# Patient Record
Sex: FEMALE | Race: BLACK OR AFRICAN AMERICAN | NOT HISPANIC OR LATINO | Employment: OTHER | URBAN - METROPOLITAN AREA
[De-identification: names, ages, dates, MRNs, and addresses within clinical notes are randomized per-mention and may not be internally consistent; named-entity substitution may affect disease eponyms.]

---

## 2017-01-06 RX ORDER — GLIMEPIRIDE 4 MG/1
TABLET ORAL
Qty: 30 TABLET | Refills: 1 | Status: SHIPPED | OUTPATIENT
Start: 2017-01-06 | End: 2017-03-11 | Stop reason: SDUPTHER

## 2017-01-11 ENCOUNTER — OFFICE VISIT (OUTPATIENT)
Dept: SLEEP MEDICINE | Facility: HOSPITAL | Age: 57
End: 2017-01-11

## 2017-01-11 VITALS
BODY MASS INDEX: 37.19 KG/M2 | OXYGEN SATURATION: 96 % | HEART RATE: 66 BPM | WEIGHT: 197 LBS | SYSTOLIC BLOOD PRESSURE: 158 MMHG | HEIGHT: 61 IN | DIASTOLIC BLOOD PRESSURE: 66 MMHG

## 2017-01-11 DIAGNOSIS — F51.04 PSYCHOPHYSIOLOGICAL INSOMNIA: ICD-10-CM

## 2017-01-11 DIAGNOSIS — G47.33 OBSTRUCTIVE SLEEP APNEA SYNDROME: Primary | ICD-10-CM

## 2017-01-11 PROBLEM — E61.1 IRON DEFICIENCY: Status: ACTIVE | Noted: 2017-01-11

## 2017-01-11 PROBLEM — J02.0 STREP PHARYNGITIS: Status: ACTIVE | Noted: 2017-01-11

## 2017-01-11 PROCEDURE — 99213 OFFICE O/P EST LOW 20 MIN: CPT | Performed by: INTERNAL MEDICINE

## 2017-01-11 RX ORDER — HYDROCODONE BITARTRATE AND ACETAMINOPHEN 5; 325 MG/1; MG/1
TABLET ORAL
COMMUNITY
Start: 2016-12-19 | End: 2018-04-23

## 2017-01-11 NOTE — PATIENT INSTRUCTIONS
Insomnia  Insomnia is a sleep disorder that makes it difficult to fall asleep or to stay asleep. Insomnia can cause tiredness (fatigue), low energy, difficulty concentrating, mood swings, and poor performance at work or school.   There are three different ways to classify insomnia:   · Difficulty falling asleep.  · Difficulty staying asleep.  · Waking up too early in the morning.  Any type of insomnia can be long-term (chronic) or short-term (acute). Both are common. Short-term insomnia usually lasts for three months or less. Chronic insomnia occurs at least three times a week for longer than three months.  CAUSES   Insomnia may be caused by another condition, situation, or substance, such as:  · Anxiety.  · Certain medicines.  · Gastroesophageal reflux disease (GERD) or other gastrointestinal conditions.  · Asthma or other breathing conditions.  · Restless legs syndrome, sleep apnea, or other sleep disorders.  · Chronic pain.  · Menopause. This may include hot flashes.  · Stroke.  · Abuse of alcohol, tobacco, or illegal drugs.  · Depression.  · Caffeine.    · Neurological disorders, such as Alzheimer disease.  · An overactive thyroid (hyperthyroidism).  The cause of insomnia may not be known.  RISK FACTORS  Risk factors for insomnia include:  · Gender. Women are more commonly affected than men.  · Age. Insomnia is more common as you get older.  · Stress. This may involve your professional or personal life.  · Income. Insomnia is more common in people with lower income.  · Lack of exercise.    · Irregular work schedule or night shifts.  · Traveling between different time zones.  SIGNS AND SYMPTOMS  If you have insomnia, trouble falling asleep or trouble staying asleep is the main symptom. This may lead to other symptoms, such as:  · Feeling fatigued.  · Feeling nervous about going to sleep.  · Not feeling rested in the morning.  · Having trouble concentrating.  · Feeling irritable, anxious, or depressed.  TREATMENT    Treatment for insomnia depends on the cause. If your insomnia is caused by an underlying condition, treatment will focus on addressing the condition. Treatment may also include:   · Medicines to help you sleep.  · Counseling or therapy.  · Lifestyle adjustments.  HOME CARE INSTRUCTIONS   · Take medicines only as directed by your health care provider.  · Keep regular sleeping and waking hours. Avoid naps.  · Keep a sleep diary to help you and your health care provider figure out what could be causing your insomnia. Include:      When you sleep.    When you wake up during the night.    How well you sleep.      How rested you feel the next day.    Any side effects of medicines you are taking.    What you eat and drink.    · Make your bedroom a comfortable place where it is easy to fall asleep:    Put up shades or special blackout curtains to block light from outside.    Use a white noise machine to block noise.    Keep the temperature cool.    · Exercise regularly as directed by your health care provider. Avoid exercising right before bedtime.  · Use relaxation techniques to manage stress. Ask your health care provider to suggest some techniques that may work well for you. These may include:    Breathing exercises.    Routines to release muscle tension.    Visualizing peaceful scenes.  · Cut back on alcohol, caffeinated beverages, and cigarettes, especially close to bedtime. These can disrupt your sleep.  · Do not overeat or eat spicy foods right before bedtime. This can lead to digestive discomfort that can make it hard for you to sleep.  · Limit screen use before bedtime. This includes:    Watching TV.    Using your smartphone, tablet, and computer.  · Stick to a routine. This can help you fall asleep faster. Try to do a quiet activity, brush your teeth, and go to bed at the same time each night.  · Get out of bed if you are still awake after 15 minutes of trying to sleep. Keep the lights down, but try reading or  doing a quiet activity. When you feel sleepy, go back to bed.  · Make sure that you drive carefully. Avoid driving if you feel very sleepy.  · Keep all follow-up appointments as directed by your health care provider. This is important.  SEEK MEDICAL CARE IF:   · You are tired throughout the day or have trouble in your daily routine due to sleepiness.  · You continue to have sleep problems or your sleep problems get worse.  SEEK IMMEDIATE MEDICAL CARE IF:   · You have serious thoughts about hurting yourself or someone else.     This information is not intended to replace advice given to you by your health care provider. Make sure you discuss any questions you have with your health care provider.     Document Released: 12/15/2001 Document Revised: 09/07/2016 Document Reviewed: 09/18/2015  [employer] Interactive Patient Education ©2016 [employer]  Sleep Apnea   Sleep apnea is a sleep disorder characterized by abnormal pauses in breathing while you sleep. When your breathing pauses, the level of oxygen in your blood decreases. This causes you to move out of deep sleep and into light sleep. As a result, your quality of sleep is poor, and the system that carries your blood throughout your body (cardiovascular system) experiences stress. If sleep apnea remains untreated, the following conditions can develop:  · High blood pressure (hypertension).  · Coronary artery disease.  · Inability to achieve or maintain an erection (impotence).  · Impairment of your thought process (cognitive dysfunction).  There are three types of sleep apnea:  1. Obstructive sleep apnea--Pauses in breathing during sleep because of a blocked airway.  2. Central sleep apnea--Pauses in breathing during sleep because the area of the brain that controls your breathing does not send the correct signals to the muscles that control breathing.  3. Mixed sleep apnea--A combination of both obstructive and central sleep apnea.  RISK FACTORS  The following  "risk factors can increase your risk of developing sleep apnea:  · Being overweight.  · Smoking.  · Having narrow passages in your nose and throat.  · Being of older age.  · Being male.  · Alcohol use.  · Sedative and tranquilizer use.  · Ethnicity. Among individuals younger than 35 years,  Americans are at increased risk of sleep apnea.  SYMPTOMS   · Difficulty staying asleep.  · Daytime sleepiness and fatigue.  · Loss of energy.  · Irritability.  · Loud, heavy snoring.  · Morning headaches.  · Trouble concentrating.  · Forgetfulness.  · Decreased interest in sex.  · Unexplained sleepiness.  DIAGNOSIS   In order to diagnose sleep apnea, your caregiver will perform a physical examination. A sleep study done in the comfort of your own home may be appropriate if you are otherwise healthy. Your caregiver may also recommend that you spend the night in a sleep lab. In the sleep lab, several monitors record information about your heart, lungs, and brain while you sleep. Your leg and arm movements and blood oxygen level are also recorded.  TREATMENT  The following actions may help to resolve mild sleep apnea:  · Sleeping on your side.    · Using a decongestant if you have nasal congestion.    · Avoiding the use of depressants, including alcohol, sedatives, and narcotics.    · Losing weight and modifying your diet if you are overweight.  There also are devices and treatments to help open your airway:  · Oral appliances. These are custom-made mouthpieces that shift your lower jaw forward and slightly open your bite. This opens your airway.  · Devices that create positive airway pressure. This positive pressure \"splints\" your airway open to help you breathe better during sleep. The following devices create positive airway pressure:    Continuous positive airway pressure (CPAP) device. The CPAP device creates a continuous level of air pressure with an air pump. The air is delivered to your airway through a mask while you " sleep. This continuous pressure keeps your airway open.    Nasal expiratory positive airway pressure (EPAP) device. The EPAP device creates positive air pressure as you exhale. The device consists of single-use valves, which are inserted into each nostril and held in place by adhesive. The valves create very little resistance when you inhale but create much more resistance when you exhale. That increased resistance creates the positive airway pressure. This positive pressure while you exhale keeps your airway open, making it easier to breath when you inhale again.    Bilevel positive airway pressure (BPAP) device. The BPAP device is used mainly in patients with central sleep apnea. This device is similar to the CPAP device because it also uses an air pump to deliver continuous air pressure through a mask. However, with the BPAP machine, the pressure is set at two different levels. The pressure when you exhale is lower than the pressure when you inhale.  · Surgery. Typically, surgery is only done if you cannot comply with less invasive treatments or if the less invasive treatments do not improve your condition. Surgery involves removing excess tissue in your airway to create a wider passage way.     This information is not intended to replace advice given to you by your health care provider. Make sure you discuss any questions you have with your health care provider.     Document Released: 12/08/2003 Document Revised: 01/08/2016 Document Reviewed: 04/25/2013  [employer] Interactive Patient Education ©2016 [employer]

## 2017-01-11 NOTE — PROGRESS NOTES
"Eamon Lopez is a 56 y.o. female is here today for follow-up.She is seen follow-up for obstructive sleep apnea and difficulty getting to sleep.  Her primary care physician is Dr. Little.    History of Present Illness  Patient was last seen here August 24, 2016.  She has obstructive sleep apnea obesity diabetes and dyspnea she's been having some problems of being unable to fall asleep for the past 2 weeks she is been going to bed at 10 or 10:30 at night taking Ambien still some problems falling asleep.  She's sleeping during the day however up to 5 hours says she would like to get up at 8:30 or 9 but is sleeping off until 11 AM.  She was being  and apparently had 4 for foster children removed and one day.  She is been using Ambien as needed.  The following portions of the patient's history were reviewed and updated as appropriate: allergies, current medications and problem list.    Review of Systems   Constitutional: Negative.    HENT: Negative.    Eyes: Positive for visual disturbance.   Respiratory: Positive for shortness of breath.    Cardiovascular: Positive for palpitations.   Gastrointestinal: Negative.    Endocrine: Negative.    Genitourinary: Negative.    Musculoskeletal: Positive for arthralgias and joint swelling.   Skin: Negative.    Allergic/Immunologic: Negative.    Neurological: Negative.    Hematological: Negative.    Psychiatric/Behavioral: Positive for dysphoric mood.       Objective    Visit Vitals   • /66   • Pulse 66   • Ht 61\" (154.9 cm)   • Wt 197 lb (89.4 kg)   • SpO2 96%   • BMI 37.22 kg/m2       Physical Exam   Constitutional: She is oriented to person, place, and time. She appears well-developed and well-nourished.   She is obese.   HENT:   Head: Normocephalic and atraumatic.   She has Mallampati class IV anatomy   Eyes: EOM are normal. Pupils are equal, round, and reactive to light.   Neck: Normal range of motion. Neck supple.   Cardiovascular: Normal " rate, regular rhythm and normal heart sounds.    Pulmonary/Chest: Effort normal and breath sounds normal.   Abdominal: Soft. Bowel sounds are normal.   Musculoskeletal: Normal range of motion. She exhibits no edema.   Neurological: She is alert and oriented to person, place, and time.   Skin: Skin is warm and dry.   Psychiatric: She has a normal mood and affect. Her behavior is normal.     Download from machine for the past 6 months shows usage 99.4% the days.  She 8 hours 17 minutes per day.  Her AHI is 2.6.  Her 90% pressure is 12.3.    Assessment/Plan   Di was seen today for follow-up.    Diagnoses and all orders for this visit:    Obstructive sleep apnea syndrome  -     Generic Auto PAP Order    Psychophysiological insomnia        The patient does have significant obstructive sleep apnea but appears to be good control on her current pressure settings.  We'll write for new supplies.  She is continued encouraged to lose weight and to avoid alcohol and sedatives close to bedtime she is to practice lateral position sleep    She seems have some significant psych physiologic insomnia.  Given her information on measures to improve her sleep hygiene  also given her information on the [facility] cognitive behavioral therapy course for insomnia.  She is to return then in 4 months and we'll reassess situation.  She is to contact us earlier symptoms worsen.    Mendoza Mercer MD [facility]  Sleep Medicine  Pulmonary and Critical Care Medicine

## 2017-01-11 NOTE — MR AVS SNAPSHOT
Di WADDELL Hockaday 1/11/2017 3:45 PM   Office Visit    Dept Phone:  [phone]   Encounter #:  [id number]    Provider:  Mendoza Mercer MD   Department:  [facility] SLEEP MEDICINE                Your Full Care Plan              Your Updated Medication List          This list is accurate as of: 1/11/17  4:44 PM.  Always use your most recent med list.                albuterol 108 (90 BASE) MCG/ACT inhaler   Commonly known as:  PROVENTIL HFA;VENTOLIN HFA       Alcohol Prep 70 % pads       ASSURE COMFORT LANCETS 30G misc       budesonide-formoterol 160-4.5 MCG/ACT inhaler   Commonly known as:  SYMBICORT   Inhale 2 puffs 2 (two) times a day.       carvedilol 12.5 MG tablet   Commonly known as:  COREG       Dextromethorphan-Guaifenesin  MG capsule   Commonly known as:  CORICIDIN HBP CONGESTION/COUGH   Take 2 tablets by mouth Every 4 (Four) Hours As Needed (cough).       escitalopram 20 MG tablet   Commonly known as:  LEXAPRO   Take 1 tablet by mouth Daily.       FISH OIL PO       folic acid 1 MG tablet   Commonly known as:  FOLVITE   Take 1 tablet by mouth daily.       furosemide 40 MG tablet   Commonly known as:  LASIX       gabapentin 600 MG tablet   Commonly known as:  NEURONTIN       glimepiride 4 MG tablet   Commonly known as:  AMARYL   TAKE ONE TABLET BY MOUTH ONCE DAILY       * HYDROcodone-acetaminophen  MG per tablet   Commonly known as:  NORCO       * HYDROcodone-acetaminophen 5-325 MG per tablet   Commonly known as:  NORCO       Insulin Glargine 100 UNIT/ML injection pen   Commonly known as:  LANTUS SOLOSTAR   Inject 40 Units under the skin daily.       nitrofurantoin 100 MG capsule   Commonly known as:  MACRODANTIN   Take 1 capsule by mouth 2 (Two) Times a Day.       ONE TOUCH ULTRA 2 W/DEVICE kit       ONE TOUCH ULTRA TEST test strip   Generic drug:  glucose blood       ONGLYZA 5 MG tablet   Generic drug:  SAXagliptin   TAKE ONE TABLET BY MOUTH ONCE DAILY        OT ULTRA/FASTTK CNTRL SOLN solution       RELION PEN NEEDLES 32G X 4 MM misc   Generic drug:  Insulin Pen Needle       SIMPLE DIAGNOSTICS LANCING DEV misc       simvastatin 40 MG tablet   Commonly known as:  ZOCOR       * warfarin 1 MG tablet   Commonly known as:  COUMADIN       * warfarin 4 MG tablet   Commonly known as:  COUMADIN       * Notice:  This list has 4 medication(s) that are the same as other medications prescribed for you. Read the directions carefully, and ask your doctor or other care provider to review them with you.            We Performed the Following     Generic Auto PAP Order       You Were Diagnosed With        Codes Comments    Obstructive sleep apnea syndrome    -  Primary ICD-10-CM: G47.33  ICD-9-CM: 327.23     Psychophysiological insomnia     ICD-10-CM: F51.04  ICD-9-CM: 307.42       Instructions    Insomnia  Insomnia is a sleep disorder that makes it difficult to fall asleep or to stay asleep. Insomnia can cause tiredness (fatigue), low energy, difficulty concentrating, mood swings, and poor performance at work or school.   There are three different ways to classify insomnia:   · Difficulty falling asleep.  · Difficulty staying asleep.  · Waking up too early in the morning.  Any type of insomnia can be long-term (chronic) or short-term (acute). Both are common. Short-term insomnia usually lasts for three months or less. Chronic insomnia occurs at least three times a week for longer than three months.  CAUSES   Insomnia may be caused by another condition, situation, or substance, such as:  · Anxiety.  · Certain medicines.  · Gastroesophageal reflux disease (GERD) or other gastrointestinal conditions.  · Asthma or other breathing conditions.  · Restless legs syndrome, sleep apnea, or other sleep disorders.  · Chronic pain.  · Menopause. This may include hot flashes.  · Stroke.  · Abuse of alcohol, tobacco, or illegal drugs.  · Depression.  · Caffeine.    · Neurological disorders, such  as Alzheimer disease.  · An overactive thyroid (hyperthyroidism).  The cause of insomnia may not be known.  RISK FACTORS  Risk factors for insomnia include:  · Gender. Women are more commonly affected than men.  · Age. Insomnia is more common as you get older.  · Stress. This may involve your professional or personal life.  · Income. Insomnia is more common in people with lower income.  · Lack of exercise.    · Irregular work schedule or night shifts.  · Traveling between different time zones.  SIGNS AND SYMPTOMS  If you have insomnia, trouble falling asleep or trouble staying asleep is the main symptom. This may lead to other symptoms, such as:  · Feeling fatigued.  · Feeling nervous about going to sleep.  · Not feeling rested in the morning.  · Having trouble concentrating.  · Feeling irritable, anxious, or depressed.  TREATMENT   Treatment for insomnia depends on the cause. If your insomnia is caused by an underlying condition, treatment will focus on addressing the condition. Treatment may also include:   · Medicines to help you sleep.  · Counseling or therapy.  · Lifestyle adjustments.  HOME CARE INSTRUCTIONS   · Take medicines only as directed by your health care provider.  · Keep regular sleeping and waking hours. Avoid naps.  · Keep a sleep diary to help you and your health care provider figure out what could be causing your insomnia. Include:      When you sleep.    When you wake up during the night.    How well you sleep.      How rested you feel the next day.    Any side effects of medicines you are taking.    What you eat and drink.    · Make your bedroom a comfortable place where it is easy to fall asleep:    Put up shades or special blackout curtains to block light from outside.    Use a white noise machine to block noise.    Keep the temperature cool.    · Exercise regularly as directed by your health care provider. Avoid exercising right before bedtime.  · Use relaxation techniques to manage stress.  Ask your health care provider to suggest some techniques that may work well for you. These may include:    Breathing exercises.    Routines to release muscle tension.    Visualizing peaceful scenes.  · Cut back on alcohol, caffeinated beverages, and cigarettes, especially close to bedtime. These can disrupt your sleep.  · Do not overeat or eat spicy foods right before bedtime. This can lead to digestive discomfort that can make it hard for you to sleep.  · Limit screen use before bedtime. This includes:    Watching TV.    Using your smartphone, tablet, and computer.  · Stick to a routine. This can help you fall asleep faster. Try to do a quiet activity, brush your teeth, and go to bed at the same time each night.  · Get out of bed if you are still awake after 15 minutes of trying to sleep. Keep the lights down, but try reading or doing a quiet activity. When you feel sleepy, go back to bed.  · Make sure that you drive carefully. Avoid driving if you feel very sleepy.  · Keep all follow-up appointments as directed by your health care provider. This is important.  SEEK MEDICAL CARE IF:   · You are tired throughout the day or have trouble in your daily routine due to sleepiness.  · You continue to have sleep problems or your sleep problems get worse.  SEEK IMMEDIATE MEDICAL CARE IF:   · You have serious thoughts about hurting yourself or someone else.     This information is not intended to replace advice given to you by your health care provider. Make sure you discuss any questions you have with your health care provider.     Document Released: 12/15/2001 Document Revised: 09/07/2016 Document Reviewed: 09/18/2015  [employer] Interactive Patient Education ©2016 [employer]  Sleep Apnea   Sleep apnea is a sleep disorder characterized by abnormal pauses in breathing while you sleep. When your breathing pauses, the level of oxygen in your blood decreases. This causes you to move out of deep sleep and into light sleep. As  a result, your quality of sleep is poor, and the system that carries your blood throughout your body (cardiovascular system) experiences stress. If sleep apnea remains untreated, the following conditions can develop:  · High blood pressure (hypertension).  · Coronary artery disease.  · Inability to achieve or maintain an erection (impotence).  · Impairment of your thought process (cognitive dysfunction).  There are three types of sleep apnea:  1. Obstructive sleep apnea--Pauses in breathing during sleep because of a blocked airway.  2. Central sleep apnea--Pauses in breathing during sleep because the area of the brain that controls your breathing does not send the correct signals to the muscles that control breathing.  3. Mixed sleep apnea--A combination of both obstructive and central sleep apnea.  RISK FACTORS  The following risk factors can increase your risk of developing sleep apnea:  · Being overweight.  · Smoking.  · Having narrow passages in your nose and throat.  · Being of older age.  · Being male.  · Alcohol use.  · Sedative and tranquilizer use.  · Ethnicity. Among individuals younger than 35 years,  Americans are at increased risk of sleep apnea.  SYMPTOMS   · Difficulty staying asleep.  · Daytime sleepiness and fatigue.  · Loss of energy.  · Irritability.  · Loud, heavy snoring.  · Morning headaches.  · Trouble concentrating.  · Forgetfulness.  · Decreased interest in sex.  · Unexplained sleepiness.  DIAGNOSIS   In order to diagnose sleep apnea, your caregiver will perform a physical examination. A sleep study done in the comfort of your own home may be appropriate if you are otherwise healthy. Your caregiver may also recommend that you spend the night in a sleep lab. In the sleep lab, several monitors record information about your heart, lungs, and brain while you sleep. Your leg and arm movements and blood oxygen level are also recorded.  TREATMENT  The following actions may help to resolve  "mild sleep apnea:  · Sleeping on your side.    · Using a decongestant if you have nasal congestion.    · Avoiding the use of depressants, including alcohol, sedatives, and narcotics.    · Losing weight and modifying your diet if you are overweight.  There also are devices and treatments to help open your airway:  · Oral appliances. These are custom-made mouthpieces that shift your lower jaw forward and slightly open your bite. This opens your airway.  · Devices that create positive airway pressure. This positive pressure \"splints\" your airway open to help you breathe better during sleep. The following devices create positive airway pressure:    Continuous positive airway pressure (CPAP) device. The CPAP device creates a continuous level of air pressure with an air pump. The air is delivered to your airway through a mask while you sleep. This continuous pressure keeps your airway open.    Nasal expiratory positive airway pressure (EPAP) device. The EPAP device creates positive air pressure as you exhale. The device consists of single-use valves, which are inserted into each nostril and held in place by adhesive. The valves create very little resistance when you inhale but create much more resistance when you exhale. That increased resistance creates the positive airway pressure. This positive pressure while you exhale keeps your airway open, making it easier to breath when you inhale again.    Bilevel positive airway pressure (BPAP) device. The BPAP device is used mainly in patients with central sleep apnea. This device is similar to the CPAP device because it also uses an air pump to deliver continuous air pressure through a mask. However, with the BPAP machine, the pressure is set at two different levels. The pressure when you exhale is lower than the pressure when you inhale.  · Surgery. Typically, surgery is only done if you cannot comply with less invasive treatments or if the less invasive treatments do not " "improve your condition. Surgery involves removing excess tissue in your airway to create a wider passage way.     This information is not intended to replace advice given to you by your health care provider. Make sure you discuss any questions you have with your health care provider.     Document Released: 12/08/2003 Document Revised: 01/08/2016 Document Reviewed: 04/25/2013  [employer] Interactive Patient Education ©2016 [employer]       Patient Instructions History      Upcoming Appointments     Visit Type Date Time Department    FOLLOW UP 1/11/2017  3:45 PM [facility] [place]    FOLLOW UP 1 UNIT 2/3/2017 11:30 AM [facility] [place]    FOLLOW UP 5/12/2017  2:45 PM [facility] [place]      MyChart Signup     Our records indicate that you have declined [facility] [employer] signup. If you would like to sign up for [employer], please email [facility] Nest [email or web address] or call [phone] to obtain an activation code.             Other Info from Your Visit           Your Appointments     Feb 03, 2017 11:30 AM EST   FOLLOW UP with Quinn Palmer MD   [facility] HEMATOLOGY  AND ONCOLOGY ([place])    [address] [address]  [place] [address]   [phone]            May 12, 2017  2:45 PM EDT   Follow Up with Mendoza Mercer MD   [facility] SLEEP MEDICINE (--)    [address] [address]  [place] [id number] [phone]           Please bring completed new patient packets that were mailed to you prior to follow up as well as bringing a copy of insurnace card and photo ID to visit. Please bring downloadable chips/cards out of machines if you are on PAP therapy.              Allergies     Codeine      Latex      Penicillins      Sulfa Antibiotics        Reason for Visit     Follow-up           Vital Signs     Blood Pressure Pulse Height Weight Oxygen Saturation Body Mass Index    158/66 66 61\" (154.9 cm) 197 lb (89.4 kg) 96% 37.22 kg/m2    Smoking Status "                   Former Smoker           Problems and Diagnoses Noted     Difficulty falling or staying asleep    Sleep apnea

## 2017-01-11 NOTE — LETTER
January 13, 2017     Keri Little DO  [address] [place] [id number]    Patient: Di Lopez   YOB: 1960   Date of Visit: 1/11/2017       Dear Dr. Sidney DO:    Thank you for referring Di Lopez to me for evaluation. Below are the relevant portions of my assessment and plan of care.      Di was seen today for follow-up.    Diagnoses and all orders for this visit:    Obstructive sleep apnea syndrome  -     Generic Auto PAP Order    Psychophysiological insomnia        The patient does have significant obstructive sleep apnea but appears to be good control on her current pressure settings.  We'll write for new supplies.  She is continued encouraged to lose weight and to avoid alcohol and sedatives close to bedtime she is to practice lateral position sleep    She seems have some significant psych physiologic insomnia.  Given her information on measures to improve her sleep hygiene  also given her information on the [facility] cognitive behavioral therapy course for insomnia.  She is to return then in 4 months and we'll reassess situation.  She is to contact us earlier symptoms worsen.    Mendoza Mercer MD [facility]  Sleep Medicine  Pulmonary and Critical Care Medicine                      If you have questions, please do not hesitate to call me. I look forward to following Di along with you.         Sincerely,        Mendoza Mercer MD        CC: No Recipients

## 2017-02-02 RX ORDER — PEN NEEDLE, DIABETIC 32GX 5/32"
NEEDLE, DISPOSABLE MISCELLANEOUS
Qty: 100 EACH | Refills: 0 | Status: SHIPPED | OUTPATIENT
Start: 2017-02-02 | End: 2017-06-21 | Stop reason: SDUPTHER

## 2017-02-07 ENCOUNTER — OFFICE VISIT (OUTPATIENT)
Dept: ONCOLOGY | Facility: CLINIC | Age: 57
End: 2017-02-07

## 2017-02-07 ENCOUNTER — LAB (OUTPATIENT)
Dept: LAB | Facility: HOSPITAL | Age: 57
End: 2017-02-07

## 2017-02-07 VITALS
HEART RATE: 69 BPM | BODY MASS INDEX: 37.19 KG/M2 | RESPIRATION RATE: 16 BRPM | DIASTOLIC BLOOD PRESSURE: 70 MMHG | SYSTOLIC BLOOD PRESSURE: 153 MMHG | WEIGHT: 197 LBS | TEMPERATURE: 96.9 F | HEIGHT: 61 IN

## 2017-02-07 DIAGNOSIS — D64.9 NORMOCYTIC ANEMIA: Primary | ICD-10-CM

## 2017-02-07 DIAGNOSIS — D64.9 NORMOCYTIC ANEMIA: ICD-10-CM

## 2017-02-07 LAB
ERYTHROCYTE [DISTWIDTH] IN BLOOD BY AUTOMATED COUNT: 13.9 % (ref 11.3–14.5)
FERRITIN SERPL-MCNC: 89 NG/ML (ref 10–291)
FOLATE SERPL-MCNC: 16.86 NG/ML (ref 3.2–20)
HCT VFR BLD AUTO: 36.9 % (ref 34.5–44)
HGB BLD-MCNC: 12.4 G/DL (ref 11.5–15.5)
IRON 24H UR-MRATE: 56 MCG/DL (ref 50–175)
IRON SATN MFR SERPL: 14 % (ref 15–50)
LYMPHOCYTES # BLD AUTO: 1.5 10*3/MM3 (ref 0.6–4.8)
LYMPHOCYTES NFR BLD AUTO: 19.2 % (ref 24–44)
MCH RBC QN AUTO: 26.8 PG (ref 27–31)
MCHC RBC AUTO-ENTMCNC: 33.5 G/DL (ref 32–36)
MCV RBC AUTO: 80.2 FL (ref 80–99)
MONOCYTES # BLD AUTO: 0.3 10*3/MM3 (ref 0–1)
MONOCYTES NFR BLD AUTO: 3.6 % (ref 0–12)
NEUTROPHILS # BLD AUTO: 6.2 10*3/MM3 (ref 1.5–8.3)
NEUTROPHILS NFR BLD AUTO: 77.2 % (ref 41–71)
PLATELET # BLD AUTO: 340 10*3/MM3 (ref 150–450)
PMV BLD AUTO: 8 FL (ref 6–12)
RBC # BLD AUTO: 4.6 10*6/MM3 (ref 3.89–5.14)
TIBC SERPL-MCNC: 387 MCG/DL (ref 250–450)
WBC NRBC COR # BLD: 8 10*3/MM3 (ref 3.5–10.8)

## 2017-02-07 PROCEDURE — 82746 ASSAY OF FOLIC ACID SERUM: CPT | Performed by: INTERNAL MEDICINE

## 2017-02-07 PROCEDURE — 85025 COMPLETE CBC W/AUTO DIFF WBC: CPT

## 2017-02-07 PROCEDURE — 82607 VITAMIN B-12: CPT | Performed by: INTERNAL MEDICINE

## 2017-02-07 PROCEDURE — 36415 COLL VENOUS BLD VENIPUNCTURE: CPT

## 2017-02-07 PROCEDURE — 83540 ASSAY OF IRON: CPT | Performed by: INTERNAL MEDICINE

## 2017-02-07 PROCEDURE — 99213 OFFICE O/P EST LOW 20 MIN: CPT | Performed by: NURSE PRACTITIONER

## 2017-02-07 PROCEDURE — 82728 ASSAY OF FERRITIN: CPT | Performed by: INTERNAL MEDICINE

## 2017-02-07 PROCEDURE — 83550 IRON BINDING TEST: CPT | Performed by: INTERNAL MEDICINE

## 2017-02-07 NOTE — PROGRESS NOTES
"DATE OF VISIT: 2/7/2017    REASON FOR VISIT: Followup for normocytic anemia.      HISTORY OF PRESENT ILLNESS: The patient is a very pleasant 53-year-old   American female with past medical history significant for anemia. The patient  had been diagnosed with iron deficiency and vitamin B12 deficiency in the past.  The patient was treated with IV iron replacement and vitamin B12 replacement.  The patient was doing fine until 07/2013. The patient was admitted to the  hospital with fatigue and weakness and found to have hemoglobin of 5. The  patient had a good response to transfusion of 4 units of blood during her  hospitalization. She had a normal iron panel, borderline vitamin B12 level.  She was started on vitamin B12 replacement prior to discharge. The patient had  EGD, colonoscopy and barium swallow evaluation done in 2013 that was negative  for bleeds. The patient is here today in scheduled followup visit.     SUBJECTIVE: The patient has been doing fairly well. She complains today of difficulty sleeping. She can't fall asleep until 3:00 am. She has tried Ambien and melatonin with no relief. She notes she falls asleep during the day and feels \"run down.\" She has been seen by sleep medicine, but has had no improvement in her symptoms. She also notes persistent shortness of air. This has not been progressive. She denies cough, fever, chills, or wheezing. Otherwise, she denies evidence of bleeding. She continues her Coumadin for mechanical valve.  PAST MEDICAL HISTORY/SOCIAL HISTORY/FAMILY HISTORY: Unchanged from my prior documentation done on 09/16/2013.     Review of Systems   Constitutional: Positive for fatigue. Negative for activity change, appetite change, chills, fever and unexpected weight change.   HENT: Negative for hearing loss, mouth sores, nosebleeds, sore throat and trouble swallowing.    Eyes: Negative for visual disturbance.   Respiratory: Positive for shortness of breath. Negative for cough, " chest tightness and wheezing.    Cardiovascular: Negative for chest pain, palpitations and leg swelling.   Gastrointestinal: Negative for abdominal distention, abdominal pain, blood in stool, constipation, diarrhea, nausea, rectal pain and vomiting.   Endocrine: Negative for cold intolerance and heat intolerance.   Genitourinary: Negative for difficulty urinating, dysuria, frequency and urgency.   Musculoskeletal: Negative for arthralgias, back pain, gait problem, joint swelling and myalgias.   Skin: Negative for rash.   Neurological: Negative for dizziness, tremors, syncope, weakness, light-headedness, numbness and headaches.        Sleep disturbance   Hematological: Negative for adenopathy. Does not bruise/bleed easily.   Psychiatric/Behavioral: Negative for confusion, sleep disturbance and suicidal ideas. The patient is not nervous/anxious.          Current Outpatient Prescriptions:   •  albuterol (PROVENTIL HFA;VENTOLIN HFA) 108 (90 BASE) MCG/ACT inhaler, Inhale 2 puffs Every 4 (Four) Hours As Needed for wheezing., Disp: , Rfl:   •  Alcohol Swabs (ALCOHOL PREP) 70 % pads, , Disp: , Rfl:   •  ASSURE COMFORT LANCETS 30G misc, , Disp: , Rfl:   •  Blood Glucose Calibration (OT ULTRA/FASTTK CNTRL SOLN) solution, , Disp: , Rfl:   •  Blood Glucose Monitoring Suppl (ONE TOUCH ULTRA 2) W/DEVICE kit, , Disp: , Rfl:   •  budesonide-formoterol (SYMBICORT) 160-4.5 MCG/ACT inhaler, Inhale 2 puffs 2 (two) times a day., Disp: 1 inhaler, Rfl: 12  •  carvedilol (COREG) 12.5 MG tablet, , Disp: , Rfl:   •  Dextromethorphan-Guaifenesin (CORICIDIN HBP CONGESTION/COUGH)  MG capsule, Take 2 tablets by mouth Every 4 (Four) Hours As Needed (cough)., Disp: 168 each, Rfl: 0  •  escitalopram (LEXAPRO) 20 MG tablet, Take 1 tablet by mouth Daily., Disp: 30 tablet, Rfl: 2  •  folic acid (FOLVITE) 1 MG tablet, Take 1 tablet by mouth daily., Disp: 30 tablet, Rfl: 5  •  furosemide (LASIX) 40 MG tablet, Take 1 tablet by mouth daily., Disp: ,  "Rfl:   •  gabapentin (NEURONTIN) 600 MG tablet, , Disp: , Rfl:   •  glimepiride (AMARYL) 4 MG tablet, TAKE ONE TABLET BY MOUTH ONCE DAILY, Disp: 30 tablet, Rfl: 1  •  HYDROcodone-acetaminophen (NORCO)  MG per tablet, , Disp: , Rfl:   •  HYDROcodone-acetaminophen (NORCO) 5-325 MG per tablet, , Disp: , Rfl:   •  Insulin Glargine (LANTUS SOLOSTAR) 100 UNIT/ML injection pen, Inject 40 Units under the skin daily., Disp: 1 pen, Rfl: 3  •  Lancet Devices (SIMPLE DIAGNOSTICS LANCING DEV) misc, , Disp: , Rfl:   •  nitrofurantoin (MACRODANTIN) 100 MG capsule, Take 1 capsule by mouth 2 (Two) Times a Day., Disp: 20 capsule, Rfl: 0  •  Omega-3 Fatty Acids (FISH OIL PO), Take  by mouth., Disp: , Rfl:   •  ONE TOUCH ULTRA TEST test strip, , Disp: , Rfl:   •  ONGLYZA 5 MG tablet, TAKE ONE TABLET BY MOUTH ONCE DAILY, Disp: 30 tablet, Rfl: 2  •  RELION PEN NEEDLES 32G X 4 MM misc, USE AS DIRECTED, Disp: 100 each, Rfl: 0  •  simvastatin (ZOCOR) 40 MG tablet, Take 1 tablet by mouth every night., Disp: , Rfl:   •  warfarin (COUMADIN) 1 MG tablet, , Disp: , Rfl:   •  warfarin (COUMADIN) 4 MG tablet, , Disp: , Rfl:     PHYSICAL EXAMINATION:   Visit Vitals   • Ht 61\" (154.9 cm)      ECOG Performance Status: 1 - Symptomatic but completely ambulatory  General Appearance:  alert, cooperative, no apparent distress and appears stated age   Neurologic/Psychiatric: A&O x 3, gait steady, appropriate affect, strength 5/5 in all muscle groups   HEENT:  Normocephalic, without obvious abnormality, mucous membranes moist   Neck: Supple, symmetrical, trachea midline, no adenopathy;  No thyromegaly, masses, or tenderness   Lungs:   Clear to auscultation bilaterally; respirations regular, even, and unlabored bilaterally   Heart:  Regular rate and rhythm, no murmurs appreciated   Abdomen:   Soft, non-tender, non-distended and no organomegaly   Lymph nodes: No cervical, supraclavicular, inguinal or axillary adenopathy noted   Extremities: Normal, " atraumatic; no clubbing, cyanosis, or edema    Skin: No rashes, ulcers, or suspicious lesions noted     No visits with results within 2 Week(s) from this visit.  Latest known visit with results is:    Abstract on 01/11/2017   Component Date Value Ref Range Status   • [facility] Colonoscopy 01/01/2013 negative for bleeds   Final        No results found.    ASSESSMENT: The patient is a very pleasant 56-year-old  female  with normocytic anemia.    PLAN:  1. I did go over the CBC results in detail with the patient. I reassured her  that her hemoglobin is normal. Her iron studies and vitamin levels are pending at this time. We will follow up on these results and notify her of any significant findings.   2. The patient will come back to see me in 6 months with repeat CBC, ferritin, iron panel, vitamin B12, and folate.   3. The patient will continue taking Coumadin for her mechanical valve. She  gets her INR checked with Dr. Patel.   4. Regarding her sleep disturbance, I recommended good sleep hygiene including reduced caffeine in the evenings, limited screen time 30 minutes before bed, and avoiding naps throughout the day. She will follow up with her sleep medicine doctor regarding ongoing treatment.     Suzette Olsen, APRN  2/7/2017

## 2017-02-16 ENCOUNTER — OFFICE VISIT (OUTPATIENT)
Dept: RETAIL CLINIC | Facility: CLINIC | Age: 57
End: 2017-02-16

## 2017-02-16 VITALS — TEMPERATURE: 97.5 F | HEART RATE: 74 BPM | OXYGEN SATURATION: 99 %

## 2017-02-16 DIAGNOSIS — J40 BRONCHITIS: Primary | ICD-10-CM

## 2017-02-16 PROCEDURE — 99213 OFFICE O/P EST LOW 20 MIN: CPT | Performed by: NURSE PRACTITIONER

## 2017-02-16 RX ORDER — DEXTROMETHORPHAN HYDROBROMIDE AND PROMETHAZINE HYDROCHLORIDE 15; 6.25 MG/5ML; MG/5ML
5 SYRUP ORAL 4 TIMES DAILY PRN
Qty: 120 ML | Refills: 0 | Status: SHIPPED | OUTPATIENT
Start: 2017-02-16 | End: 2017-02-22

## 2017-02-16 RX ORDER — PREDNISONE 5 MG/1
TABLET ORAL
Qty: 1 EACH | Refills: 0 | Status: SHIPPED | OUTPATIENT
Start: 2017-02-16 | End: 2017-03-08

## 2017-02-16 RX ORDER — AZITHROMYCIN 250 MG/1
TABLET, FILM COATED ORAL
Qty: 6 TABLET | Refills: 0 | Status: SHIPPED | OUTPATIENT
Start: 2017-02-16 | End: 2017-03-08

## 2017-02-16 NOTE — PATIENT INSTRUCTIONS
Acute Bronchitis  Bronchitis is inflammation of the airways that extend from the windpipe into the lungs (bronchi). The inflammation often causes mucus to develop. This leads to a cough, which is the most common symptom of bronchitis.   In acute bronchitis, the condition usually develops suddenly and goes away over time, usually in a couple weeks. Smoking, allergies, and asthma can make bronchitis worse. Repeated episodes of bronchitis may cause further lung problems.   CAUSES  Acute bronchitis is most often caused by the same virus that causes a cold. The virus can spread from person to person (contagious) through coughing, sneezing, and touching contaminated objects.  SIGNS AND SYMPTOMS   · Cough.    · Fever.    · Coughing up mucus.    · Body aches.    · Chest congestion.    · Chills.    · Shortness of breath.    · Sore throat.    DIAGNOSIS   Acute bronchitis is usually diagnosed through a physical exam. Your health care provider will also ask you questions about your medical history. Tests, such as chest X-rays, are sometimes done to rule out other conditions.   TREATMENT   Acute bronchitis usually goes away in a couple weeks. Oftentimes, no medical treatment is necessary. Medicines are sometimes given for relief of fever or cough. Antibiotic medicines are usually not needed but may be prescribed in certain situations. In some cases, an inhaler may be recommended to help reduce shortness of breath and control the cough. A cool mist vaporizer may also be used to help thin bronchial secretions and make it easier to clear the chest.   HOME CARE INSTRUCTIONS  · Get plenty of rest.    · Drink enough fluids to keep your urine clear or pale yellow (unless you have a medical condition that requires fluid restriction). Increasing fluids may help thin your respiratory secretions (sputum) and reduce chest congestion, and it will prevent dehydration.    · Take medicines only as directed by your health care provider.  · If  you were prescribed an antibiotic medicine, finish it all even if you start to feel better.  · Avoid smoking and secondhand smoke. Exposure to cigarette smoke or irritating chemicals will make bronchitis worse. If you are a smoker, consider using nicotine gum or skin patches to help control withdrawal symptoms. Quitting smoking will help your lungs heal faster.    · Reduce the chances of another bout of acute bronchitis by washing your hands frequently, avoiding people with cold symptoms, and trying not to touch your hands to your mouth, nose, or eyes.    · Keep all follow-up visits as directed by your health care provider.    SEEK MEDICAL CARE IF:  Your symptoms do not improve after 1 week of treatment.   SEEK IMMEDIATE MEDICAL CARE IF:  · You develop an increased fever or chills.    · You have chest pain.    · You have severe shortness of breath.  · You have bloody sputum.    · You develop dehydration.  · You faint or repeatedly feel like you are going to pass out.  · You develop repeated vomiting.  · You develop a severe headache.  MAKE SURE YOU:   · Understand these instructions.  · Will watch your condition.  · Will get help right away if you are not doing well or get worse.     This information is not intended to replace advice given to you by your health care provider. Make sure you discuss any questions you have with your health care provider.     Document Released: 01/25/2006 Document Revised: 01/08/2016 Document Reviewed: 06/10/2014  [employer] Interactive Patient Education ©2016 [employer]

## 2017-02-21 RX ORDER — ESCITALOPRAM OXALATE 20 MG/1
TABLET ORAL
Qty: 30 TABLET | Refills: 0 | Status: SHIPPED | OUTPATIENT
Start: 2017-02-21 | End: 2017-03-31 | Stop reason: SDUPTHER

## 2017-02-21 NOTE — PROGRESS NOTES
Subjective   Di Lopez is a 56 y.o. female presenting to the clinic with c/o persistent cough, headache, chest congestion for the past few weeks, feeling worse today. OTC mucinex with minimal relief. Denies any known exposure. See ROS.      History of Present Illness     The following portions of the patient's history were reviewed and updated as appropriate: allergies, current medications, past family history, past medical history, past social history, past surgical history and problem list.    Review of Systems   Constitutional: Positive for activity change, appetite change (mild) and fatigue. Negative for chills and diaphoresis. Fever: unknown.   HENT: Negative for congestion, dental problem, ear discharge, ear pain, mouth sores, nosebleeds, postnasal drip, rhinorrhea, sinus pressure, sneezing, sore throat and trouble swallowing.    Eyes: Negative for discharge and redness.   Respiratory: Positive for cough (persistent/dry), chest tightness (chest congestion), shortness of breath and wheezing.    Cardiovascular: Positive for chest pain (chest soreness with coughing).   Gastrointestinal: Positive for nausea (mild). Negative for abdominal pain, diarrhea and vomiting.   Musculoskeletal: Positive for back pain (with coughing). Negative for myalgias.   Skin: Negative for rash.   Neurological: Positive for headaches (after coughing spells). Negative for dizziness.     Visit Vitals   • Pulse 74   • Temp 97.5 °F (36.4 °C)   • SpO2 99%   • Breastfeeding No         Objective   Physical Exam   Constitutional: She appears well-nourished. She has a sickly appearance.   HENT:   Right Ear: Tympanic membrane, external ear and ear canal normal.   Left Ear: Tympanic membrane, external ear and ear canal normal.   Nose: Nose normal.   Mouth/Throat: Uvula is midline and mucous membranes are normal. Posterior oropharyngeal erythema present. No oropharyngeal exudate or posterior oropharyngeal edema.   Eyes: Conjunctivae are  normal.   Cardiovascular: Normal rate, regular rhythm and normal heart sounds.    Pulmonary/Chest: Effort normal. She has decreased breath sounds in the right lower field and the left lower field. She has wheezes (intermittent/expiration). She has rhonchi (throughout that does not clear with cough). She has no rales.   Lymphadenopathy:     She has no cervical adenopathy.   Neurological: She is alert.   Skin: Skin is warm and dry.   Psychiatric: She has a normal mood and affect.       Assessment/Plan   Di was seen today for cough.    Diagnoses and all orders for this visit:    Bronchitis  -     promethazine-dextromethorphan (PROMETHAZINE-DM) 6.25-15 MG/5ML syrup; Take 5 mL by mouth 4 (Four) Times a Day As Needed for cough for up to 6 days.  -     PredniSONE 5 MG (21) tablet therapy pack dosepak; Take as directed on package instructions.  -     azithromycin (ZITHROMAX Z-SHYANN) 250 MG tablet; Take 2 tablets the first day, then 1 tablet daily for 4 days.  -     Stay hydrated  -     OTC tylenol per bottle instructions for pain/fever  -     Patient instructions given  -     For persistent or worsening symptoms f/u with PCP

## 2017-03-08 ENCOUNTER — OFFICE VISIT (OUTPATIENT)
Dept: FAMILY MEDICINE CLINIC | Facility: CLINIC | Age: 57
End: 2017-03-08

## 2017-03-08 VITALS
SYSTOLIC BLOOD PRESSURE: 132 MMHG | DIASTOLIC BLOOD PRESSURE: 80 MMHG | WEIGHT: 199 LBS | HEART RATE: 72 BPM | TEMPERATURE: 98.2 F | HEIGHT: 61 IN | OXYGEN SATURATION: 96 % | BODY MASS INDEX: 37.57 KG/M2

## 2017-03-08 DIAGNOSIS — E11.9 TYPE 2 DIABETES MELLITUS WITHOUT COMPLICATION, WITH LONG-TERM CURRENT USE OF INSULIN (HCC): ICD-10-CM

## 2017-03-08 DIAGNOSIS — Z00.00 HEALTH CARE MAINTENANCE: Primary | ICD-10-CM

## 2017-03-08 DIAGNOSIS — Z79.01 LONG TERM CURRENT USE OF ANTICOAGULANT: ICD-10-CM

## 2017-03-08 DIAGNOSIS — Z79.4 TYPE 2 DIABETES MELLITUS WITHOUT COMPLICATION, WITH LONG-TERM CURRENT USE OF INSULIN (HCC): ICD-10-CM

## 2017-03-08 LAB
ALBUMIN SERPL-MCNC: 4.6 G/DL (ref 3.2–4.8)
ALBUMIN/GLOB SERPL: 1.4 G/DL (ref 1.5–2.5)
ALP SERPL-CCNC: 201 U/L (ref 25–100)
ALT SERPL W P-5'-P-CCNC: 57 U/L (ref 7–40)
ANION GAP SERPL CALCULATED.3IONS-SCNC: 9 MMOL/L (ref 3–11)
ARTICHOKE IGE QN: 117 MG/DL (ref 0–130)
AST SERPL-CCNC: 34 U/L (ref 0–33)
BASOPHILS # BLD AUTO: 0.02 10*3/MM3 (ref 0–0.2)
BASOPHILS NFR BLD AUTO: 0.2 % (ref 0–1)
BILIRUB SERPL-MCNC: 0.3 MG/DL (ref 0.3–1.2)
BUN BLD-MCNC: 11 MG/DL (ref 9–23)
BUN/CREAT SERPL: 15.7 (ref 7–25)
CALCIUM SPEC-SCNC: 10.7 MG/DL (ref 8.7–10.4)
CHLORIDE SERPL-SCNC: 100 MMOL/L (ref 99–109)
CHOLEST SERPL-MCNC: 189 MG/DL (ref 0–200)
CO2 SERPL-SCNC: 28 MMOL/L (ref 20–31)
CREAT BLD-MCNC: 0.7 MG/DL (ref 0.6–1.3)
DEPRECATED RDW RBC AUTO: 43.6 FL (ref 37–54)
EOSINOPHIL # BLD AUTO: 0.25 10*3/MM3 (ref 0.1–0.3)
EOSINOPHIL NFR BLD AUTO: 2.5 % (ref 0–3)
ERYTHROCYTE [DISTWIDTH] IN BLOOD BY AUTOMATED COUNT: 14.3 % (ref 11.3–14.5)
GFR SERPL CREATININE-BSD FRML MDRD: 105 ML/MIN/1.73
GLOBULIN UR ELPH-MCNC: 3.2 GM/DL
GLUCOSE BLD-MCNC: 223 MG/DL (ref 70–100)
HBA1C MFR BLD: 8.9 %
HCT VFR BLD AUTO: 40.4 % (ref 34.5–44)
HDLC SERPL-MCNC: 44 MG/DL (ref 40–60)
HGB BLD-MCNC: 13.1 G/DL (ref 11.5–15.5)
IMM GRANULOCYTES # BLD: 0.03 10*3/MM3 (ref 0–0.03)
IMM GRANULOCYTES NFR BLD: 0.3 % (ref 0–0.6)
INR PPP: 1.9 (ref 2–3)
LYMPHOCYTES # BLD AUTO: 2.63 10*3/MM3 (ref 0.6–4.8)
LYMPHOCYTES NFR BLD AUTO: 26.1 % (ref 24–44)
MCH RBC QN AUTO: 27.1 PG (ref 27–31)
MCHC RBC AUTO-ENTMCNC: 32.4 G/DL (ref 32–36)
MCV RBC AUTO: 83.6 FL (ref 80–99)
MONOCYTES # BLD AUTO: 0.69 10*3/MM3 (ref 0–1)
MONOCYTES NFR BLD AUTO: 6.8 % (ref 0–12)
NEUTROPHILS # BLD AUTO: 6.46 10*3/MM3 (ref 1.5–8.3)
NEUTROPHILS NFR BLD AUTO: 64.1 % (ref 41–71)
PLATELET # BLD AUTO: 385 10*3/MM3 (ref 150–450)
PMV BLD AUTO: 11 FL (ref 6–12)
POC CREATININE URINE: 200
POC MICROALBUMIN URINE: 80
POTASSIUM BLD-SCNC: 4.4 MMOL/L (ref 3.5–5.5)
PROT SERPL-MCNC: 7.8 G/DL (ref 5.7–8.2)
RBC # BLD AUTO: 4.83 10*6/MM3 (ref 3.89–5.14)
SODIUM BLD-SCNC: 137 MMOL/L (ref 132–146)
TRIGL SERPL-MCNC: 226 MG/DL (ref 0–150)
TSH SERPL DL<=0.05 MIU/L-ACNC: 0.67 MIU/ML (ref 0.35–5.35)
WBC NRBC COR # BLD: 10.08 10*3/MM3 (ref 3.5–10.8)

## 2017-03-08 PROCEDURE — 96160 PT-FOCUSED HLTH RISK ASSMT: CPT | Performed by: FAMILY MEDICINE

## 2017-03-08 PROCEDURE — 83036 HEMOGLOBIN GLYCOSYLATED A1C: CPT | Performed by: FAMILY MEDICINE

## 2017-03-08 PROCEDURE — 36415 COLL VENOUS BLD VENIPUNCTURE: CPT | Performed by: FAMILY MEDICINE

## 2017-03-08 PROCEDURE — 82044 UR ALBUMIN SEMIQUANTITATIVE: CPT | Performed by: FAMILY MEDICINE

## 2017-03-08 PROCEDURE — 85025 COMPLETE CBC W/AUTO DIFF WBC: CPT | Performed by: FAMILY MEDICINE

## 2017-03-08 PROCEDURE — 99396 PREV VISIT EST AGE 40-64: CPT | Performed by: FAMILY MEDICINE

## 2017-03-08 PROCEDURE — 85610 PROTHROMBIN TIME: CPT | Performed by: FAMILY MEDICINE

## 2017-03-08 PROCEDURE — G0439 PPPS, SUBSEQ VISIT: HCPCS | Performed by: FAMILY MEDICINE

## 2017-03-08 PROCEDURE — 80053 COMPREHEN METABOLIC PANEL: CPT | Performed by: FAMILY MEDICINE

## 2017-03-08 PROCEDURE — 80061 LIPID PANEL: CPT | Performed by: FAMILY MEDICINE

## 2017-03-08 PROCEDURE — 84443 ASSAY THYROID STIM HORMONE: CPT | Performed by: FAMILY MEDICINE

## 2017-03-08 NOTE — PROGRESS NOTES
QUICK REFERENCE INFORMATION:  The ABCs of the Annual Wellness Visit    Subsequent Medicare Wellness Visit    HEALTH RISK ASSESSMENT    1960    Recent Hospitalizations:  No recent hospitalization(s)..        Current Medical Providers:  Patient Care Team:  Keri Little DO as PCP - General  Keri Little DO as PCP - Family Medicine  Cardiology, Dr. Patel  Pain management, Dr. Ivy      Smoking Status:  History   Smoking Status   • Former Smoker   • Packs/day: 0.50   • Types: Cigarettes   • Quit date: 1/1/2012   Smokeless Tobacco   • Never Used       Alcohol Consumption:  History   Alcohol Use No       Depression Screen:   PHQ-9 Depression Screening 3/8/2017   Little interest or pleasure in doing things 0   Feeling down, depressed, or hopeless 0   Trouble falling or staying asleep, or sleeping too much 1   Feeling tired or having little energy 0   Poor appetite or overeating 0   Feeling bad about yourself - or that you are a failure or have let yourself or your family down 0   Trouble concentrating on things, such as reading the newspaper or watching television 0   Moving or speaking so slowly that other people could have noticed. Or the opposite - being so fidgety or restless that you have been moving around a lot more than usual 0   Thoughts that you would be better off dead, or of hurting yourself in some way 0   PHQ-9 Total Score 1   If you checked off any problems, how difficult have these problems made it for you to do your work, take care of things at home, or get along with other people? Not difficult at all       Health Habits and Functional and Cognitive Screening:  Functional & Cognitive Status 3/8/2017   Do you have difficulty preparing food and eating? No   Do you have difficulty bathing yourself? No   Do you have difficulty getting dressed? No   Do you have difficulty using the toilet? No   Do you have difficulty moving around from place to place? No   In the past year have you fallen or  experienced a near fall? No   Do you need help using the phone?  No   Are you deaf or do you have serious difficulty hearing?  No   Do you need help with transportation? No   Do you need help shopping? No   Do you need help preparing meals?  No   Do you need help with housework?  No   Do you need help with laundry? No   Do you need help taking your medications? No   Do you need help managing money? No   Do you have difficulty concentrating, remembering or making decisions? No              Does the patient have evidence of cognitive impairment? No    Asprin use counseling:no      Recent Lab Results:  CMP:  Lab Results   Component Value Date    BUN 10 11/18/2016    CREATININE 0.80 11/18/2016    EGFRIFAFRI 91 11/18/2016    BCR 12.5 11/18/2016     11/18/2016    K 3.8 11/18/2016    CO2 30.0 11/18/2016    CALCIUM 9.2 11/18/2016    ALBUMIN 4.10 11/18/2016    LABIL2 1.4 11/18/2016    BILITOT 0.3 11/18/2016    ALKPHOS 155 (H) 11/18/2016    AST 25 11/18/2016    ALT 25 11/18/2016     Lipid Panel:  Lab Results   Component Value Date    CHLPL 167 02/11/2016    TRIG 334 (H) 11/18/2016    HDL 38 (L) 11/18/2016     LDL:     HbA1c:  Lab Results   Component Value Date    HGBA1C 8.9 03/08/2017     Urine Microalbumin:  Lab Results   Component Value Date    POCMALB 80 03/08/2017     Visual Acuity:  Per ophth    Hearing:   bilat finger rub test normal    Age-appropriate Screening Schedule:  Refer to the list below for future screening recommendations based on patient's age, sex and/or medical conditions. Orders for these recommended tests are listed in the plan section. The patient has been provided with a written plan.    Health Maintenance   Topic Date Due   • DIABETIC FOOT EXAM  05/12/2016   • DIABETIC EYE EXAM  03/31/2017   • MAMMOGRAM  03/08/2018   • HEMOGLOBIN A1C  03/08/2018   • URINE MICROALBUMIN  03/08/2018   • TDAP/TD VACCINES (2 - Td) 09/01/2020   • COLONOSCOPY  01/01/2023   • PNEUMOCOCCAL VACCINE (19-64 MEDIUM RISK)   Addressed   • INFLUENZA VACCINE  Completed   • PAP SMEAR  Excluded        Subjective   History of Present Illness    Di Lopez is a 56 y.o. female who presents for an Subsequent Wellness Visit.    The following portions of the patient's history were reviewed and updated as appropriate: allergies, current medications, past family history, past medical history, past social history, past surgical history and problem list.    Outpatient Medications Prior to Visit   Medication Sig Dispense Refill   • albuterol (PROVENTIL HFA;VENTOLIN HFA) 108 (90 BASE) MCG/ACT inhaler Inhale 2 puffs Every 4 (Four) Hours As Needed for wheezing.     • Alcohol Swabs (ALCOHOL PREP) 70 % pads      • ASSURE COMFORT LANCETS 30G misc      • Blood Glucose Calibration (OT ULTRA/FASTTK CNTRL SOLN) solution      • Blood Glucose Monitoring Suppl (ONE TOUCH ULTRA 2) W/DEVICE kit      • carvedilol (COREG) 12.5 MG tablet      • escitalopram (LEXAPRO) 20 MG tablet TAKE ONE TABLET BY MOUTH ONCE DAILY 30 tablet 0   • furosemide (LASIX) 40 MG tablet Take 1 tablet by mouth daily.     • gabapentin (NEURONTIN) 600 MG tablet      • glimepiride (AMARYL) 4 MG tablet TAKE ONE TABLET BY MOUTH ONCE DAILY 30 tablet 1   • HYDROcodone-acetaminophen (NORCO) 5-325 MG per tablet      • Lancet Devices (SIMPLE DIAGNOSTICS LANCING DEV) misc      • Omega-3 Fatty Acids (FISH OIL PO) Take  by mouth.     • ONE TOUCH ULTRA TEST test strip      • ONGLYZA 5 MG tablet TAKE ONE TABLET BY MOUTH ONCE DAILY 30 tablet 2   • RELION PEN NEEDLES 32G X 4 MM misc USE AS DIRECTED 100 each 0   • simvastatin (ZOCOR) 40 MG tablet Take 1 tablet by mouth every night.     • warfarin (COUMADIN) 1 MG tablet      • warfarin (COUMADIN) 4 MG tablet      • Insulin Glargine (LANTUS SOLOSTAR) 100 UNIT/ML injection pen Inject 40 Units under the skin daily. 1 pen 3   • azithromycin (ZITHROMAX Z-SHYANN) 250 MG tablet Take 2 tablets the first day, then 1 tablet daily for 4 days. 6 tablet 0   •  budesonide-formoterol (SYMBICORT) 160-4.5 MCG/ACT inhaler Inhale 2 puffs 2 (two) times a day. 1 inhaler 12   • PredniSONE 5 MG (21) tablet therapy pack dosepak Take as directed on package instructions. 1 each 0     No facility-administered medications prior to visit.        Patient Active Problem List   Diagnosis   • Arteriovenous malformation of gastrointestinal tract   • Depression   • Type 2 diabetes mellitus   • Dyslipidemia   • Emphysema/COPD   • Hypertension   • Insomnia   • Morbid obesity   • Low back pain   • Obstructive sleep apnea syndrome   • Long term current use of anticoagulant   • Normocytic anemia   • Hand paresthesia   • Sliding hiatal hernia   • Dyspnea   • Vitamin B12 deficiency   • Rash and nonspecific skin eruption   • Cough   • Iron deficiency   • Strep pharyngitis       Advanced Care Planning:  has NO advanced directive - not interested in additional information    Identification of Risk Factors:  Risk factors include: weight , unhealthy diet, cardiovascular risk, chronic pain, depression and polypharmacy.    Review of Systems   Constitutional: Negative for appetite change, chills, diaphoresis, fatigue, fever and unexpected weight change.   HENT: Negative for congestion, ear pain, mouth sores, postnasal drip, rhinorrhea, sinus pressure, sneezing, sore throat and trouble swallowing.    Eyes: Negative for pain, redness and visual disturbance.   Respiratory: Negative for apnea, cough, chest tightness, shortness of breath and wheezing.    Cardiovascular: Negative for chest pain, palpitations and leg swelling.   Gastrointestinal: Negative for abdominal distention, blood in stool, constipation, diarrhea and nausea.   Endocrine: Negative for cold intolerance, polydipsia, polyphagia and polyuria.   Genitourinary: Negative for difficulty urinating, dysuria, enuresis, flank pain and urgency.   Musculoskeletal: Negative for arthralgias, back pain, joint swelling, myalgias and neck pain.   Skin: Negative  "for color change, rash and wound.   Neurological: Negative for dizziness, syncope, weakness, light-headedness and numbness.   Hematological: Negative for adenopathy.   Psychiatric/Behavioral: Negative for agitation, behavioral problems and confusion. The patient is not nervous/anxious.        Compared to one year ago, the patient feels her physical health is the same.  Compared to one year ago, the patient feels her mental health is the same.    Objective     Physical Exam    Vitals:    03/08/17 1138   BP: 132/80   Pulse: 72   Temp: 98.2 °F (36.8 °C)   SpO2: 96%   Weight: 199 lb (90.3 kg)   Height: 61\" (154.9 cm)       Body mass index is 37.6 kg/(m^2).  Discussed the patient's BMI with her. The BMI is above average; BMI management plan is completed.    Assessment/Plan   Patient Self-Management and Personalized Health Advice  The patient has been provided with information about: diet, exercise, weight management, prevention of cardiac or vascular disease, the relationship between weight and GERD, fall prevention, designing advance directives, supplements and mental health concerns and preventive services including:   · Advanced directives: has NO advanced directive - not interested in additional information, Bone densitometry screening, Exercise counseling provided, Fall Risk assessment done, Fall Risk plan of care done, Screening mammography, referral placed.    Visit Diagnoses:    ICD-10-CM ICD-9-CM   1. Type 2 diabetes mellitus without complication, with long-term current use of insulin E11.9 250.00    Z79.4 V58.67   2. Long term current use of anticoagulant Z79.01 V58.61   3. Health care maintenance Z00.00 V70.0       Orders Placed This Encounter   Procedures   • Comprehensive Metabolic Panel   • Lipid Panel   • TSH   • CBC Auto Differential   • POCT glycated hemoglobin, total   • POCT INR   • POC Microalbumin       Outpatient Encounter Prescriptions as of 3/8/2017   Medication Sig Dispense Refill   • albuterol " (PROVENTIL HFA;VENTOLIN HFA) 108 (90 BASE) MCG/ACT inhaler Inhale 2 puffs Every 4 (Four) Hours As Needed for wheezing.     • Alcohol Swabs (ALCOHOL PREP) 70 % pads      • ASSURE COMFORT LANCETS 30G misc      • Blood Glucose Calibration (OT ULTRA/FASTTK CNTRL SOLN) solution      • Blood Glucose Monitoring Suppl (ONE TOUCH ULTRA 2) W/DEVICE kit      • carvedilol (COREG) 12.5 MG tablet      • escitalopram (LEXAPRO) 20 MG tablet TAKE ONE TABLET BY MOUTH ONCE DAILY 30 tablet 0   • furosemide (LASIX) 40 MG tablet Take 1 tablet by mouth daily.     • gabapentin (NEURONTIN) 600 MG tablet      • glimepiride (AMARYL) 4 MG tablet TAKE ONE TABLET BY MOUTH ONCE DAILY 30 tablet 1   • HYDROcodone-acetaminophen (NORCO) 5-325 MG per tablet      • Insulin Glargine (LANTUS SOLOSTAR) 100 UNIT/ML injection pen Inject 50 Units under the skin Daily. 1 pen 3   • Lancet Devices (SIMPLE DIAGNOSTICS LANCING DEV) misc      • Omega-3 Fatty Acids (FISH OIL PO) Take  by mouth.     • ONE TOUCH ULTRA TEST test strip      • ONGLYZA 5 MG tablet TAKE ONE TABLET BY MOUTH ONCE DAILY 30 tablet 2   • RELION PEN NEEDLES 32G X 4 MM misc USE AS DIRECTED 100 each 0   • simvastatin (ZOCOR) 40 MG tablet Take 1 tablet by mouth every night.     • warfarin (COUMADIN) 1 MG tablet      • warfarin (COUMADIN) 4 MG tablet      • [DISCONTINUED] Insulin Glargine (LANTUS SOLOSTAR) 100 UNIT/ML injection pen Inject 40 Units under the skin daily. 1 pen 3   • [DISCONTINUED] azithromycin (ZITHROMAX Z-SHYANN) 250 MG tablet Take 2 tablets the first day, then 1 tablet daily for 4 days. 6 tablet 0   • [DISCONTINUED] budesonide-formoterol (SYMBICORT) 160-4.5 MCG/ACT inhaler Inhale 2 puffs 2 (two) times a day. 1 inhaler 12   • [DISCONTINUED] PredniSONE 5 MG (21) tablet therapy pack dosepak Take as directed on package instructions. 1 each 0     No facility-administered encounter medications on file as of 3/8/2017.        Reviewed use of high risk medication in the elderly: yes  Reviewed  for potential of harmful drug interactions in the elderly: yes    Follow Up:  Return in about 3 months (around 6/8/2017).     An After Visit Summary and PPPS with all of these plans were given to the patient.

## 2017-03-10 ENCOUNTER — TRANSCRIBE ORDERS (OUTPATIENT)
Dept: FAMILY MEDICINE CLINIC | Facility: CLINIC | Age: 57
End: 2017-03-10

## 2017-03-10 DIAGNOSIS — Z12.31 VISIT FOR SCREENING MAMMOGRAM: Primary | ICD-10-CM

## 2017-03-13 RX ORDER — GLIMEPIRIDE 4 MG/1
TABLET ORAL
Qty: 30 TABLET | Refills: 0 | Status: SHIPPED | OUTPATIENT
Start: 2017-03-13 | End: 2017-04-07 | Stop reason: SDUPTHER

## 2017-03-13 RX ORDER — SIMVASTATIN 40 MG
TABLET ORAL
Qty: 90 TABLET | Refills: 0 | Status: SHIPPED | OUTPATIENT
Start: 2017-03-13 | End: 2017-08-25 | Stop reason: SDUPTHER

## 2017-03-30 ENCOUNTER — HOSPITAL ENCOUNTER (OUTPATIENT)
Dept: MAMMOGRAPHY | Facility: HOSPITAL | Age: 57
Discharge: HOME | End: 2017-03-30
Attending: FAMILY MEDICINE | Admitting: FAMILY MEDICINE

## 2017-03-30 DIAGNOSIS — Z12.31 VISIT FOR SCREENING MAMMOGRAM: ICD-10-CM

## 2017-03-30 PROCEDURE — 77063 BREAST TOMOSYNTHESIS BI: CPT

## 2017-03-30 PROCEDURE — G0202 SCR MAMMO BI INCL CAD: HCPCS | Performed by: RADIOLOGY

## 2017-03-30 PROCEDURE — 77063 BREAST TOMOSYNTHESIS BI: CPT | Performed by: RADIOLOGY

## 2017-03-30 PROCEDURE — G0202 SCR MAMMO BI INCL CAD: HCPCS

## 2017-03-31 RX ORDER — ESCITALOPRAM OXALATE 20 MG/1
TABLET ORAL
Qty: 90 TABLET | Refills: 0 | Status: SHIPPED | OUTPATIENT
Start: 2017-03-31 | End: 2017-08-25 | Stop reason: SDUPTHER

## 2017-04-07 RX ORDER — GLIMEPIRIDE 4 MG/1
TABLET ORAL
Qty: 30 TABLET | Refills: 0 | Status: SHIPPED | OUTPATIENT
Start: 2017-04-07 | End: 2017-08-05 | Stop reason: SDUPTHER

## 2017-04-10 RX ORDER — INSULIN GLARGINE 100 [IU]/ML
INJECTION, SOLUTION SUBCUTANEOUS
Qty: 1 PEN | Refills: 0 | Status: SHIPPED | OUTPATIENT
Start: 2017-04-10 | End: 2018-01-22 | Stop reason: SDUPTHER

## 2017-04-14 ENCOUNTER — OFFICE VISIT (OUTPATIENT)
Dept: RETAIL CLINIC | Facility: CLINIC | Age: 57
End: 2017-04-14

## 2017-04-14 VITALS — HEART RATE: 69 BPM | OXYGEN SATURATION: 99 % | HEIGHT: 61 IN | TEMPERATURE: 97.8 F

## 2017-04-14 DIAGNOSIS — J01.00 ACUTE NON-RECURRENT MAXILLARY SINUSITIS: Primary | ICD-10-CM

## 2017-04-14 PROCEDURE — 99213 OFFICE O/P EST LOW 20 MIN: CPT | Performed by: NURSE PRACTITIONER

## 2017-04-14 RX ORDER — AZITHROMYCIN 250 MG/1
TABLET, FILM COATED ORAL
Qty: 6 TABLET | Refills: 0 | Status: SHIPPED | OUTPATIENT
Start: 2017-04-14 | End: 2017-04-26

## 2017-04-14 NOTE — PROGRESS NOTES
"Subjective   Di Lopez is a 56 y.o. female.     HPI Comments: She reports sinus pressure past few days and greenish sinus drainage. Denies fever, chest congestion.     Cough   This is a new problem. The current episode started in the past 7 days. The problem has been gradually worsening. The cough is non-productive. Associated symptoms include headaches, nasal congestion, postnasal drip and rhinorrhea. Pertinent negatives include no chest pain, chills, ear congestion, ear pain, fever, hemoptysis, myalgias, rash, sore throat, shortness of breath, sweats, weight loss or wheezing.        The following portions of the patient's history were reviewed and updated as appropriate: allergies, current medications, past family history, past medical history, past social history, past surgical history and problem list.    Review of Systems   Constitutional: Negative for chills, fever and weight loss.   HENT: Positive for postnasal drip and rhinorrhea. Negative for ear pain and sore throat.    Respiratory: Positive for cough. Negative for hemoptysis, shortness of breath and wheezing.    Cardiovascular: Negative for chest pain.   Musculoskeletal: Negative for myalgias.   Skin: Negative for rash.   Neurological: Positive for headaches.   All other systems reviewed and are negative.    Pulse 69  Temp 97.8 °F (36.6 °C) (Oral)   Ht 61\" (154.9 cm)  SpO2 99%   Objective   Physical Exam   Constitutional: She appears well-developed.   HENT:   Head: Normocephalic.   Right Ear: Tympanic membrane is erythematous.   Left Ear: Tympanic membrane is erythematous.   Nose: Mucosal edema and rhinorrhea present. Right sinus exhibits maxillary sinus tenderness. Left sinus exhibits maxillary sinus tenderness.   Mouth/Throat: Posterior oropharyngeal edema and posterior oropharyngeal erythema present.   Eyes: Conjunctivae are normal. Pupils are equal, round, and reactive to light.   Neck: Normal range of motion.   Cardiovascular: Normal rate, " regular rhythm and normal heart sounds.    Pulmonary/Chest: Effort normal and breath sounds normal.   Vitals reviewed.       Assessment/Plan   Di was seen today for cough and nasal congestion.    Diagnoses and all orders for this visit:    Acute non-recurrent maxillary sinusitis    Other orders  -     azithromycin (ZITHROMAX Z-SHYANN) 250 MG tablet; Take 2 tablets the first day, then 1 tablet daily for 4 days.      Please review visit summary for additional instructions.            Kareen Chance, APRN

## 2017-04-20 DIAGNOSIS — M54.40 CHRONIC LOW BACK PAIN WITH SCIATICA, SCIATICA LATERALITY UNSPECIFIED, UNSPECIFIED BACK PAIN LATERALITY: Primary | ICD-10-CM

## 2017-04-20 DIAGNOSIS — G89.29 CHRONIC LOW BACK PAIN WITH SCIATICA, SCIATICA LATERALITY UNSPECIFIED, UNSPECIFIED BACK PAIN LATERALITY: Primary | ICD-10-CM

## 2017-04-26 ENCOUNTER — OFFICE VISIT (OUTPATIENT)
Dept: FAMILY MEDICINE CLINIC | Facility: CLINIC | Age: 57
End: 2017-04-26

## 2017-04-26 VITALS
TEMPERATURE: 98.4 F | BODY MASS INDEX: 38.36 KG/M2 | HEART RATE: 84 BPM | RESPIRATION RATE: 16 BRPM | SYSTOLIC BLOOD PRESSURE: 124 MMHG | WEIGHT: 203 LBS | DIASTOLIC BLOOD PRESSURE: 70 MMHG

## 2017-04-26 DIAGNOSIS — J06.9 ACUTE URI: ICD-10-CM

## 2017-04-26 DIAGNOSIS — Z79.01 CHRONIC ANTICOAGULATION: ICD-10-CM

## 2017-04-26 DIAGNOSIS — E11.9 DIABETES MELLITUS WITHOUT COMPLICATION (HCC): ICD-10-CM

## 2017-04-26 DIAGNOSIS — R04.0 EPISTAXIS: Primary | ICD-10-CM

## 2017-04-26 LAB
ALBUMIN SERPL-MCNC: 4.6 G/DL (ref 3.2–4.8)
ALBUMIN/GLOB SERPL: 1.6 G/DL (ref 1.5–2.5)
ALP SERPL-CCNC: 230 U/L (ref 25–100)
ALT SERPL W P-5'-P-CCNC: 34 U/L (ref 7–40)
ANION GAP SERPL CALCULATED.3IONS-SCNC: 3 MMOL/L (ref 3–11)
AST SERPL-CCNC: 27 U/L (ref 0–33)
BASOPHILS # BLD AUTO: 0.02 10*3/MM3 (ref 0–0.2)
BASOPHILS NFR BLD AUTO: 0.2 % (ref 0–1)
BILIRUB SERPL-MCNC: 0.3 MG/DL (ref 0.3–1.2)
BUN BLD-MCNC: 12 MG/DL (ref 9–23)
BUN/CREAT SERPL: 15 (ref 7–25)
CALCIUM SPEC-SCNC: 9.8 MG/DL (ref 8.7–10.4)
CHLORIDE SERPL-SCNC: 103 MMOL/L (ref 99–109)
CO2 SERPL-SCNC: 31 MMOL/L (ref 20–31)
CREAT BLD-MCNC: 0.8 MG/DL (ref 0.6–1.3)
DEPRECATED RDW RBC AUTO: 44.5 FL (ref 37–54)
EOSINOPHIL # BLD AUTO: 0.13 10*3/MM3 (ref 0.1–0.3)
EOSINOPHIL NFR BLD AUTO: 1.3 % (ref 0–3)
ERYTHROCYTE [DISTWIDTH] IN BLOOD BY AUTOMATED COUNT: 14.3 % (ref 11.3–14.5)
GFR SERPL CREATININE-BSD FRML MDRD: 90 ML/MIN/1.73
GLOBULIN UR ELPH-MCNC: 2.8 GM/DL
GLUCOSE BLD-MCNC: 183 MG/DL (ref 70–100)
HBA1C MFR BLD: 8.6 % (ref 4.8–5.6)
HCT VFR BLD AUTO: 37.1 % (ref 34.5–44)
HGB BLD-MCNC: 11.3 G/DL (ref 11.5–15.5)
IMM GRANULOCYTES # BLD: 0.02 10*3/MM3 (ref 0–0.03)
IMM GRANULOCYTES NFR BLD: 0.2 % (ref 0–0.6)
INR PPP: 1.8 (ref 0.9–1.1)
LYMPHOCYTES # BLD AUTO: 2.14 10*3/MM3 (ref 0.6–4.8)
LYMPHOCYTES NFR BLD AUTO: 22.1 % (ref 24–44)
MCH RBC QN AUTO: 26 PG (ref 27–31)
MCHC RBC AUTO-ENTMCNC: 30.5 G/DL (ref 32–36)
MCV RBC AUTO: 85.5 FL (ref 80–99)
MONOCYTES # BLD AUTO: 0.75 10*3/MM3 (ref 0–1)
MONOCYTES NFR BLD AUTO: 7.8 % (ref 0–12)
NEUTROPHILS # BLD AUTO: 6.61 10*3/MM3 (ref 1.5–8.3)
NEUTROPHILS NFR BLD AUTO: 68.4 % (ref 41–71)
PLATELET # BLD AUTO: 374 10*3/MM3 (ref 150–450)
PMV BLD AUTO: 10.5 FL (ref 6–12)
POTASSIUM BLD-SCNC: 4.3 MMOL/L (ref 3.5–5.5)
PROT SERPL-MCNC: 7.4 G/DL (ref 5.7–8.2)
PROTHROMBIN TIME: 21.8 SECONDS
RBC # BLD AUTO: 4.34 10*6/MM3 (ref 3.89–5.14)
SODIUM BLD-SCNC: 137 MMOL/L (ref 132–146)
WBC NRBC COR # BLD: 9.67 10*3/MM3 (ref 3.5–10.8)

## 2017-04-26 PROCEDURE — 83036 HEMOGLOBIN GLYCOSYLATED A1C: CPT | Performed by: FAMILY MEDICINE

## 2017-04-26 PROCEDURE — 85610 PROTHROMBIN TIME: CPT | Performed by: FAMILY MEDICINE

## 2017-04-26 PROCEDURE — 36415 COLL VENOUS BLD VENIPUNCTURE: CPT | Performed by: FAMILY MEDICINE

## 2017-04-26 PROCEDURE — 99214 OFFICE O/P EST MOD 30 MIN: CPT | Performed by: FAMILY MEDICINE

## 2017-04-26 PROCEDURE — 85025 COMPLETE CBC W/AUTO DIFF WBC: CPT | Performed by: FAMILY MEDICINE

## 2017-04-26 PROCEDURE — 80053 COMPREHEN METABOLIC PANEL: CPT | Performed by: FAMILY MEDICINE

## 2017-04-26 RX ORDER — AZITHROMYCIN 250 MG/1
TABLET, FILM COATED ORAL
Qty: 6 TABLET | Refills: 0 | Status: SHIPPED | OUTPATIENT
Start: 2017-04-26 | End: 2017-06-28

## 2017-04-26 NOTE — PROGRESS NOTES
Subjective   Di Lopez is a 56 y.o. female.     Nose Bleed    The bleeding has been from the left nare. This is a new problem. The current episode started in the past 7 days. The problem occurs daily. The problem has been unchanged. The bleeding is associated with anticoagulants. The treatment provided mild relief.   Diabetes   She presents for her follow-up diabetic visit. She has type 2 diabetes mellitus. Her disease course has been stable. There are no hypoglycemic associated symptoms. Pertinent negatives for hypoglycemia include no confusion, dizziness or nervousness/anxiousness. Associated symptoms include polyuria. Pertinent negatives for diabetes include no blurred vision, no chest pain, no fatigue, no polydipsia, no polyphagia and no weakness. There are no hypoglycemic complications. Symptoms are stable. There are no diabetic complications. Risk factors for coronary artery disease include diabetes mellitus, dyslipidemia, family history, obesity, hypertension, sedentary lifestyle and post-menopausal. Current diabetic treatment includes insulin injections and oral agent (dual therapy). She is compliant with treatment all of the time. Her weight is stable. She is following a generally healthy diet. She has not had a previous visit with a dietitian. She rarely participates in exercise. There is no change in her home blood glucose trend. An ACE inhibitor/angiotensin II receptor blocker is not being taken. She does not see a podiatrist.Eye exam is not current.   Has had headache, frontal with sinus pressure for 1 week.  Was placed on [facility]pack 1 week [facility].  Pt also is having fatigue and has a history of anemia.  The following portions of the patient's history were reviewed and updated as appropriate: allergies, current medications, past family history, past medical history, past social history, past surgical history and problem list.    Review of Systems   Constitutional: Negative for appetite  change, chills, diaphoresis, fatigue, fever and unexpected weight change.   HENT: Positive for nosebleeds. Negative for congestion, ear pain, mouth sores, postnasal drip, rhinorrhea, sinus pressure, sneezing, sore throat and trouble swallowing.    Eyes: Negative for blurred vision, pain, redness and visual disturbance.   Respiratory: Negative for apnea, cough, chest tightness, shortness of breath and wheezing.    Cardiovascular: Negative for chest pain, palpitations and leg swelling.   Gastrointestinal: Negative for abdominal distention, blood in stool, constipation, diarrhea and nausea.   Endocrine: Positive for polyuria. Negative for cold intolerance, polydipsia and polyphagia.   Genitourinary: Negative for difficulty urinating, dysuria, enuresis, flank pain and urgency.   Musculoskeletal: Negative for arthralgias, back pain, joint swelling, myalgias and neck pain.   Skin: Negative for color change, rash and wound.   Neurological: Negative for dizziness, syncope, weakness, light-headedness and numbness.   Hematological: Negative for adenopathy.   Psychiatric/Behavioral: Negative for agitation, behavioral problems and confusion. The patient is not nervous/anxious.        Objective   Physical Exam   Constitutional: She is oriented to person, place, and time. She appears well-developed and well-nourished. No distress.   HENT:   Right Ear: External ear normal.   Left Ear: External ear normal.   Nose: Nose normal.   Mouth/Throat: Oropharynx is clear and moist.   Eyes: Conjunctivae and EOM are normal. Pupils are equal, round, and reactive to light.   Neck: Normal range of motion. Neck supple. No thyromegaly present.   Cardiovascular: Normal rate, regular rhythm and normal heart sounds.    No murmur heard.  Pulmonary/Chest: Effort normal and breath sounds normal. No respiratory distress. She has no wheezes.   Abdominal: Soft. Bowel sounds are normal. She exhibits no distension and no mass. There is no tenderness. There  is no rebound and no guarding. No hernia.   Musculoskeletal: Normal range of motion. She exhibits no edema or tenderness.   Lymphadenopathy:     She has no cervical adenopathy.   Neurological: She is alert and oriented to person, place, and time. She has normal reflexes.   Skin: Skin is warm and dry. No rash noted. She is not diaphoretic. No erythema. No pallor.   Psychiatric: She has a normal mood and affect. Her behavior is normal. Judgment and thought content normal.   Nursing note and vitals reviewed.      Assessment/Plan   Di was seen today for nose bleed and headache.    Diagnoses and all orders for this visit:    Epistaxis  -     CBC & Differential  -     CBC Auto Differential    Acute URI    Diabetes mellitus without complication  -     Comprehensive Metabolic Panel  -     Hemoglobin A1c    Chronic anticoagulation  -     Cancel: POC INR  -     POC Protime / INR    Other orders  -     azithromycin (ZITHROMAX Z-SHYANN) 250 MG tablet; Take 2 tablets the first day, then 1 tablet daily for 4 days.    Patient has been instructed to go to ER if increased nasal bleeding or coughing up blood.    I personally spent over half of a total 25 minutes face to face with the patient in counseling and discussion and/or coordination of care as described above.

## 2017-05-12 ENCOUNTER — OFFICE VISIT (OUTPATIENT)
Dept: SLEEP MEDICINE | Facility: HOSPITAL | Age: 57
End: 2017-05-12

## 2017-05-12 VITALS
OXYGEN SATURATION: 96 % | WEIGHT: 202.8 LBS | HEART RATE: 67 BPM | DIASTOLIC BLOOD PRESSURE: 60 MMHG | SYSTOLIC BLOOD PRESSURE: 154 MMHG | HEIGHT: 61 IN | BODY MASS INDEX: 38.29 KG/M2

## 2017-05-12 DIAGNOSIS — F51.04 PSYCHOPHYSIOLOGICAL INSOMNIA: ICD-10-CM

## 2017-05-12 DIAGNOSIS — G47.33 OSA (OBSTRUCTIVE SLEEP APNEA): Primary | ICD-10-CM

## 2017-05-12 PROCEDURE — 99214 OFFICE O/P EST MOD 30 MIN: CPT | Performed by: INTERNAL MEDICINE

## 2017-06-21 RX ORDER — PEN NEEDLE, DIABETIC 32GX 5/32"
NEEDLE, DISPOSABLE MISCELLANEOUS
Qty: 100 EACH | Refills: 0 | Status: SHIPPED | OUTPATIENT
Start: 2017-06-21 | End: 2017-06-21 | Stop reason: SDUPTHER

## 2017-06-28 ENCOUNTER — OFFICE VISIT (OUTPATIENT)
Dept: FAMILY MEDICINE CLINIC | Facility: CLINIC | Age: 57
End: 2017-06-28

## 2017-06-28 VITALS
OXYGEN SATURATION: 96 % | BODY MASS INDEX: 38.33 KG/M2 | HEIGHT: 61 IN | WEIGHT: 203 LBS | DIASTOLIC BLOOD PRESSURE: 80 MMHG | HEART RATE: 96 BPM | SYSTOLIC BLOOD PRESSURE: 158 MMHG

## 2017-06-28 DIAGNOSIS — G89.29 HEEL PAIN, CHRONIC, LEFT: ICD-10-CM

## 2017-06-28 DIAGNOSIS — R04.0 EPISTAXIS: ICD-10-CM

## 2017-06-28 DIAGNOSIS — J45.21 MILD INTERMITTENT ASTHMA WITH ACUTE EXACERBATION: ICD-10-CM

## 2017-06-28 DIAGNOSIS — R35.0 URINARY FREQUENCY: ICD-10-CM

## 2017-06-28 DIAGNOSIS — M79.672 HEEL PAIN, CHRONIC, LEFT: ICD-10-CM

## 2017-06-28 DIAGNOSIS — E11.9 TYPE 2 DIABETES MELLITUS WITHOUT COMPLICATION, WITHOUT LONG-TERM CURRENT USE OF INSULIN (HCC): Primary | ICD-10-CM

## 2017-06-28 LAB
BILIRUB BLD-MCNC: ABNORMAL MG/DL
CLARITY, POC: CLEAR
COLOR UR: ABNORMAL
GLUCOSE UR STRIP-MCNC: NEGATIVE MG/DL
HBA1C MFR BLD: 7.5 %
KETONES UR QL: NEGATIVE
LEUKOCYTE EST, POC: NEGATIVE
NITRITE UR-MCNC: NEGATIVE MG/ML
PH UR: 5 [PH] (ref 5–8)
PROT UR STRIP-MCNC: ABNORMAL MG/DL
RBC # UR STRIP: ABNORMAL /UL
SP GR UR: 1.03 (ref 1–1.03)
UROBILINOGEN UR QL: NORMAL

## 2017-06-28 PROCEDURE — 83036 HEMOGLOBIN GLYCOSYLATED A1C: CPT | Performed by: FAMILY MEDICINE

## 2017-06-28 PROCEDURE — 81002 URINALYSIS NONAUTO W/O SCOPE: CPT | Performed by: FAMILY MEDICINE

## 2017-06-28 PROCEDURE — 99214 OFFICE O/P EST MOD 30 MIN: CPT | Performed by: FAMILY MEDICINE

## 2017-06-28 NOTE — PROGRESS NOTES
Subjective   Di Lopez is a 56 y.o. female.     Diabetes   She presents for her follow-up diabetic visit. She has type 2 diabetes mellitus. Her disease course has been stable. There are no hypoglycemic associated symptoms. Pertinent negatives for hypoglycemia include no confusion, dizziness or nervousness/anxiousness. Associated symptoms include polyuria. Pertinent negatives for diabetes include no blurred vision, no chest pain, no fatigue, no polydipsia, no polyphagia and no weakness. There are no hypoglycemic complications. Symptoms are stable. There are no diabetic complications. Risk factors for coronary artery disease include diabetes mellitus, dyslipidemia, family history, obesity, hypertension, sedentary lifestyle and post-menopausal. Current diabetic treatment includes insulin injections and oral agent (dual therapy) (Lantus 55 units qd). She is compliant with treatment all of the time. Her weight is stable. She is following a generally healthy diet. She has not had a previous visit with a dietitian. She rarely participates in exercise. There is no change in her home blood glucose trend. An ACE inhibitor/angiotensin II receptor blocker is not being taken. She does not see a podiatrist.Eye exam is not current.   Hypertension   This is a chronic (follows with Cardiology) problem. The current episode started more than 1 year ago. The problem is unchanged. The problem is uncontrolled. Associated symptoms include malaise/fatigue and shortness of breath. Pertinent negatives include no blurred vision, chest pain, neck pain, palpitations, peripheral edema or PND. Risk factors for coronary artery disease include diabetes mellitus, dyslipidemia, family history, obesity, post-menopausal state, sedentary lifestyle and stress.   Urinary Frequency    This is a recurrent problem. The current episode started 1 to 4 weeks ago. The problem occurs intermittently. The problem has been unchanged. The pain is at a severity  of 0/10. The patient is experiencing no pain. There has been no fever. There is no history of pyelonephritis. Associated symptoms include frequency and urgency. Pertinent negatives include no chills, flank pain, hematuria or nausea. She has tried nothing for the symptoms. The treatment provided no relief.   Still having nose bleeds, has not seen ENT yet.   C/O left heel pain.  Increased dyspnea and asthma. Using Albuterol tid. Has seen Pulmonary and needs f/u.  The following portions of the patient's history were reviewed and updated as appropriate: allergies, current medications, past family history, past medical history, past social history, past surgical history and problem list.    Review of Systems   Constitutional: Positive for malaise/fatigue. Negative for appetite change, chills, diaphoresis, fatigue, fever and unexpected weight change.   HENT: Negative for congestion, ear pain, mouth sores, postnasal drip, rhinorrhea, sinus pressure, sneezing, sore throat and trouble swallowing.    Eyes: Negative for blurred vision, pain, redness and visual disturbance.   Respiratory: Positive for shortness of breath. Negative for apnea, cough, chest tightness and wheezing.    Cardiovascular: Negative for chest pain, palpitations, leg swelling and PND.   Gastrointestinal: Negative for abdominal distention, blood in stool, constipation, diarrhea and nausea.   Endocrine: Positive for polyuria. Negative for cold intolerance, polydipsia and polyphagia.   Genitourinary: Positive for frequency and urgency. Negative for difficulty urinating, dysuria, enuresis, flank pain and hematuria.   Musculoskeletal: Negative for arthralgias, back pain, joint swelling, myalgias and neck pain.   Skin: Negative for color change, rash and wound.   Neurological: Negative for dizziness, syncope, weakness, light-headedness and numbness.   Hematological: Negative for adenopathy.   Psychiatric/Behavioral: Negative for agitation, behavioral problems  and confusion. The patient is not nervous/anxious.        Objective   Physical Exam   Constitutional: She is oriented to person, place, and time. She appears well-developed and well-nourished. No distress.   HENT:   Right Ear: External ear normal.   Left Ear: External ear normal.   Nose: Nose normal.   Mouth/Throat: Oropharynx is clear and moist.   Eyes: Conjunctivae and EOM are normal. Pupils are equal, round, and reactive to light.   Neck: Normal range of motion. Neck supple. No thyromegaly present.   Cardiovascular: Normal rate, regular rhythm and normal heart sounds.    No murmur heard.  Pulmonary/Chest: Effort normal and breath sounds normal. No respiratory distress. She has no wheezes.   Abdominal: Soft. Bowel sounds are normal. She exhibits no distension and no mass. There is no tenderness. There is no rebound and no guarding. No hernia.   Musculoskeletal: Normal range of motion. She exhibits no edema or tenderness.   Lymphadenopathy:     She has no cervical adenopathy.   Neurological: She is alert and oriented to person, place, and time. She has normal reflexes.   Skin: Skin is warm and dry. No rash noted. She is not diaphoretic. No erythema. No pallor.   Psychiatric: She has a normal mood and affect. Her behavior is normal. Judgment and thought content normal.   Nursing note and vitals reviewed.      Assessment/Plan   Di was seen today for follow-up.    Diagnoses and all orders for this visit:    Type 2 diabetes mellitus without complication, without long-term current use of insulin  -     POC Glycosylated Hemoglobin (Hb A1C)    Epistaxis  -     Ambulatory Referral to ENT (Otolaryngology)    Heel pain, chronic, left    Mild intermittent asthma with acute exacerbation  -     Ambulatory Referral to Pulmonology    Urinary frequency  -     POC Urinalysis Dipstick    Patient unable to take NSAIDs. We will do PT for heel pain.  Continue current meds. BP came down in office (initially up due to stress)  Patient  will continue follow-up with cardiology and her specialists.  I personally spent over half of a total 25 minutes face to face with the patient in counseling and discussion and/or coordination of care as described above.

## 2017-07-11 ENCOUNTER — TELEPHONE (OUTPATIENT)
Dept: FAMILY MEDICINE CLINIC | Facility: CLINIC | Age: 57
End: 2017-07-11

## 2017-07-11 RX ORDER — FLUTICASONE PROPIONATE 50 MCG
2 SPRAY, SUSPENSION (ML) NASAL DAILY
Qty: 1 EACH | Refills: 1 | Status: SHIPPED | OUTPATIENT
Start: 2017-07-11 | End: 2017-08-10

## 2017-07-11 NOTE — TELEPHONE ENCOUNTER
----- Message from Sofia Madrid sent at 7/11/2017  9:59 AM EDT -----  Regarding: NEEDS REFILL  Contact: [phone]  PATIENT NEEDS FLONASE CALLED IN TO [facility]

## 2017-07-17 RX ORDER — SAXAGLIPTIN 5 MG/1
TABLET, FILM COATED ORAL
Qty: 30 TABLET | Refills: 0 | Status: SHIPPED | OUTPATIENT
Start: 2017-07-17 | End: 2017-08-05 | Stop reason: SDUPTHER

## 2017-08-07 RX ORDER — GLIMEPIRIDE 4 MG/1
TABLET ORAL
Qty: 30 TABLET | Refills: 0 | Status: SHIPPED | OUTPATIENT
Start: 2017-08-07 | End: 2017-08-25 | Stop reason: SDUPTHER

## 2017-08-07 RX ORDER — SAXAGLIPTIN 5 MG/1
TABLET, FILM COATED ORAL
Qty: 30 TABLET | Refills: 0 | Status: SHIPPED | OUTPATIENT
Start: 2017-08-07 | End: 2017-08-25 | Stop reason: SDUPTHER

## 2017-08-08 ENCOUNTER — LAB (OUTPATIENT)
Dept: LAB | Facility: HOSPITAL | Age: 57
End: 2017-08-08

## 2017-08-08 ENCOUNTER — OFFICE VISIT (OUTPATIENT)
Dept: ONCOLOGY | Facility: CLINIC | Age: 57
End: 2017-08-08

## 2017-08-08 VITALS
RESPIRATION RATE: 16 BRPM | HEART RATE: 64 BPM | DIASTOLIC BLOOD PRESSURE: 77 MMHG | BODY MASS INDEX: 38.51 KG/M2 | HEIGHT: 61 IN | WEIGHT: 204 LBS | TEMPERATURE: 97.2 F | SYSTOLIC BLOOD PRESSURE: 150 MMHG

## 2017-08-08 DIAGNOSIS — D64.9 NORMOCYTIC ANEMIA: ICD-10-CM

## 2017-08-08 DIAGNOSIS — E61.1 IRON DEFICIENCY: Primary | ICD-10-CM

## 2017-08-08 DIAGNOSIS — E53.8 VITAMIN B12 DEFICIENCY: ICD-10-CM

## 2017-08-08 LAB
ERYTHROCYTE [DISTWIDTH] IN BLOOD BY AUTOMATED COUNT: 16.2 % (ref 11.3–14.5)
FERRITIN SERPL-MCNC: 62 NG/ML (ref 10–291)
FOLATE SERPL-MCNC: 21.16 NG/ML (ref 3.2–20)
HCT VFR BLD AUTO: 36.2 % (ref 34.5–44)
HGB BLD-MCNC: 11.3 G/DL (ref 11.5–15.5)
IRON 24H UR-MRATE: 22 MCG/DL (ref 50–175)
IRON SATN MFR SERPL: 5 % (ref 15–50)
LYMPHOCYTES # BLD AUTO: 1.9 10*3/MM3 (ref 0.6–4.8)
LYMPHOCYTES NFR BLD AUTO: 20 % (ref 24–44)
MCH RBC QN AUTO: 24 PG (ref 27–31)
MCHC RBC AUTO-ENTMCNC: 31.2 G/DL (ref 32–36)
MCV RBC AUTO: 76.9 FL (ref 80–99)
MONOCYTES # BLD AUTO: 0.3 10*3/MM3 (ref 0–1)
MONOCYTES NFR BLD AUTO: 3.7 % (ref 0–12)
NEUTROPHILS # BLD AUTO: 7.1 10*3/MM3 (ref 1.5–8.3)
NEUTROPHILS NFR BLD AUTO: 76.3 % (ref 41–71)
PLATELET # BLD AUTO: 375 10*3/MM3 (ref 150–450)
PMV BLD AUTO: 8.1 FL (ref 6–12)
RBC # BLD AUTO: 4.71 10*6/MM3 (ref 3.89–5.14)
TIBC SERPL-MCNC: 422 MCG/DL (ref 250–450)
VIT B12 BLD-MCNC: 403 PG/ML (ref 211–911)
WBC NRBC COR # BLD: 9.3 10*3/MM3 (ref 3.5–10.8)

## 2017-08-08 PROCEDURE — 99214 OFFICE O/P EST MOD 30 MIN: CPT | Performed by: INTERNAL MEDICINE

## 2017-08-08 PROCEDURE — 83550 IRON BINDING TEST: CPT | Performed by: NURSE PRACTITIONER

## 2017-08-08 PROCEDURE — 82607 VITAMIN B-12: CPT | Performed by: NURSE PRACTITIONER

## 2017-08-08 PROCEDURE — 83540 ASSAY OF IRON: CPT | Performed by: NURSE PRACTITIONER

## 2017-08-08 PROCEDURE — 82728 ASSAY OF FERRITIN: CPT | Performed by: NURSE PRACTITIONER

## 2017-08-08 PROCEDURE — 85025 COMPLETE CBC W/AUTO DIFF WBC: CPT

## 2017-08-08 PROCEDURE — 82746 ASSAY OF FOLIC ACID SERUM: CPT | Performed by: NURSE PRACTITIONER

## 2017-08-08 PROCEDURE — 36415 COLL VENOUS BLD VENIPUNCTURE: CPT

## 2017-08-08 RX ORDER — FOLIC ACID 1 MG/1
1 TABLET ORAL DAILY
Qty: 30 TABLET | Refills: 5 | Status: SHIPPED | OUTPATIENT
Start: 2017-08-08 | End: 2017-08-14 | Stop reason: SDUPTHER

## 2017-08-08 NOTE — PROGRESS NOTES
"DATE OF VISIT: 8/8/2017    REASON FOR VISIT: Followup for normocytic anemia.      HISTORY OF PRESENT ILLNESS: The patient is a very pleasant 53-year-old   American female with past medical history significant for anemia. The patient  had been diagnosed with iron deficiency and vitamin B12 deficiency in the past.  The patient was treated with IV iron replacement and vitamin B12 replacement.  The patient was doing fine until 07/2013. The patient was admitted to the  hospital with fatigue and weakness and found to have hemoglobin of 5. The  patient had a good response to transfusion of 4 units of blood during her  hospitalization. She had a normal iron panel, borderline vitamin B12 level.  She was started on vitamin B12 replacement prior to discharge. The patient had  EGD, colonoscopy and barium swallow evaluation done in 2013 that was negative  for bleeds. The patient is here today in scheduled followup visit.     SUBJECTIVE: The patient has been doing fairly well. She complains today of difficulty sleeping. She can't fall asleep until 3:00 am. She has tried Ambien and melatonin with no relief. She notes she falls asleep during the day and feels \"run down.\" She has been seen by sleep medicine, but has had no improvement in her symptoms. She also notes persistent shortness of air. This has not been progressive. She denies cough, fever, chills, or wheezing. Otherwise, she denies evidence of bleeding. She continues her Coumadin for mechanical valve.    PAST MEDICAL HISTORY/SOCIAL HISTORY/FAMILY HISTORY: Unchanged from my prior documentation done on 09/16/2013.     Review of Systems   Constitutional: Positive for fatigue. Negative for activity change, appetite change, chills, fever and unexpected weight change.   HENT: Negative for hearing loss, mouth sores, nosebleeds, sore throat and trouble swallowing.    Eyes: Negative for visual disturbance.   Respiratory: Positive for shortness of breath. Negative for cough, " chest tightness and wheezing.    Cardiovascular: Negative for chest pain, palpitations and leg swelling.   Gastrointestinal: Negative for abdominal distention, abdominal pain, blood in stool, constipation, diarrhea, nausea, rectal pain and vomiting.   Endocrine: Negative for cold intolerance and heat intolerance.   Genitourinary: Negative for difficulty urinating, dysuria, frequency and urgency.   Musculoskeletal: Negative for arthralgias, back pain, gait problem, joint swelling and myalgias.   Skin: Negative for rash.   Neurological: Negative for dizziness, tremors, syncope, weakness, light-headedness, numbness and headaches.        Sleep disturbance   Hematological: Negative for adenopathy. Does not bruise/bleed easily.   Psychiatric/Behavioral: Negative for confusion, sleep disturbance and suicidal ideas. The patient is not nervous/anxious.          Current Outpatient Prescriptions:   •  albuterol (PROVENTIL HFA;VENTOLIN HFA) 108 (90 BASE) MCG/ACT inhaler, Inhale 2 puffs Every 4 (Four) Hours As Needed for wheezing., Disp: , Rfl:   •  Alcohol Swabs (ALCOHOL PREP) 70 % pads, , Disp: , Rfl:   •  ASSURE COMFORT LANCETS 30G misc, , Disp: , Rfl:   •  Blood Glucose Calibration (OT ULTRA/FASTTK CNTRL SOLN) solution, , Disp: , Rfl:   •  Blood Glucose Monitoring Suppl (ONE TOUCH ULTRA 2) W/DEVICE kit, , Disp: , Rfl:   •  carvedilol (COREG) 12.5 MG tablet, , Disp: , Rfl:   •  escitalopram (LEXAPRO) 20 MG tablet, TAKE ONE TABLET BY MOUTH ONCE DAILY, Disp: 90 tablet, Rfl: 0  •  fluticasone (FLONASE) 50 MCG/ACT nasal spray, 2 sprays into each nostril Daily for 30 days., Disp: 1 each, Rfl: 1  •  folic acid (FOLVITE) 1 MG tablet, Take 1 tablet by mouth Daily., Disp: 30 tablet, Rfl: 5  •  furosemide (LASIX) 40 MG tablet, Take 1 tablet by mouth daily., Disp: , Rfl:   •  gabapentin (NEURONTIN) 600 MG tablet, , Disp: , Rfl:   •  glimepiride (AMARYL) 4 MG tablet, TAKE ONE TABLET BY MOUTH ONCE DAILY, Disp: 30 tablet, Rfl: 0  •   "HYDROcodone-acetaminophen (NORCO) 5-325 MG per tablet, , Disp: , Rfl:   •  Insulin Pen Needle (RELION PEN NEEDLES) 32G X 4 MM misc, TEST BID, Disp: 100 each, Rfl: 0  •  Lancet Devices (SIMPLE DIAGNOSTICS LANCING DEV) misc, , Disp: , Rfl:   •  LANTUS SOLOSTAR 100 UNIT/ML injection pen, INJECT 50 UNITS SUBCUTANEOUSLY DAILY, Disp: 1 pen, Rfl: 0  •  Omega-3 Fatty Acids (FISH OIL PO), Take  by mouth., Disp: , Rfl:   •  ONE TOUCH ULTRA TEST test strip, , Disp: , Rfl:   •  ONGLYZA 5 MG tablet, TAKE ONE TABLET BY MOUTH ONCE DAILY, Disp: 30 tablet, Rfl: 0  •  simvastatin (ZOCOR) 40 MG tablet, TAKE ONE TABLET BY MOUTH AT BEDTIME, Disp: 90 tablet, Rfl: 0  •  warfarin (COUMADIN) 1 MG tablet, , Disp: , Rfl:   •  warfarin (COUMADIN) 4 MG tablet, , Disp: , Rfl:     PHYSICAL EXAMINATION:   /77  Pulse 64  Temp 97.2 °F (36.2 °C) (Temporal Artery )   Resp 16  Ht 61\" (154.9 cm)  Wt 204 lb (92.5 kg)  BMI 38.55 kg/m2   ECOG Performance Status: 1 - Symptomatic but completely ambulatory  General Appearance:  alert, cooperative, no apparent distress and appears stated age   Neurologic/Psychiatric: A&O x 3, gait steady, appropriate affect, strength 5/5 in all muscle groups   HEENT:  Normocephalic, without obvious abnormality, mucous membranes moist   Neck: Supple, symmetrical, trachea midline, no adenopathy;  No thyromegaly, masses, or tenderness   Lungs:   Clear to auscultation bilaterally; respirations regular, even, and unlabored bilaterally   Heart:  Regular rate and rhythm, no murmurs appreciated   Abdomen:   Soft, non-tender, non-distended and no organomegaly   Lymph nodes: No cervical, supraclavicular, inguinal or axillary adenopathy noted   Extremities: Normal, atraumatic; no clubbing, cyanosis, or edema    Skin: No rashes, ulcers, or suspicious lesions noted     Lab on 08/08/2017   Component Date Value Ref Range Status   • Ferritin 08/08/2017 62.00  10.00 - 291.00 ng/mL Final   • Vitamin B-12 08/08/2017 403  211 - 911 " pg/mL Final   • Iron 08/08/2017 22* 50 - 175 mcg/dL Final   • TIBC 08/08/2017 422  250 - 450 mcg/dL Final   • Iron Saturation 08/08/2017 5* 15 - 50 % Final   • Folate 08/08/2017 21.16* 3.20 - 20.00 ng/mL Final   • WBC 08/08/2017 9.30  3.50 - 10.80 10*3/mm3 Final    Verified by repeat analysis.    • RBC 08/08/2017 4.71  3.89 - 5.14 10*6/mm3 Final   • Hemoglobin 08/08/2017 11.3* 11.5 - 15.5 g/dL Final   • Hematocrit 08/08/2017 36.2  34.5 - 44.0 % Final   • RDW 08/08/2017 16.2* 11.3 - 14.5 % Final   • MCV 08/08/2017 76.9* 80.0 - 99.0 fL Final   • MCH 08/08/2017 24.0* 27.0 - 31.0 pg Final   • MCHC 08/08/2017 31.2* 32.0 - 36.0 g/dL Final   • MPV 08/08/2017 8.1  6.0 - 12.0 fL Final   • Platelets 08/08/2017 375  150 - 450 10*3/mm3 Final   • Neutrophil % 08/08/2017 76.3* 41.0 - 71.0 % Final   • Lymphocyte % 08/08/2017 20.0* 24.0 - 44.0 % Final   • Monocyte % 08/08/2017 3.7  0.0 - 12.0 % Final   • Neutrophils, Absolute 08/08/2017 7.10  1.50 - 8.30 10*3/mm3 Final   • Lymphocytes, Absolute 08/08/2017 1.90  0.60 - 4.80 10*3/mm3 Final   • Monocytes, Absolute 08/08/2017 0.30  0.00 - 1.00 10*3/mm3 Final        No results found.    ASSESSMENT: The patient is a very pleasant 56-year-old  female  with normocytic anemia.    PLAN:  1. I did go over the CBC results in detail with the patient. I reassured her  that her hemoglobin is stable. Her iron studies and vitamin levels are pending at this time. We will follow up on these results and notify her of any significant findings.   2. The patient will come back to see me in 4 months with repeat CBC, ferritin, iron panel, vitamin B12, and folate.   3. The patient will continue taking Coumadin for her mechanical valve. She  gets her INR checked with Dr. Patel.   4. She will follow up with her sleep medicine doctor regarding ongoing treatment.   5. I will restart folate 1 mg daily.  6. Microcytosis: I am concern about her new microcytosis, I will arrange for IV iron if her  ferritin is less than 50 or Fe sat less than 10%.    Scribed for Quinn Palmer MD by Suzette Olsen, APRN. 8/8/2017  5:35 PM  Quinn Palmer MD  8/8/2017   I, Quinn Palmer MD, personally performed the services described in this documentation as scribed by the above named individual in my presence, and it is both accurate and complete.  8/8/2017  5:35 PM

## 2017-08-09 ENCOUNTER — TELEPHONE (OUTPATIENT)
Dept: ONCOLOGY | Facility: CLINIC | Age: 57
End: 2017-08-09

## 2017-08-09 PROBLEM — K90.9 IRON MALABSORPTION: Status: ACTIVE | Noted: 2017-08-09

## 2017-08-09 RX ORDER — SODIUM CHLORIDE 9 MG/ML
250 INJECTION, SOLUTION INTRAVENOUS ONCE
Status: CANCELLED | OUTPATIENT
Start: 2017-08-23

## 2017-08-09 RX ORDER — FAMOTIDINE 10 MG/ML
20 INJECTION, SOLUTION INTRAVENOUS ONCE
Status: CANCELLED | OUTPATIENT
Start: 2017-08-16

## 2017-08-09 RX ORDER — FAMOTIDINE 10 MG/ML
20 INJECTION, SOLUTION INTRAVENOUS ONCE
Status: CANCELLED | OUTPATIENT
Start: 2017-08-23

## 2017-08-09 RX ORDER — DIPHENHYDRAMINE HCL 25 MG
25 CAPSULE ORAL ONCE
Status: CANCELLED | OUTPATIENT
Start: 2017-08-23

## 2017-08-09 RX ORDER — DIPHENHYDRAMINE HCL 25 MG
25 CAPSULE ORAL ONCE
Status: CANCELLED | OUTPATIENT
Start: 2017-08-16

## 2017-08-09 RX ORDER — SODIUM CHLORIDE 9 MG/ML
250 INJECTION, SOLUTION INTRAVENOUS ONCE
Status: CANCELLED | OUTPATIENT
Start: 2017-08-16

## 2017-08-09 NOTE — TELEPHONE ENCOUNTER
Pt informed of need for IV Iron via VM. Message sent to scheduling to call patient with appt details

## 2017-08-14 RX ORDER — FOLIC ACID 1 MG/1
TABLET ORAL
Qty: 30 TABLET | Refills: 5 | Status: SHIPPED | OUTPATIENT
Start: 2017-08-14 | End: 2019-06-05

## 2017-08-14 RX ORDER — FOLIC ACID 1 MG/1
1 TABLET ORAL DAILY
Qty: 30 TABLET | Refills: 5 | Status: SHIPPED | OUTPATIENT
Start: 2017-08-14 | End: 2017-08-14 | Stop reason: SDUPTHER

## 2017-08-16 ENCOUNTER — INFUSION (OUTPATIENT)
Dept: ONCOLOGY | Facility: HOSPITAL | Age: 57
End: 2017-08-16

## 2017-08-16 VITALS
HEART RATE: 70 BPM | TEMPERATURE: 96.6 F | DIASTOLIC BLOOD PRESSURE: 77 MMHG | SYSTOLIC BLOOD PRESSURE: 142 MMHG | RESPIRATION RATE: 20 BRPM

## 2017-08-16 DIAGNOSIS — E53.8 VITAMIN B12 DEFICIENCY: ICD-10-CM

## 2017-08-16 DIAGNOSIS — D64.9 NORMOCYTIC ANEMIA: ICD-10-CM

## 2017-08-16 DIAGNOSIS — E61.1 IRON DEFICIENCY: ICD-10-CM

## 2017-08-16 DIAGNOSIS — K90.9 IRON MALABSORPTION: Primary | ICD-10-CM

## 2017-08-16 LAB
BASOPHILS # BLD AUTO: 0.03 10*3/MM3 (ref 0–0.2)
BASOPHILS NFR BLD AUTO: 0.4 % (ref 0–1)
DEPRECATED RDW RBC AUTO: 43.3 FL (ref 37–54)
EOSINOPHIL # BLD AUTO: 0.14 10*3/MM3 (ref 0–0.3)
EOSINOPHIL NFR BLD AUTO: 1.7 % (ref 0–3)
ERYTHROCYTE [DISTWIDTH] IN BLOOD BY AUTOMATED COUNT: 15.3 % (ref 11.3–14.5)
FERRITIN SERPL-MCNC: 61 NG/ML (ref 10–291)
HCT VFR BLD AUTO: 36.4 % (ref 34.5–44)
HGB BLD-MCNC: 11.1 G/DL (ref 11.5–15.5)
IMM GRANULOCYTES # BLD: 0.02 10*3/MM3 (ref 0–0.03)
IMM GRANULOCYTES NFR BLD: 0.2 % (ref 0–0.6)
IRON 24H UR-MRATE: 24 MCG/DL (ref 50–175)
IRON SATN MFR SERPL: 6 % (ref 15–50)
LYMPHOCYTES # BLD AUTO: 1.73 10*3/MM3 (ref 0.6–4.8)
LYMPHOCYTES NFR BLD AUTO: 20.4 % (ref 24–44)
MCH RBC QN AUTO: 23.9 PG (ref 27–31)
MCHC RBC AUTO-ENTMCNC: 30.5 G/DL (ref 32–36)
MCV RBC AUTO: 78.3 FL (ref 80–99)
MONOCYTES # BLD AUTO: 0.58 10*3/MM3 (ref 0–1)
MONOCYTES NFR BLD AUTO: 6.9 % (ref 0–12)
NEUTROPHILS # BLD AUTO: 5.96 10*3/MM3 (ref 1.5–8.3)
NEUTROPHILS NFR BLD AUTO: 70.4 % (ref 41–71)
PLATELET # BLD AUTO: 370 10*3/MM3 (ref 150–450)
PMV BLD AUTO: 10.4 FL (ref 6–12)
RBC # BLD AUTO: 4.65 10*6/MM3 (ref 3.89–5.14)
TIBC SERPL-MCNC: 388 MCG/DL (ref 250–450)
WBC NRBC COR # BLD: 8.46 10*3/MM3 (ref 3.5–10.8)

## 2017-08-16 PROCEDURE — 83550 IRON BINDING TEST: CPT

## 2017-08-16 PROCEDURE — 63710000001 DIPHENHYDRAMINE PER 50 MG: Performed by: INTERNAL MEDICINE

## 2017-08-16 PROCEDURE — 96374 THER/PROPH/DIAG INJ IV PUSH: CPT

## 2017-08-16 PROCEDURE — 85025 COMPLETE CBC W/AUTO DIFF WBC: CPT

## 2017-08-16 PROCEDURE — 96375 TX/PRO/DX INJ NEW DRUG ADDON: CPT

## 2017-08-16 PROCEDURE — 83540 ASSAY OF IRON: CPT

## 2017-08-16 PROCEDURE — 82728 ASSAY OF FERRITIN: CPT

## 2017-08-16 PROCEDURE — 25010000002 FERUMOXYTOL 510 MG/17ML SOLUTION 510 MG VIAL: Performed by: INTERNAL MEDICINE

## 2017-08-16 RX ORDER — FAMOTIDINE 10 MG/ML
20 INJECTION, SOLUTION INTRAVENOUS ONCE
Status: COMPLETED | OUTPATIENT
Start: 2017-08-16 | End: 2017-08-16

## 2017-08-16 RX ORDER — DIPHENHYDRAMINE HCL 25 MG
25 CAPSULE ORAL ONCE
Status: COMPLETED | OUTPATIENT
Start: 2017-08-16 | End: 2017-08-16

## 2017-08-16 RX ADMIN — FAMOTIDINE 20 MG: 10 INJECTION, SOLUTION INTRAVENOUS at 11:52

## 2017-08-16 RX ADMIN — DIPHENHYDRAMINE HYDROCHLORIDE 25 MG: 25 CAPSULE ORAL at 11:51

## 2017-08-16 RX ADMIN — FERUMOXYTOL 510 MG: 510 INJECTION INTRAVENOUS at 11:55

## 2017-08-23 ENCOUNTER — INFUSION (OUTPATIENT)
Dept: ONCOLOGY | Facility: HOSPITAL | Age: 57
End: 2017-08-23

## 2017-08-23 VITALS
HEART RATE: 72 BPM | DIASTOLIC BLOOD PRESSURE: 61 MMHG | TEMPERATURE: 96.7 F | RESPIRATION RATE: 20 BRPM | SYSTOLIC BLOOD PRESSURE: 141 MMHG | OXYGEN SATURATION: 98 %

## 2017-08-23 DIAGNOSIS — E61.1 IRON DEFICIENCY: ICD-10-CM

## 2017-08-23 DIAGNOSIS — K90.9 IRON MALABSORPTION: Primary | ICD-10-CM

## 2017-08-23 PROCEDURE — 96375 TX/PRO/DX INJ NEW DRUG ADDON: CPT

## 2017-08-23 PROCEDURE — 96374 THER/PROPH/DIAG INJ IV PUSH: CPT

## 2017-08-23 PROCEDURE — 63710000001 DIPHENHYDRAMINE PER 50 MG: Performed by: INTERNAL MEDICINE

## 2017-08-23 PROCEDURE — 25010000002 FERUMOXYTOL 510 MG/17ML SOLUTION 510 MG VIAL: Performed by: INTERNAL MEDICINE

## 2017-08-23 RX ORDER — FAMOTIDINE 10 MG/ML
20 INJECTION, SOLUTION INTRAVENOUS ONCE
Status: COMPLETED | OUTPATIENT
Start: 2017-08-23 | End: 2017-08-23

## 2017-08-23 RX ORDER — DIPHENHYDRAMINE HCL 25 MG
25 CAPSULE ORAL ONCE
Status: COMPLETED | OUTPATIENT
Start: 2017-08-23 | End: 2017-08-23

## 2017-08-23 RX ADMIN — FAMOTIDINE 20 MG: 10 INJECTION, SOLUTION INTRAVENOUS at 11:57

## 2017-08-23 RX ADMIN — FERUMOXYTOL 510 MG: 510 INJECTION INTRAVENOUS at 12:03

## 2017-08-23 RX ADMIN — DIPHENHYDRAMINE HYDROCHLORIDE 25 MG: 25 CAPSULE ORAL at 11:57

## 2017-08-25 ENCOUNTER — HOSPITAL ENCOUNTER (OUTPATIENT)
Dept: GENERAL RADIOLOGY | Facility: HOSPITAL | Age: 57
Discharge: HOME | End: 2017-08-25
Attending: FAMILY MEDICINE | Admitting: FAMILY MEDICINE

## 2017-08-25 ENCOUNTER — OFFICE VISIT (OUTPATIENT)
Dept: FAMILY MEDICINE CLINIC | Facility: CLINIC | Age: 57
End: 2017-08-25

## 2017-08-25 VITALS
HEART RATE: 72 BPM | TEMPERATURE: 98 F | BODY MASS INDEX: 39.45 KG/M2 | DIASTOLIC BLOOD PRESSURE: 80 MMHG | SYSTOLIC BLOOD PRESSURE: 140 MMHG | RESPIRATION RATE: 16 BRPM | WEIGHT: 202 LBS | OXYGEN SATURATION: 95 %

## 2017-08-25 DIAGNOSIS — J40 BRONCHITIS: ICD-10-CM

## 2017-08-25 DIAGNOSIS — G89.29 OTHER CHRONIC PAIN: ICD-10-CM

## 2017-08-25 DIAGNOSIS — J40 BRONCHITIS: Primary | ICD-10-CM

## 2017-08-25 PROCEDURE — 71020 HC CHEST PA AND LATERAL: CPT

## 2017-08-25 PROCEDURE — 99214 OFFICE O/P EST MOD 30 MIN: CPT | Performed by: FAMILY MEDICINE

## 2017-08-25 RX ORDER — GLIMEPIRIDE 4 MG/1
4 TABLET ORAL
Qty: 90 TABLET | Refills: 0 | Status: SHIPPED | OUTPATIENT
Start: 2017-08-25 | End: 2017-10-12 | Stop reason: SDUPTHER

## 2017-08-25 RX ORDER — SIMVASTATIN 40 MG
40 TABLET ORAL NIGHTLY
Qty: 90 TABLET | Refills: 0 | Status: SHIPPED | OUTPATIENT
Start: 2017-08-25 | End: 2018-01-17

## 2017-08-25 RX ORDER — ESCITALOPRAM OXALATE 20 MG/1
20 TABLET ORAL DAILY
Qty: 90 TABLET | Refills: 0 | Status: SHIPPED | OUTPATIENT
Start: 2017-08-25 | End: 2017-10-12 | Stop reason: SDUPTHER

## 2017-08-25 RX ORDER — BENZONATATE 100 MG/1
100 CAPSULE ORAL 3 TIMES DAILY PRN
Qty: 30 CAPSULE | Refills: 0 | Status: SHIPPED | OUTPATIENT
Start: 2017-08-25 | End: 2017-09-28

## 2017-08-25 NOTE — PROGRESS NOTES
Subjective   Di Lopez is a 56 y.o. female.   Had recently had iron infusion. Was noticed to have bronchitis and shortness of breath 2 days ago and seen at [facility]. Given a nebulizer and placed on prednisone and doxycycline with some relief. Has increased urination and increased blood sugar on steroids. Pt increased insulin to lantus 60 units. Over past 2 days.  Bronchitis   This is a new problem. The current episode started in the past 7 days. The problem occurs constantly. The problem has been unchanged. Associated symptoms include chills, coughing and fatigue. Pertinent negatives include no arthralgias, chest pain, congestion, diaphoresis, fever, joint swelling, myalgias, nausea, neck pain, numbness, rash, sore throat or weakness. Nothing aggravates the symptoms. Treatments tried: prednisone and doxycycline, ventolin. The treatment provided mild relief.     Needs rf of onglyza, lexapro, glimeperide.   Has been in pain clinic and needs referral.  Pt needs papers completed for foster care.  The following portions of the patient's history were reviewed and updated as appropriate: allergies, current medications, past family history, past medical history, past social history, past surgical history and problem list.    Review of Systems   Constitutional: Positive for chills and fatigue. Negative for appetite change, diaphoresis, fever and unexpected weight change.   HENT: Negative for congestion, ear pain, mouth sores, postnasal drip, rhinorrhea, sinus pressure, sneezing, sore throat and trouble swallowing.    Eyes: Negative for pain, redness and visual disturbance.   Respiratory: Positive for cough. Negative for apnea, chest tightness, shortness of breath and wheezing.    Cardiovascular: Negative for chest pain, palpitations and leg swelling.   Gastrointestinal: Negative for abdominal distention, blood in stool, constipation, diarrhea and nausea.   Endocrine: Negative for cold intolerance, polydipsia, polyphagia  and polyuria.   Genitourinary: Negative for difficulty urinating, dysuria, enuresis, flank pain and urgency.   Musculoskeletal: Negative for arthralgias, back pain, joint swelling, myalgias and neck pain.   Skin: Negative for color change, rash and wound.   Neurological: Negative for dizziness, syncope, weakness, light-headedness and numbness.   Hematological: Negative for adenopathy.   Psychiatric/Behavioral: Negative for agitation, behavioral problems and confusion. The patient is not nervous/anxious.        Objective   Physical Exam   Constitutional: She is oriented to person, place, and time. She appears well-developed and well-nourished. No distress.   HENT:   Right Ear: External ear normal.   Left Ear: External ear normal.   Nose: Nose normal.   Mouth/Throat: Oropharynx is clear and moist.   Eyes: Conjunctivae and EOM are normal. Pupils are equal, round, and reactive to light.   Neck: Normal range of motion. Neck supple. No thyromegaly present.   Cardiovascular: Normal rate, regular rhythm and normal heart sounds.    No murmur heard.  Pulmonary/Chest: Effort normal and breath sounds normal. No respiratory distress. She has no wheezes.   Abdominal: Soft. Bowel sounds are normal. She exhibits no distension and no mass. There is no tenderness. There is no rebound and no guarding. No hernia.   Musculoskeletal: Normal range of motion. She exhibits no edema or tenderness.   Lymphadenopathy:     She has no cervical adenopathy.   Neurological: She is alert and oriented to person, place, and time. She has normal reflexes.   Skin: Skin is warm and dry. No rash noted. She is not diaphoretic. No erythema. No pallor.   Psychiatric: She has a normal mood and affect. Her behavior is normal. Judgment and thought content normal.   Nursing note and vitals reviewed.      Assessment/Plan   Di was seen today for bronchitis.    Diagnoses and all orders for this visit:    Bronchitis  -     XR Chest 2 View; Future    Other chronic  pain    Other orders  -     escitalopram (LEXAPRO) 20 MG tablet; Take 1 tablet by mouth Daily.  -     simvastatin (ZOCOR) 40 MG tablet; Take 1 tablet by mouth Every Night.  -     SAXagliptin (ONGLYZA) 5 MG tablet; Take 1 tablet by mouth Daily.  -     glimepiride (AMARYL) 4 MG tablet; Take 1 tablet by mouth Every Morning Before Breakfast.  -     benzonatate (TESSALON PERLES) 100 MG capsule; Take 1 capsule by mouth 3 (Three) Times a Day As Needed for Cough.        I personally spent over half of a total 25 minutes face to face with the patient in counseling and discussion and/or coordination of care as described above.

## 2017-08-30 ENCOUNTER — OFFICE VISIT (OUTPATIENT)
Dept: PULMONOLOGY | Facility: CLINIC | Age: 57
End: 2017-08-30

## 2017-08-30 VITALS
OXYGEN SATURATION: 92 % | SYSTOLIC BLOOD PRESSURE: 140 MMHG | HEIGHT: 60 IN | BODY MASS INDEX: 39.66 KG/M2 | WEIGHT: 202 LBS | RESPIRATION RATE: 16 BRPM | DIASTOLIC BLOOD PRESSURE: 78 MMHG | TEMPERATURE: 97.6 F | HEART RATE: 72 BPM

## 2017-08-30 DIAGNOSIS — R05.9 COUGH: ICD-10-CM

## 2017-08-30 DIAGNOSIS — J43.9 PULMONARY EMPHYSEMA, UNSPECIFIED EMPHYSEMA TYPE (HCC): Primary | ICD-10-CM

## 2017-08-30 DIAGNOSIS — Z79.01 LONG TERM CURRENT USE OF ANTICOAGULANT: ICD-10-CM

## 2017-08-30 DIAGNOSIS — G47.33 OBSTRUCTIVE SLEEP APNEA SYNDROME: ICD-10-CM

## 2017-08-30 DIAGNOSIS — R06.02 SHORTNESS OF BREATH: ICD-10-CM

## 2017-08-30 DIAGNOSIS — E66.01 MORBID OBESITY, UNSPECIFIED OBESITY TYPE (HCC): ICD-10-CM

## 2017-08-30 PROCEDURE — 94726 PLETHYSMOGRAPHY LUNG VOLUMES: CPT | Performed by: NURSE PRACTITIONER

## 2017-08-30 PROCEDURE — 94200 LUNG FUNCTION TEST (MBC/MVV): CPT | Performed by: NURSE PRACTITIONER

## 2017-08-30 PROCEDURE — 94375 RESPIRATORY FLOW VOLUME LOOP: CPT | Performed by: NURSE PRACTITIONER

## 2017-08-30 PROCEDURE — 94620 PR PULMONARY STRESS TESTING,SIMPLE: CPT | Performed by: NURSE PRACTITIONER

## 2017-08-30 PROCEDURE — 99215 OFFICE O/P EST HI 40 MIN: CPT | Performed by: NURSE PRACTITIONER

## 2017-08-30 PROCEDURE — 94729 DIFFUSING CAPACITY: CPT | Performed by: NURSE PRACTITIONER

## 2017-08-30 NOTE — PROGRESS NOTES
[facility] Pulmonary Follow up    CHIEF COMPLAINT    Dyspnea    HISTORY OF PRESENT ILLNESS    Di Lopez is a 56 y.o.female here today for worsening shortness of air.  She has been evaluated in our office by Dr. Alex Traore in 2016 as well as Dr. Thomas in the past for dyspnea.  She has had a nondiagnostic CPX as well as a CT scan.  She does have multiple comorbidities for her dyspnea, she was lost to follow-up after her last visit.  She comes in today for continued dyspnea.  She continues to smoke an e-cigarette and has done so for almost 10 years.  She has chronic bronchitis issues and is just recovering from a recent illness and is finishing up a antibiotic.  She does have a chronic cough with chronic sputum production.  She does complain of postnasal drainage and seasonal allergies.  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  She had some resolution of her symptoms when she stopped smoking and after cancer in 2008.  It has had a worsening of her symptoms around a year ago.  She had a mitral valve replacement in 2008 by Dr. Barreto and is on chronic Coumadin therapy.  She's had no worsening or complaints of chest pain, palpitations, or lower extremity edema.  She's also been her cardiologist Dr. Patel regularly.    She also has obstructive sleep apnea and uses a CPAP at night.  She follows up with Dr. Mercer in the sleep clinic.    She also follows up with Dr. Vargas for iron deficiency anemia with iron injections.    Patient Active Problem List   Diagnosis   • Arteriovenous malformation of gastrointestinal tract   • Depression   • Type 2 diabetes mellitus   • Dyslipidemia   • Emphysema/COPD   • Hypertension   • Insomnia   • Morbid obesity   • Low back pain   • Obstructive sleep apnea syndrome   • Long term current use of anticoagulant   • Normocytic anemia   • Hand paresthesia   • Sliding hiatal hernia   •  Dyspnea   • Vitamin B12 deficiency   • Rash and nonspecific skin eruption   • Cough   • Iron deficiency   • Strep pharyngitis   • Iron malabsorption       Allergies   Allergen Reactions   • Codeine    • Latex    • Penicillins    • Sulfa Antibiotics        Current Outpatient Prescriptions:   •  albuterol (PROVENTIL HFA;VENTOLIN HFA) 108 (90 BASE) MCG/ACT inhaler, Inhale 2 puffs Every 4 (Four) Hours As Needed for wheezing., Disp: , Rfl:   •  Alcohol Swabs (ALCOHOL PREP) 70 % pads, , Disp: , Rfl:   •  ASSURE COMFORT LANCETS 30G misc, , Disp: , Rfl:   •  benzonatate (TESSALON PERLES) 100 MG capsule, Take 1 capsule by mouth 3 (Three) Times a Day As Needed for Cough., Disp: 30 capsule, Rfl: 0  •  Blood Glucose Calibration (OT ULTRA/FASTTK CNTRL SOLN) solution, , Disp: , Rfl:   •  Blood Glucose Monitoring Suppl (ONE TOUCH ULTRA 2) W/DEVICE kit, , Disp: , Rfl:   •  carvedilol (COREG) 12.5 MG tablet, , Disp: , Rfl:   •  doxycycline (MONODOX) 100 MG capsule, Take 1 capsule by mouth 2 (Two) Times a Day for 10 days., Disp: 20 capsule, Rfl: 0  •  escitalopram (LEXAPRO) 20 MG tablet, Take 1 tablet by mouth Daily., Disp: 90 tablet, Rfl: 0  •  folic acid (FOLVITE) 1 MG tablet, TAKE ONE TABLET BY MOUTH DAILY, Disp: 30 tablet, Rfl: 5  •  furosemide (LASIX) 40 MG tablet, Take 1 tablet by mouth daily., Disp: , Rfl:   •  gabapentin (NEURONTIN) 600 MG tablet, , Disp: , Rfl:   •  glimepiride (AMARYL) 4 MG tablet, Take 1 tablet by mouth Every Morning Before Breakfast., Disp: 90 tablet, Rfl: 0  •  HYDROcodone-acetaminophen (NORCO) 5-325 MG per tablet, , Disp: , Rfl:   •  Insulin Pen Needle (RELION PEN NEEDLES) 32G X 4 MM misc, TEST BID, Disp: 100 each, Rfl: 0  •  Lancet Devices (SIMPLE DIAGNOSTICS LANCING DEV) misc, , Disp: , Rfl:   •  LANTUS SOLOSTAR 100 UNIT/ML injection pen, INJECT 50 UNITS SUBCUTANEOUSLY DAILY, Disp: 1 pen, Rfl: 0  •  Omega-3 Fatty Acids (FISH OIL PO), Take  by mouth., Disp: , Rfl:   •  ONE TOUCH ULTRA TEST test strip,  ", Disp: , Rfl:   •  PredniSONE (DELTASONE) 10 MG (21) tablet pack, Take  by mouth Take As Directed., Disp: 21 tablet, Rfl: 0  •  SAXagliptin (ONGLYZA) 5 MG tablet, Take 1 tablet by mouth Daily., Disp: 90 tablet, Rfl: 0  •  simvastatin (ZOCOR) 40 MG tablet, Take 1 tablet by mouth Every Night., Disp: 90 tablet, Rfl: 0  •  warfarin (COUMADIN) 1 MG tablet, , Disp: , Rfl:   •  warfarin (COUMADIN) 4 MG tablet, , Disp: , Rfl:   MEDICATION LIST AND ALLERGIES REVIEWED.    Social History   Substance Use Topics   • Smoking status: Current Every Day Smoker     Packs/day: 0.50     Types: Electronic Cigarette, Cigarettes     Last attempt to quit: 1/1/2012   • Smokeless tobacco: Never Used   • Alcohol use No       FAMILY AND SOCIAL HISTORY REVIEWED.    Review of Systems   Constitutional: Positive for fatigue. Negative for chills, fever and unexpected weight change.   HENT: Positive for congestion and postnasal drip. Negative for nosebleeds, rhinorrhea, sinus pressure and trouble swallowing.    Respiratory: Positive for cough, shortness of breath and wheezing. Negative for chest tightness.    Cardiovascular: Negative for chest pain, palpitations and leg swelling.   Gastrointestinal: Negative for abdominal pain, constipation, diarrhea, nausea and vomiting.   Genitourinary: Negative for dysuria, frequency, hematuria and urgency.   Musculoskeletal: Negative for myalgias.   Neurological: Positive for weakness. Negative for dizziness, numbness and headaches.   All other systems reviewed and are negative.  .    /78  Pulse 72  Temp 97.6 °F (36.4 °C)  Resp 16  Ht 60\" (152.4 cm)  Wt 202 lb (91.6 kg)  SpO2 92% Comment: RA  BMI 39.45 kg/m2    Physical Exam   Constitutional: She is oriented to person, place, and time. She appears well-developed and well-nourished.   obese   HENT:   Head: Normocephalic and atraumatic.   Eyes: EOM are normal. Pupils are equal, round, and reactive to light.   Neck: Normal range of motion. Neck " supple.   Cardiovascular: Normal rate and regular rhythm.    No murmur heard.  Pulmonary/Chest: Effort normal. No respiratory distress. She has wheezes. She has no rales.   Abdominal: Soft. Bowel sounds are normal. She exhibits no distension.   Musculoskeletal: Normal range of motion. She exhibits no edema.   Neurological: She is alert and oriented to person, place, and time.   Skin: Skin is warm and dry. No erythema.   Psychiatric: She has a normal mood and affect. Her behavior is normal.   Vitals reviewed.        RESULTS    PFTS in the office today, read by me:  Moderate obstructive airway disease with moderate restriction.    6MT:  Desaturated to 85% at 2 minutes, 300 feet with very minimal heart rate response, 69.    CXR   FINDINGS: Two-view chest reveals the heart to be enlarged. The patient  is status post median sternotomy and valvular replacement. Prominence of  the pulmonary vascularity. No focal parenchymal opacification present.  Mild increased perihilar markings suggesting a viral bronchiolitis with  bronchial cuffing and thickening bilaterally. Degenerative changes seen  within the spine.  No pleural effusion or pneumothorax.          IMPRESSION:  The heart is borderline enlarged with slight prominence of  the perihilar regions bilaterally and bronchial cuffing suggesting a  mild viral bronchiolitis.        CT 5/2016  1. There is centrilobular emphysema and mild obstructive lung disease  with minimal bronchiectasis in the upper and lower lung zones and  minimal perimeter fibrosis involving the peripheral secondary pulmonary  lobular septations.  2. Otherwise, there is no evidence of groundglass disease,  consolidation, free fluid or dominant mass.  3. Considerable calcified post granulomatous adenopathy is identified in  the central mediastinum. There is borderline four-chamber heart size.        PROBLEM LIST    Problem List Items Addressed This Visit        Respiratory    Emphysema/COPD - Primary     Relevant Orders    Pulmonary Function Test (Completed)    Walking Oximetry    Obstructive sleep apnea syndrome    Overview     Description: A.  Moderate; CPAP therapy..         Relevant Orders    Pulmonary Function Test (Completed)    Walking Oximetry    Dyspnea    Relevant Orders    Pulmonary Function Test (Completed)    Walking Oximetry    Cough    Relevant Orders    Pulmonary Function Test (Completed)    Walking Oximetry       Digestive    Morbid obesity       Other    Long term current use of anticoagulant    Overview     Valvular heart replacement                 DISCUSSION    A total of 30 minutes Of our 45 minute visit was spent with face-to-face time in the office today discussing COPD management.  We spent time counseling on tobacco cessation, maintenance medication, the use of oxygen with chronic respiratory failure, patient and family education regarding disease management.  As well as the importance of compliance with medication and regular follow-up.  She does continue to use an e-cigarette for the last 10 years, we discussed there is no long-term use data and that her recurrent chronic bronchitis is likely related to her continued e-cigarette use.  I put her on Zyrtec and Flonase for her postnasal drainage and allergic rhinitis.  This is also contributing to her chronic bronchitis.  We went over her PFTs as well as management of her chronic airway disease.  I gave her samples of Breo and Incruse In the office today, I demonstrated how to use them and gave her written instructions.  Ok to continue on Albuterol as needed every 4-6 hours for shortness of breath and wheezing.    She did require oxygen and had exertional hypoxemia on her 6 and walk test.  We'll arrange home oxygen 2 L nasal cannula with activity.    Continue on her CPAP for obstructive sleep apnea.  We went over how chronic sleep apnea will attribute to her chronic dyspnea as well.  Follow-up with Dr. Mercer at the sleep clinic for CPAP  titration study to see if she needs oxygen with her CPAP as well.    I would also like her to follow-up with her cardiologist, Dr. Patel, for repeat echocardiogram to evaluate for pulmonary hypertension.  I'm unsure why she did not have a very significant chronotropic response to her hypoxemia, she is on Coreg 12.5 mg.  I would like for him to follow up for further recommendations and evaluation.    Follow-up in 6-8 weeks to see how she's doing on the above treatment.    Abbey Reed, APRN  08/30/201711:37 AM  Electronically signed     Please note that portions of this note were completed with a voice recognition program. Efforts were made to edit the dictations, but occasionally words are mistranscribed.      CC: Keri Little, DO

## 2017-09-11 ENCOUNTER — TRANSCRIBE ORDERS (OUTPATIENT)
Dept: PULMONOLOGY | Facility: CLINIC | Age: 57
End: 2017-09-11

## 2017-09-20 ENCOUNTER — TELEPHONE (OUTPATIENT)
Dept: ONCOLOGY | Facility: CLINIC | Age: 57
End: 2017-09-20

## 2017-09-20 NOTE — TELEPHONE ENCOUNTER
----- Message from Monika Ramos sent at 9/19/2017  1:51 PM EDT -----  Regarding: [facility]-INPATIENT  Contact: [phone]  Patient is in inpatient at  due to going to the ER at [facility] in [place] said she needed a [facility] due to having something going on with her liver she had a MRI she has a mass on her liver that is full of blood could this be where all her blood has been going? She told the doctors to get in touch with Dr. Palmer.

## 2017-09-25 ENCOUNTER — TRANSITIONAL CARE MANAGEMENT TELEPHONE ENCOUNTER (OUTPATIENT)
Dept: FAMILY MEDICINE CLINIC | Facility: CLINIC | Age: 57
End: 2017-09-25

## 2017-09-28 ENCOUNTER — OFFICE VISIT (OUTPATIENT)
Dept: FAMILY MEDICINE CLINIC | Facility: CLINIC | Age: 57
End: 2017-09-28

## 2017-09-28 VITALS
HEART RATE: 62 BPM | HEIGHT: 60 IN | SYSTOLIC BLOOD PRESSURE: 124 MMHG | BODY MASS INDEX: 39.07 KG/M2 | TEMPERATURE: 98.7 F | WEIGHT: 199 LBS | DIASTOLIC BLOOD PRESSURE: 76 MMHG | OXYGEN SATURATION: 97 %

## 2017-09-28 DIAGNOSIS — Z09 HOSPITAL DISCHARGE FOLLOW-UP: Primary | ICD-10-CM

## 2017-09-28 DIAGNOSIS — R51.9 HEADACHE DISORDER: ICD-10-CM

## 2017-09-28 DIAGNOSIS — E11.9 DIABETES MELLITUS WITHOUT COMPLICATION (HCC): ICD-10-CM

## 2017-09-28 DIAGNOSIS — R16.0 LIVER MASS: ICD-10-CM

## 2017-09-28 LAB
ALBUMIN SERPL-MCNC: 3.6 G/DL (ref 3.2–4.8)
ALBUMIN/GLOB SERPL: 1.4 G/DL (ref 1.5–2.5)
ALP SERPL-CCNC: 296 U/L (ref 25–100)
ALT SERPL W P-5'-P-CCNC: 28 U/L (ref 7–40)
AMYLASE SERPL-CCNC: 46 U/L (ref 30–118)
ANION GAP SERPL CALCULATED.3IONS-SCNC: 6 MMOL/L (ref 3–11)
AST SERPL-CCNC: 21 U/L (ref 0–33)
BASOPHILS # BLD AUTO: 0.02 10*3/MM3 (ref 0–0.2)
BASOPHILS NFR BLD AUTO: 0.2 % (ref 0–1)
BILIRUB SERPL-MCNC: 0.2 MG/DL (ref 0.3–1.2)
BUN BLD-MCNC: 6 MG/DL (ref 9–23)
BUN/CREAT SERPL: 7.5 (ref 7–25)
CALCIUM SPEC-SCNC: 8.8 MG/DL (ref 8.7–10.4)
CHLORIDE SERPL-SCNC: 103 MMOL/L (ref 99–109)
CO2 SERPL-SCNC: 29 MMOL/L (ref 20–31)
CREAT BLD-MCNC: 0.8 MG/DL (ref 0.6–1.3)
DEPRECATED RDW RBC AUTO: 55.3 FL (ref 37–54)
EOSINOPHIL # BLD AUTO: 0.09 10*3/MM3 (ref 0–0.3)
EOSINOPHIL NFR BLD AUTO: 0.9 % (ref 0–3)
ERYTHROCYTE [DISTWIDTH] IN BLOOD BY AUTOMATED COUNT: 18.4 % (ref 11.3–14.5)
GFR SERPL CREATININE-BSD FRML MDRD: 90 ML/MIN/1.73
GLOBULIN UR ELPH-MCNC: 2.6 GM/DL
GLUCOSE BLD-MCNC: 136 MG/DL (ref 70–100)
HBA1C MFR BLD: 7.4 % (ref 4.8–5.6)
HCT VFR BLD AUTO: 30.4 % (ref 34.5–44)
HGB BLD-MCNC: 9.4 G/DL (ref 11.5–15.5)
IMM GRANULOCYTES # BLD: 0.05 10*3/MM3 (ref 0–0.03)
IMM GRANULOCYTES NFR BLD: 0.5 % (ref 0–0.6)
LIPASE SERPL-CCNC: 32 U/L (ref 6–51)
LYMPHOCYTES # BLD AUTO: 1.46 10*3/MM3 (ref 0.6–4.8)
LYMPHOCYTES NFR BLD AUTO: 14.3 % (ref 24–44)
MCH RBC QN AUTO: 25.5 PG (ref 27–31)
MCHC RBC AUTO-ENTMCNC: 30.9 G/DL (ref 32–36)
MCV RBC AUTO: 82.4 FL (ref 80–99)
MONOCYTES # BLD AUTO: 1.13 10*3/MM3 (ref 0–1)
MONOCYTES NFR BLD AUTO: 11.1 % (ref 0–12)
NEUTROPHILS # BLD AUTO: 7.44 10*3/MM3 (ref 1.5–8.3)
NEUTROPHILS NFR BLD AUTO: 73 % (ref 41–71)
PLATELET # BLD AUTO: 625 10*3/MM3 (ref 150–450)
PMV BLD AUTO: 9.1 FL (ref 6–12)
POTASSIUM BLD-SCNC: 4.4 MMOL/L (ref 3.5–5.5)
PROT SERPL-MCNC: 6.2 G/DL (ref 5.7–8.2)
RBC # BLD AUTO: 3.69 10*6/MM3 (ref 3.89–5.14)
SODIUM BLD-SCNC: 138 MMOL/L (ref 132–146)
WBC NRBC COR # BLD: 10.19 10*3/MM3 (ref 3.5–10.8)

## 2017-09-28 PROCEDURE — 83036 HEMOGLOBIN GLYCOSYLATED A1C: CPT | Performed by: FAMILY MEDICINE

## 2017-09-28 PROCEDURE — 82150 ASSAY OF AMYLASE: CPT | Performed by: FAMILY MEDICINE

## 2017-09-28 PROCEDURE — 85025 COMPLETE CBC W/AUTO DIFF WBC: CPT | Performed by: FAMILY MEDICINE

## 2017-09-28 PROCEDURE — 80053 COMPREHEN METABOLIC PANEL: CPT | Performed by: FAMILY MEDICINE

## 2017-09-28 PROCEDURE — 83690 ASSAY OF LIPASE: CPT | Performed by: FAMILY MEDICINE

## 2017-09-28 PROCEDURE — 99214 OFFICE O/P EST MOD 30 MIN: CPT | Performed by: FAMILY MEDICINE

## 2017-09-28 PROCEDURE — 36415 COLL VENOUS BLD VENIPUNCTURE: CPT | Performed by: FAMILY MEDICINE

## 2017-09-28 RX ORDER — PANTOPRAZOLE SODIUM 40 MG/1
40 TABLET, DELAYED RELEASE ORAL DAILY
COMMUNITY
End: 2018-04-23

## 2017-09-28 RX ORDER — TRAMADOL HYDROCHLORIDE 50 MG/1
50 TABLET ORAL EVERY 8 HOURS PRN
Qty: 45 TABLET | Refills: 0 | Status: SHIPPED | OUTPATIENT
Start: 2017-09-28 | End: 2017-11-17

## 2017-10-03 ENCOUNTER — HOSPITAL ENCOUNTER (OUTPATIENT)
Dept: MRI IMAGING | Facility: HOSPITAL | Age: 57
Discharge: HOME | End: 2017-10-03
Attending: FAMILY MEDICINE | Admitting: FAMILY MEDICINE

## 2017-10-03 DIAGNOSIS — R51.9 HEADACHE DISORDER: ICD-10-CM

## 2017-10-03 PROCEDURE — 70551 MRI BRAIN STEM W/O DYE: CPT

## 2017-10-09 ENCOUNTER — OFFICE VISIT (OUTPATIENT)
Dept: FAMILY MEDICINE CLINIC | Facility: CLINIC | Age: 57
End: 2017-10-09

## 2017-10-09 VITALS
RESPIRATION RATE: 16 BRPM | HEART RATE: 68 BPM | WEIGHT: 194 LBS | HEIGHT: 60 IN | DIASTOLIC BLOOD PRESSURE: 78 MMHG | OXYGEN SATURATION: 98 % | BODY MASS INDEX: 38.09 KG/M2 | SYSTOLIC BLOOD PRESSURE: 122 MMHG

## 2017-10-09 DIAGNOSIS — D50.9 IRON DEFICIENCY ANEMIA, UNSPECIFIED IRON DEFICIENCY ANEMIA TYPE: ICD-10-CM

## 2017-10-09 DIAGNOSIS — K76.9 LIVER LESION: Primary | ICD-10-CM

## 2017-10-09 LAB
HCT VFR BLDA CALC: 34.4 %
HGB BLDA-MCNC: 10.8 G/DL
MCH, POC: 26
MCHC, POC: 31.4
MCV, POC: 82.7
PLATELET # BLD AUTO: 509 10*3/MM3
PMV BLD: 7.2 FL
RBC, POC: 4.16
RDW, POC: 17.8
WBC # BLD: 7.8 10*3/UL

## 2017-10-09 PROCEDURE — 99214 OFFICE O/P EST MOD 30 MIN: CPT | Performed by: FAMILY MEDICINE

## 2017-10-09 PROCEDURE — 85027 COMPLETE CBC AUTOMATED: CPT | Performed by: FAMILY MEDICINE

## 2017-10-09 NOTE — PROGRESS NOTES
Subjective   Di Lopez is a 56 y.o. female.     History of Present Illness   Pt is feeling some better. Still has HA. Had stable MRI head.   Has also had a repeat MRI abdomen to monitor liver lesion. Will be following with GI at .  Has been anemic. Needs to repeat CBC. Follows hematology.  The following portions of the patient's history were reviewed and updated as appropriate: allergies, current medications, past family history, past medical history, past social history, past surgical history and problem list.    Review of Systems   Constitutional: Negative.    HENT: Negative.    Eyes: Negative.    Respiratory: Negative.    Cardiovascular: Negative.    Gastrointestinal: Negative.    Endocrine: Negative.    Genitourinary: Negative.    Musculoskeletal: Negative.    Skin: Negative.    Allergic/Immunologic: Negative.    Neurological: Negative.    Hematological: Negative.    Psychiatric/Behavioral: Negative.    All other systems reviewed and are negative.      Objective   Physical Exam   Constitutional: She is oriented to person, place, and time. She appears well-developed and well-nourished. No distress.   HENT:   Right Ear: External ear normal.   Left Ear: External ear normal.   Nose: Nose normal.   Mouth/Throat: Oropharynx is clear and moist.   Eyes: Conjunctivae and EOM are normal. Pupils are equal, round, and reactive to light.   Neck: Normal range of motion. Neck supple. No thyromegaly present.   Cardiovascular: Normal rate, regular rhythm and normal heart sounds.    No murmur heard.  Pulmonary/Chest: Effort normal and breath sounds normal. No respiratory distress. She has no wheezes.   Abdominal: Soft. Bowel sounds are normal. She exhibits no distension and no mass. There is no tenderness. There is no rebound and no guarding. No hernia.   Musculoskeletal: Normal range of motion. She exhibits no edema or tenderness.   Lymphadenopathy:     She has no cervical adenopathy.   Neurological: She is alert and  oriented to person, place, and time. She has normal reflexes.   Skin: Skin is warm and dry. No rash noted. She is not diaphoretic. No erythema. No pallor.   Psychiatric: She has a normal mood and affect. Her behavior is normal. Judgment and thought content normal.   Nursing note and vitals reviewed.  Hemoglobin 10.8 today    Assessment/Plan   Di was seen today for follow-up.    Diagnoses and all orders for this visit:    Liver lesion    Iron deficiency anemia, unspecified iron deficiency anemia type  -     POC CBC    Patient will follow-up with specialist regarding MRI of abdomen and if she needs a biopsy or not.    I personally spent over half of a total 25 minutes face to face with the patient in counseling and discussion and/or coordination of care as described above.

## 2017-10-12 RX ORDER — ESCITALOPRAM OXALATE 20 MG/1
20 TABLET ORAL DAILY
Qty: 90 TABLET | Refills: 0 | Status: SHIPPED | OUTPATIENT
Start: 2017-10-12 | End: 2018-04-16 | Stop reason: SDUPTHER

## 2017-10-12 RX ORDER — GLIMEPIRIDE 4 MG/1
4 TABLET ORAL
Qty: 90 TABLET | Refills: 0 | Status: SHIPPED | OUTPATIENT
Start: 2017-10-12 | End: 2018-04-23

## 2017-11-08 ENCOUNTER — TELEPHONE (OUTPATIENT)
Dept: FAMILY MEDICINE CLINIC | Facility: CLINIC | Age: 57
End: 2017-11-08

## 2017-11-08 NOTE — TELEPHONE ENCOUNTER
----- Message from Naima Rice sent at 11/8/2017 12:41 PM EST -----  Regarding: med refills  Contact: [phone]  Need a refill on ASSURE COMFORT LANCETS [facility]  [address] [place]--[phone]

## 2017-11-09 RX ORDER — PEN NEEDLE, DIABETIC 32GX 5/32"
NEEDLE, DISPOSABLE MISCELLANEOUS
Qty: 100 EACH | Refills: 3 | Status: SHIPPED | OUTPATIENT
Start: 2017-11-09 | End: 2018-10-30 | Stop reason: SDUPTHER

## 2017-11-17 ENCOUNTER — OFFICE VISIT (OUTPATIENT)
Dept: SLEEP MEDICINE | Facility: HOSPITAL | Age: 57
End: 2017-11-17

## 2017-11-17 VITALS
HEIGHT: 60 IN | OXYGEN SATURATION: 92 % | BODY MASS INDEX: 39.66 KG/M2 | HEART RATE: 72 BPM | DIASTOLIC BLOOD PRESSURE: 62 MMHG | SYSTOLIC BLOOD PRESSURE: 134 MMHG | WEIGHT: 202 LBS

## 2017-11-17 DIAGNOSIS — G47.33 OBSTRUCTIVE SLEEP APNEA, ADULT: Primary | ICD-10-CM

## 2017-11-17 DIAGNOSIS — G47.34 NOCTURNAL HYPOXEMIA: ICD-10-CM

## 2017-11-17 DIAGNOSIS — J43.9 PULMONARY EMPHYSEMA, UNSPECIFIED EMPHYSEMA TYPE (HCC): ICD-10-CM

## 2017-11-17 PROCEDURE — 99214 OFFICE O/P EST MOD 30 MIN: CPT | Performed by: INTERNAL MEDICINE

## 2017-11-17 NOTE — PATIENT INSTRUCTIONS
Chronic Obstructive Pulmonary Disease  Chronic obstructive pulmonary disease (COPD) is a common lung condition in which airflow from the lungs is limited. COPD is a general term that can be used to describe many different lung problems that limit airflow, including both chronic bronchitis and emphysema. If you have COPD, your lung function will probably never return to normal, but there are measures you can take to improve lung function and make yourself feel better.  CAUSES   · Smoking (common).  · Exposure to secondhand smoke.  · Genetic problems.  · Chronic inflammatory lung diseases or recurrent infections.  SYMPTOMS  · Shortness of breath, especially with physical activity.  · Deep, persistent (chronic) cough with a large amount of thick mucus.  · Wheezing.  · Rapid breaths (tachypnea).  · Gray or bluish discoloration (cyanosis) of the skin, especially in your fingers, toes, or lips.  · Fatigue.  · Weight loss.  · Frequent infections or episodes when breathing symptoms become much worse (exacerbations).  · Chest tightness.  DIAGNOSIS  Your health care provider will take a medical history and perform a physical examination to diagnose COPD. Additional tests for COPD may include:  · Lung (pulmonary) function tests.  · Chest X-ray.  · CT scan.  · Blood tests.  TREATMENT   Treatment for COPD may include:  · Inhaler and nebulizer medicines. These help manage the symptoms of COPD and make your breathing more comfortable.  · Supplemental oxygen. Supplemental oxygen is only helpful if you have a low oxygen level in your blood.  · Exercise and physical activity. These are beneficial for nearly all people with COPD.  · Lung surgery or transplant.  · Nutrition therapy to gain weight, if you are underweight.  · Pulmonary rehabilitation. This may involve working with a team of health care providers and specialists, such as respiratory, occupational, and physical therapists.  HOME CARE INSTRUCTIONS  · Take all medicines  (inhaled or pills) as directed by your health care provider.  · Avoid over-the-counter medicines or cough syrups that dry up your airway (such as antihistamines) and slow down the elimination of secretions unless instructed otherwise by your health care provider.  · If you are a smoker, the most important thing that you can do is stop smoking. Continuing to smoke will cause further lung damage and breathing trouble. Ask your health care provider for help with quitting smoking. He or she can direct you to community resources or hospitals that provide support.  · Avoid exposure to irritants such as smoke, chemicals, and fumes that aggravate your breathing.  · Use oxygen therapy and pulmonary rehabilitation if directed by your health care provider. If you require home oxygen therapy, ask your health care provider whether you should purchase a pulse oximeter to measure your oxygen level at home.  · Avoid contact with individuals who have a contagious illness.  · Avoid extreme temperature and humidity changes.  · Eat healthy foods. Eating smaller, more frequent meals and resting before meals may help you maintain your strength.  · Stay active, but balance activity with periods of rest. Exercise and physical activity will help you maintain your ability to do things you want to do.  · Preventing infection and hospitalization is very important when you have COPD. Make sure to receive all the vaccines your health care provider recommends, especially the pneumococcal and influenza vaccines. Ask your health care provider whether you need a pneumonia vaccine.  · Learn and use relaxation techniques to manage stress.  · Learn and use controlled breathing techniques as directed by your health care provider. Controlled breathing techniques include:    Pursed lip breathing. Start by breathing in (inhaling) through your nose for 1 second. Then, purse your lips as if you were going to whistle and breathe out (exhale) through the  pursed lips for 2 seconds.    Diaphragmatic breathing. Start by putting one hand on your abdomen just above your waist. Inhale slowly through your nose. The hand on your abdomen should move out. Then purse your lips and exhale slowly. You should be able to feel the hand on your abdomen moving in as you exhale.  · Learn and use controlled coughing to clear mucus from your lungs. Controlled coughing is a series of short, progressive coughs. The steps of controlled coughing are:  . Lean your head slightly forward.  . Breathe in deeply using diaphragmatic breathing.  . Try to hold your breath for 3 seconds.  . Keep your mouth slightly open while coughing twice.  . Spit any mucus out into a tissue.  . Rest and repeat the steps once or twice as needed.  SEEK MEDICAL CARE IF:  · You are coughing up more mucus than usual.  · There is a change in the color or thickness of your mucus.  · Your breathing is more labored than usual.  · Your breathing is faster than usual.  SEEK IMMEDIATE MEDICAL CARE IF:  · You have shortness of breath while you are resting.  · You have shortness of breath that prevents you from:    Being able to talk.    Performing your usual physical activities.  · You have chest pain lasting longer than 5 minutes.  · Your skin color is more cyanotic than usual.  · You measure low oxygen saturations for longer than 5 minutes with a pulse oximeter.  MAKE SURE YOU:  · Understand these instructions.  · Will watch your condition.  · Will get help right away if you are not doing well or get worse.     This information is not intended to replace advice given to you by your health care provider. Make sure you discuss any questions you have with your health care provider.     Document Released: 09/27/2006 Document Revised: 01/08/2016 Document Reviewed: 08/14/2014  [employer] Interactive Patient Education ©2017 [employer]  Sleep Apnea  Sleep apnea is a condition in which breathing pauses or becomes shallow during  sleep. Episodes of sleep apnea usually last 10 seconds or longer, and they may occur as many as 20 times an hour. Sleep apnea disrupts your sleep and keeps your body from getting the rest that it needs. This condition can increase your risk of certain health problems, including:  · Heart attack.  · Stroke.  · Obesity.  · Diabetes.  · Heart failure.  · Irregular heartbeat.  There are three kinds of sleep apnea:  · Obstructive sleep apnea. This kind is caused by a blocked or collapsed airway.  · Central sleep apnea. This kind happens when the part of the brain that controls breathing does not send the correct signals to the muscles that control breathing.  · Mixed sleep apnea. This is a combination of obstructive and central sleep apnea.  CAUSES  The most common cause of this condition is a collapsed or blocked airway. An airway can collapse or become blocked if:  · Your throat muscles are abnormally relaxed.  · Your tongue and tonsils are larger than normal.  · You are overweight.  · Your airway is smaller than normal.  RISK FACTORS  This condition is more likely to develop in people who:  · Are overweight.  · Smoke.  · Have a smaller than normal airway.  · Are elderly.  · Are male.  · Drink alcohol.  · Take sedatives or tranquilizers.  · Have a family history of sleep apnea.  SYMPTOMS  Symptoms of this condition include:  · Trouble staying asleep.  · Daytime sleepiness and tiredness.  · Irritability.  · Loud snoring.  · Morning headaches.  · Trouble concentrating.  · Forgetfulness.  · Decreased interest in sex.  · Unexplained sleepiness.  · Mood swings.  · Personality changes.  · Feelings of depression.  · Waking up often during the night to urinate.  · Dry mouth.  · Sore throat.  DIAGNOSIS  This condition may be diagnosed with:  · A medical history.  · A physical exam.  · A series of tests that are done while you are sleeping (sleep study). These tests are usually done in a sleep lab, but they may also be done at  home.  TREATMENT  Treatment for this condition aims to restore normal breathing and to ease symptoms during sleep. It may involve managing health issues that can affect breathing, such as high blood pressure or obesity. Treatment may include:  · Sleeping on your side.  · Using a decongestant if you have nasal congestion.  · Avoiding the use of depressants, including alcohol, sedatives, and narcotics.  · Losing weight if you are overweight.  · Making changes to your diet.  · Quitting smoking.  · Using a device to open your airway while you sleep, such as:    An oral appliance. This is a custom-made mouthpiece that shifts your lower jaw forward.    A continuous positive airway pressure (CPAP) device. This device delivers oxygen to your airway through a mask.    A nasal expiratory positive airway pressure (EPAP) device. This device has valves that you put into each nostril.    A bi-level positive airway pressure (BPAP) device. This device delivers oxygen to your airway through a mask.  · Surgery if other treatments do not work. During surgery, excess tissue is removed to create a wider airway.  It is important to get treatment for sleep apnea. Without treatment, this condition can lead to:  · High blood pressure.  · Coronary artery disease.  · (Men) An inability to achieve or maintain an erection (impotence).  · Reduced thinking abilities.  HOME CARE INSTRUCTIONS  · Make any lifestyle changes that your health care provider recommends.  · Eat a healthy, well-balanced diet.  · Take over-the-counter and prescription medicines only as told by your health care provider.  · Avoid using depressants, including alcohol, sedatives, and narcotics.  · Take steps to lose weight if you are overweight.  · If you were given a device to open your airway while you sleep, use it only as told by your health care provider.  · Do not use any tobacco products, such as cigarettes, chewing tobacco, and e-cigarettes. If you need help quitting,  ask your health care provider.  · Keep all follow-up visits as told by your health care provider. This is important.  SEEK MEDICAL CARE IF:  · The device that you received to open your airway during sleep is uncomfortable or does not seem to be working.  · Your symptoms do not improve.  · Your symptoms get worse.  SEEK IMMEDIATE MEDICAL CARE IF:  · You develop chest pain.  · You develop shortness of breath.  · You develop discomfort in your back, arms, or stomach.  · You have trouble speaking.  · You have weakness on one side of your body.  · You have drooping in your face.  These symptoms may represent a serious problem that is an emergency. Do not wait to see if the symptoms will go away. Get medical help right away. Call your local emergency services (911 in the [place] Do not drive yourself to the hospital.     This information is not intended to replace advice given to you by your health care provider. Make sure you discuss any questions you have with your health care provider.     Document Released: 12/08/2003 Document Revised: 04/10/2017 Document Reviewed: 09/26/2016  [employer] Interactive Patient Education ©2017 [employer]

## 2017-11-17 NOTE — PROGRESS NOTES
Subjective   Di Lopez is a 56 y.o. female is here today for follow-up.  She is followed here with obstructive sleep apnea.  Her primary care physician is Dr. Little.    History of Present Illness  Patient was last seen in this clinic May 12 with obstructive sleep apnea.  She has a history of having index of 19.8 on her study in 2013.  She has history of psychophysiologic insomnia and diabetes and obesity.  She is been evaluated by cardiology and found to have moderate pulmonary hypertension with RVSP of 57 on her most recent echo.  She says she's been using her CPAP with oxygen therapy.  She's been doing fairly well with the machine without any significant problems.  She apparently is been found to have a liver mass and this to go to  to have this further evaluated.  She denies any history of weight loss.  She is also been found to have a subcutaneous mass on her left shoulder.  She's been having occasional nosebleeds and must go see ENT after her last visit but did not do this.  She is having occasional symptoms of arthritis.  She has seen Ms. Reed at the pulmonary office for her COPD.  Past Medical History:   Diagnosis Date   • Arteriovenous malformation of gastrointestinal tract 5/12/2016   • Arthritis    • Black hairy tongue    • CHF (congestive heart failure)    • Depression 5/12/2016   • Diabetes mellitus    • Dyslipidemia 5/12/2016   • Emphysema/COPD 5/12/2016   • Fatigue    • Headache    • Herniated cervical disc    • History of echocardiogram 07/16/2013    Left ventricular systolic function at the lower limits of normal; EF 50-55%; mild MR; mechanical prosthetic mitral valve present; mod TR   • Hypertension    • Insomnia 5/12/2016   • Iron deficiency     A.  The patient was treated with IV iron replacement. B.  Secondary to GI bleed, status post EGD with cauterization of blood vessel to the duodenum, 7/16/2014.   • Left shoulder strain    • Liver mass    • Long term current use of anticoagulant  5/12/2016   • Morbid obesity 5/12/2016   • Obstructive sleep apnea syndrome 5/12/2016    Description: A.  Moderate; CPAP therapy..   • Sliding hiatal hernia 7/28/2016    Description: A.  Reported on EGD, 12/30/2011.   • Type 2 diabetes mellitus 5/12/2016   • Vitamin B12 deficiency     A.  The patient was treated with vitamin B12 replacement.       Past Surgical History:   Procedure Laterality Date   • BILATERAL OOPHORECTOMY  2009    Status post bilateral oophorectomy secondary to ovarian cysts    • BREAST BIOPSY Right     STEREOTACTIC   • BREAST EXCISIONAL BIOPSY Bilateral    • COLONOSCOPY  12/28/2012   • CYSTECTOMY      removal of ganglion cyst from left wrist   • ENDOSCOPY  12/30/2011    DIAGNOSTIC ESOPHAGOGASTRODUODENOSCOPY   • HERNIA REPAIR     • HYSTERECTOMY  1995    A.  Status post hysterectomy for early stage endometrial cancer done in 1995.   • MITRAL VALVE REPLACEMENT  03/03/2008    Dr. Hima Barreto.    • OOPHORECTOMY             Current Outpatient Prescriptions:   •  Alcohol Swabs (ALCOHOL PREP) 70 % pads, , Disp: , Rfl:   •  ASSURE COMFORT LANCETS 30G misc, Use 2 times a day E11.9, Disp: 100 each, Rfl: 5  •  Blood Glucose Calibration (OT ULTRA/FASTTK CNTRL SOLN) solution, , Disp: , Rfl:   •  Blood Glucose Monitoring Suppl (ONE TOUCH ULTRA 2) W/DEVICE kit, , Disp: , Rfl:   •  carvedilol (COREG) 12.5 MG tablet, , Disp: , Rfl:   •  escitalopram (LEXAPRO) 20 MG tablet, Take 1 tablet by mouth Daily., Disp: 90 tablet, Rfl: 0  •  Fluticasone Furoate-Vilanterol (BREO ELLIPTA) 100-25 MCG/INH aerosol powder , Inhale., Disp: , Rfl:   •  folic acid (FOLVITE) 1 MG tablet, TAKE ONE TABLET BY MOUTH DAILY, Disp: 30 tablet, Rfl: 5  •  furosemide (LASIX) 40 MG tablet, Take 1 tablet by mouth daily., Disp: , Rfl:   •  gabapentin (NEURONTIN) 600 MG tablet, , Disp: , Rfl:   •  glimepiride (AMARYL) 4 MG tablet, Take 1 tablet by mouth Every Morning Before Breakfast., Disp: 90 tablet, Rfl: 0  •  HYDROcodone-acetaminophen  (NORCO) 5-325 MG per tablet, , Disp: , Rfl:   •  Insulin Pen Needle (RELION PEN NEEDLES) 32G X 4 MM misc, TEST BID, Disp: 100 each, Rfl: 0  •  Lancet Devices (SIMPLE DIAGNOSTICS LANCING DEV) misc, , Disp: , Rfl:   •  LANTUS SOLOSTAR 100 UNIT/ML injection pen, INJECT 50 UNITS SUBCUTANEOUSLY DAILY, Disp: 1 pen, Rfl: 0  •  Omega-3 Fatty Acids (FISH OIL PO), Take  by mouth., Disp: , Rfl:   •  ONE TOUCH ULTRA TEST test strip, , Disp: , Rfl:   •  pantoprazole (PROTONIX) 40 MG EC tablet, Take 40 mg by mouth Daily., Disp: , Rfl:   •  RELION PEN NEEDLES 32G X 4 MM misc, USE AS DIRECTED, Disp: 100 each, Rfl: 3  •  SAXagliptin (ONGLYZA) 5 MG tablet, Take 1 tablet by mouth Daily., Disp: 90 tablet, Rfl: 0  •  simvastatin (ZOCOR) 40 MG tablet, Take 1 tablet by mouth Every Night., Disp: 90 tablet, Rfl: 0  •  warfarin (COUMADIN) 1 MG tablet, , Disp: , Rfl:   •  warfarin (COUMADIN) 4 MG tablet, , Disp: , Rfl:   •  albuterol (PROVENTIL HFA;VENTOLIN HFA) 108 (90 BASE) MCG/ACT inhaler, Inhale 2 puffs Every 4 (Four) Hours As Needed for wheezing., Disp: , Rfl:     Allergies   Allergen Reactions   • Codeine    • Latex    • Penicillins    • Sulfa Antibiotics        The following portions of the patient's history were reviewed and updated as appropriate: allergies, current medications and problem list.    Review of Systems   Constitutional: Positive for chills and diaphoresis.   HENT: Positive for congestion, nosebleeds and postnasal drip.    Eyes: Negative.    Respiratory: Positive for cough, shortness of breath and wheezing.    Cardiovascular: Positive for palpitations.   Gastrointestinal: Positive for abdominal distention.   Endocrine: Negative.    Genitourinary: Positive for frequency.   Musculoskeletal: Positive for arthralgias and myalgias.   Skin: Negative.    Allergic/Immunologic: Negative.    Neurological: Negative.    Hematological: Negative.    Psychiatric/Behavioral: Positive for dysphoric mood.     [place] scores  "4/24  Objective     /62  Pulse 72  Ht 60\" (152.4 cm)  Wt 202 lb (91.6 kg)  SpO2 92%  BMI 39.45 kg/m2    Physical Exam   Constitutional: She is oriented to person, place, and time. She appears well-developed and well-nourished.   She is obese.   HENT:   Head: Normocephalic and atraumatic.   She has Mallampati class III anatomy.   Eyes: EOM are normal. Pupils are equal, round, and reactive to light.   Neck: Normal range of motion. Neck supple.   Cardiovascular: Normal rate and regular rhythm.    She seems have a valve click.   Pulmonary/Chest: Effort normal and breath sounds normal.   Abdominal: Soft. Bowel sounds are normal.   Musculoskeletal: Normal range of motion. She exhibits edema and deformity.   Seems to have a lipoma on her left shoulder    She has trace pedal edema   Neurological: She is alert and oriented to person, place, and time.   Skin: Skin is warm and dry.   Psychiatric: She has a normal mood and affect. Her behavior is normal.    download from machine for the past 6 months shows usage 95.6% of the days.  She's using it 9 hours 23 minutes per day.  Her 90% pressure is 11.3.  Her AHI is 1.9 she is an AutoSet of 8-18.      Assessment/Plan   Di was seen today for follow-up.    Diagnoses and all orders for this visit:    Obstructive sleep apnea, adult  -     CPAP Therapy  -     Overnight Sleep Oximetry Study; Future    Pulmonary emphysema, unspecified emphysema type    Nocturnal hypoxemia  -     Overnight Sleep Oximetry Study; Future    Patient seems be doing fairly well on her CPAP with good control of her respiratory events.  She is concerned whether her oxygen should be used at night.  She needs to be having that bled into her machine.  We will check an overnight oximetry on the CPAP and her supplemental oxygen and make sure it is staying where it should be.  We will renew her supplies.  We will plan to see her back in 6 months.  She is apparently to be seen sooner [facility] to evaluate her " liver mass.             Mendoza Mercer MD [facility]  Sleep Medicine  Pulmonary and Critical Care Medicine      11/17/17  10:33 AM

## 2017-11-21 DIAGNOSIS — K76.9 LIVER LESION: ICD-10-CM

## 2017-11-21 DIAGNOSIS — E61.1 IRON DEFICIENCY: Primary | ICD-10-CM

## 2017-11-27 ENCOUNTER — TELEPHONE (OUTPATIENT)
Dept: FAMILY MEDICINE CLINIC | Facility: CLINIC | Age: 57
End: 2017-11-27

## 2017-11-27 NOTE — TELEPHONE ENCOUNTER
----- Message from Keri Little DO sent at 11/21/2017  2:25 PM EST -----  Contact: [phone]  In order for her to see a GI doctor at [facility]. I can also see her next week  ----- Message -----     From: Marina Juan MA     Sent: 11/21/2017   2:04 PM       To: Keri Little DO    Pt would like to know if you want her to wait to come in and see you next week because she has not seen GI .   ----- Message -----     From: Keri Little DO     Sent: 11/21/2017   1:20 PM       To: Marina Juan MA    I placed the order but I want her to see the [facility] GI dr as well since  is who found the liver abnormality  ----- Message -----     From: Marina Juan MA     Sent: 11/21/2017  10:36 AM       To: Keri Little DO        ----- Message -----     From: Jodee Redding     Sent: 11/21/2017  10:15 AM       To: Marina Juan MA    THE HOSPITAL AT  REFERRED PATIENT TO A GI  AT  AND PATIENT WOULD LIKE TO HAVE A ORDER FROM PCP TO SEE A GI DOCTOR AT [facility].  PATIENT ALSO HAS A F/U SCHEDULED NEXT WEEK AND IS UNSURE IF SHE NEEDS TO COME AFTER SHE IS SEEN AT [facility]

## 2017-12-01 ENCOUNTER — TELEPHONE (OUTPATIENT)
Dept: GASTROENTEROLOGY | Facility: CLINIC | Age: 57
End: 2017-12-01

## 2017-12-01 NOTE — TELEPHONE ENCOUNTER
Returned patient's call. Patient stated she is still having shortness of breath, nose bleeds, swelling of the abd including pain but no fever. Patient is wondering what she can do for the pain until she comes in to see us on 12/22/2017. I advised patient to come to [facility] ER to get evaluated. I don't think she need to wait until the 12/22/17 with these symptoms but still keep the office appointment. Patient voiced understanding.

## 2017-12-04 RX ORDER — GLIMEPIRIDE 4 MG/1
TABLET ORAL
Qty: 90 TABLET | Refills: 0 | Status: SHIPPED | OUTPATIENT
Start: 2017-12-04 | End: 2018-03-18 | Stop reason: SDUPTHER

## 2017-12-04 RX ORDER — SAXAGLIPTIN 5 MG/1
TABLET, FILM COATED ORAL
Qty: 90 TABLET | Refills: 0 | Status: SHIPPED | OUTPATIENT
Start: 2017-12-04 | End: 2018-03-18 | Stop reason: SDUPTHER

## 2017-12-04 RX ORDER — ESCITALOPRAM OXALATE 20 MG/1
TABLET ORAL
Qty: 90 TABLET | Refills: 0 | Status: SHIPPED | OUTPATIENT
Start: 2017-12-04 | End: 2018-04-16 | Stop reason: SDUPTHER

## 2017-12-12 ENCOUNTER — TELEPHONE (OUTPATIENT)
Dept: ONCOLOGY | Facility: CLINIC | Age: 57
End: 2017-12-12

## 2017-12-12 ENCOUNTER — LAB (OUTPATIENT)
Dept: LAB | Facility: HOSPITAL | Age: 57
End: 2017-12-12

## 2017-12-12 ENCOUNTER — OFFICE VISIT (OUTPATIENT)
Dept: ONCOLOGY | Facility: CLINIC | Age: 57
End: 2017-12-12

## 2017-12-12 VITALS
WEIGHT: 204 LBS | HEART RATE: 72 BPM | BODY MASS INDEX: 40.05 KG/M2 | DIASTOLIC BLOOD PRESSURE: 65 MMHG | HEIGHT: 60 IN | RESPIRATION RATE: 16 BRPM | OXYGEN SATURATION: 97 % | TEMPERATURE: 98.3 F | SYSTOLIC BLOOD PRESSURE: 163 MMHG

## 2017-12-12 DIAGNOSIS — E61.1 IRON DEFICIENCY: ICD-10-CM

## 2017-12-12 DIAGNOSIS — E61.1 IRON DEFICIENCY: Primary | ICD-10-CM

## 2017-12-12 DIAGNOSIS — D64.9 NORMOCYTIC ANEMIA: ICD-10-CM

## 2017-12-12 DIAGNOSIS — K90.9 IRON MALABSORPTION: ICD-10-CM

## 2017-12-12 LAB
ERYTHROCYTE [DISTWIDTH] IN BLOOD BY AUTOMATED COUNT: 17 % (ref 11.3–14.5)
FERRITIN SERPL-MCNC: 188 NG/ML (ref 10–291)
FOLATE SERPL-MCNC: >24 NG/ML (ref 3.2–20)
HCT VFR BLD AUTO: 39.6 % (ref 34.5–44)
HGB BLD-MCNC: 12.3 G/DL (ref 11.5–15.5)
IRON 24H UR-MRATE: 50 MCG/DL (ref 50–175)
IRON SATN MFR SERPL: 14 % (ref 15–50)
LYMPHOCYTES # BLD AUTO: 1.7 10*3/MM3 (ref 0.6–4.8)
LYMPHOCYTES NFR BLD AUTO: 14.9 % (ref 24–44)
MCH RBC QN AUTO: 25.4 PG (ref 27–31)
MCHC RBC AUTO-ENTMCNC: 31 G/DL (ref 32–36)
MCV RBC AUTO: 81.9 FL (ref 80–99)
MONOCYTES # BLD AUTO: 0.3 10*3/MM3 (ref 0–1)
MONOCYTES NFR BLD AUTO: 2.9 % (ref 0–12)
NEUTROPHILS # BLD AUTO: 9.2 10*3/MM3 (ref 1.5–8.3)
NEUTROPHILS NFR BLD AUTO: 82.2 % (ref 41–71)
PLATELET # BLD AUTO: 394 10*3/MM3 (ref 150–450)
PMV BLD AUTO: 8.1 FL (ref 6–12)
RBC # BLD AUTO: 4.84 10*6/MM3 (ref 3.89–5.14)
TIBC SERPL-MCNC: 364 MCG/DL (ref 250–450)
VIT B12 BLD-MCNC: 324 PG/ML (ref 211–911)
WBC NRBC COR # BLD: 11.2 10*3/MM3 (ref 3.5–10.8)

## 2017-12-12 PROCEDURE — 99214 OFFICE O/P EST MOD 30 MIN: CPT | Performed by: INTERNAL MEDICINE

## 2017-12-12 PROCEDURE — 82728 ASSAY OF FERRITIN: CPT

## 2017-12-12 PROCEDURE — 82746 ASSAY OF FOLIC ACID SERUM: CPT

## 2017-12-12 PROCEDURE — 82607 VITAMIN B-12: CPT

## 2017-12-12 PROCEDURE — 83550 IRON BINDING TEST: CPT

## 2017-12-12 PROCEDURE — 36415 COLL VENOUS BLD VENIPUNCTURE: CPT

## 2017-12-12 PROCEDURE — 85025 COMPLETE CBC W/AUTO DIFF WBC: CPT

## 2017-12-12 PROCEDURE — 83540 ASSAY OF IRON: CPT

## 2017-12-12 NOTE — PROGRESS NOTES
"DATE OF VISIT: 12/12/2017    REASON FOR VISIT: Followup for normocytic anemia.      HISTORY OF PRESENT ILLNESS: The patient is a very pleasant 53-year-old   American female with past medical history significant for anemia. The patient  had been diagnosed with iron deficiency and vitamin B12 deficiency in the past.  The patient was treated with IV iron replacement and vitamin B12 replacement.  The patient was doing fine until 07/2013. The patient was admitted to the  hospital with fatigue and weakness and found to have hemoglobin of 5. The  patient had a good response to transfusion of 4 units of blood during her  hospitalization. She had a normal iron panel, borderline vitamin B12 level.  She was started on vitamin B12 replacement prior to discharge. The patient had  EGD, colonoscopy and barium swallow evaluation done in 2013 that was negative  for bleeds. The patient is here today in scheduled followup visit.     SUBJECTIVE: The patient has been doing fairly well. She complains today of difficulty sleeping. She can't fall asleep until 3:00 am. She has tried Ambien and melatonin with no relief. She notes she falls asleep during the day and feels \"run down.\" She has been seen by sleep medicine, but has had no improvement in her symptoms. She also notes persistent shortness of air. This has not been progressive. She denies cough, fever, chills, or wheezing. Otherwise, she denies evidence of bleeding. She continues her Coumadin for mechanical valve.    PAST MEDICAL HISTORY/SOCIAL HISTORY/FAMILY HISTORY: Unchanged from my prior documentation done on 09/16/2013.     Review of Systems   Constitutional: Positive for fatigue. Negative for activity change, appetite change, chills, fever and unexpected weight change.   HENT: Negative for hearing loss, mouth sores, nosebleeds, sore throat and trouble swallowing.    Eyes: Negative for visual disturbance.   Respiratory: Positive for shortness of breath. Negative for " cough, chest tightness and wheezing.    Cardiovascular: Negative for chest pain, palpitations and leg swelling.   Gastrointestinal: Negative for abdominal distention, abdominal pain, blood in stool, constipation, diarrhea, nausea, rectal pain and vomiting.   Endocrine: Negative for cold intolerance and heat intolerance.   Genitourinary: Negative for difficulty urinating, dysuria, frequency and urgency.   Musculoskeletal: Negative for arthralgias, back pain, gait problem, joint swelling and myalgias.   Skin: Negative for rash.   Neurological: Negative for dizziness, tremors, syncope, weakness, light-headedness, numbness and headaches.        Sleep disturbance   Hematological: Negative for adenopathy. Does not bruise/bleed easily.   Psychiatric/Behavioral: Negative for confusion, sleep disturbance and suicidal ideas. The patient is not nervous/anxious.          Current Outpatient Prescriptions:   •  albuterol (PROVENTIL HFA;VENTOLIN HFA) 108 (90 BASE) MCG/ACT inhaler, Inhale 2 puffs Every 4 (Four) Hours As Needed for wheezing., Disp: , Rfl:   •  Alcohol Swabs (ALCOHOL PREP) 70 % pads, , Disp: , Rfl:   •  ASSURE COMFORT LANCETS 30G misc, Use 2 times a day E11.9, Disp: 100 each, Rfl: 5  •  Blood Glucose Calibration (OT ULTRA/FASTTK CNTRL SOLN) solution, , Disp: , Rfl:   •  Blood Glucose Monitoring Suppl (ONE TOUCH ULTRA 2) W/DEVICE kit, , Disp: , Rfl:   •  carvedilol (COREG) 12.5 MG tablet, , Disp: , Rfl:   •  escitalopram (LEXAPRO) 20 MG tablet, Take 1 tablet by mouth Daily., Disp: 90 tablet, Rfl: 0  •  escitalopram (LEXAPRO) 20 MG tablet, TAKE ONE TABLET BY MOUTH ONCE DAILY, Disp: 90 tablet, Rfl: 0  •  Fluticasone Furoate-Vilanterol (BREO ELLIPTA) 100-25 MCG/INH aerosol powder , Inhale., Disp: , Rfl:   •  folic acid (FOLVITE) 1 MG tablet, TAKE ONE TABLET BY MOUTH DAILY, Disp: 30 tablet, Rfl: 5  •  furosemide (LASIX) 40 MG tablet, Take 1 tablet by mouth daily., Disp: , Rfl:   •  gabapentin (NEURONTIN) 600 MG tablet, ,  "Disp: , Rfl:   •  glimepiride (AMARYL) 4 MG tablet, Take 1 tablet by mouth Every Morning Before Breakfast., Disp: 90 tablet, Rfl: 0  •  glimepiride (AMARYL) 4 MG tablet, TAKE ONE TABLET BY MOUTH IN THE MORNING BEFORE BREAKFAST, Disp: 90 tablet, Rfl: 0  •  HYDROcodone-acetaminophen (NORCO) 5-325 MG per tablet, , Disp: , Rfl:   •  Insulin Pen Needle (RELION PEN NEEDLES) 32G X 4 MM misc, TEST BID, Disp: 100 each, Rfl: 0  •  Lancet Devices (SIMPLE DIAGNOSTICS LANCING DEV) misc, , Disp: , Rfl:   •  LANTUS SOLOSTAR 100 UNIT/ML injection pen, INJECT 50 UNITS SUBCUTANEOUSLY DAILY, Disp: 1 pen, Rfl: 0  •  Omega-3 Fatty Acids (FISH OIL PO), Take  by mouth., Disp: , Rfl:   •  ONE TOUCH ULTRA TEST test strip, , Disp: , Rfl:   •  ONGLYZA 5 MG tablet, TAKE ONE TABLET BY MOUTH ONCE DAILY, Disp: 90 tablet, Rfl: 0  •  pantoprazole (PROTONIX) 40 MG EC tablet, Take 40 mg by mouth Daily., Disp: , Rfl:   •  RELION PEN NEEDLES 32G X 4 MM misc, USE AS DIRECTED, Disp: 100 each, Rfl: 3  •  simvastatin (ZOCOR) 40 MG tablet, Take 1 tablet by mouth Every Night., Disp: 90 tablet, Rfl: 0  •  warfarin (COUMADIN) 1 MG tablet, , Disp: , Rfl:   •  warfarin (COUMADIN) 4 MG tablet, , Disp: , Rfl:     PHYSICAL EXAMINATION:   /65  Pulse 72  Temp 98.3 °F (36.8 °C) (Temporal Artery )   Resp 16  Ht 152.4 cm (60\")  Wt 92.5 kg (204 lb)  SpO2 97%  BMI 39.84 kg/m2   ECOG Performance Status: 1 - Symptomatic but completely ambulatory  General Appearance:  alert, cooperative, no apparent distress and appears stated age   Neurologic/Psychiatric: A&O x 3, gait steady, appropriate affect, strength 5/5 in all muscle groups   HEENT:  Normocephalic, without obvious abnormality, mucous membranes moist   Neck: Supple, symmetrical, trachea midline, no adenopathy;  No thyromegaly, masses, or tenderness   Lungs:   Clear to auscultation bilaterally; respirations regular, even, and unlabored bilaterally   Heart:  Regular rate and rhythm, no murmurs appreciated "   Abdomen:   Soft, non-tender, non-distended and no organomegaly   Lymph nodes: No cervical, supraclavicular, inguinal or axillary adenopathy noted   Extremities: Normal, atraumatic; no clubbing, cyanosis, or edema    Skin: No rashes, ulcers, or suspicious lesions noted     Lab on 12/12/2017   Component Date Value Ref Range Status   • WBC 12/12/2017 11.20* 3.50 - 10.80 10*3/mm3 Final   • RBC 12/12/2017 4.84  3.89 - 5.14 10*6/mm3 Final   • Hemoglobin 12/12/2017 12.3  11.5 - 15.5 g/dL Final   • Hematocrit 12/12/2017 39.6  34.5 - 44.0 % Final   • RDW 12/12/2017 17.0* 11.3 - 14.5 % Final   • MCV 12/12/2017 81.9  80.0 - 99.0 fL Final   • MCH 12/12/2017 25.4* 27.0 - 31.0 pg Final   • MCHC 12/12/2017 31.0* 32.0 - 36.0 g/dL Final   • MPV 12/12/2017 8.1  6.0 - 12.0 fL Final   • Platelets 12/12/2017 394  150 - 450 10*3/mm3 Final   • Neutrophil % 12/12/2017 82.2* 41.0 - 71.0 % Final   • Lymphocyte % 12/12/2017 14.9* 24.0 - 44.0 % Final   • Monocyte % 12/12/2017 2.9  0.0 - 12.0 % Final   • Neutrophils, Absolute 12/12/2017 9.20* 1.50 - 8.30 10*3/mm3 Final   • Lymphocytes, Absolute 12/12/2017 1.70  0.60 - 4.80 10*3/mm3 Final   • Monocytes, Absolute 12/12/2017 0.30  0.00 - 1.00 10*3/mm3 Final        No results found.    ASSESSMENT: The patient is a very pleasant 56-year-old  female  with normocytic anemia.    PLAN:  1. I did go over the CBC results in detail with the patient. I reassured her  that her hemoglobin is stable. Her iron studies and vitamin levels are pending at this time. We will follow up on these results and notify her of any significant findings.   2. The patient will come back to see me in 6 months with repeat CBC, ferritin, iron panel, vitamin B12, and folate.   3. The patient will continue taking Coumadin for her mechanical valve. She  gets her INR checked with Dr. Patel.   4. She will follow up with her sleep medicine doctor regarding ongoing treatment.   5. I will restart folate 1 mg  daily.  6. Microcytosis: I am concern about her new microcytosis, I will arrange for IV iron if her ferritin is less than 50 or Fe sat less than 10%.    Scribed for Quinn Palmer MD by FRANCESCA Villanueva. 12/12/2017  1:00 PM  Quinn Palmer MD  12/12/2017   I, Quinn Palmer MD, personally performed the services described in this documentation as scribed by the above named individual in my presence, and it is both accurate and complete.  12/12/2017  1:00 PM

## 2018-01-17 ENCOUNTER — OFFICE VISIT (OUTPATIENT)
Dept: RETAIL CLINIC | Facility: CLINIC | Age: 58
End: 2018-01-17

## 2018-01-17 VITALS
OXYGEN SATURATION: 97 % | DIASTOLIC BLOOD PRESSURE: 86 MMHG | SYSTOLIC BLOOD PRESSURE: 144 MMHG | HEIGHT: 55 IN | BODY MASS INDEX: 46.05 KG/M2 | HEART RATE: 74 BPM | TEMPERATURE: 98.2 F | RESPIRATION RATE: 16 BRPM | WEIGHT: 199 LBS

## 2018-01-17 DIAGNOSIS — R68.89 FLU-LIKE SYMPTOMS: ICD-10-CM

## 2018-01-17 DIAGNOSIS — J40 BRONCHITIS: ICD-10-CM

## 2018-01-17 DIAGNOSIS — J01.11 ACUTE RECURRENT FRONTAL SINUSITIS: Primary | ICD-10-CM

## 2018-01-17 LAB
EXPIRATION DATE: NORMAL
FLUAV AG NPH QL: NEGATIVE
FLUBV AG NPH QL: NEGATIVE
INTERNAL CONTROL: NORMAL
Lab: NORMAL

## 2018-01-17 PROCEDURE — 99213 OFFICE O/P EST LOW 20 MIN: CPT | Performed by: NURSE PRACTITIONER

## 2018-01-17 PROCEDURE — 87804 INFLUENZA ASSAY W/OPTIC: CPT | Performed by: NURSE PRACTITIONER

## 2018-01-17 RX ORDER — DEXTROMETHORPHAN HYDROBROMIDE AND PROMETHAZINE HYDROCHLORIDE 15; 6.25 MG/5ML; MG/5ML
5 SYRUP ORAL 4 TIMES DAILY PRN
Qty: 240 ML | Refills: 0 | Status: SHIPPED | OUTPATIENT
Start: 2018-01-17 | End: 2018-01-27

## 2018-01-17 RX ORDER — GUAIFENESIN, PSEUDOEPHEDRINE HYDROCHLORIDE 600; 60 MG/1; MG/1
1 TABLET, EXTENDED RELEASE ORAL EVERY 12 HOURS
Qty: 20 TABLET | Refills: 0 | Status: SHIPPED | OUTPATIENT
Start: 2018-01-17 | End: 2018-01-27

## 2018-01-17 RX ORDER — DOXYCYCLINE HYCLATE 100 MG/1
100 CAPSULE ORAL 2 TIMES DAILY
Qty: 20 CAPSULE | Refills: 0 | Status: SHIPPED | OUTPATIENT
Start: 2018-01-17 | End: 2018-01-22

## 2018-01-17 NOTE — PATIENT INSTRUCTIONS
Sinusitis, Adult  Sinusitis is soreness and inflammation of your sinuses. Sinuses are hollow spaces in the bones around your face. Your sinuses are located:  · Around your eyes.  · In the middle of your forehead.  · Behind your nose.  · In your cheekbones.  Your sinuses and nasal passages are lined with a stringy fluid (mucus). Mucus normally drains out of your sinuses. When your nasal tissues become inflamed or swollen, the mucus can become trapped or blocked so air cannot flow through your sinuses. This allows bacteria, viruses, and funguses to grow, which leads to infection.  Sinusitis can develop quickly and last for 7?10 days (acute) or for more than 12 weeks (chronic). Sinusitis often develops after a cold.  What are the causes?  This condition is caused by anything that creates swelling in the sinuses or stops mucus from draining, including:  · Allergies.  · Asthma.  · Bacterial or viral infection.  · Abnormally shaped bones between the nasal passages.  · Nasal growths that contain mucus (nasal polyps).  · Narrow sinus openings.  · Pollutants, such as chemicals or irritants in the air.  · A foreign object stuck in the nose.  · A fungal infection. This is rare.  What increases the risk?  The following factors may make you more likely to develop this condition:  · Having allergies or asthma.  · Having had a recent cold or respiratory tract infection.  · Having structural deformities or blockages in your nose or sinuses.  · Having a weak immune system.  · Doing a lot of swimming or diving.  · Overusing nasal sprays.  · Smoking.  What are the signs or symptoms?  The main symptoms of this condition are pain and a feeling of pressure around the affected sinuses. Other symptoms include:  · Upper toothache.  · Earache.  · Headache.  · Bad breath.  · Decreased sense of smell and taste.  · A cough that may get worse at night.  · Fatigue.  · Fever.  · Thick drainage from your nose. The drainage is often green and it may  contain pus (purulent).  · Stuffy nose or congestion.  · Postnasal drip. This is when extra mucus collects in the throat or back of the nose.  · Swelling and warmth over the affected sinuses.  · Sore throat.  · Sensitivity to light.  How is this diagnosed?  This condition is diagnosed based on symptoms, a medical history, and a physical exam. To find out if your condition is acute or chronic, your health care provider may:  · Look in your nose for signs of nasal polyps.  · Tap over the affected sinus to check for signs of infection.  · View the inside of your sinuses using an imaging device that has a light attached (endoscope).  If your health care provider suspects that you have chronic sinusitis, you may also:  · Be tested for allergies.  · Have a sample of mucus taken from your nose (nasal culture) and checked for bacteria.  · Have a mucus sample examined to see if your sinusitis is related to an allergy.  If your sinusitis does not respond to treatment and it lasts longer than 8 weeks, you may have an MRI or CT scan to check your sinuses. These scans also help to determine how severe your infection is.  In rare cases, a bone biopsy may be done to rule out more serious types of fungal sinus disease.  How is this treated?  Treatment for sinusitis depends on the cause and whether your condition is chronic or acute. If a virus is causing your sinusitis, your symptoms will go away on their own within 10 days. You may be given medicines to relieve your symptoms, including:  · Topical nasal decongestants. They shrink swollen nasal passages and let mucus drain from your sinuses.  · Antihistamines. These drugs block inflammation that is triggered by allergies. This can help to ease swelling in your nose and sinuses.  · Topical nasal corticosteroids. These are nasal sprays that ease inflammation and swelling in your nose and sinuses.  · Nasal saline washes. These rinses can help to get rid of thick mucus in your  nose.  If your condition is caused by bacteria, you will be given an antibiotic medicine. If your condition is caused by a fungus, you will be given an antifungal medicine.  Surgery may be needed to correct underlying conditions, such as narrow nasal passages. Surgery may also be needed to remove polyps.  Follow these instructions at home:  Medicines  · Take, use, or apply over-the-counter and prescription medicines only as told by your health care provider. These may include nasal sprays.  · If you were prescribed an antibiotic medicine, take it as told by your health care provider. Do not stop taking the antibiotic even if you start to feel better.  Hydrate and Humidify  · Drink enough water to keep your urine clear or pale yellow. Staying hydrated will help to thin your mucus.  · Use a cool mist humidifier to keep the humidity level in your home above 50%.  · Inhale steam for 10-15 minutes, 3-4 times a day or as told by your health care provider. You can do this in the bathroom while a hot shower is running.  · Limit your exposure to cool or dry air.  Rest  · Rest as much as possible.  · Sleep with your head raised (elevated).  · Make sure to get enough sleep each night.  General instructions  · Apply a warm, moist washcloth to your face 3-4 times a day or as told by your health care provider. This will help with discomfort.  · Wash your hands often with soap and water to reduce your exposure to viruses and other germs. If soap and water are not available, use hand .  · Do not smoke. Avoid being around people who are smoking (secondhand smoke).  · Keep all follow-up visits as told by your health care provider. This is important.  Contact a health care provider if:  · You have a fever.  · Your symptoms get worse.  · Your symptoms do not improve within 10 days.  Get help right away if:  · You have a severe headache.  · You have persistent vomiting.  · You have pain or swelling around your face or  eyes.  · You have vision problems.  · You develop confusion.  · Your neck is stiff.  · You have trouble breathing.  This information is not intended to replace advice given to you by your health care provider. Make sure you discuss any questions you have with your health care provider.  Document Released: 12/18/2006 Document Revised: 08/13/2017 Document Reviewed: 10/12/2016  [employer] Interactive Patient Education © 2017 [employer]    Acute Bronchitis, Adult  Acute bronchitis is when air tubes (bronchi) in the lungs suddenly get swollen. The condition can make it hard to breathe. It can also cause these symptoms:  · A cough.  · Coughing up clear, yellow, or green mucus.  · Wheezing.  · Chest congestion.  · Shortness of breath.  · A fever.  · Body aches.  · Chills.  · A sore throat.  Follow these instructions at home:  Medicines  · Take over-the-counter and prescription medicines only as told by your doctor.  · If you were prescribed an antibiotic medicine, take it as told by your doctor. Do not stop taking the antibiotic even if you start to feel better.  General instructions  · Rest.  · Drink enough fluids to keep your pee (urine) clear or pale yellow.  · Avoid smoking and secondhand smoke. If you smoke and you need help quitting, ask your doctor. Quitting will help your lungs heal faster.  · Use an inhaler, cool mist vaporizer, or humidifier as told by your doctor.  · Keep all follow-up visits as told by your doctor. This is important.  How is this prevented?  To lower your risk of getting this condition again:  · Wash your hands often with soap and water. If you cannot use soap and water, use hand .  · Avoid contact with people who have cold symptoms.  · Try not to touch your hands to your mouth, nose, or eyes.  · Make sure to get the flu shot every year.  Contact a doctor if:  · Your symptoms do not get better in 2 weeks.  Get help right away if:  · You cough up blood.  · You have chest pain.  · You  have very bad shortness of breath.  · You become dehydrated.  · You faint (pass out) or keep feeling like you are going to pass out.  · You keep throwing up (vomiting).  · You have a very bad headache.  · Your fever or chills gets worse.  This information is not intended to replace advice given to you by your health care provider. Make sure you discuss any questions you have with your health care provider.  Document Released: 06/05/2009 Document Revised: 07/26/2017 Document Reviewed: 06/07/2017  [employer] Interactive Patient Education © 2017 [employer]

## 2018-01-17 NOTE — PROGRESS NOTES
Subjective   Di Lopez is a 57 y.o. female.     Flu Symptoms   This is a new problem. The current episode started 1 to 4 weeks ago (11 days). The problem occurs constantly. The problem has been gradually worsening. Associated symptoms include arthralgias, chills, congestion (yellow), coughing, diaphoresis, fatigue, myalgias and weakness. Pertinent negatives include no abdominal pain, anorexia, change in bowel habit, chest pain, fever, joint swelling, nausea, neck pain, numbness, rash, sore throat, swollen glands, urinary symptoms, vertigo, visual change or vomiting. Nothing aggravates the symptoms. She has tried sleep for the symptoms. The treatment provided mild relief.        Current Outpatient Prescriptions on File Prior to Visit   Medication Sig Dispense Refill   • albuterol (PROVENTIL HFA;VENTOLIN HFA) 108 (90 BASE) MCG/ACT inhaler Inhale 2 puffs Every 4 (Four) Hours As Needed for wheezing.     • Alcohol Swabs (ALCOHOL PREP) 70 % pads      • ASSURE COMFORT LANCETS 30G misc Use 2 times a day E11.9 100 each 5   • Blood Glucose Calibration (OT ULTRA/FASTTK CNTRL SOLN) solution      • Blood Glucose Monitoring Suppl (ONE TOUCH ULTRA 2) W/DEVICE kit      • carvedilol (COREG) 12.5 MG tablet      • escitalopram (LEXAPRO) 20 MG tablet Take 1 tablet by mouth Daily. 90 tablet 0   • escitalopram (LEXAPRO) 20 MG tablet TAKE ONE TABLET BY MOUTH ONCE DAILY 90 tablet 0   • Fluticasone Furoate-Vilanterol (BREO ELLIPTA) 100-25 MCG/INH aerosol powder  Inhale.     • folic acid (FOLVITE) 1 MG tablet TAKE ONE TABLET BY MOUTH DAILY 30 tablet 5   • furosemide (LASIX) 40 MG tablet Take 1 tablet by mouth daily.     • gabapentin (NEURONTIN) 600 MG tablet      • glimepiride (AMARYL) 4 MG tablet Take 1 tablet by mouth Every Morning Before Breakfast. 90 tablet 0   • glimepiride (AMARYL) 4 MG tablet TAKE ONE TABLET BY MOUTH IN THE MORNING BEFORE BREAKFAST 90 tablet 0   • HYDROcodone-acetaminophen (NORCO) 5-325 MG per tablet      •  Insulin Pen Needle (RELION PEN NEEDLES) 32G X 4 MM misc TEST  each 0   • Lancet Devices (SIMPLE DIAGNOSTICS LANCING DEV) misc      • LANTUS SOLOSTAR 100 UNIT/ML injection pen INJECT 50 UNITS SUBCUTANEOUSLY DAILY 1 pen 0   • Omega-3 Fatty Acids (FISH OIL PO) Take  by mouth.     • ONE TOUCH ULTRA TEST test strip      • ONGLYZA 5 MG tablet TAKE ONE TABLET BY MOUTH ONCE DAILY 90 tablet 0   • pantoprazole (PROTONIX) 40 MG EC tablet Take 40 mg by mouth Daily.     • RELION PEN NEEDLES 32G X 4 MM misc USE AS DIRECTED 100 each 3   • warfarin (COUMADIN) 1 MG tablet      • warfarin (COUMADIN) 4 MG tablet      • [DISCONTINUED] simvastatin (ZOCOR) 40 MG tablet Take 1 tablet by mouth Every Night. 90 tablet 0     No current facility-administered medications on file prior to visit.        Allergies   Allergen Reactions   • Codeine    • Latex    • Penicillins    • Sulfa Antibiotics        Past Medical History:   Diagnosis Date   • Arteriovenous malformation of gastrointestinal tract 5/12/2016   • Arthritis    • Black hairy tongue    • CHF (congestive heart failure)    • Depression 5/12/2016   • Diabetes mellitus    • Dyslipidemia 5/12/2016   • Emphysema/COPD 5/12/2016   • Fatigue    • Headache    • Herniated cervical disc    • History of echocardiogram 07/16/2013    Left ventricular systolic function at the lower limits of normal; EF 50-55%; mild MR; mechanical prosthetic mitral valve present; mod TR   • Hypertension    • Insomnia 5/12/2016   • Iron deficiency     A.  The patient was treated with IV iron replacement. B.  Secondary to GI bleed, status post EGD with cauterization of blood vessel to the duodenum, 7/16/2014.   • Left shoulder strain    • Liver mass    • Long term current use of anticoagulant 5/12/2016   • Morbid obesity 5/12/2016   • Obstructive sleep apnea syndrome 5/12/2016    Description: A.  Moderate; CPAP therapy..   • Sliding hiatal hernia 7/28/2016    Description: A.  Reported on EGD, 12/30/2011.   • Type  2 diabetes mellitus 5/12/2016   • Vitamin B12 deficiency     A.  The patient was treated with vitamin B12 replacement.       Past Surgical History:   Procedure Laterality Date   • BILATERAL OOPHORECTOMY  2009    Status post bilateral oophorectomy secondary to ovarian cysts    • BREAST BIOPSY Right     STEREOTACTIC   • BREAST EXCISIONAL BIOPSY Bilateral    • COLONOSCOPY  12/28/2012   • CYSTECTOMY      removal of ganglion cyst from left wrist   • ENDOSCOPY  12/30/2011    DIAGNOSTIC ESOPHAGOGASTRODUODENOSCOPY   • HERNIA REPAIR     • HYSTERECTOMY  1995    A.  Status post hysterectomy for early stage endometrial cancer done in 1995.   • MITRAL VALVE REPLACEMENT  03/03/2008    Dr. Hima Barreto.    • OOPHORECTOMY         Family History   Problem Relation Age of Onset   • Sudden death Mother      POSSIBLE SERIOUS MASSIVE STROKE   • Multiple myeloma Father    • Ovarian cancer Paternal Grandmother    • Breast cancer Neg Hx        Social History     Social History   • Marital status: Single     Spouse name: N/A   • Number of children: N/A   • Years of education: N/A     Occupational History   • Not on file.     Social History Main Topics   • Smoking status: Former Smoker     Packs/day: 0.50     Years: 33.00     Types: Electronic Cigarette, Cigarettes     Quit date: 1/1/2012   • Smokeless tobacco: Never Used      Comment: Quit in 2010   • Alcohol use No   • Drug use: No   • Sexual activity: Not on file     Other Topics Concern   • Not on file     Social History Narrative       Review of Systems   Constitutional: Positive for activity change, chills, diaphoresis and fatigue. Negative for appetite change and fever.   HENT: Positive for congestion (yellow), rhinorrhea, sinus pressure and voice change. Negative for sinus pain, sneezing and sore throat.    Eyes: Negative for pain, discharge and itching.   Respiratory: Positive for cough.    Cardiovascular: Negative for chest pain.   Gastrointestinal: Negative for abdominal pain,  "anorexia, change in bowel habit, nausea and vomiting.   Musculoskeletal: Positive for arthralgias and myalgias. Negative for joint swelling and neck pain.   Skin: Negative for rash.   Allergic/Immunologic: Negative for environmental allergies.   Neurological: Positive for weakness. Negative for vertigo and numbness.       /86 (BP Location: Left arm, Patient Position: Sitting, Cuff Size: Adult)  Pulse 74  Temp 98.2 °F (36.8 °C) (Oral)   Resp 16  Ht 60 cm (23.62\")  Wt 90.3 kg (199 lb)  LMP 06/17/1998  SpO2 97%  .74 kg/m2    Objective   Physical Exam   Constitutional: She is oriented to person, place, and time. She appears well-developed and well-nourished. She is cooperative. She appears ill.   HENT:   Head: Normocephalic and atraumatic.   Right Ear: External ear and ear canal normal. A middle ear effusion is present.   Left Ear: Tympanic membrane, external ear and ear canal normal.   Nose: Mucosal edema and rhinorrhea present. Right sinus exhibits frontal sinus tenderness. Right sinus exhibits no maxillary sinus tenderness. Left sinus exhibits frontal sinus tenderness. Left sinus exhibits no maxillary sinus tenderness.   Mouth/Throat: Uvula is midline and mucous membranes are normal. Oropharyngeal exudate (clear) and posterior oropharyngeal erythema present. No posterior oropharyngeal edema.   Eyes: Conjunctivae and lids are normal.   Cardiovascular: Normal heart sounds.    Pulmonary/Chest: Effort normal and breath sounds normal.   Lymphadenopathy:     She has cervical adenopathy.   Neurological: She is alert and oriented to person, place, and time.   Skin: Skin is warm and dry. No rash noted.   Psychiatric: She has a normal mood and affect. Her speech is normal and behavior is normal.         Assessment/Plan   Di was seen today for flu symptoms.    Diagnoses and all orders for this visit:    Acute recurrent frontal sinusitis  -     pseudoephedrine-guaifenesin (MUCINEX D)  MG per 12 hr " tablet; Take 1 tablet by mouth Every 12 (Twelve) Hours for 10 days.  -     promethazine-dextromethorphan (PROMETHAZINE-DM) 6.25-15 MG/5ML syrup; Take 5 mL by mouth 4 (Four) Times a Day As Needed for Cough for up to 10 days.  -     doxycycline (VIBRAMYCIN) 100 MG capsule; Take 1 capsule by mouth 2 (Two) Times a Day for 5 days.    Bronchitis  -     pseudoephedrine-guaifenesin (MUCINEX D)  MG per 12 hr tablet; Take 1 tablet by mouth Every 12 (Twelve) Hours for 10 days.  -     promethazine-dextromethorphan (PROMETHAZINE-DM) 6.25-15 MG/5ML syrup; Take 5 mL by mouth 4 (Four) Times a Day As Needed for Cough for up to 10 days.  -     doxycycline (VIBRAMYCIN) 100 MG capsule; Take 1 capsule by mouth 2 (Two) Times a Day for 5 days.        Results for orders placed or performed in visit on 12/12/17   Iron Profile   Result Value Ref Range    Iron 50 50 - 175 mcg/dL    TIBC 364 250 - 450 mcg/dL    Iron Saturation 14 (L) 15 - 50 %   Ferritin   Result Value Ref Range    Ferritin 188.00 10.00 - 291.00 ng/mL   Folate   Result Value Ref Range    Folate >24.00 (H) 3.20 - 20.00 ng/mL   Vitamin B12   Result Value Ref Range    Vitamin B-12 324 211 - 911 pg/mL   CBC Auto Differential   Result Value Ref Range    WBC 11.20 (H) 3.50 - 10.80 10*3/mm3    RBC 4.84 3.89 - 5.14 10*6/mm3    Hemoglobin 12.3 11.5 - 15.5 g/dL    Hematocrit 39.6 34.5 - 44.0 %    RDW 17.0 (H) 11.3 - 14.5 %    MCV 81.9 80.0 - 99.0 fL    MCH 25.4 (L) 27.0 - 31.0 pg    MCHC 31.0 (L) 32.0 - 36.0 g/dL    MPV 8.1 6.0 - 12.0 fL    Platelets 394 150 - 450 10*3/mm3    Neutrophil % 82.2 (H) 41.0 - 71.0 %    Lymphocyte % 14.9 (L) 24.0 - 44.0 %    Monocyte % 2.9 0.0 - 12.0 %    Neutrophils, Absolute 9.20 (H) 1.50 - 8.30 10*3/mm3    Lymphocytes, Absolute 1.70 0.60 - 4.80 10*3/mm3    Monocytes, Absolute 0.30 0.00 - 1.00 10*3/mm3     - Drink 8-10 eight ounce glasses of fluid a day. Nonalcoholic and noncafeinated beverages    Follow up with PCP or go to the nearest emergency room  if symptoms worsen or fail to improve.

## 2018-01-17 NOTE — PROGRESS NOTES
Subjective   Di Lopez is a 57 y.o. female.     History of Present Illness     Current Outpatient Prescriptions on File Prior to Visit   Medication Sig Dispense Refill   • albuterol (PROVENTIL HFA;VENTOLIN HFA) 108 (90 BASE) MCG/ACT inhaler Inhale 2 puffs Every 4 (Four) Hours As Needed for wheezing.     • Alcohol Swabs (ALCOHOL PREP) 70 % pads      • ASSURE COMFORT LANCETS 30G misc Use 2 times a day E11.9 100 each 5   • Blood Glucose Calibration (OT ULTRA/FASTTK CNTRL SOLN) solution      • Blood Glucose Monitoring Suppl (ONE TOUCH ULTRA 2) W/DEVICE kit      • carvedilol (COREG) 12.5 MG tablet      • escitalopram (LEXAPRO) 20 MG tablet Take 1 tablet by mouth Daily. 90 tablet 0   • escitalopram (LEXAPRO) 20 MG tablet TAKE ONE TABLET BY MOUTH ONCE DAILY 90 tablet 0   • Fluticasone Furoate-Vilanterol (BREO ELLIPTA) 100-25 MCG/INH aerosol powder  Inhale.     • folic acid (FOLVITE) 1 MG tablet TAKE ONE TABLET BY MOUTH DAILY 30 tablet 5   • furosemide (LASIX) 40 MG tablet Take 1 tablet by mouth daily.     • gabapentin (NEURONTIN) 600 MG tablet      • glimepiride (AMARYL) 4 MG tablet Take 1 tablet by mouth Every Morning Before Breakfast. 90 tablet 0   • glimepiride (AMARYL) 4 MG tablet TAKE ONE TABLET BY MOUTH IN THE MORNING BEFORE BREAKFAST 90 tablet 0   • HYDROcodone-acetaminophen (NORCO) 5-325 MG per tablet      • Insulin Pen Needle (RELION PEN NEEDLES) 32G X 4 MM misc TEST  each 0   • Lancet Devices (SIMPLE DIAGNOSTICS LANCING DEV) misc      • LANTUS SOLOSTAR 100 UNIT/ML injection pen INJECT 50 UNITS SUBCUTANEOUSLY DAILY 1 pen 0   • Omega-3 Fatty Acids (FISH OIL PO) Take  by mouth.     • ONE TOUCH ULTRA TEST test strip      • ONGLYZA 5 MG tablet TAKE ONE TABLET BY MOUTH ONCE DAILY 90 tablet 0   • pantoprazole (PROTONIX) 40 MG EC tablet Take 40 mg by mouth Daily.     • RELION PEN NEEDLES 32G X 4 MM misc USE AS DIRECTED 100 each 3   • warfarin (COUMADIN) 1 MG tablet      • warfarin (COUMADIN) 4 MG tablet       • [DISCONTINUED] simvastatin (ZOCOR) 40 MG tablet Take 1 tablet by mouth Every Night. 90 tablet 0     No current facility-administered medications on file prior to visit.        Allergies   Allergen Reactions   • Codeine    • Latex    • Penicillins    • Sulfa Antibiotics        Past Medical History:   Diagnosis Date   • Arteriovenous malformation of gastrointestinal tract 5/12/2016   • Arthritis    • Black hairy tongue    • CHF (congestive heart failure)    • Depression 5/12/2016   • Diabetes mellitus    • Dyslipidemia 5/12/2016   • Emphysema/COPD 5/12/2016   • Fatigue    • Headache    • Herniated cervical disc    • History of echocardiogram 07/16/2013    Left ventricular systolic function at the lower limits of normal; EF 50-55%; mild MR; mechanical prosthetic mitral valve present; mod TR   • Hypertension    • Insomnia 5/12/2016   • Iron deficiency     A.  The patient was treated with IV iron replacement. B.  Secondary to GI bleed, status post EGD with cauterization of blood vessel to the duodenum, 7/16/2014.   • Left shoulder strain    • Liver mass    • Long term current use of anticoagulant 5/12/2016   • Morbid obesity 5/12/2016   • Obstructive sleep apnea syndrome 5/12/2016    Description: A.  Moderate; CPAP therapy..   • Sliding hiatal hernia 7/28/2016    Description: A.  Reported on EGD, 12/30/2011.   • Type 2 diabetes mellitus 5/12/2016   • Vitamin B12 deficiency     A.  The patient was treated with vitamin B12 replacement.       Past Surgical History:   Procedure Laterality Date   • BILATERAL OOPHORECTOMY  2009    Status post bilateral oophorectomy secondary to ovarian cysts    • BREAST BIOPSY Right     STEREOTACTIC   • BREAST EXCISIONAL BIOPSY Bilateral    • COLONOSCOPY  12/28/2012   • CYSTECTOMY      removal of ganglion cyst from left wrist   • ENDOSCOPY  12/30/2011    DIAGNOSTIC ESOPHAGOGASTRODUODENOSCOPY   • HERNIA REPAIR     • HYSTERECTOMY  1995    A.  Status post hysterectomy for early stage  "endometrial cancer done in 1995.   • MITRAL VALVE REPLACEMENT  03/03/2008    Dr. Hima Barreto.    • OOPHORECTOMY         Family History   Problem Relation Age of Onset   • Sudden death Mother      POSSIBLE SERIOUS MASSIVE STROKE   • Multiple myeloma Father    • Ovarian cancer Paternal Grandmother    • Breast cancer Neg Hx        Social History     Social History   • Marital status: Single     Spouse name: N/A   • Number of children: N/A   • Years of education: N/A     Occupational History   • Not on file.     Social History Main Topics   • Smoking status: Former Smoker     Packs/day: 0.50     Types: Electronic Cigarette, Cigarettes     Quit date: 1/1/2012   • Smokeless tobacco: Never Used   • Alcohol use No   • Drug use: No   • Sexual activity: Not on file     Other Topics Concern   • Not on file     Social History Narrative       Review of Systems    /86 (BP Location: Left arm, Patient Position: Sitting, Cuff Size: Adult)  Pulse 74  Temp 98.2 °F (36.8 °C) (Oral)   Resp 16  Ht 60 cm (23.62\")  Wt 90.3 kg (199 lb)  LMP 06/17/1998  SpO2 97%  .74 kg/m2    Objective   Physical Exam      Assessment/Plan   There are no diagnoses linked to this encounter.    Results for orders placed or performed in visit on 12/12/17   Iron Profile   Result Value Ref Range    Iron 50 50 - 175 mcg/dL    TIBC 364 250 - 450 mcg/dL    Iron Saturation 14 (L) 15 - 50 %   Ferritin   Result Value Ref Range    Ferritin 188.00 10.00 - 291.00 ng/mL   Folate   Result Value Ref Range    Folate >24.00 (H) 3.20 - 20.00 ng/mL   Vitamin B12   Result Value Ref Range    Vitamin B-12 324 211 - 911 pg/mL   CBC Auto Differential   Result Value Ref Range    WBC 11.20 (H) 3.50 - 10.80 10*3/mm3    RBC 4.84 3.89 - 5.14 10*6/mm3    Hemoglobin 12.3 11.5 - 15.5 g/dL    Hematocrit 39.6 34.5 - 44.0 %    RDW 17.0 (H) 11.3 - 14.5 %    MCV 81.9 80.0 - 99.0 fL    MCH 25.4 (L) 27.0 - 31.0 pg    MCHC 31.0 (L) 32.0 - 36.0 g/dL    MPV 8.1 6.0 - 12.0 fL    " Platelets 394 150 - 450 10*3/mm3    Neutrophil % 82.2 (H) 41.0 - 71.0 %    Lymphocyte % 14.9 (L) 24.0 - 44.0 %    Monocyte % 2.9 0.0 - 12.0 %    Neutrophils, Absolute 9.20 (H) 1.50 - 8.30 10*3/mm3    Lymphocytes, Absolute 1.70 0.60 - 4.80 10*3/mm3    Monocytes, Absolute 0.30 0.00 - 1.00 10*3/mm3       Follow up with PCP or go to the nearest emergency room if symptoms worsen or fail to improve.

## 2018-01-23 RX ORDER — INSULIN GLARGINE 100 [IU]/ML
INJECTION, SOLUTION SUBCUTANEOUS
Qty: 1 PEN | Refills: 0 | Status: SHIPPED | OUTPATIENT
Start: 2018-01-23 | End: 2018-03-18 | Stop reason: SDUPTHER

## 2018-03-18 RX ORDER — GLIMEPIRIDE 4 MG/1
TABLET ORAL
Qty: 90 TABLET | Refills: 0 | Status: SHIPPED | OUTPATIENT
Start: 2018-03-18 | End: 2020-01-14

## 2018-03-18 RX ORDER — INSULIN GLARGINE 100 [IU]/ML
INJECTION, SOLUTION SUBCUTANEOUS
Qty: 15 ML | Refills: 5 | Status: SHIPPED | OUTPATIENT
Start: 2018-03-18 | End: 2018-04-16 | Stop reason: SDUPTHER

## 2018-03-18 RX ORDER — SAXAGLIPTIN 5 MG/1
TABLET, FILM COATED ORAL
Qty: 90 TABLET | Refills: 0 | Status: SHIPPED | OUTPATIENT
Start: 2018-03-18 | End: 2018-06-25 | Stop reason: SDUPTHER

## 2018-04-16 ENCOUNTER — OFFICE VISIT (OUTPATIENT)
Dept: FAMILY MEDICINE CLINIC | Facility: CLINIC | Age: 58
End: 2018-04-16

## 2018-04-16 VITALS
HEIGHT: 60 IN | SYSTOLIC BLOOD PRESSURE: 146 MMHG | RESPIRATION RATE: 18 BRPM | HEART RATE: 78 BPM | BODY MASS INDEX: 39.07 KG/M2 | OXYGEN SATURATION: 96 % | WEIGHT: 199 LBS | DIASTOLIC BLOOD PRESSURE: 84 MMHG

## 2018-04-16 DIAGNOSIS — I10 HYPERTENSION, UNSPECIFIED TYPE: ICD-10-CM

## 2018-04-16 DIAGNOSIS — M54.40 BILATERAL LOW BACK PAIN WITH SCIATICA, SCIATICA LATERALITY UNSPECIFIED, UNSPECIFIED CHRONICITY: ICD-10-CM

## 2018-04-16 DIAGNOSIS — Z79.4 CONTROLLED TYPE 2 DIABETES MELLITUS WITH INSULIN THERAPY (HCC): Primary | ICD-10-CM

## 2018-04-16 DIAGNOSIS — E11.9 CONTROLLED TYPE 2 DIABETES MELLITUS WITH INSULIN THERAPY (HCC): Primary | ICD-10-CM

## 2018-04-16 DIAGNOSIS — R04.0 BLEEDING FROM THE NOSE: ICD-10-CM

## 2018-04-16 LAB — HBA1C MFR BLD: 6.6 %

## 2018-04-16 PROCEDURE — 99215 OFFICE O/P EST HI 40 MIN: CPT | Performed by: FAMILY MEDICINE

## 2018-04-16 PROCEDURE — 83036 HEMOGLOBIN GLYCOSYLATED A1C: CPT | Performed by: FAMILY MEDICINE

## 2018-04-16 RX ORDER — ATORVASTATIN CALCIUM 80 MG/1
TABLET, FILM COATED ORAL
COMMUNITY
Start: 2018-03-18 | End: 2018-05-10

## 2018-04-16 RX ORDER — ESCITALOPRAM OXALATE 20 MG/1
20 TABLET ORAL DAILY
Qty: 90 TABLET | Refills: 0 | Status: SHIPPED | OUTPATIENT
Start: 2018-04-16 | End: 2018-04-23 | Stop reason: SDUPTHER

## 2018-04-16 RX ORDER — HYDROCODONE BITARTRATE AND ACETAMINOPHEN 7.5; 325 MG/1; MG/1
TABLET ORAL
COMMUNITY
Start: 2018-02-01 | End: 2018-04-16 | Stop reason: SDUPTHER

## 2018-04-16 RX ORDER — ESCITALOPRAM OXALATE 20 MG/1
TABLET ORAL
Qty: 90 TABLET | Refills: 0 | Status: SHIPPED | OUTPATIENT
Start: 2018-04-16 | End: 2018-05-10

## 2018-04-16 RX ORDER — HYDROCODONE BITARTRATE AND ACETAMINOPHEN 7.5; 325 MG/1; MG/1
1 TABLET ORAL 2 TIMES DAILY PRN
Qty: 30 TABLET | Refills: 0 | Status: SHIPPED | OUTPATIENT
Start: 2018-04-16 | End: 2018-07-19 | Stop reason: SDUPTHER

## 2018-04-16 RX ORDER — SILDENAFIL CITRATE 20 MG/1
TABLET ORAL
COMMUNITY
Start: 2018-04-04 | End: 2018-05-10

## 2018-04-16 NOTE — PROGRESS NOTES
Subjective   Di Lopez is a 57 y.o. female.     Diabetes   She presents for her follow-up diabetic visit. She has type 2 diabetes mellitus. Her disease course has been stable. There are no hypoglycemic associated symptoms. Pertinent negatives for hypoglycemia include no confusion, dizziness, headaches or nervousness/anxiousness. Pertinent negatives for diabetes include no blurred vision, no chest pain, no fatigue, no polydipsia, no polyphagia, no polyuria, no weakness and no weight loss. There are no hypoglycemic complications. Symptoms are stable. There are no diabetic complications. Risk factors for coronary artery disease include diabetes mellitus, dyslipidemia, family history, obesity, hypertension, sedentary lifestyle and post-menopausal. Current diabetic treatment includes insulin injections and oral agent (dual therapy) (Lantus 55 units qd). She is compliant with treatment all of the time. Her weight is stable. She is following a generally healthy diet. She has not had a previous visit with a dietitian. She rarely participates in exercise. There is no change in her home blood glucose trend. An ACE inhibitor/angiotensin II receptor blocker is not being taken. She does not see a podiatrist.Eye exam is not current.   Nose Bleed    The bleeding has been from the left nare. This is a chronic problem. The current episode started more than 1 month ago. The problem has been unchanged. The bleeding is associated with anticoagulants. She has tried nothing for the symptoms. The treatment provided no relief. liver tumor   Hypertension   This is a chronic (follows with Cardiology) problem. The current episode started more than 1 year ago. The problem is unchanged. The problem is uncontrolled. Associated symptoms include malaise/fatigue and shortness of breath. Pertinent negatives include no blurred vision, chest pain, headaches, neck pain, palpitations, peripheral edema or PND. Risk factors for coronary artery  disease include diabetes mellitus, dyslipidemia, family history, obesity, post-menopausal state, sedentary lifestyle and stress. Past treatments include beta blockers and diuretics. Current antihypertension treatment includes beta blockers and diuretics. The current treatment provides moderate improvement. There are no compliance problems.    Has been following with surgery at  for liver tumor.  Continues following with Cardiology, now at . Has shortness of breath. Does not have CHF. On Viagra for Pulmonary Hypertension.  Needs referred to pain treatment ceter at [place]. Has chronic low back pain.  Still has mass on left shoulder and has had an US at  which possibly showed a fatty tumor.    The following portions of the patient's history were reviewed and updated as appropriate: allergies, current medications, past family history, past medical history, past social history, past surgical history and problem list.    Review of Systems   Constitutional: Positive for malaise/fatigue. Negative for activity change, fatigue, fever, unexpected weight gain and unexpected weight loss.   HENT: Positive for nosebleeds. Negative for congestion, sneezing and sore throat.    Eyes: Negative.  Negative for blurred vision, double vision and visual disturbance.   Respiratory: Positive for shortness of breath. Negative for cough, chest tightness and wheezing.    Cardiovascular: Negative.  Negative for chest pain, palpitations, leg swelling and PND.   Gastrointestinal: Negative.  Negative for abdominal distention, abdominal pain, blood in stool, constipation, diarrhea and nausea.   Endocrine: Negative.  Negative for cold intolerance, heat intolerance, polydipsia, polyphagia and polyuria.   Genitourinary: Negative.  Negative for urinary incontinence, dysuria, frequency and urgency.   Musculoskeletal: Negative.  Negative for arthralgias, myalgias and neck pain.   Skin: Negative.  Negative for rash.   Allergic/Immunologic:  Negative.    Neurological: Negative.  Negative for dizziness, syncope, weakness, numbness, headaches, memory problem and confusion.   Hematological: Negative.  Negative for adenopathy.   Psychiatric/Behavioral: Negative.  Negative for suicidal ideas and depressed mood. The patient is not nervous/anxious.    All other systems reviewed and are negative.      Objective   Physical Exam   Constitutional: She is oriented to person, place, and time. She appears well-developed and well-nourished.   HENT:   Head: Normocephalic.   Right Ear: External ear normal.   Left Ear: External ear normal.   Nose: Nose normal.   Mouth/Throat: Oropharynx is clear and moist. No oropharyngeal exudate.   Eyes: Conjunctivae and EOM are normal. Pupils are equal, round, and reactive to light.   Neck: Normal range of motion. Neck supple. No thyromegaly present.   Cardiovascular: Normal rate, regular rhythm, normal heart sounds and intact distal pulses.    No murmur heard.  Pulmonary/Chest: Effort normal and breath sounds normal. No respiratory distress. She exhibits no tenderness.   Abdominal: Soft. Bowel sounds are normal. She exhibits no distension and no mass. There is no tenderness. There is no rebound and no guarding.   Musculoskeletal: Normal range of motion.   Lymphadenopathy:     She has no cervical adenopathy.   Neurological: She is alert and oriented to person, place, and time. She has normal reflexes. She displays normal reflexes. She exhibits normal muscle tone. Coordination normal.   Skin: Skin is warm and dry. No rash noted. She is not diaphoretic. No erythema.   Psychiatric: She has a normal mood and affect. Her behavior is normal. Judgment and thought content normal.   Nursing note and vitals reviewed.        Assessment/Plan   Di was seen today for diabetes and nose bleed.    Diagnoses and all orders for this visit:    Controlled type 2 diabetes mellitus with insulin therapy  -     POC Glycosylated Hemoglobin (Hb  A1C)    Hypertension, unspecified type    Bilateral low back pain with sciatica, sciatica laterality unspecified, unspecified chronicity  -     Ambulatory Referral to Pain Management    Bleeding from the nose  -     Ambulatory Referral to ENT (Otolaryngology)    Other orders  -     escitalopram (LEXAPRO) 20 MG tablet; Take 1 tablet by mouth Daily.  -     Insulin Glargine (LANTUS SOLOSTAR) 100 UNIT/ML injection pen; Inject 50 Units under the skin Daily.  -     HYDROcodone-acetaminophen (NORCO) 7.5-325 MG per tablet; Take 1 tablet by mouth 2 (Two) Times a Day As Needed for Moderate Pain .    Patient presents with all of her records from [facility] where she has been had having extensive testing and follow-up for liver tumors. I have reviewed and discussed these records with the patient today.  Patient will continue her current medications and follow-up with her specialists.  Discussed the nature of the disease including, risks, complications, implications, management, safe and proper use of medications. Encouraged therapeutic lifestyle changes including low calorie diet with plenty of fruits and vegetables, daily exercise, medication compliance, and keeping scheduled follow up appointments with me and any other providers. Encouraged patient to have appointment for complete physical, fasting labs, appropriate screenings, and immunizations on an annual basis.  I personally spent over half of a total 40minutes face to face with the patient in counseling and discussion and/or coordination of care as described above.

## 2018-04-23 ENCOUNTER — OFFICE VISIT (OUTPATIENT)
Dept: RETAIL CLINIC | Facility: CLINIC | Age: 58
End: 2018-04-23

## 2018-04-23 ENCOUNTER — TELEPHONE (OUTPATIENT)
Dept: FAMILY MEDICINE CLINIC | Facility: CLINIC | Age: 58
End: 2018-04-23

## 2018-04-23 VITALS
BODY MASS INDEX: 38.87 KG/M2 | TEMPERATURE: 98.4 F | DIASTOLIC BLOOD PRESSURE: 102 MMHG | HEART RATE: 82 BPM | SYSTOLIC BLOOD PRESSURE: 160 MMHG | HEIGHT: 60 IN | OXYGEN SATURATION: 96 % | RESPIRATION RATE: 18 BRPM | WEIGHT: 198 LBS

## 2018-04-23 DIAGNOSIS — R06.02 SHORTNESS OF BREATH: ICD-10-CM

## 2018-04-23 DIAGNOSIS — R61 DIAPHORESIS: Primary | ICD-10-CM

## 2018-04-23 DIAGNOSIS — E11.8 TYPE 2 DIABETES MELLITUS WITH COMPLICATION, UNSPECIFIED LONG TERM INSULIN USE STATUS: ICD-10-CM

## 2018-04-23 DIAGNOSIS — I10 HYPERTENSION, UNSPECIFIED TYPE: ICD-10-CM

## 2018-04-23 LAB
GLUCOSE BLDC GLUCOMTR-MCNC: 59 MG/DL (ref 70–130)
GLUCOSE BLDC GLUCOMTR-MCNC: 65 MG/DL (ref 70–130)

## 2018-04-23 PROCEDURE — 99214 OFFICE O/P EST MOD 30 MIN: CPT | Performed by: NURSE PRACTITIONER

## 2018-04-23 PROCEDURE — 82962 GLUCOSE BLOOD TEST: CPT | Performed by: NURSE PRACTITIONER

## 2018-04-23 PROCEDURE — 94640 AIRWAY INHALATION TREATMENT: CPT | Performed by: NURSE PRACTITIONER

## 2018-04-23 RX ORDER — IPRATROPIUM BROMIDE AND ALBUTEROL SULFATE 2.5; .5 MG/3ML; MG/3ML
3 SOLUTION RESPIRATORY (INHALATION) ONCE
Status: COMPLETED | OUTPATIENT
Start: 2018-04-23 | End: 2018-04-23

## 2018-04-23 RX ORDER — WARFARIN SODIUM 5 MG/1
5 TABLET ORAL
COMMUNITY
End: 2019-09-17

## 2018-04-23 RX ADMIN — IPRATROPIUM BROMIDE AND ALBUTEROL SULFATE 3 ML: 2.5; .5 SOLUTION RESPIRATORY (INHALATION) at 17:00

## 2018-04-23 NOTE — PROGRESS NOTES
Subjective   Di Lopez is a 57 y.o. female    CHIEF COMPLAINT  Shortness of Breath      In ED at [facility] on 4/21/18, received breathing treatment for SOA.  Called PCP today, was instructed to seek care here and will see PCP tomorrow (per patient).  H/O liver tumor, recently started on Viagra per cardiologist.  After cardiology clears patient, she is to have surgery to remove liver tumor.          Shortness of Breath   This is a new problem. The current episode started in the past 7 days. The problem occurs intermittently. The problem has been waxing and waning. Associated symptoms include headaches, orthopnea and sputum production. Pertinent negatives include no abdominal pain, chest pain, claudication, coryza, ear pain, fever, hemoptysis, leg pain, leg swelling, neck pain (2 pillows since 2008), rash, rhinorrhea, sore throat, swollen glands, syncope, vomiting or wheezing. The symptoms are aggravated by emotional upset. She has tried body position changes and rest for the symptoms. The treatment provided mild relief.       The following portions of the patient's history were reviewed and updated as appropriate: allergies, current mediations, past family history, past medical history, past social history, past surgical history, and problem list.    Review of Systems   Constitutional: Positive for fatigue. Negative for activity change, appetite change, chills and fever.   HENT: Positive for nosebleeds. Negative for congestion, ear pain, postnasal drip, rhinorrhea, sinus pressure, sneezing, sore throat and tinnitus.    Eyes: Negative for pain and itching.   Respiratory: Positive for cough, sputum production and shortness of breath. Negative for hemoptysis and wheezing.    Cardiovascular: Positive for orthopnea. Negative for chest pain, claudication, leg swelling and syncope.   Gastrointestinal: Negative for abdominal pain, diarrhea, nausea and vomiting.   Musculoskeletal: Negative for neck pain (2  "pillows since 2008).   Skin: Negative for rash.   Neurological: Positive for headaches. Negative for dizziness and light-headedness.         Objective   Allergies   Allergen Reactions   • Codeine Itching and Nausea And Vomiting   • Latex Other (See Comments)     Pulls skin off   • Penicillins Hives   • Sulfa Antibiotics Nausea And Vomiting         BP (!) 160/102   Pulse 82   Temp 98.4 °F (36.9 °C) (Oral)   Resp 18   Ht 152.4 cm (60\")   Wt 89.8 kg (198 lb)   LMP 06/17/1998   SpO2 96%   BMI 38.67 kg/m²     Physical Exam   Constitutional: She is oriented to person, place, and time. She appears well-developed and well-nourished. She appears distressed.   HENT:   Head: Normocephalic.   Right Ear: External ear normal.   Left Ear: External ear normal.   Nose: Nose normal.   Eyes: Conjunctivae are normal.   Cardiovascular: Normal rate, regular rhythm and normal heart sounds.    Pulmonary/Chest: Effort normal. No tachypnea. No respiratory distress. She has decreased breath sounds in the right lower field and the left lower field.   Abdominal: Bowel sounds are normal. She exhibits distension. She exhibits no shifting dullness, no fluid wave and no mass. There is no tenderness.   Lymphadenopathy:     She has no cervical adenopathy.   Neurological: She is alert and oriented to person, place, and time.   Psychiatric: Her mood appears anxious.       Assessment/Plan   Di was seen today for shortness of breath.    Diagnoses and all orders for this visit:    Diaphoresis  -     POC Glucose: 59/verified with second test: 65; given a sucker, juice, peanut butter crackers  -     POC Glucose: 148 after PO intake    Shortness of breath  -     ipratropium-albuterol (DUO-NEB) nebulizer solution 3 mL; Take 3 mL by nebulization 1 (One) Time. Breath sounds improved although bilateral bases still diminished, no SOA reported by patient    Type 2 diabetes mellitus with complication, unspecified long term insulin use status        " - Advised to monitor blood glucose closely this evening, drink plenty of water, take meds as prescribed; eat a protein rich meal for dinner    Hypertension, unspecified type        - Advised to monitor BP, call PCP and advise of elevated BP readings        - Patient states she has not taken her 1700 medications, will take as soon as she gets home   Strongly encouraged to call PCP first thing in am, report symptoms and treatment for further evaluation and treatment as warranted.  If symptoms worsen this evening, encouraged to seek higher level of care (ED/UTC).       An After Visit Summary was printed, reviewed, and given to the patient. Understanding verbalized and agrees with treatment plan.          April 23, 2018  5:43 PM

## 2018-04-23 NOTE — PATIENT INSTRUCTIONS
Blood Glucose Monitoring, Adult  Monitoring your blood sugar (glucose) helps you manage your diabetes. It also helps you and your health care provider determine how well your diabetes management plan is working. Blood glucose monitoring involves checking your blood glucose as often as directed, and keeping a record (log) of your results over time.  Why should I monitor my blood glucose?  Checking your blood glucose regularly can:  · Help you understand how food, exercise, illnesses, and medicines affect your blood glucose.  · Let you know what your blood glucose is at any time. You can quickly tell if you are having low blood glucose (hypoglycemia) or high blood glucose (hyperglycemia).  · Help you and your health care provider adjust your medicines as needed.  When should I check my blood glucose?  Follow instructions from your health care provider about how often to check your blood glucose. This may depend on:  · The type of diabetes you have.  · How well-controlled your diabetes is.  · Medicines you are taking.  If you have type 1 diabetes:   · Check your blood glucose at least 2 times a day.  · Also check your blood glucose:  ¨ Before every insulin injection.  ¨ Before and after exercise.  ¨ Between meals.  ¨ 2 hours after a meal.  ¨ Occasionally between 2:00 a.m. and 3:00 a.m., as directed.  ¨ Before potentially dangerous tasks, like driving or using heavy machinery.  ¨ At bedtime.  · You may need to check your blood glucose more often, up to 6-10 times a day:  ¨ If you use an insulin pump.  ¨ If you need multiple daily injections (MDI).  ¨ If your diabetes is not well-controlled.  ¨ If you are ill.  ¨ If you have a history of severe hypoglycemia.  ¨ If you have a history of not knowing when your blood glucose is getting low (hypoglycemia unawareness).  If you have type 2 diabetes:   · If you take insulin or other diabetes medicines, check your blood glucose at least 2 times a day.  · If you are on intensive  insulin therapy, check your blood glucose at least 4 times a day. Occasionally, you may also need to check between 2:00 a.m. and 3:00 a.m., as directed.  · Also check your blood glucose:  ¨ Before and after exercise.  ¨ Before potentially dangerous tasks, like driving or using heavy machinery.  · You may need to check your blood glucose more often if:  ¨ Your medicine is being adjusted.  ¨ Your diabetes is not well-controlled.  ¨ You are ill.  What is a blood glucose log?  · A blood glucose log is a record of your blood glucose readings. It helps you and your health care provider:  ¨ Look for patterns in your blood glucose over time.  ¨ Adjust your diabetes management plan as needed.  · Every time you check your blood glucose, write down your result and notes about things that may be affecting your blood glucose, such as your diet and exercise for the day.  · Most glucose meters store a record of glucose readings in the meter. Some meters allow you to download your records to a computer.  How do I check my blood glucose?  Follow these steps to get accurate readings of your blood glucose:  Supplies needed     · Blood glucose meter.  · Test strips for your meter. Each meter has its own strips. You must use the strips that come with your meter.  · A needle to prick your finger (lancet). Do not use lancets more than once.  · A device that holds the lancet (lancing device).  · A journal or log book to write down your results.  Procedure   · Wash your hands with soap and water.  · Prick the side of your finger (not the tip) with the lancet. Use a different finger each time.  · Gently rub the finger until a small drop of blood appears.  · Follow instructions that come with your meter for inserting the test strip, applying blood to the strip, and using your blood glucose meter.  · Write down your result and any notes.  Alternative testing sites   · Some meters allow you to use areas of your body other than your finger  (alternative sites) to test your blood.  · If you think you may have hypoglycemia, or if you have hypoglycemia unawareness, do not use alternative sites. Use your finger instead.  · Alternative sites may not be as accurate as the fingers, because blood flow is slower in these areas. This means that the result you get may be delayed, and it may be different from the result that you would get from your finger.  · The most common alternative sites are:  ¨ Forearm.  ¨ Thigh.  ¨ Palm of the hand.  Additional tips   · Always keep your supplies with you.  · If you have questions or need help, all blood glucose meters have a 24-hour “hotline” number that you can call. You may also contact your health care provider.  · After you use a few boxes of test strips, adjust (calibrate) your blood glucose meter by following instructions that came with your meter.  This information is not intended to replace advice given to you by your health care provider. Make sure you discuss any questions you have with your health care provider.  Document Released: 12/20/2004 Document Revised: 07/07/2017 Document Reviewed: 05/29/2017  [employer] Interactive Patient Education © 2017 [employer]  Hypertension  Hypertension is another name for high blood pressure. High blood pressure forces your heart to work harder to pump blood. This can cause problems over time.  There are two numbers in a blood pressure reading. There is a top number (systolic) over a bottom number (diastolic). It is best to have a blood pressure below 120/80. Healthy choices can help lower your blood pressure. You may need medicine to help lower your blood pressure if:  · Your blood pressure cannot be lowered with healthy choices.  · Your blood pressure is higher than 130/80.  Follow these instructions at home:  Eating and drinking   · If directed, follow the DASH eating plan. This diet includes:  ¨ Filling half of your plate at each meal with fruits and vegetables.  ¨ Filling  one quarter of your plate at each meal with whole grains. Whole grains include whole wheat pasta, brown rice, and whole grain bread.  ¨ Eating or drinking low-fat dairy products, such as skim milk or low-fat yogurt.  ¨ Filling one quarter of your plate at each meal with low-fat (lean) proteins. Low-fat proteins include fish, skinless chicken, eggs, beans, and tofu.  ¨ Avoiding fatty meat, cured and processed meat, or chicken with skin.  ¨ Avoiding premade or processed food.  · Eat less than 1,500 mg of salt (sodium) a day.  · Limit alcohol use to no more than 1 drink a day for nonpregnant women and 2 drinks a day for men. One drink equals 12 oz of beer, 5 oz of wine, or 1½ oz of hard liquor.  Lifestyle   · Work with your doctor to stay at a healthy weight or to lose weight. Ask your doctor what the best weight is for you.  · Get at least 30 minutes of exercise that causes your heart to beat faster (aerobic exercise) most days of the week. This may include walking, swimming, or biking.  · Get at least 30 minutes of exercise that strengthens your muscles (resistance exercise) at least 3 days a week. This may include lifting weights or pilates.  · Do not use any products that contain nicotine or tobacco. This includes cigarettes and e-cigarettes. If you need help quitting, ask your doctor.  · Check your blood pressure at home as told by your doctor.  · Keep all follow-up visits as told by your doctor. This is important.  Medicines   · Take over-the-counter and prescription medicines only as told by your doctor. Follow directions carefully.  · Do not skip doses of blood pressure medicine. The medicine does not work as well if you skip doses. Skipping doses also puts you at risk for problems.  · Ask your doctor about side effects or reactions to medicines that you should watch for.  Contact a doctor if:  · You think you are having a reaction to the medicine you are taking.  · You have headaches that keep coming back  (recurring).  · You feel dizzy.  · You have swelling in your ankles.  · You have trouble with your vision.  Get help right away if:  · You get a very bad headache.  · You start to feel confused.  · You feel weak or numb.  · You feel faint.  · You get very bad pain in your:  ¨ Chest.  ¨ Belly (abdomen).  · You throw up (vomit) more than once.  · You have trouble breathing.  Summary  · Hypertension is another name for high blood pressure.  · Making healthy choices can help lower blood pressure. If your blood pressure cannot be controlled with healthy choices, you may need to take medicine.  This information is not intended to replace advice given to you by your health care provider. Make sure you discuss any questions you have with your health care provider.  Document Released: 06/05/2009 Document Revised: 11/15/2017 Document Reviewed: 11/15/2017  [employer] Interactive Patient Education © 2017 [employer]  Shortness of Breath, Adult  Shortness of breath means you have trouble breathing. Your lungs are organs for breathing.  Follow these instructions at home:  Pay attention to any changes in your symptoms. Take these actions to help with your condition:  · Do not smoke. Smoking can cause shortness of breath. If you need help to quit smoking, ask your doctor.  · Avoid things that can make it harder to breathe, such as:  ¨ Mold.  ¨ Dust.  ¨ Air pollution.  ¨ Chemical smells.  ¨ Things that can cause allergy symptoms (allergens), if you have allergies.  · Keep your living space clean and free of mold and dust.  · Rest as needed. Slowly return to your usual activities.  · Take over-the-counter and prescription medicines, including oxygen and inhaled medicines, only as told by your doctor.  · Keep all follow-up visits as told by your doctor. This is important.  Contact a doctor if:  · Your condition does not get better as soon as expected.  · You have a hard time doing your normal activities, even after you rest.  · You  have new symptoms.  Get help right away if:  · You have trouble breathing when you are resting.  · You feel light-headed or you faint.  · You have a cough that is not helped by medicines.  · You cough up blood.  · You have pain with breathing.  · You have pain in your chest, arms, shoulders, or belly (abdomen).  · You have a fever.  · You cannot walk up stairs.  · You cannot exercise the way you normally do.  This information is not intended to replace advice given to you by your health care provider. Make sure you discuss any questions you have with your health care provider.  Document Released: 06/05/2009 Document Revised: 01/04/2018 Document Reviewed: 01/04/2018  [employer] Interactive Patient Education © 2017 [employer]

## 2018-04-23 NOTE — TELEPHONE ENCOUNTER
Called and spoke with pt , she stated that she was having a hard time breathing and thought she needed another breathing treatment . I advised pt to return to the ER if she was having trouble breathing and advised pt to call me back .... Pt verbalized understanding .                   ----- Message from Celena Boyd sent at 4/23/2018  9:15 AM EDT -----  Regarding: PLEASE RETURN CALL  Contact: [phone]  WENT TO ER.BREATHING DIFFICULTIES. BP /78,GOT BREATHING TREATMENTS, BUT WANTS ANOTHER BREATHING TREATMENT.WOULD RATHER TALK TO A NURSE TO SEE IF SHE SHOULD SCHEDULE NOW. PATIENT WAS CONFUSED WHEN I OFFERED  ER F/U APPT. CAN YOU PLEASE CALL HER AND SPEAK WITH HER, AND ADVISE APPT.[phone].THANKS,  CELENA

## 2018-05-10 ENCOUNTER — OFFICE VISIT (OUTPATIENT)
Dept: FAMILY MEDICINE CLINIC | Facility: CLINIC | Age: 58
End: 2018-05-10

## 2018-05-10 VITALS
BODY MASS INDEX: 39.46 KG/M2 | DIASTOLIC BLOOD PRESSURE: 88 MMHG | OXYGEN SATURATION: 98 % | SYSTOLIC BLOOD PRESSURE: 152 MMHG | HEART RATE: 84 BPM | RESPIRATION RATE: 16 BRPM | WEIGHT: 201 LBS | HEIGHT: 60 IN

## 2018-05-10 DIAGNOSIS — F32.0 MILD SINGLE CURRENT EPISODE OF MAJOR DEPRESSIVE DISORDER (HCC): Primary | ICD-10-CM

## 2018-05-10 DIAGNOSIS — E53.8 B12 DEFICIENCY: ICD-10-CM

## 2018-05-10 PROCEDURE — 99214 OFFICE O/P EST MOD 30 MIN: CPT | Performed by: FAMILY MEDICINE

## 2018-05-10 PROCEDURE — 96372 THER/PROPH/DIAG INJ SC/IM: CPT | Performed by: FAMILY MEDICINE

## 2018-05-10 RX ORDER — FLUOXETINE HYDROCHLORIDE 20 MG/1
20 CAPSULE ORAL DAILY
Qty: 30 CAPSULE | Refills: 0 | Status: SHIPPED | OUTPATIENT
Start: 2018-05-10 | End: 2018-07-19

## 2018-05-10 RX ORDER — CYANOCOBALAMIN 1000 UG/ML
1000 INJECTION, SOLUTION INTRAMUSCULAR; SUBCUTANEOUS
Status: DISCONTINUED | OUTPATIENT
Start: 2018-05-10 | End: 2019-06-05

## 2018-05-10 RX ADMIN — CYANOCOBALAMIN 1000 MCG: 1000 INJECTION, SOLUTION INTRAMUSCULAR; SUBCUTANEOUS at 13:42

## 2018-05-10 NOTE — PROGRESS NOTES
Subjective   Di Lopez is a 57 y.o. female.     Depression   Visit Type: follow-up (on Lexapro and does not think it is working. Increased crying and increased depression. )  Patient presents with the following symptoms: anhedonia, depressed mood, irritability, malaise, psychomotor retardation and shortness of breath.  Patient is not experiencing: excessive worry, nervousness/anxiety, palpitations, suicidal ideas, suicidal planning, thoughts of death, weight gain and weight loss.  Frequency of symptoms: occasionally   Severity: moderate   Sleep per night: 6 hours  Sleep quality: fair  Nighttime awakenings: occasional  Compliance with medications:  %        Pt also still following at  for removal of potential malignant liver mass.  Continues also following with Cardiology.    The following portions of the patient's history were reviewed and updated as appropriate: allergies, current medications, past family history, past medical history, past social history, past surgical history and problem list.    Review of Systems   Constitutional: Positive for irritability. Negative for activity change, fatigue, fever, unexpected weight gain and unexpected weight loss.   HENT: Negative.  Negative for congestion, sneezing and sore throat.    Eyes: Negative.  Negative for blurred vision, double vision and visual disturbance.   Respiratory: Positive for shortness of breath. Negative for cough, chest tightness and wheezing.    Cardiovascular: Negative.  Negative for chest pain, palpitations and leg swelling.   Gastrointestinal: Negative.  Negative for abdominal distention, abdominal pain, blood in stool, constipation, diarrhea and nausea.   Endocrine: Negative.  Negative for cold intolerance and heat intolerance.   Genitourinary: Negative.  Negative for urinary incontinence, dysuria, frequency and urgency.   Musculoskeletal: Negative.  Negative for arthralgias and myalgias.   Skin: Negative.  Negative for rash.    Allergic/Immunologic: Negative.    Neurological: Negative.  Negative for dizziness, syncope, numbness and memory problem.   Hematological: Negative.  Negative for adenopathy.   Psychiatric/Behavioral: Negative.  Positive for depressed mood. Negative for suicidal ideas. The patient is not nervous/anxious.    All other systems reviewed and are negative.      Objective   Physical Exam   Constitutional: She is oriented to person, place, and time. She appears well-developed and well-nourished.   HENT:   Head: Normocephalic.   Right Ear: External ear normal.   Left Ear: External ear normal.   Nose: Nose normal.   Mouth/Throat: Oropharynx is clear and moist. No oropharyngeal exudate.   Eyes: Conjunctivae and EOM are normal. Pupils are equal, round, and reactive to light.   Neck: Normal range of motion. Neck supple. No thyromegaly present.   Cardiovascular: Normal rate, regular rhythm, normal heart sounds and intact distal pulses.    No murmur heard.  Pulmonary/Chest: Effort normal and breath sounds normal. No respiratory distress. She exhibits no tenderness.   Abdominal: Soft. Bowel sounds are normal. She exhibits no distension and no mass. There is no tenderness. There is no rebound and no guarding.   Musculoskeletal: Normal range of motion.   Lymphadenopathy:     She has no cervical adenopathy.   Neurological: She is alert and oriented to person, place, and time. She has normal reflexes. She displays normal reflexes. She exhibits normal muscle tone. Coordination normal.   Skin: Skin is warm and dry. No rash noted. She is not diaphoretic. No erythema.   Psychiatric: She has a normal mood and affect. Her behavior is normal. Judgment and thought content normal.   Nursing note and vitals reviewed.        Assessment/Plan   Di was seen today for depression.    Diagnoses and all orders for this visit:    Mild single current episode of major depressive disorder    B12 deficiency  -     cyanocobalamin injection 1,000 mcg;  Inject 1 mL into the shoulder, thigh, or buttocks Every 28 (Twenty-Eight) Days.    Other orders  -     FLUoxetine (PROZAC) 20 MG capsule; Take 1 capsule by mouth Daily.    Will change patient from Lexapro to Prozac. Patient will follow-up in one month.  Patient will continue follow-up with her specialists.  I personally spent over half of a total 25 minutes face to face with the patient in counseling and discussion and/or coordination of care as described above.

## 2018-05-11 ENCOUNTER — TELEPHONE (OUTPATIENT)
Dept: FAMILY MEDICINE CLINIC | Facility: CLINIC | Age: 58
End: 2018-05-11

## 2018-05-11 RX ORDER — TRAZODONE HYDROCHLORIDE 50 MG/1
50 TABLET ORAL NIGHTLY
Qty: 30 TABLET | Refills: 1 | Status: SHIPPED | OUTPATIENT
Start: 2018-05-11 | End: 2018-07-19

## 2018-05-11 NOTE — TELEPHONE ENCOUNTER
----- Message from Keri Little DO sent at 5/11/2018  2:49 PM EDT -----  I want her to continue her previous medicine Lexapro. 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 She wants to know if you can send her something else ?

## 2018-05-31 RX ORDER — GLIMEPIRIDE 4 MG/1
TABLET ORAL
Qty: 90 TABLET | Refills: 0 | Status: SHIPPED | OUTPATIENT
Start: 2018-05-31 | End: 2018-09-19 | Stop reason: SDUPTHER

## 2018-06-05 ENCOUNTER — TRANSCRIBE ORDERS (OUTPATIENT)
Dept: FAMILY MEDICINE CLINIC | Facility: CLINIC | Age: 58
End: 2018-06-05

## 2018-06-05 DIAGNOSIS — Z12.31 VISIT FOR SCREENING MAMMOGRAM: Primary | ICD-10-CM

## 2018-06-15 ENCOUNTER — LAB (OUTPATIENT)
Dept: LAB | Facility: HOSPITAL | Age: 58
End: 2018-06-15

## 2018-06-15 DIAGNOSIS — K90.9 IRON MALABSORPTION: ICD-10-CM

## 2018-06-15 DIAGNOSIS — E61.1 IRON DEFICIENCY: ICD-10-CM

## 2018-06-15 DIAGNOSIS — D64.9 NORMOCYTIC ANEMIA: ICD-10-CM

## 2018-06-15 LAB
ERYTHROCYTE [DISTWIDTH] IN BLOOD BY AUTOMATED COUNT: 18.3 % (ref 11.3–14.5)
FERRITIN SERPL-MCNC: 89 NG/ML (ref 10–291)
FOLATE SERPL-MCNC: >24 NG/ML (ref 3.2–20)
HCT VFR BLD AUTO: 37.4 % (ref 34.5–44)
HGB BLD-MCNC: 11.1 G/DL (ref 11.5–15.5)
IRON 24H UR-MRATE: 42 MCG/DL (ref 50–175)
IRON SATN MFR SERPL: 10 % (ref 15–50)
LYMPHOCYTES # BLD AUTO: 1.5 10*3/MM3 (ref 0.6–4.8)
LYMPHOCYTES NFR BLD AUTO: 18.2 % (ref 24–44)
MCH RBC QN AUTO: 24.6 PG (ref 27–31)
MCHC RBC AUTO-ENTMCNC: 29.8 G/DL (ref 32–36)
MCV RBC AUTO: 82.7 FL (ref 80–99)
MONOCYTES # BLD AUTO: 0.4 10*3/MM3 (ref 0–1)
MONOCYTES NFR BLD AUTO: 5.1 % (ref 0–12)
NEUTROPHILS # BLD AUTO: 6.5 10*3/MM3 (ref 1.5–8.3)
NEUTROPHILS NFR BLD AUTO: 76.7 % (ref 41–71)
PLATELET # BLD AUTO: 341 10*3/MM3 (ref 150–450)
PMV BLD AUTO: 7.7 FL (ref 6–12)
RBC # BLD AUTO: 4.52 10*6/MM3 (ref 3.89–5.14)
TIBC SERPL-MCNC: 412 MCG/DL (ref 250–450)
VIT B12 BLD-MCNC: 428 PG/ML (ref 211–911)
WBC NRBC COR # BLD: 8.5 10*3/MM3 (ref 3.5–10.8)

## 2018-06-15 PROCEDURE — 83550 IRON BINDING TEST: CPT

## 2018-06-15 PROCEDURE — 85025 COMPLETE CBC W/AUTO DIFF WBC: CPT

## 2018-06-15 PROCEDURE — 83540 ASSAY OF IRON: CPT

## 2018-06-15 PROCEDURE — 82607 VITAMIN B-12: CPT

## 2018-06-15 PROCEDURE — 82746 ASSAY OF FOLIC ACID SERUM: CPT

## 2018-06-15 PROCEDURE — 82728 ASSAY OF FERRITIN: CPT

## 2018-06-15 PROCEDURE — 36415 COLL VENOUS BLD VENIPUNCTURE: CPT

## 2018-06-26 RX ORDER — SAXAGLIPTIN 5 MG/1
TABLET, FILM COATED ORAL
Qty: 90 TABLET | Refills: 0 | Status: SHIPPED | OUTPATIENT
Start: 2018-06-26 | End: 2018-09-30 | Stop reason: SDUPTHER

## 2018-07-06 ENCOUNTER — HOSPITAL ENCOUNTER (OUTPATIENT)
Dept: MAMMOGRAPHY | Facility: HOSPITAL | Age: 58
Discharge: HOME | End: 2018-07-06
Attending: FAMILY MEDICINE | Admitting: FAMILY MEDICINE

## 2018-07-06 DIAGNOSIS — Z12.31 VISIT FOR SCREENING MAMMOGRAM: ICD-10-CM

## 2018-07-06 PROCEDURE — 77063 BREAST TOMOSYNTHESIS BI: CPT | Performed by: RADIOLOGY

## 2018-07-06 PROCEDURE — 77067 SCR MAMMO BI INCL CAD: CPT

## 2018-07-06 PROCEDURE — 77067 SCR MAMMO BI INCL CAD: CPT | Performed by: RADIOLOGY

## 2018-07-06 PROCEDURE — 77063 BREAST TOMOSYNTHESIS BI: CPT

## 2018-07-19 ENCOUNTER — OFFICE VISIT (OUTPATIENT)
Dept: FAMILY MEDICINE CLINIC | Facility: CLINIC | Age: 58
End: 2018-07-19

## 2018-07-19 VITALS
WEIGHT: 202.4 LBS | HEIGHT: 60 IN | HEART RATE: 74 BPM | OXYGEN SATURATION: 98 % | DIASTOLIC BLOOD PRESSURE: 84 MMHG | SYSTOLIC BLOOD PRESSURE: 136 MMHG | BODY MASS INDEX: 39.74 KG/M2 | RESPIRATION RATE: 16 BRPM

## 2018-07-19 DIAGNOSIS — Z00.00 HEALTH CARE MAINTENANCE: Primary | ICD-10-CM

## 2018-07-19 DIAGNOSIS — Z79.01 CURRENT USE OF LONG TERM ANTICOAGULATION: ICD-10-CM

## 2018-07-19 DIAGNOSIS — M54.40 CHRONIC LOW BACK PAIN WITH SCIATICA, SCIATICA LATERALITY UNSPECIFIED, UNSPECIFIED BACK PAIN LATERALITY: ICD-10-CM

## 2018-07-19 DIAGNOSIS — Z51.81 ENCOUNTER FOR THERAPEUTIC DRUG LEVEL MONITORING: ICD-10-CM

## 2018-07-19 DIAGNOSIS — G89.29 CHRONIC LOW BACK PAIN WITH SCIATICA, SCIATICA LATERALITY UNSPECIFIED, UNSPECIFIED BACK PAIN LATERALITY: ICD-10-CM

## 2018-07-19 DIAGNOSIS — R22.32 SKIN LUMP OF ARM, LEFT: ICD-10-CM

## 2018-07-19 DIAGNOSIS — Z79.4 TYPE 2 DIABETES MELLITUS WITHOUT COMPLICATION, WITH LONG-TERM CURRENT USE OF INSULIN (HCC): ICD-10-CM

## 2018-07-19 DIAGNOSIS — E11.9 TYPE 2 DIABETES MELLITUS WITHOUT COMPLICATION, WITH LONG-TERM CURRENT USE OF INSULIN (HCC): ICD-10-CM

## 2018-07-19 LAB
ALBUMIN SERPL-MCNC: 4.39 G/DL (ref 3.2–4.8)
ALBUMIN/GLOB SERPL: 1.4 G/DL (ref 1.5–2.5)
ALP SERPL-CCNC: 246 U/L (ref 25–100)
ALT SERPL W P-5'-P-CCNC: 42 U/L (ref 7–40)
ANION GAP SERPL CALCULATED.3IONS-SCNC: 8 MMOL/L (ref 3–11)
ARTICHOKE IGE QN: 95 MG/DL (ref 0–130)
AST SERPL-CCNC: 38 U/L (ref 0–33)
BILIRUB SERPL-MCNC: 0.3 MG/DL (ref 0.3–1.2)
BUN BLD-MCNC: 14 MG/DL (ref 9–23)
BUN/CREAT SERPL: 15.6 (ref 7–25)
CALCIUM SPEC-SCNC: 9.4 MG/DL (ref 8.7–10.4)
CHLORIDE SERPL-SCNC: 102 MMOL/L (ref 99–109)
CHOLEST SERPL-MCNC: 147 MG/DL (ref 0–200)
CO2 SERPL-SCNC: 29 MMOL/L (ref 20–31)
CREAT BLD-MCNC: 0.9 MG/DL (ref 0.6–1.3)
GFR SERPL CREATININE-BSD FRML MDRD: 78 ML/MIN/1.73
GLOBULIN UR ELPH-MCNC: 3.1 GM/DL
GLUCOSE BLD-MCNC: 203 MG/DL (ref 70–100)
HBA1C MFR BLD: 7.5 %
HDLC SERPL-MCNC: 35 MG/DL (ref 40–60)
INR PPP: 3.3 (ref 0.9–1.1)
POC CREATININE URINE: 200
POC MICROALBUMIN URINE: 80
POTASSIUM BLD-SCNC: 4 MMOL/L (ref 3.5–5.5)
PROT SERPL-MCNC: 7.5 G/DL (ref 5.7–8.2)
SODIUM BLD-SCNC: 139 MMOL/L (ref 132–146)
TRIGL SERPL-MCNC: 202 MG/DL (ref 0–150)

## 2018-07-19 PROCEDURE — 80061 LIPID PANEL: CPT | Performed by: FAMILY MEDICINE

## 2018-07-19 PROCEDURE — 83036 HEMOGLOBIN GLYCOSYLATED A1C: CPT | Performed by: FAMILY MEDICINE

## 2018-07-19 PROCEDURE — G0447 BEHAVIOR COUNSEL OBESITY 15M: HCPCS | Performed by: FAMILY MEDICINE

## 2018-07-19 PROCEDURE — 82044 UR ALBUMIN SEMIQUANTITATIVE: CPT | Performed by: FAMILY MEDICINE

## 2018-07-19 PROCEDURE — 36415 COLL VENOUS BLD VENIPUNCTURE: CPT | Performed by: FAMILY MEDICINE

## 2018-07-19 PROCEDURE — 80053 COMPREHEN METABOLIC PANEL: CPT | Performed by: FAMILY MEDICINE

## 2018-07-19 PROCEDURE — 85610 PROTHROMBIN TIME: CPT | Performed by: FAMILY MEDICINE

## 2018-07-19 PROCEDURE — 99396 PREV VISIT EST AGE 40-64: CPT | Performed by: FAMILY MEDICINE

## 2018-07-19 PROCEDURE — G0439 PPPS, SUBSEQ VISIT: HCPCS | Performed by: FAMILY MEDICINE

## 2018-07-19 PROCEDURE — G0444 DEPRESSION SCREEN ANNUAL: HCPCS | Performed by: FAMILY MEDICINE

## 2018-07-19 RX ORDER — GABAPENTIN 600 MG/1
600 TABLET ORAL DAILY
Qty: 30 TABLET | Refills: 1 | Status: SHIPPED | OUTPATIENT
Start: 2018-07-19 | End: 2018-08-22 | Stop reason: SDUPTHER

## 2018-07-19 RX ORDER — ATORVASTATIN CALCIUM 80 MG/1
80 TABLET, FILM COATED ORAL DAILY
COMMUNITY
End: 2018-11-05

## 2018-07-19 RX ORDER — HYDROCODONE BITARTRATE AND ACETAMINOPHEN 7.5; 325 MG/1; MG/1
1 TABLET ORAL DAILY PRN
Qty: 20 TABLET | Refills: 0 | Status: SHIPPED | OUTPATIENT
Start: 2018-07-19 | End: 2019-06-05 | Stop reason: DRUGHIGH

## 2018-07-19 NOTE — PROGRESS NOTES
"QUICK REFERENCE INFORMATION:  The ABCs of the Annual Wellness Visit    Initial Medicare Wellness Visit    HEALTH RISK ASSESSMENT    1960    Recent Hospitalizations:  {Hospitalization history:2552171275::\"No hospitalization(s) within the last year.\"}.        Current Medical Providers:  Patient Care Team:  Mendoza Mercer MD as PCP - General (Pulmonary Disease)  Keri Little DO as PCP - Family Medicine  Quinn Palmer MD as Consulting Physician (Hematology and Oncology)        Smoking Status:  History   Smoking Status   • Former Smoker   • Packs/day: 0.50   • Years: 33.00   • Types: Electronic Cigarette, Cigarettes   • Quit date: 1/1/2012   Smokeless Tobacco   • Never Used     Comment: Quit in 2010       Alcohol Consumption:  History   Alcohol Use No       Depression Screen:   PHQ-2/PHQ-9 Depression Screening 3/8/2017   Little interest or pleasure in doing things 0   Feeling down, depressed, or hopeless 0   Trouble falling or staying asleep, or sleeping too much 1   Feeling tired or having little energy 0   Poor appetite or overeating 0   Feeling bad about yourself - or that you are a failure or have let yourself or your family down 0   Trouble concentrating on things, such as reading the newspaper or watching television 0   Moving or speaking so slowly that other people could have noticed. Or the opposite - being so fidgety or restless that you have been moving around a lot more than usual 0   Thoughts that you would be better off dead, or of hurting yourself in some way 0   Total Score 1   If you checked off any problems, how difficult have these problems made it for you to do your work, take care of things at home, or get along with other people? Not difficult at all       Health Habits and Functional and Cognitive Screening:  Functional & Cognitive Status 3/8/2017   Do you have difficulty preparing food and eating? No   Do you have difficulty bathing yourself, getting dressed or grooming yourself? No   Do " "you have difficulty using the toilet? No   Do you have difficulty moving around from place to place? No   In the past year have you fallen or experienced a near fall? No   Do you need help using the phone?  No   Are you deaf or do you have serious difficulty hearing?  No   Do you need help with transportation? No   Do you need help shopping? No   Do you need help preparing meals?  No   Do you need help with housework?  No   Do you need help with laundry? No   Do you need help taking your medications? No   Do you need help managing money? No   Do you have difficulty concentrating, remembering or making decisions? No           Does the patient have evidence of cognitive impairment? {Yes/No w/ pre-defaulted No:57476::\"No\"}    Asiprin use counseling: {Aspirin :06233}      Recent Lab Results:    Visual Acuity:  No exam data present    Age-appropriate Screening Schedule:  Refer to the list below for future screening recommendations based on patient's age, sex and/or medical conditions. Orders for these recommended tests are listed in the plan section. The patient has been provided with a written plan.    Health Maintenance   Topic Date Due   • ZOSTER VACCINE (1 of 2) 12/31/2011   • URINE MICROALBUMIN  03/08/2018   • DIABETIC EYE EXAM  05/26/2018   • INFLUENZA VACCINE  08/01/2018   • HEMOGLOBIN A1C  10/16/2018   • DIABETIC FOOT EXAM  04/16/2019   • MAMMOGRAM  07/06/2020   • TDAP/TD VACCINES (2 - Td) 09/01/2020   • COLONOSCOPY  01/01/2023   • PNEUMOCOCCAL VACCINE (19-64 MEDIUM RISK)  Addressed   • PAP SMEAR  Excluded        Subjective   History of Present Illness    Di Lopez is a 57 y.o. female who presents for an Annual Wellness Visit.    The following portions of the patient's history were reviewed and updated as appropriate: {history reviewed:20406::\"allergies\",\"current medications\",\"past family history\",\"past medical history\",\"past social history\",\"past surgical history\",\"problem list\"}.    Outpatient " Medications Prior to Visit   Medication Sig Dispense Refill   • albuterol (PROVENTIL HFA;VENTOLIN HFA) 108 (90 BASE) MCG/ACT inhaler Inhale 2 puffs Every 4 (Four) Hours As Needed for wheezing.     • Alcohol Swabs (ALCOHOL PREP) 70 % pads      • ASSURE COMFORT LANCETS 30G misc Use 2 times a day E11.9 100 each 5   • Blood Glucose Calibration (OT ULTRA/FASTTK CNTRL SOLN) solution      • Blood Glucose Monitoring Suppl (ONE TOUCH ULTRA 2) W/DEVICE kit      • carvedilol (COREG) 12.5 MG tablet      • Fluticasone Furoate-Vilanterol (BREO ELLIPTA) 100-25 MCG/INH aerosol powder  Inhale.     • folic acid (FOLVITE) 1 MG tablet TAKE ONE TABLET BY MOUTH DAILY 30 tablet 5   • furosemide (LASIX) 40 MG tablet Take 1 tablet by mouth daily.     • gabapentin (NEURONTIN) 600 MG tablet      • glimepiride (AMARYL) 4 MG tablet TAKE ONE TABLET BY MOUTH IN THE MORNING BEFORE BREAKFAST 90 tablet 0   • glimepiride (AMARYL) 4 MG tablet TAKE ONE TABLET BY MOUTH IN THE MORNING BEFORE BREAKFAST 90 tablet 0   • HYDROcodone-acetaminophen (NORCO) 7.5-325 MG per tablet Take 1 tablet by mouth 2 (Two) Times a Day As Needed for Moderate Pain . 30 tablet 0   • Insulin Glargine (LANTUS SOLOSTAR) 100 UNIT/ML injection pen Inject 50 Units under the skin Daily. 15 mL 5   • Insulin Pen Needle (RELION PEN NEEDLES) 32G X 4 MM misc TEST  each 0   • Lancet Devices (SIMPLE DIAGNOSTICS LANCING DEV) misc      • Omega-3 Fatty Acids (FISH OIL PO) Take  by mouth.     • ONE TOUCH ULTRA TEST test strip      • ONGLYZA 5 MG tablet TAKE 1 TABLET BY MOUTH ONCE DAILY 90 tablet 0   • RELION PEN NEEDLES 32G X 4 MM misc USE AS DIRECTED 100 each 3   • warfarin (COUMADIN) 5 MG tablet Take 5 mg by mouth Daily.     • FLUoxetine (PROZAC) 20 MG capsule Take 1 capsule by mouth Daily. 30 capsule 0   • traZODone (DESYREL) 50 MG tablet Take 1 tablet by mouth Every Night. 30 tablet 1     Facility-Administered Medications Prior to Visit   Medication Dose Route Frequency Provider Last  "Rate Last Dose   • cyanocobalamin injection 1,000 mcg  1,000 mcg Intramuscular Q28 Days Keri Little,    1,000 mcg at 05/10/18 1342       Patient Active Problem List   Diagnosis   • Arteriovenous malformation of gastrointestinal tract   • Depression   • Type 2 diabetes mellitus (CMS/HCC)   • Dyslipidemia   • Emphysema/COPD (CMS/[facility])   • Hypertension   • Insomnia   • Morbid obesity (CMS/[facility])   • Low back pain   • Obstructive sleep apnea syndrome   • Long term current use of anticoagulant   • Normocytic anemia   • Hand paresthesia   • Sliding hiatal hernia   • Dyspnea   • Vitamin B12 deficiency   • Rash and nonspecific skin eruption   • Cough   • Iron deficiency   • Strep pharyngitis   • Iron malabsorption       Advance Care Planning:  {Advance Directive Status:02491}    Identification of Risk Factors:  Risk factors include: {MC; WELLNESS RISK FACTORS:09984}.    Review of Systems    Compared to one year ago, the patient feels her physical health is {better worse same:60184}.  Compared to one year ago, the patient feels her mental health is {better worse same:25503}.    Objective     Physical Exam    Vitals:    07/19/18 1144   BP: 136/84   Pulse: 74   Resp: 16   SpO2: 98%   Weight: 91.8 kg (202 lb 6.4 oz)   Height: 152.4 cm (60\")   PainSc: 0-No pain       Patient's Body mass index is 39.53 kg/m². BMI is {BMI range:68418}.      Assessment/Plan   Patient Self-Management and Personalized Health Advice  The patient has been provided with information about: {; PERSONALIZED HEALTH ADVICE:99772} and preventive services including:   · {plan:41302}.    Visit Diagnoses:  No diagnosis found.    No orders of the defined types were placed in this encounter.      Outpatient Encounter Prescriptions as of 7/19/2018   Medication Sig Dispense Refill   • albuterol (PROVENTIL HFA;VENTOLIN HFA) 108 (90 BASE) MCG/ACT inhaler Inhale 2 puffs Every 4 (Four) Hours As Needed for wheezing.     • Alcohol Swabs (ALCOHOL PREP) 70 % pads      • " ASSURE COMFORT LANCETS 30G misc Use 2 times a day E11.9 100 each 5   • atorvastatin (LIPITOR) 80 MG tablet Take 80 mg by mouth Daily.     • Blood Glucose Calibration (OT ULTRA/FASTTK CNTRL SOLN) solution      • Blood Glucose Monitoring Suppl (ONE TOUCH ULTRA 2) W/DEVICE kit      • carvedilol (COREG) 12.5 MG tablet      • Fluticasone Furoate-Vilanterol (BREO ELLIPTA) 100-25 MCG/INH aerosol powder  Inhale.     • folic acid (FOLVITE) 1 MG tablet TAKE ONE TABLET BY MOUTH DAILY 30 tablet 5   • furosemide (LASIX) 40 MG tablet Take 1 tablet by mouth daily.     • gabapentin (NEURONTIN) 600 MG tablet      • glimepiride (AMARYL) 4 MG tablet TAKE ONE TABLET BY MOUTH IN THE MORNING BEFORE BREAKFAST 90 tablet 0   • glimepiride (AMARYL) 4 MG tablet TAKE ONE TABLET BY MOUTH IN THE MORNING BEFORE BREAKFAST 90 tablet 0   • HYDROcodone-acetaminophen (NORCO) 7.5-325 MG per tablet Take 1 tablet by mouth 2 (Two) Times a Day As Needed for Moderate Pain . 30 tablet 0   • Insulin Glargine (LANTUS SOLOSTAR) 100 UNIT/ML injection pen Inject 50 Units under the skin Daily. 15 mL 5   • Insulin Pen Needle (RELION PEN NEEDLES) 32G X 4 MM misc TEST  each 0   • Lancet Devices (SIMPLE DIAGNOSTICS LANCING DEV) misc      • Omega-3 Fatty Acids (FISH OIL PO) Take  by mouth.     • ONE TOUCH ULTRA TEST test strip      • ONGLYZA 5 MG tablet TAKE 1 TABLET BY MOUTH ONCE DAILY 90 tablet 0   • RELION PEN NEEDLES 32G X 4 MM misc USE AS DIRECTED 100 each 3   • warfarin (COUMADIN) 5 MG tablet Take 5 mg by mouth Daily.     • [DISCONTINUED] FLUoxetine (PROZAC) 20 MG capsule Take 1 capsule by mouth Daily. 30 capsule 0   • [DISCONTINUED] traZODone (DESYREL) 50 MG tablet Take 1 tablet by mouth Every Night. 30 tablet 1     Facility-Administered Encounter Medications as of 7/19/2018   Medication Dose Route Frequency Provider Last Rate Last Dose   • cyanocobalamin injection 1,000 mcg  1,000 mcg Intramuscular Q28 Days Keri Little DO   1,000 mcg at 05/10/18 9071        Reviewed use of high risk medication in the elderly: {Response; yes/no/na:63}  Reviewed for potential of harmful drug interactions in the elderly: {Response; yes/no/na:63}    Follow Up:  No Follow-up on file.     An After Visit Summary and PPPS with all of these plans were given to the patient.

## 2018-07-19 NOTE — PROGRESS NOTES
QUICK REFERENCE INFORMATION:  The ABCs of the Annual Wellness Visit    Subsequent Medicare Wellness Visit    HEALTH RISK ASSESSMENT    1960    Recent Hospitalizations:  Recently treated at the following:  Other: . 10/17 for liver disease        Current Medical Providers:  Patient Care Team:  Mendoza Mercer MD as PCP - General (Pulmonary Disease)  Keri Little DO as PCP - Family Medicine  Quinn Palmer MD as Consulting Physician (Hematology and Oncology)  Cardiology-[facility]- GI, transplant, Cardiology      Smoking Status:  History   Smoking Status   • Former Smoker   • Packs/day: 0.50   • Years: 33.00   • Types: Electronic Cigarette, Cigarettes   • Quit date: 1/1/2012   Smokeless Tobacco   • Never Used     Comment: Quit in 2010       Alcohol Consumption:  History   Alcohol Use No       Depression Screen:   PHQ-2/PHQ-9 Depression Screening 7/19/2018   Little interest or pleasure in doing things 1   Feeling down, depressed, or hopeless 1   Trouble falling or staying asleep, or sleeping too much 1   Feeling tired or having little energy 1   Poor appetite or overeating 0   Feeling bad about yourself - or that you are a failure or have let yourself or your family down 0   Trouble concentrating on things, such as reading the newspaper or watching television 0   Moving or speaking so slowly that other people could have noticed. Or the opposite - being so fidgety or restless that you have been moving around a lot more than usual 0   Thoughts that you would be better off dead, or of hurting yourself in some way 0   Total Score 4   If you checked off any problems, how difficult have these problems made it for you to do your work, take care of things at home, or get along with other people? -       Health Habits and Functional and Cognitive Screening:  Functional & Cognitive Status 7/19/2018   Do you have difficulty preparing food and eating? No   Do you have difficulty bathing yourself, getting dressed or  grooming yourself? No   Do you have difficulty using the toilet? No   Do you have difficulty moving around from place to place? No   Do you have trouble with steps or getting out of a bed or a chair? No   In the past year have you fallen or experienced a near fall? No   Current Diet Well Balanced Diet   Dental Exam Not up to date   Eye Exam Up to date   Exercise (times per week) 0 times per week   Current Exercise Activities Include None   Do you need help using the phone?  No   Are you deaf or do you have serious difficulty hearing?  No   Do you need help with transportation? No   Do you need help shopping? No   Do you need help preparing meals?  No   Do you need help with housework?  No   Do you need help with laundry? No   Do you need help taking your medications? No   Do you need help managing money? No   Do you ever drive or ride in a car without wearing a seat belt? No   Have you felt unusual stress, anger or loneliness in the last month? No   Who do you live with? Alone   If you need help, do you have trouble finding someone available to you? No   Have you been bothered in the last four weeks by sexual problems? No   Do you have difficulty concentrating, remembering or making decisions? No           Does the patient have evidence of cognitive impairment? No    Aspirin use counseling: Contraindicated from taking ASA      Recent Lab Results:  CMP:  Lab Results   Component Value Date    BUN 6 (L) 09/28/2017    CREATININE 0.80 09/28/2017    EGFRIFAFRI 90 09/28/2017    BCR 7.5 09/28/2017     09/28/2017    K 4.4 09/28/2017    CO2 29.0 09/28/2017    CALCIUM 8.8 09/28/2017    ALBUMIN 3.60 09/28/2017    BILITOT 0.2 (L) 09/28/2017    ALKPHOS 296 (H) 09/28/2017    AST 21 09/28/2017    ALT 28 09/28/2017     Lipid Panel:  Lab Results   Component Value Date    CHOL 189 03/08/2017    TRIG 226 (H) 03/08/2017    HDL 44 03/08/2017     HbA1c:  Lab Results   Component Value Date    HGBA1C 7.5 07/19/2018       Visual  Acuity:   Visual Acuity Screening    Right eye Left eye Both eyes   Without correction:      With correction: 20/20 20/20 20/20   Comments: Per ophth    Hearing Screening Comments: Normal hearing    Age-appropriate Screening Schedule:  Refer to the list below for future screening recommendations based on patient's age, sex and/or medical conditions. Orders for these recommended tests are listed in the plan section. The patient has been provided with a written plan.    Health Maintenance   Topic Date Due   • INFLUENZA VACCINE  08/01/2018   • HEMOGLOBIN A1C  10/16/2018   • DIABETIC EYE EXAM  11/01/2018   • DIABETIC FOOT EXAM  04/16/2019   • URINE MICROALBUMIN  07/19/2019   • MAMMOGRAM  07/06/2020   • TDAP/TD VACCINES (2 - Td) 09/01/2020   • COLONOSCOPY  01/01/2023   • PNEUMOCOCCAL VACCINE (19-64 MEDIUM RISK)  Addressed   • PAP SMEAR  Excluded   • ZOSTER VACCINE  Excluded        Subjective   History of Present Illness    Di Lopez is a 57 y.o. female who presents for an Subsequent Wellness Visit.  1. F/u DM; Continues on Glimeperide and Lantus.  2. Had a lump left shoulder unchanging and now hardened; Continues to follow with  for liver mass with possible upcoming excision/chemo/rad.  3. Needs referred to new pain dr. Needs rf of [place] to use prn for chronic back pain. Unable to take med daily due to liver disease.  The following portions of the patient's history were reviewed and updated as appropriate: allergies, current medications, past family history, past medical history, past social history, past surgical history and problem list.    Outpatient Medications Prior to Visit   Medication Sig Dispense Refill   • albuterol (PROVENTIL HFA;VENTOLIN HFA) 108 (90 BASE) MCG/ACT inhaler Inhale 2 puffs Every 4 (Four) Hours As Needed for wheezing.     • Alcohol Swabs (ALCOHOL PREP) 70 % pads      • ASSURE COMFORT LANCETS 30G misc Use 2 times a day E11.9 100 each 5   • Blood Glucose Calibration (OT ULTRA/FASTTK CNTRL  SOLN) solution      • Blood Glucose Monitoring Suppl (ONE TOUCH ULTRA 2) W/DEVICE kit      • carvedilol (COREG) 12.5 MG tablet      • Fluticasone Furoate-Vilanterol (BREO ELLIPTA) 100-25 MCG/INH aerosol powder  Inhale.     • folic acid (FOLVITE) 1 MG tablet TAKE ONE TABLET BY MOUTH DAILY 30 tablet 5   • furosemide (LASIX) 40 MG tablet Take 1 tablet by mouth daily.     • glimepiride (AMARYL) 4 MG tablet TAKE ONE TABLET BY MOUTH IN THE MORNING BEFORE BREAKFAST 90 tablet 0   • glimepiride (AMARYL) 4 MG tablet TAKE ONE TABLET BY MOUTH IN THE MORNING BEFORE BREAKFAST 90 tablet 0   • Insulin Glargine (LANTUS SOLOSTAR) 100 UNIT/ML injection pen Inject 50 Units under the skin Daily. 15 mL 5   • Insulin Pen Needle (RELION PEN NEEDLES) 32G X 4 MM misc TEST  each 0   • Lancet Devices (SIMPLE DIAGNOSTICS LANCING DEV) misc      • Omega-3 Fatty Acids (FISH OIL PO) Take  by mouth.     • ONE TOUCH ULTRA TEST test strip      • ONGLYZA 5 MG tablet TAKE 1 TABLET BY MOUTH ONCE DAILY 90 tablet 0   • RELION PEN NEEDLES 32G X 4 MM misc USE AS DIRECTED 100 each 3   • warfarin (COUMADIN) 5 MG tablet Take 5 mg by mouth Daily.     • gabapentin (NEURONTIN) 600 MG tablet      • HYDROcodone-acetaminophen (NORCO) 7.5-325 MG per tablet Take 1 tablet by mouth 2 (Two) Times a Day As Needed for Moderate Pain . 30 tablet 0   • FLUoxetine (PROZAC) 20 MG capsule Take 1 capsule by mouth Daily. 30 capsule 0   • traZODone (DESYREL) 50 MG tablet Take 1 tablet by mouth Every Night. 30 tablet 1     Facility-Administered Medications Prior to Visit   Medication Dose Route Frequency Provider Last Rate Last Dose   • cyanocobalamin injection 1,000 mcg  1,000 mcg Intramuscular Q28 Days Keri Little DO   1,000 mcg at 05/10/18 1342       Patient Active Problem List   Diagnosis   • Arteriovenous malformation of gastrointestinal tract   • Depression   • Type 2 diabetes mellitus (CMS/HCC)   • Dyslipidemia   • Emphysema/COPD (CMS/HCC)   • Hypertension   •  Insomnia   • Morbid obesity (CMS/HCC)   • Low back pain   • Obstructive sleep apnea syndrome   • Long term current use of anticoagulant   • Normocytic anemia   • Hand paresthesia   • Sliding hiatal hernia   • Dyspnea   • Vitamin B12 deficiency   • Rash and nonspecific skin eruption   • Cough   • Iron deficiency   • Strep pharyngitis   • Iron malabsorption       Advance Care Planning:  has NO advance directive - not interested in additional information    Identification of Risk Factors:  Risk factors include: weight , unhealthy diet, cardiovascular risk, inactivity, increased fall risk and polypharmacy.    Review of Systems   Constitutional: Negative.  Negative for activity change, fatigue, fever and unexpected weight change.   HENT: Negative.  Negative for congestion, sneezing and sore throat.    Eyes: Negative.  Negative for visual disturbance.   Respiratory: Negative.  Negative for cough, chest tightness, shortness of breath and wheezing.    Cardiovascular: Negative.  Negative for chest pain, palpitations and leg swelling.   Gastrointestinal: Negative.  Negative for abdominal distention, abdominal pain, blood in stool, constipation, diarrhea and nausea.   Endocrine: Negative.  Negative for cold intolerance and heat intolerance.   Genitourinary: Negative.  Negative for dysuria, frequency and urgency.   Musculoskeletal: Negative.  Negative for arthralgias and myalgias.   Skin: Negative.  Negative for rash.   Allergic/Immunologic: Negative.    Neurological: Negative.  Negative for dizziness, syncope and numbness.   Hematological: Negative.  Negative for adenopathy.   Psychiatric/Behavioral: Negative.  Negative for suicidal ideas. The patient is not nervous/anxious.        Compared to one year ago, the patient feels her physical health is worse.  Compared to one year ago, the patient feels her mental health is the same.    Objective     Physical Exam   Constitutional: She is oriented to person, place, and time. She  "appears well-developed and well-nourished. No distress.   HENT:   Right Ear: External ear normal.   Left Ear: External ear normal.   Nose: Nose normal.   Mouth/Throat: Oropharynx is clear and moist.   Eyes: Pupils are equal, round, and reactive to light. Conjunctivae and EOM are normal.   Neck: Normal range of motion. Neck supple. No thyromegaly present.   Cardiovascular: Normal rate, regular rhythm and normal heart sounds.    No murmur heard.  + mechanical heart valve click   Pulmonary/Chest: Effort normal and breath sounds normal. No respiratory distress. She has no wheezes.   Abdominal: Soft. Bowel sounds are normal. She exhibits no distension and no mass. There is no tenderness. There is no rebound and no guarding. No hernia.   Musculoskeletal: Normal range of motion. She exhibits no edema or tenderness.   Large soft area at deltoid of left shoulder. This does become quite hardened  whenshe extends shoulder   Lymphadenopathy:     She has no cervical adenopathy.   Neurological: She is alert and oriented to person, place, and time. She has normal reflexes.   Skin: Skin is warm and dry. No rash noted. She is not diaphoretic. No erythema. No pallor.   Psychiatric: She has a normal mood and affect. Her behavior is normal. Judgment and thought content normal.   Nursing note and vitals reviewed.      Vitals:    07/19/18 1144   BP: 136/84   Pulse: 74   Resp: 16   SpO2: 98%   Weight: 91.8 kg (202 lb 6.4 oz)   Height: 152.4 cm (60\")   PainSc: 0-No pain   PainLoc: Back       Patient's Body mass index is 39.53 kg/m². BMI is above normal parameters. Recommendations include: nutrition counseling.      Assessment/Plan   Patient Self-Management and Personalized Health Advice  The patient has been provided with information about: diet, exercise, weight management, prevention of cardiac or vascular disease and fall prevention and preventive services including:   · Counseling for cardiovascular disease risk reduction, Diabetes " screening, see lab orders, Exercise counseling provided, Fall Risk assessment done, Nutrition counseling provided.    Visit Diagnoses:    ICD-10-CM ICD-9-CM   1. Health care maintenance Z00.00 V70.0   2. Type 2 diabetes mellitus without complication, with long-term current use of insulin (CMS/[facility]) E11.9 250.00    Z79.4 V58.67   3. Current use of long term anticoagulation Z79.01 V58.61   4. Chronic low back pain with sciatica, sciatica laterality unspecified, unspecified back pain laterality M54.40 724.2    G89.29 724.3     338.29   5. Skin lump of arm, left R22.32 782.2   6. Encounter for therapeutic drug level monitoring  Z51.81 V58.83       Orders Placed This Encounter   Procedures   • MRI Shoulder Left Without Contrast     Standing Status:   Future     Standing Expiration Date:   7/19/2019     Order Specific Question:   Patient Pregnant     Answer:   No   • Comprehensive Metabolic Panel   • Lipid Panel   • Urine Drug Screen - Urine, Clean Catch   • Ambulatory Referral to Pain Management     Referral Priority:   Routine     Referral Type:   Pain Management     Referral Reason:   Specialty Services Required     Requested Specialty:   Pain Medicine     Number of Visits Requested:   1   • POC INR   • POC Glycosylated Hemoglobin (Hb A1C)   • POC Microalbumin       Outpatient Encounter Prescriptions as of 7/19/2018   Medication Sig Dispense Refill   • albuterol (PROVENTIL HFA;VENTOLIN HFA) 108 (90 BASE) MCG/ACT inhaler Inhale 2 puffs Every 4 (Four) Hours As Needed for wheezing.     • Alcohol Swabs (ALCOHOL PREP) 70 % pads      • ASSURE COMFORT LANCETS 30G misc Use 2 times a day E11.9 100 each 5   • atorvastatin (LIPITOR) 80 MG tablet Take 80 mg by mouth Daily.     • Blood Glucose Calibration (OT ULTRA/FASTTK CNTR SOLN) solution      • Blood Glucose Monitoring Suppl (ONE TOUCH ULTRA 2) W/DEVICE kit      • carvedilol (COREG) 12.5 MG tablet      • Fluticasone Furoate-Vilanterol (BREO ELLIPTA) 100-25 MCG/INH aerosol  powder  Inhale.     • folic acid (FOLVITE) 1 MG tablet TAKE ONE TABLET BY MOUTH DAILY 30 tablet 5   • furosemide (LASIX) 40 MG tablet Take 1 tablet by mouth daily.     • gabapentin (NEURONTIN) 600 MG tablet Take 1 tablet by mouth Daily. 30 tablet 1   • glimepiride (AMARYL) 4 MG tablet TAKE ONE TABLET BY MOUTH IN THE MORNING BEFORE BREAKFAST 90 tablet 0   • glimepiride (AMARYL) 4 MG tablet TAKE ONE TABLET BY MOUTH IN THE MORNING BEFORE BREAKFAST 90 tablet 0   • HYDROcodone-acetaminophen (NORCO) 7.5-325 MG per tablet Take 1 tablet by mouth Daily As Needed for Moderate Pain . 20 tablet 0   • Insulin Glargine (LANTUS SOLOSTAR) 100 UNIT/ML injection pen Inject 50 Units under the skin Daily. 15 mL 5   • Insulin Pen Needle (RELION PEN NEEDLES) 32G X 4 MM misc TEST  each 0   • Lancet Devices (SIMPLE DIAGNOSTICS LANCING DEV) misc      • Omega-3 Fatty Acids (FISH OIL PO) Take  by mouth.     • ONE TOUCH ULTRA TEST test strip      • ONGLYZA 5 MG tablet TAKE 1 TABLET BY MOUTH ONCE DAILY 90 tablet 0   • RELION PEN NEEDLES 32G X 4 MM misc USE AS DIRECTED 100 each 3   • warfarin (COUMADIN) 5 MG tablet Take 5 mg by mouth Daily.     • [DISCONTINUED] gabapentin (NEURONTIN) 600 MG tablet      • [DISCONTINUED] HYDROcodone-acetaminophen (NORCO) 7.5-325 MG per tablet Take 1 tablet by mouth 2 (Two) Times a Day As Needed for Moderate Pain . 30 tablet 0   • [DISCONTINUED] FLUoxetine (PROZAC) 20 MG capsule Take 1 capsule by mouth Daily. 30 capsule 0   • [DISCONTINUED] traZODone (DESYREL) 50 MG tablet Take 1 tablet by mouth Every Night. 30 tablet 1     Facility-Administered Encounter Medications as of 7/19/2018   Medication Dose Route Frequency Provider Last Rate Last Dose   • cyanocobalamin injection 1,000 mcg  1,000 mcg Intramuscular Q28 Days Keri Little DO   1,000 mcg at 05/10/18 1342       Reviewed use of high risk medication in the elderly: yes  Reviewed for potential of harmful drug interactions in the elderly: yes  Annual  depression screen complete. 15 minutes.  Intensive behavioral therapy for obesity, discussed with patient. 15 minutes. Discussed nutrition and healthy eating choices. Discussed and given handouts from physicians committee for responsible medicine.  Follow Up:  No Follow-up on file.     An After Visit Summary and PPPS with all of these plans were given to the patient.

## 2018-07-26 DIAGNOSIS — M25.812 MASS OF JOINT OF LEFT SHOULDER: Primary | ICD-10-CM

## 2018-08-20 DIAGNOSIS — G47.34 NOCTURNAL HYPOXEMIA: ICD-10-CM

## 2018-08-20 DIAGNOSIS — G47.33 OBSTRUCTIVE SLEEP APNEA, ADULT: ICD-10-CM

## 2018-08-22 ENCOUNTER — OFFICE VISIT (OUTPATIENT)
Dept: SLEEP MEDICINE | Facility: HOSPITAL | Age: 58
End: 2018-08-22

## 2018-08-22 VITALS
HEART RATE: 69 BPM | BODY MASS INDEX: 40.8 KG/M2 | OXYGEN SATURATION: 95 % | SYSTOLIC BLOOD PRESSURE: 125 MMHG | WEIGHT: 207.8 LBS | DIASTOLIC BLOOD PRESSURE: 51 MMHG | HEIGHT: 60 IN

## 2018-08-22 DIAGNOSIS — G47.33 OBSTRUCTIVE SLEEP APNEA SYNDROME: ICD-10-CM

## 2018-08-22 DIAGNOSIS — R06.02 SHORTNESS OF BREATH: ICD-10-CM

## 2018-08-22 DIAGNOSIS — E66.01 MORBID OBESITY (HCC): ICD-10-CM

## 2018-08-22 DIAGNOSIS — G47.61 PERIODIC LIMB MOVEMENT DISORDER: ICD-10-CM

## 2018-08-22 DIAGNOSIS — G47.33 OSA (OBSTRUCTIVE SLEEP APNEA): Primary | ICD-10-CM

## 2018-08-22 PROCEDURE — 99214 OFFICE O/P EST MOD 30 MIN: CPT | Performed by: INTERNAL MEDICINE

## 2018-08-22 RX ORDER — GABAPENTIN 600 MG/1
600 TABLET ORAL DAILY
Qty: 30 TABLET | Refills: 2 | Status: SHIPPED | OUTPATIENT
Start: 2018-08-22 | End: 2018-11-05

## 2018-08-22 NOTE — PATIENT INSTRUCTIONS
Shortness of Breath, Adult  Shortness of breath is when a person has trouble breathing enough air, or when a person feels like she or he is having trouble breathing in enough air. Shortness of breath could be a sign of medical problem.  Follow these instructions at home:  Pay attention to any changes in your symptoms. Take these actions to help with your condition:  · Do not smoke. Smoking is a common cause of shortness of breath. If you smoke and you need help quitting, ask your health care provider.  · Avoid things that can irritate your airways, such as:  ? Mold.  ? Dust.  ? Air pollution.  ? Chemical fumes.  ? Things that can cause allergy symptoms (allergens), if you have allergies.  · Keep your living space clean and free of mold and dust.  · Rest as needed. Slowly return to your usual activities.  · Take over-the-counter and prescription medicines, including oxygen and inhaled medicines, only as told by your health care provider.  · Keep all follow-up visits as told by your health care provider. This is important.    Contact a health care provider if:  · Your condition does not improve as soon as expected.  · You have a hard time doing your normal activities, even after you rest.  · You have new symptoms.  Get help right away if:  · Your shortness of breath gets worse.  · You have shortness of breath when you are resting.  · You feel light-headed or you faint.  · You have a cough that is not controlled with medicines.  · You cough up blood.  · You have pain with breathing.  · You have pain in your chest, arms, shoulders, or abdomen.  · You have a fever.  · You cannot walk up stairs or exercise the way that you normally do.  This information is not intended to replace advice given to you by your health care provider. Make sure you discuss any questions you have with your health care provider.  Document Released: 09/12/2002 Document Revised: 07/08/2017 Document Reviewed: 05/25/2017  [employer] Patient  Education © 2018 [employer]  Sleep Apnea  Sleep apnea is a condition in which breathing pauses or becomes shallow during sleep. Episodes of sleep apnea usually last 10 seconds or longer, and they may occur as many as 20 times an hour. Sleep apnea disrupts your sleep and keeps your body from getting the rest that it needs. This condition can increase your risk of certain health problems, including:  · Heart attack.  · Stroke.  · Obesity.  · Diabetes.  · Heart failure.  · Irregular heartbeat.    There are three kinds of sleep apnea:  · Obstructive sleep apnea. This kind is caused by a blocked or collapsed airway.  · Central sleep apnea. This kind happens when the part of the brain that controls breathing does not send the correct signals to the muscles that control breathing.  · Mixed sleep apnea. This is a combination of obstructive and central sleep apnea.    What are the causes?  The most common cause of this condition is a collapsed or blocked airway. An airway can collapse or become blocked if:  · Your throat muscles are abnormally relaxed.  · Your tongue and tonsils are larger than normal.  · You are overweight.  · Your airway is smaller than normal.    What increases the risk?  This condition is more likely to develop in people who:  · Are overweight.  · Smoke.  · Have a smaller than normal airway.  · Are elderly.  · Are male.  · Drink alcohol.  · Take sedatives or tranquilizers.  · Have a family history of sleep apnea.    What are the signs or symptoms?  Symptoms of this condition include:  · Trouble staying asleep.  · Daytime sleepiness and tiredness.  · Irritability.  · Loud snoring.  · Morning headaches.  · Trouble concentrating.  · Forgetfulness.  · Decreased interest in sex.  · Unexplained sleepiness.  · Mood swings.  · Personality changes.  · Feelings of depression.  · Waking up often during the night to urinate.  · Dry mouth.  · Sore throat.    How is this diagnosed?  This condition may be diagnosed  with:  · A medical history.  · A physical exam.  · A series of tests that are done while you are sleeping (sleep study). These tests are usually done in a sleep lab, but they may also be done at home.    How is this treated?  Treatment for this condition aims to restore normal breathing and to ease symptoms during sleep. It may involve managing health issues that can affect breathing, such as high blood pressure or obesity. Treatment may include:  · Sleeping on your side.  · Using a decongestant if you have nasal congestion.  · Avoiding the use of depressants, including alcohol, sedatives, and narcotics.  · Losing weight if you are overweight.  · Making changes to your diet.  · Quitting smoking.  · Using a device to open your airway while you sleep, such as:  ? An oral appliance. This is a custom-made mouthpiece that shifts your lower jaw forward.  ? A continuous positive airway pressure (CPAP) device. This device delivers oxygen to your airway through a mask.  ? A nasal expiratory positive airway pressure (EPAP) device. This device has valves that you put into each nostril.  ? A bi-level positive airway pressure (BPAP) device. This device delivers oxygen to your airway through a mask.  · Surgery if other treatments do not work. During surgery, excess tissue is removed to create a wider airway.    It is important to get treatment for sleep apnea. Without treatment, this condition can lead to:  · High blood pressure.  · Coronary artery disease.  · (Men) An inability to achieve or maintain an erection (impotence).  · Reduced thinking abilities.    Follow these instructions at home:  · Make any lifestyle changes that your health care provider recommends.  · Eat a healthy, well-balanced diet.  · Take over-the-counter and prescription medicines only as told by your health care provider.  · Avoid using depressants, including alcohol, sedatives, and narcotics.  · Take steps to lose weight if you are overweight.  · If you  were given a device to open your airway while you sleep, use it only as told by your health care provider.  · Do not use any tobacco products, such as cigarettes, chewing tobacco, and e-cigarettes. If you need help quitting, ask your health care provider.  · Keep all follow-up visits as told by your health care provider. This is important.  Contact a health care provider if:  · The device that you received to open your airway during sleep is uncomfortable or does not seem to be working.  · Your symptoms do not improve.  · Your symptoms get worse.  Get help right away if:  · You develop chest pain.  · You develop shortness of breath.  · You develop discomfort in your back, arms, or stomach.  · You have trouble speaking.  · You have weakness on one side of your body.  · You have drooping in your face.  These symptoms may represent a serious problem that is an emergency. Do not wait to see if the symptoms will go away. Get medical help right away. Call your local emergency services (911 in the [place] Do not drive yourself to the hospital.  This information is not intended to replace advice given to you by your health care provider. Make sure you discuss any questions you have with your health care provider.  Document Released: 12/08/2003 Document Revised: 08/13/2017 Document Reviewed: 09/26/2016  [employer] Interactive Patient Education © 2018 [employer]

## 2018-08-22 NOTE — PROGRESS NOTES
Subjective   Di Lopez is a 57 y.o. female is here today for follow-up.  She is seen follow-up of sleep apnea.  Her primary care physician is Dr. Little.    History of Present Illness  She was last seen here in November 17, 2017.  She had moderate obstructive sleep apnea with an AHI of 19.8.  She has psychophysiologic insomnia and also has diabetes, pulmonary hypertension, she has hepatic mass in dyspnea.  She has a history of obesity.  She says she's not sleeping very well and night but will fall asleep during the day.  She kicks her legs frequently night.  She also complains jerking of her legs syndrome keep her awake.  She has occasional pain in her feet.  She is been on gabapentin also had been receiving iron supplements for iron deficiency anemia.  She complains of dyspnea and activity and dyspnea when bending over.  She is been using her CPAP regularly.  She denies being noted she has snoring with the CPAP.  She is apparently have resection of the liver mass 30th this month.  She complains of occasional coughing at night she is not using her inhaler.  She is been on Breo in  the past but is not using currently.  She had a cauterization for nosebleeds previously  Past Medical History:   Diagnosis Date   • Arteriovenous malformation of gastrointestinal tract 5/12/2016   • Arthritis    • Black hairy tongue    • CHF (congestive heart failure) (CMS/HCC)    • Chronic back pain    • Depression 5/12/2016   • Diabetes mellitus (CMS/HCC)    • Dyslipidemia 5/12/2016   • Emphysema/COPD (CMS/HCC) 5/12/2016   • Fatigue    • Headache    • Herniated cervical disc    • History of echocardiogram 07/16/2013    Left ventricular systolic function at the lower limits of normal; EF 50-55%; mild MR; mechanical prosthetic mitral valve present; mod TR   • Hypertension    • Insomnia 5/12/2016   • Iron deficiency     A.  The patient was treated with IV iron replacement. B.  Secondary to GI bleed, status post EGD with cauterization  of blood vessel to the duodenum, 7/16/2014.   • Left shoulder strain    • Liver mass    • Long term current use of anticoagulant 5/12/2016   • Morbid obesity (CMS/HCC) 5/12/2016   • Obstructive sleep apnea syndrome 5/12/2016    Description: A.  Moderate; CPAP therapy..   • Pulmonary hypertension    • Sliding hiatal hernia 7/28/2016    Description: A.  Reported on EGD, 12/30/2011.   • Type 2 diabetes mellitus (CMS/HCC) 5/12/2016   • Vitamin B12 deficiency     A.  The patient was treated with vitamin B12 replacement.       Past Surgical History:   Procedure Laterality Date   • BILATERAL OOPHORECTOMY  2009    Status post bilateral oophorectomy secondary to ovarian cysts    • BREAST BIOPSY Right     STEREOTACTIC   • BREAST EXCISIONAL BIOPSY Bilateral    • COLONOSCOPY  12/28/2012   • CYSTECTOMY      removal of ganglion cyst from left wrist   • ENDOSCOPY  12/30/2011    DIAGNOSTIC ESOPHAGOGASTRODUODENOSCOPY   • HERNIA REPAIR     • HYSTERECTOMY  1995    A.  Status post hysterectomy for early stage endometrial cancer done in 1995.   • MITRAL VALVE REPLACEMENT  03/03/2008    Dr. Hima Barreto.    • OOPHORECTOMY             Current Outpatient Prescriptions:   •  albuterol (PROVENTIL HFA;VENTOLIN HFA) 108 (90 BASE) MCG/ACT inhaler, Inhale 2 puffs Every 4 (Four) Hours As Needed for wheezing., Disp: , Rfl:   •  Alcohol Swabs (ALCOHOL PREP) 70 % pads, , Disp: , Rfl:   •  ASSURE COMFORT LANCETS 30G misc, Use 2 times a day E11.9, Disp: 100 each, Rfl: 5  •  atorvastatin (LIPITOR) 80 MG tablet, Take 80 mg by mouth Daily., Disp: , Rfl:   •  Blood Glucose Calibration (OT ULTRA/FASTTK CNTRL SOLN) solution, , Disp: , Rfl:   •  Blood Glucose Monitoring Suppl (ONE TOUCH ULTRA 2) W/DEVICE kit, , Disp: , Rfl:   •  carvedilol (COREG) 12.5 MG tablet, , Disp: , Rfl:   •  Fluticasone Furoate-Vilanterol (BREO ELLIPTA) 100-25 MCG/INH aerosol powder , Inhale., Disp: , Rfl:   •  folic acid (FOLVITE) 1 MG tablet, TAKE ONE TABLET BY MOUTH DAILY, Disp:  30 tablet, Rfl: 5  •  furosemide (LASIX) 40 MG tablet, Take 1 tablet by mouth daily., Disp: , Rfl:   •  gabapentin (NEURONTIN) 600 MG tablet, Take 1 tablet by mouth Daily., Disp: 30 tablet, Rfl: 1  •  glimepiride (AMARYL) 4 MG tablet, TAKE ONE TABLET BY MOUTH IN THE MORNING BEFORE BREAKFAST, Disp: 90 tablet, Rfl: 0  •  glimepiride (AMARYL) 4 MG tablet, TAKE ONE TABLET BY MOUTH IN THE MORNING BEFORE BREAKFAST, Disp: 90 tablet, Rfl: 0  •  HYDROcodone-acetaminophen (NORCO) 7.5-325 MG per tablet, Take 1 tablet by mouth Daily As Needed for Moderate Pain ., Disp: 20 tablet, Rfl: 0  •  Insulin Glargine (LANTUS SOLOSTAR) 100 UNIT/ML injection pen, Inject 50 Units under the skin Daily., Disp: 15 mL, Rfl: 5  •  Insulin Pen Needle (RELION PEN NEEDLES) 32G X 4 MM misc, TEST BID, Disp: 100 each, Rfl: 0  •  Lancet Devices (SIMPLE DIAGNOSTICS LANCING DEV) misc, , Disp: , Rfl:   •  Omega-3 Fatty Acids (FISH OIL PO), Take  by mouth., Disp: , Rfl:   •  ONE TOUCH ULTRA TEST test strip, , Disp: , Rfl:   •  ONGLYZA 5 MG tablet, TAKE 1 TABLET BY MOUTH ONCE DAILY, Disp: 90 tablet, Rfl: 0  •  RELION PEN NEEDLES 32G X 4 MM misc, USE AS DIRECTED, Disp: 100 each, Rfl: 3  •  warfarin (COUMADIN) 5 MG tablet, Take 5 mg by mouth Daily., Disp: , Rfl:     Current Facility-Administered Medications:   •  cyanocobalamin injection 1,000 mcg, 1,000 mcg, Intramuscular, Q28 Days, Keri Little DO, 1,000 mcg at 05/10/18 1342    Allergies   Allergen Reactions   • Codeine Itching and Nausea And Vomiting   • Latex Other (See Comments)     Pulls skin off   • Penicillins Hives   • Sulfa Antibiotics Nausea And Vomiting       The following portions of the patient's history were reviewed and updated as appropriate: allergies, current medications and problem list.    Review of Systems   Constitutional: Positive for diaphoresis.   HENT: Positive for congestion, nosebleeds and sinus pressure.    Eyes: Positive for visual disturbance.   Respiratory: Positive for  "cough, shortness of breath and wheezing.    Cardiovascular: Positive for palpitations.   Gastrointestinal: Positive for abdominal pain and nausea.   Endocrine: Positive for polydipsia and polyuria.   Genitourinary: Positive for frequency.   Musculoskeletal: Positive for arthralgias and joint swelling.   Skin: Negative.    Allergic/Immunologic: Positive for environmental allergies.   Neurological: Positive for light-headedness and headaches.   Hematological: Negative.    Psychiatric/Behavioral: Positive for dysphoric mood. The patient is nervous/anxious.    [place] scores 11/24    Objective     /51   Pulse 69   Ht 152.4 cm (60\")   Wt 94.3 kg (207 lb 12.8 oz)   LMP 06/17/1998   SpO2 95%   BMI 40.58 kg/m²     Physical Exam   Constitutional: She is oriented to person, place, and time. She appears well-developed and well-nourished.   She is morbidly obese.   HENT:   Head: Normocephalic and atraumatic.   She has Geovanny Triica class for anatomy.   Eyes: Pupils are equal, round, and reactive to light. EOM are normal.   Neck: Normal range of motion. Neck supple.   Cardiovascular: Normal rate and regular rhythm.    She has valve clicks present   Pulmonary/Chest: Effort normal and breath sounds normal.   Abdominal: Soft. Bowel sounds are normal.   Musculoskeletal: Normal range of motion. She exhibits no edema.   Neurological: She is alert and oriented to person, place, and time.   Skin: Skin is warm and dry.   Psychiatric: She has a normal mood and affect. Her behavior is normal.    download from her machine shows for the past 6 months she's used it 99.4% the days.  Using 8 hours 5 minutes per day.  Her AHI is normal at 2.8.      Assessment/Plan   Di was seen today for follow-up.    Diagnoses and all orders for this visit:    CAL (obstructive sleep apnea)  -     CPAP Therapy    Obstructive sleep apnea syndrome    Morbid obesity (CMS/HCC)    Shortness of breath  -     Fluticasone Furoate-Vilanterol (BREO ELLIPTA) " 100-25 MCG/INH aerosol powder ; Inhale 1 puff Daily.  -     Cancel: Full Pulmonary Function Test With Bronchodilator; Future  -     Ambulatory Referral to Pulmonology    Periodic limb movement disorder  -     gabapentin (NEURONTIN) 600 MG tablet; Take 1 tablet by mouth Daily.    Patient has obstructive sleep apnea . she has good compliance and good control of her respiratory events on her current pressure setting.  We will continue on these pressures.  We will renew her supplies.  She also complains of kicking and jerking her legs at night and has been on gabapentin.  She states she needs a refill on that.  We will her that.  Her [facility] report is reviewed and is #[id number].  She complains of occasional wheezing.  We'll place her back on Breo and give her 2 refills on that.  We will also refer back to Ms. Reed in pulmonary clinic.  We will see her again in sleep clinic in 6 months.  She is to contact us earlier symptoms worsen.             Mendoza Mercer MD [facility]  Sleep Medicine  Pulmonary and Critical Care Medicine      08/22/18  12:01 PM

## 2018-09-19 ENCOUNTER — OFFICE VISIT (OUTPATIENT)
Dept: PULMONOLOGY | Facility: CLINIC | Age: 58
End: 2018-09-19

## 2018-09-19 VITALS
DIASTOLIC BLOOD PRESSURE: 82 MMHG | HEIGHT: 60 IN | BODY MASS INDEX: 40.64 KG/M2 | OXYGEN SATURATION: 96 % | TEMPERATURE: 97.9 F | HEART RATE: 76 BPM | SYSTOLIC BLOOD PRESSURE: 130 MMHG | WEIGHT: 207 LBS

## 2018-09-19 DIAGNOSIS — G47.33 OBSTRUCTIVE SLEEP APNEA SYNDROME: ICD-10-CM

## 2018-09-19 DIAGNOSIS — J43.9 PULMONARY EMPHYSEMA, UNSPECIFIED EMPHYSEMA TYPE (HCC): ICD-10-CM

## 2018-09-19 DIAGNOSIS — R05.9 COUGH: Primary | ICD-10-CM

## 2018-09-19 DIAGNOSIS — R06.02 SHORTNESS OF BREATH: ICD-10-CM

## 2018-09-19 DIAGNOSIS — E66.01 MORBID OBESITY (HCC): ICD-10-CM

## 2018-09-19 PROCEDURE — 94375 RESPIRATORY FLOW VOLUME LOOP: CPT | Performed by: NURSE PRACTITIONER

## 2018-09-19 PROCEDURE — 99214 OFFICE O/P EST MOD 30 MIN: CPT | Performed by: NURSE PRACTITIONER

## 2018-09-19 NOTE — PROGRESS NOTES
[facility] Follow up    CHIEF COMPLAINT    Dyspnea    HISTORY OF PRESENT ILLNESS    Di Lopez is a 57 y.o.female here today for worsening shortness of air.  She does complain of worsening shortness of air as well as generalized fatigue and weakness.  She's been having some hemoptysis and nosebleeds recently due to a vessel in her nose has been cauterized by ENT in the past.  Last time she had been was in July.  She's had several months of this chronic nosebleed.  She is on Coumadin as well.  She continues to follow-up with ENT.  After one of the episodes where she has a significant nosebleed she feels very weakened and fatigued.    She also follows up with cardiology for chronic heart failure, pulmonary hypertension.  They had her on sildenafil which she believes she still takes.  We also discharge her her on Entresto.  She follows up with Dr. Patel.  I do not have her last echocardiogram available.  She does complain of lightheadedness and frequent headaches.    She does wear BiPAP at night for obstructive sleep apnea.  She does have the bleed 2 L into her BiPAP due to chronic hypoxemia.  She also has exertional hypoxemia but has never received her portable concentrator from [facility].  She has documented exertional hypoxemia down to 85% requiring 2 L to keep her above 90.  She does not sleep well and only gets this a few hours of sleep at night.    She also has a liver mass that she's going to have removed the first of October.        Patient Active Problem List   Diagnosis   • Arteriovenous malformation of gastrointestinal tract   • Depression   • Type 2 diabetes mellitus (CMS/HCC)   • Dyslipidemia   • Emphysema/COPD (CMS/HCC)   • Hypertension   • Insomnia   • Morbid obesity (CMS/HCC)   • Low back pain   • Obstructive sleep apnea syndrome   • Long term current use of anticoagulant   • Normocytic anemia   • Hand paresthesia   • Sliding hiatal hernia   • Dyspnea   • Vitamin B12 deficiency   • Rash and  nonspecific skin eruption   • Cough   • Iron deficiency   • Strep pharyngitis   • Iron malabsorption       Allergies   Allergen Reactions   • Codeine Itching and Nausea And Vomiting   • Latex Other (See Comments)     Pulls skin off   • Penicillins Hives   • Sulfa Antibiotics Nausea And Vomiting       Current Outpatient Prescriptions:   •  albuterol (PROVENTIL HFA;VENTOLIN HFA) 108 (90 BASE) MCG/ACT inhaler, Inhale 2 puffs Every 4 (Four) Hours As Needed for wheezing., Disp: , Rfl:   •  Alcohol Swabs (ALCOHOL PREP) 70 % pads, , Disp: , Rfl:   •  ASSURE COMFORT LANCETS 30G misc, Use 2 times a day E11.9, Disp: 100 each, Rfl: 5  •  atorvastatin (LIPITOR) 80 MG tablet, Take 80 mg by mouth Daily., Disp: , Rfl:   •  Blood Glucose Calibration (OT ULTRA/FASTTK CNTRL SOLN) solution, , Disp: , Rfl:   •  Blood Glucose Monitoring Suppl (ONE TOUCH ULTRA 2) W/DEVICE kit, , Disp: , Rfl:   •  carvedilol (COREG) 12.5 MG tablet, , Disp: , Rfl:   •  Fluticasone Furoate-Vilanterol (BREO ELLIPTA) 100-25 MCG/INH aerosol powder , Inhale 1 puff Daily., Disp: 1 each, Rfl: 6  •  folic acid (FOLVITE) 1 MG tablet, TAKE ONE TABLET BY MOUTH DAILY, Disp: 30 tablet, Rfl: 5  •  furosemide (LASIX) 40 MG tablet, Take 1 tablet by mouth daily., Disp: , Rfl:   •  gabapentin (NEURONTIN) 600 MG tablet, Take 1 tablet by mouth Daily., Disp: 30 tablet, Rfl: 2  •  glimepiride (AMARYL) 4 MG tablet, TAKE ONE TABLET BY MOUTH IN THE MORNING BEFORE BREAKFAST, Disp: 90 tablet, Rfl: 0  •  glimepiride (AMARYL) 4 MG tablet, TAKE ONE TABLET BY MOUTH IN THE MORNING BEFORE BREAKFAST, Disp: 90 tablet, Rfl: 0  •  HYDROcodone-acetaminophen (NORCO) 7.5-325 MG per tablet, Take 1 tablet by mouth Daily As Needed for Moderate Pain ., Disp: 20 tablet, Rfl: 0  •  Insulin Glargine (LANTUS SOLOSTAR) 100 UNIT/ML injection pen, Inject 50 Units under the skin Daily., Disp: 15 mL, Rfl: 5  •  Insulin Pen Needle (RELION PEN NEEDLES) 32G X 4 MM misc, TEST BID, Disp: 100 each, Rfl: 0  •   Lancet Devices (SIMPLE DIAGNOSTICS LANCING DEV) misc, , Disp: , Rfl:   •  Omega-3 Fatty Acids (FISH OIL PO), Take  by mouth., Disp: , Rfl:   •  ONE TOUCH ULTRA TEST test strip, , Disp: , Rfl:   •  ONGLYZA 5 MG tablet, TAKE 1 TABLET BY MOUTH ONCE DAILY, Disp: 90 tablet, Rfl: 0  •  RELION PEN NEEDLES 32G X 4 MM misc, USE AS DIRECTED, Disp: 100 each, Rfl: 3  •  warfarin (COUMADIN) 5 MG tablet, Take 5 mg by mouth Daily., Disp: , Rfl:     Current Facility-Administered Medications:   •  cyanocobalamin injection 1,000 mcg, 1,000 mcg, Intramuscular, Q28 Days, Keri Little DO, 1,000 mcg at 05/10/18 1342  MEDICATION LIST AND ALLERGIES REVIEWED.    Social History   Substance Use Topics   • Smoking status: Former Smoker     Packs/day: 0.50     Years: 33.00     Types: Electronic Cigarette, Cigarettes     Quit date: 2010   • Smokeless tobacco: Never Used      Comment: Quit in 2010   • Alcohol use No       FAMILY AND SOCIAL HISTORY REVIEWED.    Review of Systems   Constitutional: Positive for fatigue. Negative for chills, fever and unexpected weight change.   HENT: Positive for nosebleeds. Negative for congestion, postnasal drip, rhinorrhea, sinus pressure and trouble swallowing.    Respiratory: Positive for cough and shortness of breath. Negative for chest tightness and wheezing.    Cardiovascular: Positive for palpitations and leg swelling. Negative for chest pain.   Gastrointestinal: Negative for abdominal pain, constipation, diarrhea, nausea and vomiting.   Genitourinary: Negative for dysuria, frequency, hematuria and urgency.   Musculoskeletal: Negative for myalgias.   Neurological: Positive for dizziness, light-headedness and headaches. Negative for weakness and numbness.   Psychiatric/Behavioral: Positive for sleep disturbance.   All other systems reviewed and are negative.  .    LMP 06/17/1998     Immunization History   Administered Date(s) Administered   • Flu Mist 10/22/2014   • PPD Test 06/20/2012   • Tdap 09/01/2010        Physical Exam   Constitutional: She is oriented to person, place, and time. She appears well-developed and well-nourished.   Obese   HENT:   Head: Normocephalic and atraumatic.   Eyes: Pupils are equal, round, and reactive to light. EOM are normal.   Neck: Normal range of motion. Neck supple.   Cardiovascular: Normal rate and regular rhythm.    No murmur heard.  Pulmonary/Chest: Effort normal and breath sounds normal. No respiratory distress. She has no wheezes. She has no rales.   Decreased but no wheezing or rales   Abdominal: Soft. Bowel sounds are normal. She exhibits no distension.   Musculoskeletal: Normal range of motion. She exhibits edema.   Neurological: She is alert and oriented to person, place, and time.   Skin: Skin is warm and dry. No erythema.   Psychiatric: She has a normal mood and affect. Her behavior is normal.   Vitals reviewed.        RESULTS    PFTS in the office today, read by me:  Moderate restrictive disease with a normal ratio of forced vital capacity of 1.59, 69%    PROBLEM LIST    Problem List Items Addressed This Visit        Respiratory    Emphysema/COPD (CMS/HCC)    Obstructive sleep apnea syndrome    Overview     Description: A.  Moderate; CPAP therapy..         Dyspnea    Cough - Primary    Relevant Orders    Spirometry Without Bronchodilator (Completed)       Digestive    Morbid obesity (CMS/HCC)            DISCUSSION    We went over the need for oxygen with her multiple comorbidities.  With her heart failure, pulmonary hypertension, and severe objective sleep apnea with obesity having relation syndrome she does need oxygen.  She has documented nocturnal as well as exertional hypoxemia.  She is never able to get her portable concentrator through [facility], we'll plan to reestablish with another DME and sleeping get her her equipment.  We went over the importance of using her oxygen with activity.    Continue follow-up with Dr. Patel her cardiologist for pulmonary hypertension  and heart failure management.    Continue on her BiPAP at night.    Follow-up in 3 months.    I spent 25 minutes with the patient. I spent > 50% percent of this time counseling and discussing diagnostic testing, evaluation, current status, treatment options and management.    Abbey Reed, APRN  09/19/201811:39 AM  Electronically signed     Please note that portions of this note were completed with a voice recognition program. Efforts were made to edit the dictations, but occasionally words are mistranscribed.      CC: Mendoza Mercer MD

## 2018-10-01 RX ORDER — SAXAGLIPTIN 5 MG/1
TABLET, FILM COATED ORAL
Qty: 90 TABLET | Refills: 0 | Status: SHIPPED | OUTPATIENT
Start: 2018-10-01 | End: 2019-01-14 | Stop reason: SDUPTHER

## 2018-10-01 RX ORDER — GLIMEPIRIDE 4 MG/1
TABLET ORAL
Qty: 90 TABLET | Refills: 0 | Status: SHIPPED | OUTPATIENT
Start: 2018-10-01 | End: 2019-01-14 | Stop reason: SDUPTHER

## 2018-10-31 RX ORDER — PEN NEEDLE, DIABETIC 32GX 5/32"
NEEDLE, DISPOSABLE MISCELLANEOUS
Qty: 100 EACH | Refills: 0 | Status: SHIPPED | OUTPATIENT
Start: 2018-10-31 | End: 2018-12-19 | Stop reason: SDUPTHER

## 2018-11-05 ENCOUNTER — OFFICE VISIT (OUTPATIENT)
Dept: FAMILY MEDICINE CLINIC | Facility: CLINIC | Age: 58
End: 2018-11-05

## 2018-11-05 VITALS
SYSTOLIC BLOOD PRESSURE: 134 MMHG | DIASTOLIC BLOOD PRESSURE: 82 MMHG | HEART RATE: 70 BPM | BODY MASS INDEX: 39.66 KG/M2 | HEIGHT: 60 IN | RESPIRATION RATE: 16 BRPM | WEIGHT: 202 LBS | OXYGEN SATURATION: 97 %

## 2018-11-05 DIAGNOSIS — E55.9 VITAMIN D DEFICIENCY: ICD-10-CM

## 2018-11-05 DIAGNOSIS — R26.89 DECREASED MOBILITY: ICD-10-CM

## 2018-11-05 DIAGNOSIS — R53.83 OTHER FATIGUE: ICD-10-CM

## 2018-11-05 DIAGNOSIS — E11.9 TYPE 2 DIABETES MELLITUS WITHOUT COMPLICATION, WITHOUT LONG-TERM CURRENT USE OF INSULIN (HCC): Primary | ICD-10-CM

## 2018-11-05 DIAGNOSIS — R39.9 UTI SYMPTOMS: ICD-10-CM

## 2018-11-05 DIAGNOSIS — D50.8 OTHER IRON DEFICIENCY ANEMIA: ICD-10-CM

## 2018-11-05 LAB
25(OH)D3 SERPL-MCNC: 7.7 NG/ML
ALBUMIN SERPL-MCNC: 4.17 G/DL (ref 3.2–4.8)
ALBUMIN/GLOB SERPL: 1.3 G/DL (ref 1.5–2.5)
ALP SERPL-CCNC: 153 U/L (ref 25–100)
ALT SERPL W P-5'-P-CCNC: 16 U/L (ref 7–40)
ANION GAP SERPL CALCULATED.3IONS-SCNC: 9 MMOL/L (ref 3–11)
AST SERPL-CCNC: 18 U/L (ref 0–33)
BASOPHILS # BLD AUTO: 0.02 10*3/MM3 (ref 0–0.2)
BASOPHILS NFR BLD AUTO: 0.3 % (ref 0–1)
BILIRUB BLD-MCNC: NEGATIVE MG/DL
BILIRUB SERPL-MCNC: 0.4 MG/DL (ref 0.3–1.2)
BUN BLD-MCNC: 11 MG/DL (ref 9–23)
BUN/CREAT SERPL: 14.9 (ref 7–25)
CALCIUM SPEC-SCNC: 9.2 MG/DL (ref 8.7–10.4)
CHLORIDE SERPL-SCNC: 102 MMOL/L (ref 99–109)
CLARITY, POC: CLEAR
CO2 SERPL-SCNC: 27 MMOL/L (ref 20–31)
COLOR UR: YELLOW
CREAT BLD-MCNC: 0.74 MG/DL (ref 0.6–1.3)
DEPRECATED RDW RBC AUTO: 46.5 FL (ref 37–54)
EOSINOPHIL # BLD AUTO: 0.16 10*3/MM3 (ref 0–0.3)
EOSINOPHIL NFR BLD AUTO: 2.4 % (ref 0–3)
ERYTHROCYTE [DISTWIDTH] IN BLOOD BY AUTOMATED COUNT: 16.1 % (ref 11.3–14.5)
GFR SERPL CREATININE-BSD FRML MDRD: 98 ML/MIN/1.73
GLOBULIN UR ELPH-MCNC: 3.1 GM/DL
GLUCOSE BLD-MCNC: 108 MG/DL (ref 70–100)
GLUCOSE UR STRIP-MCNC: NEGATIVE MG/DL
HBA1C MFR BLD: 7.6 %
HCT VFR BLD AUTO: 36.7 % (ref 34.5–44)
HGB BLD-MCNC: 11.3 G/DL (ref 11.5–15.5)
IMM GRANULOCYTES # BLD: 0.03 10*3/MM3 (ref 0–0.03)
IMM GRANULOCYTES NFR BLD: 0.5 % (ref 0–0.6)
IRON 24H UR-MRATE: 38 MCG/DL (ref 50–175)
KETONES UR QL: NEGATIVE
LEUKOCYTE EST, POC: NEGATIVE
LYMPHOCYTES # BLD AUTO: 1.64 10*3/MM3 (ref 0.6–4.8)
LYMPHOCYTES NFR BLD AUTO: 24.9 % (ref 24–44)
MCH RBC QN AUTO: 24.4 PG (ref 27–31)
MCHC RBC AUTO-ENTMCNC: 30.8 G/DL (ref 32–36)
MCV RBC AUTO: 79.1 FL (ref 80–99)
MONOCYTES # BLD AUTO: 0.61 10*3/MM3 (ref 0–1)
MONOCYTES NFR BLD AUTO: 9.3 % (ref 0–12)
NEUTROPHILS # BLD AUTO: 4.15 10*3/MM3 (ref 1.5–8.3)
NEUTROPHILS NFR BLD AUTO: 63.1 % (ref 41–71)
NITRITE UR-MCNC: NEGATIVE MG/ML
PH UR: 6 [PH] (ref 5–8)
PLATELET # BLD AUTO: 369 10*3/MM3 (ref 150–450)
PMV BLD AUTO: 10.7 FL (ref 6–12)
POTASSIUM BLD-SCNC: 4.2 MMOL/L (ref 3.5–5.5)
PROT SERPL-MCNC: 7.3 G/DL (ref 5.7–8.2)
PROT UR STRIP-MCNC: ABNORMAL MG/DL
RBC # BLD AUTO: 4.64 10*6/MM3 (ref 3.89–5.14)
RBC # UR STRIP: NEGATIVE /UL
SODIUM BLD-SCNC: 138 MMOL/L (ref 132–146)
SP GR UR: 1.02 (ref 1–1.03)
UROBILINOGEN UR QL: NORMAL
VIT B12 BLD-MCNC: 348 PG/ML (ref 211–911)
WBC NRBC COR # BLD: 6.58 10*3/MM3 (ref 3.5–10.8)

## 2018-11-05 PROCEDURE — 85025 COMPLETE CBC W/AUTO DIFF WBC: CPT | Performed by: FAMILY MEDICINE

## 2018-11-05 PROCEDURE — 36415 COLL VENOUS BLD VENIPUNCTURE: CPT | Performed by: FAMILY MEDICINE

## 2018-11-05 PROCEDURE — 87186 SC STD MICRODIL/AGAR DIL: CPT | Performed by: FAMILY MEDICINE

## 2018-11-05 PROCEDURE — 80053 COMPREHEN METABOLIC PANEL: CPT | Performed by: FAMILY MEDICINE

## 2018-11-05 PROCEDURE — 87077 CULTURE AEROBIC IDENTIFY: CPT | Performed by: FAMILY MEDICINE

## 2018-11-05 PROCEDURE — 83540 ASSAY OF IRON: CPT | Performed by: FAMILY MEDICINE

## 2018-11-05 PROCEDURE — 87086 URINE CULTURE/COLONY COUNT: CPT | Performed by: FAMILY MEDICINE

## 2018-11-05 PROCEDURE — 82306 VITAMIN D 25 HYDROXY: CPT | Performed by: FAMILY MEDICINE

## 2018-11-05 PROCEDURE — 81002 URINALYSIS NONAUTO W/O SCOPE: CPT | Performed by: FAMILY MEDICINE

## 2018-11-05 PROCEDURE — 83036 HEMOGLOBIN GLYCOSYLATED A1C: CPT | Performed by: FAMILY MEDICINE

## 2018-11-05 PROCEDURE — 82607 VITAMIN B-12: CPT | Performed by: FAMILY MEDICINE

## 2018-11-05 PROCEDURE — 99214 OFFICE O/P EST MOD 30 MIN: CPT | Performed by: FAMILY MEDICINE

## 2018-11-05 NOTE — PROGRESS NOTES
"Subjective   Di Lopez is a 57 y.o. female.   Has recently had surgery at  on the liver and had \"blood vessels to tumor clipped\"/embolization.  Pt c/o low energy.  Has had decreased mobility and is wanting an electric chair.  C/o increased odor in urination  Diabetes   She presents for her follow-up (taking lantus, onglyza, glimeperide) diabetic visit. She has type 2 diabetes mellitus. Her disease course has been stable. There are no hypoglycemic associated symptoms. Pertinent negatives for hypoglycemia include no dizziness, headaches or nervousness/anxiousness. Associated symptoms include fatigue. Pertinent negatives for diabetes include no blurred vision, no chest pain, no polydipsia, no polyphagia, no polyuria, no weakness and no weight loss. There are no hypoglycemic complications. Symptoms are stable. There are no diabetic complications. Risk factors for coronary artery disease include diabetes mellitus, dyslipidemia, family history, obesity, hypertension, sedentary lifestyle and post-menopausal. Current diabetic treatment includes insulin injections and oral agent (dual therapy) (Lantus 50 units qd). She is compliant with treatment all of the time. Her weight is stable. She is following a generally healthy diet. She has not had a previous visit with a dietitian. She rarely participates in exercise. There is no change in her home blood glucose trend. An ACE inhibitor/angiotensin II receptor blocker is not being taken. She does not see a podiatrist.Eye exam is not current.   Fatigue   This is a chronic problem. The current episode started more than 1 year ago. The problem occurs constantly. The problem has been unchanged. Associated symptoms include fatigue. Pertinent negatives include no abdominal pain, chest pain, coughing, fever, headaches, nausea, neck pain, rash or weakness. Exacerbated by: iron def. She has tried nothing for the symptoms. The treatment provided no relief.   Hypertension   This is " a chronic (follows with Cardiology) problem. The current episode started more than 1 year ago. The problem is unchanged. The problem is uncontrolled. Associated symptoms include malaise/fatigue and shortness of breath. Pertinent negatives include no blurred vision, chest pain, headaches, neck pain, palpitations, peripheral edema or PND. Risk factors for coronary artery disease include diabetes mellitus, dyslipidemia, family history, obesity, post-menopausal state, sedentary lifestyle and stress. Past treatments include beta blockers and diuretics. Current antihypertension treatment includes beta blockers and diuretics. The current treatment provides moderate improvement. There are no compliance problems.         The following portions of the patient's history were reviewed and updated as appropriate: allergies, current medications, past family history, past medical history, past social history, past surgical history and problem list.    Review of Systems   Constitutional: Positive for fatigue and malaise/fatigue. Negative for activity change, fever, unexpected weight gain and unexpected weight loss.   HENT: Negative.    Eyes: Negative.  Negative for blurred vision, double vision and visual disturbance.   Respiratory: Positive for shortness of breath. Negative for cough, chest tightness and wheezing.    Cardiovascular: Negative.  Negative for chest pain, palpitations and PND.   Gastrointestinal: Negative.  Negative for abdominal distention, abdominal pain, blood in stool, constipation, diarrhea and nausea.   Endocrine: Negative.  Negative for cold intolerance, heat intolerance, polydipsia, polyphagia and polyuria.   Genitourinary: Negative.  Negative for urinary incontinence, dysuria, frequency and urgency.   Musculoskeletal: Negative.  Negative for neck pain.   Skin: Negative.  Negative for rash.   Allergic/Immunologic: Negative.    Neurological: Negative.  Negative for dizziness and weakness.   Hematological:  Negative.  Negative for adenopathy.   Psychiatric/Behavioral: Negative.  Negative for suicidal ideas and depressed mood. The patient is not nervous/anxious.    All other systems reviewed and are negative.      Objective   Physical Exam   Constitutional: She is oriented to person, place, and time. She appears well-developed and well-nourished.   HENT:   Head: Normocephalic.   Right Ear: External ear normal.   Left Ear: External ear normal.   Nose: Nose normal.   Mouth/Throat: Oropharynx is clear and moist. No oropharyngeal exudate.   Eyes: Conjunctivae and EOM are normal. Pupils are equal, round, and reactive to light.   Neck: Normal range of motion. Neck supple. No thyromegaly present.   Cardiovascular: Normal rate, regular rhythm and intact distal pulses.   Murmur heard.  Pulmonary/Chest: Effort normal and breath sounds normal. No respiratory distress. She exhibits no tenderness.   Abdominal: Soft. Bowel sounds are normal. She exhibits no distension and no mass. There is no tenderness. There is no rebound and no guarding.   Musculoskeletal: Normal range of motion.   Decreased strength, mobility in arms and legs   Lymphadenopathy:     She has no cervical adenopathy.   Neurological: She is alert and oriented to person, place, and time. She has normal reflexes. She displays normal reflexes. She exhibits normal muscle tone. Coordination normal.   Skin: Skin is warm and dry. No rash noted. She is not diaphoretic. No erythema.   Psychiatric: She has a normal mood and affect. Her behavior is normal. Judgment and thought content normal.   Nursing note and vitals reviewed.        Assessment/Plan   Di was seen today for diabetes and fatigue.    Diagnoses and all orders for this visit:    Type 2 diabetes mellitus without complication, without long-term current use of insulin (CMS/[facility])  -     POC Glycosylated Hemoglobin (Hb A1C)  -     Ambulatory Referral to Ophthalmology    UTI symptoms  -     POC Urinalysis Dipstick  -      Urine Culture - Urine, Urine, Clean Catch    Other fatigue  -     CBC & Differential  -     Comprehensive Metabolic Panel  -     Iron  -     Vitamin D 25 Hydroxy  -     Vitamin B12  -     CBC Auto Differential    Other iron deficiency anemia  -     CBC & Differential  -     Comprehensive Metabolic Panel  -     Iron  -     Vitamin D 25 Hydroxy  -     Vitamin B12  -     CBC Auto Differential    Vitamin D deficiency      Patient will follow-up with cardiology and hematology. She will also continue to follow-up with  liver team.

## 2018-11-07 LAB — BACTERIA SPEC AEROBE CULT: ABNORMAL

## 2018-11-07 RX ORDER — NITROFURANTOIN 25; 75 MG/1; MG/1
100 CAPSULE ORAL EVERY 12 HOURS SCHEDULED
Qty: 14 CAPSULE | Refills: 0 | Status: SHIPPED | OUTPATIENT
Start: 2018-11-07 | End: 2019-06-05

## 2018-11-20 ENCOUNTER — APPOINTMENT (OUTPATIENT)
Dept: LAB | Facility: HOSPITAL | Age: 58
End: 2018-11-20

## 2018-11-20 ENCOUNTER — TRANSCRIBE ORDERS (OUTPATIENT)
Dept: LAB | Facility: HOSPITAL | Age: 58
End: 2018-11-20

## 2018-11-20 DIAGNOSIS — Z79.01 ENCOUNTER FOR MONITORING COUMADIN THERAPY: ICD-10-CM

## 2018-11-20 DIAGNOSIS — Z95.2 HEART VALVE REPLACED: Primary | ICD-10-CM

## 2018-11-20 DIAGNOSIS — Z51.81 ENCOUNTER FOR MONITORING COUMADIN THERAPY: ICD-10-CM

## 2018-11-20 LAB
INR PPP: 4.2 (ref 0.91–1.09)
PROTHROMBIN TIME: 44.1 SECONDS (ref 9.6–11.5)

## 2018-11-20 PROCEDURE — 85610 PROTHROMBIN TIME: CPT | Performed by: INTERNAL MEDICINE

## 2018-11-20 PROCEDURE — 36415 COLL VENOUS BLD VENIPUNCTURE: CPT | Performed by: INTERNAL MEDICINE

## 2018-11-26 ENCOUNTER — TELEPHONE (OUTPATIENT)
Dept: FAMILY MEDICINE CLINIC | Facility: CLINIC | Age: 58
End: 2018-11-26

## 2018-11-26 RX ORDER — CIPROFLOXACIN 250 MG/1
250 TABLET, FILM COATED ORAL DAILY
Qty: 7 TABLET | Refills: 0 | Status: SHIPPED | OUTPATIENT
Start: 2018-11-26 | End: 2019-06-05

## 2018-11-26 NOTE — TELEPHONE ENCOUNTER
----- Message from Keri Little DO sent at 11/26/2018 12:14 PM EST -----  Regarding: FW: BLADDER INFECTION  Contact: [phone]  Change to Cipro 250 qd for 7 days no rf. Repeat urine culture in 10 days  ----- Message -----  From: Justa Burk  Sent: 11/26/2018  12:01 PM  To: Keri Little DO  Subject: BLADDER INFECTION                                PT STATES HER BLADDER INFECTION IS NOT GETTING ANY BETTER.   THANK YOU.

## 2018-12-05 ENCOUNTER — TELEPHONE (OUTPATIENT)
Dept: FAMILY MEDICINE CLINIC | Facility: CLINIC | Age: 58
End: 2018-12-05

## 2018-12-05 NOTE — TELEPHONE ENCOUNTER
----- Message from Keri Little DO sent at 12/4/2018  9:34 AM EST -----  Regarding: FW: HOME HEALTH CARE REQUEST  Contact: [phone]  Yes, if they haven't taken care of it  ----- Message -----  From: Marina Machado MA  Sent: 12/4/2018   9:19 AM  To: Keri Little DO  Subject: FW: HOME HEALTH CARE REQUEST                     Do you want me to tell her to just call back after sh ehas the surgery >   ----- Message -----  From: Keri Little DO  Sent: 12/4/2018   9:09 AM  To: Marina Machado MA  Subject: FW: HOME HEALTH CARE REQUEST                     I can sign orders though this usually gets set up from surgery.  ----- Message -----  From: Marina Machado MA  Sent: 12/3/2018   3:55 PM  To: Keri Little DO  Subject: FW: HOME HEALTH CARE REQUEST                         ----- Message -----  From: Marcela Boyd, Lillian Rep  Sent: 12/3/2018   3:18 PM  To: Marina Machado MA  Subject: HOME HEALTH CARE REQUEST                         PT CALLED TO SEE IF SHE CAN GET GET HOME HEALTH CARE AFTER HER LIVER SURGERY IN January? SHE LIVES AT HOME ALONE AND HAS HAD PREVIOUS DIFFICULTY WITH OTHER PROCEDURES. SHE WAS TOLD THAT IT HAS TO COME FROM HER PCP.

## 2018-12-19 RX ORDER — PEN NEEDLE, DIABETIC 32GX 5/32"
NEEDLE, DISPOSABLE MISCELLANEOUS
Qty: 100 EACH | Refills: 0 | Status: SHIPPED | OUTPATIENT
Start: 2018-12-19 | End: 2019-03-29 | Stop reason: SDUPTHER

## 2019-01-14 RX ORDER — GLIMEPIRIDE 4 MG/1
TABLET ORAL
Qty: 90 TABLET | Refills: 0 | Status: SHIPPED | OUTPATIENT
Start: 2019-01-14 | End: 2019-04-15 | Stop reason: SDUPTHER

## 2019-01-14 RX ORDER — SAXAGLIPTIN 5 MG/1
TABLET, FILM COATED ORAL
Qty: 90 TABLET | Refills: 0 | Status: SHIPPED | OUTPATIENT
Start: 2019-01-14 | End: 2020-01-14

## 2019-01-30 ENCOUNTER — OFFICE VISIT (OUTPATIENT)
Dept: FAMILY MEDICINE CLINIC | Facility: CLINIC | Age: 59
End: 2019-01-30

## 2019-01-30 VITALS
HEIGHT: 60 IN | OXYGEN SATURATION: 96 % | SYSTOLIC BLOOD PRESSURE: 132 MMHG | BODY MASS INDEX: 40.84 KG/M2 | DIASTOLIC BLOOD PRESSURE: 78 MMHG | HEART RATE: 76 BPM | RESPIRATION RATE: 16 BRPM | WEIGHT: 208 LBS

## 2019-01-30 DIAGNOSIS — E11.9 TYPE 2 DIABETES MELLITUS WITHOUT COMPLICATION, WITHOUT LONG-TERM CURRENT USE OF INSULIN (HCC): ICD-10-CM

## 2019-01-30 DIAGNOSIS — I10 ESSENTIAL HYPERTENSION: ICD-10-CM

## 2019-01-30 DIAGNOSIS — R19.7 DIARRHEA, UNSPECIFIED TYPE: ICD-10-CM

## 2019-01-30 DIAGNOSIS — N39.0 URINARY TRACT INFECTION WITHOUT HEMATURIA, SITE UNSPECIFIED: ICD-10-CM

## 2019-01-30 DIAGNOSIS — R30.0 DYSURIA: Primary | ICD-10-CM

## 2019-01-30 DIAGNOSIS — R05.9 COUGH: ICD-10-CM

## 2019-01-30 LAB
BILIRUB BLD-MCNC: ABNORMAL MG/DL
CLARITY, POC: CLEAR
COLOR UR: YELLOW
GLUCOSE UR STRIP-MCNC: NEGATIVE MG/DL
HBA1C MFR BLD: 7.5 %
KETONES UR QL: NEGATIVE
LEUKOCYTE EST, POC: NEGATIVE
NITRITE UR-MCNC: NEGATIVE MG/ML
PH UR: 5 [PH] (ref 5–8)
PROT UR STRIP-MCNC: ABNORMAL MG/DL
RBC # UR STRIP: ABNORMAL /UL
SP GR UR: 1.03 (ref 1–1.03)
UROBILINOGEN UR QL: NORMAL

## 2019-01-30 PROCEDURE — 87077 CULTURE AEROBIC IDENTIFY: CPT | Performed by: FAMILY MEDICINE

## 2019-01-30 PROCEDURE — 83036 HEMOGLOBIN GLYCOSYLATED A1C: CPT | Performed by: FAMILY MEDICINE

## 2019-01-30 PROCEDURE — 87086 URINE CULTURE/COLONY COUNT: CPT | Performed by: FAMILY MEDICINE

## 2019-01-30 PROCEDURE — 99214 OFFICE O/P EST MOD 30 MIN: CPT | Performed by: FAMILY MEDICINE

## 2019-01-30 PROCEDURE — 81003 URINALYSIS AUTO W/O SCOPE: CPT | Performed by: FAMILY MEDICINE

## 2019-01-30 PROCEDURE — 87186 SC STD MICRODIL/AGAR DIL: CPT | Performed by: FAMILY MEDICINE

## 2019-01-30 RX ORDER — METRONIDAZOLE 500 MG/1
500 TABLET ORAL 2 TIMES DAILY
Qty: 20 TABLET | Refills: 0 | Status: SHIPPED | OUTPATIENT
Start: 2019-01-30 | End: 2019-06-05

## 2019-01-30 RX ORDER — FLUCONAZOLE 150 MG/1
150 TABLET ORAL ONCE
Qty: 1 TABLET | Refills: 1 | Status: SHIPPED | OUTPATIENT
Start: 2019-01-30 | End: 2019-01-30

## 2019-01-30 RX ORDER — CIPROFLOXACIN 250 MG/1
250 TABLET, FILM COATED ORAL 2 TIMES DAILY
Qty: 10 TABLET | Refills: 0 | Status: SHIPPED | OUTPATIENT
Start: 2019-01-30 | End: 2019-06-05

## 2019-01-30 NOTE — PROGRESS NOTES
Subjective   Di Lopez is a 58 y.o. female.     Diarrhea    This is a new problem. The current episode started 1 to 4 weeks ago. The problem occurs 2 to 4 times per day. The problem has been unchanged. The patient states that diarrhea does not awaken her from sleep. Pertinent negatives include no abdominal pain, arthralgias, coughing, fever, myalgias or weight loss. Nothing aggravates the symptoms. There are no known risk factors. She has tried nothing for the symptoms. The treatment provided no relief.   Cough   This is a new problem. The current episode started 1 to 4 weeks ago. The problem has been unchanged. The problem occurs every few minutes. The cough is productive of sputum. Associated symptoms include shortness of breath. Pertinent negatives include no chest pain, ear pain, fever, myalgias, nasal congestion, postnasal drip, rash, rhinorrhea, sore throat, weight loss or wheezing. Treatments tried: mucinex and Amox. The treatment provided mild relief. Her past medical history is significant for asthma.   Difficulty Urinating   This is a recurrent problem. The current episode started more than 1 month ago. The problem occurs intermittently. The problem has been unchanged. Pertinent negatives include no abdominal pain, arthralgias, chest pain, congestion, coughing, fatigue, fever, myalgias, nausea, numbness, rash or sore throat. Nothing aggravates the symptoms. Treatments tried: Was treated with Cipro 2 months ago for UTI. The treatment provided no relief.   Diabetes   She presents for her follow-up (taking lantus, onglyza, glimeperide) diabetic visit. She has type 2 diabetes mellitus. Her disease course has been stable. There are no hypoglycemic associated symptoms. Pertinent negatives for hypoglycemia include no dizziness or nervousness/anxiousness. Pertinent negatives for diabetes include no blurred vision, no chest pain, no fatigue, no polydipsia, no polyphagia, no polyuria and no weight loss. There  are no hypoglycemic complications. Symptoms are stable. There are no diabetic complications. Risk factors for coronary artery disease include diabetes mellitus, dyslipidemia, family history, obesity, hypertension, sedentary lifestyle and post-menopausal. Current diabetic treatment includes insulin injections and oral agent (dual therapy) (Lantus 50 units qd). She is compliant with treatment all of the time. Her weight is stable. She is following a generally healthy diet. She has not had a previous visit with a dietitian. She rarely participates in exercise. There is no change in her home blood glucose trend. An ACE inhibitor/angiotensin II receptor blocker is not being taken. She does not see a podiatrist.Eye exam is not current.   Hypertension   This is a chronic (follows with Cardiology) problem. The current episode started more than 1 year ago. The problem is unchanged. The problem is uncontrolled. Associated symptoms include malaise/fatigue and shortness of breath. Pertinent negatives include no blurred vision, chest pain, palpitations, peripheral edema or PND. Risk factors for coronary artery disease include diabetes mellitus, dyslipidemia, family history, obesity, post-menopausal state, sedentary lifestyle and stress. Past treatments include beta blockers and diuretics. Current antihypertension treatment includes beta blockers and diuretics. The current treatment provides moderate improvement. There are no compliance problems.    Continues following with GI/surgery at  for liver lesion. Will have possible upcoming liver surgery/biopsy.  Continues following with pain clinic and will have an upcoming injection in back.  Continues following with Cardiology and stable.       The following portions of the patient's history were reviewed and updated as appropriate: allergies, current medications, past family history, past medical history, past social history, past surgical history and problem list.    Review of  Systems   Constitutional: Positive for malaise/fatigue. Negative for activity change, fatigue, fever, unexpected weight gain and unexpected weight loss.   HENT: Negative.  Negative for congestion, ear pain, postnasal drip, rhinorrhea, sneezing and sore throat.    Eyes: Negative.  Negative for blurred vision, double vision and visual disturbance.   Respiratory: Positive for shortness of breath. Negative for cough, chest tightness and wheezing.    Cardiovascular: Negative.  Negative for chest pain, palpitations, leg swelling and PND.   Gastrointestinal: Positive for diarrhea. Negative for abdominal distention, abdominal pain, blood in stool, constipation and nausea.   Endocrine: Negative.  Negative for cold intolerance, heat intolerance, polydipsia, polyphagia and polyuria.   Genitourinary: Positive for difficulty urinating. Negative for urinary incontinence, dysuria, frequency and urgency.   Musculoskeletal: Negative.  Negative for arthralgias and myalgias.   Skin: Negative.  Negative for rash.   Allergic/Immunologic: Negative.    Neurological: Negative.  Negative for dizziness, syncope, numbness and memory problem.   Hematological: Negative.  Negative for adenopathy.   Psychiatric/Behavioral: Negative.  Negative for suicidal ideas and depressed mood. The patient is not nervous/anxious.    All other systems reviewed and are negative.      Objective   Physical Exam   Constitutional: She is oriented to person, place, and time. She appears well-developed and well-nourished.   HENT:   Head: Normocephalic.   Right Ear: External ear normal.   Left Ear: External ear normal.   Nose: Nose normal.   Mouth/Throat: Oropharynx is clear and moist. No oropharyngeal exudate.   Eyes: Conjunctivae and EOM are normal. Pupils are equal, round, and reactive to light.   Neck: Normal range of motion. Neck supple. No thyromegaly present.   Cardiovascular: Normal rate, regular rhythm, normal heart sounds and intact distal pulses.   No murmur  heard.  Pulmonary/Chest: Effort normal and breath sounds normal. No respiratory distress. She exhibits no tenderness.   Abdominal: Soft. Bowel sounds are normal. She exhibits no distension and no mass. There is no tenderness. There is no rebound and no guarding.   Musculoskeletal: Normal range of motion.   Lymphadenopathy:     She has no cervical adenopathy.   Neurological: She is alert and oriented to person, place, and time. She has normal reflexes. She displays normal reflexes. She exhibits normal muscle tone. Coordination normal.   Skin: Skin is warm and dry. No rash noted. She is not diaphoretic. No erythema.   Psychiatric: She has a normal mood and affect. Her behavior is normal. Judgment and thought content normal.   Nursing note and vitals reviewed.        Assessment/Plan   Di was seen today for diarrhea, cough and difficulty urinating.    Diagnoses and all orders for this visit:    Dysuria  -     POC Urinalysis Dipstick, Automated    Urinary tract infection without hematuria, site unspecified  -     Urine Culture - Urine, Urine, Clean Catch    Cough    Diarrhea, unspecified type  -     Clostridium Difficile Toxin - Stool, Per Rectum    Type 2 diabetes mellitus without complication, without long-term current use of insulin (CMS/[facility])  -     POC Glycosylated Hemoglobin (Hb A1C)    Essential hypertension    Other orders  -     ciprofloxacin (CIPRO) 250 MG tablet; Take 1 tablet by mouth 2 (Two) Times a Day.  -     fluconazole (DIFLUCAN) 150 MG tablet; Take 1 tablet by mouth 1 (One) Time for 1 dose.  -     metroNIDAZOLE (FLAGYL) 500 MG tablet; Take 1 tablet by mouth 2 (Two) Times a Day.

## 2019-02-01 LAB
BACTERIA SPEC AEROBE CULT: ABNORMAL
C DIFF TOX GENS STL QL NAA+PROBE: NOT DETECTED

## 2019-02-01 PROCEDURE — 87493 C DIFF AMPLIFIED PROBE: CPT | Performed by: FAMILY MEDICINE

## 2019-02-25 RX ORDER — ESCITALOPRAM OXALATE 20 MG/1
TABLET ORAL
Qty: 90 TABLET | Refills: 0 | Status: SHIPPED | OUTPATIENT
Start: 2019-02-25 | End: 2019-05-26 | Stop reason: SDUPTHER

## 2019-02-26 ENCOUNTER — TELEPHONE (OUTPATIENT)
Dept: FAMILY MEDICINE CLINIC | Facility: CLINIC | Age: 59
End: 2019-02-26

## 2019-02-26 NOTE — TELEPHONE ENCOUNTER
----- Message from Keri Little DO sent at 2/25/2019  2:18 PM EST -----  Regarding: FW: LAB RESULTS  Contact: [phone]  What is this for? For foster care? If so then we should be able to do a drug test here.  ----- Message -----  From: Nina Jolley MA  Sent: 2/25/2019   1:00 PM  To: Keri Little DO  Subject: FW: LAB RESULTS                                      ----- Message -----  From: Lula Ross RegSched Rep  Sent: 2/25/2019  12:58 PM  To: Nina Jolley MA  Subject: LAB RESULTS                                      PLEASE CALL PT IF YOU CAN ORDER A LAB TEST TO CONFIRM THAT SHE DOES NOT DO DRUGS. PLEASE CALL AND ADVISE    Advised pt that she nneds an appt.  Jaylene, MARKUS

## 2019-02-26 NOTE — TELEPHONE ENCOUNTER
----- Message from Keri Little DO sent at 2/25/2019  2:18 PM EST -----  Regarding: FW: LAB RESULTS  Contact: [phone]  What is this for? For foster care? If so then we should be able to do a drug test here.  ----- Message -----  From: Nina Jolley MA  Sent: 2/25/2019   1:00 PM  To: Keri Little DO  Subject: FW: LAB RESULTS                                      ----- Message -----  From: Lula Ross RegSched Rep  Sent: 2/25/2019  12:58 PM  To: Nina Jolley MA  Subject: LAB RESULTS                                      PLEASE CALL PT IF YOU CAN ORDER A LAB TEST TO CONFIRM THAT SHE DOES NOT DO DRUGS. PLEASE CALL AND ADVISE

## 2019-03-04 RX ORDER — SAXAGLIPTIN 5 MG/1
TABLET, FILM COATED ORAL
Qty: 90 TABLET | Refills: 0 | Status: SHIPPED | OUTPATIENT
Start: 2019-03-04 | End: 2019-05-06 | Stop reason: SDUPTHER

## 2019-03-29 RX ORDER — PEN NEEDLE, DIABETIC 32GX 5/32"
NEEDLE, DISPOSABLE MISCELLANEOUS
Qty: 60 EACH | Refills: 2 | Status: SHIPPED | OUTPATIENT
Start: 2019-03-29 | End: 2019-09-09 | Stop reason: SDUPTHER

## 2019-04-17 RX ORDER — GLIMEPIRIDE 4 MG/1
TABLET ORAL
Qty: 90 TABLET | Refills: 0 | Status: SHIPPED | OUTPATIENT
Start: 2019-04-17 | End: 2019-06-05 | Stop reason: SDUPTHER

## 2019-05-17 RX ORDER — SAXAGLIPTIN 5 MG/1
TABLET, FILM COATED ORAL
Qty: 90 TABLET | Refills: 0 | Status: SHIPPED | OUTPATIENT
Start: 2019-05-17 | End: 2019-09-17

## 2019-05-17 NOTE — TELEPHONE ENCOUNTER
This is written by her primary care provider.  Mendoza Mercer MD [facility]  Sleep Medicine  Pulmonary and Critical Care Medicine

## 2019-05-24 ENCOUNTER — TRANSCRIBE ORDERS (OUTPATIENT)
Dept: ADMINISTRATIVE | Facility: HOSPITAL | Age: 59
End: 2019-05-24

## 2019-05-24 DIAGNOSIS — Z12.31 VISIT FOR SCREENING MAMMOGRAM: Primary | ICD-10-CM

## 2019-05-26 RX ORDER — ESCITALOPRAM OXALATE 20 MG/1
TABLET ORAL
Qty: 90 TABLET | Refills: 0 | Status: SHIPPED | OUTPATIENT
Start: 2019-05-26 | End: 2019-08-29 | Stop reason: SDUPTHER

## 2019-06-05 ENCOUNTER — OFFICE VISIT (OUTPATIENT)
Dept: FAMILY MEDICINE CLINIC | Facility: CLINIC | Age: 59
End: 2019-06-05

## 2019-06-05 VITALS
RESPIRATION RATE: 24 BRPM | SYSTOLIC BLOOD PRESSURE: 124 MMHG | OXYGEN SATURATION: 92 % | BODY MASS INDEX: 41.13 KG/M2 | HEART RATE: 90 BPM | DIASTOLIC BLOOD PRESSURE: 68 MMHG | HEIGHT: 60 IN | WEIGHT: 209.5 LBS | TEMPERATURE: 97.1 F

## 2019-06-05 DIAGNOSIS — J01.80 OTHER ACUTE SINUSITIS, RECURRENCE NOT SPECIFIED: Primary | ICD-10-CM

## 2019-06-05 DIAGNOSIS — J40 BRONCHITIS: ICD-10-CM

## 2019-06-05 PROCEDURE — 99213 OFFICE O/P EST LOW 20 MIN: CPT | Performed by: NURSE PRACTITIONER

## 2019-06-05 RX ORDER — DEXTROMETHORPHAN HYDROBROMIDE AND PROMETHAZINE HYDROCHLORIDE 15; 6.25 MG/5ML; MG/5ML
5 SYRUP ORAL 4 TIMES DAILY PRN
Qty: 240 ML | Refills: 0 | Status: SHIPPED | OUTPATIENT
Start: 2019-06-05 | End: 2020-02-28

## 2019-06-05 RX ORDER — DOXYCYCLINE HYCLATE 100 MG/1
100 CAPSULE ORAL 2 TIMES DAILY
Qty: 20 CAPSULE | Refills: 0 | Status: SHIPPED | OUTPATIENT
Start: 2019-06-05 | End: 2019-07-31

## 2019-06-05 RX ORDER — ALBUTEROL SULFATE 90 UG/1
2 AEROSOL, METERED RESPIRATORY (INHALATION) EVERY 4 HOURS PRN
Qty: 1 INHALER | Refills: 2 | Status: SHIPPED | OUTPATIENT
Start: 2019-06-05 | End: 2020-02-28

## 2019-06-05 RX ORDER — HYDROCODONE BITARTRATE AND ACETAMINOPHEN 5; 325 MG/1; MG/1
1 TABLET ORAL 2 TIMES DAILY
Refills: 0 | COMMUNITY
Start: 2019-05-06 | End: 2020-02-28 | Stop reason: DRUGHIGH

## 2019-06-05 NOTE — PROGRESS NOTES
Subjective   Di Lopez is a 58 y.o. female.     History of Present Illness 4 day history of nasal congestion, nasal discharge, sinus pressure, cough productive of yellow sputum. Has some sob and wheezing. Treated with OTC sinus and allergy meds. Low grade temp. Not using inhaler.  Denies chest pain. Not sleeping well at night.  On coumadin for mitral valve replacement.    Outpatient Encounter Medications as of 6/5/2019   Medication Sig Dispense Refill   • albuterol (PROVENTIL HFA;VENTOLIN HFA) 108 (90 BASE) MCG/ACT inhaler Inhale 2 puffs Every 4 (Four) Hours As Needed for wheezing.     • Alcohol Swabs (ALCOHOL PREP) 70 % pads      • ASSURE COMFORT LANCETS 30G misc Use 2 times a day E11.9 100 each 5   • Blood Glucose Calibration (OT ULTRA/FASTTK CNTRL SOLN) solution      • Blood Glucose Monitoring Suppl (ONE TOUCH ULTRA 2) W/DEVICE kit      • carvedilol (COREG) 12.5 MG tablet 12.5 mg 2 (Two) Times a Day With Meals.     • escitalopram (LEXAPRO) 20 MG tablet TAKE 1 TABLET BY MOUTH ONCE DAILY 90 tablet 0   • Fluticasone Furoate-Vilanterol (BREO ELLIPTA) 100-25 MCG/INH aerosol powder  Inhale 1 puff Daily. 1 each 6   • furosemide (LASIX) 40 MG tablet Take 1 tablet by mouth daily.     • glimepiride (AMARYL) 4 MG tablet TAKE ONE TABLET BY MOUTH IN THE MORNING BEFORE BREAKFAST 90 tablet 0   • HYDROcodone-acetaminophen (NORCO) 5-325 MG per tablet Take 1 tablet by mouth 2 (Two) Times a Day.  0   • Insulin Glargine (LANTUS SOLOSTAR) 100 UNIT/ML injection pen Inject 50 Units under the skin into the appropriate area as directed Daily. 15 mL 3   • Insulin Pen Needle (RELION PEN NEEDLES) 32G X 4 MM misc TEST  each 0   • Lancet Devices (SIMPLE DIAGNOSTICS LANCING DEV) misc      • Omega-3 Fatty Acids (FISH OIL PO) Take  by mouth.     • ONE TOUCH ULTRA TEST test strip      • ONGLYZA 5 MG tablet TAKE 1 TABLET BY MOUTH ONCE DAILY 90 tablet 0   • ONGLYZA 5 MG tablet TAKE 1 TABLET BY MOUTH ONCE DAILY 90 tablet 0   • RELION  PEN NEEDLES 32G X 4 MM misc USE AS DIRECTED 60 each 2   • sacubitril-valsartan (ENTRESTO) 24-26 MG tablet Take 1 tablet by mouth 2 (Two) Times a Day.     • warfarin (COUMADIN) 5 MG tablet Take 5 mg by mouth Daily.     • [DISCONTINUED] ciprofloxacin (CIPRO) 250 MG tablet Take 1 tablet by mouth Daily. 7 tablet 0   • [DISCONTINUED] ciprofloxacin (CIPRO) 250 MG tablet Take 1 tablet by mouth 2 (Two) Times a Day. 10 tablet 0   • [DISCONTINUED] folic acid (FOLVITE) 1 MG tablet TAKE ONE TABLET BY MOUTH DAILY 30 tablet 5   • [DISCONTINUED] glimepiride (AMARYL) 4 MG tablet TAKE 1 TABLET BY MOUTH ONCE DAILY IN THE MORNING BEFORE BREAKFAST 90 tablet 0   • [DISCONTINUED] HYDROcodone-acetaminophen (NORCO) 7.5-325 MG per tablet Take 1 tablet by mouth Daily As Needed for Moderate Pain . 20 tablet 0   • [DISCONTINUED] metroNIDAZOLE (FLAGYL) 500 MG tablet Take 1 tablet by mouth 2 (Two) Times a Day. 20 tablet 0   • [DISCONTINUED] nitrofurantoin, macrocrystal-monohydrate, (MACROBID) 100 MG capsule Take 1 capsule by mouth Every 12 (Twelve) Hours. 14 capsule 0     Facility-Administered Encounter Medications as of 6/5/2019   Medication Dose Route Frequency Provider Last Rate Last Dose   • [DISCONTINUED] cyanocobalamin injection 1,000 mcg  1,000 mcg Intramuscular Q28 Days Keri Little DO   1,000 mcg at 05/10/18 1342       The following portions of the patient's history were reviewed and updated as appropriate: allergies, current medications, past family history, past medical history, past social history, past surgical history and problem list.    Review of Systems   Constitutional: Negative for appetite change, fever, unexpected weight gain and unexpected weight loss.   HENT: Positive for congestion, postnasal drip, rhinorrhea, sinus pressure and sore throat. Negative for nosebleeds and trouble swallowing.    Eyes: Negative for visual disturbance.   Respiratory: Positive for cough, shortness of breath and wheezing.    Cardiovascular:  "Negative for chest pain, palpitations and leg swelling.   Gastrointestinal: Negative for abdominal pain, blood in stool, constipation, diarrhea, nausea and vomiting.   Endocrine: Negative for polydipsia, polyphagia and polyuria.   Genitourinary: Negative for dysuria, frequency and hematuria.   Musculoskeletal: Negative for arthralgias, joint swelling and myalgias.   Skin: Negative for rash.   Neurological: Negative for dizziness, seizures, syncope and numbness.   Hematological: Negative for adenopathy. Does not bruise/bleed easily.   Psychiatric/Behavioral: Negative for behavioral problems, sleep disturbance and depressed mood. The patient is not nervous/anxious.        Objective     Visit Vitals  /68 (BP Location: Right arm, Patient Position: Sitting)   Pulse 90   Temp 97.1 °F (36.2 °C) (Temporal)   Resp 24   Ht 152.4 cm (60\")   Wt 95 kg (209 lb 8 oz)   LMP 06/17/1998   SpO2 92%   BMI 40.92 kg/m²       Physical Exam   Constitutional: She is oriented to person, place, and time. She appears well-developed and well-nourished. No distress.   HENT:   Head: Normocephalic and atraumatic.   Right Ear: Tympanic membrane and external ear normal.   Left Ear: Tympanic membrane and external ear normal.   Nose: Nose normal.   Mouth/Throat: Oropharynx is clear and moist. No oropharyngeal exudate.   Eyes: Conjunctivae are normal. Pupils are equal, round, and reactive to light. Right eye exhibits no discharge. Left eye exhibits no discharge. No scleral icterus.   Neck: Neck supple. No tracheal deviation present. No thyromegaly present.   Cardiovascular: Normal rate, regular rhythm and normal heart sounds. Exam reveals no gallop and no friction rub.   No murmur heard.  Pulmonary/Chest: Effort normal. No respiratory distress. She has no wheezes.   Decreased bs bilat but still with acceptable air movement.   Abdominal: Soft. Bowel sounds are normal. She exhibits no distension and no mass. There is no tenderness. "   Musculoskeletal: She exhibits no edema or deformity.   Lymphadenopathy:     She has no cervical adenopathy.   Neurological: She is alert and oriented to person, place, and time. Coordination normal.   Skin: Skin is warm and dry. Capillary refill takes less than 2 seconds. No rash noted. No erythema.   Psychiatric: She has a normal mood and affect. Her speech is normal and behavior is normal. Judgment and thought content normal.   Nursing note and vitals reviewed.        Assessment/Plan   Di was seen today for uri.    Diagnoses and all orders for this visit:    Other acute sinusitis, recurrence not specified  -     doxycycline (VIBRAMYCIN) 100 MG capsule; Take 1 capsule by mouth 2 (Two) Times a Day.  -     promethazine-dextromethorphan (PROMETHAZINE-DM) 6.25-15 MG/5ML syrup; Take 5 mL by mouth 4 (Four) Times a Day As Needed for Cough.    Bronchitis  -     doxycycline (VIBRAMYCIN) 100 MG capsule; Take 1 capsule by mouth 2 (Two) Times a Day.  -     promethazine-dextromethorphan (PROMETHAZINE-DM) 6.25-15 MG/5ML syrup; Take 5 mL by mouth 4 (Four) Times a Day As Needed for Cough.  -     albuterol sulfate  (90 Base) MCG/ACT inhaler; Inhale 2 puffs Every 4 (Four) Hours As Needed for Wheezing.      Increase fluids. Use inhaler 2 puffs qid. Instructions on proper use of inhaler provided. Follow up symptoms persist or worsen.  Discussed the nature of the disease including, risks, complications, implications, management, safe and proper use of medications. Encouraged therapeutic lifestyle changes including low calorie diet with plenty of fruits and vegetables, daily exercise, medication compliance, and keeping scheduled follow up appointments with me and any other providers. Encouraged patient to have appointment for complete physical, fasting labs, appropriate screenings, and immunizations on an annual basis.  Follow up symptoms persist or worsen or go to ER.  Check PT/INR next week.

## 2019-07-12 RX ORDER — GLIMEPIRIDE 4 MG/1
TABLET ORAL
Qty: 90 TABLET | Refills: 0 | OUTPATIENT
Start: 2019-07-12

## 2019-07-22 ENCOUNTER — TELEPHONE (OUTPATIENT)
Dept: FAMILY MEDICINE CLINIC | Facility: CLINIC | Age: 59
End: 2019-07-22

## 2019-07-22 NOTE — TELEPHONE ENCOUNTER
LVM for pt and advised form has been filled out and faxed for diabetic shoes.  ----- Message from Keri Little DO sent at 7/22/2019  3:26 PM EDT -----  Regarding: FW: PAPERWORK  Contact: [phone]  complete  ----- Message -----  From: Temitope Treadwell MA  Sent: 7/22/2019   1:50 PM  To: Keri Little DO  Subject: FW: PAPERWORK                                        ----- Message -----  From: Marina Mancia  Sent: 7/22/2019   1:06 PM  To: Temitope Treadwell MA  Subject: PAPERWORK                                        Patient called and was wanting to know if her paperwork for Diabetic shoes is completed yet.  Please call and advise @ [phone].  Thank you.

## 2019-07-31 ENCOUNTER — OFFICE VISIT (OUTPATIENT)
Dept: FAMILY MEDICINE CLINIC | Facility: CLINIC | Age: 59
End: 2019-07-31

## 2019-07-31 VITALS
HEIGHT: 60 IN | SYSTOLIC BLOOD PRESSURE: 110 MMHG | DIASTOLIC BLOOD PRESSURE: 74 MMHG | HEART RATE: 81 BPM | RESPIRATION RATE: 18 BRPM | OXYGEN SATURATION: 97 % | WEIGHT: 205 LBS | BODY MASS INDEX: 40.25 KG/M2 | TEMPERATURE: 96.9 F

## 2019-07-31 DIAGNOSIS — E11.9 TYPE 2 DIABETES MELLITUS WITHOUT COMPLICATION, WITH LONG-TERM CURRENT USE OF INSULIN (HCC): Primary | ICD-10-CM

## 2019-07-31 DIAGNOSIS — Z79.4 TYPE 2 DIABETES MELLITUS WITHOUT COMPLICATION, WITH LONG-TERM CURRENT USE OF INSULIN (HCC): Primary | ICD-10-CM

## 2019-07-31 DIAGNOSIS — N63.10 LUMP OF RIGHT BREAST: ICD-10-CM

## 2019-07-31 DIAGNOSIS — N64.9 DISORDER OF BREAST: ICD-10-CM

## 2019-07-31 DIAGNOSIS — I10 ESSENTIAL HYPERTENSION: ICD-10-CM

## 2019-07-31 LAB — HBA1C MFR BLD: 9.2 %

## 2019-07-31 PROCEDURE — 80061 LIPID PANEL: CPT | Performed by: FAMILY MEDICINE

## 2019-07-31 PROCEDURE — 85025 COMPLETE CBC W/AUTO DIFF WBC: CPT | Performed by: FAMILY MEDICINE

## 2019-07-31 PROCEDURE — 83036 HEMOGLOBIN GLYCOSYLATED A1C: CPT | Performed by: FAMILY MEDICINE

## 2019-07-31 PROCEDURE — 99214 OFFICE O/P EST MOD 30 MIN: CPT | Performed by: FAMILY MEDICINE

## 2019-07-31 PROCEDURE — 84443 ASSAY THYROID STIM HORMONE: CPT | Performed by: FAMILY MEDICINE

## 2019-07-31 PROCEDURE — 80053 COMPREHEN METABOLIC PANEL: CPT | Performed by: FAMILY MEDICINE

## 2019-07-31 RX ORDER — GLIMEPIRIDE 4 MG/1
4 TABLET ORAL
Qty: 90 TABLET | Refills: 1 | Status: SHIPPED | OUTPATIENT
Start: 2019-07-31 | End: 2019-09-17

## 2019-07-31 NOTE — PROGRESS NOTES
Subjective   Di Lopez is a 58 y.o. female.   Has a lump in right breast and has a bruise.  Diabetes   She presents for her follow-up (taking lantus, onglyza. Has not been taking Glmeperide.) diabetic visit. She has type 2 diabetes mellitus. Her disease course has been stable. There are no hypoglycemic associated symptoms. Pertinent negatives for hypoglycemia include no dizziness or nervousness/anxiousness. There are no diabetic associated symptoms. Pertinent negatives for diabetes include no blurred vision, no chest pain, no fatigue, no polydipsia, no polyphagia, no polyuria and no weight loss. There are no hypoglycemic complications. Symptoms are stable. There are no diabetic complications. Risk factors for coronary artery disease include diabetes mellitus, dyslipidemia, family history, obesity, hypertension, sedentary lifestyle and post-menopausal. Current diabetic treatment includes insulin injections and oral agent (dual therapy) (Lantus 50 units qd). She is compliant with treatment all of the time. Her weight is stable. She is following a generally healthy diet. She has not had a previous visit with a dietitian. She rarely participates in exercise. There is no change in her home blood glucose trend. An ACE inhibitor/angiotensin II receptor blocker is not being taken. She does not see a podiatrist.Eye exam is not current.   Hypertension   This is a chronic (follows with Cardiology) problem. The current episode started more than 1 year ago. The problem is unchanged. The problem is uncontrolled. Pertinent negatives include no blurred vision, chest pain, palpitations, peripheral edema, PND or shortness of breath. Risk factors for coronary artery disease include diabetes mellitus, dyslipidemia, family history, obesity, post-menopausal state, sedentary lifestyle and stress. Past treatments include beta blockers and diuretics. Current antihypertension treatment includes beta blockers and diuretics. The current  treatment provides moderate improvement. There are no compliance problems.         The following portions of the patient's history were reviewed and updated as appropriate: allergies, current medications, past family history, past medical history, past social history, past surgical history and problem list.  Vitals:    07/31/19 1428   BP: 110/74   Pulse: 81   Resp: 18   Temp: 96.9 °F (36.1 °C)   SpO2: 97%     Body mass index is 40.04 kg/m².  Review of Systems   Constitutional: Negative.  Negative for activity change, fatigue, fever, unexpected weight gain and unexpected weight loss.   HENT: Negative.  Negative for congestion, sneezing and sore throat.    Eyes: Negative.  Negative for blurred vision, double vision and visual disturbance.   Respiratory: Negative.  Negative for cough, chest tightness, shortness of breath and wheezing.    Cardiovascular: Negative.  Negative for chest pain, palpitations, leg swelling and PND.   Gastrointestinal: Negative.  Negative for abdominal distention, abdominal pain, blood in stool, constipation, diarrhea and nausea.   Endocrine: Negative.  Negative for cold intolerance, heat intolerance, polydipsia, polyphagia and polyuria.   Genitourinary: Negative.  Positive for breast lump. Negative for urinary incontinence, dysuria, frequency and urgency.   Musculoskeletal: Negative.  Negative for arthralgias and myalgias.   Skin: Negative.  Negative for rash.   Allergic/Immunologic: Negative.    Neurological: Negative.  Negative for dizziness, syncope, numbness and memory problem.   Hematological: Negative.  Negative for adenopathy.   Psychiatric/Behavioral: Negative.  Negative for suicidal ideas and depressed mood. The patient is not nervous/anxious.    All other systems reviewed and are negative.      Objective   Physical Exam   Constitutional: She is oriented to person, place, and time. She appears well-developed and well-nourished. No distress.   HENT:   Right Ear: External ear normal.    Left Ear: External ear normal.   Nose: Nose normal.   Mouth/Throat: Oropharynx is clear and moist.   Eyes: Conjunctivae and EOM are normal. Pupils are equal, round, and reactive to light.   Neck: Normal range of motion. Neck supple. No thyromegaly present.   Cardiovascular: Normal rate, regular rhythm and normal heart sounds.   No murmur heard.  Pulmonary/Chest: Effort normal and breath sounds normal. No respiratory distress. She has no wheezes.   Hematoma/lump right breast 7:00 3 in from nipple   Abdominal: Soft. Bowel sounds are normal. She exhibits no distension and no mass. There is no tenderness. There is no rebound and no guarding. No hernia.   Musculoskeletal: Normal range of motion. She exhibits no edema or tenderness.   Lymphadenopathy:     She has no cervical adenopathy.   Neurological: She is alert and oriented to person, place, and time. She has normal reflexes.   Skin: Skin is warm and dry. No rash noted. She is not diaphoretic. No erythema. No pallor.   Psychiatric: She has a normal mood and affect. Her behavior is normal. Judgment and thought content normal.   Nursing note and vitals reviewed.          Assessment/Plan   Di was seen today for diabetes.    Diagnoses and all orders for this visit:    Type 2 diabetes mellitus without complication, with long-term current use of insulin (CMS/[facility])  -     POC Glycosylated Hemoglobin (Hb A1C)  -     CBC & Differential  -     Comprehensive Metabolic Panel  -     Lipid Panel  -     TSH    Essential hypertension    Lump of right breast  -     Mammo Diagnostic Digital Tomosynthesis Bilateral With CAD; Future    Disorder of breast   -     Mammo Diagnostic Digital Tomosynthesis Bilateral With CAD; Future    Other orders  -     glimepiride (AMARYL) 4 MG tablet; Take 1 tablet by mouth Every Morning Before Breakfast.

## 2019-08-01 LAB
ALBUMIN SERPL-MCNC: 4.5 G/DL (ref 3.5–5.2)
ALBUMIN/GLOB SERPL: 1.4 G/DL
ALP SERPL-CCNC: 122 U/L (ref 39–117)
ALT SERPL W P-5'-P-CCNC: 32 U/L (ref 1–33)
ANION GAP SERPL CALCULATED.3IONS-SCNC: 14 MMOL/L (ref 5–15)
AST SERPL-CCNC: 23 U/L (ref 1–32)
BASOPHILS # BLD AUTO: 0.05 10*3/MM3 (ref 0–0.2)
BASOPHILS NFR BLD AUTO: 0.5 % (ref 0–1.5)
BILIRUB SERPL-MCNC: 0.3 MG/DL (ref 0.2–1.2)
BUN BLD-MCNC: 12 MG/DL (ref 6–20)
BUN/CREAT SERPL: 16 (ref 7–25)
CALCIUM SPEC-SCNC: 9.8 MG/DL (ref 8.6–10.5)
CHLORIDE SERPL-SCNC: 94 MMOL/L (ref 98–107)
CHOLEST SERPL-MCNC: 259 MG/DL (ref 0–200)
CO2 SERPL-SCNC: 27 MMOL/L (ref 22–29)
CREAT BLD-MCNC: 0.75 MG/DL (ref 0.57–1)
DEPRECATED RDW RBC AUTO: 46.6 FL (ref 37–54)
EOSINOPHIL # BLD AUTO: 0.18 10*3/MM3 (ref 0–0.4)
EOSINOPHIL NFR BLD AUTO: 1.9 % (ref 0.3–6.2)
ERYTHROCYTE [DISTWIDTH] IN BLOOD BY AUTOMATED COUNT: 14 % (ref 12.3–15.4)
GFR SERPL CREATININE-BSD FRML MDRD: 97 ML/MIN/1.73
GLOBULIN UR ELPH-MCNC: 3.2 GM/DL
GLUCOSE BLD-MCNC: 284 MG/DL (ref 65–99)
HCT VFR BLD AUTO: 45.5 % (ref 34–46.6)
HDLC SERPL-MCNC: 41 MG/DL (ref 40–60)
HGB BLD-MCNC: 13.8 G/DL (ref 12–15.9)
IMM GRANULOCYTES # BLD AUTO: 0.03 10*3/MM3 (ref 0–0.05)
IMM GRANULOCYTES NFR BLD AUTO: 0.3 % (ref 0–0.5)
LDLC SERPL CALC-MCNC: 147 MG/DL (ref 0–100)
LDLC/HDLC SERPL: 3.58 {RATIO}
LYMPHOCYTES # BLD AUTO: 1.84 10*3/MM3 (ref 0.7–3.1)
LYMPHOCYTES NFR BLD AUTO: 19.1 % (ref 19.6–45.3)
MCH RBC QN AUTO: 27.6 PG (ref 26.6–33)
MCHC RBC AUTO-ENTMCNC: 30.3 G/DL (ref 31.5–35.7)
MCV RBC AUTO: 91 FL (ref 79–97)
MONOCYTES # BLD AUTO: 0.55 10*3/MM3 (ref 0.1–0.9)
MONOCYTES NFR BLD AUTO: 5.7 % (ref 5–12)
NEUTROPHILS # BLD AUTO: 6.97 10*3/MM3 (ref 1.7–7)
NEUTROPHILS NFR BLD AUTO: 72.5 % (ref 42.7–76)
NRBC BLD AUTO-RTO: 0 /100 WBC (ref 0–0.2)
PLATELET # BLD AUTO: 174 10*3/MM3 (ref 140–450)
PMV BLD AUTO: 12 FL (ref 6–12)
POTASSIUM BLD-SCNC: 4.3 MMOL/L (ref 3.5–5.2)
PROT SERPL-MCNC: 7.7 G/DL (ref 6–8.5)
RBC # BLD AUTO: 5 10*6/MM3 (ref 3.77–5.28)
SODIUM BLD-SCNC: 135 MMOL/L (ref 136–145)
TRIGL SERPL-MCNC: 356 MG/DL (ref 0–150)
TSH SERPL DL<=0.05 MIU/L-ACNC: 0.72 MIU/ML (ref 0.27–4.2)
VLDLC SERPL-MCNC: 71.2 MG/DL (ref 5–40)
WBC NRBC COR # BLD: 9.62 10*3/MM3 (ref 3.4–10.8)

## 2019-08-26 ENCOUNTER — HOSPITAL ENCOUNTER (OUTPATIENT)
Dept: ULTRASOUND IMAGING | Facility: HOSPITAL | Age: 59
Discharge: HOME | End: 2019-08-26

## 2019-08-26 ENCOUNTER — HOSPITAL ENCOUNTER (OUTPATIENT)
Dept: MAMMOGRAPHY | Facility: HOSPITAL | Age: 59
Discharge: HOME | End: 2019-08-26
Admitting: FAMILY MEDICINE

## 2019-08-26 ENCOUNTER — APPOINTMENT (OUTPATIENT)
Dept: MAMMOGRAPHY | Facility: HOSPITAL | Age: 59
End: 2019-08-26

## 2019-08-26 ENCOUNTER — TRANSCRIBE ORDERS (OUTPATIENT)
Dept: MAMMOGRAPHY | Facility: HOSPITAL | Age: 59
End: 2019-08-26

## 2019-08-26 DIAGNOSIS — N64.9 DISORDER OF BREAST: ICD-10-CM

## 2019-08-26 DIAGNOSIS — N63.10 LUMP OF RIGHT BREAST: ICD-10-CM

## 2019-08-26 DIAGNOSIS — R92.8 ABNORMAL MAMMOGRAM: Primary | ICD-10-CM

## 2019-08-26 PROCEDURE — G0279 TOMOSYNTHESIS, MAMMO: HCPCS

## 2019-08-26 PROCEDURE — 76642 ULTRASOUND BREAST LIMITED: CPT | Performed by: RADIOLOGY

## 2019-08-26 PROCEDURE — 76642 ULTRASOUND BREAST LIMITED: CPT

## 2019-08-26 PROCEDURE — 77066 DX MAMMO INCL CAD BI: CPT

## 2019-08-26 PROCEDURE — 77066 DX MAMMO INCL CAD BI: CPT | Performed by: RADIOLOGY

## 2019-08-26 PROCEDURE — G0279 TOMOSYNTHESIS, MAMMO: HCPCS | Performed by: RADIOLOGY

## 2019-08-30 RX ORDER — ESCITALOPRAM OXALATE 20 MG/1
TABLET ORAL
Qty: 90 TABLET | Refills: 0 | Status: SHIPPED | OUTPATIENT
Start: 2019-08-30 | End: 2019-11-22 | Stop reason: SDUPTHER

## 2019-09-12 ENCOUNTER — OFFICE VISIT (OUTPATIENT)
Dept: RETAIL CLINIC | Facility: CLINIC | Age: 59
End: 2019-09-12

## 2019-09-12 VITALS
BODY MASS INDEX: 41.58 KG/M2 | OXYGEN SATURATION: 96 % | TEMPERATURE: 97.8 F | HEIGHT: 60 IN | RESPIRATION RATE: 16 BRPM | DIASTOLIC BLOOD PRESSURE: 84 MMHG | WEIGHT: 211.8 LBS | HEART RATE: 101 BPM | SYSTOLIC BLOOD PRESSURE: 126 MMHG

## 2019-09-12 DIAGNOSIS — J01.00 ACUTE MAXILLARY SINUSITIS, RECURRENCE NOT SPECIFIED: Primary | ICD-10-CM

## 2019-09-12 DIAGNOSIS — H65.02 ACUTE SEROUS OTITIS MEDIA OF LEFT EAR, RECURRENCE NOT SPECIFIED: ICD-10-CM

## 2019-09-12 PROCEDURE — 99213 OFFICE O/P EST LOW 20 MIN: CPT | Performed by: NURSE PRACTITIONER

## 2019-09-12 RX ORDER — AZITHROMYCIN 250 MG/1
TABLET, FILM COATED ORAL
Qty: 6 TABLET | Refills: 0 | Status: SHIPPED | OUTPATIENT
Start: 2019-09-12 | End: 2019-11-27

## 2019-09-12 RX ORDER — FLUTICASONE PROPIONATE 50 MCG
2 SPRAY, SUSPENSION (ML) NASAL DAILY
Qty: 1 BOTTLE | Refills: 0 | Status: SHIPPED | OUTPATIENT
Start: 2019-09-12 | End: 2019-11-27

## 2019-09-12 NOTE — PROGRESS NOTES
TY  Di Lopez is a 58 y.o. female.   Chief Complaint   Patient presents with   • Nasal Congestion   • Sinus Problem      History of Present Illness   Patient presents with severe nasal congestion with yellow thick nasal exudate which has been progressively worsening for over a week. She is taking OTC allergy medication without relief. She had originally thought it was a cold/allergy issues but it is now feeling like a sinus infection. She has bilateral sinus pressure and lethargy and malaise.   The following portions of the patient's history were reviewed and updated as appropriate: allergies, current medications, past family history, past medical history, past social history, past surgical history and problem list.    Current Outpatient Medications:   •  albuterol sulfate  (90 Base) MCG/ACT inhaler, Inhale 2 puffs Every 4 (Four) Hours As Needed for Wheezing., Disp: 1 inhaler, Rfl: 2  •  Alcohol Swabs (ALCOHOL PREP) 70 % pads, , Disp: , Rfl:   •  ASSURE COMFORT LANCETS 30G misc, Use 2 times a day E11.9, Disp: 100 each, Rfl: 5  •  azithromycin (ZITHROMAX) 250 MG tablet, Take 2 tablets the first day, then 1 tablet daily for 4 days., Disp: 6 tablet, Rfl: 0  •  Blood Glucose Calibration (OT ULTRA/FASTTK CNTRL SOLN) solution, , Disp: , Rfl:   •  Blood Glucose Monitoring Suppl (ONE TOUCH ULTRA 2) W/DEVICE kit, , Disp: , Rfl:   •  carvedilol (COREG) 12.5 MG tablet, 12.5 mg 2 (Two) Times a Day With Meals., Disp: , Rfl:   •  Chlorcyclizine-Pseudoephed (STAHIST AD) 25-60 MG tablet, Take 1 tablet by mouth 2 (Two) Times a Day., Disp: 42 tablet, Rfl: 0  •  escitalopram (LEXAPRO) 20 MG tablet, TAKE 1 TABLET BY MOUTH ONCE DAILY, Disp: 90 tablet, Rfl: 0  •  fluticasone (FLONASE) 50 MCG/ACT nasal spray, 2 sprays into the nostril(s) as directed by provider Daily., Disp: 1 bottle, Rfl: 0  •  Fluticasone Furoate-Vilanterol (BREO ELLIPTA) 100-25 MCG/INH aerosol powder , Inhale 1 puff Daily., Disp: 1 each,  Rfl: 6  •  furosemide (LASIX) 40 MG tablet, Take 1 tablet by mouth daily., Disp: , Rfl:   •  glimepiride (AMARYL) 4 MG tablet, TAKE ONE TABLET BY MOUTH IN THE MORNING BEFORE BREAKFAST, Disp: 90 tablet, Rfl: 0  •  glimepiride (AMARYL) 4 MG tablet, Take 1 tablet by mouth Every Morning Before Breakfast., Disp: 90 tablet, Rfl: 1  •  HYDROcodone-acetaminophen (NORCO) 5-325 MG per tablet, Take 1 tablet by mouth 2 (Two) Times a Day., Disp: , Rfl: 0  •  Insulin Glargine (LANTUS SOLOSTAR) 100 UNIT/ML injection pen, Inject 50 Units under the skin into the appropriate area as directed Daily., Disp: 15 mL, Rfl: 3  •  Insulin Pen Needle (RELION PEN NEEDLES) 32G X 4 MM misc, TEST BID, Disp: 100 each, Rfl: 0  •  Insulin Pen Needle (RELION PEN NEEDLES) 32G X 4 MM misc, Insulin pen needle refill, Disp: 60 each, Rfl: 2  •  Lancet Devices (SIMPLE DIAGNOSTICS LANCING DEV) misc, , Disp: , Rfl:   •  Omega-3 Fatty Acids (FISH OIL PO), Take  by mouth., Disp: , Rfl:   •  ONE TOUCH ULTRA TEST test strip, , Disp: , Rfl:   •  ONGLYZA 5 MG tablet, TAKE 1 TABLET BY MOUTH ONCE DAILY, Disp: 90 tablet, Rfl: 0  •  ONGLYZA 5 MG tablet, TAKE 1 TABLET BY MOUTH ONCE DAILY, Disp: 90 tablet, Rfl: 0  •  promethazine-dextromethorphan (PROMETHAZINE-DM) 6.25-15 MG/5ML syrup, Take 5 mL by mouth 4 (Four) Times a Day As Needed for Cough., Disp: 240 mL, Rfl: 0  •  sacubitril-valsartan (ENTRESTO) 24-26 MG tablet, Take 1 tablet by mouth 2 (Two) Times a Day., Disp: , Rfl:   •  warfarin (COUMADIN) 5 MG tablet, Take 5 mg by mouth Daily., Disp: , Rfl:     Allergies   Allergen Reactions   • Codeine Itching and Nausea And Vomiting   • Latex Other (See Comments)     Pulls skin off   • Penicillins Hives   • Sulfa Antibiotics Nausea And Vomiting       Review of Systems   Constitutional: Positive for activity change, appetite change, fatigue and fever (tactile).   HENT: Positive for congestion (nasal and sinus), rhinorrhea, sinus pressure, sinus pain and sneezing. Negative  "for ear discharge, ear pain, facial swelling, hearing loss, sore throat, trouble swallowing and voice change.    Eyes: Positive for itching.   Respiratory: Negative.    Cardiovascular: Negative.    Gastrointestinal: Negative.    Musculoskeletal: Negative.    Allergic/Immunologic: Positive for environmental allergies.   Neurological: Negative.    Hematological: Negative.    Psychiatric/Behavioral: Positive for agitation.       Objective     Visit Vitals  /84 (BP Location: Left arm, Patient Position: Sitting, Cuff Size: Adult)   Pulse 101   Temp 97.8 °F (36.6 °C)   Resp 16   Ht 152.4 cm (60\")   Wt 96.1 kg (211 lb 12.8 oz)   LMP 06/17/1998   SpO2 96%   BMI 41.36 kg/m²         Physical Exam   Constitutional: She is oriented to person, place, and time. Vital signs are normal. She appears well-developed and well-nourished. She is cooperative.  Non-toxic appearance. She does not have a sickly appearance. She appears ill. No distress.   HENT:   Head: Normocephalic and atraumatic.   Right Ear: Hearing, tympanic membrane, external ear and ear canal normal.   Left Ear: Hearing, external ear and ear canal normal. Tympanic membrane is erythematous. Tympanic membrane is not injected. A middle ear effusion is present.   Nose: Mucosal edema and rhinorrhea present. Right sinus exhibits maxillary sinus tenderness and frontal sinus tenderness. Left sinus exhibits maxillary sinus tenderness and frontal sinus tenderness.   Mouth/Throat: Oropharynx is clear and moist and mucous membranes are normal. No oropharyngeal exudate, posterior oropharyngeal edema, posterior oropharyngeal erythema or tonsillar abscesses. Tonsils are 0 on the right. Tonsils are 0 on the left. No tonsillar exudate.   Eyes: Conjunctivae and EOM are normal. Pupils are equal, round, and reactive to light.   Neck: Normal range of motion. Neck supple.   Cardiovascular: Normal rate, regular rhythm and normal heart sounds.   No murmur heard.  Pulmonary/Chest: Effort " normal and breath sounds normal. No respiratory distress. She has no wheezes.   Lymphadenopathy:     She has no cervical adenopathy.   Neurological: She is alert and oriented to person, place, and time.   Skin: Skin is warm and dry. No rash noted. No erythema.   Psychiatric: She has a normal mood and affect. Her behavior is normal.   Nursing note and vitals reviewed.      Lab Results (last 24 hours)     ** No results found for the last 24 hours. **          Assessment/Plan   Di was seen today for nasal congestion and sinus problem.    Diagnoses and all orders for this visit:    Acute maxillary sinusitis, recurrence not specified  -     azithromycin (ZITHROMAX) 250 MG tablet; Take 2 tablets the first day, then 1 tablet daily for 4 days.  -     Chlorcyclizine-Pseudoephed (STAHIST AD) 25-60 MG tablet; Take 1 tablet by mouth 2 (Two) Times a Day.  -     fluticasone (FLONASE) 50 MCG/ACT nasal spray; 2 sprays into the nostril(s) as directed by provider Daily.    Acute serous otitis media of left ear, recurrence not specified  -     azithromycin (ZITHROMAX) 250 MG tablet; Take 2 tablets the first day, then 1 tablet daily for 4 days.    patient to monitor PT/INR during the time she is using the Azithromycin. She has a machine at home.  Take the Stahist for the least amount of time possible   Start Afrin Nasal spray as directed and use for 3 days only to open nasal turbinates.   Increase fluids and rest.   Follow up with PCP worsening s/s.     FRANCESCA Boss

## 2019-09-12 NOTE — PATIENT INSTRUCTIONS
Sinusitis, Adult  Sinusitis is soreness and swelling (inflammation) of your sinuses. Sinuses are hollow spaces in the bones around your face. They are located:  · Around your eyes.  · In the middle of your forehead.  · Behind your nose.  · In your cheekbones.  Your sinuses and nasal passages are lined with a stringy fluid (mucus). Mucus normally drains out of your sinuses. Swelling can trap mucus in your sinuses. This lets germs like bacteria or viruses grow, and that leads to infection. Most of the time, sinusitis is caused by a virus.  If bacteria is causing your infection, your doctor may have you wait and see if you get better without antibiotic medicine. You may be prescribed antibiotics if you have:  · A very bad infection.  · A weak disease-fighting (immune) system.  Follow these instructions at home:  Medicines  · Take, use, or apply over-the-counter and prescription medicines only as told by your doctor. These may include nasal sprays.  · If you were prescribed an antibiotic, take it as told by your doctor. Do not stop taking the antibiotic even if you start to feel better.  Hydrate and Humidify  · Drink enough water to keep your pee (urine) pale yellow.  · Use a cool mist humidifier to keep the humidity level in your home above 50%.  · Breathe in steam for 10-15 minutes, 3-4 times a day or as told by your doctor. You can do this in the bathroom while a hot shower is running.  · Try not to spend time in cool or dry air.  Rest  · Rest as much as possible.  · Sleep with your head raised (elevated).  · Make sure to get enough sleep each night.  General instructions  · Put a warm, moist washcloth on your face 3-4 times a day, or as often as told by your doctor. This will help with discomfort.  · Wash your hands often with soap and water. If there is no soap and water, use hand .  · Do not smoke. Avoid being around people who are smoking (secondhand smoke).  · Keep all follow-up visits as told by your  doctor. This is important.  Contact a doctor if:  · You have a fever.  · Your symptoms get worse.  · Your symptoms do not get better within 10 days.  Get help right away if:  · You have a very bad headache.  · You cannot stop throwing up (vomiting).  · You have pain or swelling around your face or eyes.  · You have trouble seeing.  · You feel confused.  · Your neck is stiff.  · You have trouble breathing.  This information is not intended to replace advice given to you by your health care provider. Make sure you discuss any questions you have with your health care provider.  Document Released: 06/05/2009 Document Revised: 06/29/2018 Document Reviewed: 10/12/2016  [employer] Patient Education © 2019 [employer]

## 2019-09-16 ENCOUNTER — TRANSCRIBE ORDERS (OUTPATIENT)
Dept: ADMINISTRATIVE | Facility: HOSPITAL | Age: 59
End: 2019-09-16

## 2019-09-16 DIAGNOSIS — I35.1 AORTIC VALVE INSUFFICIENCY, ACQUIRED: Primary | ICD-10-CM

## 2019-09-17 ENCOUNTER — OFFICE VISIT (OUTPATIENT)
Dept: SLEEP MEDICINE | Facility: HOSPITAL | Age: 59
End: 2019-09-17

## 2019-09-17 VITALS
HEART RATE: 67 BPM | DIASTOLIC BLOOD PRESSURE: 49 MMHG | WEIGHT: 210.8 LBS | SYSTOLIC BLOOD PRESSURE: 109 MMHG | HEIGHT: 60 IN | OXYGEN SATURATION: 96 % | BODY MASS INDEX: 41.39 KG/M2

## 2019-09-17 DIAGNOSIS — G25.81 RESTLESS LEGS SYNDROME (RLS): ICD-10-CM

## 2019-09-17 DIAGNOSIS — F51.04 PSYCHOPHYSIOLOGICAL INSOMNIA: ICD-10-CM

## 2019-09-17 DIAGNOSIS — G47.34 NOCTURNAL HYPOXEMIA: ICD-10-CM

## 2019-09-17 DIAGNOSIS — G47.33 OSA (OBSTRUCTIVE SLEEP APNEA): Primary | ICD-10-CM

## 2019-09-17 PROCEDURE — 99213 OFFICE O/P EST LOW 20 MIN: CPT | Performed by: NURSE PRACTITIONER

## 2019-09-17 RX ORDER — ROPINIROLE 0.5 MG/1
0.5 TABLET, FILM COATED ORAL NIGHTLY
Qty: 30 TABLET | Refills: 5 | Status: SHIPPED | OUTPATIENT
Start: 2019-09-17 | End: 2020-08-21

## 2019-09-17 RX ORDER — WARFARIN SODIUM 4 MG/1
4 TABLET ORAL
Refills: 2 | COMMUNITY
Start: 2019-08-14

## 2019-09-17 RX ORDER — TRAZODONE HYDROCHLORIDE 100 MG/1
100 TABLET ORAL NIGHTLY
Qty: 30 TABLET | Refills: 3 | Status: SHIPPED | OUTPATIENT
Start: 2019-09-17 | End: 2020-08-21

## 2019-09-17 NOTE — PROGRESS NOTES
Chief Complaint:   Chief Complaint   Patient presents with   • Follow-up       HPI:    Di Lopez is a 58 y.o. female here for follow-up of sleep apnea.  Patient was last seen 8/22/2018.  Patient was taking Neurontin for PLMD but did stop this due to memory loss.  Patient complains that her her leg kicks are still bad and do keep her awake at night and waking up frequently.  She does not think she is tried any other medications in the past for this.  Patient has been on Ambien for insomnia that was taken off this medication by her PCP.  Patient has used melatonin with only minimal relief.  Patient states that will sometimes occur 45 minutes up to an hour to go to sleep and then still awakens frequently.  Patient would like to try different medication to help with both leg kicks and insomnia.  Patient does have CPAP therapy and is doing well.  Patient is sleeping 5 hours nightly and only intermittently rested upon awakening.  Patient has an [place] score of 5/24.  Patient is compliant with CPAP.  Patient needs need a new machine order today as well as an order for oxygen to bleed into this machine.        Current medications are:   Current Outpatient Medications:   •  albuterol sulfate  (90 Base) MCG/ACT inhaler, Inhale 2 puffs Every 4 (Four) Hours As Needed for Wheezing., Disp: 1 inhaler, Rfl: 2  •  Alcohol Swabs (ALCOHOL PREP) 70 % pads, , Disp: , Rfl:   •  ASSURE COMFORT LANCETS 30G misc, Use 2 times a day E11.9, Disp: 100 each, Rfl: 5  •  azithromycin (ZITHROMAX) 250 MG tablet, Take 2 tablets the first day, then 1 tablet daily for 4 days., Disp: 6 tablet, Rfl: 0  •  Blood Glucose Calibration (OT ULTRA/FASTTK CNTRL SOLN) solution, , Disp: , Rfl:   •  Blood Glucose Monitoring Suppl (ONE TOUCH ULTRA 2) W/DEVICE kit, , Disp: , Rfl:   •  carvedilol (COREG) 12.5 MG tablet, 12.5 mg 2 (Two) Times a Day With Meals., Disp: , Rfl:   •  Chlorcyclizine-Pseudoephed (STAHIST AD) 25-60 MG tablet, Take 1 tablet  by mouth 2 (Two) Times a Day., Disp: 42 tablet, Rfl: 0  •  escitalopram (LEXAPRO) 20 MG tablet, TAKE 1 TABLET BY MOUTH ONCE DAILY, Disp: 90 tablet, Rfl: 0  •  fluticasone (FLONASE) 50 MCG/ACT nasal spray, 2 sprays into the nostril(s) as directed by provider Daily., Disp: 1 bottle, Rfl: 0  •  Fluticasone Furoate-Vilanterol (BREO ELLIPTA) 100-25 MCG/INH aerosol powder , Inhale 1 puff Daily., Disp: 1 each, Rfl: 6  •  furosemide (LASIX) 40 MG tablet, Take 1 tablet by mouth daily., Disp: , Rfl:   •  glimepiride (AMARYL) 4 MG tablet, TAKE ONE TABLET BY MOUTH IN THE MORNING BEFORE BREAKFAST, Disp: 90 tablet, Rfl: 0  •  HYDROcodone-acetaminophen (NORCO) 5-325 MG per tablet, Take 1 tablet by mouth 2 (Two) Times a Day., Disp: , Rfl: 0  •  Insulin Glargine (LANTUS SOLOSTAR) 100 UNIT/ML injection pen, Inject 50 Units under the skin into the appropriate area as directed Daily., Disp: 15 mL, Rfl: 3  •  Insulin Pen Needle (RELION PEN NEEDLES) 32G X 4 MM misc, TEST BID, Disp: 100 each, Rfl: 0  •  Lancet Devices (SIMPLE DIAGNOSTICS LANCING DEV) misc, , Disp: , Rfl:   •  Omega-3 Fatty Acids (FISH OIL PO), Take  by mouth., Disp: , Rfl:   •  ONE TOUCH ULTRA TEST test strip, , Disp: , Rfl:   •  ONGLYZA 5 MG tablet, TAKE 1 TABLET BY MOUTH ONCE DAILY, Disp: 90 tablet, Rfl: 0  •  promethazine-dextromethorphan (PROMETHAZINE-DM) 6.25-15 MG/5ML syrup, Take 5 mL by mouth 4 (Four) Times a Day As Needed for Cough., Disp: 240 mL, Rfl: 0  •  sacubitril-valsartan (ENTRESTO) 24-26 MG tablet, Take 1 tablet by mouth 2 (Two) Times a Day., Disp: , Rfl:   •  warfarin (COUMADIN) 4 MG tablet, Take 4.5 mg by mouth Daily., Disp: , Rfl: 2  •  rOPINIRole (REQUIP) 0.5 MG tablet, Take 1 tablet by mouth Every Night. Take 1 hour before bedtime., Disp: 30 tablet, Rfl: 5  •  traZODone (DESYREL) 100 MG tablet, Take 1 tablet by mouth Every Night., Disp: 30 tablet, Rfl: 3.      The patient's relevant past medical, surgical, family and social history were reviewed and  updated in Epic as appropriate.       Review of Systems   Constitutional: Positive for diaphoresis.   Eyes: Positive for visual disturbance.   Respiratory: Positive for apnea, cough, shortness of breath and wheezing.    Cardiovascular: Positive for palpitations.   Gastrointestinal: Positive for abdominal pain and nausea.   Endocrine: Positive for polydipsia and polyuria.   Genitourinary: Positive for frequency.   Musculoskeletal: Positive for arthralgias and joint swelling.   Allergic/Immunologic: Positive for environmental allergies.   Neurological: Positive for light-headedness and headaches.   Psychiatric/Behavioral: Positive for dysphoric mood and sleep disturbance. The patient is nervous/anxious.    All other systems reviewed and are negative.        Objective:    Physical Exam   Constitutional: She is oriented to person, place, and time. She appears well-developed and well-nourished.   HENT:   Head: Normocephalic and atraumatic.   Mouth/Throat: Oropharynx is clear and moist.   Mallampati 4 anatomy   Eyes: Conjunctivae are normal.   Neck: Neck supple. No thyromegaly present.   Cardiovascular: Normal rate and regular rhythm.   Pulmonary/Chest: Effort normal and breath sounds normal.   Lymphadenopathy:     She has no cervical adenopathy.   Neurological: She is alert and oriented to person, place, and time.   Skin: Skin is warm and dry.   Psychiatric: She has a normal mood and affect. Her behavior is normal. Judgment and thought content normal.   Nursing note and vitals reviewed.        ASSESSMENT/PLAN    Di was seen today for follow-up.    Diagnoses and all orders for this visit:    CAL (obstructive sleep apnea)  -     Cancel: CPAP Therapy  -     PAP Therapy    Restless legs syndrome (RLS)  -     rOPINIRole (REQUIP) 0.5 MG tablet; Take 1 tablet by mouth Every Night. Take 1 hour before bedtime.    Psychophysiological insomnia  -     traZODone (DESYREL) 100 MG tablet; Take 1 tablet by mouth Every  Night.    Nocturnal hypoxemia  -     Oxygen Therapy            1. Counseled patient regarding multimodal approach with healthy nutrition, healthy sleep, regular physical activity, social activities, counseling, and medications. Encouraged to practice lateral sleep position. Avoid alcohol and sedatives close to bedtime.  2. There has been placed for supplies for CPAP as well as a new machine.  Patient will need to follow-up in 31 to 90 days for this.  Requip 0.5 mg 1 p.o. 1 hour before bedtime #30  Trazodone 100 mg 1 p.o. nightly #30 with 2 refills given I have reviewed the results of my evaluation  And impression and discussed my recommendations in detail with the patient.    O2 at 2 L to bleed into CPAP as above I will see patient back in 31 to 90 days.  Signed by  FRANCESCA Smith    September 17, 2019      CC: Keri Little DO          No ref. provider found

## 2019-09-17 NOTE — PATIENT INSTRUCTIONS
Restless Legs Syndrome  Restless legs syndrome is a condition that causes uncomfortable feelings or sensations in the legs, especially while sitting or lying down. The sensations usually cause an overwhelming urge to move the legs. The arms can also sometimes be affected.  The condition can range from mild to severe. The symptoms often interfere with a person's ability to sleep.  What are the causes?  The cause of this condition is not known.  What increases the risk?  The following factors may make you more likely to develop this condition:  · Being older than 50.  · Pregnancy.  · Being a woman. In general, the condition is more common in women than in men.  · A family history of the condition.  · Having iron deficiency.  · Overuse of caffeine, nicotine, or alcohol.  · Certain medical conditions, such as kidney disease, Parkinson's disease, or nerve damage.  · Certain medicines, such as those for high blood pressure, nausea, colds, allergies, depression, and some heart conditions.  What are the signs or symptoms?  The main symptom of this condition is uncomfortable sensations in the legs, such as:  · Pulling.  · Tingling.  · Prickling.  · Throbbing.  · Crawling.  · Burning.  Usually, the sensations:  · Affect both sides of the body.  · Are worse when you sit or lie down.  · Are worse at night. These may wake you up or make it difficult to fall asleep.  · Make you have a strong urge to move your legs.  · Are temporarily relieved by moving your legs.  The arms can also be affected, but this is rare. People who have this condition often have tiredness during the day because of their lack of sleep at night.  How is this diagnosed?  This condition may be diagnosed based on:  · Your symptoms.  · Blood tests.  In some cases, you may be monitored in a sleep lab by a specialist (a sleep study). This can detect any disruptions in your sleep.  How is this treated?  This condition is treated by managing the symptoms. This may  include:  · Lifestyle changes, such as exercising, using relaxation techniques, and avoiding caffeine, alcohol, or tobacco.  · Medicines. Anti-seizure medicines may be tried first.  Follow these instructions at home:    General instructions  · Take over-the-counter and prescription medicines only as told by your health care provider.  · Use methods to help relieve the uncomfortable sensations, such as:  ? Massaging your legs.  ? Walking or stretching.  ? Taking a cold or hot bath.  · Keep all follow-up visits as told by your health care provider. This is important.  Lifestyle  · Practice good sleep habits. For example, go to bed and get up at the same time every day. Most adults should get 7-9 hours of sleep each night.  · Exercise regularly. Try to get at least 30 minutes of exercise most days of the week.  · Practice ways of relaxing, such as yoga or meditation.  · Avoid caffeine and alcohol.  · Do not use any products that contain nicotine or tobacco, such as cigarettes and e-cigarettes. If you need help quitting, ask your health care provider.  Contact a health care provider if:  · Your symptoms get worse or they do not improve with treatment.  Summary  · Restless legs syndrome is a condition that causes uncomfortable feelings or sensations in the legs, especially while sitting or lying down.  · The symptoms often interfere with a person's ability to sleep.  · This condition is treated by managing the symptoms. You may need to make lifestyle changes or take medicines.  This information is not intended to replace advice given to you by your health care provider. Make sure you discuss any questions you have with your health care provider.  Document Released: 12/08/2003 Document Revised: 01/07/2019 Document Reviewed: 01/07/2019  [employer] Patient Education © 2019 [employer]  Sleep Apnea  Sleep apnea is a condition that affects breathing. People with sleep apnea have moments during sleep when their  breathing pauses briefly or gets shallow. Sleep apnea can cause these symptoms:  · Trouble staying asleep.  · Sleepiness or tiredness during the day.  · Irritability.  · Loud snoring.  · Morning headaches.  · Trouble concentrating.  · Forgetting things.  · Less interest in sex.  · Being sleepy for no reason.  · Mood swings.  · Personality changes.  · Depression.  · Waking up a lot during the night to pee (urinate).  · Dry mouth.  · Sore throat.  Follow these instructions at home:    · Make any changes in your routine that your doctor recommends.  · Eat a healthy, well-balanced diet.  · Take over-the-counter and prescription medicines only as told by your doctor.  · Avoid using alcohol, calming medicines (sedatives), and narcotic medicines.  · Take steps to lose weight if you are overweight.  · If you were given a machine (device) to use while you sleep, use it only as told by your doctor.  · Do not use any tobacco products, such as cigarettes, chewing tobacco, and e-cigarettes. If you need help quitting, ask your doctor.  · Keep all follow-up visits as told by your doctor. This is important.  Contact a doctor if:  · The machine that you were given to use during sleep is uncomfortable or does not seem to be working.  · Your symptoms do not get better.  · Your symptoms get worse.  Get help right away if:  · Your chest hurts.  · You have trouble breathing in enough air (shortness of breath).  · You have an uncomfortable feeling in your back, arms, or stomach.  · You have trouble talking.  · One side of your body feels weak.  · A part of your face is hanging down (drooping).  These symptoms may be an emergency. Do not wait to see if the symptoms will go away. Get medical help right away. Call your local emergency services (911 in the [place] Do not drive yourself to the hospital.  This information is not intended to replace advice given to you by your health care provider. Make sure you discuss any questions you have  with your health care provider.  Document Released: 09/26/2009 Document Revised: 07/16/2018 Document Reviewed: 09/26/2016  [employer] Interactive Patient Education © 2019 [employer]

## 2019-09-26 ENCOUNTER — APPOINTMENT (OUTPATIENT)
Dept: CARDIOLOGY | Facility: HOSPITAL | Age: 59
End: 2019-09-26

## 2019-10-08 ENCOUNTER — APPOINTMENT (OUTPATIENT)
Dept: CARDIOLOGY | Facility: HOSPITAL | Age: 59
End: 2019-10-08

## 2019-10-10 ENCOUNTER — HOSPITAL ENCOUNTER (OUTPATIENT)
Dept: CARDIOLOGY | Facility: HOSPITAL | Age: 59
Discharge: HOME | End: 2019-10-10
Admitting: INTERNAL MEDICINE

## 2019-10-10 VITALS
HEIGHT: 60 IN | RESPIRATION RATE: 20 BRPM | WEIGHT: 210.76 LBS | DIASTOLIC BLOOD PRESSURE: 60 MMHG | HEART RATE: 70 BPM | SYSTOLIC BLOOD PRESSURE: 128 MMHG | OXYGEN SATURATION: 95 % | BODY MASS INDEX: 41.38 KG/M2

## 2019-10-10 DIAGNOSIS — I35.1 AORTIC VALVE INSUFFICIENCY, ACQUIRED: ICD-10-CM

## 2019-10-10 PROCEDURE — 93312 ECHO TRANSESOPHAGEAL: CPT

## 2019-10-10 PROCEDURE — 93321 DOPPLER ECHO F-UP/LMTD STD: CPT

## 2019-10-10 PROCEDURE — 93325 DOPPLER ECHO COLOR FLOW MAPG: CPT

## 2019-10-10 PROCEDURE — 25010000002 FENTANYL CITRATE (PF) 100 MCG/2ML SOLUTION: Performed by: INTERNAL MEDICINE

## 2019-10-10 PROCEDURE — 25010000002 MIDAZOLAM PER 1 MG: Performed by: INTERNAL MEDICINE

## 2019-10-10 RX ORDER — MIDAZOLAM HYDROCHLORIDE 1 MG/ML
INJECTION INTRAMUSCULAR; INTRAVENOUS
Status: COMPLETED | OUTPATIENT
Start: 2019-10-10 | End: 2019-10-10

## 2019-10-10 RX ORDER — FENTANYL CITRATE 50 UG/ML
INJECTION, SOLUTION INTRAMUSCULAR; INTRAVENOUS
Status: COMPLETED | OUTPATIENT
Start: 2019-10-10 | End: 2019-10-10

## 2019-10-10 RX ADMIN — MIDAZOLAM HYDROCHLORIDE 2 MG: 1 INJECTION, SOLUTION INTRAMUSCULAR; INTRAVENOUS at 15:09

## 2019-10-10 RX ADMIN — FENTANYL CITRATE 50 MCG: 50 INJECTION, SOLUTION INTRAMUSCULAR; INTRAVENOUS at 15:09

## 2019-10-11 ENCOUNTER — APPOINTMENT (OUTPATIENT)
Dept: CARDIOLOGY | Facility: HOSPITAL | Age: 59
End: 2019-10-11

## 2019-10-21 RX ORDER — SAXAGLIPTIN 5 MG/1
TABLET, FILM COATED ORAL
Qty: 90 TABLET | Refills: 0 | Status: SHIPPED | OUTPATIENT
Start: 2019-10-21 | End: 2019-11-27

## 2019-11-25 RX ORDER — ESCITALOPRAM OXALATE 20 MG/1
TABLET ORAL
Qty: 90 TABLET | Refills: 0 | Status: SHIPPED | OUTPATIENT
Start: 2019-11-25 | End: 2020-02-28

## 2019-11-27 ENCOUNTER — TELEPHONE (OUTPATIENT)
Dept: FAMILY MEDICINE CLINIC | Facility: CLINIC | Age: 59
End: 2019-11-27

## 2019-11-27 ENCOUNTER — TELEPHONE (OUTPATIENT)
Dept: SLEEP MEDICINE | Facility: HOSPITAL | Age: 59
End: 2019-11-27

## 2019-11-27 ENCOUNTER — OFFICE VISIT (OUTPATIENT)
Dept: FAMILY MEDICINE CLINIC | Facility: CLINIC | Age: 59
End: 2019-11-27

## 2019-11-27 VITALS
WEIGHT: 211.8 LBS | DIASTOLIC BLOOD PRESSURE: 82 MMHG | BODY MASS INDEX: 41.58 KG/M2 | TEMPERATURE: 97.4 F | RESPIRATION RATE: 16 BRPM | HEART RATE: 71 BPM | SYSTOLIC BLOOD PRESSURE: 128 MMHG | OXYGEN SATURATION: 90 % | HEIGHT: 60 IN

## 2019-11-27 DIAGNOSIS — K52.9 AGE (ACUTE GASTROENTERITIS): ICD-10-CM

## 2019-11-27 DIAGNOSIS — T50.Z95A ADVERSE EFFECT OF VACCINE, INITIAL ENCOUNTER: ICD-10-CM

## 2019-11-27 DIAGNOSIS — R35.0 URINARY FREQUENCY: ICD-10-CM

## 2019-11-27 DIAGNOSIS — L03.114 CELLULITIS OF LEFT UPPER EXTREMITY: ICD-10-CM

## 2019-11-27 DIAGNOSIS — Z79.4 TYPE 2 DIABETES MELLITUS WITHOUT COMPLICATION, WITH LONG-TERM CURRENT USE OF INSULIN (HCC): Primary | ICD-10-CM

## 2019-11-27 DIAGNOSIS — E11.9 TYPE 2 DIABETES MELLITUS WITHOUT COMPLICATION, WITH LONG-TERM CURRENT USE OF INSULIN (HCC): Primary | ICD-10-CM

## 2019-11-27 DIAGNOSIS — K29.60 OTHER GASTRITIS WITHOUT HEMORRHAGE, UNSPECIFIED CHRONICITY: ICD-10-CM

## 2019-11-27 LAB
BILIRUB BLD-MCNC: NEGATIVE MG/DL
CLARITY, POC: CLEAR
COLOR UR: YELLOW
EXPIRATION DATE: NORMAL
GLUCOSE UR STRIP-MCNC: NEGATIVE MG/DL
HBA1C MFR BLD: 8.3 %
KETONES UR QL: NEGATIVE
LEUKOCYTE EST, POC: NEGATIVE
Lab: NORMAL
NITRITE UR-MCNC: NEGATIVE MG/ML
PH UR: 5.5 [PH] (ref 5–8)
PROT UR STRIP-MCNC: NEGATIVE MG/DL
RBC # UR STRIP: NEGATIVE /UL
SP GR UR: 1.03 (ref 1–1.03)
UROBILINOGEN UR QL: NORMAL

## 2019-11-27 PROCEDURE — 99214 OFFICE O/P EST MOD 30 MIN: CPT | Performed by: NURSE PRACTITIONER

## 2019-11-27 PROCEDURE — 81003 URINALYSIS AUTO W/O SCOPE: CPT | Performed by: NURSE PRACTITIONER

## 2019-11-27 PROCEDURE — 83036 HEMOGLOBIN GLYCOSYLATED A1C: CPT | Performed by: NURSE PRACTITIONER

## 2019-11-27 RX ORDER — ZOSTER VACCINE RECOMBINANT, ADJUVANTED 50 MCG/0.5
KIT INTRAMUSCULAR
Refills: 0 | COMMUNITY
Start: 2019-11-25 | End: 2020-02-28

## 2019-11-27 RX ORDER — DOXYCYCLINE HYCLATE 100 MG/1
100 TABLET, DELAYED RELEASE ORAL 2 TIMES DAILY
Qty: 14 TABLET | Refills: 0 | Status: SHIPPED | OUTPATIENT
Start: 2019-11-27 | End: 2019-11-27

## 2019-11-27 RX ORDER — SPIRONOLACTONE 25 MG/1
25 TABLET ORAL DAILY
Refills: 0 | COMMUNITY
Start: 2019-11-25

## 2019-11-27 RX ORDER — INFLUENZA A VIRUS A/SINGAPORE/GP1908/2015 IVR-180A (H1N1) ANTIGEN (PROPIOLACTONE INACTIVATED), INFLUENZA A VIRUS A/SINGAPORE/INFIMH-16-0019/2016 IVR-186 (H3N2) ANTIGEN (PROPIOLACTONE INACTIVATED), INFLUENZA B VIRUS B/MARYLAND/15/2016 ANTIGEN (PROPIOLACTONE INACTIVATED), AND INFLUENZA B VIRUS B/PHUKET/3073/2013 BVR-1B ANTIGEN (PROPIOLACTONE INACTIVATED) 15; 15; 15; 15 UG/.5ML; UG/.5ML; UG/.5ML; UG/.5ML
INJECTION, SUSPENSION INTRAMUSCULAR
Refills: 0 | COMMUNITY
Start: 2019-11-25 | End: 2020-02-28

## 2019-11-27 RX ORDER — DOXYCYCLINE 100 MG/1
100 CAPSULE ORAL 2 TIMES DAILY
Qty: 20 CAPSULE | Refills: 0 | Status: SHIPPED | OUTPATIENT
Start: 2019-11-27 | End: 2020-02-28

## 2019-11-27 RX ORDER — OMEPRAZOLE 20 MG/1
20 CAPSULE, DELAYED RELEASE ORAL DAILY
Qty: 30 CAPSULE | Refills: 5 | Status: SHIPPED | OUTPATIENT
Start: 2019-11-27 | End: 2020-08-21

## 2019-11-27 RX ORDER — WARFARIN SODIUM 1 MG/1
1 TABLET ORAL DAILY PRN
Refills: 5 | COMMUNITY
Start: 2019-11-22 | End: 2020-08-21

## 2019-11-27 NOTE — PROGRESS NOTES
Subjective   Di Lopez is a 58 y.o. female.     History of Present Illness Ms Lopez is a patient of Dr Little who presents with multiple concerns today.  1. Complains of urinary frequency. No dysuria. Urine with strong odor. Has had frequent UTI's this year.  2. Has type 2 DM. Last A1c > 9.0.  She has been drinking apple cider vinegar for her diabetes and lantus 50u at night. Poor appetite. No exercise. Denies poly's  3. Had recent flu and pneumovax at pharmacy 2 days ago. Left upper arm is red and swollen and hot to touch. It is a little better today but still red and hot. No fever. Has not had reaction in the past.  4. Complains of epigastric fullness and pain with burning after eating. No change in bowel. Has a liver mass and is managed by . It is not malignant. Having treatments to destroy the mass. Denies melana and hematochezia.  5. Recently had 24 hours of vomiting and diarrhea. Has had some weakness since then.    The following portions of the patient's history were reviewed and updated as appropriate: allergies, current medications, past family history, past medical history, past social history, past surgical history and problem list.    Review of Systems   Constitutional: Negative for appetite change, fever, unexpected weight gain and unexpected weight loss.   HENT: Negative for congestion, nosebleeds, sore throat and trouble swallowing.    Eyes: Negative for visual disturbance.   Respiratory: Negative for cough, shortness of breath and wheezing.    Cardiovascular: Negative for chest pain, palpitations and leg swelling.   Gastrointestinal: Positive for abdominal pain and GERD. Negative for blood in stool, constipation, diarrhea, nausea and vomiting.   Endocrine: Negative for polydipsia, polyphagia and polyuria.   Genitourinary: Positive for frequency. Negative for dysuria and hematuria.   Musculoskeletal: Negative for arthralgias, joint swelling and myalgias.   Skin: Positive for color change.  Negative for rash.   Neurological: Negative for dizziness, seizures, syncope and numbness.   Hematological: Negative for adenopathy. Does not bruise/bleed easily.   Psychiatric/Behavioral: Negative for behavioral problems, sleep disturbance and depressed mood. The patient is not nervous/anxious.        Objective   Physical Exam   Constitutional: She is oriented to person, place, and time. She appears well-developed and well-nourished. No distress.   HENT:   Head: Normocephalic and atraumatic.   Right Ear: Tympanic membrane and external ear normal.   Left Ear: Tympanic membrane and external ear normal.   Nose: Nose normal.   Mouth/Throat: Oropharynx is clear and moist. No oropharyngeal exudate.   Eyes: Conjunctivae are normal. Pupils are equal, round, and reactive to light. Right eye exhibits no discharge. Left eye exhibits no discharge. No scleral icterus.   Neck: Neck supple. No tracheal deviation present. No thyromegaly present.   Cardiovascular: Normal rate, regular rhythm and normal heart sounds. Exam reveals no gallop and no friction rub.   No murmur heard.  Pulmonary/Chest: Effort normal and breath sounds normal. No respiratory distress. She has no wheezes.   Abdominal: Soft. Bowel sounds are normal. She exhibits mass. She exhibits no distension. There is tenderness.   Tender in epigastric area but area of firmness in RUQ approx 3cm that is non-tender   Musculoskeletal: She exhibits no edema or deformity.   Lymphadenopathy:     She has no cervical adenopathy.   Neurological: She is alert and oriented to person, place, and time. Coordination normal.   Skin: Skin is warm and dry. Capillary refill takes less than 2 seconds. No rash noted. There is erythema.   Left upper arm with 12 cm area of induration. No pus or streaks. Warm to touch   Psychiatric: She has a normal mood and affect. Her speech is normal and behavior is normal. Judgment and thought content normal.   Nursing note and vitals  reviewed.        Assessment/Plan   Di was seen today for follow-up and abdominal pain.    Diagnoses and all orders for this visit:    Type 2 diabetes mellitus without complication, with long-term current use of insulin (CMS/[facility])  -     POC Glycosylated Hemoglobin (Hb A1C)  -     POCT urinalysis dipstick, automated    Cellulitis of left upper extremity  -     doxycycline (DORYX) 100 MG enteric coated tablet; Take 1 tablet by mouth 2 (Two) Times a Day.    Adverse effect of vaccine, initial encounter    AGE (acute gastroenteritis)    Urinary frequency    Other gastritis without hemorrhage, unspecified chronicity  -     omeprazole (priLOSEC) 20 MG capsule; Take 1 capsule by mouth Daily.    UA is negative  A1c is better at 8.3. Continue current treatment.   Discussed the importance of low carb diet, annual dilated eye exam, nightly foot checks, annual dentist visit, daily exercise. Monitor blood sugar at least twice a week. Maintain BMI under 25. Have regular follow up appointments for labs and screenings.  Discussed need to take PPI alone.  Discussed need for PT/INR next week due to meds  Watch for bleeding. GERD precautions.  Follow up with PCP in one week.

## 2019-11-27 NOTE — TELEPHONE ENCOUNTER
[facility] called saying that the doxycycline (DORYX) 100 MG enteric coated tablet extended release is not covered by insurance, but the Doxycycline regular release is covered. Does the provider want to change the rx?    [facility] in [place]

## 2020-01-14 RX ORDER — SAXAGLIPTIN 5 MG/1
TABLET, FILM COATED ORAL
Qty: 30 TABLET | Refills: 0 | Status: SHIPPED | OUTPATIENT
Start: 2020-01-14 | End: 2020-02-21

## 2020-01-14 RX ORDER — GLIMEPIRIDE 4 MG/1
TABLET ORAL
Qty: 30 TABLET | Refills: 0 | Status: SHIPPED | OUTPATIENT
Start: 2020-01-14 | End: 2020-02-21

## 2020-01-24 ENCOUNTER — OFFICE VISIT (OUTPATIENT)
Dept: RETAIL CLINIC | Facility: CLINIC | Age: 60
End: 2020-01-24

## 2020-01-24 VITALS
HEART RATE: 70 BPM | OXYGEN SATURATION: 97 % | TEMPERATURE: 98.1 F | BODY MASS INDEX: 41.23 KG/M2 | WEIGHT: 210 LBS | DIASTOLIC BLOOD PRESSURE: 80 MMHG | HEIGHT: 60 IN | SYSTOLIC BLOOD PRESSURE: 110 MMHG

## 2020-01-24 DIAGNOSIS — J02.0 STREP THROAT: Primary | ICD-10-CM

## 2020-01-24 LAB
EXPIRATION DATE: ABNORMAL
INTERNAL CONTROL: ABNORMAL
Lab: ABNORMAL
S PYO AG THROAT QL: POSITIVE

## 2020-01-24 PROCEDURE — 87880 STREP A ASSAY W/OPTIC: CPT | Performed by: NURSE PRACTITIONER

## 2020-01-24 PROCEDURE — 99213 OFFICE O/P EST LOW 20 MIN: CPT | Performed by: NURSE PRACTITIONER

## 2020-01-24 RX ORDER — AMOXICILLIN 875 MG/1
875 TABLET, COATED ORAL 2 TIMES DAILY
Qty: 20 TABLET | Refills: 0 | Status: SHIPPED | OUTPATIENT
Start: 2020-01-24 | End: 2020-02-28

## 2020-01-25 NOTE — PROGRESS NOTES
TY  Di Lopez is a 59 y.o. female.   Chief Complaint   Patient presents with   • Sore Throat      History of Present Illness   Presents with sore throat, lethargy and malaise, headache and fever. Symptoms have been present for several days and she found some old Amoxicillin tablets which she started to use 2 days ago. She states she saw exudate infection on the back of her throat and that's why she started the medication. She is gargling warm salt water and taking IBU for pain.   The following portions of the patient's history were reviewed and updated as appropriate: allergies, current medications, past family history, past medical history, past social history, past surgical history and problem list.    Current Outpatient Medications:   •  AFLURIA QUADRIVALENT 0.5 ML suspension prefilled syringe injection, ADM 0.5ML IM UTD, Disp: , Rfl: 0  •  albuterol sulfate  (90 Base) MCG/ACT inhaler, Inhale 2 puffs Every 4 (Four) Hours As Needed for Wheezing., Disp: 1 inhaler, Rfl: 2  •  carvedilol (COREG) 12.5 MG tablet, 12.5 mg 2 (Two) Times a Day With Meals., Disp: , Rfl:   •  escitalopram (LEXAPRO) 20 MG tablet, TAKE 1 TABLET BY MOUTH ONCE DAILY, Disp: 90 tablet, Rfl: 0  •  Fluticasone Furoate-Vilanterol (BREO ELLIPTA) 100-25 MCG/INH aerosol powder , Inhale 1 puff Daily., Disp: 1 each, Rfl: 6  •  furosemide (LASIX) 40 MG tablet, Take 1 tablet by mouth daily., Disp: , Rfl:   •  glimepiride (AMARYL) 4 MG tablet, TAKE 1 TABLET BY MOUTH ONCE DAILY IN THE MORNING BEFORE BREAKFAST, Disp: 30 tablet, Rfl: 0  •  HYDROcodone-acetaminophen (NORCO) 5-325 MG per tablet, Take 1 tablet by mouth 2 (Two) Times a Day., Disp: , Rfl: 0  •  Insulin Pen Needle (RELION PEN NEEDLES) 32G X 4 MM misc, Use BID for E11.9, Disp: 100 each, Rfl: 1  •  Lancet Devices (SIMPLE DIAGNOSTICS LANCING DEV) misc, , Disp: , Rfl:   •  ONGLYZA 5 MG tablet, TAKE 1 TABLET BY MOUTH ONCE DAILY, Disp: 30 tablet, Rfl: 0  •  rOPINIRole (REQUIP)  0.5 MG tablet, Take 1 tablet by mouth Every Night. Take 1 hour before bedtime., Disp: 30 tablet, Rfl: 5  •  sacubitril-valsartan (ENTRESTO) 24-26 MG tablet, Take 1 tablet by mouth 2 (Two) Times a Day., Disp: , Rfl:   •  SHINGRIX 50 MCG/0.5ML reconstituted suspension, ADM 0.5ML IM UTD, Disp: , Rfl: 0  •  spironolactone (ALDACTONE) 25 MG tablet, Take 25 mg by mouth Daily., Disp: , Rfl: 0  •  traZODone (DESYREL) 100 MG tablet, Take 1 tablet by mouth Every Night., Disp: 30 tablet, Rfl: 3  •  Alcohol Swabs (ALCOHOL PREP) 70 % pads, , Disp: , Rfl:   •  amoxicillin (AMOXIL) 875 MG tablet, Take 1 tablet by mouth 2 (Two) Times a Day., Disp: 20 tablet, Rfl: 0  •  ASSURE COMFORT LANCETS 30G misc, Use 2 times a day E11.9, Disp: 100 each, Rfl: 5  •  Blood Glucose Calibration (OT ULTRA/FASTTK CNTRL SOLN) solution, , Disp: , Rfl:   •  Blood Glucose Monitoring Suppl (ONE TOUCH ULTRA 2) W/DEVICE kit, , Disp: , Rfl:   •  doxycycline (MONODOX) 100 MG capsule, Take 1 capsule by mouth 2 (Two) Times a Day., Disp: 20 capsule, Rfl: 0  •  Insulin Glargine (LANTUS SOLOSTAR) 100 UNIT/ML injection pen, Inject 50 Units under the skin into the appropriate area as directed Daily., Disp: 15 mL, Rfl: 1  •  Omega-3 Fatty Acids (FISH OIL PO), Take  by mouth., Disp: , Rfl:   •  omeprazole (priLOSEC) 20 MG capsule, Take 1 capsule by mouth Daily., Disp: 30 capsule, Rfl: 5  •  ONE TOUCH ULTRA TEST test strip, , Disp: , Rfl:   •  promethazine-dextromethorphan (PROMETHAZINE-DM) 6.25-15 MG/5ML syrup, Take 5 mL by mouth 4 (Four) Times a Day As Needed for Cough., Disp: 240 mL, Rfl: 0  •  warfarin (COUMADIN) 1 MG tablet, Take 1 mg by mouth Daily As Needed., Disp: , Rfl: 5  •  warfarin (COUMADIN) 4 MG tablet, Take 4.5 mg by mouth Daily., Disp: , Rfl: 2    Allergies   Allergen Reactions   • Codeine Itching and Nausea And Vomiting   • Latex Other (See Comments)     Pulls skin off   • Penicillins Hives   • Sulfa Antibiotics Nausea And Vomiting       Review of  "Systems   Constitutional: Positive for activity change, appetite change, fatigue and fever.   HENT: Positive for postnasal drip, rhinorrhea, sore throat and trouble swallowing. Negative for ear pain, sinus pressure, sinus pain and voice change.    Eyes: Negative.    Respiratory: Negative.    Cardiovascular: Negative.    Gastrointestinal: Negative.    Musculoskeletal: Negative.    Skin: Negative.    Allergic/Immunologic: Negative.    Neurological: Positive for headaches.   Hematological: Negative.    Psychiatric/Behavioral: Negative.        Objective     Visit Vitals  /80   Pulse 70   Temp 98.1 °F (36.7 °C)   Ht 152.4 cm (60\")   Wt 95.3 kg (210 lb)   LMP 06/17/1998   SpO2 97%   BMI 41.01 kg/m²         Physical Exam   Constitutional: She is oriented to person, place, and time. Vital signs are normal. She appears well-developed and well-nourished. She is cooperative.  Non-toxic appearance. She does not have a sickly appearance. She does not appear ill. No distress.   HENT:   Head: Normocephalic and atraumatic.   Right Ear: Hearing, tympanic membrane, external ear and ear canal normal.   Left Ear: Hearing, tympanic membrane, external ear and ear canal normal.   Nose: Mucosal edema and rhinorrhea present. Right sinus exhibits no maxillary sinus tenderness and no frontal sinus tenderness. Left sinus exhibits no maxillary sinus tenderness and no frontal sinus tenderness.   Mouth/Throat: Mucous membranes are normal. Posterior oropharyngeal edema and posterior oropharyngeal erythema present. No oropharyngeal exudate or tonsillar abscesses. Tonsils are 2+ on the right. Tonsils are 1+ on the left. No tonsillar exudate.   Neck: Normal range of motion. Neck supple.   Cardiovascular: Normal rate, regular rhythm and normal heart sounds.   No murmur heard.  Pulmonary/Chest: Effort normal and breath sounds normal.   Lymphadenopathy:     She has no cervical adenopathy.   Neurological: She is alert and oriented to person, place, " and time.   Skin: Skin is warm and dry. No rash noted.   Psychiatric: She has a normal mood and affect.   Nursing note and vitals reviewed.      Lab Results (last 24 hours)     Procedure Component Value Units Date/Time    POC Rapid Strep A [[id number]]  (Abnormal) Collected:  01/24/20 1946    Specimen:  Swab Updated:  01/24/20 1946     Rapid Strep A Screen Positive     Internal Control Passed     Lot Number [id number]     Expiration Date 3/31/2021          Assessment/Plan   Di was seen today for sore throat.    Diagnoses and all orders for this visit:    Strep throat  -     amoxicillin (AMOXIL) 875 MG tablet; Take 1 tablet by mouth 2 (Two) Times a Day.  -     POC Rapid Strep A    warm salt water gargles  Rest and fluids  continue  every 6 hours prn pain.  Follow up prn worsening s/s.    FRANCESCA Boss

## 2020-02-08 RX ORDER — INSULIN GLARGINE 100 [IU]/ML
INJECTION, SOLUTION SUBCUTANEOUS
Qty: 45 ML | Refills: 0 | Status: SHIPPED | OUTPATIENT
Start: 2020-02-08 | End: 2020-05-19

## 2020-02-21 RX ORDER — SAXAGLIPTIN 5 MG/1
TABLET, FILM COATED ORAL
Qty: 30 TABLET | Refills: 0 | Status: SHIPPED | OUTPATIENT
Start: 2020-02-21 | End: 2020-03-27

## 2020-02-21 RX ORDER — GLIMEPIRIDE 4 MG/1
TABLET ORAL
Qty: 30 TABLET | Refills: 0 | Status: SHIPPED | OUTPATIENT
Start: 2020-02-21 | End: 2020-03-27

## 2020-02-27 ENCOUNTER — HOSPITAL ENCOUNTER (OUTPATIENT)
Dept: ULTRASOUND IMAGING | Facility: HOSPITAL | Age: 60
Discharge: HOME | End: 2020-02-27

## 2020-02-27 ENCOUNTER — HOSPITAL ENCOUNTER (OUTPATIENT)
Dept: MAMMOGRAPHY | Facility: HOSPITAL | Age: 60
Discharge: HOME | End: 2020-02-27
Admitting: FAMILY MEDICINE

## 2020-02-27 DIAGNOSIS — R92.8 ABNORMAL MAMMOGRAM: ICD-10-CM

## 2020-02-27 PROCEDURE — 76642 ULTRASOUND BREAST LIMITED: CPT

## 2020-02-27 PROCEDURE — 76642 ULTRASOUND BREAST LIMITED: CPT | Performed by: RADIOLOGY

## 2020-02-27 PROCEDURE — 77065 DX MAMMO INCL CAD UNI: CPT | Performed by: RADIOLOGY

## 2020-02-27 PROCEDURE — 77065 DX MAMMO INCL CAD UNI: CPT

## 2020-02-28 ENCOUNTER — OFFICE VISIT (OUTPATIENT)
Dept: RETAIL CLINIC | Facility: CLINIC | Age: 60
End: 2020-02-28

## 2020-02-28 VITALS
OXYGEN SATURATION: 97 % | HEIGHT: 55 IN | TEMPERATURE: 98.2 F | BODY MASS INDEX: 13.01 KG/M2 | HEART RATE: 97 BPM | SYSTOLIC BLOOD PRESSURE: 130 MMHG | DIASTOLIC BLOOD PRESSURE: 68 MMHG | WEIGHT: 56.2 LBS | RESPIRATION RATE: 16 BRPM

## 2020-02-28 DIAGNOSIS — J06.9 ACUTE URI: ICD-10-CM

## 2020-02-28 DIAGNOSIS — R68.89 FLU-LIKE SYMPTOMS: ICD-10-CM

## 2020-02-28 PROCEDURE — 99213 OFFICE O/P EST LOW 20 MIN: CPT | Performed by: NURSE PRACTITIONER

## 2020-02-28 PROCEDURE — 87804 INFLUENZA ASSAY W/OPTIC: CPT | Performed by: NURSE PRACTITIONER

## 2020-02-28 RX ORDER — FERROUS SULFATE TAB EC 324 MG (65 MG FE EQUIVALENT) 324 (65 FE) MG
324 TABLET DELAYED RESPONSE ORAL
COMMUNITY
End: 2020-10-23

## 2020-02-28 RX ORDER — HYDROCODONE BITARTRATE AND ACETAMINOPHEN 7.5; 325 MG/1; MG/1
1 TABLET ORAL DAILY PRN
COMMUNITY
Start: 2020-02-21

## 2020-02-28 RX ORDER — LANOLIN ALCOHOL/MO/W.PET/CERES
800 CREAM (GRAM) TOPICAL DAILY
COMMUNITY
End: 2020-08-21

## 2020-02-28 RX ORDER — GUAIFENESIN 600 MG/1
1200 TABLET, EXTENDED RELEASE ORAL 2 TIMES DAILY
Qty: 40 TABLET | Refills: 0 | Status: SHIPPED | OUTPATIENT
Start: 2020-02-28 | End: 2020-03-09

## 2020-02-28 RX ORDER — PEN NEEDLE, DIABETIC 32GX 5/32"
NEEDLE, DISPOSABLE MISCELLANEOUS
COMMUNITY
Start: 2020-01-07 | End: 2020-03-18 | Stop reason: SDUPTHER

## 2020-02-28 RX ORDER — MELATONIN
2000 DAILY
COMMUNITY
End: 2021-10-06

## 2020-02-28 RX ORDER — AZITHROMYCIN 250 MG/1
TABLET, FILM COATED ORAL
Qty: 6 TABLET | Refills: 0 | Status: SHIPPED | OUTPATIENT
Start: 2020-02-28 | End: 2020-05-22

## 2020-02-28 RX ORDER — ESCITALOPRAM OXALATE 20 MG/1
TABLET ORAL
Qty: 90 TABLET | Refills: 0 | Status: SHIPPED | OUTPATIENT
Start: 2020-02-28 | End: 2020-06-01

## 2020-02-28 RX ORDER — ALBUTEROL SULFATE 90 UG/1
2 AEROSOL, METERED RESPIRATORY (INHALATION) EVERY 6 HOURS PRN
Qty: 1 INHALER | Refills: 0 | Status: SHIPPED | OUTPATIENT
Start: 2020-02-28 | End: 2020-03-14

## 2020-02-29 NOTE — PROGRESS NOTES
"Flu Symptoms      Subjective   Di Lopez is a 59 y.o. female.     Flu Symptoms   This is a new problem. Episode onset: 2 days ago. The problem occurs constantly. The problem has been gradually worsening. Associated symptoms include congestion, coughing, fatigue, headaches, myalgias and a sore throat. Pertinent negatives include no abdominal pain, arthralgias, chest pain, chills, fever, nausea, numbness, rash, swollen glands, vomiting or weakness. Nothing aggravates the symptoms. She has tried nothing for the symptoms. The treatment provided no relief.      Patient reports onset of symptoms \"about 2 days ago\", with worsening productive cough, nasal congestion, sneezing, mild sore throat, and headaches. She denies fever, nausea, vomiting, or diarrhea, however she does report exposure to the flu this past week.    Patient Active Problem List   Diagnosis   • Arteriovenous malformation of gastrointestinal tract   • Depression   • Type 2 diabetes mellitus (CMS/HCC)   • Dyslipidemia   • Emphysema/COPD (CMS/HCC)   • Hypertension   • Insomnia   • Morbid obesity (CMS/HCC)   • Low back pain   • Obstructive sleep apnea syndrome   • Long term current use of anticoagulant   • Normocytic anemia   • Hand paresthesia   • Sliding hiatal hernia   • Dyspnea   • Vitamin B12 deficiency   • Rash and nonspecific skin eruption   • Cough   • Iron deficiency   • Strep pharyngitis   • Iron malabsorption       Allergies   Allergen Reactions   • Codeine Itching and Nausea And Vomiting   • Latex Other (See Comments)     Pulls skin off   • Penicillins Hives   • Sulfa Antibiotics Nausea And Vomiting        Current Outpatient Medications on File Prior to Visit   Medication Sig Dispense Refill   • carvedilol (COREG) 12.5 MG tablet 12.5 mg 2 (Two) Times a Day With Meals.     • cholecalciferol (VITAMIN D3) 25 MCG (1000 UT) tablet Take 2,000 Units by mouth Daily.     • escitalopram (LEXAPRO) 20 MG tablet Take 1 tablet by mouth once daily 90 " tablet 0   • ferrous sulfate 324 (65 Fe) MG tablet delayed-release EC tablet Take 324 mg by mouth Daily With Breakfast.     • Fluticasone Furoate-Vilanterol (BREO ELLIPTA) 100-25 MCG/INH aerosol powder  Inhale 1 puff Daily. 1 each 6   • folic acid (FOLVITE) 400 MCG tablet Take 800 mcg by mouth Daily.     • furosemide (LASIX) 40 MG tablet Take 1 tablet by mouth daily.     • glimepiride (AMARYL) 4 MG tablet TAKE 1 TABLET BY MOUTH ONCE DAILY IN THE MORNING BEFORE BREAKFAST 30 tablet 0   • HYDROcodone-acetaminophen (NORCO) 7.5-325 MG per tablet TAKE 1  TABLET BY MOUTH 2-3 TIMES DAILY AS NEEDED     • LANTUS SOLOSTAR 100 UNIT/ML injection pen INJECT 50 UNITS SUBCUTANEOUSLY ONCE DAILY 45 mL 0   • Omega-3 Fatty Acids (FISH OIL PO) Take  by mouth.     • ONGLYZA 5 MG tablet TAKE 1 TABLET BY MOUTH ONCE DAILY 30 tablet 0   • sacubitril-valsartan (ENTRESTO) 24-26 MG tablet Take 1 tablet by mouth 2 (Two) Times a Day.     • spironolactone (ALDACTONE) 25 MG tablet Take 25 mg by mouth Daily.  0   • warfarin (COUMADIN) 1 MG tablet Take 1 mg by mouth Daily As Needed.  5   • warfarin (COUMADIN) 4 MG tablet Take 4.5 mg by mouth Daily.  2   • Alcohol Swabs (ALCOHOL PREP) 70 % pads      • ASSURE COMFORT LANCETS 30G misc Use 2 times a day E11.9 100 each 5   • Blood Glucose Calibration (OT ULTRA/FASTTK CNT SOLN) solution      • Blood Glucose Monitoring Suppl (ONE TOUCH ULTRA 2) W/DEVICE kit      • Insulin Pen Needle (RELION PEN NEEDLES) 32G X 4 MM misc Use BID for E11.9 100 each 1   • Lancet Devices (SIMPLE DIAGNOSTICS LANCING DEV) misc      • omeprazole (priLOSEC) 20 MG capsule Take 1 capsule by mouth Daily. 30 capsule 5   • ONE TOUCH ULTRA TEST test strip      • RELION PEN NEEDLES 32G X 4 MM misc USE TWICE DAILY     • rOPINIRole (REQUIP) 0.5 MG tablet Take 1 tablet by mouth Every Night. Take 1 hour before bedtime. 30 tablet 5   • traZODone (DESYREL) 100 MG tablet Take 1 tablet by mouth Every Night. 30 tablet 3     No current  facility-administered medications on file prior to visit.        Past Medical History:   Diagnosis Date   • Arteriovenous malformation of gastrointestinal tract 5/12/2016   • Arthritis    • Black hairy tongue    • CHF (congestive heart failure) (CMS/[facility])    • Chronic back pain    • Depression 5/12/2016   • Diabetes mellitus (CMS/HCC)    • Dyslipidemia 5/12/2016   • Emphysema/COPD (CMS/HCC) 5/12/2016   • Fatigue    • Headache    • Herniated cervical disc    • History of echocardiogram 07/16/2013    Left ventricular systolic function at the lower limits of normal; EF 50-55%; mild MR; mechanical prosthetic mitral valve present; mod TR   • Hypertension    • Insomnia 5/12/2016   • Iron deficiency     A.  The patient was treated with IV iron replacement. B.  Secondary to GI bleed, status post EGD with cauterization of blood vessel to the duodenum, 7/16/2014.   • Left shoulder strain    • Liver mass    • Long term current use of anticoagulant 5/12/2016   • Morbid obesity (CMS/[facility]) 5/12/2016   • Obstructive sleep apnea syndrome 5/12/2016    Description: A.  Moderate; CPAP therapy..   • Pulmonary hypertension (CMS/[facility])    • Sliding hiatal hernia 7/28/2016    Description: A.  Reported on EGD, 12/30/2011.   • Type 2 diabetes mellitus (CMS/[facility]) 5/12/2016   • Vitamin B12 deficiency     A.  The patient was treated with vitamin B12 replacement.       Past Surgical History:   Procedure Laterality Date   • BACK SURGERY     • BILATERAL OOPHORECTOMY  2009    Status post bilateral oophorectomy secondary to ovarian cysts    • BREAST BIOPSY Right     STEREOTACTIC   • BREAST EXCISIONAL BIOPSY Bilateral    • COLONOSCOPY  12/28/2012   • CYSTECTOMY      removal of ganglion cyst from left wrist   • ENDOSCOPY  12/30/2011    DIAGNOSTIC ESOPHAGOGASTRODUODENOSCOPY   • HERNIA REPAIR     • HYSTERECTOMY  1995    A.  Status post hysterectomy for early stage endometrial cancer done in 1995.   • MITRAL VALVE REPLACEMENT  03/03/2008    Dr. Rabago  [place].    • OOPHORECTOMY         Family History   Problem Relation Age of Onset   • Sudden death Mother         POSSIBLE SERIOUS MASSIVE STROKE   • Multiple myeloma Father    • Ovarian cancer Paternal Grandmother    • Breast cancer Neg Hx        Social History     Socioeconomic History   • Marital status: Single     Spouse name: Not on file   • Number of children: Not on file   • Years of education: Not on file   • Highest education level: Not on file   Tobacco Use   • Smoking status: Former Smoker     Packs/day: 0.50     Years: 33.00     Pack years: 16.50     Types: Electronic Cigarette, Cigarettes     Last attempt to quit: 2010     Years since quitting: 10.1   • Smokeless tobacco: Never Used   • Tobacco comment: Quit in    Substance and Sexual Activity   • Alcohol use: No   • Drug use: No       Travel:  No recent travel within the last 21 days outside the [place] Denies recent travel to one of the following [place]  Countries:  [place], [place], [place], or [place].  Denies contact with anyone who has traveled to one of the following [place]  Countries: [place], [place], [place], or [place] within the last 21 days and is known or suspected to have Ebola.  Denies having had any contact with the human remains, blood or any bodily fluids of someone who is known or suspected to have Ebola within the last 21 days.     OB History        1    Para   1    Term   1            AB        Living           SAB        TAB        Ectopic        Molar        Multiple        Live Births                    Review of Systems   Constitutional: Positive for fatigue. Negative for activity change, appetite change, chills and fever.   HENT: Positive for congestion, postnasal drip, rhinorrhea, sneezing and sore throat. Negative for ear discharge, ear pain, sinus pressure, sinus pain, tinnitus, trouble swallowing and voice change.    Eyes: Negative for pain.   Respiratory: Positive for cough, chest tightness  "(\"feels like I can't get a deep breath\") and shortness of breath (with exertion). Negative for wheezing.    Cardiovascular: Negative for chest pain.   Gastrointestinal: Negative for abdominal pain, diarrhea, nausea and vomiting.   Musculoskeletal: Positive for myalgias. Negative for arthralgias.   Skin: Negative for rash.   Neurological: Positive for headaches. Negative for dizziness, facial asymmetry, weakness, light-headedness and numbness.   All other systems reviewed and are negative.      /68 (BP Location: Left arm, Patient Position: Sitting, Cuff Size: Adult)   Pulse 97   Temp 98.2 °F (36.8 °C) (Oral)   Resp 16   Ht 123.2 cm (48.5\")   Wt 25.5 kg (56 lb 3.2 oz)   LMP 06/17/1998   SpO2 97%   Breastfeeding No   BMI 16.80 kg/m²     Objective   Physical Exam   Constitutional: She is oriented to person, place, and time. She appears well-developed and well-nourished. No distress.   HENT:   Head: Normocephalic and atraumatic.   Right Ear: Hearing, external ear and ear canal normal. Tympanic membrane is erythematous.   Left Ear: Hearing, external ear and ear canal normal. Tympanic membrane is erythematous.   Nose: Mucosal edema and rhinorrhea present. Right sinus exhibits no maxillary sinus tenderness and no frontal sinus tenderness. Left sinus exhibits no maxillary sinus tenderness and no frontal sinus tenderness.   Mouth/Throat: Uvula is midline and mucous membranes are normal. Posterior oropharyngeal erythema present. No oropharyngeal exudate, posterior oropharyngeal edema or tonsillar abscesses.   Eyes: Pupils are equal, round, and reactive to light. Conjunctivae and EOM are normal. Right eye exhibits no discharge. Left eye exhibits no discharge. No scleral icterus.   Neck: Normal range of motion. Neck supple.   Cardiovascular: Normal rate, regular rhythm and normal heart sounds.   Pulmonary/Chest: Effort normal. No stridor. No tachypnea. No respiratory distress. She has decreased breath sounds " (bilaterally throughout). She has no wheezes. She has no rhonchi. She has no rales.   Deep congested cough noted on exam, productive of thick green sputum   Musculoskeletal: Normal range of motion.   Lymphadenopathy:     She has no cervical adenopathy.   Neurological: She is alert and oriented to person, place, and time.   Skin: Skin is warm and dry. No rash noted. She is not diaphoretic.   Psychiatric: She has a normal mood and affect. Her behavior is normal.   Vitals reviewed.      Assessment/Plan   Di was seen today for flu symptoms.    Diagnoses and all orders for this visit:    Acute URI  -     azithromycin (ZITHROMAX Z-SHYANN) 250 MG tablet; Take 2 tablets the first day, then 1 tablet daily for 4 days.  -     albuterol sulfate  (90 Base) MCG/ACT inhaler; Inhale 2 puffs Every 6 (Six) Hours As Needed for Shortness of Air for up to 15 days.  -     guaiFENesin (MUCINEX) 600 MG 12 hr tablet; Take 2 tablets by mouth 2 (Two) Times a Day for 10 days.    Flu-like symptoms  -     POC Influenza A / B      Negative for flu; instructed to rest, get plenty of clear, decaffeinated fluids; Tylenol for pain; monitor blood sugar closely, take abx to completion. Discussed need for patient to monitor her INR closely while taking abx; patient states she has her own monitor at home and checks her INR via fingerstick regularly; follow up with PCP for no improvement in 2-3 days; go to ER for new or worsening symptoms. Verbalized understanding.    Results for orders placed or performed in visit on 02/28/20   POC Influenza A / B   Result Value Ref Range    Rapid Influenza A Ag Negative Negative    Rapid Influenza B Ag Negative Negative    Internal Control Passed Passed    Lot Number [id number]     Expiration Date 05/06/2022        No follow-ups on file.

## 2020-02-29 NOTE — PATIENT INSTRUCTIONS
"Upper Respiratory Infection, Adult  An upper respiratory infection (URI) affects the nose, throat, and upper air passages. URIs are caused by germs (viruses). The most common type of URI is often called \"the common cold.\"  Medicines cannot cure URIs, but you can do things at home to relieve your symptoms. URIs usually get better within 7-10 days.  Follow these instructions at home:  Activity  · Rest as needed.  · If you have a fever, stay home from work or school until your fever is gone, or until your doctor says you may return to work or school.  ? You should stay home until you cannot spread the infection anymore (you are not contagious).  ? Your doctor may have you wear a face mask so you have less risk of spreading the infection.  Relieving symptoms  · Gargle with a salt-water mixture 3-4 times a day or as needed. To make a salt-water mixture, completely dissolve ½-1 tsp of salt in 1 cup of warm water.  · Use a cool-mist humidifier to add moisture to the air. This can help you breathe more easily.  Eating and drinking    · Drink enough fluid to keep your pee (urine) pale yellow.  · Eat soups and other clear broths.  General instructions    · Take over-the-counter and prescription medicines only as told by your doctor. These include cold medicines, fever reducers, and cough suppressants.  · Do not use any products that contain nicotine or tobacco. These include cigarettes and e-cigarettes. If you need help quitting, ask your doctor.  · Avoid being where people are smoking (avoid secondhand smoke).  · Make sure you get regular shots and get the flu shot every year.  · Keep all follow-up visits as told by your doctor. This is important.  How to avoid spreading infection to others    · Wash your hands often with soap and water. If you do not have soap and water, use hand .  · Avoid touching your mouth, face, eyes, or nose.  · Cough or sneeze into a tissue or your sleeve or elbow. Do not cough or sneeze " "into your hand or into the air.  Contact a doctor if:  · You are getting worse, not better.  · You have any of these:  ? A fever.  ? Chills.  ? Brown or red mucus in your nose.  ? Yellow or brown fluid (discharge)coming from your nose.  ? Pain in your face, especially when you bend forward.  ? Swollen neck glands.  ? Pain with swallowing.  ? White areas in the back of your throat.  Get help right away if:  · You have shortness of breath that gets worse.  · You have very bad or constant:  ? Headache.  ? Ear pain.  ? Pain in your forehead, behind your eyes, and over your cheekbones (sinus pain).  ? Chest pain.  · You have long-lasting (chronic) lung disease along with any of these:  ? Wheezing.  ? Long-lasting cough.  ? Coughing up blood.  ? A change in your usual mucus.  · You have a stiff neck.  · You have changes in your:  ? Vision.  ? Hearing.  ? Thinking.  ? Mood.  Summary  · An upper respiratory infection (URI) is caused by a germ called a virus. The most common type of URI is often called \"the common cold.\"  · URIs usually get better within 7-10 days.  · Take over-the-counter and prescription medicines only as told by your doctor.  This information is not intended to replace advice given to you by your health care provider. Make sure you discuss any questions you have with your health care provider.  Document Released: 06/05/2009 Document Revised: 12/26/2019 Document Reviewed: 08/10/2018  [employer] Patient Education © 2020 [employer]    "

## 2020-03-09 RX ORDER — CHLORAL HYDRATE 500 MG
1000 CAPSULE ORAL 4 TIMES DAILY
Qty: 120 CAPSULE | Refills: 2 | Status: SHIPPED | OUTPATIENT
Start: 2020-03-09 | End: 2020-04-20 | Stop reason: SDUPTHER

## 2020-03-18 RX ORDER — PEN NEEDLE, DIABETIC 32GX 5/32"
NEEDLE, DISPOSABLE MISCELLANEOUS
Qty: 100 EACH | Refills: 0 | Status: SHIPPED | OUTPATIENT
Start: 2020-03-18 | End: 2020-07-08

## 2020-03-27 RX ORDER — GLIMEPIRIDE 4 MG/1
TABLET ORAL
Qty: 30 TABLET | Refills: 0 | Status: SHIPPED | OUTPATIENT
Start: 2020-03-27 | End: 2020-05-04

## 2020-03-27 RX ORDER — SAXAGLIPTIN 5 MG/1
TABLET, FILM COATED ORAL
Qty: 30 TABLET | Refills: 0 | Status: SHIPPED | OUTPATIENT
Start: 2020-03-27 | End: 2020-04-21

## 2020-04-20 RX ORDER — CHLORAL HYDRATE 500 MG
1000 CAPSULE ORAL 4 TIMES DAILY
Qty: 120 CAPSULE | Refills: 2 | Status: SHIPPED | OUTPATIENT
Start: 2020-04-20

## 2020-04-21 RX ORDER — SAXAGLIPTIN 5 MG/1
TABLET, FILM COATED ORAL
Qty: 30 TABLET | Refills: 0 | Status: SHIPPED | OUTPATIENT
Start: 2020-04-21 | End: 2020-06-01

## 2020-05-04 RX ORDER — GLIMEPIRIDE 4 MG/1
TABLET ORAL
Qty: 30 TABLET | Refills: 0 | Status: SHIPPED | OUTPATIENT
Start: 2020-05-04 | End: 2020-06-01

## 2020-05-05 ENCOUNTER — TELEPHONE (OUTPATIENT)
Dept: FAMILY MEDICINE CLINIC | Facility: CLINIC | Age: 60
End: 2020-05-05

## 2020-05-05 NOTE — TELEPHONE ENCOUNTER
Patient called requesting an appt to come into the office to have a diabetic checkup with labs. She stated that her blood sugar has been in the 280 range.   She stated she had been tested for COVID19 last week and was negative, denied symptoms.   Per [facility] procedure I am not able to schedule in office visit if patient has been tested for COVID19.    Please call patient and advise [phone]

## 2020-05-18 ENCOUNTER — TELEPHONE (OUTPATIENT)
Dept: FAMILY MEDICINE CLINIC | Facility: CLINIC | Age: 60
End: 2020-05-18

## 2020-05-18 NOTE — TELEPHONE ENCOUNTER
PT CALLED AND CANCELED TODAYS APPT AND WANTED TO KNOW IF SHE COULD GET LABS ONLY FOR THE YEAR AND NOT COME SEE THE PHYSICIAN; PLEASE ADVISE    NEHA: [phone]

## 2020-05-19 RX ORDER — INSULIN GLARGINE 100 [IU]/ML
INJECTION, SOLUTION SUBCUTANEOUS
Qty: 45 ML | Refills: 0 | Status: SHIPPED | OUTPATIENT
Start: 2020-05-19 | End: 2020-08-03

## 2020-05-22 ENCOUNTER — OFFICE VISIT (OUTPATIENT)
Dept: FAMILY MEDICINE CLINIC | Facility: CLINIC | Age: 60
End: 2020-05-22

## 2020-05-22 VITALS
SYSTOLIC BLOOD PRESSURE: 106 MMHG | WEIGHT: 212.6 LBS | OXYGEN SATURATION: 94 % | HEIGHT: 55 IN | DIASTOLIC BLOOD PRESSURE: 62 MMHG | RESPIRATION RATE: 16 BRPM | TEMPERATURE: 97.1 F | HEART RATE: 85 BPM | BODY MASS INDEX: 49.2 KG/M2

## 2020-05-22 DIAGNOSIS — F41.1 GENERALIZED ANXIETY DISORDER: ICD-10-CM

## 2020-05-22 DIAGNOSIS — E11.9 DIABETES MELLITUS WITHOUT COMPLICATION (HCC): ICD-10-CM

## 2020-05-22 DIAGNOSIS — K21.9 GASTROESOPHAGEAL REFLUX DISEASE WITHOUT ESOPHAGITIS: ICD-10-CM

## 2020-05-22 DIAGNOSIS — R53.83 OTHER FATIGUE: ICD-10-CM

## 2020-05-22 DIAGNOSIS — Z79.01 LONG TERM CURRENT USE OF ANTICOAGULANT: Primary | ICD-10-CM

## 2020-05-22 DIAGNOSIS — E61.1 IRON DEFICIENCY: ICD-10-CM

## 2020-05-22 DIAGNOSIS — R05.9 COUGH: ICD-10-CM

## 2020-05-22 DIAGNOSIS — R51.9 LEFT TEMPORAL HEADACHE: ICD-10-CM

## 2020-05-22 DIAGNOSIS — R06.02 SHORTNESS OF BREATH: ICD-10-CM

## 2020-05-22 LAB
BASOPHILS # BLD AUTO: 0.04 10*3/MM3 (ref 0–0.2)
BASOPHILS NFR BLD AUTO: 0.5 % (ref 0–1.5)
BILIRUB BLD-MCNC: NEGATIVE MG/DL
CLARITY, POC: CLEAR
COLOR UR: YELLOW
DEPRECATED RDW RBC AUTO: 40.4 FL (ref 37–54)
EOSINOPHIL # BLD AUTO: 0.15 10*3/MM3 (ref 0–0.4)
EOSINOPHIL NFR BLD AUTO: 1.9 % (ref 0.3–6.2)
ERYTHROCYTE [DISTWIDTH] IN BLOOD BY AUTOMATED COUNT: 12.9 % (ref 12.3–15.4)
ERYTHROCYTE [SEDIMENTATION RATE] IN BLOOD: 30 MM/HR (ref 0–30)
GLUCOSE UR STRIP-MCNC: ABNORMAL MG/DL
HBA1C MFR BLD: 9.9 %
HCT VFR BLD AUTO: 38.3 % (ref 34–46.6)
HGB BLD-MCNC: 12.7 G/DL (ref 12–15.9)
IMM GRANULOCYTES # BLD AUTO: 0.03 10*3/MM3 (ref 0–0.05)
IMM GRANULOCYTES NFR BLD AUTO: 0.4 % (ref 0–0.5)
INR PPP: 4.3 (ref 0.9–1.1)
KETONES UR QL: NEGATIVE
LEUKOCYTE EST, POC: ABNORMAL
LYMPHOCYTES # BLD AUTO: 1.82 10*3/MM3 (ref 0.7–3.1)
LYMPHOCYTES NFR BLD AUTO: 23.1 % (ref 19.6–45.3)
MCH RBC QN AUTO: 28.7 PG (ref 26.6–33)
MCHC RBC AUTO-ENTMCNC: 33.2 G/DL (ref 31.5–35.7)
MCV RBC AUTO: 86.5 FL (ref 79–97)
MONOCYTES # BLD AUTO: 0.52 10*3/MM3 (ref 0.1–0.9)
MONOCYTES NFR BLD AUTO: 6.6 % (ref 5–12)
NEUTROPHILS # BLD AUTO: 5.31 10*3/MM3 (ref 1.7–7)
NEUTROPHILS NFR BLD AUTO: 67.5 % (ref 42.7–76)
NITRITE UR-MCNC: NEGATIVE MG/ML
NRBC BLD AUTO-RTO: 0 /100 WBC (ref 0–0.2)
PH UR: 6 [PH] (ref 5–8)
PLATELET # BLD AUTO: 94 10*3/MM3 (ref 140–450)
PMV BLD AUTO: 12.1 FL (ref 6–12)
PROT UR STRIP-MCNC: NEGATIVE MG/DL
RBC # BLD AUTO: 4.43 10*6/MM3 (ref 3.77–5.28)
RBC # UR STRIP: NEGATIVE /UL
SP GR UR: 1.01 (ref 1–1.03)
UROBILINOGEN UR QL: NORMAL
WBC NRBC COR # BLD: 7.87 10*3/MM3 (ref 3.4–10.8)

## 2020-05-22 PROCEDURE — 87086 URINE CULTURE/COLONY COUNT: CPT | Performed by: FAMILY MEDICINE

## 2020-05-22 PROCEDURE — 87077 CULTURE AEROBIC IDENTIFY: CPT | Performed by: FAMILY MEDICINE

## 2020-05-22 PROCEDURE — 84443 ASSAY THYROID STIM HORMONE: CPT | Performed by: FAMILY MEDICINE

## 2020-05-22 PROCEDURE — 85610 PROTHROMBIN TIME: CPT | Performed by: FAMILY MEDICINE

## 2020-05-22 PROCEDURE — 83036 HEMOGLOBIN GLYCOSYLATED A1C: CPT | Performed by: FAMILY MEDICINE

## 2020-05-22 PROCEDURE — 85025 COMPLETE CBC W/AUTO DIFF WBC: CPT | Performed by: FAMILY MEDICINE

## 2020-05-22 PROCEDURE — 99214 OFFICE O/P EST MOD 30 MIN: CPT | Performed by: FAMILY MEDICINE

## 2020-05-22 PROCEDURE — 83540 ASSAY OF IRON: CPT | Performed by: FAMILY MEDICINE

## 2020-05-22 PROCEDURE — 85652 RBC SED RATE AUTOMATED: CPT | Performed by: FAMILY MEDICINE

## 2020-05-22 PROCEDURE — 80061 LIPID PANEL: CPT | Performed by: FAMILY MEDICINE

## 2020-05-22 PROCEDURE — 82164 ANGIOTENSIN I ENZYME TEST: CPT | Performed by: FAMILY MEDICINE

## 2020-05-22 PROCEDURE — 81002 URINALYSIS NONAUTO W/O SCOPE: CPT | Performed by: FAMILY MEDICINE

## 2020-05-22 PROCEDURE — 36415 COLL VENOUS BLD VENIPUNCTURE: CPT | Performed by: FAMILY MEDICINE

## 2020-05-22 PROCEDURE — 87186 SC STD MICRODIL/AGAR DIL: CPT | Performed by: FAMILY MEDICINE

## 2020-05-22 PROCEDURE — 80053 COMPREHEN METABOLIC PANEL: CPT | Performed by: FAMILY MEDICINE

## 2020-05-22 RX ORDER — ALBUTEROL SULFATE 90 UG/1
2 AEROSOL, METERED RESPIRATORY (INHALATION) EVERY 4 HOURS PRN
Qty: 1 INHALER | Refills: 3 | Status: SHIPPED | OUTPATIENT
Start: 2020-05-22 | End: 2021-10-06

## 2020-05-22 RX ORDER — OMEPRAZOLE 20 MG/1
20 CAPSULE, DELAYED RELEASE ORAL DAILY
Qty: 30 CAPSULE | Refills: 1 | Status: SHIPPED | OUTPATIENT
Start: 2020-05-22 | End: 2020-08-21

## 2020-05-22 RX ORDER — BUPROPION HYDROCHLORIDE 150 MG/1
150 TABLET ORAL EVERY MORNING
Qty: 30 TABLET | Refills: 1 | Status: SHIPPED | OUTPATIENT
Start: 2020-05-22 | End: 2020-07-15

## 2020-05-22 NOTE — PROGRESS NOTES
Subjective   Di Lopez is a 59 y.o. female.   Pt needs PT/INR check. Is on Coumadin for artificial heart valve.    Headache    This is a recurrent problem. The current episode started more than 1 year ago. The problem occurs intermittently. The pain is located in the left unilateral region. The pain does not radiate. Associated symptoms include coughing and insomnia. Pertinent negatives include no abdominal pain, blurred vision, dizziness, fever, nausea, numbness, sore throat or weight loss. Her past medical history is significant for obesity. (Artificial heart valve)   Cough   This is a chronic problem. Episode onset: 6 months. The problem has been unchanged. The problem occurs every few minutes. The cough is productive of sputum. Associated symptoms include headaches and shortness of breath. Pertinent negatives include no chest pain, fever, myalgias, postnasal drip, rash, sore throat, weight loss or wheezing. Nothing aggravates the symptoms. Risk factors for lung disease include animal exposure. She has tried nothing for the symptoms. The treatment provided no relief. artificial heart valve   Diabetes   She presents for her follow-up (On Lantus 50, onglyza, and glimeperide) diabetic visit. She has type 2 diabetes mellitus. Her disease course has been stable. Hypoglycemia symptoms include headaches. Pertinent negatives for hypoglycemia include no dizziness or nervousness/anxiousness. There are no diabetic associated symptoms. Pertinent negatives for diabetes include no blurred vision, no chest pain, no fatigue, no polydipsia, no polyphagia, no polyuria and no weight loss. There are no hypoglycemic complications. Symptoms are stable. There are no diabetic complications. Risk factors for coronary artery disease include diabetes mellitus, dyslipidemia, family history, obesity, hypertension, sedentary lifestyle and post-menopausal. Current diabetic treatment includes insulin injections and oral agent (dual  therapy) (Lantus 50 units qd). She is compliant with treatment all of the time. Her weight is stable. She is following a generally healthy diet. She has not had a previous visit with a dietitian. She rarely participates in exercise. There is no change in her home blood glucose trend. An ACE inhibitor/angiotensin II receptor blocker is not being taken. She does not see a podiatrist.Eye exam is not current.   Hypertension   This is a chronic (follows with Cardiology) problem. The current episode started more than 1 year ago. The problem is unchanged. The problem is uncontrolled. Associated symptoms include headaches and shortness of breath. Pertinent negatives include no blurred vision, chest pain, palpitations, peripheral edema or PND. Risk factors for coronary artery disease include diabetes mellitus, dyslipidemia, family history, obesity, post-menopausal state, sedentary lifestyle and stress. Past treatments include beta blockers and diuretics. Current antihypertension treatment includes beta blockers and diuretics. The current treatment provides moderate improvement. There are no compliance problems.    Depression   Visit Type: follow-up (takes Lexapro and does not think it is helping)  Patient presents with the following symptoms: depressed mood, fatigue, insomnia, irritability, malaise and shortness of breath.  Patient is not experiencing: anhedonia, excessive worry, nervousness/anxiety, palpitations, panic, suicidal ideas, suicidal planning, thoughts of death, weight gain and weight loss.  Frequency of symptoms: rarely   Severity: mild   Sleep quality: fair  Nighttime awakenings: occasional  Compliance with medications:  %        Hyperlipidemia   This is a chronic problem. The current episode started more than 1 year ago. The problem is uncontrolled. Recent lipid tests were reviewed and are high. Exacerbating diseases include diabetes, liver disease and obesity. Associated symptoms include shortness of  breath. Pertinent negatives include no chest pain or myalgias. Treatments tried: Unable to take statin due to liver disease. There are no compliance problems.  Risk factors for coronary artery disease include diabetes mellitus, dyslipidemia, obesity, hypertension and post-menopausal.   Has had trouble swallowing and esophageal problems.  Has been following with Cardiology regarding PT/INR.    The following portions of the patient's history were reviewed and updated as appropriate: allergies, current medications, past family history, past medical history, past social history, past surgical history and problem list.  Vitals:    05/22/20 1334   BP: 106/62   Pulse: 85   Resp: 16   Temp: 97.1 °F (36.2 °C)   SpO2: 94%     Body mass index is 63.55 kg/m².  Review of Systems   Constitutional: Positive for irritability. Negative for activity change, fatigue, fever, unexpected weight gain and unexpected weight loss.   HENT: Negative for congestion, postnasal drip, sneezing and sore throat.    Eyes: Negative.  Negative for blurred vision, double vision and visual disturbance.   Respiratory: Positive for cough and shortness of breath. Negative for chest tightness and wheezing.    Cardiovascular: Negative.  Negative for chest pain, palpitations, leg swelling and PND.   Gastrointestinal: Negative.  Negative for abdominal distention, abdominal pain, blood in stool, constipation, diarrhea and nausea.   Endocrine: Negative.  Negative for cold intolerance, heat intolerance, polydipsia, polyphagia and polyuria.   Genitourinary: Negative.  Negative for dysuria, frequency, urgency and urinary incontinence.   Musculoskeletal: Negative.  Negative for arthralgias and myalgias.   Skin: Negative.  Negative for rash.   Allergic/Immunologic: Negative.    Neurological: Negative for dizziness, syncope, numbness and memory problem.   Hematological: Negative.  Negative for adenopathy.   Psychiatric/Behavioral: Positive for depressed mood. Negative  for suicidal ideas. The patient has insomnia. The patient is not nervous/anxious.    All other systems reviewed and are negative.      Objective   Physical Exam   Constitutional: She is oriented to person, place, and time. She appears well-developed and well-nourished. No distress.   HENT:   Right Ear: External ear normal.   Left Ear: External ear normal.   Nose: Nose normal.   Mouth/Throat: Oropharynx is clear and moist.   Eyes: Pupils are equal, round, and reactive to light. Conjunctivae and EOM are normal.   Neck: Normal range of motion. Neck supple. No thyromegaly present.   Cardiovascular: Normal rate, regular rhythm and normal heart sounds.   No murmur heard.  Pulmonary/Chest: Effort normal and breath sounds normal. No respiratory distress. She has no wheezes.   Abdominal: Soft. Bowel sounds are normal. She exhibits no distension and no mass. There is no tenderness. There is no rebound and no guarding. No hernia.   Musculoskeletal: Normal range of motion. She exhibits no edema or tenderness.   Lymphadenopathy:     She has no cervical adenopathy.   Neurological: She is alert and oriented to person, place, and time. She has normal reflexes.   Skin: Skin is warm and dry. No rash noted. She is not diaphoretic. No erythema. No pallor.   Psychiatric: She has a normal mood and affect. Her behavior is normal. Judgment and thought content normal.   Nursing note and vitals reviewed.      Today INR is 4.3. Pt notified Cardiology for dosing of Coumadin.    Assessment/Plan   Di was seen today for headache and cough.    Diagnoses and all orders for this visit:    Long term current use of anticoagulant  -     POC INR    Diabetes mellitus without complication (CMS/[facility])  -     POC Glycosylated Hemoglobin (Hb A1C)  -     Lipid Panel  -     POC Urinalysis Dipstick  -     Urine Culture - Urine, Urine, Clean Catch    Left temporal headache  -     Ambulatory Referral to Neurology  -     Sedimentation Rate    Cough  -     XR  Chest 2 View; Future  -     Angiotensin Converting Enzyme    Shortness of breath  -     CBC & Differential  -     Comprehensive Metabolic Panel  -     TSH  -     Fluticasone Furoate-Vilanterol (Breo Ellipta) 100-25 MCG/INH inhaler; Inhale 1 puff Daily.  -     CBC Auto Differential    Generalized anxiety disorder   -     TSH    Iron deficiency  -     Iron    Other fatigue   -     Urine Culture - Urine, Urine, Clean Catch    Gastroesophageal reflux disease without esophagitis    Other orders  -     albuterol sulfate  (90 Base) MCG/ACT inhaler; Inhale 2 puffs Every 4 (Four) Hours As Needed for Wheezing.  -     buPROPion XL (Wellbutrin XL) 150 MG 24 hr tablet; Take 1 tablet by mouth Every Morning.  -     omeprazole (PrilOSEC) 20 MG capsule; Take 1 capsule by mouth Daily.      Increase lantus to 55 units. Continue current meds.  F/U with Cardiology. She has Coumadin monitoring and dosing through them and has called them today regarding today's result.  F/U 6 weeks.

## 2020-05-23 LAB
ALBUMIN SERPL-MCNC: 4.3 G/DL (ref 3.5–5.2)
ALBUMIN/GLOB SERPL: 1.3 G/DL
ALP SERPL-CCNC: 131 U/L (ref 39–117)
ALT SERPL W P-5'-P-CCNC: 23 U/L (ref 1–33)
ANION GAP SERPL CALCULATED.3IONS-SCNC: 13 MMOL/L (ref 5–15)
AST SERPL-CCNC: 22 U/L (ref 1–32)
BILIRUB SERPL-MCNC: 0.2 MG/DL (ref 0.2–1.2)
BUN BLD-MCNC: 9 MG/DL (ref 6–20)
BUN/CREAT SERPL: 11.7 (ref 7–25)
CALCIUM SPEC-SCNC: 9.7 MG/DL (ref 8.6–10.5)
CHLORIDE SERPL-SCNC: 101 MMOL/L (ref 98–107)
CHOLEST SERPL-MCNC: 250 MG/DL (ref 0–200)
CO2 SERPL-SCNC: 26 MMOL/L (ref 22–29)
CREAT BLD-MCNC: 0.77 MG/DL (ref 0.57–1)
GFR SERPL CREATININE-BSD FRML MDRD: 93 ML/MIN/1.73
GLOBULIN UR ELPH-MCNC: 3.2 GM/DL
GLUCOSE BLD-MCNC: 297 MG/DL (ref 65–99)
HDLC SERPL-MCNC: 34 MG/DL (ref 40–60)
IRON 24H UR-MRATE: 110 MCG/DL (ref 37–145)
LDLC SERPL CALC-MCNC: 143 MG/DL (ref 0–100)
LDLC/HDLC SERPL: 4.21 {RATIO}
POTASSIUM BLD-SCNC: 4.5 MMOL/L (ref 3.5–5.2)
PROT SERPL-MCNC: 7.5 G/DL (ref 6–8.5)
SODIUM BLD-SCNC: 140 MMOL/L (ref 136–145)
TRIGL SERPL-MCNC: 365 MG/DL (ref 0–150)
TSH SERPL DL<=0.05 MIU/L-ACNC: 0.73 UIU/ML (ref 0.27–4.2)
VLDLC SERPL-MCNC: 73 MG/DL (ref 5–40)

## 2020-05-24 LAB
BACTERIA SPEC AEROBE CULT: ABNORMAL
BACTERIA SPEC AEROBE CULT: ABNORMAL

## 2020-05-26 DIAGNOSIS — R06.00 DYSPNEA, UNSPECIFIED TYPE: Primary | ICD-10-CM

## 2020-05-26 LAB — ACE SERPL-CCNC: 98 U/L (ref 14–82)

## 2020-05-26 RX ORDER — NITROFURANTOIN 25; 75 MG/1; MG/1
100 CAPSULE ORAL 2 TIMES DAILY
Qty: 20 CAPSULE | Refills: 0 | Status: SHIPPED | OUTPATIENT
Start: 2020-05-26 | End: 2020-08-21

## 2020-06-01 RX ORDER — SAXAGLIPTIN 5 MG/1
TABLET, FILM COATED ORAL
Qty: 30 TABLET | Refills: 2 | Status: SHIPPED | OUTPATIENT
Start: 2020-06-01 | End: 2020-08-24

## 2020-06-01 RX ORDER — GLIMEPIRIDE 4 MG/1
TABLET ORAL
Qty: 30 TABLET | Refills: 2 | Status: SHIPPED | OUTPATIENT
Start: 2020-06-01 | End: 2020-08-24

## 2020-06-01 RX ORDER — ESCITALOPRAM OXALATE 20 MG/1
TABLET ORAL
Qty: 90 TABLET | Refills: 0 | Status: SHIPPED | OUTPATIENT
Start: 2020-06-01 | End: 2020-08-24

## 2020-06-05 ENCOUNTER — HOSPITAL ENCOUNTER (OUTPATIENT)
Dept: GENERAL RADIOLOGY | Facility: HOSPITAL | Age: 60
Discharge: HOME | End: 2020-06-05
Admitting: FAMILY MEDICINE

## 2020-06-05 DIAGNOSIS — R05.9 COUGH: ICD-10-CM

## 2020-06-05 PROCEDURE — 71046 X-RAY EXAM CHEST 2 VIEWS: CPT

## 2020-06-15 DIAGNOSIS — Z01.812 PRE-OPERATIVE LABORATORY EXAMINATION: Primary | ICD-10-CM

## 2020-06-16 ENCOUNTER — LAB (OUTPATIENT)
Dept: PULMONOLOGY | Facility: CLINIC | Age: 60
End: 2020-06-16

## 2020-06-16 DIAGNOSIS — J43.9 PULMONARY EMPHYSEMA, UNSPECIFIED EMPHYSEMA TYPE (HCC): Primary | ICD-10-CM

## 2020-06-16 DIAGNOSIS — R05.9 COUGH: ICD-10-CM

## 2020-06-16 PROCEDURE — U0004 COV-19 TEST NON-CDC HGH THRU: HCPCS | Performed by: NURSE PRACTITIONER

## 2020-06-16 PROCEDURE — U0002 COVID-19 LAB TEST NON-CDC: HCPCS | Performed by: NURSE PRACTITIONER

## 2020-06-16 PROCEDURE — 99000 SPECIMEN HANDLING OFFICE-LAB: CPT | Performed by: NURSE PRACTITIONER

## 2020-06-17 LAB
REF LAB TEST METHOD: NORMAL
SARS-COV-2 RNA RESP QL NAA+PROBE: NOT DETECTED

## 2020-06-18 ENCOUNTER — OFFICE VISIT (OUTPATIENT)
Dept: PULMONOLOGY | Facility: CLINIC | Age: 60
End: 2020-06-18

## 2020-06-18 VITALS
HEART RATE: 69 BPM | BODY MASS INDEX: 63.18 KG/M2 | WEIGHT: 273 LBS | SYSTOLIC BLOOD PRESSURE: 128 MMHG | DIASTOLIC BLOOD PRESSURE: 68 MMHG | TEMPERATURE: 97.3 F | HEIGHT: 55 IN | OXYGEN SATURATION: 98 %

## 2020-06-18 DIAGNOSIS — J47.9 BRONCHIECTASIS WITHOUT COMPLICATION (HCC): ICD-10-CM

## 2020-06-18 DIAGNOSIS — J43.9 PULMONARY EMPHYSEMA, UNSPECIFIED EMPHYSEMA TYPE (HCC): Primary | ICD-10-CM

## 2020-06-18 DIAGNOSIS — Z78.9 ELECTRONIC CIGARETTE USE: ICD-10-CM

## 2020-06-18 DIAGNOSIS — R05.9 COUGH: ICD-10-CM

## 2020-06-18 PROCEDURE — 99214 OFFICE O/P EST MOD 30 MIN: CPT | Performed by: NURSE PRACTITIONER

## 2020-06-18 PROCEDURE — 94375 RESPIRATORY FLOW VOLUME LOOP: CPT | Performed by: NURSE PRACTITIONER

## 2020-06-18 NOTE — PROGRESS NOTES
[facility] Pulmonary Follow up    CHIEF COMPLAINT    Shortness of breath, cough    Subjective   HISTORY OF PRESENT ILLNESS    Di Lopez is a 59 y.o.female here today for shortness of breath, cough for about a year.     The cough is mostly when she lies down at night.  She is also had some occasional dysphasia symptoms.  She actually had an episode at [facility] where she choked and lost consciousness and someone had to help her.    The cough is largely nonproductive but occasionally with clear sputum.    She does complain of some chest tightness and occasional wheezing.  She does have quite a bit of dyspnea with activity.      She also follows up with  Transplant clinic for a Hepatic hemangioma status post embolization x2, non surgical candidate.  She currently has a pancreatic mass that they are in the middle of evaluating.  She has quite a bit of abdominal distention.    She also follows up with Dr. Patel for history of valvular heart disease, systolic heart failure, and pulmonary hypertension.  She stated on her last echocardiogram she had severe valve disease.  Her last echocardiogram in epic was from October 2019 with moderate to severe tricuspid regurg and severe hypokinesis with ejection fraction of 25%.        Patient Active Problem List   Diagnosis   • Arteriovenous malformation of gastrointestinal tract   • Depression   • Type 2 diabetes mellitus (CMS/HCC)   • Dyslipidemia   • Hypertension   • Insomnia   • Morbid obesity (CMS/HCC)   • Low back pain   • Obstructive sleep apnea syndrome   • Long term current use of anticoagulant   • Normocytic anemia   • Sliding hiatal hernia   • Vitamin B12 deficiency   • Cough   • Iron deficiency   • Strep pharyngitis   • Iron malabsorption   • Bronchiectasis without complication (CMS/HCC)   • Electronic cigarette use       Allergies   Allergen Reactions   • Codeine Itching and Nausea And Vomiting   • Latex Other (See Comments)     Pulls skin off   • Penicillins  Hives   • Sulfa Antibiotics Nausea And Vomiting       Current Outpatient Medications:   •  albuterol sulfate  (90 Base) MCG/ACT inhaler, Inhale 2 puffs Every 4 (Four) Hours As Needed for Wheezing., Disp: 1 inhaler, Rfl: 3  •  Alcohol Swabs (ALCOHOL PREP) 70 % pads, , Disp: , Rfl:   •  ASSURE COMFORT LANCETS 30G misc, Use 2 times a day E11.9, Disp: 100 each, Rfl: 5  •  Blood Glucose Calibration (OT ULTRA/FASTTK CNTRL SOLN) solution, , Disp: , Rfl:   •  Blood Glucose Monitoring Suppl (ONE TOUCH ULTRA 2) W/DEVICE kit, , Disp: , Rfl:   •  buPROPion XL (Wellbutrin XL) 150 MG 24 hr tablet, Take 1 tablet by mouth Every Morning., Disp: 30 tablet, Rfl: 1  •  carvedilol (COREG) 12.5 MG tablet, 12.5 mg 2 (Two) Times a Day With Meals., Disp: , Rfl:   •  cholecalciferol (VITAMIN D3) 25 MCG (1000 UT) tablet, Take 2,000 Units by mouth Daily., Disp: , Rfl:   •  escitalopram (LEXAPRO) 20 MG tablet, Take 1 tablet by mouth once daily, Disp: 90 tablet, Rfl: 0  •  ferrous sulfate 324 (65 Fe) MG tablet delayed-release EC tablet, Take 324 mg by mouth Daily With Breakfast., Disp: , Rfl:   •  Fluticasone Furoate-Vilanterol (Breo Ellipta) 100-25 MCG/INH inhaler, Inhale 1 puff Daily., Disp: 1 each, Rfl: 6  •  folic acid (FOLVITE) 400 MCG tablet, Take 800 mcg by mouth Daily., Disp: , Rfl:   •  furosemide (LASIX) 40 MG tablet, Take 1 tablet by mouth daily., Disp: , Rfl:   •  glimepiride (AMARYL) 4 MG tablet, TAKE 1 TABLET BY MOUTH ONCE DAILY IN THE MORNING BEFORE BREAKFAST, Disp: 30 tablet, Rfl: 2  •  HYDROcodone-acetaminophen (NORCO) 7.5-325 MG per tablet, TAKE 1  TABLET BY MOUTH 2-3 TIMES DAILY AS NEEDED, Disp: , Rfl:   •  Insulin Pen Needle (RELION PEN NEEDLES) 32G X 4 MM misc, Use BID for E11.9, Disp: 100 each, Rfl: 1  •  Lancet Devices ([employer] DIAGNOSTICS LANCING DEV) misc, , Disp: , Rfl:   •  LANTUS SOLOSTAR 100 UNIT/ML injection pen, INJECT 50 UNITS SUBCUTANEOUSLY ONCE DAILY, Disp: 45 mL, Rfl: 0  •  nitrofurantoin,  macrocrystal-monohydrate, (Macrobid) 100 MG capsule, Take 1 capsule by mouth 2 (Two) Times a Day., Disp: 20 capsule, Rfl: 0  •  Omega-3 Fatty Acids (FISH OIL) 1000 MG capsule capsule, Take 1 capsule by mouth 4 (Four) Times a Day., Disp: 120 capsule, Rfl: 2  •  omeprazole (priLOSEC) 20 MG capsule, Take 1 capsule by mouth Daily., Disp: 30 capsule, Rfl: 5  •  omeprazole (PrilOSEC) 20 MG capsule, Take 1 capsule by mouth Daily., Disp: 30 capsule, Rfl: 1  •  ONE TOUCH ULTRA TEST test strip, , Disp: , Rfl:   •  ONGLYZA 5 MG tablet, Take 1 tablet by mouth once daily, Disp: 30 tablet, Rfl: 2  •  RELION PEN NEEDLES 32G X 4 MM misc, Use twice daily, Disp: 100 each, Rfl: 0  •  rOPINIRole (REQUIP) 0.5 MG tablet, Take 1 tablet by mouth Every Night. Take 1 hour before bedtime., Disp: 30 tablet, Rfl: 5  •  sacubitril-valsartan (ENTRESTO) 24-26 MG tablet, Take 1 tablet by mouth 2 (Two) Times a Day., Disp: , Rfl:   •  spironolactone (ALDACTONE) 25 MG tablet, Take 25 mg by mouth Daily., Disp: , Rfl: 0  •  traZODone (DESYREL) 100 MG tablet, Take 1 tablet by mouth Every Night., Disp: 30 tablet, Rfl: 3  •  warfarin (COUMADIN) 4 MG tablet, Take 4.5 mg by mouth Daily., Disp: , Rfl: 2  •  warfarin (COUMADIN) 1 MG tablet, Take 1 mg by mouth Daily As Needed., Disp: , Rfl: 5  MEDICATION LIST AND ALLERGIES REVIEWED.    Social History     Tobacco Use   • Smoking status: Former Smoker     Packs/day: 0.50     Years: 33.00     Pack years: 16.50     Types: Electronic Cigarette, Cigarettes     Last attempt to quit: 2010     Years since quitting: 10.4   • Smokeless tobacco: Never Used   • Tobacco comment: Quit in 2010   Substance Use Topics   • Alcohol use: No   • Drug use: No       FAMILY AND SOCIAL HISTORY REVIEWED.    Review of Systems   Constitutional: Positive for activity change and fatigue. Negative for chills, fever and unexpected weight change.   HENT: Negative for congestion, nosebleeds, postnasal drip, rhinorrhea, sinus pressure and trouble  "swallowing.    Respiratory: Positive for shortness of breath. Negative for cough, chest tightness and wheezing.    Cardiovascular: Negative for chest pain and leg swelling.   Gastrointestinal: Positive for abdominal distention. Negative for abdominal pain, constipation, diarrhea, nausea and vomiting.   Genitourinary: Negative for dysuria, frequency, hematuria and urgency.   Musculoskeletal: Negative for myalgias.   Neurological: Negative for dizziness, weakness, numbness and headaches.   All other systems reviewed and are negative.  .  Objective   /68   Pulse 69   Temp 97.3 °F (36.3 °C)   Ht 121.9 cm (48\")   Wt 124 kg (273 lb)   LMP 06/17/1998   SpO2 98% Comment: 2 liters  BMI 83.31 kg/m²     Immunization History   Administered Date(s) Administered   • FLUARIX/FLUZONE/AFLURIA/FLULAVAL QUAD 11/25/2019   • Flu Mist 10/22/2014   • Flu Vaccine Quad PF >18YRS 11/25/2019   • Fluzone High Dose =>65 Years ([facility] ONLY) 10/22/2014   • Hepatitis A 11/13/2018, 07/09/2019   • Influenza Quad Vaccine (Inpatient) 10/21/2013, 09/06/2016   • Influenza TIV (IM) 02/02/2016   • PPD Test 06/20/2012   • Shingrix 07/09/2019, 11/25/2019   • Tdap 09/01/2010, 11/13/2018   • Zostavax 07/09/2019, 11/25/2019       Physical Exam   Constitutional: She is oriented to person, place, and time. She appears well-developed and well-nourished.   HENT:   Head: Normocephalic and atraumatic.   Eyes: Pupils are equal, round, and reactive to light. EOM are normal.   Neck: Normal range of motion. Neck supple.   Cardiovascular: Normal rate and regular rhythm.   No murmur heard.  Pulmonary/Chest: Effort normal and breath sounds normal. No respiratory distress. She has no wheezes. She has no rales.   Abdominal: Soft. Bowel sounds are normal. She exhibits distension.   Musculoskeletal: Normal range of motion. She exhibits no edema.   Neurological: She is alert and oriented to person, place, and time.   Skin: Skin is warm and dry. No erythema. "   Psychiatric: She has a normal mood and affect. Her behavior is normal.   Vitals reviewed.        RESULTS    PFTS in the office today, read by me:  No changes from 2018 with a FVC of 1.53, 68% predicted FEV1 of 1.15, 65% predicted and ratio of 75%.      EXAMINATION: XR CHEST 2 VIEWS - 06/05/2020      INDICATION: Cough, shortness of air.     COMPARISON: Chest x-ray dated 08/25/2017     FINDINGS: Cardiac size borderline enlarged status post median sternotomy  and valvular repair with central vascular congestion however no overt  edema or effusion. No pneumothorax.         IMPRESSION:  Stable borderline enlarged cardiac silhouette with mild  increase in central pulmonary vascularity and/or perihilar lung markings  without effusion. Consolidation separate from this peribronchial  involvement of potential bronchitis or reactive airways disease.     DICTATED:   06/05/2020  EDITED/ls :   06/05/2020      This report was finalized on 6/8/2020 9:51 AM by Dr. Lon Rosenbaum.           Assessment/Plan     PROBLEM LIST    Problem List Items Addressed This Visit        Respiratory    Cough    Bronchiectasis without complication (CMS/HCC)    RESOLVED: Emphysema/COPD (CMS/HCC) - Primary    Overview     No obstruction on her PFTs         Relevant Orders    Spirometry Without Bronchodilator (Completed)       Other    Electronic cigarette use            DISCUSSION    We did discuss that her shortness of breath is multifactorial given her underlying significant heart disease as well as now this liver and pancreatic mass causing some elevation of her diaphragm.  It likely is causing her worsening shortness of air.    Her pulmonary function test have not changed since 2018.    She does have some airway inflammation on her x-ray, likely from her history of bronchiectasis with this cough.  I suggested she continue on the inhaled corticosteroid, the Breo is an acceptable option.  She can continue on it for a few more weeks, if no improvement  we could switch her to a different inhaled steroid such as Symbicort.  I did discuss with her that it may take several weeks for this to improve since it is been going on for so long, and there is likely some chronic changes to her airway.    Follow up in 3 months     I spent 25 minutes face to face with the patient. I spent > 50% percent of this time counseling and discussing diagnostic testing, evaluation, current status, treatment options and management.    FRANCESCA Hernandez  06/18/20201:03 PM  Electronically signed     Please note that portions of this note were completed with a voice recognition program. Efforts were made to edit the dictations, but occasionally words are mistranscribed.      CC: Keri Little, DO

## 2020-06-22 ENCOUNTER — PATIENT OUTREACH (OUTPATIENT)
Dept: FAMILY MEDICINE CLINIC | Facility: CLINIC | Age: 60
End: 2020-06-22

## 2020-07-08 RX ORDER — FLURBIPROFEN SODIUM 0.3 MG/ML
SOLUTION/ DROPS OPHTHALMIC
Qty: 100 EACH | Refills: 0 | Status: SHIPPED | OUTPATIENT
Start: 2020-07-08 | End: 2020-10-13

## 2020-07-15 RX ORDER — BUPROPION HYDROCHLORIDE 150 MG/1
TABLET ORAL
Qty: 30 TABLET | Refills: 0 | Status: SHIPPED | OUTPATIENT
Start: 2020-07-15 | End: 2020-08-21

## 2020-08-03 RX ORDER — INSULIN GLARGINE 100 [IU]/ML
INJECTION, SOLUTION SUBCUTANEOUS
Qty: 45 ML | Refills: 0 | Status: SHIPPED | OUTPATIENT
Start: 2020-08-03 | End: 2020-10-05 | Stop reason: SDUPTHER

## 2020-08-21 ENCOUNTER — OFFICE VISIT (OUTPATIENT)
Dept: FAMILY MEDICINE CLINIC | Facility: CLINIC | Age: 60
End: 2020-08-21

## 2020-08-21 VITALS
SYSTOLIC BLOOD PRESSURE: 104 MMHG | DIASTOLIC BLOOD PRESSURE: 77 MMHG | TEMPERATURE: 97.7 F | HEART RATE: 79 BPM | RESPIRATION RATE: 16 BRPM | HEIGHT: 55 IN | BODY MASS INDEX: 49.89 KG/M2 | WEIGHT: 215.6 LBS | OXYGEN SATURATION: 100 %

## 2020-08-21 DIAGNOSIS — Z71.89 ADVANCE CARE PLANNING: ICD-10-CM

## 2020-08-21 DIAGNOSIS — E11.43 TYPE 2 DIABETES MELLITUS WITH AUTONOMIC NEUROPATHY, UNSPECIFIED WHETHER LONG TERM INSULIN USE (HCC): ICD-10-CM

## 2020-08-21 DIAGNOSIS — R39.9 UTI SYMPTOMS: ICD-10-CM

## 2020-08-21 DIAGNOSIS — N32.81 OAB (OVERACTIVE BLADDER): ICD-10-CM

## 2020-08-21 DIAGNOSIS — E11.9 DIABETES MELLITUS WITHOUT COMPLICATION (HCC): ICD-10-CM

## 2020-08-21 DIAGNOSIS — Z00.00 MEDICARE ANNUAL WELLNESS VISIT, SUBSEQUENT: Primary | ICD-10-CM

## 2020-08-21 LAB
BASOPHILS # BLD AUTO: 0.03 10*3/MM3 (ref 0–0.2)
BASOPHILS NFR BLD AUTO: 0.4 % (ref 0–1.5)
BILIRUB BLD-MCNC: NEGATIVE MG/DL
CLARITY, POC: CLEAR
COLOR UR: YELLOW
DEPRECATED RDW RBC AUTO: 39.9 FL (ref 37–54)
EOSINOPHIL # BLD AUTO: 0.08 10*3/MM3 (ref 0–0.4)
EOSINOPHIL NFR BLD AUTO: 0.9 % (ref 0.3–6.2)
ERYTHROCYTE [DISTWIDTH] IN BLOOD BY AUTOMATED COUNT: 12.9 % (ref 12.3–15.4)
EXPIRATION DATE: ABNORMAL
GLUCOSE UR STRIP-MCNC: ABNORMAL MG/DL
HBA1C MFR BLD: 10.6 %
HCT VFR BLD AUTO: 38.2 % (ref 34–46.6)
HGB BLD-MCNC: 12.7 G/DL (ref 12–15.9)
IMM GRANULOCYTES # BLD AUTO: 0.06 10*3/MM3 (ref 0–0.05)
IMM GRANULOCYTES NFR BLD AUTO: 0.7 % (ref 0–0.5)
KETONES UR QL: NEGATIVE
LEUKOCYTE EST, POC: NEGATIVE
LYMPHOCYTES # BLD AUTO: 1.47 10*3/MM3 (ref 0.7–3.1)
LYMPHOCYTES NFR BLD AUTO: 17.4 % (ref 19.6–45.3)
Lab: ABNORMAL
MCH RBC QN AUTO: 28.6 PG (ref 26.6–33)
MCHC RBC AUTO-ENTMCNC: 33.2 G/DL (ref 31.5–35.7)
MCV RBC AUTO: 86 FL (ref 79–97)
MONOCYTES # BLD AUTO: 0.48 10*3/MM3 (ref 0.1–0.9)
MONOCYTES NFR BLD AUTO: 5.7 % (ref 5–12)
NEUTROPHILS NFR BLD AUTO: 6.33 10*3/MM3 (ref 1.7–7)
NEUTROPHILS NFR BLD AUTO: 74.9 % (ref 42.7–76)
NITRITE UR-MCNC: NEGATIVE MG/ML
NRBC BLD AUTO-RTO: 0 /100 WBC (ref 0–0.2)
PH UR: 5 [PH] (ref 5–8)
PLATELET # BLD AUTO: 105 10*3/MM3 (ref 140–450)
PMV BLD AUTO: 13 FL (ref 6–12)
POC CREATININE URINE: ABNORMAL
POC MICROALBUMIN URINE: ABNORMAL
PROT UR STRIP-MCNC: NEGATIVE MG/DL
RBC # BLD AUTO: 4.44 10*6/MM3 (ref 3.77–5.28)
RBC # UR STRIP: NEGATIVE /UL
SP GR UR: 1.02 (ref 1–1.03)
UROBILINOGEN UR QL: NORMAL
WBC # BLD AUTO: 8.45 10*3/MM3 (ref 3.4–10.8)

## 2020-08-21 PROCEDURE — G0439 PPPS, SUBSEQ VISIT: HCPCS | Performed by: FAMILY MEDICINE

## 2020-08-21 PROCEDURE — 85025 COMPLETE CBC W/AUTO DIFF WBC: CPT | Performed by: FAMILY MEDICINE

## 2020-08-21 PROCEDURE — 82044 UR ALBUMIN SEMIQUANTITATIVE: CPT | Performed by: FAMILY MEDICINE

## 2020-08-21 PROCEDURE — 83036 HEMOGLOBIN GLYCOSYLATED A1C: CPT | Performed by: FAMILY MEDICINE

## 2020-08-21 PROCEDURE — 81003 URINALYSIS AUTO W/O SCOPE: CPT | Performed by: FAMILY MEDICINE

## 2020-08-21 PROCEDURE — 80053 COMPREHEN METABOLIC PANEL: CPT | Performed by: FAMILY MEDICINE

## 2020-08-21 PROCEDURE — 99396 PREV VISIT EST AGE 40-64: CPT | Performed by: FAMILY MEDICINE

## 2020-08-21 PROCEDURE — 84443 ASSAY THYROID STIM HORMONE: CPT | Performed by: FAMILY MEDICINE

## 2020-08-21 RX ORDER — GABAPENTIN 300 MG/1
300 CAPSULE ORAL 3 TIMES DAILY
COMMUNITY
End: 2020-08-21 | Stop reason: SDUPTHER

## 2020-08-21 RX ORDER — GABAPENTIN 300 MG/1
300 CAPSULE ORAL NIGHTLY PRN
Qty: 30 CAPSULE | Refills: 3 | Status: SHIPPED | OUTPATIENT
Start: 2020-08-21 | End: 2021-11-02 | Stop reason: SDUPTHER

## 2020-08-21 RX ORDER — SOLIFENACIN SUCCINATE 5 MG/1
5 TABLET, FILM COATED ORAL DAILY
Qty: 30 TABLET | Refills: 3 | Status: SHIPPED | OUTPATIENT
Start: 2020-08-21 | End: 2020-12-22

## 2020-08-21 NOTE — PROGRESS NOTES
Subsequent Medicare Wellness Visit   The ABC's of the Annual Wellness Visit    Chief Complaint   Patient presents with   • Medicare Wellness-subsequent       HPI:  Di Lopez, -1960, is a 59 y.o. female who presents for a Subsequent Medicare Wellness Visit.  1. Concerned about overactive bladder  2. Concerned about weight gain  3. F/U DM; On Lantus and has increased to 60 Units qhs, glimepiride 4 mg qd, onglyza 5 mg qd. BS running 280s at home. Not compliant with diet.   4. Takes Gabapentin prn for neuropathy in feet.    Recent Hospitalizations:  No hospitalization(s) within the last year..    Current Medical Providers:  Patient Care Team:  Keri Little DO as PCP - General (Family Medicine)  Keri Little DO as PCP - Family Medicine    Health Habits and Functional and Cognitive Screening and Depression Screening:  Functional & Cognitive Status 2020   Do you have difficulty preparing food and eating? No   Do you have difficulty bathing yourself, getting dressed or grooming yourself? No   Do you have difficulty using the toilet? No   Do you have difficulty moving around from place to place? No   Do you have trouble with steps or getting out of a bed or a chair? No   Current Diet Unhealthy Diet   Dental Exam Up to date   Eye Exam Up to date   Exercise (times per week) 0 times per week   Current Exercise Activities Include None   Do you need help using the phone?  No   Are you deaf or do you have serious difficulty hearing?  No   Do you need help with transportation? No   Do you need help shopping? No   Do you need help preparing meals?  No   Do you need help with housework?  No   Do you need help with laundry? No   Do you need help taking your medications? No   Do you need help managing money? No   Do you ever drive or ride in a car without wearing a seat belt? No   Have you felt unusual stress, anger or loneliness in the last month? No   Who do you live with? (No Data)   If you need help, do  you have trouble finding someone available to you? No   Have you been bothered in the last four weeks by sexual problems? No   Do you have difficulty concentrating, remembering or making decisions? -       Compared to one year ago, the patient feels her physical health is the same and her mental health is the same.    Depression Screen:  PHQ-2/PHQ-9 Depression Screening 8/21/2020   Little interest or pleasure in doing things 0   Feeling down, depressed, or hopeless 0   Trouble falling or staying asleep, or sleeping too much -   Feeling tired or having little energy -   Poor appetite or overeating -   Feeling bad about yourself - or that you are a failure or have let yourself or your family down -   Trouble concentrating on things, such as reading the newspaper or watching television -   Moving or speaking so slowly that other people could have noticed. Or the opposite - being so fidgety or restless that you have been moving around a lot more than usual -   Thoughts that you would be better off dead, or of hurting yourself in some way -   Total Score 0   If you checked off any problems, how difficult have these problems made it for you to do your work, take care of things at home, or get along with other people? -         Past Medical/Family/Social History:  The following portions of the patient's history were reviewed and updated as appropriate: allergies, current medications, past family history, past medical history, past social history, past surgical history and problem list.    Allergies   Allergen Reactions   • Codeine Itching and Nausea And Vomiting   • Latex Other (See Comments)     Pulls skin off   • Penicillins Hives   • Sulfa Antibiotics Nausea And Vomiting         Current Outpatient Medications:   •  albuterol sulfate  (90 Base) MCG/ACT inhaler, Inhale 2 puffs Every 4 (Four) Hours As Needed for Wheezing., Disp: 1 inhaler, Rfl: 3  •  Alcohol Swabs (ALCOHOL PREP) 70 % pads, , Disp: , Rfl:   •  ASSURE  COMFORT LANCETS 30G misc, Use 2 times a day E11.9, Disp: 100 each, Rfl: 5  •  B-D UF III MINI PEN NEEDLES 31G X 5 MM misc, USE  TWICE DAILY, Disp: 100 each, Rfl: 0  •  Blood Glucose Calibration (OT ULTRA/FASTTK CNTRL SOLN) solution, , Disp: , Rfl:   •  Blood Glucose Monitoring Suppl (ONE TOUCH ULTRA 2) W/DEVICE kit, , Disp: , Rfl:   •  carvedilol (COREG) 12.5 MG tablet, 12.5 mg 2 (Two) Times a Day With Meals., Disp: , Rfl:   •  cholecalciferol (VITAMIN D3) 25 MCG (1000 UT) tablet, Take 2,000 Units by mouth Daily., Disp: , Rfl:   •  escitalopram (LEXAPRO) 20 MG tablet, Take 1 tablet by mouth once daily, Disp: 90 tablet, Rfl: 0  •  ferrous sulfate 324 (65 Fe) MG tablet delayed-release EC tablet, Take 324 mg by mouth Daily With Breakfast., Disp: , Rfl:   •  Fluticasone Furoate-Vilanterol (Breo Ellipta) 100-25 MCG/INH inhaler, Inhale 1 puff Daily., Disp: 1 each, Rfl: 6  •  furosemide (LASIX) 40 MG tablet, Take 1 tablet by mouth daily., Disp: , Rfl:   •  gabapentin (NEURONTIN) 300 MG capsule, Take 1 capsule by mouth At Night As Needed (leg pain)., Disp: 30 capsule, Rfl: 3  •  glimepiride (AMARYL) 4 MG tablet, TAKE 1 TABLET BY MOUTH ONCE DAILY IN THE MORNING BEFORE BREAKFAST, Disp: 30 tablet, Rfl: 2  •  HYDROcodone-acetaminophen (NORCO) 7.5-325 MG per tablet, TAKE 1  TABLET BY MOUTH 2-3 TIMES DAILY AS NEEDED, Disp: , Rfl:   •  Insulin Pen Needle (RELION PEN NEEDLES) 32G X 4 MM misc, Use BID for E11.9, Disp: 100 each, Rfl: 1  •  Lancet Devices (SIMPLE DIAGNOSTICS LANCING DEV) misc, , Disp: , Rfl:   •  LANTUS SOLOSTAR 100 UNIT/ML injection pen, INJECT 50 UNITS SUBCUTANEOUSLY ONCE DAILY, Disp: 45 mL, Rfl: 0  •  Omega-3 Fatty Acids (FISH OIL) 1000 MG capsule capsule, Take 1 capsule by mouth 4 (Four) Times a Day., Disp: 120 capsule, Rfl: 2  •  ONE TOUCH ULTRA TEST test strip, , Disp: , Rfl:   •  ONGLYZA 5 MG tablet, Take 1 tablet by mouth once daily, Disp: 30 tablet, Rfl: 2  •  sacubitril-valsartan (ENTRESTO) 24-26 MG tablet,  Take 1 tablet by mouth 2 (Two) Times a Day., Disp: , Rfl:   •  spironolactone (ALDACTONE) 25 MG tablet, Take 25 mg by mouth Daily., Disp: , Rfl: 0  •  warfarin (COUMADIN) 4 MG tablet, Take 4.5 mg by mouth Daily., Disp: , Rfl: 2  •  solifenacin (VESIcare) 5 MG tablet, Take 1 tablet by mouth Daily., Disp: 30 tablet, Rfl: 3    Aspirin use counseling: Does not need ASA (and currently is not on it)    Current medication list contains no high risk medications.  No harmful drug interactions have been identified.     Family History   Problem Relation Age of Onset   • Sudden death Mother         POSSIBLE SERIOUS MASSIVE STROKE   • Multiple myeloma Father    • Ovarian cancer Paternal Grandmother    • Breast cancer Neg Hx        Social History     Tobacco Use   • Smoking status: Former Smoker     Packs/day: 0.50     Years: 33.00     Pack years: 16.50     Types: Electronic Cigarette, Cigarettes     Last attempt to quit: 2010     Years since quitting: 10.6   • Smokeless tobacco: Never Used   • Tobacco comment: Quit in 2010   Substance Use Topics   • Alcohol use: No       Past Surgical History:   Procedure Laterality Date   • BACK SURGERY     • BILATERAL OOPHORECTOMY  2009    Status post bilateral oophorectomy secondary to ovarian cysts    • BREAST BIOPSY Right     STEREOTACTIC   • BREAST EXCISIONAL BIOPSY Bilateral    • COLONOSCOPY  12/28/2012   • CYSTECTOMY      removal of ganglion cyst from left wrist   • ENDOSCOPY  12/30/2011    DIAGNOSTIC ESOPHAGOGASTRODUODENOSCOPY   • HERNIA REPAIR     • HYSTERECTOMY  1995    A.  Status post hysterectomy for early stage endometrial cancer done in 1995.   • MITRAL VALVE REPLACEMENT  03/03/2008    Dr. Hima Barreto.    • OOPHORECTOMY         Patient Active Problem List   Diagnosis   • Arteriovenous malformation of gastrointestinal tract   • Depression   • Type 2 diabetes mellitus (CMS/HCC)   • Dyslipidemia   • Hypertension   • Insomnia   • Morbid obesity (CMS/HCC)   • Low back pain   •  "Obstructive sleep apnea syndrome   • Long term current use of anticoagulant   • Normocytic anemia   • Sliding hiatal hernia   • Vitamin B12 deficiency   • Cough   • Iron deficiency   • Strep pharyngitis   • Iron malabsorption   • Bronchiectasis without complication (CMS/HCC)   • Electronic cigarette use       Review of Systems   Constitutional: Negative.  Negative for activity change, fatigue, fever and unexpected weight change.   HENT: Negative.  Negative for congestion, sneezing and sore throat.    Eyes: Negative.  Negative for visual disturbance.   Respiratory: Negative.  Negative for cough, chest tightness, shortness of breath and wheezing.    Cardiovascular: Negative.  Negative for chest pain, palpitations and leg swelling.   Gastrointestinal: Negative.  Negative for abdominal distention, abdominal pain, blood in stool, constipation, diarrhea and nausea.   Endocrine: Negative.  Negative for cold intolerance and heat intolerance.   Genitourinary: Positive for frequency. Negative for dysuria and urgency.   Musculoskeletal: Negative.  Negative for arthralgias and myalgias.   Skin: Negative.  Negative for rash.   Allergic/Immunologic: Negative.    Neurological: Negative.  Negative for dizziness, syncope and numbness.   Hematological: Negative.  Negative for adenopathy.   Psychiatric/Behavioral: Negative.  Negative for suicidal ideas. The patient is not nervous/anxious.        Objective     Vitals:    08/21/20 1320   BP: 104/77   Pulse: 79   Resp: 16   Temp: 97.7 °F (36.5 °C)   TempSrc: Temporal   SpO2: 100%   Weight: 97.8 kg (215 lb 9.6 oz)   Height: 121.9 cm (48\")       Patient's Body mass index is 65.79 kg/m². BMI is above normal parameters. Recommendations include: nutrition counseling.      Hearing Screening Comments: Normal hearing  Vision Screening Comments: ophth ref    The patient has no evidence of cognitve impairment.     Physical Exam   Constitutional: She is oriented to person, place, and time. She " appears well-developed and well-nourished. No distress.   HENT:   Right Ear: External ear normal.   Left Ear: External ear normal.   Nose: Nose normal.   Mouth/Throat: Oropharynx is clear and moist.   Eyes: Pupils are equal, round, and reactive to light. Conjunctivae and EOM are normal.   Neck: Normal range of motion. Neck supple. No thyromegaly present.   Cardiovascular: Normal rate, regular rhythm and normal heart sounds.   No murmur heard.  Pulmonary/Chest: Effort normal and breath sounds normal. No respiratory distress. She has no wheezes.   Abdominal: Soft. Bowel sounds are normal. She exhibits no distension and no mass. There is no tenderness. There is no rebound and no guarding. No hernia.   Musculoskeletal: Normal range of motion. She exhibits no edema or tenderness.   Lymphadenopathy:     She has no cervical adenopathy.   Neurological: She is alert and oriented to person, place, and time. She has normal reflexes.   Skin: Skin is warm and dry. No rash noted. She is not diaphoretic. No erythema. No pallor.   Psychiatric: She has a normal mood and affect. Her behavior is normal. Judgment and thought content normal.   Nursing note and vitals reviewed.      Recent Lab Results:     Lab Results   Component Value Date    CHOL 250 (H) 05/22/2020    TRIG 365 (H) 05/22/2020    HDL 34 (L) 05/22/2020    VLDL 73 (H) 05/22/2020    LDLHDL 4.21 05/22/2020       Assessment/Plan   Age-appropriate Screening Schedule:  Refer to the list below for future screening recommendations based on patient's age, sex and/or medical conditions.      Health Maintenance   Topic Date Due   • DIABETIC FOOT EXAM  04/16/2019   • URINE MICROALBUMIN  07/19/2019   • DIABETIC EYE EXAM  05/21/2020   • INFLUENZA VACCINE  08/01/2020   • HEMOGLOBIN A1C  11/22/2020   • MAMMOGRAM  02/27/2022   • COLONOSCOPY  01/01/2023   • TDAP/TD VACCINES (3 - Td) 11/13/2028   • PNEUMOCOCCAL VACCINE (19-64 MEDIUM RISK)  Addressed   • PAP SMEAR  Discontinued   • ZOSTER  VACCINE  Discontinued       Medicare Risks and Personalized Health Plan:  Cardiovascular risk  Diabetic Lab Screening       CMS-Preventive Services Quick Reference  Medicare Preventive Services Addressed:  Annual Wellness Visit (AWV)  Cardiovascular Disease Screening Tests (may do this order every 5 years in beneficiaries without signs or symptoms of cardiovascular disease)  Diabetes Screening-Lab Order for either glucose quantitative blood (except reagent strip), glucose;post glucose dose(includes glucose), or glucose tolerance test-3 specimens(includes glucose)    Advance Care Planning:  ACP discussion was held with the patient during this visit. Patient does not have an advance directive, information provided.    Diagnoses and all orders for this visit:    1. Medicare annual wellness visit, subsequent (Primary)    2. Diabetes mellitus without complication (CMS/[facility])  -     POCT microalbumin  -     POC Glycosylated Hemoglobin (Hb A1C)  -     CBC & Differential  -     Comprehensive Metabolic Panel  -     TSH  -     Ambulatory Referral to Endocrinology  -     CBC Auto Differential    3. UTI symptoms  -     POC Urinalysis Dipstick, Automated    4. Advance care planning  -     Ambulatory Referral to Advance Care Planning    5. OAB (overactive bladder)  -     solifenacin (VESIcare) 5 MG tablet; Take 1 tablet by mouth Daily.  Dispense: 30 tablet; Refill: 3    6. Type 2 diabetes mellitus with autonomic neuropathy, unspecified whether long term insulin use (CMS/[facility])  -     gabapentin (NEURONTIN) 300 MG capsule; Take 1 capsule by mouth At Night As Needed (leg pain).  Dispense: 30 capsule; Refill: 3  -     Ambulatory Referral to Endocrinology      Patient is here for health maintenance visit.  Discussed diet and adequate exercise.  Screening lab work discussed.  Immunizations reviewed.  Advice and education regarding nutrition, exercise, dental evaluations, routine eye examinations, reproductive health, cardiovascular risk  reduction, sunscreen use, self skin examination, and seatbelt use was given today.  Annual wellness evaluations recommended.    An After Visit Summary and PPPS with all of these plans were given to the patient.      Follow Up:  No follow-ups on file.

## 2020-08-22 LAB
ALBUMIN SERPL-MCNC: 4.3 G/DL (ref 3.5–5.2)
ALBUMIN/GLOB SERPL: 1.5 G/DL
ALP SERPL-CCNC: 151 U/L (ref 39–117)
ALT SERPL W P-5'-P-CCNC: 25 U/L (ref 1–33)
ANION GAP SERPL CALCULATED.3IONS-SCNC: 12.1 MMOL/L (ref 5–15)
AST SERPL-CCNC: 17 U/L (ref 1–32)
BILIRUB SERPL-MCNC: 0.2 MG/DL (ref 0–1.2)
BUN SERPL-MCNC: 11 MG/DL (ref 6–20)
BUN/CREAT SERPL: 13.3 (ref 7–25)
CALCIUM SPEC-SCNC: 9.5 MG/DL (ref 8.6–10.5)
CHLORIDE SERPL-SCNC: 99 MMOL/L (ref 98–107)
CO2 SERPL-SCNC: 23.9 MMOL/L (ref 22–29)
CREAT SERPL-MCNC: 0.83 MG/DL (ref 0.57–1)
GFR SERPL CREATININE-BSD FRML MDRD: 86 ML/MIN/1.73
GLOBULIN UR ELPH-MCNC: 2.9 GM/DL
GLUCOSE SERPL-MCNC: 343 MG/DL (ref 65–99)
POTASSIUM SERPL-SCNC: 4.3 MMOL/L (ref 3.5–5.2)
PROT SERPL-MCNC: 7.2 G/DL (ref 6–8.5)
SODIUM SERPL-SCNC: 135 MMOL/L (ref 136–145)
TSH SERPL DL<=0.05 MIU/L-ACNC: 0.71 UIU/ML (ref 0.27–4.2)

## 2020-08-24 RX ORDER — BUPROPION HYDROCHLORIDE 150 MG/1
TABLET ORAL
Qty: 30 TABLET | Refills: 0 | OUTPATIENT
Start: 2020-08-24

## 2020-08-24 RX ORDER — ESCITALOPRAM OXALATE 20 MG/1
TABLET ORAL
Qty: 90 TABLET | Refills: 0 | Status: SHIPPED | OUTPATIENT
Start: 2020-08-24 | End: 2020-12-09

## 2020-08-24 RX ORDER — SAXAGLIPTIN 5 MG/1
TABLET, FILM COATED ORAL
Qty: 30 TABLET | Refills: 0 | Status: SHIPPED | OUTPATIENT
Start: 2020-08-24 | End: 2020-09-30

## 2020-08-24 RX ORDER — GLIMEPIRIDE 4 MG/1
TABLET ORAL
Qty: 90 TABLET | Refills: 0 | Status: SHIPPED | OUTPATIENT
Start: 2020-08-24 | End: 2020-11-10

## 2020-09-30 RX ORDER — SAXAGLIPTIN 5 MG/1
TABLET, FILM COATED ORAL
Qty: 30 TABLET | Refills: 0 | Status: SHIPPED | OUTPATIENT
Start: 2020-09-30 | End: 2020-10-27

## 2020-10-05 RX ORDER — INSULIN GLARGINE 100 [IU]/ML
50 INJECTION, SOLUTION SUBCUTANEOUS DAILY
Qty: 45 ML | Refills: 0 | Status: SHIPPED | OUTPATIENT
Start: 2020-10-05 | End: 2020-11-17 | Stop reason: SDUPTHER

## 2020-10-12 ENCOUNTER — APPOINTMENT (OUTPATIENT)
Dept: PREADMISSION TESTING | Facility: HOSPITAL | Age: 60
End: 2020-10-12

## 2020-10-12 ENCOUNTER — TRANSCRIBE ORDERS (OUTPATIENT)
Dept: ADMINISTRATIVE | Facility: HOSPITAL | Age: 60
End: 2020-10-12

## 2020-10-12 DIAGNOSIS — I44.60 LEFT BUNDLE BRANCH HEMIBLOCK: ICD-10-CM

## 2020-10-12 DIAGNOSIS — I50.32 CHRONIC DIASTOLIC HEART FAILURE (HCC): Primary | ICD-10-CM

## 2020-10-12 PROCEDURE — U0004 COV-19 TEST NON-CDC HGH THRU: HCPCS | Performed by: INTERNAL MEDICINE

## 2020-10-12 PROCEDURE — C9803 HOPD COVID-19 SPEC COLLECT: HCPCS

## 2020-10-13 LAB — SARS-COV-2 RNA RESP QL NAA+PROBE: NOT DETECTED

## 2020-10-13 RX ORDER — FLURBIPROFEN SODIUM 0.3 MG/ML
SOLUTION/ DROPS OPHTHALMIC
Qty: 100 EACH | Refills: 0 | Status: SHIPPED | OUTPATIENT
Start: 2020-10-13 | End: 2020-11-17

## 2020-10-15 ENCOUNTER — HOSPITAL ENCOUNTER (OUTPATIENT)
Facility: HOSPITAL | Age: 60
Discharge: HOME | End: 2020-10-16
Attending: INTERNAL MEDICINE | Admitting: INTERNAL MEDICINE

## 2020-10-15 DIAGNOSIS — I50.32 CHRONIC DIASTOLIC HEART FAILURE (HCC): ICD-10-CM

## 2020-10-15 DIAGNOSIS — I44.60 LEFT BUNDLE BRANCH HEMIBLOCK: ICD-10-CM

## 2020-10-15 LAB
ANION GAP SERPL CALCULATED.3IONS-SCNC: 9 MMOL/L (ref 5–15)
BUN SERPL-MCNC: 10 MG/DL (ref 6–20)
BUN/CREAT SERPL: 12.5 (ref 7–25)
CALCIUM SPEC-SCNC: 9.2 MG/DL (ref 8.6–10.5)
CHLORIDE SERPL-SCNC: 102 MMOL/L (ref 98–107)
CO2 SERPL-SCNC: 25 MMOL/L (ref 22–29)
CREAT SERPL-MCNC: 0.8 MG/DL (ref 0.57–1)
DEPRECATED RDW RBC AUTO: 42.8 FL (ref 37–54)
ERYTHROCYTE [DISTWIDTH] IN BLOOD BY AUTOMATED COUNT: 13.2 % (ref 12.3–15.4)
GFR SERPL CREATININE-BSD FRML MDRD: 89 ML/MIN/1.73
GLUCOSE BLDC GLUCOMTR-MCNC: 271 MG/DL (ref 70–130)
GLUCOSE SERPL-MCNC: 237 MG/DL (ref 65–99)
HCT VFR BLD AUTO: 37.7 % (ref 34–46.6)
HGB BLD-MCNC: 11.7 G/DL (ref 12–15.9)
INR PPP: 2.24 (ref 0.85–1.16)
MCH RBC QN AUTO: 27.7 PG (ref 26.6–33)
MCHC RBC AUTO-ENTMCNC: 31 G/DL (ref 31.5–35.7)
MCV RBC AUTO: 89.3 FL (ref 79–97)
PLATELET # BLD AUTO: 253 10*3/MM3 (ref 140–450)
PMV BLD AUTO: 11.2 FL (ref 6–12)
POTASSIUM SERPL-SCNC: 4 MMOL/L (ref 3.5–5.2)
PROTHROMBIN TIME: 24.5 SECONDS (ref 11.5–14)
RBC # BLD AUTO: 4.22 10*6/MM3 (ref 3.77–5.28)
SODIUM SERPL-SCNC: 136 MMOL/L (ref 136–145)
WBC # BLD AUTO: 9.41 10*3/MM3 (ref 3.4–10.8)

## 2020-10-15 PROCEDURE — 82962 GLUCOSE BLOOD TEST: CPT

## 2020-10-15 PROCEDURE — C1898 LEAD, PMKR, OTHER THAN TRANS: HCPCS | Performed by: INTERNAL MEDICINE

## 2020-10-15 PROCEDURE — C1892 INTRO/SHEATH,FIXED,PEEL-AWAY: HCPCS | Performed by: INTERNAL MEDICINE

## 2020-10-15 PROCEDURE — 0 IOPAMIDOL PER 1 ML: Performed by: INTERNAL MEDICINE

## 2020-10-15 PROCEDURE — 25010000002 MORPHINE PER 10 MG: Performed by: INTERNAL MEDICINE

## 2020-10-15 PROCEDURE — 63710000001 DIPHENHYDRAMINE PER 50 MG: Performed by: PHYSICIAN ASSISTANT

## 2020-10-15 PROCEDURE — 25010000002 DIPHENHYDRAMINE PER 50 MG: Performed by: INTERNAL MEDICINE

## 2020-10-15 PROCEDURE — 63710000001 HYDROCODONE-ACETAMINOPHEN 7.5-325 MG TABLET: Performed by: INTERNAL MEDICINE

## 2020-10-15 PROCEDURE — 33225 L VENTRIC PACING LEAD ADD-ON: CPT | Performed by: INTERNAL MEDICINE

## 2020-10-15 PROCEDURE — 85610 PROTHROMBIN TIME: CPT

## 2020-10-15 PROCEDURE — C1887 CATHETER, GUIDING: HCPCS | Performed by: INTERNAL MEDICINE

## 2020-10-15 PROCEDURE — A9270 NON-COVERED ITEM OR SERVICE: HCPCS | Performed by: INTERNAL MEDICINE

## 2020-10-15 PROCEDURE — C1730 CATH, EP, 19 OR FEW ELECT: HCPCS | Performed by: INTERNAL MEDICINE

## 2020-10-15 PROCEDURE — C2621 PMKR, OTHER THAN SING/DUAL: HCPCS | Performed by: INTERNAL MEDICINE

## 2020-10-15 PROCEDURE — A9270 NON-COVERED ITEM OR SERVICE: HCPCS | Performed by: PHYSICIAN ASSISTANT

## 2020-10-15 PROCEDURE — G0378 HOSPITAL OBSERVATION PER HR: HCPCS

## 2020-10-15 PROCEDURE — 63710000001 CARVEDILOL 12.5 MG TABLET: Performed by: INTERNAL MEDICINE

## 2020-10-15 PROCEDURE — 25010000002 VANCOMYCIN: Performed by: PHYSICIAN ASSISTANT

## 2020-10-15 PROCEDURE — 85027 COMPLETE CBC AUTOMATED: CPT | Performed by: INTERNAL MEDICINE

## 2020-10-15 PROCEDURE — 63710000001 SACUBITRIL-VALSARTAN 24-26 MG TABLET: Performed by: INTERNAL MEDICINE

## 2020-10-15 PROCEDURE — 80048 BASIC METABOLIC PNL TOTAL CA: CPT | Performed by: INTERNAL MEDICINE

## 2020-10-15 PROCEDURE — 99153 MOD SED SAME PHYS/QHP EA: CPT | Performed by: INTERNAL MEDICINE

## 2020-10-15 PROCEDURE — 99152 MOD SED SAME PHYS/QHP 5/>YRS: CPT | Performed by: INTERNAL MEDICINE

## 2020-10-15 PROCEDURE — 63710000001 TEMAZEPAM 7.5 MG CAPSULE: Performed by: INTERNAL MEDICINE

## 2020-10-15 PROCEDURE — C1900 LEAD, CORONARY VENOUS: HCPCS | Performed by: INTERNAL MEDICINE

## 2020-10-15 PROCEDURE — C1769 GUIDE WIRE: HCPCS | Performed by: INTERNAL MEDICINE

## 2020-10-15 PROCEDURE — 25010000002 FENTANYL CITRATE (PF) 100 MCG/2ML SOLUTION: Performed by: INTERNAL MEDICINE

## 2020-10-15 PROCEDURE — 33208 INSRT HEART PM ATRIAL & VENT: CPT | Performed by: INTERNAL MEDICINE

## 2020-10-15 PROCEDURE — 25010000002 MIDAZOLAM PER 1 MG: Performed by: INTERNAL MEDICINE

## 2020-10-15 DEVICE — LD PM TENDRIL STS 6F52CM 2088TC52: Type: IMPLANTABLE DEVICE | Status: FUNCTIONAL

## 2020-10-15 DEVICE — IMPLANTABLE DEVICE: Type: IMPLANTABLE DEVICE | Status: FUNCTIONAL

## 2020-10-15 DEVICE — LD QUARTET CRT VENT LNG SPACING 86CM: Type: IMPLANTABLE DEVICE | Status: FUNCTIONAL

## 2020-10-15 DEVICE — LD PM TENDRIL STS 6F58CM 2088TC58: Type: IMPLANTABLE DEVICE | Status: FUNCTIONAL

## 2020-10-15 RX ORDER — CARVEDILOL 12.5 MG/1
12.5 TABLET ORAL 2 TIMES DAILY WITH MEALS
Status: DISCONTINUED | OUTPATIENT
Start: 2020-10-15 | End: 2020-10-16 | Stop reason: HOSPADM

## 2020-10-15 RX ORDER — MELATONIN
2000 DAILY
Status: DISCONTINUED | OUTPATIENT
Start: 2020-10-16 | End: 2020-10-16 | Stop reason: HOSPADM

## 2020-10-15 RX ORDER — DIPHENHYDRAMINE HCL 25 MG
25 CAPSULE ORAL EVERY 6 HOURS PRN
Status: DISCONTINUED | OUTPATIENT
Start: 2020-10-15 | End: 2020-10-16 | Stop reason: HOSPADM

## 2020-10-15 RX ORDER — SPIRONOLACTONE 25 MG/1
25 TABLET ORAL DAILY
Status: DISCONTINUED | OUTPATIENT
Start: 2020-10-16 | End: 2020-10-16 | Stop reason: HOSPADM

## 2020-10-15 RX ORDER — DIPHENHYDRAMINE HYDROCHLORIDE 50 MG/ML
INJECTION INTRAMUSCULAR; INTRAVENOUS AS NEEDED
Status: DISCONTINUED | OUTPATIENT
Start: 2020-10-15 | End: 2020-10-15 | Stop reason: HOSPADM

## 2020-10-15 RX ORDER — LIDOCAINE HYDROCHLORIDE 10 MG/ML
INJECTION, SOLUTION EPIDURAL; INFILTRATION; INTRACAUDAL; PERINEURAL AS NEEDED
Status: DISCONTINUED | OUTPATIENT
Start: 2020-10-15 | End: 2020-10-15 | Stop reason: HOSPADM

## 2020-10-15 RX ORDER — MORPHINE SULFATE 2 MG/ML
1 INJECTION, SOLUTION INTRAMUSCULAR; INTRAVENOUS EVERY 4 HOURS PRN
Status: DISCONTINUED | OUTPATIENT
Start: 2020-10-15 | End: 2020-10-16 | Stop reason: HOSPADM

## 2020-10-15 RX ORDER — GABAPENTIN 300 MG/1
300 CAPSULE ORAL NIGHTLY PRN
Status: DISCONTINUED | OUTPATIENT
Start: 2020-10-15 | End: 2020-10-16 | Stop reason: HOSPADM

## 2020-10-15 RX ORDER — MIDAZOLAM HYDROCHLORIDE 1 MG/ML
INJECTION INTRAMUSCULAR; INTRAVENOUS AS NEEDED
Status: DISCONTINUED | OUTPATIENT
Start: 2020-10-15 | End: 2020-10-15 | Stop reason: HOSPADM

## 2020-10-15 RX ORDER — SODIUM CHLORIDE 9 MG/ML
250 INJECTION, SOLUTION INTRAVENOUS CONTINUOUS
Status: ACTIVE | OUTPATIENT
Start: 2020-10-15 | End: 2020-10-15

## 2020-10-15 RX ORDER — HYDROCODONE BITARTRATE AND ACETAMINOPHEN 7.5; 325 MG/1; MG/1
1 TABLET ORAL EVERY 8 HOURS PRN
Status: DISCONTINUED | OUTPATIENT
Start: 2020-10-15 | End: 2020-10-16 | Stop reason: HOSPADM

## 2020-10-15 RX ORDER — FENTANYL CITRATE 50 UG/ML
INJECTION, SOLUTION INTRAMUSCULAR; INTRAVENOUS AS NEEDED
Status: DISCONTINUED | OUTPATIENT
Start: 2020-10-15 | End: 2020-10-15 | Stop reason: HOSPADM

## 2020-10-15 RX ORDER — NALOXONE HCL 0.4 MG/ML
0.4 VIAL (ML) INJECTION
Status: DISCONTINUED | OUTPATIENT
Start: 2020-10-15 | End: 2020-10-16 | Stop reason: HOSPADM

## 2020-10-15 RX ORDER — TEMAZEPAM 7.5 MG/1
7.5 CAPSULE ORAL NIGHTLY PRN
Status: DISCONTINUED | OUTPATIENT
Start: 2020-10-15 | End: 2020-10-16 | Stop reason: HOSPADM

## 2020-10-15 RX ORDER — FUROSEMIDE 40 MG/1
40 TABLET ORAL DAILY
Status: DISCONTINUED | OUTPATIENT
Start: 2020-10-15 | End: 2020-10-16 | Stop reason: HOSPADM

## 2020-10-15 RX ADMIN — MORPHINE SULFATE 1 MG: 2 INJECTION, SOLUTION INTRAMUSCULAR; INTRAVENOUS at 20:26

## 2020-10-15 RX ADMIN — SACUBITRIL AND VALSARTAN 1 TABLET: 24; 26 TABLET, FILM COATED ORAL at 20:21

## 2020-10-15 RX ADMIN — HYDROCODONE BITARTRATE AND ACETAMINOPHEN 1 TABLET: 7.5; 325 TABLET ORAL at 23:44

## 2020-10-15 RX ADMIN — VANCOMYCIN HYDROCHLORIDE 2000 MG: 10 INJECTION, POWDER, LYOPHILIZED, FOR SOLUTION INTRAVENOUS at 09:29

## 2020-10-15 RX ADMIN — TEMAZEPAM 7.5 MG: 7.5 CAPSULE ORAL at 20:22

## 2020-10-15 RX ADMIN — DIPHENHYDRAMINE HYDROCHLORIDE 25 MG: 25 CAPSULE ORAL at 14:58

## 2020-10-15 RX ADMIN — HYDROCODONE BITARTRATE AND ACETAMINOPHEN 1 TABLET: 7.5; 325 TABLET ORAL at 14:19

## 2020-10-15 RX ADMIN — CARVEDILOL 12.5 MG: 12.5 TABLET, FILM COATED ORAL at 17:31

## 2020-10-15 NOTE — NURSING NOTE
Pt expressed concern about taking her nightly lantus and taking her glimepiride in the morning; FBS was 271; MD aware, no new orders.

## 2020-10-15 NOTE — H&P
Pre-PPM Report  Cardiovascular Laboratory  [facility]      Patient:  Di Lopez  :  1960  PCP:  Keri Little DO  PHONE:  [phone]    DATE: 10/15/2020    BRIEF HPI:  Di Lopez is a 59 y.o. female with diabetes, valvular heart disease-post previous mechanical mitral valve replacement, and chronic systolic CHF.  She was recent evaluated in the office and has been complaining of increasing shortness of breath and fatigue that has been occurring for the past year.  Associated symptoms include severe fatigue and having no energy.  She is also noted to have a left bundle branch block and an EF of 40% by echocardiogram.  She now presents for biventricular permanent pacemaker placement.    Cardiac Risk Factors:  diabetes mellitus, obesity (BMI >= 30 kg/m2)    Anginal class in last 2 weeks:  No anginal symptoms    CHF Class in last 2 weeks:  NYHA Class III    Cardiogenic shock:  no    Cardiac arrest <24 hours:  no    Stress test within last 6 months:   no   Details:    Previous cardiac catheterization:  yes  Details:     Previous CABG:  no  Details:      Allergies:     IV contrast allergy:  no  Allergies   Allergen Reactions   • Codeine Itching and Nausea And Vomiting   • Latex Other (See Comments)     Pulls skin off   • Penicillins Hives   • Sulfa Antibiotics Nausea And Vomiting       MEDICATIONS:  Prior to Admission medications    Medication Sig Start Date End Date Taking? Authorizing Provider   albuterol sulfate  (90 Base) MCG/ACT inhaler Inhale 2 puffs Every 4 (Four) Hours As Needed for Wheezing. 20   Keri Little DO   Alcohol Swabs (ALCOHOL PREP) 70 % pads  16   Provider, MD Sury   ASSURE COMFORT LANCETS 30G misc Use 2 times a day E11.9 17   Keri Little DO   B-D UF III MINI PEN NEEDLES 31G X 5 MM misc USE 1  TWICE DAILY 10/13/20   Keri Little DO   Blood Glucose Calibration (OT ULTRA/FASTTK CNTRL SOLN) solution  16   Provider  MD Sury   Blood Glucose Monitoring Suppl (ONE TOUCH ULTRA 2) W/DEVICE kit  4/12/16   Sury Maciel MD   carvedilol (COREG) 12.5 MG tablet 12.5 mg 2 (Two) Times a Day With Meals. 5/6/16   Sury Maciel MD   cholecalciferol (VITAMIN D3) 25 MCG (1000 UT) tablet Take 2,000 Units by mouth Daily.    Sury Maciel MD   escitalopram (LEXAPRO) 20 MG tablet Take 1 tablet by mouth once daily 8/24/20   Keri Little DO   ferrous sulfate 324 (65 Fe) MG tablet delayed-release EC tablet Take 324 mg by mouth Daily With Breakfast.    Sury Maceil MD   Fluticasone Furoate-Vilanterol (Breo Ellipta) 100-25 MCG/INH inhaler Inhale 1 puff Daily. 5/22/20   Keri Little DO   furosemide (LASIX) 40 MG tablet Take 1 tablet by mouth daily. 6/12/13   Sury Maciel MD   gabapentin (NEURONTIN) 300 MG capsule Take 1 capsule by mouth At Night As Needed (leg pain). 8/21/20   Keri Little DO   glimepiride (AMARYL) 4 MG tablet TAKE 1 TABLET BY MOUTH ONCE DAILY IN THE MORNING BEFORE BREAKFAST 8/24/20   Keri Little DO   HYDROcodone-acetaminophen (NORCO) 7.5-325 MG per tablet TAKE 1  TABLET BY MOUTH 2-3 TIMES DAILY AS NEEDED 2/21/20   Sury Maciel MD   Insulin Glargine (Lantus SoloStar) 100 UNIT/ML injection pen Inject 50 Units under the skin into the appropriate area as directed Daily. 10/5/20   Keri Little DO   Insulin Pen Needle (RELION PEN NEEDLES) 32G X 4 MM misc Use BID for E11.9 1/7/20   Keri Little DO   Lancet Devices (SIMPLE DIAGNOSTICS LANCING DEV) misc  4/12/16   Sury Maciel MD   Omega-3 Fatty Acids (FISH OIL) 1000 MG capsule capsule Take 1 capsule by mouth 4 (Four) Times a Day. 4/20/20   Keri Little DO   ONE TOUCH ULTRA TEST test strip  4/12/16   Sury Maciel MD   Onglyza 5 MG tablet Take 1 tablet by mouth once daily 9/30/20   Keri Little DO   sacubitril-valsartan (ENTRESTO) 24-26 MG tablet Take 1 tablet by mouth 2 (Two) Times a Day.     Provider, MD Sury   solifenacin (VESIcare) 5 MG tablet Take 1 tablet by mouth Daily. 8/21/20   Keri Little DO   spironolactone (ALDACTONE) 25 MG tablet Take 25 mg by mouth Daily. 11/25/19   ProviderSury MD   warfarin (COUMADIN) 4 MG tablet Take 4.5 mg by mouth Daily. 8/14/19   ProviderSury MD       Past medical & surgical history, social and family history reviewed in the electronic medical record.    ROS:  Cardiovascular ROS: positive for - dyspnea on exertion and shortness of breath    Physical Exam:    Vitals: There were no vitals filed for this visit. There were no vitals filed for this visit.    General Appearance:    Alert, cooperative, in no acute distress   Head:    Normocephalic, without obvious abnormality, atraumatic   Eyes:            Lids and lashes normal, conjunctivae and sclerae normal, no   icterus, no pallor, corneas clear, PERRLA   Ears:    Ears appear intact with no abnormalities noted   Neck:   No adenopathy, supple, trachea midline, no thyromegaly, no   carotid bruit, no JVD   Back:     No kyphosis present, no scoliosis present, range of motion normal   Lungs:     Clear to auscultation,respirations regular, even and                unlabored    Heart:    Regular rhythm and normal rate, normal S1 and S2, no         murmur, no gallop, no rub, + click   Chest Wall:    No abnormalities observed   Abdomen:     Normal bowel sounds, no masses, no organomegaly, soft     nontender, nondistended, no guarding, no rebound                tenderness   Rectal:     Deferred   Extremities:   Moves all extremities well, no edema, no cyanosis, no           redness   Pulses:   Pulses palpable and equal bilaterally   Skin:   No bleeding, bruising or rash   Neurologic:   Cranial nerves 2 - 12 grossly intact, sensation intact     Barbaeu Test:  Left: Not Assessed  (oxymetric Allens) Right: Not Assessed             Lab Results   Component Value Date    CHLPL 167 02/11/2016    TRIG 365 (H)  05/22/2020    HDL 34 (L) 05/22/2020    AST 17 08/21/2020    ALT 25 08/21/2020           Impression      · Chronic systolic CHF  · Left bundle branch block    Plan     · Procedure to perform: Biventricular permanent pacemaker implant  · Planned access: Left infraclavicular              TAVO Bledsoe  10/15/20  07:42 EDT

## 2020-10-15 NOTE — PLAN OF CARE
Problem: Adult Inpatient Plan of Care  Goal: Plan of Care Review  Outcome: Ongoing, Not Progressing  Flowsheets (Taken 10/15/2020 1608)  Progress: improving  Plan of Care Reviewed With: patient  Outcome Summary:   VSS on room air   Vpaced   fall precautions intiated   pt on bedrest until 5pm   pain controlled with PO PRNs   pt c/o itching all over, got PRN benadryl   chest xray and labs in the morning   will continue to monitor  Goal: Patient-Specific Goal (Individualized)  Outcome: Ongoing, Not Progressing  Goal: Absence of Hospital-Acquired Illness or Injury  Outcome: Ongoing, Not Progressing  Intervention: Identify and Manage Fall Risk  Recent Flowsheet Documentation  Taken 10/15/2020 1419 by Ivon Aguayo RN  Safety Promotion/Fall Prevention:   activity supervised   assistive device/personal items within reach   clutter free environment maintained   nonskid shoes/slippers when out of bed   room organization consistent   safety round/check completed   other (see comments)  Intervention: Prevent Skin Injury  Recent Flowsheet Documentation  Taken 10/15/2020 1419 by Ivon Aguayo RN  Body Position: supine  Intervention: Prevent and Manage VTE (venous thromboembolism) Risk  Recent Flowsheet Documentation  Taken 10/15/2020 1419 by Ivon Aguayo RN  VTE Prevention/Management:   bilateral   dorsiflexion/plantar flexion performed   bleeding risk factor(s) identified  Intervention: Prevent Infection  Recent Flowsheet Documentation  Taken 10/15/2020 1419 by Ivon Aguayo RN  Infection Prevention:   rest/sleep promoted   single patient room provided   hand hygiene promoted  Goal: Optimal Comfort and Wellbeing  Outcome: Ongoing, Not Progressing  Intervention: Provide Person-Centered Care  Recent Flowsheet Documentation  Taken 10/15/2020 1419 by Ivon Aguayo RN  Trust Relationship/Rapport:   care explained   questions answered   questions encouraged  Goal: Readiness for Transition of Care  Outcome:  Ongoing, Not Progressing     Problem: Fall Injury Risk  Goal: Absence of Fall and Fall-Related Injury  Outcome: Ongoing, Not Progressing  Intervention: Identify and Manage Contributors to Fall Injury Risk  Recent Flowsheet Documentation  Taken 10/15/2020 1419 by Ivon Aguayo RN  Medication Review/Management: medications reviewed  Intervention: Promote Injury-Free Environment  Recent Flowsheet Documentation  Taken 10/15/2020 1419 by Ivon Aguayo RN  Safety Promotion/Fall Prevention:   activity supervised   assistive device/personal items within reach   clutter free environment maintained   nonskid shoes/slippers when out of bed   room organization consistent   safety round/check completed   other (see comments)     Problem: Adjustment to Illness (Cardiac Rhythm Management Device)  Goal: Optimal Adjustment to Device Presence  Outcome: Ongoing, Not Progressing  Intervention: Optimize Psychosocial Response to Device Implantation  Recent Flowsheet Documentation  Taken 10/15/2020 1419 by Ivon Aguayo RN  Family/Support System Care:   self-care encouraged   support provided     Problem: Bleeding (Cardiac Rhythm Management Device)  Goal: Absence of Bleeding  Outcome: Ongoing, Not Progressing     Problem: Device-Related Complication Risk (Cardiac Rhythm Management Device)  Goal: Effective Device Function  Outcome: Ongoing, Not Progressing     Problem: Infection (Cardiac Rhythm Management Device)  Goal: Absence of Infection Signs and Symptoms  Outcome: Ongoing, Not Progressing     Problem: Pain (Cardiac Rhythm Management Device)  Goal: Acceptable Pain Level  Outcome: Ongoing, Not Progressing  Intervention: Prevent or Manage Pain  Recent Flowsheet Documentation  Taken 10/15/2020 1419 by Ivon Aguayo RN  Pain Management Interventions: see MAR     Problem: Respiratory Compromise (Cardiac Rhythm Management Device)  Goal: Effective Respiratory Effort and Oxygenation  Outcome: Ongoing, Not Progressing      Problem: Pain Acute  Goal: Optimal Pain Control  Outcome: Ongoing, Not Progressing  Intervention: Develop Pain Management Plan  Recent Flowsheet Documentation  Taken 10/15/2020 1419 by Ivon Aguayo RN  Pain Management Interventions: see MAR  Intervention: Optimize Psychosocial Wellbeing  Recent Flowsheet Documentation  Taken 10/15/2020 1419 by Ivon Aguayo RN  Diversional Activities:   television   smartphone     Problem: Adult Inpatient Plan of Care  Goal: Plan of Care Review  Outcome: Ongoing, Not Progressing  Flowsheets (Taken 10/15/2020 1608)  Progress: improving  Plan of Care Reviewed With: patient  Outcome Summary:   VSS on room air   Vpaced   fall precautions intiated   pt on bedrest until 5pm   pain controlled with PO PRNs   pt c/o itching all over, got PRN benadryl   chest xray and labs in the morning   will continue to monitor  Goal: Patient-Specific Goal (Individualized)  Outcome: Ongoing, Not Progressing  Goal: Absence of Hospital-Acquired Illness or Injury  Outcome: Ongoing, Not Progressing  Intervention: Identify and Manage Fall Risk  Recent Flowsheet Documentation  Taken 10/15/2020 1419 by Ivon Aguayo RN  Safety Promotion/Fall Prevention:   activity supervised   assistive device/personal items within reach   clutter free environment maintained   nonskid shoes/slippers when out of bed   room organization consistent   safety round/check completed   other (see comments)  Intervention: Prevent Skin Injury  Recent Flowsheet Documentation  Taken 10/15/2020 1419 by Ivon Aguayo RN  Body Position: supine  Intervention: Prevent and Manage VTE (venous thromboembolism) Risk  Recent Flowsheet Documentation  Taken 10/15/2020 1419 by Ivon Aguayo RN  VTE Prevention/Management:   bilateral   dorsiflexion/plantar flexion performed   bleeding risk factor(s) identified  Intervention: Prevent Infection  Recent Flowsheet Documentation  Taken 10/15/2020 1419 by Ivon Aguayo  RN  Infection Prevention:   rest/sleep promoted   single patient room provided   hand hygiene promoted  Goal: Optimal Comfort and Wellbeing  Outcome: Ongoing, Not Progressing  Intervention: Provide Person-Centered Care  Recent Flowsheet Documentation  Taken 10/15/2020 1419 by Ivon Aguayo RN  Trust Relationship/Rapport:   care explained   questions answered   questions encouraged  Goal: Readiness for Transition of Care  Outcome: Ongoing, Not Progressing     Problem: Fall Injury Risk  Goal: Absence of Fall and Fall-Related Injury  Outcome: Ongoing, Not Progressing  Intervention: Identify and Manage Contributors to Fall Injury Risk  Recent Flowsheet Documentation  Taken 10/15/2020 1419 by Ivon Aguayo RN  Medication Review/Management: medications reviewed  Intervention: Promote Injury-Free Environment  Recent Flowsheet Documentation  Taken 10/15/2020 1419 by Ivon Aguayo RN  Safety Promotion/Fall Prevention:   activity supervised   assistive device/personal items within reach   clutter free environment maintained   nonskid shoes/slippers when out of bed   room organization consistent   safety round/check completed   other (see comments)     Problem: Adjustment to Illness (Cardiac Rhythm Management Device)  Goal: Optimal Adjustment to Device Presence  Outcome: Ongoing, Not Progressing  Intervention: Optimize Psychosocial Response to Device Implantation  Recent Flowsheet Documentation  Taken 10/15/2020 1419 by Ivon Aguayo RN  Family/Support System Care:   self-care encouraged   support provided     Problem: Bleeding (Cardiac Rhythm Management Device)  Goal: Absence of Bleeding  Outcome: Ongoing, Not Progressing     Problem: Device-Related Complication Risk (Cardiac Rhythm Management Device)  Goal: Effective Device Function  Outcome: Ongoing, Not Progressing     Problem: Infection (Cardiac Rhythm Management Device)  Goal: Absence of Infection Signs and Symptoms  Outcome: Ongoing, Not  Progressing     Problem: Pain (Cardiac Rhythm Management Device)  Goal: Acceptable Pain Level  Outcome: Ongoing, Not Progressing  Intervention: Prevent or Manage Pain  Recent Flowsheet Documentation  Taken 10/15/2020 1419 by Ivon Aguayo RN  Pain Management Interventions: see MAR     Problem: Respiratory Compromise (Cardiac Rhythm Management Device)  Goal: Effective Respiratory Effort and Oxygenation  Outcome: Ongoing, Not Progressing     Problem: Pain Acute  Goal: Optimal Pain Control  Outcome: Ongoing, Not Progressing  Intervention: Develop Pain Management Plan  Recent Flowsheet Documentation  Taken 10/15/2020 1419 by Ivon Aguayo RN  Pain Management Interventions: see MAR  Intervention: Optimize Psychosocial Wellbeing  Recent Flowsheet Documentation  Taken 10/15/2020 1419 by Ivon Aguayo RN  Diversional Activities:   television   smartphone   Goal Outcome Evaluation:  Plan of Care Reviewed With: patient  Progress: improving  Outcome Summary: VSS on room air; Vpaced; fall precautions intiated; pt on bedrest until 5pm; pain controlled with PO PRNs; pt c/o itching all over, got PRN benadryl; chest xray and labs in the morning; will continue to monitor

## 2020-10-16 ENCOUNTER — APPOINTMENT (OUTPATIENT)
Dept: GENERAL RADIOLOGY | Facility: HOSPITAL | Age: 60
End: 2020-10-16

## 2020-10-16 ENCOUNTER — READMISSION MANAGEMENT (OUTPATIENT)
Dept: CALL CENTER | Facility: HOSPITAL | Age: 60
End: 2020-10-16

## 2020-10-16 VITALS
WEIGHT: 218.26 LBS | OXYGEN SATURATION: 96 % | HEART RATE: 70 BPM | RESPIRATION RATE: 18 BRPM | TEMPERATURE: 98.9 F | DIASTOLIC BLOOD PRESSURE: 58 MMHG | SYSTOLIC BLOOD PRESSURE: 123 MMHG | HEIGHT: 60 IN | BODY MASS INDEX: 42.85 KG/M2

## 2020-10-16 LAB
ANION GAP SERPL CALCULATED.3IONS-SCNC: 8 MMOL/L (ref 5–15)
BUN SERPL-MCNC: 12 MG/DL (ref 6–20)
BUN/CREAT SERPL: 14.5 (ref 7–25)
CALCIUM SPEC-SCNC: 8.9 MG/DL (ref 8.6–10.5)
CHLORIDE SERPL-SCNC: 102 MMOL/L (ref 98–107)
CO2 SERPL-SCNC: 26 MMOL/L (ref 22–29)
CREAT SERPL-MCNC: 0.83 MG/DL (ref 0.57–1)
DEPRECATED RDW RBC AUTO: 43.2 FL (ref 37–54)
ERYTHROCYTE [DISTWIDTH] IN BLOOD BY AUTOMATED COUNT: 13.1 % (ref 12.3–15.4)
GFR SERPL CREATININE-BSD FRML MDRD: 86 ML/MIN/1.73
GLUCOSE BLDC GLUCOMTR-MCNC: 227 MG/DL (ref 70–130)
GLUCOSE SERPL-MCNC: 244 MG/DL (ref 65–99)
HCT VFR BLD AUTO: 35.1 % (ref 34–46.6)
HGB BLD-MCNC: 11 G/DL (ref 12–15.9)
MCH RBC QN AUTO: 28.5 PG (ref 26.6–33)
MCHC RBC AUTO-ENTMCNC: 31.3 G/DL (ref 31.5–35.7)
MCV RBC AUTO: 90.9 FL (ref 79–97)
PLATELET # BLD AUTO: 210 10*3/MM3 (ref 140–450)
PMV BLD AUTO: 11.3 FL (ref 6–12)
POTASSIUM SERPL-SCNC: 4 MMOL/L (ref 3.5–5.2)
RBC # BLD AUTO: 3.86 10*6/MM3 (ref 3.77–5.28)
SODIUM SERPL-SCNC: 136 MMOL/L (ref 136–145)
WBC # BLD AUTO: 9.16 10*3/MM3 (ref 3.4–10.8)

## 2020-10-16 PROCEDURE — G0378 HOSPITAL OBSERVATION PER HR: HCPCS

## 2020-10-16 PROCEDURE — 63710000001 CARVEDILOL 12.5 MG TABLET: Performed by: INTERNAL MEDICINE

## 2020-10-16 PROCEDURE — A9270 NON-COVERED ITEM OR SERVICE: HCPCS | Performed by: INTERNAL MEDICINE

## 2020-10-16 PROCEDURE — 71046 X-RAY EXAM CHEST 2 VIEWS: CPT

## 2020-10-16 PROCEDURE — 85027 COMPLETE CBC AUTOMATED: CPT | Performed by: INTERNAL MEDICINE

## 2020-10-16 PROCEDURE — 63710000001 HYDROCODONE-ACETAMINOPHEN 7.5-325 MG TABLET: Performed by: INTERNAL MEDICINE

## 2020-10-16 PROCEDURE — 80048 BASIC METABOLIC PNL TOTAL CA: CPT | Performed by: INTERNAL MEDICINE

## 2020-10-16 PROCEDURE — 82962 GLUCOSE BLOOD TEST: CPT

## 2020-10-16 PROCEDURE — 63710000001 SACUBITRIL-VALSARTAN 24-26 MG TABLET: Performed by: INTERNAL MEDICINE

## 2020-10-16 PROCEDURE — 63710000001 SPIRONOLACTONE 25 MG TABLET: Performed by: INTERNAL MEDICINE

## 2020-10-16 RX ADMIN — CARVEDILOL 12.5 MG: 12.5 TABLET, FILM COATED ORAL at 08:37

## 2020-10-16 RX ADMIN — SPIRONOLACTONE 25 MG: 25 TABLET ORAL at 08:37

## 2020-10-16 RX ADMIN — SACUBITRIL AND VALSARTAN 1 TABLET: 24; 26 TABLET, FILM COATED ORAL at 08:37

## 2020-10-16 RX ADMIN — HYDROCODONE BITARTRATE AND ACETAMINOPHEN 1 TABLET: 7.5; 325 TABLET ORAL at 06:53

## 2020-10-16 NOTE — PLAN OF CARE
Problem: Adult Inpatient Plan of Care  Goal: Plan of Care Review  10/16/2020 0846 by Ivon Aguayo RN  Outcome: Met  10/16/2020 0844 by Ivon Aguayo RN  Outcome: Ongoing, Progressing  Flowsheets (Taken 10/16/2020 0844)  Progress: improving  Plan of Care Reviewed With: patient  Outcome Summary:   VSS on room air   vpaced 100% on tele   pain controlled with po meds   anticipating discharge  Goal: Patient-Specific Goal (Individualized)  10/16/2020 0846 by Ivon Aguayo RN  Outcome: Met  10/16/2020 0844 by Ivon Aguayo RN  Outcome: Ongoing, Progressing  Goal: Absence of Hospital-Acquired Illness or Injury  10/16/2020 0846 by Ivon Aguayo RN  Outcome: Met  10/16/2020 0844 by Ivon Aguayo RN  Outcome: Ongoing, Progressing  Intervention: Identify and Manage Fall Risk  Recent Flowsheet Documentation  Taken 10/16/2020 0800 by Ivon Aguayo RN  Safety Promotion/Fall Prevention:   activity supervised   assistive device/personal items within reach   clutter free environment maintained   nonskid shoes/slippers when out of bed   room organization consistent   safety round/check completed   toileting scheduled  Intervention: Prevent Skin Injury  Recent Flowsheet Documentation  Taken 10/16/2020 0800 by Ivon Aguayo RN  Body Position: sitting up in bed  Intervention: Prevent Infection  Recent Flowsheet Documentation  Taken 10/16/2020 0800 by Ivon Aguayo RN  Infection Prevention:   rest/sleep promoted   single patient room provided   hand hygiene promoted  Goal: Optimal Comfort and Wellbeing  10/16/2020 0846 by Ivon Aguayo RN  Outcome: Met  10/16/2020 0844 by Ivon Aguayo RN  Outcome: Ongoing, Progressing  Intervention: Provide Person-Centered Care  Recent Flowsheet Documentation  Taken 10/16/2020 0800 by Ivon Aguayo RN  Trust Relationship/Rapport:   care explained   questions answered  Goal: Readiness for Transition of Care  10/16/2020 0846 by Ivon Aguayo  JARAD WADDELL  Outcome: Met  10/16/2020 0844 by Ivon Aguayo RN  Outcome: Ongoing, Progressing     Problem: Fall Injury Risk  Goal: Absence of Fall and Fall-Related Injury  10/16/2020 0846 by Ivon Aguayo RN  Outcome: Met  10/16/2020 0844 by Ivon Aguayo RN  Outcome: Ongoing, Progressing  Intervention: Identify and Manage Contributors to Fall Injury Risk  Recent Flowsheet Documentation  Taken 10/16/2020 0800 by Ivon Aguayo RN  Medication Review/Management: medications reviewed  Intervention: Promote Injury-Free Environment  Recent Flowsheet Documentation  Taken 10/16/2020 0800 by Ivon Aguayo RN  Safety Promotion/Fall Prevention:   activity supervised   assistive device/personal items within reach   clutter free environment maintained   nonskid shoes/slippers when out of bed   room organization consistent   safety round/check completed   toileting scheduled     Problem: Adjustment to Illness (Cardiac Rhythm Management Device)  Goal: Optimal Adjustment to Device Presence  10/16/2020 0846 by Ivon Aguayo RN  Outcome: Met  10/16/2020 0844 by Ivon Aguayo RN  Outcome: Ongoing, Progressing  Intervention: Optimize Psychosocial Response to Device Implantation  Recent Flowsheet Documentation  Taken 10/16/2020 0800 by Ivon Aguayo RN  Family/Support System Care:   self-care encouraged   support provided     Problem: Bleeding (Cardiac Rhythm Management Device)  Goal: Absence of Bleeding  10/16/2020 0846 by Ivon Aguayo RN  Outcome: Met  10/16/2020 0844 by Ivon Aguayo RN  Outcome: Ongoing, Progressing     Problem: Device-Related Complication Risk (Cardiac Rhythm Management Device)  Goal: Effective Device Function  10/16/2020 0846 by Ivon Aguayo RN  Outcome: Met  10/16/2020 0844 by Ivon Aguayo RN  Outcome: Ongoing, Progressing     Problem: Infection (Cardiac Rhythm Management Device)  Goal: Absence of Infection Signs and Symptoms  10/16/2020 0846 by Olivier  Ivon WADDELL RN  Outcome: Met  10/16/2020 0844 by Ivon Aguayo RN  Outcome: Ongoing, Progressing     Problem: Pain (Cardiac Rhythm Management Device)  Goal: Acceptable Pain Level  10/16/2020 0846 by Ivon Aguayo RN  Outcome: Met  10/16/2020 0844 by Ivon Aguayo RN  Outcome: Ongoing, Progressing  Intervention: Prevent or Manage Pain  Recent Flowsheet Documentation  Taken 10/16/2020 0800 by Ivon Aguayo RN  Pain Management Interventions: quiet environment facilitated     Problem: Respiratory Compromise (Cardiac Rhythm Management Device)  Goal: Effective Respiratory Effort and Oxygenation  10/16/2020 0846 by Ivon Aguayo RN  Outcome: Met  10/16/2020 0844 by Ivon Aguayo RN  Outcome: Ongoing, Progressing     Problem: Pain Acute  Goal: Optimal Pain Control  10/16/2020 0846 by Iovn Aguayo RN  Outcome: Met  10/16/2020 0844 by Ivon Aguayo RN  Outcome: Ongoing, Progressing  Intervention: Develop Pain Management Plan  Recent Flowsheet Documentation  Taken 10/16/2020 0800 by Ivon Aguayo RN  Pain Management Interventions: quiet environment facilitated  Intervention: Optimize Psychosocial Wellbeing  Recent Flowsheet Documentation  Taken 10/16/2020 0800 by Ivon Aguayo RN  Diversional Activities:   smartphone   television   Goal Outcome Evaluation:  Plan of Care Reviewed With: patient  Progress: improving  Outcome Summary: VSS on room air; vpaced 100% on tele; pain controlled with po meds; anticipating discharge

## 2020-10-16 NOTE — OUTREACH NOTE
Prep Survey      Responses   [facility] patient discharged from?  [place]   Is LACE score < 7 ?  Yes   Eligibility  [facility]   Date of Admission  10/15/20   Date of Discharge  10/16/20   Discharge Disposition  Home or Self Care   Discharge diagnosis  Chronic diastolic heart failure Biventricular Device Insertion   Does the patient have one of the following disease processes/diagnoses(primary or secondary)?  General Surgery   Does the patient have Home health ordered?  No   Is there a DME ordered?  No   Prep survey completed?  Yes          Ursula Silver RN

## 2020-10-16 NOTE — PLAN OF CARE
Problem: Adult Inpatient Plan of Care  Goal: Plan of Care Review  Outcome: Ongoing, Progressing  Flowsheets (Taken 10/16/2020 0844)  Progress: improving  Plan of Care Reviewed With: patient  Outcome Summary:   VSS on room air   vpaced 100% on tele   pain controlled with po meds   anticipating discharge  Goal: Patient-Specific Goal (Individualized)  Outcome: Ongoing, Progressing  Goal: Absence of Hospital-Acquired Illness or Injury  Outcome: Ongoing, Progressing  Intervention: Identify and Manage Fall Risk  Recent Flowsheet Documentation  Taken 10/16/2020 0800 by Ivon Aguayo RN  Safety Promotion/Fall Prevention:   activity supervised   assistive device/personal items within reach   clutter free environment maintained   nonskid shoes/slippers when out of bed   room organization consistent   safety round/check completed   toileting scheduled  Intervention: Prevent Skin Injury  Recent Flowsheet Documentation  Taken 10/16/2020 0800 by Ivon Aguayo RN  Body Position: sitting up in bed  Intervention: Prevent Infection  Recent Flowsheet Documentation  Taken 10/16/2020 0800 by Ivon Aguayo RN  Infection Prevention:   rest/sleep promoted   single patient room provided   hand hygiene promoted  Goal: Optimal Comfort and Wellbeing  Outcome: Ongoing, Progressing  Intervention: Provide Person-Centered Care  Recent Flowsheet Documentation  Taken 10/16/2020 0800 by Ivon Aguayo RN  Trust Relationship/Rapport:   care explained   questions answered  Goal: Readiness for Transition of Care  Outcome: Ongoing, Progressing     Problem: Fall Injury Risk  Goal: Absence of Fall and Fall-Related Injury  Outcome: Ongoing, Progressing  Intervention: Identify and Manage Contributors to Fall Injury Risk  Recent Flowsheet Documentation  Taken 10/16/2020 0800 by Ivon Aguayo RN  Medication Review/Management: medications reviewed  Intervention: Promote Injury-Free Environment  Recent Flowsheet Documentation  Taken  10/16/2020 0800 by Ivon Aguayo RN  Safety Promotion/Fall Prevention:   activity supervised   assistive device/personal items within reach   clutter free environment maintained   nonskid shoes/slippers when out of bed   room organization consistent   safety round/check completed   toileting scheduled     Problem: Adjustment to Illness (Cardiac Rhythm Management Device)  Goal: Optimal Adjustment to Device Presence  Outcome: Ongoing, Progressing  Intervention: Optimize Psychosocial Response to Device Implantation  Recent Flowsheet Documentation  Taken 10/16/2020 0800 by Ivon Aguayo RN  Family/Support System Care:   self-care encouraged   support provided     Problem: Bleeding (Cardiac Rhythm Management Device)  Goal: Absence of Bleeding  Outcome: Ongoing, Progressing     Problem: Device-Related Complication Risk (Cardiac Rhythm Management Device)  Goal: Effective Device Function  Outcome: Ongoing, Progressing     Problem: Infection (Cardiac Rhythm Management Device)  Goal: Absence of Infection Signs and Symptoms  Outcome: Ongoing, Progressing     Problem: Pain (Cardiac Rhythm Management Device)  Goal: Acceptable Pain Level  Outcome: Ongoing, Progressing  Intervention: Prevent or Manage Pain  Recent Flowsheet Documentation  Taken 10/16/2020 0800 by Ivon Aguayo RN  Pain Management Interventions: quiet environment facilitated     Problem: Respiratory Compromise (Cardiac Rhythm Management Device)  Goal: Effective Respiratory Effort and Oxygenation  Outcome: Ongoing, Progressing     Problem: Pain Acute  Goal: Optimal Pain Control  Outcome: Ongoing, Progressing  Intervention: Develop Pain Management Plan  Recent Flowsheet Documentation  Taken 10/16/2020 0800 by Ivon Aguayo RN  Pain Management Interventions: quiet environment facilitated  Intervention: Optimize Psychosocial Wellbeing  Recent Flowsheet Documentation  Taken 10/16/2020 0800 by Ivon Aguayo RN  Diversional Activities:    smartphone   television   Goal Outcome Evaluation:  Plan of Care Reviewed With: patient  Progress: improving  Outcome Summary: VSS on room air; vpaced 100% on tele; pain controlled with po meds; anticipating discharge

## 2020-10-16 NOTE — NURSING NOTE
Went in to give pt IV pain meds since PO pain meds were not due at that time. Flushed IV and IV had infiltrated. Called and spoke with Dr Everett who gave permission to take out IV and leave out due to anticipated discharge.

## 2020-10-16 NOTE — DISCHARGE SUMMARY
Date of Discharge:  10/16/2020              [facility]  Date of Admit: 10/15/2020    Keri Little DO      Discharge Diagnosis:  Chronic diastolic heart failure (CMS/HCC)    Left bundle branch hemiblock      Hospital Course: Ms. Lopez is a pleasant 59-year-old female with chronic systolic CHF, EF of 40%, left bundle branch block.  She was admitted to [facility] on 10/15/2020 and underwent placement of a [employer] biventricular permanent pacemaker.  She tolerated procedure well, there were no complications.  Chest x-ray reveals excellent lead placement with no pneumothorax.  She is to resume her warfarin today due to her mechanical MVR and have her INR checked on Monday, 10/19/2020.  Today she is denying any complaints and is therefore felt ready for discharge.    Procedures Performed  Procedure(s):  Biventricular Device Insertion       Consults     No orders found for last 30 day(s).          Pertinent Test Results: SJM BiV-PPM implant  .      Discharge Physical Exam:    General Appearance No acute distress   Neck No adenopathy, supple, trachea midline, no JVD   Lungs Clear to auscultation,respirations regular, even and unlabored   Heart Regular rhythm and normal rate, normal S1 and S2, no murmur, no gallop, no rub, no click   Chest wall No abnormalities observed, ppm c/d/i   Abdomen Normal bowel sounds, no masses, no hepatomegaly, soft   Extremities Moves all extremities well, no edema, no cyanosis, no redness   Neurological Alert and oriented x 3     Discharge Medications     Discharge Medications      Changes to Medications      Instructions Start Date   glimepiride 4 MG tablet  Commonly known as: AMARYL  What changed: when to take this   TAKE 1 TABLET BY MOUTH ONCE DAILY IN THE MORNING BEFORE BREAKFAST         Continue These Medications      Instructions Start Date   albuterol sulfate  (90 Base) MCG/ACT inhaler  Commonly known as: PROVENTIL HFA;VENTOLIN  HFA;PROAIR HFA   2 puffs, Inhalation, Every 4 Hours PRN      Alcohol Prep 70 % pads   No dose, route, or frequency recorded.      Assure Comfort Lancets 30G misc   Use 2 times a day E11.9      carvedilol 12.5 MG tablet  Commonly known as: COREG   12.5 mg, 2 Times Daily With Meals      cholecalciferol 25 MCG (1000 UT) tablet  Commonly known as: VITAMIN D3   2,000 Units, Oral, Daily      Entresto 24-26 MG tablet  Generic drug: sacubitril-valsartan   1 tablet, Oral, 2 Times Daily      escitalopram 20 MG tablet  Commonly known as: LEXAPRO   Take 1 tablet by mouth once daily      ferrous sulfate 324 (65 Fe) MG tablet delayed-release EC tablet   324 mg, Oral, Daily With Breakfast      fish oil 1000 MG capsule capsule   1,000 mg, Oral, 4 Times Daily      Fluticasone Furoate-Vilanterol 100-25 MCG/INH inhaler  Commonly known as: Breo Ellipta   1 puff, Inhalation, Daily      furosemide 40 MG tablet  Commonly known as: LASIX   1 tablet, Oral, As Needed      gabapentin 300 MG capsule  Commonly known as: NEURONTIN   300 mg, Oral, Nightly PRN      HYDROcodone-acetaminophen 7.5-325 MG per tablet  Commonly known as: NORCO   1 tablet, As Needed      Insulin Pen Needle 32G X 4 MM misc  Commonly known as: ReliOn Pen Needles   Use BID for E11.9      B-D UF III MINI PEN NEEDLES 31G X 5 MM misc  Generic drug: Insulin Pen Needle   USE 1  TWICE DAILY      Lantus SoloStar 100 UNIT/ML injection pen  Generic drug: Insulin Glargine   50 Units, Subcutaneous, Daily      ONE TOUCH ULTRA 2 w/Device kit   No dose, route, or frequency recorded.      ONE TOUCH ULTRA TEST test strip  Generic drug: glucose blood   No dose, route, or frequency recorded.      Onglyza 5 MG tablet  Generic drug: SAXagliptin   Take 1 tablet by mouth once daily      OT ULTRA/FASTTK CNTRL SOLN solution   No dose, route, or frequency recorded.      [employer] Lancing Dev misc   No dose, route, or frequency recorded.      solifenacin 5 MG tablet  Commonly known as:  VESIcare   5 mg, Oral, Daily      spironolactone 25 MG tablet  Commonly known as: ALDACTONE   25 mg, Oral, Daily      warfarin 4 MG tablet  Commonly known as: COUMADIN   4.5 mg, Oral, Daily             Discharge Diet: cardiac/diabetic    Activity at Discharge: as tolerated    Discharge disposition: home    Condition on Discharge: stable    Follow-up Appointments  Future Appointments   Date Time Provider Department Center   11/17/2020  1:00 PM Ameena Iglesias MD [facility] None   12/17/2020  1:00 PM Abbey Reed APRN [facility] None     Additional Instructions for the Follow-ups that You Need to Schedule     Discharge Follow-up with Specified Provider: Dr. Everett   As directed      To: Dr. Everett    Follow Up Details: 10 days for wound check               PT/INR on Monday 10/19/20       TAVO Bledsoe  10/16/20  08:36 EDT

## 2020-10-16 NOTE — PLAN OF CARE
Goal Outcome Evaluation:  Plan of Care Reviewed With: patient  Progress: improving  Outcome Summary: VSS on RA. Vpaced on tele. Complaints of pain relieved with PRN meds. Pt resting well. Chest xray and labs in the am. Will continue to monitor.

## 2020-10-19 ENCOUNTER — TRANSITIONAL CARE MANAGEMENT TELEPHONE ENCOUNTER (OUTPATIENT)
Dept: CALL CENTER | Facility: HOSPITAL | Age: 60
End: 2020-10-19

## 2020-10-19 NOTE — OUTREACH NOTE
Call Center TCM Note      Responses   [facility] patient discharged from?  [facility]   Does the patient have one of the following disease processes/diagnoses(primary or secondary)?  General Surgery   TCM attempt successful?  Yes   Call start time  0906   Call end time  0914   Discharge diagnosis  Chronic diastolic heart failure Biventricular Device Insertion   Meds reviewed with patient/caregiver?  Yes   Is the patient having any side effects they believe may be caused by any medication additions or changes?  No   Does the patient have all medications related to this admission filled (includes all antibiotics, pain medications, etc.)  Yes   Is the patient taking all medications as directed (includes completed medication regime)?  Yes   Does the patient have a follow up appointment scheduled with their surgeon?  No   What is preventing the patient from scheduling follow up appointments?  Haven't had time   Nursing Interventions  Advised patient to make appointment   Has the patient kept scheduled appointments due by today?  N/A   Comments  Hospital d/c f/u appt is on 10/23/20 at 12:00 pm    Psychosocial issues?  No   Did the patient receive a copy of their discharge instructions?  Yes   Nursing interventions  Reviewed instructions with patient   What is the patient's perception of their health status since discharge?  New symptoms unrelated to diagnosis [Pt reports she coughed and now she has headache in her left temple that radiates towards her eye and the center of her head. The pain is a 9-10 at this time. ]   Nursing interventions  Advised patient to call provider, Nurse provided patient education [r/t wet bandage-as she was informed to leave it on until her f/u]   Is the patient /caregiver able to teach back basic post-op care?  Take showers only when approved by MD-sponge bathgriselda until then, Keep incision areas clean,dry and protected [Advised patient to remove the bandage as it's holding water ]   Is the  patient/caregiver able to teach back signs and symptoms of incisional infection?  Fever   Is the patient/caregiver able to teach back steps to recovery at home?  Rest and rebuild strength, gradually increase activity   If the patient is a current smoker, are they able to teach back resources for cessation?  Not a smoker   Is the patient/caregiver able to teach back the hierarchy of who to call/visit for symptoms/problems? PCP, Specialist, Home health nurse, Urgent Care, ED, 911  Yes   TCM call completed?  Yes          Jenifer Red RN    10/19/2020, 09:15 EDT

## 2020-10-23 ENCOUNTER — OFFICE VISIT (OUTPATIENT)
Dept: FAMILY MEDICINE CLINIC | Facility: CLINIC | Age: 60
End: 2020-10-23

## 2020-10-23 VITALS
SYSTOLIC BLOOD PRESSURE: 138 MMHG | HEART RATE: 72 BPM | WEIGHT: 216 LBS | BODY MASS INDEX: 42.41 KG/M2 | HEIGHT: 60 IN | TEMPERATURE: 98.2 F | OXYGEN SATURATION: 96 % | RESPIRATION RATE: 22 BRPM | DIASTOLIC BLOOD PRESSURE: 70 MMHG

## 2020-10-23 DIAGNOSIS — E11.9 TYPE 2 DIABETES MELLITUS WITHOUT COMPLICATION, WITH LONG-TERM CURRENT USE OF INSULIN (HCC): ICD-10-CM

## 2020-10-23 DIAGNOSIS — Z79.4 TYPE 2 DIABETES MELLITUS WITHOUT COMPLICATION, WITH LONG-TERM CURRENT USE OF INSULIN (HCC): ICD-10-CM

## 2020-10-23 DIAGNOSIS — G44.031 INTRACTABLE EPISODIC PAROXYSMAL HEMICRANIA: ICD-10-CM

## 2020-10-23 DIAGNOSIS — Z09 HOSPITAL DISCHARGE FOLLOW-UP: Primary | ICD-10-CM

## 2020-10-23 LAB — HBA1C MFR BLD: 10.4 %

## 2020-10-23 PROCEDURE — 99214 OFFICE O/P EST MOD 30 MIN: CPT | Performed by: FAMILY MEDICINE

## 2020-10-23 PROCEDURE — 90694 VACC AIIV4 NO PRSRV 0.5ML IM: CPT | Performed by: FAMILY MEDICINE

## 2020-10-23 PROCEDURE — 83036 HEMOGLOBIN GLYCOSYLATED A1C: CPT | Performed by: FAMILY MEDICINE

## 2020-10-23 PROCEDURE — G0008 ADMIN INFLUENZA VIRUS VAC: HCPCS | Performed by: FAMILY MEDICINE

## 2020-10-23 RX ORDER — FLUCONAZOLE 150 MG/1
150 TABLET ORAL ONCE
Qty: 1 TABLET | Refills: 0 | Status: SHIPPED | OUTPATIENT
Start: 2020-10-23 | End: 2020-10-23

## 2020-10-23 NOTE — PROGRESS NOTES
Transitional Care Follow Up Visit  Subjective     Ismaelkarla Lopez is a 59 y.o. female who presents for a transitional care management visit.    Within 48 business hours after discharge our office contacted her via telephone to coordinate her care and needs.      I reviewed and discussed the details of that call along with the discharge summary, hospital problems, inpatient lab results, inpatient diagnostic studies, and consultation reports with Ismaelkarla.     Current outpatient and discharge medications have been reconciled for the patient.  Reviewed by: Keri Little DO      Date of TCM Phone Call 10/16/2020   [facility]   Date of Admission 10/15/2020   Date of Discharge 10/16/2020   Discharge Disposition Home or Self Care     Risk for Readmission (LACE) Score: 6 (10/16/2020  6:00 AM)      History of Present Illness   Course During Hospital Stay:   Hospital Course: Ms. Lopez is a pleasant 59-year-old female with chronic systolic CHF, EF of 40%, left bundle branch block.  She was admitted to [facility] on 10/15/2020 and underwent placement of a [employer] biventricular permanent pacemaker.  She tolerated procedure well, there were no complications.  Chest x-ray reveals excellent lead placement with no pneumothorax.  She will resume her warfarin today due to her mechanical MVR and have her INR checked on Monday, 10/19/2020.  Has been following with Cardiology.  Pt c/o increased cough which caused a nose bleed and had a headache. Pt is on Coumadin. HA went away after 1 hr. Still has JASON with cough behind left eye and center on top of head. Has not had a nose bleed for 1 week.  C/O yeast infection.   C/o increased sleep. 10-15 hrs/day.    Has not seen endo and needs HBAIC check.      Current outpatient and discharge medications have been reconciled for the patient.  Reviewed by: Keri Little DO     The following portions of the patient's history were reviewed and updated as appropriate:  allergies, current medications, past family history, past medical history, past social history, past surgical history and problem list.    Review of Systems   Constitutional: Negative.  Negative for activity change, fatigue, fever and unexpected weight change.   HENT: Negative.  Negative for congestion, sneezing and sore throat.    Eyes: Negative.  Negative for visual disturbance.   Respiratory: Negative.  Negative for cough, chest tightness, shortness of breath and wheezing.    Cardiovascular: Negative.  Negative for chest pain, palpitations and leg swelling.   Gastrointestinal: Negative.  Negative for abdominal distention, abdominal pain, blood in stool, constipation, diarrhea and nausea.   Endocrine: Negative.  Negative for cold intolerance and heat intolerance.   Genitourinary: Negative.  Negative for dysuria, frequency and urgency.   Musculoskeletal: Negative.  Negative for arthralgias and myalgias.   Skin: Negative.  Negative for rash.   Allergic/Immunologic: Negative.    Neurological: Negative.  Negative for dizziness, syncope and numbness.   Hematological: Negative.  Negative for adenopathy.   Psychiatric/Behavioral: Negative.  Negative for suicidal ideas. The patient is not nervous/anxious.      HBAIC 10.4    Objective   Physical Exam  Vitals signs and nursing note reviewed.   Constitutional:       General: She is not in acute distress.     Appearance: She is well-developed. She is not diaphoretic.   HENT:      Right Ear: External ear normal.      Left Ear: External ear normal.      Nose: Nose normal.   Eyes:      Conjunctiva/sclera: Conjunctivae normal.      Pupils: Pupils are equal, round, and reactive to light.   Neck:      Musculoskeletal: Normal range of motion and neck supple.      Thyroid: No thyromegaly.   Cardiovascular:      Rate and Rhythm: Normal rate and regular rhythm.      Heart sounds: Normal heart sounds. No murmur.   Pulmonary:      Effort: Pulmonary effort is normal. No respiratory  distress.      Breath sounds: Normal breath sounds. No wheezing.   Abdominal:      General: Bowel sounds are normal. There is no distension.      Palpations: Abdomen is soft. There is no mass.      Tenderness: There is no abdominal tenderness. There is no guarding or rebound.      Hernia: No hernia is present.   Musculoskeletal: Normal range of motion.         General: No tenderness.   Lymphadenopathy:      Cervical: No cervical adenopathy.   Skin:     General: Skin is warm and dry.      Coloration: Skin is not pale.      Findings: No erythema or rash.   Neurological:      Mental Status: She is alert and oriented to person, place, and time.      Deep Tendon Reflexes: Reflexes are normal and symmetric.   Psychiatric:         Behavior: Behavior normal.         Thought Content: Thought content normal.         Judgment: Judgment normal.         Assessment/Plan   Diagnoses and all orders for this visit:    1. Hospital discharge follow-up (Primary)    2. Intractable episodic paroxysmal hemicrania  -     Ambulatory Referral to Neurology    3. Type 2 diabetes mellitus without complication, with long-term current use of insulin (CMS/[facility])  -     POC Glycosylated Hemoglobin (Hb A1C)  -     Ambulatory Referral to Endocrinology    Other orders  -     fluconazole (Diflucan) 150 MG tablet; Take 1 tablet by mouth 1 (One) Time for 1 dose.  Dispense: 1 tablet; Refill: 0  -     Fluad Quad 65+ yrs ([id number])    Pt will f/u with specialists.

## 2020-10-27 RX ORDER — SAXAGLIPTIN 5 MG/1
TABLET, FILM COATED ORAL
Qty: 30 TABLET | Refills: 0 | Status: SHIPPED | OUTPATIENT
Start: 2020-10-27 | End: 2020-11-17

## 2020-11-10 RX ORDER — GLIMEPIRIDE 4 MG/1
TABLET ORAL
Qty: 90 TABLET | Refills: 0 | Status: SHIPPED | OUTPATIENT
Start: 2020-11-10 | End: 2020-11-17 | Stop reason: SDUPTHER

## 2020-11-17 ENCOUNTER — OFFICE VISIT (OUTPATIENT)
Dept: ENDOCRINOLOGY | Facility: CLINIC | Age: 60
End: 2020-11-17

## 2020-11-17 VITALS
BODY MASS INDEX: 42.8 KG/M2 | DIASTOLIC BLOOD PRESSURE: 62 MMHG | HEART RATE: 70 BPM | WEIGHT: 218 LBS | SYSTOLIC BLOOD PRESSURE: 118 MMHG | HEIGHT: 60 IN | OXYGEN SATURATION: 100 %

## 2020-11-17 DIAGNOSIS — IMO0002 DIABETES MELLITUS TYPE 2, UNCONTROLLED, WITH COMPLICATIONS: Primary | ICD-10-CM

## 2020-11-17 LAB
EXPIRATION DATE: ABNORMAL
GLUCOSE BLDC GLUCOMTR-MCNC: 298 MG/DL (ref 70–130)
Lab: ABNORMAL

## 2020-11-17 PROCEDURE — 99204 OFFICE O/P NEW MOD 45 MIN: CPT | Performed by: INTERNAL MEDICINE

## 2020-11-17 PROCEDURE — 82947 ASSAY GLUCOSE BLOOD QUANT: CPT | Performed by: INTERNAL MEDICINE

## 2020-11-17 RX ORDER — PEN NEEDLE, DIABETIC 31 GX5/16"
NEEDLE, DISPOSABLE MISCELLANEOUS
Qty: 100 EACH | Refills: 5 | Status: SHIPPED | OUTPATIENT
Start: 2020-11-17 | End: 2023-02-09 | Stop reason: SDUPTHER

## 2020-11-17 RX ORDER — GLIMEPIRIDE 4 MG/1
8 TABLET ORAL
Qty: 60 TABLET | Refills: 5 | Status: SHIPPED | OUTPATIENT
Start: 2020-11-17 | End: 2021-01-06

## 2020-11-17 RX ORDER — OMEPRAZOLE 20 MG/1
20 CAPSULE, DELAYED RELEASE ORAL DAILY
COMMUNITY
End: 2021-01-29 | Stop reason: SDUPTHER

## 2020-11-17 RX ORDER — UREA 10 %
800 LOTION (ML) TOPICAL DAILY
COMMUNITY

## 2020-11-17 RX ORDER — DULAGLUTIDE 0.75 MG/.5ML
0.75 INJECTION, SOLUTION SUBCUTANEOUS WEEKLY
Qty: 4 PEN | Refills: 6 | Status: SHIPPED | OUTPATIENT
Start: 2020-11-17 | End: 2021-02-18

## 2020-11-17 RX ORDER — INSULIN GLARGINE 100 [IU]/ML
60 INJECTION, SOLUTION SUBCUTANEOUS DAILY
Qty: 18 ML | Refills: 5 | Status: SHIPPED | OUTPATIENT
Start: 2020-11-17 | End: 2021-01-05

## 2020-11-17 NOTE — PROGRESS NOTES
Chief Complaint   Patient presents with   • Establish Care   • Diabetes        New patient who is being seen in consultation regarding diabetes at the request of Keri Little DO HPI   Di Lopez is a 59 y.o. female who presents for evaluation of diabetes.     Patient has a history of type 2 diabetes.  This was initially diagnosed 3-4 years ago after patient presented with feeling sluggish and PCP checked routine labs.  Patient was initially started on oral medications but then basal insulin was quickly added. She did take metformin in the past but developed changes in bowel habits which led to discontinuation.  Patient is currently taking Lantus 60 units daily, glimepiride 4 mg daily, Onglyza 5 mg daily.  This regimen was last changed several years ago.  Patient believes that glycemic control is poor.  Patient reports most recent A1c was around 10. Patient reports good adherence to medications.      Patient denies any recent hypoglycemia or symptoms concerning for hypoglycemia.  Patient monitors blood glucose once every few weeks.  She reports that this is generally high, glycemic data is available for review today.  Patient does not follow a diabetic diet.  Patient does not exercise regularly.     Patient's last dilated eye exam was earlier this year.  Patient denies acute visual changes.  Patient reports jerking sensation in her feet, shooting pains. She takes gabapentin as needed as prescribed by an outside physician..  History of dyslipidemia: Yes. She was previously on statin therapy which was discontinued due to a liver mass.  History of renal dysfunction: No      Past Medical History:   Diagnosis Date   • Arteriovenous malformation of gastrointestinal tract 5/12/2016   • Arthritis    • Black hairy tongue    • CHF (congestive heart failure) (CMS/[facility])    • Chronic back pain    • Depression 5/12/2016   • Diabetes mellitus (CMS/[facility])    • Dyslipidemia 5/12/2016   • Emphysema/COPD (CMS/[facility]) 5/12/2016    • Fatigue    • Headache    • Herniated cervical disc    • History of echocardiogram 07/16/2013    Left ventricular systolic function at the lower limits of normal; EF 50-55%; mild MR; mechanical prosthetic mitral valve present; mod TR   • Hypertension    • Insomnia 5/12/2016   • Iron deficiency     A.  The patient was treated with IV iron replacement. B.  Secondary to GI bleed, status post EGD with cauterization of blood vessel to the duodenum, 7/16/2014.   • Left bundle branch hemiblock 10/12/2020   • Left shoulder strain    • Liver mass    • Long term current use of anticoagulant 5/12/2016   • Morbid obesity (CMS/HCC) 5/12/2016   • Obstructive sleep apnea syndrome 5/12/2016    Description: A.  Moderate; CPAP therapy..   • Pulmonary hypertension (CMS/HCC)    • Sliding hiatal hernia 7/28/2016    Description: A.  Reported on EGD, 12/30/2011.   • Type 2 diabetes mellitus (CMS/HCC) 5/12/2016   • Vitamin B12 deficiency     A.  The patient was treated with vitamin B12 replacement.     Past Surgical History:   Procedure Laterality Date   • BACK SURGERY     • BILATERAL OOPHORECTOMY  2009    Status post bilateral oophorectomy secondary to ovarian cysts    • BREAST BIOPSY Right     STEREOTACTIC   • BREAST EXCISIONAL BIOPSY Bilateral    • CARDIAC ELECTROPHYSIOLOGY PROCEDURE N/A 10/15/2020    Procedure: Biventricular Device Insertion;  Surgeon: Nadir Everett MD;  Location: [facility] INVASIVE LOCATION;  Service: Cardiology;  Laterality: N/A;   • COLONOSCOPY  12/28/2012   • CYSTECTOMY      removal of ganglion cyst from left wrist   • ENDOSCOPY  12/30/2011    DIAGNOSTIC ESOPHAGOGASTRODUODENOSCOPY   • HERNIA REPAIR     • HYSTERECTOMY  1995    A.  Status post hysterectomy for early stage endometrial cancer done in 1995.   • MITRAL VALVE REPLACEMENT  03/03/2008    Dr. Hima Barreto.    • OOPHORECTOMY        Family History   Problem Relation Age of Onset   • Sudden death Mother         POSSIBLE SERIOUS MASSIVE STROKE   •  Multiple myeloma Father    • Ovarian cancer Paternal Grandmother    • Breast cancer Neg Hx       Social History     Socioeconomic History   • Marital status: Single     Spouse name: Not on file   • Number of children: Not on file   • Years of education: Not on file   • Highest education level: Not on file   Tobacco Use   • Smoking status: Former Smoker     Packs/day: 0.50     Years: 33.00     Pack years: 16.50     Types: Electronic Cigarette, Cigarettes     Quit date: 2010     Years since quitting: 10.8   • Smokeless tobacco: Never Used   • Tobacco comment: Quit in 2010   Substance and Sexual Activity   • Alcohol use: No   • Drug use: No   • Sexual activity: Defer      Allergies   Allergen Reactions   • Codeine Itching and Nausea And Vomiting   • Latex Other (See Comments)     Pulls skin off   • Penicillins Hives   • Sulfa Antibiotics Nausea And Vomiting      Current Outpatient Medications on File Prior to Visit   Medication Sig Dispense Refill   • albuterol sulfate  (90 Base) MCG/ACT inhaler Inhale 2 puffs Every 4 (Four) Hours As Needed for Wheezing. 1 inhaler 3   • carvedilol (COREG) 12.5 MG tablet Take 12.5 mg by mouth 2 (Two) Times a Day With Meals.     • cholecalciferol (VITAMIN D3) 25 MCG (1000 UT) tablet Take 2,000 Units by mouth Daily.     • escitalopram (LEXAPRO) 20 MG tablet Take 1 tablet by mouth once daily (Patient taking differently: Take 20 mg by mouth Daily.) 90 tablet 0   • Ferrous Sulfate (IRON PO) Take 1 tablet by mouth Daily.     • Fluticasone Furoate-Vilanterol (Breo Ellipta) 100-25 MCG/INH inhaler Inhale 1 puff Daily. 1 each 6   • folic acid ([employer] Folic Acid) 800 MCG tablet Take 800 mcg by mouth Daily.     • furosemide (LASIX) 40 MG tablet Take 1 tablet by mouth As Needed.     • gabapentin (NEURONTIN) 300 MG capsule Take 1 capsule by mouth At Night As Needed (leg pain). 30 capsule 3   • HYDROcodone-acetaminophen (NORCO) 7.5-325 MG per tablet Take 1 tablet by mouth Daily.     • Insulin Pen  Needle (RELION PEN NEEDLES) 32G X 4 MM misc Use BID for E11.9 100 each 1   • Omega-3 Fatty Acids (FISH OIL) 1000 MG capsule capsule Take 1 capsule by mouth 4 (Four) Times a Day. 120 capsule 2   • omeprazole (priLOSEC) 20 MG capsule Take 20 mg by mouth Daily.     • sacubitril-valsartan (ENTRESTO) 24-26 MG tablet Take 1 tablet by mouth 2 (Two) Times a Day.     • solifenacin (VESIcare) 5 MG tablet Take 1 tablet by mouth Daily. 30 tablet 3   • spironolactone (ALDACTONE) 25 MG tablet Take 25 mg by mouth Daily.  0   • warfarin (COUMADIN) 4 MG tablet Take 4.5 mg by mouth Daily.  2   • [DISCONTINUED] glimepiride (AMARYL) 4 MG tablet TAKE 1 TABLET BY MOUTH ONCE DAILY IN THE MORNING BEFORE BREAKFAST (Patient taking differently: Take 4 mg by mouth Daily.) 90 tablet 0   • [DISCONTINUED] Insulin Glargine (Lantus SoloStar) 100 UNIT/ML injection pen Inject 50 Units under the skin into the appropriate area as directed Daily. (Patient taking differently: Inject 60 Units under the skin into the appropriate area as directed Every Night.) 45 mL 0   • [DISCONTINUED] Onglyza 5 MG tablet Take 1 tablet by mouth once daily (Patient taking differently: Take 5 mg by mouth Daily.) 30 tablet 0   • [DISCONTINUED] Alcohol Swabs (ALCOHOL PREP) 70 % pads      • [DISCONTINUED] ASSURE COMFORT LANCETS 30G misc Use 2 times a day E11.9 100 each 5   • [DISCONTINUED] B-D UF III MINI PEN NEEDLES 31G X 5 MM misc USE 1  TWICE DAILY 100 each 0   • [DISCONTINUED] Blood Glucose Calibration (OT ULTRA/FASTTK CNTRL SOLN) solution      • [DISCONTINUED] Blood Glucose Monitoring Suppl (ONE TOUCH ULTRA 2) W/DEVICE kit      • [DISCONTINUED] Lancet Devices (SIMPLE DIAGNOSTICS LANCING DEV) misc      • [DISCONTINUED] ONE TOUCH ULTRA TEST test strip        No current facility-administered medications on file prior to visit.         Review of Systems   Constitutional: Positive for unexpected weight gain. Negative for fatigue.   HENT: Negative for trouble swallowing and  "voice change.    Eyes: Negative for pain and visual disturbance.   Respiratory: Negative for cough and shortness of breath.    Cardiovascular: Negative for chest pain and palpitations.   Gastrointestinal: Negative for constipation and diarrhea.   Endocrine: Positive for polydipsia and polyuria.   Musculoskeletal: Positive for arthralgias. Negative for myalgias.   Skin: Negative for dry skin and rash.   Neurological: Positive for numbness. Negative for tremors.   Psychiatric/Behavioral: Negative for sleep disturbance. The patient is not nervous/anxious.         Vitals:    11/17/20 1314   BP: 118/62   Pulse: 70   SpO2: 100%   Weight: 98.9 kg (218 lb)   Height: 152.4 cm (60\")   Body mass index is 42.58 kg/m².     Physical Exam  Vitals signs reviewed.   Constitutional:       General: She is awake. She is not in acute distress.     Appearance: She is obese.   HENT:      Head: Normocephalic and atraumatic.      Right Ear: Hearing normal.      Left Ear: Hearing normal.      Nose: Nose normal.   Eyes:      General: Lids are normal.      Conjunctiva/sclera: Conjunctivae normal.   Neck:      Thyroid: No thyromegaly or thyroid tenderness.   Cardiovascular:      Rate and Rhythm: Normal rate and regular rhythm.      Heart sounds: No murmur.   Pulmonary:      Effort: Pulmonary effort is normal.      Breath sounds: Normal breath sounds and air entry.   Abdominal:      General: Bowel sounds are normal.      Palpations: Abdomen is soft.      Tenderness: There is no abdominal tenderness.   Lymphadenopathy:      Head:      Right side of head: No submandibular adenopathy.      Left side of head: No submandibular adenopathy.      Cervical: No cervical adenopathy.   Skin:     General: Skin is warm and dry.      Findings: No rash.   Neurological:      General: No focal deficit present.      Mental Status: She is alert.      Deep Tendon Reflexes: Reflexes are normal and symmetric.   Psychiatric:         Mood and Affect: Mood and affect " normal.         Behavior: Behavior is cooperative.            Labs/Imaging  Results for NEHA MONTERROSO (MRN [id number]) as of 11/17/2020 16:32   Ref. Range 10/23/2020 12:45 11/17/2020 13:37   Glucose Latest Ref Range: 70 - 130 mg/dL  298 (A)   Hemoglobin A1C Latest Units: % 10.4        Assessment and Plan    Diagnoses and all orders for this visit:    1. Diabetes mellitus type 2, uncontrolled, with complications (CMS/[facility]) (Primary)  -Complicated by hyperglycemia, neuropathy  -Uncontrolled with most recent hemoglobin A1c 10.4%, too soon to repeat today  -Patient not currently exercising or limiting carbohydrates.  Discussed that in the absence of dietary changes we will likely struggle to obtain appropriate glycemic control. Patient declined to see RD/diabetes education.  -No glycemic data available for review, based on patient's current regimen, I suspect she has rise in blood glucose throughout the day  -We will plan to continue Lantus 60 units daily  -Plan to increase glimepiride to 8 mg daily  -We will plan to exchange Onglyza for Trulicity 0.75 mg weekly or formulary equivalent.  Patient counseled on potential adverse effects of nausea, vomiting, diarrhea.  Reviewed increased risk of pancreatitis.  Patient denies any personal or family history of medullary thyroid cancer.  -Patient advised that it is difficult to safely make adjustments to diabetes medications in the absence of glucose data.    -Patient was advised to monitor blood sugar 2 times daily.  Patient was instructed to bring glucometer to all future appointments. Patient should contact the clinic between appointments with hypoglycemia or persistent hyperglycemia.  Discussed signs and symptoms of hypoglycemia as well as hypoglycemia management via the rule of 15's.  Discussed potential for long-term complications with uncontrolled diabetes including nephropathy, neuropathy, retinopathy, increased risk for cardiac disease.  Discussed the role of diet  and exercise in the management of diabetes.  Renal function normal in October 2020  Lipid panel up-to-date from May 2020  Urine microalbumin up-to-date from August 2020, patient taking Entresto  -     POC Glucose, Blood  -     Dulaglutide (Trulicity) 0.75 MG/0.5ML solution pen-injector; Inject 0.75 mg under the skin into the appropriate area as directed 1 (One) Time Per Week.  Dispense: 4 pen; Refill: 6  -     Insulin Glargine (Lantus SoloStar) 100 UNIT/ML injection pen; Inject 60 Units under the skin into the appropriate area as directed Daily.  Dispense: 18 mL; Refill: 5  -     glimepiride (AMARYL) 4 MG tablet; Take 2 tablets by mouth Every Morning Before Breakfast.  Dispense: 60 tablet; Refill: 5  -     Alcohol Swabs (Alcohol Prep) pads; For use prior to checking glucose twice daily  Dispense: 100 each; Refill: 5        Return in about 3 months (around 2/17/2021) for Next scheduled follow up. The patient was instructed to contact the clinic with any interval questions or concerns.    Ameena Iglesias MD     Please note that portions of this document were completed using a voice recognition program. Efforts were made to edit the dictations, but occasionally words are mis-transcribed.

## 2020-11-18 ENCOUNTER — TELEPHONE (OUTPATIENT)
Dept: ENDOCRINOLOGY | Facility: CLINIC | Age: 60
End: 2020-11-18

## 2020-12-09 RX ORDER — ESCITALOPRAM OXALATE 20 MG/1
TABLET ORAL
Qty: 90 TABLET | Refills: 0 | Status: SHIPPED | OUTPATIENT
Start: 2020-12-09 | End: 2021-03-08

## 2020-12-17 ENCOUNTER — OFFICE VISIT (OUTPATIENT)
Dept: PULMONOLOGY | Facility: CLINIC | Age: 60
End: 2020-12-17

## 2020-12-17 VITALS
SYSTOLIC BLOOD PRESSURE: 110 MMHG | TEMPERATURE: 96.9 F | DIASTOLIC BLOOD PRESSURE: 80 MMHG | BODY MASS INDEX: 42.8 KG/M2 | OXYGEN SATURATION: 93 % | HEART RATE: 93 BPM | HEIGHT: 60 IN | WEIGHT: 218 LBS

## 2020-12-17 DIAGNOSIS — J47.9 BRONCHIECTASIS WITHOUT COMPLICATION (HCC): ICD-10-CM

## 2020-12-17 DIAGNOSIS — J43.9 PULMONARY EMPHYSEMA, UNSPECIFIED EMPHYSEMA TYPE (HCC): Primary | ICD-10-CM

## 2020-12-17 DIAGNOSIS — G47.33 OBSTRUCTIVE SLEEP APNEA SYNDROME: ICD-10-CM

## 2020-12-17 DIAGNOSIS — R06.02 SHORTNESS OF BREATH: ICD-10-CM

## 2020-12-17 PROBLEM — J02.0 STREP PHARYNGITIS: Status: RESOLVED | Noted: 2017-01-11 | Resolved: 2020-12-17

## 2020-12-17 PROCEDURE — 99214 OFFICE O/P EST MOD 30 MIN: CPT | Performed by: NURSE PRACTITIONER

## 2020-12-17 NOTE — PROGRESS NOTES
[facility] Pulmonary Follow up    CHIEF COMPLAINT    Shortness of breath, chronic hypoxic respiratory failure, mild obstructive airway disease    Subjective   HISTORY OF PRESENT ILLNESS    Di Lopez is a 59 y.o.female here today for routine 6-month follow-up.  We follow her here in the office for some shortness of breath that multifactorial with mild obstructive airway disease and emphysema.    She does have chronic hypoxic respiratory failure and uses oxygen as needed throughout the day as well as with her BiPAP at night.    She does have underlying chronic heart failure and pulmonary hypertension and valvular heart disease.  She follows up with Dr. Patel and Dr. Everett.  She just recently underwent a new pacemaker placement.  She said her shortness of breath is quite a bit better with activity after her pacemaker placement.    She does have a history of some mild interstitial changes and bronchiectasis as well as emphysema on her CT.  Her most recent PFTs were in  June with an FEV1 of 1.15, 65% predicted.  She has a rare cough and no sputum production.  She stopped using Breo because it caused quite a bit of sores in her mouth.  She is not using her albuterol.          Patient Active Problem List   Diagnosis   • Arteriovenous malformation of gastrointestinal tract   • Depression   • Type 2 diabetes mellitus without complication, with long-term current use of insulin (CMS/HCC)   • Dyslipidemia   • Emphysema/COPD (CMS/HCC)   • Hypertension   • Insomnia   • Morbid obesity (CMS/HCC)   • Low back pain   • Obstructive sleep apnea syndrome   • Long term current use of anticoagulant   • Normocytic anemia   • Sliding hiatal hernia   • Dyspnea   • Vitamin B12 deficiency   • Cough   • Iron deficiency   • Iron malabsorption   • Bronchiectasis without complication (CMS/HCC)   • Electronic cigarette use   • Chronic diastolic heart failure (CMS/HCC)   • Left bundle branch hemiblock       Allergies   Allergen Reactions    • Codeine Itching and Nausea And Vomiting   • Latex Other (See Comments)     Pulls skin off   • Penicillins Hives   • Sulfa Antibiotics Nausea And Vomiting       Current Outpatient Medications:   •  albuterol sulfate  (90 Base) MCG/ACT inhaler, Inhale 2 puffs Every 4 (Four) Hours As Needed for Wheezing., Disp: 1 inhaler, Rfl: 3  •  Alcohol Swabs (Alcohol Prep) pads, For use prior to checking glucose twice daily, Disp: 100 each, Rfl: 5  •  carvedilol (COREG) 12.5 MG tablet, Take 12.5 mg by mouth 2 (Two) Times a Day With Meals., Disp: , Rfl:   •  cholecalciferol (VITAMIN D3) 25 MCG (1000 UT) tablet, Take 2,000 Units by mouth Daily., Disp: , Rfl:   •  Dulaglutide (Trulicity) 0.75 MG/0.5ML solution pen-injector, Inject 0.75 mg under the skin into the appropriate area as directed 1 (One) Time Per Week., Disp: 4 pen, Rfl: 6  •  escitalopram (LEXAPRO) 20 MG tablet, Take 1 tablet by mouth once daily, Disp: 90 tablet, Rfl: 0  •  Ferrous Sulfate (IRON PO), Take 1 tablet by mouth Daily., Disp: , Rfl:   •  folic acid ([employer] Folic Acid) 800 MCG tablet, Take 800 mcg by mouth Daily., Disp: , Rfl:   •  furosemide (LASIX) 40 MG tablet, Take 1 tablet by mouth As Needed., Disp: , Rfl:   •  gabapentin (NEURONTIN) 300 MG capsule, Take 1 capsule by mouth At Night As Needed (leg pain)., Disp: 30 capsule, Rfl: 3  •  glimepiride (AMARYL) 4 MG tablet, Take 2 tablets by mouth Every Morning Before Breakfast., Disp: 60 tablet, Rfl: 5  •  HYDROcodone-acetaminophen (NORCO) 7.5-325 MG per tablet, Take 1 tablet by mouth Daily., Disp: , Rfl:   •  Insulin Glargine (Lantus SoloStar) 100 UNIT/ML injection pen, Inject 60 Units under the skin into the appropriate area as directed Daily., Disp: 18 mL, Rfl: 5  •  Insulin Pen Needle (RELION PEN NEEDLES) 32G X 4 MM misc, Use BID for E11.9, Disp: 100 each, Rfl: 1  •  Omega-3 Fatty Acids (FISH OIL) 1000 MG capsule capsule, Take 1 capsule by mouth 4 (Four) Times a Day., Disp: 120 capsule, Rfl: 2  •   "omeprazole (priLOSEC) 20 MG capsule, Take 20 mg by mouth Daily., Disp: , Rfl:   •  sacubitril-valsartan (ENTRESTO) 24-26 MG tablet, Take 1 tablet by mouth 2 (Two) Times a Day., Disp: , Rfl:   •  solifenacin (VESIcare) 5 MG tablet, Take 1 tablet by mouth Daily., Disp: 30 tablet, Rfl: 3  •  spironolactone (ALDACTONE) 25 MG tablet, Take 25 mg by mouth Daily., Disp: , Rfl: 0  •  warfarin (COUMADIN) 4 MG tablet, Take 4.5 mg by mouth Daily., Disp: , Rfl: 2  •  Fluticasone Furoate-Vilanterol (Breo Ellipta) 100-25 MCG/INH inhaler, Inhale 1 puff Daily., Disp: 1 each, Rfl: 6  MEDICATION LIST AND ALLERGIES REVIEWED.    Social History     Tobacco Use   • Smoking status: Former Smoker     Packs/day: 0.50     Years: 33.00     Pack years: 16.50     Types: Electronic Cigarette, Cigarettes     Quit date: 2010     Years since quitting: 10.9   • Smokeless tobacco: Never Used   • Tobacco comment: Quit in 2010   Substance Use Topics   • Alcohol use: No   • Drug use: No       FAMILY AND SOCIAL HISTORY REVIEWED.    Review of Systems   Constitutional: Positive for activity change and fatigue. Negative for chills, fever and unexpected weight change.   HENT: Negative for congestion, nosebleeds, postnasal drip, rhinorrhea, sinus pressure and trouble swallowing.    Respiratory: Positive for shortness of breath. Negative for cough, chest tightness and wheezing.    Cardiovascular: Negative for chest pain and leg swelling.   Gastrointestinal: Negative for abdominal pain, constipation, diarrhea, nausea and vomiting.   Genitourinary: Negative for dysuria, frequency, hematuria and urgency.   Musculoskeletal: Negative for myalgias.   Neurological: Negative for dizziness, weakness, numbness and headaches.   All other systems reviewed and are negative.  .  Objective   /80   Pulse 93   Temp 96.9 °F (36.1 °C)   Ht 152.4 cm (60\")   Wt 98.9 kg (218 lb)   LMP 06/17/1998   SpO2 93% Comment: resting at room air  BMI 42.58 kg/m²     Immunization " History   Administered Date(s) Administered   • Flu Mist 10/22/2014   • Flu Vaccine Quad PF >18YRS 11/25/2019   • Fluad Quad 65+ 10/23/2020   • Flulaval/Fluarix/Fluzone Quad 11/25/2019   • Fluzone High Dose =>65 Years ([facility] ONLY) 10/22/2014   • Hepatitis A 11/13/2018, 07/09/2019   • Influenza Quad Vaccine (Inpatient) 10/21/2013, 09/06/2016   • Influenza TIV (IM) 02/02/2016   • PPD Test 06/20/2012   • Shingrix 07/09/2019, 11/25/2019   • Tdap 09/01/2010, 11/13/2018   • Zostavax 07/09/2019, 11/25/2019       Physical Exam  Vitals signs reviewed.   Constitutional:       Appearance: She is well-developed.   HENT:      Head: Normocephalic and atraumatic.   Eyes:      Pupils: Pupils are equal, round, and reactive to light.   Neck:      Musculoskeletal: Normal range of motion and neck supple.   Cardiovascular:      Rate and Rhythm: Normal rate and regular rhythm.      Heart sounds: No murmur.   Pulmonary:      Effort: Pulmonary effort is normal. No respiratory distress.      Breath sounds: Normal breath sounds. No wheezing or rales.   Abdominal:      General: Bowel sounds are normal. There is no distension.      Palpations: Abdomen is soft.   Musculoskeletal: Normal range of motion.   Skin:     General: Skin is warm and dry.      Findings: No erythema.   Neurological:      Mental Status: She is alert and oriented to person, place, and time.   Psychiatric:         Behavior: Behavior normal.           RESULTS    PFTS in the office today, read by me:  Unable to complete secondary to the COVID epidemic      Assessment/Plan     PROBLEM LIST         ICD-10-CM ICD-9-CM   1. Pulmonary emphysema, unspecified emphysema type (CMS/[facility])  J43.9 492.8   2. Shortness of breath  R06.02 786.05   3. Bronchiectasis without complication (CMS/[facility])  J47.9 494.0   4. Obstructive sleep apnea syndrome  G47.33 327.23       Problem List Items Addressed This Visit        Respiratory    Emphysema/COPD (CMS/[facility]) - Primary    Overview     No obstruction  on her PFTs         Obstructive sleep apnea syndrome    Overview     Description: A.  Moderate; CPAP therapy..         Dyspnea    Bronchiectasis without complication (CMS/[facility])            DISCUSSION    At this time her mild obstructive airway disease is at baseline.  She did try some inhaled maintenance therapy in the past without much success.  And she had quite a bit of issue with the inhaled steroid with thrush and oral irritation.    She has had no recent acute exacerbation or illness.    Continue follow-up with cardiology.  Her shortness of breath has improved after her new pacemaker placement.    Continue on her oxygen as needed throughout the day and at night.    Follow-up in 6 months with full PFTs.    I spent 20-29 minutes face to face with the patient. I spent > 50% percent of this time counseling and discussing diagnostic testing, evaluation, current status, treatment options and management.    Abbey Reed, APRN  12/17/202013:09 EST  Electronically signed     Please note that portions of this note were completed with a voice recognition program. Efforts were made to edit the dictations, but occasionally words are mistranscribed.      CC: Keri Little, DO

## 2020-12-22 ENCOUNTER — TELEMEDICINE (OUTPATIENT)
Dept: FAMILY MEDICINE CLINIC | Facility: CLINIC | Age: 60
End: 2020-12-22

## 2020-12-22 DIAGNOSIS — R19.7 DIARRHEA, UNSPECIFIED TYPE: ICD-10-CM

## 2020-12-22 DIAGNOSIS — N32.81 OAB (OVERACTIVE BLADDER): ICD-10-CM

## 2020-12-22 DIAGNOSIS — R06.7 SNEEZING: ICD-10-CM

## 2020-12-22 DIAGNOSIS — L30.9 DERMATITIS: Primary | ICD-10-CM

## 2020-12-22 DIAGNOSIS — N76.0 ACUTE VAGINITIS: ICD-10-CM

## 2020-12-22 PROCEDURE — 99214 OFFICE O/P EST MOD 30 MIN: CPT | Performed by: NURSE PRACTITIONER

## 2020-12-22 RX ORDER — WARFARIN SODIUM 1 MG/1
0.5 TABLET ORAL DAILY PRN
COMMUNITY
Start: 2020-12-09 | End: 2023-02-03

## 2020-12-22 RX ORDER — SOLIFENACIN SUCCINATE 5 MG/1
TABLET, FILM COATED ORAL
Qty: 30 TABLET | Refills: 0 | Status: SHIPPED | OUTPATIENT
Start: 2020-12-22 | End: 2021-01-21

## 2020-12-22 NOTE — PROGRESS NOTES
Subjective   Di Lopez is a 59 y.o. female. This was an audio and video enabled telemedicine encounter.    3 complaints    History of Present Illness   1/ Complains of diarrhea 2 episodes yesterday. None today. Had nausea yesterday and resolved today. No melena or hematochezia. There was a small amount of red color in one stool but no obvious blood, and water did not turn pink.  2. Also with sneezing. Has had 4 covid tests and all neg.  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  He is asymptomatic.  She is going to the  for another covid test. Taking vitamin D daily.  3. Has urinary leakage and having some skin burning with rash.  She is using Summers Cleo daily and will stop it. No fever or dysuria.    Denies fever, loss of taste or smell, cough, sob, myalgia and chest pain.    Outpatient Encounter Medications as of 12/22/2020   Medication Sig Dispense Refill   • albuterol sulfate  (90 Base) MCG/ACT inhaler Inhale 2 puffs Every 4 (Four) Hours As Needed for Wheezing. 1 inhaler 3   • Alcohol Swabs (Alcohol Prep) pads For use prior to checking glucose twice daily 100 each 5   • carvedilol (COREG) 12.5 MG tablet Take 12.5 mg by mouth 2 (Two) Times a Day With Meals.     • cholecalciferol (VITAMIN D3) 25 MCG (1000 UT) tablet Take 2,000 Units by mouth Daily.     • Dulaglutide (Trulicity) 0.75 MG/0.5ML solution pen-injector Inject 0.75 mg under the skin into the appropriate area as directed 1 (One) Time Per Week. 4 pen 6   • escitalopram (LEXAPRO) 20 MG tablet Take 1 tablet by mouth once daily 90 tablet 0   • Ferrous Sulfate (IRON PO) Take 1 tablet by mouth Daily.     • folic acid ([employer]S Folic Acid) 800 MCG tablet Take 800 mcg by mouth Daily.     • furosemide (LASIX) 40 MG tablet Take 1 tablet by mouth As Needed.     • gabapentin (NEURONTIN) 300 MG capsule Take 1 capsule by mouth At Night As Needed (leg pain). 30 capsule 3   • glimepiride (AMARYL) 4 MG tablet Take 2 tablets by mouth Every Morning  Before Breakfast. 60 tablet 5   • HYDROcodone-acetaminophen (NORCO) 7.5-325 MG per tablet Take 1 tablet by mouth Daily.     • Insulin Glargine (Lantus SoloStar) 100 UNIT/ML injection pen Inject 60 Units under the skin into the appropriate area as directed Daily. 18 mL 5   • Insulin Pen Needle (RELION PEN NEEDLES) 32G X 4 MM misc Use BID for E11.9 100 each 1   • Omega-3 Fatty Acids (FISH OIL) 1000 MG capsule capsule Take 1 capsule by mouth 4 (Four) Times a Day. 120 capsule 2   • omeprazole (priLOSEC) 20 MG capsule Take 20 mg by mouth Daily.     • sacubitril-valsartan (ENTRESTO) 24-26 MG tablet Take 1 tablet by mouth 2 (Two) Times a Day.     • solifenacin (VESIcare) 5 MG tablet Take 1 tablet by mouth Daily. 30 tablet 3   • spironolactone (ALDACTONE) 25 MG tablet Take 25 mg by mouth Daily.  0   • warfarin (COUMADIN) 1 MG tablet Take 1 mg by mouth Daily As Needed.     • warfarin (COUMADIN) 4 MG tablet Take 4.5 mg by mouth Daily.  2   • Fluticasone Furoate-Vilanterol (Breo Ellipta) 100-25 MCG/INH inhaler Inhale 1 puff Daily. 1 each 6   • miconazole (Micatin) 2 % cream Apply  topically to the appropriate area as directed 2 (Two) Times a Day. 28 g 3     No facility-administered encounter medications on file as of 12/22/2020.        The following portions of the patient's history were reviewed and updated as appropriate: allergies, current medications, past family history, past medical history, past social history, past surgical history and problem list.    Review of Systems   Constitutional: Negative for appetite change, chills, fever, unexpected weight gain and unexpected weight loss.   HENT: Positive for sneezing. Negative for congestion, nosebleeds, sore throat and trouble swallowing.    Eyes: Negative for visual disturbance.   Respiratory: Negative for cough, shortness of breath and wheezing.    Cardiovascular: Negative for chest pain, palpitations and leg swelling.   Gastrointestinal: Positive for diarrhea. Negative  for abdominal pain, blood in stool, constipation, nausea and vomiting.   Endocrine: Negative for polydipsia, polyphagia and polyuria.   Genitourinary: Positive for frequency. Negative for dysuria, flank pain, hematuria, pelvic pain, urgency and vaginal discharge.   Musculoskeletal: Negative for arthralgias, back pain, joint swelling and myalgias.   Skin: Positive for rash.   Neurological: Negative for dizziness, seizures, syncope and numbness.   Hematological: Negative for adenopathy. Does not bruise/bleed easily.   Psychiatric/Behavioral: Negative for behavioral problems, sleep disturbance and depressed mood. The patient is not nervous/anxious.        PHQ-2 Depression Screening  Little interest or pleasure in doing things? 0   Feeling down, depressed, or hopeless?  0   PHQ-2 Total Score  0        Objective     Visit Vitals  LMP 06/17/1998       Physical Exam  Constitutional:       General: She is not in acute distress.     Appearance: Normal appearance. She is well-developed.   HENT:      Head: Normocephalic and atraumatic.      Nose: Nose normal.   Eyes:      General:         Right eye: No discharge.         Left eye: No discharge.      Conjunctiva/sclera: Conjunctivae normal.   Pulmonary:      Effort: Pulmonary effort is normal. No respiratory distress.   Skin:     Findings: No rash.   Neurological:      General: No focal deficit present.      Mental Status: She is alert and oriented to person, place, and time. Mental status is at baseline.   Psychiatric:         Mood and Affect: Mood normal.         Behavior: Behavior normal.         Thought Content: Thought content normal.         Judgment: Judgment normal.           Assessment/Plan   Diagnoses and all orders for this visit:    1. Dermatitis (Primary)  -     miconazole (Micatin) 2 % cream; Apply  topically to the appropriate area as directed 2 (Two) Times a Day.  Dispense: 28 g; Refill: 3    2. Sneezing    3. Acute vaginitis  -     miconazole (Micatin) 2 %  cream; Apply  topically to the appropriate area as directed 2 (Two) Times a Day.  Dispense: 28 g; Refill: 3    4. Diarrhea, unspecified type      Diarrhea sounds self-limiting. Recommend staying hydrated and bland diet.  Stop Summers Cleo and use miconazole cream external genitalia for 5 days. Follow up symptoms persist.  Sneezing is likely allergic or dry air. Can use Ocean nasal spray.    Follow up symptoms persist or worsen.

## 2021-01-05 DIAGNOSIS — IMO0002 DIABETES MELLITUS TYPE 2, UNCONTROLLED, WITH COMPLICATIONS: ICD-10-CM

## 2021-01-05 RX ORDER — INSULIN GLARGINE 100 [IU]/ML
INJECTION, SOLUTION SUBCUTANEOUS
Qty: 45 ML | Refills: 0 | Status: SHIPPED | OUTPATIENT
Start: 2021-01-05 | End: 2021-02-01 | Stop reason: SDUPTHER

## 2021-01-06 DIAGNOSIS — IMO0002 DIABETES MELLITUS TYPE 2, UNCONTROLLED, WITH COMPLICATIONS: ICD-10-CM

## 2021-01-06 RX ORDER — GLIMEPIRIDE 4 MG/1
TABLET ORAL
Qty: 90 TABLET | Refills: 0 | Status: SHIPPED | OUTPATIENT
Start: 2021-01-06 | End: 2021-02-18 | Stop reason: SDUPTHER

## 2021-01-21 DIAGNOSIS — N32.81 OAB (OVERACTIVE BLADDER): ICD-10-CM

## 2021-01-21 RX ORDER — SOLIFENACIN SUCCINATE 5 MG/1
TABLET, FILM COATED ORAL
Qty: 30 TABLET | Refills: 0 | Status: SHIPPED | OUTPATIENT
Start: 2021-01-21 | End: 2021-03-08

## 2021-01-29 ENCOUNTER — TELEPHONE (OUTPATIENT)
Dept: FAMILY MEDICINE CLINIC | Facility: CLINIC | Age: 61
End: 2021-01-29

## 2021-01-29 RX ORDER — OMEPRAZOLE 20 MG/1
20 CAPSULE, DELAYED RELEASE ORAL DAILY
Qty: 30 CAPSULE | Refills: 2 | Status: SHIPPED | OUTPATIENT
Start: 2021-01-29 | End: 2021-05-03

## 2021-01-29 NOTE — TELEPHONE ENCOUNTER
Caller: Di Lopez    Relationship to patient: Self    Best call back number: [phone]    Characteristics of symptom/severity: DIARRHEA FOR 2 DAYS     Where are you experiencing symptoms: BOIL ON RECTUM AND DIARRHEA     How long have you been experiencing symptoms: 2 DAYS     When have you experienced or been treated for these symptoms before:     Have you had any recent surgeries, procedures or injections: [] Yes  [x] No   If yes, explain:     Is it the symptoms constant or intermittent: [] Constant  [x] Intermittent; constant pain on boil    What makes it worse:     What makes it better:     What therapies/medications have you tried: Placed Neosporin on boil and no change has been observed    If a prescription is needed, what is your preferred pharmacy:   [facility] [address] [place] [address] - [address] [place] [address] [phone]  - [phone] [address] [address]  Phone: [phone] Fax: [phone]    Additional Information:  Patient stated that when she belched on 1/27/21 that it had the taste of bile.

## 2021-02-01 ENCOUNTER — OFFICE VISIT (OUTPATIENT)
Dept: FAMILY MEDICINE CLINIC | Facility: CLINIC | Age: 61
End: 2021-02-01

## 2021-02-01 VITALS
DIASTOLIC BLOOD PRESSURE: 70 MMHG | OXYGEN SATURATION: 97 % | TEMPERATURE: 98.7 F | HEART RATE: 77 BPM | WEIGHT: 215.4 LBS | SYSTOLIC BLOOD PRESSURE: 120 MMHG | HEIGHT: 60 IN | BODY MASS INDEX: 42.29 KG/M2

## 2021-02-01 DIAGNOSIS — IMO0002 DIABETES MELLITUS TYPE 2, UNCONTROLLED, WITH COMPLICATIONS: ICD-10-CM

## 2021-02-01 DIAGNOSIS — R04.0 BLEEDING FROM THE NOSE: ICD-10-CM

## 2021-02-01 DIAGNOSIS — K61.1 PERI-RECTAL ABSCESS: Primary | ICD-10-CM

## 2021-02-01 DIAGNOSIS — R10.10 PAIN OF UPPER ABDOMEN: ICD-10-CM

## 2021-02-01 DIAGNOSIS — B37.9 CANDIDIASIS: ICD-10-CM

## 2021-02-01 PROCEDURE — 99214 OFFICE O/P EST MOD 30 MIN: CPT | Performed by: FAMILY MEDICINE

## 2021-02-01 RX ORDER — FLUCONAZOLE 150 MG/1
150 TABLET ORAL ONCE
Qty: 1 TABLET | Refills: 0 | Status: SHIPPED | OUTPATIENT
Start: 2021-02-01 | End: 2021-02-01

## 2021-02-01 RX ORDER — INSULIN GLARGINE 100 [IU]/ML
60 INJECTION, SOLUTION SUBCUTANEOUS DAILY
Qty: 45 ML | Refills: 0 | Status: SHIPPED | OUTPATIENT
Start: 2021-02-01 | End: 2021-02-18 | Stop reason: SDUPTHER

## 2021-02-01 NOTE — PROGRESS NOTES
"Chief Complaint  Mass (pt complains of a boil in rectum) and Vaginal Itching    Subjective          Di Lopez presents to [facility] for   History of Present Illness  1. Has a boil on rectal area after having diarrhea last week. Are burst open 2 days ago and leaking. + itching. + vaginal itching. Using vasoline little relief.   2. Has had upper abdominal pain. Follows with  regarding liver disease. Restarted omeprazole recently.  3. Follows with [facility] for DM. On Lantus and glimepiride and Trulicity  4. Has had recurrent nose bleed for 1 month out of left nostril. Has had cauterized in the past. Pt is on Coumadin for heart valve.    Objective   Vital Signs:   /70   Pulse 77   Temp 98.7 °F (37.1 °C)   Ht 152.4 cm (60\")   Wt 97.7 kg (215 lb 6.4 oz)   SpO2 97%   BMI 42.07 kg/m²     Physical Exam  Skin:     Comments: Abscess on left buttock near rectum with slight drainage.        Result Review :     POC Glycosylated Hemoglobin (Hb A1C) (10/23/2020 12:45)  CBC (No Diff) (10/16/2020 06:39)  Basic Metabolic Panel (10/16/2020 06:39)                Assessment and Plan    Problem List Items Addressed This Visit     None      Visit Diagnoses     Dahlia-rectal abscess    -  Primary    Relevant Orders    Ambulatory Referral to Colorectal Surgery (Completed)    Candidiasis        Relevant Medications    fluconazole (Diflucan) 150 MG tablet    Pain of upper abdomen        Bleeding from the nose        Relevant Orders    Ambulatory Referral to ENT (Otolaryngology) (Completed)      F/U with  liver specialists.    I spent 30 minutes caring for Di on this date of service. This time includes time spent by me in the following activities:preparing for the visit, reviewing tests, obtaining and/or reviewing a separately obtained history, performing a medically appropriate examination and/or evaluation , counseling and educating the patient/family/caregiver, ordering medications, " tests, or procedures, referring and communicating with other health care professionals , documenting information in the medical record, independently interpreting results and communicating that information with the patient/family/caregiver and care coordination  Follow Up   No follow-ups on file.  Patient was given instructions and counseling regarding her condition or for health maintenance advice. Please see specific information pulled into the AVS if appropriate.

## 2021-02-10 RX ORDER — FLURBIPROFEN SODIUM 0.3 MG/ML
SOLUTION/ DROPS OPHTHALMIC
Qty: 100 EACH | Refills: 0 | Status: SHIPPED | OUTPATIENT
Start: 2021-02-10 | End: 2021-05-23

## 2021-02-18 ENCOUNTER — TELEMEDICINE (OUTPATIENT)
Dept: ENDOCRINOLOGY | Facility: CLINIC | Age: 61
End: 2021-02-18

## 2021-02-18 ENCOUNTER — TELEPHONE (OUTPATIENT)
Dept: ENDOCRINOLOGY | Facility: CLINIC | Age: 61
End: 2021-02-18

## 2021-02-18 VITALS — WEIGHT: 213 LBS | BODY MASS INDEX: 41.82 KG/M2 | HEIGHT: 60 IN

## 2021-02-18 DIAGNOSIS — Z79.4 TYPE 2 DIABETES MELLITUS WITH HYPERGLYCEMIA, WITH LONG-TERM CURRENT USE OF INSULIN (HCC): Primary | ICD-10-CM

## 2021-02-18 DIAGNOSIS — E78.1 HYPERTRIGLYCERIDEMIA: ICD-10-CM

## 2021-02-18 DIAGNOSIS — E11.65 TYPE 2 DIABETES MELLITUS WITH HYPERGLYCEMIA, WITH LONG-TERM CURRENT USE OF INSULIN (HCC): Primary | ICD-10-CM

## 2021-02-18 PROCEDURE — 99214 OFFICE O/P EST MOD 30 MIN: CPT | Performed by: INTERNAL MEDICINE

## 2021-02-18 RX ORDER — GLIMEPIRIDE 4 MG/1
8 TABLET ORAL
Qty: 180 TABLET | Refills: 1 | Status: SHIPPED | OUTPATIENT
Start: 2021-02-18 | End: 2021-10-06 | Stop reason: SDUPTHER

## 2021-02-18 RX ORDER — INSULIN GLARGINE 100 [IU]/ML
60 INJECTION, SOLUTION SUBCUTANEOUS DAILY
Qty: 60 ML | Refills: 5 | Status: SHIPPED | OUTPATIENT
Start: 2021-02-18 | End: 2021-02-26

## 2021-02-18 RX ORDER — DULAGLUTIDE 1.5 MG/.5ML
1.5 INJECTION, SOLUTION SUBCUTANEOUS WEEKLY
Qty: 2 ML | Refills: 5 | Status: SHIPPED | OUTPATIENT
Start: 2021-02-18 | End: 2021-09-20

## 2021-02-18 RX ORDER — LANCETS 30 GAUGE
EACH MISCELLANEOUS
Qty: 100 EACH | Refills: 11 | Status: SHIPPED | OUTPATIENT
Start: 2021-02-18

## 2021-02-18 RX ORDER — BLOOD-GLUCOSE METER
1 KIT MISCELLANEOUS ONCE
Qty: 1 EACH | Refills: 0 | Status: SHIPPED | OUTPATIENT
Start: 2021-02-18 | End: 2021-02-18

## 2021-02-18 NOTE — TELEPHONE ENCOUNTER
PHARMACY STATES THAT ACCU-CHEK IS NOT COVERED BUT ONE TOUCH IS. THEY CHANGED THE RX FOR TEST STRIPS AND LANCETS TO ONE TOUCH BRAND BUT WANT TO KNOW IF DR HOLLEY CAN SEND AN RX FOR A ONE TOUCH GLUCOMETER?

## 2021-02-18 NOTE — PROGRESS NOTES
"Chief Complaint   Patient presents with   • Follow-up   • Diabetes        HPI   Neha Lopez is a 60 y.o. female had concerns including Follow-up and Diabetes.      This was an audio and video enabled telemedicine encounter. A total of 25 minutes was spent in contact with the patient.     Patient reports she has been doing okay in the interim since her last visit.  She reports she had difficulty getting through to the office to update us regarding her glucometer and thus has not been able to check routinely due to limited supplies.  She does report that glucoses have been mostly in the 200s and 300s when she has been able to monitor.  She denies any hypoglycemia.  She denies any issues since starting Trulicity including no nausea, vomiting, diarrhea.    Patient's current diabetes medications include the following:  Lantus 60 units daily  Glimepiride 8 mg daily  Trulicity 0.75 mg weekly    Reviewed patient's prior lipid panel.  Patient does report that she previously took atorvastatin but this was stopped by gastroenterology at .  She is working to reschedule follow-up with them.  She is continuing to take fish oil.    The following portions of the patient's history were reviewed and updated as appropriate: allergies, current medications and past social history.    Review of Systems   Gastrointestinal: Negative for diarrhea, nausea and vomiting.   Endocrine: Positive for polydipsia and polyuria.        Ht 152.4 cm (60\")   Wt 96.6 kg (213 lb) Comment: pt provided weight  LMP 06/17/1998   BMI 41.60 kg/m²      Physical Exam      General: well appearing, in no acute distress, obese  Respiratory: no increased work of breathing  Neuro: alert, answers questions appropriately  Psych: appropriate mood and affect    Labs/Imaging  Results for NEHA LOPEZ (MRN [id number]) as of 2/18/2021 14:57   Ref. Range 10/23/2020 12:45   Hemoglobin A1C Latest Units: % 10.4     Results for NEHA LOPEZ (MRN [id number]) as " of 2/18/2021 14:57   Ref. Range 5/22/2020 14:34   Total Cholesterol Latest Ref Range: 0 - 200 mg/dL 250 (H)   HDL Cholesterol Latest Ref Range: 40 - 60 mg/dL 34 (L)   LDL Cholesterol  Latest Ref Range: 0 - 100 mg/dL 143 (H)   VLDL Cholesterol Latest Ref Range: 5 - 40 mg/dL 73 (H)   Triglycerides Latest Ref Range: 0 - 150 mg/dL 365 (H)   LDL/HDL Ratio Unknown 4.21     Diagnoses and all orders for this visit:    1. Type 2 diabetes mellitus with hyperglycemia, with long-term current use of insulin (CMS/[facility]) (Primary)  -Uncontrolled with most recent hemoglobin A1c 10.4%  -Repeat A1c due, ordered  -Patient with limited glycemic data, generally hyperglycemic per report  -Patient currently on basal heavy regimen  -We will continue Lantus 60 units daily  Continue glimepiride 8 mg daily  Increase Trulicity to 1.5 mg weekly  Testing supplies sent to pharmacy, patient instructed to contact the office with glucose data in 2 weeks.  Patient was advised to monitor blood sugar 3 times daily.  Patient was instructed to bring glucometer to all future appointments.  Discussed signs and symptoms of hypoglycemia as well as hypoglycemia management via the rule of 15's.  Discussed potential for long-term complications with uncontrolled diabetes including nephropathy, neuropathy, retinopathy, increased risk for cardiac disease.  Discussed the role of diet and exercise in the management of diabetes.  - Renal function normal in October 2020  Lipid panel up-to-date from May 2020, not taking statin  Urine microalbumin up-to-date from August 2020, patient taking Entresto  -     glucose blood test strip; Use as instructed 3 times a day  Dispense: 100 each; Refill: 11  -     Lancets misc; For use with glucose monitoring 3 times daily  Dispense: 100 each; Refill: 11  -     Dulaglutide (Trulicity) 1.5 MG/0.5ML solution pen-injector; Inject 1.5 mg under the skin into the appropriate area as directed 1 (One) Time Per Week.  Dispense: 2 mL; Refill:  5  -     Insulin Glargine (Lantus SoloStar) 100 UNIT/ML injection pen; Inject 60 Units under the skin into the appropriate area as directed Daily.  Dispense: 60 mL; Refill: 5  -     glimepiride (AMARYL) 4 MG tablet; Take 2 tablets by mouth Every Morning Before Breakfast.  Dispense: 180 tablet; Refill: 1    2. Hypertriglyceridemia  -Triglycerides elevated to 365 on most recent labs, of note, based on time this is likely not fasting  -Patient reports she previously took atorvastatin this was stopped by gastroenterology at   -Patient currently taking fish oil  -Reviewed role of glycemic control in lowering serum triglycerides and risk of pancreatitis with hypertriglyceridemia.      Return in about 3 months (around 5/18/2021) for Next scheduled follow up. The patient was instructed to contact the clinic with any interval questions or concerns.    Ameena Iglesias MD   Endocrinologist    Please note that portions of this document were completed with a voice recognition program. Efforts were made to edit the dictations, but occasionally words are mis-transcribed.

## 2021-02-26 DIAGNOSIS — Z79.4 TYPE 2 DIABETES MELLITUS WITH HYPERGLYCEMIA, WITH LONG-TERM CURRENT USE OF INSULIN (HCC): ICD-10-CM

## 2021-02-26 DIAGNOSIS — E11.65 TYPE 2 DIABETES MELLITUS WITH HYPERGLYCEMIA, WITH LONG-TERM CURRENT USE OF INSULIN (HCC): ICD-10-CM

## 2021-02-26 RX ORDER — INSULIN GLARGINE 100 [IU]/ML
65 INJECTION, SOLUTION SUBCUTANEOUS DAILY
Qty: 60 ML | Refills: 5
Start: 2021-02-26 | End: 2021-05-20 | Stop reason: SDUPTHER

## 2021-03-01 ENCOUNTER — LAB (OUTPATIENT)
Dept: LAB | Facility: HOSPITAL | Age: 61
End: 2021-03-01

## 2021-03-01 DIAGNOSIS — E11.65 TYPE 2 DIABETES MELLITUS WITH HYPERGLYCEMIA, WITH LONG-TERM CURRENT USE OF INSULIN (HCC): ICD-10-CM

## 2021-03-01 DIAGNOSIS — Z79.4 TYPE 2 DIABETES MELLITUS WITH HYPERGLYCEMIA, WITH LONG-TERM CURRENT USE OF INSULIN (HCC): ICD-10-CM

## 2021-03-01 PROCEDURE — 83036 HEMOGLOBIN GLYCOSYLATED A1C: CPT

## 2021-03-02 LAB — HBA1C MFR BLD: 10.79 % (ref 4.8–5.6)

## 2021-03-07 DIAGNOSIS — N32.81 OAB (OVERACTIVE BLADDER): ICD-10-CM

## 2021-03-08 RX ORDER — ESCITALOPRAM OXALATE 20 MG/1
TABLET ORAL
Qty: 90 TABLET | Refills: 0 | Status: SHIPPED | OUTPATIENT
Start: 2021-03-08 | End: 2021-06-01

## 2021-03-08 RX ORDER — SOLIFENACIN SUCCINATE 5 MG/1
TABLET, FILM COATED ORAL
Qty: 30 TABLET | Refills: 0 | Status: SHIPPED | OUTPATIENT
Start: 2021-03-08 | End: 2021-04-12

## 2021-04-11 DIAGNOSIS — N32.81 OAB (OVERACTIVE BLADDER): ICD-10-CM

## 2021-04-12 RX ORDER — SOLIFENACIN SUCCINATE 5 MG/1
TABLET, FILM COATED ORAL
Qty: 30 TABLET | Refills: 0 | Status: SHIPPED | OUTPATIENT
Start: 2021-04-12 | End: 2021-05-18

## 2021-05-03 RX ORDER — OMEPRAZOLE 20 MG/1
CAPSULE, DELAYED RELEASE ORAL
Qty: 30 CAPSULE | Refills: 0 | Status: SHIPPED | OUTPATIENT
Start: 2021-05-03 | End: 2021-06-01

## 2021-05-18 DIAGNOSIS — N32.81 OAB (OVERACTIVE BLADDER): ICD-10-CM

## 2021-05-18 RX ORDER — SOLIFENACIN SUCCINATE 5 MG/1
TABLET, FILM COATED ORAL
Qty: 30 TABLET | Refills: 0 | Status: SHIPPED | OUTPATIENT
Start: 2021-05-18 | End: 2021-06-01

## 2021-05-20 ENCOUNTER — OFFICE VISIT (OUTPATIENT)
Dept: ENDOCRINOLOGY | Facility: CLINIC | Age: 61
End: 2021-05-20

## 2021-05-20 VITALS
HEIGHT: 60 IN | OXYGEN SATURATION: 96 % | DIASTOLIC BLOOD PRESSURE: 64 MMHG | SYSTOLIC BLOOD PRESSURE: 132 MMHG | HEART RATE: 83 BPM | WEIGHT: 213.8 LBS | BODY MASS INDEX: 41.98 KG/M2

## 2021-05-20 DIAGNOSIS — E11.65 TYPE 2 DIABETES MELLITUS WITH HYPERGLYCEMIA, WITH LONG-TERM CURRENT USE OF INSULIN (HCC): Primary | ICD-10-CM

## 2021-05-20 DIAGNOSIS — Z79.4 TYPE 2 DIABETES MELLITUS WITH HYPERGLYCEMIA, WITH LONG-TERM CURRENT USE OF INSULIN (HCC): Primary | ICD-10-CM

## 2021-05-20 DIAGNOSIS — E78.1 HYPERTRIGLYCERIDEMIA: ICD-10-CM

## 2021-05-20 LAB
EXPIRATION DATE: ABNORMAL
GLUCOSE BLDC GLUCOMTR-MCNC: 312 MG/DL (ref 70–130)
Lab: ABNORMAL

## 2021-05-20 PROCEDURE — 82947 ASSAY GLUCOSE BLOOD QUANT: CPT | Performed by: INTERNAL MEDICINE

## 2021-05-20 PROCEDURE — 99214 OFFICE O/P EST MOD 30 MIN: CPT | Performed by: INTERNAL MEDICINE

## 2021-05-20 RX ORDER — INSULIN GLARGINE 100 [IU]/ML
65 INJECTION, SOLUTION SUBCUTANEOUS DAILY
Qty: 60 ML | Refills: 5 | Status: SHIPPED | OUTPATIENT
Start: 2021-05-20 | End: 2021-10-06 | Stop reason: SDUPTHER

## 2021-05-20 RX ORDER — INSULIN LISPRO 100 [IU]/ML
INJECTION, SOLUTION INTRAVENOUS; SUBCUTANEOUS
Qty: 12 ML | Refills: 3 | Status: SHIPPED | OUTPATIENT
Start: 2021-05-20 | End: 2021-07-01

## 2021-05-20 NOTE — PROGRESS NOTES
"Chief Complaint   Patient presents with   • Follow-up   • Diabetes     no meter or BS log.          HPI   Neha Lopez is a 60 y.o. female had concerns including Follow-up and Diabetes (no meter or BS log.  ).      Patient reports that blood glucose values have remained high in the interim since last visit.  She denies any issues since increasing Trulicity to 1.5 mg weekly, specifically no nausea, vomiting, diarrhea.  She does not have glucometer or glucose log available for review today.  She denies any hypoglycemia or symptoms concerning for hypoglycemia.  She denies any missed doses of medication.  She does report concern for needing repeated treatment for yeast infections and increased frequency of urination leading to irritation.    Current diabetes medications include the following:  Lantus 65 units daily  Glimepiride 8 mg daily  Trulicity 1.5 mg weekly    The following portions of the patient's history were reviewed and updated as appropriate: allergies, current medications and past social history.    Review of Systems   Gastrointestinal: Negative for abdominal pain, nausea and vomiting.   Endocrine: Positive for polydipsia and polyuria.      /64   Pulse 83   Ht 152.4 cm (60\")   Wt 97 kg (213 lb 12.8 oz)   LMP 06/17/1998   SpO2 96%   BMI 41.75 kg/m²      Physical Exam      Constitutional:  well developed; well nourished  no acute distress  obese - Body mass index is 41.75 kg/m².   ENT/Thyroid: not examined   Eyes: Conjunctiva: clear   Respiratory:  breathing is unlabored  clear to auscultation bilaterally   Cardiovascular:  regular rate and rhythm   Chest:  Not performed.   Abdomen: soft, non-tender; no masses   : Not performed.   Musculoskeletal: Not performed   Skin: dry and warm   Neuro: mental status, speech normal   Psych: mood and affect are within normal limits       Labs/Imaging  Results for NEHA LOPEZ (MRN [id number]) as of 5/20/2021 14:28   Ref. Range 5/20/2021 14:26 "   Glucose Latest Ref Range: 70 - 130 mg/dL 312 (A)       Diagnoses and all orders for this visit:    1. Type 2 diabetes mellitus with hyperglycemia, with long-term current use of insulin (CMS/[facility]) (Primary)  -Uncontrolled, most recent hemoglobin A1c 10.79%, too soon to repeat today  -Reviewed with patient that her symptoms of increased frequency of urination and also yeast infections are likely both related to her uncontrolled diabetes.  -Discussed that short acting insulin would likely be the best medication for immediate improvement in glycemic values.  Patient to start Humalog 5 units with meals plus correction 2 per 50 greater than 150  -Patient to continue Trulicity 1.5 mg weekly  -Patient to continue Lantus 65 units once daily  -Patient continue glimepiride 8 mg daily  -Patient was advised to monitor blood sugar 3 times daily.  Patient was instructed to bring glucometer to all future appointments. Patient should contact the clinic between appointments with hypoglycemia or persistent hyperglycemia.  Discussed signs and symptoms of hypoglycemia as well as hypoglycemia management via the rule of 15's.  Discussed potential for long-term complications with uncontrolled diabetes including nephropathy, neuropathy, retinopathy, increased risk for cardiac disease.  Discussed the role of diet and exercise in the management of diabetes.  Renal function normal in October 2020  Lipid panel up-to-date from May 2020, repeat next visit  Urine microalbumin up-to-date from August 2020, patient taking Entresto  -     POC Glucose, Blood  -     Insulin Lispro, 1 Unit Dial, (HumaLOG KwikPen) 100 UNIT/ML solution pen-injector; For use at mealtimes and per correctional scale up to 40 units daily  Dispense: 12 mL; Refill: 3  -     Insulin Glargine (Lantus SoloStar) 100 UNIT/ML injection pen; Inject 65 Units under the skin into the appropriate area as directed Daily.  Dispense: 60 mL; Refill: 5    2.  Hypertriglyceridemia  -Plan to  repeat lipid panel next visit  -Reviewed correlation between hypertriglyceridemia and hyperglycemia, working to improve glycemic control, as above    Return in about 4 weeks (around 6/17/2021). The patient was instructed to contact the clinic with any interval questions or concerns.    Ameena Iglesias MD   Endocrinologist    Please note that portions of this document were completed with a voice recognition program. Efforts were made to edit the dictations, but occasionally words are mis-transcribed.

## 2021-05-23 RX ORDER — FLURBIPROFEN SODIUM 0.3 MG/ML
SOLUTION/ DROPS OPHTHALMIC
Qty: 100 EACH | Refills: 0 | Status: SHIPPED | OUTPATIENT
Start: 2021-05-23 | End: 2021-06-29

## 2021-05-31 DIAGNOSIS — N32.81 OAB (OVERACTIVE BLADDER): ICD-10-CM

## 2021-06-01 RX ORDER — ESCITALOPRAM OXALATE 20 MG/1
TABLET ORAL
Qty: 90 TABLET | Refills: 0 | Status: SHIPPED | OUTPATIENT
Start: 2021-06-01 | End: 2021-08-31

## 2021-06-01 RX ORDER — OMEPRAZOLE 20 MG/1
CAPSULE, DELAYED RELEASE ORAL
Qty: 30 CAPSULE | Refills: 0 | Status: SHIPPED | OUTPATIENT
Start: 2021-06-01 | End: 2021-06-29

## 2021-06-01 RX ORDER — SOLIFENACIN SUCCINATE 5 MG/1
TABLET, FILM COATED ORAL
Qty: 30 TABLET | Refills: 0 | Status: SHIPPED | OUTPATIENT
Start: 2021-06-01 | End: 2021-07-13

## 2021-06-29 RX ORDER — OMEPRAZOLE 20 MG/1
CAPSULE, DELAYED RELEASE ORAL
Qty: 30 CAPSULE | Refills: 0 | Status: SHIPPED | OUTPATIENT
Start: 2021-06-29 | End: 2021-08-09

## 2021-06-29 RX ORDER — FLURBIPROFEN SODIUM 0.3 MG/ML
SOLUTION/ DROPS OPHTHALMIC
Qty: 100 EACH | Refills: 0 | Status: SHIPPED | OUTPATIENT
Start: 2021-06-29 | End: 2021-08-23

## 2021-07-01 ENCOUNTER — LAB (OUTPATIENT)
Dept: LAB | Facility: HOSPITAL | Age: 61
End: 2021-07-01

## 2021-07-01 ENCOUNTER — OFFICE VISIT (OUTPATIENT)
Dept: ENDOCRINOLOGY | Facility: CLINIC | Age: 61
End: 2021-07-01

## 2021-07-01 VITALS
HEART RATE: 82 BPM | DIASTOLIC BLOOD PRESSURE: 72 MMHG | OXYGEN SATURATION: 94 % | HEIGHT: 60 IN | WEIGHT: 214.6 LBS | SYSTOLIC BLOOD PRESSURE: 142 MMHG | BODY MASS INDEX: 42.13 KG/M2

## 2021-07-01 DIAGNOSIS — E78.5 HYPERLIPIDEMIA, UNSPECIFIED HYPERLIPIDEMIA TYPE: ICD-10-CM

## 2021-07-01 DIAGNOSIS — E11.65 TYPE 2 DIABETES MELLITUS WITH HYPERGLYCEMIA, WITH LONG-TERM CURRENT USE OF INSULIN (HCC): Primary | ICD-10-CM

## 2021-07-01 DIAGNOSIS — Z79.4 TYPE 2 DIABETES MELLITUS WITH HYPERGLYCEMIA, WITH LONG-TERM CURRENT USE OF INSULIN (HCC): Primary | ICD-10-CM

## 2021-07-01 LAB
ALBUMIN SERPL-MCNC: 4.1 G/DL (ref 3.5–5.2)
ALBUMIN/GLOB SERPL: 1.4 G/DL
ALP SERPL-CCNC: 130 U/L (ref 39–117)
ALT SERPL W P-5'-P-CCNC: 22 U/L (ref 1–33)
ANION GAP SERPL CALCULATED.3IONS-SCNC: 11.9 MMOL/L (ref 5–15)
AST SERPL-CCNC: 17 U/L (ref 1–32)
BILIRUB SERPL-MCNC: 0.2 MG/DL (ref 0–1.2)
BUN SERPL-MCNC: 10 MG/DL (ref 6–20)
BUN/CREAT SERPL: 12.3 (ref 7–25)
CALCIUM SPEC-SCNC: 9.1 MG/DL (ref 8.6–10.5)
CHLORIDE SERPL-SCNC: 98 MMOL/L (ref 98–107)
CHOLEST SERPL-MCNC: 280 MG/DL (ref 0–200)
CO2 SERPL-SCNC: 24.1 MMOL/L (ref 22–29)
CREAT SERPL-MCNC: 0.81 MG/DL (ref 0.57–1)
EXPIRATION DATE: ABNORMAL
EXPIRATION DATE: NORMAL
GFR SERPL CREATININE-BSD FRML MDRD: 88 ML/MIN/1.73
GLOBULIN UR ELPH-MCNC: 2.9 GM/DL
GLUCOSE BLDC GLUCOMTR-MCNC: 216 MG/DL (ref 70–130)
GLUCOSE SERPL-MCNC: 211 MG/DL (ref 65–99)
HBA1C MFR BLD: 9 %
HDLC SERPL-MCNC: 31 MG/DL (ref 40–60)
LDLC SERPL CALC-MCNC: 177 MG/DL (ref 0–100)
LDLC/HDLC SERPL: 5.67 {RATIO}
Lab: ABNORMAL
Lab: NORMAL
POTASSIUM SERPL-SCNC: 4.1 MMOL/L (ref 3.5–5.2)
PROT SERPL-MCNC: 7 G/DL (ref 6–8.5)
SODIUM SERPL-SCNC: 134 MMOL/L (ref 136–145)
TRIGL SERPL-MCNC: 366 MG/DL (ref 0–150)
VLDLC SERPL-MCNC: 72 MG/DL (ref 5–40)

## 2021-07-01 PROCEDURE — 80061 LIPID PANEL: CPT | Performed by: INTERNAL MEDICINE

## 2021-07-01 PROCEDURE — 99214 OFFICE O/P EST MOD 30 MIN: CPT | Performed by: INTERNAL MEDICINE

## 2021-07-01 PROCEDURE — 3052F HG A1C>EQUAL 8.0%<EQUAL 9.0%: CPT | Performed by: INTERNAL MEDICINE

## 2021-07-01 PROCEDURE — 83036 HEMOGLOBIN GLYCOSYLATED A1C: CPT | Performed by: INTERNAL MEDICINE

## 2021-07-01 PROCEDURE — 82947 ASSAY GLUCOSE BLOOD QUANT: CPT | Performed by: INTERNAL MEDICINE

## 2021-07-01 PROCEDURE — 82570 ASSAY OF URINE CREATININE: CPT | Performed by: INTERNAL MEDICINE

## 2021-07-01 PROCEDURE — 82043 UR ALBUMIN QUANTITATIVE: CPT | Performed by: INTERNAL MEDICINE

## 2021-07-01 PROCEDURE — 80053 COMPREHEN METABOLIC PANEL: CPT | Performed by: INTERNAL MEDICINE

## 2021-07-01 RX ORDER — INSULIN LISPRO 100 [IU]/ML
INJECTION, SOLUTION INTRAVENOUS; SUBCUTANEOUS
Qty: 12 ML | Refills: 3 | Status: SHIPPED | OUTPATIENT
Start: 2021-07-01 | End: 2021-10-06 | Stop reason: SDUPTHER

## 2021-07-01 NOTE — PROGRESS NOTES
"Chief Complaint   Patient presents with   • Follow-up   • Diabetes        HPI   Di Lopez is a 60 y.o. female had concerns including Follow-up and Diabetes.     Patient denies significant health changes in the interim since her last visit.    Current diabetes medications include the following: Lantus 65 units daily, Trulicity 1.5 mg weekly, glimepiride 8 mg daily, Humalog 5 units with meals plus correction 2 per 50 greater than 150.  Patient does report that she frequently does not eat breakfast but estimates she is using 8 units of Humalog with lunch and dinner.  Patient denies any symptoms concerning for hypoglycemia.    Glucometer downloaded for review with 20 readings in the past 14 days ranging 178-372, averaging 253.  Patient denies increased thirst or increased urination.  Patient does report that she is not exercising routinely due to decreased energy which she attributes to poor sleep.    Reviewed history of hypertriglyceridemia.  Patient reports statin discontinued approximately 2 years ago due to concerns of her liver.  She does report she is due for follow-up with GI and will discuss whether she could restart lipid therapy.    The following portions of the patient's history were reviewed and updated as appropriate: allergies, current medications and past social history.    Review of Systems   Constitutional: Positive for fatigue.   Gastrointestinal: Negative for abdominal pain, nausea and vomiting.   Endocrine: Negative for polydipsia and polyuria.        /72   Pulse 82   Ht 152.4 cm (60\")   Wt 97.3 kg (214 lb 9.6 oz)   LMP 06/17/1998   SpO2 94%   BMI 41.91 kg/m²      Physical Exam      Constitutional:  well developed; well nourished  no acute distress   ENT/Thyroid: not examined   Eyes: Conjunctiva: clear   Respiratory:  breathing is unlabored  clear to auscultation bilaterally   Cardiovascular:  regular rate and rhythm   Chest:  Not performed.   Abdomen: soft, non-tender; no masses "   : Not performed.   Musculoskeletal: Not performed   Skin: dry and warm   Neuro: mental status, speech normal   Psych: mood and affect are within normal limits       Labs/Imaging  Results for NEHA MONTERROSO (MRN [id number]) as of 7/1/2021 17:13   Ref. Range 7/1/2021 15:37 7/1/2021 15:37   Glucose Latest Ref Range: 70 - 130 mg/dL 216 (A)    Hemoglobin A1C Latest Units: %  9.0       Diagnoses and all orders for this visit:    1. Type 2 diabetes mellitus with hyperglycemia, with long-term current use of insulin (CMS/[facility]) (Primary)  -Uncontrolled with hemoglobin A1c 9%  -Downloaded glucometer reveals hyperglycemia  -Patient to continue Lantus 65 units daily  -Patient continue glyburide 8 mg  -Patient continue Trulicity 1.5 mg daily  -Patient to increase Humalog to 10 units with meals plus correction 2:50 greater than 150  -Patient was advised to monitor blood sugar 3 times daily.  Patient was instructed to bring glucometer to all future appointments. Patient should contact the clinic between appointments with hypoglycemia or persistent hyperglycemia.  Discussed signs and symptoms of hypoglycemia as well as hypoglycemia management via the rule of 15's.  Discussed potential for long-term complications with uncontrolled diabetes including nephropathy, neuropathy, retinopathy, increased risk for cardiac disease.  Discussed the role of diet and exercise in the management of diabetes.  Renal function normal in October 2020, repeat today  Repeat lipid panel due, ordered  Urine microalbumin up-to-date August 2020, taking Entresto, , repeat today  -     Comprehensive Metabolic Panel  -     Lipid Panel  -     Microalbumin / Creatinine Urine Ratio - Urine, Clean Catch    2. Hyperlipidemia, unspecified hyperlipidemia type  -Patient with elevated LDL, elevated triglycerides on most recent lab  -Patient reports statin previously discontinued by GI due to concern for liver dysfunction.  She reports that she will readdress  whether she can take this type of medication and follow-up  -Reviewed goal of improving glycemic control in lowering serum triglycerides      Return in about 3 months (around 10/1/2021) for Next scheduled follow up. The patient was instructed to contact the clinic with any interval questions or concerns.    Ameena Iglesias MD   Endocrinologist    Please note that portions of this document were completed with a voice recognition program. Efforts were made to edit the dictations, but occasionally words are mis-transcribed.

## 2021-07-02 LAB
ALBUMIN UR-MCNC: 1.5 MG/DL
CREAT UR-MCNC: 127.1 MG/DL
MICROALBUMIN/CREAT UR: 11.8 MG/G

## 2021-07-12 DIAGNOSIS — N32.81 OAB (OVERACTIVE BLADDER): ICD-10-CM

## 2021-07-13 RX ORDER — SOLIFENACIN SUCCINATE 5 MG/1
TABLET, FILM COATED ORAL
Qty: 30 TABLET | Refills: 0 | Status: SHIPPED | OUTPATIENT
Start: 2021-07-13 | End: 2021-08-23

## 2021-07-29 ENCOUNTER — TELEPHONE (OUTPATIENT)
Dept: FAMILY MEDICINE CLINIC | Facility: CLINIC | Age: 61
End: 2021-07-29

## 2021-07-29 NOTE — TELEPHONE ENCOUNTER
PLease call [facility]. They may actually have to call her sleep dr if it is regarding settings.

## 2021-07-29 NOTE — TELEPHONE ENCOUNTER
Reason for Call:   [facility] is calling on pt regarding her cpap usage and different levels if someone from nursing staff or  could please return the call. Thanks   Symptoms:     Onset (when it began):    Does anything make it better /  worse?    Have they tried anything?    Other pertinent info:    Okay to wait for PCP to address

## 2021-07-30 NOTE — TELEPHONE ENCOUNTER
Spoke with representative ay [facility] and they voiced understanding. Will reach out to Pulmonology. Dinh

## 2021-08-09 RX ORDER — OMEPRAZOLE 20 MG/1
CAPSULE, DELAYED RELEASE ORAL
Qty: 30 CAPSULE | Refills: 0 | Status: SHIPPED | OUTPATIENT
Start: 2021-08-09 | End: 2021-09-13

## 2021-08-23 DIAGNOSIS — N32.81 OAB (OVERACTIVE BLADDER): ICD-10-CM

## 2021-08-23 RX ORDER — FLURBIPROFEN SODIUM 0.3 MG/ML
SOLUTION/ DROPS OPHTHALMIC
Qty: 100 EACH | Refills: 0 | Status: SHIPPED | OUTPATIENT
Start: 2021-08-23 | End: 2021-09-23

## 2021-08-23 RX ORDER — SOLIFENACIN SUCCINATE 5 MG/1
TABLET, FILM COATED ORAL
Qty: 30 TABLET | Refills: 0 | Status: SHIPPED | OUTPATIENT
Start: 2021-08-23 | End: 2021-08-31

## 2021-08-31 DIAGNOSIS — N32.81 OAB (OVERACTIVE BLADDER): ICD-10-CM

## 2021-08-31 RX ORDER — ESCITALOPRAM OXALATE 20 MG/1
TABLET ORAL
Qty: 90 TABLET | Refills: 0 | Status: SHIPPED | OUTPATIENT
Start: 2021-08-31 | End: 2021-12-16

## 2021-08-31 RX ORDER — SOLIFENACIN SUCCINATE 5 MG/1
TABLET, FILM COATED ORAL
Qty: 30 TABLET | Refills: 0 | Status: SHIPPED | OUTPATIENT
Start: 2021-08-31 | End: 2021-11-02

## 2021-09-13 RX ORDER — OMEPRAZOLE 20 MG/1
CAPSULE, DELAYED RELEASE ORAL
Qty: 30 CAPSULE | Refills: 0 | Status: SHIPPED | OUTPATIENT
Start: 2021-09-13 | End: 2021-10-18

## 2021-09-14 DIAGNOSIS — Z01.812 BLOOD TESTS PRIOR TO TREATMENT OR PROCEDURE: Primary | ICD-10-CM

## 2021-09-15 ENCOUNTER — CLINICAL SUPPORT NO REQUIREMENTS (OUTPATIENT)
Dept: PULMONOLOGY | Facility: CLINIC | Age: 61
End: 2021-09-15

## 2021-09-15 DIAGNOSIS — Z01.812 BLOOD TESTS PRIOR TO TREATMENT OR PROCEDURE: ICD-10-CM

## 2021-09-15 PROCEDURE — 99211 OFF/OP EST MAY X REQ PHY/QHP: CPT | Performed by: NURSE PRACTITIONER

## 2021-09-15 PROCEDURE — U0004 COV-19 TEST NON-CDC HGH THRU: HCPCS | Performed by: NURSE PRACTITIONER

## 2021-09-16 LAB — SARS-COV-2 RNA NOSE QL NAA+PROBE: NOT DETECTED

## 2021-09-17 ENCOUNTER — OFFICE VISIT (OUTPATIENT)
Dept: PULMONOLOGY | Facility: CLINIC | Age: 61
End: 2021-09-17

## 2021-09-17 VITALS
OXYGEN SATURATION: 95 % | HEIGHT: 60 IN | HEART RATE: 93 BPM | DIASTOLIC BLOOD PRESSURE: 80 MMHG | BODY MASS INDEX: 41.82 KG/M2 | SYSTOLIC BLOOD PRESSURE: 138 MMHG | TEMPERATURE: 97.1 F | WEIGHT: 213 LBS

## 2021-09-17 DIAGNOSIS — G47.33 OBSTRUCTIVE SLEEP APNEA SYNDROME: ICD-10-CM

## 2021-09-17 DIAGNOSIS — J96.11 CHRONIC RESPIRATORY FAILURE WITH HYPOXIA (HCC): ICD-10-CM

## 2021-09-17 DIAGNOSIS — J47.9 BRONCHIECTASIS WITHOUT COMPLICATION (HCC): Primary | ICD-10-CM

## 2021-09-17 DIAGNOSIS — J43.9 PULMONARY EMPHYSEMA, UNSPECIFIED EMPHYSEMA TYPE (HCC): Chronic | ICD-10-CM

## 2021-09-17 PROCEDURE — 94726 PLETHYSMOGRAPHY LUNG VOLUMES: CPT | Performed by: NURSE PRACTITIONER

## 2021-09-17 PROCEDURE — 94375 RESPIRATORY FLOW VOLUME LOOP: CPT | Performed by: NURSE PRACTITIONER

## 2021-09-17 PROCEDURE — 94729 DIFFUSING CAPACITY: CPT | Performed by: NURSE PRACTITIONER

## 2021-09-17 PROCEDURE — 99214 OFFICE O/P EST MOD 30 MIN: CPT | Performed by: NURSE PRACTITIONER

## 2021-09-17 NOTE — PROGRESS NOTES
"[facility] Follow up      Chief Complaint  Emphysema    Subjective          Di Lopez presents to [facility] for routine follow-up.  We follow her here in the office for multifactorial chronic hypoxic respiratory failure.    She does have some mild bronchiectasis and interstitial changes on her CT scan and some mild obstruction.  She does have a history of tobacco use but quit 11 years ago.    She does have a rescue inhaler that she rarely uses.  She has had no recent worsening cough or congestion.    She does have underlying heart failure and pulmonary hypertension.  She has recently had an increase in her Lasix and she is actually feeling much better, she did lose several pounds after the increase in her Lasix.  Her overall breathing improved after the adjustment of her medications.    She does have a history of struct of sleep apnea and uses a BiPAP with 2 L at night.  She does wear it nightly and occasionally during the day with naps.  Her current machine is very old though about 8 to 9 years old.        Objective     Vital Signs:   /80   Pulse 93   Temp 97.1 °F (36.2 °C)   Ht 152.4 cm (60\")   Wt 96.6 kg (213 lb)   SpO2 95% Comment: resting at room air  BMI 41.60 kg/m²       Immunization History   Administered Date(s) Administered   • COVID-19 (MODERNA) 05/17/2021, 06/14/2021   • Flu Vaccine Quad PF >18YRS 11/25/2019   • Flu Vaccine Quad PF >36MO 09/22/2017   • FluLaval >6 Months 11/25/2019   • FluMist 2-49yrs 10/22/2014   • Fluad Quad 65+ 10/23/2020   • Fluzone High Dose =>65 Years ([employer] ONLY) 10/22/2014   • Hepatitis A 11/13/2018, 07/09/2019   • Influenza Quad Vaccine (Inpatient) 10/21/2013, 09/06/2016   • Influenza TIV (IM) 02/02/2016   • PPD Test 06/20/2012   • Pneumococcal Polysaccharide (PPSV23) 09/22/2017   • Shingrix 07/09/2019, 11/25/2019   • Tdap 09/01/2010, 11/13/2018   • Zostavax 07/09/2019, 11/25/2019       Physical " Exam  Vitals reviewed.   Constitutional:       Appearance: She is well-developed.   HENT:      Head: Normocephalic and atraumatic.   Eyes:      Pupils: Pupils are equal, round, and reactive to light.   Cardiovascular:      Rate and Rhythm: Normal rate and regular rhythm.      Heart sounds: No murmur heard.     Pulmonary:      Effort: Pulmonary effort is normal. No respiratory distress.      Breath sounds: Normal breath sounds. No wheezing or rales.   Abdominal:      General: Bowel sounds are normal. There is no distension.      Palpations: Abdomen is soft.   Musculoskeletal:         General: Normal range of motion.      Cervical back: Normal range of motion and neck supple.   Skin:     General: Skin is warm and dry.      Findings: No erythema.   Neurological:      Mental Status: She is alert and oriented to person, place, and time.   Psychiatric:         Behavior: Behavior normal.          Result Review :{ Labs  Result Review  Imaging  Med Tab  Media :23}            PFTs done in the office today:  An FEV1 of 1.34, 76% predicted, with a ratio of 77%.  No obstructive disease today.  Some element of air trapping with an elevated residual volume.  Decreased DLCO.    PA and lateral chest x-ray done the office today reviewed by me  Awaiting final MD interpretation    Download compliance report   100% compliance with average use of 8/2 hours.  AHI is 2.5.             Assessment and Plan    Problem List Items Addressed This Visit        Pulmonary and Pneumonias    Emphysema/COPD (CMS/HCC) (Chronic)    Overview     No obstruction on her PFTs         Bronchiectasis without complication (CMS/HCC) - Primary    Relevant Orders    XR Chest PA & Lateral (Completed)    Pulmonary Function Test (Completed)    Chronic respiratory failure with hypoxia (CMS/HCC)       Sleep    Obstructive sleep apnea syndrome    Overview     BiPAP with 2 L at night               At this time she is doing very well.  She said no recent exacerbations  of her bronchiectasis or acute infections.    She will continue to follow with cardiology for chronic heart failure as well as pulmonary hypertension.  With the increase in her Lasix she is actually doing much better right now.    She does continue to follow up at  for liver and pancreatic lesions.  Awaiting cardiology clearance for approval for surgery     She does need a new BiPAP.  Hers is several years old.  We will send an order over to her DME.  Continue on her 2 L with her BiPAP.      Follow Up     No follow-ups on file.  Patient was given instructions and counseling regarding her condition or for health maintenance advice. Please see specific information pulled into the AVS if appropriate.     I spent 35 minutes caring for Di on this date of service. This time includes time spent by me in the following activities:preparing for the visit, reviewing tests, obtaining and/or reviewing a separately obtained history, performing a medically appropriate examination and/or evaluation , counseling and educating the patient/family/caregiver, ordering medications, tests, or procedures, referring and communicating with other health care professionals , documenting information in the medical record and independently interpreting results and communicating that information with the patient/family/caregiver      FRANCESCA Anderson, ACNP  [facility]  Electronically signed

## 2021-09-19 DIAGNOSIS — Z79.4 TYPE 2 DIABETES MELLITUS WITH HYPERGLYCEMIA, WITH LONG-TERM CURRENT USE OF INSULIN (HCC): ICD-10-CM

## 2021-09-19 DIAGNOSIS — E11.65 TYPE 2 DIABETES MELLITUS WITH HYPERGLYCEMIA, WITH LONG-TERM CURRENT USE OF INSULIN (HCC): ICD-10-CM

## 2021-09-20 PROBLEM — J96.11 CHRONIC RESPIRATORY FAILURE WITH HYPOXIA: Status: ACTIVE | Noted: 2021-09-20

## 2021-09-20 RX ORDER — DULAGLUTIDE 1.5 MG/.5ML
INJECTION, SOLUTION SUBCUTANEOUS
Qty: 4 ML | Refills: 0 | Status: SHIPPED | OUTPATIENT
Start: 2021-09-20 | End: 2021-10-06 | Stop reason: SDUPTHER

## 2021-09-23 RX ORDER — FLURBIPROFEN SODIUM 0.3 MG/ML
SOLUTION/ DROPS OPHTHALMIC
Qty: 100 EACH | Refills: 0 | Status: SHIPPED | OUTPATIENT
Start: 2021-09-23 | End: 2021-10-06

## 2021-09-30 RX ORDER — GABAPENTIN 600 MG/1
TABLET ORAL
Qty: 30 TABLET | Refills: 0 | OUTPATIENT
Start: 2021-09-30

## 2021-10-06 ENCOUNTER — OFFICE VISIT (OUTPATIENT)
Dept: ENDOCRINOLOGY | Facility: CLINIC | Age: 61
End: 2021-10-06

## 2021-10-06 VITALS
WEIGHT: 214 LBS | DIASTOLIC BLOOD PRESSURE: 82 MMHG | OXYGEN SATURATION: 96 % | SYSTOLIC BLOOD PRESSURE: 134 MMHG | BODY MASS INDEX: 42.01 KG/M2 | HEART RATE: 80 BPM | HEIGHT: 60 IN

## 2021-10-06 DIAGNOSIS — E78.5 DYSLIPIDEMIA: Chronic | ICD-10-CM

## 2021-10-06 DIAGNOSIS — E11.65 TYPE 2 DIABETES MELLITUS WITH HYPERGLYCEMIA, WITH LONG-TERM CURRENT USE OF INSULIN (HCC): Primary | ICD-10-CM

## 2021-10-06 DIAGNOSIS — Z79.4 TYPE 2 DIABETES MELLITUS WITH HYPERGLYCEMIA, WITH LONG-TERM CURRENT USE OF INSULIN (HCC): Primary | ICD-10-CM

## 2021-10-06 LAB
EXPIRATION DATE: ABNORMAL
EXPIRATION DATE: NORMAL
GLUCOSE BLDC GLUCOMTR-MCNC: 170 MG/DL (ref 70–130)
HBA1C MFR BLD: 8.7 %
Lab: ABNORMAL
Lab: NORMAL

## 2021-10-06 PROCEDURE — 83036 HEMOGLOBIN GLYCOSYLATED A1C: CPT | Performed by: INTERNAL MEDICINE

## 2021-10-06 PROCEDURE — 99214 OFFICE O/P EST MOD 30 MIN: CPT | Performed by: INTERNAL MEDICINE

## 2021-10-06 PROCEDURE — 3052F HG A1C>EQUAL 8.0%<EQUAL 9.0%: CPT | Performed by: INTERNAL MEDICINE

## 2021-10-06 PROCEDURE — 82947 ASSAY GLUCOSE BLOOD QUANT: CPT | Performed by: INTERNAL MEDICINE

## 2021-10-06 RX ORDER — DULAGLUTIDE 1.5 MG/.5ML
1.5 INJECTION, SOLUTION SUBCUTANEOUS WEEKLY
Qty: 6 ML | Refills: 1 | Status: SHIPPED | OUTPATIENT
Start: 2021-10-06 | End: 2022-01-21 | Stop reason: SDUPTHER

## 2021-10-06 RX ORDER — GABAPENTIN 300 MG/1
300 CAPSULE ORAL NIGHTLY PRN
Qty: 30 CAPSULE | Refills: 3 | Status: CANCELLED | OUTPATIENT
Start: 2021-10-06

## 2021-10-06 RX ORDER — GLIMEPIRIDE 4 MG/1
8 TABLET ORAL
Qty: 180 TABLET | Refills: 1 | Status: SHIPPED | OUTPATIENT
Start: 2021-10-06 | End: 2022-01-21

## 2021-10-06 RX ORDER — INSULIN LISPRO 100 [IU]/ML
INJECTION, SOLUTION INTRAVENOUS; SUBCUTANEOUS
Qty: 12 ML | Refills: 3 | Status: SHIPPED | OUTPATIENT
Start: 2021-10-06 | End: 2022-01-21

## 2021-10-06 RX ORDER — INSULIN GLARGINE 100 [IU]/ML
65 INJECTION, SOLUTION SUBCUTANEOUS DAILY
Qty: 60 ML | Refills: 5 | Status: SHIPPED | OUTPATIENT
Start: 2021-10-06 | End: 2022-01-21 | Stop reason: SDUPTHER

## 2021-10-06 NOTE — PROGRESS NOTES
"Chief Complaint   Patient presents with   • Diabetes        HPI   Di Lopez is a 60 y.o. female had concerns including Diabetes.      Patient denies significant health changes in the interim since her last visit. She does present with paperwork to obtain diabetic shoes. She also reports that she is potentially interested in continuous glucose monitor. She has not this in the past but believes it would be helpful in taking better glycemic control. She is currently monitoring via fingerstick, and twice daily. Glucose values generally higher later in the day. Glucometer downloaded for review with values ranging , average 254 in the past 2 weeks.    Current diabetes medications include the following: Lantus 65 units daily, glimepiride 8 mg daily, Trulicity 1.5 mg weekly, Humalog 10 units with meals plus correction two per 50 greater than 150. Patient estimates that she is generally using 12 to 16 units of Humalog at each meal, generally only eating twice daily.    Patient remains off statin therapy.    The following portions of the patient's history were reviewed and updated as appropriate: allergies, current medications and past social history.    Review of Systems   Gastrointestinal: Negative for abdominal pain, nausea and vomiting.   Endocrine: Negative for polydipsia and polyuria.   Musculoskeletal: Negative for arthralgias and myalgias.      /82 (BP Location: Right arm, Patient Position: Sitting, Cuff Size: Adult)   Pulse 80   Ht 152.4 cm (60\")   Wt 97.1 kg (214 lb)   LMP 06/17/1998   SpO2 96%   BMI 41.79 kg/m²      Physical Exam  Feet:      Right foot:      Protective Sensation: 10 sites tested. 10 sites sensed.      Skin integrity: Skin integrity normal.      Left foot:      Protective Sensation: 10 sites tested. 10 sites sensed.      Skin integrity: Skin integrity normal.          Constitutional:  well developed; well nourished  no acute distress  obese - Body mass index is 41.79 kg/m². "   ENT/Thyroid: not examined   Eyes: Conjunctiva: clear   Respiratory:  breathing is unlabored  clear to auscultation bilaterally   Cardiovascular:  regular rate and rhythm   Chest:  Not performed.   Abdomen: soft, non-tender; no masses   : Not performed.   Musculoskeletal: Not performed   Skin: dry and warm   Neuro: mental status, speech normal   Psych: mood and affect are within normal limits     Diabetic Foot Exam Performed and Monofilament Test Performed  Labs/Imaging  Results for NEHA MONTERROSO (MRN [id number]) as of 10/6/2021 12:03   Ref. Range 10/6/2021 10:33 10/6/2021 10:33   Glucose Latest Ref Range: 70 - 130 mg/dL 170 (A)    Hemoglobin A1C Latest Units: %  8.7       Diagnoses and all orders for this visit:    1. Type 2 diabetes mellitus with hyperglycemia, with long-term current use of insulin  -Uncontrolled with hemoglobin A1c 8.7%  -Downloaded of home glucose data reveals glucose rising throughout the day  -Continue Lantus 65 units daily  -Increase Humalog to 16 units with meals and change correction to 3 per 50 greater than 150  -Continue glimepiride 8 mg  -Daily continue Trulicity 1.5 mg weekly   -Prescription sent for CGM to pharmacy  -Patient was advised to monitor blood sugar 3 times daily.  Patient was instructed to bring glucometer to all future appointments. Patient should contact the clinic between appointments with hypoglycemia or persistent hyperglycemia.  Discussed signs and symptoms of hypoglycemia as well as hypoglycemia management via the rule of 15's.  Discussed potential for long-term complications with uncontrolled diabetes including nephropathy, neuropathy, retinopathy, increased risk for cardiac disease.  Discussed the role of diet and exercise in the management of diabetes.  CMP up-to-date from July 2021, EGFR 88  Lipid panel up-to-date from July 2021, triglycerides 366,   Urine microalbumin up-to-date from July 2021  Monofilament exam completed today with intact sensation  bilaterally. Patient denies history of amputation, ulceration, callus formation. She does have flat feet.    2. Dyslipidemia  -Patient with elevated LDL, elevated triglycerides  -Currently not taking statin therapy due to concern for liver dysfunction (previously discontinued by GI per patient)  -Reviewed that hypertriglyceridemia will likely improve with improving glycemic control      Return in about 3 months (around 1/6/2022). The patient was instructed to contact the clinic with any interval questions or concerns.    Ameena Iglesias MD   Endocrinologist    Please note that portions of this document were completed with a voice recognition program. Efforts were made to edit the dictations, but occasionally words are mis-transcribed.

## 2021-10-07 ENCOUNTER — TELEPHONE (OUTPATIENT)
Dept: ENDOCRINOLOGY | Facility: CLINIC | Age: 61
End: 2021-10-07

## 2021-10-07 NOTE — TELEPHONE ENCOUNTER
Arsen reviewed in chart- per this, gabapentin has not been filled in the past year and it has never been filled by this office. Per EMR- this was last filled in 8/2020 by Dr. Little. The only recent controlled substance fill was for hydrocodone.    Again, I am happy to discuss at follow up, but given gabapentin is a controlled substance, there are certain procedures we must follow prior to prescribing.

## 2021-10-07 NOTE — TELEPHONE ENCOUNTER
I'm not sure if this was discussed with staff, but we did not discuss gabapentin during her visit. Marine write this medication without controlled substance agreement, which I am happy to address at her next appointment.

## 2021-10-07 NOTE — TELEPHONE ENCOUNTER
Please see message  I am unable to run the jos edaniel I am not registered under your name . Last OV yesterday, future appt 1/21/22

## 2021-10-07 NOTE — TELEPHONE ENCOUNTER
Patient called and stated Dr. Iglesias should have called in her prescription for Gabapentin. She stated it has not been sent to her pharmacy. Please advise.

## 2021-10-07 NOTE — TELEPHONE ENCOUNTER
Please contact patient, I have never prescribed gabapentin for this patient.  Per her chart, it was last prescribed by Dr. Little.  If she needs a refill, she needs to contact Dr. Little.

## 2021-10-07 NOTE — TELEPHONE ENCOUNTER
Called pt and she stated she needs a reader for her sensor. She also said the Gabapentin was discussed at visit. She said she even showed you the empty bottle. I told her about the Controlled Substance Agreement that needed to be signed. She said she could come by tomorrow and sign one since she is completely out. I told her you were off tomorrow and Monday. Please advise.

## 2021-10-07 NOTE — TELEPHONE ENCOUNTER
Spoke with pt she stated at her appt it was discussed. She is taking it for Diabetic Neuropathy. She was under the impression you would be filling it for her.  Please advise

## 2021-10-08 NOTE — TELEPHONE ENCOUNTER
Called the pt and she stated she got last script from Dr Mercer. She is going to contact them or the pain clinic. If they can not help her she may call and schedule appt to discuss.

## 2021-10-18 RX ORDER — OMEPRAZOLE 20 MG/1
CAPSULE, DELAYED RELEASE ORAL
Qty: 30 CAPSULE | Refills: 0 | Status: SHIPPED | OUTPATIENT
Start: 2021-10-18 | End: 2021-11-15

## 2021-10-29 ENCOUNTER — TELEPHONE (OUTPATIENT)
Dept: FAMILY MEDICINE CLINIC | Facility: CLINIC | Age: 61
End: 2021-10-29

## 2021-10-29 PROCEDURE — U0004 COV-19 TEST NON-CDC HGH THRU: HCPCS | Performed by: NURSE PRACTITIONER

## 2021-10-29 NOTE — TELEPHONE ENCOUNTER
----- Message from Keri Little DO sent at 10/29/2021 11:31 AM EDT -----  Regarding: FW: Sinus or Cold      ----- Message -----  From: Nina Jolley MA  Sent: 10/29/2021  11:07 AM EDT  To: Keri Little DO  Subject: FW: Sinus or Cold                                Please advise  ----- Message -----  From: Di WADDELL Hockaday  Sent: 10/28/2021   7:28 PM EDT  To: [facility]  Subject: Sinus or Cold                                    I know its not covid, 1. I am fully vaccined 2. I've not lost taste or smell 3. I not had a fever at all.

## 2021-10-31 ENCOUNTER — TELEPHONE (OUTPATIENT)
Dept: URGENT CARE | Facility: CLINIC | Age: 61
End: 2021-10-31

## 2021-10-31 NOTE — TELEPHONE ENCOUNTER
----- Message from FRANCESCA Lincoln sent at 10/31/2021  9:02 AM EDT -----  Not detected, please notify patient.

## 2021-10-31 NOTE — TELEPHONE ENCOUNTER
----- Message from FRANCESCA Lincoln sent at 10/31/2021  9:02 AM EDT -----  Not detected, please notify patien   No vm

## 2021-11-02 ENCOUNTER — OFFICE VISIT (OUTPATIENT)
Dept: SLEEP MEDICINE | Facility: HOSPITAL | Age: 61
End: 2021-11-02

## 2021-11-02 VITALS
HEART RATE: 73 BPM | HEIGHT: 60 IN | BODY MASS INDEX: 43.07 KG/M2 | WEIGHT: 219.4 LBS | OXYGEN SATURATION: 96 % | DIASTOLIC BLOOD PRESSURE: 60 MMHG | SYSTOLIC BLOOD PRESSURE: 138 MMHG

## 2021-11-02 DIAGNOSIS — F51.04 PSYCHOPHYSIOLOGICAL INSOMNIA: ICD-10-CM

## 2021-11-02 DIAGNOSIS — N32.81 OAB (OVERACTIVE BLADDER): ICD-10-CM

## 2021-11-02 DIAGNOSIS — G47.33 OBSTRUCTIVE SLEEP APNEA SYNDROME: Primary | ICD-10-CM

## 2021-11-02 DIAGNOSIS — G25.81 RLS (RESTLESS LEGS SYNDROME): ICD-10-CM

## 2021-11-02 PROCEDURE — 99213 OFFICE O/P EST LOW 20 MIN: CPT | Performed by: NURSE PRACTITIONER

## 2021-11-02 RX ORDER — GABAPENTIN 300 MG/1
300 CAPSULE ORAL NIGHTLY PRN
Qty: 30 CAPSULE | Refills: 3 | Status: SHIPPED | OUTPATIENT
Start: 2021-11-02 | End: 2022-11-01 | Stop reason: SDUPTHER

## 2021-11-02 RX ORDER — SOLIFENACIN SUCCINATE 5 MG/1
TABLET, FILM COATED ORAL
Qty: 30 TABLET | Refills: 0 | Status: SHIPPED | OUTPATIENT
Start: 2021-11-02 | End: 2021-12-16

## 2021-11-02 NOTE — PROGRESS NOTES
Chief Complaint:   Chief Complaint   Patient presents with   • Follow-up       HPI:    Di Lopez is a 60 y.o. female here for follow-up of sleep apnea.  Patient was last seen 9/17/2019.  Patient continues to use gabapentin at night for restless leg syndrome.  She also uses CPAP for sleep apnea.  Patient states she will sleep 5 hours nightly will awaken many times throughout the night and will sometimes have trouble going back to sleep.  She does but her [place] score at 5/24.  She has tried melatonin in the past but has not tried extended release melatonin to help with sleep.  I am going to refill her Neurontin today and she is going to add extended release melatonin we will then see her back to reassess        Current medications are:   Current Outpatient Medications:   •  Alcohol Swabs (Alcohol Prep) pads, For use prior to checking glucose twice daily, Disp: 100 each, Rfl: 5  •  azithromycin (Zithromax Z-Sudheer) 250 MG tablet, Take 2 tablets the first day, then 1 tablet daily for 4 days., Disp: 6 tablet, Rfl: 0  •  brompheniramine-pseudoephedrine-DM 30-2-10 MG/5ML syrup, Take 5 mL by mouth 4 (Four) Times a Day As Needed for Cough or Allergies for up to 10 days., Disp: 240 mL, Rfl: 0  •  Continuous Blood Gluc  (FreeStyle Dalia 2 [place]) device, 1 each Daily. Use as directed daily to check blood sugars, Disp: 1 each, Rfl: 0  •  Continuous Blood Gluc Sensor (FreeStyle Dalia 2 Sensor) misc, 1 each Every 14 (Fourteen) Days., Disp: 3 each, Rfl: 11  •  Dulaglutide (Trulicity) 1.5 MG/0.5ML solution pen-injector, Inject 1.5 mg under the skin into the appropriate area as directed 1 (One) Time Per Week., Disp: 6 mL, Rfl: 1  •  escitalopram (LEXAPRO) 20 MG tablet, Take 1 tablet by mouth once daily, Disp: 90 tablet, Rfl: 0  •  Ferrous Sulfate (IRON PO), Take 1 tablet by mouth Daily., Disp: , Rfl:   •  folic acid ([employer] Folic Acid) 800 MCG tablet, Take 800 mcg by mouth Daily., Disp: , Rfl:   •  furosemide (LASIX)  40 MG tablet, Take 80 mg by mouth As Needed., Disp: , Rfl:   •  gabapentin (NEURONTIN) 300 MG capsule, Take 1 capsule by mouth At Night As Needed (leg pain)., Disp: 30 capsule, Rfl: 3  •  glimepiride (AMARYL) 4 MG tablet, Take 2 tablets by mouth Every Morning Before Breakfast., Disp: 180 tablet, Rfl: 1  •  glucose blood test strip, Use as instructed 3 times a day, Disp: 100 each, Rfl: 11  •  HYDROcodone-acetaminophen (NORCO) 7.5-325 MG per tablet, Take 1 tablet by mouth Daily., Disp: , Rfl:   •  Insulin Glargine (Lantus SoloStar) 100 UNIT/ML injection pen, Inject 65 Units under the skin into the appropriate area as directed Daily., Disp: 60 mL, Rfl: 5  •  Insulin Lispro, 1 Unit Dial, (HumaLOG KwikPen) 100 UNIT/ML solution pen-injector, For use at mealtimes and per correctional scale up to 40 units daily, Disp: 12 mL, Rfl: 3  •  Insulin Pen Needle (RELION PEN NEEDLES) 32G X 4 MM misc, Use BID for E11.9, Disp: 100 each, Rfl: 1  •  Lancets misc, For use with glucose monitoring 3 times daily, Disp: 100 each, Rfl: 11  •  levocetirizine (Xyzal) 5 MG tablet, Take 1 tablet by mouth Every Evening for 30 days., Disp: 30 tablet, Rfl: 5  •  Omega-3 Fatty Acids (FISH OIL) 1000 MG capsule capsule, Take 1 capsule by mouth 4 (Four) Times a Day., Disp: 120 capsule, Rfl: 2  •  omeprazole (priLOSEC) 20 MG capsule, Take 1 capsule by mouth once daily, Disp: 30 capsule, Rfl: 0  •  sacubitril-valsartan (ENTRESTO) 24-26 MG tablet, Take 1 tablet by mouth 2 (Two) Times a Day., Disp: , Rfl:   •  solifenacin (VESICARE) 5 MG tablet, Take 1 tablet by mouth once daily, Disp: 30 tablet, Rfl: 0  •  spironolactone (ALDACTONE) 25 MG tablet, Take 25 mg by mouth Daily., Disp: , Rfl: 0  •  warfarin (COUMADIN) 1 MG tablet, Take 1 mg by mouth Daily As Needed. Half tab po 5 days, Disp: , Rfl:   •  warfarin (COUMADIN) 4 MG tablet, Take 4.5 mg by mouth Daily. 4.5 mg for 5 days then 4 mg for 2 days., Disp: , Rfl: 2.      The patient's relevant past medical,  "surgical, family and social history were reviewed and updated in Epic as appropriate.       Review of Systems   Eyes: Positive for visual disturbance.   Respiratory: Positive for apnea, cough, shortness of breath and wheezing.    Cardiovascular: Positive for palpitations.   Gastrointestinal: Positive for abdominal pain.   Genitourinary: Positive for frequency.   Musculoskeletal: Positive for arthralgias and joint swelling.   Allergic/Immunologic: Positive for environmental allergies.   Neurological: Positive for light-headedness and headaches.   Psychiatric/Behavioral: Positive for dysphoric mood and sleep disturbance. The patient is nervous/anxious.    All other systems reviewed and are negative.        Objective:    Physical Exam  Constitutional:       Appearance: Normal appearance.   HENT:      Head: Normocephalic and atraumatic.      Mouth/Throat:      Comments: Class 4 airway  Cardiovascular:      Rate and Rhythm: Normal rate.   Pulmonary:      Effort: Pulmonary effort is normal.      Comments: Deferred as patient states she has been coughing has \"bronchitis and sinus infection\"  Neurological:      Mental Status: She is alert and oriented to person, place, and time.   Psychiatric:         Mood and Affect: Mood normal.         Behavior: Behavior normal.         Thought Content: Thought content normal.         Judgment: Judgment normal.       30/30 days of use  Greater than 4-hour use 96.7  90% pressure 14.0  AHI of 2.5  Settings 8-18  ASSESSMENT/PLAN    Diagnoses and all orders for this visit:    1. Obstructive sleep apnea syndrome (Primary)  -     CPAP Therapy    2. RLS (restless legs syndrome)  -     gabapentin (NEURONTIN) 300 MG capsule; Take 1 capsule by mouth At Night As Needed (leg pain).  Dispense: 30 capsule; Refill: 3    3. Psychophysiological insomnia            1. Counseled patient regarding multimodal approach with healthy nutrition, healthy sleep, regular physical activity, social activities, " counseling, and medications. Encouraged to practice lateral sleep position. Avoid alcohol and sedatives close to bedtime.  2. Refill Neurontin 300 mg with 3 refills patient will try extended release melatonin I will see her back in 3 months to reassess    I have reviewed the results of my evaluation and impression and discussed my recommendations in detail with the patient.      Signed by  FRANCESCA Smith    November 2, 2021      CC: Keri Little,           No ref. provider found

## 2021-11-09 NOTE — TELEPHONE ENCOUNTER
Spoke to Annabelle at [facility] and she states that they have attempted to contact patient several times and tried to deliver the cpap machine with no success. Attempted to call patient but no answer.    Quality 111:Pneumonia Vaccination Status For Older Adults: Pneumococcal Vaccination not Administered or Previously Received, Reason not Otherwise Specified Quality 110: Preventive Care And Screening: Influenza Immunization: Influenza Immunization previously received during influenza season Detail Level: Detailed Quality 226: Preventive Care And Screening: Tobacco Use: Screening And Cessation Intervention: Patient screened for tobacco use and is an ex/non-smoker Quality 431: Preventive Care And Screening: Unhealthy Alcohol Use - Screening: Patient not identified as an unhealthy alcohol user when screened for unhealthy alcohol use using a systematic screening method

## 2021-11-10 RX ORDER — FLURBIPROFEN SODIUM 0.3 MG/ML
1 SOLUTION/ DROPS OPHTHALMIC 2 TIMES DAILY
Qty: 100 EACH | Refills: 1 | Status: SHIPPED | OUTPATIENT
Start: 2021-11-10 | End: 2021-11-17 | Stop reason: SDUPTHER

## 2021-11-11 ENCOUNTER — TRANSCRIBE ORDERS (OUTPATIENT)
Dept: ADMINISTRATIVE | Facility: HOSPITAL | Age: 61
End: 2021-11-11

## 2021-11-15 RX ORDER — OMEPRAZOLE 20 MG/1
CAPSULE, DELAYED RELEASE ORAL
Qty: 30 CAPSULE | Refills: 0 | Status: SHIPPED | OUTPATIENT
Start: 2021-11-15 | End: 2021-12-16

## 2021-11-17 ENCOUNTER — TELEPHONE (OUTPATIENT)
Dept: ENDOCRINOLOGY | Facility: CLINIC | Age: 61
End: 2021-11-17

## 2021-11-17 RX ORDER — FLURBIPROFEN SODIUM 0.3 MG/ML
SOLUTION/ DROPS OPHTHALMIC
Qty: 150 EACH | Refills: 3 | Status: SHIPPED | OUTPATIENT
Start: 2021-11-17 | End: 2022-06-30

## 2021-11-17 NOTE — TELEPHONE ENCOUNTER
PATIENT IS REQUESTING TO HAVE A NEW RX WRITTEN FOR HER PEN NEEDLES. SHE STATES THAT WITH USING A CORRECTOR DOSE, SHE IS USING SOMETIMES UP TO 5 PEN NEEDLES PER DAY.

## 2021-12-15 ENCOUNTER — PATIENT OUTREACH (OUTPATIENT)
Dept: CASE MANAGEMENT | Facility: OTHER | Age: 61
End: 2021-12-15

## 2021-12-15 DIAGNOSIS — N32.81 OAB (OVERACTIVE BLADDER): ICD-10-CM

## 2021-12-15 NOTE — OUTREACH NOTE
Ambulatory Case Management Note    Patient Outreach    Called patient for possible Ambulatory Case Management needs regarding Diabetes management.  Explained role of Ambulatory  and contact information.  Patient said she could not talk at this time, as she was getting ready to leave the house.  Asked RN-ACELAINE to call her back tomorrow.        Alexsandra Do RN  Ambulatory Case Management    12/15/2021, 15:39 EST

## 2021-12-16 RX ORDER — SOLIFENACIN SUCCINATE 5 MG/1
TABLET, FILM COATED ORAL
Qty: 30 TABLET | Refills: 0 | Status: SHIPPED | OUTPATIENT
Start: 2021-12-16 | End: 2022-01-20

## 2021-12-16 RX ORDER — OMEPRAZOLE 20 MG/1
CAPSULE, DELAYED RELEASE ORAL
Qty: 30 CAPSULE | Refills: 0 | Status: SHIPPED | OUTPATIENT
Start: 2021-12-16 | End: 2021-12-30

## 2021-12-16 RX ORDER — ESCITALOPRAM OXALATE 20 MG/1
TABLET ORAL
Qty: 30 TABLET | Refills: 0 | Status: SHIPPED | OUTPATIENT
Start: 2021-12-16 | End: 2022-01-20

## 2021-12-23 ENCOUNTER — PATIENT OUTREACH (OUTPATIENT)
Dept: CASE MANAGEMENT | Facility: OTHER | Age: 61
End: 2021-12-23

## 2021-12-23 NOTE — OUTREACH NOTE
Ambulatory Case Management Note    Patient Outreach    Called patient to assess for possible Ambulatory Case Management regarding Diabetes Management.  Explained role of Ambulatory  and contact information given.  Patient said she is able to keep up with her blood sugars better since having the Continual Glucose Monitoring device.  Reviewed patient's Diabetic medication.  She voiced compliance daily with her meds.  Patient said she checks BS before each meal and takes the Humalog Insulin as directed, with correction as directed.  Discussed importance of diet and exercise on managing blood sugar levels.  Patient admitted to drinking sugar-drinks; does not like diet drinks.  Said she needs to better understanding what foods have sugar and carbohydrates in them.  Encouraged substituting water for the sugary drinks.  She said she does not get regular exercise, beside walking around the house, due to painful joints.  Explained [facility] Diabetic Education classes that are offered via Telehealth/ Virtually, utilizing an RN and a Dietitian, at no extra cost to the patient.  Provided patient the phone number to call and gather information about the classes and get registered for an appt if interested.  Patient said she is turning 61 on her birthday, and she is definitely interested; said she would call.    Reviewed next PCP appt, 1-5-22, 3:00pm.  Confirmed transportation.  She said she drives herself; just has trouble in the mornings, getting going. Reviewed next Endocrinology appt., 1-21-22, 3:15pm. Patient voiced understanding and agreement.      General & Health Literacy Assessment    Questions/Answers      Most Recent Value   Assessment Completed With Patient   Living Arrangement Children   Type of Residence Private Residence   Home Care Services No   Equiptment Used at Home --  [Continual Glucose Monitoring system]   Communication Device No   Bed or Wheelchair Confined No   Difficulty Keeping  Appointments No   Health Literacy Good          Care Evaluation    Questions/Answers      Most Recent Value   Annual Wellness Visit:  Patient Will Schedule   Care Gaps Addressed Colon Cancer Screening,  Diabetic A1C,  Diabetic Eye Exam,  Flu Shot,  Mammogram,  Pneumonia Vaccine,  Other (See Comment)  [Covid Vaccine: has had x 2, May & Jun 2021,  waiting to get Covid Booster in Jan. 2022.]   Colon Cancer Screening Type Colonoscopy   Colonoscopy Status Up to Date (< 10 yrs)   HbA1c Status Up to Date-outside defined limits   Diabetic Eye Exam Status Up to Date   Flu Shot Status Patient will Complete   Mammogram Status Up to Date   Pneumonia Vaccine Status Up to Date   Other Patient Education/Resources  Advanced Care Planning   ACP Education Method Verbal   Advanced Directives: Send Materials   Medication Adherence Medications understood            Care Plan: Diabetes   Updates made since 12/23/2021 12:00 AM      Problem: DIET MANAGEMENT       Goal: Learn to Manage Calories       Task: Provide resources for diabetic diet    Responsible User: Alexsandra Do RN      Problem: HBA1C UNCONTROLLED       Goal: Establish Regular Follow-Ups with PCP       Task: Determine patient's next PCP visit Completed 12/23/2021   Responsible User: Alexsandra Do RN      Goal: Reduce HbA1c levels below 9%       Task: Educate patient on diet and exercise Completed 12/23/2021   Responsible User: Alexsandra Do RN      Task: Educate patient on regular BS checks Completed 12/23/2021   Responsible User: Alexsandra Do RN      Task: Discuss diabetes treatment plan with patient Completed 12/23/2021   Responsible User: Alexsandra Do RN      Problem: MEDICATION ADHERENCE       Goal: Consistently take medications as prescribed       Task: Discuss barriers to medication adherence with patient    Responsible User: Alexsandra Do RN      Problem: PATIENT IS INACTIVE       Goal: Exercise at least 20 minutes per day       Task: Discuss  barriers to activity with patient. Update SDOH. Completed 12/23/2021   Responsible User: Alexsandra Do RN      Task: Develop exercise plan with patient    Responsible User: Alexsandra Do RN      Problem: KNOWLEDGE DEFICIT       Goal: Patient able to teach back disease management       Task: Refer to [facility] Certified Diabetic Educators Completed 12/23/2021   Responsible User: Alexsandra Do RN      Task: Provide diabetic educational resources Completed 12/23/2021   Responsible User: Alexsandra Do RN      Task: Educate on hyper/hypo-glycemia signs and symptoms    Responsible User: Alexsandra Do RN Bettie Kingkade, RN  Ambulatory Case Management    12/23/2021, 15:01 EST

## 2021-12-30 RX ORDER — OMEPRAZOLE 20 MG/1
CAPSULE, DELAYED RELEASE ORAL
Qty: 30 CAPSULE | Refills: 0 | Status: SHIPPED | OUTPATIENT
Start: 2021-12-30 | End: 2022-01-20

## 2022-01-13 ENCOUNTER — LAB (OUTPATIENT)
Dept: LAB | Facility: HOSPITAL | Age: 62
End: 2022-01-13

## 2022-01-13 ENCOUNTER — OFFICE VISIT (OUTPATIENT)
Dept: FAMILY MEDICINE CLINIC | Facility: CLINIC | Age: 62
End: 2022-01-13

## 2022-01-13 ENCOUNTER — PATIENT OUTREACH (OUTPATIENT)
Dept: CASE MANAGEMENT | Facility: OTHER | Age: 62
End: 2022-01-13

## 2022-01-13 VITALS
SYSTOLIC BLOOD PRESSURE: 126 MMHG | TEMPERATURE: 98.4 F | DIASTOLIC BLOOD PRESSURE: 62 MMHG | BODY MASS INDEX: 43.35 KG/M2 | WEIGHT: 220.8 LBS | OXYGEN SATURATION: 98 % | RESPIRATION RATE: 16 BRPM | HEART RATE: 85 BPM | HEIGHT: 60 IN

## 2022-01-13 DIAGNOSIS — Z12.31 ENCOUNTER FOR SCREENING MAMMOGRAM FOR MALIGNANT NEOPLASM OF BREAST: ICD-10-CM

## 2022-01-13 DIAGNOSIS — E11.9 TYPE 2 DIABETES MELLITUS WITHOUT COMPLICATION, WITH LONG-TERM CURRENT USE OF INSULIN: ICD-10-CM

## 2022-01-13 DIAGNOSIS — Z79.4 TYPE 2 DIABETES MELLITUS WITHOUT COMPLICATION, WITH LONG-TERM CURRENT USE OF INSULIN: ICD-10-CM

## 2022-01-13 DIAGNOSIS — E53.8 VITAMIN B12 DEFICIENCY: ICD-10-CM

## 2022-01-13 DIAGNOSIS — R82.90 FOUL SMELLING URINE: Primary | ICD-10-CM

## 2022-01-13 DIAGNOSIS — E78.5 DYSLIPIDEMIA: ICD-10-CM

## 2022-01-13 DIAGNOSIS — E55.9 VITAMIN D DEFICIENCY: ICD-10-CM

## 2022-01-13 LAB
ALBUMIN SERPL-MCNC: 4.2 G/DL (ref 3.5–5.2)
ALBUMIN/GLOB SERPL: 1.4 G/DL
ALP SERPL-CCNC: 158 U/L (ref 39–117)
ALT SERPL W P-5'-P-CCNC: 29 U/L (ref 1–33)
ANION GAP SERPL CALCULATED.3IONS-SCNC: 10.2 MMOL/L (ref 5–15)
AST SERPL-CCNC: 31 U/L (ref 1–32)
BILIRUB BLD-MCNC: NEGATIVE MG/DL
BILIRUB SERPL-MCNC: 0.2 MG/DL (ref 0–1.2)
BUN SERPL-MCNC: 10 MG/DL (ref 8–23)
BUN/CREAT SERPL: 11.5 (ref 7–25)
CALCIUM SPEC-SCNC: 9.4 MG/DL (ref 8.6–10.5)
CHLORIDE SERPL-SCNC: 98 MMOL/L (ref 98–107)
CHOLEST SERPL-MCNC: 248 MG/DL (ref 0–200)
CLARITY, POC: CLEAR
CO2 SERPL-SCNC: 24.8 MMOL/L (ref 22–29)
COLOR UR: YELLOW
CREAT SERPL-MCNC: 0.87 MG/DL (ref 0.57–1)
DEPRECATED RDW RBC AUTO: 38.6 FL (ref 37–54)
ERYTHROCYTE [DISTWIDTH] IN BLOOD BY AUTOMATED COUNT: 12.4 % (ref 12.3–15.4)
EXPIRATION DATE: ABNORMAL
GFR SERPL CREATININE-BSD FRML MDRD: 80 ML/MIN/1.73
GLOBULIN UR ELPH-MCNC: 3 GM/DL
GLUCOSE SERPL-MCNC: 317 MG/DL (ref 65–99)
GLUCOSE UR STRIP-MCNC: ABNORMAL MG/DL
HCT VFR BLD AUTO: 36.2 % (ref 34–46.6)
HDLC SERPL-MCNC: 25 MG/DL (ref 40–60)
HGB BLD-MCNC: 12.4 G/DL (ref 12–15.9)
KETONES UR QL: NEGATIVE
LDLC SERPL CALC-MCNC: 105 MG/DL (ref 0–100)
LDLC/HDLC SERPL: 3.46 {RATIO}
LEUKOCYTE EST, POC: NEGATIVE
Lab: ABNORMAL
MCH RBC QN AUTO: 29.7 PG (ref 26.6–33)
MCHC RBC AUTO-ENTMCNC: 34.3 G/DL (ref 31.5–35.7)
MCV RBC AUTO: 86.6 FL (ref 79–97)
NITRITE UR-MCNC: NEGATIVE MG/ML
PH UR: 5 [PH] (ref 5–8)
PLATELET # BLD AUTO: 145 10*3/MM3 (ref 140–450)
PMV BLD AUTO: 12.3 FL (ref 6–12)
POTASSIUM SERPL-SCNC: 4.1 MMOL/L (ref 3.5–5.2)
PROT SERPL-MCNC: 7.2 G/DL (ref 6–8.5)
PROT UR STRIP-MCNC: ABNORMAL MG/DL
RBC # BLD AUTO: 4.18 10*6/MM3 (ref 3.77–5.28)
RBC # UR STRIP: NEGATIVE /UL
SODIUM SERPL-SCNC: 133 MMOL/L (ref 136–145)
SP GR UR: 5 (ref 1–1.03)
TRIGL SERPL-MCNC: 682 MG/DL (ref 0–150)
UROBILINOGEN UR QL: NORMAL
VLDLC SERPL-MCNC: 118 MG/DL (ref 5–40)
WBC NRBC COR # BLD: 10.93 10*3/MM3 (ref 3.4–10.8)

## 2022-01-13 PROCEDURE — 81003 URINALYSIS AUTO W/O SCOPE: CPT | Performed by: STUDENT IN AN ORGANIZED HEALTH CARE EDUCATION/TRAINING PROGRAM

## 2022-01-13 PROCEDURE — 99214 OFFICE O/P EST MOD 30 MIN: CPT | Performed by: STUDENT IN AN ORGANIZED HEALTH CARE EDUCATION/TRAINING PROGRAM

## 2022-01-13 PROCEDURE — 36415 COLL VENOUS BLD VENIPUNCTURE: CPT | Performed by: STUDENT IN AN ORGANIZED HEALTH CARE EDUCATION/TRAINING PROGRAM

## 2022-01-13 RX ORDER — ERGOCALCIFEROL (VITAMIN D2) 10 MCG
400 TABLET ORAL DAILY
COMMUNITY

## 2022-01-13 RX ORDER — CARVEDILOL 12.5 MG/1
12.5 TABLET ORAL 2 TIMES DAILY WITH MEALS
COMMUNITY
Start: 2021-11-15

## 2022-01-13 RX ORDER — ALBUTEROL SULFATE 90 UG/1
2 AEROSOL, METERED RESPIRATORY (INHALATION) EVERY 4 HOURS PRN
COMMUNITY
End: 2022-01-21

## 2022-01-13 NOTE — PROGRESS NOTES
New Patient Office Visit      Patient Name: Di Lopez  : 1960   MRN: [id number]     Chief Complaint:  Annual Exam (ANNUAL EXAM, NEEDING REFERRAL TO [facility]ENTER FOR CONTD BREAST ISSUE, URINE FOUL ODOR, NEEDING SOMETHING FOR SLEEP, BLOOD SUGAR UP TODAY)     History of Present Illness:     DM  Follows with endocrinology. Shows me her freestyle average readings over past 30 days looks to be about 200. Taking humalog 16 units with every meal and increases on sliding scale up from there. Takes 65 units of long acting insulin at night. Her lowest reading was 53 on  at 3 am and 55 in the am in the morning on . 54 on the .     Says she sleeps during the day and stays up at night sometimes till 3 am. Says melatonin helps some. Says she chews a melatonin gummy. Says the 10 mg will make her sleep through the alarm. Says the gabapentin helps some as well from [facility].   Will need to avoid sleep during the day.     CHF and mitral valve  Taking warfarin and checks at home every two weeks. Says her last was within range. Says she has been following with dr dionicio denis cardiologist will request records.. Taking entresto spironolactone betablocker.     Bette  Taking iron supplementation    Urine  Says her urine has an odor on occasion. No dysuria urgency or frequency.     depression  No suicidal homicidal thoughts or dizziness.   Subjective        Past Medical History:   Diagnosis Date   • Arteriovenous malformation of gastrointestinal tract 2016   • Arthritis    • Black hairy tongue    • CHF (congestive heart failure) ([facility])    • Chronic back pain    • Depression 2016   • Diabetes mellitus ([facility])    • Dyslipidemia 2016   • Emphysema/COPD ([facility]) 2016   • Fatigue    • Headache    • Herniated cervical disc    • History of echocardiogram 2013    Left ventricular systolic function at the lower limits of normal; EF 50-55%; mild MR; mechanical prosthetic  mitral valve present; mod TR   • Hypertension    • Insomnia 5/12/2016   • Iron deficiency     A.  The patient was treated with IV iron replacement. B.  Secondary to GI bleed, status post EGD with cauterization of blood vessel to the duodenum, 7/16/2014.   • Left bundle branch hemiblock 10/12/2020   • Left shoulder strain    • Liver mass    • Long term current use of anticoagulant 5/12/2016   • Morbid obesity (HCC) 5/12/2016   • Obstructive sleep apnea syndrome 5/12/2016    Description: A.  Moderate; CPAP therapy..   • Pulmonary hypertension (HCC)    • Sliding hiatal hernia 7/28/2016    Description: A.  Reported on EGD, 12/30/2011.   • Type 2 diabetes mellitus (HCC) 5/12/2016   • Vitamin B12 deficiency     A.  The patient was treated with vitamin B12 replacement.       Past Surgical History:   Procedure Laterality Date   • BACK SURGERY     • BILATERAL OOPHORECTOMY  2009    Status post bilateral oophorectomy secondary to ovarian cysts    • BREAST BIOPSY Right     STEREOTACTIC   • BREAST EXCISIONAL BIOPSY Bilateral    • CARDIAC ELECTROPHYSIOLOGY PROCEDURE N/A 10/15/2020    Procedure: Biventricular Device Insertion;  Surgeon: Nadir Everett MD;  Location: [facility] INVASIVE LOC[facility];  Service: Cardiology;  Laterality: N/A;   • COLONOSCOPY  12/28/2012   • CYSTECTOMY      removal of ganglion cyst from left wrist   • ENDOSCOPY  12/30/2011    DIAGNOSTIC ESOPHAGOGASTRODUODENOSCOPY   • HERNIA REPAIR     • HYSTERECTOMY  1995    A.  Status post hysterectomy for early stage endometrial cancer done in 1995.   • MITRAL VALVE REPLACEMENT  03/03/2008    Dr. Hima Barreto.    • OOPHORECTOMY         Family History   Problem Relation Age of Onset   • Sudden death Mother         POSSIBLE SERIOUS MASSIVE STROKE   • Multiple myeloma Father    • Ovarian cancer Paternal Grandmother    • Breast cancer Neg Hx        Social History     Socioeconomic History   • Marital status: Single   Tobacco Use   • Smoking status: Former Smoker      Packs/day: 0.50     Years: 33.00     Pack years: 16.50     Types: Electronic Cigarette, Cigarettes     Quit date:      Years since quittin.0   • Smokeless tobacco: Never Used   • Tobacco comment: Quit in    Vaping Use   • Vaping Use: Every day   • Substances: Nicotine   • Devices: [employer] tank   Substance and Sexual Activity   • Alcohol use: No   • Drug use: No   • Sexual activity: Defer          Current Outpatient Medications:   •  albuterol sulfate  (90 Base) MCG/ACT inhaler, Inhale 2 puffs Every 4 (Four) Hours As Needed for Wheezing., Disp: , Rfl:   •  Alcohol Swabs (Alcohol Prep) pads, For use prior to checking glucose twice daily, Disp: 100 each, Rfl: 5  •  carvedilol (COREG) 12.5 MG tablet, , Disp: , Rfl:   •  Continuous Blood Gluc  (FreeStyle Dalia 2 [place]) device, 1 each Daily. Use as directed daily to check blood sugars, Disp: 1 each, Rfl: 0  •  Continuous Blood Gluc Sensor (FreeStyle Dalia 2 Sensor) misc, 1 each Every 14 (Fourteen) Days., Disp: 3 each, Rfl: 11  •  Dulaglutide (Trulicity) 1.5 MG/0.5ML solution pen-injector, Inject 1.5 mg under the skin into the appropriate area as directed 1 (One) Time Per Week., Disp: 6 mL, Rfl: 1  •  escitalopram (LEXAPRO) 20 MG tablet, Take 1 tablet by mouth once daily, Disp: 30 tablet, Rfl: 0  •  Ferrous Sulfate (IRON PO), Take 1 tablet by mouth Daily., Disp: , Rfl:   •  folic acid ([employer] Folic Acid) 800 MCG tablet, Take 800 mcg by mouth Daily., Disp: , Rfl:   •  furosemide (LASIX) 40 MG tablet, Take 40 mg by mouth As Needed. TAKING PRETTY MUCH DAILY, Disp: , Rfl:   •  gabapentin (NEURONTIN) 300 MG capsule, Take 1 capsule by mouth At Night As Needed (leg pain)., Disp: 30 capsule, Rfl: 3  •  glimepiride (AMARYL) 4 MG tablet, Take 2 tablets by mouth Every Morning Before Breakfast., Disp: 180 tablet, Rfl: 1  •  glucose blood test strip, Use as instructed 3 times a day, Disp: 100 each, Rfl: 11  •  HYDROcodone-acetaminophen (NORCO) 7.5-325 MG  per tablet, Take 1 tablet by mouth Daily., Disp: , Rfl:   •  Insulin Glargine (Lantus SoloStar) 100 UNIT/ML injection pen, Inject 65 Units under the skin into the appropriate area as directed Daily., Disp: 60 mL, Rfl: 5  •  Insulin Lispro, 1 Unit Dial, (HumaLOG KwikPen) 100 UNIT/ML solution pen-injector, For use at mealtimes and per correctional scale up to 40 units daily, Disp: 12 mL, Rfl: 3  •  Insulin Pen Needle (B-D UF III MINI PEN NEEDLES) 31G X 5 MM misc, Use as directed 5 times daily, Disp: 150 each, Rfl: 3  •  Insulin Pen Needle (RELION PEN NEEDLES) 32G X 4 MM misc, Use BID for E11.9, Disp: 100 each, Rfl: 1  •  Lancets misc, For use with glucose monitoring 3 times daily, Disp: 100 each, Rfl: 11  •  Omega-3 Fatty Acids (FISH OIL) 1000 MG capsule capsule, Take 1 capsule by mouth 4 (Four) Times a Day., Disp: 120 capsule, Rfl: 2  •  omeprazole (priLOSEC) 20 MG capsule, Take 1 capsule by mouth once daily, Disp: 30 capsule, Rfl: 0  •  sacubitril-valsartan (ENTRESTO) 24-26 MG tablet, Take 1 tablet by mouth 2 (Two) Times a Day., Disp: , Rfl:   •  solifenacin (VESICARE) 5 MG tablet, Take 1 tablet by mouth once daily, Disp: 30 tablet, Rfl: 0  •  spironolactone (ALDACTONE) 25 MG tablet, Take 25 mg by mouth Daily., Disp: , Rfl: 0  •  Vitamin D, Cholecalciferol, (CHOLECALCIFEROL) 10 MCG (400 UNIT) tablet, Take 400 Units by mouth Daily., Disp: , Rfl:   •  warfarin (COUMADIN) 1 MG tablet, Take 1 mg by mouth Daily As Needed. Half tab po 5 days, Disp: , Rfl:   •  warfarin (COUMADIN) 4 MG tablet, Take 4.5 mg by mouth Daily. 4.5 mg for 5 days then 4 mg for 2 days., Disp: , Rfl: 2  •  levocetirizine (Xyzal) 5 MG tablet, Take 1 tablet by mouth Every Evening for 30 days., Disp: 30 tablet, Rfl: 5    Allergies   Allergen Reactions   • Codeine Itching and Nausea And Vomiting   • Latex Other (See Comments)     Pulls skin off   • Penicillins Hives   • Sulfa Antibiotics Nausea And Vomiting       Objective     Physical Exam:  Vitals:     "01/13/22 1535   BP: 126/62   BP Location: Left arm   Patient Position: Sitting   Cuff Size: Adult   Pulse: 85   Resp: 16   Temp: 98.4 °F (36.9 °C)   TempSrc: Temporal   SpO2: 98%   Weight: 100 kg (220 lb 12.8 oz)   Height: 152.4 cm (60\")      Body mass index is 43.12 kg/m².     Physical Exam  Constitutional:       General: She is not in acute distress.     Appearance: Normal appearance.   HENT:      Head: Normocephalic and atraumatic.   Eyes:      Extraocular Movements: Extraocular movements intact.   Cardiovascular:      Rate and Rhythm: Normal rate and regular rhythm.      Heart sounds: Murmur heard.       Pulmonary:      Effort: Pulmonary effort is normal. No respiratory distress.      Breath sounds: Normal breath sounds.   Musculoskeletal:         General: No swelling.      Cervical back: Normal range of motion.   Skin:     Findings: No rash.   Neurological:      General: No focal deficit present.      Mental Status: She is alert.   Psychiatric:         Mood and Affect: Mood normal.              Assessment / Plan      Assessment/Plan:   Diagnoses and all orders for this visit:    1. Foul smelling urine (Primary)  -     POC Urinalysis Dipstick, Automated    2. Dyslipidemia  -     Lipid Panel; Future    3. Type 2 diabetes mellitus without complication, with long-term current use of insulin (HCC)  -     CBC (No Diff); Future  -     Comprehensive Metabolic Panel; Future  -     Hemoglobin A1c; Future    4. Vitamin B12 deficiency  -     Vitamin B12; Future    5. Vitamin D deficiency  -     Vitamin D 25 Hydroxy; Future     Advised her given her early am lows to decrease her long acting insulin at night from 65 units to 60 and let us know for any further lows. Has apt with endocrinology next week. Urine showing no signs of infection discussed to drink plenty water and stay hydrated.     Return in about 4 months (around 5/13/2022) for Medicare Wellness.       Davina Santiago D.O.  [facility] [place]     "

## 2022-01-13 NOTE — OUTREACH NOTE
Ambulatory Case Management Note    Patient Outreach    Called patient in follow up for HRCM.  Patient stated her Blood Sugar has been 96 this AM, and her CGM alarmed at that point.  Patient is interested in lowering the alarm point, as she did not think 96 was that low.  Instructed her to talk with her doctor about the low BS range for the alarm to sound, and get assistance as needed with CGM.  Patient said her BS later in morning was between 150-200; she has eaten lunch; she is taking her daily meds / insulin as directed each day.  Said she has no problem with refills, and is compliant daily.  Patient said she is going to see her PCP now. Reminded of need to schedule her Medicare AWV appt with PCP.  Reported no difficulty with breathing; is wearing CPAP w/ O2 at night to sleep.  Said she does have difficulty getting to sleep at night.  Encouraged patient to discuss this with her PCP.  No other questions or concerns voiced at this time.        Care Plan: Diabetes   Updates made since 1/13/2022 12:00 AM      Problem: MEDICATION ADHERENCE       Goal: Consistently take medications as prescribed       Task: Discuss barriers to medication adherence with patient Completed 1/13/2022   Responsible User: Alexsandra Do, RN          Alexsandra Do RN  Ambulatory Case Management    1/13/2022, 14:37 EST

## 2022-01-14 DIAGNOSIS — R74.8 ELEVATED ALKALINE PHOSPHATASE LEVEL: Primary | ICD-10-CM

## 2022-01-14 DIAGNOSIS — E78.5 DYSLIPIDEMIA: Primary | ICD-10-CM

## 2022-01-14 LAB
25(OH)D3 SERPL-MCNC: 30.6 NG/ML (ref 30–100)
GGT SERPL-CCNC: 276 U/L (ref 5–36)
HBA1C MFR BLD: 8.82 % (ref 4.8–5.6)
VIT B12 BLD-MCNC: 338 PG/ML (ref 211–946)

## 2022-01-20 DIAGNOSIS — E11.65 TYPE 2 DIABETES MELLITUS WITH HYPERGLYCEMIA, WITH LONG-TERM CURRENT USE OF INSULIN: ICD-10-CM

## 2022-01-20 DIAGNOSIS — N32.81 OAB (OVERACTIVE BLADDER): ICD-10-CM

## 2022-01-20 DIAGNOSIS — Z79.4 TYPE 2 DIABETES MELLITUS WITH HYPERGLYCEMIA, WITH LONG-TERM CURRENT USE OF INSULIN: ICD-10-CM

## 2022-01-20 RX ORDER — SOLIFENACIN SUCCINATE 5 MG/1
TABLET, FILM COATED ORAL
Qty: 30 TABLET | Refills: 0 | Status: SHIPPED | OUTPATIENT
Start: 2022-01-20 | End: 2022-02-07

## 2022-01-20 RX ORDER — ESCITALOPRAM OXALATE 20 MG/1
TABLET ORAL
Qty: 30 TABLET | Refills: 0 | Status: SHIPPED | OUTPATIENT
Start: 2022-01-20 | End: 2022-02-07

## 2022-01-20 RX ORDER — OMEPRAZOLE 20 MG/1
CAPSULE, DELAYED RELEASE ORAL
Qty: 30 CAPSULE | Refills: 0 | Status: SHIPPED | OUTPATIENT
Start: 2022-01-20 | End: 2022-04-07

## 2022-01-21 ENCOUNTER — OFFICE VISIT (OUTPATIENT)
Dept: ENDOCRINOLOGY | Facility: CLINIC | Age: 62
End: 2022-01-21

## 2022-01-21 VITALS
HEART RATE: 84 BPM | BODY MASS INDEX: 42.8 KG/M2 | WEIGHT: 218 LBS | OXYGEN SATURATION: 95 % | HEIGHT: 60 IN | DIASTOLIC BLOOD PRESSURE: 70 MMHG | SYSTOLIC BLOOD PRESSURE: 126 MMHG

## 2022-01-21 DIAGNOSIS — E78.1 HYPERTRIGLYCERIDEMIA: ICD-10-CM

## 2022-01-21 DIAGNOSIS — E11.65 TYPE 2 DIABETES MELLITUS WITH HYPERGLYCEMIA, WITH LONG-TERM CURRENT USE OF INSULIN: Primary | ICD-10-CM

## 2022-01-21 DIAGNOSIS — Z79.4 TYPE 2 DIABETES MELLITUS WITH HYPERGLYCEMIA, WITH LONG-TERM CURRENT USE OF INSULIN: Primary | ICD-10-CM

## 2022-01-21 PROCEDURE — 99214 OFFICE O/P EST MOD 30 MIN: CPT | Performed by: INTERNAL MEDICINE

## 2022-01-21 PROCEDURE — 95251 CONT GLUC MNTR ANALYSIS I&R: CPT | Performed by: INTERNAL MEDICINE

## 2022-01-21 RX ORDER — DULAGLUTIDE 1.5 MG/.5ML
1.5 INJECTION, SOLUTION SUBCUTANEOUS WEEKLY
Qty: 6 ML | Refills: 1 | Status: SHIPPED | OUTPATIENT
Start: 2022-01-21 | End: 2022-05-10

## 2022-01-21 RX ORDER — INSULIN GLARGINE 100 [IU]/ML
65 INJECTION, SOLUTION SUBCUTANEOUS DAILY
Qty: 60 ML | Refills: 5 | Status: SHIPPED | OUTPATIENT
Start: 2022-01-21 | End: 2022-05-10

## 2022-01-21 RX ORDER — INSULIN LISPRO 100 [IU]/ML
INJECTION, SOLUTION INTRAVENOUS; SUBCUTANEOUS
Qty: 18 ML | Refills: 3 | Status: SHIPPED | OUTPATIENT
Start: 2022-01-21 | End: 2022-05-10

## 2022-01-21 RX ORDER — GLIMEPIRIDE 4 MG/1
TABLET ORAL
Qty: 180 TABLET | Refills: 0 | Status: SHIPPED | OUTPATIENT
Start: 2022-01-21 | End: 2022-04-20

## 2022-01-21 NOTE — PROGRESS NOTES
"Chief Complaint   Patient presents with   • Diabetes        HPI   Di Lopez is a 61 y.o. female had concerns including Diabetes.      Patient denies significant health changes in the interim since her last visit.  She has not had follow-up with GI.  She did recently have labs with primary care.  She does report she was having some overnight hypoglycemia primary care instructed her to reduce Lantus to 60 units which she did a few days ago.  Of note, patient does report that she has frequently been taking mealtime insulin with her snack at bedtime, hypoglycemia usually within 2 to 3 hours of this.  She continues to monitor blood sugar with freestyle bhumi.    Current diabetes medications include the following: Lantus 60 units daily, Humalog 16 units with meals plus correction 3 per 50 greater than 150, glimepiride 8 mg daily, Trulicity 1.5 mg weekly    Reviewed recent lipid panel and discussed elevated triglycerides.  Patient reports she has plans to discuss restarting statin therapy with GI at her next appointment.    The following portions of the patient's history were reviewed and updated as appropriate: allergies, current medications and past social history.    Review of Systems   Gastrointestinal: Negative for abdominal pain, nausea and vomiting.   Endocrine: Negative for polydipsia and polyuria.      /70 (BP Location: Left arm, Patient Position: Sitting, Cuff Size: Adult)   Pulse 84   Ht 152.4 cm (60\")   Wt 98.9 kg (218 lb)   LMP 06/17/1998   SpO2 95%   BMI 42.58 kg/m²      Physical Exam      Constitutional:  well developed; well nourished  no acute distress  obese - Body mass index is 42.58 kg/m².   ENT/Thyroid: not examined   Eyes: Conjunctiva: clear   Respiratory:  breathing is unlabored  clear to auscultation bilaterally   Cardiovascular:  regular rate and rhythm   Chest:  Not performed.   Abdomen: soft, non-tender; no masses   : Not performed.   Musculoskeletal: Not performed   Skin: " dry and warm   Neuro: mental status, speech normal   Psych: mood and affect are within normal limits       Labs/Imaging  Results for NEHA MONTERROSO (MRN [id number]) as of 1/21/2022 17:19   Ref. Range 1/13/2022 16:22   Glucose Latest Ref Range: 65 - 99 mg/dL 317 (H)   Sodium Latest Ref Range: 136 - 145 mmol/L 133 (L)   Potassium Latest Ref Range: 3.5 - 5.2 mmol/L 4.1   CO2 Latest Ref Range: 22.0 - 29.0 mmol/L 24.8   Chloride Latest Ref Range: 98 - 107 mmol/L 98   Anion Gap Latest Ref Range: 5.0 - 15.0 mmol/L 10.2   Creatinine Latest Ref Range: 0.57 - 1.00 mg/dL 0.87   BUN Latest Ref Range: 8 - 23 mg/dL 10   BUN/Creatinine Ratio Latest Ref Range: 7.0 - 25.0  11.5   Calcium Latest Ref Range: 8.6 - 10.5 mg/dL 9.4   eGFR  Am Latest Ref Range: >60 mL/min/1.73 80   Alkaline Phosphatase Latest Ref Range: 39 - 117 U/L 158 (H)   Total Protein Latest Ref Range: 6.0 - 8.5 g/dL 7.2   ALT (SGPT) Latest Ref Range: 1 - 33 U/L 29   AST (SGOT) Latest Ref Range: 1 - 32 U/L 31   Total Bilirubin Latest Ref Range: 0.0 - 1.2 mg/dL 0.2   Albumin Latest Ref Range: 3.50 - 5.20 g/dL 4.20   Globulin Latest Units: gm/dL 3.0   A/G Ratio Latest Units: g/dL 1.4   GGT Latest Ref Range: 5 - 36 U/L 276 (H)   Hemoglobin A1C Latest Ref Range: 4.80 - 5.60 % 8.82 (H)   Total Cholesterol Latest Ref Range: 0 - 200 mg/dL 248 (H)   HDL Cholesterol Latest Ref Range: 40 - 60 mg/dL 25 (L)   LDL Cholesterol  Latest Ref Range: 0 - 100 mg/dL 105 (H)   VLDL Cholesterol Latest Ref Range: 5 - 40 mg/dL 118 (H)   Triglycerides Latest Ref Range: 0 - 150 mg/dL 682 (H)   LDL/HDL Ratio Unknown 3.46     POC glucose during visit: 149    Freestyle bhumi downloaded for review for dates ranging January 8 January 21, 2022, CGM active 89% of the time with average glucose 190, GMI 7.9% with 30.9% glucose variability.  43% of data within target range, 1% low, 42% high, 14% very high.  Patient has pattern of sharply declining blood glucose just after midnight which  correlates with the timing of her bedtime snack.  Glucose generally for approximately 3 AM to noon, patient has glucose rise in the afternoon correlating to mealtimes.    Diagnoses and all orders for this visit:    1. Type 2 diabetes mellitus with hyperglycemia, with long-term current use of insulin (HCC) (Primary)  -Uncontrolled with hemoglobin A1c 8.82, patient having both hyperglycemia and hypoglycemia  CGM containing 72 hours of glucose data was downloaded and reviewed  Overnight hypoglycemia likely secondary to inappropriate use of mealtime insulin when having a bedtime snack.  Discussed the amount of insulin patient is giving is disproportionate to what is likely needed for her intake  Patient to resume prior Lantus dose of 65 units once daily  Patient to change Humalog dosing.  She will give 16 units with a full meal, 8 units with a small meal or snack.  Continue correction 3 for 50 greater than 150  -Continue Trulicity 1.5 mg weekly  -Continue glimepiride 8 mg daily  Patient was advised to monitor blood sugar 3 times daily if not using CGM.  Patient was instructed to bring glucometer to all future appointments. Patient should contact the clinic between appointments with hypoglycemia or persistent hyperglycemia.  Discussed signs and symptoms of hypoglycemia as well as hypoglycemia management via the rule of 15's.  Discussed potential for long-term complications with uncontrolled diabetes including nephropathy, neuropathy, retinopathy, increased risk for cardiac disease.  Discussed the role of diet and exercise in the management of diabetes.  CMP up-to-date from January 2022, EGFR 80, alkaline phosphatase 158, AST, ALT normal  Lipid panel up-to-date from January 2022, triglycerides 682,   -     POC Glucose, Blood  -     Cancel: POC Glycosylated Hemoglobin (Hb A1C)    2. Hypertriglyceridemia  -Discussed elevated triglycerides, 692 on recent labs.  Reviewed pancreatitis risk with this degree of  hypertriglyceridemia.  Patient not currently on any lipid-lowering therapy, this is previously discontinued due to concern for liver function.  She was advised to discuss this at upcoming GI appointment.  -Reviewed that serum triglycerides will likely decrease with improving glycemic control.    Addendum dated 3/23/2022  Received result of eye exam dated 3/16/2022, no retinopathy noted     No follow-ups on file. The patient was instructed to contact the clinic with any interval questions or concerns.    Ameena Iglesias MD   Endocrinologist    Dictated Utilizing Dragon Dictation

## 2022-01-24 LAB
EXPIRATION DATE: ABNORMAL
GLUCOSE BLDC GLUCOMTR-MCNC: 149 MG/DL (ref 70–130)
Lab: ABNORMAL

## 2022-01-27 ENCOUNTER — TELEMEDICINE (OUTPATIENT)
Dept: FAMILY MEDICINE CLINIC | Facility: CLINIC | Age: 62
End: 2022-01-27

## 2022-01-27 VITALS — BODY MASS INDEX: 41.54 KG/M2 | WEIGHT: 220 LBS | HEIGHT: 61 IN

## 2022-01-27 DIAGNOSIS — J06.9 UPPER RESPIRATORY TRACT INFECTION, UNSPECIFIED TYPE: ICD-10-CM

## 2022-01-27 DIAGNOSIS — R09.81 CONGESTION OF NASAL SINUS: Primary | ICD-10-CM

## 2022-01-27 DIAGNOSIS — K86.9 PANCREATIC LESION: ICD-10-CM

## 2022-01-27 DIAGNOSIS — K76.9 LIVER LESION: ICD-10-CM

## 2022-01-27 PROCEDURE — 99214 OFFICE O/P EST MOD 30 MIN: CPT | Performed by: STUDENT IN AN ORGANIZED HEALTH CARE EDUCATION/TRAINING PROGRAM

## 2022-01-27 RX ORDER — CEPHALEXIN 500 MG/1
500 CAPSULE ORAL 2 TIMES DAILY
Qty: 10 CAPSULE | Refills: 0 | Status: SHIPPED | OUTPATIENT
Start: 2022-01-27 | End: 2022-04-20

## 2022-01-27 NOTE — PROGRESS NOTES
"You have chosen to receive care through a telehealth visit.  Do you consent to use a video/audio connection for your medical care today? Yes     Chief Complaint  Di Lopez is a 61 y.o. female who presents via video-conference for uri    History of Present Illness  Main symptom: has nasal congestion with cough with mucus. Says she has a boil on her behind. Has a history of this and it usually clears with antibiotic. Started two weeks ago.  Began: a week ago  Sick contacts: none   Fever: none.   Sore throat: np   SOB/cp: no  none  GI symptoms:none       The following portions of the patient's history were reviewed and updated as appropriate: allergies, current medications, past family history, past medical history, past social history, past surgical history and problem list.    Review of Systems  Negative unless otherwise stated.     Objective  Vital signs available: No      Assessment/Plan   rhinosinusitis   \"boil\"    Will give keflex as she has tolerated it in the past for possible skin infection and discussed to follow up with us. Will rule out covid-19 and will have her isolate until test returns.     We also discussed her ultrasound and she states she has seen a liver specialist at  but has had difficulty getting back in for follow up. Says she had a liver and pancreatic lesion and has lost follow up with her doctors and would like referrals. Liver ultrasound pending.     Follow up next week she agrees to follow up.     Davina Santiago D.O.  [facility] [place]     15 minutes were spent reviewing the patient's questionnaire, formulating a treatment plan, and relaying information to the patient via [employer].  "

## 2022-02-06 DIAGNOSIS — N32.81 OAB (OVERACTIVE BLADDER): ICD-10-CM

## 2022-02-07 RX ORDER — SOLIFENACIN SUCCINATE 5 MG/1
TABLET, FILM COATED ORAL
Qty: 30 TABLET | Refills: 0 | Status: SHIPPED | OUTPATIENT
Start: 2022-02-07 | End: 2022-03-08

## 2022-02-07 RX ORDER — ESCITALOPRAM OXALATE 20 MG/1
TABLET ORAL
Qty: 30 TABLET | Refills: 0 | Status: SHIPPED | OUTPATIENT
Start: 2022-02-07 | End: 2022-03-08

## 2022-02-07 NOTE — TELEPHONE ENCOUNTER
Last appointment: 01/13/22  Next appointment: 05/13/22    Last Refill: 01/20/22  Quantity: 30  Refills: 0

## 2022-02-11 ENCOUNTER — TELEMEDICINE (OUTPATIENT)
Dept: FAMILY MEDICINE CLINIC | Facility: CLINIC | Age: 62
End: 2022-02-11

## 2022-02-11 DIAGNOSIS — R05.9 COUGH: ICD-10-CM

## 2022-02-11 DIAGNOSIS — S09.90XS HEADACHES DUE TO OLD HEAD INJURY: ICD-10-CM

## 2022-02-11 DIAGNOSIS — R53.83 FATIGUE, UNSPECIFIED TYPE: ICD-10-CM

## 2022-02-11 DIAGNOSIS — G44.309 HEADACHES DUE TO OLD HEAD INJURY: ICD-10-CM

## 2022-02-11 DIAGNOSIS — R52 BODY ACHES: Primary | ICD-10-CM

## 2022-02-11 PROCEDURE — U0004 COV-19 TEST NON-CDC HGH THRU: HCPCS | Performed by: NURSE PRACTITIONER

## 2022-02-11 PROCEDURE — 99213 OFFICE O/P EST LOW 20 MIN: CPT | Performed by: NURSE PRACTITIONER

## 2022-02-11 NOTE — PROGRESS NOTES
Chief Complaint  Fatigue, Generalized Body Aches, and Headache    Subjective          Di Lopez presents to [facility]  States symptoms started 3-4 days ago with fatigue, chills, sciatic nerve on right is acting up she says and referring to anterior knee, cough, headache, aches in back of neck. She has been stretching and heat therapy with little relief. She has not been tested for COVID. She is agreeable to come to office for COVID PCR. Denies fever or shortness of breath. She is treated for chronic back pain by pain management. States she was supposed to have upcoming procedure for spine and back pain treatment but insurance declined. She is taking Norco as prescribed by pain management and she states she seen chiropractor in past with great relief of sciatic nerve pain but he retired. She denies any acute distress at this time.      Objective   Vital Signs:   There were no vitals taken for this visit.    Physical Exam  Constitutional:       General: She is not in acute distress.     Appearance: Normal appearance.   HENT:      Head: Normocephalic and atraumatic.   Pulmonary:      Effort: Pulmonary effort is normal. No respiratory distress.   Neurological:      Mental Status: She is alert and oriented to person, place, and time.   Psychiatric:         Mood and Affect: Mood normal.         Behavior: Behavior normal.         Thought Content: Thought content normal.         Judgment: Judgment normal.        Result Review :     Covid Tests    Common Labsle 2/11/22   COVID19 Not Detected                    Assessment and Plan    Diagnoses and all orders for this visit:    1. Body aches (Primary)  -     COVID-19 PCR, [facility], NP SWAB IN [facility] TRANSPORT MEDIA/ORAL SWISH 24-30 HR TAT - Swab, Nasopharynx; Future  -     COVID-19 PCR, [facility], NP SWAB IN [facility]AL TRANSPORT MEDIA/ORAL SWISH 24-30 HR TAT - Swab, Nasopharynx    2. Fatigue, unspecified type  -     COVID-19  PCR, [facility], NP SWAB IN [facility] [employer]AL TRANSPORT MEDIA/ORAL SWISH 24-30 HR TAT - Swab, Nasopharynx; Future  -     COVID-19 PCR, [facility], NP SWAB IN [facility] VIRAL TRANSPORT MEDIA/ORAL SWISH 24-30 HR TAT - Swab, Nasopharynx    3. Cough  -     COVID-19 PCR, [facility], NP SWAB IN [facility] VIRAL TRANSPORT MEDIA/ORAL SWISH 24-30 HR TAT - Swab, Nasopharynx; Future  -     COVID-19 PCR, [facility], NP SWAB IN [facility] VIRAL TRANSPORT MEDIA/ORAL SWISH 24-30 HR TAT - Swab, Nasopharynx    4. Headaches due to old head injury  -     COVID-19 PCR, [facility], NP SWAB IN [facility] VIRAL TRANSPORT MEDIA/ORAL SWISH 24-30 HR TAT - Swab, Nasopharynx; Future  -     COVID-19 PCR, [facility], NP SWAB IN [facility] VIRAL TRANSPORT MEDIA/ORAL SWISH 24-30 HR TAT - Swab, Nasopharynx    COVID test ordered and instructed to quarantine until results return  Tylenol/Motrin PRN as directed unless otherwise informed not to  Stay well hydrated  Suggested Vitamin d and zinc daily  Go to ER if respiratory distress  RTO or call PRN persistent or worsening symptoms  Patient to be notified of COVID PCR results upon return    I spent 15 minutes caring for Di on this date of service. This time includes time spent by me in the following activities:preparing for the visit, obtaining and/or reviewing a separately obtained history, performing a medically appropriate examination and/or evaluation , counseling and educating the patient/family/caregiver, ordering medications, tests, or procedures and documenting information in the medical record  Follow Up   Return in about 4 weeks (around 3/11/2022), or if symptoms worsen or fail to improve, for Annual with PCP.  Patient was given instructions and counseling regarding her condition or for health maintenance advice. Please see specific information pulled into the AVS if appropriate.

## 2022-02-12 LAB — SARS-COV-2 RNA NOSE QL NAA+PROBE: NOT DETECTED

## 2022-02-14 ENCOUNTER — TELEMEDICINE (OUTPATIENT)
Dept: FAMILY MEDICINE CLINIC | Facility: CLINIC | Age: 62
End: 2022-02-14

## 2022-02-14 ENCOUNTER — TELEPHONE (OUTPATIENT)
Dept: FAMILY MEDICINE CLINIC | Facility: CLINIC | Age: 62
End: 2022-02-14

## 2022-02-14 DIAGNOSIS — R05.9 COUGH: ICD-10-CM

## 2022-02-14 DIAGNOSIS — R68.83 CHILLS: Primary | ICD-10-CM

## 2022-02-14 DIAGNOSIS — R31.9 HEMATURIA, UNSPECIFIED TYPE: ICD-10-CM

## 2022-02-14 PROCEDURE — 99214 OFFICE O/P EST MOD 30 MIN: CPT | Performed by: FAMILY MEDICINE

## 2022-02-14 RX ORDER — DOXYCYCLINE 100 MG/1
100 CAPSULE ORAL 2 TIMES DAILY
Qty: 14 CAPSULE | Refills: 0 | Status: SHIPPED | OUTPATIENT
Start: 2022-02-14 | End: 2022-05-10

## 2022-02-14 NOTE — TELEPHONE ENCOUNTER
Caller: Di Lopez    Relationship to patient: Self    Best call back number: [phone]    Characteristics of symptom/severity:  BLOOD IN URINE, DIARRHEA, CHILLS, NO APPETITE, HEADACHE,SEVERE BODY ACHES, PAIN UNDER RIGHT BREAST,     How long have you been experiencing symptoms: ABOUT 5 DAYS   PATIENT     PATIENT HAD A VIRTUAL VISIT ON 2-11-22 AND IS CALLING BECAUSE SHE IS FEELING WORSE    SHE IS DOING ANOTHER VIDEO VISIT TODAY WITH DR YASMIN CASTRO       If a prescription is needed, what is your preferred pharmacy:   [facility] [address] [place] [address] - [address] [place] [address] [phone]  - [phone]   [address] [facility] [address]  Phone: [phone] Fax: [phone]

## 2022-02-14 NOTE — PROGRESS NOTES
Follow Up Office Visit      Patient Name: Di Lopez  : 1960   MRN: [id number]     Chief Complaint:    Chief Complaint   Patient presents with   • Chills       History of Present Illness: Di Lopez is a 61 y.o. female who is here today to follow up with chills, bloody productive cough, bloody nasal discharge, diarrhea, and overall unwell feeling.    Patient reports that the symptoms started within the last week.  Patient has already performed a telehealth visit and was Covid negative.  Patient reports that she feels unwell and cannot get to our office.  Patient says her symptoms are worsening overall.      Patient confirmed their consent for this visit. They also confirmed their name, , and location in [place]. video and audio was used for this visit.  The provider was located in [facility] for this visit.      Review of systems was positive for cough      Physical exam: Cannot be performed due to telehealth/video visit.        Subjective        I have reviewed and the following portions of the patient's history were updated as appropriate: past family history, past medical history, past social history, past surgical history and problem list.    Medications:     Current Outpatient Medications:   •  Alcohol Swabs (Alcohol Prep) pads, For use prior to checking glucose twice daily, Disp: 100 each, Rfl: 5  •  carvedilol (COREG) 12.5 MG tablet, , Disp: , Rfl:   •  cephalexin (Keflex) 500 MG capsule, Take 1 capsule by mouth 2 (Two) Times a Day., Disp: 10 capsule, Rfl: 0  •  Continuous Blood Gluc  (FreeStyle Dalia 2 Wright) device, 1 each Daily. Use as directed daily to check blood sugars, Disp: 1 each, Rfl: 0  •  Continuous Blood Gluc Sensor (FreeStyle Dalia 2 Sensor) misc, 1 each Every 14 (Fourteen) Days., Disp: 3 each, Rfl: 11  •  doxycycline (MONODOX) 100 MG capsule, Take 1 capsule by mouth 2 (Two) Times a Day., Disp: 14 capsule, Rfl: 0  •  Dulaglutide (Trulicity) 1.5 MG/0.5ML  solution pen-injector, Inject 1.5 mg under the skin into the appropriate area as directed 1 (One) Time Per Week., Disp: 6 mL, Rfl: 1  •  escitalopram (LEXAPRO) 20 MG tablet, Take 1 tablet by mouth once daily, Disp: 30 tablet, Rfl: 0  •  Ferrous Sulfate (IRON PO), Take 1 tablet by mouth Daily., Disp: , Rfl:   •  folic acid ([employer] Folic Acid) 800 MCG tablet, Take 800 mcg by mouth Daily., Disp: , Rfl:   •  furosemide (LASIX) 40 MG tablet, Take 40 mg by mouth As Needed. TAKING PRETTY MUCH DAILY, Disp: , Rfl:   •  gabapentin (NEURONTIN) 300 MG capsule, Take 1 capsule by mouth At Night As Needed (leg pain)., Disp: 30 capsule, Rfl: 3  •  glimepiride (AMARYL) 4 MG tablet, TAKE 2 TABLETS BY MOUTH IN THE MORNING BEFORE BREAKFAST, Disp: 180 tablet, Rfl: 0  •  glucose blood test strip, Use as instructed 3 times a day, Disp: 100 each, Rfl: 11  •  HYDROcodone-acetaminophen (NORCO) 7.5-325 MG per tablet, Take 1 tablet by mouth Daily., Disp: , Rfl:   •  Insulin Glargine (Lantus SoloStar) 100 UNIT/ML injection pen, Inject 65 Units under the skin into the appropriate area as directed Daily., Disp: 60 mL, Rfl: 5  •  Insulin Lispro, 1 Unit Dial, (HumaLOG KwikPen) 100 UNIT/ML solution pen-injector, For use at mealtimes and per correctional scale up to 60 units daily, Disp: 18 mL, Rfl: 3  •  Insulin Pen Needle (B-D UF III MINI PEN NEEDLES) 31G X 5 MM misc, Use as directed 5 times daily, Disp: 150 each, Rfl: 3  •  Insulin Pen Needle (RELION PEN NEEDLES) 32G X 4 MM misc, Use BID for E11.9, Disp: 100 each, Rfl: 1  •  Lancets misc, For use with glucose monitoring 3 times daily, Disp: 100 each, Rfl: 11  •  Omega-3 Fatty Acids (FISH OIL) 1000 MG capsule capsule, Take 1 capsule by mouth 4 (Four) Times a Day., Disp: 120 capsule, Rfl: 2  •  omeprazole (priLOSEC) 20 MG capsule, Take 1 capsule by mouth once daily, Disp: 30 capsule, Rfl: 0  •  sacubitril-valsartan (ENTRESTO) 24-26 MG tablet, Take 1 tablet by mouth 2 (Two) Times a Day., Disp: , Rfl:    •  solifenacin (VESICARE) 5 MG tablet, Take 1 tablet by mouth once daily, Disp: 30 tablet, Rfl: 0  •  spironolactone (ALDACTONE) 25 MG tablet, Take 25 mg by mouth Daily., Disp: , Rfl: 0  •  Vitamin D, Cholecalciferol, (CHOLECALCIFEROL) 10 MCG (400 UNIT) tablet, Take 400 Units by mouth Daily., Disp: , Rfl:   •  warfarin (COUMADIN) 1 MG tablet, Take 1 mg by mouth Daily As Needed. Half tab po 5 days, Disp: , Rfl:   •  warfarin (COUMADIN) 4 MG tablet, Take 4.5 mg by mouth Daily. 4.5 mg for 5 days then 4 mg for 2 days., Disp: , Rfl: 2    Allergies:   Allergies   Allergen Reactions   • Codeine Itching and Nausea And Vomiting   • Latex Other (See Comments)     Pulls skin off   • Penicillins Hives   • Sulfa Antibiotics Nausea And Vomiting       Objective     Physical Exam: Please see above  Vital Signs: There were no vitals filed for this visit.  There is no height or weight on file to calculate BMI.          Assessment / Plan      Assessment/Plan:   Diagnoses and all orders for this visit:    1. Chills (Primary)  -     XR Chest PA & Lateral; Future    2. Hematuria, unspecified type  -     Urinalysis With Culture If Indicated - Urine, Clean Catch; Future    3. Cough  -     COVID-19 PCR, [facility], NP SWAB IN mobicanvasAR VIRAL TRANSPORT MEDIA/ORAL SWISH 24-30 HR TAT - Swab, Nasopharynx; Future  -     XR Chest PA & Lateral; Future  -     doxycycline (MONODOX) 100 MG capsule; Take 1 capsule by mouth 2 (Two) Times a Day.  Dispense: 14 capsule; Refill: 0    Differential includes viral illness versus bacterial pneumonia or sinusitis.  I still think this is likely COVID given her symptoms.  Will get x-ray to rule out pneumonia.  Given her recent urinary symptoms also recommend urinalysis to rule out infection.    Given her persistent and worsening symptoms we will treat for pneumonia/sinus infection.  Patient to start doxycycline immediately and to stop by our office for x-ray and urinalysis.  I explained to patient that usually I  would prefer that she drop off urinalysis and get a chest x-ray before starting treatment, but given the fact that she will be unable to stop by today I want her to go ahead and start these antibiotics as soon as possible.  Patient was advised to go to emerge department if her symptoms worsen within the next 2 to 3 days.    I spent a total of 13 minutes on this visit.  10 minutes was spent on the telephone/video visit with this patient.  3 minutes was used to review her chart and document this note.    Follow Up:   Return in about 1 week (around 2/21/2022).    Jabari Red DO  [facility] [place]

## 2022-02-15 PROBLEM — R52 BODY ACHES: Status: ACTIVE | Noted: 2022-02-15

## 2022-02-15 PROBLEM — G44.309 HEADACHES DUE TO OLD HEAD INJURY: Status: ACTIVE | Noted: 2022-02-15

## 2022-02-15 PROBLEM — S09.90XS HEADACHES DUE TO OLD HEAD INJURY: Status: ACTIVE | Noted: 2022-02-15

## 2022-02-15 PROBLEM — R53.83 FATIGUE: Status: ACTIVE | Noted: 2022-02-15

## 2022-02-21 ENCOUNTER — PATIENT OUTREACH (OUTPATIENT)
Dept: CASE MANAGEMENT | Facility: OTHER | Age: 62
End: 2022-02-21

## 2022-02-21 NOTE — OUTREACH NOTE
Ambulatory Case Management Note    Patient Outreach    Called patient in follow up for HRCM.  Patient said she has been sick for several days, explained extreme lethargy and fatigue; thought it was Covid but Covid tested negative.  Said her doctor wants her to go get labs done.  Stated today is first day she feels able to get out of bed and drive to get labwork and CXR done as ordered, so she is going today.  Discussed DM management.  Patient said she has questions about making insulin adjustments, when not eating like normal; she said she was going to message the Endocrinologist about this.  Encouraged that communication today.  Discussed DM diet.  Reminded of [facility] DM Educ. Classes and provided phone number again, as patient voiced interest in talking with dietitian.  Discussed s/sx hypo/ hyperglycemia.  Reminded to f/u with PCP after labwork completed.        Care Plan: Diabetes   Updates made since 2/21/2022 12:00 AM      Problem: DIET MANAGEMENT       Goal: Learn to Manage Calories       Task: Provide resources for diabetic diet Completed 2/21/2022   Responsible User: Alexsandra Do RN      Problem: KNOWLEDGE DEFICIT       Goal: Patient able to teach back disease management       Task: Educate on hyper/hypo-glycemia signs and symptoms Completed 2/21/2022   Responsible User: Alexsandra Do, JARAD Do RN  Ambulatory Case Management    2/21/2022, 14:33 EST

## 2022-02-23 ENCOUNTER — LAB (OUTPATIENT)
Dept: LAB | Facility: HOSPITAL | Age: 62
End: 2022-02-23

## 2022-02-23 DIAGNOSIS — R31.9 HEMATURIA, UNSPECIFIED TYPE: ICD-10-CM

## 2022-02-23 LAB
BACTERIA UR QL AUTO: ABNORMAL /HPF
BILIRUB UR QL STRIP: NEGATIVE
CLARITY UR: CLEAR
COLOR UR: YELLOW
GLUCOSE UR STRIP-MCNC: NEGATIVE MG/DL
HGB UR QL STRIP.AUTO: ABNORMAL
HYALINE CASTS UR QL AUTO: ABNORMAL /LPF
KETONES UR QL STRIP: NEGATIVE
LEUKOCYTE ESTERASE UR QL STRIP.AUTO: ABNORMAL
NITRITE UR QL STRIP: POSITIVE
PH UR STRIP.AUTO: 5.5 [PH] (ref 5–8)
PROT UR QL STRIP: ABNORMAL
RBC # UR STRIP: ABNORMAL /HPF
REF LAB TEST METHOD: ABNORMAL
SP GR UR STRIP: 1.02 (ref 1–1.03)
SQUAMOUS #/AREA URNS HPF: ABNORMAL /HPF
UROBILINOGEN UR QL STRIP: ABNORMAL
WBC # UR STRIP: ABNORMAL /HPF

## 2022-02-23 PROCEDURE — 87186 SC STD MICRODIL/AGAR DIL: CPT

## 2022-02-23 PROCEDURE — 81001 URINALYSIS AUTO W/SCOPE: CPT

## 2022-02-23 PROCEDURE — 87077 CULTURE AEROBIC IDENTIFY: CPT

## 2022-02-23 PROCEDURE — 87086 URINE CULTURE/COLONY COUNT: CPT

## 2022-02-24 ENCOUNTER — TELEPHONE (OUTPATIENT)
Dept: ENDOCRINOLOGY | Facility: CLINIC | Age: 62
End: 2022-02-24

## 2022-02-24 NOTE — TELEPHONE ENCOUNTER
Pt was advised with Dr Iglesias recommendations and pt voiced understanding and will call back when needed

## 2022-02-24 NOTE — TELEPHONE ENCOUNTER
Pt called she been having low blood sugars this has been going on for 2 weeks blood sugar range anywhere from 50 to 83 pt states she has been sick since 02/14/22 pt is not taking any fasting acting insulin. Pt last seen 01/21/22 pt next appt 05/09/22

## 2022-02-24 NOTE — TELEPHONE ENCOUNTER
Per most recent note, she should be taking 65 units of long acting insulin- hypogylcemia is fasting, please have patient reduce to 55 units. Ok to hold short acting insulin given appetite likely low with illness (she may use her correctional scale, if needed)

## 2022-02-25 LAB — BACTERIA SPEC AEROBE CULT: ABNORMAL

## 2022-02-26 DIAGNOSIS — N30.00 ACUTE CYSTITIS WITHOUT HEMATURIA: Primary | ICD-10-CM

## 2022-02-26 RX ORDER — LEVOFLOXACIN 250 MG/1
250 TABLET ORAL DAILY
Qty: 3 TABLET | Refills: 0 | Status: SHIPPED | OUTPATIENT
Start: 2022-02-26 | End: 2022-05-10

## 2022-03-03 DIAGNOSIS — N30.00 ACUTE CYSTITIS WITHOUT HEMATURIA: Primary | ICD-10-CM

## 2022-03-03 RX ORDER — FLUCONAZOLE 150 MG/1
150 TABLET ORAL ONCE
Qty: 1 TABLET | Refills: 0 | Status: SHIPPED | OUTPATIENT
Start: 2022-03-03 | End: 2022-03-03

## 2022-03-08 DIAGNOSIS — N32.81 OAB (OVERACTIVE BLADDER): ICD-10-CM

## 2022-03-08 RX ORDER — ESCITALOPRAM OXALATE 20 MG/1
TABLET ORAL
Qty: 30 TABLET | Refills: 2 | Status: SHIPPED | OUTPATIENT
Start: 2022-03-08 | End: 2022-06-15

## 2022-03-08 RX ORDER — SOLIFENACIN SUCCINATE 5 MG/1
TABLET, FILM COATED ORAL
Qty: 30 TABLET | Refills: 2 | Status: SHIPPED | OUTPATIENT
Start: 2022-03-08 | End: 2022-06-15

## 2022-03-16 ENCOUNTER — TELEPHONE (OUTPATIENT)
Dept: FAMILY MEDICINE CLINIC | Facility: CLINIC | Age: 62
End: 2022-03-16

## 2022-03-16 NOTE — TELEPHONE ENCOUNTER
Caller: Di Lopez    Relationship: Self    Best call back number: [phone]    What medication are you requesting: MEDICATION REQUEST    What are your current symptoms: PRESSURE WHILE URINATING AND DOES NOT FEEL LIKE EMPTYING OUT BLADDER AND BARELY ANYTHING COMES OUT    How long have you been experiencing symptoms:N/A    Have you had these symptoms before:    [x] Yes  [] No    Have you been treated for these symptoms before:   [x] Yes  [] No    If a prescription is needed, what is your preferred pharmacy and phone number: [facility] [address] [place] [address] - [address] [place] [address] [phone] [facility] [phone]      Additional notes: PATIENT STATED THAT SHE CAME TO SEE PROVIDER FOR THE SAME ISSUE AND WOULD LIKE TO SEE ABOUT GETTING MEDICATION FOR THIS AGAIN    PLEASE ADVISE

## 2022-03-17 ENCOUNTER — APPOINTMENT (OUTPATIENT)
Dept: MAMMOGRAPHY | Facility: HOSPITAL | Age: 62
End: 2022-03-17

## 2022-03-17 ENCOUNTER — TRANSCRIBE ORDERS (OUTPATIENT)
Dept: ADMINISTRATIVE | Facility: HOSPITAL | Age: 62
End: 2022-03-17

## 2022-03-17 DIAGNOSIS — Z12.31 VISIT FOR SCREENING MAMMOGRAM: Primary | ICD-10-CM

## 2022-03-23 ENCOUNTER — PATIENT OUTREACH (OUTPATIENT)
Dept: CASE MANAGEMENT | Facility: OTHER | Age: 62
End: 2022-03-23

## 2022-03-24 ENCOUNTER — OFFICE VISIT (OUTPATIENT)
Dept: FAMILY MEDICINE CLINIC | Facility: CLINIC | Age: 62
End: 2022-03-24

## 2022-03-24 VITALS
TEMPERATURE: 97.3 F | HEIGHT: 60 IN | OXYGEN SATURATION: 98 % | SYSTOLIC BLOOD PRESSURE: 118 MMHG | DIASTOLIC BLOOD PRESSURE: 60 MMHG | RESPIRATION RATE: 16 BRPM | BODY MASS INDEX: 41.11 KG/M2 | HEART RATE: 84 BPM | WEIGHT: 209.4 LBS

## 2022-03-24 DIAGNOSIS — D17.22 LIPOMA OF LEFT UPPER EXTREMITY: Primary | ICD-10-CM

## 2022-03-24 DIAGNOSIS — R26.89 BALANCE DISORDER: ICD-10-CM

## 2022-03-24 DIAGNOSIS — R39.198 VOIDING DIFFICULTY: ICD-10-CM

## 2022-03-24 DIAGNOSIS — M54.31 SCIATICA OF RIGHT SIDE: ICD-10-CM

## 2022-03-24 PROCEDURE — 99214 OFFICE O/P EST MOD 30 MIN: CPT | Performed by: STUDENT IN AN ORGANIZED HEALTH CARE EDUCATION/TRAINING PROGRAM

## 2022-03-24 NOTE — PROGRESS NOTES
"Chief Complaint  Follow-up (Follow up today, said she would like for you to look at her right hip as its \"numb\" she said she has a mole on the back of her right leg that is changing, she said she has a cyst on her left shoulder she wants looked at and this is a follow up on her kidneys as she was in the er recently for a severe uti)    History of Present Illness     was seen at [facility] on 3/18. She went in for uti and was placed on levaquin. She says she is feeling better after starting this. Says her symptoms started about 3 weeks ago. She has two days left of this antibiotic. She says for the past year she has had feelings of incomplete void.    she recently underwent biopsy of her portal lymph node done by dr chinchilla gastroenterologist. She also has hepatic adenoma and cystic mass of pancreas. She was referred to dr vallejo ,surgical oncologist. She has apt with him in April she states.     She has numbness in her lateral right hip and has a history of sciatica on that side.     She also has a cyst on her left arm and was told it was a lipoma after ultrasound in the ER. She has a mole on posterior lower right leg that she says has grown and is concerning. She also complains of irritation on her buttocks for the past few days        The following portions of the patient's history were reviewed and updated as appropriate: allergies, current medications, past family history, past medical history, past social history, past surgical history, and problem list.    OBJECTIVE:  /60 (BP Location: Right arm, Patient Position: Sitting, Cuff Size: Adult)   Pulse 84   Temp 97.3 °F (36.3 °C) (Temporal)   Resp 16   Ht 152.4 cm (60\")   Wt 95 kg (209 lb 6.4 oz)   SpO2 98%   BMI 40.90 kg/m²       Physical Exam  Constitutional:       General: She is not in acute distress.     Appearance: Normal appearance.   HENT:      Head: Normocephalic and atraumatic.   Eyes:      Extraocular Movements: Extraocular movements " intact.   Cardiovascular:      Rate and Rhythm: Normal rate and regular rhythm.      Heart sounds: No murmur heard.  Pulmonary:      Effort: Pulmonary effort is normal. No respiratory distress.      Breath sounds: Normal breath sounds.   Abdominal:      General: Abdomen is flat.   Musculoskeletal:         General: No swelling.      Cervical back: Normal range of motion.      Comments: Lower extremity muscle strength sensations and pulses normal. Negative straight leg raise test on the right. She complains of numbness lateral right hip. No skin changes in that area.    Skin:     Findings: No rash.      Comments: Has dark mole on right buttocks as well as lower posterior right leg. Has some scratches on right buttocks without signs of infection ulcers or other lesions.   Has large cyst appearing structure lateral left deltoid   Neurological:      General: No focal deficit present.      Mental Status: She is alert.   Psychiatric:         Mood and Affect: Mood normal.                    Assessment and Plan   Diagnoses and all orders for this visit:    1. Lipoma of left upper extremity (Primary)  -     Ambulatory Referral to Dermatology    2. Balance disorder  -     Ambulatory Referral to Physical Therapy Evaluate and treat    3. Sciatica of right side  -     Ambulatory Referral to Physical Therapy Evaluate and treat  -     XR Spine Lumbar 2 or 3 View (In Office)    4. Voiding difficulty  -     Ambulatory Referral to Urology  -     US Bladder Volume; Future      Will send to derm for cyst like lesion and two dark moles.   For feelings of incomplete void obtain post void residual and refer to urology.   Numbness may be some sciatica vs local nerve pathology. She agrees to referral to physical therapy.    Return for Next scheduled follow up.       Davina Santiago D.O.  [facility] [place]

## 2022-03-24 NOTE — OUTREACH NOTE
AMBULATORY CASE MANAGEMENT NOTE    Name and Relationship of Patient/Support Person: Di Lopez Self    Patient Outreach    Called patient in follow up for Ambulatory Case Mgt.  Patient said she went to Urgent Care w/ Mily Auguste, 3/18/22, who sent her to the ED, patient went to [facility], where she was diagnosed with a UTI and placed on antibiotics.  Reports compliance with taking antibiotics as directed and knows to complete them all.  Stated she is feeling a little better.  Voiced understanding/ compliance to drink plenty of water.  Discussed Diabetes Mgt.  Patient said her Cont. Glu Monitor is not working; she does have finger-stick glucose meter she can use.    She stated she can  what she needs at Pharmacy for CGM. Reminded of importance of monitoring BS every day as directed.  Patient reported compliance with diabetes medications as ordered each day.  Discussed SDOH questionnaire results with patient.  Regarding Housing/ Utilities, patient reported her electric bill was extremely high this month; the bill is paid.  She voiced concern about possible continued high electric bills.  Talked with patient about calling [employer] [facility] in [place], if needing help with utilities.  She said she has their number, and would call if needed.  Patient said she is seeing her PCP tomorrow, 3/24/22.          JONES CHU  Ambulatory Case Management    3/23/2022, 20:10 EDT

## 2022-04-07 RX ORDER — OMEPRAZOLE 20 MG/1
CAPSULE, DELAYED RELEASE ORAL
Qty: 30 CAPSULE | Refills: 0 | Status: SHIPPED | OUTPATIENT
Start: 2022-04-07 | End: 2022-05-05

## 2022-04-14 ENCOUNTER — HOSPITAL ENCOUNTER (OUTPATIENT)
Dept: ULTRASOUND IMAGING | Facility: HOSPITAL | Age: 62
End: 2022-04-14

## 2022-04-20 ENCOUNTER — OFFICE VISIT (OUTPATIENT)
Dept: FAMILY MEDICINE CLINIC | Facility: CLINIC | Age: 62
End: 2022-04-20

## 2022-04-20 VITALS
SYSTOLIC BLOOD PRESSURE: 118 MMHG | BODY MASS INDEX: 41.07 KG/M2 | OXYGEN SATURATION: 96 % | WEIGHT: 209.2 LBS | HEART RATE: 76 BPM | DIASTOLIC BLOOD PRESSURE: 72 MMHG | HEIGHT: 60 IN | TEMPERATURE: 97.3 F | RESPIRATION RATE: 14 BRPM

## 2022-04-20 DIAGNOSIS — M54.9 BACK PAIN, UNSPECIFIED BACK LOCATION, UNSPECIFIED BACK PAIN LATERALITY, UNSPECIFIED CHRONICITY: ICD-10-CM

## 2022-04-20 DIAGNOSIS — L72.9 CYST OF SKIN: ICD-10-CM

## 2022-04-20 DIAGNOSIS — Z79.4 TYPE 2 DIABETES MELLITUS WITH HYPERGLYCEMIA, WITH LONG-TERM CURRENT USE OF INSULIN: ICD-10-CM

## 2022-04-20 DIAGNOSIS — E11.65 TYPE 2 DIABETES MELLITUS WITH HYPERGLYCEMIA, WITH LONG-TERM CURRENT USE OF INSULIN: ICD-10-CM

## 2022-04-20 DIAGNOSIS — M25.511 RIGHT SHOULDER PAIN, UNSPECIFIED CHRONICITY: Primary | ICD-10-CM

## 2022-04-20 DIAGNOSIS — M47.9 SPONDYLOSIS: Primary | ICD-10-CM

## 2022-04-20 DIAGNOSIS — M25.559 HIP PAIN: ICD-10-CM

## 2022-04-20 PROCEDURE — 99214 OFFICE O/P EST MOD 30 MIN: CPT | Performed by: STUDENT IN AN ORGANIZED HEALTH CARE EDUCATION/TRAINING PROGRAM

## 2022-04-20 RX ORDER — LIDOCAINE 50 MG/G
1 PATCH TOPICAL EVERY 24 HOURS
Qty: 30 PATCH | Refills: 2 | Status: SHIPPED | OUTPATIENT
Start: 2022-04-20 | End: 2023-01-09

## 2022-04-20 RX ORDER — GLIMEPIRIDE 4 MG/1
TABLET ORAL
Qty: 180 TABLET | Refills: 0 | Status: SHIPPED | OUTPATIENT
Start: 2022-04-20 | End: 2022-05-10

## 2022-04-20 RX ORDER — CEPHALEXIN 500 MG/1
500 CAPSULE ORAL 4 TIMES DAILY
Qty: 20 CAPSULE | Refills: 0 | Status: SHIPPED | OUTPATIENT
Start: 2022-04-20 | End: 2022-05-10

## 2022-04-20 NOTE — PROGRESS NOTES
"Chief Complaint  Medicare Wellness-subsequent (Has been having back pain), Shoulder Pain (Right shoulder pain for about 3 weeks ), Hip Pain (Right hip is numb ( thinks it may be a pinched nerve) and has started hurting. It all started in about Feb. ), and Leg Pain (Had a mole removed on right leg about 2-3 weeks ago and is giving her a lot of pain. She went to the ER over it and they cleaned it and gave her an ointment, but it is still hurting her. )    History of Present Illness     She says she has pain in her right shoulder without fever or injury. This started a couple weeks ago. The pain is in the anterior shoulder and worse with movement. No repetitiive movement or lifting. No radiation of pain. She says the pain is a throbbing ache.      Back pain   She says her back hurts in the center of her back and it is worse with walking. She says lidocaine patch helps the pain. No injury. She says this is an aching pain as well. She follows with pain clinic and has had injections for this in the past. She says the pain goes into her right hip and is numb. She does not have claudication.     She had a lesion removed from her leg 1-2 weeks by dermatology and she says the lesion was benign but has not healed well.         The following portions of the patient's history were reviewed and updated as appropriate: allergies, current medications, past family history, past medical history, past social history, past surgical history, and problem list.    OBJECTIVE:  /72   Pulse 76   Temp 97.3 °F (36.3 °C)   Resp 14   Ht 152.4 cm (60\")   Wt 94.9 kg (209 lb 3.2 oz)   SpO2 96%   BMI 40.86 kg/m²       Physical Exam  Constitutional:       General: She is not in acute distress.     Appearance: Normal appearance.   HENT:      Head: Normocephalic and atraumatic.   Eyes:      Extraocular Movements: Extraocular movements intact.   Cardiovascular:      Rate and Rhythm: Normal rate and regular rhythm.      Heart sounds: No " murmur heard.  Pulmonary:      Effort: Pulmonary effort is normal. No respiratory distress.      Breath sounds: Normal breath sounds.   Abdominal:      General: Abdomen is flat.   Musculoskeletal:         General: No swelling.      Cervical back: Normal range of motion.      Comments: Right shoulder with limited active passive rom in all directions. Sensations and pulses are intact.   She has pain on palpation of paraspinal muscles lower thoracic lower lumbar. Pain to palpation of her lateral right thigh.    Skin:     Comments: Posterior right leg with 1 cm by 1 cm round ulcer from skin biopsy with very mild purulent drainage    Neurological:      General: No focal deficit present.      Mental Status: She is alert. Mental status is at baseline.   Psychiatric:         Mood and Affect: Mood normal.                    Assessment and Plan   Diagnoses and all orders for this visit:    1. Right shoulder pain, unspecified chronicity (Primary)  -     XR Shoulder 2+ View Right (In Office)  -     Ambulatory Referral to Orthopedic Surgery    2. Back pain, unspecified back location, unspecified back pain laterality, unspecified chronicity  -     XR Spine Lumbar 2 or 3 View (In Office)  -     XR Spine Thoracic 3 View (In Office)    3. Cyst of skin  -     Ambulatory Referral to General Surgery    4. Hip pain  -     Ambulatory Referral to Orthopedic Surgery      For back and shoulder pain she declines referral to physical therapy. Suspect some frozen shoulder on right side. Will refer to orthopedics and obtain imaging of back and shoulder. Call in lidocaine patches. She is established with pain management.     Call in keflex for lower extremity infection and advised her to make dermatology aware.     She has a large cyst lateral left shoulder. Dermatology would like her to be referred to general surgery for this.     Return in about 6 weeks (around 6/1/2022) for Medicare Wellness.       Davina Santiago D.O.  [facility]  [place]

## 2022-04-25 ENCOUNTER — TREATMENT (OUTPATIENT)
Dept: PHYSICAL THERAPY | Facility: CLINIC | Age: 62
End: 2022-04-25

## 2022-04-25 DIAGNOSIS — M54.16 RADICULOPATHY, LUMBAR REGION: Primary | ICD-10-CM

## 2022-04-25 PROCEDURE — 97162 PT EVAL MOD COMPLEX 30 MIN: CPT | Performed by: PHYSICAL THERAPIST

## 2022-04-25 PROCEDURE — 97110 THERAPEUTIC EXERCISES: CPT | Performed by: PHYSICAL THERAPIST

## 2022-04-25 NOTE — PROGRESS NOTES
Physical Therapy Initial Evaluation and Plan of Care      Subjective Evaluation    History of Present Illness  Mechanism of injury: Pt is a 61 year old female presenting to the clinic with low back pain and right hip pain. She has been going to the pain clinic and has gotten injections 4 times. In Feb, she started having sciatic pain. She now has numbness on the right outer hip. She has pain with walking, sitting, and touching the right hip. Her low back hurts in the center of her lower back and to the left. She has had back pain for about 10 years. She has tried going to the chiropractor but has never tried physical therapy. The pain in her right leg goes down the outside of the right knee. She takes hydrocodone, gabapentin and uses pain patches for the low back. She feels like she can only walk about 10 min before the pain is so bad she needs to sit. She feels like she has a harder time going to the bathroom than she used to, but other than that she has not noticed any changes in bowel or bladder. She feels like when her blood sugar is high, her pain is worse. She has a scooter that she uses if she wants to travel longer distances. She had one fall in the shower a few days ago. The shower mat slipped from under her. She has had several instances of catching herself and feels like she has poor balance. She noticed that in the last year her balance has declined.       Patient Occupation: Not working  Quality of life: excellent    Pain  Current pain ratin  At worst pain ratin  Quality: sharp and throbbing  Relieving factors: medications  Aggravating factors: ambulation and prolonged positioning  Progression: worsening    Diagnostic Tests  X-ray: abnormal    Patient Goals  Patient goals for therapy: decreased pain, increased motion, independence with ADLs/IADLs, increased strength and return to sport/leisure activities             Objective          Palpation     Right Tenderness of the gluteus medius and  piriformis.     Additional Palpation Details  Pt was tender along the spinous processes of the lumbar spine, most tenderness noted at L3.    Tenderness     Right Hip   Tenderness in the greater trochanter.     Neurological Testing     Sensation     Lumbar   Left   Intact: light touch    Right   Diminished: light touch    Comments   Right light touch: L2, L3, L4    Reflexes   Left   Patellar (L4): normal (2+)    Right   Patellar (L4): normal (2+)    Active Range of Motion     Lumbar   Flexion: 50 degrees   Extension: 18 degrees   Left lateral flexion: 13 degrees   Right lateral flexion: 10 degrees     Strength/Myotome Testing     Left Hip   Planes of Motion   Flexion: 4+  Abduction: 3+    Right Hip   Planes of Motion   Flexion: 4+  Abduction: 3+    Left Knee   Flexion: 4  Extension: 5    Right Knee   Flexion: 4-  Extension: 4+    Left Ankle/Foot   Dorsiflexion: 5  Plantar flexion: 5    Right Ankle/Foot   Dorsiflexion: 4+  Plantar flexion: 5    Tests     Lumbar     Left   Negative passive SLR and quadrant.     Right   Positive passive SLR and quadrant.     Right Hip   Negative KASSANDRA, FADIR and scour.     Functional Assessment     Comments  Tandem balance R in front: 3 sec  Tandem balance L in front: 30 sec          Assessment & Plan     Assessment  Impairments: abnormal muscle tone, abnormal or restricted ROM, activity intolerance, impaired balance, impaired physical strength, lacks appropriate home exercise program and pain with function  Functional Limitations: lifting, walking, uncomfortable because of pain, sitting, standing and unable to perform repetitive tasks  Assessment details: Pt is a 61 year old female presenting to the clinic with low back pain and radicular symptoms in the right LE. She has decreased lumbar AROM, decreased sensation of the R LE, decreased strength of the right LE, and positive quadrant and SLR. Her impairments are limiting her ability to walk for longer than 10 min without extreme pain.  Pt would benefit from skilled PT services to address her impairments so she can reach her long term goals.   Prognosis: good    Goals  Plan Goals: SHORT TERM GOALS:     2 weeks  1. Pt independent with HEP  2. Pt to demonstrate trunk AROM 50-75% of expected norms to allow for improved ability to perform ADL's  3. Pt to demonstrate bilateral hip strength 4/5 in all planes to improved stability of the core/trunk     LONG TERM GOALS:   6 weeks  1. Pt to demonstrate trunk AROM % of expected norms to allow for improved ability to perform functional activities  2. Pt to demonstrate ability to perform full functional squat with good form and without increased pain in the low back   3. Pt to report being able to work full shift or work in the home without increase in pain in the back  4. Pt to report cessation of pain/numbness/tingling into the right leg to show decreased nerve compression    Plan  Therapy options: will be seen for skilled therapy services  Planned modality interventions: cryotherapy, dry needling, high voltage pulsed current (pain management), TENS and thermotherapy (hydrocollator packs)  Planned therapy interventions: abdominal trunk stabilization, manual therapy, balance/weight-bearing training, body mechanics training, functional ROM exercises, flexibility, gait training, home exercise program, joint mobilization, neuromuscular re-education, postural training, soft tissue mobilization, spinal/joint mobilization, strengthening, stretching and therapeutic activities  Duration in visits: 2  Duration in weeks: 12  Treatment plan discussed with: patient        Manual Therapy:         mins  29264;  Therapeutic Exercise:    12     mins  14950;     Neuromuscular Elza:        mins  62187;    Therapeutic Activity:          mins  17459;     Gait Training:           mins  67895;     Ultrasound:          mins  61897;    Electrical Stimulation:         mins  21648 ( );  Dry Needling          mins  self-pay    Timed Treatment:   12   mins   Total Treatment:     56   mins    PT SIGNATURE: Onelia Unger, PT   DATE TREATMENT INITIATED: 4/25/2022    Initial Certification  Certification Period: 4/25/2022 thru 7/23/2022  I certify that the therapy services are furnished while this patient is under my care.  The services outlined above are required by this patient, and will be reviewed every 90 days.     PHYSICIAN: Davina Santiago,       DATE:     Please sign and return via fax to [phone].. Thank you, [facility].

## 2022-05-04 ENCOUNTER — HOSPITAL ENCOUNTER (OUTPATIENT)
Dept: ULTRASOUND IMAGING | Facility: HOSPITAL | Age: 62
Discharge: HOME | End: 2022-05-04
Admitting: STUDENT IN AN ORGANIZED HEALTH CARE EDUCATION/TRAINING PROGRAM

## 2022-05-04 ENCOUNTER — TELEPHONE (OUTPATIENT)
Dept: ORTHOPEDIC SURGERY | Facility: CLINIC | Age: 62
End: 2022-05-04

## 2022-05-04 DIAGNOSIS — R39.198 VOIDING DIFFICULTY: ICD-10-CM

## 2022-05-04 PROCEDURE — 51798 US URINE CAPACITY MEASURE: CPT

## 2022-05-04 NOTE — TELEPHONE ENCOUNTER
Called patient in regards of missed appointment 5/3 patient has confirmed the rescheduled date. I reminded patient about our 24 hour cancellation notice

## 2022-05-05 RX ORDER — OMEPRAZOLE 20 MG/1
CAPSULE, DELAYED RELEASE ORAL
Qty: 30 CAPSULE | Refills: 0 | Status: SHIPPED | OUTPATIENT
Start: 2022-05-05 | End: 2022-06-15

## 2022-05-10 ENCOUNTER — OFFICE VISIT (OUTPATIENT)
Dept: ENDOCRINOLOGY | Facility: CLINIC | Age: 62
End: 2022-05-10

## 2022-05-10 VITALS
BODY MASS INDEX: 39.66 KG/M2 | HEIGHT: 60 IN | OXYGEN SATURATION: 96 % | WEIGHT: 202 LBS | SYSTOLIC BLOOD PRESSURE: 124 MMHG | DIASTOLIC BLOOD PRESSURE: 74 MMHG | HEART RATE: 94 BPM

## 2022-05-10 DIAGNOSIS — E11.649 TYPE 2 DIABETES MELLITUS WITH HYPOGLYCEMIA WITHOUT COMA, WITH LONG-TERM CURRENT USE OF INSULIN: Primary | ICD-10-CM

## 2022-05-10 DIAGNOSIS — Z79.4 TYPE 2 DIABETES MELLITUS WITH HYPOGLYCEMIA WITHOUT COMA, WITH LONG-TERM CURRENT USE OF INSULIN: Primary | ICD-10-CM

## 2022-05-10 LAB
EXPIRATION DATE: ABNORMAL
EXPIRATION DATE: NORMAL
GLUCOSE BLDC GLUCOMTR-MCNC: 185 MG/DL (ref 70–130)
HBA1C MFR BLD: 7.1 %
Lab: ABNORMAL
Lab: NORMAL

## 2022-05-10 PROCEDURE — 83036 HEMOGLOBIN GLYCOSYLATED A1C: CPT | Performed by: INTERNAL MEDICINE

## 2022-05-10 PROCEDURE — 3051F HG A1C>EQUAL 7.0%<8.0%: CPT | Performed by: INTERNAL MEDICINE

## 2022-05-10 PROCEDURE — 95251 CONT GLUC MNTR ANALYSIS I&R: CPT | Performed by: INTERNAL MEDICINE

## 2022-05-10 PROCEDURE — 99214 OFFICE O/P EST MOD 30 MIN: CPT | Performed by: INTERNAL MEDICINE

## 2022-05-10 RX ORDER — GLIMEPIRIDE 4 MG/1
8 TABLET ORAL
Qty: 180 TABLET | Refills: 1 | Status: SHIPPED | OUTPATIENT
Start: 2022-05-10 | End: 2023-01-23

## 2022-05-10 RX ORDER — INSULIN GLARGINE 100 [IU]/ML
50 INJECTION, SOLUTION SUBCUTANEOUS DAILY
Qty: 45 ML | Refills: 1 | Status: SHIPPED | OUTPATIENT
Start: 2022-05-10 | End: 2022-09-13 | Stop reason: SDUPTHER

## 2022-05-10 RX ORDER — INSULIN LISPRO 100 [IU]/ML
INJECTION, SOLUTION INTRAVENOUS; SUBCUTANEOUS
Qty: 15 ML | Refills: 3 | Status: SHIPPED | OUTPATIENT
Start: 2022-05-10

## 2022-05-10 RX ORDER — DULAGLUTIDE 3 MG/.5ML
3 INJECTION, SOLUTION SUBCUTANEOUS WEEKLY
Qty: 6 ML | Refills: 1 | Status: SHIPPED | OUTPATIENT
Start: 2022-05-10 | End: 2022-09-13

## 2022-05-10 NOTE — PROGRESS NOTES
"Chief Complaint   Patient presents with   • Diabetes        HPI   Di Lopez is a 61 y.o. female had concerns including Diabetes.     Patient contacted the clinic in the interim following last visit due to hypoglycemia.  Long-acting insulin was reduced at that time with improvement in use of scheduled short acting insulin.  She is using only correctional insulin at this time.  She does continue on Trulicity and glimepiride.    Current diabetes medications include the following:  Lantus 55 units once daily  Correctional dose Humalog, generally totaling 10 units daily  Trulicity 1.5 mg weekly  Glimepiride 8 mg daily    Patient is monitoring blood glucose via freestyle bhumi.  She denies any issues with this device.    The following portions of the patient's history were reviewed and updated as appropriate: allergies, current medications and past social history.    Review of Systems   Gastrointestinal: Negative for abdominal pain, nausea and vomiting.   Endocrine: Negative for polydipsia and polyuria.   Musculoskeletal: Negative for myalgias.       /74 (BP Location: Left arm, Patient Position: Sitting, Cuff Size: Adult)   Pulse 94   Ht 152.4 cm (60\")   Wt 91.6 kg (202 lb)   LMP 06/17/1998   SpO2 96%   BMI 39.45 kg/m²      Physical Exam      Constitutional:  well developed; well nourished  no acute distress  obese - Body mass index is 39.45 kg/m².   ENT/Thyroid: not examined   Eyes: Conjunctiva: clear   Respiratory:  breathing is unlabored  clear to auscultation bilaterally   Cardiovascular:  regular rate and rhythm   Chest:  Not performed.   Abdomen: soft, non-tender; no masses   : Not performed.   Musculoskeletal: Not performed   Skin: not performed.   Neuro: mental status, speech normal   Psych: mood and affect are within normal limits       Labs/Imaging   Latest Reference Range & Units 05/10/22 14:36   Glucose 70 - 130 mg/dL 185 !   Hemoglobin A1C % 7.1   !: Data is abnormal    Freestyle bhumi " download for review for dates ranging April 26- May 9, 2022, CGM active 96% of the time with average glucose 167, GMI 7.3%.  34.9% glucose variability.  62% of data within target range, 2% low, 27% high, 9% very high.  Patient with downtrending blood glucose overnight with occasional hypoglycemia.  Patient has postprandial hyperglycemia.    Diagnoses and all orders for this visit:    1. Type 2 diabetes mellitus with hypoglycemia without coma, with long-term current use of insulin ([facility]) (Primary)  -Hemoglobin A1c 7.1%  -Patient experiencing both hypo and hyperglycemia  -CGM containing 72 hours of glucose data was downloaded and reviewed  -Patient with downtrending glucose overnight, likely secondary to excess basal insulin.  Reduce Lantus to 50 units daily  -Patient with postprandial hyperglycemia, plan to increase Trulicity to 3 mg weekly  -Patient to continue correctional Humalog, 2 units for every 50 greater than 150  -Patient continue glimepiride 8 mg daily  Patient was advised to monitor blood sugar 3 times daily if not using CGM.  Patient was instructed to bring glucometer to all future appointments. Patient should contact the clinic between appointments with hypoglycemia or persistent hyperglycemia.  Discussed signs and symptoms of hypoglycemia as well as hypoglycemia management via the rule of 15's.  Discussed potential for long-term complications with uncontrolled diabetes including nephropathy, neuropathy, retinopathy, increased risk for cardiac disease.  Discussed the role of diet and exercise in the management of diabetes.  CMP up-to-date from January 2022, EGFR 80, alkaline phosphatase 158, AST, ALT normal  Lipid panel up-to-date from January 2022, triglycerides 682,   Exam completed March 2022, no retinopathy noted  -     POC Glucose, Blood  -     POC Glycosylated Hemoglobin (Hb A1C)  -     Insulin Glargine (Lantus SoloStar) 100 UNIT/ML injection pen; Inject 50 Units under the skin into the  appropriate area as directed Daily.  Dispense: 45 mL; Refill: 1  -     glimepiride (AMARYL) 4 MG tablet; Take 2 tablets by mouth Every Morning Before Breakfast.  Dispense: 180 tablet; Refill: 1  -     Insulin Lispro, 1 Unit Dial, (HumaLOG KwikPen) 100 UNIT/ML solution pen-injector; Per correctional scale, MDD 50 units  Dispense: 15 mL; Refill: 3    Other orders  -     Dulaglutide (Trulicity) 3 MG/0.5ML solution pen-injector; Inject 0.5 mL under the skin into the appropriate area as directed 1 (One) Time Per Week.  Dispense: 6 mL; Refill: 1      Return in about 4 months (around 9/10/2022) for Next scheduled follow up. The patient was instructed to contact the clinic with any interval questions or concerns.    Ameena Iglesias MD   Endocrinologist    Dictated Utilizing Dragon Dictation

## 2022-05-13 ENCOUNTER — OFFICE VISIT (OUTPATIENT)
Dept: FAMILY MEDICINE CLINIC | Facility: CLINIC | Age: 62
End: 2022-05-13

## 2022-05-13 VITALS
TEMPERATURE: 97.9 F | RESPIRATION RATE: 26 BRPM | BODY MASS INDEX: 40.25 KG/M2 | HEART RATE: 86 BPM | HEIGHT: 60 IN | WEIGHT: 205 LBS | OXYGEN SATURATION: 98 % | SYSTOLIC BLOOD PRESSURE: 112 MMHG | DIASTOLIC BLOOD PRESSURE: 60 MMHG

## 2022-05-13 DIAGNOSIS — J34.89 RHINORRHEA: Primary | ICD-10-CM

## 2022-05-13 PROCEDURE — 99213 OFFICE O/P EST LOW 20 MIN: CPT | Performed by: STUDENT IN AN ORGANIZED HEALTH CARE EDUCATION/TRAINING PROGRAM

## 2022-05-13 RX ORDER — AZELASTINE 1 MG/ML
2 SPRAY, METERED NASAL 2 TIMES DAILY
Qty: 1 EACH | Refills: 1 | Status: SHIPPED | OUTPATIENT
Start: 2022-05-13 | End: 2023-01-31

## 2022-05-13 NOTE — PROGRESS NOTES
"Chief Complaint  Nasal Congestion and Cough    History of Present Illness     Patient was scheduled for medicare wellness, says she would like to switch to acute visit for nasal congestion.     Main symptom: runny nose  Began: wednesday  Sick contacts: says she was around a child with a runny nose   She denies ear pain  Fever:none  Sore throat: none today a bit last night  GI symptoms: none   She has no facial pain or body aches.         The following portions of the patient's history were reviewed and updated as appropriate: allergies, current medications, past family history, past medical history, past social history, past surgical history, and problem list.    OBJECTIVE:  /60   Pulse 86   Temp 97.9 °F (36.6 °C)   Resp 26   Ht 152.4 cm (60\")   Wt 93 kg (205 lb)   SpO2 98%   BMI 40.04 kg/m²       Physical Exam  Constitutional:       General: She is not in acute distress.     Appearance: Normal appearance.   HENT:      Head: Normocephalic and atraumatic.      Right Ear: Tympanic membrane, ear canal and external ear normal.      Left Ear: Tympanic membrane, ear canal and external ear normal.      Mouth/Throat:      Mouth: Mucous membranes are moist.      Pharynx: No oropharyngeal exudate or posterior oropharyngeal erythema.   Eyes:      Extraocular Movements: Extraocular movements intact.   Cardiovascular:      Rate and Rhythm: Normal rate and regular rhythm.      Heart sounds: No murmur heard.  Pulmonary:      Effort: Pulmonary effort is normal. No respiratory distress.      Breath sounds: Normal breath sounds. No stridor. No wheezing, rhonchi or rales.   Abdominal:      General: Abdomen is flat.   Musculoskeletal:         General: No swelling.      Cervical back: Normal range of motion.   Skin:     Findings: No rash.   Neurological:      General: No focal deficit present.      Mental Status: She is alert.   Psychiatric:         Mood and Affect: Mood normal.                    Assessment and Plan "   Diagnoses and all orders for this visit:    1. Rhinorrhea (Primary)  -     COVID-19,[facility] ROUTINE, NP/OP SWAB IN TRANSPORT MEDIA OR ESWAB 72 HR TAT - Swab, Nasopharynx; Future    Other orders  -     azelastine (ASTELIN) 0.1 % nasal spray; 2 sprays into the nostril(s) as directed by provider 2 (Two) Times a Day. Use in each nostril as directed  Dispense: 1 each; Refill: 1      Sounds viral in etiology.     Discussed to isolate for until test is back. Discussed to continue with supportive care including rest increased hydration and to eat. Tylenol for body aches and antihistamine for congestion.    Told patient to go to er for any sob, cp, worsening symptoms. She understands.          Return if symptoms worsen or fail to improve, for Next scheduled follow up.       Davina Santiaog D.O.  [facility] [place]

## 2022-05-16 ENCOUNTER — APPOINTMENT (OUTPATIENT)
Dept: MAMMOGRAPHY | Facility: HOSPITAL | Age: 62
End: 2022-05-16

## 2022-05-16 ENCOUNTER — LAB (OUTPATIENT)
Dept: LAB | Facility: HOSPITAL | Age: 62
End: 2022-05-16

## 2022-05-16 DIAGNOSIS — J34.89 RHINORRHEA: ICD-10-CM

## 2022-05-16 PROCEDURE — U0003 INFECTIOUS AGENT DETECTION BY NUCLEIC ACID (DNA OR RNA); SEVERE ACUTE RESPIRATORY SYNDROME CORONAVIRUS 2 (SARS-COV-2) (CORONAVIRUS DISEASE [COVID-19]), AMPLIFIED PROBE TECHNIQUE, MAKING USE OF HIGH THROUGHPUT TECHNOLOGIES AS DESCRIBED BY CMS-2020-01-R: HCPCS

## 2022-05-18 ENCOUNTER — TELEPHONE (OUTPATIENT)
Dept: FAMILY MEDICINE CLINIC | Facility: CLINIC | Age: 62
End: 2022-05-18

## 2022-05-18 LAB
LABCORP SARS-COV-2, NAA 2 DAY TAT: NORMAL
SARS-COV-2 RNA RESP QL NAA+PROBE: NOT DETECTED

## 2022-05-19 ENCOUNTER — TELEMEDICINE (OUTPATIENT)
Dept: FAMILY MEDICINE CLINIC | Facility: CLINIC | Age: 62
End: 2022-05-19

## 2022-05-19 DIAGNOSIS — R05.9 COUGH: Primary | ICD-10-CM

## 2022-05-19 PROCEDURE — 99213 OFFICE O/P EST LOW 20 MIN: CPT | Performed by: STUDENT IN AN ORGANIZED HEALTH CARE EDUCATION/TRAINING PROGRAM

## 2022-05-19 RX ORDER — AZITHROMYCIN 250 MG/1
TABLET, FILM COATED ORAL
Qty: 6 TABLET | Refills: 0 | Status: SHIPPED | OUTPATIENT
Start: 2022-05-19 | End: 2022-08-30

## 2022-05-19 NOTE — PROGRESS NOTES
You have chosen to receive care through a telehealth visit.  Do you consent to use a video/audio connection for your medical care today? Yes     Chief Complaint  Di Lopez is a 61 y.o. female who presents via video-conference for uri    History of Present Illness      Main symptom: cough with productive sputum that is blood tinged and yellow positive for runny nose   Began:over one week ago  Sick contacts: none that she knows of  Fever: none   Sore throat: very mildly  SOB: denies   GI symptoms: no new   She denies any wheezing and has not had to use her inhaler.     Covid was recently negative.           The following portions of the patient's history were reviewed and updated as appropriate: allergies, current medications, past family history, past medical history, past social history, past surgical history and problem list.    Review of Systems  Negative unless otherwise stated   Objective  Vital signs available: No      Assessment/Plan   Cough    Given worsening of symptoms and length of symptoms we will start on a z darren.    Discussed to continue with supportive care including rest increased hydration and to eat. Tylenol for body aches and antihistamine for congestion.Told patient to go to er for any sob, cp, worsening symptoms or call for any wheezing or increased use of inhaler. If continued blood tinged sputum after resolution of her illness will further evaluate.    Advised that z darren with warfarin can cause increased bleeding risk to check inr more frequently. She agrees.               Davina Santiago D.O.  [facility] [place] [facility] [place]     15 minutes were spent reviewing the patient's questionnaire, formulating a treatment plan, and relaying information to the patient via [employer].

## 2022-05-24 ENCOUNTER — APPOINTMENT (OUTPATIENT)
Dept: MAMMOGRAPHY | Facility: HOSPITAL | Age: 62
End: 2022-05-24

## 2022-05-27 ENCOUNTER — TELEPHONE (OUTPATIENT)
Dept: ORTHOPEDIC SURGERY | Facility: CLINIC | Age: 62
End: 2022-05-27

## 2022-05-27 NOTE — TELEPHONE ENCOUNTER
Called patient about the same-day cancellation on 5/11,and the cancellation of  5/18. I explained the 24 hour cancellation notice and the same-day cancellation policy. Patient understood and wanted to send her deepest apologies.

## 2022-06-14 DIAGNOSIS — N32.81 OAB (OVERACTIVE BLADDER): ICD-10-CM

## 2022-06-15 RX ORDER — OMEPRAZOLE 20 MG/1
CAPSULE, DELAYED RELEASE ORAL
Qty: 30 CAPSULE | Refills: 0 | Status: SHIPPED | OUTPATIENT
Start: 2022-06-15 | End: 2022-07-08

## 2022-06-15 RX ORDER — ESCITALOPRAM OXALATE 20 MG/1
TABLET ORAL
Qty: 30 TABLET | Refills: 0 | Status: SHIPPED | OUTPATIENT
Start: 2022-06-15 | End: 2022-07-22

## 2022-06-15 RX ORDER — SOLIFENACIN SUCCINATE 5 MG/1
TABLET, FILM COATED ORAL
Qty: 30 TABLET | Refills: 0 | Status: SHIPPED | OUTPATIENT
Start: 2022-06-15 | End: 2022-07-08

## 2022-06-30 RX ORDER — FLURBIPROFEN SODIUM 0.3 MG/ML
SOLUTION/ DROPS OPHTHALMIC
Qty: 150 EACH | Refills: 5 | Status: SHIPPED | OUTPATIENT
Start: 2022-06-30 | End: 2023-01-24 | Stop reason: SDUPTHER

## 2022-07-06 ENCOUNTER — TELEMEDICINE (OUTPATIENT)
Dept: FAMILY MEDICINE CLINIC | Facility: CLINIC | Age: 62
End: 2022-07-06

## 2022-07-06 DIAGNOSIS — R05.9 COUGH: Primary | ICD-10-CM

## 2022-07-06 PROCEDURE — 99213 OFFICE O/P EST LOW 20 MIN: CPT | Performed by: FAMILY MEDICINE

## 2022-07-06 RX ORDER — DOXYCYCLINE 100 MG/1
100 CAPSULE ORAL 2 TIMES DAILY
Qty: 14 CAPSULE | Refills: 0 | Status: SHIPPED | OUTPATIENT
Start: 2022-07-06 | End: 2022-09-13

## 2022-07-06 NOTE — PROGRESS NOTES
Follow Up Office Visit      Patient Name: Di Lopez  : 1960   MRN: [id number]     Chief Complaint:    Chief Complaint   Patient presents with   • Illness       History of Present Illness: Di Lopez is a 61 y.o. female who is here today to follow up with illness. Started with sinuses and she has coughing and mucus production. She also had sneezing. This started last Saturday. No fever. Antibiotics last month. Had zpack. No covid testing.       Review of systems positive for cough    Physical exam: could not perform      Patient confirmed their consent for this visit. They also confirmed their name, , and location in [place]. Video and audio used for this visit. Provider located in [facility].     Subjective        I have reviewed and the following portions of the patient's history were updated as appropriate: past family history, past medical history, past social history, past surgical history and problem list.    Medications:     Current Outpatient Medications:   •  Alcohol Swabs (Alcohol Prep) pads, For use prior to checking glucose twice daily, Disp: 100 each, Rfl: 5  •  azelastine (ASTELIN) 0.1 % nasal spray, 2 sprays into the nostril(s) as directed by provider 2 (Two) Times a Day. Use in each nostril as directed, Disp: 1 each, Rfl: 1  •  azithromycin (ZITHROMAX) 250 MG tablet, Take 2 tablets the first day, then 1 tablet daily for 4 days., Disp: 6 tablet, Rfl: 0  •  carvedilol (COREG) 12.5 MG tablet, 12.5 mg 2 (Two) Times a Day With Meals., Disp: , Rfl:   •  Continuous Blood Gluc  (FreeStyle Dalia 2 [place]) device, 1 each Daily. Use as directed daily to check blood sugars, Disp: 1 each, Rfl: 0  •  Continuous Blood Gluc Sensor (FreeStyle Dalia 2 Sensor) misc, 1 each Every 14 (Fourteen) Days., Disp: 3 each, Rfl: 11  •  doxycycline (MONODOX) 100 MG capsule, Take 1 capsule by mouth 2 (Two) Times a Day., Disp: 14 capsule, Rfl: 0  •  Dulaglutide (Trulicity) 3 MG/0.5ML solution  pen-injector, Inject 0.5 mL under the skin into the appropriate area as directed 1 (One) Time Per Week., Disp: 6 mL, Rfl: 1  •  escitalopram (LEXAPRO) 20 MG tablet, Take 1 tablet by mouth once daily, Disp: 30 tablet, Rfl: 0  •  Ferrous Sulfate (IRON PO), Take 1 tablet by mouth Daily., Disp: , Rfl:   •  folic acid (FOLVITE) 800 MCG tablet, Take 800 mcg by mouth Daily., Disp: , Rfl:   •  furosemide (LASIX) 40 MG tablet, Take 40 mg by mouth As Needed. TAKING PRETTY MUCH DAILY, Disp: , Rfl:   •  gabapentin (NEURONTIN) 300 MG capsule, Take 1 capsule by mouth At Night As Needed (leg pain)., Disp: 30 capsule, Rfl: 3  •  glimepiride (AMARYL) 4 MG tablet, Take 2 tablets by mouth Every Morning Before Breakfast., Disp: 180 tablet, Rfl: 1  •  glucose blood test strip, Use as instructed 3 times a day, Disp: 100 each, Rfl: 11  •  HYDROcodone-acetaminophen (NORCO) 7.5-325 MG per tablet, Take 1 tablet by mouth Daily., Disp: , Rfl:   •  hydrocortisone 2.5 % ointment, APPLY A SMALL AMOUNT TO AFFECTED AREA TWICE DAILY, Disp: , Rfl:   •  Insulin Glargine (Lantus SoloStar) 100 UNIT/ML injection pen, Inject 50 Units under the skin into the appropriate area as directed Daily., Disp: 45 mL, Rfl: 1  •  Insulin Lispro, 1 Unit Dial, (HumaLOG KwikPen) 100 UNIT/ML solution pen-injector, Per correctional scale, MDD 50 units, Disp: 15 mL, Rfl: 3  •  Insulin Pen Needle (B-D UF III MINI PEN NEEDLES) 31G X 5 MM misc, USE 1  FIVE TIMES DAILY, Disp: 150 each, Rfl: 5  •  Insulin Pen Needle (RELION PEN NEEDLES) 32G X 4 MM misc, Use BID for E11.9, Disp: 100 each, Rfl: 1  •  Lancets misc, For use with glucose monitoring 3 times daily, Disp: 100 each, Rfl: 11  •  lidocaine (LIDODERM) 5 %, Place 1 patch on the skin as directed by provider Daily. Remove & Discard patch within 12 hours or as directed by MD, Disp: 30 patch, Rfl: 2  •  Omega-3 Fatty Acids (FISH OIL) 1000 MG capsule capsule, Take 1 capsule by mouth 4 (Four) Times a Day., Disp: 120 capsule, Rfl:  2  •  omeprazole (priLOSEC) 20 MG capsule, Take 1 capsule by mouth once daily, Disp: 30 capsule, Rfl: 0  •  sacubitril-valsartan (ENTRESTO) 24-26 MG tablet, Take 1 tablet by mouth 2 (Two) Times a Day., Disp: , Rfl:   •  solifenacin (VESICARE) 5 MG tablet, Take 1 tablet by mouth once daily, Disp: 30 tablet, Rfl: 0  •  spironolactone (ALDACTONE) 25 MG tablet, Take 25 mg by mouth Daily., Disp: , Rfl: 0  •  Vitamin D, Cholecalciferol, (CHOLECALCIFEROL) 10 MCG (400 UNIT) tablet, Take 400 Units by mouth Daily., Disp: , Rfl:   •  warfarin (COUMADIN) 1 MG tablet, Take 1 mg by mouth Daily As Needed. Half tab po 5 days, Disp: , Rfl:   •  warfarin (COUMADIN) 4 MG tablet, Take 4.5 mg by mouth Daily. 4.5 mg for 5 days then 4 mg for 2 days., Disp: , Rfl: 2    Allergies:   Allergies   Allergen Reactions   • Codeine Itching and Nausea And Vomiting   • Latex Other (See Comments)     Pulls skin off   • Penicillins Hives   • Sulfa Antibiotics Nausea And Vomiting       Objective     Physical Exam: Please see above  Vital Signs: There were no vitals filed for this visit.  There is no height or weight on file to calculate BMI.          Assessment / Plan      Assessment/Plan:   Diagnoses and all orders for this visit:    1. Cough (Primary)  -     COVID-19 PCR, [facility], NP SWAB IN [facility] TRANSPORT MEDIA/ORAL SWISH 24-30 HR TAT - Swab, Nasopharynx; Future  -     doxycycline (MONODOX) 100 MG capsule; Take 1 capsule by mouth 2 (Two) Times a Day.  Dispense: 14 capsule; Refill: 0     treating as sinusitis until covid test comes back. If positive patient will stop antibiotic.     Recommend taking medication apart from other meds due to chelation.     Follow Up:   Return for Recheck.    Jabari Red DO  [facility] [place]

## 2022-07-07 PROCEDURE — U0004 COV-19 TEST NON-CDC HGH THRU: HCPCS | Performed by: FAMILY MEDICINE

## 2022-07-08 DIAGNOSIS — N32.81 OAB (OVERACTIVE BLADDER): ICD-10-CM

## 2022-07-08 LAB — SARS-COV-2 RNA NOSE QL NAA+PROBE: DETECTED

## 2022-07-08 RX ORDER — SOLIFENACIN SUCCINATE 5 MG/1
TABLET, FILM COATED ORAL
Qty: 30 TABLET | Refills: 0 | Status: SHIPPED | OUTPATIENT
Start: 2022-07-08 | End: 2022-08-19

## 2022-07-08 RX ORDER — OMEPRAZOLE 20 MG/1
CAPSULE, DELAYED RELEASE ORAL
Qty: 30 CAPSULE | Refills: 0 | Status: SHIPPED | OUTPATIENT
Start: 2022-07-08 | End: 2022-08-19

## 2022-07-11 DIAGNOSIS — R05.9 COUGH: ICD-10-CM

## 2022-07-11 RX ORDER — DOXYCYCLINE 100 MG/1
100 CAPSULE ORAL 2 TIMES DAILY
Qty: 14 CAPSULE | Refills: 0 | OUTPATIENT
Start: 2022-07-11

## 2022-07-11 NOTE — TELEPHONE ENCOUNTER
Rx Refill Note  Requested Prescriptions     Pending Prescriptions Disp Refills   • doxycycline (MONODOX) 100 MG capsule 14 capsule 0     Sig: Take 1 capsule by mouth 2 (Two) Times a Day.      Last office visit with prescribing clinician: 5/13/2022      Next office visit with prescribing clinician: 4/24/2023            Kristian Peralta MA  07/11/22, 14:14 EDT

## 2022-07-18 DIAGNOSIS — G25.81 RLS (RESTLESS LEGS SYNDROME): ICD-10-CM

## 2022-07-18 RX ORDER — GABAPENTIN 300 MG/1
CAPSULE ORAL
Qty: 30 CAPSULE | Refills: 0 | OUTPATIENT
Start: 2022-07-18

## 2022-07-22 RX ORDER — ESCITALOPRAM OXALATE 20 MG/1
TABLET ORAL
Qty: 30 TABLET | Refills: 0 | Status: SHIPPED | OUTPATIENT
Start: 2022-07-22 | End: 2022-08-19

## 2022-07-27 ENCOUNTER — HOSPITAL ENCOUNTER (OUTPATIENT)
Dept: MAMMOGRAPHY | Facility: HOSPITAL | Age: 62
Discharge: HOME | End: 2022-07-27
Admitting: STUDENT IN AN ORGANIZED HEALTH CARE EDUCATION/TRAINING PROGRAM

## 2022-07-27 DIAGNOSIS — Z12.31 ENCOUNTER FOR SCREENING MAMMOGRAM FOR MALIGNANT NEOPLASM OF BREAST: ICD-10-CM

## 2022-07-27 PROCEDURE — 77063 BREAST TOMOSYNTHESIS BI: CPT

## 2022-07-27 PROCEDURE — 77067 SCR MAMMO BI INCL CAD: CPT

## 2022-07-27 PROCEDURE — 77063 BREAST TOMOSYNTHESIS BI: CPT | Performed by: RADIOLOGY

## 2022-07-27 PROCEDURE — 77067 SCR MAMMO BI INCL CAD: CPT | Performed by: RADIOLOGY

## 2022-08-03 DIAGNOSIS — R92.8 ABNORMAL MAMMOGRAM: Primary | ICD-10-CM

## 2022-08-17 ENCOUNTER — HOSPITAL ENCOUNTER (OUTPATIENT)
Dept: MAMMOGRAPHY | Facility: HOSPITAL | Age: 62
Discharge: HOME | End: 2022-08-17

## 2022-08-17 ENCOUNTER — HOSPITAL ENCOUNTER (OUTPATIENT)
Dept: ULTRASOUND IMAGING | Facility: HOSPITAL | Age: 62
Discharge: HOME | End: 2022-08-17

## 2022-08-17 DIAGNOSIS — R92.8 ABNORMAL MAMMOGRAM: ICD-10-CM

## 2022-08-17 PROCEDURE — 76642 ULTRASOUND BREAST LIMITED: CPT

## 2022-08-17 PROCEDURE — 76642 ULTRASOUND BREAST LIMITED: CPT | Performed by: RADIOLOGY

## 2022-08-17 PROCEDURE — 77066 DX MAMMO INCL CAD BI: CPT

## 2022-08-17 PROCEDURE — G0279 TOMOSYNTHESIS, MAMMO: HCPCS | Performed by: RADIOLOGY

## 2022-08-17 PROCEDURE — 77066 DX MAMMO INCL CAD BI: CPT | Performed by: RADIOLOGY

## 2022-08-17 PROCEDURE — G0279 TOMOSYNTHESIS, MAMMO: HCPCS

## 2022-08-18 ENCOUNTER — DOCUMENTATION (OUTPATIENT)
Dept: MAMMOGRAPHY | Facility: HOSPITAL | Age: 62
End: 2022-08-18

## 2022-08-18 DIAGNOSIS — R92.8 ABNORMAL MAMMOGRAM: Primary | ICD-10-CM

## 2022-08-18 NOTE — PROGRESS NOTES
Antithrombolic/blood thinner clearance form successfully faxed to Dr NATASHA Patel at patient request.

## 2022-08-19 DIAGNOSIS — N32.81 OAB (OVERACTIVE BLADDER): ICD-10-CM

## 2022-08-19 RX ORDER — SOLIFENACIN SUCCINATE 5 MG/1
TABLET, FILM COATED ORAL
Qty: 30 TABLET | Refills: 0 | Status: SHIPPED | OUTPATIENT
Start: 2022-08-19 | End: 2022-09-23

## 2022-08-19 RX ORDER — ESCITALOPRAM OXALATE 20 MG/1
TABLET ORAL
Qty: 30 TABLET | Refills: 0 | Status: SHIPPED | OUTPATIENT
Start: 2022-08-19 | End: 2022-09-23

## 2022-08-19 RX ORDER — OMEPRAZOLE 20 MG/1
CAPSULE, DELAYED RELEASE ORAL
Qty: 30 CAPSULE | Refills: 0 | Status: SHIPPED | OUTPATIENT
Start: 2022-08-19 | End: 2022-09-23

## 2022-08-30 ENCOUNTER — OFFICE VISIT (OUTPATIENT)
Dept: FAMILY MEDICINE CLINIC | Facility: CLINIC | Age: 62
End: 2022-08-30

## 2022-08-30 VITALS
HEIGHT: 60 IN | HEART RATE: 70 BPM | WEIGHT: 198 LBS | TEMPERATURE: 97.3 F | OXYGEN SATURATION: 99 % | RESPIRATION RATE: 20 BRPM | SYSTOLIC BLOOD PRESSURE: 130 MMHG | BODY MASS INDEX: 38.87 KG/M2 | DIASTOLIC BLOOD PRESSURE: 70 MMHG

## 2022-08-30 DIAGNOSIS — Z12.11 SCREENING FOR COLON CANCER: Primary | ICD-10-CM

## 2022-08-30 DIAGNOSIS — N94.2 VAGINISMUS: ICD-10-CM

## 2022-08-30 DIAGNOSIS — Z12.4 CERVICAL CANCER SCREENING: ICD-10-CM

## 2022-08-30 DIAGNOSIS — Z12.2 SCREENING FOR LUNG CANCER: ICD-10-CM

## 2022-08-30 DIAGNOSIS — N84.2 VAGINAL POLYP: ICD-10-CM

## 2022-08-30 DIAGNOSIS — Z78.0 ENCOUNTER FOR OSTEOPOROSIS SCREENING IN ASYMPTOMATIC POSTMENOPAUSAL PATIENT: ICD-10-CM

## 2022-08-30 DIAGNOSIS — Z13.820 ENCOUNTER FOR OSTEOPOROSIS SCREENING IN ASYMPTOMATIC POSTMENOPAUSAL PATIENT: ICD-10-CM

## 2022-08-30 DIAGNOSIS — Z87.891 PERSONAL HISTORY OF NICOTINE DEPENDENCE: ICD-10-CM

## 2022-08-30 PROCEDURE — 1170F FXNL STATUS ASSESSED: CPT | Performed by: STUDENT IN AN ORGANIZED HEALTH CARE EDUCATION/TRAINING PROGRAM

## 2022-08-30 PROCEDURE — 1159F MED LIST DOCD IN RCRD: CPT | Performed by: STUDENT IN AN ORGANIZED HEALTH CARE EDUCATION/TRAINING PROGRAM

## 2022-08-30 PROCEDURE — 99396 PREV VISIT EST AGE 40-64: CPT | Performed by: STUDENT IN AN ORGANIZED HEALTH CARE EDUCATION/TRAINING PROGRAM

## 2022-08-30 PROCEDURE — G0439 PPPS, SUBSEQ VISIT: HCPCS | Performed by: STUDENT IN AN ORGANIZED HEALTH CARE EDUCATION/TRAINING PROGRAM

## 2022-08-30 RX ORDER — CEPHALEXIN 500 MG/1
CAPSULE ORAL
COMMUNITY
Start: 2022-07-05 | End: 2023-02-03

## 2022-08-30 RX ORDER — ENOXAPARIN SODIUM 100 MG/ML
INJECTION SUBCUTANEOUS
COMMUNITY
Start: 2022-08-26 | End: 2023-01-31

## 2022-08-30 NOTE — PROGRESS NOTES
The ABCs of the Annual Wellness Visit  Subsequent Medicare Wellness Visit    Chief Complaint   Patient presents with   • Annual Exam     Physical for adoption.       Subjective    History of Present Illness:  Di Lopez is a 61 y.o. female who presents for a Subsequent Medicare Wellness Visit.    The following portions of the patient's history were reviewed and   updated as appropriate: allergies, current medications, past family history, past medical history, past social history, past surgical history and problem list.    Compared to one year ago, the patient feels her physical   health is the same.    Compared to one year ago, the patient feels her mental   health is the same.    Recent Hospitalizations:  She was not admitted to the hospital during the last year.       Current Medical Providers:  Patient Care Team:  Davina Santiago DO as PCP - General (Family Medicine)  Ameena Iglesias MD as Consulting Physician (Endocrinology)    Outpatient Medications Prior to Visit   Medication Sig Dispense Refill   • Alcohol Swabs (Alcohol Prep) pads For use prior to checking glucose twice daily 100 each 5   • azelastine (ASTELIN) 0.1 % nasal spray 2 sprays into the nostril(s) as directed by provider 2 (Two) Times a Day. Use in each nostril as directed 1 each 1   • carvedilol (COREG) 12.5 MG tablet 12.5 mg 2 (Two) Times a Day With Meals.     • Continuous Blood Gluc  (FreeStyle Dalia 2 [place]) device 1 each Daily. Use as directed daily to check blood sugars 1 each 0   • Continuous Blood Gluc Sensor (FreeStyle Dalia 2 Sensor) misc 1 each Every 14 (Fourteen) Days. 3 each 11   • Dulaglutide (Trulicity) 3 MG/0.5ML solution pen-injector Inject 0.5 mL under the skin into the appropriate area as directed 1 (One) Time Per Week. 6 mL 1   • Enoxaparin Sodium (LOVENOX) 100 MG/ML solution prefilled syringe syringe      • escitalopram (LEXAPRO) 20 MG tablet Take 1 tablet by mouth once daily 30 tablet 0   • Ferrous Sulfate  (IRON PO) Take 1 tablet by mouth Daily.     • folic acid (FOLVITE) 800 MCG tablet Take 800 mcg by mouth Daily.     • furosemide (LASIX) 40 MG tablet Take 40 mg by mouth As Needed. TAKING PRETTY MUCH DAILY     • gabapentin (NEURONTIN) 300 MG capsule Take 1 capsule by mouth At Night As Needed (leg pain). 30 capsule 3   • glimepiride (AMARYL) 4 MG tablet Take 2 tablets by mouth Every Morning Before Breakfast. 180 tablet 1   • glucose blood test strip Use as instructed 3 times a day 100 each 11   • HYDROcodone-acetaminophen (NORCO) 7.5-325 MG per tablet Take 1 tablet by mouth Daily.     • Insulin Glargine (Lantus SoloStar) 100 UNIT/ML injection pen Inject 50 Units under the skin into the appropriate area as directed Daily. 45 mL 1   • Insulin Lispro, 1 Unit Dial, (HumaLOG KwikPen) 100 UNIT/ML solution pen-injector Per correctional scale, MDD 50 units 15 mL 3   • Insulin Pen Needle (B-D UF III MINI PEN NEEDLES) 31G X 5 MM misc USE 1  FIVE TIMES DAILY 150 each 5   • Insulin Pen Needle (RELION PEN NEEDLES) 32G X 4 MM misc Use BID for E11.9 100 each 1   • Lancets misc For use with glucose monitoring 3 times daily 100 each 11   • lidocaine (LIDODERM) 5 % Place 1 patch on the skin as directed by provider Daily. Remove & Discard patch within 12 hours or as directed by MD 30 patch 2   • Omega-3 Fatty Acids (FISH OIL) 1000 MG capsule capsule Take 1 capsule by mouth 4 (Four) Times a Day. 120 capsule 2   • omeprazole (priLOSEC) 20 MG capsule Take 1 capsule by mouth once daily 30 capsule 0   • sacubitril-valsartan (ENTRESTO) 24-26 MG tablet Take 1 tablet by mouth 2 (Two) Times a Day.     • solifenacin (VESICARE) 5 MG tablet Take 1 tablet by mouth once daily 30 tablet 0   • spironolactone (ALDACTONE) 25 MG tablet Take 25 mg by mouth Daily.  0   • Vitamin D, Cholecalciferol, (CHOLECALCIFEROL) 10 MCG (400 UNIT) tablet Take 400 Units by mouth Daily.     • warfarin (COUMADIN) 1 MG tablet Take 1 mg by mouth Daily As Needed. Half tab po 5  days     • warfarin (COUMADIN) 4 MG tablet Take 4.5 mg by mouth Daily. 4.5 mg for 5 days then 4 mg for 2 days.  2   • cephalexin (KEFLEX) 500 MG capsule TAKE FOUR CAPSULES BY MOUTH ONE HOUR BEFORE APPOINTMENT     • doxycycline (MONODOX) 100 MG capsule Take 1 capsule by mouth 2 (Two) Times a Day. 14 capsule 0   • hydrocortisone 2.5 % ointment APPLY A SMALL AMOUNT TO AFFECTED AREA TWICE DAILY     • azithromycin (ZITHROMAX) 250 MG tablet Take 2 tablets the first day, then 1 tablet daily for 4 days. 6 tablet 0     No facility-administered medications prior to visit.       Opioid medication/s are on active medication list.  and I have evaluated her active treatment plan and pain score trends (see table).  There were no vitals filed for this visit.  I have reviewed the chart for potential of high risk medication and harmful drug interactions in the elderly.            Aspirin is not on active medication list.  Aspirin use is not indicated based on review of current medical condition/s. Risk of harm outweighs potential benefits.  .    Patient Active Problem List   Diagnosis   • Arteriovenous malformation of gastrointestinal tract   • Depression   • Type 2 diabetes mellitus without complication, with long-term current use of insulin (HCC)   • Dyslipidemia   • Emphysema/COPD ([facility])   • Hypertension   • Insomnia   • Morbid obesity ([facility])   • Low back pain   • Obstructive sleep apnea syndrome   • Long term current use of anticoagulant   • Normocytic anemia   • Sliding hiatal hernia   • Dyspnea   • Vitamin B12 deficiency   • Cough   • Iron deficiency   • Iron malabsorption   • Bronchiectasis without complication ([facility])   • Electronic cigarette use   • Chronic diastolic heart failure (HCC)   • Left bundle branch hemiblock   • Chronic respiratory failure with hypoxia ([facility])   • Body aches   • Fatigue   • Headaches due to old head injury     Advance Care Planning  Advance Directive is not on file.  ACP discussion was held with the  "patient during this visit. Patient does not have an advance directive, information provided.          Objective    Vitals:    22 1138   BP: 130/70   Pulse: 70   Resp: 20   Temp: 97.3 °F (36.3 °C)   TempSrc: Temporal   SpO2: 99%   Weight: 89.8 kg (198 lb)   Height: 152.4 cm (60\")     Estimated body mass index is 38.67 kg/m² as calculated from the following:    Height as of this encounter: 152.4 cm (60\").    Weight as of this encounter: 89.8 kg (198 lb).        Does the patient have evidence of cognitive impairment? No    Physical Exam  Constitutional:       General: She is not in acute distress.     Appearance: Normal appearance.   HENT:      Head: Normocephalic and atraumatic.   Eyes:      Extraocular Movements: Extraocular movements intact.   Cardiovascular:      Rate and Rhythm: Normal rate and regular rhythm.      Heart sounds: No murmur heard.  Pulmonary:      Effort: Pulmonary effort is normal. No respiratory distress.      Breath sounds: Normal breath sounds.   Genitourinary:     Comments: Speculum vaginal exam done with joanne pettit in the room.   Outer  Vaginal normal  Speculum inserted without difficulty.   Right sided vaginal wall red polyp present  Musculoskeletal:         General: No swelling.      Cervical back: Normal range of motion.   Skin:     Findings: No rash.   Neurological:      General: No focal deficit present.      Mental Status: She is alert.   Psychiatric:         Mood and Affect: Mood normal.                 HEALTH RISK ASSESSMENT    Smoking Status:  Social History     Tobacco Use   Smoking Status Former Smoker   • Packs/day: 0.50   • Years: 33.00   • Pack years: 16.50   • Types: Electronic Cigarette, Cigarettes   • Quit date:    • Years since quittin.6   Smokeless Tobacco Never Used   Tobacco Comment    Quit in      Alcohol Consumption:  Social History     Substance and Sexual Activity   Alcohol Use No     Fall Risk Screen:    BLADIMIR Fall Risk Assessment has not been " completed.    Depression Screening:  PHQ-2/PHQ-9 Depression Screening 8/30/2022   Retired Total Score -   Little Interest or Pleasure in Doing Things 0-->not at all   Feeling Down, Depressed or Hopeless 0-->not at all   PHQ-9: Brief Depression Severity Measure Score 0       Health Habits and Functional and Cognitive Screening:  Functional & Cognitive Status 8/30/2022   Do you have difficulty preparing food and eating? No   Do you have difficulty bathing yourself, getting dressed or grooming yourself? No   Do you have difficulty using the toilet? No   Do you have difficulty moving around from place to place? No   Do you have trouble with steps or getting out of a bed or a chair? Yes   Current Diet Unhealthy Diet   Dental Exam -   Eye Exam Not up to date        Eye Exam Comment recomended   Exercise (times per week) 0 times per week   Current Exercises Include No Regular Exercise   Current Exercise Activities Include -   Do you need help using the phone?  No   Are you deaf or do you have serious difficulty hearing?  Yes   Do you need help with transportation? Yes   Do you need help shopping? No   Do you need help preparing meals?  No   Do you need help with housework?  No   Do you need help with laundry? No   Do you need help taking your medications? No   Do you need help managing money? No   Do you ever drive or ride in a car without wearing a seat belt? No   Have you felt unusual stress, anger or loneliness in the last month? No   Who do you live with? Child   If you need help, do you have trouble finding someone available to you? No   Have you been bothered in the last four weeks by sexual problems? Yes   Do you have difficulty concentrating, remembering or making decisions? No       Age-appropriate Screening Schedule:  Refer to the list below for future screening recommendations based on patient's age, sex and/or medical conditions. Orders for these recommended tests are listed in the plan section. The patient has  been provided with a written plan.    Health Maintenance   Topic Date Due   • URINE MICROALBUMIN  07/01/2022   • INFLUENZA VACCINE  10/01/2022   • DIABETIC FOOT EXAM  10/06/2022   • HEMOGLOBIN A1C  11/10/2022   • LIPID PANEL  01/13/2023   • DIABETIC EYE EXAM  03/16/2023   • MAMMOGRAM  08/17/2024   • TDAP/TD VACCINES (3 - Td or Tdap) 11/13/2028   • PAP SMEAR  Discontinued   • ZOSTER VACCINE  Discontinued              Assessment & Plan   CMS Preventative Services Quick Reference  Risk Factors Identified During Encounter  Cardiovascular Disease  The above risks/problems have been discussed with the patient.  Follow up actions/plans if indicated are seen below in the Assessment/Plan Section.  Pertinent information has been shared with the patient in the After Visit Summary.    There are no diagnoses linked to this encounter.    Follow Up:   Return in about 6 months (around 2/28/2023).     An After Visit Summary and PPPS were made available to the patient.    Annual exam  Colonoscopy: ordered   Mammogram: she is scheduled for biopsy  dexa scan: ordered   She is agreeable to ct scan lung screen  Recommend dental and eye exam  covid vaccinated   Shingles vaccine up to date   Pneumonia vaccine up to date.    She says she has a new boyfriend that she used to date when she was in her 20s. She attempted to have sexual intercourse using lubricant but she was unable to have intercourse. She does not have pain. She declines any sti screening. No discharge. She has not been intimate in over 10 years she says. Pelvic exam normal aside from vaginal polyp with normal insertion of speculum but with slight discomfort. Suspect vaginismus and pelvic floor therapy will help. Refer to gyn for polyp about 1 cm in size.

## 2022-09-01 ENCOUNTER — OFFICE VISIT (OUTPATIENT)
Dept: OBSTETRICS AND GYNECOLOGY | Facility: CLINIC | Age: 62
End: 2022-09-01

## 2022-09-01 ENCOUNTER — LAB (OUTPATIENT)
Dept: LAB | Facility: HOSPITAL | Age: 62
End: 2022-09-01

## 2022-09-01 VITALS
RESPIRATION RATE: 16 BRPM | DIASTOLIC BLOOD PRESSURE: 80 MMHG | SYSTOLIC BLOOD PRESSURE: 128 MMHG | BODY MASS INDEX: 38.67 KG/M2 | WEIGHT: 198 LBS

## 2022-09-01 DIAGNOSIS — Z20.2 POSSIBLE EXPOSURE TO STD: ICD-10-CM

## 2022-09-01 DIAGNOSIS — N84.2 VAGINAL POLYP: Primary | ICD-10-CM

## 2022-09-01 PROCEDURE — 86696 HERPES SIMPLEX TYPE 2 TEST: CPT

## 2022-09-01 PROCEDURE — 86695 HERPES SIMPLEX TYPE 1 TEST: CPT

## 2022-09-01 PROCEDURE — 36415 COLL VENOUS BLD VENIPUNCTURE: CPT

## 2022-09-01 PROCEDURE — 86694 HERPES SIMPLEX NES ANTBDY: CPT

## 2022-09-01 PROCEDURE — 99214 OFFICE O/P EST MOD 30 MIN: CPT | Performed by: NURSE PRACTITIONER

## 2022-09-01 NOTE — PROGRESS NOTES
"Problem Visit     Patient Name: Di Lopez  : 1960   MRN: [id number]   Care Team: Patient Care Team:  Davina Santiago DO as PCP - General (Family Medicine)  Ameena Iglesias MD as Consulting Physician (Endocrinology)  Nakita Freire APRN as Nurse Practitioner (Nurse Practitioner)    Chief Complaint:    Vaginal polyp     HPI: Di Lopez is a 61 y.o. year old  presenting to be seen as referral from PCP for evaluation of vaginal polyp.   S/p total hyst in  - states she had a malignant tumor   BSO done in  d/t benign ovarian cysts     Denies vaginal bldg or pain   Hasn't been sexually active in many yrs until recently   She has rekindled a relationship with a past partner and is very happy   States there has been some difficulty when trying to have intercourse - no pain or discomfort for her   He is dealing with ED - has an upcoming appt     Reports a vulvar \"blister\" that comes intermittently   States it is very painful and lasts 5-7 days then resolves   She hasn't been sexually active until recently, so she hasn't been concerned   No hx of cold sores and no dx of herpes that she is aware of     Reports urinary urgency and incontinence - states this has been an issue since she has been taking a diuretic   Hx of CHF   Denies stress urinary incontinence   States she applies vasoline to vulva - never inserts it vaginally   States it has been helpful to reduce skin irritation and odor     In the process of adopting a little girl - she has been a  for several yrs       Subjective      /80   Resp 16   Wt 89.8 kg (198 lb)   LMP 1998   Breastfeeding No   BMI 38.67 kg/m²     BMI reviewed: Body mass index is 38.67 kg/m².      Objective     Physical Exam      Neuro: alert and oriented to person, place and time   General:  alert; cooperative; well developed; well nourished   Skin:  Not performed.   Thyroid: not examined   Lungs:  breathing is unlabored "   Heart:  Not performed.   Breasts:  Not performed.   Abdomen: Not performed.   Pelvis: Clinical staff was present for exam  External genitalia:  normal appearance of the external genitalia including Bartholin's and [place]'s glands.  :  urethral meatus normal;  Vaginal:  normal pink mucosa without prolapse or lesions. vaginal polyp present left side wall approx 1cm size, non friable   Cervix:  absent.  Uterus:  absent.  Adnexa:  absent, bilateral.  Rectal:  digital rectal exam not performed; anus visually normal appearing.         Assessment / Plan      Assessment  Problems Addressed This Visit    ICD-10-CM ICD-9-CM   1. Vaginal polyp  N84.2 623.7   2. Possible exposure to STD  Z20.2 V01.6       Plan    Discussed vaginal polyp - no bldg or pain with exam today   Will continue to monitor annually   If vaginal bldg or pain develops, she will RTC - pt v/u     Discussed her sx sound c/w HSV   She would like labs to confirm this   Discussed suppressive vs episodic txment with Valtrex   Will await results of lab first   IF negative, she will RTC when lesion is present for further evaluation   Discussed not to be sexually active while lesion is present     May cont with Vasoline externally only     F/u 1 yr           Follow Up  Return in about 1 year (around 9/1/2023) for Recheck.  Patient was given instructions and counseling regarding her condition or for health maintenance advice. Please see specific information pulled into the AVS if appropriate.     Nakita Freire, FRANCESCA  September 1, 2022  14:36 EDT

## 2022-09-02 ENCOUNTER — PATIENT ROUNDING (BHMG ONLY) (OUTPATIENT)
Dept: OBSTETRICS AND GYNECOLOGY | Facility: CLINIC | Age: 62
End: 2022-09-02

## 2022-09-02 ENCOUNTER — HOSPITAL ENCOUNTER (OUTPATIENT)
Dept: CT IMAGING | Facility: HOSPITAL | Age: 62
Discharge: HOME | End: 2022-09-02
Admitting: STUDENT IN AN ORGANIZED HEALTH CARE EDUCATION/TRAINING PROGRAM

## 2022-09-02 DIAGNOSIS — Z87.891 PERSONAL HISTORY OF NICOTINE DEPENDENCE: ICD-10-CM

## 2022-09-02 DIAGNOSIS — Z12.2 SCREENING FOR LUNG CANCER: ICD-10-CM

## 2022-09-02 LAB
HSV1 IGG SER IA-ACNC: <0.91 INDEX (ref 0–0.9)
HSV1+2 IGM SER IA-ACNC: <0.91 RATIO (ref 0–0.9)
HSV2 IGG SER IA-ACNC: 7.66 INDEX (ref 0–0.9)

## 2022-09-02 PROCEDURE — 71271 CT THORAX LUNG CANCER SCR C-: CPT

## 2022-09-02 RX ORDER — VALACYCLOVIR HYDROCHLORIDE 500 MG/1
500 TABLET, FILM COATED ORAL DAILY
Qty: 30 TABLET | Refills: 11 | Status: SHIPPED | OUTPATIENT
Start: 2022-09-02 | End: 2022-10-02

## 2022-09-02 NOTE — PROGRESS NOTES
A [employer]oo[employer]ella message has been sent to the patient for PATIENT ROUNDING with [facility].

## 2022-09-03 DIAGNOSIS — R91.8 LUNG MASS: Primary | ICD-10-CM

## 2022-09-03 DIAGNOSIS — Z87.891 PERSONAL HISTORY OF NICOTINE DEPENDENCE: ICD-10-CM

## 2022-09-03 DIAGNOSIS — R91.1 LUNG NODULE: ICD-10-CM

## 2022-09-03 DIAGNOSIS — R91.8 ABNORMAL CT LUNG SCREENING: Primary | ICD-10-CM

## 2022-09-07 ENCOUNTER — TELEPHONE (OUTPATIENT)
Dept: MAMMOGRAPHY | Facility: HOSPITAL | Age: 62
End: 2022-09-07

## 2022-09-07 NOTE — TELEPHONE ENCOUNTER
Spoke with Britany at [facility] asking for medication clearance form to be completed before patient can be scheduled for biopsy.  Britany spoke with Dr. Everett and stated he instructed that the patient must be first scheduled for the biopsy.  She is to go to their office 4 days prior to the biopsy and have her INR checked and their office will “go from there.”  Dr. ELSY Damian notified.     Attempted to call patient to notify her of this information.  Left message for patient to return my call.

## 2022-09-09 ENCOUNTER — TELEPHONE (OUTPATIENT)
Dept: MAMMOGRAPHY | Facility: HOSPITAL | Age: 62
End: 2022-09-09

## 2022-09-09 NOTE — TELEPHONE ENCOUNTER
Attempted to call patient to discuss medication clearance procedure.  Left message on patient's voicemail to return my call.

## 2022-09-13 ENCOUNTER — HOSPITAL ENCOUNTER (OUTPATIENT)
Dept: CT IMAGING | Facility: HOSPITAL | Age: 62
Discharge: HOME | End: 2022-09-13
Admitting: STUDENT IN AN ORGANIZED HEALTH CARE EDUCATION/TRAINING PROGRAM

## 2022-09-13 ENCOUNTER — OFFICE VISIT (OUTPATIENT)
Dept: ENDOCRINOLOGY | Facility: CLINIC | Age: 62
End: 2022-09-13

## 2022-09-13 VITALS
DIASTOLIC BLOOD PRESSURE: 78 MMHG | HEART RATE: 70 BPM | BODY MASS INDEX: 39.07 KG/M2 | HEIGHT: 60 IN | OXYGEN SATURATION: 97 % | WEIGHT: 199 LBS | SYSTOLIC BLOOD PRESSURE: 136 MMHG

## 2022-09-13 DIAGNOSIS — E11.65 TYPE 2 DIABETES MELLITUS WITH HYPERGLYCEMIA, WITH LONG-TERM CURRENT USE OF INSULIN: Primary | ICD-10-CM

## 2022-09-13 DIAGNOSIS — Z79.4 TYPE 2 DIABETES MELLITUS WITH HYPERGLYCEMIA, WITH LONG-TERM CURRENT USE OF INSULIN: Primary | ICD-10-CM

## 2022-09-13 DIAGNOSIS — R91.8 ABNORMAL CT LUNG SCREENING: ICD-10-CM

## 2022-09-13 DIAGNOSIS — R53.83 OTHER FATIGUE: ICD-10-CM

## 2022-09-13 LAB
CREAT BLDA-MCNC: 0.8 MG/DL (ref 0.6–1.3)
EXPIRATION DATE: ABNORMAL
EXPIRATION DATE: NORMAL
GLUCOSE BLDC GLUCOMTR-MCNC: 141 MG/DL (ref 70–130)
HBA1C MFR BLD: 6.9 %
Lab: ABNORMAL
Lab: NORMAL

## 2022-09-13 PROCEDURE — 83036 HEMOGLOBIN GLYCOSYLATED A1C: CPT | Performed by: INTERNAL MEDICINE

## 2022-09-13 PROCEDURE — 3044F HG A1C LEVEL LT 7.0%: CPT | Performed by: INTERNAL MEDICINE

## 2022-09-13 PROCEDURE — 74177 CT ABD & PELVIS W/CONTRAST: CPT

## 2022-09-13 PROCEDURE — 95251 CONT GLUC MNTR ANALYSIS I&R: CPT | Performed by: INTERNAL MEDICINE

## 2022-09-13 PROCEDURE — 82565 ASSAY OF CREATININE: CPT

## 2022-09-13 PROCEDURE — 25010000002 IOPAMIDOL 61 % SOLUTION: Performed by: STUDENT IN AN ORGANIZED HEALTH CARE EDUCATION/TRAINING PROGRAM

## 2022-09-13 PROCEDURE — 99214 OFFICE O/P EST MOD 30 MIN: CPT | Performed by: INTERNAL MEDICINE

## 2022-09-13 RX ORDER — DULAGLUTIDE 4.5 MG/.5ML
4.5 INJECTION, SOLUTION SUBCUTANEOUS WEEKLY
Qty: 6 ML | Refills: 1 | Status: SHIPPED | OUTPATIENT
Start: 2022-09-13

## 2022-09-13 RX ORDER — INSULIN GLARGINE 100 [IU]/ML
45 INJECTION, SOLUTION SUBCUTANEOUS DAILY
Qty: 45 ML | Refills: 1 | Status: SHIPPED | OUTPATIENT
Start: 2022-09-13

## 2022-09-13 RX ADMIN — IOPAMIDOL 98 ML: 612 INJECTION, SOLUTION INTRAVENOUS at 15:31

## 2022-09-13 NOTE — PROGRESS NOTES
"Chief Complaint   Patient presents with   • Diabetes        HPI   Di Lopez is a 61 y.o. female had concerns including Diabetes.      Patient reports no significant health changes in the interim since her last visit.  She does state that something happened with her phone and she has been unable to utilize freestyle bhumi in the last several weeks.  She does report she now has this issue resolved and does have sensors at home which she intends to resume use of.  She has been monitoring once daily via fingerstick, values in the past 2 weeks ranging 117-282, she is generally monitoring at bedtime.  Patient denies any significant adverse effects since increasing Trulicity to 3 mg weekly.  She reports she is having to utilize correctional Humalog only intermittently and generally no more than 2 to 4 units.     Current diabetes medications include the following:  Lantus 50 units daily  Trulicity 3 mg weekly  Correctional Humalog 2 per 50 greater than 150  Glimepiride 8 mg daily    Patient does report she has been having more issues with fatigue in recent weeks.  She reports generally in the mid afternoon she feels like she needs to take a nap.  She does note that she has not been sleeping well and significantly 4 to 5 hours of sleep per night.    The following portions of the patient's history were reviewed and updated as appropriate: allergies, current medications and past social history.    Review of Systems   Constitutional: Positive for fatigue.   Cardiovascular: Negative for palpitations.   Gastrointestinal: Negative for constipation and diarrhea.   Endocrine: Negative for polydipsia and polyuria.      /78 (BP Location: Left arm, Patient Position: Sitting, Cuff Size: Adult)   Pulse 70   Ht 152.4 cm (60\")   Wt 90.3 kg (199 lb)   LMP 06/17/1998   SpO2 97%   BMI 38.86 kg/m²      Physical Exam      Constitutional:  well developed; well nourished  no acute distress   ENT/Thyroid: not examined   Eyes: " Conjunctiva: clear   Respiratory:  breathing is unlabored  clear to auscultation bilaterally   Cardiovascular:  regular rate and rhythm   Chest:  Not performed.   Abdomen: soft, non-tender; no masses   : Not performed.   Musculoskeletal: Not performed   Skin: not performed.   Neuro: mental status, speech normal   Psych: mood and affect are within normal limits       Labs/Imaging   Latest Reference Range & Units 09/13/22 14:15   Glucose 70 - 130 mg/dL 141 !   Hemoglobin A1C % 6.9   !: Data is abnormal     Latest Reference Range & Units 08/21/20 14:12   TSH Baseline 0.270 - 4.200 uIU/mL 0.710      Latest Reference Range & Units 01/13/22 16:22   25 Hydroxy, Vitamin D 30.0 - 100.0 ng/ml 30.6       Freestyle dalia downloaded for review for dates ranging June 29-July 12, 2022.  CGM active 88% of the time with average glucose 165, GMI 7.3% with 33.6% glucose variability.  50% of data within target range, 3% low, 1% very low, 32% high, 6% very high.  Download reveals downtrending glucose overnight with occasional overnight hypoglycemia.  Postprandial hyperglycemia.    Diagnoses and all orders for this visit:    1. Type 2 diabetes mellitus with hyperglycemia, with long-term current use of insulin (HCC) (Primary)  Hemoglobin A1c at goal, 6.9%  CGM data reviewed  Download of dalia data from July shows overnight hypoglycemia, plan to reduce Lantus to 45 units daily  Dalia download reveals postprandial hyperglycemia, increase Trulicity to 4.5 mg weekly  Continue correctional Humalog 2 per 50 greater than 150  Continue glimepiride 8 mg daily  Patient was advised to monitor blood sugar 3 times daily. Patient to resume use of CGM, she will contact the office if she has any trouble with restarting device.  Patient was instructed to bring glucometer to all future appointments. Patient should contact the clinic between appointments with hypoglycemia or persistent hyperglycemia.  Discussed signs and symptoms of hypoglycemia as well  as hypoglycemia management via the rule of 15's.  Discussed potential for long-term complications with uncontrolled diabetes including nephropathy, neuropathy, retinopathy, increased risk for cardiac disease.  Discussed the role of diet and exercise in the management of diabetes.   CMP up-to-date from January 2022, EGFR 80, alkaline phosphatase 158, AST, ALT normal  Lipid panel up-to-date from January 2022, triglycerides 682,   Exam completed March 2022, no retinopathy noted  -     POC Glucose, Blood  -     POC Glycosylated Hemoglobin (Hb A1C)  -     Insulin Glargine (Lantus SoloStar) 100 UNIT/ML injection pen; Inject 45 Units under the skin into the appropriate area as directed Daily.  Dispense: 45 mL; Refill: 1    2. Other fatigue  Prior labs reviewed, vitamin D was normal in January 2022, patient continues on supplementation.  Patient without recent thyroid function testing though historically this has been normal, most recent in 2020.  Patient does not wish to repeat today.  Encourage patient to check glucose at the time of symptoms.  However, I suspect this is multifactorial and likely due in part to recent limited sleep and stress.  Encourage patient to follow-up with her sleep medicine physician and PCP.      Other orders  -     Dulaglutide (Trulicity) 4.5 MG/0.5ML solution pen-injector; Inject 0.5 mL under the skin into the appropriate area as directed 1 (One) Time Per Week.  Dispense: 6 mL; Refill: 1        Return in about 4 months (around 1/13/2023). The patient was instructed to contact the clinic with any interval questions or concerns.    Ameena Iglesias MD   Endocrinologist    Dictated Utilizing Dragon Dictation

## 2022-09-16 ENCOUNTER — TELEPHONE (OUTPATIENT)
Dept: MAMMOGRAPHY | Facility: HOSPITAL | Age: 62
End: 2022-09-16

## 2022-09-16 NOTE — TELEPHONE ENCOUNTER
Left message with family member for patient to call back on Monday; declined needing [facility] contact number.

## 2022-09-18 DIAGNOSIS — K76.9 LIVER LESION: Primary | ICD-10-CM

## 2022-09-19 ENCOUNTER — TELEPHONE (OUTPATIENT)
Dept: MAMMOGRAPHY | Facility: HOSPITAL | Age: 62
End: 2022-09-19

## 2022-09-19 DIAGNOSIS — R92.8 ABNORMAL MAMMOGRAM: Primary | ICD-10-CM

## 2022-09-19 NOTE — TELEPHONE ENCOUNTER
Patient called in and stated she had breast biopsy at [facility] 9/16/22.  Dr. ELSY Damian made aware.

## 2022-09-21 ENCOUNTER — TREATMENT (OUTPATIENT)
Dept: PHYSICAL THERAPY | Facility: CLINIC | Age: 62
End: 2022-09-21

## 2022-09-21 DIAGNOSIS — M62.89 PELVIC FLOOR TENSION: ICD-10-CM

## 2022-09-21 DIAGNOSIS — M62.89 PELVIC FLOOR DYSFUNCTION IN FEMALE: ICD-10-CM

## 2022-09-21 DIAGNOSIS — N94.2 VAGINISMUS: Primary | ICD-10-CM

## 2022-09-21 PROCEDURE — 97162 PT EVAL MOD COMPLEX 30 MIN: CPT

## 2022-09-21 NOTE — PROGRESS NOTES
Physical Therapy Initial Evaluation and Plan of Care    Patient: Di Lopez   : 1960  Diagnosis/ICD-10 Code:  Vaginismus [N94.2]  Referring practitioner: Davina Santiago DO  Date of Initial Visit: 2022  Today's Date: 2022  Patient seen for 1 sessions    Subjective   Saw PCP;   Having a new relationship, never been successful with intercourse.  Been many years since last sexual activity.  Tried more things to relax, still not successful.  Vaginal opening is tight.    13 years since last sexual encounter, was pain-free in past  Partial Hysterectomy was 1996, ovaries removed;  Vomitting, ER, emergency   Tumors had developed and ruptured.  Bladder has dropped since then, inability to hold urine.    Breast tumor, biopsy, cancer-free.  38 tumors removed from breast.  Hx of uterine CA.  Vaginal polyp, observed by GYN, no treatment needed at this time, due to small size. Just observe.    Diabetic; has sensor, insulin dependent.      Chief Complaint:   Chief Complaint   Patient presents with   • Initial Evaluation      Functional Outcome Measure: PFDI-20 score: 48 %  V-Q score: 1    Pain  L Hip, to see Dr next year.  No report of pelvic pain except with intercourse, never able to achieve because of tightness.    Bladder function:    Incontinence: yes  # of pads in 24 hours: Depends all the time;  Water pill 3 days per wee  How soaked is pad when changed: varies, saturated to dry, have to change several per day  What specifically makes you leak: varies. Strong urge, cough sneeze  Leak at night: no, sometimes leakage to bathroom  Urination at night: 3-4 times  Use bathroom “just in case”: yes  Strong urinary urge: Yes  Leaking with urge: yes  Urge triggers: no  Complete bladder voiding %: no, sit longer,  Post-micturition dribble: no  Frequency of urination: 14 x per day  Discomfort with urination: no  Fluid irritants consumed daily: try 2 small bottles per day; gatorade2-3 bottles; 1 soda,  sometimes; 1/2 cup coffee, maybe    Bowel function:    How many episodes of leakage/month: no  How many BM's/day: daily  [place] Stool Scale Type: 3/4  Discomfort with BM: no  Complete bowel voiding %: yes, but sometimes urge to return hour later  Diet: low  IIncontinence with gas: sometimes  Ability to pass gas: yes    Sexual activity  Sexually active: yes, after 13 years; unable due to tightness at vaginal opening; no reports of pain  Lubrication: none  Positions of comfort & discomfort: limited per partner's preference  Using vasoline  Partner dealing with ED. Viagra hasn't helped.    Hx of sexual trauma: no    Diagnostics:    PMH:   Past Medical History:   Diagnosis Date   • Arteriovenous malformation of gastrointestinal tract 5/12/2016   • Arthritis    • Black hairy tongue    • CHF (congestive heart failure) ([facility])    • Chronic back pain    • Depression 5/12/2016   • Diabetes mellitus ([facility])    • Dyslipidemia 5/12/2016   • Emphysema/COPD ([facility]) 5/12/2016   • Fatigue    • Headache    • Herniated cervical disc    • History of echocardiogram 07/16/2013    Left ventricular systolic function at the lower limits of normal; EF 50-55%; mild MR; mechanical prosthetic mitral valve present; mod TR   • Hypertension    • Insomnia 5/12/2016   • Iron deficiency     A.  The patient was treated with IV iron replacement. B.  Secondary to GI bleed, status post EGD with cauterization of blood vessel to the duodenum, 7/16/2014.   • Left bundle branch hemiblock 10/12/2020   • Left shoulder strain    • Liver mass    • Long term current use of anticoagulant 5/12/2016   • Morbid obesity ([facility]) 5/12/2016   • Obstructive sleep apnea syndrome 5/12/2016    Description: A.  Moderate; CPAP therapy..   • Pulmonary hypertension ([facility])    • Sliding hiatal hernia 7/28/2016    Description: A.  Reported on EGD, 12/30/2011.   • Type 2 diabetes mellitus ([facility]) 5/12/2016   • Vitamin B12 deficiency     A.  The patient was treated with vitamin B12  replacement.        Surgical HX:   Past Surgical History:   Procedure Laterality Date   • BACK SURGERY     • BILATERAL OOPHORECTOMY  2009    Status post bilateral oophorectomy secondary to ovarian cysts    • BREAST BIOPSY Right     STEREOTACTIC   • BREAST EXCISIONAL BIOPSY Bilateral    • CARDIAC ELECTROPHYSIOLOGY PROCEDURE N/A 10/15/2020    Procedure: Biventricular Device Insertion;  Surgeon: Nadir Everett MD;  Location: [facility] INVASIVE LOCATION;  Service: Cardiology;  Laterality: N/A;   • COLONOSCOPY  12/28/2012   • CYSTECTOMY      removal of ganglion cyst from left wrist   • ENDOSCOPY  12/30/2011    DIAGNOSTIC ESOPHAGOGASTRODUODENOSCOPY   • HERNIA REPAIR     • HYSTERECTOMY  1995    A.  Status post hysterectomy for early stage endometrial cancer done in 1995.   • MITRAL VALVE REPLACEMENT  03/03/2008    Dr. Hima Barreto.    • OOPHORECTOMY          Menstrual cycle: post hysterectomy, abdominal incision  Oophorectomy in 2009    Birth Hx: 2 known pregnancy; vaginal deliveries; miscarried in one pregnancy;  Cramping, clotting, heavy periods, complicated female issues after deliveries; 14+ D/C to stop bleeding.  Hx of bleeding for 1 solid year.    Meds:   Current Outpatient Medications:   •  Alcohol Swabs (Alcohol Prep) pads, For use prior to checking glucose twice daily, Disp: 100 each, Rfl: 5  •  azelastine (ASTELIN) 0.1 % nasal spray, 2 sprays into the nostril(s) as directed by provider 2 (Two) Times a Day. Use in each nostril as directed, Disp: 1 each, Rfl: 1  •  carvedilol (COREG) 12.5 MG tablet, 12.5 mg 2 (Two) Times a Day With Meals., Disp: , Rfl:   •  cephalexin (KEFLEX) 500 MG capsule, TAKE FOUR CAPSULES BY MOUTH ONE HOUR BEFORE APPOINTMENT, Disp: , Rfl:   •  Continuous Blood Gluc  (FreeStyle Dalia 2 [place]) device, 1 each Daily. Use as directed daily to check blood sugars, Disp: 1 each, Rfl: 0  •  Continuous Blood Gluc Sensor (FreeStyle Dalia 2 Sensor) misc, 1 each Every 14 (Fourteen)  Days., Disp: 3 each, Rfl: 11  •  Dulaglutide (Trulicity) 4.5 MG/0.5ML solution pen-injector, Inject 0.5 mL under the skin into the appropriate area as directed 1 (One) Time Per Week., Disp: 6 mL, Rfl: 1  •  Enoxaparin Sodium (LOVENOX) 100 MG/ML solution prefilled syringe syringe, , Disp: , Rfl:   •  escitalopram (LEXAPRO) 20 MG tablet, Take 1 tablet by mouth once daily, Disp: 30 tablet, Rfl: 0  •  folic acid (FOLVITE) 800 MCG tablet, Take 800 mcg by mouth Daily., Disp: , Rfl:   •  furosemide (LASIX) 40 MG tablet, Take 40 mg by mouth As Needed. TAKING PRETTY MUCH DAILY, Disp: , Rfl:   •  gabapentin (NEURONTIN) 300 MG capsule, Take 1 capsule by mouth At Night As Needed (leg pain)., Disp: 30 capsule, Rfl: 3  •  glimepiride (AMARYL) 4 MG tablet, Take 2 tablets by mouth Every Morning Before Breakfast., Disp: 180 tablet, Rfl: 1  •  glucose blood test strip, Use as instructed 3 times a day, Disp: 100 each, Rfl: 11  •  HYDROcodone-acetaminophen (NORCO) 7.5-325 MG per tablet, Take 1 tablet by mouth Daily., Disp: , Rfl:   •  hydrocortisone 2.5 % ointment, APPLY A SMALL AMOUNT TO AFFECTED AREA TWICE DAILY, Disp: , Rfl:   •  Insulin Glargine (Lantus SoloStar) 100 UNIT/ML injection pen, Inject 45 Units under the skin into the appropriate area as directed Daily., Disp: 45 mL, Rfl: 1  •  Insulin Lispro, 1 Unit Dial, (HumaLOG KwikPen) 100 UNIT/ML solution pen-injector, Per correctional scale, MDD 50 units, Disp: 15 mL, Rfl: 3  •  Insulin Pen Needle (B-D UF III MINI PEN NEEDLES) 31G X 5 MM misc, USE 1  FIVE TIMES DAILY, Disp: 150 each, Rfl: 5  •  Insulin Pen Needle (RELION PEN NEEDLES) 32G X 4 MM misc, Use BID for E11.9, Disp: 100 each, Rfl: 1  •  Lancets misc, For use with glucose monitoring 3 times daily, Disp: 100 each, Rfl: 11  •  lidocaine (LIDODERM) 5 %, Place 1 patch on the skin as directed by provider Daily. Remove & Discard patch within 12 hours or as directed by MD, Disp: 30 patch, Rfl: 2  •  Omega-3 Fatty Acids (FISH OIL)  1000 MG capsule capsule, Take 1 capsule by mouth 4 (Four) Times a Day., Disp: 120 capsule, Rfl: 2  •  omeprazole (priLOSEC) 20 MG capsule, Take 1 capsule by mouth once daily, Disp: 30 capsule, Rfl: 0  •  sacubitril-valsartan (ENTRESTO) 24-26 MG tablet, Take 1 tablet by mouth 2 (Two) Times a Day., Disp: , Rfl:   •  solifenacin (VESICARE) 5 MG tablet, Take 1 tablet by mouth once daily, Disp: 30 tablet, Rfl: 0  •  spironolactone (ALDACTONE) 25 MG tablet, Take 25 mg by mouth Daily., Disp: , Rfl: 0  •  valACYclovir (Valtrex) 500 MG tablet, Take 1 tablet by mouth Daily for 30 days., Disp: 30 tablet, Rfl: 11  •  Vitamin D, Cholecalciferol, (CHOLECALCIFEROL) 10 MCG (400 UNIT) tablet, Take 400 Units by mouth Daily., Disp: , Rfl:   •  warfarin (COUMADIN) 1 MG tablet, Take 1 mg by mouth Daily As Needed. Half tab po 5 days, Disp: , Rfl:   •  warfarin (COUMADIN) 4 MG tablet, Take 4.5 mg by mouth Daily. 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    Activity level/exercise routine: no regular exercise        Objective    Patient independently ambulates into clinic.     Verbal consent obtained for internal pelvic exam/treatment; declined need for second person in room  Posture:ant. pelvic tilt, increased lumbar lordosis, forward head, rounded shoulders  Bony Landmarks: levle  Palpation: R glut/piriformis, SI  Squat partial  Seated LE MMT: 4/5    Supine:   Infrasternal Angle: NT  Breathing pattern:  shallow  Abdominals: distended, restricted. Bruising due to injections, inferior to umbilicus  Iliacus : Unremarkable for tension B  Psoas :  -  Adductors -    PELVIC FLOOR ASSESSMENT  External:    Sensation: Intact internally and externally B to light touch / pressure    Skin quality/Color/Atrophy: WNL   Ischiocavernosus: WNL   Transverse perineal muscle: WNL   Perineal body: -    Scar:assessment: -   PFC: weak   Cough Reflex: -   Prolapse with  cough/bear down: -   Decent with Bear down: -       Internal:   Wall Laxity: WN   Internal superficial perineal body:WNL   Perineal body mobility: WNl  Tension/Trigger Points:   Levator ani: mild tension B   Obturator internus: -   Compressor urethra: + urge    PERF SCALE:  Power: 3  Endurance: 5  Repetitions: 3  Fast twitch: 6  PFM:  Recruitment: slow recruitment  Derecruitment: slow    See flowsheet for details of treatment following evaluation.    Basic information was provided regarding pelvic floor anatomy, explanation of the function of the pelvic floor, interaction between diaphragm and PFM. Patient was provided with a bladder diary to be completed and returned to next visit. Instruction began regarding fluid intake, micturition and defecation behavior, and use of squatty potty.    Assessment/Plan:     Assessment/Plan    The patient is a 61 y.o. female who presents to physical therapy today for vaginismus. Upon initial evaluation, the patient demonstrates the following impairments: pelvic floor dysfunction, urinary frequency, urgency, pelvic floor weakness, tension; mixed incontinence. Due to these impairments, the patient is unable to have intercourse with partner and has daily episodes of urinary leakage with need for depends. The patient would benefit from skilled pelvic PT services to address functional limitations and impairments and to improve patient quality of life.      Goals:   STG's: 4 weeks  ·   · Patient will report > 50% improvement in urinary symptoms for improved QOL  · Patient will be able to actively and consistently contract PFM 5/5 trials for progress toward LTG  · Patient will be independent with prolapse precautions and pelvic brace with lifting  · Patient will be able to perform HEP with minimal verbal cues    LTG's: By discharge  · \  · Patient will report >75% improvement in urinary symptoms   · Patient will report decreasing bladder irritant consumption by 2 servings and increasing  water by 1 serving  · Patient will report an elimination of pad usage with no incontinence x 2-3 weeks for improved QOL  · Patient will report an elimination of urinary urgency   · Patient will report an elimination of nocturia  · Patient will be able to tolerate an internal pelvic exam/vaginal penetration with pain <0/10   · Patient will report improved voiding frequency to once every 2-4 hours for improved function with ADLs.  · Patient will be independent with HEP    Plan  Therapy options: will be seen for skilled physical therapy services  Planned modality interventions: TENS, ultrasound, cryotherapy, thermotherapy (hydrocollator packs)  Planned therapy interventions: abdominal trunk stabilization, manual therapy, neuromuscular re-education, body mechanics training, flexibility, functional ROM exercises, gait training, home exercise program, joint mobilization, therapeutic activities, stretching, strengthening, spinal/joint mobilization, soft tissue mobilization and postural training  Pt prognosis: good  Frequency: weekly  Duration in visits: 12  Duration in weeks: 12  Treatment plan discussed with: patient    Treatment Time: 135 - 235  Timed:         Manual Therapy:    0     mins  22280;     Therapeutic Exercise:    0     mins  73701;     Neuromuscular Elza:    0    mins  63930;    Therapeutic Activity:     5     mins  67879;     Gait Trainin     mins  19038;     Ultrasound:     0     mins  42503;    Ionto                               0    mins   99642  Self Care                       0     mins   27774  Canalith Repos    0     mins 35841      Un-Timed:  Electrical Stimulation:    0     mins  52875 ( );  Dry Needling     0     mins self-pay  Traction     0     mins 45883  Low Eval     0     Mins  79217  Mod Eval     55     Mins  60494  High Eval                       0     Mins  86326  Re-Eval                           0    mins  36296        Timed Treatment:   5   mins   Total Treatment:      60   mins    PT SIGNATURE:Kate Imelda, PAIGE  [place] License: [id number]    DATE TREATMENT INITIATED: 9/21/2022    Initial Certification  Certification Period: 12/20/2022  I certify that the therapy services are furnished while this patient is under my care.  The services outlined above are required by this patient, and will be reviewed every 90 days.     PHYSICIAN: Davina Santiago DO  NPI: [id number]       DATE: 09/21/2022     Please sign and return via fax to [phone]. Thank you, [facility].

## 2022-09-22 DIAGNOSIS — N32.81 OAB (OVERACTIVE BLADDER): ICD-10-CM

## 2022-09-23 RX ORDER — SOLIFENACIN SUCCINATE 5 MG/1
TABLET, FILM COATED ORAL
Qty: 90 TABLET | Refills: 1 | Status: SHIPPED | OUTPATIENT
Start: 2022-09-23 | End: 2023-03-07

## 2022-09-23 RX ORDER — OMEPRAZOLE 20 MG/1
CAPSULE, DELAYED RELEASE ORAL
Qty: 90 CAPSULE | Refills: 1 | Status: SHIPPED | OUTPATIENT
Start: 2022-09-23 | End: 2023-03-07

## 2022-09-23 RX ORDER — ESCITALOPRAM OXALATE 20 MG/1
TABLET ORAL
Qty: 90 TABLET | Refills: 1 | Status: SHIPPED | OUTPATIENT
Start: 2022-09-23 | End: 2023-03-07

## 2022-09-23 NOTE — TELEPHONE ENCOUNTER
Rx Refill Note  Requested Prescriptions     Pending Prescriptions Disp Refills   • escitalopram (LEXAPRO) 20 MG tablet [Pharmacy Med Name: Escitalopram Oxalate 20 MG Oral Tablet] 30 tablet 0     Sig: Take 1 tablet by mouth once daily   • solifenacin (VESICARE) 5 MG tablet [Pharmacy Med Name: Solifenacin Succinate 5 MG Oral Tablet] 30 tablet 0     Sig: Take 1 tablet by mouth once daily   • omeprazole (priLOSEC) 20 MG capsule [Pharmacy Med Name: Omeprazole 20 MG Oral Capsule Delayed Release] 30 capsule 0     Sig: Take 1 capsule by mouth once daily      Last office visit with prescribing clinician: 8/30/2022      Next office visit with prescribing clinician: 3/1/2023            Kristian Peralta MA  09/23/22, 08:18 EDT

## 2022-09-29 ENCOUNTER — OFFICE VISIT (OUTPATIENT)
Dept: PULMONOLOGY | Facility: CLINIC | Age: 62
End: 2022-09-29

## 2022-09-29 VITALS
TEMPERATURE: 97.5 F | OXYGEN SATURATION: 98 % | HEART RATE: 82 BPM | HEIGHT: 60 IN | SYSTOLIC BLOOD PRESSURE: 118 MMHG | WEIGHT: 196.6 LBS | DIASTOLIC BLOOD PRESSURE: 70 MMHG | BODY MASS INDEX: 38.6 KG/M2

## 2022-09-29 DIAGNOSIS — Z87.891 HISTORY OF TOBACCO USE, PRESENTING HAZARDS TO HEALTH: ICD-10-CM

## 2022-09-29 DIAGNOSIS — R06.09 DYSPNEA ON EXERTION: ICD-10-CM

## 2022-09-29 DIAGNOSIS — R91.8 MULTIPLE NODULES OF LUNG: ICD-10-CM

## 2022-09-29 DIAGNOSIS — J43.2 CENTRILOBULAR EMPHYSEMA: Chronic | ICD-10-CM

## 2022-09-29 DIAGNOSIS — J47.9 BRONCHIECTASIS WITHOUT COMPLICATION: Primary | ICD-10-CM

## 2022-09-29 DIAGNOSIS — Z23 IMMUNIZATION DUE: ICD-10-CM

## 2022-09-29 DIAGNOSIS — G47.33 OBSTRUCTIVE SLEEP APNEA SYNDROME: ICD-10-CM

## 2022-09-29 DIAGNOSIS — G47.34 NOCTURNAL HYPOXEMIA: ICD-10-CM

## 2022-09-29 PROCEDURE — G0009 ADMIN PNEUMOCOCCAL VACCINE: HCPCS | Performed by: INTERNAL MEDICINE

## 2022-09-29 PROCEDURE — 94726 PLETHYSMOGRAPHY LUNG VOLUMES: CPT | Performed by: INTERNAL MEDICINE

## 2022-09-29 PROCEDURE — 99215 OFFICE O/P EST HI 40 MIN: CPT | Performed by: INTERNAL MEDICINE

## 2022-09-29 PROCEDURE — 94729 DIFFUSING CAPACITY: CPT | Performed by: INTERNAL MEDICINE

## 2022-09-29 PROCEDURE — 94618 PULMONARY STRESS TESTING: CPT | Performed by: INTERNAL MEDICINE

## 2022-09-29 PROCEDURE — 94375 RESPIRATORY FLOW VOLUME LOOP: CPT | Performed by: INTERNAL MEDICINE

## 2022-09-29 PROCEDURE — 90677 PCV20 VACCINE IM: CPT | Performed by: INTERNAL MEDICINE

## 2022-10-07 NOTE — PROGRESS NOTES
"Per [facility] team, \"WQ order to see Ms. Lopez for liver lesions. She was a Surgical pt before, but should be followed by  for liver and pancreas.\"       Spoke with Dr. Lanier over the phone regarding this referral denial and read him her ct scan result from 9/13/22. He would like to see her in his office for follow up. I asked him if I should go ahead and order mri, he says he will order at follow up with him. His office told me they will call patient to schedule her follow up. I have made patient aware of this and they should be calling her.  "

## 2022-10-26 ENCOUNTER — PREP FOR SURGERY (OUTPATIENT)
Dept: OTHER | Facility: HOSPITAL | Age: 62
End: 2022-10-26

## 2022-10-26 ENCOUNTER — HOSPITAL ENCOUNTER (OUTPATIENT)
Facility: HOSPITAL | Age: 62
Setting detail: HOSPITAL OUTPATIENT SURGERY
End: 2022-10-26
Attending: INTERNAL MEDICINE | Admitting: INTERNAL MEDICINE

## 2022-10-26 ENCOUNTER — TELEPHONE (OUTPATIENT)
Dept: GASTROENTEROLOGY | Facility: CLINIC | Age: 62
End: 2022-10-26

## 2022-10-26 DIAGNOSIS — Z12.11 SCREENING FOR COLON CANCER: Primary | ICD-10-CM

## 2022-10-28 NOTE — TELEPHONE ENCOUNTER
I SPOKE WITH MS MONTERROSO. SURGICAL CLEARANCE INFORMATION GIVEN. PATIENT WILL ALSO CALL DR KIMBROUGH'S OFFICE TO GET CLARIFICATION ON INR CHECK & LOVENOX BRIDGE.

## 2022-11-01 ENCOUNTER — OFFICE VISIT (OUTPATIENT)
Dept: SLEEP MEDICINE | Facility: HOSPITAL | Age: 62
End: 2022-11-01

## 2022-11-01 VITALS
OXYGEN SATURATION: 98 % | HEART RATE: 80 BPM | HEIGHT: 60 IN | BODY MASS INDEX: 38.83 KG/M2 | SYSTOLIC BLOOD PRESSURE: 111 MMHG | DIASTOLIC BLOOD PRESSURE: 52 MMHG | WEIGHT: 197.8 LBS

## 2022-11-01 DIAGNOSIS — G47.33 OBSTRUCTIVE SLEEP APNEA SYNDROME: Primary | ICD-10-CM

## 2022-11-01 DIAGNOSIS — G25.81 RLS (RESTLESS LEGS SYNDROME): ICD-10-CM

## 2022-11-01 PROCEDURE — 99213 OFFICE O/P EST LOW 20 MIN: CPT | Performed by: NURSE PRACTITIONER

## 2022-11-01 RX ORDER — GABAPENTIN 300 MG/1
300 CAPSULE ORAL NIGHTLY PRN
Qty: 30 CAPSULE | Refills: 3 | Status: SHIPPED | OUTPATIENT
Start: 2022-11-01 | End: 2023-01-31 | Stop reason: SDUPTHER

## 2022-11-04 ENCOUNTER — APPOINTMENT (OUTPATIENT)
Dept: BONE DENSITY | Facility: HOSPITAL | Age: 62
End: 2022-11-04

## 2022-11-04 DIAGNOSIS — Z79.4 TYPE 2 DIABETES MELLITUS WITH HYPERGLYCEMIA, WITH LONG-TERM CURRENT USE OF INSULIN: ICD-10-CM

## 2022-11-04 DIAGNOSIS — E11.65 TYPE 2 DIABETES MELLITUS WITH HYPERGLYCEMIA, WITH LONG-TERM CURRENT USE OF INSULIN: ICD-10-CM

## 2022-11-11 DIAGNOSIS — Z12.11 SCREENING FOR COLON CANCER: Primary | ICD-10-CM

## 2022-11-28 ENCOUNTER — ANESTHESIA EVENT (OUTPATIENT)
Dept: GASTROENTEROLOGY | Facility: HOSPITAL | Age: 62
End: 2022-11-28

## 2022-11-28 RX ORDER — SODIUM CHLORIDE 0.9 % (FLUSH) 0.9 %
10 SYRINGE (ML) INJECTION AS NEEDED
Status: CANCELLED | OUTPATIENT
Start: 2022-11-28

## 2022-11-28 RX ORDER — MIDAZOLAM HYDROCHLORIDE 1 MG/ML
1 INJECTION INTRAMUSCULAR; INTRAVENOUS
Status: CANCELLED | OUTPATIENT
Start: 2022-11-28

## 2022-11-28 RX ORDER — SODIUM CHLORIDE, SODIUM LACTATE, POTASSIUM CHLORIDE, CALCIUM CHLORIDE 600; 310; 30; 20 MG/100ML; MG/100ML; MG/100ML; MG/100ML
9 INJECTION, SOLUTION INTRAVENOUS CONTINUOUS
Status: CANCELLED | OUTPATIENT
Start: 2022-11-28

## 2022-11-28 RX ORDER — FAMOTIDINE 10 MG/ML
20 INJECTION, SOLUTION INTRAVENOUS ONCE
Status: CANCELLED | OUTPATIENT
Start: 2022-11-28 | End: 2022-11-28

## 2022-11-28 RX ORDER — SODIUM CHLORIDE 0.9 % (FLUSH) 0.9 %
10 SYRINGE (ML) INJECTION EVERY 12 HOURS SCHEDULED
Status: CANCELLED | OUTPATIENT
Start: 2022-11-28

## 2022-11-28 RX ORDER — FAMOTIDINE 20 MG/1
20 TABLET, FILM COATED ORAL ONCE
Status: CANCELLED | OUTPATIENT
Start: 2022-11-28 | End: 2022-11-28

## 2022-11-28 RX ORDER — LIDOCAINE HYDROCHLORIDE 10 MG/ML
0.5 INJECTION, SOLUTION EPIDURAL; INFILTRATION; INTRACAUDAL; PERINEURAL ONCE AS NEEDED
Status: CANCELLED | OUTPATIENT
Start: 2022-11-28

## 2022-11-28 RX ORDER — SODIUM CHLORIDE 9 MG/ML
40 INJECTION, SOLUTION INTRAVENOUS AS NEEDED
Status: CANCELLED | OUTPATIENT
Start: 2022-11-28

## 2022-11-29 ENCOUNTER — ANESTHESIA (OUTPATIENT)
Dept: GASTROENTEROLOGY | Facility: HOSPITAL | Age: 62
End: 2022-11-29

## 2022-11-29 ENCOUNTER — TELEPHONE (OUTPATIENT)
Dept: FAMILY MEDICINE CLINIC | Facility: CLINIC | Age: 62
End: 2022-11-29

## 2022-11-29 DIAGNOSIS — Z12.11 SCREENING FOR COLON CANCER: Primary | ICD-10-CM

## 2022-11-29 NOTE — TELEPHONE ENCOUNTER
The last colonoscopy I have for her is 2013. She can call and reschedule for next year or if she knows the doctor who did it before we can request records.

## 2022-11-29 NOTE — TELEPHONE ENCOUNTER
Pt notified . She says she doesn't remember the provider that done it last. She also says she tried to call them to reschedule and that this would have to be something the office would have to do for her.

## 2022-11-29 NOTE — TELEPHONE ENCOUNTER
Provider: DR JERE HANNAH  Caller: NEHA LEDESMA  Relationship to Patient: SELF  Phone Number: [phone]  Reason for Call: PATIENT MISSED HER APPOINTMENT FOR A COLONOSCOPY TODAY. SHE HAD ONE DONE 3 YEARS AGO AT . CAN YOU JUST PULL THE RECORDS FROM THAT ONE OR WOULD YOU LIKE HER TO GET ANOTHER ONE?

## 2022-12-01 ENCOUNTER — TELEPHONE (OUTPATIENT)
Dept: FAMILY MEDICINE CLINIC | Facility: CLINIC | Age: 62
End: 2022-12-01

## 2022-12-01 NOTE — TELEPHONE ENCOUNTER
Please call patient to let her know that she would benefit from being on a cholesterol medication called lipitor.  It can cause muscle pain or elevated liver tests for which we would monitor. Let me know if interested.

## 2022-12-01 NOTE — TELEPHONE ENCOUNTER
Caller: SUBHASH  Phone Number: [phone]  Reason for Call: MESSAGE BELOW FROM ERICH:     OUR RECORDS SHOW THAT THIS PATIENT HAS DIABETES BUT THE PATIENT IS NOT PRESCRIBED A STATIN. GUIDELINES RECOMMEND PATIENT'S 40-75 YEARS OLD WITH DIABETES TAKE A STATIN TO DECREASE CARDIOVASCULAR RISK. THEY RECOMMEND A STATIN FOR THIS PATIENT

## 2022-12-05 RX ORDER — DULAGLUTIDE 3 MG/.5ML
INJECTION, SOLUTION SUBCUTANEOUS
Qty: 12 ML | Refills: 0 | OUTPATIENT
Start: 2022-12-05

## 2022-12-05 RX ORDER — SODIUM, POTASSIUM,MAG SULFATES 17.5-3.13G
SOLUTION, RECONSTITUTED, ORAL ORAL
Qty: 354 ML | Refills: 0 | OUTPATIENT
Start: 2022-12-05

## 2022-12-14 ENCOUNTER — PREP FOR SURGERY (OUTPATIENT)
Dept: OTHER | Facility: HOSPITAL | Age: 62
End: 2022-12-14

## 2022-12-14 DIAGNOSIS — Z12.11 SCREENING FOR COLON CANCER: Primary | ICD-10-CM

## 2023-01-09 RX ORDER — LIDOCAINE 50 MG/G
PATCH TOPICAL
Qty: 30 PATCH | Refills: 0 | Status: SHIPPED | OUTPATIENT
Start: 2023-01-09

## 2023-01-20 DIAGNOSIS — Z79.4 TYPE 2 DIABETES MELLITUS WITH HYPOGLYCEMIA WITHOUT COMA, WITH LONG-TERM CURRENT USE OF INSULIN: ICD-10-CM

## 2023-01-20 DIAGNOSIS — E11.649 TYPE 2 DIABETES MELLITUS WITH HYPOGLYCEMIA WITHOUT COMA, WITH LONG-TERM CURRENT USE OF INSULIN: ICD-10-CM

## 2023-01-23 RX ORDER — GLIMEPIRIDE 4 MG/1
TABLET ORAL
Qty: 180 TABLET | Refills: 0 | Status: SHIPPED | OUTPATIENT
Start: 2023-01-23

## 2023-01-24 RX ORDER — FLURBIPROFEN SODIUM 0.3 MG/ML
SOLUTION/ DROPS OPHTHALMIC
Qty: 150 EACH | Refills: 5 | Status: SHIPPED | OUTPATIENT
Start: 2023-01-24

## 2023-01-31 ENCOUNTER — OFFICE VISIT (OUTPATIENT)
Dept: SLEEP MEDICINE | Facility: HOSPITAL | Age: 63
End: 2023-01-31
Payer: MEDICARE

## 2023-01-31 VITALS
HEIGHT: 60 IN | SYSTOLIC BLOOD PRESSURE: 128 MMHG | HEART RATE: 71 BPM | OXYGEN SATURATION: 95 % | BODY MASS INDEX: 38.79 KG/M2 | DIASTOLIC BLOOD PRESSURE: 59 MMHG | WEIGHT: 197.6 LBS

## 2023-01-31 DIAGNOSIS — G47.33 OBSTRUCTIVE SLEEP APNEA SYNDROME: Primary | ICD-10-CM

## 2023-01-31 DIAGNOSIS — G25.81 RLS (RESTLESS LEGS SYNDROME): ICD-10-CM

## 2023-01-31 PROCEDURE — 99213 OFFICE O/P EST LOW 20 MIN: CPT | Performed by: NURSE PRACTITIONER

## 2023-01-31 RX ORDER — GABAPENTIN 300 MG/1
300 CAPSULE ORAL NIGHTLY PRN
Qty: 30 CAPSULE | Refills: 3 | Status: SHIPPED | OUTPATIENT
Start: 2023-01-31

## 2023-01-31 NOTE — PROGRESS NOTES
Chief Complaint:   Chief Complaint   Patient presents with   • Follow-up       HPI:    Di Lopez is a 62 y.o. female here for follow-up of sleep apnea and restless leg syndrome.  Patient was last seen 11/1/2022.  Patient does good with gabapentin 300 mg at bedtime this allows her to sleep without moving legs or having any sensations that are annoying to her during the night.  Patient has an [place] score of 4/24.  She has no concerns or complaints and will continue CPAP as well as Neurontin        Current medications are:   Current Outpatient Medications:   •  Alcohol Swabs (Alcohol Prep) pads, For use prior to checking glucose twice daily, Disp: 100 each, Rfl: 5  •  carvedilol (COREG) 12.5 MG tablet, 12.5 mg 2 (Two) Times a Day With Meals., Disp: , Rfl:   •  cephalexin (KEFLEX) 500 MG capsule, TAKE FOUR CAPSULES BY MOUTH ONE HOUR BEFORE APPOINTMENT, Disp: , Rfl:   •  Continuous Blood Gluc  (FreeStyle Dalia 2 [place]) device, 1 each Daily. Use as directed daily to check blood sugars, Disp: 1 each, Rfl: 0  •  Continuous Blood Gluc Sensor (FreeStyle Dalia 2 Sensor) misc, USE AS DIRECTED. CHANGE EVERY 14 DAYS, Disp: 2 each, Rfl: 5  •  Dulaglutide (Trulicity) 4.5 MG/0.5ML solution pen-injector, Inject 0.5 mL under the skin into the appropriate area as directed 1 (One) Time Per Week., Disp: 6 mL, Rfl: 1  •  escitalopram (LEXAPRO) 20 MG tablet, Take 1 tablet by mouth once daily, Disp: 90 tablet, Rfl: 1  •  folic acid (FOLVITE) 800 MCG tablet, Take 800 mcg by mouth Daily., Disp: , Rfl:   •  furosemide (LASIX) 40 MG tablet, Take 40 mg by mouth As Needed. TAKING PRETTY MUCH DAILY, Disp: , Rfl:   •  gabapentin (NEURONTIN) 300 MG capsule, Take 1 capsule by mouth At Night As Needed (leg pain)., Disp: 30 capsule, Rfl: 3  •  glimepiride (AMARYL) 4 MG tablet, TAKE 2 TABLETS BY MOUTH IN THE MORNING BEFORE BREAKFAST, Disp: 180 tablet, Rfl: 0  •  glucose blood test strip, Use as instructed 3 times a day, Disp: 100 each,  Rfl: 11  •  HYDROcodone-acetaminophen (NORCO) 7.5-325 MG per tablet, Take 1 tablet by mouth Daily., Disp: , Rfl:   •  hydrocortisone 2.5 % ointment, APPLY A SMALL AMOUNT TO AFFECTED AREA TWICE DAILY, Disp: , Rfl:   •  Insulin Glargine (Lantus SoloStar) 100 UNIT/ML injection pen, Inject 45 Units under the skin into the appropriate area as directed Daily., Disp: 45 mL, Rfl: 1  •  Insulin Lispro, 1 Unit Dial, (HumaLOG KwikPen) 100 UNIT/ML solution pen-injector, Per correctional scale, MDD 50 units, Disp: 15 mL, Rfl: 3  •  Insulin Pen Needle (B-D UF III MINI PEN NEEDLES) 31G X 5 MM misc, USE 1  FIVE TIMES DAILY, Disp: 150 each, Rfl: 5  •  Insulin Pen Needle (RELION PEN NEEDLES) 32G X 4 MM misc, Use BID for E11.9, Disp: 100 each, Rfl: 1  •  Lancets misc, For use with glucose monitoring 3 times daily, Disp: 100 each, Rfl: 11  •  lidocaine (LIDODERM) 5 %, USE 1 PATCH EXTERNALLY ONCE DAILY. REMOVE AND DISCARD PATCH WITHIN 12 HOURS OR AS DIRECTED BY MD, Disp: 30 patch, Rfl: 0  •  Omega-3 Fatty Acids (FISH OIL) 1000 MG capsule capsule, Take 1 capsule by mouth 4 (Four) Times a Day., Disp: 120 capsule, Rfl: 2  •  omeprazole (priLOSEC) 20 MG capsule, Take 1 capsule by mouth once daily, Disp: 90 capsule, Rfl: 1  •  sacubitril-valsartan (ENTRESTO) 24-26 MG tablet, Take 1 tablet by mouth 2 (Two) Times a Day., Disp: , Rfl:   •  solifenacin (VESICARE) 5 MG tablet, Take 1 tablet by mouth once daily, Disp: 90 tablet, Rfl: 1  •  spironolactone (ALDACTONE) 25 MG tablet, Take 25 mg by mouth Daily., Disp: , Rfl: 0  •  Vitamin D, Cholecalciferol, (CHOLECALCIFEROL) 10 MCG (400 UNIT) tablet, Take 400 Units by mouth Daily., Disp: , Rfl:   •  warfarin (COUMADIN) 1 MG tablet, Take 0.5 mg by mouth Daily As Needed. Half tab po 5 days, Disp: , Rfl:   •  warfarin (COUMADIN) 4 MG tablet, Take 4.5 mg by mouth Daily. 4.5 mg for 5 days then 4 mg for 2 days., Disp: , Rfl: 2.      The patient's relevant past medical, surgical, family and social history  were reviewed and updated in Epic as appropriate.       Review of Systems   Eyes: Positive for visual disturbance.   Respiratory: Positive for apnea, shortness of breath and wheezing.    Cardiovascular: Positive for leg swelling.   Gastrointestinal: Negative for abdominal distention.   Musculoskeletal: Positive for arthralgias, back pain, gait problem and joint swelling.   Allergic/Immunologic: Positive for environmental allergies.   Psychiatric/Behavioral: Positive for dysphoric mood and sleep disturbance. The patient is nervous/anxious.    All other systems reviewed and are negative.        Objective:    Physical Exam  Constitutional:       Appearance: Normal appearance.   HENT:      Head: Normocephalic and atraumatic.      Mouth/Throat:      Comments: Class 4 airway  Cardiovascular:      Rate and Rhythm: Normal rate and regular rhythm.   Pulmonary:      Effort: Pulmonary effort is normal.      Breath sounds: Normal breath sounds.   Skin:     General: Skin is warm and dry.   Neurological:      Mental Status: She is alert and oriented to person, place, and time.   Psychiatric:         Mood and Affect: Mood normal.         Behavior: Behavior normal.         Thought Content: Thought content normal.         Judgment: Judgment normal.         CPAP Report  79/90 days of use  Greater than 4-hour use 73%  Settings 8-18  95th percentile pressure 13.9  AHI 1.5    The patient continues to use and benefit from CPAP therapy.    ASSESSMENT/PLAN    Diagnoses and all orders for this visit:    1. Obstructive sleep apnea syndrome (Primary)    2. RLS (restless legs syndrome)  -     gabapentin (NEURONTIN) 300 MG capsule; Take 1 capsule by mouth At Night As Needed (leg pain).  Dispense: 30 capsule; Refill: 3        1. Counseled patient regarding multimodal approach with healthy nutrition, healthy sleep, regular physical activity, social activities, counseling, and medications. Encouraged to practice lateral sleep position. Avoid  alcohol and sedatives close to bedtime.  2.   As per his up-to-date and compliant Neurontin 300 mg at bedtime #30 with 3 refills I will see patient back in 3 to 4 months or sooner as symptoms warrant.    I have reviewed the results of my evaluation and impression and discussed my recommendations in detail with the patient.      Signed by  FRANCESCA Smith    January 31, 2023      CC: Davina Santiago DO         No ref. provider found

## 2023-02-02 ENCOUNTER — PREP FOR SURGERY (OUTPATIENT)
Dept: OTHER | Facility: HOSPITAL | Age: 63
End: 2023-02-02
Payer: MEDICARE

## 2023-02-02 ENCOUNTER — TELEPHONE (OUTPATIENT)
Dept: GASTROENTEROLOGY | Facility: CLINIC | Age: 63
End: 2023-02-02
Payer: MEDICARE

## 2023-02-02 NOTE — TELEPHONE ENCOUNTER
Patient has pacemaker and mechanical valve, also takes wafarin.    Scheduled colonoscopy 02/10 with Dom    Cardiologist:  Nadir Everett  Phone: [phone]  Fax: [phone]    I am not sure why this wasn't requested earlier, but it needs to be done please. Thank you!

## 2023-02-03 ENCOUNTER — PRE-ADMISSION TESTING (OUTPATIENT)
Dept: PREADMISSION TESTING | Facility: HOSPITAL | Age: 63
End: 2023-02-03
Payer: MEDICARE

## 2023-02-03 ENCOUNTER — TELEPHONE (OUTPATIENT)
Dept: GASTROENTEROLOGY | Facility: CLINIC | Age: 63
End: 2023-02-03
Payer: MEDICARE

## 2023-02-03 VITALS — HEIGHT: 60 IN | BODY MASS INDEX: 38.65 KG/M2 | WEIGHT: 196.87 LBS

## 2023-02-03 LAB
DEPRECATED RDW RBC AUTO: 43.9 FL (ref 37–54)
ERYTHROCYTE [DISTWIDTH] IN BLOOD BY AUTOMATED COUNT: 13.4 % (ref 12.3–15.4)
HCT VFR BLD AUTO: 36.9 % (ref 34–46.6)
HGB BLD-MCNC: 12.1 G/DL (ref 12–15.9)
INR PPP: 1.81 (ref 0.84–1.13)
MCH RBC QN AUTO: 29.2 PG (ref 26.6–33)
MCHC RBC AUTO-ENTMCNC: 32.8 G/DL (ref 31.5–35.7)
MCV RBC AUTO: 89.1 FL (ref 79–97)
PLATELET # BLD AUTO: 297 10*3/MM3 (ref 140–450)
PMV BLD AUTO: 10.9 FL (ref 6–12)
POTASSIUM SERPL-SCNC: 3.6 MMOL/L (ref 3.5–5.2)
PROTHROMBIN TIME: 21 SECONDS (ref 11.4–14.4)
QT INTERVAL: 504 MS
QTC INTERVAL: 515 MS
RBC # BLD AUTO: 4.14 10*6/MM3 (ref 3.77–5.28)
WBC NRBC COR # BLD: 9.61 10*3/MM3 (ref 3.4–10.8)

## 2023-02-03 PROCEDURE — 84132 ASSAY OF SERUM POTASSIUM: CPT

## 2023-02-03 PROCEDURE — 93005 ELECTROCARDIOGRAM TRACING: CPT

## 2023-02-03 PROCEDURE — 85027 COMPLETE CBC AUTOMATED: CPT

## 2023-02-03 PROCEDURE — 93010 ELECTROCARDIOGRAM REPORT: CPT | Performed by: INTERNAL MEDICINE

## 2023-02-03 PROCEDURE — 36415 COLL VENOUS BLD VENIPUNCTURE: CPT

## 2023-02-03 PROCEDURE — 85610 PROTHROMBIN TIME: CPT

## 2023-02-03 RX ORDER — WARFARIN SODIUM 1 MG/1
TABLET ORAL
COMMUNITY

## 2023-02-03 NOTE — PAT
An arrival time for procedure was not provided during PAT visit. If patient had any questions or concerns about their arrival time, they were instructed to contact their surgeon/physician.  Additionally, if the patient referred to an arrival time that was acquired from their my chart account, patient was encouraged to verify that time with their surgeon/physician. Arrival times are NOT provided in Pre Admission Testing Department.    It was noted during Pre Admission Testing that patient was wearing some form of fingernail polish (gel/regular) and/or acrylic/artificial nails.  Patient was told that polish and/or artificial nails must be removed for surgery.  If a patient had recent manicure, and would rather not remove polish or artificial nails. Then the minimum requirement is that the polish/artificial nails must be removed from the middle finger on each hand.  Patient verbalized understanding.    Patient denies any current skin issues.     Per Anesthesia Request, patient instructed not to take their ACE/ARB medications on the AM of surgery.    DR. SEQUEIRA'S OFFICE FAXED A CARDIAC RISK ASSESSMENT AND COUMADIN STATEMENT REQUEST TO DR. MORRIS TODAY 2/3/23. CALLED DR. MORRIS'S OFFICE TO REQUEST LAST PPM INTERROGATION. TO FAX. WILL HOLD CHART.PT WILL NEED INSTRUCTIONS ON WHEN TO STOP COUMADIN WHEN OBTAINED FROM DR. MORRIS.

## 2023-02-06 ENCOUNTER — TELEPHONE (OUTPATIENT)
Dept: GASTROENTEROLOGY | Facility: CLINIC | Age: 63
End: 2023-02-06

## 2023-02-06 NOTE — TELEPHONE ENCOUNTER
Dr Everett's nurse called and stated she check on Ms Hockaday this morning. She never started on Levonox bridge today or stopped Warfarin yesterday.  Appointment has to be cancelled and rescheduled.

## 2023-02-06 NOTE — TELEPHONE ENCOUNTER
I TRIED TO CALL MS REYES TO INFORM HER THAT DR SOTO GAVE SURGICAL CLEARANCE WITH LOVENOX BRIDGE PROTOCOL. NO ANSWER; LEFT VOICE MESSAGE. MAKE SURE SHE STARTED BRIDGE. NO ANSWER; LEFT VOICE MESSAGE.

## 2023-02-06 NOTE — PAT
Per Dr Fernandes's office surgery cancelled due to patient not following their/cardiolgy Lovenox bridge recs and will be rescheduled at a later date, chart sent to medical records

## 2023-02-09 ENCOUNTER — OFFICE VISIT (OUTPATIENT)
Dept: ENDOCRINOLOGY | Facility: CLINIC | Age: 63
End: 2023-02-09
Payer: MEDICARE

## 2023-02-09 VITALS
SYSTOLIC BLOOD PRESSURE: 128 MMHG | OXYGEN SATURATION: 95 % | DIASTOLIC BLOOD PRESSURE: 68 MMHG | BODY MASS INDEX: 38.68 KG/M2 | HEART RATE: 84 BPM | WEIGHT: 197 LBS | HEIGHT: 60 IN

## 2023-02-09 DIAGNOSIS — E11.65 TYPE 2 DIABETES MELLITUS WITH HYPERGLYCEMIA, WITH LONG-TERM CURRENT USE OF INSULIN: Primary | ICD-10-CM

## 2023-02-09 DIAGNOSIS — Z79.4 TYPE 2 DIABETES MELLITUS WITH HYPERGLYCEMIA, WITH LONG-TERM CURRENT USE OF INSULIN: Primary | ICD-10-CM

## 2023-02-09 LAB
EXPIRATION DATE: ABNORMAL
EXPIRATION DATE: NORMAL
GLUCOSE BLDC GLUCOMTR-MCNC: 185 MG/DL (ref 70–130)
HBA1C MFR BLD: 6.8 %
Lab: ABNORMAL
Lab: NORMAL

## 2023-02-09 PROCEDURE — 80061 LIPID PANEL: CPT | Performed by: INTERNAL MEDICINE

## 2023-02-09 PROCEDURE — 83036 HEMOGLOBIN GLYCOSYLATED A1C: CPT | Performed by: INTERNAL MEDICINE

## 2023-02-09 PROCEDURE — 82947 ASSAY GLUCOSE BLOOD QUANT: CPT | Performed by: INTERNAL MEDICINE

## 2023-02-09 PROCEDURE — 3044F HG A1C LEVEL LT 7.0%: CPT | Performed by: INTERNAL MEDICINE

## 2023-02-09 PROCEDURE — 82043 UR ALBUMIN QUANTITATIVE: CPT | Performed by: INTERNAL MEDICINE

## 2023-02-09 PROCEDURE — 99214 OFFICE O/P EST MOD 30 MIN: CPT | Performed by: INTERNAL MEDICINE

## 2023-02-09 PROCEDURE — 82570 ASSAY OF URINE CREATININE: CPT | Performed by: INTERNAL MEDICINE

## 2023-02-09 PROCEDURE — 80053 COMPREHEN METABOLIC PANEL: CPT | Performed by: INTERNAL MEDICINE

## 2023-02-09 RX ORDER — PEN NEEDLE, DIABETIC 31 GX5/16"
NEEDLE, DISPOSABLE MISCELLANEOUS
Qty: 100 EACH | Refills: 5 | Status: SHIPPED | OUTPATIENT
Start: 2023-02-09

## 2023-02-09 NOTE — PROGRESS NOTES
"Chief Complaint   Patient presents with   • Diabetes        HPI   Di Lopez is a 62 y.o. female had concerns including Diabetes.      Patient reports no significant health changes in the interim since her last visit.  She does report that she missed a single week of Trulicity due to issues with supply but states she did not have a significant change of blood sugar.  She denies any nausea since increasing dose.    Current diabetes medications include the following:  Trulicity 4.5 mg weekly  Correctional Humalog 2 units for 50 greater than 150  Glimepiride 8 mg daily  Lantus 45 units daily    Patient continues to monitor blood glucose via freestyle bhumi, discussed pattern noted on device.  Patient reports no dietary changes but does report she is drinking regular Gatorade which may be impacting her sugar.    The following portions of the patient's history were reviewed and updated as appropriate: allergies, current medications and past social history.    Review of Systems   Gastrointestinal: Negative for abdominal pain, nausea and vomiting.   Endocrine: Negative for polydipsia and polyuria.      /68 (BP Location: Left arm, Patient Position: Sitting, Cuff Size: Adult)   Pulse 84   Ht 152.4 cm (60\")   Wt 89.4 kg (197 lb)   LMP 06/17/1998   SpO2 95%   BMI 38.47 kg/m²      Physical Exam  Cardiovascular:      Pulses:           Dorsalis pedis pulses are 2+ on the right side and 2+ on the left side.        Posterior tibial pulses are 2+ on the right side and 2+ on the left side.   Feet:      Right foot:      Protective Sensation: 10 sites tested. 10 sites sensed.      Toenail Condition: Right toenails are abnormally thick.      Left foot:      Protective Sensation: 10 sites tested. 10 sites sensed.      Skin integrity: Callus present.      Toenail Condition: Left toenails are abnormally thick.           Constitutional:  well developed; well nourished  no acute distress   ENT/Thyroid: not examined   Eyes: " Conjunctiva: clear   Respiratory:  breathing is unlabored  clear to auscultation bilaterally   Cardiovascular:  regular rate and rhythm   Chest:  Not performed.   Abdomen: Not performed.   : Not performed.   Musculoskeletal: Not performed   Skin: not performed.   Neuro: mental status, speech normal   Psych: mood and affect are within normal limits       Labs/Imaging   Latest Reference Range & Units 02/09/23 10:12 02/09/23 10:13   Glucose 70 - 130 mg/dL 185 !    Hemoglobin A1C %  6.8   !: Data is abnormal    Freestyle bhumi downloaded for review for dates ranging January 27 through February 9, 2023, CGM is active 85% of the time with average glucose 180, GMI 7.6% with 31.3% glucose variability.  57% of data within target range, 28% high, 15% very high.  No hypoglycemia noted.  Patient has periodic rise in glucose throughout the day which generally returns to baseline.  Per history this is likely either due to food intake or intake of sugary drink.    Diagnoses and all orders for this visit:    1. Type 2 diabetes mellitus with hyperglycemia, with long-term current use of insulin (HCC) (Primary)  -     POC Glucose, Blood  -     POC Glycosylated Hemoglobin (Hb A1C)  -     Alcohol Swabs (Alcohol Prep) pads; For use prior to checking glucose twice daily  Dispense: 100 each; Refill: 5  -     Lipid Panel; Future  -     Comprehensive Metabolic Panel; Future  -     Microalbumin / Creatinine Urine Ratio - Urine, Clean Catch; Future  Hemoglobin A1c at goal, 6.8%  CGM containing 72 hours of glucose data was downloaded and reviewed  Discussed factors likely causing periodic rise in glucose noted throughout the day per CGM.  Discussed transitioning to a zero sugar Gatorade.  Plan to continue current medications which include Trulicity 4.5 mg weekly, Humalog per correctional scale 2 per 50 greater than 150, glimepiride 8 mg daily, Lantus 45 units daily   Patient was advised to monitor blood sugar 3 times daily if not using CGM.   Patient was instructed to bring glucometer to all future appointments. Patient should contact the clinic between appointments with hypoglycemia or persistent hyperglycemia.  Discussed signs and symptoms of hypoglycemia as well as hypoglycemia management via the rule of 15's.  Discussed potential for long-term complications with uncontrolled diabetes including nephropathy, neuropathy, retinopathy, increased risk for cardiac disease.  Discussed the role of diet and exercise in the management of diabetes.  Update screening labs today   monofilament exam completed today  Patient reports eye exam completed within the past year     addendum dated 3/29/2023  Received copy of eye exam dated 3/28/2023.  See scanned report    Return in about 3 months (around 5/9/2023) for Next scheduled follow up. The patient was instructed to contact the clinic with any interval questions or concerns.    Ameena Iglesias MD   Endocrinologist    Dictated Utilizing Dragon Dictation

## 2023-02-10 LAB
ALBUMIN SERPL-MCNC: 4.6 G/DL (ref 3.5–5.2)
ALBUMIN UR-MCNC: <1.2 MG/DL
ALBUMIN/GLOB SERPL: 1.4 G/DL
ALP SERPL-CCNC: 164 U/L (ref 39–117)
ALT SERPL W P-5'-P-CCNC: 25 U/L (ref 1–33)
ANION GAP SERPL CALCULATED.3IONS-SCNC: 11.5 MMOL/L (ref 5–15)
AST SERPL-CCNC: 18 U/L (ref 1–32)
BILIRUB SERPL-MCNC: 0.2 MG/DL (ref 0–1.2)
BUN SERPL-MCNC: 12 MG/DL (ref 8–23)
BUN/CREAT SERPL: 13.5 (ref 7–25)
CALCIUM SPEC-SCNC: 9.9 MG/DL (ref 8.6–10.5)
CHLORIDE SERPL-SCNC: 98 MMOL/L (ref 98–107)
CHOLEST SERPL-MCNC: 224 MG/DL (ref 0–200)
CO2 SERPL-SCNC: 28.5 MMOL/L (ref 22–29)
CREAT SERPL-MCNC: 0.89 MG/DL (ref 0.57–1)
CREAT UR-MCNC: 9.2 MG/DL
EGFRCR SERPLBLD CKD-EPI 2021: 73.9 ML/MIN/1.73
GLOBULIN UR ELPH-MCNC: 3.3 GM/DL
GLUCOSE SERPL-MCNC: 144 MG/DL (ref 65–99)
HDLC SERPL-MCNC: 34 MG/DL (ref 40–60)
LDLC SERPL CALC-MCNC: 130 MG/DL (ref 0–100)
LDLC/HDLC SERPL: 3.61 {RATIO}
MICROALBUMIN/CREAT UR: NORMAL MG/G{CREAT}
POTASSIUM SERPL-SCNC: 3.6 MMOL/L (ref 3.5–5.2)
PROT SERPL-MCNC: 7.9 G/DL (ref 6–8.5)
SODIUM SERPL-SCNC: 138 MMOL/L (ref 136–145)
TRIGL SERPL-MCNC: 337 MG/DL (ref 0–150)
VLDLC SERPL-MCNC: 60 MG/DL (ref 5–40)

## 2023-02-15 ENCOUNTER — TELEPHONE (OUTPATIENT)
Dept: FAMILY MEDICINE CLINIC | Facility: CLINIC | Age: 63
End: 2023-02-15
Payer: MEDICARE

## 2023-02-15 NOTE — TELEPHONE ENCOUNTER
PATIENT CALLED, SAID SHE WAS OUT YESTERDAY, GOT DIZZY, FELL AGAINST CAR BUT CAUGHT HERSELF.  SHE REGAINED HER BALANCE, BEGAN TO WALK AGAIN AND FELL BACKWARDS SO HARD IT SOUNDED LIKE A GUNSHOT WHEN HER HEAD HIT THE PAVEMENT.  SINCE THEN SHE HAS HAD MEMORY LAPSE, SHORT TERM MEMORY LOSS, HEADACHES, NAUSEA, BALANCE ISSUE.  SINCE SHE IS IN WARFARIN, I INSTRUCTED HER TO GET TO THE ER IMMEDIATELY BUT SHE SAID SHE WAS AFRAID TO DRIVE.  I ASKED IF SHE COULD GET SOMEONE TO TAKE HER AND SHE SAID PROBABLY HER DAUGHTER.  I TOLD HER BECAUSE SHE WAS ON BLOOD THINNERS SHE NEED TO HAVE A CT SCAN TO MAKE SURE SHE DIDN'T HAVE A BRAIN BLEED AND/OR A CONCUSSION WITH THE SYMPTOMS.  I TALKED TO DR. HANNAH ABOUT THIS AS WELL AND SHE SAID THIS WAS EXACTLY WHAT SHE NEEDED TO DO.

## 2023-02-16 ENCOUNTER — TELEPHONE (OUTPATIENT)
Dept: FAMILY MEDICINE CLINIC | Facility: CLINIC | Age: 63
End: 2023-02-16
Payer: MEDICARE

## 2023-02-16 NOTE — TELEPHONE ENCOUNTER
PATIENT CALLED TO FU ON CT SCAN, SHE WOULD LIKE FOR IT TO GO [place]. SHE IS REQUESTING A CALL WHEN SHE IS ABLE TO SCHEDULE

## 2023-02-17 DIAGNOSIS — R91.1 LUNG NODULE: Primary | ICD-10-CM

## 2023-02-17 NOTE — TELEPHONE ENCOUNTER
PT HAD CT CHEST IN 9/2022, RADIOLOGIST RECOMMENDED A SHORT TERM FOLLOW UP. PT IS REQUESTING AN ORDER FOR CT CHEST PLEASE.

## 2023-03-01 ENCOUNTER — LAB (OUTPATIENT)
Dept: LAB | Facility: HOSPITAL | Age: 63
End: 2023-03-01
Payer: MEDICARE

## 2023-03-01 ENCOUNTER — OFFICE VISIT (OUTPATIENT)
Dept: FAMILY MEDICINE CLINIC | Facility: CLINIC | Age: 63
End: 2023-03-01
Payer: MEDICARE

## 2023-03-01 VITALS
WEIGHT: 192.8 LBS | RESPIRATION RATE: 17 BRPM | OXYGEN SATURATION: 98 % | BODY MASS INDEX: 37.85 KG/M2 | SYSTOLIC BLOOD PRESSURE: 117 MMHG | HEIGHT: 60 IN | DIASTOLIC BLOOD PRESSURE: 60 MMHG | HEART RATE: 92 BPM | TEMPERATURE: 97.6 F

## 2023-03-01 DIAGNOSIS — Z79.4 TYPE 2 DIABETES MELLITUS WITHOUT COMPLICATION, WITH LONG-TERM CURRENT USE OF INSULIN: Chronic | ICD-10-CM

## 2023-03-01 DIAGNOSIS — R55 SYNCOPE, UNSPECIFIED SYNCOPE TYPE: ICD-10-CM

## 2023-03-01 DIAGNOSIS — R93.89 ABNORMAL CT SCAN: Primary | ICD-10-CM

## 2023-03-01 DIAGNOSIS — E11.9 TYPE 2 DIABETES MELLITUS WITHOUT COMPLICATION, WITH LONG-TERM CURRENT USE OF INSULIN: Chronic | ICD-10-CM

## 2023-03-01 DIAGNOSIS — R51.9 NONINTRACTABLE HEADACHE, UNSPECIFIED CHRONICITY PATTERN, UNSPECIFIED HEADACHE TYPE: ICD-10-CM

## 2023-03-01 DIAGNOSIS — I10 HYPERTENSION, UNSPECIFIED TYPE: Chronic | ICD-10-CM

## 2023-03-01 DIAGNOSIS — E78.5 DYSLIPIDEMIA: Chronic | ICD-10-CM
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
## 2023-03-01 PROCEDURE — 82977 ASSAY OF GGT: CPT

## 2023-03-01 PROCEDURE — 85027 COMPLETE CBC AUTOMATED: CPT

## 2023-03-01 PROCEDURE — 36415 COLL VENOUS BLD VENIPUNCTURE: CPT

## 2023-03-01 PROCEDURE — 80061 LIPID PANEL: CPT

## 2023-03-01 PROCEDURE — 84443 ASSAY THYROID STIM HORMONE: CPT

## 2023-03-01 PROCEDURE — 80053 COMPREHEN METABOLIC PANEL: CPT

## 2023-03-01 PROCEDURE — 82607 VITAMIN B-12: CPT

## 2023-03-01 PROCEDURE — 99214 OFFICE O/P EST MOD 30 MIN: CPT | Performed by: STUDENT IN AN ORGANIZED HEALTH CARE EDUCATION/TRAINING PROGRAM

## 2023-03-01 PROCEDURE — 93000 ELECTROCARDIOGRAM COMPLETE: CPT | Performed by: STUDENT IN AN ORGANIZED HEALTH CARE EDUCATION/TRAINING PROGRAM

## 2023-03-01 PROCEDURE — 82306 VITAMIN D 25 HYDROXY: CPT

## 2023-03-01 RX ORDER — VALACYCLOVIR HYDROCHLORIDE 500 MG/1
1 TABLET, FILM COATED ORAL DAILY
COMMUNITY
Start: 2023-02-20

## 2023-03-01 NOTE — PROGRESS NOTES
"Chief Complaint  Follow up    History of Present Illness    She would like to have a ct scan of her head. She says about two weeks ago she had a fall  hitting the back of her head/neck. She says she went to the store and walking to the back of her car and says her knees became weak and she dropped to the ground hitting the back of her head. She says the sound was extremely loud as she hit the ground so hard. She says she blacked out. She hurt her tailbone but this feels better . She did not go to the er.   No chest pain palpitations sob. She does not have a headache at this time but did for 3 days after.   She has not passed out since. She denies any neuro deficits or confusion. No pain anywhere today.    The following portions of the patient's history were reviewed and updated as appropriate: allergies, current medications, past family history, past medical history, past social history, past surgical history, and problem list.    OBJECTIVE:  /60   Pulse 92   Temp 97.6 °F (36.4 °C)   Resp 17   Ht 152.4 cm (60\")   Wt 87.5 kg (192 lb 12.8 oz)   SpO2 98%   BMI 37.65 kg/m²       Physical Exam  Constitutional:       General: She is not in acute distress.     Appearance: Normal appearance.   HENT:      Head: Normocephalic and atraumatic.   Eyes:      Extraocular Movements: Extraocular movements intact.      Pupils: Pupils are equal, round, and reactive to light.   Cardiovascular:      Rate and Rhythm: Normal rate and regular rhythm.      Heart sounds: No murmur heard.  Pulmonary:      Effort: Pulmonary effort is normal. No respiratory distress.      Breath sounds: Normal breath sounds. No stridor. No wheezing, rhonchi or rales.   Musculoskeletal:      Comments: No change in gait  No pain on palpation of back of head/neck. No wounds or injuries seen back of head/neck   Skin:     Findings: No rash.   Neurological:      General: No focal deficit present.      Mental Status: She is alert and oriented to person, " place, and time.      Cranial Nerves: No cranial nerve deficit.      Sensory: No sensory deficit.      Motor: No weakness.      Coordination: Coordination normal.   Psychiatric:         Mood and Affect: Mood normal.              ECG 12 Lead    Date/Time: 3/1/2023 3:51 PM  Performed by: Davina Santiago DO  Authorized by: Davina Santiago DO   Comparison: compared with previous ECG   Rhythm: paced  Rate: normal  Conduction: conduction normal  ST Segments: ST segments normal  T inversion: V2    Clinical impression: non-specific ECG  Comments: Flipped t wave in v2 similar to prior ekg. No new changes               Assessment and Plan   Diagnoses and all orders for this visit:    1. Abnormal CT scan (Primary)    2. Syncope, unspecified syncope type  -     ECG 12 Lead  -     Cancel: CT Head Without Contrast; Future  -     CT Head Without Contrast; Future  -     Adult Transthoracic Echo Complete W/ Cont if Necessary Per Protocol; Future  -     Duplex Carotid Ultrasound CAR; Future    3. Fall, subsequent encounter  -     XR Sacrum & Coccyx (In Office)  -     Cancel: CT Head Without Contrast; Future  -     CT Head Without Contrast; Future  -     CT cervical spine wo contrast; Future    4. Nonintractable headache, unspecified chronicity pattern, unspecified headache type  -     CT Head Without Contrast; Future    5. Hypertension, unspecified type  -     CBC (No Diff); Future  -     Comprehensive Metabolic Panel; Future    6. Type 2 diabetes mellitus without complication, with long-term current use of insulin (HCC)  -     Lipid Panel; Future  -     TSH Rfx On Abnormal To Free T4; Future  -     Vitamin B12; Future  -     Vitamin D,25-Hydroxy; Future    7. Body mass index (BMI) 30.0-30.9, adult  -     Vitamin D,25-Hydroxy; Future    Reviewed Nina note. She will be going for follow up imaging given abnormal ct.     For syncope ekg no acute changes. orthos negative. Her bp is stable. No known low glucose.   Given high risk  patient on warfarin with headache after a significant head trama will check ct scan. It is very reassuring that her headache has resolved and her neuro exam is normal.       Return in about 1 month (around 4/1/2023).       Davina Santiago D.O.  [facility]

## 2023-03-02 DIAGNOSIS — R74.8 ELEVATED ALKALINE PHOSPHATASE LEVEL: Primary | ICD-10-CM

## 2023-03-02 DIAGNOSIS — E78.5 DYSLIPIDEMIA: Primary | Chronic | ICD-10-CM

## 2023-03-02 LAB
25(OH)D3 SERPL-MCNC: 37.2 NG/ML (ref 30–100)
ALBUMIN SERPL-MCNC: 4.5 G/DL (ref 3.5–5.2)
ALBUMIN/GLOB SERPL: 1.5 G/DL
ALP SERPL-CCNC: 176 U/L (ref 39–117)
ALT SERPL W P-5'-P-CCNC: 25 U/L (ref 1–33)
ANION GAP SERPL CALCULATED.3IONS-SCNC: 13 MMOL/L (ref 5–15)
AST SERPL-CCNC: 15 U/L (ref 1–32)
BILIRUB SERPL-MCNC: 0.4 MG/DL (ref 0–1.2)
BUN SERPL-MCNC: 14 MG/DL (ref 8–23)
BUN/CREAT SERPL: 14.1 (ref 7–25)
CALCIUM SPEC-SCNC: 9.7 MG/DL (ref 8.6–10.5)
CHLORIDE SERPL-SCNC: 96 MMOL/L (ref 98–107)
CHOLEST SERPL-MCNC: 211 MG/DL (ref 0–200)
CO2 SERPL-SCNC: 29 MMOL/L (ref 22–29)
CREAT SERPL-MCNC: 0.99 MG/DL (ref 0.57–1)
DEPRECATED RDW RBC AUTO: 43.5 FL (ref 37–54)
EGFRCR SERPLBLD CKD-EPI 2021: 65 ML/MIN/1.73
ERYTHROCYTE [DISTWIDTH] IN BLOOD BY AUTOMATED COUNT: 13.4 % (ref 12.3–15.4)
GGT SERPL-CCNC: 189 U/L (ref 5–36)
GLOBULIN UR ELPH-MCNC: 3.1 GM/DL
GLUCOSE SERPL-MCNC: 217 MG/DL (ref 65–99)
HCT VFR BLD AUTO: 36.8 % (ref 34–46.6)
HDLC SERPL-MCNC: 27 MG/DL (ref 40–60)
HGB BLD-MCNC: 12.2 G/DL (ref 12–15.9)
LDLC SERPL CALC-MCNC: 112 MG/DL (ref 0–100)
LDLC/HDLC SERPL: 3.75 {RATIO}
MCH RBC QN AUTO: 29.2 PG (ref 26.6–33)
MCHC RBC AUTO-ENTMCNC: 33.2 G/DL (ref 31.5–35.7)
MCV RBC AUTO: 88 FL (ref 79–97)
PLATELET # BLD AUTO: 222 10*3/MM3 (ref 140–450)
PMV BLD AUTO: 11.3 FL (ref 6–12)
POTASSIUM SERPL-SCNC: 3.4 MMOL/L (ref 3.5–5.2)
PROT SERPL-MCNC: 7.6 G/DL (ref 6–8.5)
RBC # BLD AUTO: 4.18 10*6/MM3 (ref 3.77–5.28)
SODIUM SERPL-SCNC: 138 MMOL/L (ref 136–145)
TRIGL SERPL-MCNC: 414 MG/DL (ref 0–150)
TSH SERPL DL<=0.05 MIU/L-ACNC: 0.81 UIU/ML (ref 0.27–4.2)
VIT B12 BLD-MCNC: 326 PG/ML (ref 211–946)
VLDLC SERPL-MCNC: 72 MG/DL (ref 5–40)
WBC NRBC COR # BLD: 12.51 10*3/MM3 (ref 3.4–10.8)

## 2023-03-02 RX ORDER — POTASSIUM CHLORIDE 750 MG/1
20 TABLET, FILM COATED, EXTENDED RELEASE ORAL ONCE
Qty: 2 TABLET | Refills: 0 | Status: SHIPPED | OUTPATIENT
Start: 2023-03-02 | End: 2023-03-02

## 2023-03-07 DIAGNOSIS — E11.65 TYPE 2 DIABETES MELLITUS WITH HYPERGLYCEMIA, WITH LONG-TERM CURRENT USE OF INSULIN: ICD-10-CM

## 2023-03-07 DIAGNOSIS — N32.81 OAB (OVERACTIVE BLADDER): ICD-10-CM

## 2023-03-07 DIAGNOSIS — Z79.4 TYPE 2 DIABETES MELLITUS WITH HYPERGLYCEMIA, WITH LONG-TERM CURRENT USE OF INSULIN: ICD-10-CM

## 2023-03-07 RX ORDER — ESCITALOPRAM OXALATE 20 MG/1
TABLET ORAL
Qty: 90 TABLET | Refills: 0 | Status: SHIPPED | OUTPATIENT
Start: 2023-03-07 | End: 2023-03-27

## 2023-03-07 RX ORDER — OMEPRAZOLE 20 MG/1
20 CAPSULE, DELAYED RELEASE ORAL DAILY
Qty: 90 CAPSULE | Refills: 0 | Status: SHIPPED | OUTPATIENT
Start: 2023-03-07

## 2023-03-07 RX ORDER — SOLIFENACIN SUCCINATE 5 MG/1
5 TABLET, FILM COATED ORAL DAILY
Qty: 90 TABLET | Refills: 1 | Status: SHIPPED | OUTPATIENT
Start: 2023-03-07

## 2023-03-07 NOTE — TELEPHONE ENCOUNTER
Rx Refill Note  Requested Prescriptions     Pending Prescriptions Disp Refills   • escitalopram (LEXAPRO) 20 MG tablet [Pharmacy Med Name: Escitalopram Oxalate 20 MG Oral Tablet] 90 tablet 0     Sig: Take 1 tablet by mouth once daily   • solifenacin (VESICARE) 5 MG tablet [Pharmacy Med Name: Solifenacin Succinate 5 MG Oral Tablet] 90 tablet 0     Sig: Take 1 tablet by mouth once daily   • omeprazole (priLOSEC) 20 MG capsule [Pharmacy Med Name: Omeprazole 20 MG Oral Capsule Delayed Release] 90 capsule 0     Sig: Take 1 capsule by mouth once daily      Last office visit with prescribing clinician: 3/1/2023   Last telemedicine visit with prescribing clinician: 3/7/2023   Next office visit with prescribing clinician: 3/7/2023                         Would you like a call back once the refill request has been completed: [] Yes [] No    If the office needs to give you a call back, can they leave a voicemail: [] Yes [] No    Kalli Castillo MA  03/07/23, 10:21 EST

## 2023-03-16 RX ORDER — SODIUM, POTASSIUM,MAG SULFATES 17.5-3.13G
1 SOLUTION, RECONSTITUTED, ORAL ORAL TAKE AS DIRECTED
Qty: 354 ML | Refills: 0 | Status: SHIPPED | OUTPATIENT
Start: 2023-03-16

## 2023-03-20 ENCOUNTER — TELEPHONE (OUTPATIENT)
Dept: GASTROENTEROLOGY | Facility: CLINIC | Age: 63
End: 2023-03-20
Payer: MEDICARE

## 2023-03-20 ENCOUNTER — OFFICE VISIT (OUTPATIENT)
Dept: FAMILY MEDICINE CLINIC | Facility: CLINIC | Age: 63
End: 2023-03-20
Payer: MEDICARE

## 2023-03-20 VITALS
BODY MASS INDEX: 38.64 KG/M2 | SYSTOLIC BLOOD PRESSURE: 136 MMHG | WEIGHT: 196.8 LBS | DIASTOLIC BLOOD PRESSURE: 74 MMHG | HEART RATE: 84 BPM | RESPIRATION RATE: 19 BRPM | OXYGEN SATURATION: 95 % | HEIGHT: 60 IN | TEMPERATURE: 97.8 F

## 2023-03-20 DIAGNOSIS — R05.9 COUGH, UNSPECIFIED TYPE: Primary | ICD-10-CM

## 2023-03-20 PROCEDURE — 3044F HG A1C LEVEL LT 7.0%: CPT | Performed by: STUDENT IN AN ORGANIZED HEALTH CARE EDUCATION/TRAINING PROGRAM

## 2023-03-20 PROCEDURE — 87804 INFLUENZA ASSAY W/OPTIC: CPT | Performed by: STUDENT IN AN ORGANIZED HEALTH CARE EDUCATION/TRAINING PROGRAM

## 2023-03-20 PROCEDURE — 3078F DIAST BP <80 MM HG: CPT | Performed by: STUDENT IN AN ORGANIZED HEALTH CARE EDUCATION/TRAINING PROGRAM

## 2023-03-20 PROCEDURE — 87880 STREP A ASSAY W/OPTIC: CPT | Performed by: STUDENT IN AN ORGANIZED HEALTH CARE EDUCATION/TRAINING PROGRAM

## 2023-03-20 PROCEDURE — 1160F RVW MEDS BY RX/DR IN RCRD: CPT | Performed by: STUDENT IN AN ORGANIZED HEALTH CARE EDUCATION/TRAINING PROGRAM

## 2023-03-20 PROCEDURE — 1159F MED LIST DOCD IN RCRD: CPT | Performed by: STUDENT IN AN ORGANIZED HEALTH CARE EDUCATION/TRAINING PROGRAM

## 2023-03-20 PROCEDURE — 99214 OFFICE O/P EST MOD 30 MIN: CPT | Performed by: STUDENT IN AN ORGANIZED HEALTH CARE EDUCATION/TRAINING PROGRAM

## 2023-03-20 PROCEDURE — 3075F SYST BP GE 130 - 139MM HG: CPT | Performed by: STUDENT IN AN ORGANIZED HEALTH CARE EDUCATION/TRAINING PROGRAM

## 2023-03-20 RX ORDER — METHYLPREDNISOLONE 4 MG/1
TABLET ORAL
Qty: 21 TABLET | Refills: 0 | Status: SHIPPED | OUTPATIENT
Start: 2023-03-20

## 2023-03-20 RX ORDER — CEFDINIR 300 MG/1
300 CAPSULE ORAL 2 TIMES DAILY
Qty: 20 CAPSULE | Refills: 0 | Status: SHIPPED | OUTPATIENT
Start: 2023-03-20 | End: 2023-03-31

## 2023-03-20 RX ORDER — ALBUTEROL SULFATE 90 UG/1
2 AEROSOL, METERED RESPIRATORY (INHALATION) EVERY 4 HOURS PRN
Qty: 1 G | Refills: 0 | Status: SHIPPED | OUTPATIENT
Start: 2023-03-20

## 2023-03-20 NOTE — PROGRESS NOTES
"Chief Complaint  Nasal Congestion (Runny nose,hoarse voice, coughing, yellow mucus,headache , since saturday)    History of Present Illness          Main symptom: hoarseness with headache and cough that is productive.  Began: Saturday  She had diarrhea on Saturday that has since resolved after taking pepto.   Sick contacts: none   Fever: none   Sore throat: none since sat   SOB: with exertion  GI symptoms: no stomach pain or vomiting.   Ear pain:none   No body aches    The following portions of the patient's history were reviewed and updated as appropriate: allergies, current medications, past family history, past medical history, past social history, past surgical history, and problem list.    OBJECTIVE:  /74   Pulse 84   Temp 97.8 °F (36.6 °C)   Resp 19   Ht 152.4 cm (60\")   Wt 89.3 kg (196 lb 12.8 oz)   SpO2 95%   BMI 38.43 kg/m²       Physical Exam  Constitutional:       General: She is not in acute distress.     Appearance: Normal appearance.   HENT:      Head: Normocephalic and atraumatic.      Right Ear: Tympanic membrane normal.      Left Ear: Tympanic membrane normal.      Mouth/Throat:      Pharynx: Posterior oropharyngeal erythema present.   Eyes:      Extraocular Movements: Extraocular movements intact.   Cardiovascular:      Rate and Rhythm: Normal rate and regular rhythm.      Heart sounds: No murmur heard.  Pulmonary:      Effort: Pulmonary effort is normal. No respiratory distress.      Breath sounds: Normal breath sounds. No stridor. No wheezing, rhonchi or rales.   Skin:     Findings: No rash.   Neurological:      General: No focal deficit present.      Mental Status: She is alert.   Psychiatric:         Mood and Affect: Mood normal.                    Assessment and Plan   Diagnoses and all orders for this visit:    1. Cough, unspecified type (Primary)  -     COVID-19 PCR, [facility], NP SWAB IN LEXAR VIRAL TRANSPORT MEDIA/ORAL SWISH 24-30 HR TAT - Swab, Nasopharynx; Future  -     " POCT rapid strep A  -     POCT Influenza A/B    Other orders  -     albuterol sulfate  (90 Base) MCG/ACT inhaler; Inhale 2 puffs Every 4 (Four) Hours As Needed for Wheezing.  Dispense: 1 g; Refill: 0  -     methylPREDNISolone (MEDROL) 4 MG dose pack; Take as directed on package instructions.  Dispense: 21 tablet; Refill: 0  -     cefdinir (OMNICEF) 300 MG capsule; Take 1 capsule by mouth 2 (Two) Times a Day.  Dispense: 20 capsule; Refill: 0      Copd exacerbation   Will call in low dose steroid (given no wheezing and concern for high dose steroid interaction with warfarin) and albuterol inhaler.   Given pos strep with hives to penicillin will call in cefdinir which she says she believes she has tolerated in the past.   Advised her to check her INR at home every other day while on this treatment to avoid any shifts in INR due to steroid or antibiotic which can be associated with clotting or bleeding. She will let me or inr clinic know for any inr out of goal and has supply to check at home.  She notes a tiny streak of blood associated with the diarrhea. This has resolved. Advised her to seek care right away for any continued or med to large amounts of rectal bleeding and to schedule her colonoscopy and to always let me know for any bleeding not associated with diarrhea or large amounts /nonresolving blood.   Discussed to isolate until covid test is back. Continue with supportive care including rest increased hydration and to eat. Tylenol for body aches and antihistamine for congestion. Advised patient to seek care immediately for any new symptoms, shortness of breath, chest pain, dizziness, symptoms that do not resolve.        Return in about 1 week (around 3/27/2023).       Davina Santiago D.O.  [facility]

## 2023-03-20 NOTE — TELEPHONE ENCOUNTER
Patient called stating she is not feeling well today - going to be visiting PCP office to determine what she may be ill with, and will call us back to schedule.

## 2023-03-21 ENCOUNTER — LAB (OUTPATIENT)
Dept: LAB | Facility: HOSPITAL | Age: 63
End: 2023-03-21
Payer: MEDICARE

## 2023-03-21 DIAGNOSIS — R05.9 COUGH, UNSPECIFIED TYPE: ICD-10-CM

## 2023-03-21 LAB
EXPIRATION DATE: ABNORMAL
EXPIRATION DATE: NORMAL
FLUAV AG NPH QL: NEGATIVE
FLUBV AG NPH QL: NEGATIVE
INTERNAL CONTROL: ABNORMAL
INTERNAL CONTROL: NORMAL
Lab: ABNORMAL
Lab: NORMAL
S PYO AG THROAT QL: POSITIVE

## 2023-03-21 PROCEDURE — U0004 COV-19 TEST NON-CDC HGH THRU: HCPCS

## 2023-03-22 ENCOUNTER — TELEPHONE (OUTPATIENT)
Dept: FAMILY MEDICINE CLINIC | Facility: CLINIC | Age: 63
End: 2023-03-22

## 2023-03-22 LAB — SARS-COV-2 RNA NOSE QL NAA+PROBE: NOT DETECTED

## 2023-03-22 NOTE — TELEPHONE ENCOUNTER
"  Caller: Di Lopez \"Shiloh\"    Relationship: Self    Best call back number [phone]    Caller requesting test results: *YES    What test was performed: COVID    "

## 2023-03-22 NOTE — TELEPHONE ENCOUNTER
Patient was originally scheduled colonoscopy with Dr Fernandes 03/21. Patient has been rescheduled for 05/23.    Requested cardiac clearance for original appointment.    If cardiac clearance is necessary for new appointment, please seek. If no cardiac clearance is necessary, please advise.

## 2023-03-27 ENCOUNTER — TELEPHONE (OUTPATIENT)
Dept: GASTROENTEROLOGY | Facility: CLINIC | Age: 63
End: 2023-03-27
Payer: MEDICARE

## 2023-03-27 RX ORDER — ESCITALOPRAM OXALATE 20 MG/1
TABLET ORAL
Qty: 90 TABLET | Refills: 0 | Status: SHIPPED | OUTPATIENT
Start: 2023-03-27

## 2023-03-31 ENCOUNTER — LAB (OUTPATIENT)
Dept: LAB | Facility: HOSPITAL | Age: 63
End: 2023-03-31
Payer: MEDICARE

## 2023-03-31 ENCOUNTER — OFFICE VISIT (OUTPATIENT)
Dept: FAMILY MEDICINE CLINIC | Facility: CLINIC | Age: 63
End: 2023-03-31
Payer: MEDICARE

## 2023-03-31 VITALS
RESPIRATION RATE: 18 BRPM | SYSTOLIC BLOOD PRESSURE: 120 MMHG | DIASTOLIC BLOOD PRESSURE: 70 MMHG | TEMPERATURE: 97.1 F | OXYGEN SATURATION: 98 % | HEART RATE: 80 BPM | HEIGHT: 60 IN | BODY MASS INDEX: 38.05 KG/M2 | WEIGHT: 193.8 LBS

## 2023-03-31 DIAGNOSIS — R74.8 ELEVATED ALKALINE PHOSPHATASE LEVEL: ICD-10-CM

## 2023-03-31 DIAGNOSIS — R30.0 DYSURIA: Primary | ICD-10-CM

## 2023-03-31 LAB — BILIRUB CONJ SERPL-MCNC: <0.2 MG/DL (ref 0–0.3)

## 2023-03-31 PROCEDURE — 1159F MED LIST DOCD IN RCRD: CPT | Performed by: STUDENT IN AN ORGANIZED HEALTH CARE EDUCATION/TRAINING PROGRAM

## 2023-03-31 PROCEDURE — 3044F HG A1C LEVEL LT 7.0%: CPT | Performed by: STUDENT IN AN ORGANIZED HEALTH CARE EDUCATION/TRAINING PROGRAM

## 2023-03-31 PROCEDURE — 1160F RVW MEDS BY RX/DR IN RCRD: CPT | Performed by: STUDENT IN AN ORGANIZED HEALTH CARE EDUCATION/TRAINING PROGRAM

## 2023-03-31 PROCEDURE — 99214 OFFICE O/P EST MOD 30 MIN: CPT | Performed by: STUDENT IN AN ORGANIZED HEALTH CARE EDUCATION/TRAINING PROGRAM

## 2023-03-31 PROCEDURE — 36415 COLL VENOUS BLD VENIPUNCTURE: CPT

## 2023-03-31 PROCEDURE — 80053 COMPREHEN METABOLIC PANEL: CPT

## 2023-03-31 PROCEDURE — 82248 BILIRUBIN DIRECT: CPT

## 2023-03-31 PROCEDURE — 3074F SYST BP LT 130 MM HG: CPT | Performed by: STUDENT IN AN ORGANIZED HEALTH CARE EDUCATION/TRAINING PROGRAM

## 2023-03-31 PROCEDURE — 3078F DIAST BP <80 MM HG: CPT | Performed by: STUDENT IN AN ORGANIZED HEALTH CARE EDUCATION/TRAINING PROGRAM

## 2023-03-31 RX ORDER — ENOXAPARIN SODIUM 100 MG/ML
INJECTION SUBCUTANEOUS
COMMUNITY
Start: 2023-03-18

## 2023-03-31 RX ORDER — AZELASTINE 1 MG/ML
2 SPRAY, METERED NASAL 2 TIMES DAILY
Qty: 1 EACH | Refills: 1 | Status: SHIPPED | OUTPATIENT
Start: 2023-03-31

## 2023-03-31 RX ORDER — SIMVASTATIN 5 MG
5 TABLET ORAL NIGHTLY
Qty: 90 TABLET | Refills: 0 | Status: SHIPPED | OUTPATIENT
Start: 2023-03-31

## 2023-03-31 NOTE — PROGRESS NOTES
"Chief Complaint  Cough (Follow up , still has headache, fatigue , pt thinks she may have uti also ) and Hyperlipidemia (Discuss meds)    History of Present Illness       She is feeling better. She still feels mild headache and fatigue. Her breathing is better. She has been following with INR clinic.     She would like to be on a cholesterol medication.     She has vaginal itching without discharge since being on abx. No further fevers . No dysuria or urgency.        The following portions of the patient's history were reviewed and updated as appropriate: allergies, current medications, past family history, past medical history, past social history, past surgical history, and problem list.    OBJECTIVE:  /70   Pulse 80   Temp 97.1 °F (36.2 °C)   Resp 18   Ht 152.4 cm (60\")   Wt 87.9 kg (193 lb 12.8 oz)   SpO2 98%   BMI 37.85 kg/m²       Physical Exam  Constitutional:       General: She is not in acute distress.     Appearance: Normal appearance.   HENT:      Head: Normocephalic and atraumatic.   Eyes:      Extraocular Movements: Extraocular movements intact.   Cardiovascular:      Rate and Rhythm: Normal rate and regular rhythm.      Heart sounds: No murmur heard.  Pulmonary:      Effort: Pulmonary effort is normal. No respiratory distress.      Breath sounds: Normal breath sounds. No stridor. No wheezing, rhonchi or rales.   Skin:     Findings: No rash.   Neurological:      General: No focal deficit present.      Mental Status: She is alert.   Psychiatric:         Mood and Affect: Mood normal.                    Assessment and Plan   Diagnoses and all orders for this visit:    1. Dysuria (Primary)  -     Cancel: POC Urinalysis Dipstick, Automated    2. Elevated alkaline phosphatase level  -     Hepatic Function Panel; Future    Other orders  -     simvastatin (Zocor) 5 MG tablet; Take 1 tablet by mouth Every Night.  Dispense: 90 tablet; Refill: 0  -     azelastine (ASTELIN) 0.1 % nasal spray; 2 sprays " into the nostril(s) as directed by provider 2 (Two) Times a Day. Use in each nostril as directed  Dispense: 1 each; Refill: 1  -     miconazole (MICOTIN) 2 % vaginal cream; Insert 1 applicator into the vagina Every Night.  Dispense: 1 g; Refill: 0    Will treat what sounds like a yeast infection with antifungal cream.   Start azelastine to help with continued cough after uri.   Will start her back on low dose statin given high cardiovascular risk and follow up labs in one month.        Return in about 1 month (around 4/30/2023).       Davina Santiago D.O.  [facility]

## 2023-04-01 LAB
ALBUMIN SERPL-MCNC: 4.7 G/DL (ref 3.5–5.2)
ALBUMIN/GLOB SERPL: 1.4 G/DL
ALP SERPL-CCNC: 176 U/L (ref 39–117)
ALT SERPL W P-5'-P-CCNC: 93 U/L (ref 1–33)
ANION GAP SERPL CALCULATED.3IONS-SCNC: 13.5 MMOL/L (ref 5–15)
AST SERPL-CCNC: 51 U/L (ref 1–32)
BILIRUB SERPL-MCNC: 0.4 MG/DL (ref 0–1.2)
BUN SERPL-MCNC: 9 MG/DL (ref 8–23)
BUN/CREAT SERPL: 9.9 (ref 7–25)
CALCIUM SPEC-SCNC: 10.1 MG/DL (ref 8.6–10.5)
CHLORIDE SERPL-SCNC: 96 MMOL/L (ref 98–107)
CO2 SERPL-SCNC: 27.5 MMOL/L (ref 22–29)
CREAT SERPL-MCNC: 0.91 MG/DL (ref 0.57–1)
EGFRCR SERPLBLD CKD-EPI 2021: 71.9 ML/MIN/1.73
GLOBULIN UR ELPH-MCNC: 3.3 GM/DL
GLUCOSE SERPL-MCNC: 95 MG/DL (ref 65–99)
POTASSIUM SERPL-SCNC: 4 MMOL/L (ref 3.5–5.2)
PROT SERPL-MCNC: 8 G/DL (ref 6–8.5)
SODIUM SERPL-SCNC: 137 MMOL/L (ref 136–145)

## 2023-04-03 ENCOUNTER — TELEPHONE (OUTPATIENT)
Dept: FAMILY MEDICINE CLINIC | Facility: CLINIC | Age: 63
End: 2023-04-03
Payer: MEDICARE

## 2023-04-03 NOTE — TELEPHONE ENCOUNTER
Hub and front can read      Tried to reach pt in regards to her Mammogram referral. Wanted to give pt the number to scheduling [phone]

## 2023-04-12 RX ORDER — CEFDINIR 300 MG/1
CAPSULE ORAL
Qty: 20 CAPSULE | Refills: 0 | OUTPATIENT
Start: 2023-04-12

## 2023-04-12 NOTE — TELEPHONE ENCOUNTER
Rx Refill Note  Requested Prescriptions     Pending Prescriptions Disp Refills   • cefdinir (OMNICEF) 300 MG capsule [Pharmacy Med Name: Cefdinir 300 MG Oral Capsule] 20 capsule 0     Sig: Take 1 capsule by mouth twice daily      Last office visit with prescribing clinician: 3/31/2023   Last telemedicine visit with prescribing clinician: 4/28/2023   Next office visit with prescribing clinician: 4/28/2023                         Would you like a call back once the refill request has been completed: [] Yes [] No    If the office needs to give you a call back, can they leave a voicemail: [] Yes [] No    Guillermo Malhotra MA  04/12/23, 15:43 EDT

## 2023-04-17 DIAGNOSIS — E11.65 TYPE 2 DIABETES MELLITUS WITH HYPERGLYCEMIA, WITH LONG-TERM CURRENT USE OF INSULIN: ICD-10-CM

## 2023-04-17 DIAGNOSIS — Z79.4 TYPE 2 DIABETES MELLITUS WITH HYPERGLYCEMIA, WITH LONG-TERM CURRENT USE OF INSULIN: ICD-10-CM

## 2023-04-17 RX ORDER — INSULIN GLARGINE 100 [IU]/ML
45 INJECTION, SOLUTION SUBCUTANEOUS DAILY
Qty: 45 ML | Refills: 1 | Status: SHIPPED | OUTPATIENT
Start: 2023-04-17

## 2023-04-20 RX ORDER — DULAGLUTIDE 4.5 MG/.5ML
INJECTION, SOLUTION SUBCUTANEOUS
Qty: 12 ML | Refills: 0 | Status: SHIPPED | OUTPATIENT
Start: 2023-04-20

## 2023-04-22 RX ORDER — OMEPRAZOLE 20 MG/1
20 CAPSULE, DELAYED RELEASE ORAL DAILY
Qty: 90 CAPSULE | Refills: 0 | Status: SHIPPED | OUTPATIENT
Start: 2023-04-22

## 2023-04-25 ENCOUNTER — OFFICE VISIT (OUTPATIENT)
Dept: PULMONOLOGY | Facility: CLINIC | Age: 63
End: 2023-04-25
Payer: MEDICARE

## 2023-04-25 VITALS
BODY MASS INDEX: 37.85 KG/M2 | HEART RATE: 76 BPM | WEIGHT: 192.8 LBS | TEMPERATURE: 96.9 F | DIASTOLIC BLOOD PRESSURE: 72 MMHG | HEIGHT: 60 IN | SYSTOLIC BLOOD PRESSURE: 122 MMHG | OXYGEN SATURATION: 97 %

## 2023-04-25 DIAGNOSIS — J47.9 BRONCHIECTASIS WITHOUT COMPLICATION: ICD-10-CM

## 2023-04-25 DIAGNOSIS — E11.649 TYPE 2 DIABETES MELLITUS WITH HYPOGLYCEMIA WITHOUT COMA, WITH LONG-TERM CURRENT USE OF INSULIN: ICD-10-CM

## 2023-04-25 DIAGNOSIS — J43.2 CENTRILOBULAR EMPHYSEMA: Chronic | ICD-10-CM

## 2023-04-25 DIAGNOSIS — I50.32 CHRONIC DIASTOLIC HEART FAILURE: Primary | ICD-10-CM

## 2023-04-25 DIAGNOSIS — G47.33 OBSTRUCTIVE SLEEP APNEA SYNDROME: ICD-10-CM

## 2023-04-25 DIAGNOSIS — Z79.4 TYPE 2 DIABETES MELLITUS WITH HYPOGLYCEMIA WITHOUT COMA, WITH LONG-TERM CURRENT USE OF INSULIN: ICD-10-CM

## 2023-04-25 DIAGNOSIS — J96.11 CHRONIC RESPIRATORY FAILURE WITH HYPOXIA: ICD-10-CM

## 2023-04-25 PROCEDURE — 99214 OFFICE O/P EST MOD 30 MIN: CPT | Performed by: NURSE PRACTITIONER

## 2023-04-25 PROCEDURE — 3078F DIAST BP <80 MM HG: CPT | Performed by: NURSE PRACTITIONER

## 2023-04-25 PROCEDURE — 1160F RVW MEDS BY RX/DR IN RCRD: CPT | Performed by: NURSE PRACTITIONER

## 2023-04-25 PROCEDURE — 3074F SYST BP LT 130 MM HG: CPT | Performed by: NURSE PRACTITIONER

## 2023-04-25 PROCEDURE — 1159F MED LIST DOCD IN RCRD: CPT | Performed by: NURSE PRACTITIONER

## 2023-04-25 RX ORDER — GLIMEPIRIDE 4 MG/1
TABLET ORAL
Qty: 180 TABLET | Refills: 0 | Status: SHIPPED | OUTPATIENT
Start: 2023-04-25

## 2023-04-26 NOTE — PROGRESS NOTES
[facility] Pulmonary Follow up    CHIEF COMPLAINT    Dyspnea    HISTORY OF PRESENT ILLNESS    Di Lpoez is a 62 y.o.female here today for follow-up.  She was last seen in the office by Dr. Thomas in September.  She has been seen in the past for her bronchiectasis and emphysema.  She denies any respiratory illnesses since her last appointment.  She is also had a history of abnormal CT scans.    She is scheduled for a low-dose CT screening next month.  Dr. Thomas recommended a CT scan in 6 months from September 2022.    She is currently not on any inhaler therapy.  She does use the albuterol occasionally for shortness of breath.  She does have shortness of breath with exertion but does recover very quickly at rest.    She denies sputum production or hemoptysis.  She denies any chest pain or palpitations.  She denies any lower extremity edema or calf tenderness.    She quit smoking in 2010.  She has a 30-pack-year history.    She denies any reflux symptoms and does take omeprazole regularly.    She has a history of CAL but has been noncompliant in the past.    Patient Active Problem List   Diagnosis   • Arteriovenous malformation of gastrointestinal tract   • Depression   • Type 2 diabetes mellitus without complication, with long-term current use of insulin   • Dyslipidemia   • Emphysema/COPD   • Hypertension   • Insomnia   • Morbid obesity   • Low back pain   • Obstructive sleep apnea syndrome   • Long term current use of anticoagulant   • Normocytic anemia   • Sliding hiatal hernia   • Vitamin B12 deficiency   • Iron deficiency   • Iron malabsorption   • Bronchiectasis without complication   • Electronic cigarette use   • Chronic diastolic heart failure   • Left bundle branch hemiblock   • Chronic respiratory failure with hypoxia   • Body aches   • Fatigue   • Headaches due to old head injury   • History of tobacco use, presenting hazards to health   • Multiple nodules of lung   • Nocturnal hypoxemia   •  "Screening for colon cancer   • Abnormal CT scan       Allergies   Allergen Reactions   • Codeine Itching and Nausea And Vomiting   • Penicillins Hives   • Sulfa Antibiotics Nausea And Vomiting   • Adhesive Tape Other (See Comments)     \"pulls skin off\"       Current Outpatient Medications:   •  albuterol sulfate  (90 Base) MCG/ACT inhaler, Inhale 2 puffs Every 4 (Four) Hours As Needed for Wheezing., Disp: 1 g, Rfl: 0  •  Alcohol Swabs (Alcohol Prep) pads, For use prior to checking glucose twice daily, Disp: 100 each, Rfl: 5  •  azelastine (ASTELIN) 0.1 % nasal spray, 2 sprays into the nostril(s) as directed by provider 2 (Two) Times a Day. Use in each nostril as directed, Disp: 1 each, Rfl: 1  •  carvedilol (COREG) 12.5 MG tablet, 1 tablet 2 (Two) Times a Day With Meals., Disp: , Rfl:   •  Continuous Blood Gluc  (FreeStyle Dalia 2 Coffeen) device, 1 each Daily. Use as directed daily to check blood sugars, Disp: 1 each, Rfl: 0  •  Continuous Blood Gluc Sensor (FreeStyle Dalia 2 Sensor) misc, Inject 1 each under the skin into the appropriate area as directed Every 14 (Fourteen) Days. NEEDS APPT FOR MORE REFILLS, Disp: 2 each, Rfl: 3  •  Enoxaparin Sodium (LOVENOX) 100 MG/ML solution prefilled syringe syringe, INJECT CONTENTS OF 1 SYRINGE SUBCUTANEOUSLY TWICE DAILY FOR 7 DAYS, Disp: , Rfl:   •  escitalopram (LEXAPRO) 20 MG tablet, Take 1 tablet by mouth once daily, Disp: 90 tablet, Rfl: 0  •  folic acid (FOLVITE) 800 MCG tablet, Take 1 tablet by mouth Daily., Disp: , Rfl:   •  furosemide (LASIX) 40 MG tablet, Take 1 tablet by mouth 3 (Three) Times a Week. Monday, Wednesday, Friday, Disp: , Rfl:   •  gabapentin (NEURONTIN) 300 MG capsule, Take 1 capsule by mouth At Night As Needed (leg pain)., Disp: 30 capsule, Rfl: 3  •  glimepiride (AMARYL) 4 MG tablet, TAKE 2 TABLETS BY MOUTH IN THE MORNING BEFORE BREAKFAST, Disp: 180 tablet, Rfl: 0  •  glucose blood test strip, Use as instructed 3 times a day, Disp: " 100 each, Rfl: 11  •  HYDROcodone-acetaminophen (NORCO) 7.5-325 MG per tablet, Take 1 tablet by mouth Daily As Needed for Moderate Pain or Severe Pain., Disp: , Rfl:   •  Insulin Glargine (Lantus SoloStar) 100 UNIT/ML injection pen, Inject 45 Units under the skin into the appropriate area as directed Daily., Disp: 45 mL, Rfl: 1  •  Insulin Lispro, 1 Unit Dial, (HumaLOG KwikPen) 100 UNIT/ML solution pen-injector, Per correctional scale, MDD 50 units, Disp: 15 mL, Rfl: 3  •  Insulin Pen Needle (B-D UF III MINI PEN NEEDLES) 31G X 5 MM misc, USE 1  FIVE TIMES DAILY, Disp: 150 each, Rfl: 5  •  Insulin Pen Needle (RELION PEN NEEDLES) 32G X 4 MM misc, Use BID for E11.9, Disp: 100 each, Rfl: 1  •  Lancets misc, For use with glucose monitoring 3 times daily, Disp: 100 each, Rfl: 11  •  lidocaine (LIDODERM) 5 %, USE 1 PATCH EXTERNALLY ONCE DAILY. REMOVE AND DISCARD PATCH WITHIN 12 HOURS OR AS DIRECTED BY MD (Patient taking differently: Place 1 patch on the skin as directed by provider Daily.), Disp: 30 patch, Rfl: 0  •  methylPREDNISolone (MEDROL) 4 MG dose pack, Take as directed on package instructions., Disp: 21 tablet, Rfl: 0  •  miconazole (MICOTIN) 2 % vaginal cream, Insert 1 applicator into the vagina Every Night., Disp: 1 g, Rfl: 0  •  Omega-3 Fatty Acids (FISH OIL) 1000 MG capsule capsule, Take 1 capsule by mouth 4 (Four) Times a Day. (Patient taking differently: Take 1 capsule by mouth 2 (Two) Times a Day With Meals.), Disp: 120 capsule, Rfl: 2  •  omeprazole (priLOSEC) 20 MG capsule, Take 1 capsule by mouth once daily, Disp: 90 capsule, Rfl: 0  •  sacubitril-valsartan (ENTRESTO) 24-26 MG tablet, Take 1 tablet by mouth 2 (Two) Times a Day., Disp: , Rfl:   •  simvastatin (Zocor) 5 MG tablet, Take 1 tablet by mouth Every Night., Disp: 90 tablet, Rfl: 0  •  sodium-potassium-magnesium sulfates (Suprep Bowel Prep Kit) 17.5-3.13-1.6 GM/177ML solution oral solution, Take 1 bottle by mouth Take As Directed. Follow  instructions that were mailed to your home. If you didn't receive these call [phone]., Disp: 354 mL, Rfl: 0  •  solifenacin (VESICARE) 5 MG tablet, Take 1 tablet by mouth Daily., Disp: 90 tablet, Rfl: 1  •  spironolactone (ALDACTONE) 25 MG tablet, Take 1 tablet by mouth Daily., Disp: , Rfl: 0  •  Trulicity 4.5 MG/0.5ML solution pen-injector, INJECT 0.5ML UNDER THE SKIN AS DIRECTED ONCE A WEEK, Disp: 12 mL, Rfl: 0  •  valACYclovir (VALTREX) 500 MG tablet, Take 1 tablet by mouth Daily., Disp: , Rfl:   •  valACYclovir HCl (VALTREX PO), Take 1 tablet by mouth Daily. PT TO BRING BOTTLE FOR DOSAGE, Disp: , Rfl:   •  Vitamin D, Cholecalciferol, (CHOLECALCIFEROL) 10 MCG (400 UNIT) tablet, Take 1 tablet by mouth Daily., Disp: , Rfl:   •  warfarin (COUMADIN) 1 MG tablet, Take  by mouth. PT TAKES 0.5 (1 MG TAB) PLUS 4 MG TAB-TOTAL 4.5 MG ON Saturday AND , Disp: , Rfl:   •  warfarin (COUMADIN) 4 MG tablet, Take 1 tablet by mouth. 4 MG M-F, Disp: , Rfl: 2  MEDICATION LIST AND ALLERGIES REVIEWED.    Social History     Tobacco Use   • Smoking status: Former     Packs/day: 0.50     Years: 30.00     Pack years: 15.00     Types: Cigarettes, Electronic Cigarette     Start date: 1976     Quit date: 2/10/2010     Years since quittin.2   • Smokeless tobacco: Never   • Tobacco comments:     Quit in    Vaping Use   • Vaping Use: Every day   • Substances: Nicotine   • Devices: Refillable tank   Substance Use Topics   • Alcohol use: No   • Drug use: Never       FAMILY AND SOCIAL HISTORY REVIEWED.    Review of Systems   Constitutional: Positive for fatigue. Negative for activity change, appetite change, fever and unexpected weight change.   HENT: Negative for congestion, postnasal drip, rhinorrhea, sinus pressure, sore throat and voice change.    Eyes: Negative for visual disturbance.   Respiratory: Positive for shortness of breath. Negative for cough, chest tightness and wheezing.    Cardiovascular: Negative for  "chest pain, palpitations and leg swelling.   Gastrointestinal: Negative for abdominal distention, abdominal pain, nausea and vomiting.   Endocrine: Negative for cold intolerance and heat intolerance.   Genitourinary: Negative for difficulty urinating and urgency.   Musculoskeletal: Negative for arthralgias, back pain and neck pain.   Skin: Negative for color change and pallor.   Allergic/Immunologic: Negative for environmental allergies and food allergies.   Neurological: Negative for dizziness, syncope, weakness and light-headedness.   Hematological: Negative for adenopathy. Does not bruise/bleed easily.   Psychiatric/Behavioral: Negative for agitation and behavioral problems.   .    /72   Pulse 76   Temp 96.9 °F (36.1 °C) (Temporal)   Ht 152.4 cm (60\")   Wt 87.5 kg (192 lb 12.8 oz)   LMP 06/17/1998   SpO2 97% Comment: Resting at room air  BMI 37.65 kg/m²     Immunization History   Administered Date(s) Administered   • COVID-19 (MODERNA) 1st,2nd,3rd Dose Monovalent 05/17/2021, 06/14/2021   • Flu Vaccine Quad PF >36MO 09/22/2017   • FluLaval/Fluzone >6mos 11/25/2019   • FluMist 2-49yrs 10/22/2014   • Fluad Quad 65+ 10/23/2020   • Fluzone High Dose =>65 Years ([facility] ONLY) 10/22/2014   • Fluzone Quad >6mos (Multi-dose) 11/25/2019   • Hepatitis A 11/13/2018, 07/09/2019   • Influenza Quad Vaccine (Inpatient) 10/21/2013, 09/06/2016   • Influenza TIV (IM) 02/02/2016   • PPD Test 06/20/2012   • Pneumococcal Conjugate 20-Valent (PCV20) 09/29/2022   • Pneumococcal Polysaccharide (PPSV23) 09/22/2017   • Shingrix 07/09/2019, 11/25/2019   • Tdap 09/01/2010, 11/13/2018   • Zostavax 07/09/2019, 11/25/2019       Physical Exam  Vitals and nursing note reviewed.   Constitutional:       Appearance: She is well-developed. She is not diaphoretic.   HENT:      Head: Normocephalic and atraumatic.   Eyes:      Pupils: Pupils are equal, round, and reactive to light.   Neck:      Thyroid: No thyromegaly.   Cardiovascular:     "  Rate and Rhythm: Normal rate and regular rhythm.      Heart sounds: Normal heart sounds. No murmur heard.    No friction rub. No gallop.   Pulmonary:      Effort: Pulmonary effort is normal. No respiratory distress.      Breath sounds: Normal breath sounds. No wheezing or rales.   Chest:      Chest wall: No tenderness.   Abdominal:      General: Bowel sounds are normal.      Palpations: Abdomen is soft.      Tenderness: There is no abdominal tenderness.   Musculoskeletal:         General: No swelling. Normal range of motion.      Cervical back: Normal range of motion and neck supple.   Lymphadenopathy:      Cervical: No cervical adenopathy.   Skin:     General: Skin is warm and dry.      Capillary Refill: Capillary refill takes less than 2 seconds.   Neurological:      Mental Status: She is alert and oriented to person, place, and time.   Psychiatric:         Mood and Affect: Mood normal.         Behavior: Behavior normal.           RESULTS      PROBLEM LIST    Problem List Items Addressed This Visit        Cardiac and Vasculature    Chronic diastolic heart failure - Primary    Overview     Added automatically from request for surgery [id number]            Pulmonary and Pneumonias    Emphysema/COPD (Chronic)    Overview     No obstruction on her PFTs         Bronchiectasis without complication    Chronic respiratory failure with hypoxia       Sleep    Obstructive sleep apnea syndrome    Overview     BiPAP with 2 L at night              DISCUSSION    Ms. Lopez was here for follow-up.  She seems to be doing fairly well from a pulmonary standpoint.  I did encourage her to continue using the albuterol as needed.    She will continue to follow with cardiology for his history of CHF.    She will continue to wear her BiPAP every night with 2 L bled in the machine.  I did encourage her to wear this a minimum of 4 hours or I did not have her download    She will continue Mucinex as needed for her bronchiectasis.  Did  encourage her to continue using her oxygen as needed.    She will follow-up in 6 months.  I personally spent a total of 31 minutes on patient visit today including chart review, face to face with the patient obtaining the history and physical exam, review of pertinent images and tests, counseling and discussion and/or coordination of care as described above, and documentation.  Total time excludes time spent on other separate services such as performing procedures or test interpretation, if applicable.        Gena Encarnacion, APRN  04/25/202310:25 EDT  Electronically signed     Please note that portions of this note were completed with a voice recognition program.        CC: Davina Santiago,

## 2023-04-28 ENCOUNTER — OFFICE VISIT (OUTPATIENT)
Dept: FAMILY MEDICINE CLINIC | Facility: CLINIC | Age: 63
End: 2023-04-28
Payer: MEDICARE

## 2023-04-28 ENCOUNTER — LAB (OUTPATIENT)
Dept: LAB | Facility: HOSPITAL | Age: 63
End: 2023-04-28
Payer: MEDICARE

## 2023-04-28 VITALS
OXYGEN SATURATION: 96 % | HEART RATE: 86 BPM | WEIGHT: 192.2 LBS | BODY MASS INDEX: 37.73 KG/M2 | DIASTOLIC BLOOD PRESSURE: 72 MMHG | RESPIRATION RATE: 17 BRPM | SYSTOLIC BLOOD PRESSURE: 116 MMHG | TEMPERATURE: 98.2 F | HEIGHT: 60 IN

## 2023-04-28 DIAGNOSIS — R30.0 DYSURIA: ICD-10-CM

## 2023-04-28 DIAGNOSIS — L72.9 SKIN CYST: ICD-10-CM

## 2023-04-28 DIAGNOSIS — R79.89 ELEVATED LIVER FUNCTION TESTS: Primary | ICD-10-CM

## 2023-04-28 DIAGNOSIS — R79.89 ELEVATED LIVER FUNCTION TESTS: ICD-10-CM

## 2023-04-28 DIAGNOSIS — Z00.00 WELLNESS EXAMINATION: ICD-10-CM

## 2023-04-28 DIAGNOSIS — Z12.31 ENCOUNTER FOR SCREENING MAMMOGRAM FOR MALIGNANT NEOPLASM OF BREAST: ICD-10-CM

## 2023-04-28 LAB
ALBUMIN SERPL-MCNC: 4.8 G/DL (ref 3.5–5.2)
ALBUMIN/GLOB SERPL: 1.5 G/DL
ALP SERPL-CCNC: 167 U/L (ref 39–117)
ALT SERPL W P-5'-P-CCNC: 25 U/L (ref 1–33)
ANION GAP SERPL CALCULATED.3IONS-SCNC: 11 MMOL/L (ref 5–15)
AST SERPL-CCNC: 21 U/L (ref 1–32)
BILIRUB SERPL-MCNC: 0.2 MG/DL (ref 0–1.2)
BILIRUB UR QL STRIP: NEGATIVE
BUN SERPL-MCNC: 13 MG/DL (ref 8–23)
BUN/CREAT SERPL: 15.1 (ref 7–25)
CALCIUM SPEC-SCNC: 9.7 MG/DL (ref 8.6–10.5)
CHLORIDE SERPL-SCNC: 97 MMOL/L (ref 98–107)
CLARITY UR: CLEAR
CO2 SERPL-SCNC: 29 MMOL/L (ref 22–29)
COLOR UR: YELLOW
CREAT SERPL-MCNC: 0.86 MG/DL (ref 0.57–1)
EGFRCR SERPLBLD CKD-EPI 2021: 77 ML/MIN/1.73
GLOBULIN UR ELPH-MCNC: 3.3 GM/DL
GLUCOSE SERPL-MCNC: 145 MG/DL (ref 65–99)
GLUCOSE UR STRIP-MCNC: NEGATIVE MG/DL
HGB UR QL STRIP.AUTO: NEGATIVE
KETONES UR QL STRIP: NEGATIVE
LEUKOCYTE ESTERASE UR QL STRIP.AUTO: NEGATIVE
NITRITE UR QL STRIP: NEGATIVE
PH UR STRIP.AUTO: 6 [PH] (ref 5–8)
POTASSIUM SERPL-SCNC: 4.1 MMOL/L (ref 3.5–5.2)
PROT SERPL-MCNC: 8.1 G/DL (ref 6–8.5)
PROT UR QL STRIP: NEGATIVE
SODIUM SERPL-SCNC: 137 MMOL/L (ref 136–145)
SP GR UR STRIP: 1.01 (ref 1–1.03)
UROBILINOGEN UR QL STRIP: NORMAL

## 2023-04-28 PROCEDURE — 81003 URINALYSIS AUTO W/O SCOPE: CPT

## 2023-04-28 PROCEDURE — 36415 COLL VENOUS BLD VENIPUNCTURE: CPT

## 2023-04-28 PROCEDURE — 80053 COMPREHEN METABOLIC PANEL: CPT

## 2023-04-28 NOTE — PROGRESS NOTES
The ABCs of the Annual Wellness Visit  Subsequent Medicare Wellness Visit    Subjective    Di Lopez is a 62 y.o. female who presents for a Subsequent Medicare Wellness Visit.    The following portions of the patient's history were reviewed and   updated as appropriate: allergies, current medications, past family history, past medical history, past social history, past surgical history and problem list.    Compared to one year ago, the patient feels her physical   health is the same.    Compared to one year ago, the patient feels her mental   health is the same.    Recent Hospitalizations:  She was not admitted to the hospital during the last year.       Current Medical Providers:  Patient Care Team:  Davina Santiago DO as PCP - General (Family Medicine)  Ameena Iglesias MD as Consulting Physician (Endocrinology)  Nakita Freire APRN as Nurse Practitioner (Nurse Practitioner)  Raimundo Thomas MD as Consulting Physician (Pulmonary Disease)    Outpatient Medications Prior to Visit   Medication Sig Dispense Refill   • albuterol sulfate  (90 Base) MCG/ACT inhaler Inhale 2 puffs Every 4 (Four) Hours As Needed for Wheezing. 1 g 0   • Alcohol Swabs (Alcohol Prep) pads For use prior to checking glucose twice daily 100 each 5   • azelastine (ASTELIN) 0.1 % nasal spray 2 sprays into the nostril(s) as directed by provider 2 (Two) Times a Day. Use in each nostril as directed 1 each 1   • carvedilol (COREG) 12.5 MG tablet 1 tablet 2 (Two) Times a Day With Meals.     • Continuous Blood Gluc  (FreeStyle Dalia 2 [place]) device 1 each Daily. Use as directed daily to check blood sugars 1 each 0   • Continuous Blood Gluc Sensor (FreeStyle Dalia 2 Sensor) misc Inject 1 each under the skin into the appropriate area as directed Every 14 (Fourteen) Days. NEEDS APPT FOR MORE REFILLS 2 each 3   • Enoxaparin Sodium (LOVENOX) 100 MG/ML solution prefilled syringe syringe INJECT CONTENTS OF 1 SYRINGE  SUBCUTANEOUSLY TWICE DAILY FOR 7 DAYS     • escitalopram (LEXAPRO) 20 MG tablet Take 1 tablet by mouth once daily 90 tablet 0   • folic acid (FOLVITE) 800 MCG tablet Take 1 tablet by mouth Daily.     • furosemide (LASIX) 40 MG tablet Take 1 tablet by mouth 3 (Three) Times a Week. Monday, Wednesday, Friday     • gabapentin (NEURONTIN) 300 MG capsule Take 1 capsule by mouth At Night As Needed (leg pain). 30 capsule 3   • glimepiride (AMARYL) 4 MG tablet TAKE 2 TABLETS BY MOUTH IN THE MORNING BEFORE BREAKFAST 180 tablet 0   • glucose blood test strip Use as instructed 3 times a day 100 each 11   • HYDROcodone-acetaminophen (NORCO) 7.5-325 MG per tablet Take 1 tablet by mouth Daily As Needed for Moderate Pain or Severe Pain.     • Insulin Glargine (Lantus SoloStar) 100 UNIT/ML injection pen Inject 45 Units under the skin into the appropriate area as directed Daily. 45 mL 1   • Insulin Lispro, 1 Unit Dial, (HumaLOG KwikPen) 100 UNIT/ML solution pen-injector Per correctional scale, MDD 50 units 15 mL 3   • Insulin Pen Needle (B-D UF III MINI PEN NEEDLES) 31G X 5 MM misc USE 1  FIVE TIMES DAILY 150 each 5   • Insulin Pen Needle (RELION PEN NEEDLES) 32G X 4 MM misc Use BID for E11.9 100 each 1   • Lancets misc For use with glucose monitoring 3 times daily 100 each 11   • lidocaine (LIDODERM) 5 % USE 1 PATCH EXTERNALLY ONCE DAILY. REMOVE AND DISCARD PATCH WITHIN 12 HOURS OR AS DIRECTED BY MD (Patient taking differently: Place 1 patch on the skin as directed by provider Daily.) 30 patch 0   • miconazole (MICOTIN) 2 % vaginal cream Insert 1 applicator into the vagina Every Night. 1 g 0   • Omega-3 Fatty Acids (FISH OIL) 1000 MG capsule capsule Take 1 capsule by mouth 4 (Four) Times a Day. (Patient taking differently: Take 1 capsule by mouth 2 (Two) Times a Day With Meals.) 120 capsule 2   • omeprazole (priLOSEC) 20 MG capsule Take 1 capsule by mouth once daily 90 capsule 0   • sacubitril-valsartan (ENTRESTO) 24-26 MG tablet  Take 1 tablet by mouth 2 (Two) Times a Day.     • simvastatin (Zocor) 5 MG tablet Take 1 tablet by mouth Every Night. 90 tablet 0   • sodium-potassium-magnesium sulfates (Suprep Bowel Prep Kit) 17.5-3.13-1.6 GM/177ML solution oral solution Take 1 bottle by mouth Take As Directed. Follow instructions that were mailed to your home. If you didn't receive these call [phone]. 354 mL 0   • solifenacin (VESICARE) 5 MG tablet Take 1 tablet by mouth Daily. 90 tablet 1   • spironolactone (ALDACTONE) 25 MG tablet Take 1 tablet by mouth Daily.  0   • Trulicity 4.5 MG/0.5ML solution pen-injector INJECT 0.5ML UNDER THE SKIN AS DIRECTED ONCE A WEEK 12 mL 0   • valACYclovir (VALTREX) 500 MG tablet Take 1 tablet by mouth Daily.     • valACYclovir HCl (VALTREX PO) Take 1 tablet by mouth Daily. PT TO BRING BOTTLE FOR DOSAGE     • Vitamin D, Cholecalciferol, (CHOLECALCIFEROL) 10 MCG (400 UNIT) tablet Take 1 tablet by mouth Daily.     • warfarin (COUMADIN) 1 MG tablet Take  by mouth. PT TAKES 0.5 (1 MG TAB) PLUS 4 MG TAB-TOTAL 4.5 MG ON Saturday AND Sunday     • warfarin (COUMADIN) 4 MG tablet Take 1 tablet by mouth. 4 MG M-F  2   • methylPREDNISolone (MEDROL) 4 MG dose pack Take as directed on package instructions. 21 tablet 0     No facility-administered medications prior to visit.       Opioid medication/s are on active medication list.  and I have evaluated her active treatment plan and pain score trends (see table).  Vitals:    04/28/23 1435   PainSc:   9     I have reviewed the chart for potential of high risk medication and harmful drug interactions in the elderly.            Aspirin is not on active medication list.  Aspirin use is not indicated based on review of current medical condition/s. Risk of harm outweighs potential benefits.  .    Patient Active Problem List   Diagnosis   • Arteriovenous malformation of gastrointestinal tract   • Depression   • Type 2 diabetes mellitus without complication, with long-term  "current use of insulin   • Dyslipidemia   • Emphysema/COPD   • Hypertension   • Insomnia   • Morbid obesity   • Obstructive sleep apnea syndrome   • Normocytic anemia   • Sliding hiatal hernia   • Vitamin B12 deficiency   • Iron deficiency   • Iron malabsorption   • Bronchiectasis without complication   • Electronic cigarette use   • Chronic diastolic heart failure   • Left bundle branch hemiblock   • Chronic respiratory failure with hypoxia   • Body aches   • Fatigue   • Headaches due to old head injury   • History of tobacco use, presenting hazards to health   • Multiple nodules of lung   • Nocturnal hypoxemia   • Screening for colon cancer   • Abnormal CT scan   • Wellness examination     Advance Care Planning   Advance Care Planning     Advance Directive is not on file.  ACP discussion was held with the patient during this visit. Patient does not have an advance directive, information provided.     Objective    Vitals:    23 1435   BP: 116/72   Pulse: 86   Resp: 17   Temp: 98.2 °F (36.8 °C)   SpO2: 96%   Weight: 87.2 kg (192 lb 3.2 oz)   Height: 152.4 cm (60\")   PainSc:   9     Estimated body mass index is 37.54 kg/m² as calculated from the following:    Height as of this encounter: 152.4 cm (60\").    Weight as of this encounter: 87.2 kg (192 lb 3.2 oz).          Does the patient have evidence of cognitive impairment? No    Lab Results   Component Value Date    TRIG 414 (H) 2023    HDL 27 (L) 2023     (H) 2023    VLDL 72 (H) 2023    HGBA1C 6.8 2023        HEALTH RISK ASSESSMENT    Smoking Status:  Social History     Tobacco Use   Smoking Status Former   • Packs/day: 1.00   • Years: 30.00   • Pack years: 30.00   • Types: Cigarettes, Electronic Cigarette   • Start date: 1976   • Quit date: 2/10/2010   • Years since quittin.2   Smokeless Tobacco Never   Tobacco Comments    Quit in      Alcohol Consumption:  Social History     Substance and Sexual Activity "   Alcohol Use No     Fall Risk Screen:    STEADI Fall Risk Assessment was completed, and patient is at MODERATE risk for falls. Assessment completed on:2023    Depression Screenin/28/2023     2:40 PM   PHQ-2/PHQ-9 Depression Screening   Little Interest or Pleasure in Doing Things 0-->not at all   Feeling Down, Depressed or Hopeless 0-->not at all   PHQ-9: Brief Depression Severity Measure Score 0       Health Habits and Functional and Cognitive Screenin/28/2023     2:38 PM   Functional & Cognitive Status   Do you have difficulty preparing food and eating? No   Do you have difficulty bathing yourself, getting dressed or grooming yourself? No   Do you have difficulty using the toilet? No   Do you have difficulty moving around from place to place? Yes   Do you have trouble with steps or getting out of a bed or a chair? Yes   Current Diet Well Balanced Diet   Dental Exam Up to date   Eye Exam Not up to date   Exercise (times per week) 0 times per week   Current Exercises Include No Regular Exercise   Do you need help using the phone?  No   Are you deaf or do you have serious difficulty hearing?  No   Do you need help with transportation? No   Do you need help shopping? No   Do you need help preparing meals?  No   Do you need help with housework?  Yes   Do you need help with laundry? No   Do you need help taking your medications? No   Do you need help managing money? No   Do you ever drive or ride in a car without wearing a seat belt? No       Age-appropriate Screening Schedule:  Refer to the list below for future screening recommendations based on patient's age, sex and/or medical conditions. Orders for these recommended tests are listed in the plan section. The patient has been provided with a written plan.    Health Maintenance   Topic Date Due   • COVID-19 Vaccine (3 - Booster for Moderna series) 2021   • DIABETIC FOOT EXAM  10/06/2022   • COLORECTAL CANCER SCREENING  2023   •  "INFLUENZA VACCINE  08/01/2023   • HEMOGLOBIN A1C  08/09/2023   • LUNG CANCER SCREENING  09/02/2023   • URINE MICROALBUMIN  02/09/2024   • LIPID PANEL  03/01/2024   • DIABETIC EYE EXAM  03/28/2024   • ANNUAL WELLNESS VISIT  04/28/2024   • MAMMOGRAM  09/16/2024   • TDAP/TD VACCINES (3 - Td or Tdap) 11/13/2028   • Pneumococcal Vaccine 0-64  Completed   • HEPATITIS C SCREENING  Discontinued   • PAP SMEAR  Discontinued   • ZOSTER VACCINE  Discontinued                  CMS Preventative Services Quick Reference  Risk Factors Identified During Encounter  Immunizations Discussed/Encouraged: Tdap  The above risks/problems have been discussed with the patient.  Pertinent information has been shared with the patient in the After Visit Summary.  An After Visit Summary and PPPS were made available to the patient.    Follow Up:   Next Medicare Wellness visit to be scheduled in 1 year.       Additional E&M Note during same encounter follows:  Patient has multiple medical problems which are significant and separately identifiable that require additional work above and beyond the Medicare Wellness Visit.      Chief Complaint  Medicare Wellness-subsequent (Possible Bladder infection, extreme vaginal dryness )    Subjective        HPI  Di Lopez is also being seen today for urinary symptoms.          Objective   Vital Signs:  /72   Pulse 86   Temp 98.2 °F (36.8 °C)   Resp 17   Ht 152.4 cm (60\")   Wt 87.2 kg (192 lb 3.2 oz)   SpO2 96%   BMI 37.54 kg/m²     Physical Exam  Constitutional:       General: She is not in acute distress.     Appearance: Normal appearance.   HENT:      Head: Normocephalic and atraumatic.   Eyes:      Extraocular Movements: Extraocular movements intact.   Cardiovascular:      Rate and Rhythm: Normal rate and regular rhythm.      Heart sounds: No murmur heard.  Pulmonary:      Effort: Pulmonary effort is normal. No respiratory distress.      Breath sounds: Normal breath sounds. No stridor. No " wheezing, rhonchi or rales.   Musculoskeletal:      Comments: Large cystic structure felt under her skin left slbow no redness warmth or swelling.   Normal arm rom left side.    Skin:     Findings: No rash.   Neurological:      General: No focal deficit present.      Mental Status: She is alert.   Psychiatric:         Mood and Affect: Mood normal.            Annual exam  Colonoscopy is scheduled.   Mammogram: up to date at this time. Due in the fall. Will order for that time.   dexa scan has been ordered. Advised her to call the office to schedule.   Lung cancer screen: ordered. Advised her to call the office and schedule.  covid vaccine recommended   Shingles vaccine up to date  Pneumonia vaccine up to date  tdap up to date      She has been having dysuria and would like her urine checked for infection.     She picked up simvastatin from pharmacy and has been taking. Check lfts.    US lump on left should (present for a year or more no signs infection).                  Assessment and Plan   Diagnoses and all orders for this visit:    1. Elevated liver function tests (Primary)  -     Comprehensive metabolic panel; Future    2. Wellness examination    3. Dysuria  -     Urinalysis With Culture If Indicated -; Future    4. Encounter for screening mammogram for malignant neoplasm of breast  -     Mammo screening digital tomosynthesis bilateral w CAD; Future             Follow Up   Return in about 4 months (around 8/28/2023).  Patient was given instructions and counseling regarding her condition or for health maintenance advice. Please see specific information pulled into the AVS if appropriate.

## 2023-04-30 ENCOUNTER — DOCUMENTATION (OUTPATIENT)
Dept: FAMILY MEDICINE CLINIC | Facility: CLINIC | Age: 63
End: 2023-04-30
Payer: MEDICARE

## 2023-05-09 ENCOUNTER — TELEPHONE (OUTPATIENT)
Dept: ENDOCRINOLOGY | Facility: CLINIC | Age: 63
End: 2023-05-09

## 2023-05-09 NOTE — TELEPHONE ENCOUNTER
Please instruct patient to reduce her long-acting insulin by 5 units due to overnight lows.  If she continues to have issues she should contact the clinic for sooner appointment.  I do have some of the ability later this week if she would like to go ahead and reschedule.

## 2023-05-09 NOTE — TELEPHONE ENCOUNTER
"Called the pt and she is aware she missed her appt today she had other appts.    She states that when she goes to be her numbers are fine. When she wakes up in the am she is in the 60\"s. Later in the day they go back in the 100's. She could not give any dates, times etc.    Please advise.  "

## 2023-05-10 ENCOUNTER — TELEPHONE (OUTPATIENT)
Dept: FAMILY MEDICINE CLINIC | Facility: CLINIC | Age: 63
End: 2023-05-10

## 2023-05-10 NOTE — TELEPHONE ENCOUNTER
"Caller: Di Lopez \"Shiloh\"    Relationship: Self    Best call back number: [phone]    What specialty or service is being requested: PAIN MANAGEMENT    What is the provider, practice or medical service name: N/A    What is the office location: ANYWHERE IN [place]    Any additional details: PATIENT STATES THAT SHE WAS DISMISSED FROM THE [facility]IN TREATMENT CENTER ON   [address] [place] [address] DUE TO TARDINESS.     SHE IS REQUESTING DR HANNAH REFER HER TO ANOTHER PAIN CLINIC IN [place] TO CONTINUE TREATMENT FOR HER BACK PAIN.         "

## 2023-05-11 ENCOUNTER — TELEPHONE (OUTPATIENT)
Dept: ENDOCRINOLOGY | Facility: CLINIC | Age: 63
End: 2023-05-11
Payer: MEDICARE

## 2023-05-11 DIAGNOSIS — M54.50 CHRONIC LOW BACK PAIN, UNSPECIFIED BACK PAIN LATERALITY, UNSPECIFIED WHETHER SCIATICA PRESENT: Primary | ICD-10-CM

## 2023-05-11 DIAGNOSIS — G89.29 CHRONIC LOW BACK PAIN, UNSPECIFIED BACK PAIN LATERALITY, UNSPECIFIED WHETHER SCIATICA PRESENT: Primary | ICD-10-CM

## 2023-05-16 ENCOUNTER — PRE-ADMISSION TESTING (OUTPATIENT)
Dept: PREADMISSION TESTING | Facility: HOSPITAL | Age: 63
End: 2023-05-16
Payer: MEDICARE

## 2023-05-16 VITALS — HEIGHT: 60 IN | WEIGHT: 192 LBS | BODY MASS INDEX: 37.69 KG/M2

## 2023-05-16 LAB
DEPRECATED RDW RBC AUTO: 44.5 FL (ref 37–54)
ERYTHROCYTE [DISTWIDTH] IN BLOOD BY AUTOMATED COUNT: 13.2 % (ref 12.3–15.4)
HCT VFR BLD AUTO: 34.4 % (ref 34–46.6)
HGB BLD-MCNC: 10.9 G/DL (ref 12–15.9)
INR PPP: 3.06 (ref 0.89–1.12)
MCH RBC QN AUTO: 29 PG (ref 26.6–33)
MCHC RBC AUTO-ENTMCNC: 31.7 G/DL (ref 31.5–35.7)
MCV RBC AUTO: 91.5 FL (ref 79–97)
PLATELET # BLD AUTO: 302 10*3/MM3 (ref 140–450)
PMV BLD AUTO: 10.1 FL (ref 6–12)
POTASSIUM SERPL-SCNC: 4.1 MMOL/L (ref 3.5–5.2)
PROTHROMBIN TIME: 31.8 SECONDS (ref 12.2–14.5)
RBC # BLD AUTO: 3.76 10*6/MM3 (ref 3.77–5.28)
WBC NRBC COR # BLD: 11.59 10*3/MM3 (ref 3.4–10.8)

## 2023-05-16 PROCEDURE — 85027 COMPLETE CBC AUTOMATED: CPT

## 2023-05-16 PROCEDURE — 85610 PROTHROMBIN TIME: CPT

## 2023-05-16 PROCEDURE — 84132 ASSAY OF SERUM POTASSIUM: CPT

## 2023-05-16 PROCEDURE — 36415 COLL VENOUS BLD VENIPUNCTURE: CPT

## 2023-05-16 RX ORDER — FERROUS SULFATE 325(65) MG
325 TABLET ORAL
COMMUNITY

## 2023-05-16 NOTE — PAT
NO orders while pt in PAT. Will need to get permit signed in preop.    See Dr Patel note on chart, will need PT check 4 days prior to colonoscopy, then call office for further instructions.    An arrival time for procedure was not provided during PAT visit. If patient had any questions or concerns about their arrival time, they were instructed to contact their surgeon/physician.  Additionally, if the patient referred to an arrival time that was acquired from their my chart account, patient was encouraged to verify that time with their surgeon/physician. Arrival times are NOT provided in Pre Admission Testing Department.    Patient denies any current skin issues.     Per Anesthesia Request, patient instructed not to take their ACE/ARB medications on the AM of surgery.    See Pacemaker device check from [facility] on chart from PAT     It was noted during Pre Admission Testing that patient was wearing some form of fingernail polish (gel/regular) and/or acrylic/artificial nails.  Patient was told that polish and/or artificial nails must be removed for surgery.  If a patient had recent manicure, and would rather not remove polish or artificial nails. Then the minimum requirement is that the polish/artificial nails must be removed from the middle finger on each hand.  Patient verbalized understanding.    If patient was having surgery on an upper extremity, then the patient was instructed that fingernail polish/artificial fingernails must be removed for surgery.  NO EXCEPTIONS.  Patient verbalized understanding.    If patient was having surgery on a lower extremity, then the patient was instructed that toenail polish on both extremities must be removed for surgery.  NO EXCEPTIONS. Patient verbalized understanding.    Patient was informed they must have someone to transport them at time of discharge as well as adult supervision the first 24 hours after surgery.  Stressed to patient that if they lack discharge transportation or  a caregiver there is a possibility their procedure will be cancelled upon arrival to pre op.

## 2023-05-23 ENCOUNTER — ANESTHESIA EVENT (OUTPATIENT)
Dept: GASTROENTEROLOGY | Facility: HOSPITAL | Age: 63
End: 2023-05-23
Payer: MEDICARE

## 2023-05-23 ENCOUNTER — ANESTHESIA (OUTPATIENT)
Dept: GASTROENTEROLOGY | Facility: HOSPITAL | Age: 63
End: 2023-05-23
Payer: MEDICARE

## 2023-05-23 ENCOUNTER — HOSPITAL ENCOUNTER (OUTPATIENT)
Facility: HOSPITAL | Age: 63
Setting detail: HOSPITAL OUTPATIENT SURGERY
Discharge: HOME | End: 2023-05-23
Attending: INTERNAL MEDICINE | Admitting: INTERNAL MEDICINE
Payer: MEDICARE

## 2023-05-23 VITALS
SYSTOLIC BLOOD PRESSURE: 130 MMHG | HEART RATE: 77 BPM | RESPIRATION RATE: 18 BRPM | OXYGEN SATURATION: 97 % | DIASTOLIC BLOOD PRESSURE: 71 MMHG | TEMPERATURE: 97.2 F

## 2023-05-23 DIAGNOSIS — Z12.11 SCREENING FOR COLON CANCER: ICD-10-CM

## 2023-05-23 LAB — GLUCOSE BLDC GLUCOMTR-MCNC: 79 MG/DL (ref 70–130)

## 2023-05-23 PROCEDURE — 82948 REAGENT STRIP/BLOOD GLUCOSE: CPT

## 2023-05-23 PROCEDURE — 25010000002 PROPOFOL 10 MG/ML EMULSION

## 2023-05-23 PROCEDURE — 88305 TISSUE EXAM BY PATHOLOGIST: CPT | Performed by: INTERNAL MEDICINE

## 2023-05-23 PROCEDURE — 45385 COLONOSCOPY W/LESION REMOVAL: CPT | Performed by: INTERNAL MEDICINE

## 2023-05-23 PROCEDURE — S0260 H&P FOR SURGERY: HCPCS | Performed by: INTERNAL MEDICINE

## 2023-05-23 RX ORDER — PROPOFOL 10 MG/ML
VIAL (ML) INTRAVENOUS AS NEEDED
Status: DISCONTINUED | OUTPATIENT
Start: 2023-05-23 | End: 2023-05-23 | Stop reason: SURG

## 2023-05-23 RX ORDER — LIDOCAINE HYDROCHLORIDE 10 MG/ML
INJECTION, SOLUTION EPIDURAL; INFILTRATION; INTRACAUDAL; PERINEURAL AS NEEDED
Status: DISCONTINUED | OUTPATIENT
Start: 2023-05-23 | End: 2023-05-23 | Stop reason: SURG

## 2023-05-23 RX ORDER — SODIUM CHLORIDE, SODIUM LACTATE, POTASSIUM CHLORIDE, CALCIUM CHLORIDE 600; 310; 30; 20 MG/100ML; MG/100ML; MG/100ML; MG/100ML
INJECTION, SOLUTION INTRAVENOUS CONTINUOUS PRN
Status: DISCONTINUED | OUTPATIENT
Start: 2023-05-23 | End: 2023-05-23 | Stop reason: SURG

## 2023-05-23 RX ORDER — IPRATROPIUM BROMIDE AND ALBUTEROL SULFATE 2.5; .5 MG/3ML; MG/3ML
3 SOLUTION RESPIRATORY (INHALATION) ONCE AS NEEDED
Status: DISCONTINUED | OUTPATIENT
Start: 2023-05-23 | End: 2023-05-23 | Stop reason: HOSPADM

## 2023-05-23 RX ORDER — ONDANSETRON 2 MG/ML
4 INJECTION INTRAMUSCULAR; INTRAVENOUS ONCE AS NEEDED
Status: DISCONTINUED | OUTPATIENT
Start: 2023-05-23 | End: 2023-05-23 | Stop reason: HOSPADM

## 2023-05-23 RX ADMIN — LIDOCAINE HYDROCHLORIDE 100 MG: 10 INJECTION, SOLUTION EPIDURAL; INFILTRATION; INTRACAUDAL; PERINEURAL at 14:54

## 2023-05-23 RX ADMIN — LIDOCAINE HYDROCHLORIDE 50 MG: 10 INJECTION, SOLUTION EPIDURAL; INFILTRATION; INTRACAUDAL; PERINEURAL at 14:35

## 2023-05-23 RX ADMIN — PROPOFOL 100 MCG/KG/MIN: 10 INJECTION, EMULSION INTRAVENOUS at 14:36

## 2023-05-23 RX ADMIN — PROPOFOL 80 MG: 10 INJECTION, EMULSION INTRAVENOUS at 14:35

## 2023-05-23 RX ADMIN — SODIUM CHLORIDE, POTASSIUM CHLORIDE, SODIUM LACTATE AND CALCIUM CHLORIDE: 600; 310; 30; 20 INJECTION, SOLUTION INTRAVENOUS at 14:10

## 2023-05-23 NOTE — H&P
HPI: Ms. Lopez is a 63 yo with a history of hypertension, diastolic heart failure, sleep apnea and diabetes. She has regular bowel habits without gross blood in the stool. The patient denies abdominal pain or nausea. There is no unexplained weight loss. She denies family history of colon polyps. Ms. Lopez states a colonoscopy done at  about 6 or 7 years ago demonstrated a couple of polyps. The patient denies difficult or painful swallowing. No breakthrough heartburn.    PMH: Hypertension            Diastolic heart failure            COPD            CAL            CHF            Diabetes mellitus            Endometrial cancer  PSH: Mitral valve replacement           Hysterectomy           Breast biopsy           Previous EGD AND COLONOSCOPY  All: PCN        Codeine        Morphine         Sulfa  Meds: see list  Fam Hx: Multiple myeloma- father  Soc Hx: Patient does vape daily, no alcohol  ROS: see HPI    Phy Exam: Gen- pleasant female, no acute distress                     HEENT- oropharynx clear, no lesions                     Chest- clear to auscultation                     Cardiac- s1s2, valvular click                     Abd- soft, bowel sounds present, no tenderness                     Ext- no cyanosis, no clubbing                     Neuro- awake, alert  Imp: History of colon polyps- this is the history per patient report. She does not have any details about the recall plan. The last exam per review was 2012 and no polyps were found.    Plan: Will proceed with colonoscopy.

## 2023-05-23 NOTE — ANESTHESIA POSTPROCEDURE EVALUATION
Patient: Di Lopez    Procedure Summary     Date: 05/23/23 Room / [facility]:  [facility]    Anesthesia Start: 1431 Anesthesia Stop: 1509    Procedure: COLONOSCOPY Diagnosis:       Screening for colon cancer      (Screening for colon cancer [Z12.11])    Surgeons: Gt Fernandes MD Provider: Jose Maria Harvey MD    Anesthesia Type: general, MAC ASA Status: 3          Anesthesia Type: general, MAC    Vitals  Vitals Value Taken Time   /68 05/23/23 1505   Temp 97.2 °F (36.2 °C) 05/23/23 1505   Pulse 85 05/23/23 1506   Resp 18 05/23/23 1505   SpO2 99 % 05/23/23 1506   Vitals shown include unvalidated device data.        Post Anesthesia Care and Evaluation    Patient location during evaluation: PACU  Patient participation: complete - patient participated  Level of consciousness: awake and alert  Pain management: adequate    Airway patency: patent  Anesthetic complications: No anesthetic complications  PONV Status: none  Cardiovascular status: hemodynamically stable and acceptable  Respiratory status: nonlabored ventilation, acceptable and room air  Hydration status: acceptable    Comments: /68  HR 85  Sats 100  Temp 97.2

## 2023-05-23 NOTE — ANESTHESIA PREPROCEDURE EVALUATION
Anesthesia Evaluation     Patient summary reviewed and Nursing notes reviewed   NPO Solid Status: > 8 hours  NPO Liquid Status: > 2 hours           Airway   Mallampati: II  TM distance: >3 FB  Neck ROM: full  Possible difficult intubation  Dental      Pulmonary    (+) a smoker (2010 ) Former, COPD (emphysema PRN MDI) mild, asthma,home oxygen (nocte ), sleep apnea on CPAP,     ROS comment: Pulm HTN   Cardiovascular   Exercise tolerance: poor (<4 METS)    ECG reviewed  PT is on anticoagulation therapy    (+) hypertension, valvular problems/murmurs (sp mitral vlave repair 2010 ), dysrhythmias, CHF Diastolic >=55%, hyperlipidemia,   (-) CAD, cardiac stents, CABGPast MI: pasquale def.      Neuro/Psych  (+) headaches, numbness, psychiatric history,    (-) seizures, CVA    ROS Comment: wheelchair   GI/Hepatic/Renal/Endo    (+) obesity,  hiatal hernia, GERD,  liver disease, diabetes mellitus (A1C >6.5) type 2 poorly controlled,   (-) morbid obesity, no renal disease, no thyroid disorder    ROS Comment: GI Tract AVM     Musculoskeletal     Abdominal    Substance History      OB/GYN          Other   arthritis,    history of cancer (uterine ca ) active    ROS/Med Hx Other: Allergies to adhesive tape and Morphine    HCT 35      Phys Exam Other: [employer] 2 grade IIB [employer] 2022              Anesthesia Plan    ASA 3     general and MAC     (PFL)  intravenous induction     Anesthetic plan, risks, benefits, and alternatives have been provided, discussed and informed consent has been obtained with: patient.    Plan discussed with CRNA.        CODE STATUS:

## 2023-05-25 LAB
CYTO UR: NORMAL
LAB AP CASE REPORT: NORMAL
LAB AP CLINICAL INFORMATION: NORMAL
PATH REPORT.FINAL DX SPEC: NORMAL
PATH REPORT.GROSS SPEC: NORMAL

## 2023-05-31 ENCOUNTER — HOSPITAL ENCOUNTER (OUTPATIENT)
Dept: CARDIOLOGY | Facility: HOSPITAL | Age: 63
Discharge: HOME | End: 2023-05-31

## 2023-05-31 VITALS
BODY MASS INDEX: 37.7 KG/M2 | HEIGHT: 60 IN | SYSTOLIC BLOOD PRESSURE: 117 MMHG | DIASTOLIC BLOOD PRESSURE: 101 MMHG | WEIGHT: 192.02 LBS

## 2023-05-31 DIAGNOSIS — R55 SYNCOPE, UNSPECIFIED SYNCOPE TYPE: ICD-10-CM

## 2023-05-31 LAB
BH CV ECHO MEAS - AO MAX PG: 10.8 MMHG
BH CV ECHO MEAS - AO MEAN PG: 5.5 MMHG
BH CV ECHO MEAS - AO ROOT DIAM: 2.7 CM
BH CV ECHO MEAS - AO V2 MAX: 164 CM/SEC
BH CV ECHO MEAS - AO V2 VTI: 30.1 CM
BH CV ECHO MEAS - AVA(I,D): 1.76 CM2
BH CV ECHO MEAS - EDV(CUBED): 83.1 ML
BH CV ECHO MEAS - EDV(MOD-SP2): 51.2 ML
BH CV ECHO MEAS - EDV(MOD-SP4): 101 ML
BH CV ECHO MEAS - EF(MOD-BP): 44 %
BH CV ECHO MEAS - EF(MOD-SP2): 47.5 %
BH CV ECHO MEAS - EF(MOD-SP4): 40.2 %
BH CV ECHO MEAS - ESV(CUBED): 29.2 ML
BH CV ECHO MEAS - ESV(MOD-SP2): 26.9 ML
BH CV ECHO MEAS - ESV(MOD-SP4): 60.4 ML
BH CV ECHO MEAS - FS: 29.4 %
BH CV ECHO MEAS - IVS/LVPW: 0.82 CM
BH CV ECHO MEAS - IVSD: 0.94 CM
BH CV ECHO MEAS - LA DIMENSION: 3.6 CM
BH CV ECHO MEAS - LAT PEAK E' VEL: 5 CM/SEC
BH CV ECHO MEAS - LV DIASTOLIC VOL/BSA (35-75): 55.1 CM2
BH CV ECHO MEAS - LV MASS(C)D: 154.4 GRAMS
BH CV ECHO MEAS - LV MAX PG: 3.8 MMHG
BH CV ECHO MEAS - LV MEAN PG: 2 MMHG
BH CV ECHO MEAS - LV SYSTOLIC VOL/BSA (12-30): 32.9 CM2
BH CV ECHO MEAS - LV V1 MAX: 96.9 CM/SEC
BH CV ECHO MEAS - LV V1 VTI: 17 CM
BH CV ECHO MEAS - LVIDD: 4.4 CM
BH CV ECHO MEAS - LVIDS: 3.1 CM
BH CV ECHO MEAS - LVOT AREA: 3.1 CM2
BH CV ECHO MEAS - LVOT DIAM: 1.99 CM
BH CV ECHO MEAS - LVPWD: 1.15 CM
BH CV ECHO MEAS - MED PEAK E' VEL: 7.3 CM/SEC
BH CV ECHO MEAS - MV A MAX VEL: 123 CM/SEC
BH CV ECHO MEAS - MV DEC SLOPE: 534.3 CM/SEC2
BH CV ECHO MEAS - MV DEC TIME: 0.19 MSEC
BH CV ECHO MEAS - MV E MAX VEL: 113.8 CM/SEC
BH CV ECHO MEAS - MV E/A: 0.93
BH CV ECHO MEAS - MV MAX PG: 8.6 MMHG
BH CV ECHO MEAS - MV MEAN PG: 5.8 MMHG
BH CV ECHO MEAS - MV P1/2T: 72.7 MSEC
BH CV ECHO MEAS - MV V2 VTI: 34.7 CM
BH CV ECHO MEAS - MVA(P1/2T): 3 CM2
BH CV ECHO MEAS - MVA(VTI): 1.52 CM2
BH CV ECHO MEAS - PA ACC TIME: 0.15 SEC
BH CV ECHO MEAS - PA PR(ACCEL): 10.9 MMHG
BH CV ECHO MEAS - PA V2 MAX: 114 CM/SEC
BH CV ECHO MEAS - PI END-D VEL: 84.2 CM/SEC
BH CV ECHO MEAS - RAP SYSTOLE: 3 MMHG
BH CV ECHO MEAS - RVSP: 33 MMHG
BH CV ECHO MEAS - SI(MOD-SP2): 13.2 ML/M2
BH CV ECHO MEAS - SI(MOD-SP4): 22.1 ML/M2
BH CV ECHO MEAS - SV(LVOT): 52.8 ML
BH CV ECHO MEAS - SV(MOD-SP2): 24.3 ML
BH CV ECHO MEAS - SV(MOD-SP4): 40.6 ML
BH CV ECHO MEAS - TAPSE (>1.6): 1.22 CM
BH CV ECHO MEAS - TR MAX PG: 30 MMHG
BH CV ECHO MEAS - TR MAX VEL: 246.6 CM/SEC
BH CV ECHO MEASUREMENTS AVERAGE E/E' RATIO: 18.5
BH CV XLRA - RV BASE: 3.5 CM
BH CV XLRA - RV LENGTH: 8.3 CM
BH CV XLRA - RV MID: 2.29 CM
BH CV XLRA - TDI S': 7.3 CM/SEC
BH CV XLRA MEAS LEFT DIST CCA EDV: 19.7 CM/SEC
BH CV XLRA MEAS LEFT DIST CCA PSV: 80.6 CM/SEC
BH CV XLRA MEAS LEFT DIST ICA EDV: 41.2 CM/SEC
BH CV XLRA MEAS LEFT DIST ICA PSV: 111 CM/SEC
BH CV XLRA MEAS LEFT ICA/CCA RATIO: 1.4
BH CV XLRA MEAS LEFT MID CCA EDV: 22.7 CM/SEC
BH CV XLRA MEAS LEFT MID CCA PSV: 111 CM/SEC
BH CV XLRA MEAS LEFT MID ICA EDV: 41.2 CM/SEC
BH CV XLRA MEAS LEFT MID ICA PSV: 117 CM/SEC
BH CV XLRA MEAS LEFT PROX CCA EDV: 20 CM/SEC
BH CV XLRA MEAS LEFT PROX CCA PSV: 124 CM/SEC
BH CV XLRA MEAS LEFT PROX ECA EDV: 11.1 CM/SEC
BH CV XLRA MEAS LEFT PROX ECA PSV: 105 CM/SEC
BH CV XLRA MEAS LEFT PROX ICA EDV: 30 CM/SEC
BH CV XLRA MEAS LEFT PROX ICA PSV: 92.6 CM/SEC
BH CV XLRA MEAS LEFT PROX SCLA PSV: 110 CM/SEC
BH CV XLRA MEAS LEFT VERTEBRAL A EDV: 8.6 CM/SEC
BH CV XLRA MEAS LEFT VERTEBRAL A PSV: 44.6 CM/SEC
BH CV XLRA MEAS RIGHT DIST CCA EDV: 14.2 CM/SEC
BH CV XLRA MEAS RIGHT DIST CCA PSV: 69.2 CM/SEC
BH CV XLRA MEAS RIGHT DIST ICA EDV: 16.2 CM/SEC
BH CV XLRA MEAS RIGHT DIST ICA PSV: 63.6 CM/SEC
BH CV XLRA MEAS RIGHT ICA/CCA RATIO: 2.1
BH CV XLRA MEAS RIGHT MID CCA EDV: 15.2 CM/SEC
BH CV XLRA MEAS RIGHT MID CCA PSV: 75.8 CM/SEC
BH CV XLRA MEAS RIGHT MID ICA EDV: 15.6 CM/SEC
BH CV XLRA MEAS RIGHT MID ICA PSV: 72.6 CM/SEC
BH CV XLRA MEAS RIGHT PROX CCA EDV: 12 CM/SEC
BH CV XLRA MEAS RIGHT PROX CCA PSV: 107 CM/SEC
BH CV XLRA MEAS RIGHT PROX ECA EDV: 12 CM/SEC
BH CV XLRA MEAS RIGHT PROX ECA PSV: 76.8 CM/SEC
BH CV XLRA MEAS RIGHT PROX ICA EDV: 37 CM/SEC
BH CV XLRA MEAS RIGHT PROX ICA PSV: 148 CM/SEC
BH CV XLRA MEAS RIGHT PROX SCLA PSV: 160 CM/SEC
BH CV XLRA MEAS RIGHT VERTEBRAL A EDV: 17.4 CM/SEC
BH CV XLRA MEAS RIGHT VERTEBRAL A PSV: 61.8 CM/SEC
LEFT ATRIUM VOLUME INDEX: 31.9 ML/M2
MAXIMAL PREDICTED HEART RATE: 159 BPM
STRESS TARGET HR: 135 BPM

## 2023-05-31 PROCEDURE — 93306 TTE W/DOPPLER COMPLETE: CPT

## 2023-05-31 PROCEDURE — 93880 EXTRACRANIAL BILAT STUDY: CPT

## 2023-05-31 PROCEDURE — 93880 EXTRACRANIAL BILAT STUDY: CPT | Performed by: INTERNAL MEDICINE

## 2023-06-01 ENCOUNTER — TELEPHONE (OUTPATIENT)
Dept: FAMILY MEDICINE CLINIC | Facility: CLINIC | Age: 63
End: 2023-06-01

## 2023-06-01 NOTE — TELEPHONE ENCOUNTER
"  Caller: Di Lopez \"Shiloh\"    Relationship: Self    What was the call regarding: HUB READ MESSAGE AND PATIENT IS AWARE AND VOICED UNDERSTANDING    "

## 2023-06-05 ENCOUNTER — OFFICE VISIT (OUTPATIENT)
Dept: SLEEP MEDICINE | Facility: HOSPITAL | Age: 63
End: 2023-06-05
Payer: MEDICARE

## 2023-06-05 VITALS
HEIGHT: 60 IN | WEIGHT: 192 LBS | SYSTOLIC BLOOD PRESSURE: 108 MMHG | HEART RATE: 78 BPM | BODY MASS INDEX: 37.69 KG/M2 | OXYGEN SATURATION: 95 % | DIASTOLIC BLOOD PRESSURE: 43 MMHG

## 2023-06-05 DIAGNOSIS — G47.33 OBSTRUCTIVE SLEEP APNEA SYNDROME: ICD-10-CM

## 2023-06-05 DIAGNOSIS — G25.81 RLS (RESTLESS LEGS SYNDROME): Primary | ICD-10-CM

## 2023-06-05 PROCEDURE — 1159F MED LIST DOCD IN RCRD: CPT | Performed by: NURSE PRACTITIONER

## 2023-06-05 PROCEDURE — 1160F RVW MEDS BY RX/DR IN RCRD: CPT | Performed by: NURSE PRACTITIONER

## 2023-06-05 PROCEDURE — 3078F DIAST BP <80 MM HG: CPT | Performed by: NURSE PRACTITIONER

## 2023-06-05 PROCEDURE — 99213 OFFICE O/P EST LOW 20 MIN: CPT | Performed by: NURSE PRACTITIONER

## 2023-06-05 PROCEDURE — 3074F SYST BP LT 130 MM HG: CPT | Performed by: NURSE PRACTITIONER

## 2023-06-05 NOTE — PROGRESS NOTES
Chief Complaint:   Chief Complaint   Patient presents with    Follow-up       HPI:    Di Lopez is a 62 y.o. female here for follow-up of sleep apnea and restless leg syndrome.  Patient was last seen 1/31/2023.  Patient does do well and is very compliant with CPAP therapy.  She does use 1 machine at her house and one machine when she bends the night with her boyfriend.  She does take gabapentin 300 mg several times a week to help with restless leg syndrome.  Patient states her boyfriend likes to stay up all night and sleep all day and this is the pattern she has been keeping.  Patient states this does not work for her and is trying to get back on her normal sleep schedule.  She does better [place] score at 3/24.  She has no concerns or complaints and will continue CPAP with gabapentin as needed.        Current medications are:   Current Outpatient Medications:     albuterol sulfate  (90 Base) MCG/ACT inhaler, Inhale 2 puffs Every 4 (Four) Hours As Needed for Wheezing., Disp: 1 g, Rfl: 0    azelastine (ASTELIN) 0.1 % nasal spray, 2 sprays into the nostril(s) as directed by provider 2 (Two) Times a Day. Use in each nostril as directed (Patient taking differently: 2 sprays into the nostril(s) as directed by provider As Needed for Rhinitis or Allergies. Use in each nostril as directed), Disp: 1 each, Rfl: 1    carvedilol (COREG) 12.5 MG tablet, 1 tablet 2 (Two) Times a Day With Meals., Disp: , Rfl:     Continuous Blood Gluc  (FreeStyle Dalia 2 [place]) device, 1 each Daily. Use as directed daily to check blood sugars, Disp: 1 each, Rfl: 0    Continuous Blood Gluc Sensor (FreeStyle Dalia 2 Sensor) misc, Inject 1 each under the skin into the appropriate area as directed Every 14 (Fourteen) Days. NEEDS APPT FOR MORE REFILLS, Disp: 2 each, Rfl: 3    escitalopram (LEXAPRO) 20 MG tablet, Take 1 tablet by mouth once daily (Patient taking differently: Take 1 tablet by mouth Daily.), Disp: 90 tablet, Rfl:  0    ferrous sulfate 325 (65 FE) MG tablet, Take 1 tablet by mouth Daily With Breakfast., Disp: , Rfl:     folic acid (FOLVITE) 800 MCG tablet, Take 1 tablet by mouth Daily., Disp: , Rfl:     furosemide (LASIX) 40 MG tablet, Take 1 tablet by mouth 3 (Three) Times a Week. Monday, Wednesday, Friday, Disp: , Rfl:     gabapentin (NEURONTIN) 300 MG capsule, Take 1 capsule by mouth At Night As Needed (leg pain)., Disp: 30 capsule, Rfl: 3    glimepiride (AMARYL) 4 MG tablet, TAKE 2 TABLETS BY MOUTH IN THE MORNING BEFORE BREAKFAST (Patient taking differently: Take 2 tablets by mouth Every Morning Before Breakfast.), Disp: 180 tablet, Rfl: 0    glucose blood test strip, Use as instructed 3 times a day, Disp: 100 each, Rfl: 11    HYDROcodone-acetaminophen (NORCO) 7.5-325 MG per tablet, Take 1 tablet by mouth Daily As Needed for Moderate Pain or Severe Pain., Disp: , Rfl:     Insulin Glargine (Lantus SoloStar) 100 UNIT/ML injection pen, Inject 45 Units under the skin into the appropriate area as directed Daily. (Patient taking differently: Inject 40 Units under the skin into the appropriate area as directed Every Night.), Disp: 45 mL, Rfl: 1    Insulin Lispro, 1 Unit Dial, (HumaLOG KwikPen) 100 UNIT/ML solution pen-injector, Per correctional scale, MDD 50 units, Disp: 15 mL, Rfl: 3    Insulin Pen Needle (B-D UF III MINI PEN NEEDLES) 31G X 5 MM misc, USE 1  FIVE TIMES DAILY, Disp: 150 each, Rfl: 5    Insulin Pen Needle (RELION PEN NEEDLES) 32G X 4 MM misc, Use BID for E11.9, Disp: 100 each, Rfl: 1    Lancets misc, For use with glucose monitoring 3 times daily, Disp: 100 each, Rfl: 11    lidocaine (LIDODERM) 5 %, USE 1 PATCH EXTERNALLY ONCE DAILY. REMOVE AND DISCARD PATCH WITHIN 12 HOURS OR AS DIRECTED BY MD (Patient taking differently: Place 1 patch on the skin as directed by provider Daily.), Disp: 30 patch, Rfl: 0    Omega-3 Fatty Acids (FISH OIL) 1000 MG capsule capsule, Take 1 capsule by mouth 4 (Four) Times a Day. (Patient  taking differently: Take 1 capsule by mouth 2 (Two) Times a Day With Meals.), Disp: 120 capsule, Rfl: 2    omeprazole (priLOSEC) 20 MG capsule, Take 1 capsule by mouth once daily, Disp: 90 capsule, Rfl: 0    sacubitril-valsartan (ENTRESTO) 24-26 MG tablet, Take 1 tablet by mouth 2 (Two) Times a Day., Disp: , Rfl:     simvastatin (Zocor) 5 MG tablet, Take 1 tablet by mouth Every Night., Disp: 90 tablet, Rfl: 0    solifenacin (VESICARE) 5 MG tablet, Take 1 tablet by mouth Daily., Disp: 90 tablet, Rfl: 1    spironolactone (ALDACTONE) 25 MG tablet, Take 1 tablet by mouth Daily., Disp: , Rfl: 0    Trulicity 4.5 MG/0.5ML solution pen-injector, INJECT 0.5ML UNDER THE SKIN AS DIRECTED ONCE A WEEK (Patient taking differently: sundays), Disp: 12 mL, Rfl: 0    Vitamin D, Cholecalciferol, (CHOLECALCIFEROL) 10 MCG (400 UNIT) tablet, Take 2 tablets by mouth Daily., Disp: , Rfl:     warfarin (COUMADIN) 1 MG tablet, Take  by mouth. PT TAKES 0.5 (1 MG TAB) PLUS 4 MG TAB-TOTAL 4.5 MG ON Saturday AND Sunday, Disp: , Rfl:     warfarin (COUMADIN) 4 MG tablet, Take 1 tablet by mouth. 4 MG M-F, Disp: , Rfl: 2.      The patient's relevant past medical, surgical, family and social history were reviewed and updated in Epic as appropriate.       Review of Systems   Eyes:  Positive for visual disturbance.   Respiratory:  Positive for apnea, shortness of breath and wheezing.    Cardiovascular:  Positive for leg swelling.   Musculoskeletal:  Positive for arthralgias, gait problem and joint swelling.   Allergic/Immunologic: Positive for environmental allergies.   Psychiatric/Behavioral:  Positive for dysphoric mood and sleep disturbance. The patient is nervous/anxious.    All other systems reviewed and are negative.      Objective:    Physical Exam  Constitutional:       Appearance: Normal appearance.   HENT:      Head: Normocephalic and atraumatic.      Mouth/Throat:      Comments: Mallampati 4 anatomy  Pulmonary:      Effort: Pulmonary effort  is normal.      Breath sounds: Normal breath sounds.   Neurological:      Mental Status: She is alert and oriented to person, place, and time.   Psychiatric:         Mood and Affect: Mood normal.         Behavior: Behavior normal.         Thought Content: Thought content normal.         Judgment: Judgment normal.       CPAP Report  But has 2 machines 1 showing 55 out of 90 days of use with the rest of the time showing 38-90 days of use.  Since she is using 2 machines.  Her 95% pressure is 14.2 with an AHI of 1.1    The patient continues to use and benefit from CPAP therapy.    ASSESSMENT/PLAN    Diagnoses and all orders for this visit:    1. RLS (restless legs syndrome) (Primary)    2. Obstructive sleep apnea syndrome        Counseled patient regarding multimodal approach with healthy nutrition, healthy sleep, regular physical activity, social activities, counseling, and medications. Encouraged to practice lateral sleep position. Avoid alcohol and sedatives close to bedtime.    She will continue with CPAP therapy and let me know when she needs a refill of her gabapentin otherwise I will see her back in 6 months or sooner if symptoms warrant.    I have reviewed the results of my evaluation and impression and discussed my recommendations in detail with the patient.      Signed by  FRANCESCA Smith    June 5, 2023      CC: Davina Santiago DO         No ref. provider found

## 2023-06-06 ENCOUNTER — TELEPHONE (OUTPATIENT)
Dept: FAMILY MEDICINE CLINIC | Facility: CLINIC | Age: 63
End: 2023-06-06
Payer: MEDICARE

## 2023-06-06 NOTE — TELEPHONE ENCOUNTER
----- Message from Davina Santiago DO sent at 6/1/2023 10:16 AM EDT -----  Please call the patient to let her know that her heart function is mildly decreased at 45-50% (normal around 55% and her last was about 50%).   She is on a good medication regimen for this. Please fax this result to her cardiologist (Amanda at [facility] ) so he can continue evaluation and management of this.   In the meantime continue to take bp meds for goal bp 120s/80 with avoidance of salt and focus on weight loss with daily walking lean meats vegetables.   If she has any shortness of breath, chest pain, palpitations, fatigue, leg swelling seek care right away.

## 2023-06-07 NOTE — TELEPHONE ENCOUNTER
THE PATIENT CALLED IN I RELAYED THE HUB TO READ MESSAGE THE PATIENT STATES THAT HER CARDIOLOGIST DOCTOR KIMBROUGH IS WITH [facility] SHE ALSO STATES THAT SHE SAW A DOCTOR WITH DOCTOR [facility] OFFICE 06/06/2023 AND THAT DOCTOR SAID THAT EVERYTHING WAS FINE BUT SHE NEEDED TO GET RID OF SOME STRESS     CALL [phone]

## 2023-06-14 ENCOUNTER — HOSPITAL ENCOUNTER (OUTPATIENT)
Dept: ULTRASOUND IMAGING | Facility: HOSPITAL | Age: 63
Discharge: HOME | End: 2023-06-14
Admitting: STUDENT IN AN ORGANIZED HEALTH CARE EDUCATION/TRAINING PROGRAM
Payer: MEDICARE

## 2023-06-14 DIAGNOSIS — L72.9 SKIN CYST: ICD-10-CM

## 2023-06-14 PROCEDURE — 76882 US LMTD JT/FCL EVL NVASC XTR: CPT

## 2023-06-15 ENCOUNTER — TELEPHONE (OUTPATIENT)
Dept: FAMILY MEDICINE CLINIC | Facility: CLINIC | Age: 63
End: 2023-06-15
Payer: MEDICARE

## 2023-06-15 DIAGNOSIS — R22.9 LUMP OF SKIN: Primary | ICD-10-CM

## 2023-06-15 NOTE — TELEPHONE ENCOUNTER
HUB CAN READ!        ----- Message from Davina Santiago DO sent at 6/15/2023 10:14 AM EDT -----  Please call the patient to let her know her ultrasound shows her mass but they are recommending an mri. I have ordered this for her. If she has any metal that she cannot get an mri please let them know when she goes for this.

## 2023-06-16 ENCOUNTER — TELEPHONE (OUTPATIENT)
Dept: FAMILY MEDICINE CLINIC | Facility: CLINIC | Age: 63
End: 2023-06-16

## 2023-06-16 NOTE — TELEPHONE ENCOUNTER
"  Caller: Di Lopez \"Shiloh\"    Relationship: Self    Best call back number: [phone]    What is the best time to reach you: ANYTIME    Who are you requesting to speak with (clinical staff, provider,  specific staff member): CLINICAL STAFF    Do you know the name of the person who called: SELF    What was the call regarding: PATIENT STATES THAT SHE HAS A PACEMAKER AND A ARTIFICAL HEART VALVE. PATIENT STATES THAT SHE ALSO WOULD LIKE A CALL ABOUT A REFERRAL TO PAIN MANAGEMENT .    Is it okay if the provider responds through MyChart: NO        "

## 2023-06-19 DIAGNOSIS — M25.512 LEFT SHOULDER PAIN, UNSPECIFIED CHRONICITY: Primary | ICD-10-CM

## 2023-06-23 ENCOUNTER — TELEPHONE (OUTPATIENT)
Dept: FAMILY MEDICINE CLINIC | Facility: CLINIC | Age: 63
End: 2023-06-23

## 2023-06-23 NOTE — TELEPHONE ENCOUNTER
Provider: JERE ESPINOZA DO    Caller: HAIDERADELINA KRISS    Phone Number: [phone]     Reason for Call: PATIENT CALLING TO CHECK ON THE STATUS OF HER REFERRAL TO PAIN MANAGEMENT CLINIC  PLACED ON 5/11/23. THIS WAS FOR Chronic low back pain, unspecified back pain laterality, unspecified whether sciatica present

## 2023-06-23 NOTE — TELEPHONE ENCOUNTER
Hub to read, per notes [facility] wants notes from previous pain management before they will review and schedule. The pain treatment center would not accept the patient back we tried to refer her there too. If she wants to get scheduled with [facility] they will have to receive prior notes.

## 2023-08-02 ENCOUNTER — TELEPHONE (OUTPATIENT)
Dept: FAMILY MEDICINE CLINIC | Facility: CLINIC | Age: 63
End: 2023-08-02

## 2023-08-02 DIAGNOSIS — Z79.4 TYPE 2 DIABETES MELLITUS WITH HYPOGLYCEMIA WITHOUT COMA, WITH LONG-TERM CURRENT USE OF INSULIN: ICD-10-CM

## 2023-08-02 DIAGNOSIS — E11.649 TYPE 2 DIABETES MELLITUS WITH HYPOGLYCEMIA WITHOUT COMA, WITH LONG-TERM CURRENT USE OF INSULIN: ICD-10-CM

## 2023-08-02 RX ORDER — GLIMEPIRIDE 4 MG/1
TABLET ORAL
Qty: 180 TABLET | Refills: 0 | Status: SHIPPED | OUTPATIENT
Start: 2023-08-02

## 2023-08-02 NOTE — TELEPHONE ENCOUNTER
"  Caller: Di Lopez \"Shiloh\"    Relationship to patient: Self    Best call back number:   OK TO LEAVE VOICEMAIL    Chief complaint: MED REFILLS AND DISCUSS NEW MEDICATION    Type of visit: OFFICE VISIT OR VIRTUAL    Requested date: ANY DAY BUT 080823 AND 080923     If rescheduling, when is the original appointment: [id number]     Additional notes:DR HANNAH DIDN'T HAVE ANY VIRTUAL APPTS ON HER SCHEDULE AND FOLLOW UP VISITS WERENT OPEN UNTIL DEC  2023       "

## 2023-08-24 ENCOUNTER — OFFICE VISIT (OUTPATIENT)
Dept: FAMILY MEDICINE CLINIC | Facility: CLINIC | Age: 63
End: 2023-08-24
Payer: MEDICARE

## 2023-08-24 VITALS
BODY MASS INDEX: 38.01 KG/M2 | TEMPERATURE: 98.4 F | SYSTOLIC BLOOD PRESSURE: 120 MMHG | HEIGHT: 60 IN | OXYGEN SATURATION: 98 % | DIASTOLIC BLOOD PRESSURE: 60 MMHG | WEIGHT: 193.6 LBS | RESPIRATION RATE: 20 BRPM | HEART RATE: 97 BPM

## 2023-08-24 DIAGNOSIS — D64.9 ANEMIA, UNSPECIFIED TYPE: ICD-10-CM

## 2023-08-24 DIAGNOSIS — M54.50 CHRONIC LOW BACK PAIN, UNSPECIFIED BACK PAIN LATERALITY, UNSPECIFIED WHETHER SCIATICA PRESENT: ICD-10-CM

## 2023-08-24 DIAGNOSIS — Z79.4 TYPE 2 DIABETES MELLITUS WITHOUT COMPLICATION, WITH LONG-TERM CURRENT USE OF INSULIN: Primary | Chronic | ICD-10-CM

## 2023-08-24 DIAGNOSIS — K92.1 MELENA: ICD-10-CM

## 2023-08-24 DIAGNOSIS — G89.29 CHRONIC LOW BACK PAIN, UNSPECIFIED BACK PAIN LATERALITY, UNSPECIFIED WHETHER SCIATICA PRESENT: ICD-10-CM

## 2023-08-24 DIAGNOSIS — E11.9 TYPE 2 DIABETES MELLITUS WITHOUT COMPLICATION, WITH LONG-TERM CURRENT USE OF INSULIN: Primary | Chronic | ICD-10-CM

## 2023-08-24 DIAGNOSIS — R30.0 DYSURIA: ICD-10-CM

## 2023-08-24 DIAGNOSIS — R68.89 CONGESTION OF THROAT: ICD-10-CM

## 2023-08-24 PROBLEM — R53.83 FATIGUE: Status: RESOLVED | Noted: 2022-02-15 | Resolved: 2023-08-24

## 2023-08-24 LAB
BILIRUB BLD-MCNC: NEGATIVE MG/DL
CLARITY, POC: CLEAR
COLOR UR: YELLOW
EXPIRATION DATE: ABNORMAL
EXPIRATION DATE: NORMAL
FLUAV RNA RESP QL NAA+PROBE: NOT DETECTED
FLUBV RNA RESP QL NAA+PROBE: NOT DETECTED
GLUCOSE UR STRIP-MCNC: NEGATIVE MG/DL
HBA1C MFR BLD: 6.8 %
INTERNAL CONTROL: ABNORMAL
INTERNAL CONTROL: NORMAL
INTERNAL CONTROL: NORMAL
KETONES UR QL: NEGATIVE
LEUKOCYTE EST, POC: NEGATIVE
Lab: ABNORMAL
Lab: NORMAL
NITRITE UR-MCNC: NEGATIVE MG/ML
PH UR: 6 [PH] (ref 5–8)
PROT UR STRIP-MCNC: NEGATIVE MG/DL
RBC # UR STRIP: NEGATIVE /UL
S PYO AG THROAT QL: POSITIVE
SARS-COV-2 AG UPPER RESP QL IA.RAPID: NOT DETECTED
SP GR UR: 1.02 (ref 1–1.03)
UROBILINOGEN UR QL: NORMAL

## 2023-08-24 RX ORDER — CEFDINIR 300 MG/1
300 CAPSULE ORAL 2 TIMES DAILY
Qty: 14 CAPSULE | Refills: 0 | Status: SHIPPED | OUTPATIENT
Start: 2023-08-24

## 2023-08-24 RX ORDER — OMEPRAZOLE 20 MG/1
20 CAPSULE, DELAYED RELEASE ORAL 2 TIMES DAILY
Qty: 60 CAPSULE | Refills: 1 | Status: SHIPPED | OUTPATIENT
Start: 2023-08-24

## 2023-08-24 NOTE — PROGRESS NOTES
"Chief Complaint  URI (Nasal congestion, cough, left ear pain, fatigue, sore throat that started 4 days ago. )    URI       Main symptom: coughing and nasal congestion  Began: 4 days ago  Sick contacts: none   Fever: not present   Sore throat: present   She feels left ear pain  She will also notice bubbles in urine so she would like her urine tested.   Bms are reported as normal  No other complaints      Two weeks ago she developed belly cramping with diarrhea and vomiting at the same time after eating fast food. She says she noticed black stool at the time. She notes this did not just happen once but has been happening every 2-3 mo.     Dm  Compliant with meds  Taking her insulin as directed.       The following portions of the patient's history were reviewed and updated as appropriate: allergies, current medications, past family history, past medical history, past social history, past surgical history, and problem list.    OBJECTIVE:  /60   Pulse 97   Temp 98.4 øF (36.9 øC) (Temporal)   Resp 20   Ht 152.6 cm (60.08\")   Wt 87.8 kg (193 lb 9.6 oz)   SpO2 98%   BMI 37.71 kg/mý       Physical Exam  Constitutional:       General: She is not in acute distress.     Appearance: Normal appearance.   HENT:      Head: Normocephalic and atraumatic.   Eyes:      Extraocular Movements: Extraocular movements intact.   Cardiovascular:      Rate and Rhythm: Normal rate and regular rhythm.      Heart sounds: No murmur heard.  Pulmonary:      Effort: Pulmonary effort is normal. No respiratory distress.      Breath sounds: Normal breath sounds. No stridor. No wheezing, rhonchi or rales.   Abdominal:      General: Bowel sounds are normal.      Palpations: Abdomen is soft.      Tenderness: There is no abdominal tenderness. There is no guarding or rebound.      Hernia: A hernia is present.   Skin:     Findings: No rash.   Neurological:      General: No focal deficit present.      Mental Status: She is alert.   Psychiatric:   "       Mood and Affect: Mood normal.                  Assessment and Plan   Diagnoses and all orders for this visit:    1. Anemia, unspecified type (Primary)  -     CBC & Differential; Future  -     Iron Profile; Future  -     Ferritin; Future  -     Vitamin B12; Future    2. Dysuria  -     POC Urinalysis Dipstick, Automated    3. Congestion of throat  -     POCT Flu A&B, Molecular  -     POC Rapid Strep A  -     POCT VERITOR SARS-CoV-2 Antigen    4. Type 2 diabetes mellitus without complication, with long-term current use of insulin    5. Melena  -     Ambulatory referral for Screening EGD    6. Chronic low back pain, unspecified back pain laterality, unspecified whether sciatica present  -     Ambulatory Referral to Pain Management    Other orders  -     omeprazole (priLOSEC) 20 MG capsule; Take 1 capsule by mouth 2 (Two) Times a Day.  Dispense: 60 capsule; Refill: 1  -     cefdinir (OMNICEF) 300 MG capsule; Take 1 capsule by mouth 2 (Two) Times a Day.  Dispense: 14 capsule; Refill: 0      UA is stable.       Strep positive. Will treat with antibiotic, hives with pcn but has tolerated cefdinir. Discussed with her that this antibiotic can change her inr making her likely to bleed or clot. She will monitor this at home.       For occasional belly cramping with melena and vomiting I discussed management with pepcid twice daily versus going for an egd to look for any ulcer. She would like to go ahead and schedule egd. Er for any new symptoms. She will avoid nsaids. Will recheck anemia levels. She is stable today.       She has been going to the pain treatment center for 6-7 years. She was referred to pain medicine but she was dismissed for being late for apt.  The Mri was done at this [facility].  She has had injections into the back and these did not help very well. She was told to consider back surgery but she is adamant that she wants to avoid surgery given concern for risks. She was  "stable on 7.5 mg of hydrocodone. She has a history of \"deteriorated disks\" in the lumbar spine.   Will check on this referral and get her in with a new pain management specialist.     Return in about 4 months (around 12/24/2023).       Davina Santiago D.O.  [facility]   "

## 2023-09-08 ENCOUNTER — PREP FOR SURGERY (OUTPATIENT)
Dept: OTHER | Facility: HOSPITAL | Age: 63
End: 2023-09-08

## 2023-09-08 DIAGNOSIS — K92.1 MELENA: Primary | ICD-10-CM

## 2023-09-12 DIAGNOSIS — N32.81 OAB (OVERACTIVE BLADDER): ICD-10-CM

## 2023-09-12 RX ORDER — SOLIFENACIN SUCCINATE 5 MG/1
TABLET, FILM COATED ORAL
Qty: 90 TABLET | Refills: 0 | Status: SHIPPED | OUTPATIENT
Start: 2023-09-12

## 2023-09-13 DIAGNOSIS — Z79.4 TYPE 2 DIABETES MELLITUS WITH HYPOGLYCEMIA WITHOUT COMA, WITH LONG-TERM CURRENT USE OF INSULIN: ICD-10-CM

## 2023-09-13 DIAGNOSIS — E11.649 TYPE 2 DIABETES MELLITUS WITH HYPOGLYCEMIA WITHOUT COMA, WITH LONG-TERM CURRENT USE OF INSULIN: ICD-10-CM

## 2023-09-14 ENCOUNTER — TELEPHONE (OUTPATIENT)
Dept: GASTROENTEROLOGY | Facility: CLINIC | Age: 63
End: 2023-09-14
Payer: MEDICARE

## 2023-09-14 ENCOUNTER — TRANSCRIBE ORDERS (OUTPATIENT)
Dept: ADMINISTRATIVE | Facility: HOSPITAL | Age: 63
End: 2023-09-14
Payer: MEDICARE

## 2023-09-14 DIAGNOSIS — R92.8 ABNORMAL MAMMOGRAM: Primary | ICD-10-CM

## 2023-09-14 PROBLEM — K92.1 MELENA: Status: ACTIVE | Noted: 2023-09-14

## 2023-09-14 RX ORDER — GLIMEPIRIDE 4 MG/1
TABLET ORAL
Qty: 180 TABLET | Refills: 0 | Status: SHIPPED | OUTPATIENT
Start: 2023-09-14

## 2023-09-14 NOTE — TELEPHONE ENCOUNTER
Please seek cardiac clearance for patient who is scheduled an EGD with Dr. Fernandes 11/14/23.    Patient has presence of pacemake and artifical heart valve. She also has considerable cardiac hx and is prescribed a regimen of warfarin.    Cardiologist:  Juan Carlos Patel  Phone: [phone]  Fax: [phone]

## 2023-09-14 NOTE — TELEPHONE ENCOUNTER
Rx Refill Note    Requested Prescriptions     Pending Prescriptions Disp Refills    glimepiride (AMARYL) 4 MG tablet [Pharmacy Med Name: Glimepiride 4 MG Oral Tablet] 180 tablet 0     Sig: TAKE 2 TABLETS BY MOUTH IN THE MORNING BEFORE BREAKFAST        Last office visit with prescribing clinician: 2/9/2023       Next office visit with prescribing clinician: 10/6/2023     {    Evi Townsend MA  09/14/23, 08:32 EDT

## 2023-09-15 RX ORDER — SIMVASTATIN 5 MG
TABLET ORAL
Qty: 90 TABLET | Refills: 0 | Status: SHIPPED | OUTPATIENT
Start: 2023-09-15

## 2023-09-15 NOTE — TELEPHONE ENCOUNTER
Rx Refill Note  Requested Prescriptions     Pending Prescriptions Disp Refills    simvastatin (ZOCOR) 5 MG tablet [Pharmacy Med Name: Simvastatin 5 MG Oral Tablet] 90 tablet 0     Sig: TAKE 1 TABLET BY MOUTH ONCE DAILY AT NIGHT      Last office visit with prescribing clinician: 8/24/2023   Last telemedicine visit with prescribing clinician: Visit date not found   Next office visit with prescribing clinician: 1/3/2024                         Would you like a call back once the refill request has been completed: [] Yes [] No    If the office needs to give you a call back, can they leave a voicemail: [] Yes [] No    Kareen Kern LPN  09/15/23, 10:25 EDT

## 2023-09-25 ENCOUNTER — TELEPHONE (OUTPATIENT)
Dept: FAMILY MEDICINE CLINIC | Facility: CLINIC | Age: 63
End: 2023-09-25

## 2023-09-25 DIAGNOSIS — N32.81 OAB (OVERACTIVE BLADDER): ICD-10-CM

## 2023-09-25 RX ORDER — DULAGLUTIDE 4.5 MG/.5ML
INJECTION, SOLUTION SUBCUTANEOUS
Qty: 6 ML | Refills: 0 | Status: SHIPPED | OUTPATIENT
Start: 2023-09-25

## 2023-09-25 NOTE — TELEPHONE ENCOUNTER
Rx Refill Note    Requested Prescriptions     Pending Prescriptions Disp Refills    Trulicity 4.5 MG/0.5ML solution pen-injector [Pharmacy Med Name: Trulicity 4.5 MG/0.5ML Subcutaneous Solution Pen-injector] 12 mL 0     Sig: INJECT 1 SYRINGE SUBCUTANEOUSLY ONCE A WEEK        Last office visit with prescribing clinician: 2/9/2023       Next office visit with prescribing clinician: 10/6/2023     {    Evi Townsend MA  09/25/23, 08:36 EDT

## 2023-09-25 NOTE — TELEPHONE ENCOUNTER
"    Caller: Di Lopez \"Shiloh\"    Relationship: Self    Best call back number: [phone]    Requested Prescriptions:   Requested Prescriptions     Pending Prescriptions Disp Refills    solifenacin (VESICARE) 5 MG tablet 90 tablet 0     Sig: Take 1 tablet by mouth Daily.        Pharmacy where request should be sent: [facility] [address] [place] [address] [phone] [place] [phone] FX     Last office visit with prescribing clinician: 8/24/2023   Last telemedicine visit with prescribing clinician: Visit date not found   Next office visit with prescribing clinician: 1/3/2024     Additional details provided by patient: PATIENT IS COMPLETELY OUT    Does the patient have less than a 3 day supply:  [x] Yes  [] No    Would you like a call back once the refill request has been completed: [] Yes [x] No    If the office needs to give you a call back, can they leave a voicemail: [] Yes [x] No    Lillian Pang   09/25/23 17:01 EDT       "

## 2023-09-25 NOTE — TELEPHONE ENCOUNTER
"    Caller: Di Lopez \"Shiloh\"    Relationship: Self    Best call back number: [phone]    What orders are you requesting (i.e. lab or imaging): CT SCAN    In what timeframe would the patient need to come in: PATIENT IS NOT SURE WHY SHE NEEDS THE SCAN    Where will you receive your lab/imaging services: [facility]    Additional notes: WHEN PATIENT CAME IN TO THE OFFICE SHE WAS TOLD THERE WERE A FEW APPOINTMENTS THAT SHE HAD MISSED. THE MAMMOGRAM HAS BEEN RESCHEDULED, HOWEVER THE ORDERS FOR THE CT SCAN HAVE . PATIENT IS NOT SURE WHAT THE SCAN WAS FOR AND IF SHE STILL NEEDS THAT?    "

## 2023-09-26 RX ORDER — SOLIFENACIN SUCCINATE 5 MG/1
5 TABLET, FILM COATED ORAL DAILY
Qty: 90 TABLET | Refills: 0 | Status: SHIPPED | OUTPATIENT
Start: 2023-09-26 | End: 2023-10-02 | Stop reason: SDUPTHER

## 2023-09-29 ENCOUNTER — SPECIALTY PHARMACY (OUTPATIENT)
Dept: ENDOCRINOLOGY | Facility: CLINIC | Age: 63
End: 2023-09-29
Payer: MEDICARE

## 2023-09-29 NOTE — PROGRESS NOTES
" Specialty Pharmacy Patient Management Program  Endocrinology Benefits Investigation      Edkarla \"Mazomanie\" is seen by a [facility]ocr[facility] provider and is currently taking a target specialty medication.  The patient IS ELIGIBLE for enrollment in the Endocrinology Patient Management Program offered by [facility].     Insurance: [facility]  Medication(s) and Costs: Trulicity 28/84 Days, $0          "

## 2023-10-02 ENCOUNTER — SPECIALTY PHARMACY (OUTPATIENT)
Dept: ENDOCRINOLOGY | Facility: CLINIC | Age: 63
End: 2023-10-02
Payer: MEDICARE

## 2023-10-02 DIAGNOSIS — N32.81 OAB (OVERACTIVE BLADDER): ICD-10-CM

## 2023-10-02 RX ORDER — SOLIFENACIN SUCCINATE 5 MG/1
5 TABLET, FILM COATED ORAL DAILY
Qty: 90 TABLET | Refills: 0 | Status: SHIPPED | OUTPATIENT
Start: 2023-10-02

## 2023-10-02 NOTE — TELEPHONE ENCOUNTER
"    Caller: Di Lopez \"Shiloh\"    Relationship: Self    Best call back number: [phone]     Requested Prescriptions:   Requested Prescriptions     Pending Prescriptions Disp Refills    solifenacin (VESICARE) 5 MG tablet 90 tablet 0     Sig: Take 1 tablet by mouth Daily.        Pharmacy where request should be sent: [facility] [address] [place] [address] [phone] [place] [phone] FX     Last office visit with prescribing clinician: 8/24/2023   Last telemedicine visit with prescribing clinician: Visit date not found   Next office visit with prescribing clinician: 1/3/2024     Additional details provided by patient: THE PATIENT REPORTS SHE HAS BEEN OUT OF MEDICATION FOR 60 DAYS, BECAUSE HER OB/GYN IS NO LONGER AT THE PRACTICE, AND IS REQUESTING THAT DR HANNAH PLEASE SEND IN REFILLS FOR THIS MEDICATION.     Does the patient have less than a 3 day supply:  [x] Yes  [] No    Would you like a call back once the refill request has been completed: [] Yes [x] No    If the office needs to give you a call back, can they leave a voicemail: [] Yes [x] No    Lillian Durham Rep   10/02/23 14:08 EDT   "

## 2023-10-04 ENCOUNTER — TELEPHONE (OUTPATIENT)
Dept: FAMILY MEDICINE CLINIC | Facility: CLINIC | Age: 63
End: 2023-10-04

## 2023-10-04 NOTE — TELEPHONE ENCOUNTER
"    Caller: Di Lopez \"Shiloh\"    Relationship: Self    Best call back number: [phone]     What medication are you requesting: SOMETHING FOR SYMPTOMS    What are your current symptoms: VAGINAL BREAK OUT    How long have you been experiencing symptoms: 1 WEEK    Have you had these symptoms before:    [x] Yes  [] No    Have you been treated for these symptoms before:   [x] Yes  [] No    If a prescription is needed, what is your preferred pharmacy and phone number: [facility] [address] [place] [address] - [address] [place] [address] [phone] [place] [phone] FX         "

## 2023-10-06 ENCOUNTER — OFFICE VISIT (OUTPATIENT)
Dept: FAMILY MEDICINE CLINIC | Facility: CLINIC | Age: 63
End: 2023-10-06
Payer: MEDICARE

## 2023-10-06 ENCOUNTER — OFFICE VISIT (OUTPATIENT)
Dept: ENDOCRINOLOGY | Facility: CLINIC | Age: 63
End: 2023-10-06
Payer: MEDICARE

## 2023-10-06 VITALS
BODY MASS INDEX: 38.09 KG/M2 | HEIGHT: 60 IN | WEIGHT: 194 LBS | HEART RATE: 74 BPM | SYSTOLIC BLOOD PRESSURE: 120 MMHG | DIASTOLIC BLOOD PRESSURE: 64 MMHG | OXYGEN SATURATION: 97 %

## 2023-10-06 VITALS
RESPIRATION RATE: 20 BRPM | DIASTOLIC BLOOD PRESSURE: 80 MMHG | HEART RATE: 91 BPM | SYSTOLIC BLOOD PRESSURE: 130 MMHG | TEMPERATURE: 98.9 F | BODY MASS INDEX: 38.24 KG/M2 | OXYGEN SATURATION: 95 % | WEIGHT: 194.8 LBS | HEIGHT: 60 IN

## 2023-10-06 DIAGNOSIS — Z11.59 ENCOUNTER FOR SCREENING FOR OTHER VIRAL DISEASES: ICD-10-CM

## 2023-10-06 DIAGNOSIS — E11.65 TYPE 2 DIABETES MELLITUS WITH HYPERGLYCEMIA, WITH LONG-TERM CURRENT USE OF INSULIN: Primary | ICD-10-CM

## 2023-10-06 DIAGNOSIS — B00.9 HSV (HERPES SIMPLEX VIRUS) INFECTION: Primary | ICD-10-CM

## 2023-10-06 DIAGNOSIS — Z79.4 TYPE 2 DIABETES MELLITUS WITH HYPERGLYCEMIA, WITH LONG-TERM CURRENT USE OF INSULIN: Primary | ICD-10-CM

## 2023-10-06 DIAGNOSIS — E78.5 HYPERLIPIDEMIA, UNSPECIFIED HYPERLIPIDEMIA TYPE: ICD-10-CM

## 2023-10-06 LAB
EXPIRATION DATE: ABNORMAL
GLUCOSE BLDC GLUCOMTR-MCNC: 226 MG/DL (ref 70–130)
Lab: ABNORMAL

## 2023-10-06 PROCEDURE — 3044F HG A1C LEVEL LT 7.0%: CPT | Performed by: STUDENT IN AN ORGANIZED HEALTH CARE EDUCATION/TRAINING PROGRAM

## 2023-10-06 PROCEDURE — 3079F DIAST BP 80-89 MM HG: CPT | Performed by: STUDENT IN AN ORGANIZED HEALTH CARE EDUCATION/TRAINING PROGRAM

## 2023-10-06 PROCEDURE — 3075F SYST BP GE 130 - 139MM HG: CPT | Performed by: STUDENT IN AN ORGANIZED HEALTH CARE EDUCATION/TRAINING PROGRAM

## 2023-10-06 PROCEDURE — 99213 OFFICE O/P EST LOW 20 MIN: CPT | Performed by: STUDENT IN AN ORGANIZED HEALTH CARE EDUCATION/TRAINING PROGRAM

## 2023-10-06 RX ORDER — GLIMEPIRIDE 4 MG/1
4 TABLET ORAL 2 TIMES DAILY
Qty: 180 TABLET | Refills: 1 | Status: SHIPPED | OUTPATIENT
Start: 2023-10-06

## 2023-10-06 RX ORDER — VALACYCLOVIR HYDROCHLORIDE 500 MG/1
TABLET, FILM COATED ORAL
Qty: 90 TABLET | Refills: 0 | Status: SHIPPED | OUTPATIENT
Start: 2023-10-06

## 2023-10-06 RX ORDER — INSULIN GLARGINE 100 [IU]/ML
44 INJECTION, SOLUTION SUBCUTANEOUS DAILY
Qty: 45 ML | Refills: 1 | Status: SHIPPED | OUTPATIENT
Start: 2023-10-06

## 2023-10-06 NOTE — PROGRESS NOTES
"Chief Complaint  vaginal rash (She believes she may have bites all over her body.)    History of Present Illness      Rash  She is concerned she has bites on her left hand. It was a bubble she popped it with needle. These areas are now healing. She also has some bites on her left arm. No bed bugs. No mosquitos.       There is a spot on her vagina as well. She is concerned this is herpes as it seems similar to when she had herpes in the past.   No fevers.         The following portions of the patient's history were reviewed and updated as appropriate: allergies, current medications, past family history, past medical history, past social history, past surgical history, and problem list.    OBJECTIVE:  /80   Pulse 91   Temp 98.9 øF (37.2 øC) (Infrared)   Resp 20   Ht 152.4 cm (60\")   Wt 88.4 kg (194 lb 12.8 oz)   SpO2 95%   BMI 38.04 kg/mý       Physical Exam  Constitutional:       General: She is not in acute distress.     Appearance: Normal appearance.   HENT:      Head: Normocephalic and atraumatic.   Eyes:      Extraocular Movements: Extraocular movements intact.   Skin:     Findings: Rash (the rash on her arm and hand appear to be healing  no active infection) present.   Neurological:      General: No focal deficit present.      Mental Status: She is alert.   Psychiatric:         Mood and Affect: Mood normal.      Vaginal exam done with yeison mckeon in the room. There is a lesion on upper crease of left thigh that doesn't appear infected (possible dermatitis doesn't seem to be yeast) and hsv lesion right labia            Assessment and Plan   Diagnoses and all orders for this visit:    1. HSV (herpes simplex virus) infection (Primary)  -     RPR; Future  -     HIV-1 / O / 2 Ag / Antibody; Future  -     Hepatitis C Antibody; Future  -     Hepatitis B Virus (HBV) Screening and Diagnosis; Future  -     OneSwab - Kit, Vagina; Future    2. Encounter for screening for other viral diseases  -     Hepatitis " B Virus (HBV) Screening and Diagnosis; Future    Other orders  -     valACYclovir (Valtrex) 500 MG tablet; Take 2 pills daily by mouth for ten days . Thereafter take one pill daily by mouth.  Dispense: 90 tablet; Refill: 0        Rash  Spots are healing. Recommend topical triple antibiotic.       Vaginal rash  Consistent with hsv lesion  Advised her to use condoms as this can be passed along regardless of lesion, advised to let sexual partners know and to avoid sexual contact for two weeks and only then if healed.   She would like daily use to prevent recurrence  Will give acyclovir, counseled to take with water. Counseled on possible kidney issues on this. Hand out given.   Discussed with her that the antiviral can make her more likely to bleed and change inr. She will check inr more frequently to monitor this (checking at home).      Dermatitis of leg  Advised her to use triple antibiotic and to keep an eye on this. For any drainage fever or worsening lesion come back right away.   Return in about 2 months (around 12/6/2023) for Next scheduled follow up.       Davina Santiago D.O.  [facility]

## 2023-10-06 NOTE — TELEPHONE ENCOUNTER
Rx Refill Note    Requested Prescriptions      No prescriptions requested or ordered in this encounter        Last office visit with prescribing clinician: 2/9/2023       Next office visit with prescribing clinician: 10/6/2023     {    Evi Townsend MA  10/06/23, 10:03 EDT

## 2023-10-06 NOTE — PROGRESS NOTES
"Chief Complaint   Patient presents with    Diabetes        HPI   Di Lopez is a 62 y.o. female had concerns including Diabetes.      Patient reports she has noticed some recent variability in her blood glucose.  Of note, she has been out of glimepiride for approximately 2 weeks.  She is also taking a lower dose of long-acting insulin previously recommended.    Current diabetes medications include the following: Lantus 40 units daily  Humalog per correctional scale  Trulicity 4.5 mg weekly    Patient has resumed simvastatin in the interim since last visit.  She denies any notable adverse effects.    The following portions of the patient's history were reviewed and updated as appropriate: allergies, current medications, and past social history.    Review of Systems   Gastrointestinal:  Negative for nausea and vomiting.      /64 (BP Location: Right arm, Patient Position: Sitting, Cuff Size: Adult)   Pulse 74   Ht 152.4 cm (60\")   Wt 88 kg (194 lb)   LMP 06/17/1998   SpO2 97%   BMI 37.89 kg/m²      Physical Exam      Constitutional:  well developed; well nourished  no acute distress  obese - Body mass index is 37.89 kg/m².   ENT/Thyroid: not examined   Eyes: Conjunctiva: clear   Respiratory:  breathing is unlabored  clear to auscultation bilaterally   Cardiovascular:  regular rate and rhythm   Chest:  Not performed.   Abdomen: Not performed.   : Not performed.   Musculoskeletal: Not performed   Skin: not performed.   Neuro: mental status, speech normal   Psych: mood and affect are within normal limits       Labs/Imaging  CMP:  Lab Results   Component Value Date    BUN 13 04/28/2023    CREATININE 0.86 04/28/2023    EGFRIFAFRI 73 10/14/2022    BCR 15.1 04/28/2023     04/28/2023    K 4.1 05/16/2023    CO2 29.0 04/28/2023    CALCIUM 9.7 04/28/2023    ALBUMIN 4.8 04/28/2023    BILITOT 0.2 04/28/2023    ALKPHOS 167 (H) 04/28/2023    AST 21 04/28/2023    ALT 25 04/28/2023     HbA1c:  Lab Results "   Component Value Date    HGBA1C 6.8 08/24/2023    HGBA1C 6.8 02/09/2023     Microalbumin:  Lab Results   Component Value Date    MICROALBUR <1.2 02/09/2023      Latest Reference Range & Units 10/06/23 15:45   Glucose 70 - 130 mg/dL 226 !   !: Data is abnormal     Latest Reference Range & Units 04/28/23 15:15   Sodium 136 - 145 mmol/L 137   Potassium 3.5 - 5.2 mmol/L 4.1   Chloride 98 - 107 mmol/L 97 (L)   CO2 22.0 - 29.0 mmol/L 29.0   Anion Gap 5.0 - 15.0 mmol/L 11.0   BUN 8 - 23 mg/dL 13   Creatinine 0.57 - 1.00 mg/dL 0.86   BUN/Creatinine Ratio 7.0 - 25.0  15.1   eGFR >60.0 mL/min/1.73 77.0   Glucose 65 - 99 mg/dL 145 (H)   Calcium 8.6 - 10.5 mg/dL 9.7   Alkaline Phosphatase 39 - 117 U/L 167 (H)   Total Protein 6.0 - 8.5 g/dL 8.1   Albumin 3.5 - 5.2 g/dL 4.8   Globulin gm/dL 3.3   A/G Ratio g/dL 1.5   AST (SGOT) 1 - 32 U/L 21   ALT (SGPT) 1 - 33 U/L 25   Total Bilirubin 0.0 - 1.2 mg/dL 0.2   (L): Data is abnormally low  (H): Data is abnormally high    Freestyle bhumi downloaded for review for dates ranging September 23 through October 6, 2023, CGM is active 78% of the time with average glucose 183, GMI 7.7% with 24.8% glucose variability.  52% of data within target range, 38% high, 9% very high.  Patient has ongoing hyperglycemia throughout the day.  This worsens postprandially.  No pattern of hypoglycemia noted.    Diagnoses and all orders for this visit:    1. Type 2 diabetes mellitus with hyperglycemia, with long-term current use of insulin (Primary)  -     POC Glucose, Blood  -     glimepiride (AMARYL) 4 MG tablet; Take 1 tablet by mouth 2 (Two) Times a Day.  Dispense: 180 tablet; Refill: 1  -     Insulin Glargine (Lantus SoloStar) 100 UNIT/ML injection pen; Inject 44 Units under the skin into the appropriate area as directed Daily.  Dispense: 45 mL; Refill: 1  Hemoglobin A1c is at goal, 6.8%.  This is somewhat discordant with recent CGM download, suspect this is due to patient having run out of  glimepiride.  CGM containing 72 hours of glucose data was downloaded for review  Discussed options for improving glycemic control.  Discussed that variability in glucose is likely due to variable carbohydrate intake.  Patient will resume glimepiride 8 mg daily  Patient will increase Lantus to 44 units daily  Patient will continue Trulicity 4.5 mg weekly  Patient will continue Humalog per correctional scale  Reviewed recommendations for annual screenings:  CMP reviewed from April 2023, EGFR 77  Lipid panel reviewed from March 2023, triglycerides 414,   Urine microalbumin is up-to-date from February 2023    2. Hyperlipidemia, unspecified hyperlipidemia type  Most recent lipid panel with LDL above goal.  Patient has since resumed taking simvastatin 5 mg daily, will continue       Return in about 4 months (around 2/6/2024) for Next scheduled follow up. The patient was instructed to contact the clinic with any interval questions or concerns.    Ameena Iglesias MD   Endocrinologist    Dictated Utilizing Dragon Dictation

## 2023-10-16 RX ORDER — OMEPRAZOLE 20 MG/1
20 CAPSULE, DELAYED RELEASE ORAL 2 TIMES DAILY
Qty: 60 CAPSULE | Refills: 0 | Status: SHIPPED | OUTPATIENT
Start: 2023-10-16

## 2023-10-18 ENCOUNTER — OFFICE VISIT (OUTPATIENT)
Dept: PAIN MEDICINE | Facility: CLINIC | Age: 63
End: 2023-10-18
Payer: MEDICARE

## 2023-10-18 VITALS
WEIGHT: 199 LBS | HEIGHT: 60 IN | BODY MASS INDEX: 39.07 KG/M2 | HEART RATE: 80 BPM | SYSTOLIC BLOOD PRESSURE: 128 MMHG | DIASTOLIC BLOOD PRESSURE: 68 MMHG | TEMPERATURE: 96.8 F

## 2023-10-18 DIAGNOSIS — G89.29 CHRONIC BILATERAL LOW BACK PAIN WITHOUT SCIATICA: Primary | ICD-10-CM

## 2023-10-18 DIAGNOSIS — M54.50 CHRONIC BILATERAL LOW BACK PAIN WITHOUT SCIATICA: Primary | ICD-10-CM

## 2023-10-18 PROCEDURE — 1125F AMNT PAIN NOTED PAIN PRSNT: CPT | Performed by: STUDENT IN AN ORGANIZED HEALTH CARE EDUCATION/TRAINING PROGRAM

## 2023-10-18 PROCEDURE — 99203 OFFICE O/P NEW LOW 30 MIN: CPT | Performed by: STUDENT IN AN ORGANIZED HEALTH CARE EDUCATION/TRAINING PROGRAM

## 2023-10-18 PROCEDURE — 3078F DIAST BP <80 MM HG: CPT | Performed by: STUDENT IN AN ORGANIZED HEALTH CARE EDUCATION/TRAINING PROGRAM

## 2023-10-18 PROCEDURE — 1159F MED LIST DOCD IN RCRD: CPT | Performed by: STUDENT IN AN ORGANIZED HEALTH CARE EDUCATION/TRAINING PROGRAM

## 2023-10-18 PROCEDURE — 1160F RVW MEDS BY RX/DR IN RCRD: CPT | Performed by: STUDENT IN AN ORGANIZED HEALTH CARE EDUCATION/TRAINING PROGRAM

## 2023-10-18 PROCEDURE — 3074F SYST BP LT 130 MM HG: CPT | Performed by: STUDENT IN AN ORGANIZED HEALTH CARE EDUCATION/TRAINING PROGRAM

## 2023-10-18 NOTE — PROGRESS NOTES
Referring Physician: Davina Santiago DO  [facility] [address]    Primary Physician: Davina Santiago DO    CHIEF COMPLAINT or REASON FOR VISIT: Back Pain (New patient - Chronic low back pain, unspecified back pain laterality, unspecified whether sciatica present )      Initial history of present illness on 10/18/2023:  Ms. Di Lopez is 62 y.o. female who presents as a new patient referral for evaluation and treatment of chronic axial low back pain.  Patient has been treated for this condition for many years at pain treatment centers up until May 2023 at which point she was discharged for failure to keep her appointments.  She states that she has had several years of chronic axial low back pain exacerbated with extension.  She denies any inciting event or trauma.  She denies any significant radiation into the extremities.  Patient denies any bowel or bladder dysfunction, lower extremity weakness, new onset saddle anesthesia or unexplained weight loss.   She has undergone multiple epidural steroid injections at pain treatment centers with no benefit.  She does not believe that she has had a rhizotomy.  She is relatively well controlled with Norco 7.5 mg 3 times daily previously.  She is interested in resuming this therapy.  She is not interested in interventional therapy.  I do not have any imaging for review today.  Outside pain management records indicate that she was discharged from the clinic for noncompliance.    Interval history:    Interventions:      Objective Pain Scoring:   BRIEF PAIN INVENTORY:  Total score:   Pain Score    10/18/23 1030   PainSc:   8   PainLoc: Back      PHQ-2: PHQ-2 Total Score: 3  PHQ-9: PHQ-9: Brief Depression Severity Measure Score: 8  Opioid Risk Tool:         Review of Systems:   ROS negative except as otherwise noted     Past Medical History:   Past Medical History:   Diagnosis Date    Allergic     Anemia     Anticoagulated     WARFARIN     Arteriovenous malformation of gastrointestinal tract 05/12/2016    Arthritis     Asthma     Black hairy tongue     CHF (congestive heart failure)     Chronic back pain     Coronary artery disease     Depression 05/12/2016    Diabetes mellitus     Dyslipidemia 05/12/2016    Emphysema/COPD 05/12/2016    Fatigue     GERD (gastroesophageal reflux disease)     Headache     Herniated cervical disc     History of echocardiogram 07/16/2013    Left ventricular systolic function at the lower limits of normal; EF 50-55%; mild MR; mechanical prosthetic mitral valve present; mod TR    History of transfusion     NO REACTION; [facility]    Hyperlipidemia     Hypertension     Insomnia 05/12/2016    Iron deficiency     A.  The patient was treated with IV iron replacement. B.  Secondary to GI bleed, status post EGD with cauterization of blood vessel to the duodenum, 7/16/2014.    Left bundle branch hemiblock 10/12/2020    Left shoulder strain     Liver disease     Liver mass     Long term current use of anticoagulant 05/12/2016    Low back pain     Lung nodules     PER DR. LEVI'S NOTE 10/22    Morbid obesity 05/12/2016    Neuromuscular disorder 7/28/2016    Obstructive sleep apnea syndrome 05/12/2016    Description: A.  Moderate; CPAP therapy..    Sliding hiatal hernia 07/28/2016    Description: A.  Reported on EGD, 12/30/2011.    Type 2 diabetes mellitus 05/12/2016    Urinary tract infection     Uterine cancer     HYSTERECTOMY    Vitamin B12 deficiency     A.  The patient was treated with vitamin B12 replacement.    Wears eyeglasses          Past Surgical History:   Past Surgical History:   Procedure Laterality Date    BILATERAL OOPHORECTOMY  2009    Status post bilateral oophorectomy secondary to ovarian cysts     BREAST BIOPSY Right     STEREOTACTIC    BREAST EXCISIONAL BIOPSY Bilateral     CARDIAC ELECTROPHYSIOLOGY PROCEDURE N/A 10/15/2020    Procedure: Biventricular Device Insertion;  Surgeon: Nadir Everett MD;  Location:   [facility];  Service: Cardiology;  Laterality: N/A;    CARDIAC SURGERY      CARPAL TUNNEL RELEASE Bilateral     COLONOSCOPY  2012    COLONOSCOPY N/A 2023    Procedure: COLONOSCOPY;  Surgeon: Gt Fernandes MD;  Location: [facility] ENDOSCOPY;  Service: Gastroenterology;  Laterality: N/A;    CYSTECTOMY      removal of ganglion cyst from left wrist    ENDOSCOPY  2011    DIAGNOSTIC ESOPHAGOGASTRODUODENOSCOPY    HYSTERECTOMY      A.  Status post hysterectomy for early stage endometrial cancer done in .    MITRAL VALVE REPLACEMENT  2008    Dr. Hima Barreto; MECHANICAL    OTHER SURGICAL HISTORY      LIVER MASS BLOOD SUPPLY CLIPPED ON RIGHT AND LEFT SIDE PER PT         Family History   Family History   Problem Relation Age of Onset    Sudden death Mother         POSSIBLE SERIOUS MASSIVE STROKE    Hypertension Mother             Multiple myeloma Father     Cancer Father             Hypertension Father             Ovarian cancer Paternal Grandmother     Cancer Paternal Grandmother             Diabetes Brother             Hypertension Brother             Breast cancer Neg Hx          Social History   Social History     Socioeconomic History    Marital status: Single   Tobacco Use    Smoking status: Former     Packs/day: 1.00     Years: 30.00     Additional pack years: 0.00     Total pack years: 30.00     Types: Cigarettes, Electronic Cigarette     Start date: 1976     Quit date: 2/10/2010     Years since quittin.6    Smokeless tobacco: Never    Tobacco comments:     Quit in    Vaping Use    Vaping Use: Every day    Substances: Nicotine    Devices: Disposable, one every 2 days   Substance and Sexual Activity    Alcohol use: No    Drug use: Never    Sexual activity: Defer     Partners: Male     Birth control/protection: Abstinence, None, Hysterectomy     Comment: 1 partner        Medications:     Current  Outpatient Medications:     albuterol sulfate  (90 Base) MCG/ACT inhaler, Inhale 2 puffs Every 4 (Four) Hours As Needed for Wheezing., Disp: 1 g, Rfl: 0    azelastine (ASTELIN) 0.1 % nasal spray, 2 sprays into the nostril(s) as directed by provider 2 (Two) Times a Day. Use in each nostril as directed (Patient taking differently: 2 sprays into the nostril(s) as directed by provider As Needed for Rhinitis or Allergies. Use in each nostril as directed prn), Disp: 1 each, Rfl: 1    carvedilol (COREG) 12.5 MG tablet, Take 1 tablet by mouth 2 (Two) Times a Day With Meals., Disp: 180 tablet, Rfl: 2    cefdinir (OMNICEF) 300 MG capsule, Take 1 capsule by mouth 2 (Two) Times a Day., Disp: 14 capsule, Rfl: 0    Continuous Blood Gluc  (FreeStyle Dalia 2 [place]) device, 1 each Daily. Use as directed daily to check blood sugars, Disp: 1 each, Rfl: 0    Continuous Blood Gluc Sensor (FreeStyle Dalia 2 Sensor) misc, USE AS DIRECTED FOR 14 DAYS, Disp: 2 each, Rfl: 11    Dulaglutide (Trulicity) 4.5 MG/0.5ML solution pen-injector, INJECT 1 SYRINGE SUBCUTANEOUSLY ONCE A WEEK, Disp: 6 mL, Rfl: 0    escitalopram (LEXAPRO) 20 MG tablet, Take 1 tablet by mouth once daily, Disp: 90 tablet, Rfl: 0    ferrous sulfate 325 (65 FE) MG tablet, Take 1 tablet by mouth Daily With Breakfast., Disp: , Rfl:     folic acid (FOLVITE) 800 MCG tablet, Take 1 tablet by mouth Daily., Disp: , Rfl:     furosemide (LASIX) 40 MG tablet, Take 1 tablet by mouth 3 (Three) Times a Week. Monday, Wednesday, Friday, Disp: , Rfl:     gabapentin (NEURONTIN) 300 MG capsule, Take 1 capsule by mouth At Night As Needed (leg pain)., Disp: 30 capsule, Rfl: 3    glimepiride (AMARYL) 4 MG tablet, Take 1 tablet by mouth 2 (Two) Times a Day., Disp: 180 tablet, Rfl: 1    glucose blood test strip, Use as instructed 3 times a day, Disp: 100 each, Rfl: 11    Insulin Glargine (Lantus SoloStar) 100 UNIT/ML injection pen, Inject 44 Units under the skin into the appropriate  area as directed Daily., Disp: 45 mL, Rfl: 1    Insulin Lispro, 1 Unit Dial, (HumaLOG KwikPen) 100 UNIT/ML solution pen-injector, Per correctional scale, MDD 50 units, Disp: 15 mL, Rfl: 3    Insulin Pen Needle (B-D UF III MINI PEN NEEDLES) 31G X 5 MM misc, USE 1  FIVE TIMES DAILY, Disp: 150 each, Rfl: 5    Insulin Pen Needle (RELION PEN NEEDLES) 32G X 4 MM misc, Use BID for E11.9, Disp: 100 each, Rfl: 1    Lancets misc, For use with glucose monitoring 3 times daily, Disp: 100 each, Rfl: 11    lidocaine (LIDODERM) 5 %, USE 1 PATCH EXTERNALLY ONCE DAILY. REMOVE AND DISCARD PATCH WITHIN 12 HOURS OR AS DIRECTED BY MD (Patient taking differently: Place 1 patch on the skin as directed by provider Daily.), Disp: 30 patch, Rfl: 0    Omega-3 Fatty Acids (FISH OIL) 1000 MG capsule capsule, Take 1 capsule by mouth 4 (Four) Times a Day. (Patient taking differently: Take 1 capsule by mouth 2 (Two) Times a Day With Meals.), Disp: 120 capsule, Rfl: 2    omeprazole (priLOSEC) 20 MG capsule, Take 1 capsule by mouth twice daily, Disp: 60 capsule, Rfl: 0    sacubitril-valsartan (ENTRESTO) 24-26 MG tablet, Take 1 tablet by mouth 2 (Two) Times a Day., Disp: 60 tablet, Rfl: 5    simvastatin (ZOCOR) 5 MG tablet, TAKE 1 TABLET BY MOUTH ONCE DAILY AT NIGHT, Disp: 90 tablet, Rfl: 0    solifenacin (VESICARE) 5 MG tablet, Take 1 tablet by mouth Daily., Disp: 90 tablet, Rfl: 0    spironolactone (ALDACTONE) 25 MG tablet, Take 1 tablet by mouth Daily., Disp: , Rfl: 0    valACYclovir (Valtrex) 500 MG tablet, Take 2 pills daily by mouth for ten days . Thereafter take one pill daily by mouth., Disp: 90 tablet, Rfl: 0    Vitamin D, Cholecalciferol, (CHOLECALCIFEROL) 10 MCG (400 UNIT) tablet, Take 2 tablets by mouth Daily., Disp: , Rfl:     warfarin (COUMADIN) 1 MG tablet, Take  by mouth. PT TAKES 0.5 (1 MG TAB) PLUS 4 MG TAB-TOTAL 4.5 MG ON Saturday AND Sunday, Disp: , Rfl:     warfarin (COUMADIN) 4 MG tablet, Take 1 tablet by mouth. 4 MG M-F, Disp:  ", Rfl: 2        Physical Exam:     Vitals:    10/18/23 1030   BP: 128/68   Pulse: 80   Temp: 96.8 °F (36 °C)   TempSrc: Infrared   Weight: 90.3 kg (199 lb)   Height: 152.4 cm (60\")   PainSc:   8   PainLoc: Back        General: Alert and oriented, No acute distress.   HEENT: Normocephalic, atraumatic.   Cardiovascular: No gross edema  Respiratory: Respirations are non-labored       Lumbar Spine:   No masses or atrophy  Range of motion - Flexion normal. Extension normal. Right Lat Bending normal. Left Lat Bending normal  Facet Loading: Positive bilaterally  Facet Palpation -tender  PSIS tenderness - Negative bilaterally  Franklin's/KASSANDRA/Thigh thrust -      Motor Exam:    Strength: Rate on 1-5 scale Right Left    C5-Elbow flexion, Deltoid 5 5    C6-Wrist extension 5 5    C7- Elbow / finger extension 5 5    C8- Finger flexion 5 5    T1- Intrinsics hand 5 5    Strength: Rate on 1-5 scale Right Left    L1/2- hip flexion 5 5    L3- knee extension 5 5    L4- ankle dorsiflexion 5 5    L5- great toe extension 5 5    S1- ankle plantarflexion 5 5    Sensory Exam: Full and equal sensation to light touch throughout.       Neurologic: Cranial Nerves II-XII are grossly intact.   Oates’s -   Clonus -     Psychiatric: Cooperative.   Gait: Antalgic flexed forward  Assistive Devices: None    Imaging Studies:   No results found for this or any previous visit.      Impression & Plan:       10/18/2023: Di Lopez is a 62 y.o. female with past medical history significant for depression, DM 2, HTN, LBBB, who presents to the pain clinic for evaluation and treatment of chronic axial low back pain.  I do not have images for interpretation today.  Patient declines further evaluation as she is primarily interested in medication management.  I do not recommend opioids for chronic nonmalignant pain given the risks of tolerance, dependence, addiction, hormonal imbalance, immunosuppression.  We discussed lumbar medial branch blocks and if " appropriate rhizotomy.  I will make a referral to a local pain clinic that may be able to simultaneously manage medications as well as perform RFA.    1. Chronic bilateral low back pain without sciatica        PLAN:  1. Medication Recommendations: Recommend Voltaren topical, NSAIDs, Tylenol.  Can trial turmeric 500 mg twice daily if NSAID contraindicated.    2. Physical Therapy: Continue HEP    3. Psychological: defer    4. Complementary and alternative (CAM) Therapies:     5. Labs/Diagnostic studies: None indicated     6. Imaging: Consider repeat lumbar x-ray    7. Interventions: Discussed lumbar medial branch block/rhizotomy.  Patient declines at this time.    8. Referrals: [facility], Dr. Reggie Carlos.    9. Records: Pain treatment center records reviewed    10. Lifestyle goals:    Follow-up as needed      [facility]  Luiz Longoria MD

## 2023-10-30 ENCOUNTER — TELEPHONE (OUTPATIENT)
Dept: FAMILY MEDICINE CLINIC | Facility: CLINIC | Age: 63
End: 2023-10-30

## 2023-10-30 NOTE — TELEPHONE ENCOUNTER
"  Caller: Di Lopez \"Shiloh\"    Relationship: Self    Best call back number: [phone]    What is the best time to reach you: AS SOON AS POSSIBLE     Who are you requesting to speak with (clinical staff, provider,  specific staff member): NURSE         What was the call regarding: THE PATIENT STATES THAT SHE HAS HAD DIARRHEA SINCE LAST WEDNESDAY THE PATIENT WOULD LIKE TO KNOW IF THERE IS ANYTHING SHE CAN TAKE SHE DID MAKE AND APPOINTMENT FOR 11/02/2023      "

## 2023-11-02 ENCOUNTER — OFFICE VISIT (OUTPATIENT)
Dept: FAMILY MEDICINE CLINIC | Facility: CLINIC | Age: 63
End: 2023-11-02
Payer: MEDICARE

## 2023-11-02 ENCOUNTER — LAB (OUTPATIENT)
Dept: LAB | Facility: HOSPITAL | Age: 63
End: 2023-11-02
Payer: MEDICARE

## 2023-11-02 VITALS
DIASTOLIC BLOOD PRESSURE: 58 MMHG | TEMPERATURE: 97.5 F | SYSTOLIC BLOOD PRESSURE: 120 MMHG | WEIGHT: 195 LBS | HEIGHT: 60 IN | RESPIRATION RATE: 18 BRPM | HEART RATE: 80 BPM | BODY MASS INDEX: 38.28 KG/M2 | OXYGEN SATURATION: 98 %

## 2023-11-02 DIAGNOSIS — D64.89 OTHER SPECIFIED ANEMIAS: ICD-10-CM

## 2023-11-02 DIAGNOSIS — Z79.4 TYPE 2 DIABETES MELLITUS WITHOUT COMPLICATION, WITH LONG-TERM CURRENT USE OF INSULIN: Chronic | ICD-10-CM

## 2023-11-02 DIAGNOSIS — R19.7 DIARRHEA, UNSPECIFIED TYPE: ICD-10-CM

## 2023-11-02 DIAGNOSIS — Z79.4 TYPE 2 DIABETES MELLITUS WITHOUT COMPLICATION, WITH LONG-TERM CURRENT USE OF INSULIN: Primary | Chronic | ICD-10-CM

## 2023-11-02 DIAGNOSIS — E11.9 TYPE 2 DIABETES MELLITUS WITHOUT COMPLICATION, WITH LONG-TERM CURRENT USE OF INSULIN: Chronic | ICD-10-CM

## 2023-11-02 DIAGNOSIS — E11.9 TYPE 2 DIABETES MELLITUS WITHOUT COMPLICATION, WITH LONG-TERM CURRENT USE OF INSULIN: Primary | Chronic | ICD-10-CM

## 2023-11-02 LAB
ALBUMIN SERPL-MCNC: 4 G/DL (ref 3.5–5.2)
ALBUMIN/GLOB SERPL: 1.5 G/DL
ALP SERPL-CCNC: 186 U/L (ref 39–117)
ALT SERPL W P-5'-P-CCNC: 39 U/L (ref 1–33)
ANION GAP SERPL CALCULATED.3IONS-SCNC: 11 MMOL/L (ref 5–15)
AST SERPL-CCNC: 39 U/L (ref 1–32)
BILIRUB SERPL-MCNC: 0.4 MG/DL (ref 0–1.2)
BUN SERPL-MCNC: 18 MG/DL (ref 8–23)
BUN/CREAT SERPL: 11.3 (ref 7–25)
CALCIUM SPEC-SCNC: 9.1 MG/DL (ref 8.6–10.5)
CHLORIDE SERPL-SCNC: 106 MMOL/L (ref 98–107)
CO2 SERPL-SCNC: 23 MMOL/L (ref 22–29)
CREAT SERPL-MCNC: 1.6 MG/DL (ref 0.57–1)
EGFRCR SERPLBLD CKD-EPI 2021: 36.5 ML/MIN/1.73
GLOBULIN UR ELPH-MCNC: 2.6 GM/DL
GLUCOSE SERPL-MCNC: 133 MG/DL (ref 65–99)
POTASSIUM SERPL-SCNC: 3.7 MMOL/L (ref 3.5–5.2)
PROT SERPL-MCNC: 6.6 G/DL (ref 6–8.5)
SODIUM SERPL-SCNC: 140 MMOL/L (ref 136–145)

## 2023-11-02 PROCEDURE — 80053 COMPREHEN METABOLIC PANEL: CPT

## 2023-11-02 PROCEDURE — 36415 COLL VENOUS BLD VENIPUNCTURE: CPT

## 2023-11-02 NOTE — PROGRESS NOTES
"Chief Complaint  Diarrhea (Pt has been having diarrhea, stomach cramps, chills that started last Tuesday. )    Diarrhea         Diarrhea  Started last Wednesday.   She had steak last night. She drank a bottle of water.   No vomiting.   She has some epigastric belly pain.   She has not had any fever but had chills.   She denies any upper respiratory symptoms.   No bloody stool.   She has some mild nausea.   No urinary symptoms      The following portions of the patient's history were reviewed and updated as appropriate: allergies, current medications, past family history, past medical history, past social history, past surgical history, and problem list.    OBJECTIVE:  /58   Pulse 80   Temp 97.5 °F (36.4 °C) (Temporal)   Resp 18   Ht 152.4 cm (60\")   Wt 88.5 kg (195 lb)   SpO2 98%   BMI 38.08 kg/m²       Physical Exam  Constitutional:       General: She is not in acute distress.     Appearance: Normal appearance.   HENT:      Head: Normocephalic and atraumatic.   Eyes:      Extraocular Movements: Extraocular movements intact.   Cardiovascular:      Rate and Rhythm: Normal rate and regular rhythm.      Heart sounds: No murmur heard.  Pulmonary:      Effort: Pulmonary effort is normal. No respiratory distress.      Breath sounds: Normal breath sounds. No stridor. No wheezing, rhonchi or rales.   Abdominal:      Tenderness: There is abdominal tenderness (mild tenderness present in ruq and epigastrum). There is no guarding or rebound.   Skin:     Findings: No rash.   Neurological:      General: No focal deficit present.      Mental Status: She is alert.   Psychiatric:         Mood and Affect: Mood normal.                    Assessment and Plan   Diagnoses and all orders for this visit:    1. Type 2 diabetes mellitus without complication, with long-term current use of insulin (Primary)  -     Comprehensive metabolic panel; Future    2. Diarrhea, unspecified type  -     Stool Culture (Reference Lab) - Stool, " Per Rectum; Future  -     Gastrointestinal Panel, PCR - Stool, Per Rectum; Future  -     Clostridioides difficile Toxin, PCR - Stool, Per Rectum; Future    3. Other specified anemias  -     Gastrointestinal Panel, PCR - Stool, Per Rectum; Future      Diarrhea  Over one week.  No fever or bloody stool: given this with no alarm symptoms at this time will continue with rest, increased fluids and soft foods.  Will test stool for bacterial infection, as she may have gastroenteritis.  I am also concerned she may need imaging of gallbladder liver given belly pain, but no fever or moderate to severe pain at this time. She is not in distress in the office and appears okay.   Discussed with her that if her pain doesn't resolve and nothing shows on her stool studies we will obtain imaging.   Advised her for any worsening pain or for any fever to go straight to the ER.     Advised her to go to lab.     Follow up in 1 week or sooner if needed.        Davina Santiago D.O.  [facility] [place]

## 2023-11-03 DIAGNOSIS — R79.89 ELEVATED LFTS: Primary | ICD-10-CM

## 2023-11-03 DIAGNOSIS — Z11.59 ENCOUNTER FOR SCREENING FOR OTHER VIRAL DISEASES: ICD-10-CM

## 2023-11-03 NOTE — PROGRESS NOTES
Please call Ms. Matamoros to let her know that her kidney function is down to around half of its normal function at about 36 percent, normal is above 70. This is likely because she has been losing fluids from her stool. Please make sure she pushes fluids with electrolytes (adult pedialyte) to keep herself out of the hospital. If she cannot drink she needs to go to the er for a bag of fluids.   Also go to er for any worsening belly pain, fever, vomiting.   Please have her stop by the lab on Monday after hydrating to make sure her kidneys are improved.   Please make sure she turns in the stool studies.   Have her hold the trulicity until her kidney function is back to normal as well as the lasix and spironolactone.

## 2023-11-06 ENCOUNTER — HOSPITAL ENCOUNTER (OUTPATIENT)
Dept: ULTRASOUND IMAGING | Facility: HOSPITAL | Age: 63
Discharge: HOME | End: 2023-11-06
Payer: MEDICARE

## 2023-11-06 ENCOUNTER — PRE-ADMISSION TESTING (OUTPATIENT)
Dept: PREADMISSION TESTING | Facility: HOSPITAL | Age: 63
End: 2023-11-06
Payer: MEDICARE

## 2023-11-06 ENCOUNTER — HOSPITAL ENCOUNTER (OUTPATIENT)
Dept: MAMMOGRAPHY | Facility: HOSPITAL | Age: 63
Discharge: HOME | End: 2023-11-06
Payer: MEDICARE

## 2023-11-06 DIAGNOSIS — Z79.4 TYPE 2 DIABETES MELLITUS WITHOUT COMPLICATION, WITH LONG-TERM CURRENT USE OF INSULIN: Chronic | ICD-10-CM

## 2023-11-06 DIAGNOSIS — R92.8 ABNORMAL MAMMOGRAM: ICD-10-CM

## 2023-11-06 DIAGNOSIS — E11.9 TYPE 2 DIABETES MELLITUS WITHOUT COMPLICATION, WITH LONG-TERM CURRENT USE OF INSULIN: Chronic | ICD-10-CM

## 2023-11-06 DIAGNOSIS — D64.9 ANEMIA, UNSPECIFIED TYPE: ICD-10-CM

## 2023-11-06 DIAGNOSIS — R19.7 DIARRHEA, UNSPECIFIED TYPE: ICD-10-CM

## 2023-11-06 LAB
ANION GAP SERPL CALCULATED.3IONS-SCNC: 8 MMOL/L (ref 5–15)
BASOPHILS # BLD AUTO: 0.03 10*3/MM3 (ref 0–0.2)
BASOPHILS NFR BLD AUTO: 0.3 % (ref 0–1.5)
BUN SERPL-MCNC: 13 MG/DL (ref 8–23)
BUN/CREAT SERPL: 9 (ref 7–25)
CALCIUM SPEC-SCNC: 8.8 MG/DL (ref 8.6–10.5)
CHLORIDE SERPL-SCNC: 109 MMOL/L (ref 98–107)
CO2 SERPL-SCNC: 27 MMOL/L (ref 22–29)
CREAT SERPL-MCNC: 1.45 MG/DL (ref 0.57–1)
DEPRECATED RDW RBC AUTO: 44.7 FL (ref 37–54)
EGFRCR SERPLBLD CKD-EPI 2021: 41.1 ML/MIN/1.73
EOSINOPHIL # BLD AUTO: 0.16 10*3/MM3 (ref 0–0.4)
EOSINOPHIL NFR BLD AUTO: 1.5 % (ref 0.3–6.2)
ERYTHROCYTE [DISTWIDTH] IN BLOOD BY AUTOMATED COUNT: 14 % (ref 12.3–15.4)
GLUCOSE SERPL-MCNC: 107 MG/DL (ref 65–99)
HCT VFR BLD AUTO: 29.3 % (ref 34–46.6)
HGB BLD-MCNC: 9.3 G/DL (ref 12–15.9)
IMM GRANULOCYTES # BLD AUTO: 0.17 10*3/MM3 (ref 0–0.05)
IMM GRANULOCYTES NFR BLD AUTO: 1.6 % (ref 0–0.5)
INR PPP: 4.13 (ref 0.89–1.12)
LYMPHOCYTES # BLD AUTO: 2.36 10*3/MM3 (ref 0.7–3.1)
LYMPHOCYTES NFR BLD AUTO: 21.5 % (ref 19.6–45.3)
MCH RBC QN AUTO: 27.6 PG (ref 26.6–33)
MCHC RBC AUTO-ENTMCNC: 31.7 G/DL (ref 31.5–35.7)
MCV RBC AUTO: 86.9 FL (ref 79–97)
MONOCYTES # BLD AUTO: 0.82 10*3/MM3 (ref 0.1–0.9)
MONOCYTES NFR BLD AUTO: 7.5 % (ref 5–12)
NEUTROPHILS NFR BLD AUTO: 67.6 % (ref 42.7–76)
NEUTROPHILS NFR BLD AUTO: 7.42 10*3/MM3 (ref 1.7–7)
NRBC BLD AUTO-RTO: 0 /100 WBC (ref 0–0.2)
PLATELET # BLD AUTO: 320 10*3/MM3 (ref 140–450)
PMV BLD AUTO: 9.8 FL (ref 6–12)
POTASSIUM SERPL-SCNC: 3.9 MMOL/L (ref 3.5–5.2)
PROTHROMBIN TIME: 40.3 SECONDS (ref 12.2–14.5)
RBC # BLD AUTO: 3.37 10*6/MM3 (ref 3.77–5.28)
SODIUM SERPL-SCNC: 144 MMOL/L (ref 136–145)
WBC NRBC COR # BLD: 10.96 10*3/MM3 (ref 3.4–10.8)

## 2023-11-06 PROCEDURE — 85025 COMPLETE CBC W/AUTO DIFF WBC: CPT

## 2023-11-06 PROCEDURE — 36415 COLL VENOUS BLD VENIPUNCTURE: CPT

## 2023-11-06 PROCEDURE — 77066 DX MAMMO INCL CAD BI: CPT

## 2023-11-06 PROCEDURE — 85610 PROTHROMBIN TIME: CPT

## 2023-11-06 PROCEDURE — 93005 ELECTROCARDIOGRAM TRACING: CPT

## 2023-11-06 PROCEDURE — 76642 ULTRASOUND BREAST LIMITED: CPT

## 2023-11-06 PROCEDURE — 82728 ASSAY OF FERRITIN: CPT

## 2023-11-06 PROCEDURE — 83540 ASSAY OF IRON: CPT

## 2023-11-06 PROCEDURE — G0279 TOMOSYNTHESIS, MAMMO: HCPCS

## 2023-11-06 PROCEDURE — G0279 TOMOSYNTHESIS, MAMMO: HCPCS | Performed by: RADIOLOGY

## 2023-11-06 PROCEDURE — 80048 BASIC METABOLIC PNL TOTAL CA: CPT

## 2023-11-06 PROCEDURE — 84466 ASSAY OF TRANSFERRIN: CPT

## 2023-11-06 PROCEDURE — 76642 ULTRASOUND BREAST LIMITED: CPT | Performed by: RADIOLOGY

## 2023-11-06 PROCEDURE — 77066 DX MAMMO INCL CAD BI: CPT | Performed by: RADIOLOGY

## 2023-11-06 NOTE — PAT
An arrival time for procedure was not provided during PAT visit. If patient had any questions or concerns about their arrival time, they were instructed to contact their surgeon/physician.  Additionally, if the patient referred to an arrival time that was acquired from their my chart account, patient was encouraged to verify that time with their surgeon/physician. Arrival times are NOT provided in Pre Admission Testing Department.    Patient viewed general PAT education video as instructed in their preoperative information received from their surgeon.  Patient stated the general PAT education video was viewed in its entirety and survey completed.  Copies of PAT general education handouts (Incentive Spirometry, Meds to Beds Program, Patient Belongings, Pre-op skin preparation instructions, Blood Glucose testing, Visitor policy, Surgery FAQ, Code H) distributed to patient if not printed. Education related to the PAT pass and skin preparation for surgery (if applicable) completed in PAT as a reinforcement to PAT education video. Patient instructed to return PAT pass provided today as well as completed skin preparation sheet (if applicable) on the day of procedure.     Additionally if patient had not viewed video yet but intended to view it at home or in our waiting area, then referred them to the handout with QR code/link provided during PAT visit.  Instructed patient to complete survey after viewing the video in its entirety.  Encouraged patient/family to read PAT general education handouts thoroughly and notify PAT staff with any questions or concerns. Patient verbalized understanding of all information and priority content.    Patient denies any current skin issues.     Per Anesthesia Request, patient instructed not to take their ACE/ARB medications on the AM of surgery.    Verified patient previously completed cardiology visit for cardiac risk assessment in preparation for upcoming procedure, completion of 12-lead  ECG within six months, and risk assessment letter reviewed. No further interventions required.   CARDIAC RISK ASSESSMENT FROM DR. SOTO ON 9/15/23 ON CHART. LOVENOX BRIDGE PER DR. SOTO.     PT 2 HRS LATE TO PAT APPOINTMENT; ABBREVIATED VISIT COMPLETED. PLEASE REVIEW/UPDATE DATABASE IN PREOP DOS.

## 2023-11-07 DIAGNOSIS — Z79.4 TYPE 2 DIABETES MELLITUS WITHOUT COMPLICATION, WITH LONG-TERM CURRENT USE OF INSULIN: Primary | Chronic | ICD-10-CM

## 2023-11-07 DIAGNOSIS — N17.9 AKI (ACUTE KIDNEY INJURY): Primary | ICD-10-CM

## 2023-11-07 DIAGNOSIS — E11.9 TYPE 2 DIABETES MELLITUS WITHOUT COMPLICATION, WITH LONG-TERM CURRENT USE OF INSULIN: Primary | Chronic | ICD-10-CM

## 2023-11-07 LAB
FERRITIN SERPL-MCNC: 134.2 NG/ML (ref 13–150)
IRON 24H UR-MRATE: 38 MCG/DL (ref 37–145)
IRON SATN MFR SERPL: 10 % (ref 20–50)
QT INTERVAL: 506 MS
QTC INTERVAL: 538 MS
TIBC SERPL-MCNC: 362 MCG/DL (ref 298–536)
TRANSFERRIN SERPL-MCNC: 243 MG/DL (ref 200–360)

## 2023-11-07 NOTE — PROGRESS NOTES
Spoke with patient over the phone.   Discussed her kidney function is mildly improved but not at her normal range.   She says her diarrhea is resolved and she has not had a lot of belly pain any more.   No fevers.   She will increase hydration and hold the lasix, glp 1 agonist, entresto, spironolactone, valtrex. She will monitor bp and let me know if it goes above 140 systolic.   She will let us know for any symptoms.

## 2023-11-09 ENCOUNTER — APPOINTMENT (OUTPATIENT)
Dept: GENERAL RADIOLOGY | Facility: HOSPITAL | Age: 63
End: 2023-11-09
Payer: MEDICARE

## 2023-11-09 ENCOUNTER — TELEPHONE (OUTPATIENT)
Dept: GASTROENTEROLOGY | Facility: CLINIC | Age: 63
End: 2023-11-09
Payer: MEDICARE

## 2023-11-09 ENCOUNTER — APPOINTMENT (OUTPATIENT)
Dept: CT IMAGING | Facility: HOSPITAL | Age: 63
End: 2023-11-09
Payer: MEDICARE

## 2023-11-09 ENCOUNTER — HOSPITAL ENCOUNTER (INPATIENT)
Facility: HOSPITAL | Age: 63
LOS: 11 days | Discharge: HOME | End: 2023-11-22
Attending: EMERGENCY MEDICINE | Admitting: STUDENT IN AN ORGANIZED HEALTH CARE EDUCATION/TRAINING PROGRAM
Payer: MEDICARE

## 2023-11-09 DIAGNOSIS — K62.5 GASTROINTESTINAL HEMORRHAGE ASSOCIATED WITH ANORECTAL SOURCE: ICD-10-CM

## 2023-11-09 DIAGNOSIS — K92.2 GASTROINTESTINAL HEMORRHAGE, UNSPECIFIED GASTROINTESTINAL HEMORRHAGE TYPE: Primary | ICD-10-CM

## 2023-11-09 DIAGNOSIS — D62 ACUTE BLOOD LOSS ANEMIA: ICD-10-CM

## 2023-11-09 DIAGNOSIS — Z95.2 H/O HEART VALVE REPLACEMENT WITH MECHANICAL VALVE: ICD-10-CM

## 2023-11-09 DIAGNOSIS — K92.1 MELENA: ICD-10-CM

## 2023-11-09 PROBLEM — N17.9 AKI (ACUTE KIDNEY INJURY): Status: ACTIVE | Noted: 2023-11-09

## 2023-11-09 LAB
ABO GROUP BLD: NORMAL
ALBUMIN SERPL-MCNC: 3.5 G/DL (ref 3.5–5.2)
ALBUMIN/GLOB SERPL: 1.3 G/DL
ALP SERPL-CCNC: 167 U/L (ref 39–117)
ALT SERPL W P-5'-P-CCNC: 48 U/L (ref 1–33)
ANION GAP SERPL CALCULATED.3IONS-SCNC: 11 MMOL/L (ref 5–15)
AST SERPL-CCNC: 32 U/L (ref 1–32)
BACTERIA UR QL AUTO: ABNORMAL /HPF
BASOPHILS # BLD AUTO: 0.04 10*3/MM3 (ref 0–0.2)
BASOPHILS NFR BLD AUTO: 0.3 % (ref 0–1.5)
BILIRUB SERPL-MCNC: 0.3 MG/DL (ref 0–1.2)
BILIRUB UR QL STRIP: NEGATIVE
BLD GP AB SCN SERPL QL: NEGATIVE
BUN SERPL-MCNC: 21 MG/DL (ref 8–23)
BUN/CREAT SERPL: 11.2 (ref 7–25)
CALCIUM SPEC-SCNC: 8.3 MG/DL (ref 8.6–10.5)
CHLORIDE SERPL-SCNC: 107 MMOL/L (ref 98–107)
CLARITY UR: CLEAR
CO2 SERPL-SCNC: 24 MMOL/L (ref 22–29)
COLOR UR: YELLOW
CREAT SERPL-MCNC: 1.88 MG/DL (ref 0.57–1)
DEPRECATED RDW RBC AUTO: 47.3 FL (ref 37–54)
DEVELOPER EXPIRATION DATE: ABNORMAL
DEVELOPER LOT NUMBER: ABNORMAL
EGFRCR SERPLBLD CKD-EPI 2021: 30.1 ML/MIN/1.73
EOSINOPHIL # BLD AUTO: 0.16 10*3/MM3 (ref 0–0.4)
EOSINOPHIL NFR BLD AUTO: 1.3 % (ref 0.3–6.2)
ERYTHROCYTE [DISTWIDTH] IN BLOOD BY AUTOMATED COUNT: 14.5 % (ref 12.3–15.4)
EXPIRATION DATE: ABNORMAL
FECAL OCCULT BLOOD SCREEN, POC: POSITIVE
GLOBULIN UR ELPH-MCNC: 2.6 GM/DL
GLUCOSE SERPL-MCNC: 280 MG/DL (ref 65–99)
GLUCOSE UR STRIP-MCNC: ABNORMAL MG/DL
HCT VFR BLD AUTO: 22.9 % (ref 34–46.6)
HGB BLD-MCNC: 7.2 G/DL (ref 12–15.9)
HGB UR QL STRIP.AUTO: NEGATIVE
HYALINE CASTS UR QL AUTO: ABNORMAL /LPF
IMM GRANULOCYTES # BLD AUTO: 0.18 10*3/MM3 (ref 0–0.05)
IMM GRANULOCYTES NFR BLD AUTO: 1.4 % (ref 0–0.5)
INR PPP: 3.31 (ref 0.89–1.12)
KETONES UR QL STRIP: NEGATIVE
LEUKOCYTE ESTERASE UR QL STRIP.AUTO: ABNORMAL
LIPASE SERPL-CCNC: 25 U/L (ref 13–60)
LYMPHOCYTES # BLD AUTO: 2.32 10*3/MM3 (ref 0.7–3.1)
LYMPHOCYTES NFR BLD AUTO: 18.2 % (ref 19.6–45.3)
Lab: ABNORMAL
MAGNESIUM SERPL-MCNC: 2 MG/DL (ref 1.6–2.4)
MCH RBC QN AUTO: 28.2 PG (ref 26.6–33)
MCHC RBC AUTO-ENTMCNC: 31.4 G/DL (ref 31.5–35.7)
MCV RBC AUTO: 89.8 FL (ref 79–97)
MONOCYTES # BLD AUTO: 0.7 10*3/MM3 (ref 0.1–0.9)
MONOCYTES NFR BLD AUTO: 5.5 % (ref 5–12)
NEGATIVE CONTROL: NEGATIVE
NEUTROPHILS NFR BLD AUTO: 73.3 % (ref 42.7–76)
NEUTROPHILS NFR BLD AUTO: 9.37 10*3/MM3 (ref 1.7–7)
NITRITE UR QL STRIP: POSITIVE
NRBC BLD AUTO-RTO: 0 /100 WBC (ref 0–0.2)
NT-PROBNP SERPL-MCNC: 830.8 PG/ML (ref 0–900)
PH UR STRIP.AUTO: 5.5 [PH] (ref 5–8)
PLATELET # BLD AUTO: 289 10*3/MM3 (ref 140–450)
PMV BLD AUTO: 10 FL (ref 6–12)
POSITIVE CONTROL: POSITIVE
POTASSIUM SERPL-SCNC: 4.1 MMOL/L (ref 3.5–5.2)
PROT SERPL-MCNC: 6.1 G/DL (ref 6–8.5)
PROT UR QL STRIP: ABNORMAL
PROTHROMBIN TIME: 33.9 SECONDS (ref 12.2–14.5)
RBC # BLD AUTO: 2.55 10*6/MM3 (ref 3.77–5.28)
RBC # UR STRIP: ABNORMAL /HPF
REF LAB TEST METHOD: ABNORMAL
RH BLD: POSITIVE
SODIUM SERPL-SCNC: 142 MMOL/L (ref 136–145)
SP GR UR STRIP: 1.02 (ref 1–1.03)
SQUAMOUS #/AREA URNS HPF: ABNORMAL /HPF
T&S EXPIRATION DATE: NORMAL
TROPONIN T SERPL HS-MCNC: 11 NG/L
UROBILINOGEN UR QL STRIP: ABNORMAL
WBC # UR STRIP: ABNORMAL /HPF
WBC NRBC COR # BLD: 12.77 10*3/MM3 (ref 3.4–10.8)

## 2023-11-09 PROCEDURE — 84145 PROCALCITONIN (PCT): CPT | Performed by: NURSE PRACTITIONER

## 2023-11-09 PROCEDURE — 84484 ASSAY OF TROPONIN QUANT: CPT | Performed by: NURSE PRACTITIONER

## 2023-11-09 PROCEDURE — 85025 COMPLETE CBC W/AUTO DIFF WBC: CPT | Performed by: PHYSICIAN ASSISTANT

## 2023-11-09 PROCEDURE — 83880 ASSAY OF NATRIURETIC PEPTIDE: CPT | Performed by: NURSE PRACTITIONER

## 2023-11-09 PROCEDURE — 71045 X-RAY EXAM CHEST 1 VIEW: CPT

## 2023-11-09 PROCEDURE — 99285 EMERGENCY DEPT VISIT HI MDM: CPT

## 2023-11-09 PROCEDURE — 86920 COMPATIBILITY TEST SPIN: CPT

## 2023-11-09 PROCEDURE — 85610 PROTHROMBIN TIME: CPT | Performed by: PHYSICIAN ASSISTANT

## 2023-11-09 PROCEDURE — 81001 URINALYSIS AUTO W/SCOPE: CPT | Performed by: NURSE PRACTITIONER

## 2023-11-09 PROCEDURE — 80053 COMPREHEN METABOLIC PANEL: CPT | Performed by: PHYSICIAN ASSISTANT

## 2023-11-09 PROCEDURE — 86850 RBC ANTIBODY SCREEN: CPT | Performed by: PHYSICIAN ASSISTANT

## 2023-11-09 PROCEDURE — 83735 ASSAY OF MAGNESIUM: CPT | Performed by: PHYSICIAN ASSISTANT

## 2023-11-09 PROCEDURE — 87186 SC STD MICRODIL/AGAR DIL: CPT | Performed by: NURSE PRACTITIONER

## 2023-11-09 PROCEDURE — 74176 CT ABD & PELVIS W/O CONTRAST: CPT

## 2023-11-09 PROCEDURE — 82270 OCCULT BLOOD FECES: CPT | Performed by: PHYSICIAN ASSISTANT

## 2023-11-09 PROCEDURE — 87086 URINE CULTURE/COLONY COUNT: CPT | Performed by: NURSE PRACTITIONER

## 2023-11-09 PROCEDURE — 87077 CULTURE AEROBIC IDENTIFY: CPT | Performed by: NURSE PRACTITIONER

## 2023-11-09 PROCEDURE — 83690 ASSAY OF LIPASE: CPT | Performed by: PHYSICIAN ASSISTANT

## 2023-11-09 PROCEDURE — 36415 COLL VENOUS BLD VENIPUNCTURE: CPT

## 2023-11-09 PROCEDURE — 99223 1ST HOSP IP/OBS HIGH 75: CPT | Performed by: INTERNAL MEDICINE

## 2023-11-09 PROCEDURE — 86900 BLOOD TYPING SEROLOGIC ABO: CPT | Performed by: PHYSICIAN ASSISTANT

## 2023-11-09 PROCEDURE — 93005 ELECTROCARDIOGRAM TRACING: CPT | Performed by: NURSE PRACTITIONER

## 2023-11-09 PROCEDURE — 86901 BLOOD TYPING SEROLOGIC RH(D): CPT | Performed by: PHYSICIAN ASSISTANT

## 2023-11-09 RX ORDER — ACETAMINOPHEN 325 MG/1
650 TABLET ORAL EVERY 6 HOURS PRN
Status: DISCONTINUED | OUTPATIENT
Start: 2023-11-09 | End: 2023-11-22 | Stop reason: HOSPADM

## 2023-11-09 NOTE — TELEPHONE ENCOUNTER
I SPOKE WITH MS REYES. DR SEQUEIRA'S RECOMMENDATION GIVEN. PATIENT AGREED TO GO TO THE ER AT [facility].

## 2023-11-09 NOTE — Clinical Note
Level of Care: Telemetry [5]   Diagnosis: GIB (gastrointestinal bleeding) [081124]   Admitting Physician: HOWIE COLEMAN [[id number]]   Attending Physician: HOWIE COLEMAN [[id number]]   Isolate for COVID?: No [0]   Bed Request Comments: tele   Certification: I Certify That Inpatient Hospital Services Are Medically Necessary For Greater Than 2 Midnights

## 2023-11-09 NOTE — TELEPHONE ENCOUNTER
Dr Fernandes,    I spoke with Ms Matamoros this afternoon. Patient is having some tightness all of her body. Urination odor, feet/ legs swollen, blood in stool(bright red blood), no fever. EGD is scheduled for the 11/14/2023. Patient is also suppose to call Dr Patel's office to get Lovenox bridge instructions. Please advise. Thanks

## 2023-11-10 PROBLEM — K92.2 GI BLEED: Status: ACTIVE | Noted: 2023-11-10

## 2023-11-10 LAB
ANION GAP SERPL CALCULATED.3IONS-SCNC: 10 MMOL/L (ref 5–15)
APTT PPP: 78.8 SECONDS (ref 60–90)
BASOPHILS # BLD AUTO: 0.03 10*3/MM3 (ref 0–0.2)
BASOPHILS NFR BLD AUTO: 0.2 % (ref 0–1.5)
BUN SERPL-MCNC: 22 MG/DL (ref 8–23)
BUN/CREAT SERPL: 12.3 (ref 7–25)
CALCIUM SPEC-SCNC: 8.2 MG/DL (ref 8.6–10.5)
CHLORIDE SERPL-SCNC: 109 MMOL/L (ref 98–107)
CO2 SERPL-SCNC: 25 MMOL/L (ref 22–29)
CREAT SERPL-MCNC: 1.79 MG/DL (ref 0.57–1)
D-LACTATE SERPL-SCNC: 1.5 MMOL/L (ref 0.5–2)
DEPRECATED RDW RBC AUTO: 46.5 FL (ref 37–54)
EGFRCR SERPLBLD CKD-EPI 2021: 31.9 ML/MIN/1.73
EOSINOPHIL # BLD AUTO: 0.1 10*3/MM3 (ref 0–0.4)
EOSINOPHIL NFR BLD AUTO: 0.8 % (ref 0.3–6.2)
ERYTHROCYTE [DISTWIDTH] IN BLOOD BY AUTOMATED COUNT: 14.4 % (ref 12.3–15.4)
GEN 5 2HR TROPONIN T REFLEX: 10 NG/L
GLUCOSE BLDC GLUCOMTR-MCNC: 140 MG/DL (ref 70–130)
GLUCOSE BLDC GLUCOMTR-MCNC: 151 MG/DL (ref 70–130)
GLUCOSE BLDC GLUCOMTR-MCNC: 215 MG/DL (ref 70–130)
GLUCOSE BLDC GLUCOMTR-MCNC: 253 MG/DL (ref 70–130)
GLUCOSE BLDC GLUCOMTR-MCNC: 92 MG/DL (ref 70–130)
GLUCOSE SERPL-MCNC: 197 MG/DL (ref 65–99)
HCT VFR BLD AUTO: 21.9 % (ref 34–46.6)
HCT VFR BLD AUTO: 23.9 % (ref 34–46.6)
HCT VFR BLD AUTO: 24 % (ref 34–46.6)
HCT VFR BLD AUTO: 24.2 % (ref 34–46.6)
HCT VFR BLD AUTO: 25.6 % (ref 34–46.6)
HCT VFR BLD AUTO: 26.5 % (ref 34–46.6)
HGB BLD-MCNC: 7.1 G/DL (ref 12–15.9)
HGB BLD-MCNC: 7.6 G/DL (ref 12–15.9)
HGB BLD-MCNC: 7.7 G/DL (ref 12–15.9)
HGB BLD-MCNC: 7.8 G/DL (ref 12–15.9)
HGB BLD-MCNC: 8.3 G/DL (ref 12–15.9)
HGB BLD-MCNC: 8.5 G/DL (ref 12–15.9)
IMM GRANULOCYTES # BLD AUTO: 0.14 10*3/MM3 (ref 0–0.05)
IMM GRANULOCYTES NFR BLD AUTO: 1.1 % (ref 0–0.5)
INR PPP: 1.21 (ref 0.89–1.12)
INR PPP: 2.84 (ref 0.89–1.12)
INR PPP: 2.88 (ref 0.89–1.12)
LYMPHOCYTES # BLD AUTO: 2.72 10*3/MM3 (ref 0.7–3.1)
LYMPHOCYTES NFR BLD AUTO: 21.4 % (ref 19.6–45.3)
MAGNESIUM SERPL-MCNC: 2 MG/DL (ref 1.6–2.4)
MCH RBC QN AUTO: 28.5 PG (ref 26.6–33)
MCHC RBC AUTO-ENTMCNC: 32.4 G/DL (ref 31.5–35.7)
MCV RBC AUTO: 88 FL (ref 79–97)
MONOCYTES # BLD AUTO: 0.59 10*3/MM3 (ref 0.1–0.9)
MONOCYTES NFR BLD AUTO: 4.6 % (ref 5–12)
NEUTROPHILS NFR BLD AUTO: 71.9 % (ref 42.7–76)
NEUTROPHILS NFR BLD AUTO: 9.12 10*3/MM3 (ref 1.7–7)
NRBC BLD AUTO-RTO: 0 /100 WBC (ref 0–0.2)
PLATELET # BLD AUTO: 310 10*3/MM3 (ref 140–450)
PMV BLD AUTO: 10 FL (ref 6–12)
POTASSIUM SERPL-SCNC: 3.7 MMOL/L (ref 3.5–5.2)
PROCALCITONIN SERPL-MCNC: 0.07 NG/ML (ref 0–0.25)
PROTHROMBIN TIME: 15.4 SECONDS (ref 12.2–14.5)
PROTHROMBIN TIME: 30.1 SECONDS (ref 12.2–14.5)
PROTHROMBIN TIME: 30.4 SECONDS (ref 12.2–14.5)
QT INTERVAL: 490 MS
QTC INTERVAL: 536 MS
RBC # BLD AUTO: 2.49 10*6/MM3 (ref 3.77–5.28)
SODIUM SERPL-SCNC: 144 MMOL/L (ref 136–145)
TROPONIN T DELTA: NORMAL
UFH PPP CHRO-ACNC: 0.1 IU/ML (ref 0.3–0.7)
WBC NRBC COR # BLD: 12.7 10*3/MM3 (ref 3.4–10.8)

## 2023-11-10 PROCEDURE — 83735 ASSAY OF MAGNESIUM: CPT | Performed by: NURSE PRACTITIONER

## 2023-11-10 PROCEDURE — 82948 REAGENT STRIP/BLOOD GLUCOSE: CPT

## 2023-11-10 PROCEDURE — 87040 BLOOD CULTURE FOR BACTERIA: CPT | Performed by: NURSE PRACTITIONER

## 2023-11-10 PROCEDURE — 85014 HEMATOCRIT: CPT | Performed by: INTERNAL MEDICINE

## 2023-11-10 PROCEDURE — 25010000002 HEPARIN (PORCINE) 25000-0.45 UT/250ML-% SOLUTION

## 2023-11-10 PROCEDURE — 80048 BASIC METABOLIC PNL TOTAL CA: CPT | Performed by: NURSE PRACTITIONER

## 2023-11-10 PROCEDURE — 25010000002 CEFEPIME PER 500 MG: Performed by: INTERNAL MEDICINE

## 2023-11-10 PROCEDURE — 25010000002 METRONIDAZOLE 500 MG/100ML SOLUTION: Performed by: INTERNAL MEDICINE

## 2023-11-10 PROCEDURE — 94660 CPAP INITIATION&MGMT: CPT

## 2023-11-10 PROCEDURE — 25010000002 CEFTRIAXONE PER 250 MG: Performed by: INTERNAL MEDICINE

## 2023-11-10 PROCEDURE — 86927 PLASMA FRESH FROZEN: CPT

## 2023-11-10 PROCEDURE — 85610 PROTHROMBIN TIME: CPT

## 2023-11-10 PROCEDURE — 85018 HEMOGLOBIN: CPT | Performed by: NURSE PRACTITIONER

## 2023-11-10 PROCEDURE — P9016 RBC LEUKOCYTES REDUCED: HCPCS

## 2023-11-10 PROCEDURE — 85520 HEPARIN ASSAY: CPT | Performed by: NURSE PRACTITIONER

## 2023-11-10 PROCEDURE — 83605 ASSAY OF LACTIC ACID: CPT | Performed by: NURSE PRACTITIONER

## 2023-11-10 PROCEDURE — G0378 HOSPITAL OBSERVATION PER HR: HCPCS

## 2023-11-10 PROCEDURE — 36430 TRANSFUSION BLD/BLD COMPNT: CPT

## 2023-11-10 PROCEDURE — 85610 PROTHROMBIN TIME: CPT | Performed by: INTERNAL MEDICINE

## 2023-11-10 PROCEDURE — 86900 BLOOD TYPING SEROLOGIC ABO: CPT

## 2023-11-10 PROCEDURE — 85018 HEMOGLOBIN: CPT | Performed by: INTERNAL MEDICINE

## 2023-11-10 PROCEDURE — P9059 PLASMA, FRZ BETWEEN 8-24HOUR: HCPCS

## 2023-11-10 PROCEDURE — 85014 HEMATOCRIT: CPT | Performed by: NURSE PRACTITIONER

## 2023-11-10 PROCEDURE — 85025 COMPLETE CBC W/AUTO DIFF WBC: CPT | Performed by: NURSE PRACTITIONER

## 2023-11-10 PROCEDURE — 85730 THROMBOPLASTIN TIME PARTIAL: CPT | Performed by: NURSE PRACTITIONER

## 2023-11-10 PROCEDURE — 25010000002 CEFTRIAXONE PER 250 MG: Performed by: NURSE PRACTITIONER

## 2023-11-10 PROCEDURE — 99214 OFFICE O/P EST MOD 30 MIN: CPT | Performed by: PHYSICIAN ASSISTANT

## 2023-11-10 PROCEDURE — 63710000001 INSULIN LISPRO (HUMAN) PER 5 UNITS: Performed by: NURSE PRACTITIONER

## 2023-11-10 PROCEDURE — 25010000002 FUROSEMIDE PER 20 MG: Performed by: INTERNAL MEDICINE

## 2023-11-10 PROCEDURE — 25010000002 PHYTONADIONE 10 MG/ML SOLUTION 1 ML AMPULE: Performed by: INTERNAL MEDICINE

## 2023-11-10 PROCEDURE — 25810000003 SODIUM CHLORIDE 0.9 % SOLUTION: Performed by: NURSE PRACTITIONER

## 2023-11-10 PROCEDURE — 99232 SBSQ HOSP IP/OBS MODERATE 35: CPT | Performed by: INTERNAL MEDICINE

## 2023-11-10 RX ORDER — SODIUM CHLORIDE 0.9 % (FLUSH) 0.9 %
10 SYRINGE (ML) INJECTION AS NEEDED
Status: DISCONTINUED | OUTPATIENT
Start: 2023-11-10 | End: 2023-11-22 | Stop reason: HOSPADM

## 2023-11-10 RX ORDER — BISACODYL 5 MG/1
5 TABLET, DELAYED RELEASE ORAL DAILY PRN
Status: DISCONTINUED | OUTPATIENT
Start: 2023-11-10 | End: 2023-11-22 | Stop reason: HOSPADM

## 2023-11-10 RX ORDER — BISACODYL 10 MG
10 SUPPOSITORY, RECTAL RECTAL DAILY PRN
Status: DISCONTINUED | OUTPATIENT
Start: 2023-11-10 | End: 2023-11-22 | Stop reason: HOSPADM

## 2023-11-10 RX ORDER — OXYBUTYNIN CHLORIDE 5 MG/1
5 TABLET, EXTENDED RELEASE ORAL DAILY
Status: DISCONTINUED | OUTPATIENT
Start: 2023-11-10 | End: 2023-11-22 | Stop reason: HOSPADM

## 2023-11-10 RX ORDER — FERROUS SULFATE 325(65) MG
325 TABLET ORAL
Status: DISCONTINUED | OUTPATIENT
Start: 2023-11-10 | End: 2023-11-22 | Stop reason: HOSPADM

## 2023-11-10 RX ORDER — METRONIDAZOLE 500 MG/100ML
500 INJECTION, SOLUTION INTRAVENOUS EVERY 8 HOURS
Status: DISCONTINUED | OUTPATIENT
Start: 2023-11-10 | End: 2023-11-10

## 2023-11-10 RX ORDER — DEXTROSE MONOHYDRATE 25 G/50ML
25 INJECTION, SOLUTION INTRAVENOUS
Status: DISCONTINUED | OUTPATIENT
Start: 2023-11-10 | End: 2023-11-22 | Stop reason: HOSPADM

## 2023-11-10 RX ORDER — SODIUM CHLORIDE 9 MG/ML
50 INJECTION, SOLUTION INTRAVENOUS CONTINUOUS
Status: DISCONTINUED | OUTPATIENT
Start: 2023-11-10 | End: 2023-11-10

## 2023-11-10 RX ORDER — AMOXICILLIN 250 MG
2 CAPSULE ORAL 2 TIMES DAILY
Status: DISCONTINUED | OUTPATIENT
Start: 2023-11-10 | End: 2023-11-10

## 2023-11-10 RX ORDER — FUROSEMIDE 10 MG/ML
40 INJECTION INTRAMUSCULAR; INTRAVENOUS ONCE
Status: COMPLETED | OUTPATIENT
Start: 2023-11-10 | End: 2023-11-10

## 2023-11-10 RX ORDER — HEPARIN SODIUM 1000 [USP'U]/ML
2000 INJECTION, SOLUTION INTRAVENOUS; SUBCUTANEOUS AS NEEDED
Status: DISCONTINUED | OUTPATIENT
Start: 2023-11-10 | End: 2023-11-10 | Stop reason: DRUGHIGH

## 2023-11-10 RX ORDER — METRONIDAZOLE 500 MG/100ML
500 INJECTION, SOLUTION INTRAVENOUS EVERY 8 HOURS
Qty: 1500 ML | Refills: 0 | Status: COMPLETED | OUTPATIENT
Start: 2023-11-10 | End: 2023-11-15

## 2023-11-10 RX ORDER — PANTOPRAZOLE SODIUM 40 MG/10ML
40 INJECTION, POWDER, LYOPHILIZED, FOR SOLUTION INTRAVENOUS
Status: DISCONTINUED | OUTPATIENT
Start: 2023-11-10 | End: 2023-11-15

## 2023-11-10 RX ORDER — INSULIN LISPRO 100 [IU]/ML
2-7 INJECTION, SOLUTION INTRAVENOUS; SUBCUTANEOUS
Status: DISCONTINUED | OUTPATIENT
Start: 2023-11-10 | End: 2023-11-22 | Stop reason: HOSPADM

## 2023-11-10 RX ORDER — HEPARIN SODIUM 10000 [USP'U]/100ML
23 INJECTION, SOLUTION INTRAVENOUS
Status: DISCONTINUED | OUTPATIENT
Start: 2023-11-10 | End: 2023-11-17

## 2023-11-10 RX ORDER — SODIUM CHLORIDE 0.9 % (FLUSH) 0.9 %
10 SYRINGE (ML) INJECTION EVERY 12 HOURS SCHEDULED
Status: DISCONTINUED | OUTPATIENT
Start: 2023-11-10 | End: 2023-11-22 | Stop reason: HOSPADM

## 2023-11-10 RX ORDER — CARVEDILOL 12.5 MG/1
12.5 TABLET ORAL 2 TIMES DAILY WITH MEALS
Status: DISCONTINUED | OUTPATIENT
Start: 2023-11-10 | End: 2023-11-22 | Stop reason: HOSPADM

## 2023-11-10 RX ORDER — POLYETHYLENE GLYCOL 3350 17 G/17G
17 POWDER, FOR SOLUTION ORAL DAILY PRN
Status: DISCONTINUED | OUTPATIENT
Start: 2023-11-10 | End: 2023-11-22 | Stop reason: HOSPADM

## 2023-11-10 RX ORDER — NICOTINE 21 MG/24HR
1 PATCH, TRANSDERMAL 24 HOURS TRANSDERMAL EVERY 24 HOURS
Status: DISCONTINUED | OUTPATIENT
Start: 2023-11-10 | End: 2023-11-22 | Stop reason: HOSPADM

## 2023-11-10 RX ORDER — IBUPROFEN 600 MG/1
1 TABLET ORAL
Status: DISCONTINUED | OUTPATIENT
Start: 2023-11-10 | End: 2023-11-22 | Stop reason: HOSPADM

## 2023-11-10 RX ORDER — INSULIN LISPRO 100 [IU]/ML
4 INJECTION, SOLUTION INTRAVENOUS; SUBCUTANEOUS
Status: DISCONTINUED | OUTPATIENT
Start: 2023-11-10 | End: 2023-11-22 | Stop reason: HOSPADM

## 2023-11-10 RX ORDER — HYDROCODONE BITARTRATE AND ACETAMINOPHEN 5; 325 MG/1; MG/1
1 TABLET ORAL EVERY 6 HOURS PRN
Status: DISPENSED | OUTPATIENT
Start: 2023-11-10 | End: 2023-11-17

## 2023-11-10 RX ORDER — HEPARIN SODIUM 10000 [USP'U]/100ML
1000 INJECTION, SOLUTION INTRAVENOUS
Status: DISCONTINUED | OUTPATIENT
Start: 2023-11-10 | End: 2023-11-10 | Stop reason: DRUGHIGH

## 2023-11-10 RX ORDER — HEPARIN SODIUM 1000 [USP'U]/ML
4000 INJECTION, SOLUTION INTRAVENOUS; SUBCUTANEOUS AS NEEDED
Status: DISCONTINUED | OUTPATIENT
Start: 2023-11-10 | End: 2023-11-10 | Stop reason: DRUGHIGH

## 2023-11-10 RX ORDER — SODIUM CHLORIDE 9 MG/ML
40 INJECTION, SOLUTION INTRAVENOUS AS NEEDED
Status: DISCONTINUED | OUTPATIENT
Start: 2023-11-10 | End: 2023-11-22 | Stop reason: HOSPADM

## 2023-11-10 RX ORDER — ESCITALOPRAM OXALATE 20 MG/1
20 TABLET ORAL DAILY
Status: DISCONTINUED | OUTPATIENT
Start: 2023-11-10 | End: 2023-11-22 | Stop reason: HOSPADM

## 2023-11-10 RX ORDER — LANOLIN ALCOHOL/MO/W.PET/CERES
800 CREAM (GRAM) TOPICAL DAILY
Status: DISCONTINUED | OUTPATIENT
Start: 2023-11-10 | End: 2023-11-22 | Stop reason: HOSPADM

## 2023-11-10 RX ORDER — WARFARIN SODIUM 1 MG/1
1 TABLET ORAL
Status: DISCONTINUED | OUTPATIENT
Start: 2023-11-10 | End: 2023-11-10 | Stop reason: ALTCHOICE

## 2023-11-10 RX ORDER — CHOLECALCIFEROL (VITAMIN D3) 125 MCG
5 CAPSULE ORAL NIGHTLY PRN
Status: DISCONTINUED | OUTPATIENT
Start: 2023-11-10 | End: 2023-11-22 | Stop reason: HOSPADM

## 2023-11-10 RX ORDER — NICOTINE POLACRILEX 4 MG
15 LOZENGE BUCCAL
Status: DISCONTINUED | OUTPATIENT
Start: 2023-11-10 | End: 2023-11-22 | Stop reason: HOSPADM

## 2023-11-10 RX ADMIN — Medication 10 ML: at 01:40

## 2023-11-10 RX ADMIN — PHYTONADIONE 5 MG: 10 INJECTION, EMULSION INTRAMUSCULAR; INTRAVENOUS; SUBCUTANEOUS at 04:35

## 2023-11-10 RX ADMIN — Medication 5 MG: at 23:26

## 2023-11-10 RX ADMIN — CARVEDILOL 12.5 MG: 12.5 TABLET, FILM COATED ORAL at 17:44

## 2023-11-10 RX ADMIN — FOLIC ACID TAB 400 MCG 800 MCG: 400 TAB at 08:41

## 2023-11-10 RX ADMIN — Medication 10 ML: at 21:29

## 2023-11-10 RX ADMIN — ACETAMINOPHEN 650 MG: 325 TABLET ORAL at 21:49

## 2023-11-10 RX ADMIN — Medication 10 ML: at 08:43

## 2023-11-10 RX ADMIN — FUROSEMIDE 40 MG: 10 INJECTION, SOLUTION INTRAMUSCULAR; INTRAVENOUS at 17:44

## 2023-11-10 RX ADMIN — CEFTRIAXONE 2000 MG: 2 INJECTION, POWDER, FOR SOLUTION INTRAMUSCULAR; INTRAVENOUS at 17:44

## 2023-11-10 RX ADMIN — HYDROCODONE BITARTRATE AND ACETAMINOPHEN 1 TABLET: 5; 325 TABLET ORAL at 23:26

## 2023-11-10 RX ADMIN — FERROUS SULFATE TAB 325 MG (65 MG ELEMENTAL FE) 325 MG: 325 (65 FE) TAB at 08:41

## 2023-11-10 RX ADMIN — SENNOSIDES AND DOCUSATE SODIUM 2 TABLET: 8.6; 5 TABLET ORAL at 08:42

## 2023-11-10 RX ADMIN — ACETAMINOPHEN 650 MG: 325 TABLET ORAL at 00:40

## 2023-11-10 RX ADMIN — CARVEDILOL 12.5 MG: 12.5 TABLET, FILM COATED ORAL at 08:42

## 2023-11-10 RX ADMIN — METRONIDAZOLE 500 MG: 500 INJECTION, SOLUTION INTRAVENOUS at 05:50

## 2023-11-10 RX ADMIN — FUROSEMIDE 40 MG: 10 INJECTION, SOLUTION INTRAMUSCULAR; INTRAVENOUS at 04:35

## 2023-11-10 RX ADMIN — ESCITALOPRAM OXALATE 20 MG: 20 TABLET ORAL at 08:41

## 2023-11-10 RX ADMIN — INSULIN LISPRO 4 UNITS: 100 INJECTION, SOLUTION INTRAVENOUS; SUBCUTANEOUS at 13:01

## 2023-11-10 RX ADMIN — SODIUM CHLORIDE 1000 MG: 900 INJECTION INTRAVENOUS at 00:38

## 2023-11-10 RX ADMIN — METRONIDAZOLE 500 MG: 500 INJECTION, SOLUTION INTRAVENOUS at 21:29

## 2023-11-10 RX ADMIN — OXYBUTYNIN CHLORIDE 5 MG: 5 TABLET, EXTENDED RELEASE ORAL at 08:41

## 2023-11-10 RX ADMIN — METRONIDAZOLE 500 MG: 500 INJECTION, SOLUTION INTRAVENOUS at 13:02

## 2023-11-10 RX ADMIN — SODIUM CHLORIDE 50 ML/HR: 9 INJECTION, SOLUTION INTRAVENOUS at 02:20

## 2023-11-10 RX ADMIN — INSULIN LISPRO 2 UNITS: 100 INJECTION, SOLUTION INTRAVENOUS; SUBCUTANEOUS at 21:28

## 2023-11-10 RX ADMIN — ACETAMINOPHEN 650 MG: 325 TABLET ORAL at 13:01

## 2023-11-10 RX ADMIN — HEPARIN SODIUM 11 UNITS/KG/HR: 10000 INJECTION, SOLUTION INTRAVENOUS at 21:35

## 2023-11-10 RX ADMIN — PANTOPRAZOLE SODIUM 40 MG: 40 INJECTION, POWDER, LYOPHILIZED, FOR SOLUTION INTRAVENOUS at 05:05

## 2023-11-10 RX ADMIN — CEFEPIME 2000 MG: 2 INJECTION, POWDER, FOR SOLUTION INTRAVENOUS at 03:59

## 2023-11-10 NOTE — H&P
[facility] Medicine Services  HISTORY AND PHYSICAL    Patient Name: Di Lopez  : 1960  MRN: [id number]  Primary Care Physician: Davina Santiago DO  Date of admission: 2023    Subjective   Subjective     Chief Complaint:  Rectal bleeding     HPI:  Di Lopez is a 62 y.o. female with PMH significant for COPD, HTN, HLD, CAD, depression, CHF, VHD s/p mechanical valve replacement on warfarin, DM2, CAL on CPAP, liver mass, obesity, who presents to the ED with complaint of rectal bleeding.  She states she has been having diarrhea over the past couple weeks.  Today, she developed bright red rectal bleeding.  She notes foul odor to her urine and her stool.  She denies abdominal pain but reports abdominal fullness.  She also reports increased lower extremity edema.  She reports being evaluated by her PCP last week.  She was called 2 days ago and advised to hold her Lasix, spironolactone, Entresto, and Trulicity secondary to worsening renal function.  This morning, she began to have bright red rectal bleeding with diarrhea.  She has had 4 episodes.  She reports dyspnea this evening with walking.  She denies chest pain.  She is on warfarin with her last dose last night.  Upon arrival to the ED, FOBT is positive.  Labs are concerning for elevated creatinine, leukocytosis, and worsening anemia.  CT abdomen/pelvis notes mild inflammation of the posterior wall of the short segment of mid sigmoid otherwise no acute findings.  She will be admitted to hospital medicine for further evaluation.    Review of Systems   Constitutional:  Negative for activity change, appetite change, chills, diaphoresis, fatigue, fever and unexpected weight change.   HENT:  Negative for congestion, sinus pressure and sore throat.    Eyes: Negative.  Negative for visual disturbance.   Respiratory:  Positive for shortness of breath. Negative for cough, chest tightness and wheezing.    Cardiovascular:   Positive for leg swelling. Negative for chest pain and palpitations.   Gastrointestinal:  Positive for abdominal pain, anal bleeding, blood in stool and diarrhea. Negative for abdominal distention, nausea, rectal pain and vomiting.   Endocrine: Negative.  Negative for polydipsia and polyphagia.   Genitourinary:  Positive for frequency and urgency. Negative for decreased urine volume, difficulty urinating, dysuria and flank pain.   Musculoskeletal:  Positive for back pain. Negative for arthralgias, gait problem, myalgias and neck pain.   Skin: Negative.  Negative for color change, pallor, rash and wound.   Allergic/Immunologic: Negative.  Negative for immunocompromised state.   Neurological:  Positive for weakness and headaches. Negative for dizziness, tremors, syncope, facial asymmetry, speech difficulty and light-headedness.   Hematological: Negative.  Does not bruise/bleed easily.   Psychiatric/Behavioral: Negative.  Negative for confusion. The patient is not nervous/anxious.        Personal History     Past Medical History:   Diagnosis Date    Allergic     Anemia     Anticoagulated     WARFARIN    Arteriovenous malformation of gastrointestinal tract 05/12/2016    Arthritis     Asthma     Black hairy tongue     CHF (congestive heart failure)     Chronic back pain     Coronary artery disease     Depression 05/12/2016    Diabetes mellitus     Dyslipidemia 05/12/2016    Elevated cholesterol     Emphysema/COPD 05/12/2016    Emphysema/COPD 05/12/2016    Fatigue     GERD (gastroesophageal reflux disease)     Headache     Herniated cervical disc     History of echocardiogram 07/16/2013    Left ventricular systolic function at the lower limits of normal; EF 50-55%; mild MR; mechanical prosthetic mitral valve present; mod TR    History of transfusion     NO REACTION; [facility]    Hyperlipidemia     Hypertension     Insomnia 05/12/2016    Iron deficiency     A.  The patient was treated with IV iron replacement. B.  Secondary to  GI bleed, status post EGD with cauterization of blood vessel to the duodenum, 7/16/2014.    Left bundle branch hemiblock 10/12/2020    Left shoulder strain     Liver disease     Liver mass     Long term current use of anticoagulant 05/12/2016    Low back pain     Lung nodules     PER DR. LEVI'S NOTE 10/22    Morbid obesity 05/12/2016    Neuromuscular disorder 7/28/2016    Obstructive sleep apnea syndrome 05/12/2016    Description: A.  Moderate; CPAP therapy..    Sliding hiatal hernia 07/28/2016    Description: A.  Reported on EGD, 12/30/2011.    Type 2 diabetes mellitus 05/12/2016    Urinary tract infection     Uterine cancer     HYSTERECTOMY    Vitamin B12 deficiency     A.  The patient was treated with vitamin B12 replacement.    Wears eyeglasses        Past Surgical History:   Procedure Laterality Date    BILATERAL OOPHORECTOMY  2009    Status post bilateral oophorectomy secondary to ovarian cysts     BREAST BIOPSY Right     STEREOTACTIC    BREAST EXCISIONAL BIOPSY Bilateral     CARDIAC ELECTROPHYSIOLOGY PROCEDURE N/A 10/15/2020    Procedure: Biventricular Device Insertion;  Surgeon: Nadir Everett MD;  Location:  [facility] CATH INVASIVE LOCATION;  Service: Cardiology;  Laterality: N/A;    CARDIAC SURGERY  2003    CARPAL TUNNEL RELEASE Bilateral     COLONOSCOPY  12/28/2012    COLONOSCOPY N/A 5/23/2023    Procedure: COLONOSCOPY;  Surgeon: Gt Fernandes MD;  Location:  [facility] ENDOSCOPY;  Service: Gastroenterology;  Laterality: N/A;    CYSTECTOMY      removal of ganglion cyst from left wrist    ENDOSCOPY  12/30/2011    DIAGNOSTIC ESOPHAGOGASTRODUODENOSCOPY    HYSTERECTOMY  1995    A.  Status post hysterectomy for early stage endometrial cancer done in 1995.    MITRAL VALVE REPLACEMENT  03/03/2008    Dr. Hima Barreto; MECHANICAL    OTHER SURGICAL HISTORY      LIVER MASS BLOOD SUPPLY CLIPPED ON RIGHT AND LEFT SIDE PER PT       Family History:  family history includes Cancer in her father and paternal  "grandmother; Diabetes in her brother; Hypertension in her brother, father, and mother; Multiple myeloma in her father; Ovarian cancer in her paternal grandmother; Sudden death in her mother.     Social History:  reports that she quit smoking about 13 years ago. Her smoking use included cigarettes and electronic cigarette. She started smoking about 47 years ago. She has a 30.00 pack-year smoking history. She has never used smokeless tobacco. She reports that she does not drink alcohol and does not use drugs.  Social History     Social History Narrative    Not on file       Medications:  Dulaglutide, FreeStyle Dalia 2 [place], FreeStyle Dalia 2 Sensor, Insulin Glargine, Insulin Lispro (1 Unit Dial), Insulin Pen Needle, Lancets, Vitamin D (Cholecalciferol), albuterol sulfate HFA, azelastine, carvedilol, escitalopram, ferrous sulfate, fish oil, folic acid, furosemide, gabapentin, glimepiride, glucose blood, lidocaine, omeprazole, sacubitril-valsartan, simvastatin, solifenacin, spironolactone, valACYclovir, and warfarin    Allergies   Allergen Reactions    Adhesive Tape Other (See Comments)     \"pulls skin off\"    Sildenafil Shortness Of Breath and Other (See Comments)     pt c/o tightness in chest and trouble breathing    Codeine Itching and Nausea And Vomiting    Morphine Other (See Comments)     uncontrolable crying     Penicillins Hives    Statins Other (See Comments)     HX of Liver Issues    Sulfa Antibiotics Nausea And Vomiting       Objective   Objective     Vital Signs:   Temp:  [98 °F (36.7 °C)] 98 °F (36.7 °C)  Heart Rate:  [70-78] 74  Resp:  [18] 18  BP: (128-135)/(45-52) 135/50    Physical Exam   Constitutional: Awake, alert, no acute distress   Eyes: PERRLA, sclerae anicteric, no conjunctival injection  HENT: NCAT, mucous membranes moist  Neck: Supple, no thyromegaly, no lymphadenopathy, trachea midline  Respiratory: Clear to auscultation bilaterally, nonlabored respirations   Cardiovascular: RRR, no " murmurs, rubs, or gallops, palpable pedal pulses bilaterally  Gastrointestinal: Positive bowel sounds, soft, nontender, nondistended  Musculoskeletal: mild bilateral ankle edema R>L, no clubbing or cyanosis to extremities  Psychiatric: Appropriate affect, cooperative  Neurologic: Oriented x 3, strength symmetric in all extremities, Cranial Nerves grossly intact to confrontation, speech clear  Skin: No rashes      Result Review:  I have personally reviewed the results from the time of this admission to 11/10/2023 00:00 EST and agree with these findings:  [x]  Laboratory list / accordion  [x]  Microbiology  [x]  Radiology  []  EKG/Telemetry   []  Cardiology/Vascular   []  Pathology  [x]  Old records  []  Other:  Most notable findings include:     LAB RESULTS:      Lab 11/09/23 1955 11/06/23 1629 11/06/23  1624   WBC 12.77*  --  10.96*   HEMOGLOBIN 7.2*  --  9.3*   HEMATOCRIT 22.9*  --  29.3*   PLATELETS 289  --  320   NEUTROS ABS 9.37*  --  7.42*   IMMATURE GRANS (ABS) 0.18*  --  0.17*   LYMPHS ABS 2.32  --  2.36   MONOS ABS 0.70  --  0.82   EOS ABS 0.16  --  0.16   MCV 89.8  --  86.9   PROTIME 33.9* 40.3*  --          Lab 11/09/23 1955 11/06/23  1624   SODIUM 142 144   POTASSIUM 4.1 3.9   CHLORIDE 107 109*   CO2 24.0 27.0   ANION GAP 11.0 8.0   BUN 21 13   CREATININE 1.88* 1.45*   EGFR 30.1* 41.1*   GLUCOSE 280* 107*   CALCIUM 8.3* 8.8   MAGNESIUM 2.0  --          Lab 11/09/23 1955   TOTAL PROTEIN 6.1   ALBUMIN 3.5   GLOBULIN 2.6   ALT (SGPT) 48*   AST (SGOT) 32   BILIRUBIN 0.3   ALK PHOS 167*   LIPASE 25         Lab 11/09/23 1955 11/06/23  1629   PROBNP 830.8  --    HSTROP T 11  --    PROTIME 33.9* 40.3*   INR 3.31* 4.13*             Lab 11/09/23 2142 11/06/23  1624   IRON  --  38   IRON SATURATION (TSAT)  --  10*   TIBC  --  362   TRANSFERRIN  --  243   FERRITIN  --  134.20   ABO TYPING O  --    RH TYPING Positive  --    ANTIBODY SCREEN Negative  --          Brief Urine Lab Results  (Last result in the past  365 days)        Color   Clarity   Blood   Leuk Est   Nitrite   Protein   CREAT   Urine HCG        11/09/23 2320 Yellow   Clear   Negative   Small (1+)   Positive   100 mg/dL (2+)                 Microbiology Results (last 10 days)       ** No results found for the last 240 hours. **            XR Chest 1 View    Result Date: 11/9/2023  XR CHEST 1 VW Date of Exam: 11/9/2023 11:20 PM EST Indication: dyspnea, chf Comparison: 6/21/2023. Findings: There are no airspace consolidations. No pleural fluid. No pneumothorax. The pulmonary vasculature appears within normal limits. The heart appears enlarged. Median sternotomy wires are present. There is a left subclavian AICD/pacemaker device. Stable chronic interstitial changes are present.. No acute osseous abnormality identified.     Impression: Impression: No acute cardiopulmonary process. Stable cardiomegaly. Electronically Signed: Kylah Torres MD  11/9/2023 11:45 PM EST  Workstation ID: [id number]    CT Abdomen Pelvis Without Contrast    Result Date: 11/9/2023  CT ABDOMEN PELVIS WO CONTRAST Date of Exam: 11/9/2023 9:25 PM EST Indication: Rectal bleeding. Comparison: 9/13/2022 abdomen pelvis CT scan Technique: Axial CT images were obtained of the abdomen and pelvis without the administration of contrast. Reconstructed coronal and sagittal images were also obtained. Automated exposure control and iterative construction methods were used. Findings: Previous 9/13/2022 exam report indicated no acute disease. Included lower lungs appear clear of active disease. A 3 mm right lower lobe noncalcified nodule and similar size left lower lobe nodule are unchanged. There is stable size of the patient's 8 cm nodular left lobe liver lesion, with what appear to be multiple calcifications, consistent with history of adenoma and embolization. Allowing for differences between unenhanced scan today and previous contrast-enhanced study, no significant interval change in appearance of the liver  is seen. Spleen is not enlarged. Pancreas, adrenal glands, and kidneys appear unremarkable. No upper abdominal free air, ascites, adenopathy, or acute inflammatory changes seen. Small bowel loops are normal in caliber. The colon is nondistended, but contains almost entirely fluid. Regarding lower abdomen and pelvis, there is mild to moderate left colon diverticulosis without apparent diverticulitis. There is a very subtle change in appearance of the mid sigmoid, axial image 105 and adjacent images, with indistinct posterior margin  of the short segment here, and what appears to be some stranding of the fat between this loop, the upper margin of the bladder and an adjacent redundant loop of sigmoid. Very low level inflammation might cause this appearance. No similar findings are appreciated elsewhere.  Some stable, focal linear thickening of the left upper peritoneal margin/adnexa is likely stable scarring from previous hysterectomy. No intrapelvic inflammatory change is seen. Bladder is decompressed. Uterus and ovaries are not identified. No evidence  of ureteral calculus or bladder calculus is seen. Bony structures appear to be intact. There is again advanced degenerative disc disease at L5-S1, with very mild posterior subluxation of L5 on S1. No acute bony abnormality seen.     Impression: Impression: 1. Suspected subtle inflammation of the posterior wall of a short segment of mid sigmoid. No obvious features of diverticulitis such as abscess, extraluminal air, etc., but there are small diverticuli in this region. 2. Fluid-filled normal caliber colon consistent with diarrhea. 3. Stable appearance of the patient's left liver mass, by history adenoma with previous embolization. 4. No other evidence of acute intra-abdominal intrapelvic disease elsewhere. Electronically Signed: Víctor Zazueta MD  11/9/2023 9:56 PM EST  Workstation ID: [id number]     Results for orders placed during the hospital encounter of  05/31/23    Adult Transthoracic Echo Complete W/ Cont if Necessary Per Protocol    Interpretation Summary    Mildly reduced right ventricular systolic function noted.    There is calcification of the aortic valve.    Estimated right ventricular systolic pressure from tricuspid regurgitation is normal (<35 mmHg).    Mild global LV systolic dysfunction    EF=45-50%    Normal prosthetic MV function    AV calcification      Assessment & Plan   Assessment & Plan       GIB (gastrointestinal bleeding)    Depression    Type 2 diabetes mellitus without complication, with long-term current use of insulin    Dyslipidemia    Emphysema/COPD    Hypertension    Obstructive sleep apnea syndrome    Chronic diastolic heart failure    Acute blood loss anemia    MEERA (acute kidney injury)    H/O heart valve replacement with mechanical valve    62 y.o. female with PMH significant for COPD, HTN, HLD, CAD, depression, CHF, VHD s/p mechanical valve replacement on warfarin, DM2, CAL on CPAP, liver mass, obesity, who presents to the ED with complaint of rectal bleeding who was found to have concern for GI bleed.    GI bleed  Symptomatic acute blood loss anemia  - Type and screen  - Transfuse 1 unit PRBCs  - GI consult in the a.m., Dr. Fernandes  - Colonoscopy completed May 2023  - Has scheduled EGD outpatient 11/14/2023 (Dr. Fernandes)  - N.p.o. after midnight  - Serial H&H  -Ongoing GI bleed.  Reverse Coumadin, starting heparin  -CT scan with inflammation in the sigmoid colon, cover with Cefepime/flagyl for now    MEERA  - Baseline creatinine ~0.8-0.9, currently 1.88  - Hold nephrotoxic medications  - Transfusing 1 unit PRBCs, tachypneic with concerns for volume overload  - Lasix 40 mg IV x1 tonight  - BMP in the a.m.  - Urine studies    UTI  - urine culture pending   -Cefepime per above  - CBC in the a.m.    Valvular heart disease  - S/p mechanical valve replacement  - On warfarin, last dose evening of 11/9/2023  - Ongoing GI bleed.  - Reverse  Coumadin might give 2 units FFP, vitamin K  - start heparin drip    Leukocytosis  - GI panel  - CXR pending  - Lactic acid, procalcitonin pending  -Blood cultures pending    Diabetes mellitus 2  - Hold Trulicity, glimepiride  - Hold Lantus for now, add back as appropriate  - FSBG ACHS  - SS insulin  - Hemoglobin A1c in the a.m.    CHF  - Hold Entresto, Lasix, spironolactone pending a.m. labs  - Continue carvedilol  - Daily weight    Hypertension  Dyslipidemia  - Currently controlled  - Continue carvedilol  - Continue statin    CAL  - CPAP    Nicotine use  - Vapes  - Nicotine patch  - Cessation counseling    DVT prophylaxis: Heparin gtt      CODE STATUS:    Code Status (Patient has no pulse and is not breathing): CPR (Attempt to Resuscitate)  Medical Interventions (Patient has pulse or is breathing): Full Support      Expected Discharge  Expected discharge date/ time has not been documented.      This note has been completed as part of a split-shared workflow.     Signature: Electronically signed by FRANCESCA Holland, 11/10/23, 12:01 AM EST.        Attending   Admission Attestation       I have performed an independent face-to-face diagnostic evaluation including performing an independent physical examination.  The documented plan of care above was reviewed and developed with the advanced practice clinician (APC).  I have updated the HPI as appropriate.    Brief HPI    This is a 62-year-old female patient with a PMH significant for CAD, depression, systolic CHF, presence of mechanical mitral valve, diabetes mellitus type 2 on insulin, CAL on CPAP, COPD, HTN, HLD who comes to the ED due to rectal bleeding.  Patient reports ongoing diarrhea for the past few weeks.  She says diarrhea stopped 3 days ago, she had 2 days without a bowel movement.  Today she began to experience rectal bleeding.  She had 4 episodes of rectal bleeding throughout the day today.  Denies abdominal pain, nausea vomiting.    Attending Physical  Exam:  Temp:  [97.9 °F (36.6 °C)-98 °F (36.7 °C)] 98 °F (36.7 °C)  Heart Rate:  [68-78] 68  Resp:  [18] 18  BP: (127-145)/(45-56) 144/56  Flow (L/min):  [2-3] 3    Constitutional: Awake, alert  Eyes: PERRLA, sclerae anicteric, no conjunctival injection  HENT: NCAT, mucous membranes moist  Neck: Supple, no thyromegaly, no lymphadenopathy, trachea midline  Respiratory: Clear to auscultation bilaterally, tachypneic  Cardiovascular: RRR, no murmurs, rubs, or gallops, palpable pedal pulses bilaterally  Gastrointestinal: Positive bowel sounds, soft, nontender, nondistended  Musculoskeletal: 2+ pitting bilateral ankle edema, no clubbing or cyanosis to extremities  Psychiatric: Appropriate affect, cooperative  Neurologic: Oriented x 3, strength symmetric in all extremities, Cranial Nerves grossly intact to confrontation, speech clear  Skin: No rashes      Assessment and Plan:    See assessment and plan documented by APC above and updated/edited by me as appropriate.    Sirena Jordan DO  11/10/23

## 2023-11-10 NOTE — CASE MANAGEMENT/SOCIAL WORK
Discharge Planning Assessment  [facility]     Patient Name: Di Lopez  MRN: [id number]  Today's Date: 11/10/2023    Admit Date: 11/9/2023    Plan: home   Discharge Needs Assessment       Row Name 11/10/23 1318       Living Environment    People in Home child(juan), dependent    Current Living Arrangements home    Potentially Unsafe Housing Conditions none    Primary Care Provided by self    Provides Primary Care For no one    Family Caregiver if Needed child(juan), adult    Family Caregiver Names DTR Marcela, [phone]    Quality of Family Relationships helpful;involved;supportive    Able to Return to Prior Arrangements yes    Living Arrangement Comments Plan is home       Resource/Environmental Concerns    Resource/Environmental Concerns none    Transportation Concerns none       Transition Planning    Patient/Family Anticipates Transition to home    Patient/Family Anticipated Services at Transition none    Transportation Anticipated family or friend will provide       Discharge Needs Assessment    Equipment Currently Used at Home oxygen    Concerns to be Addressed no discharge needs identified    Equipment Needed After Discharge oxygen    Discharge Coordination/Progress Plan is home                   Discharge Plan       Row Name 11/10/23 1320       Plan    Plan home    Patient/Family in Agreement with Plan yes    Plan Comments I spoke with patient at the . Patient very sleepy but she lives in [facility], one story home with 13 and 15 y/o children. Patient has home 02 with [employer] at [address] that she wears when needed. Patient has portable 02 tanks at home if needed for transportation. No other DME and no HH. Plan is home and she has transportation at discharge with family.    Final Discharge Disposition Code 01 - home or self-care                  Continued Care and Services - Admitted Since 11/9/2023    Coordination has not been started for this encounter.       Selected Continued Care - Episodes  Includes continued care and service providers with selected services from the active episodes listed below      [facility] Episode start date: 9/29/2023   There are no active outsourced providers for this episode.                 Expected Discharge Date and Time       Expected Discharge Date Expected Discharge Time    Nov 13, 2023            Demographic Summary       Row Name 11/10/23 1317       General Information    Referral Source admission list    Reason for Consult discharge planning    Preferred Language English       Contact Information    Permission Granted to Share Info With     Contact Information Obtained for     Contact Information Comments PCP: Davina Santiago, confirmed by patient                   Functional Status       Row Name 11/10/23 1317       Functional Status    Usual Activity Tolerance moderate    Current Activity Tolerance fair       Functional Status, IADL    Medications independent    Meal Preparation assistive person    Housekeeping assistive person    Laundry independent    Shopping assistive person    IADL Comments Patient has coverage for medications       Mental Status    General Appearance WDL WDL       Mental Status Summary    Recent Changes in Mental Status/Cognitive Functioning ability to speak clearly;ability to understand       Employment/    Employment Status disabled                   Psychosocial    No documentation.                  Abuse/Neglect    No documentation.                  Legal    No documentation.                  Substance Abuse    No documentation.                  Patient Forms    No documentation.                     Melodie Conteh RN

## 2023-11-10 NOTE — ED PROVIDER NOTES
Subjective  History of Present Illness:    Chief Complaint: Rectal bleeding  History of Present Illness: 62-year-old female with rectal bleeding, significant past medical history for congestive heart failure, COPD, diabetes, hypertension, hyperlipidemia, and she is on Coumadin for valve replacement.  She states she started having bleeding this morning, it is bright red blood, she had 4 episodes, she also states she has abdominal fullness.  Onset: Sudden onset  Duration: Bleeding started this morning  Exacerbating / Alleviating factors: Worse with bowel movements  Associated symptoms: Abdominal fullness and pain      Nurses Notes reviewed and agree, including vitals, allergies, social history and prior medical history.     REVIEW OF SYSTEMS: All systems reviewed and not pertinent unless noted.    Review of Systems   Gastrointestinal:  Positive for abdominal pain and blood in stool.   All other systems reviewed and are negative.      Past Medical History:   Diagnosis Date    Allergic     Anemia     Anticoagulated     WARFARIN    Arteriovenous malformation of gastrointestinal tract 05/12/2016    Arthritis     Asthma     Black hairy tongue     CHF (congestive heart failure)     Chronic back pain     Coronary artery disease     Depression 05/12/2016    Diabetes mellitus     Dyslipidemia 05/12/2016    Elevated cholesterol     Emphysema/COPD 05/12/2016    Emphysema/COPD 05/12/2016    Fatigue     GERD (gastroesophageal reflux disease)     Headache     Herniated cervical disc     History of echocardiogram 07/16/2013    Left ventricular systolic function at the lower limits of normal; EF 50-55%; mild MR; mechanical prosthetic mitral valve present; mod TR    History of transfusion     NO REACTION; [facility]    Hyperlipidemia     Hypertension     Insomnia 05/12/2016    Iron deficiency     A.  The patient was treated with IV iron replacement. B.  Secondary to GI bleed, status post EGD with cauterization of blood vessel to the  duodenum, 7/16/2014.    Left bundle branch hemiblock 10/12/2020    Left shoulder strain     Liver disease     Liver mass     Long term current use of anticoagulant 05/12/2016    Low back pain     Lung nodules     PER DR. LEVI'S NOTE 10/22    Morbid obesity 05/12/2016    Neuromuscular disorder 7/28/2016    Obstructive sleep apnea syndrome 05/12/2016    Description: A.  Moderate; CPAP therapy..    Sliding hiatal hernia 07/28/2016    Description: A.  Reported on EGD, 12/30/2011.    Type 2 diabetes mellitus 05/12/2016    Urinary tract infection     Uterine cancer     HYSTERECTOMY    Vitamin B12 deficiency     A.  The patient was treated with vitamin B12 replacement.    Wears eyeglasses        Allergies:    Adhesive tape, Sildenafil, Codeine, Morphine, Penicillins, Statins, and Sulfa antibiotics      Past Surgical History:   Procedure Laterality Date    BILATERAL OOPHORECTOMY  2009    Status post bilateral oophorectomy secondary to ovarian cysts     BREAST BIOPSY Right     STEREOTACTIC    BREAST EXCISIONAL BIOPSY Bilateral     CARDIAC ELECTROPHYSIOLOGY PROCEDURE N/A 10/15/2020    Procedure: Biventricular Device Insertion;  Surgeon: Nadir Everett MD;  Location: [facility] CATH INVASIVE LOCATION;  Service: Cardiology;  Laterality: N/A;    CARDIAC SURGERY  2003    CARPAL TUNNEL RELEASE Bilateral     COLONOSCOPY  12/28/2012    COLONOSCOPY N/A 5/23/2023    Procedure: COLONOSCOPY;  Surgeon: Gt Fernandes MD;  Location: [facility] ENDOSCOPY;  Service: Gastroenterology;  Laterality: N/A;    CYSTECTOMY      removal of ganglion cyst from left wrist    ENDOSCOPY  12/30/2011    DIAGNOSTIC ESOPHAGOGASTRODUODENOSCOPY    HYSTERECTOMY  1995    A.  Status post hysterectomy for early stage endometrial cancer done in 1995.    MITRAL VALVE REPLACEMENT  03/03/2008    Dr. Hima Barreto; MECHANICAL    OTHER SURGICAL HISTORY      LIVER MASS BLOOD SUPPLY CLIPPED ON RIGHT AND LEFT SIDE PER PT         Social History     Socioeconomic  "History    Marital status: Significant Other   Tobacco Use    Smoking status: Former     Packs/day: 1.00     Years: 30.00     Additional pack years: 0.00     Total pack years: 30.00     Types: Cigarettes, Electronic Cigarette     Start date: 1976     Quit date: 2/10/2010     Years since quittin.7    Smokeless tobacco: Never    Tobacco comments:     Quit in    Vaping Use    Vaping Use: Every day    Substances: Nicotine    Devices: Disposable, one every 2 days   Substance and Sexual Activity    Alcohol use: No    Drug use: Never    Sexual activity: Defer     Partners: Male     Birth control/protection: Abstinence, None, Hysterectomy     Comment: 1 partner         Family History   Problem Relation Age of Onset    Sudden death Mother         POSSIBLE SERIOUS MASSIVE STROKE    Hypertension Mother             Multiple myeloma Father     Cancer Father             Hypertension Father             Ovarian cancer Paternal Grandmother     Cancer Paternal Grandmother             Diabetes Brother             Hypertension Brother             Breast cancer Neg Hx        Objective  Physical Exam:  /48   Pulse 76   Temp 97.9 °F (36.6 °C) (Axillary)   Resp 18   Ht 154.9 cm (61\")   Wt 87.1 kg (192 lb)   LMP 1998   SpO2 93%   BMI 36.28 kg/m²      Physical Exam  Vitals and nursing note reviewed.   Constitutional:       Appearance: She is well-developed.   Cardiovascular:      Rate and Rhythm: Normal rate and regular rhythm.   Pulmonary:      Effort: Pulmonary effort is normal.      Breath sounds: Normal breath sounds.   Abdominal:      Palpations: Abdomen is soft.      Tenderness: There is generalized abdominal tenderness.   Musculoskeletal:         General: Normal range of motion.      Cervical back: Normal range of motion and neck supple.   Skin:     General: Skin is warm and dry.   Neurological:      Mental Status: She is alert and oriented to person, " place, and time.      Deep Tendon Reflexes: Reflexes are normal and symmetric.           Critical Care    Performed by: Huy Machado Jr., PA-C  Authorized by: Sirena Jordan DO    Critical care provider statement:     Critical care time (minutes):  30    Critical care time was exclusive of:  Separately billable procedures and treating other patients and teaching time    Critical care was necessary to treat or prevent imminent or life-threatening deterioration of the following conditions: GI bleed, with gastroenterology consult expecting EGD within the next 12 hours.  Blood products given.    Critical care was time spent personally by me on the following activities:  Blood draw for specimens, development of treatment plan with patient or surrogate, discussions with consultants, discussions with primary provider, evaluation of patient's response to treatment, examination of patient, gastric intubation, obtaining history from patient or surrogate, ordering and performing treatments and interventions, ordering and review of laboratory studies, ordering and review of radiographic studies, pulse oximetry, re-evaluation of patient's condition and review of old charts      ED Course:    ED Course as of 11/10/23 0148   Thu Nov 09, 2023 2122 Hemoglobin(!): 7.2  Acute on chronic anemia when compared to prior. [RS]   2122 Creatinine(!): 1.88  Mild exacerbation of chronic kidney disease. [RS]   2128 Discussed the case with Dr. Fernandes, he would see the patient in consult, admit to the hospitalist team [CS]   2144 Patient does not want rectal performed [CS]      ED Course User Index  [CS] Huy Machado Jr., PA-C  [RS] Humberto Hemphill MD       Lab Results (last 24 hours)       Procedure Component Value Units Date/Time    CBC Auto Differential [[id number]]  (Abnormal) Collected: 11/09/23 1955    Specimen: Blood Updated: 11/09/23 2006     WBC 12.77 10*3/mm3      RBC 2.55 10*6/mm3      Hemoglobin 7.2 g/dL       Hematocrit 22.9 %      MCV 89.8 fL      MCH 28.2 pg      MCHC 31.4 g/dL      RDW 14.5 %      RDW-SD 47.3 fl      MPV 10.0 fL      Platelets 289 10*3/mm3      Neutrophil % 73.3 %      Lymphocyte % 18.2 %      Monocyte % 5.5 %      Eosinophil % 1.3 %      Basophil % 0.3 %      Immature Grans % 1.4 %      Neutrophils, Absolute 9.37 10*3/mm3      Lymphocytes, Absolute 2.32 10*3/mm3      Monocytes, Absolute 0.70 10*3/mm3      Eosinophils, Absolute 0.16 10*3/mm3      Basophils, Absolute 0.04 10*3/mm3      Immature Grans, Absolute 0.18 10*3/mm3      nRBC 0.0 /100 WBC     Comprehensive Metabolic Panel [[id number]]  (Abnormal) Collected: 11/09/23 1955    Specimen: Blood Updated: 11/09/23 2028     Glucose 280 mg/dL      BUN 21 mg/dL      Creatinine 1.88 mg/dL      Sodium 142 mmol/L      Potassium 4.1 mmol/L      Comment: Slight hemolysis detected by analyzer. Result may be falsely elevated.        Chloride 107 mmol/L      CO2 24.0 mmol/L      Calcium 8.3 mg/dL      Total Protein 6.1 g/dL      Albumin 3.5 g/dL      ALT (SGPT) 48 U/L      AST (SGOT) 32 U/L      Alkaline Phosphatase 167 U/L      Total Bilirubin 0.3 mg/dL      Globulin 2.6 gm/dL      Comment: Calculated Result        A/G Ratio 1.3 g/dL      BUN/Creatinine Ratio 11.2     Anion Gap 11.0 mmol/L      eGFR 30.1 mL/min/1.73     Narrative:      GFR Normal >60  Chronic Kidney Disease <60  Kidney Failure <15      Magnesium [[id number]]  (Normal) Collected: 11/09/23 1955    Specimen: Blood Updated: 11/09/23 2028     Magnesium 2.0 mg/dL     Protime-INR [[id number]]  (Abnormal) Collected: 11/09/23 1955    Specimen: Blood Updated: 11/09/23 2023     Protime 33.9 Seconds      INR 3.31    Lipase [[id number]]  (Normal) Collected: 11/09/23 1955    Specimen: Blood Updated: 11/09/23 2028     Lipase 25 U/L     proBNP [[id number]]  (Normal) Collected: 11/09/23 1955    Specimen: Blood Updated: 11/09/23 2324     proBNP 830.8 pg/mL     Narrative:      This assay is used as an aid in the  diagnosis of individuals suspected of having heart failure. It can be used as an aid in the diagnosis of acute decompensated heart failure (ADHF) in patients presenting with signs and symptoms of ADHF to the emergency department (ED). In addition, NT-proBNP of <300 pg/mL indicates ADHF is not likely.    Age Range Result Interpretation  NT-proBNP Concentration (pg/mL:      <50             Positive            >450                   Gray                 300-450                    Negative             <300    50-75           Positive            >900                  Gray                300-900                  Negative            <300      >75             Positive            >1800                  Gray                300-1800                  Negative            <300    High Sensitivity Troponin T [[id number]]  (Normal) Collected: 11/09/23 1955    Specimen: Blood Updated: 11/09/23 2323     HS Troponin T 11 ng/L     Narrative:      High Sensitive Troponin T Reference Range:  <14.0 ng/L- Negative Female for AMI  <22.0 ng/L- Negative Male for AMI  >=14 - Abnormal Female indicating possible myocardial injury.  >=22 - Abnormal Male indicating possible myocardial injury.   Clinicians would have to utilize clinical acumen, EKG, Troponin, and serial changes to determine if it is an Acute Myocardial Infarction or myocardial injury due to an underlying chronic condition.         POCT Occult Blood, Stool [[id number]]  (Abnormal) Resulted: 11/09/23 2149    Specimen: Stool from Per Rectum Updated: 11/09/23 2151     Fecal Occult Blood Positive     Lot Number [id number]     Expiration Date 2/2,026     DEVELOPER LOT NUMBER [id number]     DEVELOPER EXPIRATION DATE 2/2,027     Positive Control Positive     Negative Control Negative    Urinalysis With Culture If Indicated - Urine, Clean Catch [[id number]]  (Abnormal) Collected: 11/09/23 2320    Specimen: Urine, Clean Catch Updated: 11/09/23 2333     Color, UA Yellow     Appearance, UA Clear      pH, UA 5.5     Specific Gravity, UA 1.019     Glucose,  mg/dL (Trace)     Ketones, UA Negative     Bilirubin, UA Negative     Blood, UA Negative     Protein,  mg/dL (2+)     Leuk Esterase, UA Small (1+)     Nitrite, UA Positive     Urobilinogen, UA 0.2 E.U./dL    Narrative:      In absence of clinical symptoms, the presence of pyuria, bacteria, and/or nitrites on the urinalysis result does not correlate with infection.    Urinalysis, Microscopic Only - Urine, Clean Catch [[id number]]  (Abnormal) Collected: 11/09/23 2320    Specimen: Urine, Clean Catch Updated: 11/09/23 2333     RBC, UA 0-2 /HPF      WBC, UA 21-50 /HPF      Bacteria, UA 4+ /HPF      Squamous Epithelial Cells, UA 0-2 /HPF      Hyaline Casts, UA 7-12 /LPF      Methodology Automated Microscopy    Urine Culture - Urine, Urine, Clean Catch [[id number]] Collected: 11/09/23 2320    Specimen: Urine, Clean Catch Updated: 11/09/23 2333    High Sensitivity Troponin T 2Hr [[id number]] Collected: 11/09/23 2336    Specimen: Blood Updated: 11/10/23 0105     HS Troponin T 10 ng/L      Troponin T Delta --    Narrative:      High Sensitive Troponin T Reference Range:  <14.0 ng/L- Negative Female for AMI  <22.0 ng/L- Negative Male for AMI  >=14 - Abnormal Female indicating possible myocardial injury.  >=22 - Abnormal Male indicating possible myocardial injury.   Clinicians would have to utilize clinical acumen, EKG, Troponin, and serial changes to determine if it is an Acute Myocardial Infarction or myocardial injury due to an underlying chronic condition.         Procalcitonin [[id number]]  (Normal) Collected: 11/09/23 2336    Specimen: Blood Updated: 11/10/23 0008     Procalcitonin 0.07 ng/mL     Narrative:      As a Marker for Sepsis (Non-Neonates):    1. <0.5 ng/mL represents a low risk of severe sepsis and/or septic shock.  2. >2 ng/mL represents a high risk of severe sepsis and/or septic shock.    As a Marker for Lower Respiratory Tract Infections that  "require antibiotic therapy:    PCT on Admission    Antibiotic Therapy       6-12 Hrs later    >0.5                Strongly Recommended  >0.25 - <0.5        Recommended   0.1 - 0.25          Discouraged              Remeasure/reassess PCT  <0.1                Strongly Discouraged     Remeasure/reassess PCT    As 28 day mortality risk marker: \"Change in Procalcitonin Result\" (>80% or <=80%) if Day 0 (or Day 1) and Day 4 values are available. Refer to http://www.[facility]-pct-calculator.com    Change in PCT <=80%  A decrease of PCT levels below or equal to 80% defines a positive change in PCT test result representing a higher risk for 28-day all-cause mortality of patients diagnosed with severe sepsis for septic shock.    Change in PCT >80%  A decrease of PCT levels of more than 80% defines a negative change in PCT result representing a lower risk for 28-day all-cause mortality of patients diagnosed with severe sepsis or septic shock.       Lactic Acid, Plasma [[id number]]  (Normal) Collected: 11/10/23 0027    Specimen: Blood Updated: 11/10/23 0107     Lactate 1.5 mmol/L      Comment: Falsely depressed results may occur on samples drawn from patients receiving N-Acetylcysteine (NAC) or Metamizole.       Blood Culture - Blood, Arm, Right [[id number]] Collected: 11/10/23 0027    Specimen: Blood from Arm, Right Updated: 11/10/23 0051    Blood Culture - Blood, Arm, Right [[id number]] Collected: 11/10/23 0027    Specimen: Blood from Arm, Right Updated: 11/10/23 0050             XR Chest 1 View    Result Date: 11/9/2023  XR CHEST 1 VW Date of Exam: 11/9/2023 11:20 PM EST Indication: dyspnea, chf Comparison: 6/21/2023. Findings: There are no airspace consolidations. No pleural fluid. No pneumothorax. The pulmonary vasculature appears within normal limits. The heart appears enlarged. Median sternotomy wires are present. There is a left subclavian AICD/pacemaker device. Stable chronic interstitial changes are present.. No acute " osseous abnormality identified.     Impression: Impression: No acute cardiopulmonary process. Stable cardiomegaly. Electronically Signed: Kylah Torres MD  11/9/2023 11:45 PM EST  Workstation ID: [id number]    CT Abdomen Pelvis Without Contrast    Result Date: 11/9/2023  CT ABDOMEN PELVIS WO CONTRAST Date of Exam: 11/9/2023 9:25 PM EST Indication: Rectal bleeding. Comparison: 9/13/2022 abdomen pelvis CT scan Technique: Axial CT images were obtained of the abdomen and pelvis without the administration of contrast. Reconstructed coronal and sagittal images were also obtained. Automated exposure control and iterative construction methods were used. Findings: Previous 9/13/2022 exam report indicated no acute disease. Included lower lungs appear clear of active disease. A 3 mm right lower lobe noncalcified nodule and similar size left lower lobe nodule are unchanged. There is stable size of the patient's 8 cm nodular left lobe liver lesion, with what appear to be multiple calcifications, consistent with history of adenoma and embolization. Allowing for differences between unenhanced scan today and previous contrast-enhanced study, no significant interval change in appearance of the liver is seen. Spleen is not enlarged. Pancreas, adrenal glands, and kidneys appear unremarkable. No upper abdominal free air, ascites, adenopathy, or acute inflammatory changes seen. Small bowel loops are normal in caliber. The colon is nondistended, but contains almost entirely fluid. Regarding lower abdomen and pelvis, there is mild to moderate left colon diverticulosis without apparent diverticulitis. There is a very subtle change in appearance of the mid sigmoid, axial image 105 and adjacent images, with indistinct posterior margin  of the short segment here, and what appears to be some stranding of the fat between this loop, the upper margin of the bladder and an adjacent redundant loop of sigmoid. Very low level inflammation might cause  this appearance. No similar findings are appreciated elsewhere.  Some stable, focal linear thickening of the left upper peritoneal margin/adnexa is likely stable scarring from previous hysterectomy. No intrapelvic inflammatory change is seen. Bladder is decompressed. Uterus and ovaries are not identified. No evidence  of ureteral calculus or bladder calculus is seen. Bony structures appear to be intact. There is again advanced degenerative disc disease at L5-S1, with very mild posterior subluxation of L5 on S1. No acute bony abnormality seen.     Impression: Impression: 1. Suspected subtle inflammation of the posterior wall of a short segment of mid sigmoid. No obvious features of diverticulitis such as abscess, extraluminal air, etc., but there are small diverticuli in this region. 2. Fluid-filled normal caliber colon consistent with diarrhea. 3. Stable appearance of the patient's left liver mass, by history adenoma with previous embolization. 4. No other evidence of acute intra-abdominal intrapelvic disease elsewhere. Electronically Signed: Víctor Zazueta MD  11/9/2023 9:56 PM EST  Workstation ID: [id number]        Medical Decision Making  Patient Presentation 62-year-old female presented with a GI bleed, bright red blood per rectum, vital signs stable no acute distress, abdomen nonacute    DDX GI bleed, hemorrhoids, fissure, colitis, anemia, acute kidney insufficiency    Data Review/ Non ED Records /Analysis/Ordering unique tests. Review of previous visits, prior labs, prior imaging, available notes from prior evaluations or visits with specialists, medication list, allergies, past medical history, past surgical history a CBC, CMP, lipase, PT/INR, were performed        Independent Review Studies and interpreted all labs including the CBC, CMP, lipase, PT/INR    Intervention/Re-evaluation intervention included packed red blood cells for anemia on reevaluation patient stable    Independent Clinician discussed with my  supervising physician Dr. Hemphill, admitted to the hospitalist Dr. Jordan, discussed with the on-call gastroenterologist Dr. Fernandes    Risk Stratification tools/clinical decision rules patient presented with a GI bleed, significant blood loss compared to her previous labs 3 days earlier, significant risk for poor organ perfusion, endorgan damage, cardiac strain, cardiac event, disability, or death.  She was given packed red blood cells, she remained stable, and she was admitted after consultation with GI with expectant EGD, and possibly colonoscopy within the next 12 hours.    Shared Decision Making discussed the plan for admission with patient and family and they agree    Disposition patient stable for admission    Problems Addressed:  Gastrointestinal hemorrhage, unspecified gastrointestinal hemorrhage type: complicated acute illness or injury    Amount and/or Complexity of Data Reviewed  Labs: ordered. Decision-making details documented in ED Course.  Radiology: ordered.    Risk  Decision regarding hospitalization.          Final diagnoses:   Gastrointestinal hemorrhage, unspecified gastrointestinal hemorrhage type          Huy Machado Jr., PA-C  11/10/23 0148

## 2023-11-10 NOTE — PROGRESS NOTES
[facility] Medicine Services  PROGRESS NOTE    Patient Name: Di Lopez  : 1960  MRN: [id number]    Date of Admission: 2023  Primary Care Physician: Davina Santiago DO    Subjective   Subjective     CC:  passed blood per rectum    HPI:\   dyspneic after minimal movement off commode - breathing worse than baseline and feels swollen.  No diarrhea x past week.  No rectal bleeding today.      She confirms a recent trial of PCN caused pruritus.  However she has no pruritus w cefepime given in ED.     Objective   Objective     Vital Signs:   Temp:  [97.9 °F (36.6 °C)-98.3 °F (36.8 °C)] 98.2 °F (36.8 °C)  Heart Rate:  [68-78] 72  Resp:  [18] 18  BP: (127-152)/(45-81) 138/60  Flow (L/min):  [2-3] 3     Physical Exam:  Gen:  WD/WN woman, labored resps after gettign from commode to bed.    Neuro: alert and oriented, clear speech, follows commands, grossly nonfocal  HEENT:  NC/AT   Neck:  Supple, no LAD  Heart RRR no murmur  Lungs no crackles wheeze or rhonchi but labored resps after minimal activity.    Abd:  Soft, nontender, no rebound or guarding, pos BS  Extrem:  No c/c   Trace edema ankles       Results Reviewed:  LAB RESULTS:      Lab 11/10/23  0742 11/10/23  0725 11/10/23  0437 11/10/23  0154 11/10/23  0027 23  2336 23  1955 23  1629 23  1624   WBC  --   --   --  12.70*  --   --  12.77*  --  10.96*   HEMOGLOBIN 8.3* 8.5* 7.7* 7.1*  --   --  7.2*  --  9.3*   HEMATOCRIT 25.6* 26.5* 24.2* 21.9*  --   --  22.9*  --  29.3*   PLATELETS  --   --   --  310  --   --  289  --  320   NEUTROS ABS  --   --   --  9.12*  --   --  9.37*  --  7.42*   IMMATURE GRANS (ABS)  --   --   --  0.14*  --   --  0.18*  --  0.17*   LYMPHS ABS  --   --   --  2.72  --   --  2.32  --  2.36   MONOS ABS  --   --   --  0.59  --   --  0.70  --  0.82   EOS ABS  --   --   --  0.10  --   --  0.16  --  0.16   MCV  --   --   --  88.0  --   --  89.8  --  86.9   PROCALCITONIN  --   --   --    --   --  0.07  --   --   --    LACTATE  --   --   --   --  1.5  --   --   --   --    PROTIME  --   --  30.1* 30.4*  --   --  33.9* 40.3*  --    APTT  --   --   --  78.8  --   --   --   --   --    HEPARIN ANTI-XA  --   --   --  0.10*  --   --   --   --   --          Lab 11/10/23  0154 11/09/23  1955 11/06/23  1624   SODIUM 144 142 144   POTASSIUM 3.7 4.1 3.9   CHLORIDE 109* 107 109*   CO2 25.0 24.0 27.0   ANION GAP 10.0 11.0 8.0   BUN 22 21 13   CREATININE 1.79* 1.88* 1.45*   EGFR 31.9* 30.1* 41.1*   GLUCOSE 197* 280* 107*   CALCIUM 8.2* 8.3* 8.8   MAGNESIUM 2.0 2.0  --          Lab 11/09/23 1955   TOTAL PROTEIN 6.1   ALBUMIN 3.5   GLOBULIN 2.6   ALT (SGPT) 48*   AST (SGOT) 32   BILIRUBIN 0.3   ALK PHOS 167*   LIPASE 25         Lab 11/10/23  0437 11/10/23  0154 11/09/23  2336 11/09/23 1955 11/06/23  1629   PROBNP  --   --   --  830.8  --    HSTROP T  --   --  10 11  --    PROTIME 30.1* 30.4*  --  33.9* 40.3*   INR 2.84* 2.88*  --  3.31* 4.13*             Lab 11/09/23 2142 11/06/23  1624   IRON  --  38   IRON SATURATION (TSAT)  --  10*   TIBC  --  362   TRANSFERRIN  --  243   FERRITIN  --  134.20   ABO TYPING O  --    RH TYPING Positive  --    ANTIBODY SCREEN Negative  --          Brief Urine Lab Results  (Last result in the past 365 days)        Color   Clarity   Blood   Leuk Est   Nitrite   Protein   CREAT   Urine HCG        11/09/23 2320 Yellow   Clear   Negative   Small (1+)   Positive   100 mg/dL (2+)                   Microbiology Results Abnormal       None            XR Chest 1 View    Result Date: 11/9/2023  XR CHEST 1 VW Date of Exam: 11/9/2023 11:20 PM EST Indication: dyspnea, chf Comparison: 6/21/2023. Findings: There are no airspace consolidations. No pleural fluid. No pneumothorax. The pulmonary vasculature appears within normal limits. The heart appears enlarged. Median sternotomy wires are present. There is a left subclavian AICD/pacemaker device. Stable chronic interstitial changes are present.. No  acute osseous abnormality identified.     Impression: Impression: No acute cardiopulmonary process. Stable cardiomegaly. Electronically Signed: Kylah Torres MD  11/9/2023 11:45 PM EST  Workstation ID: [id number]    CT Abdomen Pelvis Without Contrast    Result Date: 11/9/2023  CT ABDOMEN PELVIS WO CONTRAST Date of Exam: 11/9/2023 9:25 PM EST Indication: Rectal bleeding. Comparison: 9/13/2022 abdomen pelvis CT scan Technique: Axial CT images were obtained of the abdomen and pelvis without the administration of contrast. Reconstructed coronal and sagittal images were also obtained. Automated exposure control and iterative construction methods were used. Findings: Previous 9/13/2022 exam report indicated no acute disease. Included lower lungs appear clear of active disease. A 3 mm right lower lobe noncalcified nodule and similar size left lower lobe nodule are unchanged. There is stable size of the patient's 8 cm nodular left lobe liver lesion, with what appear to be multiple calcifications, consistent with history of adenoma and embolization. Allowing for differences between unenhanced scan today and previous contrast-enhanced study, no significant interval change in appearance of the liver is seen. Spleen is not enlarged. Pancreas, adrenal glands, and kidneys appear unremarkable. No upper abdominal free air, ascites, adenopathy, or acute inflammatory changes seen. Small bowel loops are normal in caliber. The colon is nondistended, but contains almost entirely fluid. Regarding lower abdomen and pelvis, there is mild to moderate left colon diverticulosis without apparent diverticulitis. There is a very subtle change in appearance of the mid sigmoid, axial image 105 and adjacent images, with indistinct posterior margin  of the short segment here, and what appears to be some stranding of the fat between this loop, the upper margin of the bladder and an adjacent redundant loop of sigmoid. Very low level inflammation might  cause this appearance. No similar findings are appreciated elsewhere.  Some stable, focal linear thickening of the left upper peritoneal margin/adnexa is likely stable scarring from previous hysterectomy. No intrapelvic inflammatory change is seen. Bladder is decompressed. Uterus and ovaries are not identified. No evidence  of ureteral calculus or bladder calculus is seen. Bony structures appear to be intact. There is again advanced degenerative disc disease at L5-S1, with very mild posterior subluxation of L5 on S1. No acute bony abnormality seen.     Impression: Impression: 1. Suspected subtle inflammation of the posterior wall of a short segment of mid sigmoid. No obvious features of diverticulitis such as abscess, extraluminal air, etc., but there are small diverticuli in this region. 2. Fluid-filled normal caliber colon consistent with diarrhea. 3. Stable appearance of the patient's left liver mass, by history adenoma with previous embolization. 4. No other evidence of acute intra-abdominal intrapelvic disease elsewhere. Electronically Signed: Víctor Zazueta MD  11/9/2023 9:56 PM EST  Workstation ID: [id number]     Results for orders placed during the hospital encounter of 05/31/23    Adult Transthoracic Echo Complete W/ Cont if Necessary Per Protocol    Interpretation Summary    Mildly reduced right ventricular systolic function noted.    There is calcification of the aortic valve.    Estimated right ventricular systolic pressure from tricuspid regurgitation is normal (<35 mmHg).    Mild global LV systolic dysfunction    EF=45-50%    Normal prosthetic MV function    AV calcification      Current medications:  Scheduled Meds:carvedilol, 12.5 mg, Oral, BID With Meals  cefepime, 2,000 mg, Intravenous, Q12H  escitalopram, 20 mg, Oral, Daily  ferrous sulfate, 325 mg, Oral, Daily With Breakfast  folic acid, 800 mcg, Oral, Daily  insulin lispro, 2-7 Units, Subcutaneous, 4x Daily AC & at Bedtime  metroNIDAZOLE, 500 mg,  Intravenous, Q8H  nicotine, 1 patch, Transdermal, Q24H  oxybutynin XL, 5 mg, Oral, Daily  pantoprazole, 40 mg, Intravenous, Q AM  senna-docusate sodium, 2 tablet, Oral, BID  sodium chloride, 10 mL, Intravenous, Q12H      Continuous Infusions:Pharmacy Consult,   Pharmacy to Dose Heparin,       PRN Meds:.  acetaminophen    senna-docusate sodium **AND** polyethylene glycol **AND** bisacodyl **AND** bisacodyl    dextrose    dextrose    glucagon (human recombinant)    nicotine polacrilex    Pharmacy to Dose Heparin    sodium chloride    sodium chloride    Assessment & Plan   Assessment & Plan     Active Hospital Problems    Diagnosis  POA    **GIB (gastrointestinal bleeding) [K92.2]  Yes    Acute blood loss anemia [D62]  Yes    MEERA (acute kidney injury) [N17.9]  Yes    H/O heart valve replacement with mechanical valve [Z95.2]  Not Applicable    Chronic diastolic heart failure [I50.32]  Yes    Emphysema/COPD [J43.9]  Yes    Hypertension [I10]  Yes    Type 2 diabetes mellitus without complication, with long-term current use of insulin [E11.9, Z79.4]  Not Applicable    Depression [F32.A]  Yes    Dyslipidemia [E78.5]  Yes    Obstructive sleep apnea syndrome [G47.33]  Yes      Resolved Hospital Problems   No resolved problems to display.        Brief Hospital Course to date:  Di Lopez is a 62 y.o. female  with PMH significant for COPD, HTN, HLD, CAD, depression, CHF, VHD s/p mechanical valve replacement on warfarin, DM2, CAL on CPAP, liver mass, obesity.  She has had diarrhea for 2 weeks, then developed BRBPR on 11/9. She was asked to hold her diuretics, Entresto and Trulicity by PCP last last week due to rising creatinine.        GI bleed - BRBPR   Symptomatic acute blood loss anemia  Warfarin use; INR 3-4 recently  - Hgb was 11 in May, was 7.1 on admission.   - Transfused 1 unit PRBCs  - GI consult ,  Sees Dr. Fernandes in clinic   - Colonoscopy completed May 2023;  Has scheduled EGD outpatient 11/14/2023 (  [facility])  - No rectal bleeding or BMs since admission.   Reversed Coumadin on admission  (Vit K 5mg IV given), plan on starting heparin when INR below 2.5  -CT scan with inflammation in the sigmoid colon; pt got cefepime in ED; change to CTX/flagyl for now.  She confirms recent episode of pruritus with PCN but tolerated hte cephalo this morning without reaction.       MEERA  - Baseline creatinine ~0.8-0.9,  - was 1.9 on admission, now 1.8; pt feels swollen   - Hold nephrotoxic medications  - got lasix x 1 dose with pRBC transfusion     HFpEF, acute on chronic   - EF 45-50.  Give second dose Lasix now.  Recheck Cr in AM prior to further diuresis     UTI  - urine culture pending   -Cefepime was started.  Past UTI was E coli, a nd Klebs, not ESBL .  Change to ctx.       mechanical mitral valve   - On warfarin, last dose evening of 11/9/2023  - Ongoing GI bleed.- if no further bleeding plan to restart warfarin soon   - Reversing Coumadin :vitamin K  - start heparin drip when inr < 2.5         Diabetes mellitus 2 - A1C 6.8   - Hold Trulicity, glimepiride  - Hold Lantus for now, add back as appropriate  - FSBG ACHS  - add bolus insulin      CHF  - Hold Entresto, Lasix, spironolactone pending a.m. labs  - Continue carvedilol  - Daily weight     Hypertension  Dyslipidemia  - Currently controlled  - Continue carvedilol  - Continue statin     CAL  - CPAP     Nicotine use  - Vapes  - Nicotine patch  - Cessation counseling     DVT prophylaxis: Heparin gtt      Expected Discharge Location and Transportation: home by car   Expected Discharge   Expected Discharge Date: 11/13/2023; Expected Discharge Time:      DVT prophylaxis:  Medical DVT prophylaxis orders are present.     AM-PAC 6 Clicks Score (PT): 24 (11/10/23 5293)    CODE STATUS:   Code Status and Medical Interventions:   Ordered at: 11/09/23 1731     Code Status (Patient has no pulse and is not breathing):    CPR (Attempt to Resuscitate)     Medical Interventions (Patient  has pulse or is breathing):    Full Support       Rhoda Holman MD  11/10/23

## 2023-11-10 NOTE — CONSULTS
[facility] Gastroenterology Consult    Referring Provider: Rhoda Holman MD     PCP: Davina Santiago DO    Reason for Consultation: GI bleed     Chief complaint: Bloody diarrhea     History of present illness:    Di Lopez is a 62 y.o. female who is admitted with rectal bleeding.   She describes a 1 week history of watery diarrhea that occurred 10/25-11/2.   This spontaneously resolved.   She however developed acute bright red blood per rectum yesterday.    She is on chronic anticoagulation with Warfarin for history of mechanical valve, INR is therapeutic at 2.84.       She had a recent colonoscopy in May 2023 with Dr. Fernandes.    Preparation of the bowel was good.  Four tubular adenoma polyps were removed.   Mild pandiverticulosis.      H&H was 7.2 and 22.   She was transfused 1 unit of PRBC and Hgb appropriately responded to 8.3 this morning.    She has mild abdominal pain.        Allergies:  Adhesive tape, Sildenafil, Codeine, Morphine, Penicillins, Statins, and Sulfa antibiotics    Scheduled Meds:  carvedilol, 12.5 mg, Oral, BID With Meals  cefepime, 2,000 mg, Intravenous, Q12H  escitalopram, 20 mg, Oral, Daily  ferrous sulfate, 325 mg, Oral, Daily With Breakfast  folic acid, 800 mcg, Oral, Daily  insulin lispro, 2-7 Units, Subcutaneous, 4x Daily AC & at Bedtime  metroNIDAZOLE, 500 mg, Intravenous, Q8H  nicotine, 1 patch, Transdermal, Q24H  oxybutynin XL, 5 mg, Oral, Daily  pantoprazole, 40 mg, Intravenous, Q AM  sodium chloride, 10 mL, Intravenous, Q12H         Infusions:  Pharmacy Consult, , Last Rate: Stopped (11/10/23 0840)  Pharmacy to Dose Heparin,         PRN Meds:    acetaminophen    [DISCONTINUED] senna-docusate sodium **AND** polyethylene glycol **AND** bisacodyl **AND** bisacodyl    dextrose    dextrose    glucagon (human recombinant)    nicotine polacrilex    Pharmacy to Dose Heparin    sodium chloride    sodium chloride    Home Meds:  Medications Prior to Admission   Medication Sig Dispense Refill  Last Dose    carvedilol (COREG) 12.5 MG tablet Take 1 tablet by mouth 2 (Two) Times a Day With Meals. 180 tablet 2 11/10/2023    Continuous Blood Gluc  (FreeStyle Dalia 2 [place]) device 1 each Daily. Use as directed daily to check blood sugars 1 each 0 11/10/2023    Continuous Blood Gluc Sensor (FreeStyle Dalia 2 Sensor) misc USE AS DIRECTED FOR 14 DAYS 2 each 11 11/10/2023    escitalopram (LEXAPRO) 20 MG tablet Take 1 tablet by mouth once daily 90 tablet 0 11/10/2023    ferrous sulfate 325 (65 FE) MG tablet Take 1 tablet by mouth Daily With Breakfast.   11/10/2023    folic acid (FOLVITE) 800 MCG tablet Take 1 tablet by mouth Daily.   11/10/2023    furosemide (LASIX) 40 MG tablet Take 1 tablet by mouth 3 (Three) Times a Week. Monday, Wednesday, Friday   Past Week    gabapentin (NEURONTIN) 300 MG capsule Take 1 capsule by mouth At Night As Needed (leg pain). 30 capsule 3 Past Week    glimepiride (AMARYL) 4 MG tablet Take 1 tablet by mouth 2 (Two) Times a Day. 180 tablet 1 11/10/2023    glucose blood test strip Use as instructed 3 times a day 100 each 11 11/10/2023    Insulin Glargine (Lantus SoloStar) 100 UNIT/ML injection pen Inject 44 Units under the skin into the appropriate area as directed Daily. 45 mL 1 11/10/2023    Insulin Lispro, 1 Unit Dial, (HumaLOG KwikPen) 100 UNIT/ML solution pen-injector Per correctional scale, MDD 50 units 15 mL 3 11/10/2023    Insulin Pen Needle (B-D UF III MINI PEN NEEDLES) 31G X 5 MM misc USE 1  FIVE TIMES DAILY 150 each 5 11/10/2023    Insulin Pen Needle (RELION PEN NEEDLES) 32G X 4 MM misc Use BID for E11.9 100 each 1 11/10/2023    Lancets misc For use with glucose monitoring 3 times daily 100 each 11 11/10/2023    lidocaine (LIDODERM) 5 % USE 1 PATCH EXTERNALLY ONCE DAILY. REMOVE AND DISCARD PATCH WITHIN 12 HOURS OR AS DIRECTED BY MD (Patient taking differently: Place 1 patch on the skin as directed by provider Daily.) 30 patch 0 Past Month    Omega-3 Fatty Acids (FISH  OIL) 1000 MG capsule capsule Take 1 capsule by mouth 4 (Four) Times a Day. (Patient taking differently: Take 1 capsule by mouth 2 (Two) Times a Day With Meals.) 120 capsule 2 11/10/2023    omeprazole (priLOSEC) 20 MG capsule Take 1 capsule by mouth twice daily 60 capsule 0 11/10/2023    simvastatin (ZOCOR) 5 MG tablet TAKE 1 TABLET BY MOUTH ONCE DAILY AT NIGHT 90 tablet 0 11/9/2023    solifenacin (VESICARE) 5 MG tablet Take 1 tablet by mouth Daily. 90 tablet 0 11/10/2023    spironolactone (ALDACTONE) 25 MG tablet Take 1 tablet by mouth Daily.  0 11/10/2023    valACYclovir (Valtrex) 500 MG tablet Take 2 pills daily by mouth for ten days . Thereafter take one pill daily by mouth. 90 tablet 0 11/10/2023    albuterol sulfate  (90 Base) MCG/ACT inhaler Inhale 2 puffs Every 4 (Four) Hours As Needed for Wheezing. 1 g 0 More than a month    azelastine (ASTELIN) 0.1 % nasal spray 2 sprays into the nostril(s) as directed by provider 2 (Two) Times a Day. Use in each nostril as directed (Patient taking differently: 2 sprays into the nostril(s) as directed by provider As Needed for Rhinitis or Allergies. Use in each nostril as directed prn) 1 each 1     Dulaglutide (Trulicity) 4.5 MG/0.5ML solution pen-injector INJECT 1 SYRINGE SUBCUTANEOUSLY ONCE A WEEK 6 mL 0     sacubitril-valsartan (ENTRESTO) 24-26 MG tablet Take 1 tablet by mouth 2 (Two) Times a Day. 60 tablet 5     Vitamin D, Cholecalciferol, (CHOLECALCIFEROL) 10 MCG (400 UNIT) tablet Take 2 tablets by mouth Daily.   11/8/2023    warfarin (COUMADIN) 1 MG tablet Take  by mouth. PT TAKES 0.5 (1 MG TAB) PLUS 4 MG TAB-TOTAL 4.5 MG ON Saturday AND Sunday 11/5/2023    warfarin (COUMADIN) 4 MG tablet Take 1 tablet by mouth. 4 MG M-F  2 11/6/2023       ROS: Review of Systems   Constitutional:  Positive for fatigue.   HENT: Negative.     Eyes: Negative.    Respiratory: Negative.     Cardiovascular: Negative.    Gastrointestinal:  Positive for abdominal pain, blood in stool  and diarrhea.   Endocrine: Negative.    Genitourinary: Negative.    Musculoskeletal: Negative.    Skin: Negative.    Allergic/Immunologic: Negative.    Neurological:  Positive for weakness.   Hematological: Negative.    Psychiatric/Behavioral: Negative.       PAST MED HX:  Past Medical History:   Diagnosis Date    Allergic     Anemia     Anticoagulated     WARFARIN    Arteriovenous malformation of gastrointestinal tract 05/12/2016    Arthritis     Asthma     Black hairy tongue     CHF (congestive heart failure)     Chronic back pain     Coronary artery disease     Depression 05/12/2016    Diabetes mellitus     Dyslipidemia 05/12/2016    Elevated cholesterol     Emphysema/COPD 05/12/2016    Emphysema/COPD 05/12/2016    Fatigue     GERD (gastroesophageal reflux disease)     Headache     Herniated cervical disc     History of echocardiogram 07/16/2013    Left ventricular systolic function at the lower limits of normal; EF 50-55%; mild MR; mechanical prosthetic mitral valve present; mod TR    History of transfusion     NO REACTION; [facility]    Hyperlipidemia     Hypertension     Insomnia 05/12/2016    Iron deficiency     A.  The patient was treated with IV iron replacement. B.  Secondary to GI bleed, status post EGD with cauterization of blood vessel to the duodenum, 7/16/2014.    Left bundle branch hemiblock 10/12/2020    Left shoulder strain     Liver disease     Liver mass     Long term current use of anticoagulant 05/12/2016    Low back pain     Lung nodules     PER DR. LEVI'S NOTE 10/22    Morbid obesity 05/12/2016    Neuromuscular disorder 7/28/2016    Obstructive sleep apnea syndrome 05/12/2016    Description: A.  Moderate; CPAP therapy..    Sliding hiatal hernia 07/28/2016    Description: A.  Reported on EGD, 12/30/2011.    Type 2 diabetes mellitus 05/12/2016    Urinary tract infection     Uterine cancer     HYSTERECTOMY    Vitamin B12 deficiency     A.  The patient was treated with vitamin B12 replacement.     Wears eyeglasses        PAST SURG HX:  Past Surgical History:   Procedure Laterality Date    BILATERAL OOPHORECTOMY  2009    Status post bilateral oophorectomy secondary to ovarian cysts     BREAST BIOPSY Right     STEREOTACTIC    BREAST EXCISIONAL BIOPSY Bilateral     CARDIAC ELECTROPHYSIOLOGY PROCEDURE N/A 10/15/2020    Procedure: Biventricular Device Insertion;  Surgeon: Nadir Everett MD;  Location:  [facility];  Service: Cardiology;  Laterality: N/A;    CARDIAC SURGERY      CARPAL TUNNEL RELEASE Bilateral     COLONOSCOPY  2012    COLONOSCOPY N/A 2023    Procedure: COLONOSCOPY;  Surgeon: Gt Fernandes MD;  Location: [facility];  Service: Gastroenterology;  Laterality: N/A;    CYSTECTOMY      removal of ganglion cyst from left wrist    ENDOSCOPY  2011    DIAGNOSTIC ESOPHAGOGASTRODUODENOSCOPY    HYSTERECTOMY      A.  Status post hysterectomy for early stage endometrial cancer done in .    MITRAL VALVE REPLACEMENT  2008    Dr. Hima Barreto; MECHANICAL    OTHER SURGICAL HISTORY      LIVER MASS BLOOD SUPPLY CLIPPED ON RIGHT AND LEFT SIDE PER PT       FAM HX:  Family History   Problem Relation Age of Onset    Sudden death Mother         POSSIBLE SERIOUS MASSIVE STROKE    Hypertension Mother             Multiple myeloma Father     Cancer Father             Hypertension Father             Ovarian cancer Paternal Grandmother     Cancer Paternal Grandmother             Diabetes Brother             Hypertension Brother             Breast cancer Neg Hx        SOC HX:  Social History     Socioeconomic History    Marital status: Significant Other   Tobacco Use    Smoking status: Former     Packs/day: 1.00     Years: 30.00     Additional pack years: 0.00     Total pack years: 30.00     Types: Cigarettes, Electronic Cigarette     Start date: 1976     Quit date: 2/10/2010     Years since quittin.7     "Smokeless tobacco: Never    Tobacco comments:     Quit in 2010   Vaping Use    Vaping Use: Every day    Substances: Nicotine    Devices: Disposable, one every 2 days   Substance and Sexual Activity    Alcohol use: No    Drug use: Never    Sexual activity: Defer     Partners: Male     Birth control/protection: Abstinence, None, Hysterectomy     Comment: 1 partner       PHYSICAL EXAM  /60   Pulse 72   Temp 98.2 °F (36.8 °C)   Resp 18   Ht 152.4 cm (60\")   Wt 90.9 kg (200 lb 8 oz)   LMP 06/17/1998   SpO2 96%   BMI 39.16 kg/m²   Wt Readings from Last 3 Encounters:   11/10/23 90.9 kg (200 lb 8 oz)   11/02/23 88.5 kg (195 lb)   10/18/23 90.3 kg (199 lb)   ,body mass index is 39.16 kg/m².  Physical Exam  Constitutional:       General: She is not in acute distress.     Appearance: She is obese. She is ill-appearing.   HENT:      Head: Normocephalic and atraumatic.   Eyes:      General: No scleral icterus.  Cardiovascular:      Rate and Rhythm: Normal rate and regular rhythm.   Pulmonary:      Effort: Pulmonary effort is normal.      Breath sounds: Normal breath sounds.   Abdominal:      General: Bowel sounds are normal. There is no distension.      Palpations: Abdomen is soft.      Tenderness: There is no abdominal tenderness. There is no guarding.   Musculoskeletal:      Right lower leg: No edema.      Left lower leg: No edema.   Skin:     General: Skin is warm and dry.   Neurological:      Mental Status: She is alert and oriented to person, place, and time.   Psychiatric:         Behavior: Behavior normal.       Results Review:   I reviewed the patient's new clinical results.    Lab Results   Component Value Date    WBC 12.70 (H) 11/10/2023    HGB 8.3 (L) 11/10/2023    HGB 8.5 (L) 11/10/2023    HGB 7.7 (L) 11/10/2023    HCT 25.6 (L) 11/10/2023    MCV 88.0 11/10/2023     11/10/2023     Lab Results   Component Value Date    INR 2.84 (H) 11/10/2023    INR 2.88 (H) 11/10/2023    INR 3.31 (H) 11/09/2023 "     Lab Results   Component Value Date    GLUCOSE 197 (H) 11/10/2023    BUN 22 11/10/2023    CREATININE 1.79 (H) 11/10/2023    EGFRIFAFRI 73 10/14/2022    BCR 12.3 11/10/2023     11/10/2023    K 3.7 11/10/2023    CO2 25.0 11/10/2023    CALCIUM 8.2 (L) 11/10/2023    ALBUMIN 3.5 11/09/2023    ALKPHOS 167 (H) 11/09/2023    BILITOT 0.3 11/09/2023    BILIDIR <0.2 03/31/2023    ALT 48 (H) 11/09/2023    AST 32 11/09/2023     CT abdomen/pelvis (as interpreted by radiologist)  Included lower lungs appear clear of active disease. A 3 mm right lower lobe noncalcified nodule and similar size left lower lobe nodule are unchanged. There is stable size of the patient's 8 cm nodular left lobe liver lesion, with what appear to be multiple calcifications, consistent with history of adenoma and embolization. Allowing for differences between unenhanced scan today and previous contrast-enhanced study, no significant interval change in appearance of the liver is seen. Spleen is not enlarged. Pancreas, adrenal glands, and kidneys appear unremarkable. No upper abdominal free air, ascites, adenopathy, or acute inflammatory changes seen. Small bowel loops are normal in caliber. The colon is nondistended, but contains almost entirely fluid.   Regarding lower abdomen and pelvis, there is mild to moderate left colon diverticulosis without apparent diverticulitis. There is a very subtle change in appearance of the mid sigmoid, axial image 105 and adjacent images, with indistinct posterior margin   of the short segment here, and what appears to be some stranding of the fat between this loop, the upper margin of the bladder and an adjacent redundant loop of sigmoid. Very low level inflammation might cause this appearance. No similar findings are appreciated elsewhere.    Some stable, focal linear thickening of the left upper peritoneal margin/adnexa is likely stable scarring from previous hysterectomy. No intrapelvic inflammatory change is  seen. Bladder is decompressed. Uterus and ovaries are not identified. No evidence   of ureteral calculus or bladder calculus is seen. Bony structures appear to be intact. There is again advanced degenerative disc disease at L5-S1, with very mild posterior subluxation of L5 on S1. No acute bony abnormality seen.   IMPRESSION:  Impression:   1. Suspected subtle inflammation of the posterior wall of a short segment of mid sigmoid. No obvious features of diverticulitis such as abscess, extraluminal air, etc., but there are small diverticuli in this region.   2. Fluid-filled normal caliber colon consistent with diarrhea.   3. Stable appearance of the patient's left liver mass, by history adenoma with previous embolization.   4. No other evidence of acute intra-abdominal intrapelvic disease elsewhere.     ASSESSMENTS/PLANS    Rectal bleeding   Recent diarrheal illness, spontaneously resolved.      Subtle inflammation of the posterior wall of a short segment of mid sigmoid.   Mild abdominal pain and mild leukocytosis   Anticoagulation use secondary to mechanical valve     Differential includes hemorrhoidal source, diverticular, mild ischemic colitis.    She had a recent colonoscopy in May 2023 with Dr. Fernandes.  Stool studies were initially ordered but her diarrhea spontaneously resolved last week.       >> Recommend supportive care at this time.    Clear liquid diet  >> Continue IV antibiotics.  >> Add anusol suppository   >> Serial H&H     I discussed the patient's findings and my recommendations with patient    TAVO Alvarenga  11/10/23  09:24 EST

## 2023-11-10 NOTE — PROGRESS NOTES
HEPARIN INFUSION  Di Lopez is a  62 y.o. female receiving heparin infusion.     Therapy for ([facility]Cardiac[facility]  Patient Weight: 90.9 kg  Initial Bolus (Y/N):  No  Any Bolus (Y/N):  Yes    Signs or Symptoms of Bleedin episodes of BRBPR on admission, ongoing anemia     Cardiac or Other (Not VTE)  Initial rate: 12 units/kg/hr (Max 1,000 units/hr)   Anti Xa Rebolus Infusion Hold time Change infusion Dose (Units/kg/hr) Next Anti Xa or aPTT Level Due   < 0.11 50 Units/kg  (4000 Units Max) None Increase by  3 Units/kg/hr 6 hours   0.11- 0.19 25 Units/kg  (2000 Units Max) None Increase by  2 Units/kg/hr 6 hours   0.2 - 0.29 0 None Increase by  1 Units/kg/hr 6 hours   0.3 - 0.5 0 None No Change 6 hours (after 2 consecutive levels in range check qAM)   0.51 - 0.6 0 None Decrease by  1 Units/kg/hr 6 hours   0.61 - 0.8 0 30 Minutes Decrease by  2 Units/kg/hr 6 hours   0.81 - 1 0 60 Minutes Decrease by  3 Units/kg/hr 6 hours   >1 0 Hold  After Anti Xa less than 0.5 decrease previous rate by  4 Units/kg/hr  Every 2 hours until Anti Xa  less than 0.5 then when infusion restarts in 6 hours     Recommend Xa every 6 hours.     Results from last 7 days   Lab Units 11/10/23  1126 11/10/23  0742 11/10/23  0725 11/10/23  0437 11/10/23  0154 23  1955 23  1629 23  1624   INR   --   --   --  2.84* 2.88* 3.31*   < >  --    HEMOGLOBIN g/dL 7.8* 8.3* 8.5* 7.7* 7.1* 7.2*  --  9.3*   HEMATOCRIT % 24.0* 25.6* 26.5* 24.2* 21.9* 22.9*  --  29.3*   PLATELETS 10*3/mm3  --   --   --   --  310 289  --  320    < > = values in this interval not displayed.       Date   Time   Anti-Xa Current Rate (Unit/kg/hr) Bolus   (Units) Rate Change   (Unit/kg/hr) New Rate (Unit/kg/hr) Next   Anti-Xa Comments  Pump Check Daily   11/10 0200 0.10    INR 2.88 --- - -- -- 0600 D/w APRN, hold until INR <2.5. Currently 2.88, ordered repeat INR @ 0600   11/10 0437 --    INR 2.84 -- -- -- -- 1800 Continue to hold heparin gtt  until until <2.5; repeat INR at 1800                                                                                                                                                                                                                      Taylor Riedel-Rogers, PharmD, BCPS  11/10/2023  13:27 EST

## 2023-11-10 NOTE — PLAN OF CARE
Goal Outcome Evaluation:         Patient pleasant, A&Ox4 . Patient having dyspneic breathing, on 3L nasal cannula, states that's what she's on at home. Hemoglobin at 01:54 was 7.1 received 1 unit PRBC's. Patient resting, call light in reach. No further needs at this time.

## 2023-11-11 LAB
ADV 40+41 DNA STL QL NAA+NON-PROBE: DETECTED
ANION GAP SERPL CALCULATED.3IONS-SCNC: 13 MMOL/L (ref 5–15)
ASTRO TYP 1-8 RNA STL QL NAA+NON-PROBE: NOT DETECTED
BH BB BLOOD EXPIRATION DATE: NORMAL
BH BB BLOOD TYPE BARCODE: 5100
BH BB DISPENSE STATUS: NORMAL
BH BB PRODUCT CODE: NORMAL
BH BB UNIT NUMBER: NORMAL
BUN SERPL-MCNC: 25 MG/DL (ref 8–23)
BUN/CREAT SERPL: 13.3 (ref 7–25)
C CAYETANENSIS DNA STL QL NAA+NON-PROBE: NOT DETECTED
C COLI+JEJ+UPSA DNA STL QL NAA+NON-PROBE: NOT DETECTED
CALCIUM SPEC-SCNC: 8.4 MG/DL (ref 8.6–10.5)
CHLORIDE SERPL-SCNC: 101 MMOL/L (ref 98–107)
CO2 SERPL-SCNC: 27 MMOL/L (ref 22–29)
CREAT SERPL-MCNC: 1.88 MG/DL (ref 0.57–1)
CROSSMATCH INTERPRETATION: NORMAL
CRYPTOSP DNA STL QL NAA+NON-PROBE: NOT DETECTED
E HISTOLYT DNA STL QL NAA+NON-PROBE: NOT DETECTED
EAEC PAA PLAS AGGR+AATA ST NAA+NON-PRB: NOT DETECTED
EC STX1+STX2 GENES STL QL NAA+NON-PROBE: NOT DETECTED
EGFRCR SERPLBLD CKD-EPI 2021: 30.1 ML/MIN/1.73
EPEC EAE GENE STL QL NAA+NON-PROBE: NOT DETECTED
ETEC LTA+ST1A+ST1B TOX ST NAA+NON-PROBE: NOT DETECTED
G LAMBLIA DNA STL QL NAA+NON-PROBE: NOT DETECTED
GLUCOSE BLDC GLUCOMTR-MCNC: 106 MG/DL (ref 70–130)
GLUCOSE BLDC GLUCOMTR-MCNC: 156 MG/DL (ref 70–130)
GLUCOSE BLDC GLUCOMTR-MCNC: 220 MG/DL (ref 70–130)
GLUCOSE BLDC GLUCOMTR-MCNC: 84 MG/DL (ref 70–130)
GLUCOSE SERPL-MCNC: 143 MG/DL (ref 65–99)
HCT VFR BLD AUTO: 21.3 % (ref 34–46.6)
HCT VFR BLD AUTO: 23.6 % (ref 34–46.6)
HGB BLD-MCNC: 7 G/DL (ref 12–15.9)
HGB BLD-MCNC: 7.5 G/DL (ref 12–15.9)
INR PPP: 1.09 (ref 0.89–1.12)
NOROVIRUS GI+II RNA STL QL NAA+NON-PROBE: NOT DETECTED
NT-PROBNP SERPL-MCNC: 833.7 PG/ML (ref 0–900)
P SHIGELLOIDES DNA STL QL NAA+NON-PROBE: NOT DETECTED
POTASSIUM SERPL-SCNC: 3.7 MMOL/L (ref 3.5–5.2)
PROTHROMBIN TIME: 14.2 SECONDS (ref 12.2–14.5)
RVA RNA STL QL NAA+NON-PROBE: NOT DETECTED
S ENT+BONG DNA STL QL NAA+NON-PROBE: NOT DETECTED
SAPO I+II+IV+V RNA STL QL NAA+NON-PROBE: NOT DETECTED
SHIGELLA SP+EIEC IPAH ST NAA+NON-PROBE: NOT DETECTED
SODIUM SERPL-SCNC: 141 MMOL/L (ref 136–145)
UFH PPP CHRO-ACNC: 0.1 IU/ML (ref 0.3–0.7)
UFH PPP CHRO-ACNC: 0.1 IU/ML (ref 0.3–0.7)
UFH PPP CHRO-ACNC: 0.25 IU/ML (ref 0.3–0.7)
UNIT  ABO: NORMAL
UNIT  RH: NORMAL
V CHOL+PARA+VUL DNA STL QL NAA+NON-PROBE: NOT DETECTED
V CHOLERAE DNA STL QL NAA+NON-PROBE: NOT DETECTED
Y ENTEROCOL DNA STL QL NAA+NON-PROBE: NOT DETECTED

## 2023-11-11 PROCEDURE — 94640 AIRWAY INHALATION TREATMENT: CPT

## 2023-11-11 PROCEDURE — 63710000001 INSULIN LISPRO (HUMAN) PER 5 UNITS: Performed by: NURSE PRACTITIONER

## 2023-11-11 PROCEDURE — 83880 ASSAY OF NATRIURETIC PEPTIDE: CPT | Performed by: INTERNAL MEDICINE

## 2023-11-11 PROCEDURE — 94799 UNLISTED PULMONARY SVC/PX: CPT

## 2023-11-11 PROCEDURE — 85520 HEPARIN ASSAY: CPT

## 2023-11-11 PROCEDURE — 82948 REAGENT STRIP/BLOOD GLUCOSE: CPT

## 2023-11-11 PROCEDURE — 25010000002 HEPARIN (PORCINE) PER 1000 UNITS

## 2023-11-11 PROCEDURE — G0378 HOSPITAL OBSERVATION PER HR: HCPCS

## 2023-11-11 PROCEDURE — 87507 IADNA-DNA/RNA PROBE TQ 12-25: CPT | Performed by: NURSE PRACTITIONER

## 2023-11-11 PROCEDURE — 25010000002 HEPARIN (PORCINE) 25000-0.45 UT/250ML-% SOLUTION

## 2023-11-11 PROCEDURE — 25010000002 METRONIDAZOLE 500 MG/100ML SOLUTION: Performed by: INTERNAL MEDICINE

## 2023-11-11 PROCEDURE — 80048 BASIC METABOLIC PNL TOTAL CA: CPT | Performed by: INTERNAL MEDICINE

## 2023-11-11 PROCEDURE — 99232 SBSQ HOSP IP/OBS MODERATE 35: CPT | Performed by: INTERNAL MEDICINE

## 2023-11-11 PROCEDURE — 85014 HEMATOCRIT: CPT | Performed by: INTERNAL MEDICINE

## 2023-11-11 PROCEDURE — 63710000001 INSULIN LISPRO (HUMAN) PER 5 UNITS: Performed by: INTERNAL MEDICINE

## 2023-11-11 PROCEDURE — 25010000002 CEFTRIAXONE PER 250 MG: Performed by: INTERNAL MEDICINE

## 2023-11-11 PROCEDURE — 94664 DEMO&/EVAL PT USE INHALER: CPT

## 2023-11-11 PROCEDURE — 97162 PT EVAL MOD COMPLEX 30 MIN: CPT

## 2023-11-11 PROCEDURE — 85018 HEMOGLOBIN: CPT | Performed by: INTERNAL MEDICINE

## 2023-11-11 PROCEDURE — 85610 PROTHROMBIN TIME: CPT

## 2023-11-11 RX ORDER — IPRATROPIUM BROMIDE AND ALBUTEROL SULFATE 2.5; .5 MG/3ML; MG/3ML
3 SOLUTION RESPIRATORY (INHALATION)
Status: DISCONTINUED | OUTPATIENT
Start: 2023-11-11 | End: 2023-11-22 | Stop reason: HOSPADM

## 2023-11-11 RX ORDER — WARFARIN SODIUM 3 MG/1
6 TABLET ORAL
Status: COMPLETED | OUTPATIENT
Start: 2023-11-11 | End: 2023-11-11

## 2023-11-11 RX ORDER — WARFARIN SODIUM 4 MG/1
4 TABLET ORAL
Status: DISCONTINUED | OUTPATIENT
Start: 2023-11-13 | End: 2023-11-11

## 2023-11-11 RX ORDER — HEPARIN SODIUM 1000 [USP'U]/ML
4000 INJECTION, SOLUTION INTRAVENOUS; SUBCUTANEOUS ONCE
Status: COMPLETED | OUTPATIENT
Start: 2023-11-11 | End: 2023-11-11

## 2023-11-11 RX ORDER — WARFARIN SODIUM 3 MG/1
3 TABLET ORAL
Status: DISCONTINUED | OUTPATIENT
Start: 2023-11-12 | End: 2023-11-14

## 2023-11-11 RX ORDER — BUDESONIDE AND FORMOTEROL FUMARATE DIHYDRATE 160; 4.5 UG/1; UG/1
2 AEROSOL RESPIRATORY (INHALATION)
Status: DISCONTINUED | OUTPATIENT
Start: 2023-11-11 | End: 2023-11-22 | Stop reason: HOSPADM

## 2023-11-11 RX ADMIN — IPRATROPIUM BROMIDE AND ALBUTEROL SULFATE 3 ML: 2.5; .5 SOLUTION RESPIRATORY (INHALATION) at 17:06

## 2023-11-11 RX ADMIN — PANTOPRAZOLE SODIUM 40 MG: 40 INJECTION, POWDER, LYOPHILIZED, FOR SOLUTION INTRAVENOUS at 06:55

## 2023-11-11 RX ADMIN — HEPARIN SODIUM 15 UNITS/KG/HR: 10000 INJECTION, SOLUTION INTRAVENOUS at 13:58

## 2023-11-11 RX ADMIN — HEPARIN SODIUM 4000 UNITS: 1000 INJECTION INTRAVENOUS; SUBCUTANEOUS at 06:57

## 2023-11-11 RX ADMIN — INSULIN LISPRO 4 UNITS: 100 INJECTION, SOLUTION INTRAVENOUS; SUBCUTANEOUS at 14:00

## 2023-11-11 RX ADMIN — Medication 1 PATCH: at 02:55

## 2023-11-11 RX ADMIN — ESCITALOPRAM OXALATE 20 MG: 20 TABLET ORAL at 08:20

## 2023-11-11 RX ADMIN — INSULIN LISPRO 3 UNITS: 100 INJECTION, SOLUTION INTRAVENOUS; SUBCUTANEOUS at 14:01

## 2023-11-11 RX ADMIN — CARVEDILOL 12.5 MG: 12.5 TABLET, FILM COATED ORAL at 08:21

## 2023-11-11 RX ADMIN — OXYBUTYNIN CHLORIDE 5 MG: 5 TABLET, EXTENDED RELEASE ORAL at 08:20

## 2023-11-11 RX ADMIN — INSULIN LISPRO 2 UNITS: 100 INJECTION, SOLUTION INTRAVENOUS; SUBCUTANEOUS at 20:42

## 2023-11-11 RX ADMIN — BUDESONIDE AND FORMOTEROL FUMARATE DIHYDRATE 2 PUFF: 160; 4.5 AEROSOL RESPIRATORY (INHALATION) at 12:58

## 2023-11-11 RX ADMIN — FERROUS SULFATE TAB 325 MG (65 MG ELEMENTAL FE) 325 MG: 325 (65 FE) TAB at 08:20

## 2023-11-11 RX ADMIN — METRONIDAZOLE 500 MG: 500 INJECTION, SOLUTION INTRAVENOUS at 20:54

## 2023-11-11 RX ADMIN — HEPARIN SODIUM 4000 UNITS: 1000 INJECTION INTRAVENOUS; SUBCUTANEOUS at 20:43

## 2023-11-11 RX ADMIN — CARVEDILOL 12.5 MG: 12.5 TABLET, FILM COATED ORAL at 17:50

## 2023-11-11 RX ADMIN — Medication 10 ML: at 08:22

## 2023-11-11 RX ADMIN — IPRATROPIUM BROMIDE AND ALBUTEROL SULFATE 3 ML: 2.5; .5 SOLUTION RESPIRATORY (INHALATION) at 12:57

## 2023-11-11 RX ADMIN — METRONIDAZOLE 500 MG: 500 INJECTION, SOLUTION INTRAVENOUS at 16:17

## 2023-11-11 RX ADMIN — WARFARIN SODIUM 6 MG: 3 TABLET ORAL at 17:50

## 2023-11-11 RX ADMIN — IPRATROPIUM BROMIDE AND ALBUTEROL SULFATE 3 ML: 2.5; .5 SOLUTION RESPIRATORY (INHALATION) at 20:34

## 2023-11-11 RX ADMIN — METRONIDAZOLE 500 MG: 500 INJECTION, SOLUTION INTRAVENOUS at 06:54

## 2023-11-11 RX ADMIN — CEFTRIAXONE 2000 MG: 2 INJECTION, POWDER, FOR SOLUTION INTRAMUSCULAR; INTRAVENOUS at 16:17

## 2023-11-11 RX ADMIN — INSULIN LISPRO 4 UNITS: 100 INJECTION, SOLUTION INTRAVENOUS; SUBCUTANEOUS at 08:21

## 2023-11-11 RX ADMIN — HYDROCODONE BITARTRATE AND ACETAMINOPHEN 1 TABLET: 5; 325 TABLET ORAL at 20:54

## 2023-11-11 RX ADMIN — Medication 5 MG: at 20:55

## 2023-11-11 RX ADMIN — BUDESONIDE AND FORMOTEROL FUMARATE DIHYDRATE 2 PUFF: 160; 4.5 AEROSOL RESPIRATORY (INHALATION) at 20:35

## 2023-11-11 RX ADMIN — FOLIC ACID TAB 400 MCG 800 MCG: 400 TAB at 08:20

## 2023-11-11 NOTE — CONSULTS
"Visited per consult \"to assist patient with completion of advanced care planning\".  Patient asked me to leave the info for her to read, also to pray with patient.  "

## 2023-11-11 NOTE — PLAN OF CARE
Goal Outcome Evaluation:   Patient is alert and oriented x4, cooperative and pleasant. Tele monitor shows V paced 100%. Patient requires 3L nasal cannula to maintain Sp02 at 96%. Heparin drip started and infusing at 11units/kg/hr. Pt up standing at side of the bed tolerating well. Patient resting comfortably, call light in reach, will continue to monitor.

## 2023-11-11 NOTE — PROGRESS NOTES
[facility] Medicine Services  PROGRESS NOTE    Patient Name: Di Lopez  : 1960  MRN: [id number]    Date of Admission: 2023  Primary Care Physician: Davina Santiago DO    Subjective   Subjective     CC:  BRBPR; dyspnea     HPI:  No acute events. No bleeding. States that she started feeling very short of breath and swollen when she was admitted. At baseline can walk across her house but not much further. Poor functional status but states this is much worse      Objective   Objective     Vital Signs:   Temp:  [97.9 °F (36.6 °C)-98.3 °F (36.8 °C)] 98.3 °F (36.8 °C)  Heart Rate:  [64-74] 64  Resp:  [18-19] 19  BP: (114-152)/(41-82) 152/63  Flow (L/min):  [3] 3     Physical Exam:  Constitutional: No acute distress, awake, alert; obese  HENT: NCAT, mucous membranes moist  Respiratory: Clear to auscultation bilaterally, respiratory effort normal   Cardiovascular: RRR, no murmurs, rubs, or gallops; click  Gastrointestinal: Positive bowel sounds, soft, nontender, nondistended  Musculoskeletal: trace bilateral ankle edema  Psychiatric: Appropriate affect, cooperative  Neurologic: Oriented x 3, strength symmetric in all extremities, Cranial Nerves grossly intact to confrontation, speech clear  Skin: No rashes      Results Reviewed:  LAB RESULTS:      Lab 23  0810 23  0447 11/10/23  1902 11/10/23  1126 11/10/23  0742 11/10/23  0725 11/10/23  0437 11/10/23  0154 11/10/23  0027 23  2336 23  1955 23  1629 23  1624   WBC  --   --   --   --   --   --   --  12.70*  --   --  12.77*  --  10.96*   HEMOGLOBIN 7.5*  --  7.6* 7.8* 8.3* 8.5* 7.7* 7.1*  --   --  7.2*  --  9.3*   HEMATOCRIT 23.6*  --  23.9* 24.0* 25.6* 26.5* 24.2* 21.9*  --   --  22.9*  --  29.3*   PLATELETS  --   --   --   --   --   --   --  310  --   --  289  --  320   NEUTROS ABS  --   --   --   --   --   --   --  9.12*  --   --  9.37*  --  7.42*   IMMATURE GRANS (ABS)  --   --   --   --    --   --   --  0.14*  --   --  0.18*  --  0.17*   LYMPHS ABS  --   --   --   --   --   --   --  2.72  --   --  2.32  --  2.36   MONOS ABS  --   --   --   --   --   --   --  0.59  --   --  0.70  --  0.82   EOS ABS  --   --   --   --   --   --   --  0.10  --   --  0.16  --  0.16   MCV  --   --   --   --   --   --   --  88.0  --   --  89.8  --  86.9   PROCALCITONIN  --   --   --   --   --   --   --   --   --  0.07  --   --   --    LACTATE  --   --   --   --   --   --   --   --  1.5  --   --   --   --    PROTIME  --  14.2 15.4*  --   --   --  30.1* 30.4*  --   --  33.9*   < >  --    APTT  --   --   --   --   --   --   --  78.8  --   --   --   --   --    HEPARIN ANTI-XA  --  0.10*  --   --   --   --   --  0.10*  --   --   --   --   --     < > = values in this interval not displayed.         Lab 11/11/23  0447 11/10/23  0154 11/09/23 1955 11/06/23  1624   SODIUM 141 144 142 144   POTASSIUM 3.7 3.7 4.1 3.9   CHLORIDE 101 109* 107 109*   CO2 27.0 25.0 24.0 27.0   ANION GAP 13.0 10.0 11.0 8.0   BUN 25* 22 21 13   CREATININE 1.88* 1.79* 1.88* 1.45*   EGFR 30.1* 31.9* 30.1* 41.1*   GLUCOSE 143* 197* 280* 107*   CALCIUM 8.4* 8.2* 8.3* 8.8   MAGNESIUM  --  2.0 2.0  --          Lab 11/09/23 1955   TOTAL PROTEIN 6.1   ALBUMIN 3.5   GLOBULIN 2.6   ALT (SGPT) 48*   AST (SGOT) 32   BILIRUBIN 0.3   ALK PHOS 167*   LIPASE 25         Lab 11/11/23  0447 11/10/23  1902 11/10/23  0437 11/10/23  0154 11/09/23  2336 11/09/23  1955   PROBNP  --   --   --   --   --  830.8   HSTROP T  --   --   --   --  10 11   PROTIME 14.2 15.4* 30.1* 30.4*  --  33.9*   INR 1.09 1.21* 2.84* 2.88*  --  3.31*             Lab 11/09/23  2142 11/06/23  1624   IRON  --  38   IRON SATURATION (TSAT)  --  10*   TIBC  --  362   TRANSFERRIN  --  243   FERRITIN  --  134.20   ABO TYPING O  --    RH TYPING Positive  --    ANTIBODY SCREEN Negative  --          Brief Urine Lab Results  (Last result in the past 365 days)        Color   Clarity   Blood   Leuk Est   Nitrite    Protein   CREAT   Urine HCG        11/09/23 2320 Yellow   Clear   Negative   Small (1+)   Positive   100 mg/dL (2+)                   Microbiology Results Abnormal       Procedure Component Value - Date/Time    Blood Culture - Blood, Arm, Right [[id number]]  (Normal) Collected: 11/10/23 0027    Lab Status: Preliminary result Specimen: Blood from Arm, Right Updated: 11/11/23 0100     Blood Culture No growth at 24 hours    Blood Culture - Blood, Arm, Right [[id number]]  (Normal) Collected: 11/10/23 0027    Lab Status: Preliminary result Specimen: Blood from Arm, Right Updated: 11/11/23 0100     Blood Culture No growth at 24 hours            XR Chest 1 View    Result Date: 11/9/2023  XR CHEST 1 VW Date of Exam: 11/9/2023 11:20 PM EST Indication: dyspnea, chf Comparison: 6/21/2023. Findings: There are no airspace consolidations. No pleural fluid. No pneumothorax. The pulmonary vasculature appears within normal limits. The heart appears enlarged. Median sternotomy wires are present. There is a left subclavian AICD/pacemaker device. Stable chronic interstitial changes are present.. No acute osseous abnormality identified.     Impression: Impression: No acute cardiopulmonary process. Stable cardiomegaly. Electronically Signed: Kylah Torres MD  11/9/2023 11:45 PM EST  Workstation ID: [id number]    CT Abdomen Pelvis Without Contrast    Result Date: 11/9/2023  CT ABDOMEN PELVIS WO CONTRAST Date of Exam: 11/9/2023 9:25 PM EST Indication: Rectal bleeding. Comparison: 9/13/2022 abdomen pelvis CT scan Technique: Axial CT images were obtained of the abdomen and pelvis without the administration of contrast. Reconstructed coronal and sagittal images were also obtained. Automated exposure control and iterative construction methods were used. Findings: Previous 9/13/2022 exam report indicated no acute disease. Included lower lungs appear clear of active disease. A 3 mm right lower lobe noncalcified nodule and similar size left lower lobe  nodule are unchanged. There is stable size of the patient's 8 cm nodular left lobe liver lesion, with what appear to be multiple calcifications, consistent with history of adenoma and embolization. Allowing for differences between unenhanced scan today and previous contrast-enhanced study, no significant interval change in appearance of the liver is seen. Spleen is not enlarged. Pancreas, adrenal glands, and kidneys appear unremarkable. No upper abdominal free air, ascites, adenopathy, or acute inflammatory changes seen. Small bowel loops are normal in caliber. The colon is nondistended, but contains almost entirely fluid. Regarding lower abdomen and pelvis, there is mild to moderate left colon diverticulosis without apparent diverticulitis. There is a very subtle change in appearance of the mid sigmoid, axial image 105 and adjacent images, with indistinct posterior margin  of the short segment here, and what appears to be some stranding of the fat between this loop, the upper margin of the bladder and an adjacent redundant loop of sigmoid. Very low level inflammation might cause this appearance. No similar findings are appreciated elsewhere.  Some stable, focal linear thickening of the left upper peritoneal margin/adnexa is likely stable scarring from previous hysterectomy. No intrapelvic inflammatory change is seen. Bladder is decompressed. Uterus and ovaries are not identified. No evidence  of ureteral calculus or bladder calculus is seen. Bony structures appear to be intact. There is again advanced degenerative disc disease at L5-S1, with very mild posterior subluxation of L5 on S1. No acute bony abnormality seen.     Impression: Impression: 1. Suspected subtle inflammation of the posterior wall of a short segment of mid sigmoid. No obvious features of diverticulitis such as abscess, extraluminal air, etc., but there are small diverticuli in this region. 2. Fluid-filled normal caliber colon consistent with  diarrhea. 3. Stable appearance of the patient's left liver mass, by history adenoma with previous embolization. 4. No other evidence of acute intra-abdominal intrapelvic disease elsewhere. Electronically Signed: Víctor Zazueta MD  11/9/2023 9:56 PM EST  Workstation ID: [id number]     Results for orders placed during the hospital encounter of 05/31/23    Adult Transthoracic Echo Complete W/ Cont if Necessary Per Protocol    Interpretation Summary    Mildly reduced right ventricular systolic function noted.    There is calcification of the aortic valve.    Estimated right ventricular systolic pressure from tricuspid regurgitation is normal (<35 mmHg).    Mild global LV systolic dysfunction    EF=45-50%    Normal prosthetic MV function    AV calcification      Current medications:  Scheduled Meds:carvedilol, 12.5 mg, Oral, BID With Meals  cefTRIAXone, 2,000 mg, Intravenous, Q24H  escitalopram, 20 mg, Oral, Daily  ferrous sulfate, 325 mg, Oral, Daily With Breakfast  folic acid, 800 mcg, Oral, Daily  insulin lispro, 2-7 Units, Subcutaneous, 4x Daily AC & at Bedtime  Insulin Lispro, 4 Units, Subcutaneous, TID With Meals  metroNIDAZOLE, 500 mg, Intravenous, Q8H  nicotine, 1 patch, Transdermal, Q24H  oxybutynin XL, 5 mg, Oral, Daily  pantoprazole, 40 mg, Intravenous, Q AM  sodium chloride, 10 mL, Intravenous, Q12H      Continuous Infusions:heparin, 14 Units/kg/hr, Last Rate: 14 Units/kg/hr (11/11/23 0657)  Pharmacy to Dose Heparin,       PRN Meds:.  acetaminophen    [DISCONTINUED] senna-docusate sodium **AND** polyethylene glycol **AND** bisacodyl **AND** bisacodyl    dextrose    dextrose    glucagon (human recombinant)    HYDROcodone-acetaminophen    melatonin    nicotine polacrilex    Pharmacy to Dose Heparin    sodium chloride    sodium chloride    Assessment & Plan   Assessment & Plan     Active Hospital Problems    Diagnosis  POA    **GIB (gastrointestinal bleeding) [K92.2]  Yes    GI bleed [K92.2]  Yes    Acute blood loss  anemia [D62]  Yes    MEERA (acute kidney injury) [N17.9]  Yes    H/O heart valve replacement with mechanical valve [Z95.2]  Not Applicable    Chronic diastolic heart failure [I50.32]  Yes    Emphysema/COPD [J43.9]  Yes    Hypertension [I10]  Yes    Type 2 diabetes mellitus without complication, with long-term current use of insulin [E11.9, Z79.4]  Not Applicable    Depression [F32.A]  Yes    Dyslipidemia [E78.5]  Yes    Obstructive sleep apnea syndrome [G47.33]  Yes      Resolved Hospital Problems   No resolved problems to display.        Brief Hospital Course to date:  Di Lopez is a 62 y.o. female  with PMH significant for COPD, HTN, HLD, CAD, depression, CHF, VHD s/p mechanical valve replacement on warfarin, DM2, CAL on CPAP, liver mass, obesity.  She has had diarrhea for 2 weeks, then developed BRBPR on 11/9. She was asked to hold her diuretics, Entresto and Trulicity by PCP last last week due to rising creatinine.    GI bleed - BRBPR   Symptomatic acute blood loss anemia  Warfarin use; INR 3-4 recently  - Hgb was 11 in May, was 7.1 on admission.   - Colonoscopy completed May 2023;  Has scheduled EGD outpatient 11/14/2023 (Dr. Fernandes)  -CT scan with inflammation in the sigmoid colon  - GI consult ,  Sees Dr. Fernandes in clinic.    - Recommend supportive care. CLD. IV antibiotics and anusol suppository  - No further bleeding since admission  - H/H stable  - Continue rocephin/flagyl  - Continue heparin gtt       MEERA  - Baseline creatinine ~0.8-0.9,  - was 1.9 on admission, now 1.8; pt feels swollen   - Hold nephrotoxic medications  - Cr stable following lasix. Will hold on further diuretics today       HFmrEF  - EF 45-50 3/23.   - Holding home entresto due to MEERA. Continue coreg  - s/p lasix. Currently holding for today   - CXR with no acute findings. Normal BNP on admission. Will repeat.     Chronic hypoxic resp failure  - 3L at baseline but states home O2 concentrator is broken  - Will start symbicort and  duo-nebs      UTI  - urine culture gram neg bacilli   -Cefepime was started.  Past UTI was E coli, a nd Klebs, not ESBL .   - Continue rocephin     mechanical mitral valve   - On warfarin, last dose evening of 11/9/2023  - Continue heparin gtt. Will discuss with pharmacy to restart coumadin as no further bleeding and she did receive Vit K     Diabetes mellitus 2 - A1C 6.8   - Hold Trulicity, glimepiride  - Hold Lantus for now, add back as appropriate  - FSBG ACHS  - add bolus insulin      Hypertension  Dyslipidemia  - Currently controlled  - Continue carvedilol  - Continue statin     CAL  - CPAP     Nicotine use  - Vapes  - Nicotine patch  - Cessation counseling    Obesity   - BMI 39. Complicates all aspects of care    Expected Discharge Location and Transportation: PT/OT pending  Expected Discharge   Expected Discharge Date: 11/13/2023; Expected Discharge Time:      DVT prophylaxis:  Medical DVT prophylaxis orders are present.     AM-PAC 6 Clicks Score (PT): 20 (11/10/23 1800)    CODE STATUS:   Code Status and Medical Interventions:   Ordered at: 11/09/23 4966     Code Status (Patient has no pulse and is not breathing):    CPR (Attempt to Resuscitate)     Medical Interventions (Patient has pulse or is breathing):    Full Support       Sarah Sherman,   11/11/23

## 2023-11-11 NOTE — THERAPY DISCHARGE NOTE
Patient Name: Di Lopez  : 1960    MRN: [id number]                              Today's Date: 2023       Admit Date: 2023    Visit Dx:     ICD-10-CM ICD-9-CM   1. Gastrointestinal hemorrhage, unspecified gastrointestinal hemorrhage type  K92.2 578.9     Patient Active Problem List   Diagnosis    Arteriovenous malformation of gastrointestinal tract    Depression    Type 2 diabetes mellitus without complication, with long-term current use of insulin    Dyslipidemia    Emphysema/COPD    Hypertension    Insomnia    Morbid obesity    Obstructive sleep apnea syndrome    Normocytic anemia    Sliding hiatal hernia    Vitamin B12 deficiency    Iron deficiency    Iron malabsorption    Bronchiectasis without complication    Electronic cigarette use    Chronic diastolic heart failure    Left bundle branch hemiblock    Chronic respiratory failure with hypoxia    Body aches    Headaches due to old head injury    History of tobacco use, presenting hazards to health    Multiple nodules of lung    Nocturnal hypoxemia    Screening for colon cancer    Abnormal CT scan    Wellness examination    Melena    GIB (gastrointestinal bleeding)    Acute blood loss anemia    MEERA (acute kidney injury)    H/O heart valve replacement with mechanical valve    GI bleed     Past Medical History:   Diagnosis Date    Allergic     Anemia     Anticoagulated     WARFARIN    Arteriovenous malformation of gastrointestinal tract 2016    Arthritis     Asthma     Black hairy tongue     CHF (congestive heart failure)     Chronic back pain     Coronary artery disease     Depression 2016    Diabetes mellitus     Dyslipidemia 2016    Elevated cholesterol     Emphysema/COPD 2016    Emphysema/COPD 2016    Fatigue     GERD (gastroesophageal reflux disease)     Headache     Herniated cervical disc     History of echocardiogram 2013    Left ventricular systolic function at the lower limits of normal; EF  50-55%; mild MR; mechanical prosthetic mitral valve present; mod TR    History of transfusion     NO REACTION; [facility]    Hyperlipidemia     Hypertension     Insomnia 05/12/2016    Iron deficiency     A.  The patient was treated with IV iron replacement. B.  Secondary to GI bleed, status post EGD with cauterization of blood vessel to the duodenum, 7/16/2014.    Left bundle branch hemiblock 10/12/2020    Left shoulder strain     Liver disease     Liver mass     Long term current use of anticoagulant 05/12/2016    Low back pain     Lung nodules     PER DR. LEVI'S NOTE 10/22    Morbid obesity 05/12/2016    Neuromuscular disorder 7/28/2016    Obstructive sleep apnea syndrome 05/12/2016    Description: A.  Moderate; CPAP therapy..    Sliding hiatal hernia 07/28/2016    Description: A.  Reported on EGD, 12/30/2011.    Type 2 diabetes mellitus 05/12/2016    Urinary tract infection     Uterine cancer     HYSTERECTOMY    Vitamin B12 deficiency     A.  The patient was treated with vitamin B12 replacement.    Wears eyeglasses      Past Surgical History:   Procedure Laterality Date    BILATERAL OOPHORECTOMY  2009    Status post bilateral oophorectomy secondary to ovarian cysts     BREAST BIOPSY Right     STEREOTACTIC    BREAST EXCISIONAL BIOPSY Bilateral     CARDIAC ELECTROPHYSIOLOGY PROCEDURE N/A 10/15/2020    Procedure: Biventricular Device Insertion;  Surgeon: Nadir Everett MD;  Location: [facility];  Service: Cardiology;  Laterality: N/A;    CARDIAC SURGERY  2003    CARPAL TUNNEL RELEASE Bilateral     COLONOSCOPY  12/28/2012    COLONOSCOPY N/A 5/23/2023    Procedure: COLONOSCOPY;  Surgeon: Gt Fernandes MD;  Location: [facility];  Service: Gastroenterology;  Laterality: N/A;    CYSTECTOMY      removal of ganglion cyst from left wrist    ENDOSCOPY  12/30/2011    DIAGNOSTIC ESOPHAGOGASTRODUODENOSCOPY    HYSTERECTOMY  1995    A.  Status post hysterectomy for early stage endometrial cancer  done in 1995.    MITRAL VALVE REPLACEMENT  03/03/2008    Dr. Hima Barreto; MECHANICAL    OTHER SURGICAL HISTORY      LIVER MASS BLOOD SUPPLY CLIPPED ON RIGHT AND LEFT SIDE PER PT      General Information       Row Name 11/11/23 1031          Physical Therapy Time and Intention    Document Type discharge evaluation/summary  -     Mode of Treatment physical therapy  -       Row Name 11/11/23 1031          General Information    Patient Profile Reviewed yes  -ML     Prior Level of Function independent:;all household mobility;community mobility;gait;transfer;ADL's;home management;cooking;cleaning;driving;shopping  -     Existing Precautions/Restrictions oxygen therapy device and L/min;fall  -     Barriers to Rehab medically complex  -       Row Name 11/11/23 1031          Living Environment    People in Home child(juan), dependent  -       Row Name 11/11/23 1031          Home Main Entrance    Number of Stairs, Main Entrance none  -[facility] Name 11/11/23 1031          Stairs Within Home, Primary    Number of Stairs, Within Home, Primary none  -       Row Name 11/11/23 1031          Cognition    Orientation Status (Cognition) oriented x 4  -ML       Row Name 11/11/23 1031          Safety Issues, Functional Mobility    Safety Issues Affecting Function (Mobility) insight into deficits/self-awareness  -               User Key  (r) = Recorded By, (t) = Taken By, (c) = Cosigned By      Initials Name Provider Type     Ameena Sosa Physical Therapist                   Mobility       Row Name 11/11/23 1032          Bed Mobility    Comment, (Bed Mobility) Patient found seated at EOB  -       Row Name 11/11/23 1032          Sit-Stand Transfer    Sit-Stand [place] (Transfers) independent  -       Row Name 11/11/23 1032          Gait/Stairs (Locomotion)    [place] Level (Gait) independent  -ML     Distance in Feet (Gait) 35  -ML     Deviations/Abnormal Patterns (Gait) sabra decreased;gait speed  decreased  -ML     Comment, (Gait/Stairs) Patient ambulates with step through gait pattern, no loss of balance and patient denies dyspnea during ambulation.  -ML               User Key  (r) = Recorded By, (t) = Taken By, (c) = Cosigned By      Initials Name Provider Type    ML Ameena Sosa Physical Therapist                   Obj/Interventions       Row Name 11/11/23 1033          Range of Motion Comprehensive    General Range of Motion bilateral lower extremity ROM WFL  -ML       Row Name 11/11/23 1033          Strength Comprehensive (MMT)    General Manual Muscle Testing (MMT) Assessment no strength deficits identified  -ML       Row Name 11/11/23 1033          Balance    Balance Assessment sitting static balance;sitting dynamic balance;sit to stand dynamic balance;standing static balance;standing dynamic balance  -ML     Static Sitting Balance independent  -ML     Dynamic Sitting Balance independent  -ML     Position, Sitting Balance unsupported;sitting edge of bed  -ML     Sit to Stand Dynamic Balance independent  -ML     Static Standing Balance independent  -ML     Dynamic Standing Balance independent  -ML     Position/Device Used, Standing Balance unsupported  -ML     Balance Interventions sitting;standing;sit to stand;occupation based/functional task  -ML       Row Name 11/11/23 1033          Sensory Assessment (Somatosensory)    Sensory Assessment (Somatosensory) LE sensation intact  -ML               User Key  (r) = Recorded By, (t) = Taken By, (c) = Cosigned By      Initials Name Provider Type    ML Ameena Sosa Physical Therapist                   Goals/Plan    No documentation.                  Clinical Impression       Row Name 11/11/23 1033          Pain    Pretreatment Pain Rating 0/10 - no pain  -ML     Posttreatment Pain Rating 0/10 - no pain  -ML       Row Name 11/11/23 1033          Plan of Care Review    Plan of Care Reviewed With patient  -ML     Outcome Evaluation Physical therapy evaluation  complete. Patient independent with transfers and ambulation. The patient presents near baseline for functional mobility, no acute PT needs identified.  -ML       Row Name 11/11/23 1033          Therapy Assessment/Plan (PT)    Criteria for Skilled Interventions Met (PT) no;no problems identified which require skilled intervention;does not meet criteria for skilled intervention  -ML     Therapy Frequency (PT) evaluation only  -ML       Row Name 11/11/23 1033          Vital Signs    Pre SpO2 (%) 94  -ML     O2 Delivery Pre Treatment nasal cannula  -ML     Post SpO2 (%) 93  -ML     O2 Delivery Post Treatment nasal cannula  -ML     Pre Patient Position Sitting  -ML     Intra Patient Position Standing  -ML     Post Patient Position Sitting  -ML       Row Name 11/11/23 1033          Positioning and Restraints    Pre-Treatment Position in bed  -ML     Post Treatment Position chair  -ML     In Chair notified nsg;reclined;call light within reach;encouraged to call for assist;waffle cushion;legs elevated  RN ok'd no chair alarm  -ML               User Key  (r) = Recorded By, (t) = Taken By, (c) = Cosigned By      Initials Name Provider Type    ML Ameena Sosa Physical Therapist                   Outcome Measures       Row Name 11/11/23 1035          How much help from another person do you currently need...    Turning from your back to your side while in flat bed without using bedrails? 4  -ML     Moving from lying on back to sitting on the side of a flat bed without bedrails? 4  -ML     Moving to and from a bed to a chair (including a wheelchair)? 4  -ML     Standing up from a chair using your arms (e.g., wheelchair, bedside chair)? 4  -ML     Climbing 3-5 steps with a railing? 4  -ML     To walk in hospital room? 4  -ML     AM-PAC 6 Clicks Score (PT) 24  -ML     Highest level of mobility 8 --> Walked 250 feet or more  -ML       Row Name 11/11/23 1035          Functional Assessment    Outcome Measure Options AM-PAC 6 Clicks  Basic Mobility (PT)  -               User Key  (r) = Recorded By, (t) = Taken By, (c) = Cosigned By      Initials Name Provider Type     Ameena Sosa Physical Therapist                  Physical Therapy Education       Title: PT OT SLP Therapies (In Progress)       Topic: Physical Therapy (In Progress)       Point: Mobility training (Done)       Learning Progress Summary             Patient Acceptance, E, VU by  at 11/11/2023 1035                         Point: Home exercise program (Not Started)       Learner Progress:  Not documented in this visit.              Point: Body mechanics (Not Started)       Learner Progress:  Not documented in this visit.              Point: Precautions (Done)       Learning Progress Summary             Patient Acceptance, E, VU by  at 11/11/2023 1035                                         User Key       Initials Effective Dates Name Provider Type Discipline     04/22/21 -  Ameena Sosa Physical Therapist PT                  PT Recommendation and Plan     Plan of Care Reviewed With: patient  Outcome Evaluation: Physical therapy evaluation complete. Patient independent with transfers and ambulation. The patient presents near baseline for functional mobility, no acute PT needs identified.     Time Calculation:   PT Evaluation Complexity  History, PT Evaluation Complexity: 1-2 personal factors and/or comorbidities  Examination of Body Systems (PT Eval Complexity): total of 3 or more elements  Clinical Presentation (PT Evaluation Complexity): evolving  Clinical Decision Making (PT Evaluation Complexity): moderate complexity  Overall Complexity (PT Evaluation Complexity): moderate complexity     PT Charges       Row Name 11/11/23 1036             Time Calculation    Start Time 1000  -ML      PT Received On 11/11/23  -ML         Untimed Charges    PT Eval/Re-eval Minutes 35  -ML         Total Minutes    Untimed Charges Total Minutes 35  -ML       Total Minutes 35  -ML                 User Key  (r) = Recorded By, (t) = Taken By, (c) = Cosigned By      Initials Name Provider Type    Ameena Vu Physical Therapist                  Therapy Charges for Today       Code Description Service Date Service Provider Modifiers Qty    63260823290 HC PT EVAL MOD COMPLEXITY 3 11/11/2023 Ameena Sosa GP 1            PT G-Codes  Outcome Measure Options: AM-PAC 6 Clicks Basic Mobility (PT)  AM-PAC 6 Clicks Score (PT): 24    PT Discharge Summary  Anticipated Discharge Disposition (PT): home  Reason for Discharge: Independent, At baseline function    Ameena Sosa  11/11/2023

## 2023-11-11 NOTE — PROGRESS NOTES
HEPARIN INFUSION  Di Lopez is a  62 y.o. female receiving heparin infusion.     Therapy for ([facility]Card[facility]  Patient Weight: 90.9 kg  Initial Bolus (Y/N):  No  Any Bolus (Y/N):  Yes    Signs or Symptoms of Bleedin episodes of BRBPR on admission, ongoing anemia     Cardiac or Other (Not VTE)  Initial rate: 12 units/kg/hr (Max 1,000 units/hr)   Anti Xa Rebolus Infusion Hold time Change infusion Dose (Units/kg/hr) Next Anti Xa or aPTT Level Due   < 0.11 50 Units/kg  (4000 Units Max) None Increase by  3 Units/kg/hr 6 hours   0.11- 0.19 25 Units/kg  (2000 Units Max) None Increase by  2 Units/kg/hr 6 hours   0.2 - 0.29 0 None Increase by  1 Units/kg/hr 6 hours   0.3 - 0.5 0 None No Change 6 hours (after 2 consecutive levels in range check qAM)   0.51 - 0.6 0 None Decrease by  1 Units/kg/hr 6 hours   0.61 - 0.8 0 30 Minutes Decrease by  2 Units/kg/hr 6 hours   0.81 - 1 0 60 Minutes Decrease by  3 Units/kg/hr 6 hours   >1 0 Hold  After Anti Xa less than 0.5 decrease previous rate by  4 Units/kg/hr  Every 2 hours until Anti Xa  less than 0.5 then when infusion restarts in 6 hours     Recommend Xa every 6 hours.     Results from last 7 days   Lab Units 23  0810 23  0447 11/10/23  1902 11/10/23  1126 11/10/23  0725 11/10/23  0437 11/10/23  0154 23  1955 23  1629 23  1624   INR   --  1.09 1.21*  --   --  2.84* 2.88* 3.31*   < >  --    HEMOGLOBIN g/dL 7.5*  --  7.6* 7.8*   < > 7.7* 7.1* 7.2*  --  9.3*   HEMATOCRIT % 23.6*  --  23.9* 24.0*   < > 24.2* 21.9* 22.9*  --  29.3*   PLATELETS 10*3/mm3  --   --   --   --   --   --  310 289  --  320    < > = values in this interval not displayed.       Date   Time   Anti-Xa Current Rate (Unit/kg/hr) Bolus   (Units) Rate Change   (Unit/kg/hr) New Rate (Unit/kg/hr) Next   Anti-Xa Comments  Pump Check Daily   11/10 0200 0.10    INR 2.88 --- - -- -- 0600 D/w APRN, hold until INR >2.5. Currently 2.88, ordered repeat INR @ 0600    11/10 0437 --    INR 2.84 -- -- -- -- 1800 Continue to hold heparin gtt until until <2.5; repeat INR at 1800.   11/10 1902 --    INR 1.21 -- -- +11 11 0400 DW RN Tom. INR returned at 1.21 . Starting heparin gtt to cover mechanical valves     11/11 0600 0.1 11 4000 +3 14 1200 D/w RN, no reported bleeding overnight   11/11 1145 0.25 14 -- +1 15 1900 D/W RN- warfarin resumed       Sofia Diaz, PharmD  11/11/2023  12:48 EST

## 2023-11-11 NOTE — PROGRESS NOTES
"Pharmacy Consult  -  Warfarin    Di Lopez is a  62 y.o. female   Height - 152.4 cm (60\")  Weight - 91.8 kg (202 lb 4.8 oz)    Consulting Provider: - Dr. Sherman  Indication: - Mechanical MVR  Goal INR: 2.5 - 3.5   Home Regimen:   - 4 mg daily, except 4.5 mg on Sat/Sun      Bridge Therapy: Yes   and unfractionated heparin    Drug-Drug Interactions with current regimen:   Metronidazole - increase INR              Heparin - increase bleed risk    Warfarin Dosing During Admission:    Date  11/10 11/11          INR  4.13 1.09          Dose  IV Vit K given (6mg)               Education Provided:  11/11/23 - provided education pamphlet and answered questions.      Discharge Follow up:   Following Provider - Dr. Everett   Follow up time range or appointment -      Labs:    Results from last 7 days   Lab Units 11/11/23  0810 11/11/23  0447 11/10/23  1902 11/10/23  1126 11/10/23  0742 11/10/23  0725 11/10/23  0437 11/10/23  0154 11/09/23 1955 11/06/23  1629   INR   --  1.09 1.21*  --   --   --  2.84* 2.88* 3.31* 4.13*   APTT seconds  --   --   --   --   --   --   --  78.8  --   --    HEMOGLOBIN g/dL 7.5*  --  7.6* 7.8* 8.3* 8.5* 7.7* 7.1* 7.2*  --    HEMATOCRIT % 23.6*  --  23.9* 24.0* 25.6* 26.5* 24.2* 21.9* 22.9*  --      Results from last 7 days   Lab Units 11/11/23  0447 11/10/23  0154 11/09/23 1955   SODIUM mmol/L 141 144 142   POTASSIUM mmol/L 3.7 3.7 4.1   CHLORIDE mmol/L 101 109* 107   CO2 mmol/L 27.0 25.0 24.0   BUN mg/dL 25* 22 21   CREATININE mg/dL 1.88* 1.79* 1.88*   CALCIUM mg/dL 8.4* 8.2* 8.3*   BILIRUBIN mg/dL  --   --  0.3   ALK PHOS U/L  --   --  167*   ALT (SGPT) U/L  --   --  48*   AST (SGOT) U/L  --   --  32   GLUCOSE mg/dL 143* 197* 280*       Current dietary intake: Clear liquid diet       Assessment/Plan:     Pharmacy consulted to resume home warfarin.  Patient admitted with supratherapeutic INR of 4.13 and GIB; given IV Vit K 5mg on 11/10.  INR is now subtherapeutic at 1.09.  Will give " warfarin 6mg x 1 today, then begin warfarin 3 mg daily (due to metronidazole interaction and supratherapeutic INR on home dose).  Patient is being bridged with heparin drip.   2. Continue to check INR daily, dietary intake, drug-drug interactions and s/sx of bleeding or clotting.  3. Pharmacy will continue to follow.         Sofia Diaz, PharmD  11/11/2023  10:56 EST

## 2023-11-11 NOTE — PLAN OF CARE
Goal Outcome Evaluation:  Plan of Care Reviewed With: patient           Outcome Evaluation: Physical therapy evaluation complete. Patient independent with transfers and ambulation. The patient presents near baseline for functional mobility, no acute PT needs identified.      Anticipated Discharge Disposition (PT): home

## 2023-11-12 LAB
ANION GAP SERPL CALCULATED.3IONS-SCNC: 11 MMOL/L (ref 5–15)
BACTERIA SPEC AEROBE CULT: ABNORMAL
BUN SERPL-MCNC: 22 MG/DL (ref 8–23)
BUN/CREAT SERPL: 13.8 (ref 7–25)
CALCIUM SPEC-SCNC: 8.8 MG/DL (ref 8.6–10.5)
CHLORIDE SERPL-SCNC: 102 MMOL/L (ref 98–107)
CO2 SERPL-SCNC: 29 MMOL/L (ref 22–29)
CREAT SERPL-MCNC: 1.59 MG/DL (ref 0.57–1)
DEPRECATED RDW RBC AUTO: 46.9 FL (ref 37–54)
EGFRCR SERPLBLD CKD-EPI 2021: 36.8 ML/MIN/1.73
ERYTHROCYTE [DISTWIDTH] IN BLOOD BY AUTOMATED COUNT: 15 % (ref 12.3–15.4)
GLUCOSE BLDC GLUCOMTR-MCNC: 106 MG/DL (ref 70–130)
GLUCOSE BLDC GLUCOMTR-MCNC: 127 MG/DL (ref 70–130)
GLUCOSE BLDC GLUCOMTR-MCNC: 178 MG/DL (ref 70–130)
GLUCOSE BLDC GLUCOMTR-MCNC: 197 MG/DL (ref 70–130)
GLUCOSE SERPL-MCNC: 123 MG/DL (ref 65–99)
HCT VFR BLD AUTO: 21.8 % (ref 34–46.6)
HGB BLD-MCNC: 7.2 G/DL (ref 12–15.9)
INR PPP: 1.16 (ref 0.89–1.12)
MCH RBC QN AUTO: 28.1 PG (ref 26.6–33)
MCHC RBC AUTO-ENTMCNC: 33 G/DL (ref 31.5–35.7)
MCV RBC AUTO: 85.2 FL (ref 79–97)
PLATELET # BLD AUTO: 274 10*3/MM3 (ref 140–450)
PMV BLD AUTO: 10.3 FL (ref 6–12)
POTASSIUM SERPL-SCNC: 3.5 MMOL/L (ref 3.5–5.2)
PROTHROMBIN TIME: 14.9 SECONDS (ref 12.2–14.5)
RBC # BLD AUTO: 2.56 10*6/MM3 (ref 3.77–5.28)
SODIUM SERPL-SCNC: 142 MMOL/L (ref 136–145)
UFH PPP CHRO-ACNC: 0.42 IU/ML (ref 0.3–0.7)
UFH PPP CHRO-ACNC: 0.46 IU/ML (ref 0.3–0.7)
WBC NRBC COR # BLD: 12.8 10*3/MM3 (ref 3.4–10.8)

## 2023-11-12 PROCEDURE — 85027 COMPLETE CBC AUTOMATED: CPT | Performed by: INTERNAL MEDICINE

## 2023-11-12 PROCEDURE — 85520 HEPARIN ASSAY: CPT

## 2023-11-12 PROCEDURE — 63710000001 INSULIN LISPRO (HUMAN) PER 5 UNITS: Performed by: NURSE PRACTITIONER

## 2023-11-12 PROCEDURE — P9016 RBC LEUKOCYTES REDUCED: HCPCS

## 2023-11-12 PROCEDURE — 99232 SBSQ HOSP IP/OBS MODERATE 35: CPT | Performed by: NURSE PRACTITIONER

## 2023-11-12 PROCEDURE — G0378 HOSPITAL OBSERVATION PER HR: HCPCS

## 2023-11-12 PROCEDURE — 25010000002 HEPARIN (PORCINE) 25000-0.45 UT/250ML-% SOLUTION

## 2023-11-12 PROCEDURE — 63710000001 INSULIN LISPRO (HUMAN) PER 5 UNITS: Performed by: INTERNAL MEDICINE

## 2023-11-12 PROCEDURE — 25010000002 METRONIDAZOLE 500 MG/100ML SOLUTION: Performed by: INTERNAL MEDICINE

## 2023-11-12 PROCEDURE — 85610 PROTHROMBIN TIME: CPT

## 2023-11-12 PROCEDURE — 94799 UNLISTED PULMONARY SVC/PX: CPT

## 2023-11-12 PROCEDURE — 86900 BLOOD TYPING SEROLOGIC ABO: CPT

## 2023-11-12 PROCEDURE — 82948 REAGENT STRIP/BLOOD GLUCOSE: CPT

## 2023-11-12 PROCEDURE — 94664 DEMO&/EVAL PT USE INHALER: CPT

## 2023-11-12 PROCEDURE — 99232 SBSQ HOSP IP/OBS MODERATE 35: CPT | Performed by: INTERNAL MEDICINE

## 2023-11-12 PROCEDURE — 36430 TRANSFUSION BLD/BLD COMPNT: CPT

## 2023-11-12 PROCEDURE — 25010000002 CEFTRIAXONE PER 250 MG: Performed by: INTERNAL MEDICINE

## 2023-11-12 PROCEDURE — 80048 BASIC METABOLIC PNL TOTAL CA: CPT | Performed by: INTERNAL MEDICINE

## 2023-11-12 RX ORDER — FUROSEMIDE 10 MG/ML
20 INJECTION INTRAMUSCULAR; INTRAVENOUS AS NEEDED
Status: ACTIVE | OUTPATIENT
Start: 2023-11-12 | End: 2023-11-13

## 2023-11-12 RX ORDER — HYDROCORTISONE ACETATE 25 MG/1
25 SUPPOSITORY RECTAL 2 TIMES DAILY
Status: DISPENSED | OUTPATIENT
Start: 2023-11-12 | End: 2023-11-17

## 2023-11-12 RX ADMIN — INSULIN LISPRO 4 UNITS: 100 INJECTION, SOLUTION INTRAVENOUS; SUBCUTANEOUS at 13:28

## 2023-11-12 RX ADMIN — IPRATROPIUM BROMIDE AND ALBUTEROL SULFATE 3 ML: 2.5; .5 SOLUTION RESPIRATORY (INHALATION) at 12:41

## 2023-11-12 RX ADMIN — INSULIN LISPRO 4 UNITS: 100 INJECTION, SOLUTION INTRAVENOUS; SUBCUTANEOUS at 18:07

## 2023-11-12 RX ADMIN — HYDROCODONE BITARTRATE AND ACETAMINOPHEN 1 TABLET: 5; 325 TABLET ORAL at 11:55

## 2023-11-12 RX ADMIN — WARFARIN SODIUM 3 MG: 3 TABLET ORAL at 18:06

## 2023-11-12 RX ADMIN — INSULIN LISPRO 4 UNITS: 100 INJECTION, SOLUTION INTRAVENOUS; SUBCUTANEOUS at 09:18

## 2023-11-12 RX ADMIN — Medication 10 ML: at 22:04

## 2023-11-12 RX ADMIN — OXYBUTYNIN CHLORIDE 5 MG: 5 TABLET, EXTENDED RELEASE ORAL at 09:18

## 2023-11-12 RX ADMIN — Medication 1 PATCH: at 03:17

## 2023-11-12 RX ADMIN — CARVEDILOL 12.5 MG: 12.5 TABLET, FILM COATED ORAL at 18:06

## 2023-11-12 RX ADMIN — ESCITALOPRAM OXALATE 20 MG: 20 TABLET ORAL at 09:18

## 2023-11-12 RX ADMIN — PANTOPRAZOLE SODIUM 40 MG: 40 INJECTION, POWDER, LYOPHILIZED, FOR SOLUTION INTRAVENOUS at 06:50

## 2023-11-12 RX ADMIN — CARVEDILOL 12.5 MG: 12.5 TABLET, FILM COATED ORAL at 09:17

## 2023-11-12 RX ADMIN — INSULIN LISPRO 2 UNITS: 100 INJECTION, SOLUTION INTRAVENOUS; SUBCUTANEOUS at 18:07

## 2023-11-12 RX ADMIN — IPRATROPIUM BROMIDE AND ALBUTEROL SULFATE 3 ML: 2.5; .5 SOLUTION RESPIRATORY (INHALATION) at 16:50

## 2023-11-12 RX ADMIN — HEPARIN SODIUM 18 UNITS/KG/HR: 10000 INJECTION, SOLUTION INTRAVENOUS at 11:54

## 2023-11-12 RX ADMIN — FOLIC ACID TAB 400 MCG 800 MCG: 400 TAB at 09:18

## 2023-11-12 RX ADMIN — CEFTRIAXONE 2000 MG: 2 INJECTION, POWDER, FOR SOLUTION INTRAMUSCULAR; INTRAVENOUS at 18:07

## 2023-11-12 RX ADMIN — IPRATROPIUM BROMIDE AND ALBUTEROL SULFATE 3 ML: 2.5; .5 SOLUTION RESPIRATORY (INHALATION) at 08:06

## 2023-11-12 RX ADMIN — Medication 5 MG: at 22:03

## 2023-11-12 RX ADMIN — METRONIDAZOLE 500 MG: 500 INJECTION, SOLUTION INTRAVENOUS at 22:03

## 2023-11-12 RX ADMIN — METRONIDAZOLE 500 MG: 500 INJECTION, SOLUTION INTRAVENOUS at 06:43

## 2023-11-12 RX ADMIN — BUDESONIDE AND FORMOTEROL FUMARATE DIHYDRATE 2 PUFF: 160; 4.5 AEROSOL RESPIRATORY (INHALATION) at 08:06

## 2023-11-12 RX ADMIN — ACETAMINOPHEN 650 MG: 325 TABLET ORAL at 09:23

## 2023-11-12 RX ADMIN — INSULIN LISPRO 2 UNITS: 100 INJECTION, SOLUTION INTRAVENOUS; SUBCUTANEOUS at 22:03

## 2023-11-12 RX ADMIN — METRONIDAZOLE 500 MG: 500 INJECTION, SOLUTION INTRAVENOUS at 16:15

## 2023-11-12 RX ADMIN — IPRATROPIUM BROMIDE AND ALBUTEROL SULFATE 3 ML: 2.5; .5 SOLUTION RESPIRATORY (INHALATION) at 19:41

## 2023-11-12 RX ADMIN — BUDESONIDE AND FORMOTEROL FUMARATE DIHYDRATE 2 PUFF: 160; 4.5 AEROSOL RESPIRATORY (INHALATION) at 19:40

## 2023-11-12 RX ADMIN — FERROUS SULFATE TAB 325 MG (65 MG ELEMENTAL FE) 325 MG: 325 (65 FE) TAB at 09:18

## 2023-11-12 RX ADMIN — Medication 10 ML: at 09:18

## 2023-11-12 NOTE — PROGRESS NOTES
"Pharmacy Consult  -  Warfarin    Di Lopez is a  62 y.o. female   Height - 152.4 cm (60\")  Weight - 91.8 kg (202 lb 4.8 oz)    Consulting Provider: - Dr. Sherman  Indication: - Mechanical MVR  Goal INR: 2.5 - 3.5   Home Regimen:   - 4 mg daily, except 4.5 mg on Sat/Sun      Bridge Therapy: Yes   and unfractionated heparin    Drug-Drug Interactions with current regimen:   Metronidazole (major) - increase INR              Heparin - increase bleed risk    Warfarin Dosing During Admission:    Date  11/10 11/11 11/12         INR  4.13 1.09 1.16         Dose  IV Vit K given 6mg (3mg)              Education Provided:  11/11/23 - provided education pamphlet and answered questions.      Discharge Follow up:   Following Provider - Dr. Everett   Follow up time range or appointment -      Labs:    Results from last 7 days   Lab Units 11/12/23  0148 11/11/23  1844 11/11/23  0810 11/11/23  0447 11/10/23  1902 11/10/23  1126 11/10/23  0742 11/10/23  0725 11/10/23  0437 11/10/23  0154 11/09/23  1955 11/06/23  1629   INR  1.16*  --   --  1.09 1.21*  --   --   --  2.84* 2.88* 3.31* 4.13*   APTT seconds  --   --   --   --   --   --   --   --   --  78.8  --   --    HEMOGLOBIN g/dL 7.2* 7.0* 7.5*  --  7.6* 7.8* 8.3* 8.5* 7.7* 7.1* 7.2*  --    HEMATOCRIT % 21.8* 21.3* 23.6*  --  23.9* 24.0* 25.6* 26.5* 24.2* 21.9* 22.9*  --      Results from last 7 days   Lab Units 11/12/23  0148 11/11/23  0447 11/10/23  0154 11/09/23  1955   SODIUM mmol/L 142 141 144 142   POTASSIUM mmol/L 3.5 3.7 3.7 4.1   CHLORIDE mmol/L 102 101 109* 107   CO2 mmol/L 29.0 27.0 25.0 24.0   BUN mg/dL 22 25* 22 21   CREATININE mg/dL 1.59* 1.88* 1.79* 1.88*   CALCIUM mg/dL 8.8 8.4* 8.2* 8.3*   BILIRUBIN mg/dL  --   --   --  0.3   ALK PHOS U/L  --   --   --  167*   ALT (SGPT) U/L  --   --   --  48*   AST (SGOT) U/L  --   --   --  32   GLUCOSE mg/dL 123* 143* 197* 280*       Current dietary intake: Clear liquid diet; 100% lunch (11/11)      Assessment/Plan: "     Pharmacy consulted to resume home warfarin.  Patient admitted with supratherapeutic INR of 4.13 and GIB; given IV Vit K 5mg on 11/10.  INR today is subtherapeutic 1.16 after warfarin 6mg x 1 dose.  Will continue with plan for reduced dose of warfarin 3 mg daily (due to metronidazole interaction and supratherapeutic INR on home dose).  Patient is being bridged with heparin drip.   2. Continue to check INR daily, dietary intake, drug-drug interactions and s/sx of bleeding or clotting.  3. Pharmacy will continue to follow.         Sofia Diaz, PharmD  11/12/2023  06:50 EST

## 2023-11-12 NOTE — PROGRESS NOTES
[facility] Medicine Services  PROGRESS NOTE    Patient Name: Di Lopez  : 1960  MRN: [id number]    Date of Admission: 2023  Primary Care Physician: Davina Santiago DO    Subjective   Subjective     CC:  SOA, BRBPR    HPI:  No acute events. No futher bleeding noted. States breathing is a bit better. Would like diet advanced.       Objective   Objective     Vital Signs:   Temp:  [98 °F (36.7 °C)-98.8 °F (37.1 °C)] 98.2 °F (36.8 °C)  Heart Rate:  [59-79] 59  Resp:  [16-18] 18  BP: (116-154)/(48-68) 116/56  Flow (L/min):  [3-3.5] 3     Physical Exam:  Constitutional: No acute distress, awake, alert; obese   HENT: NCAT, mucous membranes moist  Respiratory: Clear to auscultation bilaterally, respiratory effort normal; no wheeze  Cardiovascular: RRR, no murmurs, rubs, or gallops  Gastrointestinal: Positive bowel sounds, soft, nontender, nondistended  Musculoskeletal: No bilateral ankle edema  Psychiatric: Appropriate affect, cooperative  Neurologic: Oriented x 3, strength symmetric in all extremities, Cranial Nerves grossly intact to confrontation, speech clear  Skin: No rashes      Results Reviewed:  LAB RESULTS:      Lab 23  0916 23  0148 23  1844 23  1145 23  0810 23  0447 11/10/23  1902 11/10/23  1126 11/10/23  0725 11/10/23  0437 11/10/23  0154 11/10/23  0027 23  2336 23  1955 23  1955 23  1629 23  1624   WBC  --  12.80*  --   --   --   --   --   --   --   --  12.70*  --   --   --  12.77*  --  10.96*   HEMOGLOBIN  --  7.2* 7.0*  --  7.5*  --  7.6* 7.8*   < > 7.7* 7.1*  --   --   --  7.2*  --  9.3*   HEMATOCRIT  --  21.8* 21.3*  --  23.6*  --  23.9* 24.0*   < > 24.2* 21.9*  --   --   --  22.9*  --  29.3*   PLATELETS  --  274  --   --   --   --   --   --   --   --  310  --   --   --  289  --  320   NEUTROS ABS  --   --   --   --   --   --   --   --   --   --  9.12*  --   --   --  9.37*  --  7.42*   IMMATURE  GRANS (ABS)  --   --   --   --   --   --   --   --   --   --  0.14*  --   --   --  0.18*  --  0.17*   LYMPHS ABS  --   --   --   --   --   --   --   --   --   --  2.72  --   --   --  2.32  --  2.36   MONOS ABS  --   --   --   --   --   --   --   --   --   --  0.59  --   --   --  0.70  --  0.82   EOS ABS  --   --   --   --   --   --   --   --   --   --  0.10  --   --   --  0.16  --  0.16   MCV  --  85.2  --   --   --   --   --   --   --   --  88.0  --   --   --  89.8  --  86.9   PROCALCITONIN  --   --   --   --   --   --   --   --   --   --   --   --  0.07  --   --   --   --    LACTATE  --   --   --   --   --   --   --   --   --   --   --  1.5  --   --   --   --   --    PROTIME  --  14.9*  --   --   --  14.2 15.4*  --   --  30.1* 30.4*  --   --   --  33.9*   < >  --    APTT  --   --   --   --   --   --   --   --   --   --  78.8  --   --   --   --   --   --    HEPARIN ANTI-XA 0.42 0.46 0.10* 0.25*  --  0.10*  --   --   --   --  0.10*  --   --    < >  --   --   --     < > = values in this interval not displayed.         Lab 11/12/23  0148 11/11/23  0447 11/10/23  0154 11/09/23 1955 11/06/23  1624   SODIUM 142 141 144 142 144   POTASSIUM 3.5 3.7 3.7 4.1 3.9   CHLORIDE 102 101 109* 107 109*   CO2 29.0 27.0 25.0 24.0 27.0   ANION GAP 11.0 13.0 10.0 11.0 8.0   BUN 22 25* 22 21 13   CREATININE 1.59* 1.88* 1.79* 1.88* 1.45*   EGFR 36.8* 30.1* 31.9* 30.1* 41.1*   GLUCOSE 123* 143* 197* 280* 107*   CALCIUM 8.8 8.4* 8.2* 8.3* 8.8   MAGNESIUM  --   --  2.0 2.0  --          Lab 11/09/23 1955   TOTAL PROTEIN 6.1   ALBUMIN 3.5   GLOBULIN 2.6   ALT (SGPT) 48*   AST (SGOT) 32   BILIRUBIN 0.3   ALK PHOS 167*   LIPASE 25         Lab 11/12/23  0148 11/11/23  0447 11/10/23  1902 11/10/23  0437 11/10/23  0154 11/09/23  2336 11/09/23 1955   PROBNP  --  833.7  --   --   --   --  830.8   HSTROP T  --   --   --   --   --  10 11   PROTIME 14.9* 14.2 15.4* 30.1* 30.4*  --  33.9*   INR 1.16* 1.09 1.21* 2.84* 2.88*  --  3.31*             Lab  11/09/23 2142 11/06/23  1624   IRON  --  38   IRON SATURATION (TSAT)  --  10*   TIBC  --  362   TRANSFERRIN  --  243   FERRITIN  --  134.20   ABO TYPING O  --    RH TYPING Positive  --    ANTIBODY SCREEN Negative  --          Brief Urine Lab Results  (Last result in the past 365 days)        Color   Clarity   Blood   Leuk Est   Nitrite   Protein   CREAT   Urine HCG        11/09/23 2320 Yellow   Clear   Negative   Small (1+)   Positive   100 mg/dL (2+)                   Microbiology Results Abnormal       Procedure Component Value - Date/Time    Blood Culture - Blood, Arm, Right [[id number]]  (Normal) Collected: 11/10/23 0027    Lab Status: Preliminary result Specimen: Blood from Arm, Right Updated: 11/12/23 0100     Blood Culture No growth at 2 days    Blood Culture - Blood, Arm, Right [[id number]]  (Normal) Collected: 11/10/23 0027    Lab Status: Preliminary result Specimen: Blood from Arm, Right Updated: 11/12/23 0100     Blood Culture No growth at 2 days            No radiology results from the last 24 hrs    Results for orders placed during the hospital encounter of 05/31/23    Adult Transthoracic Echo Complete W/ Cont if Necessary Per Protocol    Interpretation Summary    Mildly reduced right ventricular systolic function noted.    There is calcification of the aortic valve.    Estimated right ventricular systolic pressure from tricuspid regurgitation is normal (<35 mmHg).    Mild global LV systolic dysfunction    EF=45-50%    Normal prosthetic MV function    AV calcification      Current medications:  Scheduled Meds:budesonide-formoterol, 2 puff, Inhalation, BID - RT  carvedilol, 12.5 mg, Oral, BID With Meals  cefTRIAXone, 2,000 mg, Intravenous, Q24H  escitalopram, 20 mg, Oral, Daily  ferrous sulfate, 325 mg, Oral, Daily With Breakfast  folic acid, 800 mcg, Oral, Daily  insulin lispro, 2-7 Units, Subcutaneous, 4x Daily AC & at Bedtime  Insulin Lispro, 4 Units, Subcutaneous, TID With  Meals  ipratropium-albuterol, 3 mL, Nebulization, 4x Daily - RT  metroNIDAZOLE, 500 mg, Intravenous, Q8H  nicotine, 1 patch, Transdermal, Q24H  oxybutynin XL, 5 mg, Oral, Daily  pantoprazole, 40 mg, Intravenous, Q AM  sodium chloride, 10 mL, Intravenous, Q12H  warfarin, 3 mg, Oral, Daily      Continuous Infusions:heparin, 18 Units/kg/hr, Last Rate: 18 Units/kg/hr (11/12/23 1154)  Pharmacy to Dose Heparin,   Pharmacy to dose warfarin,       PRN Meds:.  acetaminophen    [DISCONTINUED] senna-docusate sodium **AND** polyethylene glycol **AND** bisacodyl **AND** bisacodyl    dextrose    dextrose    furosemide    glucagon (human recombinant)    HYDROcodone-acetaminophen    melatonin    nicotine polacrilex    Pharmacy to Dose Heparin    Pharmacy to dose warfarin    sodium chloride    sodium chloride    Assessment & Plan   Assessment & Plan     Active Hospital Problems    Diagnosis  POA    **GIB (gastrointestinal bleeding) [K92.2]  Yes    GI bleed [K92.2]  Yes    Acute blood loss anemia [D62]  Yes    MEERA (acute kidney injury) [N17.9]  Yes    H/O heart valve replacement with mechanical valve [Z95.2]  Not Applicable    Chronic respiratory failure with hypoxia [J96.11]  Yes    Chronic diastolic heart failure [I50.32]  Yes    Emphysema/COPD [J43.9]  Yes    Hypertension [I10]  Yes    Type 2 diabetes mellitus without complication, with long-term current use of insulin [E11.9, Z79.4]  Not Applicable    Depression [F32.A]  Yes    Dyslipidemia [E78.5]  Yes    Obstructive sleep apnea syndrome [G47.33]  Yes      Resolved Hospital Problems   No resolved problems to display.        Brief Hospital Course to date:  Di Lopez is a 62 y.o. female  with PMH significant for COPD, HTN, HLD, CAD, depression, CHF, VHD s/p mechanical valve replacement on warfarin, DM2, CAL on CPAP, liver mass, obesity.  She has had diarrhea for 2 weeks, then developed BRBPR on 11/9. She was asked to hold her diuretics, Entresto and Trulicity by PCP last  last week due to rising creatinine.    GI bleed - BRBPR   Symptomatic acute blood loss anemia  Warfarin use; INR 3-4 recently  Adenovirus   - Hgb was 11 in May, was 7.1 on admission.   - Colonoscopy completed May 2023;  Has scheduled EGD outpatient 11/14/2023 (Dr. Fernandes)  - CT scan with inflammation in the sigmoid colon  - GI PCR with adenovirus   - GI consult ,  Sees Dr. Fernandes in clinic.               - Recommend supportive care.  IV antibiotics and anusol suppository  - No further bleeding since admission but Hgb trended down and transfused 1 unit again on 11/12   - Continue rocephin/flagyl -- likely DC soon   - Continue heparin gtt    - Advance diet      MEERA  - Baseline creatinine ~0.8-0.9,  - was 1.9 on admission  - Hold nephrotoxic medications  - Cr improved. AM labs.       HFmrEF  - EF 45-50 3/23.   - Holding home entresto due to MEERA. Continue coreg  - s/p lasix. Repeat after blood   - CXR with no acute findings. Normal BNP on admission and repeat      Chronic hypoxic resp failure  - 3L at baseline but states home O2 concentrator is broken  - Will start symbicort and duo-nebs      UTI POA  - urine culture ecoli   - Past UTI was E coli, a nd Klebs, not ESBL .   - Continue rocephin     mechanical mitral valve   - On warfarin, last dose evening of 11/9/2023  - Continue heparin gtt. Will discuss with pharmacy to restart coumadin as no further bleeding and she did receive Vit K     Diabetes mellitus 2 - A1C 6.8   - Hold Trulicity, glimepiride  - Hold Lantus for now, add back as appropriate  - FSBG ACHS  - add bolus insulin   -glucose stable     Hypertension  Dyslipidemia  - Currently controlled  - Continue carvedilol  - Continue statin     CAL  - CPAP     Nicotine use  - Vapes  - Nicotine patch  - Cessation counseling     Obesity   - BMI 39. Complicates all aspects of care    Expected Discharge Location and Transportation: home  Expected Discharge   Expected Discharge Date: 11/13/2023; Expected Discharge  Time:      DVT prophylaxis:  Medical DVT prophylaxis orders are present.     AM-PAC 6 Clicks Score (PT): 24 (11/12/23 0800)    CODE STATUS:   Code Status and Medical Interventions:   Ordered at: 11/09/23 3733     Code Status (Patient has no pulse and is not breathing):    CPR (Attempt to Resuscitate)     Medical Interventions (Patient has pulse or is breathing):    Full Support       Sarah Sherman DO  11/12/23

## 2023-11-12 NOTE — PROGRESS NOTES
HEPARIN INFUSION  Di Lopez is a  62 y.o. female receiving heparin infusion.     Therapy for [facility]Cardiac[facility]  Patient Weight: 90.9 kg  Initial Bolus (Y/N):  No  Any Bolus (Y/N):  Yes    Signs or Symptoms of Bleedin episodes of BRBPR on admission, ongoing anemia     Cardiac or Other (Not VTE)  Initial rate: 12 units/kg/hr (Max 1,000 units/hr)   Anti Xa Rebolus Infusion Hold time Change infusion Dose (Units/kg/hr) Next Anti Xa or aPTT Level Due   < 0.11 50 Units/kg  (4000 Units Max) None Increase by  3 Units/kg/hr 6 hours   0.11- 0.19 25 Units/kg  (2000 Units Max) None Increase by  2 Units/kg/hr 6 hours   0.2 - 0.29 0 None Increase by  1 Units/kg/hr 6 hours   0.3 - 0.5 0 None No Change 6 hours (after 2 consecutive levels in range check qAM)   0.51 - 0.6 0 None Decrease by  1 Units/kg/hr 6 hours   0.61 - 0.8 0 30 Minutes Decrease by  2 Units/kg/hr 6 hours   0.81 - 1 0 60 Minutes Decrease by  3 Units/kg/hr 6 hours   >1 0 Hold  After Anti Xa less than 0.5 decrease previous rate by  4 Units/kg/hr  Every 2 hours until Anti Xa  less than 0.5 then when infusion restarts in 6 hours     Recommend Xa every 6 hours.     Results from last 7 days   Lab Units 23  0148 23  1844 23  0810 23  0447 11/10/23  1902 11/10/23  0437 11/10/23  0154 23  1955   INR  1.16*  --   --  1.09 1.21*   < > 2.88* 3.31*   HEMOGLOBIN g/dL 7.2* 7.0* 7.5*  --  7.6*   < > 7.1* 7.2*   HEMATOCRIT % 21.8* 21.3* 23.6*  --  23.9*   < > 21.9* 22.9*   PLATELETS 10*3/mm3 274  --   --   --   --   --  310 289    < > = values in this interval not displayed.       Date   Time   Anti-Xa Current Rate (Unit/kg/hr) Bolus   (Units) Rate Change   (Unit/kg/hr) New Rate (Unit/kg/hr) Next   Anti-Xa Comments  Pump Check Daily   11/10 0200 0.10    INR 2.88 --- - -- -- 0600 D/w APRN, hold until INR >2.5. Currently 2.88, ordered repeat INR @ 0600   11/10 0437 --    INR 2.84 -- -- -- -- 1800 Continue to hold heparin gtt  until until <2.5; repeat INR at 1800.   11/10 1902 --    INR 1.21 -- -- +11 11 0400 DW RN Tom. INR returned at 1.21 . Starting heparin gtt to cover mechanical valves     11/11 0600 0.1 11 4000 +3 14 1200 D/w RN, no reported bleeding overnight   11/11 1145 0.25 14 -- +1 15 1900 D/W RN- warfarin resumed   11/11 1844 0.10 15 4000 +3 18 0200 MARCELLUS RN   11/12 0300 0.46 18 -- -- 18 0800 D/w RN   11/12 0916 0.42 18 -- -- 18 0600 D/W RN     Sofia Diaz, PharmD  11/12/2023  10:14 EST

## 2023-11-12 NOTE — PROGRESS NOTES
"GI Daily Progress Note  Subjective:    Chief Complaint: Follow-up diarrhea and rectal bleeding    Patient resting in bed no acute distress.  Tolerating GI soft diet at time of exam.  Does not report any new or worsening GI complaints.  No further reports of rectal bleeding.  Did receive unit of PRBCs earlier today as hemoglobin has trended down.    Objective:    /55 (BP Location: Right arm, Patient Position: Lying)   Pulse 68   Temp 97.4 °F (36.3 °C) (Oral)   Resp 18   Ht 152.4 cm (60\")   Wt 91.8 kg (202 lb 4.8 oz)   LMP 06/17/1998   SpO2 96%   BMI 39.51 kg/m²     Physical Exam  Constitutional:       General: She is not in acute distress.     Appearance: Normal appearance. She is normal weight. She is not ill-appearing or toxic-appearing.      Comments: Pleasantly conversant, no acute distress   Cardiovascular:      Rate and Rhythm: Normal rate and regular rhythm.      Pulses: Normal pulses.      Heart sounds: Normal heart sounds.   Pulmonary:      Effort: Pulmonary effort is normal. No respiratory distress.      Breath sounds: Normal breath sounds.   Abdominal:      General: Abdomen is flat. Bowel sounds are normal. There is no distension.      Palpations: Abdomen is soft. There is no mass.      Tenderness: There is no abdominal tenderness. There is no guarding or rebound.      Hernia: No hernia is present.   Skin:     Capillary Refill: Capillary refill takes less than 2 seconds.   Neurological:      General: No focal deficit present.      Mental Status: She is alert and oriented to person, place, and time.         Lab  I have personally reviewed most recent cardiac tracings, lab results, and radiology images and interpretations and agree with findings.    Lab Results   Component Value Date    WBC 12.80 (H) 11/12/2023    HGB 7.2 (L) 11/12/2023    HGB 7.0 (L) 11/11/2023    HGB 7.5 (L) 11/11/2023    MCV 85.2 11/12/2023     11/12/2023    INR 1.16 (H) 11/12/2023    INR 1.09 11/11/2023    INR 1.21 " (H) 11/10/2023    INR 2.84 (H) 11/10/2023    INR 2.88 (H) 11/10/2023       Lab Results   Component Value Date    GLUCOSE 123 (H) 11/12/2023    BUN 22 11/12/2023    CREATININE 1.59 (H) 11/12/2023    EGFRIFAFRI 73 10/14/2022    BCR 13.8 11/12/2023     11/12/2023    K 3.5 11/12/2023    CO2 29.0 11/12/2023    CALCIUM 8.8 11/12/2023    ALBUMIN 3.5 11/09/2023    ALKPHOS 167 (H) 11/09/2023    BILITOT 0.3 11/09/2023    BILIDIR <0.2 03/31/2023    ALT 48 (H) 11/09/2023    AST 32 11/09/2023     Assessment:      GIB (gastrointestinal bleeding)    Depression    Type 2 diabetes mellitus without complication, with long-term current use of insulin    Dyslipidemia    Emphysema/COPD    Hypertension    Obstructive sleep apnea syndrome    Chronic diastolic heart failure    Chronic respiratory failure with hypoxia    Acute blood loss anemia    MEERA (acute kidney injury)    H/O heart valve replacement with mechanical valve    GI bleed    Rectal bleeding   Recent diarrheal illness, spontaneously resolved.   GI PCR + for adenovirus      Subtle inflammation of the posterior wall of a short segment of mid sigmoid.   Mild abdominal pain and mild leukocytosis   Anticoagulation use secondary to mechanical valve     Plan:  Patient doing well today.  Received unit of PRBCs earlier tolerated well.  No further reports of GI bleeding.  GI PCR resulted and is positive for adenovirus.    >>> Suspect patient is shedding adenovirus from her recent acute diarrheal illness; management is supportive in nature.   >>> Continue Abx for full treatment course  >>> Anusol 25 mg Rectal Suppository Twice Daily x 5 days  >>> Monitor for evidence of gastrointestinal bleeding; if significant recurrent bleeding, will consider endoscopic evaluation at that time given the patient was scheduled for an outpatient EGD with Dr. Fernandes.    If no evidence of bleeding and hemoglobin stable, anticipate discharge home in the next 24 to 48 hours.    Kike Zazueta,  APRN  11/12/23  17:59 EST

## 2023-11-13 ENCOUNTER — ANESTHESIA EVENT (OUTPATIENT)
Dept: TELEMETRY | Facility: HOSPITAL | Age: 63
End: 2023-11-13

## 2023-11-13 ENCOUNTER — APPOINTMENT (OUTPATIENT)
Dept: GENERAL RADIOLOGY | Facility: HOSPITAL | Age: 63
End: 2023-11-13
Payer: MEDICARE

## 2023-11-13 LAB
ANION GAP SERPL CALCULATED.3IONS-SCNC: 9 MMOL/L (ref 5–15)
BASOPHILS # BLD AUTO: 0.08 10*3/MM3 (ref 0–0.2)
BASOPHILS NFR BLD AUTO: 0.5 % (ref 0–1.5)
BH BB BLOOD EXPIRATION DATE: NORMAL
BH BB BLOOD TYPE BARCODE: 5100
BH BB DISPENSE STATUS: NORMAL
BH BB PRODUCT CODE: NORMAL
BH BB UNIT NUMBER: NORMAL
BUN SERPL-MCNC: 26 MG/DL (ref 8–23)
BUN/CREAT SERPL: 15.3 (ref 7–25)
CALCIUM SPEC-SCNC: 8.7 MG/DL (ref 8.6–10.5)
CHLORIDE SERPL-SCNC: 101 MMOL/L (ref 98–107)
CO2 SERPL-SCNC: 27 MMOL/L (ref 22–29)
CREAT SERPL-MCNC: 1.7 MG/DL (ref 0.57–1)
CROSSMATCH INTERPRETATION: NORMAL
DEPRECATED RDW RBC AUTO: 47.7 FL (ref 37–54)
EGFRCR SERPLBLD CKD-EPI 2021: 34 ML/MIN/1.73
EOSINOPHIL # BLD AUTO: 0.3 10*3/MM3 (ref 0–0.4)
EOSINOPHIL NFR BLD AUTO: 2 % (ref 0.3–6.2)
ERYTHROCYTE [DISTWIDTH] IN BLOOD BY AUTOMATED COUNT: 15.1 % (ref 12.3–15.4)
GLUCOSE BLDC GLUCOMTR-MCNC: 142 MG/DL (ref 70–130)
GLUCOSE BLDC GLUCOMTR-MCNC: 151 MG/DL (ref 70–130)
GLUCOSE BLDC GLUCOMTR-MCNC: 188 MG/DL (ref 70–130)
GLUCOSE BLDC GLUCOMTR-MCNC: 208 MG/DL (ref 70–130)
GLUCOSE SERPL-MCNC: 165 MG/DL (ref 65–99)
HCT VFR BLD AUTO: 23.3 % (ref 34–46.6)
HGB BLD-MCNC: 7.6 G/DL (ref 12–15.9)
IMM GRANULOCYTES # BLD AUTO: 0.17 10*3/MM3 (ref 0–0.05)
IMM GRANULOCYTES NFR BLD AUTO: 1.1 % (ref 0–0.5)
INR PPP: 1.22 (ref 0.89–1.12)
LYMPHOCYTES # BLD AUTO: 2.34 10*3/MM3 (ref 0.7–3.1)
LYMPHOCYTES NFR BLD AUTO: 15.5 % (ref 19.6–45.3)
MCH RBC QN AUTO: 28.3 PG (ref 26.6–33)
MCHC RBC AUTO-ENTMCNC: 32.6 G/DL (ref 31.5–35.7)
MCV RBC AUTO: 86.6 FL (ref 79–97)
MONOCYTES # BLD AUTO: 1.14 10*3/MM3 (ref 0.1–0.9)
MONOCYTES NFR BLD AUTO: 7.6 % (ref 5–12)
NEUTROPHILS NFR BLD AUTO: 11.06 10*3/MM3 (ref 1.7–7)
NEUTROPHILS NFR BLD AUTO: 73.3 % (ref 42.7–76)
NRBC BLD AUTO-RTO: 0 /100 WBC (ref 0–0.2)
PLATELET # BLD AUTO: 277 10*3/MM3 (ref 140–450)
PMV BLD AUTO: 10.4 FL (ref 6–12)
POTASSIUM SERPL-SCNC: 3.5 MMOL/L (ref 3.5–5.2)
PROCALCITONIN SERPL-MCNC: 0.32 NG/ML (ref 0–0.25)
PROTHROMBIN TIME: 15.6 SECONDS (ref 12.2–14.5)
RBC # BLD AUTO: 2.69 10*6/MM3 (ref 3.77–5.28)
SODIUM SERPL-SCNC: 137 MMOL/L (ref 136–145)
UFH PPP CHRO-ACNC: 0.24 IU/ML (ref 0.3–0.7)
UFH PPP CHRO-ACNC: 0.26 IU/ML (ref 0.3–0.7)
UFH PPP CHRO-ACNC: 0.34 IU/ML (ref 0.3–0.7)
UNIT  ABO: NORMAL
UNIT  RH: NORMAL
WBC NRBC COR # BLD: 15.09 10*3/MM3 (ref 3.4–10.8)

## 2023-11-13 PROCEDURE — 85520 HEPARIN ASSAY: CPT

## 2023-11-13 PROCEDURE — 25010000002 CEFTRIAXONE PER 250 MG: Performed by: INTERNAL MEDICINE

## 2023-11-13 PROCEDURE — 80048 BASIC METABOLIC PNL TOTAL CA: CPT | Performed by: INTERNAL MEDICINE

## 2023-11-13 PROCEDURE — 85520 HEPARIN ASSAY: CPT | Performed by: INTERNAL MEDICINE

## 2023-11-13 PROCEDURE — 99232 SBSQ HOSP IP/OBS MODERATE 35: CPT | Performed by: INTERNAL MEDICINE

## 2023-11-13 PROCEDURE — 84145 PROCALCITONIN (PCT): CPT | Performed by: INTERNAL MEDICINE

## 2023-11-13 PROCEDURE — 94664 DEMO&/EVAL PT USE INHALER: CPT

## 2023-11-13 PROCEDURE — 94799 UNLISTED PULMONARY SVC/PX: CPT

## 2023-11-13 PROCEDURE — 25010000002 HEPARIN (PORCINE) 25000-0.45 UT/250ML-% SOLUTION

## 2023-11-13 PROCEDURE — 25010000002 METRONIDAZOLE 500 MG/100ML SOLUTION: Performed by: INTERNAL MEDICINE

## 2023-11-13 PROCEDURE — 85610 PROTHROMBIN TIME: CPT

## 2023-11-13 PROCEDURE — 63710000001 INSULIN LISPRO (HUMAN) PER 5 UNITS: Performed by: INTERNAL MEDICINE

## 2023-11-13 PROCEDURE — 97165 OT EVAL LOW COMPLEX 30 MIN: CPT

## 2023-11-13 PROCEDURE — 71045 X-RAY EXAM CHEST 1 VIEW: CPT

## 2023-11-13 PROCEDURE — 85025 COMPLETE CBC W/AUTO DIFF WBC: CPT | Performed by: NURSE PRACTITIONER

## 2023-11-13 PROCEDURE — 63710000001 INSULIN LISPRO (HUMAN) PER 5 UNITS: Performed by: NURSE PRACTITIONER

## 2023-11-13 PROCEDURE — 82948 REAGENT STRIP/BLOOD GLUCOSE: CPT

## 2023-11-13 RX ORDER — DOCUSATE SODIUM 100 MG/1
100 CAPSULE, LIQUID FILLED ORAL 2 TIMES DAILY
Status: DISCONTINUED | OUTPATIENT
Start: 2023-11-13 | End: 2023-11-22 | Stop reason: HOSPADM

## 2023-11-13 RX ADMIN — HYDROCODONE BITARTRATE AND ACETAMINOPHEN 1 TABLET: 5; 325 TABLET ORAL at 20:59

## 2023-11-13 RX ADMIN — METRONIDAZOLE 500 MG: 500 INJECTION, SOLUTION INTRAVENOUS at 20:59

## 2023-11-13 RX ADMIN — IPRATROPIUM BROMIDE AND ALBUTEROL SULFATE 3 ML: 2.5; .5 SOLUTION RESPIRATORY (INHALATION) at 20:19

## 2023-11-13 RX ADMIN — IPRATROPIUM BROMIDE AND ALBUTEROL SULFATE 3 ML: 2.5; .5 SOLUTION RESPIRATORY (INHALATION) at 08:16

## 2023-11-13 RX ADMIN — CARVEDILOL 12.5 MG: 12.5 TABLET, FILM COATED ORAL at 08:25

## 2023-11-13 RX ADMIN — HEPARIN SODIUM 19 UNITS/KG/HR: 10000 INJECTION, SOLUTION INTRAVENOUS at 14:38

## 2023-11-13 RX ADMIN — INSULIN LISPRO 3 UNITS: 100 INJECTION, SOLUTION INTRAVENOUS; SUBCUTANEOUS at 17:13

## 2023-11-13 RX ADMIN — INSULIN LISPRO 4 UNITS: 100 INJECTION, SOLUTION INTRAVENOUS; SUBCUTANEOUS at 08:25

## 2023-11-13 RX ADMIN — Medication 10 ML: at 08:27

## 2023-11-13 RX ADMIN — ESCITALOPRAM OXALATE 20 MG: 20 TABLET ORAL at 08:24

## 2023-11-13 RX ADMIN — CEFTRIAXONE 2000 MG: 2 INJECTION, POWDER, FOR SOLUTION INTRAMUSCULAR; INTRAVENOUS at 17:12

## 2023-11-13 RX ADMIN — FOLIC ACID TAB 400 MCG 800 MCG: 400 TAB at 08:24

## 2023-11-13 RX ADMIN — INSULIN LISPRO 2 UNITS: 100 INJECTION, SOLUTION INTRAVENOUS; SUBCUTANEOUS at 20:58

## 2023-11-13 RX ADMIN — METRONIDAZOLE 500 MG: 500 INJECTION, SOLUTION INTRAVENOUS at 14:24

## 2023-11-13 RX ADMIN — ACETAMINOPHEN 650 MG: 325 TABLET ORAL at 08:24

## 2023-11-13 RX ADMIN — Medication 10 ML: at 20:59

## 2023-11-13 RX ADMIN — FERROUS SULFATE TAB 325 MG (65 MG ELEMENTAL FE) 325 MG: 325 (65 FE) TAB at 08:25

## 2023-11-13 RX ADMIN — BUDESONIDE AND FORMOTEROL FUMARATE DIHYDRATE 2 PUFF: 160; 4.5 AEROSOL RESPIRATORY (INHALATION) at 20:19

## 2023-11-13 RX ADMIN — INSULIN LISPRO 4 UNITS: 100 INJECTION, SOLUTION INTRAVENOUS; SUBCUTANEOUS at 17:13

## 2023-11-13 RX ADMIN — BUDESONIDE AND FORMOTEROL FUMARATE DIHYDRATE 2 PUFF: 160; 4.5 AEROSOL RESPIRATORY (INHALATION) at 08:16

## 2023-11-13 RX ADMIN — CARVEDILOL 12.5 MG: 12.5 TABLET, FILM COATED ORAL at 17:12

## 2023-11-13 RX ADMIN — METRONIDAZOLE 500 MG: 500 INJECTION, SOLUTION INTRAVENOUS at 05:40

## 2023-11-13 RX ADMIN — INSULIN LISPRO 4 UNITS: 100 INJECTION, SOLUTION INTRAVENOUS; SUBCUTANEOUS at 12:34

## 2023-11-13 RX ADMIN — Medication 5 MG: at 20:59

## 2023-11-13 RX ADMIN — OXYBUTYNIN CHLORIDE 5 MG: 5 TABLET, EXTENDED RELEASE ORAL at 08:25

## 2023-11-13 RX ADMIN — IPRATROPIUM BROMIDE AND ALBUTEROL SULFATE 3 ML: 2.5; .5 SOLUTION RESPIRATORY (INHALATION) at 15:34

## 2023-11-13 RX ADMIN — WARFARIN SODIUM 3 MG: 3 TABLET ORAL at 17:12

## 2023-11-13 RX ADMIN — PANTOPRAZOLE SODIUM 40 MG: 40 INJECTION, POWDER, LYOPHILIZED, FOR SOLUTION INTRAVENOUS at 05:40

## 2023-11-13 RX ADMIN — ACETAMINOPHEN 650 MG: 325 TABLET ORAL at 17:18

## 2023-11-13 RX ADMIN — HEPARIN SODIUM 18 UNITS/KG/HR: 10000 INJECTION, SOLUTION INTRAVENOUS at 00:33

## 2023-11-13 NOTE — THERAPY EVALUATION
Patient Name: Di Lopez  : 1960    MRN: [id number]                              Today's Date: 2023       Admit Date: 2023    Visit Dx:     ICD-10-CM ICD-9-CM   1. Gastrointestinal hemorrhage, unspecified gastrointestinal hemorrhage type  K92.2 578.9     Patient Active Problem List   Diagnosis    Arteriovenous malformation of gastrointestinal tract    Depression    Type 2 diabetes mellitus without complication, with long-term current use of insulin    Dyslipidemia    Emphysema/COPD    Hypertension    Insomnia    Morbid obesity    Obstructive sleep apnea syndrome    Normocytic anemia    Sliding hiatal hernia    Vitamin B12 deficiency    Iron deficiency    Iron malabsorption    Bronchiectasis without complication    Electronic cigarette use    Chronic diastolic heart failure    Left bundle branch hemiblock    Chronic respiratory failure with hypoxia    Body aches    Headaches due to old head injury    History of tobacco use, presenting hazards to health    Multiple nodules of lung    Nocturnal hypoxemia    Screening for colon cancer    Abnormal CT scan    Wellness examination    Melena    GIB (gastrointestinal bleeding)    Acute blood loss anemia    MEERA (acute kidney injury)    H/O heart valve replacement with mechanical valve    GI bleed     Past Medical History:   Diagnosis Date    Allergic     Anemia     Anticoagulated     WARFARIN    Arteriovenous malformation of gastrointestinal tract 2016    Arthritis     Asthma     Black hairy tongue     CHF (congestive heart failure)     Chronic back pain     Coronary artery disease     Depression 2016    Diabetes mellitus     Dyslipidemia 2016    Elevated cholesterol     Emphysema/COPD 2016    Emphysema/COPD 2016    Fatigue     GERD (gastroesophageal reflux disease)     Headache     Herniated cervical disc     History of echocardiogram 2013    Left ventricular systolic function at the lower limits of normal; EF  50-55%; mild MR; mechanical prosthetic mitral valve present; mod TR    History of transfusion     NO REACTION; [facility]    Hyperlipidemia     Hypertension     Insomnia 05/12/2016    Iron deficiency     A.  The patient was treated with IV iron replacement. B.  Secondary to GI bleed, status post EGD with cauterization of blood vessel to the duodenum, 7/16/2014.    Left bundle branch hemiblock 10/12/2020    Left shoulder strain     Liver disease     Liver mass     Long term current use of anticoagulant 05/12/2016    Low back pain     Lung nodules     PER DR. LEVI'S NOTE 10/22    Morbid obesity 05/12/2016    Neuromuscular disorder 7/28/2016    Obstructive sleep apnea syndrome 05/12/2016    Description: A.  Moderate; CPAP therapy..    Sliding hiatal hernia 07/28/2016    Description: A.  Reported on EGD, 12/30/2011.    Type 2 diabetes mellitus 05/12/2016    Urinary tract infection     Uterine cancer     HYSTERECTOMY    Vitamin B12 deficiency     A.  The patient was treated with vitamin B12 replacement.    Wears eyeglasses      Past Surgical History:   Procedure Laterality Date    BILATERAL OOPHORECTOMY  2009    Status post bilateral oophorectomy secondary to ovarian cysts     BREAST BIOPSY Right     STEREOTACTIC    BREAST EXCISIONAL BIOPSY Bilateral     CARDIAC ELECTROPHYSIOLOGY PROCEDURE N/A 10/15/2020    Procedure: Biventricular Device Insertion;  Surgeon: Nadir Everett MD;  Location: [facility];  Service: Cardiology;  Laterality: N/A;    CARDIAC SURGERY  2003    CARPAL TUNNEL RELEASE Bilateral     COLONOSCOPY  12/28/2012    COLONOSCOPY N/A 5/23/2023    Procedure: COLONOSCOPY;  Surgeon: Gt Fernandes MD;  Location: [facility] ENDOSCOPY;  Service: Gastroenterology;  Laterality: N/A;    CYSTECTOMY      removal of ganglion cyst from left wrist    ENDOSCOPY  12/30/2011    DIAGNOSTIC ESOPHAGOGASTRODUODENOSCOPY    HYSTERECTOMY  1995    A.  Status post hysterectomy for early stage endometrial cancer  done in 1995.    MITRAL VALVE REPLACEMENT  03/03/2008    Dr. Hima Barreto; MECHANICAL    OTHER SURGICAL HISTORY      LIVER MASS BLOOD SUPPLY CLIPPED ON RIGHT AND LEFT SIDE PER PT      General Information       Row Name 11/13/23 1031          OT Time and Intention    Document Type evaluation  -RONNY     Mode of Treatment occupational therapy  -RONNY       Row Name 11/13/23 1031          General Information    Patient Profile Reviewed yes  -RONNY     Prior Level of Function independent:;ADL's;bed mobility;transfer;all household mobility;community mobility;home management;driving;shopping  -RONNY     Existing Precautions/Restrictions fall;oxygen therapy device and L/min;other (see comments)  hx chronic back pain  -RONNY     Barriers to Rehab medically complex  -RONNY       Row Name 11/13/23 1031          Occupational Profile    Environmental Supports and Barriers (Occupational Profile) Lives with 13 year old daugter and 14 year old grandson; ambulates with no AD at baseline; has shower chair in tub shower, supplemental O2 set up at home.  -RONNY       Row Name 11/13/23 1031          Living Environment    People in Home child(juan), dependent  -RONNY       Row Name 11/13/23 1031          Home Main Entrance    Number of Stairs, Main Entrance none  -RONNY       Row Name 11/13/23 1031          Stairs Within Home, Primary    Number of Stairs, Within Home, Primary none  -RONNY       Row Name 11/13/23 1031          Cognition    Orientation Status (Cognition) oriented x 4  -RONNY       Row Name 11/13/23 1031          Safety Issues, Functional Mobility    Safety Issues Affecting Function (Mobility) insight into deficits/self-awareness;judgment;problem-solving;safety precaution awareness  -RONNY     Impairments Affecting Function (Mobility) balance;endurance/activity tolerance;pain;postural/trunk control;shortness of breath;strength  -RONNY               User Key  (r) = Recorded By, (t) = Taken By, (c) = Cosigned By      Initials Name Provider Type    RONNY Jamel  CELESTINE Juarez Occupational Therapist                     Mobility/ADL's       Row Name 11/13/23 1033          Bed Mobility    Bed Mobility supine-sit  -RONNY     Supine-Sit [place] (Bed Mobility) standby assist  -RONNY     Assistive Device (Bed Mobility) head of bed elevated;bed rails  -RONNY     Comment, (Bed Mobility) increased time and effort needed  -       Row Name 11/13/23 1033          Transfers    Comment, (Transfers) Pt declined OOB activity or transfer to recliner chair d/t headache  -       Row Name 11/13/23 1033          Sit-Stand Transfer    Sit-Stand [place] (Transfers) not tested  -       Row Name 11/13/23 1033          Activities of Daily Living    BADL Assessment/Intervention grooming;lower body dressing  -       Row Name 11/13/23 1033          Grooming Assessment/Training    [place] Level (Grooming) oral care regimen;wash face, hands;set up  -RONNY     Position (Grooming) sitting up in bed  -       Row Name 11/13/23 1033          Lower Body Dressing Assessment/Training    [place] Level (Lower Body Dressing) don;socks;moderate assist (50% patient effort)  -RONNY     Position (Lower Body Dressing) edge of bed sitting  -RONNY     Comment, (Lower Body Dressing) assist to start socks over feet; limited by LBP  -RONNY               User Key  (r) = Recorded By, (t) = Taken By, (c) = Cosigned By      Initials Name Provider Type    Ann Sandoval OT Occupational Therapist                   Obj/Interventions       Row Name 11/13/23 1035          Sensory Assessment (Somatosensory)    Sensory Assessment (Somatosensory) UE sensation intact  -       Row Name 11/13/23 1035          Vision Assessment/Intervention    Visual Impairment/Limitations WFL;corrective lenses for reading  -       Row Name 11/13/23 1035          Range of Motion Comprehensive    General Range of Motion bilateral upper extremity ROM WFL  -       Row Name 11/13/23 1035          Strength Comprehensive (MMT)    Comment, General  Manual Muscle Testing (MMT) Assessment BUE's grossly 4/5  -RONNY       Row Name 11/13/23 1035          Balance    Balance Assessment sitting static balance;sitting dynamic balance  -RONNY     Static Sitting Balance independent  -RONNY     Dynamic Sitting Balance standby assist  -RONNY     Position, Sitting Balance unsupported;sitting edge of bed  -RONNY               User Key  (r) = Recorded By, (t) = Taken By, (c) = Cosigned By      Initials Name Provider Type    Ann Sandoval OT Occupational Therapist                   Goals/Plan       Row Name 11/13/23 1042          Transfer Goal 1 (OT)    Activity/Assistive Device (Transfer Goal 1, OT) sit-to-stand/stand-to-sit;toilet  -RONNY     [place] Level/Cues Needed (Transfer Goal 1, OT) supervision required  -RONNY     Time Frame (Transfer Goal 1, OT) long term goal (LTG);by discharge  -RONNY     Progress/Outcome (Transfer Goal 1, OT) new goal  -RONNY       Row Name 11/13/23 1042          Dressing Goal 1 (OT)    Activity/Device (Dressing Goal 1, OT) lower body dressing  -RONNY     [place]/Cues Needed (Dressing Goal 1, OT) contact guard required  -RONNY     Time Frame (Dressing Goal 1, OT) long term goal (LTG);by discharge  -RONNY     Progress/Outcome (Dressing Goal 1, OT) new goal  -RONNY       Row Name 11/13/23 1042          Toileting Goal 1 (OT)    Activity/Device (Toileting Goal 1, OT) adjust/manage clothing;perform perineal hygiene;commode;grab bar/safety frame  -RONNY     [place] Level/Cues Needed (Toileting Goal 1, OT) supervision required  -RONNY     Time Frame (Toileting Goal 1, OT) by discharge  -RONNY     Progress/Outcome (Toileting Goal 1, OT) new goal  -RONNY               User Key  (r) = Recorded By, (t) = Taken By, (c) = Cosigned By      Initials Name Provider Type    Ann Sandoval OT Occupational Therapist                   Clinical Impression       Row Name 11/13/23 1036          Pain Assessment    Pain Intervention(s) Ambulation/increased activity;Repositioned;Emotional support  -RONNY      Additional Documentation Pain Scale: FACES Pre/Post-Treatment (Group)  -       Row Name 11/13/23 1036          Pain Scale: FACES Pre/Post-Treatment    Pain: FACES Scale, Pretreatment 8-->hurts whole lot  -RONNY     Posttreatment Pain Rating 8-->hurts whole lot  -RONNY     Pain Location - head  -RONNY     Pre/Posttreatment Pain Comment RN aware and managing  -RONNY       Row Name 11/13/23 1036          Plan of Care Review    Plan of Care Reviewed With patient  -RONNY     Outcome Evaluation OT eval completed. Pt presents below baseline for ADL's, limited by decreased activity tolerance and dyspnea. Pt CONNOLLY for grooming at bed level, SBA for bed mobility, ModA for LBD, limited by pain. IP OT services warranted to support return to PLOF. Recommend OP therapy at discharge as pt endorses chronic LBP limiting functional mobility at home.  -       Row Name 11/13/23 1036          Therapy Assessment/Plan (OT)    Rehab Potential (OT) good, to achieve stated therapy goals  -     Criteria for Skilled Therapeutic Interventions Met (OT) yes;meets criteria;skilled treatment is necessary  -     Therapy Frequency (OT) daily  -       Row Name 11/13/23 1036          Therapy Plan Review/Discharge Plan (OT)    Anticipated Discharge Disposition (OT) home with outpatient therapy services  -       Row Name 11/13/23 1036          Vital Signs    Pre SpO2 (%) 94  -RONNY     O2 Delivery Pre Treatment nasal cannula  -RONNY     Intra SpO2 (%) 92  -RONNY     O2 Delivery Intra Treatment nasal cannula  -RONNY     Post SpO2 (%) 93  -RONNY     O2 Delivery Post Treatment nasal cannula  -RONNY     Pre Patient Position Supine  -RONNY     Intra Patient Position Sitting  -RONNY     Post Patient Position Supine  -       Row Name 11/13/23 1036          Positioning and Restraints    Pre-Treatment Position in bed  -RONNY     Post Treatment Position bed  -RONNY     In Bed notified nsg;fowlers;call light within reach;encouraged to call for assist;exit alarm on  -RONNY               User Key   (r) = Recorded By, (t) = Taken By, (c) = Cosigned By      Initials Name Provider Type    Ann Sandoval OT Occupational Therapist                   Outcome Measures       Row Name 11/13/23 1043          How much help from another is currently needed...    Putting on and taking off regular lower body clothing? 3  -RONNY     Bathing (including washing, rinsing, and drying) 3  -RONNY     Toileting (which includes using toilet bed pan or urinal) 3  -RONNY     Putting on and taking off regular upper body clothing 4  -RONNY     Taking care of personal grooming (such as brushing teeth) 3  -RONNY     Eating meals 4  -RONNY     AM-PAC 6 Clicks Score (OT) 20  -RONNY       Row Name 11/13/23 0228          How much help from another person do you currently need...    Turning from your back to your side while in flat bed without using bedrails? 4  -KW     Moving from lying on back to sitting on the side of a flat bed without bedrails? 4  -KW     Moving to and from a bed to a chair (including a wheelchair)? 4  -KW     Standing up from a chair using your arms (e.g., wheelchair, bedside chair)? 4  -KW     Climbing 3-5 steps with a railing? 4  -KW     To walk in hospital room? 4  -KW     AM-PAC 6 Clicks Score (PT) 24  -KW     Highest level of mobility 8 --> Walked 250 feet or more  -KW       Row Name 11/13/23 1043          Functional Assessment    Outcome Measure Options AM-PAC 6 Clicks Daily Activity (OT)  -RONNY               User Key  (r) = Recorded By, (t) = Taken By, (c) = Cosigned By      Initials Name Provider Type    Lydia Robles RN Registered Nurse    Ann Sandoval OT Occupational Therapist                    Occupational Therapy Education       Title: PT OT SLP Therapies (In Progress)       Topic: Occupational Therapy (In Progress)       Point: ADL training (Done)       Description:   Instruct learner(s) on proper safety adaptation and remediation techniques during self care or transfers.   Instruct in proper use of assistive devices.                   Learning Progress Summary             Patient Acceptance, E, VU by RONNY at 11/13/2023 1043    Comment: OT POC; non-pharmacological pain management strategies                         Point: Home exercise program (Not Started)       Description:   Instruct learner(s) on appropriate technique for monitoring, assisting and/or progressing therapeutic exercises/activities.                  Learner Progress:  Not documented in this visit.              Point: Precautions (Done)       Description:   Instruct learner(s) on prescribed precautions during self-care and functional transfers.                  Learning Progress Summary             Patient Acceptance, E, VU by RONNY at 11/13/2023 1043    Comment: OT POC; non-pharmacological pain management strategies                         Point: Body mechanics (Not Started)       Description:   Instruct learner(s) on proper positioning and spine alignment during self-care, functional mobility activities and/or exercises.                  Learner Progress:  Not documented in this visit.                              User Key       Initials Effective Dates Name Provider Type Discipline    RONNY 06/16/21 -  Ann Mccullough OT Occupational Therapist OT                  OT Recommendation and Plan  Therapy Frequency (OT): daily  Plan of Care Review  Plan of Care Reviewed With: patient  Outcome Evaluation: OT eval completed. Pt presents below baseline for ADL's, limited by decreased activity tolerance and dyspnea. Pt CONNOLLY for grooming at bed level, SBA for bed mobility, ModA for LBD, limited by pain. IP OT services warranted to support return to PLOF. Recommend OP therapy at discharge as pt endorses chronic LBP limiting functional mobility at home.     Time Calculation:   Evaluation Complexity (OT)  Review Occupational Profile/Medical/Therapy History Complexity: expanded/moderate complexity  Assessment, Occupational Performance/Identification of Deficit Complexity: 1-3 performance  deficits  Clinical Decision Making Complexity (OT): problem focused assessment/low complexity  Overall Complexity of Evaluation (OT): low complexity     Time Calculation- OT       Row Name 11/13/23 0830             Time Calculation- OT    OT Start Time 0830  -RONNY      OT Received On 11/13/23  -RONNY      OT Goal Re-Cert Due Date 11/23/23  -RONNY         Untimed Charges    OT Eval/Re-eval Minutes 49  -RONNY         Total Minutes    Untimed Charges Total Minutes 49  -RONNY       Total Minutes 49  -RONNY                User Key  (r) = Recorded By, (t) = Taken By, (c) = Cosigned By      Initials Name Provider Type    Ann Sandoval OT Occupational Therapist                  Therapy Charges for Today       Code Description Service Date Service Provider Modifiers Qty    [id number] HC OT EVAL LOW COMPLEXITY 4 11/13/2023 Ann Mccullough OT GO 1                 Ann Mccullough OT  11/13/2023

## 2023-11-13 NOTE — PLAN OF CARE
Goal Outcome Evaluation:      Patient is V-Paced on the monitor and weaned from 3L to 1L NC.  Alert and oriented times four.  Standby assist.  Heparin drip infusing, see orders.  No bed bugs seen for 24 hours, patient no longer in bed bug precautions.  Bed in lowest position, phone and call light in use.

## 2023-11-13 NOTE — PROGRESS NOTES
"Pharmacy Consult  -  Warfarin    Di Lopez is a  62 y.o. female   Height - 152.4 cm (60\")  Weight - 91.8 kg (202 lb 4.8 oz)    Consulting Provider: - Dr. Sherman  Indication: - Mechanical MVR  Goal INR: 2.5 - 3.5   Home Regimen:   - 4 mg daily, except 4.5 mg on Sat/Sun      Bridge Therapy: Yes   and unfractionated heparin    Drug-Drug Interactions with current regimen:   Metronidazole - increase INR              Heparin - increase bleed risk    Warfarin Dosing During Admission:    Date  11/10 11/11 11/12 11/13        INR  4.13 1.09 1.16 1.22        Dose  IV Vit K given 6mg 3 mg (3mg)             Education Provided:  11/11/23 - provided education pamphlet and answered questions.      Discharge Follow up:   Following Provider - Dr. Everett   Follow up time range or appointment -      Labs:    Results from last 7 days   Lab Units 11/13/23  0351 11/12/23  0148 11/11/23  1844 11/11/23  0810 11/11/23  0447 11/10/23  1902 11/10/23  1126 11/10/23  0742 11/10/23  0725 11/10/23  0437 11/10/23  0154 11/09/23 1955   INR  1.22* 1.16*  --   --  1.09 1.21*  --   --   --  2.84* 2.88* 3.31*   APTT seconds  --   --   --   --   --   --   --   --   --   --  78.8  --    HEMOGLOBIN g/dL 7.6* 7.2* 7.0* 7.5*  --  7.6* 7.8* 8.3*   < > 7.7* 7.1* 7.2*   HEMATOCRIT % 23.3* 21.8* 21.3* 23.6*  --  23.9* 24.0* 25.6*   < > 24.2* 21.9* 22.9*    < > = values in this interval not displayed.     Results from last 7 days   Lab Units 11/13/23  0351 11/12/23  0148 11/11/23  0447 11/10/23  0154 11/09/23 1955   SODIUM mmol/L 137 142 141   < > 142   POTASSIUM mmol/L 3.5 3.5 3.7   < > 4.1   CHLORIDE mmol/L 101 102 101   < > 107   CO2 mmol/L 27.0 29.0 27.0   < > 24.0   BUN mg/dL 26* 22 25*   < > 21   CREATININE mg/dL 1.70* 1.59* 1.88*   < > 1.88*   CALCIUM mg/dL 8.7 8.8 8.4*   < > 8.3*   BILIRUBIN mg/dL  --   --   --   --  0.3   ALK PHOS U/L  --   --   --   --  167*   ALT (SGPT) U/L  --   --   --   --  48*   AST (SGOT) U/L  --   --   --   --  32 "   GLUCOSE mg/dL 165* 123* 143*   < > 280*    < > = values in this interval not displayed.       Current dietary intake: regular diet; 100% breakfast (11/13)      Assessment/Plan:     Pharmacy consulted to resume home warfarin.  Patient admitted with supratherapeutic INR of 4.13 and GIB; given IV Vit K 5mg on 11/10.  INR today is subtherapeutic 1.22 after warfarin 6mg, 3 mg the previous two days.  Will continue with plan for reduced dose of warfarin 3 mg daily (due to metronidazole interaction and supratherapeutic INR on home dose).  Patient is being bridged with heparin drip.   2. Continue to check INR daily, dietary intake, drug-drug interactions and s/sx of bleeding or clotting.  3. Pharmacy will continue to follow.    Thanks,     Nadir Corrales, PharmD  11/13/2023  13:39 EST

## 2023-11-13 NOTE — PLAN OF CARE
Goal Outcome Evaluation:   VSS. Paced. 3L nasal cannula. Isolation precautions maintained. No complaints of pain or SOB. Up with standby to BSC. No further complaints at this time.

## 2023-11-13 NOTE — PROGRESS NOTES
[facility] Medicine Services  PROGRESS NOTE    Patient Name: Di Lopez  : 1960  MRN: [id number]    Date of Admission: 2023  Primary Care Physician: Davina Santiago DO    Subjective   Subjective     CC:  Diarrhea; SOA    HPI:  No acute events. States she doesn't feel great today. Ear pain. HA.       Objective   Objective     Vital Signs:   Temp:  [97.4 °F (36.3 °C)-98.7 °F (37.1 °C)] 98.3 °F (36.8 °C)  Heart Rate:  [] 67  Resp:  [18-20] 18  BP: (120-176)/(48-70) 151/70  Flow (L/min):  [1-3] 1     Physical Exam:  Constitutional: No acute distress, awake, alert; obese  HENT: NCAT, mucous membranes moist  Respiratory: normal effort; on 3L  Cardiovascular: RRR, no murmurs, rubs, or gallops  Gastrointestinal: Positive bowel sounds, soft, nontender, nondistended  Musculoskeletal: No bilateral ankle edema  Psychiatric: Appropriate affect, cooperative  Neurologic: Oriented x 3, strength symmetric in all extremities, Cranial Nerves grossly intact to confrontation, speech clear  Skin: No rashes      Results Reviewed:  LAB RESULTS:      Lab 23  1148 23  0351 23  0916 23  0148 23  1844 23  1145 23  0810 23  0447 11/10/23  1902 11/10/23  0725 11/10/23  0437 11/10/23  0154 11/10/23  0027 23  2336 23  1955 23  1955 23  1629 23  1624   WBC  --  15.09*  --  12.80*  --   --   --   --   --   --   --  12.70*  --   --   --  12.77*  --  10.96*   HEMOGLOBIN  --  7.6*  --  7.2* 7.0*  --  7.5*  --  7.6*   < > 7.7* 7.1*  --   --   --  7.2*  --  9.3*   HEMATOCRIT  --  23.3*  --  21.8* 21.3*  --  23.6*  --  23.9*   < > 24.2* 21.9*  --   --   --  22.9*  --  29.3*   PLATELETS  --  277  --  274  --   --   --   --   --   --   --  310  --   --   --  289  --  320   NEUTROS ABS  --  11.06*  --   --   --   --   --   --   --   --   --  9.12*  --   --   --  9.37*  --  7.42*   IMMATURE GRANS (ABS)  --  0.17*  --   --   --    --   --   --   --   --   --  0.14*  --   --   --  0.18*  --  0.17*   LYMPHS ABS  --  2.34  --   --   --   --   --   --   --   --   --  2.72  --   --   --  2.32  --  2.36   MONOS ABS  --  1.14*  --   --   --   --   --   --   --   --   --  0.59  --   --   --  0.70  --  0.82   EOS ABS  --  0.30  --   --   --   --   --   --   --   --   --  0.10  --   --   --  0.16  --  0.16   MCV  --  86.6  --  85.2  --   --   --   --   --   --   --  88.0  --   --   --  89.8  --  86.9   PROCALCITONIN  --  0.32*  --   --   --   --   --   --   --   --   --   --   --  0.07  --   --   --   --    LACTATE  --   --   --   --   --   --   --   --   --   --   --   --  1.5  --   --   --   --   --    PROTIME  --  15.6*  --  14.9*  --   --   --  14.2 15.4*  --  30.1* 30.4*  --   --   --  33.9*   < >  --    APTT  --   --   --   --   --   --   --   --   --   --   --  78.8  --   --   --   --   --   --    HEPARIN ANTI-XA 0.34 0.24* 0.42 0.46 0.10*   < >  --  0.10*  --   --   --  0.10*  --   --    < >  --   --   --     < > = values in this interval not displayed.         Lab 11/13/23  0351 11/12/23  0148 11/11/23  0447 11/10/23  0154 11/09/23 1955   SODIUM 137 142 141 144 142   POTASSIUM 3.5 3.5 3.7 3.7 4.1   CHLORIDE 101 102 101 109* 107   CO2 27.0 29.0 27.0 25.0 24.0   ANION GAP 9.0 11.0 13.0 10.0 11.0   BUN 26* 22 25* 22 21   CREATININE 1.70* 1.59* 1.88* 1.79* 1.88*   EGFR 34.0* 36.8* 30.1* 31.9* 30.1*   GLUCOSE 165* 123* 143* 197* 280*   CALCIUM 8.7 8.8 8.4* 8.2* 8.3*   MAGNESIUM  --   --   --  2.0 2.0         Lab 11/09/23 1955   TOTAL PROTEIN 6.1   ALBUMIN 3.5   GLOBULIN 2.6   ALT (SGPT) 48*   AST (SGOT) 32   BILIRUBIN 0.3   ALK PHOS 167*   LIPASE 25         Lab 11/13/23  0351 11/12/23  0148 11/11/23  0447 11/10/23  1902 11/10/23  0437 11/10/23  0154 11/09/23  2336 11/09/23 1955   PROBNP  --   --  833.7  --   --   --   --  830.8   HSTROP T  --   --   --   --   --   --  10 11   PROTIME 15.6* 14.9* 14.2 15.4* 30.1*   < >  --  33.9*   INR 1.22*  1.16* 1.09 1.21* 2.84*   < >  --  3.31*    < > = values in this interval not displayed.             Lab 11/09/23  2142 11/06/23  1624   IRON  --  38   IRON SATURATION (TSAT)  --  10*   TIBC  --  362   TRANSFERRIN  --  243   FERRITIN  --  134.20   ABO TYPING O  --    RH TYPING Positive  --    ANTIBODY SCREEN Negative  --          Brief Urine Lab Results  (Last result in the past 365 days)        Color   Clarity   Blood   Leuk Est   Nitrite   Protein   CREAT   Urine HCG        11/09/23 2320 Yellow   Clear   Negative   Small (1+)   Positive   100 mg/dL (2+)                   Microbiology Results Abnormal       Procedure Component Value - Date/Time    Blood Culture - Blood, Arm, Right [[id number]]  (Normal) Collected: 11/10/23 0027    Lab Status: Preliminary result Specimen: Blood from Arm, Right Updated: 11/13/23 0100     Blood Culture No growth at 3 days    Blood Culture - Blood, Arm, Right [[id number]]  (Normal) Collected: 11/10/23 0027    Lab Status: Preliminary result Specimen: Blood from Arm, Right Updated: 11/13/23 0100     Blood Culture No growth at 3 days            No radiology results from the last 24 hrs    Results for orders placed during the hospital encounter of 05/31/23    Adult Transthoracic Echo Complete W/ Cont if Necessary Per Protocol    Interpretation Summary    Mildly reduced right ventricular systolic function noted.    There is calcification of the aortic valve.    Estimated right ventricular systolic pressure from tricuspid regurgitation is normal (<35 mmHg).    Mild global LV systolic dysfunction    EF=45-50%    Normal prosthetic MV function    AV calcification      Current medications:  Scheduled Meds:budesonide-formoterol, 2 puff, Inhalation, BID - RT  carvedilol, 12.5 mg, Oral, BID With Meals  cefTRIAXone, 2,000 mg, Intravenous, Q24H  escitalopram, 20 mg, Oral, Daily  ferrous sulfate, 325 mg, Oral, Daily With Breakfast  folic acid, 800 mcg, Oral, Daily  hydrocortisone, 25 mg, Rectal,  BID  insulin lispro, 2-7 Units, Subcutaneous, 4x Daily AC & at Bedtime  Insulin Lispro, 4 Units, Subcutaneous, TID With Meals  ipratropium-albuterol, 3 mL, Nebulization, 4x Daily - RT  metroNIDAZOLE, 500 mg, Intravenous, Q8H  nicotine, 1 patch, Transdermal, Q24H  oxybutynin XL, 5 mg, Oral, Daily  pantoprazole, 40 mg, Intravenous, Q AM  sodium chloride, 10 mL, Intravenous, Q12H  warfarin, 3 mg, Oral, Daily      Continuous Infusions:heparin, 19 Units/kg/hr, Last Rate: 19 Units/kg/hr (11/13/23 1053)  Pharmacy to Dose Heparin,   Pharmacy to dose warfarin,       PRN Meds:.  acetaminophen    [DISCONTINUED] senna-docusate sodium **AND** polyethylene glycol **AND** bisacodyl **AND** bisacodyl    dextrose    dextrose    glucagon (human recombinant)    HYDROcodone-acetaminophen    melatonin    nicotine polacrilex    Pharmacy to Dose Heparin    Pharmacy to dose warfarin    sodium chloride    sodium chloride    Assessment & Plan   Assessment & Plan     Active Hospital Problems    Diagnosis  POA    **GIB (gastrointestinal bleeding) [K92.2]  Yes    GI bleed [K92.2]  Yes    Acute blood loss anemia [D62]  Yes    MEERA (acute kidney injury) [N17.9]  Yes    H/O heart valve replacement with mechanical valve [Z95.2]  Not Applicable    Chronic respiratory failure with hypoxia [J96.11]  Yes    Chronic diastolic heart failure [I50.32]  Yes    Emphysema/COPD [J43.9]  Yes    Hypertension [I10]  Yes    Type 2 diabetes mellitus without complication, with long-term current use of insulin [E11.9, Z79.4]  Not Applicable    Depression [F32.A]  Yes    Dyslipidemia [E78.5]  Yes    Obstructive sleep apnea syndrome [G47.33]  Yes      Resolved Hospital Problems   No resolved problems to display.        Brief Hospital Course to date:  Di Lopez is a 62 y.o. female  with PMH significant for COPD, HTN, HLD, CAD, depression, CHF, VHD s/p mechanical valve replacement on warfarin, DM2, CAL on CPAP, liver mass, obesity.  She has had diarrhea for 2  weeks, then developed BRBPR on 11/9. She was asked to hold her diuretics, Entresto and Trulicity by PCP last last week due to rising creatinine.    GI bleed - BRBPR   Symptomatic acute blood loss anemia  Warfarin use; INR 3-4 recently  Adenovirus   - Hgb was 11 in May, was 7.1 on admission.   - Colonoscopy completed May 2023;  Has scheduled EGD outpatient 11/14/2023 (Dr. Fernandes)  - CT scan with inflammation in the sigmoid colon  - GI PCR with adenovirus   - GI consult ,  Sees Dr. Fernandes in clinic.               - Recommend supportive care.  IV antibiotics and anusol suppository  - No further bleeding since admission but Hgb trended down and transfused 1 unit again on 11/12 with stable H/H. No further signs of bleeding   - Continue rocephin/flagyl   - Start stool softener   - Continue heparin gtt    - Tolerating advancing diet     Leukocytosis  Elevated procal  - procal trending up. Increase in SOA and HA.   - Obtain CXR  - Bcx NGTD; UTI treatment per below  - Continue rocephin/flagyl      MEERA  - Baseline creatinine ~0.8-0.9,  - was 1.9 on admission  - Hold nephrotoxic medications  - Monitor Cr. Trended up again today. Hold diuretics       HFmrEF  - EF 45-50 3/23.   - Holding home entresto due to MEERA. Continue coreg  - Received intermittent IV diuretics   - CXR with no acute findings. Normal BNP on admission and repeat      Chronic hypoxic resp failure  - 3L at baseline but states home O2 concentrator is broken -- discussed with case management   - Continue symbicort and duo-nebs      UTI POA  - urine culture ecoli   - Past UTI was E coli, a nd Klebs, not ESBL .   - s/p 3 doses of rocephin     Bed bugs   - Isolation      Mechanical mitral valve   - On warfarin, last dose evening of 11/9/2023  - Continue heparin gtt. Coumadin restarted   - DC home with lovenox bridge. Follows with Dr. Everett      Diabetes mellitus 2 - A1C 6.8   - Hold Trulicity, glimepiride  - Hold Lantus for now, add back as appropriate  - FSBG  ACHS  - add bolus insulin   -glucose stable     Hypertension  Dyslipidemia  - Currently controlled  - Continue carvedilol  - Continue statin     CAL  - CPAP     Nicotine use  - Vapes  - Nicotine patch  - Cessation counseling     Obesity   - BMI 39. Complicates all aspects of care    Expected Discharge Location and Transportation: home   Expected Discharge   Expected Discharge Date: 11/13/2023; Expected Discharge Time:      DVT prophylaxis:  Medical DVT prophylaxis orders are present.     AM-PAC 6 Clicks Score (PT): 24 (11/13/23 7615)    CODE STATUS:   Code Status and Medical Interventions:   Ordered at: 11/09/23 0736     Code Status (Patient has no pulse and is not breathing):    CPR (Attempt to Resuscitate)     Medical Interventions (Patient has pulse or is breathing):    Full Support       Sarah Sherman DO  11/13/23

## 2023-11-13 NOTE — PAYOR COMM NOTE
"Neha Monterroso \"Gene Autry\" (62 y.o. Female)     [id number]     Shilpa Foy RN  Utilization Review  Rvzvs-[phone]  [phone]    OBS to INPT      Inpatient Admission  Once     Completed     Level of Care: Telemetry  Diagnosis: GIB (gastrointestinal bleeding) [887129]  Admitting Physician: SARAH SONI [[id number]]  Attending Physician: SARAH SONI [[id number]]  Certification: I Certify That Inpatient Hospital Services Are Medically Necessary For Greater Than 2 Midnights    11/13/23 1110           Date of Birth   1960    Social Security Number       Address   [address] [place] [address]    Home Phone   [phone]    MRN   [id number]       Scientology   Jainism    Marital Status   Significant Other                            Admission Date   11/9/23    Admission Type   Emergency    Admitting Provider   Sarah Soni DO    Attending Provider   Sarah Soni DO    Department, Room/Bed   [facility] 6A, N603/1       Discharge Date       Discharge Disposition       Discharge Destination                                 Attending Provider: Sarah Soni DO    Allergies: Adhesive Tape, Sildenafil, Codeine, Morphine, Penicillins, Statins, Sulfa Antibiotics    Isolation: None   Infection: None   Code Status: CPR    Ht: 152.4 cm (60\")   Wt: 91.8 kg (202 lb 4.8 oz)    Admission Cmt: None   Principal Problem: GIB (gastrointestinal bleeding) [K92.2]                   Active Insurance as of 11/9/2023       Primary Coverage       Payor Plan Insurance Group Employer/Plan Group    ANTH MEDICARE REPLACEMENT ANTH MEDICARE ADVANTAGE [id number]       Payor Plan Address Payor Plan Phone Number Payor Plan Fax Number Effective Dates    [address] [phone]  1/1/2018 - None Entered    [facility] [id number]         Subscriber Name Subscriber Birth Date Member ID       NEHA MONTERROSO 1960 [id number]                     Emergency Contacts        " (Rel.) Home Phone Work Phone Mobile Phone    Marcela Ellis (Daughter) [phone] -- [phone]                 History & Physical        Sirena Jordan DO at 23 2307              [facility]  HISTORY AND PHYSICAL    Patient Name: Di Lopez  : 1960  MRN: [id number]  Primary Care Physician: Davina Santiago DO  Date of admission: 2023    Subjective  Subjective     Chief Complaint:  Rectal bleeding     HPI:  Di Lopez is a 62 y.o. female with PMH significant for COPD, HTN, HLD, CAD, depression, CHF, VHD s/p mechanical valve replacement on warfarin, DM2, CAL on CPAP, liver mass, obesity, who presents to the ED with complaint of rectal bleeding.  She states she has been having diarrhea over the past couple weeks.  Today, she developed bright red rectal bleeding.  She notes foul odor to her urine and her stool.  She denies abdominal pain but reports abdominal fullness.  She also reports increased lower extremity edema.  She reports being evaluated by her PCP last week.  She was called 2 days ago and advised to hold her Lasix, spironolactone, Entresto, and Trulicity secondary to worsening renal function.  This morning, she began to have bright red rectal bleeding with diarrhea.  She has had 4 episodes.  She reports dyspnea this evening with walking.  She denies chest pain.  She is on warfarin with her last dose last night.  Upon arrival to the ED, FOBT is positive.  Labs are concerning for elevated creatinine, leukocytosis, and worsening anemia.  CT abdomen/pelvis notes mild inflammation of the posterior wall of the short segment of mid sigmoid otherwise no acute findings.  She will be admitted to hospital medicine for further evaluation.    Review of Systems   Constitutional:  Negative for activity change, appetite change, chills, diaphoresis, fatigue, fever and unexpected weight change.   HENT:  Negative for congestion, sinus pressure and  sore throat.    Eyes: Negative.  Negative for visual disturbance.   Respiratory:  Positive for shortness of breath. Negative for cough, chest tightness and wheezing.    Cardiovascular:  Positive for leg swelling. Negative for chest pain and palpitations.   Gastrointestinal:  Positive for abdominal pain, anal bleeding, blood in stool and diarrhea. Negative for abdominal distention, nausea, rectal pain and vomiting.   Endocrine: Negative.  Negative for polydipsia and polyphagia.   Genitourinary:  Positive for frequency and urgency. Negative for decreased urine volume, difficulty urinating, dysuria and flank pain.   Musculoskeletal:  Positive for back pain. Negative for arthralgias, gait problem, myalgias and neck pain.   Skin: Negative.  Negative for color change, pallor, rash and wound.   Allergic/Immunologic: Negative.  Negative for immunocompromised state.   Neurological:  Positive for weakness and headaches. Negative for dizziness, tremors, syncope, facial asymmetry, speech difficulty and light-headedness.   Hematological: Negative.  Does not bruise/bleed easily.   Psychiatric/Behavioral: Negative.  Negative for confusion. The patient is not nervous/anxious.        Personal History     Past Medical History:   Diagnosis Date    Allergic     Anemia     Anticoagulated     WARFARIN    Arteriovenous malformation of gastrointestinal tract 05/12/2016    Arthritis     Asthma     Black hairy tongue     CHF (congestive heart failure)     Chronic back pain     Coronary artery disease     Depression 05/12/2016    Diabetes mellitus     Dyslipidemia 05/12/2016    Elevated cholesterol     Emphysema/COPD 05/12/2016    Emphysema/COPD 05/12/2016    Fatigue     GERD (gastroesophageal reflux disease)     Headache     Herniated cervical disc     History of echocardiogram 07/16/2013    Left ventricular systolic function at the lower limits of normal; EF 50-55%; mild MR; mechanical prosthetic mitral valve present; mod TR    History of  transfusion     NO REACTION; [facility]    Hyperlipidemia     Hypertension     Insomnia 05/12/2016    Iron deficiency     A.  The patient was treated with IV iron replacement. B.  Secondary to GI bleed, status post EGD with cauterization of blood vessel to the duodenum, 7/16/2014.    Left bundle branch hemiblock 10/12/2020    Left shoulder strain     Liver disease     Liver mass     Long term current use of anticoagulant 05/12/2016    Low back pain     Lung nodules     PER DR. LEVI'S NOTE 10/22    Morbid obesity 05/12/2016    Neuromuscular disorder 7/28/2016    Obstructive sleep apnea syndrome 05/12/2016    Description: A.  Moderate; CPAP therapy..    Sliding hiatal hernia 07/28/2016    Description: A.  Reported on EGD, 12/30/2011.    Type 2 diabetes mellitus 05/12/2016    Urinary tract infection     Uterine cancer     HYSTERECTOMY    Vitamin B12 deficiency     A.  The patient was treated with vitamin B12 replacement.    Wears eyeglasses        Past Surgical History:   Procedure Laterality Date    BILATERAL OOPHORECTOMY  2009    Status post bilateral oophorectomy secondary to ovarian cysts     BREAST BIOPSY Right     STEREOTACTIC    BREAST EXCISIONAL BIOPSY Bilateral     CARDIAC ELECTROPHYSIOLOGY PROCEDURE N/A 10/15/2020    Procedure: Biventricular Device Insertion;  Surgeon: Nadir Everett MD;  Location: [facility] CATH INVASIVE LOCATION;  Service: Cardiology;  Laterality: N/A;    CARDIAC SURGERY  2003    CARPAL TUNNEL RELEASE Bilateral     COLONOSCOPY  12/28/2012    COLONOSCOPY N/A 5/23/2023    Procedure: COLONOSCOPY;  Surgeon: Gt Fernandes MD;  Location: [facility] ENDOSCOPY;  Service: Gastroenterology;  Laterality: N/A;    CYSTECTOMY      removal of ganglion cyst from left wrist    ENDOSCOPY  12/30/2011    DIAGNOSTIC ESOPHAGOGASTRODUODENOSCOPY    HYSTERECTOMY  1995    A.  Status post hysterectomy for early stage endometrial cancer done in 1995.    MITRAL VALVE REPLACEMENT  03/03/2008    Dr. Hima Barreto;  "MECHANICAL    OTHER SURGICAL HISTORY      LIVER MASS BLOOD SUPPLY CLIPPED ON RIGHT AND LEFT SIDE PER PT       Family History:  family history includes Cancer in her father and paternal grandmother; Diabetes in her brother; Hypertension in her brother, father, and mother; Multiple myeloma in her father; Ovarian cancer in her paternal grandmother; Sudden death in her mother.     Social History:  reports that she quit smoking about 13 years ago. Her smoking use included cigarettes and electronic cigarette. She started smoking about 47 years ago. She has a 30.00 pack-year smoking history. She has never used smokeless tobacco. She reports that she does not drink alcohol and does not use drugs.  Social History     Social History Narrative    Not on file       Medications:  Dulaglutide, FreeStyle Dalia 2 Water View, FreeStyle Dalia 2 Sensor, Insulin Glargine, Insulin Lispro (1 Unit Dial), Insulin Pen Needle, Lancets, Vitamin D (Cholecalciferol), albuterol sulfate HFA, azelastine, carvedilol, escitalopram, ferrous sulfate, fish oil, folic acid, furosemide, gabapentin, glimepiride, glucose blood, lidocaine, omeprazole, sacubitril-valsartan, simvastatin, solifenacin, spironolactone, valACYclovir, and warfarin    Allergies   Allergen Reactions    Adhesive Tape Other (See Comments)     \"pulls skin off\"    Sildenafil Shortness Of Breath and Other (See Comments)     pt c/o tightness in chest and trouble breathing    Codeine Itching and Nausea And Vomiting    Morphine Other (See Comments)     uncontrolable crying     Penicillins Hives    Statins Other (See Comments)     HX of Liver Issues    Sulfa Antibiotics Nausea And Vomiting       Objective  Objective     Vital Signs:   Temp:  [98 °F (36.7 °C)] 98 °F (36.7 °C)  Heart Rate:  [70-78] 74  Resp:  [18] 18  BP: (128-135)/(45-52) 135/50    Physical Exam   Constitutional: Awake, alert, no acute distress   Eyes: PERRLA, sclerae anicteric, no conjunctival injection  HENT: NCAT, mucous " membranes moist  Neck: Supple, no thyromegaly, no lymphadenopathy, trachea midline  Respiratory: Clear to auscultation bilaterally, nonlabored respirations   Cardiovascular: RRR, no murmurs, rubs, or gallops, palpable pedal pulses bilaterally  Gastrointestinal: Positive bowel sounds, soft, nontender, nondistended  Musculoskeletal: mild bilateral ankle edema R>L, no clubbing or cyanosis to extremities  Psychiatric: Appropriate affect, cooperative  Neurologic: Oriented x 3, strength symmetric in all extremities, Cranial Nerves grossly intact to confrontation, speech clear  Skin: No rashes      Result Review:  I have personally reviewed the results from the time of this admission to 11/10/2023 00:00 EST and agree with these findings:  [x]  Laboratory list / accordion  [x]  Microbiology  [x]  Radiology  []  EKG/Telemetry   []  Cardiology/Vascular   []  Pathology  [x]  Old records  []  Other:  Most notable findings include:     LAB RESULTS:      Lab 11/09/23 1955 11/06/23 1629 11/06/23  1624   WBC 12.77*  --  10.96*   HEMOGLOBIN 7.2*  --  9.3*   HEMATOCRIT 22.9*  --  29.3*   PLATELETS 289  --  320   NEUTROS ABS 9.37*  --  7.42*   IMMATURE GRANS (ABS) 0.18*  --  0.17*   LYMPHS ABS 2.32  --  2.36   MONOS ABS 0.70  --  0.82   EOS ABS 0.16  --  0.16   MCV 89.8  --  86.9   PROTIME 33.9* 40.3*  --          Lab 11/09/23 1955 11/06/23  1624   SODIUM 142 144   POTASSIUM 4.1 3.9   CHLORIDE 107 109*   CO2 24.0 27.0   ANION GAP 11.0 8.0   BUN 21 13   CREATININE 1.88* 1.45*   EGFR 30.1* 41.1*   GLUCOSE 280* 107*   CALCIUM 8.3* 8.8   MAGNESIUM 2.0  --          Lab 11/09/23 1955   TOTAL PROTEIN 6.1   ALBUMIN 3.5   GLOBULIN 2.6   ALT (SGPT) 48*   AST (SGOT) 32   BILIRUBIN 0.3   ALK PHOS 167*   LIPASE 25         Lab 11/09/23 1955 11/06/23  1629   PROBNP 830.8  --    HSTROP T 11  --    PROTIME 33.9* 40.3*   INR 3.31* 4.13*             Lab 11/09/23  2142 11/06/23  1624   IRON  --  38   IRON SATURATION (TSAT)  --  10*   TIBC  --  362    TRANSFERRIN  --  243   FERRITIN  --  134.20   ABO TYPING O  --    RH TYPING Positive  --    ANTIBODY SCREEN Negative  --          Brief Urine Lab Results  (Last result in the past 365 days)        Color   Clarity   Blood   Leuk Est   Nitrite   Protein   CREAT   Urine HCG        11/09/23 2320 Yellow   Clear   Negative   Small (1+)   Positive   100 mg/dL (2+)                 Microbiology Results (last 10 days)       ** No results found for the last 240 hours. **            XR Chest 1 View    Result Date: 11/9/2023  XR CHEST 1 VW Date of Exam: 11/9/2023 11:20 PM EST Indication: dyspnea, chf Comparison: 6/21/2023. Findings: There are no airspace consolidations. No pleural fluid. No pneumothorax. The pulmonary vasculature appears within normal limits. The heart appears enlarged. Median sternotomy wires are present. There is a left subclavian AICD/pacemaker device. Stable chronic interstitial changes are present.. No acute osseous abnormality identified.     Impression: Impression: No acute cardiopulmonary process. Stable cardiomegaly. Electronically Signed: Kylah Torres MD  11/9/2023 11:45 PM EST  Workstation ID: [id number]    CT Abdomen Pelvis Without Contrast    Result Date: 11/9/2023  CT ABDOMEN PELVIS WO CONTRAST Date of Exam: 11/9/2023 9:25 PM EST Indication: Rectal bleeding. Comparison: 9/13/2022 abdomen pelvis CT scan Technique: Axial CT images were obtained of the abdomen and pelvis without the administration of contrast. Reconstructed coronal and sagittal images were also obtained. Automated exposure control and iterative construction methods were used. Findings: Previous 9/13/2022 exam report indicated no acute disease. Included lower lungs appear clear of active disease. A 3 mm right lower lobe noncalcified nodule and similar size left lower lobe nodule are unchanged. There is stable size of the patient's 8 cm nodular left lobe liver lesion, with what appear to be multiple calcifications, consistent with  history of adenoma and embolization. Allowing for differences between unenhanced scan today and previous contrast-enhanced study, no significant interval change in appearance of the liver is seen. Spleen is not enlarged. Pancreas, adrenal glands, and kidneys appear unremarkable. No upper abdominal free air, ascites, adenopathy, or acute inflammatory changes seen. Small bowel loops are normal in caliber. The colon is nondistended, but contains almost entirely fluid. Regarding lower abdomen and pelvis, there is mild to moderate left colon diverticulosis without apparent diverticulitis. There is a very subtle change in appearance of the mid sigmoid, axial image 105 and adjacent images, with indistinct posterior margin  of the short segment here, and what appears to be some stranding of the fat between this loop, the upper margin of the bladder and an adjacent redundant loop of sigmoid. Very low level inflammation might cause this appearance. No similar findings are appreciated elsewhere.  Some stable, focal linear thickening of the left upper peritoneal margin/adnexa is likely stable scarring from previous hysterectomy. No intrapelvic inflammatory change is seen. Bladder is decompressed. Uterus and ovaries are not identified. No evidence  of ureteral calculus or bladder calculus is seen. Bony structures appear to be intact. There is again advanced degenerative disc disease at L5-S1, with very mild posterior subluxation of L5 on S1. No acute bony abnormality seen.     Impression: Impression: 1. Suspected subtle inflammation of the posterior wall of a short segment of mid sigmoid. No obvious features of diverticulitis such as abscess, extraluminal air, etc., but there are small diverticuli in this region. 2. Fluid-filled normal caliber colon consistent with diarrhea. 3. Stable appearance of the patient's left liver mass, by history adenoma with previous embolization. 4. No other evidence of acute intra-abdominal  intrapelvic disease elsewhere. Electronically Signed: Víctor Zazueta MD  11/9/2023 9:56 PM EST  Workstation ID: [id number]     Results for orders placed during the hospital encounter of 05/31/23    Adult Transthoracic Echo Complete W/ Cont if Necessary Per Protocol    Interpretation Summary    Mildly reduced right ventricular systolic function noted.    There is calcification of the aortic valve.    Estimated right ventricular systolic pressure from tricuspid regurgitation is normal (<35 mmHg).    Mild global LV systolic dysfunction    EF=45-50%    Normal prosthetic MV function    AV calcification      Assessment & Plan  Assessment & Plan       GIB (gastrointestinal bleeding)    Depression    Type 2 diabetes mellitus without complication, with long-term current use of insulin    Dyslipidemia    Emphysema/COPD    Hypertension    Obstructive sleep apnea syndrome    Chronic diastolic heart failure    Acute blood loss anemia    MEERA (acute kidney injury)    H/O heart valve replacement with mechanical valve    62 y.o. female with PMH significant for COPD, HTN, HLD, CAD, depression, CHF, VHD s/p mechanical valve replacement on warfarin, DM2, CAL on CPAP, liver mass, obesity, who presents to the ED with complaint of rectal bleeding who was found to have concern for GI bleed.    GI bleed  Symptomatic acute blood loss anemia  - Type and screen  - Transfuse 1 unit PRBCs  - GI consult in the a.m., Dr. Fernandes  - Colonoscopy completed May 2023  - Has scheduled EGD outpatient 11/14/2023 (Dr. Fernandes)  - N.p.o. after midnight  - Serial H&H  -Ongoing GI bleed.  Reverse Coumadin, starting heparin  -CT scan with inflammation in the sigmoid colon, cover with Cefepime/flagyl for now    MEERA  - Baseline creatinine ~0.8-0.9, currently 1.88  - Hold nephrotoxic medications  - Transfusing 1 unit PRBCs, tachypneic with concerns for volume overload  - Lasix 40 mg IV x1 tonight  - BMP in the a.m.  - Urine studies    UTI  - urine culture pending    -Cefepime per above  - CBC in the a.m.    Valvular heart disease  - S/p mechanical valve replacement  - On warfarin, last dose evening of 11/9/2023  - Ongoing GI bleed.  - Reverse Coumadin might give 2 units FFP, vitamin K  - start heparin drip    Leukocytosis  - GI panel  - CXR pending  - Lactic acid, procalcitonin pending  -Blood cultures pending    Diabetes mellitus 2  - Hold Trulicity, glimepiride  - Hold Lantus for now, add back as appropriate  - FSBG ACHS  - SS insulin  - Hemoglobin A1c in the a.m.    CHF  - Hold Entresto, Lasix, spironolactone pending a.m. labs  - Continue carvedilol  - Daily weight    Hypertension  Dyslipidemia  - Currently controlled  - Continue carvedilol  - Continue statin    CAL  - CPAP    Nicotine use  - Vapes  - Nicotine patch  - Cessation counseling    DVT prophylaxis: Heparin gtt      CODE STATUS:    Code Status (Patient has no pulse and is not breathing): CPR (Attempt to Resuscitate)  Medical Interventions (Patient has pulse or is breathing): Full Support      Expected Discharge  Expected discharge date/ time has not been documented.      This note has been completed as part of a split-shared workflow.     Signature: Electronically signed by FRANCESCA Holland, 11/10/23, 12:01 AM EST.        Attending   Admission Attestation       I have performed an independent face-to-face diagnostic evaluation including performing an independent physical examination.  The documented plan of care above was reviewed and developed with the advanced practice clinician (APC).  I have updated the HPI as appropriate.    Brief HPI    This is a 62-year-old female patient with a PMH significant for CAD, depression, systolic CHF, presence of mechanical mitral valve, diabetes mellitus type 2 on insulin, CAL on CPAP, COPD, HTN, HLD who comes to the ED due to rectal bleeding.  Patient reports ongoing diarrhea for the past few weeks.  She says diarrhea stopped 3 days ago, she had 2 days without a bowel  movement.  Today she began to experience rectal bleeding.  She had 4 episodes of rectal bleeding throughout the day today.  Denies abdominal pain, nausea vomiting.    Attending Physical Exam:  Temp:  [97.9 °F (36.6 °C)-98 °F (36.7 °C)] 98 °F (36.7 °C)  Heart Rate:  [68-78] 68  Resp:  [18] 18  BP: (127-145)/(45-56) 144/56  Flow (L/min):  [2-3] 3    Constitutional: Awake, alert  Eyes: PERRLA, sclerae anicteric, no conjunctival injection  HENT: NCAT, mucous membranes moist  Neck: Supple, no thyromegaly, no lymphadenopathy, trachea midline  Respiratory: Clear to auscultation bilaterally, tachypneic  Cardiovascular: RRR, no murmurs, rubs, or gallops, palpable pedal pulses bilaterally  Gastrointestinal: Positive bowel sounds, soft, nontender, nondistended  Musculoskeletal: 2+ pitting bilateral ankle edema, no clubbing or cyanosis to extremities  Psychiatric: Appropriate affect, cooperative  Neurologic: Oriented x 3, strength symmetric in all extremities, Cranial Nerves grossly intact to confrontation, speech clear  Skin: No rashes      Assessment and Plan:    See assessment and plan documented by APC above and updated/edited by me as appropriate.    Sirena Jordan DO  11/10/23                        Electronically signed by Sirena Jordan DO at 11/10/23 0358          Emergency Department Notes        Huy Machado Jr., PA-C at 11/09/23 1923        Procedure Orders    1. Critical Care [[id number]] ordered by Huy Machado Jr., PA-C              Attestation signed by Humberto Hemphill MD at 11/10/23 1401        SUPERVISE: For this patient encounter, I reviewed the APC's documentation, treatment plan, and medical decision making.  Humberto Hemphill MD 11/10/2023 14:01 EST                         Subjective  History of Present Illness:    Chief Complaint: Rectal bleeding  History of Present Illness: 62-year-old female with rectal bleeding, significant past medical history for congestive heart failure, COPD,  diabetes, hypertension, hyperlipidemia, and she is on Coumadin for valve replacement.  She states she started having bleeding this morning, it is bright red blood, she had 4 episodes, she also states she has abdominal fullness.  Onset: Sudden onset  Duration: Bleeding started this morning  Exacerbating / Alleviating factors: Worse with bowel movements  Associated symptoms: Abdominal fullness and pain      Nurses Notes reviewed and agree, including vitals, allergies, social history and prior medical history.     REVIEW OF SYSTEMS: All systems reviewed and not pertinent unless noted.    Review of Systems   Gastrointestinal:  Positive for abdominal pain and blood in stool.   All other systems reviewed and are negative.      Past Medical History:   Diagnosis Date    Allergic     Anemia     Anticoagulated     WARFARIN    Arteriovenous malformation of gastrointestinal tract 05/12/2016    Arthritis     Asthma     Black hairy tongue     CHF (congestive heart failure)     Chronic back pain     Coronary artery disease     Depression 05/12/2016    Diabetes mellitus     Dyslipidemia 05/12/2016    Elevated cholesterol     Emphysema/COPD 05/12/2016    Emphysema/COPD 05/12/2016    Fatigue     GERD (gastroesophageal reflux disease)     Headache     Herniated cervical disc     History of echocardiogram 07/16/2013    Left ventricular systolic function at the lower limits of normal; EF 50-55%; mild MR; mechanical prosthetic mitral valve present; mod TR    History of transfusion     NO REACTION; [facility]    Hyperlipidemia     Hypertension     Insomnia 05/12/2016    Iron deficiency     A.  The patient was treated with IV iron replacement. B.  Secondary to GI bleed, status post EGD with cauterization of blood vessel to the duodenum, 7/16/2014.    Left bundle branch hemiblock 10/12/2020    Left shoulder strain     Liver disease     Liver mass     Long term current use of anticoagulant 05/12/2016    Low back pain     Lung nodules     PER   GURMEET'S NOTE 10/22    Morbid obesity 05/12/2016    Neuromuscular disorder 7/28/2016    Obstructive sleep apnea syndrome 05/12/2016    Description: A.  Moderate; CPAP therapy..    Sliding hiatal hernia 07/28/2016    Description: A.  Reported on EGD, 12/30/2011.    Type 2 diabetes mellitus 05/12/2016    Urinary tract infection     Uterine cancer     HYSTERECTOMY    Vitamin B12 deficiency     A.  The patient was treated with vitamin B12 replacement.    Wears eyeglasses        Allergies:    Adhesive tape, Sildenafil, Codeine, Morphine, Penicillins, Statins, and Sulfa antibiotics      Past Surgical History:   Procedure Laterality Date    BILATERAL OOPHORECTOMY  2009    Status post bilateral oophorectomy secondary to ovarian cysts     BREAST BIOPSY Right     STEREOTACTIC    BREAST EXCISIONAL BIOPSY Bilateral     CARDIAC ELECTROPHYSIOLOGY PROCEDURE N/A 10/15/2020    Procedure: Biventricular Device Insertion;  Surgeon: Nadir Everett MD;  Location: [facility] CATH INVASIVE LOCATION;  Service: Cardiology;  Laterality: N/A;    CARDIAC SURGERY  2003    CARPAL TUNNEL RELEASE Bilateral     COLONOSCOPY  12/28/2012    COLONOSCOPY N/A 5/23/2023    Procedure: COLONOSCOPY;  Surgeon: Gt Fernandes MD;  Location: [facility] ENDOSCOPY;  Service: Gastroenterology;  Laterality: N/A;    CYSTECTOMY      removal of ganglion cyst from left wrist    ENDOSCOPY  12/30/2011    DIAGNOSTIC ESOPHAGOGASTRODUODENOSCOPY    HYSTERECTOMY  1995    A.  Status post hysterectomy for early stage endometrial cancer done in 1995.    MITRAL VALVE REPLACEMENT  03/03/2008    Dr. Hima Barreto; MECHANICAL    OTHER SURGICAL HISTORY      LIVER MASS BLOOD SUPPLY CLIPPED ON RIGHT AND LEFT SIDE PER PT         Social History     Socioeconomic History    Marital status: Significant Other   Tobacco Use    Smoking status: Former     Packs/day: 1.00     Years: 30.00     Additional pack years: 0.00     Total pack years: 30.00     Types: Cigarettes, Electronic  "Cigarette     Start date: 1976     Quit date: 2/10/2010     Years since quittin.7    Smokeless tobacco: Never    Tobacco comments:     Quit in 2010   Vaping Use    Vaping Use: Every day    Substances: Nicotine    Devices: Disposable, one every 2 days   Substance and Sexual Activity    Alcohol use: No    Drug use: Never    Sexual activity: Defer     Partners: Male     Birth control/protection: Abstinence, None, Hysterectomy     Comment: 1 partner         Family History   Problem Relation Age of Onset    Sudden death Mother         POSSIBLE SERIOUS MASSIVE STROKE    Hypertension Mother             Multiple myeloma Father     Cancer Father             Hypertension Father             Ovarian cancer Paternal Grandmother     Cancer Paternal Grandmother             Diabetes Brother             Hypertension Brother             Breast cancer Neg Hx        Objective  Physical Exam:  /48   Pulse 76   Temp 97.9 °F (36.6 °C) (Axillary)   Resp 18   Ht 154.9 cm (61\")   Wt 87.1 kg (192 lb)   LMP 1998   SpO2 93%   BMI 36.28 kg/m²      Physical Exam  Vitals and nursing note reviewed.   Constitutional:       Appearance: She is well-developed.   Cardiovascular:      Rate and Rhythm: Normal rate and regular rhythm.   Pulmonary:      Effort: Pulmonary effort is normal.      Breath sounds: Normal breath sounds.   Abdominal:      Palpations: Abdomen is soft.      Tenderness: There is generalized abdominal tenderness.   Musculoskeletal:         General: Normal range of motion.      Cervical back: Normal range of motion and neck supple.   Skin:     General: Skin is warm and dry.   Neurological:      Mental Status: She is alert and oriented to person, place, and time.      Deep Tendon Reflexes: Reflexes are normal and symmetric.           Critical Care    Performed by: Huy Machado Jr. PACelestineC  Authorized by: Sirena Jordan DO    Critical care provider " statement:     Critical care time (minutes):  30    Critical care time was exclusive of:  Separately billable procedures and treating other patients and teaching time    Critical care was necessary to treat or prevent imminent or life-threatening deterioration of the following conditions: GI bleed, with gastroenterology consult expecting EGD within the next 12 hours.  Blood products given.    Critical care was time spent personally by me on the following activities:  Blood draw for specimens, development of treatment plan with patient or surrogate, discussions with consultants, discussions with primary provider, evaluation of patient's response to treatment, examination of patient, gastric intubation, obtaining history from patient or surrogate, ordering and performing treatments and interventions, ordering and review of laboratory studies, ordering and review of radiographic studies, pulse oximetry, re-evaluation of patient's condition and review of old charts      ED Course:    ED Course as of 11/10/23 0148   Thu Nov 09, 2023 2122 Hemoglobin(!): 7.2  Acute on chronic anemia when compared to prior. [RS]   2122 Creatinine(!): 1.88  Mild exacerbation of chronic kidney disease. [RS]   2128 Discussed the case with Dr. Fernandes, he would see the patient in consult, admit to the hospitalist team [CS]   2144 Patient does not want rectal performed [CS]      ED Course User Index  [CS] Huy Machado Jr., GRACIE  [RS] Humberto Hemphill MD       Lab Results (last 24 hours)       Procedure Component Value Units Date/Time    CBC Auto Differential [[id number]]  (Abnormal) Collected: 11/09/23 1955    Specimen: Blood Updated: 11/09/23 2006     WBC 12.77 10*3/mm3      RBC 2.55 10*6/mm3      Hemoglobin 7.2 g/dL      Hematocrit 22.9 %      MCV 89.8 fL      MCH 28.2 pg      MCHC 31.4 g/dL      RDW 14.5 %      RDW-SD 47.3 fl      MPV 10.0 fL      Platelets 289 10*3/mm3      Neutrophil % 73.3 %      Lymphocyte % 18.2 %       Monocyte % 5.5 %      Eosinophil % 1.3 %      Basophil % 0.3 %      Immature Grans % 1.4 %      Neutrophils, Absolute 9.37 10*3/mm3      Lymphocytes, Absolute 2.32 10*3/mm3      Monocytes, Absolute 0.70 10*3/mm3      Eosinophils, Absolute 0.16 10*3/mm3      Basophils, Absolute 0.04 10*3/mm3      Immature Grans, Absolute 0.18 10*3/mm3      nRBC 0.0 /100 WBC     Comprehensive Metabolic Panel [[id number]]  (Abnormal) Collected: 11/09/23 1955    Specimen: Blood Updated: 11/09/23 2028     Glucose 280 mg/dL      BUN 21 mg/dL      Creatinine 1.88 mg/dL      Sodium 142 mmol/L      Potassium 4.1 mmol/L      Comment: Slight hemolysis detected by analyzer. Result may be falsely elevated.        Chloride 107 mmol/L      CO2 24.0 mmol/L      Calcium 8.3 mg/dL      Total Protein 6.1 g/dL      Albumin 3.5 g/dL      ALT (SGPT) 48 U/L      AST (SGOT) 32 U/L      Alkaline Phosphatase 167 U/L      Total Bilirubin 0.3 mg/dL      Globulin 2.6 gm/dL      Comment: Calculated Result        A/G Ratio 1.3 g/dL      BUN/Creatinine Ratio 11.2     Anion Gap 11.0 mmol/L      eGFR 30.1 mL/min/1.73     Narrative:      GFR Normal >60  Chronic Kidney Disease <60  Kidney Failure <15      Magnesium [[id number]]  (Normal) Collected: 11/09/23 1955    Specimen: Blood Updated: 11/09/23 2028     Magnesium 2.0 mg/dL     Protime-INR [[id number]]  (Abnormal) Collected: 11/09/23 1955    Specimen: Blood Updated: 11/09/23 2023     Protime 33.9 Seconds      INR 3.31    Lipase [[id number]]  (Normal) Collected: 11/09/23 1955    Specimen: Blood Updated: 11/09/23 2028     Lipase 25 U/L     proBNP [[id number]]  (Normal) Collected: 11/09/23 1955    Specimen: Blood Updated: 11/09/23 2324     proBNP 830.8 pg/mL     Narrative:      This assay is used as an aid in the diagnosis of individuals suspected of having heart failure. It can be used as an aid in the diagnosis of acute decompensated heart failure (ADHF) in patients presenting with signs and symptoms of ADHF to the  emergency department (ED). In addition, NT-proBNP of <300 pg/mL indicates ADHF is not likely.    Age Range Result Interpretation  NT-proBNP Concentration (pg/mL:      <50             Positive            >450                   Gray                 300-450                    Negative             <300    50-75           Positive            >900                  Gray                300-900                  Negative            <300      >75             Positive            >1800                  Gray                300-1800                  Negative            <300    High Sensitivity Troponin T [[id number]]  (Normal) Collected: 11/09/23 1955    Specimen: Blood Updated: 11/09/23 2323     HS Troponin T 11 ng/L     Narrative:      High Sensitive Troponin T Reference Range:  <14.0 ng/L- Negative Female for AMI  <22.0 ng/L- Negative Male for AMI  >=14 - Abnormal Female indicating possible myocardial injury.  >=22 - Abnormal Male indicating possible myocardial injury.   Clinicians would have to utilize clinical acumen, EKG, Troponin, and serial changes to determine if it is an Acute Myocardial Infarction or myocardial injury due to an underlying chronic condition.         POCT Occult Blood, Stool [[id number]]  (Abnormal) Resulted: 11/09/23 2149    Specimen: Stool from Per Rectum Updated: 11/09/23 2151     Fecal Occult Blood Positive     Lot Number [id number]     Expiration Date 2/2,026     DEVELOPER LOT NUMBER [id number]     DEVELOPER EXPIRATION DATE 2/2,027     Positive Control Positive     Negative Control Negative    Urinalysis With Culture If Indicated - Urine, Clean Catch [[id number]]  (Abnormal) Collected: 11/09/23 2320    Specimen: Urine, Clean Catch Updated: 11/09/23 2333     Color, UA Yellow     Appearance, UA Clear     pH, UA 5.5     Specific Gravity, UA 1.019     Glucose,  mg/dL (Trace)     Ketones, UA Negative     Bilirubin, UA Negative     Blood, UA Negative     Protein,  mg/dL (2+)     Leuk Esterase, UA Small  (1+)     Nitrite, UA Positive     Urobilinogen, UA 0.2 E.U./dL    Narrative:      In absence of clinical symptoms, the presence of pyuria, bacteria, and/or nitrites on the urinalysis result does not correlate with infection.    Urinalysis, Microscopic Only - Urine, Clean Catch [[id number]]  (Abnormal) Collected: 11/09/23 2320    Specimen: Urine, Clean Catch Updated: 11/09/23 2333     RBC, UA 0-2 /HPF      WBC, UA 21-50 /HPF      Bacteria, UA 4+ /HPF      Squamous Epithelial Cells, UA 0-2 /HPF      Hyaline Casts, UA 7-12 /LPF      Methodology Automated Microscopy    Urine Culture - Urine, Urine, Clean Catch [[id number]] Collected: 11/09/23 2320    Specimen: Urine, Clean Catch Updated: 11/09/23 2333    High Sensitivity Troponin T 2Hr [[id number]] Collected: 11/09/23 2336    Specimen: Blood Updated: 11/10/23 0105     HS Troponin T 10 ng/L      Troponin T Delta --    Narrative:      High Sensitive Troponin T Reference Range:  <14.0 ng/L- Negative Female for AMI  <22.0 ng/L- Negative Male for AMI  >=14 - Abnormal Female indicating possible myocardial injury.  >=22 - Abnormal Male indicating possible myocardial injury.   Clinicians would have to utilize clinical acumen, EKG, Troponin, and serial changes to determine if it is an Acute Myocardial Infarction or myocardial injury due to an underlying chronic condition.         Procalcitonin [[id number]]  (Normal) Collected: 11/09/23 2336    Specimen: Blood Updated: 11/10/23 0008     Procalcitonin 0.07 ng/mL     Narrative:      As a Marker for Sepsis (Non-Neonates):    1. <0.5 ng/mL represents a low risk of severe sepsis and/or septic shock.  2. >2 ng/mL represents a high risk of severe sepsis and/or septic shock.    As a Marker for Lower Respiratory Tract Infections that require antibiotic therapy:    PCT on Admission    Antibiotic Therapy       6-12 Hrs later    >0.5                Strongly Recommended  >0.25 - <0.5        Recommended   0.1 - 0.25          Discouraged             "  Remeasure/reassess PCT  <0.1                Strongly Discouraged     Remeasure/reassess PCT    As 28 day mortality risk marker: \"Change in Procalcitonin Result\" (>80% or <=80%) if Day 0 (or Day 1) and Day 4 values are available. Refer to [email or web address]://[email or web address]-pct-calculator.[email or web address]    Change in PCT <=80%  A decrease of PCT levels below or equal to 80% defines a positive change in PCT test result representing a higher risk for 28-day all-cause mortality of patients diagnosed with severe sepsis for septic shock.    Change in PCT >80%  A decrease of PCT levels of more than 80% defines a negative change in PCT result representing a lower risk for 28-day all-cause mortality of patients diagnosed with severe sepsis or septic shock.       Lactic Acid, Plasma [[id number]]  (Normal) Collected: 11/10/23 0027    Specimen: Blood Updated: 11/10/23 0107     Lactate 1.5 mmol/L      Comment: Falsely depressed results may occur on samples drawn from patients receiving N-Acetylcysteine (NAC) or Metamizole.       Blood Culture - Blood, Arm, Right [[id number]] Collected: 11/10/23 0027    Specimen: Blood from Arm, Right Updated: 11/10/23 0051    Blood Culture - Blood, Arm, Right [[id number]] Collected: 11/10/23 0027    Specimen: Blood from Arm, Right Updated: 11/10/23 0050             XR Chest 1 View    Result Date: 11/9/2023  XR CHEST 1 VW Date of Exam: 11/9/2023 11:20 PM EST Indication: dyspnea, chf Comparison: 6/21/2023. Findings: There are no airspace consolidations. No pleural fluid. No pneumothorax. The pulmonary vasculature appears within normal limits. The heart appears enlarged. Median sternotomy wires are present. There is a left subclavian AICD/pacemaker device. Stable chronic interstitial changes are present.. No acute osseous abnormality identified.     Impression: Impression: No acute cardiopulmonary process. Stable cardiomegaly. Electronically Signed: Kylah Torres MD  11/9/2023 11:45 PM EST  Workstation ID: [id number]    CT Abdomen " Pelvis Without Contrast    Result Date: 11/9/2023  CT ABDOMEN PELVIS WO CONTRAST Date of Exam: 11/9/2023 9:25 PM EST Indication: Rectal bleeding. Comparison: 9/13/2022 abdomen pelvis CT scan Technique: Axial CT images were obtained of the abdomen and pelvis without the administration of contrast. Reconstructed coronal and sagittal images were also obtained. Automated exposure control and iterative construction methods were used. Findings: Previous 9/13/2022 exam report indicated no acute disease. Included lower lungs appear clear of active disease. A 3 mm right lower lobe noncalcified nodule and similar size left lower lobe nodule are unchanged. There is stable size of the patient's 8 cm nodular left lobe liver lesion, with what appear to be multiple calcifications, consistent with history of adenoma and embolization. Allowing for differences between unenhanced scan today and previous contrast-enhanced study, no significant interval change in appearance of the liver is seen. Spleen is not enlarged. Pancreas, adrenal glands, and kidneys appear unremarkable. No upper abdominal free air, ascites, adenopathy, or acute inflammatory changes seen. Small bowel loops are normal in caliber. The colon is nondistended, but contains almost entirely fluid. Regarding lower abdomen and pelvis, there is mild to moderate left colon diverticulosis without apparent diverticulitis. There is a very subtle change in appearance of the mid sigmoid, axial image 105 and adjacent images, with indistinct posterior margin  of the short segment here, and what appears to be some stranding of the fat between this loop, the upper margin of the bladder and an adjacent redundant loop of sigmoid. Very low level inflammation might cause this appearance. No similar findings are appreciated elsewhere.  Some stable, focal linear thickening of the left upper peritoneal margin/adnexa is likely stable scarring from previous hysterectomy. No intrapelvic  inflammatory change is seen. Bladder is decompressed. Uterus and ovaries are not identified. No evidence  of ureteral calculus or bladder calculus is seen. Bony structures appear to be intact. There is again advanced degenerative disc disease at L5-S1, with very mild posterior subluxation of L5 on S1. No acute bony abnormality seen.     Impression: Impression: 1. Suspected subtle inflammation of the posterior wall of a short segment of mid sigmoid. No obvious features of diverticulitis such as abscess, extraluminal air, etc., but there are small diverticuli in this region. 2. Fluid-filled normal caliber colon consistent with diarrhea. 3. Stable appearance of the patient's left liver mass, by history adenoma with previous embolization. 4. No other evidence of acute intra-abdominal intrapelvic disease elsewhere. Electronically Signed: Víctor Zazueta MD  11/9/2023 9:56 PM EST  Workstation ID: [id number]        Medical Decision Making  Patient Presentation 62-year-old female presented with a GI bleed, bright red blood per rectum, vital signs stable no acute distress, abdomen nonacute    DDX GI bleed, hemorrhoids, fissure, colitis, anemia, acute kidney insufficiency    Data Review/ Non ED Records /Analysis/Ordering unique tests. Review of previous visits, prior labs, prior imaging, available notes from prior evaluations or visits with specialists, medication list, allergies, past medical history, past surgical history a CBC, CMP, lipase, PT/INR, were performed        Independent Review Studies and interpreted all labs including the CBC, CMP, lipase, PT/INR    Intervention/Re-evaluation intervention included packed red blood cells for anemia on reevaluation patient stable    Independent Clinician discussed with my supervising physician Dr. Hemphill, admitted to the hospitalist Dr. Jordan, discussed with the on-call gastroenterologist Dr. Fernandes    Risk Stratification tools/clinical decision rules patient presented with a GI  bleed, significant blood loss compared to her previous labs 3 days earlier, significant risk for poor organ perfusion, endorgan damage, cardiac strain, cardiac event, disability, or death.  She was given packed red blood cells, she remained stable, and she was admitted after consultation with GI with expectant EGD, and possibly colonoscopy within the next 12 hours.    Shared Decision Making discussed the plan for admission with patient and family and they agree    Disposition patient stable for admission    Problems Addressed:  Gastrointestinal hemorrhage, unspecified gastrointestinal hemorrhage type: complicated acute illness or injury    Amount and/or Complexity of Data Reviewed  Labs: ordered. Decision-making details documented in ED Course.  Radiology: ordered.    Risk  Decision regarding hospitalization.          Final diagnoses:   Gastrointestinal hemorrhage, unspecified gastrointestinal hemorrhage type          Huy Machado Jr., GRACIE  11/10/23 0148      Electronically signed by Humberto Hemphill MD at 11/10/23 1401       Vital Signs (last 2 days)       Date/Time Temp Temp src Pulse Resp BP Patient Position SpO2    11/13/23 0824 -- -- 67 -- 151/70 -- --    11/13/23 0816 -- -- 65 18 -- -- 98    11/13/23 0758 98.3 (36.8) -- -- -- -- -- --    11/13/23 0600 -- -- 68 -- -- -- 98    11/13/23 0400 -- -- 73 -- -- -- 97    11/13/23 0315 98.7 (37.1) Oral 77 18 141/56 Sitting 94    11/13/23 0200 -- -- 78 -- -- -- 97    11/13/23 0000 -- -- 81 -- -- -- 94    11/12/23 2352 98.2 (36.8) Oral 97 20 131/48 Sitting --    11/12/23 2200 -- -- 84 -- -- -- --    11/12/23 2000 -- -- 78 -- -- -- 95    11/12/23 1941 -- -- 71 18 -- -- 100    11/12/23 1933 98.4 (36.9) Oral 104 20 176/62 Sitting 100    11/12/23 1806 -- -- 70 -- -- -- --    11/12/23 1650 -- -- -- 18 -- -- --    11/12/23 1606 97.4 (36.3) Oral 68 18 125/55 Lying 96    11/12/23 1432 98.2 (36.8) Oral 76 18 120/63 -- 94    11/12/23 1330 97.7 (36.5) Oral 64 18 122/54  Lying 97    11/12/23 1241 -- -- -- 18 -- -- --    11/12/23 1223 98.2 (36.8) Oral 59 18 116/56 Lying 98    11/12/23 1209 98.2 (36.8) Oral 64 18 120/48 Lying 93    11/12/23 1143 98 (36.7) Oral 79 18 131/49 Lying 95    11/12/23 0917 -- -- 77 -- -- -- --    11/12/23 0816 98.8 (37.1) Oral -- 18 154/60 Sitting 96    11/12/23 0806 -- -- -- 18 -- -- --    11/12/23 0643 -- -- 71 -- -- -- 98    11/12/23 0415 -- -- 66 -- -- -- 96    11/12/23 0306 98.3 (36.8) Oral 65 16 146/65 Lying 96    11/12/23 0200 -- -- 62 -- -- -- 99    11/12/23 0017 -- -- 66 -- -- -- 96    11/11/23 2354 98 (36.7) Oral 75 16 148/68 Sitting 94    11/11/23 2239 -- -- 67 -- -- -- 94    11/11/23 2054 -- -- 68 -- -- -- 96    11/11/23 2035 -- -- 68 18 -- -- 95    11/11/23 1927 98.1 (36.7) Oral 68 18 141/54 Lying 95    11/11/23 1706 -- -- -- 18 -- -- --    11/11/23 1545 98 (36.7) Oral 71 18 139/55 Lying 92    11/11/23 1257 -- -- 68 18 -- -- 94    11/11/23 1040 98.2 (36.8) Oral 67 18 128/48 Sitting 96    11/11/23 0750 98.1 (36.7) Oral 69 18 145/60 Lying 95    11/11/23 0246 98.3 (36.8) Oral 64 19 152/63 Lying 96          Oxygen Therapy (last 2 days)       Date/Time SpO2 Device (Oxygen Therapy) Flow (L/min) Oxygen Concentration (%) ETCO2 (mmHg)    11/13/23 1000 -- humidified;nasal cannula 2 -- --    11/13/23 0816 98 humidified;nasal cannula 3 -- --    11/13/23 0800 -- humidified;nasal cannula 2 -- --    11/13/23 0614 -- humidified;nasal cannula 3 -- --    11/13/23 0600 98 humidified;nasal cannula 3 -- --    11/13/23 0405 -- humidified;nasal cannula 3 -- --    11/13/23 0400 97 -- -- -- --    11/13/23 0315 94 -- -- -- --    11/13/23 0228 -- humidified;nasal cannula 3 -- --    11/13/23 0200 97 humidified;nasal cannula 3 -- --    11/13/23 0000 94 humidified;nasal cannula 3 -- --    11/12/23 2204 -- humidified;nasal cannula 3 -- --    11/12/23 2200 -- humidified;nasal cannula 3 -- --    11/12/23 2000 95 humidified;nasal cannula 3 -- --    11/12/23 1941 100  humidified;nasal cannula 3 -- --    11/12/23 1933 100 -- -- -- --    11/12/23 1800 -- humidified;nasal cannula 3 -- --    11/12/23 1650 -- humidified;nasal cannula 3 -- --    11/12/23 1606 96 -- -- -- --    11/12/23 1600 -- nasal cannula;humidified 3 -- --    11/12/23 1432 94 humidified;nasal cannula 3 -- --    11/12/23 1400 -- humidified;nasal cannula 3 -- --    11/12/23 1330 97 humidified;nasal cannula 3 -- --    11/12/23 1241 -- humidified;nasal cannula 3 -- --    11/12/23 1223 98 humidified;nasal cannula 3 -- --    11/12/23 1209 93 humidified;nasal cannula 3 -- --    11/12/23 1200 -- humidified;nasal cannula 3 -- --    11/12/23 1143 95 humidified;nasal cannula 3 -- --    11/12/23 1000 -- humidified;nasal cannula 3.5 -- --    11/12/23 0816 96 -- -- -- --    11/12/23 0806 -- humidified;nasal cannula 3.5 -- --    11/12/23 0800 -- humidified;nasal cannula 3.5 -- --    11/12/23 0643 98 humidified;nasal cannula 3 -- --    11/12/23 0415 96 humidified;nasal cannula 3 -- --    11/12/23 0306 96 -- -- -- --    11/12/23 0200 99 humidified;nasal cannula 3 -- --    11/12/23 0017 96 humidified;nasal cannula 3 -- --    11/11/23 2354 94 -- -- -- --    11/11/23 2239 94 humidified;nasal cannula 3 -- --    11/11/23 2054 96 humidified;nasal cannula 3 -- --    11/11/23 2035 95 humidified;nasal cannula 3 -- --    11/11/23 1927 95 -- -- -- --    11/11/23 1706 -- humidified;nasal cannula 3 -- --    11/11/23 1545 92 humidified;nasal cannula 3 -- --    11/11/23 1257 94 humidified;nasal cannula 3 -- --    11/11/23 1040 96 humidified;nasal cannula 3 -- --    11/11/23 0800 -- nasal cannula 3 -- --    11/11/23 0750 95 nasal cannula 3 -- --    11/11/23 0600 -- nasal cannula 3 -- --    11/11/23 0400 -- nasal cannula 3 -- --    11/11/23 0246 96 -- -- -- --    11/11/23 0200 -- nasal cannula 3 -- --    11/11/23 0000 -- nasal cannula 3 -- --          Lines, Drains & Airways       Active LDAs       Name Placement date Placement time Site Days     Peripheral IV 11/10/23 0259 Anterior;Left;Upper Arm 11/10/23  0259  Arm  3    Peripheral IV 11/12/23 1015 Right Antecubital 11/12/23  1015  Antecubital  1                  Current Facility-Administered Medications   Medication Dose Route Frequency Provider Last Rate Last Admin    acetaminophen (TYLENOL) tablet 650 mg  650 mg Oral Q6H PRN Angie Pineda APRN   650 mg at 11/13/23 0824    polyethylene glycol (MIRALAX) packet 17 g  17 g Oral Daily PRN Angie Pineda APRN        And    bisacodyl (DULCOLAX) EC tablet 5 mg  5 mg Oral Daily PRN Angie Pineda APRN        And    bisacodyl (DULCOLAX) suppository 10 mg  10 mg Rectal Daily PRN Angie Pineda APRN        budesonide-formoterol (SYMBICORT) 160-4.5 MCG/ACT inhaler 2 puff  2 puff Inhalation BID - RT Sarah Sherman DO   2 puff at 11/13/23 0816    carvedilol (COREG) tablet 12.5 mg  12.5 mg Oral BID With Meals Angie Pineda APRN   12.5 mg at 11/13/23 0825    cefTRIAXone (ROCEPHIN) 2,000 mg in sodium chloride 0.9 % 100 mL IVPB  2,000 mg Intravenous Q24H Rhoda Holman  mL/hr at 11/12/23 1807 2,000 mg at 11/12/23 1807    dextrose (D50W) (25 g/50 mL) IV injection 25 g  25 g Intravenous Q15 Min PRN Angie Pineda APRN        dextrose (GLUTOSE) oral gel 15 g  15 g Oral Q15 Min PRN Angie Pineda APRN        escitalopram (LEXAPRO) tablet 20 mg  20 mg Oral Daily Angie Pineda APRN   20 mg at 11/13/23 0824    ferrous sulfate tablet 325 mg  325 mg Oral Daily With Breakfast Angie Pineda APRN   325 mg at 11/13/23 0825    folic acid (FOLVITE) tablet 800 mcg  800 mcg Oral Daily Angie Pineda APRN   800 mcg at 11/13/23 0824    glucagon (GLUCAGEN) injection 1 mg  1 mg Intramuscular Q15 Min PRN Pineda, Angie R, APRN        heparin 65902 units/250 mL (100 units/mL) in 0.45 % NaCl infusion  19 Units/kg/hr Intravenous Titrated Justin Mariano, PharmD 17.27 mL/hr at 11/13/23 1053 19 Units/kg/hr at 11/13/23 1053    HYDROcodone-acetaminophen  (NORCO) 5-325 MG per tablet 1 tablet  1 tablet Oral Q6H PRN Angie Pineda APRN   1 tablet at 11/12/23 1155    hydrocortisone (ANUSOL-HC) suppository 25 mg  25 mg Rectal BID Kike Zazueta APRN        Insulin Lispro (humaLOG) injection 2-7 Units  2-7 Units Subcutaneous 4x Daily AC & at Bedtime Angie Pineda APRN   2 Units at 11/12/23 2203    Insulin Lispro (humaLOG) injection 4 Units  4 Units Subcutaneous TID With Meals Rhoda Holman MD   4 Units at 11/13/23 0825    ipratropium-albuterol (DUO-NEB) nebulizer solution 3 mL  3 mL Nebulization 4x Daily - RT Sarah Sherman DO   3 mL at 11/13/23 0816    melatonin tablet 5 mg  5 mg Oral Nightly PRN Angie Pineda APRN   5 mg at 11/12/23 2203    metroNIDAZOLE (FLAGYL) IVPB 500 mg  500 mg Intravenous Q8H Rhoda Holman MD   Stopped at 11/13/23 0748    nicotine (NICODERM CQ) 21 MG/24HR patch 1 patch  1 patch Transdermal Q24H Angie Pineda APRN   1 patch at 11/12/23 0317    nicotine polacrilex (NICORETTE) gum 2 mg  2 mg Mouth/Throat Q1H PRN Angie Pineda APRN        oxybutynin XL (DITROPAN-XL) 24 hr tablet 5 mg  5 mg Oral Daily Angie Pineda APRN   5 mg at 11/13/23 0825    pantoprazole (PROTONIX) injection 40 mg  40 mg Intravenous Q AM Angie Pineda APRN   40 mg at 11/13/23 0540    Pharmacy to Dose Heparin   Does not apply Continuous PRN Angie Pineda APRN        Pharmacy to dose warfarin   Does not apply Continuous PRN Sarah Sherman DO        sodium chloride 0.9 % flush 10 mL  10 mL Intravenous Q12H Angie Pineda APRN   10 mL at 11/13/23 0827    sodium chloride 0.9 % flush 10 mL  10 mL Intravenous PRN Angie Pineda APRN        sodium chloride 0.9 % infusion 40 mL  40 mL Intravenous PRN Pineda, Angie R, APRN        warfarin (COUMADIN) tablet 3 mg  3 mg Oral Daily Sofia Diaz, RP   3 mg at 11/12/23 1805     Lab Results (last 48 hours)       Procedure Component Value Units Date/Time    Procalcitonin [[id number]]  (Abnormal)  "Collected: 11/13/23 0351    Specimen: Blood Updated: 11/13/23 1115     Procalcitonin 0.32 ng/mL     Narrative:      As a Marker for Sepsis (Non-Neonates):    1. <0.5 ng/mL represents a low risk of severe sepsis and/or septic shock.  2. >2 ng/mL represents a high risk of severe sepsis and/or septic shock.    As a Marker for Lower Respiratory Tract Infections that require antibiotic therapy:    PCT on Admission    Antibiotic Therapy       6-12 Hrs later    >0.5                Strongly Recommended  >0.25 - <0.5        Recommended   0.1 - 0.25          Discouraged              Remeasure/reassess PCT  <0.1                Strongly Discouraged     Remeasure/reassess PCT    As 28 day mortality risk marker: \"Change in Procalcitonin Result\" (>80% or <=80%) if Day 0 (or Day 1) and Day 4 values are available. Refer to [email or web address] [email or web address]    Change in PCT <=80%  A decrease of PCT levels below or equal to 80% defines a positive change in PCT test result representing a higher risk for 28-day all-cause mortality of patients diagnosed with severe sepsis for septic shock.    Change in PCT >80%  A decrease of PCT levels of more than 80% defines a negative change in PCT result representing a lower risk for 28-day all-cause mortality of patients diagnosed with severe sepsis or septic shock.       POC Glucose Once [[id number]]  (Abnormal) Collected: 11/13/23 0800    Specimen: Blood Updated: 11/13/23 0801     Glucose 142 mg/dL     Basic Metabolic Panel [[id number]]  (Abnormal) Collected: 11/13/23 0351    Specimen: Blood Updated: 11/13/23 0505     Glucose 165 mg/dL      BUN 26 mg/dL      Creatinine 1.70 mg/dL      Sodium 137 mmol/L      Potassium 3.5 mmol/L      Chloride 101 mmol/L      CO2 27.0 mmol/L      Calcium 8.7 mg/dL      BUN/Creatinine Ratio 15.3     Anion Gap 9.0 mmol/L      eGFR 34.0 mL/min/1.73     Narrative:      GFR Normal >60  Chronic Kidney Disease <60  Kidney Failure <15      Heparin Anti-Xa [[id number]]  " (Abnormal) Collected: 11/13/23 0351    Specimen: Blood Updated: 11/13/23 0458     Heparin Anti-Xa (UFH) 0.24 IU/ml     Protime-INR [[id number]]  (Abnormal) Collected: 11/13/23 0351    Specimen: Blood Updated: 11/13/23 0457     Protime 15.6 Seconds      INR 1.22    CBC & Differential [[id number]]  (Abnormal) Collected: 11/13/23 0351    Specimen: Blood Updated: 11/13/23 0444    Narrative:      The following orders were created for panel order CBC & Differential.  Procedure                               Abnormality         Status                     ---------                               -----------         ------                     CBC Auto Differential[[id number]]        Abnormal            Final result                 Please view results for these tests on the individual orders.    CBC Auto Differential [[id number]]  (Abnormal) Collected: 11/13/23 0351    Specimen: Blood Updated: 11/13/23 0444     WBC 15.09 10*3/mm3      RBC 2.69 10*6/mm3      Hemoglobin 7.6 g/dL      Hematocrit 23.3 %      MCV 86.6 fL      MCH 28.3 pg      MCHC 32.6 g/dL      RDW 15.1 %      RDW-SD 47.7 fl      MPV 10.4 fL      Platelets 277 10*3/mm3      Neutrophil % 73.3 %      Lymphocyte % 15.5 %      Monocyte % 7.6 %      Eosinophil % 2.0 %      Basophil % 0.5 %      Immature Grans % 1.1 %      Neutrophils, Absolute 11.06 10*3/mm3      Lymphocytes, Absolute 2.34 10*3/mm3      Monocytes, Absolute 1.14 10*3/mm3      Eosinophils, Absolute 0.30 10*3/mm3      Basophils, Absolute 0.08 10*3/mm3      Immature Grans, Absolute 0.17 10*3/mm3      nRBC 0.0 /100 WBC     Blood Culture - Blood, Arm, Right [[id number]]  (Normal) Collected: 11/10/23 0027    Specimen: Blood from Arm, Right Updated: 11/13/23 0100     Blood Culture No growth at 3 days    Blood Culture - Blood, Arm, Right [[id number]]  (Normal) Collected: 11/10/23 0027    Specimen: Blood from Arm, Right Updated: 11/13/23 0100     Blood Culture No growth at 3 days    POC Glucose Once [[id number]]   (Abnormal) Collected: 11/12/23 2200    Specimen: Blood Updated: 11/12/23 2201     Glucose 197 mg/dL     POC Glucose Once [[id number]]  (Abnormal) Collected: 11/12/23 1804    Specimen: Blood Updated: 11/12/23 1807     Glucose 178 mg/dL     POC Glucose Once [[id number]]  (Normal) Collected: 11/12/23 1225    Specimen: Blood Updated: 11/12/23 1229     Glucose 106 mg/dL     Heparin Anti-Xa [[id number]]  (Normal) Collected: 11/12/23 0916    Specimen: Blood Updated: 11/12/23 0954     Heparin Anti-Xa (UFH) 0.42 IU/ml     POC Glucose Once [[id number]]  (Normal) Collected: 11/12/23 0818    Specimen: Blood Updated: 11/12/23 0830     Glucose 127 mg/dL     Heparin Anti-Xa [[id number]]  (Normal) Collected: 11/12/23 0148    Specimen: Blood Updated: 11/12/23 0254     Heparin Anti-Xa (UFH) 0.46 IU/ml     Protime-INR [[id number]]  (Abnormal) Collected: 11/12/23 0148    Specimen: Blood Updated: 11/12/23 0253     Protime 14.9 Seconds      INR 1.16    Basic Metabolic Panel [[id number]]  (Abnormal) Collected: 11/12/23 0148    Specimen: Blood Updated: 11/12/23 0247     Glucose 123 mg/dL      BUN 22 mg/dL      Creatinine 1.59 mg/dL      Sodium 142 mmol/L      Potassium 3.5 mmol/L      Chloride 102 mmol/L      CO2 29.0 mmol/L      Calcium 8.8 mg/dL      BUN/Creatinine Ratio 13.8     Anion Gap 11.0 mmol/L      eGFR 36.8 mL/min/1.73     Narrative:      GFR Normal >60  Chronic Kidney Disease <60  Kidney Failure <15      CBC (No Diff) [[id number]]  (Abnormal) Collected: 11/12/23 0148    Specimen: Blood Updated: 11/12/23 0234     WBC 12.80 10*3/mm3      RBC 2.56 10*6/mm3      Hemoglobin 7.2 g/dL      Hematocrit 21.8 %      MCV 85.2 fL      MCH 28.1 pg      MCHC 33.0 g/dL      RDW 15.0 %      RDW-SD 46.9 fl      MPV 10.3 fL      Platelets 274 10*3/mm3     Urine Culture - Urine, Urine, Clean Catch [[id number]]  (Abnormal)  (Susceptibility) Collected: 11/09/23 2320    Specimen: Urine, Clean Catch Updated: 11/12/23 0216     Urine Culture >100,000 CFU/mL  Escherichia coli    Narrative:      Colonization of the urinary tract without infection is common. Treatment is discouraged unless the patient is symptomatic, pregnant, or undergoing an invasive urologic procedure.    Susceptibility        Escherichia coli      JESUSITA      Ampicillin Susceptible      Ampicillin + Sulbactam Susceptible      Cefazolin Susceptible      Cefepime Susceptible      Ceftazidime Susceptible      Ceftriaxone Susceptible      Gentamicin Susceptible      Levofloxacin Susceptible      Nitrofurantoin Susceptible      Piperacillin + Tazobactam Susceptible      Trimethoprim + Sulfamethoxazole Susceptible                           POC Glucose Once [[id number]]  (Abnormal) Collected: 11/11/23 2013    Specimen: Blood Updated: 11/11/23 2016     Glucose 156 mg/dL     Heparin Anti-Xa [[id number]]  (Abnormal) Collected: 11/11/23 1844    Specimen: Blood Updated: 11/11/23 1941     Heparin Anti-Xa (UFH) 0.10 IU/ml     Hemoglobin & Hematocrit, Blood [[id number]]  (Abnormal) Collected: 11/11/23 1844    Specimen: Blood Updated: 11/11/23 1922     Hemoglobin 7.0 g/dL      Hematocrit 21.3 %     POC Glucose Once [[id number]]  (Normal) Collected: 11/11/23 1709    Specimen: Blood Updated: 11/11/23 1710     Glucose 84 mg/dL     Gastrointestinal Panel, PCR - Stool, Per Rectum [[id number]]  (Abnormal) Collected: 11/11/23 1134    Specimen: Stool from Per Rectum Updated: 11/11/23 1329     Campylobacter Not Detected     Plesiomonas shigelloides Not Detected     Salmonella Not Detected     Vibrio Not Detected     Vibrio cholerae Not Detected     Yersinia enterocolitica Not Detected     Enteroaggregative E. coli (EAEC) Not Detected     Enteropathogenic E. coli (EPEC) Not Detected     Enterotoxigenic E. coli (ETEC) lt/st Not Detected     Shiga-like toxin-producing E. coli (STEC) stx1/stx2 Not Detected     Shigella/Enteroinvasive E. coli (EIEC) Not Detected     Cryptosporidium Not Detected     Cyclospora cayetanensis Not Detected      "Entamoeba histolytica Not Detected     Giardia lamblia Not Detected     Adenovirus F40/41 Detected     Astrovirus Not Detected     Norovirus GI/GII Not Detected     Rotavirus A Not Detected     Sapovirus (I, II, IV or V) Not Detected    Heparin Anti-Xa [[id number]]  (Abnormal) Collected: 11/11/23 1145    Specimen: Blood Updated: 11/11/23 1238     Heparin Anti-Xa (UFH) 0.25 IU/ml     POC Glucose Once [[id number]]  (Abnormal) Collected: 11/11/23 1228    Specimen: Blood Updated: 11/11/23 1229     Glucose 220 mg/dL           Imaging Results (Last 48 Hours)       ** No results found for the last 48 hours. **          ECG/EMG Results (last 48 hours)       ** No results found for the last 48 hours. **          Operative/Procedure Notes (last 48 hours)  Notes from 11/11/23 1117 through 11/13/23 1117   No notes of this type exist for this encounter.          Physician Progress Notes (last 48 hours)        Kike Zazueta APRN at 11/12/23 1759       Attestation signed by Mendoza Addison MD at 11/12/23 3512    I have reviewed this documentation and agree.                  GI Daily Progress Note  Subjective:    Chief Complaint: Follow-up diarrhea and rectal bleeding    Patient resting in bed no acute distress.  Tolerating GI soft diet at time of exam.  Does not report any new or worsening GI complaints.  No further reports of rectal bleeding.  Did receive unit of PRBCs earlier today as hemoglobin has trended down.    Objective:    /55 (BP Location: Right arm, Patient Position: Lying)   Pulse 68   Temp 97.4 °F (36.3 °C) (Oral)   Resp 18   Ht 152.4 cm (60\")   Wt 91.8 kg (202 lb 4.8 oz)   LMP 06/17/1998   SpO2 96%   BMI 39.51 kg/m²     Physical Exam  Constitutional:       General: She is not in acute distress.     Appearance: Normal appearance. She is normal weight. She is not ill-appearing or toxic-appearing.      Comments: Pleasantly conversant, no acute distress   Cardiovascular:      Rate and Rhythm: Normal rate " and regular rhythm.      Pulses: Normal pulses.      Heart sounds: Normal heart sounds.   Pulmonary:      Effort: Pulmonary effort is normal. No respiratory distress.      Breath sounds: Normal breath sounds.   Abdominal:      General: Abdomen is flat. Bowel sounds are normal. There is no distension.      Palpations: Abdomen is soft. There is no mass.      Tenderness: There is no abdominal tenderness. There is no guarding or rebound.      Hernia: No hernia is present.   Skin:     Capillary Refill: Capillary refill takes less than 2 seconds.   Neurological:      General: No focal deficit present.      Mental Status: She is alert and oriented to person, place, and time.         Lab  I have personally reviewed most recent cardiac tracings, lab results, and radiology images and interpretations and agree with findings.    Lab Results   Component Value Date    WBC 12.80 (H) 11/12/2023    HGB 7.2 (L) 11/12/2023    HGB 7.0 (L) 11/11/2023    HGB 7.5 (L) 11/11/2023    MCV 85.2 11/12/2023     11/12/2023    INR 1.16 (H) 11/12/2023    INR 1.09 11/11/2023    INR 1.21 (H) 11/10/2023    INR 2.84 (H) 11/10/2023    INR 2.88 (H) 11/10/2023       Lab Results   Component Value Date    GLUCOSE 123 (H) 11/12/2023    BUN 22 11/12/2023    CREATININE 1.59 (H) 11/12/2023    EGFRIFAFRI 73 10/14/2022    BCR 13.8 11/12/2023     11/12/2023    K 3.5 11/12/2023    CO2 29.0 11/12/2023    CALCIUM 8.8 11/12/2023    ALBUMIN 3.5 11/09/2023    ALKPHOS 167 (H) 11/09/2023    BILITOT 0.3 11/09/2023    BILIDIR <0.2 03/31/2023    ALT 48 (H) 11/09/2023    AST 32 11/09/2023     Assessment:      GIB (gastrointestinal bleeding)    Depression    Type 2 diabetes mellitus without complication, with long-term current use of insulin    Dyslipidemia    Emphysema/COPD    Hypertension    Obstructive sleep apnea syndrome    Chronic diastolic heart failure    Chronic respiratory failure with hypoxia    Acute blood loss anemia    MEERA (acute kidney injury)     H/O heart valve replacement with mechanical valve    GI bleed    Rectal bleeding   Recent diarrheal illness, spontaneously resolved.   GI PCR + for adenovirus      Subtle inflammation of the posterior wall of a short segment of mid sigmoid.   Mild abdominal pain and mild leukocytosis   Anticoagulation use secondary to mechanical valve     Plan:  Patient doing well today.  Received unit of PRBCs earlier tolerated well.  No further reports of GI bleeding.  GI PCR resulted and is positive for adenovirus.    >>> Suspect patient is shedding adenovirus from her recent acute diarrheal illness; management is supportive in nature.   >>> Continue Abx for full treatment course  >>> Anusol 25 mg Rectal Suppository Twice Daily x 5 days  >>> Monitor for evidence of gastrointestinal bleeding; if significant recurrent bleeding, will consider endoscopic evaluation at that time given the patient was scheduled for an outpatient EGD with Dr. Fernandes.    If no evidence of bleeding and hemoglobin stable, anticipate discharge home in the next 24 to 48 hours.    Kike Zazueta, FRANCESCA  23  17:59 EST      Electronically signed by Mendoza Addison MD at 23 1852       Sarah Sherman DO at 23 1302              [facility]  PROGRESS NOTE    Patient Name: Di Lopez  : 1960  MRN: [id number]    Date of Admission: 2023  Primary Care Physician: Davina Santiago DO    Subjective   Subjective     CC:  SOA, BRBPR    HPI:  No acute events. No futher bleeding noted. States breathing is a bit better. Would like diet advanced.       Objective   Objective     Vital Signs:   Temp:  [98 °F (36.7 °C)-98.8 °F (37.1 °C)] 98.2 °F (36.8 °C)  Heart Rate:  [59-79] 59  Resp:  [16-18] 18  BP: (116-154)/(48-68) 116/56  Flow (L/min):  [3-3.5] 3     Physical Exam:  Constitutional: No acute distress, awake, alert; obese   HENT: NCAT, mucous membranes moist  Respiratory: Clear to auscultation  bilaterally, respiratory effort normal; no wheeze  Cardiovascular: RRR, no murmurs, rubs, or gallops  Gastrointestinal: Positive bowel sounds, soft, nontender, nondistended  Musculoskeletal: No bilateral ankle edema  Psychiatric: Appropriate affect, cooperative  Neurologic: Oriented x 3, strength symmetric in all extremities, Cranial Nerves grossly intact to confrontation, speech clear  Skin: No rashes      Results Reviewed:  LAB RESULTS:      Lab 11/12/23  0916 11/12/23  0148 11/11/23  1844 11/11/23  1145 11/11/23  0810 11/11/23  0447 11/10/23  1902 11/10/23  1126 11/10/23  0725 11/10/23  0437 11/10/23  0154 11/10/23  0027 11/09/23  2336 11/09/23 1955 11/09/23 1955 11/06/23  1629 11/06/23  1624   WBC  --  12.80*  --   --   --   --   --   --   --   --  12.70*  --   --   --  12.77*  --  10.96*   HEMOGLOBIN  --  7.2* 7.0*  --  7.5*  --  7.6* 7.8*   < > 7.7* 7.1*  --   --   --  7.2*  --  9.3*   HEMATOCRIT  --  21.8* 21.3*  --  23.6*  --  23.9* 24.0*   < > 24.2* 21.9*  --   --   --  22.9*  --  29.3*   PLATELETS  --  274  --   --   --   --   --   --   --   --  310  --   --   --  289  --  320   NEUTROS ABS  --   --   --   --   --   --   --   --   --   --  9.12*  --   --   --  9.37*  --  7.42*   IMMATURE GRANS (ABS)  --   --   --   --   --   --   --   --   --   --  0.14*  --   --   --  0.18*  --  0.17*   LYMPHS ABS  --   --   --   --   --   --   --   --   --   --  2.72  --   --   --  2.32  --  2.36   MONOS ABS  --   --   --   --   --   --   --   --   --   --  0.59  --   --   --  0.70  --  0.82   EOS ABS  --   --   --   --   --   --   --   --   --   --  0.10  --   --   --  0.16  --  0.16   MCV  --  85.2  --   --   --   --   --   --   --   --  88.0  --   --   --  89.8  --  86.9   PROCALCITONIN  --   --   --   --   --   --   --   --   --   --   --   --  0.07  --   --   --   --    LACTATE  --   --   --   --   --   --   --   --   --   --   --  1.5  --   --   --   --   --    PROTIME  --  14.9*  --   --   --  14.2 15.4*  --    --  30.1* 30.4*  --   --   --  33.9*   < >  --    APTT  --   --   --   --   --   --   --   --   --   --  78.8  --   --   --   --   --   --    HEPARIN ANTI-XA 0.42 0.46 0.10* 0.25*  --  0.10*  --   --   --   --  0.10*  --   --    < >  --   --   --     < > = values in this interval not displayed.         Lab 11/12/23  0148 11/11/23 0447 11/10/23  0154 11/09/23 1955 11/06/23  1624   SODIUM 142 141 144 142 144   POTASSIUM 3.5 3.7 3.7 4.1 3.9   CHLORIDE 102 101 109* 107 109*   CO2 29.0 27.0 25.0 24.0 27.0   ANION GAP 11.0 13.0 10.0 11.0 8.0   BUN 22 25* 22 21 13   CREATININE 1.59* 1.88* 1.79* 1.88* 1.45*   EGFR 36.8* 30.1* 31.9* 30.1* 41.1*   GLUCOSE 123* 143* 197* 280* 107*   CALCIUM 8.8 8.4* 8.2* 8.3* 8.8   MAGNESIUM  --   --  2.0 2.0  --          Lab 11/09/23 1955   TOTAL PROTEIN 6.1   ALBUMIN 3.5   GLOBULIN 2.6   ALT (SGPT) 48*   AST (SGOT) 32   BILIRUBIN 0.3   ALK PHOS 167*   LIPASE 25         Lab 11/12/23  0148 11/11/23  0447 11/10/23  1902 11/10/23  0437 11/10/23  0154 11/09/23  2336 11/09/23 1955   PROBNP  --  833.7  --   --   --   --  830.8   HSTROP T  --   --   --   --   --  10 11   PROTIME 14.9* 14.2 15.4* 30.1* 30.4*  --  33.9*   INR 1.16* 1.09 1.21* 2.84* 2.88*  --  3.31*             Lab 11/09/23 2142 11/06/23  1624   IRON  --  38   IRON SATURATION (TSAT)  --  10*   TIBC  --  362   TRANSFERRIN  --  243   FERRITIN  --  134.20   ABO TYPING O  --    RH TYPING Positive  --    ANTIBODY SCREEN Negative  --          Brief Urine Lab Results  (Last result in the past 365 days)        Color   Clarity   Blood   Leuk Est   Nitrite   Protein   CREAT   Urine HCG        11/09/23 2320 Yellow   Clear   Negative   Small (1+)   Positive   100 mg/dL (2+)                   Microbiology Results Abnormal       Procedure Component Value - Date/Time    Blood Culture - Blood, Arm, Right [[id number]]  (Normal) Collected: 11/10/23 0027    Lab Status: Preliminary result Specimen: Blood from Arm, Right Updated: 11/12/23 0100      Blood Culture No growth at 2 days    Blood Culture - Blood, Arm, Right [[id number]]  (Normal) Collected: 11/10/23 0027    Lab Status: Preliminary result Specimen: Blood from Arm, Right Updated: 11/12/23 0100     Blood Culture No growth at 2 days            No radiology results from the last 24 hrs    Results for orders placed during the hospital encounter of 05/31/23    Adult Transthoracic Echo Complete W/ Cont if Necessary Per Protocol    Interpretation Summary    Mildly reduced right ventricular systolic function noted.    There is calcification of the aortic valve.    Estimated right ventricular systolic pressure from tricuspid regurgitation is normal (<35 mmHg).    Mild global LV systolic dysfunction    EF=45-50%    Normal prosthetic MV function    AV calcification      Current medications:  Scheduled Meds:budesonide-formoterol, 2 puff, Inhalation, BID - RT  carvedilol, 12.5 mg, Oral, BID With Meals  cefTRIAXone, 2,000 mg, Intravenous, Q24H  escitalopram, 20 mg, Oral, Daily  ferrous sulfate, 325 mg, Oral, Daily With Breakfast  folic acid, 800 mcg, Oral, Daily  insulin lispro, 2-7 Units, Subcutaneous, 4x Daily AC & at Bedtime  Insulin Lispro, 4 Units, Subcutaneous, TID With Meals  ipratropium-albuterol, 3 mL, Nebulization, 4x Daily - RT  metroNIDAZOLE, 500 mg, Intravenous, Q8H  nicotine, 1 patch, Transdermal, Q24H  oxybutynin XL, 5 mg, Oral, Daily  pantoprazole, 40 mg, Intravenous, Q AM  sodium chloride, 10 mL, Intravenous, Q12H  warfarin, 3 mg, Oral, Daily      Continuous Infusions:heparin, 18 Units/kg/hr, Last Rate: 18 Units/kg/hr (11/12/23 1154)  Pharmacy to Dose Heparin,   Pharmacy to dose warfarin,       PRN Meds:.  acetaminophen    [DISCONTINUED] senna-docusate sodium **AND** polyethylene glycol **AND** bisacodyl **AND** bisacodyl    dextrose    dextrose    furosemide    glucagon (human recombinant)    HYDROcodone-acetaminophen    melatonin    nicotine polacrilex    Pharmacy to Dose Heparin    Pharmacy to  dose warfarin    sodium chloride    sodium chloride    Assessment & Plan   Assessment & Plan     Active Hospital Problems    Diagnosis  POA    **GIB (gastrointestinal bleeding) [K92.2]  Yes    GI bleed [K92.2]  Yes    Acute blood loss anemia [D62]  Yes    MEERA (acute kidney injury) [N17.9]  Yes    H/O heart valve replacement with mechanical valve [Z95.2]  Not Applicable    Chronic respiratory failure with hypoxia [J96.11]  Yes    Chronic diastolic heart failure [I50.32]  Yes    Emphysema/COPD [J43.9]  Yes    Hypertension [I10]  Yes    Type 2 diabetes mellitus without complication, with long-term current use of insulin [E11.9, Z79.4]  Not Applicable    Depression [F32.A]  Yes    Dyslipidemia [E78.5]  Yes    Obstructive sleep apnea syndrome [G47.33]  Yes      Resolved Hospital Problems   No resolved problems to display.        Brief Hospital Course to date:  Di Lopez is a 62 y.o. female  with PMH significant for COPD, HTN, HLD, CAD, depression, CHF, VHD s/p mechanical valve replacement on warfarin, DM2, CAL on CPAP, liver mass, obesity.  She has had diarrhea for 2 weeks, then developed BRBPR on 11/9. She was asked to hold her diuretics, Entresto and Trulicity by PCP last last week due to rising creatinine.    GI bleed - BRBPR   Symptomatic acute blood loss anemia  Warfarin use; INR 3-4 recently  Adenovirus   - Hgb was 11 in May, was 7.1 on admission.   - Colonoscopy completed May 2023;  Has scheduled EGD outpatient 11/14/2023 (Dr. Fernandes)  - CT scan with inflammation in the sigmoid colon  - GI PCR with adenovirus   - GI consult ,  Sees Dr. Fernandes in clinic.               - Recommend supportive care.  IV antibiotics and anusol suppository  - No further bleeding since admission but Hgb trended down and transfused 1 unit again on 11/12   - Continue rocephin/flagyl -- likely DC soon   - Continue heparin gtt    - Advance diet      MEERA  - Baseline creatinine ~0.8-0.9,  - was 1.9 on admission  - Hold nephrotoxic  "medications  - Cr improved. AM labs.       HFmrEF  - EF 45-50 3/23.   - Holding home entresto due to MEERA. Continue coreg  - s/p lasix. Repeat after blood   - CXR with no acute findings. Normal BNP on admission and repeat      Chronic hypoxic resp failure  - 3L at baseline but states home O2 concentrator is broken  - Will start symbicort and duo-nebs      UTI POA  - urine culture ecoli   - Past UTI was E coli, a nd Klebs, not ESBL .   - Continue rocephin     mechanical mitral valve   - On warfarin, last dose evening of 11/9/2023  - Continue heparin gtt. Will discuss with pharmacy to restart coumadin as no further bleeding and she did receive Vit K     Diabetes mellitus 2 - A1C 6.8   - Hold Trulicity, glimepiride  - Hold Lantus for now, add back as appropriate  - FSBG ACHS  - add bolus insulin   -glucose stable     Hypertension  Dyslipidemia  - Currently controlled  - Continue carvedilol  - Continue statin     CAL  - CPAP     Nicotine use  - Vapes  - Nicotine patch  - Cessation counseling     Obesity   - BMI 39. Complicates all aspects of care    Expected Discharge Location and Transportation: home  Expected Discharge   Expected Discharge Date: 11/13/2023; Expected Discharge Time:      DVT prophylaxis:  Medical DVT prophylaxis orders are present.     AM-PAC 6 Clicks Score (PT): 24 (11/12/23 0800)    CODE STATUS:   Code Status and Medical Interventions:   Ordered at: 11/09/23 6983     Code Status (Patient has no pulse and is not breathing):    CPR (Attempt to Resuscitate)     Medical Interventions (Patient has pulse or is breathing):    Full Support       Sarah Sherman DO  11/12/23        Electronically signed by Sarah Sherman DO at 11/12/23 1308          Consult Notes (last 48 hours)        Susana Esteban at 11/11/23 1331        Consult Orders    1. Inpatient Consult to Advance Care Planning [[id number]] ordered by Sirena Jordan DO at 11/10/23 0108                 Visited per consult \"to assist patient with " "completion of advanced care planning\".  Patient asked me to leave the info for her to read, also to pray with patient.    Electronically signed by Susana Esteban at 11/11/23 2106       "

## 2023-11-13 NOTE — PLAN OF CARE
Goal Outcome Evaluation:  Plan of Care Reviewed With: patient           Outcome Evaluation: OT eval completed. Pt presents below baseline for ADL's, limited by decreased activity tolerance and dyspnea. Pt CONNOLLY for grooming at bed level, SBA for bed mobility, ModA for LBD, limited by pain. IP OT services warranted to support return to PLOF. Recommend OP therapy at discharge as pt endorses chronic LBP limiting functional mobility at home.      Anticipated Discharge Disposition (OT): home with outpatient therapy services

## 2023-11-13 NOTE — CASE MANAGEMENT/SOCIAL WORK
Continued Stay Note  [facility]     Patient Name: Di Lopez  MRN: [id number]  Today's Date: 11/13/2023    Admit Date: 11/9/2023    Plan: Home   Discharge Plan       Row Name 11/13/23 3020       Plan    Plan Home    Patient/Family in Agreement with Plan yes    Plan Comments I have spoken to Ms. Lopez at the bedside today regarding the discharge plan.  She continues to plan to return home when medically ready for discharge.  I have spoken to Jeannette at  Care regarding Ms. Lopez's malfunctioning oxygen concentrator.  She states that she will have someone contact the patient about this today to resolve the issue.  CM will cont to follow the plan of care and assist with discharge needs as appropriate.    Final Discharge Disposition Code 01 - home or self-care                   Discharge Codes    No documentation.                 Expected Discharge Date and Time       Expected Discharge Date Expected Discharge Time    Nov 13, 2023               Michaela Smith RN

## 2023-11-13 NOTE — PLAN OF CARE
Goal Outcome Evaluation:     Patient vital signs stable and on 3L of humidified oxygen via nasal cannula. Patient complained of headache and back pain and given prn pain medication. Patient denies nausea and shortness of air. Patient up ad vasu in room. Fall and safety precautions maintained.

## 2023-11-14 ENCOUNTER — ANESTHESIA (OUTPATIENT)
Dept: TELEMETRY | Facility: HOSPITAL | Age: 63
End: 2023-11-14

## 2023-11-14 LAB
ABO GROUP BLD: NORMAL
ANION GAP SERPL CALCULATED.3IONS-SCNC: 11 MMOL/L (ref 5–15)
BLD GP AB SCN SERPL QL: NEGATIVE
BUN SERPL-MCNC: 23 MG/DL (ref 8–23)
BUN/CREAT SERPL: 14.2 (ref 7–25)
CALCIUM SPEC-SCNC: 8.5 MG/DL (ref 8.6–10.5)
CHLORIDE SERPL-SCNC: 104 MMOL/L (ref 98–107)
CO2 SERPL-SCNC: 25 MMOL/L (ref 22–29)
CREAT SERPL-MCNC: 1.62 MG/DL (ref 0.57–1)
DEPRECATED RDW RBC AUTO: 48.1 FL (ref 37–54)
EGFRCR SERPLBLD CKD-EPI 2021: 36 ML/MIN/1.73
ERYTHROCYTE [DISTWIDTH] IN BLOOD BY AUTOMATED COUNT: 15.1 % (ref 12.3–15.4)
GLUCOSE BLDC GLUCOMTR-MCNC: 119 MG/DL (ref 70–130)
GLUCOSE BLDC GLUCOMTR-MCNC: 156 MG/DL (ref 70–130)
GLUCOSE BLDC GLUCOMTR-MCNC: 163 MG/DL (ref 70–130)
GLUCOSE BLDC GLUCOMTR-MCNC: 257 MG/DL (ref 70–130)
GLUCOSE SERPL-MCNC: 148 MG/DL (ref 65–99)
HCT VFR BLD AUTO: 22.4 % (ref 34–46.6)
HCT VFR BLD AUTO: 22.6 % (ref 34–46.6)
HGB BLD-MCNC: 7.1 G/DL (ref 12–15.9)
HGB BLD-MCNC: 7.2 G/DL (ref 12–15.9)
INR PPP: 1.36 (ref 0.89–1.12)
MCH RBC QN AUTO: 27.9 PG (ref 26.6–33)
MCHC RBC AUTO-ENTMCNC: 32.1 G/DL (ref 31.5–35.7)
MCV RBC AUTO: 86.8 FL (ref 79–97)
PLATELET # BLD AUTO: 261 10*3/MM3 (ref 140–450)
PMV BLD AUTO: 10.2 FL (ref 6–12)
POTASSIUM SERPL-SCNC: 3.7 MMOL/L (ref 3.5–5.2)
PROTHROMBIN TIME: 16.9 SECONDS (ref 12.2–14.5)
RBC # BLD AUTO: 2.58 10*6/MM3 (ref 3.77–5.28)
RH BLD: POSITIVE
SODIUM SERPL-SCNC: 140 MMOL/L (ref 136–145)
T&S EXPIRATION DATE: NORMAL
UFH PPP CHRO-ACNC: 0.33 IU/ML (ref 0.3–0.7)
UFH PPP CHRO-ACNC: 0.33 IU/ML (ref 0.3–0.7)
WBC NRBC COR # BLD: 13.05 10*3/MM3 (ref 3.4–10.8)

## 2023-11-14 PROCEDURE — 85018 HEMOGLOBIN: CPT | Performed by: INTERNAL MEDICINE

## 2023-11-14 PROCEDURE — 80048 BASIC METABOLIC PNL TOTAL CA: CPT | Performed by: INTERNAL MEDICINE

## 2023-11-14 PROCEDURE — 85027 COMPLETE CBC AUTOMATED: CPT | Performed by: INTERNAL MEDICINE

## 2023-11-14 PROCEDURE — 25010000002 FUROSEMIDE PER 20 MG: Performed by: INTERNAL MEDICINE

## 2023-11-14 PROCEDURE — 25010000002 HEPARIN (PORCINE) 25000-0.45 UT/250ML-% SOLUTION

## 2023-11-14 PROCEDURE — 86900 BLOOD TYPING SEROLOGIC ABO: CPT | Performed by: INTERNAL MEDICINE

## 2023-11-14 PROCEDURE — 85610 PROTHROMBIN TIME: CPT

## 2023-11-14 PROCEDURE — 94799 UNLISTED PULMONARY SVC/PX: CPT

## 2023-11-14 PROCEDURE — 86923 COMPATIBILITY TEST ELECTRIC: CPT

## 2023-11-14 PROCEDURE — 82948 REAGENT STRIP/BLOOD GLUCOSE: CPT

## 2023-11-14 PROCEDURE — 25010000002 METRONIDAZOLE 500 MG/100ML SOLUTION: Performed by: INTERNAL MEDICINE

## 2023-11-14 PROCEDURE — 99232 SBSQ HOSP IP/OBS MODERATE 35: CPT | Performed by: INTERNAL MEDICINE

## 2023-11-14 PROCEDURE — 63710000001 INSULIN LISPRO (HUMAN) PER 5 UNITS: Performed by: INTERNAL MEDICINE

## 2023-11-14 PROCEDURE — 85520 HEPARIN ASSAY: CPT

## 2023-11-14 PROCEDURE — 63710000001 INSULIN LISPRO (HUMAN) PER 5 UNITS: Performed by: NURSE PRACTITIONER

## 2023-11-14 PROCEDURE — 86901 BLOOD TYPING SEROLOGIC RH(D): CPT | Performed by: INTERNAL MEDICINE

## 2023-11-14 PROCEDURE — 86850 RBC ANTIBODY SCREEN: CPT | Performed by: INTERNAL MEDICINE

## 2023-11-14 PROCEDURE — 85014 HEMATOCRIT: CPT | Performed by: INTERNAL MEDICINE

## 2023-11-14 RX ORDER — WARFARIN SODIUM 5 MG/1
5 TABLET ORAL
Status: DISCONTINUED | OUTPATIENT
Start: 2023-11-14 | End: 2023-11-14

## 2023-11-14 RX ORDER — FUROSEMIDE 10 MG/ML
20 INJECTION INTRAMUSCULAR; INTRAVENOUS ONCE
Status: COMPLETED | OUTPATIENT
Start: 2023-11-14 | End: 2023-11-14

## 2023-11-14 RX ADMIN — INSULIN LISPRO 2 UNITS: 100 INJECTION, SOLUTION INTRAVENOUS; SUBCUTANEOUS at 08:39

## 2023-11-14 RX ADMIN — BUDESONIDE AND FORMOTEROL FUMARATE DIHYDRATE 2 PUFF: 160; 4.5 AEROSOL RESPIRATORY (INHALATION) at 07:50

## 2023-11-14 RX ADMIN — METRONIDAZOLE 500 MG: 500 INJECTION, SOLUTION INTRAVENOUS at 21:21

## 2023-11-14 RX ADMIN — HYDROCODONE BITARTRATE AND ACETAMINOPHEN 1 TABLET: 5; 325 TABLET ORAL at 12:18

## 2023-11-14 RX ADMIN — METRONIDAZOLE 500 MG: 500 INJECTION, SOLUTION INTRAVENOUS at 04:46

## 2023-11-14 RX ADMIN — HYDROCODONE BITARTRATE AND ACETAMINOPHEN 1 TABLET: 5; 325 TABLET ORAL at 21:20

## 2023-11-14 RX ADMIN — INSULIN LISPRO 4 UNITS: 100 INJECTION, SOLUTION INTRAVENOUS; SUBCUTANEOUS at 17:34

## 2023-11-14 RX ADMIN — CARVEDILOL 12.5 MG: 12.5 TABLET, FILM COATED ORAL at 08:36

## 2023-11-14 RX ADMIN — CARVEDILOL 12.5 MG: 12.5 TABLET, FILM COATED ORAL at 17:33

## 2023-11-14 RX ADMIN — IPRATROPIUM BROMIDE AND ALBUTEROL SULFATE 3 ML: 2.5; .5 SOLUTION RESPIRATORY (INHALATION) at 20:40

## 2023-11-14 RX ADMIN — INSULIN LISPRO 4 UNITS: 100 INJECTION, SOLUTION INTRAVENOUS; SUBCUTANEOUS at 08:37

## 2023-11-14 RX ADMIN — PANTOPRAZOLE SODIUM 40 MG: 40 INJECTION, POWDER, LYOPHILIZED, FOR SOLUTION INTRAVENOUS at 04:47

## 2023-11-14 RX ADMIN — Medication 10 ML: at 08:36

## 2023-11-14 RX ADMIN — FOLIC ACID TAB 400 MCG 800 MCG: 400 TAB at 08:36

## 2023-11-14 RX ADMIN — INSULIN LISPRO 4 UNITS: 100 INJECTION, SOLUTION INTRAVENOUS; SUBCUTANEOUS at 12:18

## 2023-11-14 RX ADMIN — FUROSEMIDE 20 MG: 10 INJECTION, SOLUTION INTRAMUSCULAR; INTRAVENOUS at 17:34

## 2023-11-14 RX ADMIN — HYDROCODONE BITARTRATE AND ACETAMINOPHEN 1 TABLET: 5; 325 TABLET ORAL at 04:47

## 2023-11-14 RX ADMIN — HEPARIN SODIUM 20 UNITS/KG/HR: 10000 INJECTION, SOLUTION INTRAVENOUS at 04:39

## 2023-11-14 RX ADMIN — FERROUS SULFATE TAB 325 MG (65 MG ELEMENTAL FE) 325 MG: 325 (65 FE) TAB at 08:36

## 2023-11-14 RX ADMIN — OXYBUTYNIN CHLORIDE 5 MG: 5 TABLET, EXTENDED RELEASE ORAL at 08:36

## 2023-11-14 RX ADMIN — HEPARIN SODIUM 20 UNITS/KG/HR: 10000 INJECTION, SOLUTION INTRAVENOUS at 17:40

## 2023-11-14 RX ADMIN — INSULIN LISPRO 2 UNITS: 100 INJECTION, SOLUTION INTRAVENOUS; SUBCUTANEOUS at 17:34

## 2023-11-14 RX ADMIN — IPRATROPIUM BROMIDE AND ALBUTEROL SULFATE 3 ML: 2.5; .5 SOLUTION RESPIRATORY (INHALATION) at 07:50

## 2023-11-14 RX ADMIN — IPRATROPIUM BROMIDE AND ALBUTEROL SULFATE 3 ML: 2.5; .5 SOLUTION RESPIRATORY (INHALATION) at 15:40

## 2023-11-14 RX ADMIN — INSULIN LISPRO 4 UNITS: 100 INJECTION, SOLUTION INTRAVENOUS; SUBCUTANEOUS at 12:19

## 2023-11-14 RX ADMIN — ESCITALOPRAM OXALATE 20 MG: 20 TABLET ORAL at 08:36

## 2023-11-14 RX ADMIN — METRONIDAZOLE 500 MG: 500 INJECTION, SOLUTION INTRAVENOUS at 15:02

## 2023-11-14 RX ADMIN — Medication 5 MG: at 21:20

## 2023-11-14 RX ADMIN — BUDESONIDE AND FORMOTEROL FUMARATE DIHYDRATE 2 PUFF: 160; 4.5 AEROSOL RESPIRATORY (INHALATION) at 20:40

## 2023-11-14 NOTE — PROGRESS NOTES
HEPARIN INFUSION  Di Lopez is a  62 y.o. female receiving heparin infusion.     Therapy for (V[facility]Cardiac[facility]  Patient Weight: 90.9 kg  Initial Bolus (Y/N): No  Any Bolus (Y/N): Yes    Signs or Symptoms of Bleedin episodes of BRBPR on admission, ongoing anemia, no bleeding noted now per RN     Cardiac or Other (Not VTE)  Initial rate: 12 units/kg/hr (Max 1,000 units/hr)   Anti Xa Rebolus Infusion Hold time Change infusion Dose (Units/kg/hr) Next Anti Xa or aPTT Level Due   < 0.11 50 Units/kg  (4000 Units Max) None Increase by  3 Units/kg/hr 6 hours   0.11- 0.19 25 Units/kg  (2000 Units Max) None Increase by  2 Units/kg/hr 6 hours   0.2 - 0.29 0 None Increase by  1 Units/kg/hr 6 hours   0.3 - 0.5 0 None No Change 6 hours (after 2 consecutive levels in range check qAM)   0.51 - 0.6 0 None Decrease by  1 Units/kg/hr 6 hours   0.61 - 0.8 0 30 Minutes Decrease by  2 Units/kg/hr 6 hours   0.81 - 1 0 60 Minutes Decrease by  3 Units/kg/hr 6 hours   >1 0 Hold  After Anti Xa less than 0.5 decrease previous rate by  4 Units/kg/hr  Every 2 hours until Anti Xa  less than 0.5 then when infusion restarts in 6 hours   Recommend Xa every 6 hours.     Results from last 7 days   Lab Units 23  0959 23  0813 23  0351 23  0148   INR   --  1.36* 1.22* 1.16*   HEMOGLOBIN g/dL 7.1* 7.2* 7.6* 7.2*   HEMATOCRIT % 22.6* 22.4* 23.3* 21.8*   PLATELETS 10*3/mm3  --  261 277 274       Date   Time   Anti-Xa Current Rate (Unit/kg/hr) Bolus   (Units) Rate Change   (Unit/kg/hr) New Rate (Unit/kg/hr) Next   Anti-Xa Comments  Pump Check Daily   11/10 0200 0.10    INR 2.88 --- - -- -- 0600 D/w APRN, hold until INR >2.5. Currently 2.88, ordered repeat INR @ 0600   11/10 0437 --    INR 2.84 -- -- -- -- 1800 Continue to hold heparin gtt until until <2.5; repeat INR at 1800.   11/10 1902 --    INR 1.21 -- -- +400 MARCELLUS Santos. INR returned at 1.21 . Starting heparin gtt to cover mechanical valves      11/11 0600 0.1 11 4000 +3 14 1200 D/w RN, no reported bleeding overnight   11/11 1145 0.25 14 -- +1 15 1900 D/W RN- warfarin resumed   11/11 1844 0.10 15 4000 +3 18 0200 DW RN   11/12 0300 0.46 18 -- -- 18 0800 D/w RN   11/12 0916 0.42 18 -- -- 18 0600 D/W RN   11/13 0630 0.24 18 -- +1 19 1200 D/w RN   11/13 1034 0.34 19 -- -- 19 1800 Spoke to RN   11/13 1718 0.26 19 -- +1 20 0200 DW RN    11/14 0230 0.33 20 -- -- 20 0800 D/w RN   11/14 0813 0.33 20 -- -- 20 0600 D/w JARAD Johnson   Checked pump                                                                                           Thank you,     Mamta Rodriguez, PharmD, BCPS  11/14/2023  10:54 EST

## 2023-11-14 NOTE — PLAN OF CARE
Goal Outcome Evaluation:      Patient V-paced on the monitor and on 1L NC.  Alert and oriented times four.  Patient is up with standby assist.  Bed in lowest position, phone and call light in reach.

## 2023-11-14 NOTE — PLAN OF CARE
Goal Outcome Evaluation:   VSS. Paced. 2L nasal cannula. Isolation precautions maintained. No complaints of SOB. Up with standby to BSC. Headache treated PRN, see MAR. No further complaints at this time.

## 2023-11-14 NOTE — PROGRESS NOTES
[facility] Medicine Services  PROGRESS NOTE    Patient Name: Di Lopez  : 1960  MRN: [id number]    Date of Admission: 2023  Primary Care Physician: Davina Santiago DO    Subjective   Subjective     CC:  BRBPR, HA     HPI:  No acute events. States she doesn't feel well with a HA. Blood in BM yesterday. Abd pain is stable. Reviewed trend in H/H.       Objective   Objective     Vital Signs:   Temp:  [98.2 °F (36.8 °C)-99 °F (37.2 °C)] 99 °F (37.2 °C)  Heart Rate:  [63-92] 70  Resp:  [18-20] 18  BP: (120-145)/(49-74) 124/61  Flow (L/min):  [1-2] 1     Physical Exam:  Constitutional: No acute distress, awake, alert; obese   HENT: NCAT, mucous membranes moist  Respiratory: diminished; poor effort; occ rhonchi   Cardiovascular: RRR, no murmurs, rubs, or gallops  Gastrointestinal: Positive bowel sounds, soft, nontender, nondistended  Musculoskeletal: trace bilateral ankle edema  Psychiatric: Appropriate affect, cooperative  Neurologic: Oriented x 3, strength symmetric in all extremities, Cranial Nerves grossly intact to confrontation, speech clear  Skin: No rashes      Results Reviewed:  LAB RESULTS:      Lab 23  0959 23  0813 23  0141 23  1718 23  1148 23  0351 23  0916 23  0148 23  1844 23  0810 23  0447 11/10/23  1902 11/10/23  0437 11/10/23  0154 11/10/23  0027 23  2336 23  1955 23   WBC  --  13.05*  --   --   --  15.09*  --  12.80*  --   --   --   --   --  12.70*  --   --   --  12.77*   HEMOGLOBIN 7.1* 7.2*  --   --   --  7.6*  --  7.2* 7.0*   < >  --  7.6*   < > 7.1*  --   --   --  7.2*   HEMATOCRIT 22.6* 22.4*  --   --   --  23.3*  --  21.8* 21.3*   < >  --  23.9*   < > 21.9*  --   --   --  22.9*   PLATELETS  --  261  --   --   --  277  --  274  --   --   --   --   --  310  --   --   --  289   NEUTROS ABS  --   --   --   --   --  11.06*  --   --   --   --   --   --   --  9.12*  --    --   --  9.37*   IMMATURE GRANS (ABS)  --   --   --   --   --  0.17*  --   --   --   --   --   --   --  0.14*  --   --   --  0.18*   LYMPHS ABS  --   --   --   --   --  2.34  --   --   --   --   --   --   --  2.72  --   --   --  2.32   MONOS ABS  --   --   --   --   --  1.14*  --   --   --   --   --   --   --  0.59  --   --   --  0.70   EOS ABS  --   --   --   --   --  0.30  --   --   --   --   --   --   --  0.10  --   --   --  0.16   MCV  --  86.8  --   --   --  86.6  --  85.2  --   --   --   --   --  88.0  --   --   --  89.8   PROCALCITONIN  --   --   --   --   --  0.32*  --   --   --   --   --   --   --   --   --  0.07  --   --    LACTATE  --   --   --   --   --   --   --   --   --   --   --   --   --   --  1.5  --   --   --    PROTIME  --  16.9*  --   --   --  15.6*  --  14.9*  --   --  14.2 15.4*   < > 30.4*  --   --   --  33.9*   APTT  --   --   --   --   --   --   --   --   --   --   --   --   --  78.8  --   --   --   --    HEPARIN ANTI-XA  --  0.33 0.33 0.26* 0.34 0.24*   < > 0.46 0.10*   < > 0.10*  --   --  0.10*  --   --    < >  --     < > = values in this interval not displayed.         Lab 11/14/23  0813 11/13/23  0351 11/12/23  0148 11/11/23  0447 11/10/23  0154 11/09/23  1955   SODIUM 140 137 142 141 144 142   POTASSIUM 3.7 3.5 3.5 3.7 3.7 4.1   CHLORIDE 104 101 102 101 109* 107   CO2 25.0 27.0 29.0 27.0 25.0 24.0   ANION GAP 11.0 9.0 11.0 13.0 10.0 11.0   BUN 23 26* 22 25* 22 21   CREATININE 1.62* 1.70* 1.59* 1.88* 1.79* 1.88*   EGFR 36.0* 34.0* 36.8* 30.1* 31.9* 30.1*   GLUCOSE 148* 165* 123* 143* 197* 280*   CALCIUM 8.5* 8.7 8.8 8.4* 8.2* 8.3*   MAGNESIUM  --   --   --   --  2.0 2.0         Lab 11/09/23 1955   TOTAL PROTEIN 6.1   ALBUMIN 3.5   GLOBULIN 2.6   ALT (SGPT) 48*   AST (SGOT) 32   BILIRUBIN 0.3   ALK PHOS 167*   LIPASE 25         Lab 11/14/23  0813 11/13/23  0351 11/12/23  0148 11/11/23  0447 11/10/23  1902 11/10/23  0154 11/09/23  2336 11/09/23 1955   PROBNP  --   --   --  833.7  --    --   --  830.8   HSTROP T  --   --   --   --   --   --  10 11   PROTIME 16.9* 15.6* 14.9* 14.2 15.4*   < >  --  33.9*   INR 1.36* 1.22* 1.16* 1.09 1.21*   < >  --  3.31*    < > = values in this interval not displayed.             Lab 11/09/23 2142   ABO TYPING O   RH TYPING Positive   ANTIBODY SCREEN Negative         Brief Urine Lab Results  (Last result in the past 365 days)        Color   Clarity   Blood   Leuk Est   Nitrite   Protein   CREAT   Urine HCG        11/09/23 2320 Yellow   Clear   Negative   Small (1+)   Positive   100 mg/dL (2+)                   Microbiology Results Abnormal       Procedure Component Value - Date/Time    Blood Culture - Blood, Arm, Right [[id number]]  (Normal) Collected: 11/10/23 0027    Lab Status: Preliminary result Specimen: Blood from Arm, Right Updated: 11/14/23 0100     Blood Culture No growth at 4 days    Blood Culture - Blood, Arm, Right [[id number]]  (Normal) Collected: 11/10/23 0027    Lab Status: Preliminary result Specimen: Blood from Arm, Right Updated: 11/14/23 0100     Blood Culture No growth at 4 days            XR Chest 1 View    Result Date: 11/13/2023  XR CHEST 1 VW Date of Exam: 11/13/2023 2:25 PM EST Indication: SOA, leukocytosis Comparison: 11/9/2023 Findings: Left subclavian transvenous pacemaker is unchanged. Patient is again noted be status post median sternotomy. Cardiomegaly is stable. Pulmonary vessels are indistinct consistent with pulmonary vascular congestion.     Impression: Impression: 1. Stable cardiomegaly. 2. No focal airspace consolidation Electronically Signed: Faizan Thomas MD  11/13/2023 2:54 PM EST  Workstation ID: [id number]     Results for orders placed during the hospital encounter of 05/31/23    Adult Transthoracic Echo Complete W/ Cont if Necessary Per Protocol    Interpretation Summary    Mildly reduced right ventricular systolic function noted.    There is calcification of the aortic valve.    Estimated right ventricular systolic pressure  from tricuspid regurgitation is normal (<35 mmHg).    Mild global LV systolic dysfunction    EF=45-50%    Normal prosthetic MV function    AV calcification      Current medications:  Scheduled Meds:budesonide-formoterol, 2 puff, Inhalation, BID - RT  carvedilol, 12.5 mg, Oral, BID With Meals  docusate sodium, 100 mg, Oral, BID  escitalopram, 20 mg, Oral, Daily  ferrous sulfate, 325 mg, Oral, Daily With Breakfast  folic acid, 800 mcg, Oral, Daily  hydrocortisone, 25 mg, Rectal, BID  insulin lispro, 2-7 Units, Subcutaneous, 4x Daily AC & at Bedtime  Insulin Lispro, 4 Units, Subcutaneous, TID With Meals  ipratropium-albuterol, 3 mL, Nebulization, 4x Daily - RT  metroNIDAZOLE, 500 mg, Intravenous, Q8H  nicotine, 1 patch, Transdermal, Q24H  oxybutynin XL, 5 mg, Oral, Daily  pantoprazole, 40 mg, Intravenous, Q AM  sodium chloride, 10 mL, Intravenous, Q12H  warfarin (COUMADIN) (dosing per levels), , Does not apply, Daily      Continuous Infusions:heparin, 20 Units/kg/hr, Last Rate: 20 Units/kg/hr (11/14/23 0439)  Pharmacy to Dose Heparin,   Pharmacy to dose warfarin,       PRN Meds:.  acetaminophen    aspirin-acetaminophen-caffeine    [DISCONTINUED] senna-docusate sodium **AND** polyethylene glycol **AND** bisacodyl **AND** bisacodyl    dextrose    dextrose    glucagon (human recombinant)    HYDROcodone-acetaminophen    melatonin    nicotine polacrilex    Pharmacy to Dose Heparin    Pharmacy to dose warfarin    sodium chloride    sodium chloride    Assessment & Plan   Assessment & Plan     Active Hospital Problems    Diagnosis  POA    **GIB (gastrointestinal bleeding) [K92.2]  Yes    GI bleed [K92.2]  Yes    Acute blood loss anemia [D62]  Yes    MEERA (acute kidney injury) [N17.9]  Yes    H/O heart valve replacement with mechanical valve [Z95.2]  Not Applicable    Chronic respiratory failure with hypoxia [J96.11]  Yes    Chronic diastolic heart failure [I50.32]  Yes    Emphysema/COPD [J43.9]  Yes    Hypertension [I10]  Yes     Type 2 diabetes mellitus without complication, with long-term current use of insulin [E11.9, Z79.4]  Not Applicable    Depression [F32.A]  Yes    Dyslipidemia [E78.5]  Yes    Obstructive sleep apnea syndrome [G47.33]  Yes      Resolved Hospital Problems   No resolved problems to display.        Brief Hospital Course to date:  Di Lopez is a 62 y.o. female  with PMH significant for COPD, HTN, HLD, CAD, depression, CHF, VHD s/p mechanical valve replacement on warfarin, DM2, CAL on CPAP, liver mass, obesity.  She has had diarrhea for 2 weeks, then developed BRBPR on 11/9. She was asked to hold her diuretics, Entresto and Trulicity by PCP last last week due to rising creatinine.    GI bleed - BRBPR   Symptomatic acute blood loss anemia  Warfarin use; INR 3-4 recently  Adenovirus   - Hgb was 11 in May, was 7.1 on admission.   - Colonoscopy completed May 2023;  Has scheduled EGD outpatient 11/14/2023 (Dr. Fernandes)  - CT scan with inflammation in the sigmoid colon  - GI PCR with adenovirus   - GI consult               - Recommend supportive care.  IV antibiotics and anusol suppository  - Continue rocephin/flagyl   - H/H with slow trend down. Small amount of BRBPR yesterday.   - Transfuse another unit 11/14 for total of 3 units so far  - Reviewed again with GI -- will start CLD in case she needs a scope. Will consider pending H/H trend/bleeding   - Continue heparin gtt     Leukocytosis  Elevated procal  - procal trending up. Increase in SOA and HA.   - CXR with no consolidation. Afebrile. URI and viral type symptoms  - WBC trending down   - AM labs   - Completed rocephin/flagyl      MEERA  - Baseline creatinine ~0.8-0.9,  - was 1.9 on admission  - Hold nephrotoxic medications  - holding home diuretics       HFmrEF  - EF 45-50 3/23.   - Holding home entresto due to MEERA. Continue coreg  - Received intermittent IV diuretics with blood transfusion   - CXR with no acute findings. Normal BNP on admission and repeat       Chronic hypoxic resp failure  - 3L at baseline but states home O2 concentrator is broken -- discussed with case management   - Continue symbicort and duo-nebs      UTI POA  - urine culture ecoli   - Past UTI was E coli, a nd Klebs, not ESBL .   - s/p 3 doses of rocephin      Bed bugs   - Isolation      Mechanical mitral valve   - Continue heparin gtt. Coumadin restarted but now will hold with trend in H/H   - plans to DC home with lovenox bridge. Follows with Dr. Everett      Diabetes mellitus 2 - A1C 6.8   - Hold Trulicity, glimepiride  - Hold Lantus for now, add back as appropriate  - FSBG ACHS  - add bolus insulin   -glucose stable     Hypertension  Dyslipidemia  - Currently controlled  - Continue carvedilol  - Continue statin     CAL  - CPAP     Nicotine use  - Vapes  - Nicotine patch  - Cessation counseling     Obesity   - BMI 39. Complicates all aspects of care    Expected Discharge Location and Transportation: home  Expected Discharge   Expected Discharge Date: 11/16/2023; Expected Discharge Time:      DVT prophylaxis:  Medical DVT prophylaxis orders are present.     AM-PAC 6 Clicks Score (PT): 24 (11/13/23 2059)    CODE STATUS:   Code Status and Medical Interventions:   Ordered at: 11/09/23 5551     Code Status (Patient has no pulse and is not breathing):    CPR (Attempt to Resuscitate)     Medical Interventions (Patient has pulse or is breathing):    Full Support       Sarah Sherman,   11/14/23

## 2023-11-14 NOTE — PROGRESS NOTES
"Pharmacy Consult  -  Warfarin  Di Lopez is a  62 y.o. female   Height - 152.4 cm (60\")  Weight - 93.8 kg (206 lb 12.8 oz)    Consulting Provider: Dr. Sherman  Indication: Mechanical MVR  Goal INR: 2.5 - 3.5   Home Regimen: 4 mg daily, except 4.5 mg on Sat/Sun    Bridge Therapy: Yes   and unfractionated heparin    Drug-Drug Interactions with current regimen:   Metronidazole - increase INR              Heparin - increase bleed risk    Warfarin Dosing During Admission:  Date  11/10 11/11 11/12 11/13 11/14 11/15 11/16 11/17 11/18   INR  4.13 1.09 1.16 1.22 1.36       Dose  IV Vit K given 6 mg 3 mg 3 mg  HOLD         Education Provided: 11/11/23 - provided education pamphlet and answered questions.      Discharge Follow up:   Following Provider - Dr. Everett   Follow up time range or appointment - 2-3 days following discharge     Labs:  Results from last 7 days   Lab Units 11/14/23  0959 11/14/23  0813 11/13/23 0351 11/12/23 0148 11/11/23  1844 11/11/23  0810 11/11/23  0447 11/10/23  1902 11/10/23  0725 11/10/23  0437 11/10/23  0154   INR   --  1.36* 1.22* 1.16*  --   --  1.09 1.21*  --  2.84* 2.88*   APTT seconds  --   --   --   --   --   --   --   --   --   --  78.8   HEMOGLOBIN g/dL 7.1* 7.2* 7.6* 7.2* 7.0* 7.5*  --  7.6*   < > 7.7* 7.1*   HEMATOCRIT % 22.6* 22.4* 23.3* 21.8* 21.3* 23.6*  --  23.9*   < > 24.2* 21.9*    < > = values in this interval not displayed.     Results from last 7 days   Lab Units 11/14/23  0813 11/13/23  0351 11/12/23  0148 11/10/23  0154 11/09/23  1955   SODIUM mmol/L 140 137 142   < > 142   POTASSIUM mmol/L 3.7 3.5 3.5   < > 4.1   CHLORIDE mmol/L 104 101 102   < > 107   CO2 mmol/L 25.0 27.0 29.0   < > 24.0   BUN mg/dL 23 26* 22   < > 21   CREATININE mg/dL 1.62* 1.70* 1.59*   < > 1.88*   CALCIUM mg/dL 8.5* 8.7 8.8   < > 8.3*   BILIRUBIN mg/dL  --   --   --   --  0.3   ALK PHOS U/L  --   --   --   --  167*   ALT (SGPT) U/L  --   --   --   --  48*   AST (SGOT) U/L  --   --   --   --  " 32   GLUCOSE mg/dL 148* 165* 123*   < > 280*    < > = values in this interval not displayed.     Current dietary intake: regular diet; 100% meals on 11/13    Assessment/Plan:   Pharmacy to dose warfarin for Mechanical MVR.  Goal INR: 2.5-3.5  Patient's INR is SUBtherapeutic today at 1.36, increased from 1.22 on 11/13.   Patient admitted with supratherapeutic INR of 4.13 and GIB; given IV Vit K 5mg on 11/10.   Will HOLD warfarin today as requested by Dr. Sherman. Patient continues to be bridged with heparin infusion until INR within therapeutic range.   Daily PT/INR ordered.  Monitor for signs/symptoms of bleeding, dietary intake, and drug-drug interactions.   Pharmacy will continue to follow and make dose adjustments as necessary.    Thank you,    Mamta Rodriguez, PharmD, BCPS  11/14/2023  11:27 EST

## 2023-11-15 LAB
ANION GAP SERPL CALCULATED.3IONS-SCNC: 10 MMOL/L (ref 5–15)
BACTERIA SPEC AEROBE CULT: NORMAL
BACTERIA SPEC AEROBE CULT: NORMAL
BUN SERPL-MCNC: 22 MG/DL (ref 8–23)
BUN/CREAT SERPL: 15.6 (ref 7–25)
CALCIUM SPEC-SCNC: 8.8 MG/DL (ref 8.6–10.5)
CHLORIDE SERPL-SCNC: 101 MMOL/L (ref 98–107)
CO2 SERPL-SCNC: 26 MMOL/L (ref 22–29)
CREAT SERPL-MCNC: 1.41 MG/DL (ref 0.57–1)
DEPRECATED RDW RBC AUTO: 48.8 FL (ref 37–54)
EGFRCR SERPLBLD CKD-EPI 2021: 42.5 ML/MIN/1.73
ERYTHROCYTE [DISTWIDTH] IN BLOOD BY AUTOMATED COUNT: 15.1 % (ref 12.3–15.4)
GLUCOSE BLDC GLUCOMTR-MCNC: 130 MG/DL (ref 70–130)
GLUCOSE BLDC GLUCOMTR-MCNC: 179 MG/DL (ref 70–130)
GLUCOSE BLDC GLUCOMTR-MCNC: 181 MG/DL (ref 70–130)
GLUCOSE BLDC GLUCOMTR-MCNC: 250 MG/DL (ref 70–130)
GLUCOSE SERPL-MCNC: 121 MG/DL (ref 65–99)
HCT VFR BLD AUTO: 22 % (ref 34–46.6)
HCT VFR BLD AUTO: 23.4 % (ref 34–46.6)
HCT VFR BLD AUTO: 26.5 % (ref 34–46.6)
HGB BLD-MCNC: 6.9 G/DL (ref 12–15.9)
HGB BLD-MCNC: 7.4 G/DL (ref 12–15.9)
HGB BLD-MCNC: 8.7 G/DL (ref 12–15.9)
INR PPP: 1.36 (ref 0.89–1.12)
MCH RBC QN AUTO: 27.7 PG (ref 26.6–33)
MCHC RBC AUTO-ENTMCNC: 31.4 G/DL (ref 31.5–35.7)
MCV RBC AUTO: 88.4 FL (ref 79–97)
PLATELET # BLD AUTO: 285 10*3/MM3 (ref 140–450)
PMV BLD AUTO: 10.5 FL (ref 6–12)
POTASSIUM SERPL-SCNC: 3.3 MMOL/L (ref 3.5–5.2)
PROCALCITONIN SERPL-MCNC: 0.28 NG/ML (ref 0–0.25)
PROTHROMBIN TIME: 16.9 SECONDS (ref 12.2–14.5)
RBC # BLD AUTO: 2.49 10*6/MM3 (ref 3.77–5.28)
SODIUM SERPL-SCNC: 137 MMOL/L (ref 136–145)
UFH PPP CHRO-ACNC: 0.25 IU/ML (ref 0.3–0.7)
UFH PPP CHRO-ACNC: 0.33 IU/ML (ref 0.3–0.7)
UFH PPP CHRO-ACNC: 0.34 IU/ML (ref 0.3–0.7)
WBC NRBC COR # BLD: 11.42 10*3/MM3 (ref 3.4–10.8)

## 2023-11-15 PROCEDURE — 85027 COMPLETE CBC AUTOMATED: CPT | Performed by: INTERNAL MEDICINE

## 2023-11-15 PROCEDURE — 85610 PROTHROMBIN TIME: CPT

## 2023-11-15 PROCEDURE — 85520 HEPARIN ASSAY: CPT

## 2023-11-15 PROCEDURE — 63710000001 INSULIN LISPRO (HUMAN) PER 5 UNITS: Performed by: INTERNAL MEDICINE

## 2023-11-15 PROCEDURE — 63710000001 INSULIN LISPRO (HUMAN) PER 5 UNITS: Performed by: NURSE PRACTITIONER

## 2023-11-15 PROCEDURE — 82948 REAGENT STRIP/BLOOD GLUCOSE: CPT

## 2023-11-15 PROCEDURE — 36430 TRANSFUSION BLD/BLD COMPNT: CPT

## 2023-11-15 PROCEDURE — 94799 UNLISTED PULMONARY SVC/PX: CPT

## 2023-11-15 PROCEDURE — 80048 BASIC METABOLIC PNL TOTAL CA: CPT | Performed by: INTERNAL MEDICINE

## 2023-11-15 PROCEDURE — 25010000002 METRONIDAZOLE 500 MG/100ML SOLUTION: Performed by: INTERNAL MEDICINE

## 2023-11-15 PROCEDURE — 85014 HEMATOCRIT: CPT | Performed by: STUDENT IN AN ORGANIZED HEALTH CARE EDUCATION/TRAINING PROGRAM

## 2023-11-15 PROCEDURE — 99232 SBSQ HOSP IP/OBS MODERATE 35: CPT | Performed by: INTERNAL MEDICINE

## 2023-11-15 PROCEDURE — 86900 BLOOD TYPING SEROLOGIC ABO: CPT

## 2023-11-15 PROCEDURE — 99233 SBSQ HOSP IP/OBS HIGH 50: CPT | Performed by: PHYSICIAN ASSISTANT

## 2023-11-15 PROCEDURE — 94761 N-INVAS EAR/PLS OXIMETRY MLT: CPT

## 2023-11-15 PROCEDURE — P9016 RBC LEUKOCYTES REDUCED: HCPCS

## 2023-11-15 PROCEDURE — 85014 HEMATOCRIT: CPT | Performed by: INTERNAL MEDICINE

## 2023-11-15 PROCEDURE — 25010000002 HEPARIN (PORCINE) 25000-0.45 UT/250ML-% SOLUTION

## 2023-11-15 PROCEDURE — 84145 PROCALCITONIN (PCT): CPT | Performed by: INTERNAL MEDICINE

## 2023-11-15 PROCEDURE — 25010000002 HEPARIN (PORCINE) 25000-0.45 UT/250ML-% SOLUTION: Performed by: INTERNAL MEDICINE

## 2023-11-15 PROCEDURE — 85018 HEMOGLOBIN: CPT | Performed by: INTERNAL MEDICINE

## 2023-11-15 PROCEDURE — 94664 DEMO&/EVAL PT USE INHALER: CPT

## 2023-11-15 PROCEDURE — 85018 HEMOGLOBIN: CPT | Performed by: STUDENT IN AN ORGANIZED HEALTH CARE EDUCATION/TRAINING PROGRAM

## 2023-11-15 PROCEDURE — 85520 HEPARIN ASSAY: CPT | Performed by: INTERNAL MEDICINE

## 2023-11-15 RX ORDER — PANTOPRAZOLE SODIUM 40 MG/10ML
40 INJECTION, POWDER, LYOPHILIZED, FOR SOLUTION INTRAVENOUS
Status: DISCONTINUED | OUTPATIENT
Start: 2023-11-15 | End: 2023-11-22 | Stop reason: HOSPADM

## 2023-11-15 RX ORDER — OMEPRAZOLE 20 MG/1
20 CAPSULE, DELAYED RELEASE ORAL 2 TIMES DAILY
Qty: 60 CAPSULE | Refills: 0 | Status: SHIPPED | OUTPATIENT
Start: 2023-11-15

## 2023-11-15 RX ORDER — PEG-3350, SODIUM SULFATE, SODIUM CHLORIDE, POTASSIUM CHLORIDE, SODIUM ASCORBATE AND ASCORBIC ACID 7.5-2.691G
1000 KIT ORAL
Status: COMPLETED | OUTPATIENT
Start: 2023-11-16 | End: 2023-11-16

## 2023-11-15 RX ORDER — PEG-3350, SODIUM SULFATE, SODIUM CHLORIDE, POTASSIUM CHLORIDE, SODIUM ASCORBATE AND ASCORBIC ACID 7.5-2.691G
1000 KIT ORAL
Status: COMPLETED | OUTPATIENT
Start: 2023-11-15 | End: 2023-11-15

## 2023-11-15 RX ADMIN — INSULIN LISPRO 4 UNITS: 100 INJECTION, SOLUTION INTRAVENOUS; SUBCUTANEOUS at 08:26

## 2023-11-15 RX ADMIN — PANTOPRAZOLE SODIUM 40 MG: 40 INJECTION, POWDER, LYOPHILIZED, FOR SOLUTION INTRAVENOUS at 17:00

## 2023-11-15 RX ADMIN — FERROUS SULFATE TAB 325 MG (65 MG ELEMENTAL FE) 325 MG: 325 (65 FE) TAB at 08:25

## 2023-11-15 RX ADMIN — Medication 10 ML: at 22:20

## 2023-11-15 RX ADMIN — PEG-3350, SODIUM SULFATE, SODIUM CHLORIDE, POTASSIUM CHLORIDE, SODIUM ASCORBATE AND ASCORBIC ACID 1000 ML: KIT at 18:00

## 2023-11-15 RX ADMIN — HYDROCODONE BITARTRATE AND ACETAMINOPHEN 1 TABLET: 5; 325 TABLET ORAL at 22:20

## 2023-11-15 RX ADMIN — HEPARIN SODIUM 21 UNITS/KG/HR: 10000 INJECTION, SOLUTION INTRAVENOUS at 21:32

## 2023-11-15 RX ADMIN — INSULIN LISPRO 2 UNITS: 100 INJECTION, SOLUTION INTRAVENOUS; SUBCUTANEOUS at 21:31

## 2023-11-15 RX ADMIN — IPRATROPIUM BROMIDE AND ALBUTEROL SULFATE 3 ML: 2.5; .5 SOLUTION RESPIRATORY (INHALATION) at 22:32

## 2023-11-15 RX ADMIN — PANTOPRAZOLE SODIUM 40 MG: 40 INJECTION, POWDER, LYOPHILIZED, FOR SOLUTION INTRAVENOUS at 05:30

## 2023-11-15 RX ADMIN — FOLIC ACID TAB 400 MCG 800 MCG: 400 TAB at 08:25

## 2023-11-15 RX ADMIN — METRONIDAZOLE 500 MG: 500 INJECTION, SOLUTION INTRAVENOUS at 05:30

## 2023-11-15 RX ADMIN — Medication 10 ML: at 08:27

## 2023-11-15 RX ADMIN — BUDESONIDE AND FORMOTEROL FUMARATE DIHYDRATE 2 PUFF: 160; 4.5 AEROSOL RESPIRATORY (INHALATION) at 09:30

## 2023-11-15 RX ADMIN — INSULIN LISPRO 4 UNITS: 100 INJECTION, SOLUTION INTRAVENOUS; SUBCUTANEOUS at 11:59

## 2023-11-15 RX ADMIN — ESCITALOPRAM OXALATE 20 MG: 20 TABLET ORAL at 08:24

## 2023-11-15 RX ADMIN — INSULIN LISPRO 2 UNITS: 100 INJECTION, SOLUTION INTRAVENOUS; SUBCUTANEOUS at 16:59

## 2023-11-15 RX ADMIN — METRONIDAZOLE 500 MG: 500 INJECTION, SOLUTION INTRAVENOUS at 14:08

## 2023-11-15 RX ADMIN — CARVEDILOL 12.5 MG: 12.5 TABLET, FILM COATED ORAL at 17:06

## 2023-11-15 RX ADMIN — CARVEDILOL 12.5 MG: 12.5 TABLET, FILM COATED ORAL at 08:25

## 2023-11-15 RX ADMIN — INSULIN LISPRO 4 UNITS: 100 INJECTION, SOLUTION INTRAVENOUS; SUBCUTANEOUS at 17:01

## 2023-11-15 RX ADMIN — Medication 5 MG: at 22:20

## 2023-11-15 RX ADMIN — IPRATROPIUM BROMIDE AND ALBUTEROL SULFATE 3 ML: 2.5; .5 SOLUTION RESPIRATORY (INHALATION) at 10:49

## 2023-11-15 RX ADMIN — IPRATROPIUM BROMIDE AND ALBUTEROL SULFATE 3 ML: 2.5; .5 SOLUTION RESPIRATORY (INHALATION) at 14:51

## 2023-11-15 RX ADMIN — BUDESONIDE AND FORMOTEROL FUMARATE DIHYDRATE 2 PUFF: 160; 4.5 AEROSOL RESPIRATORY (INHALATION) at 22:32

## 2023-11-15 RX ADMIN — OXYBUTYNIN CHLORIDE 5 MG: 5 TABLET, EXTENDED RELEASE ORAL at 08:26

## 2023-11-15 RX ADMIN — INSULIN LISPRO 4 UNITS: 100 INJECTION, SOLUTION INTRAVENOUS; SUBCUTANEOUS at 11:58

## 2023-11-15 RX ADMIN — HEPARIN SODIUM 20 UNITS/KG/HR: 10000 INJECTION, SOLUTION INTRAVENOUS at 08:03

## 2023-11-15 RX ADMIN — IPRATROPIUM BROMIDE AND ALBUTEROL SULFATE 3 ML: 2.5; .5 SOLUTION RESPIRATORY (INHALATION) at 06:48

## 2023-11-15 NOTE — NURSING NOTE
Spoke with Dr. Carmona and wants to get H&H to determine if patient  needs transfusion. H&H came back, hemoglobin 7.4 and hematocrit 23.4. Patient did not receive unit of blood this shift per order that sates to transfuse if hemoglobin is less than 7 or hematocrit less than 21.

## 2023-11-15 NOTE — PLAN OF CARE
Goal Outcome Evaluation:         VSS, on 1L nasal cannula. Patient denies SOB. C/o of headache, PRN medication administered, with relief. Up with stand by assist. V-paced on tele monitor. Patient resting in bed, bed in lowest position,call light in reach. No further needs at this time.

## 2023-11-15 NOTE — PROGRESS NOTES
"Pharmacy Consult  -  Warfarin  Di Lopez is a  62 y.o. female   Height - 152.4 cm (60\")  Weight - 94.7 kg (208 lb 11.2 oz)    Consulting Provider: Dr. Sherman  Indication: Mechanical MVR  Goal INR: 2.5 - 3.5   Home Regimen: 4 mg daily, except 4.5 mg on Sat/Sun    Bridge Therapy: Yes   and unfractionated heparin    Drug-Drug Interactions with current regimen:   Metronidazole - increase INR              Heparin - increase bleed risk    Warfarin Dosing During Admission:  Date  11/10 11/11 11/12 11/13 11/14 11/15 11/16 11/17 11/18   INR  4.13 1.09 1.16 1.22 1.36 1.36      Dose  IV Vit K given 6 mg 3 mg 3 mg  HOLD HOLD        Education Provided: 11/11/23 - provided education pamphlet and answered questions.      Discharge Follow up:   Following Provider - Dr. Everett   Follow up time range or appointment - 2-3 days following discharge     Labs:  Results from last 7 days   Lab Units 11/15/23  0745 11/15/23  0003 11/14/23  0959 11/14/23  0813 11/13/23  0351 11/12/23  0148 11/11/23  1844 11/11/23  0810 11/11/23  0447 11/10/23  1902 11/10/23  0725 11/10/23  0437 11/10/23  0154   INR  1.36*  --   --  1.36* 1.22* 1.16*  --   --  1.09 1.21*  --  2.84* 2.88*   APTT seconds  --   --   --   --   --   --   --   --   --   --   --   --  78.8   HEMOGLOBIN g/dL 6.9* 7.4* 7.1* 7.2* 7.6* 7.2* 7.0*   < >  --  7.6*   < > 7.7* 7.1*   HEMATOCRIT % 22.0* 23.4* 22.6* 22.4* 23.3* 21.8* 21.3*   < >  --  23.9*   < > 24.2* 21.9*    < > = values in this interval not displayed.     Results from last 7 days   Lab Units 11/15/23  0745 11/14/23  0813 11/13/23  0351 11/10/23  0154 11/09/23  1955   SODIUM mmol/L 137 140 137   < > 142   POTASSIUM mmol/L 3.3* 3.7 3.5   < > 4.1   CHLORIDE mmol/L 101 104 101   < > 107   CO2 mmol/L 26.0 25.0 27.0   < > 24.0   BUN mg/dL 22 23 26*   < > 21   CREATININE mg/dL 1.41* 1.62* 1.70*   < > 1.88*   CALCIUM mg/dL 8.8 8.5* 8.7   < > 8.3*   BILIRUBIN mg/dL  --   --   --   --  0.3   ALK PHOS U/L  --   --   --   --  " 167*   ALT (SGPT) U/L  --   --   --   --  48*   AST (SGOT) U/L  --   --   --   --  32   GLUCOSE mg/dL 121* 148* 165*   < > 280*    < > = values in this interval not displayed.     Current dietary intake: regular diet; 100% meals on 11/13    Assessment/Plan:   Pharmacy to dose warfarin for Mechanical MVR.  Goal INR: 2.5-3.5  Patient's INR is SUBtherapeutic today at 1.36, increased from 1.22 on 11/13.   Patient admitted with supratherapeutic INR of 4.13 and GIB; given IV Vit K 5mg on 11/10.   Will continue to HOLD warfarin today with today's H/H of 6.9/22.  Patient continues to be bridged with heparin infusion until INR within therapeutic range.   Monitor for signs/symptoms of bleeding, dietary intake, and drug-drug interactions.   Pharmacy will continue to follow and make dose adjustments as necessary.    Thank you,    Nadir Corrales, PharmD  11/15/2023  10:52 EST

## 2023-11-15 NOTE — PROGRESS NOTES
"GI Daily Progress Note  Subjective:    Pt laying in bed. C/o generalized abdominal discomfort. No stool output today. Pt had BM two days ago that was mixed dark blood and bright red blood. She denies nausea, vomiting, fever, chills.    Pt did not use Anusol suppositories for concern of effect of steroids on her blood sugar.     H/H today 6.9/22.2 with 1u pRBC infusing at bedside.     Objective:    /69   Pulse 59   Temp 98.5 °F (36.9 °C) (Oral)   Resp 16   Ht 152.4 cm (60\")   Wt 94.7 kg (208 lb 11.2 oz)   LMP 06/17/1998   SpO2 98%   BMI 40.76 kg/m²     Physical Exam  Vitals and nursing note reviewed.   Constitutional:       Appearance: Normal appearance. She is normal weight. She is not ill-appearing or diaphoretic.      Interventions: Nasal cannula in place.      Comments: BMI 40.76. Pleasantly conversant.    HENT:      Head: Normocephalic and atraumatic.      Right Ear: External ear normal.      Left Ear: External ear normal.      Nose: Nose normal.      Mouth/Throat:      Mouth: Mucous membranes are moist.      Pharynx: Oropharynx is clear.   Eyes:      Conjunctiva/sclera: Conjunctivae normal.      Pupils: Pupils are equal, round, and reactive to light.   Neck:      Thyroid: No thyromegaly.   Cardiovascular:      Rate and Rhythm: Normal rate and regular rhythm.      Pulses: Normal pulses.      Heart sounds: Normal heart sounds. Murmur heard.   Pulmonary:      Effort: Pulmonary effort is normal.      Breath sounds: Normal breath sounds.   Abdominal:      General: Abdomen is flat. Bowel sounds are normal. There is no distension.      Tenderness: There is abdominal tenderness (mild, generalized).   Musculoskeletal:         General: Normal range of motion.      Cervical back: Normal range of motion and neck supple.   Skin:     General: Skin is warm and dry.   Neurological:      General: No focal deficit present.      Mental Status: She is oriented to person, place, and time.   Psychiatric:         Mood " and Affect: Mood normal.         Lab  Lab Results   Component Value Date    WBC 11.42 (H) 11/15/2023    HGB 6.9 (C) 11/15/2023    HGB 7.4 (L) 11/15/2023    HGB 7.1 (L) 11/14/2023    MCV 88.4 11/15/2023     11/15/2023    INR 1.36 (H) 11/15/2023    INR 1.36 (H) 11/14/2023    INR 1.22 (H) 11/13/2023    INR 1.16 (H) 11/12/2023    INR 1.09 11/11/2023       Lab Results   Component Value Date    GLUCOSE 121 (H) 11/15/2023    BUN 22 11/15/2023    CREATININE 1.41 (H) 11/15/2023    EGFRIFAFRI 73 10/14/2022    BCR 15.6 11/15/2023     11/15/2023    K 3.3 (L) 11/15/2023    CO2 26.0 11/15/2023    CALCIUM 8.8 11/15/2023    ALBUMIN 3.5 11/09/2023    ALKPHOS 167 (H) 11/09/2023    BILITOT 0.3 11/09/2023    BILIDIR <0.2 03/31/2023    ALT 48 (H) 11/09/2023    AST 32 11/09/2023       Assessment and Plan:    Acute on chronic anemia, secondary to GI blood loss  GI Bleed, melena and BRBPR  Adenovirus  Sigmoid colitis via CT A/P 11/9  Chronic oral anticoagulation 2/2 mechanical valve   - continue trending H/H and transfuse per hospitalist protocol   - continue BID PPI   - Moviprep ordered for this afternoon   - NPO at midnight   - plan for EGD / CSY tomorrow with Dr. Brunner Stephanie Miller, PA-C  11/15/23  12:28 EST

## 2023-11-15 NOTE — PROGRESS NOTES
[facility] Medicine Services  PROGRESS NOTE    Patient Name: Di Lopez  : 1960  MRN: [id number]    Date of Admission: 2023  Primary Care Physician: Davina Santiago DO    Subjective   Subjective     CC:  BRBPR, HA     HPI:  Resting in bed in no acute distress and overall comfortable.  No fever or chills.  No chest pain or palpitation or shortness of breath at rest.  No nausea vomiting or diarrhea.      Objective   Objective     Vital Signs:   Temp:  [97.7 °F (36.5 °C)-98.6 °F (37 °C)] 98.2 °F (36.8 °C)  Heart Rate:  [59-81] 81  Resp:  [16-18] 18  BP: ()/(46-70) 141/61  Flow (L/min):  [1-2] 2     Physical Exam:  Constitutional: No acute distress  HENT: NCAT, mucous membranes moist  Respiratory: Clear to auscultation bilaterally, breathing with no labor.  Cardiovascular: RRR, no murmurs, rubs, or gallops  Gastrointestinal: Positive bowel sounds, soft, nontender, nondistended  Musculoskeletal: trace bilateral ankle edema  Psychiatric: Appropriate affect, cooperative  Neurologic: Awake, alert, oriented x3, no focality appreciated, speech clear  Skin: No rashes      Results Reviewed:  LAB RESULTS:      Lab 11/15/23  0745 11/15/23  0003 23  0959 23  0813 23  0141 23  1718 23  1148 23  0351 23  0916 23  0148 23  0810 23  0447 11/10/23  0437 11/10/23  0154 11/10/23  0027 23  2336 23  1955 23   WBC 11.42*  --   --  13.05*  --   --   --  15.09*  --  12.80*  --   --   --  12.70*  --   --   --  12.77*   HEMOGLOBIN 6.9* 7.4* 7.1* 7.2*  --   --   --  7.6*  --  7.2*   < >  --    < > 7.1*  --   --   --  7.2*   HEMATOCRIT 22.0* 23.4* 22.6* 22.4*  --   --   --  23.3*  --  21.8*   < >  --    < > 21.9*  --   --   --  22.9*   PLATELETS 285  --   --  261  --   --   --  277  --  274  --   --   --  310  --   --   --  289   NEUTROS ABS  --   --   --   --   --   --   --  11.06*  --   --   --   --   --   9.12*  --   --   --  9.37*   IMMATURE GRANS (ABS)  --   --   --   --   --   --   --  0.17*  --   --   --   --   --  0.14*  --   --   --  0.18*   LYMPHS ABS  --   --   --   --   --   --   --  2.34  --   --   --   --   --  2.72  --   --   --  2.32   MONOS ABS  --   --   --   --   --   --   --  1.14*  --   --   --   --   --  0.59  --   --   --  0.70   EOS ABS  --   --   --   --   --   --   --  0.30  --   --   --   --   --  0.10  --   --   --  0.16   MCV 88.4  --   --  86.8  --   --   --  86.6  --  85.2  --   --   --  88.0  --   --   --  89.8   PROCALCITONIN 0.28*  --   --   --   --   --   --  0.32*  --   --   --   --   --   --   --  0.07  --   --    LACTATE  --   --   --   --   --   --   --   --   --   --   --   --   --   --  1.5  --   --   --    PROTIME 16.9*  --   --  16.9*  --   --   --  15.6*  --  14.9*  --  14.2   < > 30.4*  --   --   --  33.9*   APTT  --   --   --   --   --   --   --   --   --   --   --   --   --  78.8  --   --   --   --    HEPARIN ANTI-XA 0.25*  --   --  0.33 0.33 0.26* 0.34 0.24*   < > 0.46   < > 0.10*  --  0.10*  --   --    < >  --     < > = values in this interval not displayed.         Lab 11/15/23  0745 11/14/23  0813 11/13/23  0351 11/12/23  0148 11/11/23  0447 11/10/23  0154 11/09/23 1955   SODIUM 137 140 137 142 141 144 142   POTASSIUM 3.3* 3.7 3.5 3.5 3.7 3.7 4.1   CHLORIDE 101 104 101 102 101 109* 107   CO2 26.0 25.0 27.0 29.0 27.0 25.0 24.0   ANION GAP 10.0 11.0 9.0 11.0 13.0 10.0 11.0   BUN 22 23 26* 22 25* 22 21   CREATININE 1.41* 1.62* 1.70* 1.59* 1.88* 1.79* 1.88*   EGFR 42.5* 36.0* 34.0* 36.8* 30.1* 31.9* 30.1*   GLUCOSE 121* 148* 165* 123* 143* 197* 280*   CALCIUM 8.8 8.5* 8.7 8.8 8.4* 8.2* 8.3*   MAGNESIUM  --   --   --   --   --  2.0 2.0         Lab 11/09/23 1955   TOTAL PROTEIN 6.1   ALBUMIN 3.5   GLOBULIN 2.6   ALT (SGPT) 48*   AST (SGOT) 32   BILIRUBIN 0.3   ALK PHOS 167*   LIPASE 25         Lab 11/15/23  0745 11/14/23  0813 11/13/23  0351 11/12/23  0148 11/11/23  0447  11/10/23  0154 11/09/23  2336 11/09/23 1955   PROBNP  --   --   --   --  833.7  --   --  830.8   HSTROP T  --   --   --   --   --   --  10 11   PROTIME 16.9* 16.9* 15.6* 14.9* 14.2   < >  --  33.9*   INR 1.36* 1.36* 1.22* 1.16* 1.09   < >  --  3.31*    < > = values in this interval not displayed.             Lab 11/14/23  1736 11/09/23  2142   ABO TYPING O O   RH TYPING Positive Positive   ANTIBODY SCREEN Negative Negative         Brief Urine Lab Results  (Last result in the past 365 days)        Color   Clarity   Blood   Leuk Est   Nitrite   Protein   CREAT   Urine HCG        11/09/23 2320 Yellow   Clear   Negative   Small (1+)   Positive   100 mg/dL (2+)                   Microbiology Results Abnormal       Procedure Component Value - Date/Time    Blood Culture - Blood, Arm, Right [[id number]]  (Normal) Collected: 11/10/23 0027    Lab Status: Final result Specimen: Blood from Arm, Right Updated: 11/15/23 0100     Blood Culture No growth at 5 days    Blood Culture - Blood, Arm, Right [[id number]]  (Normal) Collected: 11/10/23 0027    Lab Status: Final result Specimen: Blood from Arm, Right Updated: 11/15/23 0100     Blood Culture No growth at 5 days            No radiology results from the last 24 hrs    Results for orders placed during the hospital encounter of 05/31/23    Adult Transthoracic Echo Complete W/ Cont if Necessary Per Protocol    Interpretation Summary    Mildly reduced right ventricular systolic function noted.    There is calcification of the aortic valve.    Estimated right ventricular systolic pressure from tricuspid regurgitation is normal (<35 mmHg).    Mild global LV systolic dysfunction    EF=45-50%    Normal prosthetic MV function    AV calcification      Current medications:  Scheduled Meds:budesonide-formoterol, 2 puff, Inhalation, BID - RT  carvedilol, 12.5 mg, Oral, BID With Meals  docusate sodium, 100 mg, Oral, BID  escitalopram, 20 mg, Oral, Daily  ferrous sulfate, 325 mg, Oral, Daily  With Breakfast  folic acid, 800 mcg, Oral, Daily  hydrocortisone, 25 mg, Rectal, BID  insulin lispro, 2-7 Units, Subcutaneous, 4x Daily AC & at Bedtime  Insulin Lispro, 4 Units, Subcutaneous, TID With Meals  ipratropium-albuterol, 3 mL, Nebulization, 4x Daily - RT  nicotine, 1 patch, Transdermal, Q24H  oxybutynin XL, 5 mg, Oral, Daily  pantoprazole, 40 mg, Intravenous, BID AC  PEG-KCl-NaCl-NaSulf-Na Asc-C, 1,000 mL, Oral, Once When Specified   Followed by  [START ON 11/16/2023] PEG-KCl-NaCl-NaSulf-Na Asc-C, 1,000 mL, Oral, Once When Specified  sodium chloride, 10 mL, Intravenous, Q12H  warfarin (COUMADIN) (dosing per levels), , Does not apply, Daily      Continuous Infusions:heparin, 21 Units/kg/hr, Last Rate: 21 Units/kg/hr (11/15/23 0939)  Pharmacy to Dose Heparin,   Pharmacy to dose warfarin,       PRN Meds:.  acetaminophen    aspirin-acetaminophen-caffeine    [DISCONTINUED] senna-docusate sodium **AND** polyethylene glycol **AND** bisacodyl **AND** bisacodyl    dextrose    dextrose    glucagon (human recombinant)    HYDROcodone-acetaminophen    melatonin    nicotine polacrilex    Pharmacy to Dose Heparin    Pharmacy to dose warfarin    sodium chloride    sodium chloride    Assessment & Plan   Assessment & Plan     Active Hospital Problems    Diagnosis  POA    **GIB (gastrointestinal bleeding) [K92.2]  Yes    GI bleed [K92.2]  Yes    Acute blood loss anemia [D62]  Yes    MEERA (acute kidney injury) [N17.9]  Yes    H/O heart valve replacement with mechanical valve [Z95.2]  Not Applicable    Melena [K92.1]  Unknown    Chronic respiratory failure with hypoxia [J96.11]  Yes    Chronic diastolic heart failure [I50.32]  Yes    Emphysema/COPD [J43.9]  Yes    Hypertension [I10]  Yes    Type 2 diabetes mellitus without complication, with long-term current use of insulin [E11.9, Z79.4]  Not Applicable    Depression [F32.A]  Yes    Dyslipidemia [E78.5]  Yes    Obstructive sleep apnea syndrome [G47.33]  Yes      Resolved  Hospital Problems   No resolved problems to display.        Brief Hospital Course to date:  Di Lopez is a 62 y.o. female  with PMH significant for COPD, HTN, HLD, CAD, depression, CHF, VHD s/p mechanical valve replacement on warfarin, DM2, CAL on CPAP, liver mass, obesity.  She has had diarrhea for 2 weeks, then developed BRBPR on 11/9. She was asked to hold her diuretics, Entresto and Trulicity by PCP last last week due to rising creatinine.    GI bleed - BRBPR   Symptomatic acute blood loss anemia  Warfarin use; INR 3-4 recently  Adenovirus   - Hgb was 11 in May, was 7.1 on admission.   - Colonoscopy completed May 2023;  Has scheduled EGD outpatient 11/14/2023 (Dr. Fernandes)  - CT scan with inflammation in the sigmoid colon  - GI PCR with adenovirus   - H/H with slow trend down. Small amount of BRBPR yesterday.   - Transfuse another unit 11/14 for total of 3 units so far  -Hemoglobin has dropped below 7.  We will give 1 unit of packed red blood cell and also asked GI to see the patient for possible colonoscopy.    Leukocytosis  Elevated procal  - procal trending up. Increase in SOA and HA.   - CXR with no consolidation. Afebrile. URI and viral type symptoms  - WBC trending down   - AM labs        MEERA  - Baseline creatinine ~0.8-0.9,  - was 1.9 on admission  - holding home diuretics       HFmrEF  - EF 45-50 3/23.   - Holding home entresto due to MEERA. Continue coreg  - Received intermittent IV diuretics with blood transfusion      Chronic hypoxic resp failure  - 3L at baseline but states home O2 concentrator is broken -- discussed with case management      UTI POA  - urine culture ecoli   - Past UTI was E coli, a nd Klebs, not ESBL .   - s/p 3 doses of rocephin      Bed bugs   - Isolation      Mechanical mitral valve   - Continue heparin gtt. Coumadin restarted but now will hold with trend in H/H   - plans to DC home with lovenox bridge. Follows with Dr. Everett      Diabetes mellitus 2 - A1C 6.8   - Hold  Trulicity, glimepiride  - Hold Lantus for now, add back as appropriate  - FSBG ACHS     Hypertension  Dyslipidemia  - Currently controlled     CAL  - CPAP     Nicotine use  - Vapes  - Nicotine patch  - Cessation counseling     Obesity   - BMI 39. Complicates all aspects of care    Expected Discharge Location and Transportation: home  Expected Discharge   Expected Discharge Date: 11/16/2023; Expected Discharge Time:      DVT prophylaxis:  Medical DVT prophylaxis orders are present.     AM-PAC 6 Clicks Score (PT): 24 (11/14/23 1939)    CODE STATUS:   Code Status and Medical Interventions:   Ordered at: 11/09/23 8184     Code Status (Patient has no pulse and is not breathing):    CPR (Attempt to Resuscitate)     Medical Interventions (Patient has pulse or is breathing):    Full Support       Getachew Haines MD  11/15/23

## 2023-11-16 ENCOUNTER — ANESTHESIA EVENT (OUTPATIENT)
Dept: GASTROENTEROLOGY | Facility: HOSPITAL | Age: 63
End: 2023-11-16
Payer: MEDICARE

## 2023-11-16 ENCOUNTER — ANESTHESIA (OUTPATIENT)
Dept: GASTROENTEROLOGY | Facility: HOSPITAL | Age: 63
End: 2023-11-16
Payer: MEDICARE

## 2023-11-16 LAB
ANION GAP SERPL CALCULATED.3IONS-SCNC: 12 MMOL/L (ref 5–15)
BASOPHILS # BLD AUTO: 0.04 10*3/MM3 (ref 0–0.2)
BASOPHILS NFR BLD AUTO: 0.3 % (ref 0–1.5)
BUN SERPL-MCNC: 17 MG/DL (ref 8–23)
BUN/CREAT SERPL: 13 (ref 7–25)
CALCIUM SPEC-SCNC: 9.3 MG/DL (ref 8.6–10.5)
CHLORIDE SERPL-SCNC: 104 MMOL/L (ref 98–107)
CO2 SERPL-SCNC: 26 MMOL/L (ref 22–29)
CREAT SERPL-MCNC: 1.31 MG/DL (ref 0.57–1)
DEPRECATED RDW RBC AUTO: 48.8 FL (ref 37–54)
EGFRCR SERPLBLD CKD-EPI 2021: 46.5 ML/MIN/1.73
EOSINOPHIL # BLD AUTO: 0.52 10*3/MM3 (ref 0–0.4)
EOSINOPHIL NFR BLD AUTO: 4.3 % (ref 0.3–6.2)
ERYTHROCYTE [DISTWIDTH] IN BLOOD BY AUTOMATED COUNT: 15.2 % (ref 12.3–15.4)
GLUCOSE BLDC GLUCOMTR-MCNC: 135 MG/DL (ref 70–130)
GLUCOSE BLDC GLUCOMTR-MCNC: 158 MG/DL (ref 70–130)
GLUCOSE BLDC GLUCOMTR-MCNC: 206 MG/DL (ref 70–130)
GLUCOSE BLDC GLUCOMTR-MCNC: 218 MG/DL (ref 70–130)
GLUCOSE SERPL-MCNC: 148 MG/DL (ref 65–99)
HCT VFR BLD AUTO: 26.9 % (ref 34–46.6)
HGB BLD-MCNC: 8.5 G/DL (ref 12–15.9)
IMM GRANULOCYTES # BLD AUTO: 0.15 10*3/MM3 (ref 0–0.05)
IMM GRANULOCYTES NFR BLD AUTO: 1.2 % (ref 0–0.5)
INR PPP: 1.29 (ref 0.89–1.12)
LYMPHOCYTES # BLD AUTO: 2.27 10*3/MM3 (ref 0.7–3.1)
LYMPHOCYTES NFR BLD AUTO: 18.8 % (ref 19.6–45.3)
MCH RBC QN AUTO: 28.1 PG (ref 26.6–33)
MCHC RBC AUTO-ENTMCNC: 31.6 G/DL (ref 31.5–35.7)
MCV RBC AUTO: 89.1 FL (ref 79–97)
MONOCYTES # BLD AUTO: 0.84 10*3/MM3 (ref 0.1–0.9)
MONOCYTES NFR BLD AUTO: 7 % (ref 5–12)
NEUTROPHILS NFR BLD AUTO: 68.4 % (ref 42.7–76)
NEUTROPHILS NFR BLD AUTO: 8.25 10*3/MM3 (ref 1.7–7)
NRBC BLD AUTO-RTO: 0 /100 WBC (ref 0–0.2)
PLATELET # BLD AUTO: 345 10*3/MM3 (ref 140–450)
PMV BLD AUTO: 10.1 FL (ref 6–12)
POTASSIUM SERPL-SCNC: 3.4 MMOL/L (ref 3.5–5.2)
PROTHROMBIN TIME: 16.2 SECONDS (ref 12.2–14.5)
RBC # BLD AUTO: 3.02 10*6/MM3 (ref 3.77–5.28)
SODIUM SERPL-SCNC: 142 MMOL/L (ref 136–145)
UFH PPP CHRO-ACNC: 0.2 IU/ML (ref 0.3–0.7)
UFH PPP CHRO-ACNC: 0.23 IU/ML (ref 0.3–0.7)
WBC NRBC COR # BLD: 12.07 10*3/MM3 (ref 3.4–10.8)

## 2023-11-16 PROCEDURE — 99232 SBSQ HOSP IP/OBS MODERATE 35: CPT | Performed by: INTERNAL MEDICINE

## 2023-11-16 PROCEDURE — 85520 HEPARIN ASSAY: CPT

## 2023-11-16 PROCEDURE — 85610 PROTHROMBIN TIME: CPT

## 2023-11-16 PROCEDURE — 43235 EGD DIAGNOSTIC BRUSH WASH: CPT | Performed by: INTERNAL MEDICINE

## 2023-11-16 PROCEDURE — 94664 DEMO&/EVAL PT USE INHALER: CPT

## 2023-11-16 PROCEDURE — 63710000001 INSULIN LISPRO (HUMAN) PER 5 UNITS: Performed by: INTERNAL MEDICINE

## 2023-11-16 PROCEDURE — 94799 UNLISTED PULMONARY SVC/PX: CPT

## 2023-11-16 PROCEDURE — 82948 REAGENT STRIP/BLOOD GLUCOSE: CPT

## 2023-11-16 PROCEDURE — 80048 BASIC METABOLIC PNL TOTAL CA: CPT | Performed by: INTERNAL MEDICINE

## 2023-11-16 PROCEDURE — 45378 DIAGNOSTIC COLONOSCOPY: CPT | Performed by: INTERNAL MEDICINE

## 2023-11-16 PROCEDURE — 85025 COMPLETE CBC W/AUTO DIFF WBC: CPT | Performed by: NURSE PRACTITIONER

## 2023-11-16 PROCEDURE — 25010000002 HEPARIN (PORCINE) 25000-0.45 UT/250ML-% SOLUTION: Performed by: INTERNAL MEDICINE

## 2023-11-16 PROCEDURE — 25810000003 LACTATED RINGERS PER 1000 ML

## 2023-11-16 PROCEDURE — 25010000002 HEPARIN (PORCINE) PER 1000 UNITS

## 2023-11-16 PROCEDURE — 25010000002 PROPOFOL 10 MG/ML EMULSION

## 2023-11-16 RX ORDER — HEPARIN SODIUM 1000 [USP'U]/ML
4000 INJECTION, SOLUTION INTRAVENOUS; SUBCUTANEOUS ONCE
Status: COMPLETED | OUTPATIENT
Start: 2023-11-16 | End: 2023-11-16

## 2023-11-16 RX ORDER — SODIUM CHLORIDE 0.9 % (FLUSH) 0.9 %
10 SYRINGE (ML) INJECTION AS NEEDED
Status: DISCONTINUED | OUTPATIENT
Start: 2023-11-16 | End: 2023-11-22 | Stop reason: HOSPADM

## 2023-11-16 RX ORDER — SODIUM CHLORIDE 9 MG/ML
40 INJECTION, SOLUTION INTRAVENOUS AS NEEDED
Status: DISCONTINUED | OUTPATIENT
Start: 2023-11-16 | End: 2023-11-22 | Stop reason: HOSPADM

## 2023-11-16 RX ORDER — FAMOTIDINE 20 MG/1
20 TABLET, FILM COATED ORAL ONCE
Status: DISCONTINUED | OUTPATIENT
Start: 2023-11-16 | End: 2023-11-17

## 2023-11-16 RX ORDER — MIDAZOLAM HYDROCHLORIDE 1 MG/ML
1 INJECTION INTRAMUSCULAR; INTRAVENOUS
Status: DISCONTINUED | OUTPATIENT
Start: 2023-11-16 | End: 2023-11-22 | Stop reason: HOSPADM

## 2023-11-16 RX ORDER — LIDOCAINE HYDROCHLORIDE 10 MG/ML
0.5 INJECTION, SOLUTION EPIDURAL; INFILTRATION; INTRACAUDAL; PERINEURAL ONCE AS NEEDED
Status: DISCONTINUED | OUTPATIENT
Start: 2023-11-16 | End: 2023-11-22 | Stop reason: HOSPADM

## 2023-11-16 RX ORDER — LIDOCAINE HYDROCHLORIDE 10 MG/ML
INJECTION, SOLUTION EPIDURAL; INFILTRATION; INTRACAUDAL; PERINEURAL AS NEEDED
Status: DISCONTINUED | OUTPATIENT
Start: 2023-11-16 | End: 2023-11-16 | Stop reason: SURG

## 2023-11-16 RX ORDER — SODIUM CHLORIDE, SODIUM LACTATE, POTASSIUM CHLORIDE, CALCIUM CHLORIDE 600; 310; 30; 20 MG/100ML; MG/100ML; MG/100ML; MG/100ML
INJECTION, SOLUTION INTRAVENOUS CONTINUOUS PRN
Status: DISCONTINUED | OUTPATIENT
Start: 2023-11-16 | End: 2023-11-16 | Stop reason: SURG

## 2023-11-16 RX ORDER — SODIUM CHLORIDE, SODIUM LACTATE, POTASSIUM CHLORIDE, CALCIUM CHLORIDE 600; 310; 30; 20 MG/100ML; MG/100ML; MG/100ML; MG/100ML
9 INJECTION, SOLUTION INTRAVENOUS CONTINUOUS
Status: DISCONTINUED | OUTPATIENT
Start: 2023-11-16 | End: 2023-11-22 | Stop reason: HOSPADM

## 2023-11-16 RX ORDER — PROPOFOL 10 MG/ML
VIAL (ML) INTRAVENOUS CONTINUOUS PRN
Status: DISCONTINUED | OUTPATIENT
Start: 2023-11-16 | End: 2023-11-16 | Stop reason: SURG

## 2023-11-16 RX ORDER — FAMOTIDINE 10 MG/ML
20 INJECTION, SOLUTION INTRAVENOUS ONCE
Status: DISCONTINUED | OUTPATIENT
Start: 2023-11-16 | End: 2023-11-17

## 2023-11-16 RX ORDER — SODIUM CHLORIDE 0.9 % (FLUSH) 0.9 %
10 SYRINGE (ML) INJECTION EVERY 12 HOURS SCHEDULED
Status: DISCONTINUED | OUTPATIENT
Start: 2023-11-16 | End: 2023-11-22 | Stop reason: HOSPADM

## 2023-11-16 RX ADMIN — HYDROCODONE BITARTRATE AND ACETAMINOPHEN 1 TABLET: 5; 325 TABLET ORAL at 21:01

## 2023-11-16 RX ADMIN — NYSTATIN 500000 UNITS: 100000 SUSPENSION ORAL at 21:02

## 2023-11-16 RX ADMIN — Medication 10 ML: at 21:03

## 2023-11-16 RX ADMIN — PANTOPRAZOLE SODIUM 40 MG: 40 INJECTION, POWDER, LYOPHILIZED, FOR SOLUTION INTRAVENOUS at 09:07

## 2023-11-16 RX ADMIN — HEPARIN SODIUM 4000 UNITS: 1000 INJECTION INTRAVENOUS; SUBCUTANEOUS at 15:41

## 2023-11-16 RX ADMIN — CARVEDILOL 12.5 MG: 12.5 TABLET, FILM COATED ORAL at 09:06

## 2023-11-16 RX ADMIN — Medication 5 MG: at 21:05

## 2023-11-16 RX ADMIN — BUDESONIDE AND FORMOTEROL FUMARATE DIHYDRATE 2 PUFF: 160; 4.5 AEROSOL RESPIRATORY (INHALATION) at 20:49

## 2023-11-16 RX ADMIN — PROPOFOL 100 MCG/KG/MIN: 10 INJECTION, EMULSION INTRAVENOUS at 14:28

## 2023-11-16 RX ADMIN — INSULIN LISPRO 3 UNITS: 100 INJECTION, SOLUTION INTRAVENOUS; SUBCUTANEOUS at 17:18

## 2023-11-16 RX ADMIN — INSULIN LISPRO 3 UNITS: 100 INJECTION, SOLUTION INTRAVENOUS; SUBCUTANEOUS at 21:02

## 2023-11-16 RX ADMIN — LIDOCAINE HYDROCHLORIDE 100 MG: 10 INJECTION, SOLUTION EPIDURAL; INFILTRATION; INTRACAUDAL; PERINEURAL at 14:28

## 2023-11-16 RX ADMIN — IPRATROPIUM BROMIDE AND ALBUTEROL SULFATE 3 ML: 2.5; .5 SOLUTION RESPIRATORY (INHALATION) at 20:49

## 2023-11-16 RX ADMIN — HEPARIN SODIUM 22 UNITS/KG/HR: 10000 INJECTION, SOLUTION INTRAVENOUS at 15:41

## 2023-11-16 RX ADMIN — BUDESONIDE AND FORMOTEROL FUMARATE DIHYDRATE 2 PUFF: 160; 4.5 AEROSOL RESPIRATORY (INHALATION) at 08:46

## 2023-11-16 RX ADMIN — CARVEDILOL 12.5 MG: 12.5 TABLET, FILM COATED ORAL at 17:18

## 2023-11-16 RX ADMIN — IPRATROPIUM BROMIDE AND ALBUTEROL SULFATE 3 ML: 2.5; .5 SOLUTION RESPIRATORY (INHALATION) at 08:46

## 2023-11-16 RX ADMIN — INSULIN LISPRO 4 UNITS: 100 INJECTION, SOLUTION INTRAVENOUS; SUBCUTANEOUS at 17:19

## 2023-11-16 RX ADMIN — PEG-3350, SODIUM SULFATE, SODIUM CHLORIDE, POTASSIUM CHLORIDE, SODIUM ASCORBATE AND ASCORBIC ACID 1000 ML: KIT at 04:02

## 2023-11-16 RX ADMIN — PANTOPRAZOLE SODIUM 40 MG: 40 INJECTION, POWDER, LYOPHILIZED, FOR SOLUTION INTRAVENOUS at 17:18

## 2023-11-16 RX ADMIN — SODIUM CHLORIDE, POTASSIUM CHLORIDE, SODIUM LACTATE AND CALCIUM CHLORIDE: 600; 310; 30; 20 INJECTION, SOLUTION INTRAVENOUS at 14:26

## 2023-11-16 RX ADMIN — NYSTATIN 500000 UNITS: 100000 SUSPENSION ORAL at 17:18

## 2023-11-16 RX ADMIN — Medication 10 ML: at 17:19

## 2023-11-16 NOTE — PROGRESS NOTES
"Pharmacy Consult  -  Warfarin  Di Lopez is a  62 y.o. female   Height - 152.4 cm (60\")  Weight - 93.9 kg (207 lb)    Consulting Provider: Dr. Sherman  Indication: Mechanical MVR  Goal INR: 2.5 - 3.5   Home Regimen: 4 mg daily, except 4.5 mg on Sat/Sun    Bridge Therapy: Yes   and unfractionated heparin    Drug-Drug Interactions with current regimen:   Metronidazole - increase INR              Heparin - increase bleed risk    Warfarin Dosing During Admission:  Date  11/10 11/11 11/12 11/13 11/14 11/15 11/16 11/17 11/18   INR  4.13 1.09 1.16 1.22 1.36 1.36 1.29     Dose  IV Vit K given 6 mg 3 mg 3 mg  HOLD HOLD HOLD       Education Provided: 11/11/23 - provided education pamphlet and answered questions.      Discharge Follow up:   Following Provider - Dr. Everett   Follow up time range or appointment - 2-3 days following discharge     Labs:  Results from last 7 days   Lab Units 11/16/23  0730 11/15/23  1605 11/15/23  0745 11/15/23  0003 11/14/23  0959 11/14/23  0813 11/13/23  0351 11/12/23  0148 11/11/23  0810 11/11/23  0447 11/10/23  1902 11/10/23  0437 11/10/23  0154   INR  1.29*  --  1.36*  --   --  1.36* 1.22* 1.16*  --  1.09 1.21*   < > 2.88*   APTT seconds  --   --   --   --   --   --   --   --   --   --   --   --  78.8   HEMOGLOBIN g/dL 8.5* 8.7* 6.9* 7.4* 7.1* 7.2* 7.6* 7.2*   < >  --  7.6*   < > 7.1*   HEMATOCRIT % 26.9* 26.5* 22.0* 23.4* 22.6* 22.4* 23.3* 21.8*   < >  --  23.9*   < > 21.9*    < > = values in this interval not displayed.     Results from last 7 days   Lab Units 11/16/23  0730 11/15/23  0745 11/14/23  0813 11/10/23  0154 11/09/23  1955   SODIUM mmol/L 142 137 140   < > 142   POTASSIUM mmol/L 3.4* 3.3* 3.7   < > 4.1   CHLORIDE mmol/L 104 101 104   < > 107   CO2 mmol/L 26.0 26.0 25.0   < > 24.0   BUN mg/dL 17 22 23   < > 21   CREATININE mg/dL 1.31* 1.41* 1.62*   < > 1.88*   CALCIUM mg/dL 9.3 8.8 8.5*   < > 8.3*   BILIRUBIN mg/dL  --   --   --   --  0.3   ALK PHOS U/L  --   --   --   --  " 167*   ALT (SGPT) U/L  --   --   --   --  48*   AST (SGOT) U/L  --   --   --   --  32   GLUCOSE mg/dL 148* 121* 148*   < > 280*    < > = values in this interval not displayed.     Current dietary intake: NPO for colonoscopy 11/16    Assessment/Plan:   Pharmacy to dose warfarin for Mechanical MVR.  Goal INR: 2.5-3.5  Patient's INR is SUBtherapeutic today at 1.29.    Patient admitted with supratherapeutic INR of 4.13 and GIB; given IV Vit K 5mg on 11/10.   She is having a colonoscopy today, will continue to HOLD warfarin today. Holding heparin gtt just prior to colonoscopy.  Monitor for signs/symptoms of bleeding, dietary intake, and drug-drug interactions.   Pharmacy will continue to follow and make dose adjustments as necessary.    Thank you,    Nadir Corrales, PharmD  11/16/2023  13:19 EST

## 2023-11-16 NOTE — PLAN OF CARE
Goal Outcome Evaluation:   Pt up in bed, O2 at 2L/NC, Paced on tele, heparin drip infusing, cont. POC.

## 2023-11-16 NOTE — PROGRESS NOTES
HEPARIN INFUSION  Di Lopez is a  62 y.o. female receiving heparin infusion.     Therapy for [facility]Card[facility]  Patient Weight: 90.9 kg  Initial Bolus (Y/N): No  Any Bolus (Y/N): Yes    Signs or Symptoms of Bleedin episodes of BRBPR on admission, ongoing anemia, no bleeding noted now per RN     Cardiac or Other (Not VTE)  Initial rate: 12 units/kg/hr (Max 1,000 units/hr)   Anti Xa Rebolus Infusion Hold time Change infusion Dose (Units/kg/hr) Next Anti Xa or aPTT Level Due   < 0.11 50 Units/kg  (4000 Units Max) None Increase by  3 Units/kg/hr 6 hours   0.11- 0.19 25 Units/kg  (2000 Units Max) None Increase by  2 Units/kg/hr 6 hours   0.2 - 0.29 0 None Increase by  1 Units/kg/hr 6 hours   0.3 - 0.5 0 None No Change 6 hours (after 2 consecutive levels in range check qAM)   0.51 - 0.6 0 None Decrease by  1 Units/kg/hr 6 hours   0.61 - 0.8 0 30 Minutes Decrease by  2 Units/kg/hr 6 hours   0.81 - 1 0 60 Minutes Decrease by  3 Units/kg/hr 6 hours   >1 0 Hold  After Anti Xa less than 0.5 decrease previous rate by  4 Units/kg/hr  Every 2 hours until Anti Xa  less than 0.5 then when infusion restarts in 6 hours   Recommend Xa every 6 hours.     Results from last 7 days   Lab Units 23  0730 11/15/23  1605 11/15/23  0745 23  0959 23  0813   INR  1.29*  --  1.36*  --  1.36*   HEMOGLOBIN g/dL 8.5* 8.7* 6.9*   < > 7.2*   HEMATOCRIT % 26.9* 26.5* 22.0*   < > 22.4*   PLATELETS 10*3/mm3 345  --  285  --  261    < > = values in this interval not displayed.       Date   Time   Anti-Xa Current Rate (Unit/kg/hr) Bolus   (Units) Rate Change   (Unit/kg/hr) New Rate (Unit/kg/hr) Next   Anti-Xa Comments  Pump Check Daily   11/10 0200 0.10    INR 2.88 --- - -- -- 0600 D/w APRN, hold until INR >2.5. Currently 2.88, ordered repeat INR @ 0600   11/10 0437 --    INR 2.84 -- -- -- -- 1800 Continue to hold heparin gtt until until <2.5; repeat INR at 1800.   11/10 1902 --    INR 1.21 -- -- +400  DW JARAD Santos. INR returned at 1.21 . Starting heparin gtt to cover mechanical valves     11/11 0600 0.1 11 4000 +3 14 1200 D/w RN, no reported bleeding overnight   11/11 1145 0.25 14 -- +1 15 1900 D/W RN- warfarin resumed   11/11 1844 0.10 15 4000 +3 18 0200  RN   11/12 0300 0.46 18 -- -- 18 0800 D/w RN   11/12 0916 0.42 18 -- -- 18 0600 D/W RN   11/13 0630 0.24 18 -- +1 19 1200 D/w RN   11/13 1034 0.34 19 -- -- 19 1800 Spoke to RN   11/13 1718 0.26 19 -- +1 20 0200  RN    11/14 0230 0.33 20 -- -- 20 0800 D/w RN   11/14 0813 0.33 20 -- -- 20 0600 D/w JARAD Johnson   Checked pump   11/15 0745 0.25 20 -- +1 21 1500  RN    11/15 1535 0.34 21 -- -- 21 2200  RN    11/15 2300 0.33 21 -- -- 21 0600 D/w RN   11/16 0730 0.23 21 -- +1 22 1600 Spoke to RN                                               Thank you,     Nadir Corrales, PharmD  11/16/23  09:08 EST

## 2023-11-16 NOTE — BRIEF OP NOTE
COLONOSCOPY/EGD  Progress Note    Di WADDELL Hockaday 11/16/2023      Colonoscopy reveals left-sided diverticulosis, the presumed source of recent GI blood loss.  Terminal ileum is normal.    EGD reveals Candida esophagitis.  Stomach and duodenum are normal.    >> Nystatin swish and swallow for 1 week.  >> Resume anticoagulation.    Mark I. Brunner, MD     Date: 11/16/2023  Time: 14:53 EST

## 2023-11-16 NOTE — ANESTHESIA POSTPROCEDURE EVALUATION
Patient: Di Lopez    Procedure Summary       Date: 11/16/23 Room / Location:  [facility] /  [facility]    Anesthesia Start: 1420 Anesthesia Stop:     Procedures:       COLONOSCOPY      ESOPHAGOGASTRODUODENOSCOPY Diagnosis:       Gastrointestinal hemorrhage, unspecified gastrointestinal hemorrhage type      Acute blood loss anemia      Gastrointestinal hemorrhage associated with anorectal source      Melena      (Gastrointestinal hemorrhage, unspecified gastrointestinal hemorrhage type [K92.2])      (Acute blood loss anemia [D62])      (Gastrointestinal hemorrhage associated with anorectal source [K62.5])      (Melena [K92.1])    Surgeons: Brunner, Mark I, MD Provider: Jose Maria Harvey MD    Anesthesia Type: general ASA Status: 3            Anesthesia Type: general    Vitals  Vitals Value Taken Time   /91 11/16/23 1450   Temp     Pulse 61 11/16/23 1456   Resp     SpO2 91 % 11/16/23 1456   Vitals shown include unfiled device data.        Post Anesthesia Care and Evaluation    Patient location during evaluation: PACU  Patient participation: complete - patient participated  Level of consciousness: awake and alert  Pain management: adequate    Airway patency: patent  Anesthetic complications: No anesthetic complications  PONV Status: none  Cardiovascular status: hemodynamically stable and acceptable  Respiratory status: nonlabored ventilation, acceptable and nasal cannula  Hydration status: acceptable

## 2023-11-16 NOTE — PROGRESS NOTES
[facility] Medicine Services  PROGRESS NOTE    Patient Name: Di Lopez  : 1960  MRN: [id number]    Date of Admission: 2023  Primary Care Physician: Davina Santiago DO    Subjective   Subjective     CC:  BRBPR, HA     HPI:  I saw the patient earlier this morning before she had colonoscopy and EGD.  Resting in bed in no acute distress and overall comfortable.  No fever or chills.  No chest pain or palpitation or shortness of breath at rest.  No nausea vomiting or diarrhea.      Objective   Objective     Vital Signs:   Temp:  [98 °F (36.7 °C)-98.9 °F (37.2 °C)] 98.9 °F (37.2 °C)  Heart Rate:  [59-64] 60  Resp:  [16-18] 18  BP: (134-189)/(57-91) 141/65  Flow (L/min):  [1-3] 3     Physical Exam:  Constitutional: No acute distress  HENT: NCAT, mucous membranes moist  Respiratory: Clear to auscultation bilaterally, breathing with no labor.  Cardiovascular: RRR, no murmurs, rubs, or gallops  Gastrointestinal: Positive bowel sounds, soft, nontender, nondistended  Musculoskeletal: trace bilateral ankle edema  Psychiatric: Appropriate affect, cooperative  Neurologic: Awake, alert, oriented x3, no focality appreciated, speech clear  Skin: No rashes      Results Reviewed:  LAB RESULTS:      Lab 23  0730 11/15/23  2201 11/15/23  1605 11/15/23  1535 11/15/23  0745 11/15/23  0003 23  0959 23  0813 23  1148 23  0351 23  0916 23  0148 11/10/23  0437 11/10/23  0154 11/10/23  0027 23  2336 23  1955   WBC 12.07*  --   --   --  11.42*  --   --  13.05*  --  15.09*  --  12.80*  --  12.70*  --   --  12.77*   HEMOGLOBIN 8.5*  --  8.7*  --  6.9* 7.4* 7.1* 7.2*  --  7.6*  --  7.2*   < > 7.1*  --   --  7.2*   HEMATOCRIT 26.9*  --  26.5*  --  22.0* 23.4* 22.6* 22.4*  --  23.3*  --  21.8*   < > 21.9*  --   --  22.9*   PLATELETS 345  --   --   --  285  --   --  261  --  277  --  274  --  310  --   --  289   NEUTROS ABS 8.25*  --   --   --   --    --   --   --   --  11.06*  --   --   --  9.12*  --   --  9.37*   IMMATURE GRANS (ABS) 0.15*  --   --   --   --   --   --   --   --  0.17*  --   --   --  0.14*  --   --  0.18*   LYMPHS ABS 2.27  --   --   --   --   --   --   --   --  2.34  --   --   --  2.72  --   --  2.32   MONOS ABS 0.84  --   --   --   --   --   --   --   --  1.14*  --   --   --  0.59  --   --  0.70   EOS ABS 0.52*  --   --   --   --   --   --   --   --  0.30  --   --   --  0.10  --   --  0.16   MCV 89.1  --   --   --  88.4  --   --  86.8  --  86.6  --  85.2  --  88.0  --   --  89.8   PROCALCITONIN  --   --   --   --  0.28*  --   --   --   --  0.32*  --   --   --   --   --  0.07  --    LACTATE  --   --   --   --   --   --   --   --   --   --   --   --   --   --  1.5  --   --    PROTIME 16.2*  --   --   --  16.9*  --   --  16.9*  --  15.6*  --  14.9*   < > 30.4*  --   --  33.9*   APTT  --   --   --   --   --   --   --   --   --   --   --   --   --  78.8  --   --   --    HEPARIN ANTI-XA 0.23* 0.33  --  0.34 0.25*  --   --  0.33   < > 0.24*   < > 0.46   < > 0.10*  --   --   --     < > = values in this interval not displayed.         Lab 11/16/23  0730 11/15/23  0745 11/14/23  0813 11/13/23  0351 11/12/23  0148 11/11/23  0447 11/10/23  0154 11/09/23 1955   SODIUM 142 137 140 137 142   < > 144 142   POTASSIUM 3.4* 3.3* 3.7 3.5 3.5   < > 3.7 4.1   CHLORIDE 104 101 104 101 102   < > 109* 107   CO2 26.0 26.0 25.0 27.0 29.0   < > 25.0 24.0   ANION GAP 12.0 10.0 11.0 9.0 11.0   < > 10.0 11.0   BUN 17 22 23 26* 22   < > 22 21   CREATININE 1.31* 1.41* 1.62* 1.70* 1.59*   < > 1.79* 1.88*   EGFR 46.5* 42.5* 36.0* 34.0* 36.8*   < > 31.9* 30.1*   GLUCOSE 148* 121* 148* 165* 123*   < > 197* 280*   CALCIUM 9.3 8.8 8.5* 8.7 8.8   < > 8.2* 8.3*   MAGNESIUM  --   --   --   --   --   --  2.0 2.0    < > = values in this interval not displayed.         Lab 11/09/23 1955   TOTAL PROTEIN 6.1   ALBUMIN 3.5   GLOBULIN 2.6   ALT (SGPT) 48*   AST (SGOT) 32   BILIRUBIN 0.3    ALK PHOS 167*   LIPASE 25         Lab 11/16/23  0730 11/15/23  0745 11/14/23  0813 11/13/23  0351 11/12/23  0148 11/11/23  0447 11/10/23  0154 11/09/23  2336 11/09/23  1955   PROBNP  --   --   --   --   --  833.7  --   --  830.8   HSTROP T  --   --   --   --   --   --   --  10 11   PROTIME 16.2* 16.9* 16.9* 15.6* 14.9* 14.2   < >  --  33.9*   INR 1.29* 1.36* 1.36* 1.22* 1.16* 1.09   < >  --  3.31*    < > = values in this interval not displayed.             Lab 11/14/23  1736 11/09/23  2142   ABO TYPING O O   RH TYPING Positive Positive   ANTIBODY SCREEN Negative Negative         Brief Urine Lab Results  (Last result in the past 365 days)        Color   Clarity   Blood   Leuk Est   Nitrite   Protein   CREAT   Urine HCG        11/09/23 2320 Yellow   Clear   Negative   Small (1+)   Positive   100 mg/dL (2+)                   Microbiology Results Abnormal       Procedure Component Value - Date/Time    Blood Culture - Blood, Arm, Right [[id number]]  (Normal) Collected: 11/10/23 0027    Lab Status: Final result Specimen: Blood from Arm, Right Updated: 11/15/23 0100     Blood Culture No growth at 5 days    Blood Culture - Blood, Arm, Right [[id number]]  (Normal) Collected: 11/10/23 0027    Lab Status: Final result Specimen: Blood from Arm, Right Updated: 11/15/23 0100     Blood Culture No growth at 5 days            No radiology results from the last 24 hrs    Results for orders placed during the hospital encounter of 05/31/23    Adult Transthoracic Echo Complete W/ Cont if Necessary Per Protocol    Interpretation Summary    Mildly reduced right ventricular systolic function noted.    There is calcification of the aortic valve.    Estimated right ventricular systolic pressure from tricuspid regurgitation is normal (<35 mmHg).    Mild global LV systolic dysfunction    EF=45-50%    Normal prosthetic MV function    AV calcification      Current medications:  Scheduled Meds:budesonide-formoterol, 2 puff, Inhalation, BID -  RT  carvedilol, 12.5 mg, Oral, BID With Meals  docusate sodium, 100 mg, Oral, BID  escitalopram, 20 mg, Oral, Daily  famotidine, 20 mg, Intravenous, Once  famotidine, 20 mg, Oral, Once  ferrous sulfate, 325 mg, Oral, Daily With Breakfast  folic acid, 800 mcg, Oral, Daily  hydrocortisone, 25 mg, Rectal, BID  insulin lispro, 2-7 Units, Subcutaneous, 4x Daily AC & at Bedtime  Insulin Lispro, 4 Units, Subcutaneous, TID With Meals  ipratropium-albuterol, 3 mL, Nebulization, 4x Daily - RT  nicotine, 1 patch, Transdermal, Q24H  nystatin, 5 mL, Swish & Swallow, 4x Daily  oxybutynin XL, 5 mg, Oral, Daily  pantoprazole, 40 mg, Intravenous, BID AC  sodium chloride, 10 mL, Intravenous, Q12H  sodium chloride, 10 mL, Intravenous, Q12H  warfarin (COUMADIN) (dosing per levels), , Does not apply, Daily      Continuous Infusions:heparin, 22 Units/kg/hr, Last Rate: 22 Units/kg/hr (11/16/23 1541)  lactated ringers, 9 mL/hr  Pharmacy to Dose Heparin,   Pharmacy to dose warfarin,       PRN Meds:.  acetaminophen    aspirin-acetaminophen-caffeine    [DISCONTINUED] senna-docusate sodium **AND** polyethylene glycol **AND** bisacodyl **AND** bisacodyl    dextrose    dextrose    glucagon (human recombinant)    HYDROcodone-acetaminophen    lidocaine PF 1%    melatonin    midazolam    nicotine polacrilex    Pharmacy to Dose Heparin    Pharmacy to dose warfarin    sodium chloride    sodium chloride    sodium chloride    sodium chloride    Assessment & Plan   Assessment & Plan     Active Hospital Problems    Diagnosis  POA    **GIB (gastrointestinal bleeding) [K92.2]  Yes    GI bleed [K92.2]  Yes    Acute blood loss anemia [D62]  Yes    MEERA (acute kidney injury) [N17.9]  Yes    H/O heart valve replacement with mechanical valve [Z95.2]  Not Applicable    Melena [K92.1]  Unknown    Chronic respiratory failure with hypoxia [J96.11]  Yes    Chronic diastolic heart failure [I50.32]  Yes    Emphysema/COPD [J43.9]  Yes    Hypertension [I10]  Yes    Type  2 diabetes mellitus without complication, with long-term current use of insulin [E11.9, Z79.4]  Not Applicable    Depression [F32.A]  Yes    Dyslipidemia [E78.5]  Yes    Obstructive sleep apnea syndrome [G47.33]  Yes      Resolved Hospital Problems   No resolved problems to display.        Brief Hospital Course to date:  Di Lopez is a 62 y.o. female  with PMH significant for COPD, HTN, HLD, CAD, depression, CHF, VHD s/p mechanical valve replacement on warfarin, DM2, CAL on CPAP, liver mass, obesity.  She has had diarrhea for 2 weeks, then developed BRBPR on 11/9. She was asked to hold her diuretics, Entresto and Trulicity by PCP last last week due to rising creatinine.    GI bleed - BRBPR   Symptomatic acute blood loss anemia  Warfarin use; INR 3-4 recently  Adenovirus   - Hgb was 11 in May, was 7.1 on admission.   - Colonoscopy completed May 2023;  Has scheduled EGD outpatient 11/14/2023 (Dr. Fernandes)  - CT scan with inflammation in the sigmoid colon  - GI PCR with adenovirus   - H/H with slow trend down. Small amount of BRBPR yesterday.   - Transfuse another unit 11/14 for total of 3 units so far  -On 11/15 hemoglobin  dropped below 7.  Patient received 1 unit of packed red blood cell and GI also saw the patient.  -Patient had upper and lower endoscopy done on 11/16 by GI.  Significant findings on this study was Candida esophagitis and also diverticulosis.  GI bleeding was presumed to be diverticular bleeding.  - procal trending up. Increase in SOA and HA.   - CXR with no consolidation. Afebrile. URI and viral type symptoms  - WBC trending down   - AM labs        MEERA  - Baseline creatinine ~0.8-0.9,  - was 1.9 on admission  - holding home diuretics       HFmrEF  - EF 45-50 3/23.   - Holding home entresto due to MEERA. Continue coreg  - Received intermittent IV diuretics with blood transfusion      Chronic hypoxic resp failure  - 3L at baseline but states home O2 concentrator is broken -- discussed with case  management      UTI POA  - urine culture ecoli   - Past UTI was E coli, a nd Klebs, not ESBL .   - s/p 3 doses of rocephin      Bed bugs   - Isolation      Mechanical mitral valve   - Continue heparin gtt. Coumadin restarted but now will hold with trend in H/H   - plans to DC home with lovenox bridge. Follows with Dr. Everett      Diabetes mellitus 2 - A1C 6.8   - Hold Trulicity, glimepiride  - Hold Lantus for now, add back as appropriate  - FSBG ACHS     Hypertension  Dyslipidemia  - Currently controlled     CAL  - CPAP     Nicotine use  - Vapes  - Nicotine patch  - Cessation counseling     Obesity   - BMI 39. Complicates all aspects of care    Expected Discharge Location and Transportation: home  Expected Discharge   Expected Discharge Date: 11/16/2023; Expected Discharge Time:      DVT prophylaxis:  Medical DVT prophylaxis orders are present.     AM-PAC 6 Clicks Score (PT): 24 (11/16/23 1300)    CODE STATUS:   Code Status and Medical Interventions:   Ordered at: 11/09/23 8800     Code Status (Patient has no pulse and is not breathing):    CPR (Attempt to Resuscitate)     Medical Interventions (Patient has pulse or is breathing):    Full Support       Getachew Haines MD  11/16/23

## 2023-11-16 NOTE — CASE MANAGEMENT/SOCIAL WORK
Continued Stay Note  [facility]     Patient Name: Di Lopez  MRN: [id number]  Today's Date: 11/16/2023    Admit Date: 11/9/2023    Plan: discharge plan   Discharge Plan       Row Name 11/16/23 1549       Plan    Plan discharge plan    Plan Comments Per discussion in MDR, pt having a colonoscopy and EGD today. I tried to reach out to [facility] regarding issue with home O2 concentrator and had to leave a voice message at [phone]. CM will cont to follow    Final Discharge Disposition Code 01 - home or self-care                   Discharge Codes    No documentation.                 Expected Discharge Date and Time       Expected Discharge Date Expected Discharge Time    Nov 16, 2023               Michaela Lu RN

## 2023-11-16 NOTE — ANESTHESIA PREPROCEDURE EVALUATION
Anesthesia Evaluation     Patient summary reviewed and Nursing notes reviewed                Airway   Mallampati: II  TM distance: >3 FB  Neck ROM: full  No difficulty expected  Dental - normal exam     Pulmonary - normal exam   (+) COPD, asthma,sleep apnea  Cardiovascular     (+) hypertension, valvular problems/murmurs, CAD, dysrhythmias Paroxysmal Atrial Fib, murmur, hyperlipidemia    ROS comment:   Echo 5/23: EF 45-50%, mechanical MV    Neuro/Psych- negative ROS  GI/Hepatic/Renal/Endo    (+) morbid obesity, GERD, GI bleeding , liver disease, renal disease- CRI, diabetes mellitus    Musculoskeletal (-) negative ROS    Abdominal  - normal exam    Bowel sounds: normal.   Substance History - negative use     OB/GYN negative ob/gyn ROS         Other                    Anesthesia Plan    ASA 3     general     (propofol)  intravenous induction     Anesthetic plan, risks, benefits, and alternatives have been provided, discussed and informed consent has been obtained with: patient.    Plan discussed with CRNA.    CODE STATUS:    Code Status (Patient has no pulse and is not breathing): CPR (Attempt to Resuscitate)  Medical Interventions (Patient has pulse or is breathing): Full Support

## 2023-11-16 NOTE — PROGRESS NOTES
Clinical Nutrition   Nutrition Support Assessment  Reason for Visit: NPO/Clear [id number]      Patient Name: Di Lopez  YOB: 1960  MRN: [id number]  Date of Encounter: 11/16/23 09:35 EST  Admission date: 11/9/2023    Comments:  Pt meets criteria for severe malnutrition in the context of acute illness indicated by po intake </=50% x >/=5 days, moderate 2+ edema.     Advance diet as medically feasible s/p CSY. Will order appropriate ONS once diet advanced.     Nutrition Assessment   Admission Diagnosis:  GIB (gastrointestinal bleeding) [K92.2]  GI bleed [K92.2]      Problem List:    GIB (gastrointestinal bleeding)    Depression    Type 2 diabetes mellitus without complication, with long-term current use of insulin    Dyslipidemia    Emphysema/COPD    Hypertension    Obstructive sleep apnea syndrome    Chronic diastolic heart failure    Chronic respiratory failure with hypoxia    Melena    Acute blood loss anemia    MEERA (acute kidney injury)    H/O heart valve replacement with mechanical valve    GI bleed        PMH:   She  has a past medical history of Allergic, Anemia, Anticoagulated, Arteriovenous malformation of gastrointestinal tract (05/12/2016), Arthritis, Asthma, Black hairy tongue, CHF (congestive heart failure), Chronic back pain, Coronary artery disease, Depression (05/12/2016), Diabetes mellitus, Dyslipidemia (05/12/2016), Elevated cholesterol, Emphysema/COPD (05/12/2016), Emphysema/COPD (05/12/2016), Fatigue, GERD (gastroesophageal reflux disease), Headache, Herniated cervical disc, History of echocardiogram (07/16/2013), History of transfusion, Hyperlipidemia, Hypertension, Insomnia (05/12/2016), Iron deficiency, Left bundle branch hemiblock (10/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    PSH:  She  has a past surgical history that includes Colonoscopy (12/28/2012); Esophagogastroduodenoscopy (12/30/2011); Bilateral oophorectomy (2009); Cystectomy; Mitral valve replacement (03/03/2008); Breast excisional biopsy (Bilateral); Breast biopsy (Right); Hysterectomy (1995); Cardiac electrophysiology procedure (N/A, 10/15/2020); Carpal tunnel release (Bilateral); Other surgical history; Cardiac surgery (2003); and Colonoscopy (N/A, 5/23/2023).    Applicable Nutrition Concerns:   Skin:  Oral:  GI:    Applicable Interval History:         Reported/Observed/Food/Nutrition Related History:     Able to reach pt via room phone, pt states her last good meal was [employer] on Thursday and has not eaten since then. Pt is NPO/CLD x 5days. Pt to have EGD/CSY today, hopeful to advance diet after procedure. Pt agreeable to Boost, preferences also obtained. Pt states VIR=[id number] and states she has a lot of fluid on her w/reported wt taken this morning of 208lbs. NKFA.     Anthropometrics     Flowsheet Rows      Flowsheet Row First Filed Value   Admission Height 154.9 cm (61\") Documented at 11/09/2023 1908   Admission Weight 87.1 kg (192 lb) Documented at 11/09/2023 1908          Height: Height: 152.4 cm (60\")  Last Filed Weight: Weight: 93.9 kg (207 lb) (11/16/23 0358)  Method: Weight Method: Standing scale  BMI: BMI (Calculated): 40.4  BMI classification: Obese Class III extreme obesity: > or equal to 40kg/m2  IBW:  46kg    UBW:  192lbs  Weight change:      Weight       Weight (kg) Weight (lbs) Weight Method Visit Report   2/3/2023 89.3 kg  196 lb 13.9 oz  Standing scale     2/9/2023 89.359 kg  197 lb   --    3/1/2023 87.454 kg  192 lb 12.8 oz   --    3/20/2023 89.268 kg  196 lb 12.8 oz   --    3/31/2023 87.907 kg  193 lb 12.8 oz   " --    4/25/2023 87.454 kg  192 lb 12.8 oz   --    4/28/2023 87.181 kg  192 lb 3.2 oz   --    5/16/2023 87.091 kg  192 lb  Stated     5/31/2023 87.1 kg  192 lb 0.3 oz      6/5/2023 87.091 kg  192 lb   --    8/24/2023 87.816 kg  193 lb 9.6 oz   --    10/6/2023 88.361 kg  194 lb 12.8 oz   --     87.998 kg  194 lb   --        --        --    10/18/2023 90.266 kg  199 lb   --    11/2/2023 88.451 kg  195 lb   --    11/9/2023 87.091 kg  192 lb  Stated     11/10/2023 90.946 kg  200 lb 8 oz  Bed scale      93.668 kg  206 lb 8 oz  Bed scale     11/11/2023 91.763 kg  202 lb 4.8 oz      11/14/2023 93.804 kg  206 lb 12.8 oz  Standing scale        Standing scale     11/15/2023 94.666 kg  208 lb 11.2 oz  Bed scale     11/16/2023 93.895 kg  207 lb  Standing scale       Nutrition Focused Physical Exam     Date:     11/16    Unable to perform exam due to:  Isolation precautions*    Current Nutrition Prescription   PO: NPO Diet NPO Type: Strict NPO  Oral Nutrition Supplement:   Intake: Insufficient data % of meals on CLD      Nutrition Diagnosis   Date:  11/16            Updated:    Problem Malnutrition moderate acute   Etiology GIB   Signs/Symptoms Po intake </=50% x 5 days, moderate 2+ edema   Status:     Date:  11/16     Updated:     Problem Inadequate oral intake   Etiology Pending EGD/CSY   Signs/Symptoms NPO   Status:     Goal:   General: Nutrition to support treatment  PO: Advace diet as medically feasible/appropriate  EN/PN: N/A    Nutrition Intervention      Follow treatment progress, Care plan reviewed, Interview for preferences, Encourage intake, Supplement provided    Once advanced to appropriate diet, RD to order Boost+ (chocolate)    Monitoring/Evaluation:   Per protocol, PO intake, Supplement intake, GI status      Teresa Villalba, MS,RD,LD  Time Spent: 30

## 2023-11-16 NOTE — PROGRESS NOTES
Malnutrition Severity Assessment    Patient Name:  Di Lopez  YOB: 1960  MRN: [id number]  Admit Date:  11/9/2023    Patient meets criteria for : Severe Malnutrition    Comments:  Pt meets criteria for severe malnutrition in the context of acute illness indicated by po intake </=50% x >/=5 days, moderate 2+ edema.     Malnutrition Severity Assessment  Malnutrition Type: Acute Disease or Injury - Related Malnutrition  Malnutrition Type (last 8 hours)       Malnutrition Severity Assessment       Row Name 11/16/23 1000       Malnutrition Severity Assessment    Malnutrition Type Acute Disease or Injury - Related Malnutrition      Row Name 11/16/23 1000       Insufficient Energy Intake     Insufficient Energy Intake Findings Severe    Insufficient Energy Intake  < or equal to 50% of est. energy requirement for > or equal to 5d)      Row Name 11/16/23 1000       Fluid Accumulation (Edema)    Fluid Acumulation Findings Moderate    Fluid Accumulation  Moderate equals 2+ pitting edema      Row Name 11/16/23 1000       Criteria Met (Must meet criteria for severity in at least 2 of these categories: M Wasting, Fat Loss, Fluid, Secondary Signs, Wt. Status, Intake)    Patient meets criteria for  Severe Malnutrition                    Electronically signed by:  Teresa Villalba, MS,RD,LD  11/16/23 10:09 EST

## 2023-11-17 PROBLEM — E43 SEVERE MALNUTRITION: Status: ACTIVE | Noted: 2023-11-17

## 2023-11-17 LAB
ANION GAP SERPL CALCULATED.3IONS-SCNC: 10 MMOL/L (ref 5–15)
BH BB BLOOD EXPIRATION DATE: NORMAL
BH BB BLOOD TYPE BARCODE: 5100
BH BB DISPENSE STATUS: NORMAL
BH BB PRODUCT CODE: NORMAL
BH BB UNIT NUMBER: NORMAL
BUN SERPL-MCNC: 18 MG/DL (ref 8–23)
BUN/CREAT SERPL: 15 (ref 7–25)
CALCIUM SPEC-SCNC: 8.9 MG/DL (ref 8.6–10.5)
CHLORIDE SERPL-SCNC: 106 MMOL/L (ref 98–107)
CO2 SERPL-SCNC: 25 MMOL/L (ref 22–29)
CREAT SERPL-MCNC: 1.2 MG/DL (ref 0.57–1)
CROSSMATCH INTERPRETATION: NORMAL
EGFRCR SERPLBLD CKD-EPI 2021: 51.6 ML/MIN/1.73
GLUCOSE BLDC GLUCOMTR-MCNC: 235 MG/DL (ref 70–130)
GLUCOSE BLDC GLUCOMTR-MCNC: 261 MG/DL (ref 70–130)
GLUCOSE BLDC GLUCOMTR-MCNC: 263 MG/DL (ref 70–130)
GLUCOSE BLDC GLUCOMTR-MCNC: 275 MG/DL (ref 70–130)
GLUCOSE SERPL-MCNC: 258 MG/DL (ref 65–99)
INR PPP: 1.25 (ref 0.89–1.12)
POTASSIUM SERPL-SCNC: 3.7 MMOL/L (ref 3.5–5.2)
PROTHROMBIN TIME: 15.9 SECONDS (ref 12.2–14.5)
SODIUM SERPL-SCNC: 141 MMOL/L (ref 136–145)
UFH PPP CHRO-ACNC: 0.1 IU/ML (ref 0.3–0.7)
UFH PPP CHRO-ACNC: 0.45 IU/ML (ref 0.3–0.7)
UNIT  ABO: NORMAL
UNIT  RH: NORMAL

## 2023-11-17 PROCEDURE — 63710000001 INSULIN LISPRO (HUMAN) PER 5 UNITS: Performed by: INTERNAL MEDICINE

## 2023-11-17 PROCEDURE — 25010000002 FUROSEMIDE PER 20 MG: Performed by: INTERNAL MEDICINE

## 2023-11-17 PROCEDURE — 85520 HEPARIN ASSAY: CPT | Performed by: INTERNAL MEDICINE

## 2023-11-17 PROCEDURE — 82948 REAGENT STRIP/BLOOD GLUCOSE: CPT

## 2023-11-17 PROCEDURE — 0DJ08ZZ INSPECTION OF UPPER INTESTINAL TRACT, VIA NATURAL OR ARTIFICIAL OPENING ENDOSCOPIC: ICD-10-PCS | Performed by: INTERNAL MEDICINE

## 2023-11-17 PROCEDURE — 85610 PROTHROMBIN TIME: CPT | Performed by: INTERNAL MEDICINE

## 2023-11-17 PROCEDURE — 80048 BASIC METABOLIC PNL TOTAL CA: CPT | Performed by: INTERNAL MEDICINE

## 2023-11-17 PROCEDURE — 94761 N-INVAS EAR/PLS OXIMETRY MLT: CPT

## 2023-11-17 PROCEDURE — 94799 UNLISTED PULMONARY SVC/PX: CPT

## 2023-11-17 PROCEDURE — 25010000002 HEPARIN (PORCINE) 25000-0.45 UT/250ML-% SOLUTION: Performed by: INTERNAL MEDICINE

## 2023-11-17 PROCEDURE — 0DJD8ZZ INSPECTION OF LOWER INTESTINAL TRACT, VIA NATURAL OR ARTIFICIAL OPENING ENDOSCOPIC: ICD-10-PCS | Performed by: INTERNAL MEDICINE

## 2023-11-17 PROCEDURE — 99232 SBSQ HOSP IP/OBS MODERATE 35: CPT | Performed by: INTERNAL MEDICINE

## 2023-11-17 PROCEDURE — 25010000002 HEPARIN (PORCINE) 25000-0.45 UT/250ML-% SOLUTION

## 2023-11-17 PROCEDURE — 85520 HEPARIN ASSAY: CPT

## 2023-11-17 PROCEDURE — 25010000002 HEPARIN (PORCINE) PER 1000 UNITS: Performed by: INTERNAL MEDICINE

## 2023-11-17 PROCEDURE — 94664 DEMO&/EVAL PT USE INHALER: CPT

## 2023-11-17 RX ORDER — HEPARIN SODIUM 1000 [USP'U]/ML
4000 INJECTION, SOLUTION INTRAVENOUS; SUBCUTANEOUS ONCE
Status: COMPLETED | OUTPATIENT
Start: 2023-11-17 | End: 2023-11-17

## 2023-11-17 RX ORDER — HEPARIN SODIUM 10000 [USP'U]/100ML
25 INJECTION, SOLUTION INTRAVENOUS
Status: DISCONTINUED | OUTPATIENT
Start: 2023-11-17 | End: 2023-11-22

## 2023-11-17 RX ORDER — FUROSEMIDE 10 MG/ML
40 INJECTION INTRAMUSCULAR; INTRAVENOUS DAILY
Status: DISCONTINUED | OUTPATIENT
Start: 2023-11-17 | End: 2023-11-19

## 2023-11-17 RX ORDER — WARFARIN SODIUM 5 MG/1
5 TABLET ORAL
Status: COMPLETED | OUTPATIENT
Start: 2023-11-17 | End: 2023-11-17

## 2023-11-17 RX ORDER — HYDROCODONE BITARTRATE AND ACETAMINOPHEN 5; 325 MG/1; MG/1
1 TABLET ORAL EVERY 6 HOURS PRN
Status: DISPENSED | OUTPATIENT
Start: 2023-11-17 | End: 2023-11-20

## 2023-11-17 RX ADMIN — INSULIN LISPRO 4 UNITS: 100 INJECTION, SOLUTION INTRAVENOUS; SUBCUTANEOUS at 21:13

## 2023-11-17 RX ADMIN — IPRATROPIUM BROMIDE AND ALBUTEROL SULFATE 3 ML: 2.5; .5 SOLUTION RESPIRATORY (INHALATION) at 09:03

## 2023-11-17 RX ADMIN — Medication 10 ML: at 08:59

## 2023-11-17 RX ADMIN — INSULIN LISPRO 4 UNITS: 100 INJECTION, SOLUTION INTRAVENOUS; SUBCUTANEOUS at 11:33

## 2023-11-17 RX ADMIN — PANTOPRAZOLE SODIUM 40 MG: 40 INJECTION, POWDER, LYOPHILIZED, FOR SOLUTION INTRAVENOUS at 08:57

## 2023-11-17 RX ADMIN — CARVEDILOL 12.5 MG: 12.5 TABLET, FILM COATED ORAL at 08:56

## 2023-11-17 RX ADMIN — DOCUSATE SODIUM 100 MG: 100 CAPSULE, LIQUID FILLED ORAL at 21:13

## 2023-11-17 RX ADMIN — PANTOPRAZOLE SODIUM 40 MG: 40 INJECTION, POWDER, LYOPHILIZED, FOR SOLUTION INTRAVENOUS at 17:00

## 2023-11-17 RX ADMIN — NYSTATIN 500000 UNITS: 100000 SUSPENSION ORAL at 17:28

## 2023-11-17 RX ADMIN — WARFARIN SODIUM 5 MG: 5 TABLET ORAL at 17:28

## 2023-11-17 RX ADMIN — IPRATROPIUM BROMIDE AND ALBUTEROL SULFATE 3 ML: 2.5; .5 SOLUTION RESPIRATORY (INHALATION) at 20:12

## 2023-11-17 RX ADMIN — BUDESONIDE AND FORMOTEROL FUMARATE DIHYDRATE 2 PUFF: 160; 4.5 AEROSOL RESPIRATORY (INHALATION) at 20:12

## 2023-11-17 RX ADMIN — OXYBUTYNIN CHLORIDE 5 MG: 5 TABLET, EXTENDED RELEASE ORAL at 08:56

## 2023-11-17 RX ADMIN — HYDROCODONE BITARTRATE AND ACETAMINOPHEN 1 TABLET: 5; 325 TABLET ORAL at 08:56

## 2023-11-17 RX ADMIN — INSULIN LISPRO 3 UNITS: 100 INJECTION, SOLUTION INTRAVENOUS; SUBCUTANEOUS at 08:53

## 2023-11-17 RX ADMIN — IPRATROPIUM BROMIDE AND ALBUTEROL SULFATE 3 ML: 2.5; .5 SOLUTION RESPIRATORY (INHALATION) at 13:02

## 2023-11-17 RX ADMIN — NYSTATIN 500000 UNITS: 100000 SUSPENSION ORAL at 11:34

## 2023-11-17 RX ADMIN — NYSTATIN 500000 UNITS: 100000 SUSPENSION ORAL at 21:13

## 2023-11-17 RX ADMIN — FUROSEMIDE 40 MG: 10 INJECTION, SOLUTION INTRAMUSCULAR; INTRAVENOUS at 16:59

## 2023-11-17 RX ADMIN — ACETAMINOPHEN, ASPIRIN, CAFFEINE 2 TABLET: 250; 65; 250 TABLET, FILM COATED ORAL at 11:38

## 2023-11-17 RX ADMIN — BUDESONIDE AND FORMOTEROL FUMARATE DIHYDRATE 2 PUFF: 160; 4.5 AEROSOL RESPIRATORY (INHALATION) at 09:03

## 2023-11-17 RX ADMIN — ESCITALOPRAM OXALATE 20 MG: 20 TABLET ORAL at 08:56

## 2023-11-17 RX ADMIN — HEPARIN SODIUM 25 UNITS/KG/HR: 10000 INJECTION, SOLUTION INTRAVENOUS at 21:13

## 2023-11-17 RX ADMIN — FERROUS SULFATE TAB 325 MG (65 MG ELEMENTAL FE) 325 MG: 325 (65 FE) TAB at 08:56

## 2023-11-17 RX ADMIN — FOLIC ACID TAB 400 MCG 800 MCG: 400 TAB at 08:56

## 2023-11-17 RX ADMIN — NYSTATIN 500000 UNITS: 100000 SUSPENSION ORAL at 08:55

## 2023-11-17 RX ADMIN — INSULIN LISPRO 4 UNITS: 100 INJECTION, SOLUTION INTRAVENOUS; SUBCUTANEOUS at 17:00

## 2023-11-17 RX ADMIN — HEPARIN SODIUM 4000 UNITS: 1000 INJECTION INTRAVENOUS; SUBCUTANEOUS at 10:01

## 2023-11-17 RX ADMIN — HEPARIN SODIUM 23 UNITS/KG/HR: 10000 INJECTION, SOLUTION INTRAVENOUS at 07:13

## 2023-11-17 RX ADMIN — IPRATROPIUM BROMIDE AND ALBUTEROL SULFATE 3 ML: 2.5; .5 SOLUTION RESPIRATORY (INHALATION) at 16:59

## 2023-11-17 RX ADMIN — HEPARIN SODIUM 25 UNITS/KG/HR: 10000 INJECTION, SOLUTION INTRAVENOUS at 10:00

## 2023-11-17 RX ADMIN — CARVEDILOL 12.5 MG: 12.5 TABLET, FILM COATED ORAL at 17:28

## 2023-11-17 RX ADMIN — INSULIN LISPRO 4 UNITS: 100 INJECTION, SOLUTION INTRAVENOUS; SUBCUTANEOUS at 17:43

## 2023-11-17 NOTE — PROGRESS NOTES
HEPARIN INFUSION  Di Lopez is a  62 y.o. female receiving heparin infusion.     Therapy [facility]  Patient Weight: 90.9 kg  Initial Bolus (Y/N): No  Any Bolus (Y/N): Yes    Signs or Symptoms of Bleedin episodes of BRBPR on admission, ongoing anemia, no bleeding noted now per RN     Cardiac or Other (Not VTE)  Initial rate: 12 units/kg/hr (Max 1,000 units/hr)   Anti Xa Rebolus Infusion Hold time Change infusion Dose (Units/kg/hr) Next Anti Xa or aPTT Level Due   < 0.11 50 Units/kg  (4000 Units Max) None Increase by  3 Units/kg/hr 6 hours   0.11- 0.19 25 Units/kg  (2000 Units Max) None Increase by  2 Units/kg/hr 6 hours   0.2 - 0.29 0 None Increase by  1 Units/kg/hr 6 hours   0.3 - 0.5 0 None No Change 6 hours (after 2 consecutive levels in range check qAM)   0.51 - 0.6 0 None Decrease by  1 Units/kg/hr 6 hours   0.61 - 0.8 0 30 Minutes Decrease by  2 Units/kg/hr 6 hours   0.81 - 1 0 60 Minutes Decrease by  3 Units/kg/hr 6 hours   >1 0 Hold  After Anti Xa less than 0.5 decrease previous rate by  4 Units/kg/hr  Every 2 hours until Anti Xa  less than 0.5 then when infusion restarts in 6 hours   Recommend Xa every 6 hours.     Results from last 7 days   Lab Units 23  0636 23  0730 11/15/23  1605 11/15/23  0745 23  0959 23  0813   INR  1.25* 1.29*  --  1.36*  --  1.36*   HEMOGLOBIN g/dL  --  8.5* 8.7* 6.9*   < > 7.2*   HEMATOCRIT %  --  26.9* 26.5* 22.0*   < > 22.4*   PLATELETS 10*3/mm3  --  345  --  285  --  261    < > = values in this interval not displayed.       Date   Time   Anti-Xa Current Rate (Unit/kg/hr) Bolus   (Units) Rate Change   (Unit/kg/hr) New Rate (Unit/kg/hr) Next   Anti-Xa Comments  Pump Check Daily   11/10 0200 0.10    INR 2.88 --- - -- -- 0600 D/w APRN, hold until INR >2.5. Currently 2.88, ordered repeat INR @ 0600   11/10 0437 --    INR 2.84 -- -- -- -- 1800 Continue to hold heparin gtt until until <2.5; repeat INR at 1800.   11/10  1902 --    INR 1.21 -- -- +11 11 0400 DW RN Tom. INR returned at 1.21 . Starting heparin gtt to cover mechanical valves     11/11 0600 0.1 11 4000 +3 14 1200 D/w RN, no reported bleeding overnight   11/11 1145 0.25 14 -- +1 15 1900 D/W RN- warfarin resumed   11/11 1844 0.10 15 4000 +3 18 0200 DW RN   11/12 0300 0.46 18 -- -- 18 0800 D/w RN   11/12 0916 0.42 18 -- -- 18 0600 D/W RN   11/13 0630 0.24 18 -- +1 19 1200 D/w RN   11/13 1034 0.34 19 -- -- 19 1800 Spoke to RN   11/13 1718 0.26 19 -- +1 20 0200 DW RN    11/14 0230 0.33 20 -- -- 20 0800 D/w RN   11/14 0813 0.33 20 -- -- 20 0600 D/w JARAD Johnson   Checked pump   11/15 0745 0.25 20 -- +1 21 1500 DW RN    11/15 1535 0.34 21 -- -- 21 2200 DW RN    11/15 2300 0.33 21 -- -- 21 0600 D/w RN   11/16 0730 0.23 21 -- +1 22 1600 Spoke to RN   11/16 1531 -- Off 4000  +22 22 2200 DW RN Margaret, will rebolus with heparin being off since this morning for colonoscopy    11/16 2209 0.20 22 -- +1 23 0600 Evi 3250 -acb   11/17 636 0.1 23 4000 +2 25 1600 Spoke to Ed, heparin infusing, has not been off, weight in EPIC need updating              Nadir Corrales, PharmD   11/17/23  09:28 EST

## 2023-11-17 NOTE — PROGRESS NOTES
"Pharmacy Consult  -  Warfarin  Di Lopez is a  62 y.o. female   Height - 152.4 cm (60\")  Weight - 97.3 kg (214 lb 8.1 oz)    Consulting Provider: Dr. Sherman  Indication: Mechanical MVR  Goal INR: 2.5 - 3.5   Home Regimen: 4 mg daily, except 4.5 mg on Sat/Sun    Bridge Therapy: Yes   and unfractionated heparin    Drug-Drug Interactions with current regimen:   Excedrin contains asa - increase bleed risk              Heparin - increase bleed risk    Warfarin Dosing During Admission:  Date  11/10 11/11 11/12 11/13 11/14 11/15 11/16 11/17 11/18   INR  4.13 1.09 1.16 1.22 1.36 1.36 1.29 1.25    Dose  IV Vit K given 6 mg 3 mg 3 mg  HOLD HOLD HOLD (5 mg)      Education Provided: 11/11/23 - provided education pamphlet and answered questions.      Discharge Follow up:   Following Provider - Dr. Everett   Follow up time range or appointment - 2-3 days following discharge     Labs:  Results from last 7 days   Lab Units 11/17/23  0636 11/16/23  0730 11/15/23  1605 11/15/23  0745 11/15/23  0003 11/14/23  0959 11/14/23  0813 11/13/23  0351 11/12/23  0148 11/11/23  0810 11/11/23  0447   INR  1.25* 1.29*  --  1.36*  --   --  1.36* 1.22* 1.16*  --  1.09   HEMOGLOBIN g/dL  --  8.5* 8.7* 6.9* 7.4* 7.1* 7.2* 7.6* 7.2*   < >  --    HEMATOCRIT %  --  26.9* 26.5* 22.0* 23.4* 22.6* 22.4* 23.3* 21.8*   < >  --     < > = values in this interval not displayed.     Results from last 7 days   Lab Units 11/17/23  0636 11/16/23  0730 11/15/23  0745   SODIUM mmol/L 141 142 137   POTASSIUM mmol/L 3.7 3.4* 3.3*   CHLORIDE mmol/L 106 104 101   CO2 mmol/L 25.0 26.0 26.0   BUN mg/dL 18 17 22   CREATININE mg/dL 1.20* 1.31* 1.41*   CALCIUM mg/dL 8.9 9.3 8.8   GLUCOSE mg/dL 258* 148* 121*     Current dietary intake: she did eat this morning 11/17    Assessment/Plan:   Pharmacy to dose warfarin for Mechanical MVR.  Goal INR: 2.5-3.5  Patient's INR is SUBtherapeutic today at 1.25. warfarin has been held for three days. She underwent a colonoscopy " yesterday.    Patient admitted with supratherapeutic INR of 4.13 and GIB; given IV Vit K 5mg on 11/10.   Recommend to resume warfarin at 5 mg today. Continue heparin drip as well.   Monitor for signs/symptoms of bleeding, dietary intake, and drug-drug interactions.   Pharmacy will continue to follow and make dose adjustments as necessary.    Thank you,    Nadir Corrales, PharmD  11/17/2023  13:15 EST

## 2023-11-17 NOTE — CASE MANAGEMENT/SOCIAL WORK
Continued Stay Note  [facility]     Patient Name: Di Lopez  MRN: [id number]  Today's Date: 11/17/2023    Admit Date: 11/9/2023    Plan: Home   Discharge Plan       Row Name 11/17/23 1405       Plan    Plan Home    Patient/Family in Agreement with Plan yes    Plan Comments Discussed patient in MDR. Colonoscopy/EGD yesterday. Heparin drip. Re-starting coumadin today. Her plan is home with daughter Marcela to transport. Patient to call [employer] when discharged and they will replace oxygen concentrator or repair it ([phone]). CM will continue to follow.    Final Discharge Disposition Code 01 - home or self-care                   Discharge Codes    No documentation.                 Expected Discharge Date and Time       Expected Discharge Date Expected Discharge Time    Nov 18, 2023               Clarisse Elena RN

## 2023-11-17 NOTE — PROGRESS NOTES
[facility] Medicine Services  PROGRESS NOTE    Patient Name: Di Lopez  : 1960  MRN: [id number]    Date of Admission: 2023  Primary Care Physician: Davina Santiago DO    Subjective   Subjective     CC:  BRBPR, HA     HPI:    Resting in bed in no acute distress and overall comfortable.  No fever or chills.  No chest pain or palpitation or shortness of breath at rest.  No nausea vomiting or diarrhea.      Objective   Objective     Vital Signs:   Temp:  [97.8 °F (36.6 °C)-98.6 °F (37 °C)] 98 °F (36.7 °C)  Heart Rate:  [60-93] 70  Resp:  [16-20] 18  BP: (135-157)/(61-81) 135/64  Flow (L/min):  [3] 3     Physical Exam:  Constitutional: No acute distress  HENT: NCAT, mucous membranes moist  Respiratory: Clear to auscultation bilaterally, breathing with no labor.  Cardiovascular: RRR, no murmurs, rubs, or gallops  Gastrointestinal: Positive bowel sounds, soft, nontender, nondistended  Musculoskeletal: trace bilateral ankle edema  Psychiatric: Appropriate affect, cooperative  Neurologic: Awake, alert, oriented x3, no focality appreciated, speech clear  Skin: No rashes      Results Reviewed:  LAB RESULTS:      Lab 23  0636 23  2209 23  0730 11/15/23  2201 11/15/23  1605 11/15/23  1535 11/15/23  0745 11/15/23  0003 23  0959 23  0813 23  1148 23  0351 23  0916 23  0148   WBC  --   --  12.07*  --   --   --  11.42*  --   --  13.05*  --  15.09*  --  12.80*   HEMOGLOBIN  --   --  8.5*  --  8.7*  --  6.9* 7.4* 7.1* 7.2*  --  7.6*  --  7.2*   HEMATOCRIT  --   --  26.9*  --  26.5*  --  22.0* 23.4* 22.6* 22.4*  --  23.3*  --  21.8*   PLATELETS  --   --  345  --   --   --  285  --   --  261  --  277  --  274   NEUTROS ABS  --   --  8.25*  --   --   --   --   --   --   --   --  11.06*  --   --    IMMATURE GRANS (ABS)  --   --  0.15*  --   --   --   --   --   --   --   --  0.17*  --   --    LYMPHS ABS  --   --  2.27  --   --   --   --    --   --   --   --  2.34  --   --    MONOS ABS  --   --  0.84  --   --   --   --   --   --   --   --  1.14*  --   --    EOS ABS  --   --  0.52*  --   --   --   --   --   --   --   --  0.30  --   --    MCV  --   --  89.1  --   --   --  88.4  --   --  86.8  --  86.6  --  85.2   PROCALCITONIN  --   --   --   --   --   --  0.28*  --   --   --   --  0.32*  --   --    PROTIME 15.9*  --  16.2*  --   --   --  16.9*  --   --  16.9*  --  15.6*  --  14.9*   HEPARIN ANTI-XA 0.10* 0.20* 0.23* 0.33  --  0.34 0.25*  --   --  0.33   < > 0.24*   < > 0.46    < > = values in this interval not displayed.         Lab 11/17/23  0636 11/16/23  0730 11/15/23  0745 11/14/23 0813 11/13/23  0351   SODIUM 141 142 137 140 137   POTASSIUM 3.7 3.4* 3.3* 3.7 3.5   CHLORIDE 106 104 101 104 101   CO2 25.0 26.0 26.0 25.0 27.0   ANION GAP 10.0 12.0 10.0 11.0 9.0   BUN 18 17 22 23 26*   CREATININE 1.20* 1.31* 1.41* 1.62* 1.70*   EGFR 51.6* 46.5* 42.5* 36.0* 34.0*   GLUCOSE 258* 148* 121* 148* 165*   CALCIUM 8.9 9.3 8.8 8.5* 8.7               Lab 11/17/23 0636 11/16/23 0730 11/15/23  0745 11/14/23  0813 11/13/23  0351 11/12/23  0148 11/11/23  0447   PROBNP  --   --   --   --   --   --  833.7   PROTIME 15.9* 16.2* 16.9* 16.9* 15.6*   < > 14.2   INR 1.25* 1.29* 1.36* 1.36* 1.22*   < > 1.09    < > = values in this interval not displayed.             Lab 11/14/23  1736   ABO TYPING O   RH TYPING Positive   ANTIBODY SCREEN Negative         Brief Urine Lab Results  (Last result in the past 365 days)        Color   Clarity   Blood   Leuk Est   Nitrite   Protein   CREAT   Urine HCG        11/09/23 2320 Yellow   Clear   Negative   Small (1+)   Positive   100 mg/dL (2+)                   Microbiology Results Abnormal       Procedure Component Value - Date/Time    Blood Culture - Blood, Arm, Right [[id number]]  (Normal) Collected: 11/10/23 0027    Lab Status: Final result Specimen: Blood from Arm, Right Updated: 11/15/23 0100     Blood Culture No growth at 5 days     Blood Culture - Blood, Arm, Right [[id number]]  (Normal) Collected: 11/10/23 0027    Lab Status: Final result Specimen: Blood from Arm, Right Updated: 11/15/23 0100     Blood Culture No growth at 5 days            No radiology results from the last 24 hrs    Results for orders placed during the hospital encounter of 05/31/23    Adult Transthoracic Echo Complete W/ Cont if Necessary Per Protocol    Interpretation Summary    Mildly reduced right ventricular systolic function noted.    There is calcification of the aortic valve.    Estimated right ventricular systolic pressure from tricuspid regurgitation is normal (<35 mmHg).    Mild global LV systolic dysfunction    EF=45-50%    Normal prosthetic MV function    AV calcification      Current medications:  Scheduled Meds:budesonide-formoterol, 2 puff, Inhalation, BID - RT  carvedilol, 12.5 mg, Oral, BID With Meals  docusate sodium, 100 mg, Oral, BID  escitalopram, 20 mg, Oral, Daily  ferrous sulfate, 325 mg, Oral, Daily With Breakfast  folic acid, 800 mcg, Oral, Daily  furosemide, 40 mg, Intravenous, Daily  hydrocortisone, 25 mg, Rectal, BID  insulin lispro, 2-7 Units, Subcutaneous, 4x Daily AC & at Bedtime  Insulin Lispro, 4 Units, Subcutaneous, TID With Meals  ipratropium-albuterol, 3 mL, Nebulization, 4x Daily - RT  nicotine, 1 patch, Transdermal, Q24H  nystatin, 5 mL, Swish & Swallow, 4x Daily  oxybutynin XL, 5 mg, Oral, Daily  pantoprazole, 40 mg, Intravenous, BID AC  sodium chloride, 10 mL, Intravenous, Q12H  sodium chloride, 10 mL, Intravenous, Q12H  [START ON 11/18/2023] warfarin (COUMADIN) (dosing per levels), , Does not apply, Daily  warfarin, 5 mg, Oral, Once      Continuous Infusions:heparin, 25 Units/kg/hr, Last Rate: 25 Units/kg/hr (11/17/23 1000)  lactated ringers, 9 mL/hr  Pharmacy to Dose Heparin,   Pharmacy to dose warfarin,       PRN Meds:.  acetaminophen    aspirin-acetaminophen-caffeine    [DISCONTINUED] senna-docusate sodium **AND** polyethylene  glycol **AND** bisacodyl **AND** bisacodyl    dextrose    dextrose    glucagon (human recombinant)    HYDROcodone-acetaminophen    lidocaine PF 1%    melatonin    midazolam    nicotine polacrilex    Pharmacy to Dose Heparin    Pharmacy to dose warfarin    sodium chloride    sodium chloride    sodium chloride    sodium chloride    Assessment & Plan   Assessment & Plan     Active Hospital Problems    Diagnosis  POA    **GIB (gastrointestinal bleeding) [K92.2]  Yes    Severe malnutrition [E43]  Yes    GI bleed [K92.2]  Yes    Acute blood loss anemia [D62]  Yes    MEERA (acute kidney injury) [N17.9]  Yes    H/O heart valve replacement with mechanical valve [Z95.2]  Not Applicable    Melena [K92.1]  Unknown    Chronic respiratory failure with hypoxia [J96.11]  Yes    Chronic diastolic heart failure [I50.32]  Yes    Emphysema/COPD [J43.9]  Yes    Hypertension [I10]  Yes    Type 2 diabetes mellitus without complication, with long-term current use of insulin [E11.9, Z79.4]  Not Applicable    Depression [F32.A]  Yes    Dyslipidemia [E78.5]  Yes    Obstructive sleep apnea syndrome [G47.33]  Yes      Resolved Hospital Problems   No resolved problems to display.        Brief Hospital Course to date:  Di Lopez is a 62 y.o. female  with PMH significant for COPD, HTN, HLD, CAD, depression, CHF, VHD s/p mechanical valve replacement on warfarin, DM2, CAL on CPAP, liver mass, obesity.  She has had diarrhea for 2 weeks, then developed BRBPR on 11/9. She was asked to hold her diuretics, Entresto and Trulicity by PCP last last week due to rising creatinine.    GI bleed - BRBPR   Symptomatic acute blood loss anemia  Warfarin use; INR 3-4 recently  Adenovirus   - Hgb was 11 in May, was 7.1 on admission.   - Colonoscopy completed May 2023;  Has scheduled EGD outpatient 11/14/2023 (Dr. Fernandes)  - CT scan with inflammation in the sigmoid colon  - GI PCR with adenovirus   - Transfuse another unit 11/14 for total of 3 units so far  -On  11/15 hemoglobin  dropped below 7.  Patient received 1 unit of packed red blood cell and GI also saw the patient.  -Patient had upper and lower endoscopy done on 11/16 by GI.  Significant findings on this study was Candida esophagitis and also diverticulosis.  GI bleeding was presumed to be diverticular bleeding.          MEERA  - Baseline creatinine ~0.8-0.9,        HFmrEF  - EF 45-50 3/23.   - Holding home entresto due to MEERA. Continue coreg  - Received intermittent IV diuretics with blood transfusion      Chronic hypoxic resp failure  - 3L at baseline but states home O2 concentrator is broken -- discussed with case management      UTI POA  - urine culture ecoli   - Past UTI was E coli, a nd Klebs, not ESBL .   - s/p 3 doses of rocephin      Bed bugs   - Isolation      Mechanical mitral valve   - Continue heparin gtt.   - plans to DC home with lovenox bridge. Follows with Dr. Everett      Diabetes mellitus 2 - A1C 6.8   - Hold Trulicity, glimepiride  - Hold Lantus for now, add back as appropriate  - FSBG ACHS     Hypertension  Dyslipidemia  - Currently controlled     CAL  - CPAP     Nicotine use  - Vapes  - Nicotine patch  - Cessation counseling     Obesity   - BMI 39. Complicates all aspects of care    Expected Discharge Location and Transportation: home  Expected Discharge   Expected Discharge Date: 11/18/2023; Expected Discharge Time:      DVT prophylaxis:  Medical DVT prophylaxis orders are present.     AM-PAC 6 Clicks Score (PT): 24 (11/16/23 2034)    CODE STATUS:   Code Status and Medical Interventions:   Ordered at: 11/09/23 5255     Code Status (Patient has no pulse and is not breathing):    CPR (Attempt to Resuscitate)     Medical Interventions (Patient has pulse or is breathing):    Full Support       Getachew Haines MD  11/17/23

## 2023-11-17 NOTE — PLAN OF CARE
Goal Outcome Evaluation:  Plan of Care Reviewed With: patient           Outcome Evaluation: PT A/O x 4, on 3L humidified NC. Today she had c/o dyspnea and the Provider ordered IV Lasix. She is ambulatory SBA to the bathroom. Her Heparin is infusing at 25. Her appetite is good. Currently she has no verbalized needs at this time, bed low, call light in reach.

## 2023-11-18 LAB
ANION GAP SERPL CALCULATED.3IONS-SCNC: 11 MMOL/L (ref 5–15)
BUN SERPL-MCNC: 20 MG/DL (ref 8–23)
BUN/CREAT SERPL: 14.8 (ref 7–25)
CALCIUM SPEC-SCNC: 8.9 MG/DL (ref 8.6–10.5)
CHLORIDE SERPL-SCNC: 102 MMOL/L (ref 98–107)
CO2 SERPL-SCNC: 28 MMOL/L (ref 22–29)
CREAT SERPL-MCNC: 1.35 MG/DL (ref 0.57–1)
EGFRCR SERPLBLD CKD-EPI 2021: 44.8 ML/MIN/1.73
GLUCOSE BLDC GLUCOMTR-MCNC: 190 MG/DL (ref 70–130)
GLUCOSE BLDC GLUCOMTR-MCNC: 214 MG/DL (ref 70–130)
GLUCOSE BLDC GLUCOMTR-MCNC: 249 MG/DL (ref 70–130)
GLUCOSE BLDC GLUCOMTR-MCNC: 268 MG/DL (ref 70–130)
GLUCOSE SERPL-MCNC: 188 MG/DL (ref 65–99)
INR PPP: 1.23 (ref 0.89–1.12)
POTASSIUM SERPL-SCNC: 3.6 MMOL/L (ref 3.5–5.2)
PROTHROMBIN TIME: 15.6 SECONDS (ref 12.2–14.5)
SODIUM SERPL-SCNC: 141 MMOL/L (ref 136–145)
UFH PPP CHRO-ACNC: 0.29 IU/ML (ref 0.3–0.7)
UFH PPP CHRO-ACNC: 0.44 IU/ML (ref 0.3–0.7)
UFH PPP CHRO-ACNC: 0.48 IU/ML (ref 0.3–0.7)
UFH PPP CHRO-ACNC: 0.72 IU/ML (ref 0.3–0.7)

## 2023-11-18 PROCEDURE — 63710000001 INSULIN LISPRO (HUMAN) PER 5 UNITS: Performed by: INTERNAL MEDICINE

## 2023-11-18 PROCEDURE — 82948 REAGENT STRIP/BLOOD GLUCOSE: CPT

## 2023-11-18 PROCEDURE — 25010000002 HEPARIN (PORCINE) 25000-0.45 UT/250ML-% SOLUTION

## 2023-11-18 PROCEDURE — 80048 BASIC METABOLIC PNL TOTAL CA: CPT | Performed by: INTERNAL MEDICINE

## 2023-11-18 PROCEDURE — 99232 SBSQ HOSP IP/OBS MODERATE 35: CPT | Performed by: INTERNAL MEDICINE

## 2023-11-18 PROCEDURE — 85520 HEPARIN ASSAY: CPT

## 2023-11-18 PROCEDURE — 85610 PROTHROMBIN TIME: CPT | Performed by: INTERNAL MEDICINE

## 2023-11-18 PROCEDURE — 94799 UNLISTED PULMONARY SVC/PX: CPT

## 2023-11-18 PROCEDURE — 94664 DEMO&/EVAL PT USE INHALER: CPT

## 2023-11-18 RX ADMIN — INSULIN LISPRO 4 UNITS: 100 INJECTION, SOLUTION INTRAVENOUS; SUBCUTANEOUS at 18:52

## 2023-11-18 RX ADMIN — NYSTATIN 500000 UNITS: 100000 SUSPENSION ORAL at 11:54

## 2023-11-18 RX ADMIN — Medication 10 ML: at 09:55

## 2023-11-18 RX ADMIN — ESCITALOPRAM OXALATE 20 MG: 20 TABLET ORAL at 09:52

## 2023-11-18 RX ADMIN — INSULIN LISPRO 2 UNITS: 100 INJECTION, SOLUTION INTRAVENOUS; SUBCUTANEOUS at 09:54

## 2023-11-18 RX ADMIN — IPRATROPIUM BROMIDE AND ALBUTEROL SULFATE 3 ML: 2.5; .5 SOLUTION RESPIRATORY (INHALATION) at 08:25

## 2023-11-18 RX ADMIN — HYDROCODONE BITARTRATE AND ACETAMINOPHEN 1 TABLET: 5; 325 TABLET ORAL at 00:37

## 2023-11-18 RX ADMIN — HYDROCODONE BITARTRATE AND ACETAMINOPHEN 1 TABLET: 5; 325 TABLET ORAL at 21:42

## 2023-11-18 RX ADMIN — NYSTATIN 500000 UNITS: 100000 SUSPENSION ORAL at 18:45

## 2023-11-18 RX ADMIN — IPRATROPIUM BROMIDE AND ALBUTEROL SULFATE 3 ML: 2.5; .5 SOLUTION RESPIRATORY (INHALATION) at 15:57

## 2023-11-18 RX ADMIN — PANTOPRAZOLE SODIUM 40 MG: 40 INJECTION, POWDER, LYOPHILIZED, FOR SOLUTION INTRAVENOUS at 18:45

## 2023-11-18 RX ADMIN — INSULIN LISPRO 7 UNITS: 100 INJECTION, SOLUTION INTRAVENOUS; SUBCUTANEOUS at 18:45

## 2023-11-18 RX ADMIN — CARVEDILOL 12.5 MG: 12.5 TABLET, FILM COATED ORAL at 18:45

## 2023-11-18 RX ADMIN — OXYBUTYNIN CHLORIDE 5 MG: 5 TABLET, EXTENDED RELEASE ORAL at 09:53

## 2023-11-18 RX ADMIN — FERROUS SULFATE TAB 325 MG (65 MG ELEMENTAL FE) 325 MG: 325 (65 FE) TAB at 09:52

## 2023-11-18 RX ADMIN — INSULIN LISPRO 3 UNITS: 100 INJECTION, SOLUTION INTRAVENOUS; SUBCUTANEOUS at 21:42

## 2023-11-18 RX ADMIN — BUDESONIDE AND FORMOTEROL FUMARATE DIHYDRATE 2 PUFF: 160; 4.5 AEROSOL RESPIRATORY (INHALATION) at 08:25

## 2023-11-18 RX ADMIN — INSULIN LISPRO 4 UNITS: 100 INJECTION, SOLUTION INTRAVENOUS; SUBCUTANEOUS at 11:54

## 2023-11-18 RX ADMIN — PANTOPRAZOLE SODIUM 40 MG: 40 INJECTION, POWDER, LYOPHILIZED, FOR SOLUTION INTRAVENOUS at 09:53

## 2023-11-18 RX ADMIN — HEPARIN SODIUM 24 UNITS/KG/HR: 10000 INJECTION, SOLUTION INTRAVENOUS at 23:23

## 2023-11-18 RX ADMIN — CARVEDILOL 12.5 MG: 12.5 TABLET, FILM COATED ORAL at 09:52

## 2023-11-18 RX ADMIN — HEPARIN SODIUM 26 UNITS/KG/HR: 10000 INJECTION, SOLUTION INTRAVENOUS at 12:07

## 2023-11-18 RX ADMIN — NYSTATIN 500000 UNITS: 100000 SUSPENSION ORAL at 09:52

## 2023-11-18 RX ADMIN — FOLIC ACID TAB 400 MCG 800 MCG: 400 TAB at 09:52

## 2023-11-18 RX ADMIN — Medication 5 MG: at 00:37

## 2023-11-18 RX ADMIN — Medication 5 MG: at 21:42

## 2023-11-18 RX ADMIN — HYDROCODONE BITARTRATE AND ACETAMINOPHEN 1 TABLET: 5; 325 TABLET ORAL at 14:47

## 2023-11-18 NOTE — PROGRESS NOTES
HEPARIN INFUSION  Di Lopez is a  62 y.o. female receiving heparin infusion.     Therapy for (VTE/Cardiac): Cardiac ([facility] valve)  Patient Weight: 90.9 kg  Initial Bolus (Y/N): No  Any Bolus (Y/N): Yes    Signs or Symptoms of Bleedin episodes of BRBPR on admission, ongoing anemia, no bleeding noted now per RN     Cardiac or Other (Not VTE)  Initial rate: 12 units/kg/hr (Max 1,000 units/hr)   Anti Xa Rebolus Infusion Hold time Change infusion Dose (Units/kg/hr) Next Anti Xa or aPTT Level Due   < 0.11 50 Units/kg  (4000 Units Max) None Increase by  3 Units/kg/hr 6 hours   0.11- 0.19 25 Units/kg  (2000 Units Max) None Increase by  2 Units/kg/hr 6 hours   0.2 - 0.29 0 None Increase by  1 Units/kg/hr 6 hours   0.3 - 0.5 0 None No Change 6 hours (after 2 consecutive levels in range check qAM)   0.51 - 0.6 0 None Decrease by  1 Units/kg/hr 6 hours   0.61 - 0.8 0 30 Minutes Decrease by  2 Units/kg/hr 6 hours   0.81 - 1 0 60 Minutes Decrease by  3 Units/kg/hr 6 hours   >1 0 Hold  After Anti Xa less than 0.5 decrease previous rate by  4 Units/kg/hr  Every 2 hours until Anti Xa  less than 0.5 then when infusion restarts in 6 hours   Recommend Xa every 6 hours.     Results from last 7 days   Lab Units 23  0427 23  0636 23  0730 11/15/23  1605 11/15/23  0745 23  0959 23  0813   INR  1.23* 1.25* 1.29*  --  1.36*  --  1.36*   HEMOGLOBIN g/dL  --   --  8.5* 8.7* 6.9*   < > 7.2*   HEMATOCRIT %  --   --  26.9* 26.5* 22.0*   < > 22.4*   PLATELETS 10*3/mm3  --   --  345  --  285  --  261    < > = values in this interval not displayed.       Date   Time   Anti-Xa Current Rate (Unit/kg/hr) Bolus   (Units) Rate Change   (Unit/kg/hr) New Rate (Unit/kg/hr) Next   Anti-Xa Comments  Pump Check Daily   11/10 0200 0.10    INR 2.88 --- - -- -- 0600 D/w APRN, hold until INR >2.5. Currently 2.88, ordered repeat INR @ 0600   11/10 0437 --    INR 2.84 -- -- -- -- 1800 Continue to hold heparin gtt until until  <2.5; repeat INR at 1800.   11/10 1902 --    INR 1.21 -- -- +11 11 0400  RN Tom. INR returned at 1.21 . Starting heparin gtt to cover mechanical valves     11/11 0600 0.1 11 4000 +3 14 1200 D/w RN, no reported bleeding overnight   11/11 1145 0.25 14 -- +1 15 1900 D/W RN- warfarin resumed   11/11 1844 0.10 15 4000 +3 18 0200  RN   11/12 0300 0.46 18 -- -- 18 0800 D/w RN   11/12 0916 0.42 18 -- -- 18 0600 D/W RN   11/13 0630 0.24 18 -- +1 19 1200 D/w RN   11/13 1034 0.34 19 -- -- 19 1800 Spoke to RN   11/13 1718 0.26 19 -- +1 20 0200  RN    11/14 0230 0.33 20 -- -- 20 0800 D/w RN   11/14 0813 0.33 20 -- -- 20 0600 D/w JARAD Johnson   Checked pump   11/15 0745 0.25 20 -- +1 21 1500  RN    11/15 1535 0.34 21 -- -- 21 2200  RN    11/15 2300 0.33 21 -- -- 21 0600 D/w RN   11/16 0730 0.23 21 -- +1 22 1600 Spoke to RN   11/16 1531 -- Off 4000  +22 22 2200 DW JARAD Robles, will rebolus with heparin being off since this morning for colonoscopy    11/16 2209 0.20 22 -- +1 23 0600 Evi 3250 -acb   11/17 636 0.1 23 4000 +2 25 1600 Spoke to Ed, heparin infusing, has not been off, weight in EPIC need updating   11/17 1613 0.45 25 -- -- 25 0000 DW RN   11/18 0019 0329 25 -- +1 26 0900 Mendoza 3249 -acb   11/18 0837 0.44 26 -- 0 26 1500 DW RN    11/18 1525 0.48 26 -- -- 26 2100 Dw RNEd, PharmD  Pharmacy Resident  11/18/2023  16:34 EST

## 2023-11-18 NOTE — PROGRESS NOTES
No further signs of GI bleeding on heparin drip.  Hemoglobin stable yesterday.     Will sign off.  Please call with recurrent bleeding, questions, or concerns.

## 2023-11-18 NOTE — PROGRESS NOTES
"Pharmacy Consult  -  Warfarin  Di Lopez is a  62 y.o. female   Height - 152.4 cm (60\")  Weight - 97.3 kg (214 lb 8.1 oz)    Consulting Provider: Dr. Sherman  Indication: Mechanical MVR  Goal INR: 2.5 - 3.5   Home Regimen: 4 mg daily, except 4.5 mg on Sat/Sun    Bridge Therapy: Yes   and unfractionated heparin    Drug-Drug Interactions with current regimen:   Excedrin contains asa - increase bleed risk              Heparin - increase bleed risk              Norco - potential to increase INR  Warfarin Dosing During Admission:  Date  11/10 11/11 11/12 11/13 11/14 11/15 11/16 11/17 11/18   INR  4.13 1.09 1.16 1.22 1.36 1.36 1.29 1.25 1.23   Dose  IV Vit K given 6 mg 3 mg 3 mg  HOLD HOLD HOLD 5 mg (5 mg)     Education Provided: 11/11/23 - provided education pamphlet and answered questions.      Discharge Follow up:   Following Provider - Dr. Everett   Follow up time range or appointment - 2-3 days following discharge     Labs:  Results from last 7 days   Lab Units 11/18/23 0427 11/17/23  0636 11/16/23  0730 11/15/23  1605 11/15/23  0745 11/15/23  0003 11/14/23  0959 11/14/23  0813 11/13/23  0351 11/12/23  0148   INR  1.23* 1.25* 1.29*  --  1.36*  --   --  1.36* 1.22* 1.16*   HEMOGLOBIN g/dL  --   --  8.5* 8.7* 6.9* 7.4* 7.1* 7.2* 7.6* 7.2*   HEMATOCRIT %  --   --  26.9* 26.5* 22.0* 23.4* 22.6* 22.4* 23.3* 21.8*     Results from last 7 days   Lab Units 11/18/23 0427 11/17/23  0636 11/16/23  0730   SODIUM mmol/L 141 141 142   POTASSIUM mmol/L 3.6 3.7 3.4*   CHLORIDE mmol/L 102 106 104   CO2 mmol/L 28.0 25.0 26.0   BUN mg/dL 20 18 17   CREATININE mg/dL 1.35* 1.20* 1.31*   CALCIUM mg/dL 8.9 8.9 9.3   GLUCOSE mg/dL 188* 258* 148*     Current dietary intake: % on 11/17     Assessment/Plan:   Pharmacy to dose warfarin for Mechanical MVR.  Goal INR: 2.5-3.5  Patient's INR is SUBtherapeutic today at 1.23. Warfarin had been held for three days and was restarted last night. She underwent a colonoscopy 11/16.  "   Patient admitted with supratherapeutic INR of 4.13 and GIB; given IV Vit K 5mg on 11/10.   Recommend to continue warfarin at 5 mg today. Continue heparin drip as well.   Monitor for signs/symptoms of bleeding, dietary intake, and drug-drug interactions.   Pharmacy will continue to follow and make dose adjustments as necessary.    Thank you,    Dante Shankar [facility]  11/18/2023  09:42 EST

## 2023-11-18 NOTE — PROGRESS NOTES
[facility] Medicine Services  PROGRESS NOTE    Patient Name: Di Lopez  : 1960  MRN: [id number]    Date of Admission: 2023  Primary Care Physician: Davina Santiago DO    Subjective   Subjective     CC:  BRBPR, HA     HPI:  Resting in bed in no acute distress.  Patient had some shortness of breath late yesterday and that was improved with 1 dose of Lasix.  Denies any fever or chills.  No chest pain or palpitation or shortness of breath.  No nausea vomiting or diarrhea.      Objective   Objective     Vital Signs:   Temp:  [98 °F (36.7 °C)-98.8 °F (37.1 °C)] 98 °F (36.7 °C)  Heart Rate:  [59-71] 59  Resp:  [16-20] 18  BP: (142-151)/(60-70) 146/60  Flow (L/min):  [3-4] 3     Physical Exam:  Constitutional: No acute distress  HENT: NCAT, mucous membranes moist  Respiratory: Clear to auscultation bilaterally, breathing with no labor.  Cardiovascular: RRR, no murmurs, rubs, or gallops  Gastrointestinal: Positive bowel sounds, soft, nontender, nondistended  Musculoskeletal:  bilateral ankle edema  Psychiatric: Appropriate affect, cooperative  Neurologic: Awake, alert, oriented x3, no focality appreciated, speech clear  Skin: No rashes      Results Reviewed:  LAB RESULTS:      Lab 23  1525 23  0837 23  0427 23  0019 23  1613 23  0636 23  2209 23  0730 11/15/23  2201 11/15/23  1605 11/15/23  1535 11/15/23  0745 11/15/23  0003 23  0959 23  0813 23  1148 23  0351 23  0916 23  0148   WBC  --   --   --   --   --   --   --  12.07*  --   --   --  11.42*  --   --  13.05*  --  15.09*  --  12.80*   HEMOGLOBIN  --   --   --   --   --   --   --  8.5*  --  8.7*  --  6.9* 7.4* 7.1* 7.2*  --  7.6*  --  7.2*   HEMATOCRIT  --   --   --   --   --   --   --  26.9*  --  26.5*  --  22.0* 23.4* 22.6* 22.4*  --  23.3*  --  21.8*   PLATELETS  --   --   --   --   --   --   --  345  --   --   --  285  --   --  261  --   277  --  274   NEUTROS ABS  --   --   --   --   --   --   --  8.25*  --   --   --   --   --   --   --   --  11.06*  --   --    IMMATURE GRANS (ABS)  --   --   --   --   --   --   --  0.15*  --   --   --   --   --   --   --   --  0.17*  --   --    LYMPHS ABS  --   --   --   --   --   --   --  2.27  --   --   --   --   --   --   --   --  2.34  --   --    MONOS ABS  --   --   --   --   --   --   --  0.84  --   --   --   --   --   --   --   --  1.14*  --   --    EOS ABS  --   --   --   --   --   --   --  0.52*  --   --   --   --   --   --   --   --  0.30  --   --    MCV  --   --   --   --   --   --   --  89.1  --   --   --  88.4  --   --  86.8  --  86.6  --  85.2   PROCALCITONIN  --   --   --   --   --   --   --   --   --   --   --  0.28*  --   --   --   --  0.32*  --   --    PROTIME  --   --  15.6*  --   --  15.9*  --  16.2*  --   --   --  16.9*  --   --  16.9*  --  15.6*  --  14.9*   HEPARIN ANTI-XA 0.48 0.44  --  0.29* 0.45 0.10*   < > 0.23*   < >  --    < > 0.25*  --   --  0.33   < > 0.24*   < > 0.46    < > = values in this interval not displayed.         Lab 11/18/23  0427 11/17/23  0636 11/16/23  0730 11/15/23  0745 11/14/23  0813   SODIUM 141 141 142 137 140   POTASSIUM 3.6 3.7 3.4* 3.3* 3.7   CHLORIDE 102 106 104 101 104   CO2 28.0 25.0 26.0 26.0 25.0   ANION GAP 11.0 10.0 12.0 10.0 11.0   BUN 20 18 17 22 23   CREATININE 1.35* 1.20* 1.31* 1.41* 1.62*   EGFR 44.8* 51.6* 46.5* 42.5* 36.0*   GLUCOSE 188* 258* 148* 121* 148*   CALCIUM 8.9 8.9 9.3 8.8 8.5*               Lab 11/18/23  0427 11/17/23  0636 11/16/23  0730 11/15/23  0745 11/14/23  0813   PROTIME 15.6* 15.9* 16.2* 16.9* 16.9*   INR 1.23* 1.25* 1.29* 1.36* 1.36*             Lab 11/14/23  1736   ABO TYPING O   RH TYPING Positive   ANTIBODY SCREEN Negative         Brief Urine Lab Results  (Last result in the past 365 days)        Color   Clarity   Blood   Leuk Est   Nitrite   Protein   CREAT   Urine HCG        11/09/23 2320 Yellow   Clear   Negative   Small  (1+)   Positive   100 mg/dL (2+)                   Microbiology Results Abnormal       Procedure Component Value - Date/Time    Blood Culture - Blood, Arm, Right [[id number]]  (Normal) Collected: 11/10/23 0027    Lab Status: Final result Specimen: Blood from Arm, Right Updated: 11/15/23 0100     Blood Culture No growth at 5 days    Blood Culture - Blood, Arm, Right [[id number]]  (Normal) Collected: 11/10/23 0027    Lab Status: Final result Specimen: Blood from Arm, Right Updated: 11/15/23 0100     Blood Culture No growth at 5 days            No radiology results from the last 24 hrs    Results for orders placed during the hospital encounter of 05/31/23    Adult Transthoracic Echo Complete W/ Cont if Necessary Per Protocol    Interpretation Summary    Mildly reduced right ventricular systolic function noted.    There is calcification of the aortic valve.    Estimated right ventricular systolic pressure from tricuspid regurgitation is normal (<35 mmHg).    Mild global LV systolic dysfunction    EF=45-50%    Normal prosthetic MV function    AV calcification      Current medications:  Scheduled Meds:budesonide-formoterol, 2 puff, Inhalation, BID - RT  carvedilol, 12.5 mg, Oral, BID With Meals  docusate sodium, 100 mg, Oral, BID  escitalopram, 20 mg, Oral, Daily  ferrous sulfate, 325 mg, Oral, Daily With Breakfast  folic acid, 800 mcg, Oral, Daily  furosemide, 40 mg, Intravenous, Daily  insulin lispro, 2-7 Units, Subcutaneous, 4x Daily AC & at Bedtime  Insulin Lispro, 4 Units, Subcutaneous, TID With Meals  ipratropium-albuterol, 3 mL, Nebulization, 4x Daily - RT  nicotine, 1 patch, Transdermal, Q24H  nystatin, 5 mL, Swish & Swallow, 4x Daily  oxybutynin XL, 5 mg, Oral, Daily  pantoprazole, 40 mg, Intravenous, BID AC  sodium chloride, 10 mL, Intravenous, Q12H  sodium chloride, 10 mL, Intravenous, Q12H  warfarin (COUMADIN) (dosing per levels), , Does not apply, Daily      Continuous Infusions:heparin, 26 Units/kg/hr, Last  Rate: 26 Units/kg/hr (11/18/23 1207)  lactated ringers, 9 mL/hr  Pharmacy to Dose Heparin,   Pharmacy to dose warfarin,       PRN Meds:.  acetaminophen    aspirin-acetaminophen-caffeine    [DISCONTINUED] senna-docusate sodium **AND** polyethylene glycol **AND** bisacodyl **AND** bisacodyl    dextrose    dextrose    glucagon (human recombinant)    HYDROcodone-acetaminophen    lidocaine PF 1%    melatonin    midazolam    nicotine polacrilex    Pharmacy to Dose Heparin    Pharmacy to dose warfarin    sodium chloride    sodium chloride    sodium chloride    sodium chloride    Assessment & Plan   Assessment & Plan     Active Hospital Problems    Diagnosis  POA    **GIB (gastrointestinal bleeding) [K92.2]  Yes    Severe malnutrition [E43]  Yes    GI bleed [K92.2]  Yes    Acute blood loss anemia [D62]  Yes    MEERA (acute kidney injury) [N17.9]  Yes    H/O heart valve replacement with mechanical valve [Z95.2]  Not Applicable    Melena [K92.1]  Unknown    Chronic respiratory failure with hypoxia [J96.11]  Yes    Chronic diastolic heart failure [I50.32]  Yes    Emphysema/COPD [J43.9]  Yes    Hypertension [I10]  Yes    Type 2 diabetes mellitus without complication, with long-term current use of insulin [E11.9, Z79.4]  Not Applicable    Depression [F32.A]  Yes    Dyslipidemia [E78.5]  Yes    Obstructive sleep apnea syndrome [G47.33]  Yes      Resolved Hospital Problems   No resolved problems to display.        Brief Hospital Course to date:  Di Lopez is a 62 y.o. female  with PMH significant for COPD, HTN, HLD, CAD, depression, CHF, VHD s/p mechanical valve replacement on warfarin, DM2, CAL on CPAP, liver mass, obesity.  She has had diarrhea for 2 weeks, then developed BRBPR on 11/9. She was asked to hold her diuretics, Entresto and Trulicity by PCP last last week due to rising creatinine.    GI bleed - BRBPR   Symptomatic acute blood loss anemia  Warfarin use; INR 3-4 recently  Adenovirus   - Hgb was 11 in May, was 7.1  on admission.   - Colonoscopy completed May 2023;  Has scheduled EGD outpatient 11/14/2023 (Dr. Fernandes)  - CT scan with inflammation in the sigmoid colon  - GI PCR with adenovirus   - Transfuse another unit 11/14 for total of 3 units so far  -On 11/15 hemoglobin  dropped below 7.  Patient received 1 unit of packed red blood cell and GI also saw the patient.  -Patient had upper and lower endoscopy done on 11/16 by GI.  Significant findings on this study was Candida esophagitis and also diverticulosis.  GI bleeding was presumed to be diverticular bleeding.          MEERA  - Baseline creatinine ~0.8-0.9,  - was 1.9 on admission     HFmrEF  - EF 45-50 3/23.   - Holding home entresto due to MEERA. Continue coreg  - Received intermittent IV diuretics with blood transfusion      Chronic hypoxic resp failure  - 3L at baseline but states home O2 concentrator is broken -- discussed with case management      UTI POA  - urine culture ecoli   - Past UTI was E coli, a nd Klebs, not ESBL .   - s/p 3 doses of rocephin      Bed bugs   - Isolation      Mechanical mitral valve   - Continue heparin gtt. Coumadin restarted.  - plans to DC home with lovenox bridge. Follows with Dr. Everett      Diabetes mellitus 2 - A1C 6.8   - Hold Trulicity, glimepiride  - Hold Lantus for now, add back as appropriate  - FSBG ACHS     Hypertension  Dyslipidemia  - Currently controlled     CAL  - CPAP     Nicotine use  - Vapes  - Nicotine patch  - Cessation counseling     Obesity   - BMI 39. Complicates all aspects of care    Expected Discharge Location and Transportation: home  Expected Discharge   Expected Discharge Date: 11/18/2023; Expected Discharge Time:      DVT prophylaxis:  Medical DVT prophylaxis orders are present.     AM-PAC 6 Clicks Score (PT): 24 (11/18/23 0800)    CODE STATUS:   Code Status and Medical Interventions:   Ordered at: 11/09/23 6267     Code Status (Patient has no pulse and is not breathing):    CPR (Attempt to Resuscitate)      Medical Interventions (Patient has pulse or is breathing):    Full Support       Getachew Haines MD  11/18/23

## 2023-11-19 LAB
ANION GAP SERPL CALCULATED.3IONS-SCNC: 12 MMOL/L (ref 5–15)
BASOPHILS # BLD AUTO: 0.05 10*3/MM3 (ref 0–0.2)
BASOPHILS NFR BLD AUTO: 0.4 % (ref 0–1.5)
BUN SERPL-MCNC: 19 MG/DL (ref 8–23)
BUN/CREAT SERPL: 18.1 (ref 7–25)
CALCIUM SPEC-SCNC: 8.7 MG/DL (ref 8.6–10.5)
CHLORIDE SERPL-SCNC: 103 MMOL/L (ref 98–107)
CO2 SERPL-SCNC: 26 MMOL/L (ref 22–29)
CREAT SERPL-MCNC: 1.05 MG/DL (ref 0.57–1)
DEPRECATED RDW RBC AUTO: 52 FL (ref 37–54)
EGFRCR SERPLBLD CKD-EPI 2021: 60.6 ML/MIN/1.73
EOSINOPHIL # BLD AUTO: 0.65 10*3/MM3 (ref 0–0.4)
EOSINOPHIL NFR BLD AUTO: 5.2 % (ref 0.3–6.2)
ERYTHROCYTE [DISTWIDTH] IN BLOOD BY AUTOMATED COUNT: 15.8 % (ref 12.3–15.4)
GLUCOSE BLDC GLUCOMTR-MCNC: 174 MG/DL (ref 70–130)
GLUCOSE BLDC GLUCOMTR-MCNC: 197 MG/DL (ref 70–130)
GLUCOSE BLDC GLUCOMTR-MCNC: 222 MG/DL (ref 70–130)
GLUCOSE BLDC GLUCOMTR-MCNC: 273 MG/DL (ref 70–130)
GLUCOSE SERPL-MCNC: 227 MG/DL (ref 65–99)
HCT VFR BLD AUTO: 26.4 % (ref 34–46.6)
HGB BLD-MCNC: 8.3 G/DL (ref 12–15.9)
IMM GRANULOCYTES # BLD AUTO: 0.16 10*3/MM3 (ref 0–0.05)
IMM GRANULOCYTES NFR BLD AUTO: 1.3 % (ref 0–0.5)
INR PPP: 1.23 (ref 0.89–1.12)
LYMPHOCYTES # BLD AUTO: 2.92 10*3/MM3 (ref 0.7–3.1)
LYMPHOCYTES NFR BLD AUTO: 23.2 % (ref 19.6–45.3)
MCH RBC QN AUTO: 28.9 PG (ref 26.6–33)
MCHC RBC AUTO-ENTMCNC: 31.4 G/DL (ref 31.5–35.7)
MCV RBC AUTO: 92 FL (ref 79–97)
MONOCYTES # BLD AUTO: 0.73 10*3/MM3 (ref 0.1–0.9)
MONOCYTES NFR BLD AUTO: 5.8 % (ref 5–12)
NEUTROPHILS NFR BLD AUTO: 64.1 % (ref 42.7–76)
NEUTROPHILS NFR BLD AUTO: 8.08 10*3/MM3 (ref 1.7–7)
NRBC BLD AUTO-RTO: 0 /100 WBC (ref 0–0.2)
PLATELET # BLD AUTO: 333 10*3/MM3 (ref 140–450)
PMV BLD AUTO: 10.5 FL (ref 6–12)
POTASSIUM SERPL-SCNC: 3.7 MMOL/L (ref 3.5–5.2)
PROTHROMBIN TIME: 15.6 SECONDS (ref 12.2–14.5)
RBC # BLD AUTO: 2.87 10*6/MM3 (ref 3.77–5.28)
SODIUM SERPL-SCNC: 141 MMOL/L (ref 136–145)
UFH PPP CHRO-ACNC: 0.32 IU/ML (ref 0.3–0.7)
UFH PPP CHRO-ACNC: 0.56 IU/ML (ref 0.3–0.7)
UFH PPP CHRO-ACNC: 0.68 IU/ML (ref 0.3–0.7)
WBC NRBC COR # BLD AUTO: 12.59 10*3/MM3 (ref 3.4–10.8)

## 2023-11-19 PROCEDURE — 85520 HEPARIN ASSAY: CPT

## 2023-11-19 PROCEDURE — 94799 UNLISTED PULMONARY SVC/PX: CPT

## 2023-11-19 PROCEDURE — 25010000002 HEPARIN (PORCINE) 25000-0.45 UT/250ML-% SOLUTION

## 2023-11-19 PROCEDURE — 63710000001 INSULIN LISPRO (HUMAN) PER 5 UNITS: Performed by: INTERNAL MEDICINE

## 2023-11-19 PROCEDURE — 82948 REAGENT STRIP/BLOOD GLUCOSE: CPT

## 2023-11-19 PROCEDURE — 25010000002 FUROSEMIDE PER 20 MG: Performed by: INTERNAL MEDICINE

## 2023-11-19 PROCEDURE — 80048 BASIC METABOLIC PNL TOTAL CA: CPT | Performed by: INTERNAL MEDICINE

## 2023-11-19 PROCEDURE — 85610 PROTHROMBIN TIME: CPT | Performed by: INTERNAL MEDICINE

## 2023-11-19 PROCEDURE — 99232 SBSQ HOSP IP/OBS MODERATE 35: CPT | Performed by: INTERNAL MEDICINE

## 2023-11-19 PROCEDURE — 94664 DEMO&/EVAL PT USE INHALER: CPT

## 2023-11-19 PROCEDURE — 85025 COMPLETE CBC W/AUTO DIFF WBC: CPT | Performed by: INTERNAL MEDICINE

## 2023-11-19 PROCEDURE — 63710000001 INSULIN DETEMIR PER 5 UNITS: Performed by: INTERNAL MEDICINE

## 2023-11-19 RX ORDER — FUROSEMIDE 10 MG/ML
40 INJECTION INTRAMUSCULAR; INTRAVENOUS EVERY 12 HOURS SCHEDULED
Status: DISCONTINUED | OUTPATIENT
Start: 2023-11-19 | End: 2023-11-21

## 2023-11-19 RX ORDER — FUROSEMIDE 10 MG/ML
40 INJECTION INTRAMUSCULAR; INTRAVENOUS EVERY 12 HOURS
Status: DISCONTINUED | OUTPATIENT
Start: 2023-11-19 | End: 2023-11-19

## 2023-11-19 RX ORDER — WARFARIN SODIUM 7.5 MG/1
7.5 TABLET ORAL
Status: DISCONTINUED | OUTPATIENT
Start: 2023-11-19 | End: 2023-11-20

## 2023-11-19 RX ADMIN — INSULIN LISPRO 4 UNITS: 100 INJECTION, SOLUTION INTRAVENOUS; SUBCUTANEOUS at 12:39

## 2023-11-19 RX ADMIN — Medication 10 ML: at 21:31

## 2023-11-19 RX ADMIN — PANTOPRAZOLE SODIUM 40 MG: 40 INJECTION, POWDER, LYOPHILIZED, FOR SOLUTION INTRAVENOUS at 10:12

## 2023-11-19 RX ADMIN — HYDROCODONE BITARTRATE AND ACETAMINOPHEN 1 TABLET: 5; 325 TABLET ORAL at 22:30

## 2023-11-19 RX ADMIN — DOCUSATE SODIUM 100 MG: 100 CAPSULE, LIQUID FILLED ORAL at 10:13

## 2023-11-19 RX ADMIN — IPRATROPIUM BROMIDE AND ALBUTEROL SULFATE 3 ML: 2.5; .5 SOLUTION RESPIRATORY (INHALATION) at 20:04

## 2023-11-19 RX ADMIN — IPRATROPIUM BROMIDE AND ALBUTEROL SULFATE 3 ML: 2.5; .5 SOLUTION RESPIRATORY (INHALATION) at 16:31

## 2023-11-19 RX ADMIN — CARVEDILOL 12.5 MG: 12.5 TABLET, FILM COATED ORAL at 10:13

## 2023-11-19 RX ADMIN — BUDESONIDE AND FORMOTEROL FUMARATE DIHYDRATE 2 PUFF: 160; 4.5 AEROSOL RESPIRATORY (INHALATION) at 08:50

## 2023-11-19 RX ADMIN — FOLIC ACID TAB 400 MCG 800 MCG: 400 TAB at 10:13

## 2023-11-19 RX ADMIN — CARVEDILOL 12.5 MG: 12.5 TABLET, FILM COATED ORAL at 17:13

## 2023-11-19 RX ADMIN — NYSTATIN 500000 UNITS: 100000 SUSPENSION ORAL at 21:30

## 2023-11-19 RX ADMIN — ESCITALOPRAM OXALATE 20 MG: 20 TABLET ORAL at 10:13

## 2023-11-19 RX ADMIN — NYSTATIN 500000 UNITS: 100000 SUSPENSION ORAL at 17:13

## 2023-11-19 RX ADMIN — Medication 10 ML: at 10:14

## 2023-11-19 RX ADMIN — INSULIN LISPRO 4 UNITS: 100 INJECTION, SOLUTION INTRAVENOUS; SUBCUTANEOUS at 12:38

## 2023-11-19 RX ADMIN — INSULIN LISPRO 2 UNITS: 100 INJECTION, SOLUTION INTRAVENOUS; SUBCUTANEOUS at 10:14

## 2023-11-19 RX ADMIN — INSULIN LISPRO 4 UNITS: 100 INJECTION, SOLUTION INTRAVENOUS; SUBCUTANEOUS at 10:13

## 2023-11-19 RX ADMIN — IPRATROPIUM BROMIDE AND ALBUTEROL SULFATE 3 ML: 2.5; .5 SOLUTION RESPIRATORY (INHALATION) at 08:49

## 2023-11-19 RX ADMIN — INSULIN DETEMIR 10 UNITS: 100 INJECTION, SOLUTION SUBCUTANEOUS at 10:13

## 2023-11-19 RX ADMIN — NYSTATIN 500000 UNITS: 100000 SUSPENSION ORAL at 10:12

## 2023-11-19 RX ADMIN — NYSTATIN 500000 UNITS: 100000 SUSPENSION ORAL at 12:38

## 2023-11-19 RX ADMIN — OXYBUTYNIN CHLORIDE 5 MG: 5 TABLET, EXTENDED RELEASE ORAL at 10:13

## 2023-11-19 RX ADMIN — FERROUS SULFATE TAB 325 MG (65 MG ELEMENTAL FE) 325 MG: 325 (65 FE) TAB at 10:12

## 2023-11-19 RX ADMIN — HYDROCODONE BITARTRATE AND ACETAMINOPHEN 1 TABLET: 5; 325 TABLET ORAL at 10:12

## 2023-11-19 RX ADMIN — BUDESONIDE AND FORMOTEROL FUMARATE DIHYDRATE 2 PUFF: 160; 4.5 AEROSOL RESPIRATORY (INHALATION) at 20:04

## 2023-11-19 RX ADMIN — INSULIN LISPRO 3 UNITS: 100 INJECTION, SOLUTION INTRAVENOUS; SUBCUTANEOUS at 21:30

## 2023-11-19 RX ADMIN — INSULIN LISPRO 2 UNITS: 100 INJECTION, SOLUTION INTRAVENOUS; SUBCUTANEOUS at 17:13

## 2023-11-19 RX ADMIN — HYDROCODONE BITARTRATE AND ACETAMINOPHEN 1 TABLET: 5; 325 TABLET ORAL at 16:30

## 2023-11-19 RX ADMIN — PANTOPRAZOLE SODIUM 40 MG: 40 INJECTION, POWDER, LYOPHILIZED, FOR SOLUTION INTRAVENOUS at 17:13

## 2023-11-19 RX ADMIN — WARFARIN SODIUM 7.5 MG: 7.5 TABLET ORAL at 17:13

## 2023-11-19 RX ADMIN — FUROSEMIDE 40 MG: 10 INJECTION, SOLUTION INTRAMUSCULAR; INTRAVENOUS at 12:38

## 2023-11-19 RX ADMIN — Medication 5 MG: at 21:30

## 2023-11-19 RX ADMIN — INSULIN LISPRO 4 UNITS: 100 INJECTION, SOLUTION INTRAVENOUS; SUBCUTANEOUS at 17:13

## 2023-11-19 RX ADMIN — HEPARIN SODIUM 23 UNITS/KG/HR: 10000 INJECTION, SOLUTION INTRAVENOUS at 09:54

## 2023-11-19 NOTE — PLAN OF CARE
Goal Outcome Evaluation:  Plan of Care Reviewed With: patient        Progress: no change  Outcome Evaluation: VSS. AV-paced, Pain treated with PRNs. 2LNC. Heparin gtt @ 21 units/kg. Continue POC.

## 2023-11-19 NOTE — PROGRESS NOTES
HEPARIN INFUSION  Di Lopez is a  62 y.o. female receiving heparin infusion.     The[facility]  Patient Weight: 90.9 kg  Initial Bolus (Y/N): No  Any Bolus (Y/N): Yes    Signs or Symptoms of Bleedin episodes of BRBPR on admission, ongoing anemia, no bleeding noted now per RN     Cardiac or Other (Not VTE)  Initial rate: 12 units/kg/hr (Max 1,000 units/hr)   Anti Xa Rebolus Infusion Hold time Change infusion Dose (Units/kg/hr) Next Anti Xa or aPTT Level Due   < 0.11 50 Units/kg  (4000 Units Max) None Increase by  3 Units/kg/hr 6 hours   0.11- 0.19 25 Units/kg  (2000 Units Max) None Increase by  2 Units/kg/hr 6 hours   0.2 - 0.29 0 None Increase by  1 Units/kg/hr 6 hours   0.3 - 0.5 0 None No Change 6 hours (after 2 consecutive levels in range check qAM)   0.51 - 0.6 0 None Decrease by  1 Units/kg/hr 6 hours   0.61 - 0.8 0 30 Minutes Decrease by  2 Units/kg/hr 6 hours   0.81 - 1 0 60 Minutes Decrease by  3 Units/kg/hr 6 hours   >1 0 Hold  After Anti Xa less than 0.5 decrease previous rate by  4 Units/kg/hr  Every 2 hours until Anti Xa  less than 0.5 then when infusion restarts in 6 hours   Recommend Xa every 6 hours.     Results from last 7 days   Lab Units 23  0427 23  0636 23  0730 11/15/23  1605 11/15/23  0745 23  0959 23  0813   INR  1.23* 1.25* 1.29*  --  1.36*  --  1.36*   HEMOGLOBIN g/dL  --   --  8.5* 8.7* 6.9*   < > 7.2*   HEMATOCRIT %  --   --  26.9* 26.5* 22.0*   < > 22.4*   PLATELETS 10*3/mm3  --   --  345  --  285  --  261    < > = values in this interval not displayed.       Date   Time   Anti-Xa Current Rate (Unit/kg/hr) Bolus   (Units) Rate Change   (Unit/kg/hr) New Rate (Unit/kg/hr) Next   Anti-Xa Comments  Pump Check Daily   11/10 0200 0.10    INR 2.88 --- - -- -- 0600 D/w APRN, hold until INR >2.5. Currently 2.88, ordered repeat INR @ 0600   11/10 0437 --    INR 2.84 -- -- -- -- 1800 Continue to hold heparin gtt until until  <2.5; repeat INR at 1800.   11/10 1902 --    INR 1.21 -- -- +11 11 0400  RN Tmo. INR returned at 1.21 . Starting heparin gtt to cover mechanical valves     11/11 0600 0.1 11 4000 +3 14 1200 D/w RN, no reported bleeding overnight   11/11 1145 0.25 14 -- +1 15 1900 D/W RN- warfarin resumed   11/11 1844 0.10 15 4000 +3 18 0200 DW RN   11/12 0300 0.46 18 -- -- 18 0800 D/w RN   11/12 0916 0.42 18 -- -- 18 0600 D/W RN   11/13 0630 0.24 18 -- +1 19 1200 D/w RN   11/13 1034 0.34 19 -- -- 19 1800 Spoke to RN   11/13 1718 0.26 19 -- +1 20 0200  RN    11/14 0230 0.33 20 -- -- 20 0800 D/w RN   11/14 0813 0.33 20 -- -- 20 0600 D/w JARAD Johnson   Checked pump   11/15 0745 0.25 20 -- +1 21 1500 DW RN    11/15 1535 0.34 21 -- -- 21 2200 DW RN    11/15 2300 0.33 21 -- -- 21 0600 D/w RN   11/16 0730 0.23 21 -- +1 22 1600 Spoke to RN   11/16 1531 -- Off 4000  +22 22 2200 DW RN Margaret, will rebolus with heparin being off since this morning for colonoscopy    11/16 2209 0.20 22 -- +1 23 0600 Evi 3250 -acb   11/17 636 0.1 23 4000 +2 25 1600 Spoke to Ed, heparin infusing, has not been off, weight in EPIC need updating   11/17 1613 0.45 25 -- -- 25 0000 DW RN   11/18 0019 0329 25 -- +1 26 0900 Mendoza 3249 -acb   11/18 0837 0.44 26 -- 0 26 1500 DW RN    11/18 1525 0.48 26 -- -- 26 2100 Dw RN, Ed   11/18 2245 0.72 26 -- -2 24 0600 Dw RN   11/19 0630 0.56 24 -- -1 23 1200 D/w RN   11/19 1237 0.68 23 -- -2  21 2000 D/w RN. Hold x 30 minutes.      Dante Shankar, PharmD  11/19/2023  13:28 EST

## 2023-11-19 NOTE — PROGRESS NOTES
[facility] Medicine Services  PROGRESS NOTE    Patient Name: Di Lopez  : 1960  MRN: [id number]    Date of Admission: 2023  Primary Care Physician: Davina Santiago DO    Subjective   Subjective     CC:  BRBPR, HA     HPI:  Resting in bed in no acute distress.  Overall feels better except she feels her edema of lower extremity is worse than her baseline in a significant way.  Denies any fever or chills.  No chest pain or palpitation or shortness of breath.  No nausea vomiting or diarrhea.      Objective   Objective     Vital Signs:   Temp:  [97.7 °F (36.5 °C)-99 °F (37.2 °C)] 98.5 °F (36.9 °C)  Heart Rate:  [] 82  Resp:  [16-18] 18  BP: (136-171)/(49-80) 146/67  Flow (L/min):  [1-3] 1     Physical Exam:  Constitutional: No acute distress  HENT: NCAT, mucous membranes moist  Respiratory: Clear to auscultation bilaterally, breathing with no labor.  Cardiovascular: RRR, no murmurs, rubs, or gallops  Gastrointestinal: Positive bowel sounds, soft, nontender, nondistended  Musculoskeletal:  bilateral ankle edema  Psychiatric: Appropriate affect, cooperative  Neurologic: Awake, alert, oriented x3, no focality appreciated, speech clear  Skin: No rashes      Results Reviewed:  LAB RESULTS:      Lab 23  1237 23  0428 23  2108 23  1525 23  0837 23  0427 23  1613 23  0636 23  2209 23  0730 11/15/23  2201 11/15/23  1605 11/15/23  1535 11/15/23  0745 11/15/23  0003 23  0959 23  0813 23  1148 23  0351   WBC  --  12.59*  --   --   --   --   --   --   --  12.07*  --   --   --  11.42*  --   --  13.05*  --  15.09*   HEMOGLOBIN  --  8.3*  --   --   --   --   --   --   --  8.5*  --  8.7*  --  6.9* 7.4*   < > 7.2*  --  7.6*   HEMATOCRIT  --  26.4*  --   --   --   --   --   --   --  26.9*  --  26.5*  --  22.0* 23.4*   < > 22.4*  --  23.3*   PLATELETS  --  333  --   --   --   --   --   --   --  345  --    --   --  285  --   --  261  --  277   NEUTROS ABS  --  8.08*  --   --   --   --   --   --   --  8.25*  --   --   --   --   --   --   --   --  11.06*   IMMATURE GRANS (ABS)  --  0.16*  --   --   --   --   --   --   --  0.15*  --   --   --   --   --   --   --   --  0.17*   LYMPHS ABS  --  2.92  --   --   --   --   --   --   --  2.27  --   --   --   --   --   --   --   --  2.34   MONOS ABS  --  0.73  --   --   --   --   --   --   --  0.84  --   --   --   --   --   --   --   --  1.14*   EOS ABS  --  0.65*  --   --   --   --   --   --   --  0.52*  --   --   --   --   --   --   --   --  0.30   MCV  --  92.0  --   --   --   --   --   --   --  89.1  --   --   --  88.4  --   --  86.8  --  86.6   PROCALCITONIN  --   --   --   --   --   --   --   --   --   --   --   --   --  0.28*  --   --   --   --  0.32*   PROTIME  --  15.6*  --   --   --  15.6*  --  15.9*  --  16.2*  --   --   --  16.9*  --   --  16.9*  --  15.6*   HEPARIN ANTI-XA 0.68 0.56 0.72* 0.48 0.44  --    < > 0.10*   < > 0.23*   < >  --    < > 0.25*  --   --  0.33   < > 0.24*    < > = values in this interval not displayed.         Lab 11/19/23 0428 11/18/23  0427 11/17/23  0636 11/16/23  0730 11/15/23  0745   SODIUM 141 141 141 142 137   POTASSIUM 3.7 3.6 3.7 3.4* 3.3*   CHLORIDE 103 102 106 104 101   CO2 26.0 28.0 25.0 26.0 26.0   ANION GAP 12.0 11.0 10.0 12.0 10.0   BUN 19 20 18 17 22   CREATININE 1.05* 1.35* 1.20* 1.31* 1.41*   EGFR 60.6 44.8* 51.6* 46.5* 42.5*   GLUCOSE 227* 188* 258* 148* 121*   CALCIUM 8.7 8.9 8.9 9.3 8.8               Lab 11/19/23  0428 11/18/23  0427 11/17/23  0636 11/16/23  0730 11/15/23  0745   PROTIME 15.6* 15.6* 15.9* 16.2* 16.9*   INR 1.23* 1.23* 1.25* 1.29* 1.36*             Lab 11/14/23  1736   ABO TYPING O   RH TYPING Positive   ANTIBODY SCREEN Negative         Brief Urine Lab Results  (Last result in the past 365 days)        Color   Clarity   Blood   Leuk Est   Nitrite   Protein   CREAT   Urine HCG        11/09/23 2320 Yellow    Clear   Negative   Small (1+)   Positive   100 mg/dL (2+)                   Microbiology Results Abnormal       Procedure Component Value - Date/Time    Blood Culture - Blood, Arm, Right [[id number]]  (Normal) Collected: 11/10/23 0027    Lab Status: Final result Specimen: Blood from Arm, Right Updated: 11/15/23 0100     Blood Culture No growth at 5 days    Blood Culture - Blood, Arm, Right [[id number]]  (Normal) Collected: 11/10/23 0027    Lab Status: Final result Specimen: Blood from Arm, Right Updated: 11/15/23 0100     Blood Culture No growth at 5 days            No radiology results from the last 24 hrs    Results for orders placed during the hospital encounter of 05/31/23    Adult Transthoracic Echo Complete W/ Cont if Necessary Per Protocol    Interpretation Summary    Mildly reduced right ventricular systolic function noted.    There is calcification of the aortic valve.    Estimated right ventricular systolic pressure from tricuspid regurgitation is normal (<35 mmHg).    Mild global LV systolic dysfunction    EF=45-50%    Normal prosthetic MV function    AV calcification      Current medications:  Scheduled Meds:budesonide-formoterol, 2 puff, Inhalation, BID - RT  carvedilol, 12.5 mg, Oral, BID With Meals  docusate sodium, 100 mg, Oral, BID  escitalopram, 20 mg, Oral, Daily  ferrous sulfate, 325 mg, Oral, Daily With Breakfast  folic acid, 800 mcg, Oral, Daily  furosemide, 40 mg, Intravenous, Q12H  insulin detemir, 10 Units, Subcutaneous, Daily  insulin lispro, 2-7 Units, Subcutaneous, 4x Daily AC & at Bedtime  Insulin Lispro, 4 Units, Subcutaneous, TID With Meals  ipratropium-albuterol, 3 mL, Nebulization, 4x Daily - RT  nicotine, 1 patch, Transdermal, Q24H  nystatin, 5 mL, Swish & Swallow, 4x Daily  oxybutynin XL, 5 mg, Oral, Daily  pantoprazole, 40 mg, Intravenous, BID AC  sodium chloride, 10 mL, Intravenous, Q12H  sodium chloride, 10 mL, Intravenous, Q12H  warfarin (COUMADIN) (dosing per levels), , Does not  apply, Daily  warfarin, 7.5 mg, Oral, Daily      Continuous Infusions:heparin, 21 Units/kg/hr, Last Rate: 21 Units/kg/hr (11/19/23 1419)  lactated ringers, 9 mL/hr  Pharmacy to Dose Heparin,   Pharmacy to dose warfarin,       PRN Meds:.  acetaminophen    aspirin-acetaminophen-caffeine    [DISCONTINUED] senna-docusate sodium **AND** polyethylene glycol **AND** bisacodyl **AND** bisacodyl    dextrose    dextrose    glucagon (human recombinant)    HYDROcodone-acetaminophen    lidocaine PF 1%    melatonin    midazolam    nicotine polacrilex    Pharmacy to Dose Heparin    Pharmacy to dose warfarin    sodium chloride    sodium chloride    sodium chloride    sodium chloride    Assessment & Plan   Assessment & Plan     Active Hospital Problems    Diagnosis  POA    **GIB (gastrointestinal bleeding) [K92.2]  Yes    Severe malnutrition [E43]  Yes    GI bleed [K92.2]  Yes    Acute blood loss anemia [D62]  Yes    MEERA (acute kidney injury) [N17.9]  Yes    H/O heart valve replacement with mechanical valve [Z95.2]  Not Applicable    Melena [K92.1]  Unknown    Chronic respiratory failure with hypoxia [J96.11]  Yes    Chronic diastolic heart failure [I50.32]  Yes    Emphysema/COPD [J43.9]  Yes    Hypertension [I10]  Yes    Type 2 diabetes mellitus without complication, with long-term current use of insulin [E11.9, Z79.4]  Not Applicable    Depression [F32.A]  Yes    Dyslipidemia [E78.5]  Yes    Obstructive sleep apnea syndrome [G47.33]  Yes      Resolved Hospital Problems   No resolved problems to display.        Brief Hospital Course to date:  Di Lopez is a 62 y.o. female  with PMH significant for COPD, HTN, HLD, CAD, depression, CHF, VHD s/p mechanical valve replacement on warfarin, DM2, CAL on CPAP, liver mass, obesity.  She has had diarrhea for 2 weeks, then developed BRBPR on 11/9. She was asked to hold her diuretics, Entresto and Trulicity by PCP last last week due to rising creatinine.    GI bleed - BRBPR   Symptomatic  acute blood loss anemia  Warfarin use; INR 3-4 recently  Adenovirus   - Hgb was 11 in May, was 7.1 on admission.   - Colonoscopy completed May 2023;  Had scheduled EGD outpatient 11/14/2023 (Dr. Fernandes)  - CT scan with inflammation in the sigmoid colon  - GI PCR with adenovirus   - Transfuse another unit 11/14 for total of 3 units so far  -On 11/15 hemoglobin  dropped below 7.  Patient received 1 unit of packed red blood cell and GI also saw the patient.  -Patient had upper and lower endoscopy done on 11/16 by GI.  Significant findings on this study was Candida esophagitis and also diverticulosis.  GI bleeding was presumed to be diverticular bleeding.       MEERA  - Baseline creatinine ~0.8-0.9,  - was 1.9 on admission  -Currently on Lasix.  Creatinine has improved and will monitor renal function and if necessary will ask nephrology to see the patient.      HFmrEF  - EF 45-50 3/23.   - Holding home entresto due to MEERA. Continue coreg  - Received intermittent IV diuretics with blood transfusion      Chronic hypoxic resp failure  - 3L at baseline but states home O2 concentrator is broken -- discussed with case management      UTI POA  - urine culture ecoli   - Past UTI was E coli, a nd Klebs, not ESBL .   - s/p 3 doses of rocephin      Bed bugs   - Isolation      Mechanical mitral valve   - Continue heparin gtt.   - plans to DC home with lovenox bridge. Follows with Dr. Everett      Diabetes mellitus 2 - A1C 6.8   - Hold Trulicity, glimepiride  - Hold Lantus for now, add back as appropriate  - FSBG ACHS     Hypertension  Dyslipidemia  - Currently controlled     CAL  - CPAP     Nicotine use  - Vapes  - Nicotine patch  - Cessation counseling     Obesity   - BMI 39. Complicates all aspects of care    Plan:  -Continue current treatment including heparin bridge  -Pharmacy is managing Coumadin.  Patient is taking Coumadin for mechanical valve  -Continue diuresing and monitor renal function.  -If labs are acceptable patient  possibly can go home tomorrow.    Expected Discharge Location and Transportation: home  Expected Discharge   Expected Discharge Date: 11/18/2023; Expected Discharge Time:      DVT prophylaxis:  Medical DVT prophylaxis orders are present.     AM-PAC 6 Clicks Score (PT): 24 (11/18/23 0800)    CODE STATUS:   Code Status and Medical Interventions:   Ordered at: 11/09/23 1259     Code Status (Patient has no pulse and is not breathing):    CPR (Attempt to Resuscitate)     Medical Interventions (Patient has pulse or is breathing):    Full Support       Getachew Haines MD  11/19/23

## 2023-11-19 NOTE — PROGRESS NOTES
"Pharmacy Consult  -  Warfarin  Di Lopez is a  62 y.o. female   Height - 152.4 cm (60\")  Weight - 97.3 kg (214 lb 8.1 oz)    Consulting Provider: Dr. Sherman  Indication: Mechanical MVR  Goal INR: 2.5 - 3.5   Home Regimen: 4 mg daily, except 4.5 mg on Sat/Sun    Bridge Therapy: Yes   and unfractionated heparin    Drug-Drug Interactions with current regimen:   Excedrin contains asa - increase bleed risk              Heparin - increase bleed risk              Norco - potential to increase INR  Warfarin Dosing During Admission:  Date  11/10 11/11 11/12 11/13 11/14 11/15 11/16 11/17 11/18   INR  4.13 1.09 1.16 1.22 1.36 1.36 1.29 1.25 1.23   Dose  IV Vit K given 6 mg 3 mg 3 mg  HOLD HOLD HOLD 5 mg 0 mg ( error)     Date  11/19           INR  1.23           Dose  7.5 mg               Education Provided: 11/11/23 - provided education pamphlet and answered questions.      Discharge Follow up:   Following Provider - Dr. Everett   Follow up time range or appointment - 2-3 days following discharge     Labs:  Results from last 7 days   Lab Units 11/18/23  0427 11/17/23  0636 11/16/23  0730 11/15/23  1605 11/15/23  0745 11/15/23  0003 11/14/23  0959 11/14/23  0813 11/13/23  0351 11/12/23  0148   INR  1.23* 1.25* 1.29*  --  1.36*  --   --  1.36* 1.22* 1.16*   HEMOGLOBIN g/dL  --   --  8.5* 8.7* 6.9* 7.4* 7.1* 7.2* 7.6* 7.2*   HEMATOCRIT %  --   --  26.9* 26.5* 22.0* 23.4* 22.6* 22.4* 23.3* 21.8*     Results from last 7 days   Lab Units 11/18/23  0427 11/17/23  0636 11/16/23  0730   SODIUM mmol/L 141 141 142   POTASSIUM mmol/L 3.6 3.7 3.4*   CHLORIDE mmol/L 102 106 104   CO2 mmol/L 28.0 25.0 26.0   BUN mg/dL 20 18 17   CREATININE mg/dL 1.35* 1.20* 1.31*   CALCIUM mg/dL 8.9 8.9 9.3   GLUCOSE mg/dL 188* 258* 148*     Current dietary intake: % on 11/17     Assessment/Plan:   Pharmacy to dose warfarin for Mechanical MVR.  Goal INR: 2.5-3.5  Patient's INR remains SUBtherapeutic today at 1.23. Warfarin had been " held for three days and was restarted 11/17. Dose was inadvertently not entered 11/18 so no dose was received.   Patient admitted with supratherapeutic INR of 4.13 and GIB; given IV Vit K 5mg on 11/10.   Recommend to warfarin 7.5 mg today. Continue heparin drip as well.   Monitor for signs/symptoms of bleeding, dietary intake, and drug-drug interactions.   Pharmacy will continue to follow and make dose adjustments as necessary.    Thank you,    Dante Shankar [facility]  11/18/2023  09:42 EST

## 2023-11-19 NOTE — NURSING NOTE
PT A/O x 4, on 3L NC humidified, VSS. No acute events today. Norco helps with her back pain, although she refused Lasix intervention to remove excess fluid in her extremities. She is ambulatory and has had a good appetite. PT has verbalized no needs at this time, bed low, call light in reach. Will continue with current POC.

## 2023-11-20 LAB
ANION GAP SERPL CALCULATED.3IONS-SCNC: 10 MMOL/L (ref 5–15)
BUN SERPL-MCNC: 24 MG/DL (ref 8–23)
BUN/CREAT SERPL: 20.9 (ref 7–25)
CALCIUM SPEC-SCNC: 9.4 MG/DL (ref 8.6–10.5)
CHLORIDE SERPL-SCNC: 100 MMOL/L (ref 98–107)
CO2 SERPL-SCNC: 30 MMOL/L (ref 22–29)
CREAT SERPL-MCNC: 1.15 MG/DL (ref 0.57–1)
EGFRCR SERPLBLD CKD-EPI 2021: 54.3 ML/MIN/1.73
GLUCOSE BLDC GLUCOMTR-MCNC: 196 MG/DL (ref 70–130)
GLUCOSE BLDC GLUCOMTR-MCNC: 213 MG/DL (ref 70–130)
GLUCOSE BLDC GLUCOMTR-MCNC: 225 MG/DL (ref 70–130)
GLUCOSE BLDC GLUCOMTR-MCNC: 285 MG/DL (ref 70–130)
GLUCOSE SERPL-MCNC: 280 MG/DL (ref 65–99)
HCT VFR BLD AUTO: 26.9 % (ref 34–46.6)
HGB BLD-MCNC: 8.6 G/DL (ref 12–15.9)
INR PPP: 1.1 (ref 0.89–1.12)
POTASSIUM SERPL-SCNC: 3.6 MMOL/L (ref 3.5–5.2)
PROTHROMBIN TIME: 14.3 SECONDS (ref 12.2–14.5)
SODIUM SERPL-SCNC: 140 MMOL/L (ref 136–145)
UFH PPP CHRO-ACNC: 0.25 IU/ML (ref 0.3–0.7)
UFH PPP CHRO-ACNC: 0.38 IU/ML (ref 0.3–0.7)
UFH PPP CHRO-ACNC: 0.42 IU/ML (ref 0.3–0.7)

## 2023-11-20 PROCEDURE — 94799 UNLISTED PULMONARY SVC/PX: CPT

## 2023-11-20 PROCEDURE — 85520 HEPARIN ASSAY: CPT

## 2023-11-20 PROCEDURE — 25010000002 HEPARIN (PORCINE) 25000-0.45 UT/250ML-% SOLUTION

## 2023-11-20 PROCEDURE — 94761 N-INVAS EAR/PLS OXIMETRY MLT: CPT

## 2023-11-20 PROCEDURE — 63710000001 INSULIN LISPRO (HUMAN) PER 5 UNITS: Performed by: INTERNAL MEDICINE

## 2023-11-20 PROCEDURE — 82948 REAGENT STRIP/BLOOD GLUCOSE: CPT

## 2023-11-20 PROCEDURE — 25010000002 FUROSEMIDE PER 20 MG: Performed by: INTERNAL MEDICINE

## 2023-11-20 PROCEDURE — 94664 DEMO&/EVAL PT USE INHALER: CPT

## 2023-11-20 PROCEDURE — 85014 HEMATOCRIT: CPT | Performed by: STUDENT IN AN ORGANIZED HEALTH CARE EDUCATION/TRAINING PROGRAM

## 2023-11-20 PROCEDURE — 80048 BASIC METABOLIC PNL TOTAL CA: CPT | Performed by: INTERNAL MEDICINE

## 2023-11-20 PROCEDURE — 85018 HEMOGLOBIN: CPT | Performed by: STUDENT IN AN ORGANIZED HEALTH CARE EDUCATION/TRAINING PROGRAM

## 2023-11-20 PROCEDURE — 63710000001 INSULIN DETEMIR PER 5 UNITS: Performed by: INTERNAL MEDICINE

## 2023-11-20 PROCEDURE — 99232 SBSQ HOSP IP/OBS MODERATE 35: CPT | Performed by: STUDENT IN AN ORGANIZED HEALTH CARE EDUCATION/TRAINING PROGRAM

## 2023-11-20 PROCEDURE — 85610 PROTHROMBIN TIME: CPT | Performed by: INTERNAL MEDICINE

## 2023-11-20 RX ORDER — WARFARIN SODIUM 2 MG/1
2 TABLET ORAL WEEKLY
Status: DISCONTINUED | OUTPATIENT
Start: 2023-11-24 | End: 2023-11-22 | Stop reason: HOSPADM

## 2023-11-20 RX ORDER — WARFARIN SODIUM 4 MG/1
4 TABLET ORAL
Status: DISCONTINUED | OUTPATIENT
Start: 2023-11-20 | End: 2023-11-22 | Stop reason: HOSPADM

## 2023-11-20 RX ADMIN — PANTOPRAZOLE SODIUM 40 MG: 40 INJECTION, POWDER, LYOPHILIZED, FOR SOLUTION INTRAVENOUS at 18:06

## 2023-11-20 RX ADMIN — INSULIN LISPRO 4 UNITS: 100 INJECTION, SOLUTION INTRAVENOUS; SUBCUTANEOUS at 09:16

## 2023-11-20 RX ADMIN — INSULIN LISPRO 4 UNITS: 100 INJECTION, SOLUTION INTRAVENOUS; SUBCUTANEOUS at 11:33

## 2023-11-20 RX ADMIN — WARFARIN SODIUM 4 MG: 4 TABLET ORAL at 18:06

## 2023-11-20 RX ADMIN — BUDESONIDE AND FORMOTEROL FUMARATE DIHYDRATE 2 PUFF: 160; 4.5 AEROSOL RESPIRATORY (INHALATION) at 08:05

## 2023-11-20 RX ADMIN — INSULIN LISPRO 4 UNITS: 100 INJECTION, SOLUTION INTRAVENOUS; SUBCUTANEOUS at 18:07

## 2023-11-20 RX ADMIN — IPRATROPIUM BROMIDE AND ALBUTEROL SULFATE 3 ML: 2.5; .5 SOLUTION RESPIRATORY (INHALATION) at 16:27

## 2023-11-20 RX ADMIN — BUDESONIDE AND FORMOTEROL FUMARATE DIHYDRATE 2 PUFF: 160; 4.5 AEROSOL RESPIRATORY (INHALATION) at 19:41

## 2023-11-20 RX ADMIN — NYSTATIN 500000 UNITS: 100000 SUSPENSION ORAL at 20:54

## 2023-11-20 RX ADMIN — IPRATROPIUM BROMIDE AND ALBUTEROL SULFATE 3 ML: 2.5; .5 SOLUTION RESPIRATORY (INHALATION) at 08:03

## 2023-11-20 RX ADMIN — NYSTATIN 500000 UNITS: 100000 SUSPENSION ORAL at 18:15

## 2023-11-20 RX ADMIN — HEPARIN SODIUM 21 UNITS/KG/HR: 10000 INJECTION, SOLUTION INTRAVENOUS at 01:24

## 2023-11-20 RX ADMIN — CARVEDILOL 12.5 MG: 12.5 TABLET, FILM COATED ORAL at 18:06

## 2023-11-20 RX ADMIN — Medication 5 MG: at 23:39

## 2023-11-20 RX ADMIN — FERROUS SULFATE TAB 325 MG (65 MG ELEMENTAL FE) 325 MG: 325 (65 FE) TAB at 09:05

## 2023-11-20 RX ADMIN — FUROSEMIDE 40 MG: 10 INJECTION, SOLUTION INTRAMUSCULAR; INTRAVENOUS at 09:07

## 2023-11-20 RX ADMIN — HEPARIN SODIUM 22 UNITS/KG/HR: 10000 INJECTION, SOLUTION INTRAVENOUS at 14:33

## 2023-11-20 RX ADMIN — IPRATROPIUM BROMIDE AND ALBUTEROL SULFATE 3 ML: 2.5; .5 SOLUTION RESPIRATORY (INHALATION) at 11:58

## 2023-11-20 RX ADMIN — INSULIN LISPRO 4 UNITS: 100 INJECTION, SOLUTION INTRAVENOUS; SUBCUTANEOUS at 21:08

## 2023-11-20 RX ADMIN — IPRATROPIUM BROMIDE AND ALBUTEROL SULFATE 3 ML: 2.5; .5 SOLUTION RESPIRATORY (INHALATION) at 19:41

## 2023-11-20 RX ADMIN — INSULIN DETEMIR 10 UNITS: 100 INJECTION, SOLUTION SUBCUTANEOUS at 09:06

## 2023-11-20 RX ADMIN — PANTOPRAZOLE SODIUM 40 MG: 40 INJECTION, POWDER, LYOPHILIZED, FOR SOLUTION INTRAVENOUS at 09:06

## 2023-11-20 RX ADMIN — HYDROCODONE BITARTRATE AND ACETAMINOPHEN 1 TABLET: 5; 325 TABLET ORAL at 09:15

## 2023-11-20 RX ADMIN — Medication 10 ML: at 21:10

## 2023-11-20 RX ADMIN — INSULIN LISPRO 2 UNITS: 100 INJECTION, SOLUTION INTRAVENOUS; SUBCUTANEOUS at 09:15

## 2023-11-20 RX ADMIN — INSULIN LISPRO 2 UNITS: 100 INJECTION, SOLUTION INTRAVENOUS; SUBCUTANEOUS at 18:06

## 2023-11-20 RX ADMIN — INSULIN LISPRO 2 UNITS: 100 INJECTION, SOLUTION INTRAVENOUS; SUBCUTANEOUS at 11:32

## 2023-11-20 RX ADMIN — OXYBUTYNIN CHLORIDE 5 MG: 5 TABLET, EXTENDED RELEASE ORAL at 09:05

## 2023-11-20 RX ADMIN — FOLIC ACID TAB 400 MCG 800 MCG: 400 TAB at 09:05

## 2023-11-20 RX ADMIN — NYSTATIN 500000 UNITS: 100000 SUSPENSION ORAL at 09:08

## 2023-11-20 RX ADMIN — NYSTATIN 500000 UNITS: 100000 SUSPENSION ORAL at 11:32

## 2023-11-20 RX ADMIN — ESCITALOPRAM OXALATE 20 MG: 20 TABLET ORAL at 09:04

## 2023-11-20 RX ADMIN — Medication 10 ML: at 21:11

## 2023-11-20 RX ADMIN — HYDROCODONE BITARTRATE AND ACETAMINOPHEN 1 TABLET: 5; 325 TABLET ORAL at 18:11

## 2023-11-20 RX ADMIN — Medication 10 ML: at 09:06

## 2023-11-20 RX ADMIN — CARVEDILOL 12.5 MG: 12.5 TABLET, FILM COATED ORAL at 09:05

## 2023-11-20 NOTE — PROGRESS NOTES
[facility] Medicine Services  PROGRESS NOTE    Patient Name: Di Lopez  : 1960  MRN: [id number]    Date of Admission: 2023  Primary Care Physician: Davina Santiago DO    Subjective   Subjective     CC:  BRBPR, JASON     HPI:  Continues to endorse LE edema, but states that this has improved greatly since admission      Objective   Objective     Vital Signs:   Temp:  [98.3 °F (36.8 °C)-98.7 °F (37.1 °C)] 98.6 °F (37 °C)  Heart Rate:  [] 66  Resp:  [16-18] 17  BP: (138-153)/(58-79) 138/61  Flow (L/min):  [1-2] 1     Physical Exam:  Constitutional: No acute distress  HENT: NCAT, mucous membranes moist  Respiratory: Clear to auscultation bilaterally, breathing with no labor.  Cardiovascular: RRR, no murmurs, rubs, or gallops  Gastrointestinal: Positive bowel sounds, soft, nontender, nondistended  Musculoskeletal:  bilateral ankle edema  Psychiatric: Appropriate affect, cooperative  Neurologic: Awake, alert, oriented x3, no focality appreciated, speech clear  Skin: No rashes      Results Reviewed:  LAB RESULTS:      Lab 23  1021 23  0150 23  2031 23  1237 23  0428 23  0837 23  0427 23  1613 23  0636 23  2209 23  0730 11/15/23  2201 11/15/23  1605 11/15/23  1535 11/15/23  0745 11/15/23  0003 23  0959 23  0813   WBC  --   --   --   --  12.59*  --   --   --   --   --  12.07*  --   --   --  11.42*  --   --  13.05*   HEMOGLOBIN  --   --   --   --  8.3*  --   --   --   --   --  8.5*  --  8.7*  --  6.9* 7.4*   < > 7.2*   HEMATOCRIT  --   --   --   --  26.4*  --   --   --   --   --  26.9*  --  26.5*  --  22.0* 23.4*   < > 22.4*   PLATELETS  --   --   --   --  333  --   --   --   --   --  345  --   --   --  285  --   --  261   NEUTROS ABS  --   --   --   --  8.08*  --   --   --   --   --  8.25*  --   --   --   --   --   --   --    IMMATURE GRANS (ABS)  --   --   --   --  0.16*  --   --   --   --   --   0.15*  --   --   --   --   --   --   --    LYMPHS ABS  --   --   --   --  2.92  --   --   --   --   --  2.27  --   --   --   --   --   --   --    MONOS ABS  --   --   --   --  0.73  --   --   --   --   --  0.84  --   --   --   --   --   --   --    EOS ABS  --   --   --   --  0.65*  --   --   --   --   --  0.52*  --   --   --   --   --   --   --    MCV  --   --   --   --  92.0  --   --   --   --   --  89.1  --   --   --  88.4  --   --  86.8   PROCALCITONIN  --   --   --   --   --   --   --   --   --   --   --   --   --   --  0.28*  --   --   --    PROTIME  --  14.3  --   --  15.6*  --  15.6*  --  15.9*  --  16.2*  --   --   --  16.9*  --   --  16.9*   HEPARIN ANTI-XA 0.42 0.25* 0.32 0.68 0.56   < >  --    < > 0.10*   < > 0.23*   < >  --    < > 0.25*  --   --  0.33    < > = values in this interval not displayed.         Lab 11/20/23  0150 11/19/23  0428 11/18/23  0427 11/17/23  0636 11/16/23  0730   SODIUM 140 141 141 141 142   POTASSIUM 3.6 3.7 3.6 3.7 3.4*   CHLORIDE 100 103 102 106 104   CO2 30.0* 26.0 28.0 25.0 26.0   ANION GAP 10.0 12.0 11.0 10.0 12.0   BUN 24* 19 20 18 17   CREATININE 1.15* 1.05* 1.35* 1.20* 1.31*   EGFR 54.3* 60.6 44.8* 51.6* 46.5*   GLUCOSE 280* 227* 188* 258* 148*   CALCIUM 9.4 8.7 8.9 8.9 9.3               Lab 11/20/23  0150 11/19/23  0428 11/18/23  0427 11/17/23  0636 11/16/23  0730   PROTIME 14.3 15.6* 15.6* 15.9* 16.2*   INR 1.10 1.23* 1.23* 1.25* 1.29*             Lab 11/14/23  1736   ABO TYPING O   RH TYPING Positive   ANTIBODY SCREEN Negative         Brief Urine Lab Results  (Last result in the past 365 days)        Color   Clarity   Blood   Leuk Est   Nitrite   Protein   CREAT   Urine HCG        11/09/23 2320 Yellow   Clear   Negative   Small (1+)   Positive   100 mg/dL (2+)                   Microbiology Results Abnormal       Procedure Component Value - Date/Time    Blood Culture - Blood, Arm, Right [[id number]]  (Normal) Collected: 11/10/23 0027    Lab Status: Final result Specimen:  Blood from Arm, Right Updated: 11/15/23 0100     Blood Culture No growth at 5 days    Blood Culture - Blood, Arm, Right [[id number]]  (Normal) Collected: 11/10/23 0027    Lab Status: Final result Specimen: Blood from Arm, Right Updated: 11/15/23 0100     Blood Culture No growth at 5 days            No radiology results from the last 24 hrs    Results for orders placed during the hospital encounter of 05/31/23    Adult Transthoracic Echo Complete W/ Cont if Necessary Per Protocol    Interpretation Summary    Mildly reduced right ventricular systolic function noted.    There is calcification of the aortic valve.    Estimated right ventricular systolic pressure from tricuspid regurgitation is normal (<35 mmHg).    Mild global LV systolic dysfunction    EF=45-50%    Normal prosthetic MV function    AV calcification      Current medications:  Scheduled Meds:budesonide-formoterol, 2 puff, Inhalation, BID - RT  carvedilol, 12.5 mg, Oral, BID With Meals  docusate sodium, 100 mg, Oral, BID  escitalopram, 20 mg, Oral, Daily  ferrous sulfate, 325 mg, Oral, Daily With Breakfast  folic acid, 800 mcg, Oral, Daily  furosemide, 40 mg, Intravenous, Q12H  insulin detemir, 10 Units, Subcutaneous, Daily  insulin lispro, 2-7 Units, Subcutaneous, 4x Daily AC & at Bedtime  Insulin Lispro, 4 Units, Subcutaneous, TID With Meals  ipratropium-albuterol, 3 mL, Nebulization, 4x Daily - RT  nicotine, 1 patch, Transdermal, Q24H  nystatin, 5 mL, Swish & Swallow, 4x Daily  oxybutynin XL, 5 mg, Oral, Daily  pantoprazole, 40 mg, Intravenous, BID AC  sodium chloride, 10 mL, Intravenous, Q12H  sodium chloride, 10 mL, Intravenous, Q12H  [START ON 11/24/2023] warfarin, 2 mg, Oral, Weekly  warfarin, 4 mg, Oral, Once per day on Sun Mon Tue Wed Thu Sat      Continuous Infusions:heparin, 22 Units/kg/hr, Last Rate: 22 Units/kg/hr (11/20/23 1433)  lactated ringers, 9 mL/hr  Pharmacy to Dose Heparin,   Pharmacy to dose warfarin,       PRN Meds:.  acetaminophen     aspirin-acetaminophen-caffeine    [DISCONTINUED] senna-docusate sodium **AND** polyethylene glycol **AND** bisacodyl **AND** bisacodyl    dextrose    dextrose    glucagon (human recombinant)    HYDROcodone-acetaminophen    lidocaine PF 1%    melatonin    midazolam    nicotine polacrilex    Pharmacy to Dose Heparin    Pharmacy to dose warfarin    sodium chloride    sodium chloride    sodium chloride    sodium chloride    Assessment & Plan   Assessment & Plan     Active Hospital Problems    Diagnosis  POA    **GIB (gastrointestinal bleeding) [K92.2]  Yes    Severe malnutrition [E43]  Yes    GI bleed [K92.2]  Yes    Acute blood loss anemia [D62]  Yes    MEERA (acute kidney injury) [N17.9]  Yes    H/O heart valve replacement with mechanical valve [Z95.2]  Not Applicable    Melena [K92.1]  Unknown    Chronic respiratory failure with hypoxia [J96.11]  Yes    Chronic diastolic heart failure [I50.32]  Yes    Emphysema/COPD [J43.9]  Yes    Hypertension [I10]  Yes    Type 2 diabetes mellitus without complication, with long-term current use of insulin [E11.9, Z79.4]  Not Applicable    Depression [F32.A]  Yes    Dyslipidemia [E78.5]  Yes    Obstructive sleep apnea syndrome [G47.33]  Yes      Resolved Hospital Problems   No resolved problems to display.        Brief Hospital Course to date:  Di Lopez is a 62 y.o. female  with PMH significant for COPD, HTN, HLD, CAD, depression, CHF, VHD s/p mechanical valve replacement on warfarin, DM2, CAL on CPAP, liver mass, obesity.  She has had diarrhea for 2 weeks, then developed BRBPR on 11/9. She was asked to hold her diuretics, Entresto and Trulicity by PCP last last week due to rising creatinine.        GI bleed - BRBPR   Symptomatic acute blood loss anemia  Warfarin use; INR 3-4 recently  Adenovirus   - Hgb was 11 in May, was 7.1 on admission.   - Colonoscopy completed May 2023;  Had scheduled EGD outpatient 11/14/2023 (Dr. Fernandes)  - CT scan with inflammation in the sigmoid  colon  - GI PCR with adenovirus   - Transfuse another unit 11/14 for total of 3 units so far  -On 11/15 hemoglobin  dropped below 7.  Patient received 1 unit of packed red blood cell and GI also saw the patient.  -Patient had upper and lower endoscopy done on 11/16 by GI.  Significant findings on this study was Candida esophagitis and also diverticulosis.  GI bleeding was presumed to be diverticular bleeding.   -h/h has been stable, cont to monitor      MEERA  - Baseline creatinine ~0.8-0.9,  - was 1.9 on admission  -Currently on Lasix.  Cr going up a little today but still volume overloaded. Consulted nephrology for recommendations on diuresis      HFmrEF  - EF 45-50 3/23.   - Holding home entresto due to MEERA. Continue coreg  - Received intermittent IV diuretics with blood transfusion      Chronic hypoxic resp failure  - 3L at baseline but states home O2 concentrator is broken -- my partner discussed with case management      UTI POA  - urine culture ecoli   - Past UTI was E coli, a nd Klebs, not ESBL .   - s/p 3 doses of rocephin      Bed bugs   - Isolation      Mechanical mitral valve   - Continue heparin gtt.   - plans to DC home with lovenox bridge. Follows with Dr. Everett who checks INR. Will likely need to INR check on Wed     Diabetes mellitus 2 - A1C 6.8   - Hold Trulicity, glimepiride  - Hold Lantus for now, add back as appropriate  - FSBG ACHS     Hypertension  Dyslipidemia  - Currently controlled     CAL  - CPAP     Nicotine use  - Vapes  - Nicotine patch  - Cessation counseling     Obesity   - BMI 39. Complicates all aspects of care      Expected Discharge Location and Transportation: home  Expected Discharge   Expected Discharge Date: 11/21/2023; Expected Discharge Time:      DVT prophylaxis:  Medical DVT prophylaxis orders are present.     AM-PAC 6 Clicks Score (PT): 24 (11/19/23 5300)    CODE STATUS:   Code Status and Medical Interventions:   Ordered at: 11/09/23 3193     Code Status (Patient has no  pulse and is not breathing):    CPR (Attempt to Resuscitate)     Medical Interventions (Patient has pulse or is breathing):    Full Support       Amanda Longoria MD  11/20/23

## 2023-11-20 NOTE — PLAN OF CARE
Goal Outcome Evaluation:   VSS. Av paced. 1l nasal cannula. Ad vasu. Patient is requesting cardiology be consulted regarding the swelling in her ankles but refused HS dose of lasix because she did not want to be voiding during the night, told patient I would relay this information to day team. Back pain treated prn see MAR. Heparin gtt maintained as ordered. No further complaints at this time.

## 2023-11-20 NOTE — PROGRESS NOTES
"Pharmacy Consult  -  Warfarin  Di Lopez is a  62 y.o. female   Height - 152.4 cm (60\")  Weight - 94.1 kg (207 lb 6 oz)    Consulting Provider: Dr. Sherman  Indication: Mechanical MVR  Goal INR: 2.5 - 3.5   Home Regimen: 4 mg daily, except 4.5 mg on Sat/Sun    Bridge Therapy: Yes   and unfractionated heparin    Drug-Drug Interactions with current regimen:   Excedrin contains asa - increase bleed risk              Heparin - increase bleed risk              PRN Norco - potential to increase INR    Warfarin Dosing During Admission:  Date  11/10 11/11 11/12 11/13 11/14 11/15 11/16 11/17 11/18   INR  4.13 1.09 1.16 1.22 1.36 1.36 1.29 1.25 1.23   Dose  IV Vit K given 6 mg 3 mg 3 mg  HOLD HOLD HOLD 5 mg 0 mg ( error)     Date  11/19 11/20          INR  1.23 1.10          Dose  7.5 mg 4 mg              Education Provided: 11/11/23 - provided education pamphlet and answered questions.      Discharge Follow up:   Following Provider - Dr. Everett   Follow up time range or appointment - 2-3 days following discharge     Labs:  Results from last 7 days   Lab Units 11/20/23  0150 11/19/23  0428 11/18/23  0427 11/17/23  0636 11/16/23  0730 11/15/23  1605 11/15/23  0745 11/15/23  0003 11/14/23  0959 11/14/23  0813   INR  1.10 1.23* 1.23* 1.25* 1.29*  --  1.36*  --   --  1.36*   HEMOGLOBIN g/dL  --  8.3*  --   --  8.5* 8.7* 6.9* 7.4* 7.1* 7.2*   HEMATOCRIT %  --  26.4*  --   --  26.9* 26.5* 22.0* 23.4* 22.6* 22.4*     Results from last 7 days   Lab Units 11/20/23  0150 11/19/23  0428 11/18/23  0427   SODIUM mmol/L 140 141 141   POTASSIUM mmol/L 3.6 3.7 3.6   CHLORIDE mmol/L 100 103 102   CO2 mmol/L 30.0* 26.0 28.0   BUN mg/dL 24* 19 20   CREATININE mg/dL 1.15* 1.05* 1.35*   CALCIUM mg/dL 9.4 8.7 8.9   GLUCOSE mg/dL 280* 227* 188*     Current dietary intake: % on 11/17     Assessment/Plan:   Pharmacy to dose warfarin for Mechanical MVR.  Goal INR: 2.5-3.5.  Patient's INR remains SUBtherapeutic today at 1.10. " Warfarin had been held for three days and was restarted 11/17.  Patient admitted with supratherapeutic INR of 4.13 and GIB; given IV Vit K 5mg on 11/10. Colonoscopy 11/16 revealed left-sided diverticulosis, presumed to be source of recent GI blood loss by Dr Brunner.  Possible that patient may discharge on Lovenox bridge today. Difficult circumstances for outpatient recheck given upcoming Thanksgiving holiday. At this time, would recommend patient to take a decreased dose of warfarin 4 mg daily except for warfarin 2 mg daily Friday until able to recheck INR with Dr Everett's office (a ~10% decrease in weekly dose). Hopefully will be able to get INR recheck Wednesday or Friday depending on office hours. Would also recommend patient to take Lovenox 90 mg SQ q12h until INR >2.5.  Monitor for signs/symptoms of bleeding, dietary intake, and drug-drug interactions.   Pharmacy will continue to follow and make dose adjustments as necessary.    Thank you,    Javier Samuels, PharmD  11/20/2023  13:14 EST

## 2023-11-20 NOTE — CASE MANAGEMENT/SOCIAL WORK
Continued Stay Note  [facility]     Patient Name: Di Lopez  MRN: [id number]  Today's Date: 11/20/2023    Admit Date: 11/9/2023    Plan: Home   Discharge Plan       Row Name 11/20/23 1644       Plan    Plan Home    Patient/Family in Agreement with Plan yes    Plan Comments Discussed patient in MDR. Possible plan to discharge home with lovenox bridge. Her plan is home with daughter Marcela to transport. Patient to call [facility] when discharged and they will replace oxygen concentrator or repair it ([phone]). CM will continue to follow.    Final Discharge Disposition Code 01 - home or self-care                   Discharge Codes    No documentation.                 Expected Discharge Date and Time       Expected Discharge Date Expected Discharge Time    Nov 21, 2023               Clarisse Elena RN

## 2023-11-21 LAB
ANION GAP SERPL CALCULATED.3IONS-SCNC: 8 MMOL/L (ref 5–15)
BASOPHILS # BLD AUTO: 0.05 10*3/MM3 (ref 0–0.2)
BASOPHILS NFR BLD AUTO: 0.4 % (ref 0–1.5)
BUN SERPL-MCNC: 22 MG/DL (ref 8–23)
BUN/CREAT SERPL: 21 (ref 7–25)
CALCIUM SPEC-SCNC: 8.8 MG/DL (ref 8.6–10.5)
CHLORIDE SERPL-SCNC: 101 MMOL/L (ref 98–107)
CO2 SERPL-SCNC: 30 MMOL/L (ref 22–29)
CREAT SERPL-MCNC: 1.05 MG/DL (ref 0.57–1)
DEPRECATED RDW RBC AUTO: 51.8 FL (ref 37–54)
EGFRCR SERPLBLD CKD-EPI 2021: 60.6 ML/MIN/1.73
EOSINOPHIL # BLD AUTO: 0.62 10*3/MM3 (ref 0–0.4)
EOSINOPHIL NFR BLD AUTO: 4.8 % (ref 0.3–6.2)
ERYTHROCYTE [DISTWIDTH] IN BLOOD BY AUTOMATED COUNT: 15.9 % (ref 12.3–15.4)
GLUCOSE BLDC GLUCOMTR-MCNC: 204 MG/DL (ref 70–130)
GLUCOSE BLDC GLUCOMTR-MCNC: 205 MG/DL (ref 70–130)
GLUCOSE BLDC GLUCOMTR-MCNC: 246 MG/DL (ref 70–130)
GLUCOSE BLDC GLUCOMTR-MCNC: 255 MG/DL (ref 70–130)
GLUCOSE SERPL-MCNC: 212 MG/DL (ref 65–99)
HCT VFR BLD AUTO: 26.3 % (ref 34–46.6)
HGB BLD-MCNC: 8.6 G/DL (ref 12–15.9)
IMM GRANULOCYTES # BLD AUTO: 0.22 10*3/MM3 (ref 0–0.05)
IMM GRANULOCYTES NFR BLD AUTO: 1.7 % (ref 0–0.5)
INR PPP: 1.35 (ref 0.89–1.12)
LYMPHOCYTES # BLD AUTO: 2.58 10*3/MM3 (ref 0.7–3.1)
LYMPHOCYTES NFR BLD AUTO: 20.1 % (ref 19.6–45.3)
MCH RBC QN AUTO: 29.1 PG (ref 26.6–33)
MCHC RBC AUTO-ENTMCNC: 32.7 G/DL (ref 31.5–35.7)
MCV RBC AUTO: 88.9 FL (ref 79–97)
MONOCYTES # BLD AUTO: 0.82 10*3/MM3 (ref 0.1–0.9)
MONOCYTES NFR BLD AUTO: 6.4 % (ref 5–12)
NEUTROPHILS NFR BLD AUTO: 66.6 % (ref 42.7–76)
NEUTROPHILS NFR BLD AUTO: 8.55 10*3/MM3 (ref 1.7–7)
NRBC BLD AUTO-RTO: 0 /100 WBC (ref 0–0.2)
PLATELET # BLD AUTO: 366 10*3/MM3 (ref 140–450)
PMV BLD AUTO: 10 FL (ref 6–12)
POTASSIUM SERPL-SCNC: 3.4 MMOL/L (ref 3.5–5.2)
PROTHROMBIN TIME: 16.9 SECONDS (ref 12.2–14.5)
RBC # BLD AUTO: 2.96 10*6/MM3 (ref 3.77–5.28)
SODIUM SERPL-SCNC: 139 MMOL/L (ref 136–145)
UFH PPP CHRO-ACNC: 0.34 IU/ML (ref 0.3–0.7)
WBC NRBC COR # BLD AUTO: 12.84 10*3/MM3 (ref 3.4–10.8)

## 2023-11-21 PROCEDURE — 82948 REAGENT STRIP/BLOOD GLUCOSE: CPT

## 2023-11-21 PROCEDURE — 94664 DEMO&/EVAL PT USE INHALER: CPT

## 2023-11-21 PROCEDURE — 25010000002 HEPARIN (PORCINE) 25000-0.45 UT/250ML-% SOLUTION

## 2023-11-21 PROCEDURE — 63710000001 INSULIN LISPRO (HUMAN) PER 5 UNITS: Performed by: INTERNAL MEDICINE

## 2023-11-21 PROCEDURE — 94799 UNLISTED PULMONARY SVC/PX: CPT

## 2023-11-21 PROCEDURE — 25010000002 FUROSEMIDE PER 20 MG: Performed by: INTERNAL MEDICINE

## 2023-11-21 PROCEDURE — 85520 HEPARIN ASSAY: CPT

## 2023-11-21 PROCEDURE — 80048 BASIC METABOLIC PNL TOTAL CA: CPT | Performed by: INTERNAL MEDICINE

## 2023-11-21 PROCEDURE — 25010000002 FUROSEMIDE PER 20 MG: Performed by: STUDENT IN AN ORGANIZED HEALTH CARE EDUCATION/TRAINING PROGRAM

## 2023-11-21 PROCEDURE — 85025 COMPLETE CBC W/AUTO DIFF WBC: CPT | Performed by: STUDENT IN AN ORGANIZED HEALTH CARE EDUCATION/TRAINING PROGRAM

## 2023-11-21 PROCEDURE — 85610 PROTHROMBIN TIME: CPT | Performed by: INTERNAL MEDICINE

## 2023-11-21 PROCEDURE — 99232 SBSQ HOSP IP/OBS MODERATE 35: CPT | Performed by: STUDENT IN AN ORGANIZED HEALTH CARE EDUCATION/TRAINING PROGRAM

## 2023-11-21 PROCEDURE — 63710000001 INSULIN DETEMIR PER 5 UNITS: Performed by: INTERNAL MEDICINE

## 2023-11-21 RX ORDER — HYDROCODONE BITARTRATE AND ACETAMINOPHEN 5; 325 MG/1; MG/1
1 TABLET ORAL EVERY 6 HOURS PRN
Status: DISCONTINUED | OUTPATIENT
Start: 2023-11-21 | End: 2023-11-22 | Stop reason: HOSPADM

## 2023-11-21 RX ORDER — FUROSEMIDE 10 MG/ML
40 INJECTION INTRAMUSCULAR; INTRAVENOUS
Status: DISCONTINUED | OUTPATIENT
Start: 2023-11-21 | End: 2023-11-22 | Stop reason: HOSPADM

## 2023-11-21 RX ORDER — POTASSIUM CHLORIDE 750 MG/1
20 CAPSULE, EXTENDED RELEASE ORAL ONCE
Status: COMPLETED | OUTPATIENT
Start: 2023-11-21 | End: 2023-11-21

## 2023-11-21 RX ORDER — LORAZEPAM 2 MG/ML
0.25 INJECTION INTRAMUSCULAR ONCE
Status: DISCONTINUED | OUTPATIENT
Start: 2023-11-21 | End: 2023-11-22 | Stop reason: HOSPADM

## 2023-11-21 RX ADMIN — NYSTATIN 500000 UNITS: 100000 SUSPENSION ORAL at 09:21

## 2023-11-21 RX ADMIN — Medication 5 MG: at 22:57

## 2023-11-21 RX ADMIN — PANTOPRAZOLE SODIUM 40 MG: 40 INJECTION, POWDER, LYOPHILIZED, FOR SOLUTION INTRAVENOUS at 09:23

## 2023-11-21 RX ADMIN — CARVEDILOL 12.5 MG: 12.5 TABLET, FILM COATED ORAL at 09:22

## 2023-11-21 RX ADMIN — FOLIC ACID TAB 400 MCG 800 MCG: 400 TAB at 09:20

## 2023-11-21 RX ADMIN — WARFARIN SODIUM 4 MG: 4 TABLET ORAL at 17:51

## 2023-11-21 RX ADMIN — INSULIN LISPRO 4 UNITS: 100 INJECTION, SOLUTION INTRAVENOUS; SUBCUTANEOUS at 17:35

## 2023-11-21 RX ADMIN — NYSTATIN 500000 UNITS: 100000 SUSPENSION ORAL at 12:42

## 2023-11-21 RX ADMIN — Medication 10 ML: at 20:50

## 2023-11-21 RX ADMIN — INSULIN DETEMIR 10 UNITS: 100 INJECTION, SOLUTION SUBCUTANEOUS at 09:21

## 2023-11-21 RX ADMIN — IPRATROPIUM BROMIDE AND ALBUTEROL SULFATE 3 ML: 2.5; .5 SOLUTION RESPIRATORY (INHALATION) at 21:08

## 2023-11-21 RX ADMIN — IPRATROPIUM BROMIDE AND ALBUTEROL SULFATE 3 ML: 2.5; .5 SOLUTION RESPIRATORY (INHALATION) at 12:32

## 2023-11-21 RX ADMIN — IPRATROPIUM BROMIDE AND ALBUTEROL SULFATE 3 ML: 2.5; .5 SOLUTION RESPIRATORY (INHALATION) at 17:00

## 2023-11-21 RX ADMIN — NYSTATIN 500000 UNITS: 100000 SUSPENSION ORAL at 20:49

## 2023-11-21 RX ADMIN — PANTOPRAZOLE SODIUM 40 MG: 40 INJECTION, POWDER, LYOPHILIZED, FOR SOLUTION INTRAVENOUS at 17:34

## 2023-11-21 RX ADMIN — INSULIN LISPRO 3 UNITS: 100 INJECTION, SOLUTION INTRAVENOUS; SUBCUTANEOUS at 21:20

## 2023-11-21 RX ADMIN — NYSTATIN 500000 UNITS: 100000 SUSPENSION ORAL at 17:34

## 2023-11-21 RX ADMIN — HYDROCODONE BITARTRATE AND ACETAMINOPHEN 1 TABLET: 5; 325 TABLET ORAL at 18:10

## 2023-11-21 RX ADMIN — FERROUS SULFATE TAB 325 MG (65 MG ELEMENTAL FE) 325 MG: 325 (65 FE) TAB at 09:23

## 2023-11-21 RX ADMIN — HEPARIN SODIUM 22 UNITS/KG/HR: 10000 INJECTION, SOLUTION INTRAVENOUS at 17:43

## 2023-11-21 RX ADMIN — OXYBUTYNIN CHLORIDE 5 MG: 5 TABLET, EXTENDED RELEASE ORAL at 09:20

## 2023-11-21 RX ADMIN — INSULIN LISPRO 4 UNITS: 100 INJECTION, SOLUTION INTRAVENOUS; SUBCUTANEOUS at 09:20

## 2023-11-21 RX ADMIN — ESCITALOPRAM OXALATE 20 MG: 20 TABLET ORAL at 09:23

## 2023-11-21 RX ADMIN — INSULIN LISPRO 3 UNITS: 100 INJECTION, SOLUTION INTRAVENOUS; SUBCUTANEOUS at 12:43

## 2023-11-21 RX ADMIN — FUROSEMIDE 40 MG: 10 INJECTION, SOLUTION INTRAMUSCULAR; INTRAVENOUS at 09:21

## 2023-11-21 RX ADMIN — Medication 10 ML: at 09:24

## 2023-11-21 RX ADMIN — INSULIN LISPRO 3 UNITS: 100 INJECTION, SOLUTION INTRAVENOUS; SUBCUTANEOUS at 09:20

## 2023-11-21 RX ADMIN — CARVEDILOL 12.5 MG: 12.5 TABLET, FILM COATED ORAL at 17:52

## 2023-11-21 RX ADMIN — BUDESONIDE AND FORMOTEROL FUMARATE DIHYDRATE 2 PUFF: 160; 4.5 AEROSOL RESPIRATORY (INHALATION) at 21:08

## 2023-11-21 RX ADMIN — FUROSEMIDE 40 MG: 10 INJECTION, SOLUTION INTRAMUSCULAR; INTRAVENOUS at 15:02

## 2023-11-21 RX ADMIN — INSULIN LISPRO 4 UNITS: 100 INJECTION, SOLUTION INTRAVENOUS; SUBCUTANEOUS at 12:42

## 2023-11-21 RX ADMIN — POTASSIUM CHLORIDE 20 MEQ: 750 CAPSULE, EXTENDED RELEASE ORAL at 17:45

## 2023-11-21 RX ADMIN — HEPARIN SODIUM 22 UNITS/KG/HR: 10000 INJECTION, SOLUTION INTRAVENOUS at 03:47

## 2023-11-21 RX ADMIN — Medication 10 ML: at 09:23

## 2023-11-21 NOTE — PROGRESS NOTES
"Pharmacy Consult  -  Warfarin  Di Lopez is a  62 y.o. female   Height - 152.4 cm (60\")  Weight - 94.1 kg (207 lb 6 oz)    Consulting Provider: Dr. Sherman  Indication: Mechanical MVR  Goal INR: 2.5 - 3.5  Home Regimen: 4 mg daily, except 4.5 mg on Sat/Sun    Bridge Therapy: Yes   and unfractionated heparin    Drug-Drug Interactions with current regimen:   Excedrin contains asa - increase bleed risk              Heparin - increase bleed risk                Warfarin Dosing During Admission:  Date  11/10 11/11 11/12 11/13 11/14 11/15 11/16 11/17 11/18   INR  4.13 1.09 1.16 1.22 1.36 1.36 1.29 1.25 1.23   Dose  IV Vit K given 6 mg 3 mg 3 mg  HOLD HOLD HOLD 5 mg 0 mg ( error)     Date  11/19 11/20 11/21         INR  1.23 1.10 1.35         Dose  7.5 mg 4 mg 4 mg             Education Provided: 11/11/23 - provided education pamphlet and answered questions.      Discharge Follow up:   Following Provider - Dr. Everett   Follow up time range or appointment - 2-3 days following discharge     Labs:  Results from last 7 days   Lab Units 11/21/23  0535 11/20/23  1749 11/20/23  0150 11/19/23  0428 11/18/23  0427 11/17/23  0636 11/16/23  0730 11/15/23  1605 11/15/23  0745 11/15/23  0003   INR  1.35*  --  1.10 1.23* 1.23* 1.25* 1.29*  --  1.36*  --    HEMOGLOBIN g/dL 8.6* 8.6*  --  8.3*  --   --  8.5* 8.7* 6.9* 7.4*   HEMATOCRIT % 26.3* 26.9*  --  26.4*  --   --  26.9* 26.5* 22.0* 23.4*     Results from last 7 days   Lab Units 11/21/23  0535 11/20/23  0150 11/19/23  0428   SODIUM mmol/L 139 140 141   POTASSIUM mmol/L 3.4* 3.6 3.7   CHLORIDE mmol/L 101 100 103   CO2 mmol/L 30.0* 30.0* 26.0   BUN mg/dL 22 24* 19   CREATININE mg/dL 1.05* 1.15* 1.05*   CALCIUM mg/dL 8.8 9.4 8.7   GLUCOSE mg/dL 212* 280* 227*     Current dietary intake: % on 11/17     Assessment/Plan:   Pharmacy to dose warfarin for Mechanical MVR. Goal INR: 2.5-3.5.  Patient's INR remains SUBtherapeutic today at 1.35. Warfarin had been held for " three days and was restarted 11/17.  Patient admitted with supratherapeutic INR of 4.13 and GIB; given IV Vit K 5mg on 11/10. Colonoscopy 11/16 revealed left-sided diverticulosis, presumed to be source of recent GI blood loss by Dr Brunner.  Tentative plan for patient to discharge on Lovenox bridge tomorrow. Difficult circumstances for outpatient recheck given upcoming Thanksgiving holiday. At this time, would recommend patient to take a decreased dose of warfarin 4 mg daily except for warfarin 2 mg daily Friday until able to recheck INR with Dr Everett's office (a ~10% decrease in weekly dose). Likely will not be able to get INR recheck until Monday given Dr Everett's office. Would also recommend patient to take Lovenox 90 mg SQ q12h until INR >2.5.  Monitor for signs/symptoms of bleeding, dietary intake, and drug-drug interactions.   Pharmacy will continue to follow and make dose adjustments as necessary.    Thank you,    Javier Samuels, PharmD  11/21/2023  12:15 EST

## 2023-11-21 NOTE — PROGRESS NOTES
[facility] Medicine Services  PROGRESS NOTE    Patient Name: Di Lopez  : 1960  MRN: [id number]    Date of Admission: 2023  Primary Care Physician: Davina Santiago DO    Subjective   Subjective     CC:  BRBPR, HA     HPI:  Swelling continues to improve today.  No acute overnight events      Objective   Objective     Vital Signs:   Temp:  [98 °F (36.7 °C)-98.2 °F (36.8 °C)] 98.1 °F (36.7 °C)  Heart Rate:  [60-78] 62  Resp:  [16-18] 18  BP: (135-162)/(51-76) 155/73  Flow (L/min):  [1-2] 2     Physical Exam:  Constitutional: No acute distress  HENT: NCAT, mucous membranes moist  Respiratory: Clear to auscultation bilaterally, breathing with no labor.  Cardiovascular: RRR, no murmurs, rubs, or gallops  Gastrointestinal: Positive bowel sounds, soft, nontender, nondistended  Musculoskeletal:  bilateral ankle edema  Psychiatric: Appropriate affect, cooperative  Neurologic: Awake, alert, oriented x3, no focality appreciated, speech clear  Skin: No rashes      Results Reviewed:  LAB RESULTS:      Lab 23  0535 23  1749 23  1021 23  0150 23  2031 23  1237 23  0428 23  0837 23  0427 23  1613 23  0636 23  2209 23  0730 11/15/23  2201 11/15/23  1605 11/15/23  1535 11/15/23  0745   WBC 12.84*  --   --   --   --   --  12.59*  --   --   --   --   --  12.07*  --   --   --  11.42*   HEMOGLOBIN 8.6* 8.6*  --   --   --   --  8.3*  --   --   --   --   --  8.5*  --  8.7*  --  6.9*   HEMATOCRIT 26.3* 26.9*  --   --   --   --  26.4*  --   --   --   --   --  26.9*  --  26.5*  --  22.0*   PLATELETS 366  --   --   --   --   --  333  --   --   --   --   --  345  --   --   --  285   NEUTROS ABS 8.55*  --   --   --   --   --  8.08*  --   --   --   --   --  8.25*  --   --   --   --    IMMATURE GRANS (ABS) 0.22*  --   --   --   --   --  0.16*  --   --   --   --   --  0.15*  --   --   --   --    LYMPHS ABS 2.58  --   --   --    --   --  2.92  --   --   --   --   --  2.27  --   --   --   --    MONOS ABS 0.82  --   --   --   --   --  0.73  --   --   --   --   --  0.84  --   --   --   --    EOS ABS 0.62*  --   --   --   --   --  0.65*  --   --   --   --   --  0.52*  --   --   --   --    MCV 88.9  --   --   --   --   --  92.0  --   --   --   --   --  89.1  --   --   --  88.4   PROCALCITONIN  --   --   --   --   --   --   --   --   --   --   --   --   --   --   --   --  0.28*   PROTIME 16.9*  --   --  14.3  --   --  15.6*  --  15.6*  --  15.9*  --  16.2*  --   --   --  16.9*   HEPARIN ANTI-XA 0.34 0.38 0.42 0.25* 0.32   < > 0.56   < >  --    < > 0.10*   < > 0.23*   < >  --    < > 0.25*    < > = values in this interval not displayed.         Lab 11/21/23 0535 11/20/23 0150 11/19/23 0428 11/18/23 0427 11/17/23  0636   SODIUM 139 140 141 141 141   POTASSIUM 3.4* 3.6 3.7 3.6 3.7   CHLORIDE 101 100 103 102 106   CO2 30.0* 30.0* 26.0 28.0 25.0   ANION GAP 8.0 10.0 12.0 11.0 10.0   BUN 22 24* 19 20 18   CREATININE 1.05* 1.15* 1.05* 1.35* 1.20*   EGFR 60.6 54.3* 60.6 44.8* 51.6*   GLUCOSE 212* 280* 227* 188* 258*   CALCIUM 8.8 9.4 8.7 8.9 8.9               Lab 11/21/23 0535 11/20/23 0150 11/19/23 0428 11/18/23 0427 11/17/23  0636   PROTIME 16.9* 14.3 15.6* 15.6* 15.9*   INR 1.35* 1.10 1.23* 1.23* 1.25*             Lab 11/14/23  1736   ABO TYPING O   RH TYPING Positive   ANTIBODY SCREEN Negative         Brief Urine Lab Results  (Last result in the past 365 days)        Color   Clarity   Blood   Leuk Est   Nitrite   Protein   CREAT   Urine HCG        11/09/23 2320 Yellow   Clear   Negative   Small (1+)   Positive   100 mg/dL (2+)                   Microbiology Results Abnormal       Procedure Component Value - Date/Time    Blood Culture - Blood, Arm, Right [[id number]]  (Normal) Collected: 11/10/23 0027    Lab Status: Final result Specimen: Blood from Arm, Right Updated: 11/15/23 0100     Blood Culture No growth at 5 days    Blood Culture -  Blood, Arm, Right [[id number]]  (Normal) Collected: 11/10/23 0027    Lab Status: Final result Specimen: Blood from Arm, Right Updated: 11/15/23 0100     Blood Culture No growth at 5 days            No radiology results from the last 24 hrs    Results for orders placed during the hospital encounter of 05/31/23    Adult Transthoracic Echo Complete W/ Cont if Necessary Per Protocol    Interpretation Summary    Mildly reduced right ventricular systolic function noted.    There is calcification of the aortic valve.    Estimated right ventricular systolic pressure from tricuspid regurgitation is normal (<35 mmHg).    Mild global LV systolic dysfunction    EF=45-50%    Normal prosthetic MV function    AV calcification      Current medications:  Scheduled Meds:budesonide-formoterol, 2 puff, Inhalation, BID - RT  carvedilol, 12.5 mg, Oral, BID With Meals  docusate sodium, 100 mg, Oral, BID  escitalopram, 20 mg, Oral, Daily  ferrous sulfate, 325 mg, Oral, Daily With Breakfast  folic acid, 800 mcg, Oral, Daily  furosemide, 40 mg, Intravenous, BID  insulin detemir, 10 Units, Subcutaneous, Daily  insulin lispro, 2-7 Units, Subcutaneous, 4x Daily AC & at Bedtime  Insulin Lispro, 4 Units, Subcutaneous, TID With Meals  ipratropium-albuterol, 3 mL, Nebulization, 4x Daily - RT  LORazepam, 0.25 mg, Intravenous, Once  nicotine, 1 patch, Transdermal, Q24H  nystatin, 5 mL, Swish & Swallow, 4x Daily  oxybutynin XL, 5 mg, Oral, Daily  pantoprazole, 40 mg, Intravenous, BID AC  sodium chloride, 10 mL, Intravenous, Q12H  sodium chloride, 10 mL, Intravenous, Q12H  [START ON 11/24/2023] warfarin, 2 mg, Oral, Weekly  warfarin, 4 mg, Oral, Once per day on Sun Mon Tue Wed Thu Sat      Continuous Infusions:heparin, 22 Units/kg/hr, Last Rate: 22 Units/kg/hr (11/21/23 0347)  lactated ringers, 9 mL/hr  Pharmacy to Dose Heparin,   Pharmacy to dose warfarin,       PRN Meds:.  acetaminophen    aspirin-acetaminophen-caffeine    [DISCONTINUED]  senna-docusate sodium **AND** polyethylene glycol **AND** bisacodyl **AND** bisacodyl    dextrose    dextrose    glucagon (human recombinant)    lidocaine PF 1%    melatonin    midazolam    nicotine polacrilex    Pharmacy to Dose Heparin    Pharmacy to dose warfarin    sodium chloride    sodium chloride    sodium chloride    sodium chloride    Assessment & Plan   Assessment & Plan     Active Hospital Problems    Diagnosis  POA    **GIB (gastrointestinal bleeding) [K92.2]  Yes    Severe malnutrition [E43]  Yes    GI bleed [K92.2]  Yes    Acute blood loss anemia [D62]  Yes    MEERA (acute kidney injury) [N17.9]  Yes    H/O heart valve replacement with mechanical valve [Z95.2]  Not Applicable    Melena [K92.1]  Unknown    Chronic respiratory failure with hypoxia [J96.11]  Yes    Chronic diastolic heart failure [I50.32]  Yes    Emphysema/COPD [J43.9]  Yes    Hypertension [I10]  Yes    Type 2 diabetes mellitus without complication, with long-term current use of insulin [E11.9, Z79.4]  Not Applicable    Depression [F32.A]  Yes    Dyslipidemia [E78.5]  Yes    Obstructive sleep apnea syndrome [G47.33]  Yes      Resolved Hospital Problems   No resolved problems to display.        Brief Hospital Course to date:  Di Lopez is a 62 y.o. female  with PMH significant for COPD, HTN, HLD, CAD, depression, CHF, VHD s/p mechanical valve replacement on warfarin, DM2, CAL on CPAP, liver mass, obesity.  She has had diarrhea for 2 weeks, then developed BRBPR on 11/9. She was asked to hold her diuretics, Entresto and Trulicity by PCP last last week due to rising creatinine.        GI bleed - BRBPR   Symptomatic acute blood loss anemia  Warfarin use; INR 3-4 recently  Adenovirus   - Hgb was 11 in May, was 7.1 on admission.   - Colonoscopy completed May 2023;  Had scheduled EGD outpatient 11/14/2023 (Dr. Fernandes)  - CT scan with inflammation in the sigmoid colon  - GI PCR with adenovirus   - Transfused another unit 11/14 for total of  3 units so far  -On 11/15 hemoglobin  dropped below 7.  Patient received 1 unit of packed red blood cell and GI also saw the patient.  -Patient had upper and lower endoscopy done on 11/16 by GI.  Significant findings on this study was Candida esophagitis and also diverticulosis.  GI bleeding was presumed to be diverticular bleeding.   -h/h has been stable, cont to monitor      MEERA  - Baseline creatinine ~0.8-0.9,  - was 1.9 on admission  -Currently on Lasix.  Nephrology was consulted for recommendations on diuretics given uptrend in creatinine and persistent volume overload.  Today creatinine has trended down.  Discussed with nephrology will need to get strict ELINA's and daily weights to determine most appropriate diuretic regimen for home.  Will likely be ready by tomorrow      HFmrEF  - EF 45-50 3/23.   - Holding home entresto due to MEERA. Continue coreg  - Received intermittent IV diuretics with blood transfusion      Chronic hypoxic resp failure  - 3L at baseline but states home O2 concentrator is broken -- my partner discussed with case management      UTI POA  - urine culture ecoli   - Past UTI was E coli, a nd Klebs, not ESBL .   - s/p 3 doses of rocephin      Bed bugs   - Isolation      Mechanical mitral valve   - Continue heparin gtt.   - plans to DC home with lovenox bridge. Follows with Dr. Everett who checks INR.      Diabetes mellitus 2 - A1C 6.8   - Hold Trulicity, glimepiride  --cont levemir  - FSBG ACHS     Hypertension  Dyslipidemia  - Currently controlled     CAL  - CPAP     Nicotine use  - Vapes  - Nicotine patch  - Cessation counseling     Obesity   - BMI 39. Complicates all aspects of care      Expected Discharge Location and Transportation: home  Expected Discharge   Expected Discharge Date: 11/21/2023; Expected Discharge Time:      DVT prophylaxis:  Medical DVT prophylaxis orders are present.     AM-PAC 6 Clicks Score (PT): 24 (11/20/23 2045)    CODE STATUS:   Code Status and Medical  Interventions:   Ordered at: 11/09/23 2352     Code Status (Patient has no pulse and is not breathing):    CPR (Attempt to Resuscitate)     Medical Interventions (Patient has pulse or is breathing):    Full Support       Amanda Longoria MD  11/21/23

## 2023-11-21 NOTE — CONSULTS
NAL Consult Note    Di WADDELL Hockaday 1960  [id number]    Date of Admit:  11/9/2023  Date of Consult: 11/21/2023    Reason for Consultation: MEERA/ Diuretic management.     History of present illness:    Patient is a 62 y.o.  Yr old female with past medical history of CAD, congestive heart failure, COPD, diabetes hypertension, dyslipidemia, VHD s/p mitral valve replacement on warfarin who initially presented to ED on 11/9 for rectal bleed.  Patient was having ongoing diarrhea for 2 weeks prior to hospitalization, she went to PCP who stopped her diuretics due to worsening renal function.  She then have rectal bleeding in few days and ended up in hospital.  On admission her creatinine was 1.9 mg/dL however during the hospital course creatinine improved to 1.05 today; nephrology is consulted for diuretic management. During hospitalization patient had blood transfusion, she had upper and lower endoscopy.      On evaluation at bedside patient reported that she was taking Lasix 40 mg 3 times a week at home; she normally weighs around 192 pounds.  Yesterday her weight was 219 pounds.  Normally she has no lower extremity edema at baseline.     Past Medical History:   Diagnosis Date    Allergic     Anemia     Anticoagulated     WARFARIN    Arteriovenous malformation of gastrointestinal tract 05/12/2016    Arthritis     Asthma     Black hairy tongue     CHF (congestive heart failure)     Chronic back pain     Coronary artery disease     Depression 05/12/2016    Diabetes mellitus     Dyslipidemia 05/12/2016    Elevated cholesterol     Emphysema/COPD 05/12/2016    Emphysema/COPD 05/12/2016    Fatigue     GERD (gastroesophageal reflux disease)     Headache     Herniated cervical disc     History of echocardiogram 07/16/2013    Left ventricular systolic function at the lower limits of normal; EF 50-55%; mild MR; mechanical prosthetic mitral valve present; mod TR    History of transfusion     NO REACTION; [facility]    Hyperlipidemia      Hypertension     Insomnia 05/12/2016    Iron deficiency     A.  The patient was treated with IV iron replacement. B.  Secondary to GI bleed, status post EGD with cauterization of blood vessel to the duodenum, 7/16/2014.    Left bundle branch hemiblock 10/12/2020    Left shoulder strain     Liver disease     Liver mass     Long term current use of anticoagulant 05/12/2016    Low back pain     Lung nodules     PER DR. LEVI'S NOTE 10/22    Morbid obesity 05/12/2016    Neuromuscular disorder 7/28/2016    Obstructive sleep apnea syndrome 05/12/2016    Description: A.  Moderate; CPAP therapy..    Sliding hiatal hernia 07/28/2016    Description: A.  Reported on EGD, 12/30/2011.    Type 2 diabetes mellitus 05/12/2016    Urinary tract infection     Uterine cancer     HYSTERECTOMY    Vitamin B12 deficiency     A.  The patient was treated with vitamin B12 replacement.    Wears eyeglasses        Past Surgical History:   Procedure Laterality Date    BILATERAL OOPHORECTOMY  2009    Status post bilateral oophorectomy secondary to ovarian cysts     BREAST BIOPSY Right     STEREOTACTIC    BREAST EXCISIONAL BIOPSY Bilateral     CARDIAC ELECTROPHYSIOLOGY PROCEDURE N/A 10/15/2020    Procedure: Biventricular Device Insertion;  Surgeon: Nadir Everett MD;  Location:  [facility] CATH INVASIVE LOCATION;  Service: Cardiology;  Laterality: N/A;    CARDIAC SURGERY  2003    CARPAL TUNNEL RELEASE Bilateral     COLONOSCOPY  12/28/2012    COLONOSCOPY N/A 5/23/2023    Procedure: COLONOSCOPY;  Surgeon: Gt Fernandes MD;  Location:  [facility] ENDOSCOPY;  Service: Gastroenterology;  Laterality: N/A;    COLONOSCOPY N/A 11/16/2023    Procedure: COLONOSCOPY;  Surgeon: Brunner, Mark I, MD;  Location:  [facility];  Service: Gastroenterology;  Laterality: N/A;    CYSTECTOMY      removal of ganglion cyst from left wrist    ENDOSCOPY  12/30/2011    DIAGNOSTIC ESOPHAGOGASTRODUODENOSCOPY    ENDOSCOPY N/A 11/16/2023    Procedure:  "ESOPHAGOGASTRODUODENOSCOPY;  Surgeon: Brunner, Mark I, MD;  Location: [facility] ENDOSCOPY;  Service: Gastroenterology;  Laterality: N/A;    HYSTERECTOMY      A.  Status post hysterectomy for early stage endometrial cancer done in .    MITRAL VALVE REPLACEMENT  2008    Dr. Hima Barreto; [facility]AN[facility]    OTHER SURGICAL HISTORY      LIVER MASS BLOOD SUPPLY CLIPPED ON RIGHT AND LEFT SIDE PER PT       Social History     Socioeconomic History    Marital status: Significant Other   Tobacco Use    Smoking status: Former     Packs/day: 1.00     Years: 30.00     Additional pack years: 0.00     Total pack years: 30.00     Types: Cigarettes, Electronic Cigarette     Start date: 1976     Quit date: 2/10/2010     Years since quittin.7    Smokeless tobacco: Never    Tobacco comments:     Quit in    Vaping Use    Vaping Use: Every day    Substances: Nicotine    Devices: Disposable, one every 2 days   Substance and Sexual Activity    Alcohol use: No    Drug use: Never    Sexual activity: Defer     Partners: Male     Birth control/protection: Abstinence, None, Hysterectomy     Comment: 1 partner       family history includes Cancer in her father and paternal grandmother; Diabetes in her brother; Hypertension in her brother, father, and mother; Multiple myeloma in her father; Ovarian cancer in her paternal grandmother; Sudden death in her mother.    Allergies   Allergen Reactions    Adhesive Tape Other (See Comments)     \"pulls skin off\"    Sildenafil Shortness Of Breath and Other (See Comments)     pt c/o tightness in chest and trouble breathing    Codeine Itching and Nausea And Vomiting    Morphine Other (See Comments)     uncontrolable crying     Penicillins Hives    Statins Other (See Comments)     HX of Liver Issues    Sulfa Antibiotics Nausea And Vomiting       Medication:    Current Facility-Administered Medications:     acetaminophen (TYLENOL) tablet 650 mg, 650 mg, Oral, Q6H PRN, Brunner, Mark I, MD, " 650 mg at 11/13/23 1718    aspirin-acetaminophen-caffeine (EXCEDRIN MIGRAINE) 250-250-65 MG per tablet 2 tablet, 2 tablet, Oral, Q6H PRN, Brunner, Mark I, MD, 2 tablet at 11/17/23 1138    [DISCONTINUED] sennosides-docusate (PERICOLACE) 8.6-50 MG per tablet 2 tablet, 2 tablet, Oral, BID, 2 tablet at 11/10/23 0842 **AND** polyethylene glycol (MIRALAX) packet 17 g, 17 g, Oral, Daily PRN **AND** bisacodyl (DULCOLAX) EC tablet 5 mg, 5 mg, Oral, Daily PRN **AND** bisacodyl (DULCOLAX) suppository 10 mg, 10 mg, Rectal, Daily PRN, Brunner, Mark I, MD    budesonide-formoterol (SYMBICORT) 160-4.5 MCG/ACT inhaler 2 puff, 2 puff, Inhalation, BID - RT, Brunner, Mark I, MD, 2 puff at 11/20/23 1941    carvedilol (COREG) tablet 12.5 mg, 12.5 mg, Oral, BID With Meals, Brunner, Mark I, MD, 12.5 mg at 11/20/23 1806    dextrose (D50W) (25 g/50 mL) IV injection 25 g, 25 g, Intravenous, Q15 Min PRN, Brunner, Mark I, MD    dextrose (GLUTOSE) oral gel 15 g, 15 g, Oral, Q15 Min PRN, Brunner, Mark I, MD    docusate sodium (COLACE) capsule 100 mg, 100 mg, Oral, BID, Brunner, Mark I, MD, 100 mg at 11/19/23 1013    escitalopram (LEXAPRO) tablet 20 mg, 20 mg, Oral, Daily, Brunner, Mark I, MD, 20 mg at 11/20/23 0904    ferrous sulfate tablet 325 mg, 325 mg, Oral, Daily With Breakfast, Brunner, Mark I, MD, 325 mg at 11/20/23 0905    folic acid (FOLVITE) tablet 800 mcg, 800 mcg, Oral, Daily, Brunner, Mark I, MD, 800 mcg at 11/20/23 0905    furosemide (LASIX) injection 40 mg, 40 mg, Intravenous, Q12H, Getachew Haines MD, 40 mg at 11/20/23 0907    glucagon (GLUCAGEN) injection 1 mg, 1 mg, Intramuscular, Q15 Min PRN, Brunner, Mark I, MD    heparin 43020 units/250 mL (100 units/mL) in 0.45 % NaCl infusion, 22 Units/kg/hr, Intravenous, Titrated, Dieter Trevino, PharmD, Last Rate: 19.99 mL/hr at 11/21/23 0347, 22 Units/kg/hr at 11/21/23 0347    insulin detemir (LEVEMIR) injection 10 Units, 10 Units, Subcutaneous, Daily, Getachew Haines MD, 10  Units at 11/20/23 0906    Insulin Lispro (humaLOG) injection 2-7 Units, 2-7 Units, Subcutaneous, 4x Daily AC & at Bedtime, Brunner, Mark I, MD, 4 Units at 11/20/23 2108    Insulin Lispro (humaLOG) injection 4 Units, 4 Units, Subcutaneous, TID With Meals, Brunner, Mark I, MD, 4 Units at 11/20/23 1807    ipratropium-albuterol (DUO-NEB) nebulizer solution 3 mL, 3 mL, Nebulization, 4x Daily - RT, Brunner, Mark I, MD, 3 mL at 11/20/23 1941    lactated ringers infusion, 9 mL/hr, Intravenous, Continuous, Jose Maria Harvey MD    lidocaine PF 1% (XYLOCAINE) injection 0.5 mL, 0.5 mL, Injection, Once PRN, Jose Maria Harvey MD    LORazepam (ATIVAN) injection 0.25 mg, 0.25 mg, Intravenous, Once, Niya Subramanian, FRANCESCA    melatonin tablet 5 mg, 5 mg, Oral, Nightly PRN, Brunner, Mark I, MD, 5 mg at 11/20/23 2339    midazolam (VERSED) injection 1 mg, 1 mg, Intravenous, Q10 Min PRN, Jose Maria Harvey MD    nicotine (NICODERM CQ) 21 MG/24HR patch 1 patch, 1 patch, Transdermal, Q24H, Brunner, Mark I, MD, 1 patch at 11/12/23 0317    nicotine polacrilex (NICORETTE) gum 2 mg, 2 mg, Mouth/Throat, Q1H PRN, Brunner, Mark I, MD    nystatin (MYCOSTATIN) 100,000 unit/mL suspension 500,000 Units, 5 mL, Swish & Swallow, 4x Daily, Brunner, Mark I, MD, 500,000 Units at 11/20/23 2054    oxybutynin XL (DITROPAN-XL) 24 hr tablet 5 mg, 5 mg, Oral, Daily, Brunner, Mark I, MD, 5 mg at 11/20/23 0905    pantoprazole (PROTONIX) injection 40 mg, 40 mg, Intravenous, BID AC, Brunner, Mark I, MD, 40 mg at 11/20/23 1806    Pharmacy to Dose Heparin, , Does not apply, Continuous PRN, Brunner, Mark I, MD    Pharmacy to dose warfarin, , Does not apply, Continuous PRN, Brunner, Mark I, MD    sodium chloride 0.9 % flush 10 mL, 10 mL, Intravenous, Q12H, Brunner, Mark I, MD, 10 mL at 11/20/23 2111    sodium chloride 0.9 % flush 10 mL, 10 mL, Intravenous, PRN, Brunner, Mark I, MD    sodium chloride 0.9 % flush 10 mL, 10 mL, Intravenous, Q12H, Jose Maria Harvey MD,  10 mL at 11/20/23 2110    sodium chloride 0.9 % flush 10 mL, 10 mL, Intravenous, PRN, Jose Maria Harvey MD    sodium chloride 0.9 % infusion 40 mL, 40 mL, Intravenous, PRN, Brunner, Mark I, MD    sodium chloride 0.9 % infusion 40 mL, 40 mL, Intravenous, PRN, Jose Maria Harvey MD    [START ON 11/24/2023] warfarin (COUMADIN) tablet 2 mg, 2 mg, Oral, Weekly, Javier Samuels, PharmD    warfarin (COUMADIN) tablet 4 mg, 4 mg, Oral, Once per day on Sun Mon Tue Wed Thu Sat, Javier Samuels, PharmD, 4 mg at 11/20/23 1806    Medications Prior to Admission   Medication Sig Dispense Refill Last Dose    carvedilol (COREG) 12.5 MG tablet Take 1 tablet by mouth 2 (Two) Times a Day With Meals. 180 tablet 2 11/10/2023    Continuous Blood Gluc  (FreeStyle Dalia 2 [place]) device 1 each Daily. Use as directed daily to check blood sugars 1 each 0 11/10/2023    Continuous Blood Gluc Sensor (FreeStyle Dalia 2 Sensor) misc USE AS DIRECTED FOR 14 DAYS 2 each 11 11/10/2023    escitalopram (LEXAPRO) 20 MG tablet Take 1 tablet by mouth once daily 90 tablet 0 11/10/2023    ferrous sulfate 325 (65 FE) MG tablet Take 1 tablet by mouth Daily With Breakfast.   11/10/2023    folic acid (FOLVITE) 800 MCG tablet Take 1 tablet by mouth Daily.   11/10/2023    furosemide (LASIX) 40 MG tablet Take 1 tablet by mouth 3 (Three) Times a Week. Monday, Wednesday, Friday   Past Week    gabapentin (NEURONTIN) 300 MG capsule Take 1 capsule by mouth At Night As Needed (leg pain). 30 capsule 3 Past Week    glimepiride (AMARYL) 4 MG tablet Take 1 tablet by mouth 2 (Two) Times a Day. 180 tablet 1 11/10/2023    glucose blood test strip Use as instructed 3 times a day 100 each 11 11/10/2023    Insulin Glargine (Lantus SoloStar) 100 UNIT/ML injection pen Inject 44 Units under the skin into the appropriate area as directed Daily. 45 mL 1 11/10/2023    Insulin Lispro, 1 Unit Dial, (HumaLOG KwikPen) 100 UNIT/ML solution pen-injector Per correctional scale, MDD 50  units 15 mL 3 11/10/2023    Insulin Pen Needle (B-D UF III MINI PEN NEEDLES) 31G X 5 MM misc USE 1  FIVE TIMES DAILY 150 each 5 11/10/2023    Insulin Pen Needle (RELION PEN NEEDLES) 32G X 4 MM misc Use BID for E11.9 100 each 1 11/10/2023    Lancets misc For use with glucose monitoring 3 times daily 100 each 11 11/10/2023    lidocaine (LIDODERM) 5 % USE 1 PATCH EXTERNALLY ONCE DAILY. REMOVE AND DISCARD PATCH WITHIN 12 HOURS OR AS DIRECTED BY MD (Patient taking differently: Place 1 patch on the skin as directed by provider Daily.) 30 patch 0 Past Month    Omega-3 Fatty Acids (FISH OIL) 1000 MG capsule capsule Take 1 capsule by mouth 4 (Four) Times a Day. (Patient taking differently: Take 1 capsule by mouth 2 (Two) Times a Day With Meals.) 120 capsule 2 11/10/2023    simvastatin (ZOCOR) 5 MG tablet TAKE 1 TABLET BY MOUTH ONCE DAILY AT NIGHT 90 tablet 0 11/9/2023    solifenacin (VESICARE) 5 MG tablet Take 1 tablet by mouth Daily. 90 tablet 0 11/10/2023    spironolactone (ALDACTONE) 25 MG tablet Take 1 tablet by mouth Daily.  0 11/10/2023    valACYclovir (Valtrex) 500 MG tablet Take 2 pills daily by mouth for ten days . Thereafter take one pill daily by mouth. 90 tablet 0 11/10/2023    albuterol sulfate  (90 Base) MCG/ACT inhaler Inhale 2 puffs Every 4 (Four) Hours As Needed for Wheezing. 1 g 0 More than a month    azelastine (ASTELIN) 0.1 % nasal spray 2 sprays into the nostril(s) as directed by provider 2 (Two) Times a Day. Use in each nostril as directed (Patient taking differently: 2 sprays into the nostril(s) as directed by provider As Needed for Rhinitis or Allergies. Use in each nostril as directed prn) 1 each 1     Dulaglutide (Trulicity) 4.5 MG/0.5ML solution pen-injector INJECT 1 SYRINGE SUBCUTANEOUSLY ONCE A WEEK 6 mL 0     sacubitril-valsartan (ENTRESTO) 24-26 MG tablet Take 1 tablet by mouth 2 (Two) Times a Day. 60 tablet 5     Vitamin D, Cholecalciferol, (CHOLECALCIFEROL) 10 MCG (400 UNIT) tablet  "Take 2 tablets by mouth Daily.   11/8/2023    warfarin (COUMADIN) 1 MG tablet Take  by mouth. PT TAKES 0.5 (1 MG TAB) PLUS 4 MG TAB-TOTAL 4.5 MG ON Saturday AND Sunday 11/5/2023    warfarin (COUMADIN) 4 MG tablet Take 1 tablet by mouth. 4 MG M-F  2 11/6/2023       Review of Systems:  Full review of systems reviewed and negative otherwise for acute complaints    Physical Exam:   Vital Signs   Blood pressure 145/65, pulse 66, temperature 98 °F (36.7 °C), temperature source Oral, resp. rate 16, height 152.4 cm (60\"), weight 94.1 kg (207 lb 6 oz), last menstrual period 06/17/1998, SpO2 96%, not currently breastfeeding.     GENERAL: Awake and alert, in no acute distress.   HEENT: Normocephalic, atraumatic.   NECK: Supple   HEART: RRR; No murmur, rubs, gallops. ++ edema  LUNGS:   Clear to auscultation bilaterally without wheezing, rales, rhonchi.  ABDOMEN: Soft, nontender, nondistended.  EXT:  ++ edema.  :  No Perdomo   SKIN: Warm and dry without rash  NEURO: No focal neurological deficits. Strength equal bilateral  PSYCHIATRIC: Normal insight and judgement. Cooperative with PE    Laboratory Data  Results from last 7 days   Lab Units 11/21/23  0535 11/20/23  1749 11/19/23  0428   HEMOGLOBIN g/dL 8.6* 8.6* 8.3*   HEMATOCRIT % 26.3* 26.9* 26.4*     Results from last 7 days   Lab Units 11/21/23  0535 11/20/23  0150 11/19/23  0428 11/18/23  0427   SODIUM mmol/L 139 140 141 141   POTASSIUM mmol/L 3.4* 3.6 3.7 3.6   CHLORIDE mmol/L 101 100 103 102   CO2 mmol/L 30.0* 30.0* 26.0 28.0   BUN mg/dL 22 24* 19 20   CREATININE mg/dL 1.05* 1.15* 1.05* 1.35*   CALCIUM mg/dL 8.8 9.4 8.7 8.9     Results from last 7 days   Lab Units 11/21/23  0535   GLUCOSE mg/dL 212*             Estimated Creatinine Clearance: 57.6 mL/min (A) (by C-G formula based on SCr of 1.05 mg/dL (H)).      Impression:     # Fluid overload  # Rectal bleeding /diarrhea  # Anemia  # Congestive heart failure  # Diabetes  # Hypertension  # Dyslipidemia    PLAN: Thank " you for asking us to see Di WADDELL Jessica, I recommend the following:     -Patient with baseline normal kidney function, history of congestive heart failure on spironolactone and Lasix at home; Received blood transfusion and IV fluids during the hospital course for MEERA.  Patient is volume up on exam.  She normally weighs 192 pounds >> her weight yesterday 209.     -Discussed with the hospitalist about giving Lasix 40 IV twice daily for today.  Hopefully renal function remained stable and if the weight is going down we can discharge her on Lasix 40 daily for few days and then 3 times a week as she was taking prior to admission.  She need to check her weight daily.  Had extensive discussion with the patient.     Anemia: S/p blood transfusion. Cont with iron tabs.     Hypokalemia: Supplement as needed. .      Continue the rest of the care as per primary team.  Thank you for the consult, will follow with you closely.       Justin Ruiz MD  11/21/2023  07:22 EST

## 2023-11-21 NOTE — PLAN OF CARE
Goal Outcome Evaluation:  Plan of Care Reviewed With: patient           Outcome Evaluation: vss. paced  on monitor, diuresing with lasix, DC held per nephro.awaiting probable DC tomorrow.

## 2023-11-21 NOTE — PROGRESS NOTES
HEPARIN INFUSION  Di Lopez is a  62 y.o. female receiving heparin infusion.     Therapy for (VTE/Cardiac): Cardiac ([facility])  Patient Weight: 90.9 kg  Initial Bolus (Y/N): No  Any Bolus (Y/N): Yes    Signs or Symptoms of Bleedin episodes of BRBPR on admission, ongoing anemia, no bleeding noted now per RN     Cardiac or Other (Not VTE)  Initial rate: 12 units/kg/hr (Max 1,000 units/hr)   Anti Xa Rebolus Infusion Hold time Change infusion Dose (Units/kg/hr) Next Anti Xa or aPTT Level Due   < 0.11 50 Units/kg  (4000 Units Max) None Increase by  3 Units/kg/hr 6 hours   0.11- 0.19 25 Units/kg  (2000 Units Max) None Increase by  2 Units/kg/hr 6 hours   0.2 - 0.29 0 None Increase by  1 Units/kg/hr 6 hours   0.3 - 0.5 0 None No Change 6 hours (after 2 consecutive levels in range check qAM)   0.51 - 0.6 0 None Decrease by  1 Units/kg/hr 6 hours   0.61 - 0.8 0 30 Minutes Decrease by  2 Units/kg/hr 6 hours   0.81 - 1 0 60 Minutes Decrease by  3 Units/kg/hr 6 hours   >1 0 Hold  After Anti Xa less than 0.5 decrease previous rate by  4 Units/kg/hr  Every 2 hours until Anti Xa  less than 0.5 then when infusion restarts in 6 hours   Recommend Xa every 6 hours.     Results from last 7 days   Lab Units 23  0535 23  1749 23  0150 23  0428 23  0636 23  0730   INR  1.35*  --  1.10 1.23*   < > 1.29*   HEMOGLOBIN g/dL 8.6* 8.6*  --  8.3*  --  8.5*   HEMATOCRIT % 26.3* 26.9*  --  26.4*  --  26.9*   PLATELETS 10*3/mm3 366  --   --  333  --  345    < > = values in this interval not displayed.       Date   Time   Anti-Xa Current Rate (Unit/kg/hr) Bolus   (Units) Rate Change   (Unit/kg/hr) New Rate (Unit/kg/hr) Next   Anti-Xa Comments  Pump Check Daily   11/10 0200 0.10    INR 2.88 --- - -- -- 0600 D/w APRN, hold until INR >2.5. Currently 2.88, ordered repeat INR @ 0600   11/10 0437 --    INR 2.84 -- -- -- -- 1800 Continue to hold heparin gtt until until <2.5; repeat INR at 1800.   11/10 099  --    INR 1.21 -- -- +11 11 0400 DW RN Tom. INR returned at 1.21 . Starting heparin gtt to cover mechanical valves     11/11 0600 0.1 11 4000 +3 14 1200 D/w RN, no reported bleeding overnight   11/11 1145 0.25 14 -- +1 15 1900 D/W RN- warfarin resumed   11/11 1844 0.10 15 4000 +3 18 0200 DW RN   11/12 0300 0.46 18 -- -- 18 0800 D/w RN   11/12 0916 0.42 18 -- -- 18 0600 D/W RN   11/13 0630 0.24 18 -- +1 19 1200 D/w RN   11/13 1034 0.34 19 -- -- 19 1800 Spoke to RN   11/13 1718 0.26 19 -- +1 20 0200 DW RN    11/14 0230 0.33 20 -- -- 20 0800 D/w RN   11/14 0813 0.33 20 -- -- 20 0600 D/w JARAD Johnson   Checked pump   11/15 0745 0.25 20 -- +1 21 1500 DW RN    11/15 1535 0.34 21 -- -- 21 2200 DW RN    11/15 2300 0.33 21 -- -- 21 0600 D/w RN   11/16 0730 0.23 21 -- +1 22 1600 Spoke to RN   11/16 1531 -- Off 4000  +22 22 2200 DW RN Margaret, will rebolus with heparin being off since this morning for colonoscopy    11/16 2209 0.20 22 -- +1 23 0600 Evi 3250 -acb   11/17 636 0.1 23 4000 +2 25 1600 Spoke to Ed, heparin infusing, has not been off, weight in EPIC need updating   11/17 1613 0.45 25 -- -- 25 0000 DW RN   11/18 0019 0329 25 -- +1 26 0900 Mendoza 3249 -acb   11/18 0837 0.44 26 -- 0 26 1500 DW RN    11/18 1525 0.48 26 -- -- 26 2100 Dw RN, Ed   11/18 2245 0.72 26 -- -2 24 0600 Dw RN   11/19 0630 0.56 24 -- -1 23 1200 D/w RN   11/19 1237 0.68 23 -- -2  21 2000 D/w RN. Hold x 30 minutes.    11/19 2031 0.32 21 -- -- 21 0200 D/w RN   11/20 0150 0.25 21 -- +1 22 1000 Lydia 3250 -acb   11/20 1021 0.42 22 -- -- 22 1700 D/w JARAD Subramanian, pump verified   11/20 1749 0.38 22 -- -- 22 0600 CASSISU ABRAHAM    11/21 0535 0.34 22 -- -- 22 AM labs Cassius RN  Pump verified ES                                                          Javier Samuels, PharmD   11/21/23  13:58 EST

## 2023-11-22 ENCOUNTER — READMISSION MANAGEMENT (OUTPATIENT)
Dept: CALL CENTER | Facility: HOSPITAL | Age: 63
End: 2023-11-22
Payer: MEDICARE

## 2023-11-22 VITALS
BODY MASS INDEX: 39.19 KG/M2 | WEIGHT: 199.6 LBS | RESPIRATION RATE: 18 BRPM | SYSTOLIC BLOOD PRESSURE: 158 MMHG | DIASTOLIC BLOOD PRESSURE: 65 MMHG | HEIGHT: 60 IN | TEMPERATURE: 98.5 F | OXYGEN SATURATION: 96 % | HEART RATE: 66 BPM

## 2023-11-22 PROBLEM — K92.1 MELENA: Status: RESOLVED | Noted: 2023-09-14 | Resolved: 2023-11-22

## 2023-11-22 PROBLEM — K92.2 GIB (GASTROINTESTINAL BLEEDING): Status: RESOLVED | Noted: 2023-11-09 | Resolved: 2023-11-22

## 2023-11-22 PROBLEM — D62 ACUTE BLOOD LOSS ANEMIA: Status: RESOLVED | Noted: 2023-11-09 | Resolved: 2023-11-22

## 2023-11-22 PROBLEM — N17.9 AKI (ACUTE KIDNEY INJURY): Status: RESOLVED | Noted: 2023-11-09 | Resolved: 2023-11-22

## 2023-11-22 LAB
ANION GAP SERPL CALCULATED.3IONS-SCNC: 11 MMOL/L (ref 5–15)
BASOPHILS # BLD AUTO: 0.05 10*3/MM3 (ref 0–0.2)
BASOPHILS NFR BLD AUTO: 0.4 % (ref 0–1.5)
BUN SERPL-MCNC: 27 MG/DL (ref 8–23)
BUN/CREAT SERPL: 23.9 (ref 7–25)
CALCIUM SPEC-SCNC: 9.2 MG/DL (ref 8.6–10.5)
CHLORIDE SERPL-SCNC: 99 MMOL/L (ref 98–107)
CO2 SERPL-SCNC: 32 MMOL/L (ref 22–29)
CREAT SERPL-MCNC: 1.13 MG/DL (ref 0.57–1)
DEPRECATED RDW RBC AUTO: 52.9 FL (ref 37–54)
EGFRCR SERPLBLD CKD-EPI 2021: 55.5 ML/MIN/1.73
EOSINOPHIL # BLD AUTO: 0.53 10*3/MM3 (ref 0–0.4)
EOSINOPHIL NFR BLD AUTO: 4.4 % (ref 0.3–6.2)
ERYTHROCYTE [DISTWIDTH] IN BLOOD BY AUTOMATED COUNT: 16.1 % (ref 12.3–15.4)
GLUCOSE BLDC GLUCOMTR-MCNC: 225 MG/DL (ref 70–130)
GLUCOSE BLDC GLUCOMTR-MCNC: 364 MG/DL (ref 70–130)
GLUCOSE SERPL-MCNC: 232 MG/DL (ref 65–99)
HCT VFR BLD AUTO: 28.8 % (ref 34–46.6)
HGB BLD-MCNC: 8.9 G/DL (ref 12–15.9)
IMM GRANULOCYTES # BLD AUTO: 0.15 10*3/MM3 (ref 0–0.05)
IMM GRANULOCYTES NFR BLD AUTO: 1.2 % (ref 0–0.5)
INR PPP: 1.44 (ref 0.89–1.12)
LYMPHOCYTES # BLD AUTO: 2.13 10*3/MM3 (ref 0.7–3.1)
LYMPHOCYTES NFR BLD AUTO: 17.6 % (ref 19.6–45.3)
MCH RBC QN AUTO: 28.3 PG (ref 26.6–33)
MCHC RBC AUTO-ENTMCNC: 30.9 G/DL (ref 31.5–35.7)
MCV RBC AUTO: 91.4 FL (ref 79–97)
MONOCYTES # BLD AUTO: 0.82 10*3/MM3 (ref 0.1–0.9)
MONOCYTES NFR BLD AUTO: 6.8 % (ref 5–12)
NEUTROPHILS NFR BLD AUTO: 69.6 % (ref 42.7–76)
NEUTROPHILS NFR BLD AUTO: 8.4 10*3/MM3 (ref 1.7–7)
NRBC BLD AUTO-RTO: 0 /100 WBC (ref 0–0.2)
PLATELET # BLD AUTO: 374 10*3/MM3 (ref 140–450)
PMV BLD AUTO: 10.4 FL (ref 6–12)
POTASSIUM SERPL-SCNC: 3.5 MMOL/L (ref 3.5–5.2)
PROTHROMBIN TIME: 17.7 SECONDS (ref 12.2–14.5)
RBC # BLD AUTO: 3.15 10*6/MM3 (ref 3.77–5.28)
SODIUM SERPL-SCNC: 142 MMOL/L (ref 136–145)
UFH PPP CHRO-ACNC: 0.1 IU/ML (ref 0.3–0.7)
WBC NRBC COR # BLD AUTO: 12.08 10*3/MM3 (ref 3.4–10.8)

## 2023-11-22 PROCEDURE — 94799 UNLISTED PULMONARY SVC/PX: CPT

## 2023-11-22 PROCEDURE — 63710000001 INSULIN DETEMIR PER 5 UNITS: Performed by: INTERNAL MEDICINE

## 2023-11-22 PROCEDURE — 63710000001 INSULIN LISPRO (HUMAN) PER 5 UNITS: Performed by: INTERNAL MEDICINE

## 2023-11-22 PROCEDURE — 25010000002 HEPARIN (PORCINE) PER 1000 UNITS

## 2023-11-22 PROCEDURE — 25010000002 ENOXAPARIN PER 10 MG: Performed by: STUDENT IN AN ORGANIZED HEALTH CARE EDUCATION/TRAINING PROGRAM

## 2023-11-22 PROCEDURE — 25010000002 HEPARIN (PORCINE) 25000-0.45 UT/250ML-% SOLUTION

## 2023-11-22 PROCEDURE — 99239 HOSP IP/OBS DSCHRG MGMT >30: CPT | Performed by: STUDENT IN AN ORGANIZED HEALTH CARE EDUCATION/TRAINING PROGRAM

## 2023-11-22 PROCEDURE — 80048 BASIC METABOLIC PNL TOTAL CA: CPT | Performed by: INTERNAL MEDICINE

## 2023-11-22 PROCEDURE — 85520 HEPARIN ASSAY: CPT

## 2023-11-22 PROCEDURE — 82948 REAGENT STRIP/BLOOD GLUCOSE: CPT

## 2023-11-22 PROCEDURE — 94664 DEMO&/EVAL PT USE INHALER: CPT

## 2023-11-22 PROCEDURE — 85610 PROTHROMBIN TIME: CPT | Performed by: INTERNAL MEDICINE

## 2023-11-22 PROCEDURE — 85025 COMPLETE CBC W/AUTO DIFF WBC: CPT | Performed by: INTERNAL MEDICINE

## 2023-11-22 PROCEDURE — 25010000002 FUROSEMIDE PER 20 MG: Performed by: STUDENT IN AN ORGANIZED HEALTH CARE EDUCATION/TRAINING PROGRAM

## 2023-11-22 RX ORDER — ENOXAPARIN SODIUM 100 MG/ML
90 INJECTION SUBCUTANEOUS EVERY 12 HOURS SCHEDULED
Qty: 12.6 ML | Refills: 0 | Status: SHIPPED | OUTPATIENT
Start: 2023-11-22 | End: 2023-11-22 | Stop reason: SDUPTHER

## 2023-11-22 RX ORDER — ENOXAPARIN SODIUM 100 MG/ML
90 INJECTION SUBCUTANEOUS ONCE
Status: COMPLETED | OUTPATIENT
Start: 2023-11-22 | End: 2023-11-22

## 2023-11-22 RX ORDER — HEPARIN SODIUM 1000 [USP'U]/ML
4000 INJECTION, SOLUTION INTRAVENOUS; SUBCUTANEOUS ONCE
Status: COMPLETED | OUTPATIENT
Start: 2023-11-22 | End: 2023-11-22

## 2023-11-22 RX ORDER — ENOXAPARIN SODIUM 100 MG/ML
90 INJECTION SUBCUTANEOUS EVERY 12 HOURS SCHEDULED
Qty: 12.6 ML | Refills: 0 | Status: SHIPPED | OUTPATIENT
Start: 2023-11-22 | End: 2023-11-29

## 2023-11-22 RX ORDER — WARFARIN SODIUM 2 MG/1
TABLET ORAL
Qty: 5 TABLET | Refills: 0 | Status: SHIPPED | OUTPATIENT
Start: 2023-11-24

## 2023-11-22 RX ORDER — FUROSEMIDE 40 MG/1
40 TABLET ORAL DAILY
Qty: 4 TABLET | Refills: 0 | Status: SHIPPED | OUTPATIENT
Start: 2023-11-22 | End: 2023-11-26

## 2023-11-22 RX ORDER — ENOXAPARIN SODIUM 100 MG/ML
90 INJECTION SUBCUTANEOUS EVERY 12 HOURS SCHEDULED
Status: DISCONTINUED | OUTPATIENT
Start: 2023-11-22 | End: 2023-11-22 | Stop reason: HOSPADM

## 2023-11-22 RX ORDER — WARFARIN SODIUM 4 MG/1
TABLET ORAL
Qty: 14 TABLET | Refills: 0 | Status: SHIPPED | OUTPATIENT
Start: 2023-11-22

## 2023-11-22 RX ORDER — FUROSEMIDE 40 MG/1
40 TABLET ORAL 3 TIMES WEEKLY
Start: 2023-11-22

## 2023-11-22 RX ORDER — ENOXAPARIN SODIUM 100 MG/ML
90 INJECTION SUBCUTANEOUS ONCE
Qty: 0.9 ML | Refills: 0 | Status: SHIPPED | OUTPATIENT
Start: 2023-11-22 | End: 2023-11-22 | Stop reason: HOSPADM

## 2023-11-22 RX ADMIN — ESCITALOPRAM OXALATE 20 MG: 20 TABLET ORAL at 09:48

## 2023-11-22 RX ADMIN — NYSTATIN 500000 UNITS: 100000 SUSPENSION ORAL at 12:00

## 2023-11-22 RX ADMIN — FUROSEMIDE 40 MG: 10 INJECTION, SOLUTION INTRAMUSCULAR; INTRAVENOUS at 09:55

## 2023-11-22 RX ADMIN — Medication 10 ML: at 09:52

## 2023-11-22 RX ADMIN — INSULIN DETEMIR 10 UNITS: 100 INJECTION, SOLUTION SUBCUTANEOUS at 09:53

## 2023-11-22 RX ADMIN — CARVEDILOL 12.5 MG: 12.5 TABLET, FILM COATED ORAL at 09:47

## 2023-11-22 RX ADMIN — OXYBUTYNIN CHLORIDE 5 MG: 5 TABLET, EXTENDED RELEASE ORAL at 09:48

## 2023-11-22 RX ADMIN — NYSTATIN 500000 UNITS: 100000 SUSPENSION ORAL at 09:46

## 2023-11-22 RX ADMIN — INSULIN LISPRO 4 UNITS: 100 INJECTION, SOLUTION INTRAVENOUS; SUBCUTANEOUS at 09:50

## 2023-11-22 RX ADMIN — IPRATROPIUM BROMIDE AND ALBUTEROL SULFATE 3 ML: 2.5; .5 SOLUTION RESPIRATORY (INHALATION) at 09:12

## 2023-11-22 RX ADMIN — INSULIN LISPRO 6 UNITS: 100 INJECTION, SOLUTION INTRAVENOUS; SUBCUTANEOUS at 11:46

## 2023-11-22 RX ADMIN — PANTOPRAZOLE SODIUM 40 MG: 40 INJECTION, POWDER, LYOPHILIZED, FOR SOLUTION INTRAVENOUS at 09:48

## 2023-11-22 RX ADMIN — INSULIN LISPRO 3 UNITS: 100 INJECTION, SOLUTION INTRAVENOUS; SUBCUTANEOUS at 09:49

## 2023-11-22 RX ADMIN — FOLIC ACID TAB 400 MCG 800 MCG: 400 TAB at 09:48

## 2023-11-22 RX ADMIN — Medication 10 ML: at 09:51

## 2023-11-22 RX ADMIN — HEPARIN SODIUM 4000 UNITS: 1000 INJECTION INTRAVENOUS; SUBCUTANEOUS at 09:53

## 2023-11-22 RX ADMIN — BUDESONIDE AND FORMOTEROL FUMARATE DIHYDRATE 2 PUFF: 160; 4.5 AEROSOL RESPIRATORY (INHALATION) at 09:12

## 2023-11-22 RX ADMIN — HEPARIN SODIUM 25 UNITS/KG/HR: 10000 INJECTION, SOLUTION INTRAVENOUS at 10:08

## 2023-11-22 RX ADMIN — HYDROCODONE BITARTRATE AND ACETAMINOPHEN 1 TABLET: 5; 325 TABLET ORAL at 07:14

## 2023-11-22 RX ADMIN — FERROUS SULFATE TAB 325 MG (65 MG ELEMENTAL FE) 325 MG: 325 (65 FE) TAB at 09:47

## 2023-11-22 RX ADMIN — INSULIN LISPRO 4 UNITS: 100 INJECTION, SOLUTION INTRAVENOUS; SUBCUTANEOUS at 11:49

## 2023-11-22 RX ADMIN — ENOXAPARIN SODIUM 90 MG: 100 INJECTION SUBCUTANEOUS at 15:23

## 2023-11-22 RX ADMIN — IPRATROPIUM BROMIDE AND ALBUTEROL SULFATE 3 ML: 2.5; .5 SOLUTION RESPIRATORY (INHALATION) at 12:24

## 2023-11-22 NOTE — CASE MANAGEMENT/SOCIAL WORK
Case Management Discharge Note      Final Note: Patient is discharging to home today. Significant other to transport home and bring portable oxygen. Patient is agreeable with plan. No needs noted.         Selected Continued Care - Admitted Since 11/9/2023       Destination    No services have been selected for the patient.                Durable Medical Equipment       Service Provider Selected Services Address Phone Fax Patient Preferred    [facility] [place] [facility] ,  Durable Medical Equipment [address] [facility] [address] [id number] [phone] --              Dialysis/Infusion    No services have been selected for the patient.                Home Medical Care    No services have been selected for the patient.                Therapy    No services have been selected for the patient.                Community Resources    No services have been selected for the patient.                Community & DME    No services have been selected for the patient.                    Selected Continued Care - Episodes Includes continued care and service providers with selected services from the active episodes listed below      [facility] Episode start date: 9/29/2023   There are no active outsourced providers for this episode.                 Transportation Services  Private: Car    Final Discharge Disposition Code: 01 - home or self-care

## 2023-11-22 NOTE — PAYOR COMM NOTE
"Neha Monterroso \"Shiloh\" (62 y.o. Female)     [id number]     Shilpa Foy RN  Utilization Review  Coryt-[phone]  [phone]        Date of Birth   1960    Social Security Number       Address   [address] [place] [address]    Home Phone   [phone]    MRN   [id number]       Confucianism   Druze    Marital Status   Significant Other                            Admission Date   11/9/23    Admission Type   Emergency    Admitting Provider   Amanda Longoria MD    Attending Provider   Amanda Longoria MD    Department, Room/Bed   [facility] 6A, N621/1       Discharge Date       Discharge Disposition   Home or Self Care    Discharge Destination                                 Attending Provider: Amanda Longoria MD    Allergies: Adhesive Tape, Sildenafil, Codeine, Morphine, Penicillins, Statins, Sulfa Antibiotics    Isolation: None   Infection: None   Code Status: CPR    Ht: 152.4 cm (60\")   Wt: 90.5 kg (199 lb 9.6 oz)    Admission Cmt: None   Principal Problem: GIB (gastrointestinal bleeding) [K92.2]                   Active Insurance as of 11/9/2023       Primary Coverage       Payor Plan Insurance Group Employer/Plan Group    ANTHEM MEDICARE REPLACEMENT ANTHEM MEDICARE ADVANTAGE [id number]       Payor Plan Address Payor Plan Phone Number Payor Plan Fax Number Effective Dates    [address] [phone]  1/1/2018 - None Entered    [facility] [address]         Subscriber Name Subscriber Birth Date Member ID       NEHA MONTERROSO 1960 [id number]                     Emergency Contacts        (Rel.) Home Phone Work Phone Mobile Phone    Marcela Ellis (Daughter) [phone] -- [phone]    LARS ROTHMAN (Significant Other) [phone] -- [phone]                 Discharge Summary        Amanda Longoria MD at 11/22/23 1410              [facility] Medicine Services  DISCHARGE SUMMARY    Patient Name: Neha WADDELL " Jessica  : 1960  MRN: [id number]    Date of Admission: 2023  8:42 PM  Date of Discharge:  2023  Primary Care Physician: Davina Santiago DO    Consults       Date and Time Order Name Status Description    2023 12:37 PM Inpatient Nephrology Consult Completed     11/15/2023 10:29 AM Inpatient Gastroenterology Consult Completed     11/10/2023  1:27 AM Inpatient Gastroenterology Consult Completed             Hospital Course     Presenting Problem: GIB, volume overload    Active Hospital Problems    Diagnosis  POA    Severe malnutrition [E43]  Yes    GI bleed [K92.2]  Yes    H/O heart valve replacement with mechanical valve [Z95.2]  Not Applicable    Chronic respiratory failure with hypoxia [J96.11]  Yes    Chronic diastolic heart failure [I50.32]  Yes    Emphysema/COPD [J43.9]  Yes    Hypertension [I10]  Yes    Type 2 diabetes mellitus without complication, with long-term current use of insulin [E11.9, Z79.4]  Not Applicable    Depression [F32.A]  Yes    Dyslipidemia [E78.5]  Yes    Obstructive sleep apnea syndrome [G47.33]  Yes      Resolved Hospital Problems    Diagnosis Date Resolved POA    **GIB (gastrointestinal bleeding) [K92.2] 2023 Yes    Acute blood loss anemia [D62] 2023 Yes    MEERA (acute kidney injury) [N17.9] 2023 Yes    Melena [K92.1] 2023 Unknown          Hospital Course:  Di Lopez is a 62 y.o. female with PMH significant for COPD, HTN, HLD, CAD, depression, CHF, VHD s/p mechanical valve replacement on warfarin, DM2, CAL on CPAP, liver mass, obesity.  She has had diarrhea for 2 weeks, then developed BRBPR on . She was asked to hold her diuretics, Entresto and Trulicity by PCP last last week due to rising creatinine.         GI bleed - BRBPR   Symptomatic acute blood loss anemia  Warfarin use; INR 3-4 recently  Adenovirus   - Hgb was 11 in May, was 7.1 on admission.   - Colonoscopy completed May 2023;  Had scheduled EGD outpatient 2023 (  Dom)  - CT scan with inflammation in the sigmoid colon  - GI PCR with adenovirus   - Transfused another unit 11/14 for total of 3 units so far  -On 11/15 hemoglobin  dropped below 7.  Patient received 1 unit of packed red blood cell and GI also saw the patient.  -Patient had upper and lower endoscopy done on 11/16 by GI.  Significant findings on this study was Candida esophagitis and also diverticulosis.  GI bleeding was presumed to be diverticular bleeding.   -h/h has been stable, cont to monitor outpatient as appropriate        MEERA  - Baseline creatinine ~0.8-0.9,  - was 1.9 on admission  -Received several doses IV lasix.  Nephrology was consulted for recommendations on diuretics given uptrend in creatinine and persistent volume overload. With continued diuresis her Cr has stayed stable. Nephrology recommends daily lasix for a few days then 3 times a week as she was taking prior to admission. Needs to check daily weights at home      HFmrEF  - EF 45-50 3/23.   - Holding home entresto due to MEERA. Continue coreg  - Received intermittent IV diuretics with blood transfusion      Chronic hypoxic resp failure  - 3L at baseline but states home O2 concentrator is broken -- my partner discussed with case management      UTI POA  - urine culture ecoli   - Past UTI was E coli, a nd Klebs, not ESBL .   - s/p 3 doses of rocephin      Bed bugs   - Isolation complete     Mechanical mitral valve   - will need INR recheck on Monday with Dr Everett. Lovenox bridge w warfarin per pharmacy dosing recommendations     Diabetes mellitus 2 - A1C 6.8   - cont outpatient regimen     Hypertension  Dyslipidemia  - Currently controlled     CAL  - CPAP     Nicotine use  - Vapes  - Nicotine patch  - Cessation counseling     Obesity   - BMI 39. Complicates all aspects of care      Discharge Follow Up Recommendations for outpatient labs/diagnostics:   PCP, INR check    Day of Discharge     HPI:   Swelling continues to improve. Weight  trending down today    Review of Systems  Gen- No fevers, chills  CV- No chest pain, palpitations  Resp- No cough, dyspnea  GI- No N/V/D, abd pain      Vital Signs:   Temp:  [97.6 °F (36.4 °C)-98.5 °F (36.9 °C)] 98.5 °F (36.9 °C)  Heart Rate:  [59-68] 66  Resp:  [16-18] 18  BP: (135-158)/(53-65) 158/65  Flow (L/min):  [2] 2      Physical Exam:  Constitutional: No acute distress  HENT: NCAT, mucous membranes moist  Respiratory: Clear to auscultation bilaterally, breathing with no labor.  Cardiovascular: RRR, no murmurs, rubs, or gallops  Gastrointestinal: Positive bowel sounds, soft, nontender, nondistended  Musculoskeletal:  bilateral ankle edema  Psychiatric: Appropriate affect, cooperative  Neurologic: Awake, alert, oriented x3, no focality appreciated, speech clear  Skin: No rashes    Pertinent  and/or Most Recent Results     LAB RESULTS:      Lab 11/22/23  0350 11/21/23  0535 11/20/23  1749 11/20/23  1021 11/20/23  0150 11/19/23  1237 11/19/23  0428 11/18/23  0837 11/18/23  0427 11/16/23  2209 11/16/23  0730   WBC 12.08* 12.84*  --   --   --   --  12.59*  --   --   --  12.07*   HEMOGLOBIN 8.9* 8.6* 8.6*  --   --   --  8.3*  --   --   --  8.5*   HEMATOCRIT 28.8* 26.3* 26.9*  --   --   --  26.4*  --   --   --  26.9*   PLATELETS 374 366  --   --   --   --  333  --   --   --  345   NEUTROS ABS 8.40* 8.55*  --   --   --   --  8.08*  --   --   --  8.25*   IMMATURE GRANS (ABS) 0.15* 0.22*  --   --   --   --  0.16*  --   --   --  0.15*   LYMPHS ABS 2.13 2.58  --   --   --   --  2.92  --   --   --  2.27   MONOS ABS 0.82 0.82  --   --   --   --  0.73  --   --   --  0.84   EOS ABS 0.53* 0.62*  --   --   --   --  0.65*  --   --   --  0.52*   MCV 91.4 88.9  --   --   --   --  92.0  --   --   --  89.1   PROTIME 17.7* 16.9*  --   --  14.3  --  15.6*  --  15.6*   < > 16.2*   HEPARIN ANTI-XA 0.10* 0.34 0.38 0.42 0.25*   < > 0.56   < >  --    < > 0.23*    < > = values in this interval not displayed.         Lab 11/22/23  0350  11/21/23  0535 11/20/23  0150 11/19/23 0428 11/18/23 0427   SODIUM 142 139 140 141 141   POTASSIUM 3.5 3.4* 3.6 3.7 3.6   CHLORIDE 99 101 100 103 102   CO2 32.0* 30.0* 30.0* 26.0 28.0   ANION GAP 11.0 8.0 10.0 12.0 11.0   BUN 27* 22 24* 19 20   CREATININE 1.13* 1.05* 1.15* 1.05* 1.35*   EGFR 55.5* 60.6 54.3* 60.6 44.8*   GLUCOSE 232* 212* 280* 227* 188*   CALCIUM 9.2 8.8 9.4 8.7 8.9             Lab 11/22/23  0350 11/21/23  0535 11/20/23  0150 11/19/23 0428 11/18/23 0427   PROTIME 17.7* 16.9* 14.3 15.6* 15.6*   INR 1.44* 1.35* 1.10 1.23* 1.23*                 Brief Urine Lab Results  (Last result in the past 365 days)        Color   Clarity   Blood   Leuk Est   Nitrite   Protein   CREAT   Urine HCG        11/09/23 2320 Yellow   Clear   Negative   Small (1+)   Positive   100 mg/dL (2+)                 Microbiology Results (last 10 days)       ** No results found for the last 240 hours. **            XR Chest 1 View    Result Date: 11/13/2023  XR CHEST 1 VW Date of Exam: 11/13/2023 2:25 PM EST Indication: SOA, leukocytosis Comparison: 11/9/2023 Findings: Left subclavian transvenous pacemaker is unchanged. Patient is again noted be status post median sternotomy. Cardiomegaly is stable. Pulmonary vessels are indistinct consistent with pulmonary vascular congestion.     Impression: 1. Stable cardiomegaly. 2. No focal airspace consolidation Electronically Signed: Faizan Thomas MD  11/13/2023 2:54 PM EST  Workstation ID: [id number]     Results for orders placed during the hospital encounter of 05/31/23    Duplex Carotid Ultrasound CAR    Interpretation Summary    Right internal carotid artery demonstrates a less than 50% stenosis.    Left internal carotid artery demonstrates normal flow without evidence of hemodynamically significant stenosis.    Vertebral artery flow is antegrade bilaterally      Results for orders placed during the hospital encounter of 05/31/23    Duplex Carotid Ultrasound CAR    Interpretation  Summary    Right internal carotid artery demonstrates a less than 50% stenosis.    Left internal carotid artery demonstrates normal flow without evidence of hemodynamically significant stenosis.    Vertebral artery flow is antegrade bilaterally      Results for orders placed during the hospital encounter of 05/31/23    Adult Transthoracic Echo Complete W/ Cont if Necessary Per Protocol    Interpretation Summary    Mildly reduced right ventricular systolic function noted.    There is calcification of the aortic valve.    Estimated right ventricular systolic pressure from tricuspid regurgitation is normal (<35 mmHg).    Mild global LV systolic dysfunction    EF=45-50%    Normal prosthetic MV function    AV calcification      Plan for Follow-up of Pending Labs/Results: no pending results    Discharge Details        Discharge Medications        New Medications        Instructions Start Date   Enoxaparin Sodium 100 MG/ML solution prefilled syringe syringe  Commonly known as: LOVENOX   90 mg, Subcutaneous, Every 12 Hours Scheduled, If INR = 2.5 Saturday, patient can stop Lovenox and continue warfarin only until Monday recheck and further instruction from Dr Everett's office. If Saturday INR <2.5, patient to continue warfarin and Lovenox until follow up Monday.             Changes to Medications        Instructions Start Date   furosemide 40 MG tablet  Commonly known as: LASIX  What changed: additional instructions   40 mg, Oral, 3 Times Weekly, Monday, Wednesday, Friday. Resume when done w daily lasix      furosemide 40 MG tablet  Commonly known as: Lasix  What changed: You were already taking a medication with the same name, and this prescription was added. Make sure you understand how and when to take each.   40 mg, Oral, Daily      lidocaine 5 %  Commonly known as: LIDODERM  What changed: See the new instructions.   USE 1 PATCH EXTERNALLY ONCE DAILY. REMOVE AND DISCARD PATCH WITHIN 12 HOURS OR AS DIRECTED BY MD       sacubitril-valsartan 24-26 MG tablet  Commonly known as: ENTRESTO  What changed: additional instructions   1 tablet, Oral, 2 Times Daily, Resume when ok w PCP      warfarin 4 MG tablet  Commonly known as: COUMADIN  What changed:   how much to take  how to take this  additional instructions   Once per day on Sun Mon Tue Wed Thu Sat      warfarin 2 MG tablet  Commonly known as: COUMADIN  What changed:   medication strength  how to take this  additional instructions  These instructions start on November 24, 2023. If you are unsure what to do until then, ask your doctor or other care provider.   Take on 11/24/2023   Start Date: November 24, 2023            Continue These Medications        Instructions Start Date   albuterol sulfate  (90 Base) MCG/ACT inhaler  Commonly known as: PROVENTIL HFA;VENTOLIN HFA;PROAIR HFA   2 puffs, Inhalation, Every 4 Hours PRN      azelastine 0.1 % nasal spray  Commonly known as: ASTELIN   2 sprays, Nasal, 2 Times Daily, Use in each nostril as directed      carvedilol 12.5 MG tablet  Commonly known as: COREG   12.5 mg, Oral, 2 Times Daily With Meals      escitalopram 20 MG tablet  Commonly known as: LEXAPRO   Take 1 tablet by mouth once daily      ferrous sulfate 325 (65 FE) MG tablet   325 mg, Oral, Daily With Breakfast      fish oil 1000 MG capsule capsule   1,000 mg, Oral, 4 Times Daily      folic acid 800 MCG tablet  Commonly known as: FOLVITE   800 mcg, Oral, Daily      FreeStyle Dalia 2 [place] device   1 each, Does not apply, Daily, Use as directed daily to check blood sugars      FreeStyle Dalia 2 Sensor misc   USE AS DIRECTED FOR 14 DAYS      gabapentin 300 MG capsule  Commonly known as: NEURONTIN   300 mg, Oral, Nightly PRN      glimepiride 4 MG tablet  Commonly known as: AMARYL   4 mg, Oral, 2 Times Daily      glucose blood test strip   Use as instructed 3 times a day      Insulin Lispro (1 Unit Dial) 100 UNIT/ML solution pen-injector  Commonly known as: HumaLOG KwikPen    "Per correctional scale, MDD 50 units      Insulin Pen Needle 32G X 4 MM misc  Commonly known as: ReliOn Pen Needles   Use BID for E11.9      B-D UF III MINI PEN NEEDLES 31G X 5 MM misc  Generic drug: Insulin Pen Needle   USE 1  FIVE TIMES DAILY      Lancets misc   For use with glucose monitoring 3 times daily      Lantus SoloStar 100 UNIT/ML injection pen  Generic drug: Insulin Glargine   44 Units, Subcutaneous, Daily      omeprazole 20 MG capsule  Commonly known as: priLOSEC   20 mg, Oral, 2 Times Daily      simvastatin 5 MG tablet  Commonly known as: ZOCOR   TAKE 1 TABLET BY MOUTH ONCE DAILY AT NIGHT      solifenacin 5 MG tablet  Commonly known as: VESICARE   5 mg, Oral, Daily      Trulicity 4.5 MG/0.5ML solution pen-injector  Generic drug: Dulaglutide   INJECT 1 SYRINGE SUBCUTANEOUSLY ONCE A WEEK      Vitamin D (Cholecalciferol) 10 MCG (400 UNIT) tablet  Commonly known as: CHOLECALCIFEROL   800 Units, Oral, Daily             Stop These Medications      spironolactone 25 MG tablet  Commonly known as: ALDACTONE     valACYclovir 500 MG tablet  Commonly known as: Valtrex              Allergies   Allergen Reactions    Adhesive Tape Other (See Comments)     \"pulls skin off\"    Sildenafil Shortness Of Breath and Other (See Comments)     pt c/o tightness in chest and trouble breathing    Codeine Itching and Nausea And Vomiting    Morphine Other (See Comments)     uncontrolable crying     Penicillins Hives    Statins Other (See Comments)     HX of Liver Issues    Sulfa Antibiotics Nausea And Vomiting         Discharge Disposition:  Home or Self Care    Diet:  Hospital:  Diet Order   Procedures    Diet: Diabetic Diets; Consistent Carbohydrate; Texture: Regular Texture (IDDSI 7); Fluid Consistency: Thin (IDDSI 0)            Activity:  as tolerated    Restrictions or Other Recommendations:  none       CODE STATUS:    Code Status and Medical Interventions:   Ordered at: 11/09/23 7638     Code Status (Patient has no pulse and " is not breathing):    CPR (Attempt to Resuscitate)     Medical Interventions (Patient has pulse or is breathing):    Full Support       Future Appointments   Date Time Provider Department Center   11/29/2023  2:30 PM Davina Santiago DO [facility]   12/7/2023  2:30 PM Niya Gonzalez APRN [facility]   1/3/2024  1:00 PM Davina Santiago DO [facility]   1/16/2024  1:30 PM Vijaya Child APRN [facility]   2/22/2024 11:30 AM Ameena Iglesias MD [facility]   5/8/2024 11:00 AM Davina Santiago DO [facility]                 Amanda Longoria MD  11/22/23      Time Spent on Discharge:  I spent  45  minutes on this discharge activity which included: face-to-face encounter with the patient, reviewing the data in the system, coordination of the care with the nursing staff as well as consultants, documentation, and entering orders.            Electronically signed by Amanda Longoria MD at 11/22/23 7005

## 2023-11-22 NOTE — DISCHARGE SUMMARY
[facility] Medicine Services  DISCHARGE SUMMARY    Patient Name: Di Lopez  : 1960  MRN: [id number]    Date of Admission: 2023  8:42 PM  Date of Discharge:  2023  Primary Care Physician: Davina Santiago DO    Consults       Date and Time Order Name Status Description    2023 12:37 PM Inpatient Nephrology Consult Completed     11/15/2023 10:29 AM Inpatient Gastroenterology Consult Completed     11/10/2023  1:27 AM Inpatient Gastroenterology Consult Completed             Hospital Course     Presenting Problem: GIB, volume overload    Active Hospital Problems    Diagnosis  POA    Severe malnutrition [E43]  Yes    GI bleed [K92.2]  Yes    H/O heart valve replacement with mechanical valve [Z95.2]  Not Applicable    Chronic respiratory failure with hypoxia [J96.11]  Yes    Chronic diastolic heart failure [I50.32]  Yes    Emphysema/COPD [J43.9]  Yes    Hypertension [I10]  Yes    Type 2 diabetes mellitus without complication, with long-term current use of insulin [E11.9, Z79.4]  Not Applicable    Depression [F32.A]  Yes    Dyslipidemia [E78.5]  Yes    Obstructive sleep apnea syndrome [G47.33]  Yes      Resolved Hospital Problems    Diagnosis Date Resolved POA    **GIB (gastrointestinal bleeding) [K92.2] 2023 Yes    Acute blood loss anemia [D62] 2023 Yes    MEERA (acute kidney injury) [N17.9] 2023 Yes    Melena [K92.1] 2023 Unknown          Hospital Course:  Di Lopez is a 62 y.o. female with PMH significant for COPD, HTN, HLD, CAD, depression, CHF, VHD s/p mechanical valve replacement on warfarin, DM2, CAL on CPAP, liver mass, obesity.  She has had diarrhea for 2 weeks, then developed BRBPR on . She was asked to hold her diuretics, Entresto and Trulicity by PCP last last week due to rising creatinine.         GI bleed - BRBPR   Symptomatic acute blood loss anemia  Warfarin use; INR 3-4 recently  Adenovirus   - Hgb was 11 in May, was  7.1 on admission.   - Colonoscopy completed May 2023;  Had scheduled EGD outpatient 11/14/2023 (Dr. Fernandes)  - CT scan with inflammation in the sigmoid colon  - GI PCR with adenovirus   - Transfused another unit 11/14 for total of 3 units so far  -On 11/15 hemoglobin  dropped below 7.  Patient received 1 unit of packed red blood cell and GI also saw the patient.  -Patient had upper and lower endoscopy done on 11/16 by GI.  Significant findings on this study was Candida esophagitis and also diverticulosis.  GI bleeding was presumed to be diverticular bleeding.   -h/h has been stable, cont to monitor outpatient as appropriate        MEERA  - Baseline creatinine ~0.8-0.9,  - was 1.9 on admission  -Received several doses IV lasix.  Nephrology was consulted for recommendations on diuretics given uptrend in creatinine and persistent volume overload. With continued diuresis her Cr has stayed stable. Nephrology recommends daily lasix for a few days then 3 times a week as she was taking prior to admission. Needs to check daily weights at home      HFmrEF  - EF 45-50 3/23.   - Holding home entresto due to MEERA. Continue coreg  - Received intermittent IV diuretics with blood transfusion      Chronic hypoxic resp failure  - 3L at baseline but states home O2 concentrator is broken -- my partner discussed with case management      UTI POA  - urine culture ecoli   - Past UTI was E coli, a nd Klebs, not ESBL .   - s/p 3 doses of rocephin      Bed bugs   - Isolation complete     Mechanical mitral valve   - will need INR recheck on Monday with Dr Everett. Lovenox bridge w warfarin per pharmacy dosing recommendations     Diabetes mellitus 2 - A1C 6.8   - cont outpatient regimen     Hypertension  Dyslipidemia  - Currently controlled     CAL  - CPAP     Nicotine use  - Vapes  - Nicotine patch  - Cessation counseling     Obesity   - BMI 39. Complicates all aspects of care      Discharge Follow Up Recommendations for outpatient  labs/diagnostics:   PCP, INR check    Day of Discharge     HPI:   Swelling continues to improve. Weight trending down today    Review of Systems  Gen- No fevers, chills  CV- No chest pain, palpitations  Resp- No cough, dyspnea  GI- No N/V/D, abd pain      Vital Signs:   Temp:  [97.6 °F (36.4 °C)-98.5 °F (36.9 °C)] 98.5 °F (36.9 °C)  Heart Rate:  [59-68] 66  Resp:  [16-18] 18  BP: (135-158)/(53-65) 158/65  Flow (L/min):  [2] 2      Physical Exam:  Constitutional: No acute distress  HENT: NCAT, mucous membranes moist  Respiratory: Clear to auscultation bilaterally, breathing with no labor.  Cardiovascular: RRR, no murmurs, rubs, or gallops  Gastrointestinal: Positive bowel sounds, soft, nontender, nondistended  Musculoskeletal:  bilateral ankle edema  Psychiatric: Appropriate affect, cooperative  Neurologic: Awake, alert, oriented x3, no focality appreciated, speech clear  Skin: No rashes    Pertinent  and/or Most Recent Results     LAB RESULTS:      Lab 11/22/23  0350 11/21/23  0535 11/20/23  1749 11/20/23  1021 11/20/23  0150 11/19/23  1237 11/19/23  0428 11/18/23  0837 11/18/23  0427 11/16/23  2209 11/16/23  0730   WBC 12.08* 12.84*  --   --   --   --  12.59*  --   --   --  12.07*   HEMOGLOBIN 8.9* 8.6* 8.6*  --   --   --  8.3*  --   --   --  8.5*   HEMATOCRIT 28.8* 26.3* 26.9*  --   --   --  26.4*  --   --   --  26.9*   PLATELETS 374 366  --   --   --   --  333  --   --   --  345   NEUTROS ABS 8.40* 8.55*  --   --   --   --  8.08*  --   --   --  8.25*   IMMATURE GRANS (ABS) 0.15* 0.22*  --   --   --   --  0.16*  --   --   --  0.15*   LYMPHS ABS 2.13 2.58  --   --   --   --  2.92  --   --   --  2.27   MONOS ABS 0.82 0.82  --   --   --   --  0.73  --   --   --  0.84   EOS ABS 0.53* 0.62*  --   --   --   --  0.65*  --   --   --  0.52*   MCV 91.4 88.9  --   --   --   --  92.0  --   --   --  89.1   PROTIME 17.7* 16.9*  --   --  14.3  --  15.6*  --  15.6*   < > 16.2*   HEPARIN ANTI-XA 0.10* 0.34 0.38 0.42 0.25*   < >  0.56   < >  --    < > 0.23*    < > = values in this interval not displayed.         Lab 11/22/23  0350 11/21/23  0535 11/20/23  0150 11/19/23 0428 11/18/23 0427   SODIUM 142 139 140 141 141   POTASSIUM 3.5 3.4* 3.6 3.7 3.6   CHLORIDE 99 101 100 103 102   CO2 32.0* 30.0* 30.0* 26.0 28.0   ANION GAP 11.0 8.0 10.0 12.0 11.0   BUN 27* 22 24* 19 20   CREATININE 1.13* 1.05* 1.15* 1.05* 1.35*   EGFR 55.5* 60.6 54.3* 60.6 44.8*   GLUCOSE 232* 212* 280* 227* 188*   CALCIUM 9.2 8.8 9.4 8.7 8.9             Lab 11/22/23  0350 11/21/23  0535 11/20/23  0150 11/19/23 0428 11/18/23 0427   PROTIME 17.7* 16.9* 14.3 15.6* 15.6*   INR 1.44* 1.35* 1.10 1.23* 1.23*                 Brief Urine Lab Results  (Last result in the past 365 days)        Color   Clarity   Blood   Leuk Est   Nitrite   Protein   CREAT   Urine HCG        11/09/23 2320 Yellow   Clear   Negative   Small (1+)   Positive   100 mg/dL (2+)                 Microbiology Results (last 10 days)       ** No results found for the last 240 hours. **            XR Chest 1 View    Result Date: 11/13/2023  XR CHEST 1 VW Date of Exam: 11/13/2023 2:25 PM EST Indication: SOA, leukocytosis Comparison: 11/9/2023 Findings: Left subclavian transvenous pacemaker is unchanged. Patient is again noted be status post median sternotomy. Cardiomegaly is stable. Pulmonary vessels are indistinct consistent with pulmonary vascular congestion.     Impression: 1. Stable cardiomegaly. 2. No focal airspace consolidation Electronically Signed: Faizan Thomas MD  11/13/2023 2:54 PM EST  Workstation ID: [id number]     Results for orders placed during the hospital encounter of 05/31/23    Duplex Carotid Ultrasound CAR    Interpretation Summary    Right internal carotid artery demonstrates a less than 50% stenosis.    Left internal carotid artery demonstrates normal flow without evidence of hemodynamically significant stenosis.    Vertebral artery flow is antegrade bilaterally      Results for orders  placed during the hospital encounter of 05/31/23    Duplex Carotid Ultrasound CAR    Interpretation Summary    Right internal carotid artery demonstrates a less than 50% stenosis.    Left internal carotid artery demonstrates normal flow without evidence of hemodynamically significant stenosis.    Vertebral artery flow is antegrade bilaterally      Results for orders placed during the hospital encounter of 05/31/23    Adult Transthoracic Echo Complete W/ Cont if Necessary Per Protocol    Interpretation Summary    Mildly reduced right ventricular systolic function noted.    There is calcification of the aortic valve.    Estimated right ventricular systolic pressure from tricuspid regurgitation is normal (<35 mmHg).    Mild global LV systolic dysfunction    EF=45-50%    Normal prosthetic MV function    AV calcification      Plan for Follow-up of Pending Labs/Results: no pending results    Discharge Details        Discharge Medications        New Medications        Instructions Start Date   Enoxaparin Sodium 100 MG/ML solution prefilled syringe syringe  Commonly known as: LOVENOX   90 mg, Subcutaneous, Every 12 Hours Scheduled, If INR = 2.5 Saturday, patient can stop Lovenox and continue warfarin only until Monday recheck and further instruction from Dr Everett's office. If Saturday INR <2.5, patient to continue warfarin and Lovenox until follow up Monday.             Changes to Medications        Instructions Start Date   furosemide 40 MG tablet  Commonly known as: LASIX  What changed: additional instructions   40 mg, Oral, 3 Times Weekly, Monday, Wednesday, Friday. Resume when done w daily lasix      furosemide 40 MG tablet  Commonly known as: Lasix  What changed: You were already taking a medication with the same name, and this prescription was added. Make sure you understand how and when to take each.   40 mg, Oral, Daily      lidocaine 5 %  Commonly known as: LIDODERM  What changed: See the new instructions.   USE  1 PATCH EXTERNALLY ONCE DAILY. REMOVE AND DISCARD PATCH WITHIN 12 HOURS OR AS DIRECTED BY MD      sacubitril-valsartan 24-26 MG tablet  Commonly known as: ENTRESTO  What changed: additional instructions   1 tablet, Oral, 2 Times Daily, Resume when ok w PCP      warfarin 4 MG tablet  Commonly known as: COUMADIN  What changed:   how much to take  how to take this  additional instructions   Once per day on Sun Mon Tue Wed Thu Sat      warfarin 2 MG tablet  Commonly known as: COUMADIN  What changed:   medication strength  how to take this  additional instructions  These instructions start on November 24, 2023. If you are unsure what to do until then, ask your doctor or other care provider.   Take on 11/24/2023   Start Date: November 24, 2023            Continue These Medications        Instructions Start Date   albuterol sulfate  (90 Base) MCG/ACT inhaler  Commonly known as: PROVENTIL HFA;VENTOLIN HFA;PROAIR HFA   2 puffs, Inhalation, Every 4 Hours PRN      azelastine 0.1 % nasal spray  Commonly known as: ASTELIN   2 sprays, Nasal, 2 Times Daily, Use in each nostril as directed      carvedilol 12.5 MG tablet  Commonly known as: COREG   12.5 mg, Oral, 2 Times Daily With Meals      escitalopram 20 MG tablet  Commonly known as: LEXAPRO   Take 1 tablet by mouth once daily      ferrous sulfate 325 (65 FE) MG tablet   325 mg, Oral, Daily With Breakfast      fish oil 1000 MG capsule capsule   1,000 mg, Oral, 4 Times Daily      folic acid 800 MCG tablet  Commonly known as: FOLVITE   800 mcg, Oral, Daily      FreeStyle Dalia 2 Shaniko device   1 each, Does not apply, Daily, Use as directed daily to check blood sugars      FreeStyle Dalia 2 Sensor misc   USE AS DIRECTED FOR 14 DAYS      gabapentin 300 MG capsule  Commonly known as: NEURONTIN   300 mg, Oral, Nightly PRN      glimepiride 4 MG tablet  Commonly known as: AMARYL   4 mg, Oral, 2 Times Daily      glucose blood test strip   Use as instructed 3 times a day     "  Insulin Lispro (1 Unit Dial) 100 UNIT/ML solution pen-injector  Commonly known as: HumaLOG KwikPen   Per correctional scale, MDD 50 units      Insulin Pen Needle 32G X 4 MM misc  Commonly known as: ReliOn Pen Needles   Use BID for E11.9      B-D UF III MINI PEN NEEDLES 31G X 5 MM misc  Generic drug: Insulin Pen Needle   USE 1  FIVE TIMES DAILY      Lancets misc   For use with glucose monitoring 3 times daily      Lantus SoloStar 100 UNIT/ML injection pen  Generic drug: Insulin Glargine   44 Units, Subcutaneous, Daily      omeprazole 20 MG capsule  Commonly known as: priLOSEC   20 mg, Oral, 2 Times Daily      simvastatin 5 MG tablet  Commonly known as: ZOCOR   TAKE 1 TABLET BY MOUTH ONCE DAILY AT NIGHT      solifenacin 5 MG tablet  Commonly known as: VESICARE   5 mg, Oral, Daily      Trulicity 4.5 MG/0.5ML solution pen-injector  Generic drug: Dulaglutide   INJECT 1 SYRINGE SUBCUTANEOUSLY ONCE A WEEK      Vitamin D (Cholecalciferol) 10 MCG (400 UNIT) tablet  Commonly known as: CHOLECALCIFEROL   800 Units, Oral, Daily             Stop These Medications      spironolactone 25 MG tablet  Commonly known as: ALDACTONE     valACYclovir 500 MG tablet  Commonly known as: Valtrex              Allergies   Allergen Reactions    Adhesive Tape Other (See Comments)     \"pulls skin off\"    Sildenafil Shortness Of Breath and Other (See Comments)     pt c/o tightness in chest and trouble breathing    Codeine Itching and Nausea And Vomiting    Morphine Other (See Comments)     uncontrolable crying     Penicillins Hives    Statins Other (See Comments)     HX of Liver Issues    Sulfa Antibiotics Nausea And Vomiting         Discharge Disposition:  Home or Self Care    Diet:  Hospital:  Diet Order   Procedures    Diet: Diabetic Diets; Consistent Carbohydrate; Texture: Regular Texture (IDDSI 7); Fluid Consistency: Thin (IDDSI 0)            Activity:  as tolerated    Restrictions or Other Recommendations:  none       CODE STATUS:    Code " Status and Medical Interventions:   Ordered at: 11/09/23 2352     Code Status (Patient has no pulse and is not breathing):    CPR (Attempt to Resuscitate)     Medical Interventions (Patient has pulse or is breathing):    Full Support       Future Appointments   Date Time Provider Department Center   11/29/2023  2:30 PM Davina Santiago DO [facility]   12/7/2023  2:30 PM Niya Gonzalez APRN [facility]   1/3/2024  1:00 PM Davina Santiago DO [facility]   1/16/2024  1:30 PM Vijaya Child APRN [facility]   2/22/2024 11:30 AM Ameena Iglesias MD [facility]   5/8/2024 11:00 AM Davina Santiago DO [facility]                 Amanda Longoria MD  11/22/23      Time Spent on Discharge:  I spent  45  minutes on this discharge activity which included: face-to-face encounter with the patient, reviewing the data in the system, coordination of the care with the nursing staff as well as consultants, documentation, and entering orders.

## 2023-11-22 NOTE — PROGRESS NOTES
"Pharmacy Consult  -  Warfarin  Di Lopez is a  62 y.o. female   Height - 152.4 cm (60\")  Weight - 90.5 kg (199 lb 9.6 oz)    Consulting Provider: Dr. Sherman  Indication: Mechanical MVR  Goal INR: 2.5 - 3.5  Home Regimen: 4 mg daily, except 4.5 mg on Sat/Sun    Bridge Therapy: Yes   and unfractionated heparin    Drug-Drug Interactions with current regimen:   Excedrin contains asa - increase bleed risk              Heparin/Lovenox - increase bleed risk                Warfarin Dosing During Admission:  Date  11/10 11/11 11/12 11/13 11/14 11/15 11/16 11/17 11/18   INR  4.13 1.09 1.16 1.22 1.36 1.36 1.29 1.25 1.23   Dose  IV Vit K given 6 mg 3 mg 3 mg  HOLD HOLD HOLD 5 mg 0 mg ( error)     Date  11/19 11/20 11/21 11/22        INR  1.23 1.10 1.35 1.44        Dose  7.5 mg 4 mg 4 mg 4 mg            Education Provided: 11/11/23 - provided education pamphlet and answered questions.      Discharge Follow up:   Following Provider - Dr. Everett, patient does have home monitor to check INR   Follow up time range or appointment - Monday Nov 27    Labs:  Results from last 7 days   Lab Units 11/22/23  0350 11/21/23  0535 11/20/23  1749 11/20/23  0150 11/19/23  0428 11/18/23  0427 11/17/23  0636 11/16/23  0730 11/15/23  1605   INR  1.44* 1.35*  --  1.10 1.23* 1.23* 1.25* 1.29*  --    HEMOGLOBIN g/dL 8.9* 8.6* 8.6*  --  8.3*  --   --  8.5* 8.7*   HEMATOCRIT % 28.8* 26.3* 26.9*  --  26.4*  --   --  26.9* 26.5*     Results from last 7 days   Lab Units 11/22/23  0350 11/21/23  0535 11/20/23  0150   SODIUM mmol/L 142 139 140   POTASSIUM mmol/L 3.5 3.4* 3.6   CHLORIDE mmol/L 99 101 100   CO2 mmol/L 32.0* 30.0* 30.0*   BUN mg/dL 27* 22 24*   CREATININE mg/dL 1.13* 1.05* 1.15*   CALCIUM mg/dL 9.2 8.8 9.4   GLUCOSE mg/dL 232* 212* 280*     Current dietary intake: 100% of lunch on 11/21    Assessment/Plan:   Pharmacy to dose warfarin for Mechanical MVR. Goal INR: 2.5-3.5.  Patient's INR remains SUBtherapeutic today at 1.44. " Warfarin had been held for three days and was restarted 11/17.  Patient admitted with supratherapeutic INR of 4.13 and GIB; given IV Vit K 5mg on 11/10. Colonoscopy 11/16 revealed left-sided diverticulosis, presumed to be source of recent GI blood loss by Dr Brunner.  Discharge planned for today assuming fine with Nephology. Recommend patient discharge on reduced dose of warfarin 4 mg daily except for warfarin 2 mg daily Friday (a ~10% decrease in weekly dose vs PTA dosing). Would also recommend patient to take Lovenox 90 mg SQ q12h until INR >2.5. Patient to recheck INR Saturday via home monitor due to risk of further bleeding with therapeutic/supratherapeutic INR. Instructed patient to stop Lovenox injections if INR ? 2.5 Saturday and continue warfarin. Patient instructed to continue Lovenox and warfarin if INR <2.5. Patient to then recheck INR with Dr Everett's office Monday for further instruction regardless of result Saturday. Written directions provided to patient. Difficult situation for recheck given upcoming Thanksgiving holiday as Dr Everett's office is closed Thursday-Sunday.  Did call patient's home [facility] and verified they do have Lovenox 100 mg syringes on hand. Patient has Rx there that is unfilled for Lovenox 80 mg syringes sent in by Dr Everett to bridge for previously planned EGD that was scheduled for 11/14 as outpatient. Of note, patient's weight has fluctuated given ongoing diuresis. Patient is aware that she is to take 90 mg dose, squirting out 0.1 mL of the new 100 mg syringes and not to use 80 mg syringes.  Monitor for signs/symptoms of bleeding, dietary intake, and drug-drug interactions.   Pharmacy will continue to follow and make dose adjustments as necessary.    Summarized Discharge Plan:  Recommend patient discharge on reduced dose of warfarin 4 mg daily except for warfarin 2 mg daily Friday and Lovenox 90 mg SQ q12h until INR recheck on home monitor Saturday. If INR ? 2.5  Saturday, patient can stop Lovenox and continue warfarin only until Monday recheck and further instruction from Dr Everett's office. If Saturday INR <2.5, patient to continue warfarin and Lovenox until follow up Monday.    Thank you,  Javier Samuels, PharmD  11/22/2023  11:33 EST

## 2023-11-22 NOTE — PROGRESS NOTES
Clinical Nutrition   Nutrition Support Assessment  Reason for Visit: NPO/Clear [id number]      Patient Name: Di Lopez  YOB: 1960  MRN: [id number]  Date of Encounter: 11/22/23 08:04 EST  Admission date: 11/9/2023    Comments:  Pt noted to have adequate PO intake. RD will sign off at this time. If note that PO intake decrease to avg of <50%, please consult RD to re-evaluate interventions in place.     Nutrition Assessment   Admission Diagnosis:  GIB (gastrointestinal bleeding) [K92.2]  GI bleed [K92.2]      Problem List:    GIB (gastrointestinal bleeding)    Depression    Type 2 diabetes mellitus without complication, with long-term current use of insulin    Dyslipidemia    Emphysema/COPD    Hypertension    Obstructive sleep apnea syndrome    Chronic diastolic heart failure    Chronic respiratory failure with hypoxia    Melena    Acute blood loss anemia    MEERA (acute kidney injury)    H/O heart valve replacement with mechanical valve    GI bleed    Severe malnutrition        PMH:   She  has a past medical history of Allergic, Anemia, Anticoagulated, Arteriovenous malformation of gastrointestinal tract (05/12/2016), Arthritis, Asthma, Black hairy tongue, CHF (congestive heart failure), Chronic back pain, Coronary artery disease, Depression (05/12/2016), Diabetes mellitus, Dyslipidemia (05/12/2016), Elevated cholesterol, Emphysema/COPD (05/12/2016), Emphysema/COPD (05/12/2016), Fatigue, GERD (gastroesophageal reflux disease), Headache, Herniated cervical disc, History of echocardiogram (07/16/2013), History of transfusion, Hyperlipidemia, Hypertension, Insomnia (05/12/2016), Iron deficiency, Left bundle branch hemiblock (10/12/2020), Left shoulder strain, Liver disease, Liver mass, Long term current use of anticoagulant (05/12/2016), Low back pain, Lung nodules, Morbid obesity (05/12/2016), Neuromuscular disorder (7/28/2016), Obstructive sleep apnea syndrome (05/12/2016), Sliding  "hiatal hernia (07/28/2016), Type 2 diabetes mellitus (05/12/2016), Urinary tract infection, Uterine cancer, Vitamin B12 deficiency, and Wears eyeglasses.    PSH:  She  has a past surgical history that includes Colonoscopy (12/28/2012); Esophagogastroduodenoscopy (12/30/2011); Bilateral oophorectomy (2009); Cystectomy; Mitral valve replacement (03/03/2008); Breast excisional biopsy (Bilateral); Breast biopsy (Right); Hysterectomy (1995); Cardiac electrophysiology procedure (N/A, 10/15/2020); Carpal tunnel release (Bilateral); Other surgical history; Cardiac surgery (2003); Colonoscopy (N/A, 5/23/2023); Colonoscopy (N/A, 11/16/2023); and Esophagogastroduodenoscopy (N/A, 11/16/2023).    Applicable Nutrition Concerns:   Skin:  Oral:  GI:    Applicable Interval History:         Reported/Observed/Food/Nutrition Related History:     11/16  Able to reach pt via room phone, pt states her last good meal was [employer] on Thursday and has not eaten since then. Pt is NPO/CLD x 5days. Pt to have EGD/CSY today, hopeful to advance diet after procedure. Pt agreeable to Boost, preferences also obtained. Pt states [id number] and states she has a lot of fluid on her w/reported wt taken this morning of 208lbs. NKFA.     Anthropometrics     Flowsheet Rows      Flowsheet Row First Filed Value   Admission Height 154.9 cm (61\") Documented at 11/09/2023 1908   Admission Weight 87.1 kg (192 lb) Documented at 11/09/2023 1908          Height: Height: 152.4 cm (60\")  Last Filed Weight: Weight: 90.5 kg (199 lb 9.6 oz) (11/22/23 0559)  Method: Weight Method: Standing scale  BMI: BMI (Calculated): 39  BMI classification: Obese Class III extreme obesity: > or equal to 40kg/m2  IBW:  46kg    UBW:  192lbs  Weight change:      Weight       Weight (kg) Weight (lbs) Weight Method Visit Report   2/3/2023 89.3 kg  196 lb 13.9 oz  Standing scale     2/9/2023 89.359 kg  197 lb   --    3/1/2023 87.454 kg  192 lb 12.8 oz   --    3/20/2023 89.268 kg  196 lb " 12.8 oz   --    3/31/2023 87.907 kg  193 lb 12.8 oz   --    4/25/2023 87.454 kg  192 lb 12.8 oz   --    4/28/2023 87.181 kg  192 lb 3.2 oz   --    5/16/2023 87.091 kg  192 lb  Stated     5/31/2023 87.1 kg  192 lb 0.3 oz      6/5/2023 87.091 kg  192 lb   --    8/24/2023 87.816 kg  193 lb 9.6 oz   --    10/6/2023 88.361 kg  194 lb 12.8 oz   --     87.998 kg  194 lb   --        --        --    10/18/2023 90.266 kg  199 lb   --    11/2/2023 88.451 kg  195 lb   --    11/9/2023 87.091 kg  192 lb  Stated     11/10/2023 90.946 kg  200 lb 8 oz  Bed scale      93.668 kg  206 lb 8 oz  Bed scale     11/11/2023 91.763 kg  202 lb 4.8 oz      11/14/2023 93.804 kg  206 lb 12.8 oz  Standing scale        Standing scale     11/15/2023 94.666 kg  208 lb 11.2 oz  Bed scale     11/16/2023 93.895 kg  207 lb  Standing scale       Nutrition Focused Physical Exam     Date:     11/16    Unable to perform exam due to:  Isolation precautions*    Current Nutrition Prescription   PO: Diet: Diabetic Diets; Consistent Carbohydrate; Texture: Regular Texture (IDDSI 7); Fluid Consistency: Thin (IDDSI 0)  Oral Nutrition Supplement:   Intake: 81% x 9 meals      Nutrition Diagnosis   Date:  11/16            Updated:    Problem Malnutrition severe acute   Etiology GIB   Signs/Symptoms Po intake </=50% x 5 days, moderate 2+ edema   Status:     Date:  11/16     Updated:     Problem Inadequate oral intake   Etiology Pending EGD/CSY   Signs/Symptoms NPO   Status: resolved    Goal:   General: Maintain nutrition  PO: Maintain intake  EN/PN: N/A    Nutrition Intervention      Follow treatment progress, Care plan reviewed      Monitoring/Evaluation:   Per protocol      Teresa Villalba, MS,RD,LD  Time Spent: 15

## 2023-11-22 NOTE — OUTREACH NOTE
Prep Survey      Flowsheet Row Responses   [facility] [place] facility patient discharged from? [place]   Is LACE score < 7 ? No   Eligibility [facility]   Date of Admission 11/09/23   Date of Discharge 11/22/23   Discharge Disposition Home or Self Care   Discharge diagnosis GI bleed, BRBOR, symptomatic anemia   Does the patient have one of the following disease processes/diagnoses(primary or secondary)? Other   Does the patient have Home health ordered? No   Is there a DME ordered? Yes   What DME was ordered? has home O2, [employer] to repair/replace concentrator   Prep survey completed? Yes            Halle GUADARRAMA - Registered Nurse

## 2023-11-22 NOTE — PROGRESS NOTES
" LOS: 11 days   Patient Care Team:  Davina Santiago DO as PCP - General (Family Medicine)  Ameena Iglesias MD as Consulting Physician (Endocrinology)  Nakita Freire APRN as Nurse Practitioner (Nurse Practitioner)  Raimundo Thomas MD as Consulting Physician (Pulmonary Disease)  Nadir Everett MD as Consulting Physician (Cardiology)  Luiz Longoria MD as Consulting Physician (Pain Medicine)    Chief Complaint: Volume overload.    Subjective         Subjective:  Symptoms:  Stable.  No shortness of breath, chest pain, chest pressure or anxiety.    Diet:  Adequate intake.        History taken from: patient    Objective     Vital Sign Min/Max for last 24 hours  Temp  Min: 97.6 °F (36.4 °C)  Max: 98.5 °F (36.9 °C)   BP  Min: 135/53  Max: 158/65   Pulse  Min: 59  Max: 68   Resp  Min: 16  Max: 18   SpO2  Min: 96 %  Max: 97 %   Flow (L/min)  Min: 2  Max: 2   Weight  Min: 90.5 kg (199 lb 9.6 oz)  Max: 90.5 kg (199 lb 9.6 oz)     Flowsheet Rows      Flowsheet Row First Filed Value   Admission Height 154.9 cm (61\") Documented at 11/09/2023 1908   Admission Weight 87.1 kg (192 lb) Documented at 11/09/2023 1908            I/O this shift:  In: 360 [P.O.:360]  Out: -   I/O last 3 completed shifts:  In: 1935.6 [P.O.:400; I.V.:1535.6]  Out: -     Objective:  General Appearance:  Comfortable.    Vital signs: (most recent): Blood pressure 158/65, pulse 66, temperature 98.5 °F (36.9 °C), temperature source Oral, resp. rate 18, height 152.4 cm (60\"), weight 90.5 kg (199 lb 9.6 oz), last menstrual period 06/17/1998, SpO2 96%, not currently breastfeeding.  No fever.    Output: Producing urine.    HEENT: Normal HEENT exam.    Lungs:  Normal effort and normal respiratory rate.  Breath sounds clear to auscultation.  She is not in respiratory distress.  No decreased breath sounds or wheezes.    Heart: Normal rate.  Regular rhythm.  S1 normal and S2 normal.  No murmur or gallop.   Abdomen: Abdomen is soft.    Extremities: " "There is dependent edema.    Pulses: Distal pulses are intact.    Neurological: Patient is alert and oriented to person, place and time.  Normal strength.    Pupils:  Pupils are equal, round, and reactive to light.                Results Review:     I reviewed the patient's new clinical results.    WBC WBC   Date Value Ref Range Status   11/22/2023 12.08 (H) 3.40 - 10.80 10*3/mm3 Final   11/21/2023 12.84 (H) 3.40 - 10.80 10*3/mm3 Final      HGB Hemoglobin   Date Value Ref Range Status   11/22/2023 8.9 (L) 12.0 - 15.9 g/dL Final   11/21/2023 8.6 (L) 12.0 - 15.9 g/dL Final   11/20/2023 8.6 (L) 12.0 - 15.9 g/dL Final      HCT Hematocrit   Date Value Ref Range Status   11/22/2023 28.8 (L) 34.0 - 46.6 % Final   11/21/2023 26.3 (L) 34.0 - 46.6 % Final   11/20/2023 26.9 (L) 34.0 - 46.6 % Final      Platlets No results found for: \"LABPLAT\"   MCV MCV   Date Value Ref Range Status   11/22/2023 91.4 79.0 - 97.0 fL Final   11/21/2023 88.9 79.0 - 97.0 fL Final          Sodium Sodium   Date Value Ref Range Status   11/22/2023 142 136 - 145 mmol/L Final   11/21/2023 139 136 - 145 mmol/L Final   11/20/2023 140 136 - 145 mmol/L Final      Potassium Potassium   Date Value Ref Range Status   11/22/2023 3.5 3.5 - 5.2 mmol/L Final   11/21/2023 3.4 (L) 3.5 - 5.2 mmol/L Final   11/20/2023 3.6 3.5 - 5.2 mmol/L Final     Comment:     Slight hemolysis detected by analyzer. Result may be falsely elevated.      Chloride Chloride   Date Value Ref Range Status   11/22/2023 99 98 - 107 mmol/L Final   11/21/2023 101 98 - 107 mmol/L Final   11/20/2023 100 98 - 107 mmol/L Final      CO2 CO2   Date Value Ref Range Status   11/22/2023 32.0 (H) 22.0 - 29.0 mmol/L Final   11/21/2023 30.0 (H) 22.0 - 29.0 mmol/L Final   11/20/2023 30.0 (H) 22.0 - 29.0 mmol/L Final      BUN BUN   Date Value Ref Range Status   11/22/2023 27 (H) 8 - 23 mg/dL Final   11/21/2023 22 8 - 23 mg/dL Final   11/20/2023 24 (H) 8 - 23 mg/dL Final      Creatinine Creatinine   Date " "Value Ref Range Status   11/22/2023 1.13 (H) 0.57 - 1.00 mg/dL Final   11/21/2023 1.05 (H) 0.57 - 1.00 mg/dL Final   11/20/2023 1.15 (H) 0.57 - 1.00 mg/dL Final      Calcium Calcium   Date Value Ref Range Status   11/22/2023 9.2 8.6 - 10.5 mg/dL Final   11/21/2023 8.8 8.6 - 10.5 mg/dL Final   11/20/2023 9.4 8.6 - 10.5 mg/dL Final      PO4 No results found for: \"CAPO4\"   Albumin No results found for: \"ALBUMIN\"   Magnesium No results found for: \"MG\"   Uric Acid No results found for: \"URICACID\"     Medication Review: yes    Assessment & Plan       Depression    Type 2 diabetes mellitus without complication, with long-term current use of insulin    Dyslipidemia    Emphysema/COPD    Hypertension    Obstructive sleep apnea syndrome    Chronic diastolic heart failure    Chronic respiratory failure with hypoxia    H/O heart valve replacement with mechanical valve    GI bleed    Severe malnutrition      Assessment & Plan    Cardiorenal syndrome: MEERA on this admission improved. Stable renal function. Tolerating diuretics.     CHF: EF 40-45%. Hx of MV and depressed RV function. On beta blocker and lasix. Not on ACEi/ARB/MRA/ or SGLT-2inh.     Volume status: Persistent LE edema but improving.     Anemia. Hx of supra therapeutic IRN w rectal bleeding on this admission.     Hx of mechanical valve. On lovenox on this admit.     Recs   Stable for discharge. Ok to add ACEi/ARB/SGLT-inh2 as part of GDT for cardioprotection from renal standpoint. Will defer it to cardiology     F/u in renal clinic in 4 weeks.    Pro Quesada MD  11/22/23  15:04 EST            "

## 2023-11-27 ENCOUNTER — TRANSITIONAL CARE MANAGEMENT TELEPHONE ENCOUNTER (OUTPATIENT)
Dept: CALL CENTER | Facility: HOSPITAL | Age: 63
End: 2023-11-27
Payer: MEDICARE

## 2023-11-27 NOTE — OUTREACH NOTE
Call Center TCM Note      Flowsheet Row Responses   [facility] patient discharged from? [place]   Does the patient have one of the following disease processes/diagnoses(primary or secondary)? Other   TCM attempt successful? Yes   Call start time 1531   Call end time 1551   Discharge diagnosis GI bleed - BRBPR   Symptomatic acute blood loss anemia, MEERA, HF, chronic resp failure, UTI, mechanical mitral valve, diabetes mellitus type II, HTN, CAL   Person spoke with today (if not patient) and relationship Patient   Medication alerts for this patient Recommend patient discharge on reduced dose of warfarin 4 mg daily except for warfarin 2 mg daily Friday  and Lovenox 90 mg SQ q12h until INR recheck on home monitor Saturday. If INR = 2.5 Saturday, patient can  stop Lovenox and continue warfarin only until Monday recheck and further instruction from Dr Everett's office. If Saturday INR <2.5, patient to continue warfarin and Lovenox until follow up Monday.   Meds reviewed with patient/caregiver? Yes   Does the patient have all medications ordered at discharge? Yes   Is the patient taking all medications as directed (includes completed medication regime)? Yes   Medication comments Patient did not see on instructions that she was not to resume her entresto until OK's by PCP. She has been taking since return home.   Comments PCP Dr Santiago. Hospital follow up in place for 11/29  230pm.   Does the patient have an appointment with their PCP within 7-14 days of discharge? Yes   Has home health visited the patient within 72 hours of discharge? N/A   DME comments Has home O2 but reports that her concentrator is not working right. Patient to contact [employer] and get someone out when we hang up.   Psychosocial issues? No   Did the patient receive a copy of their discharge instructions? Yes   Nursing interventions Reviewed instructions with patient   What is the patient's perception of their health status since discharge? Same   [Patient reports that she continues to have intermittent upper abdominal pain. She reports feels like a hunger pain, but not hungry, not relieved with eating. Denies seeing any blood in stool since Friday. No nausea.]   Is the patient/caregiver able to teach back signs and symptoms related to disease process for when to call PCP? Yes   Is the patient/caregiver able to teach back signs and symptoms related to disease process for when to call 911? Yes   Is the patient/caregiver able to teach back the hierarchy of who to call/visit for symptoms/problems? PCP, Specialist, Home health nurse, Urgent Care, ED, 911 Yes   If the patient is a current smoker, are they able to teach back resources for cessation? Not a smoker   TCM call completed? Yes   Call end time 1551   Would this patient benefit from a Referral to Amb Social Work? No   Is the patient interested in additional calls from an ambulatory ? No            Ursula Del Rio RN    11/27/2023, 15:52 EST

## 2023-12-04 ENCOUNTER — READMISSION MANAGEMENT (OUTPATIENT)
Dept: CALL CENTER | Facility: HOSPITAL | Age: 63
End: 2023-12-04
Payer: MEDICARE

## 2023-12-04 NOTE — OUTREACH NOTE
Medical Week 2 Survey      Flowsheet Row Responses   [facility] patient discharged from? [place]   Does the patient have one of the following disease processes/diagnoses(primary or secondary)? Other   Week 2 attempt successful? No   Unsuccessful attempts Attempt 1            Carly SLOAN - Licensed Nurse

## 2023-12-07 ENCOUNTER — READMISSION MANAGEMENT (OUTPATIENT)
Dept: CALL CENTER | Facility: HOSPITAL | Age: 63
End: 2023-12-07
Payer: MEDICARE

## 2023-12-07 NOTE — OUTREACH NOTE
Medical Week 2 Survey      Flowsheet Row Responses   [facility] patient discharged from? [place]   Does the patient have one of the following disease processes/diagnoses(primary or secondary)? Other   Week 2 attempt successful? No   Unsuccessful attempts Attempt 2            Yamilex GUADARRAMA - Registered Nurse

## 2023-12-08 ENCOUNTER — LAB (OUTPATIENT)
Dept: LAB | Facility: HOSPITAL | Age: 63
End: 2023-12-08
Payer: MEDICARE

## 2023-12-08 ENCOUNTER — OFFICE VISIT (OUTPATIENT)
Dept: FAMILY MEDICINE CLINIC | Facility: CLINIC | Age: 63
End: 2023-12-08
Payer: MEDICARE

## 2023-12-08 VITALS
TEMPERATURE: 98.1 F | RESPIRATION RATE: 20 BRPM | SYSTOLIC BLOOD PRESSURE: 138 MMHG | OXYGEN SATURATION: 95 % | HEART RATE: 71 BPM | DIASTOLIC BLOOD PRESSURE: 60 MMHG | WEIGHT: 192 LBS | HEIGHT: 60 IN | BODY MASS INDEX: 37.69 KG/M2

## 2023-12-08 DIAGNOSIS — I10 HYPERTENSION, UNSPECIFIED TYPE: Chronic | ICD-10-CM

## 2023-12-08 DIAGNOSIS — Z79.4 TYPE 2 DIABETES MELLITUS WITHOUT COMPLICATION, WITH LONG-TERM CURRENT USE OF INSULIN: Chronic | ICD-10-CM

## 2023-12-08 DIAGNOSIS — Z79.4 TYPE 2 DIABETES MELLITUS WITHOUT COMPLICATION, WITH LONG-TERM CURRENT USE OF INSULIN: Primary | Chronic | ICD-10-CM

## 2023-12-08 DIAGNOSIS — E11.9 TYPE 2 DIABETES MELLITUS WITHOUT COMPLICATION, WITH LONG-TERM CURRENT USE OF INSULIN: Chronic | ICD-10-CM

## 2023-12-08 DIAGNOSIS — E11.9 TYPE 2 DIABETES MELLITUS WITHOUT COMPLICATION, WITH LONG-TERM CURRENT USE OF INSULIN: Primary | Chronic | ICD-10-CM

## 2023-12-08 PROCEDURE — 36415 COLL VENOUS BLD VENIPUNCTURE: CPT

## 2023-12-08 PROCEDURE — 80061 LIPID PANEL: CPT

## 2023-12-08 PROCEDURE — 80053 COMPREHEN METABOLIC PANEL: CPT

## 2023-12-08 PROCEDURE — 85027 COMPLETE CBC AUTOMATED: CPT

## 2023-12-08 PROCEDURE — 83036 HEMOGLOBIN GLYCOSYLATED A1C: CPT

## 2023-12-08 RX ORDER — ESCITALOPRAM OXALATE 20 MG/1
20 TABLET ORAL DAILY
Qty: 90 TABLET | Refills: 3 | Status: SHIPPED | OUTPATIENT
Start: 2023-12-08

## 2023-12-08 RX ORDER — DULAGLUTIDE 4.5 MG/.5ML
1 INJECTION, SOLUTION SUBCUTANEOUS WEEKLY
Start: 2023-12-08

## 2023-12-08 RX ORDER — ALBUTEROL SULFATE 90 UG/1
2 AEROSOL, METERED RESPIRATORY (INHALATION) EVERY 4 HOURS PRN
Qty: 1 G | Refills: 0 | Status: SHIPPED | OUTPATIENT
Start: 2023-12-08

## 2023-12-08 RX ORDER — ENOXAPARIN SODIUM 100 MG/ML
100 INJECTION SUBCUTANEOUS EVERY 12 HOURS SCHEDULED
COMMUNITY
Start: 2023-11-09

## 2023-12-08 NOTE — PROGRESS NOTES
"Chief Complaint  Hypertension (PT WAS DISCHARGED FROM ED 11/22)    Hypertension      The patient was discharged on 11/22 after being treated for a GI bleed. She was awaiting her scope the next day but given gi bleeding in her stool that developed she went to the ER. She says the day before her egd is the day the blood in her stool started.   She was transfused 3 units.   Egd and colo done showing diverticulosis and candida esophagitis.   She was tx for ecoli uti and for norbert.   She called cardiology given her low inr and she was given instructions to change her warfarin.   She says her belly pain is better . No further bloody stools.       The following portions of the patient's history were reviewed and updated as appropriate: allergies, current medications, past family history, past medical history, past social history, past surgical history, and problem list.    OBJECTIVE:  /60   Pulse 71   Temp 98.1 °F (36.7 °C) (Temporal)   Resp 20   Ht 152.4 cm (60\")   Wt 87.1 kg (192 lb)   SpO2 95%   BMI 37.50 kg/m²       Physical Exam  Constitutional:       General: She is not in acute distress.     Appearance: Normal appearance.   HENT:      Head: Normocephalic and atraumatic.   Eyes:      Extraocular Movements: Extraocular movements intact.   Cardiovascular:      Rate and Rhythm: Normal rate and regular rhythm.      Heart sounds: No murmur heard.  Pulmonary:      Effort: Pulmonary effort is normal. No respiratory distress.      Breath sounds: Normal breath sounds. No stridor. No wheezing, rhonchi or rales.   Abdominal:      General: Bowel sounds are normal.      Palpations: Abdomen is soft.      Tenderness: There is no abdominal tenderness. There is no guarding or rebound.   Skin:     Findings: No rash.   Neurological:      General: No focal deficit present.      Mental Status: She is alert.   Psychiatric:         Mood and Affect: Mood normal.                  Assessment and Plan   Diagnoses and all orders for " this visit:    1. Type 2 diabetes mellitus without complication, with long-term current use of insulin (Primary)  -     Hemoglobin A1c; Future    2. Hypertension, unspecified type  -     CBC (No Diff); Future  -     Comprehensive Metabolic Panel; Future  -     Lipid panel; Future  -     Hemoglobin A1c; Future    Other orders  -     escitalopram (LEXAPRO) 20 MG tablet; Take 1 tablet by mouth Daily.  Dispense: 90 tablet; Refill: 3  -     Dulaglutide (Trulicity) 4.5 MG/0.5ML solution pen-injector; Inject 0.11 mL under the skin into the appropriate area as directed 1 (One) Time Per Week.  -     albuterol sulfate  (90 Base) MCG/ACT inhaler; Inhale 2 puffs Every 4 (Four) Hours As Needed for Wheezing.  Dispense: 1 g; Refill: 0      She will check her inr (last night was 1.2)  and call cardiology today for their further recommendations on INR. They have been making adjustments. Advised her I am on call this weekend so she is welcome to call me if she cannot get ahold of cards.   Continue current meds.     Reviewed dc summary labs imaging    She had 6 days of antifungal for candidiasis, will continue to treat for full course (7 more days).       Return in about 3 months (around 3/8/2024).       Davina Santiago D.O.  [facility]

## 2023-12-09 DIAGNOSIS — Z79.4 TYPE 2 DIABETES MELLITUS WITHOUT COMPLICATION, WITH LONG-TERM CURRENT USE OF INSULIN: Primary | Chronic | ICD-10-CM

## 2023-12-09 DIAGNOSIS — E11.9 TYPE 2 DIABETES MELLITUS WITHOUT COMPLICATION, WITH LONG-TERM CURRENT USE OF INSULIN: Primary | Chronic | ICD-10-CM

## 2023-12-09 LAB
ALBUMIN SERPL-MCNC: 4.1 G/DL (ref 3.5–5.2)
ALBUMIN/GLOB SERPL: 1.3 G/DL
ALP SERPL-CCNC: 187 U/L (ref 39–117)
ALT SERPL W P-5'-P-CCNC: 17 U/L (ref 1–33)
ANION GAP SERPL CALCULATED.3IONS-SCNC: 13.1 MMOL/L (ref 5–15)
AST SERPL-CCNC: 18 U/L (ref 1–32)
BILIRUB SERPL-MCNC: 0.2 MG/DL (ref 0–1.2)
BUN SERPL-MCNC: 12 MG/DL (ref 8–23)
BUN/CREAT SERPL: 9.6 (ref 7–25)
CALCIUM SPEC-SCNC: 9.5 MG/DL (ref 8.6–10.5)
CHLORIDE SERPL-SCNC: 102 MMOL/L (ref 98–107)
CHOLEST SERPL-MCNC: 172 MG/DL (ref 0–200)
CO2 SERPL-SCNC: 21.9 MMOL/L (ref 22–29)
CREAT SERPL-MCNC: 1.25 MG/DL (ref 0.57–1)
DEPRECATED RDW RBC AUTO: 42.1 FL (ref 37–54)
EGFRCR SERPLBLD CKD-EPI 2021: 49.1 ML/MIN/1.73
ERYTHROCYTE [DISTWIDTH] IN BLOOD BY AUTOMATED COUNT: 13.8 % (ref 12.3–15.4)
GLOBULIN UR ELPH-MCNC: 3.1 GM/DL
GLUCOSE SERPL-MCNC: 211 MG/DL (ref 65–99)
HBA1C MFR BLD: 7.2 % (ref 4.8–5.6)
HCT VFR BLD AUTO: 29.3 % (ref 34–46.6)
HDLC SERPL-MCNC: 26 MG/DL (ref 40–60)
HGB BLD-MCNC: 9.7 G/DL (ref 12–15.9)
LDLC SERPL CALC-MCNC: 111 MG/DL (ref 0–100)
LDLC/HDLC SERPL: 4.07 {RATIO}
MCH RBC QN AUTO: 28.7 PG (ref 26.6–33)
MCHC RBC AUTO-ENTMCNC: 33.1 G/DL (ref 31.5–35.7)
MCV RBC AUTO: 86.7 FL (ref 79–97)
PLATELET # BLD AUTO: 446 10*3/MM3 (ref 140–450)
PMV BLD AUTO: 10.2 FL (ref 6–12)
POTASSIUM SERPL-SCNC: 4.2 MMOL/L (ref 3.5–5.2)
PROT SERPL-MCNC: 7.2 G/DL (ref 6–8.5)
RBC # BLD AUTO: 3.38 10*6/MM3 (ref 3.77–5.28)
SODIUM SERPL-SCNC: 137 MMOL/L (ref 136–145)
TRIGL SERPL-MCNC: 201 MG/DL (ref 0–150)
VLDLC SERPL-MCNC: 35 MG/DL (ref 5–40)
WBC NRBC COR # BLD AUTO: 9.41 10*3/MM3 (ref 3.4–10.8)

## 2023-12-09 NOTE — PROGRESS NOTES
Please call to let the patient know that her kidney function is stable but mildly decreased from last check. It is okay to continue entresto. She will just need to stop by our lab in one week for a kidney level. Order placed, no appointment needed.     Please let the patient know that cholesterol is elevated with elevated diabetic test  which is a risk factor for heart attack stroke or vascular complications over time. Recommend diet with lean meat fish and vegetables and decreased sugar/carbs with daily exercise. Can refer to phone dietitian if this helps let me know.       Her anemia is improved continue iron

## 2023-12-09 NOTE — TELEPHONE ENCOUNTER
PATIENT IS SCHEDULED TO HAVE A COLONOSCOPY ON 5/23 AND WAS TOLD THAT SHE NEEDED TO FAST FOR THIS. PATIENT WOULD LIKE TO BE ADVISED AS TO HOW SHE SHOULD HANDLE THIS AS SHE IS A DIABETIC. PROCEDURE IS SCHEDULED AT 1:30 PM.     CALL BACK [phone]  
Patient should hold glimepiride, Humalog day of procedure.    Of note, patient was given instructions for adjustment of long-acting insulin 2 days ago due to fasting hypoglycemia.  If morning sugars have normalized, no further action is needed regarding this medication.  If she is continuing to have fasting hyperglycemia she may reduce by another 5 units and she needs an appointment to discuss further.  Of note, she has no showed/canceled 2 visits this week.  
Please advise  
Pt was read the message and verbalized understanding.    Please call the pt and schedule sooner appt that 9-23.  
The procedure was performed independently

## 2023-12-12 ENCOUNTER — TELEPHONE (OUTPATIENT)
Dept: FAMILY MEDICINE CLINIC | Facility: CLINIC | Age: 63
End: 2023-12-12

## 2023-12-12 NOTE — TELEPHONE ENCOUNTER
"Hub staff attempted to follow warm transfer process and was unsuccessful     Caller: Di Lopez \"Shiloh\"    Relationship to patient: Self    Best call back number:  [phone]     Patient is needing: RETURNING A CALL TO THE OFFICE        "

## 2023-12-15 ENCOUNTER — READMISSION MANAGEMENT (OUTPATIENT)
Dept: CALL CENTER | Facility: HOSPITAL | Age: 63
End: 2023-12-15
Payer: MEDICARE

## 2023-12-15 NOTE — OUTREACH NOTE
Medical Week 3 Survey      Flowsheet Row Responses   [facility] patient discharged from? [place]   Does the patient have one of the following disease processes/diagnoses(primary or secondary)? Other   Week 3 attempt successful? No   Unsuccessful attempts Attempt 1            Daisha MONAE - Registered Nurse

## 2023-12-19 ENCOUNTER — READMISSION MANAGEMENT (OUTPATIENT)
Dept: CALL CENTER | Facility: HOSPITAL | Age: 63
End: 2023-12-19
Payer: MEDICARE

## 2023-12-19 NOTE — OUTREACH NOTE
Medical Week 3 Survey      Flowsheet Row Responses   [facility] patient discharged from? [place]   Does the patient have one of the following disease processes/diagnoses(primary or secondary)? Other   Week 3 attempt successful? No   Unsuccessful attempts Attempt 2            Daisha MONAE - Registered Nurse

## 2023-12-22 ENCOUNTER — LAB (OUTPATIENT)
Dept: LAB | Facility: HOSPITAL | Age: 63
End: 2023-12-22
Payer: MEDICARE

## 2023-12-22 DIAGNOSIS — E11.9 TYPE 2 DIABETES MELLITUS WITHOUT COMPLICATION, WITH LONG-TERM CURRENT USE OF INSULIN: Chronic | ICD-10-CM

## 2023-12-22 DIAGNOSIS — Z79.4 TYPE 2 DIABETES MELLITUS WITHOUT COMPLICATION, WITH LONG-TERM CURRENT USE OF INSULIN: Chronic | ICD-10-CM

## 2023-12-22 PROCEDURE — 36415 COLL VENOUS BLD VENIPUNCTURE: CPT

## 2023-12-26 RX ORDER — DULAGLUTIDE 4.5 MG/.5ML
INJECTION, SOLUTION SUBCUTANEOUS WEEKLY
Qty: 6 ML | Refills: 0 | Status: SHIPPED | OUTPATIENT
Start: 2023-12-26

## 2023-12-26 NOTE — TELEPHONE ENCOUNTER
Rx Refill Note  Requested Prescriptions     Pending Prescriptions Disp Refills    Trulicity 4.5 MG/0.5ML solution pen-injector [Pharmacy Med Name: Trulicity 4.5 MG/0.5ML Subcutaneous Solution Pen-injector] 12 mL 0     Sig: INJECT 1  SYRINGE SUBCUTANEOUSLY ONCE A WEEK      Last office visit with prescribing clinician: Visit date not found      Next office visit with prescribing clinician: Visit date not found                  Stefano Zepeda MA  12/26/23, 08:03 EST

## 2023-12-27 ENCOUNTER — OFFICE VISIT (OUTPATIENT)
Dept: FAMILY MEDICINE CLINIC | Facility: CLINIC | Age: 63
End: 2023-12-27
Payer: MEDICARE

## 2023-12-27 VITALS
HEIGHT: 60 IN | BODY MASS INDEX: 36.71 KG/M2 | WEIGHT: 187 LBS | OXYGEN SATURATION: 96 % | HEART RATE: 84 BPM | DIASTOLIC BLOOD PRESSURE: 52 MMHG | TEMPERATURE: 99.3 F | SYSTOLIC BLOOD PRESSURE: 118 MMHG

## 2023-12-27 DIAGNOSIS — D64.9 ANEMIA, UNSPECIFIED TYPE: Primary | ICD-10-CM

## 2023-12-27 DIAGNOSIS — R05.9 COUGH, UNSPECIFIED TYPE: ICD-10-CM

## 2023-12-27 LAB
EXPIRATION DATE: ABNORMAL
EXPIRATION DATE: NORMAL
EXPIRATION DATE: NORMAL
FLUAV AG NPH QL: NEGATIVE
FLUBV AG NPH QL: NEGATIVE
HGB BLDA-MCNC: 11.9 G/DL (ref 12–17)
INTERNAL CONTROL: NORMAL
INTERNAL CONTROL: NORMAL
Lab: ABNORMAL
Lab: NORMAL
Lab: NORMAL
SARS-COV-2 AG UPPER RESP QL IA.RAPID: NOT DETECTED

## 2023-12-27 RX ORDER — METHOCARBAMOL 500 MG/1
500 TABLET, FILM COATED ORAL 4 TIMES DAILY PRN
Qty: 40 TABLET | Refills: 0 | Status: ON HOLD | OUTPATIENT
Start: 2023-12-27

## 2023-12-27 RX ORDER — CEFDINIR 300 MG/1
300 CAPSULE ORAL 2 TIMES DAILY
Qty: 20 CAPSULE | Refills: 0 | Status: ON HOLD | OUTPATIENT
Start: 2023-12-27

## 2023-12-27 NOTE — PROGRESS NOTES
Follow Up Office Visit      Date: 2023   Patient Name: Neha Lopez  : 1960   MRN: [id number]     Chief Complaint:    Chief Complaint   Patient presents with    Generalized Body Aches     Pt states since Saturday she has been shacky, and achy. She keeps the chills  Slight cough, neck hurts  Headache in the front and the back    Diarrhea     Started yesterday    Ear Fullness     Both ears have a lot of pressure       History of Present Illness: Neha Lopez is a 63 y.o. female who is here today to follow up with body aches, coughing, chills, headache, and diarrhea.    NEHA LOPEZ her YOB: 1960, she is a 62-year-old female who comes in today with body aches since 2023, some coughing, chills, headache, and diarrhea today 2023.    She has soreness in the muscles in the back of her neck for a couple of days. She took a shower and came home, but the muscles in the back of her neck were hurting. She has a headache. Both areas are warm and red. She was in the hospital for 13 days of last month for an infection from her throat to the top of her colon. She started losing blood. She was given 3 bags of blood, 2 bags of potassium, and 15 bags of antibiotics. She went home the day before . The first week was rough, but she is feeling better again. Her throat feels fine. Nothing tastes right. She has been drinking orange juice and water. She started having diarrhea yesterday 2023. She almost did not get to the shower because she had to stop to go to the bathroom. She got nauseous. She sips of Sprite, but it does not taste very good. She has not seen any bleeding in her stool.    She had a rash down to her arm. She thought it was an itch, so she started putting Neosporin on it and it went away.    She has an artificial valve. She was on oxygen. Her legs were swollen. She does not think her legs are as bad now.      Subjective      Review of  systems:  Review of Systems   Constitutional:  Negative for fatigue and fever.   HENT:  Negative for trouble swallowing.    Eyes:  Negative for visual disturbance.   Respiratory:  Negative for cough and shortness of breath.    Cardiovascular:  Negative for chest pain and leg swelling.   Gastrointestinal:  Negative for abdominal pain.        I have reviewed and the following portions of the patient's history were updated as appropriate: past family history, past medical history, past social history, past surgical history and problem list.    Medications:   No current facility-administered medications for this visit.  No current outpatient medications on file.    Facility-Administered Medications Ordered in Other Visits:     acetaminophen (TYLENOL) tablet 650 mg, 650 mg, Oral, Q8H PRN, Marcinkevicius, Dariana, DO, 650 mg at 01/03/24 0016    albuterol (PROVENTIL) nebulizer solution 0.083% 2.5 mg/3mL, 2.5 mg, Nebulization, Q4H PRN, Ariella Phan MD    cyclobenzaprine (FLEXERIL) tablet 5 mg, 5 mg, Oral, TID PRN, RamonaeviciDariana girard, DO, 5 mg at 01/03/24 1135    dextrose (D50W) (25 g/50 mL) IV injection 25 g, 25 g, Intravenous, Q15 Min PRN, Ariella Phan MD    dextrose (GLUTOSE) oral gel 15 g, 15 g, Oral, Q15 Min PRN, Emilie, Ariella GUADARRAMA MD    escitalopram (LEXAPRO) tablet 20 mg, 20 mg, Oral, Daily, Day, Ariella GUADARRAMA MD, 20 mg at 01/03/24 1136    ferrous sulfate tablet 325 mg, 325 mg, Oral, Daily With Breakfast, Ariella Phan MD, 325 mg at 01/03/24 1136    folic acid (FOLVITE) tablet 800 mcg, 800 mcg, Oral, Daily, Day, Ariella GUADARRAMA MD, 800 mcg at 01/03/24 1135    glucagon (GLUCAGEN) injection 1 mg, 1 mg, Intramuscular, Q15 Min PRN, Ariella Phan MD    heparin 85215 units/250 mL (100 units/mL) in 0.45 % NaCl infusion, 13 Units/kg/hr, Intravenous, Titrated, Annabelle Fernandez, PharmD, Last Rate: 11.68 mL/hr at 01/03/24 1546, 13 Units/kg/hr at 01/03/24 1546    insulin detemir (LEVEMIR) injection 5 Units, 5 Units, Subcutaneous,  "Nightly, Dariana Bridges DO    Insulin Lispro (humaLOG) injection 2-7 Units, 2-7 Units, Subcutaneous, 4x Daily AC & at Bedtime, Day, Ariella GUADARRAMA MD, 4 Units at 01/03/24 1739    [START ON 1/4/2024] Insulin Lispro (humaLOG) injection 3 Units, 3 Units, Subcutaneous, TID With Meals, Dariana Bridges DO    melatonin tablet 5 mg, 5 mg, Oral, Nightly PRN, Niya Subramanian APRN, 5 mg at 01/03/24 0016    naloxone (NARCAN) injection 0.4 mg, 0.4 mg, Intravenous, Q5 Min PRN, Dariana Bridges DO    nitroglycerin (NITROSTAT) SL tablet 0.4 mg, 0.4 mg, Sublingual, Q5 Min PRN, Ariella Phan MD    oxybutynin XL (DITROPAN-XL) 24 hr tablet 5 mg, 5 mg, Oral, Daily, Day, Ariella GUADARRAMA MD, 5 mg at 01/03/24 1136    oxyCODONE (ROXICODONE) immediate release tablet 5 mg, 5 mg, Oral, Q8H PRN, Dariana Bridges DO, 5 mg at 01/02/24 2143    pantoprazole (PROTONIX) EC tablet 40 mg, 40 mg, Oral, Q AM, Day, Ariella GUADARRAMA MD, 40 mg at 01/03/24 0551    Pharmacy to Dose Heparin, , Does not apply, Continuous PRN, Day, Ariella GUADARRAMA MD    sodium chloride 0.9 % flush 10 mL, 10 mL, Intravenous, Q12H, Ariella Phan MD    sodium chloride 0.9 % flush 10 mL, 10 mL, Intravenous, PRN, Ariella Phan MD    sodium chloride 0.9 % infusion 40 mL, 40 mL, Intravenous, PRN, Ariella Phan MD    sodium chloride 0.9 % infusion, 100 mL/hr, Intravenous, Continuous, Dariana Bridges DO, Last Rate: 100 mL/hr at 01/03/24 1546, 100 mL/hr at 01/03/24 1546    Allergies:   Allergies   Allergen Reactions    Adhesive Tape Other (See Comments)     \"pulls skin off\"    Sildenafil Shortness Of Breath and Other (See Comments)     pt c/o tightness in chest and trouble breathing    Codeine Itching and Nausea And Vomiting    Morphine Other (See Comments)     uncontrolable crying     Penicillins Hives    Statins Other (See Comments)     HX of Liver Issues    Sulfa Antibiotics Nausea And Vomiting       Objective     Vital Signs:   Vitals:    12/27/23 1645   BP: " "118/52   Pulse: 84   Temp: 99.3 °F (37.4 °C)   TempSrc: Infrared   SpO2: 96%   Weight: 84.8 kg (187 lb)   Height: 152.4 cm (60\")   PainSc:   9     Body mass index is 36.52 kg/m².          Physical Exam:   Physical Exam  Vitals and nursing note reviewed.   Constitutional:       Appearance: Normal appearance.   HENT:      Head: Normocephalic and atraumatic.      Right Ear: Tympanic membrane and ear canal normal.      Left Ear: Tympanic membrane and ear canal normal.      Nose: No congestion or rhinorrhea.      Mouth/Throat:      Mouth: Mucous membranes are moist.      Pharynx: Oropharynx is clear. No posterior oropharyngeal erythema.   Cardiovascular:      Rate and Rhythm: Normal rate and regular rhythm.   Pulmonary:      Effort: Pulmonary effort is normal.      Breath sounds: Rhonchi present. No decreased breath sounds, wheezing or rales.   Musculoskeletal:      Cervical back: Neck supple.      Right lower leg: No edema.      Left lower leg: No edema.   Lymphadenopathy:      Cervical: No cervical adenopathy.   Neurological:      Mental Status: She is alert.          Procedures     Assessment / Plan      Assessment/Plan:   Diagnoses and all orders for this visit:    1. Anemia, unspecified type (Primary)  -     POCT hemoglobin    2. Cough, unspecified type  -     POCT SARS-CoV-2 Antigen  -     POC Influenza A / B    Other orders  -     cefdinir (OMNICEF) 300 MG capsule; Take 1 capsule by mouth 2 (Two) Times a Day.  Dispense: 20 capsule; Refill: 0  -     methocarbamol (ROBAXIN) 500 MG tablet; Take 1 tablet by mouth 4 (Four) Times a Day As Needed for Muscle Spasms.  Dispense: 40 tablet; Refill: 0       1. Body aches.  Cefdinir as directed. I feel like she does have some sort of infection, so that should cover a wide range. She will monitor the left supraclavicular lymph node. Also, we will try methocarbamol for the muscle stiffness and spasm. She will let me know how she is doing by Friday 12/29/2023.    Follow Up:   No " follow-ups on file.    Sammie Art PA-C   [facility]          Transcribed from ambient dictation for Sammie Art PA-C by Ann Dhillon.  12/27/23   17:57 EST    Patient or patient representative verbalized consent to the visit recording.  I have personally performed the services described in this document as transcribed by the above individual, and it is both accurate and complete.

## 2023-12-29 ENCOUNTER — TELEPHONE (OUTPATIENT)
Dept: FAMILY MEDICINE CLINIC | Facility: CLINIC | Age: 63
End: 2023-12-29

## 2023-12-29 NOTE — TELEPHONE ENCOUNTER
"  Caller: Di Lopez \"Shiloh\"    Relationship: Self    Best call back number: [phone]     What is the best time to reach you: ANYTIME    Who are you requesting to speak with (clinical staff, provider,  specific staff member): PCP/MA    Do you know the name of the person who called: SHILOH    What was the call regarding: PATIENT WAS IN OFFICE ON WED AND SEEN UNRULY VEGA. SHE HAD ASKED THE PATIENT TO CALL BACK AND LET HER KNOW IF SHE WASN'T FEELING ANY BETTER. PATIENT STATED SHE IS NOT FEELING ANY BETTER. HAS NOT BEEN ABLE TO HOLD ANYTHING DOWN AND IS VERY FATIGUED. CALL PATIENT TO DISCUSS    Is it okay if the provider responds through MyChart: CALLBACK TODAY        "

## 2024-01-01 ENCOUNTER — HOSPITAL ENCOUNTER (INPATIENT)
Facility: HOSPITAL | Age: 64
LOS: 13 days | Discharge: HOME | End: 2024-01-15
Attending: EMERGENCY MEDICINE | Admitting: INTERNAL MEDICINE
Payer: MEDICARE

## 2024-01-01 DIAGNOSIS — N17.9 ACUTE RENAL FAILURE, UNSPECIFIED ACUTE RENAL FAILURE TYPE: Primary | ICD-10-CM

## 2024-01-01 DIAGNOSIS — J96.11 CHRONIC RESPIRATORY FAILURE WITH HYPOXIA: ICD-10-CM

## 2024-01-01 DIAGNOSIS — Z79.4 TYPE 2 DIABETES MELLITUS WITH HYPERGLYCEMIA, WITH LONG-TERM CURRENT USE OF INSULIN: ICD-10-CM

## 2024-01-01 DIAGNOSIS — E11.65 TYPE 2 DIABETES MELLITUS WITH HYPERGLYCEMIA, WITH LONG-TERM CURRENT USE OF INSULIN: ICD-10-CM

## 2024-01-01 DIAGNOSIS — R13.10 DYSPHAGIA, UNSPECIFIED TYPE: ICD-10-CM

## 2024-01-01 DIAGNOSIS — N18.9 ACUTE RENAL FAILURE SUPERIMPOSED ON CHRONIC KIDNEY DISEASE, UNSPECIFIED ACUTE RENAL FAILURE TYPE, UNSPECIFIED CKD STAGE: ICD-10-CM

## 2024-01-01 DIAGNOSIS — R53.1 WEAKNESS: ICD-10-CM

## 2024-01-01 DIAGNOSIS — E87.5 HYPERKALEMIA: ICD-10-CM

## 2024-01-01 DIAGNOSIS — K92.2 GASTROINTESTINAL HEMORRHAGE, UNSPECIFIED GASTROINTESTINAL HEMORRHAGE TYPE: ICD-10-CM

## 2024-01-01 DIAGNOSIS — N17.9 ACUTE RENAL FAILURE SUPERIMPOSED ON CHRONIC KIDNEY DISEASE, UNSPECIFIED ACUTE RENAL FAILURE TYPE, UNSPECIFIED CKD STAGE: ICD-10-CM

## 2024-01-01 LAB
ALBUMIN SERPL-MCNC: 4.1 G/DL (ref 3.5–5.2)
ALBUMIN/GLOB SERPL: 1.4 G/DL
ALP SERPL-CCNC: 247 U/L (ref 39–117)
ALT SERPL W P-5'-P-CCNC: 45 U/L (ref 1–33)
ANION GAP SERPL CALCULATED.3IONS-SCNC: 19 MMOL/L (ref 5–15)
AST SERPL-CCNC: 34 U/L (ref 1–32)
BILIRUB SERPL-MCNC: 0.3 MG/DL (ref 0–1.2)
BUN SERPL-MCNC: 64 MG/DL (ref 8–23)
BUN/CREAT SERPL: 7.4 (ref 7–25)
CALCIUM SPEC-SCNC: 8.4 MG/DL (ref 8.6–10.5)
CHLORIDE SERPL-SCNC: 87 MMOL/L (ref 98–107)
CO2 SERPL-SCNC: 20 MMOL/L (ref 22–29)
CREAT SERPL-MCNC: 8.7 MG/DL (ref 0.57–1)
D-LACTATE SERPL-SCNC: 1.5 MMOL/L (ref 0.5–2)
DEVELOPER EXPIRATION DATE: ABNORMAL
DEVELOPER LOT NUMBER: ABNORMAL
EGFRCR SERPLBLD CKD-EPI 2021: 4.8 ML/MIN/1.73
EXPIRATION DATE: ABNORMAL
FECAL OCCULT BLOOD SCREEN, POC: POSITIVE
GLOBULIN UR ELPH-MCNC: 3 GM/DL
GLUCOSE SERPL-MCNC: 224 MG/DL (ref 65–99)
LIPASE SERPL-CCNC: 82 U/L (ref 13–60)
Lab: ABNORMAL
NEGATIVE CONTROL: NEGATIVE
POSITIVE CONTROL: POSITIVE
POTASSIUM SERPL-SCNC: 5.6 MMOL/L (ref 3.5–5.2)
PROT SERPL-MCNC: 7.1 G/DL (ref 6–8.5)
SODIUM SERPL-SCNC: 126 MMOL/L (ref 136–145)

## 2024-01-01 PROCEDURE — 86901 BLOOD TYPING SEROLOGIC RH(D): CPT | Performed by: PHYSICIAN ASSISTANT

## 2024-01-01 PROCEDURE — 80053 COMPREHEN METABOLIC PANEL: CPT | Performed by: PHYSICIAN ASSISTANT

## 2024-01-01 PROCEDURE — 85025 COMPLETE CBC W/AUTO DIFF WBC: CPT | Performed by: PHYSICIAN ASSISTANT

## 2024-01-01 PROCEDURE — 82270 OCCULT BLOOD FECES: CPT | Performed by: PHYSICIAN ASSISTANT

## 2024-01-01 PROCEDURE — 99285 EMERGENCY DEPT VISIT HI MDM: CPT

## 2024-01-01 PROCEDURE — 86850 RBC ANTIBODY SCREEN: CPT | Performed by: PHYSICIAN ASSISTANT

## 2024-01-01 PROCEDURE — 85610 PROTHROMBIN TIME: CPT | Performed by: PHYSICIAN ASSISTANT

## 2024-01-01 PROCEDURE — 86900 BLOOD TYPING SEROLOGIC ABO: CPT | Performed by: PHYSICIAN ASSISTANT

## 2024-01-01 PROCEDURE — 82010 KETONE BODYS QUAN: CPT | Performed by: PHYSICIAN ASSISTANT

## 2024-01-01 PROCEDURE — 83605 ASSAY OF LACTIC ACID: CPT | Performed by: PHYSICIAN ASSISTANT

## 2024-01-01 PROCEDURE — 83690 ASSAY OF LIPASE: CPT | Performed by: PHYSICIAN ASSISTANT

## 2024-01-01 PROCEDURE — 87637 SARSCOV2&INF A&B&RSV AMP PRB: CPT | Performed by: PHYSICIAN ASSISTANT

## 2024-01-02 ENCOUNTER — APPOINTMENT (OUTPATIENT)
Dept: CT IMAGING | Facility: HOSPITAL | Age: 64
End: 2024-01-02
Payer: MEDICARE

## 2024-01-02 PROBLEM — R59.0 CERVICAL LYMPHADENOPATHY: Status: ACTIVE | Noted: 2024-01-02

## 2024-01-02 PROBLEM — E11.65 TYPE 2 DIABETES MELLITUS WITH HYPERGLYCEMIA: Status: ACTIVE | Noted: 2024-01-02

## 2024-01-02 PROBLEM — D63.8 ANEMIA, CHRONIC DISEASE: Status: ACTIVE | Noted: 2024-01-02

## 2024-01-02 PROBLEM — E87.1 HYPONATREMIA: Status: ACTIVE | Noted: 2024-01-02

## 2024-01-02 PROBLEM — E87.5 HYPERKALEMIA: Status: ACTIVE | Noted: 2024-01-02

## 2024-01-02 PROBLEM — N19 RENAL FAILURE: Status: ACTIVE | Noted: 2024-01-02

## 2024-01-02 PROBLEM — R79.89 ELEVATED LFTS: Status: ACTIVE | Noted: 2024-01-02

## 2024-01-02 LAB
ABO GROUP BLD: NORMAL
ALBUMIN SERPL-MCNC: 3.4 G/DL (ref 3.5–5.2)
ALBUMIN/GLOB SERPL: 1.3 G/DL
ALP SERPL-CCNC: 222 U/L (ref 39–117)
ALT SERPL W P-5'-P-CCNC: 41 U/L (ref 1–33)
AMORPH URATE CRY URNS QL MICRO: ABNORMAL /HPF
ANION GAP SERPL CALCULATED.3IONS-SCNC: 15 MMOL/L (ref 5–15)
ANION GAP SERPL CALCULATED.3IONS-SCNC: 18 MMOL/L (ref 5–15)
APAP SERPL-MCNC: <5 MCG/ML (ref 0–30)
APTT PPP: 66.2 SECONDS (ref 60–90)
AST SERPL-CCNC: 37 U/L (ref 1–32)
ATMOSPHERIC PRESS: ABNORMAL MM[HG]
B-OH-BUTYR SERPL-SCNC: 0.13 MMOL/L (ref 0.02–0.27)
BACTERIA UR QL AUTO: ABNORMAL /HPF
BASE EXCESS BLDV CALC-SCNC: -4.3 MMOL/L (ref -2–2)
BASOPHILS # BLD AUTO: 0.01 10*3/MM3 (ref 0–0.2)
BASOPHILS # BLD AUTO: 0.01 10*3/MM3 (ref 0–0.2)
BASOPHILS NFR BLD AUTO: 0.2 % (ref 0–1.5)
BASOPHILS NFR BLD AUTO: 0.2 % (ref 0–1.5)
BDY SITE: ABNORMAL
BILIRUB SERPL-MCNC: 0.3 MG/DL (ref 0–1.2)
BILIRUB UR QL STRIP: NEGATIVE
BLD GP AB SCN SERPL QL: NEGATIVE
BODY TEMPERATURE: 37
BUN SERPL-MCNC: 61 MG/DL (ref 8–23)
BUN SERPL-MCNC: 63 MG/DL (ref 8–23)
BUN/CREAT SERPL: 7.7 (ref 7–25)
BUN/CREAT SERPL: 8.1 (ref 7–25)
CALCIUM SPEC-SCNC: 7.6 MG/DL (ref 8.6–10.5)
CALCIUM SPEC-SCNC: 7.6 MG/DL (ref 8.6–10.5)
CHLORIDE SERPL-SCNC: 91 MMOL/L (ref 98–107)
CHLORIDE SERPL-SCNC: 95 MMOL/L (ref 98–107)
CHLORIDE UR-SCNC: <20 MMOL/L
CLARITY UR: ABNORMAL
CO2 BLDA-SCNC: 21.7 MMOL/L (ref 22–33)
CO2 SERPL-SCNC: 18 MMOL/L (ref 22–29)
CO2 SERPL-SCNC: 19 MMOL/L (ref 22–29)
COARSE GRAN CASTS URNS QL MICRO: ABNORMAL /LPF
COHGB MFR BLD: 1.3 %
COLOR UR: YELLOW
CORTIS SERPL-MCNC: 10.55 MCG/DL
CREAT SERPL-MCNC: 7.49 MG/DL (ref 0.57–1)
CREAT SERPL-MCNC: 8.19 MG/DL (ref 0.57–1)
DEPRECATED RDW RBC AUTO: 44.6 FL (ref 37–54)
DEPRECATED RDW RBC AUTO: 46 FL (ref 37–54)
EGFRCR SERPLBLD CKD-EPI 2021: 5.1 ML/MIN/1.73
EGFRCR SERPLBLD CKD-EPI 2021: 5.7 ML/MIN/1.73
EOSINOPHIL # BLD AUTO: 0.02 10*3/MM3 (ref 0–0.4)
EOSINOPHIL # BLD AUTO: 0.05 10*3/MM3 (ref 0–0.4)
EOSINOPHIL NFR BLD AUTO: 0.5 % (ref 0.3–6.2)
EOSINOPHIL NFR BLD AUTO: 1.2 % (ref 0.3–6.2)
EPAP: 0
ERYTHROCYTE [DISTWIDTH] IN BLOOD BY AUTOMATED COUNT: 14.6 % (ref 12.3–15.4)
ERYTHROCYTE [DISTWIDTH] IN BLOOD BY AUTOMATED COUNT: 14.6 % (ref 12.3–15.4)
FINE GRAN CASTS URNS QL MICRO: ABNORMAL /LPF
FLUAV RNA RESP QL NAA+PROBE: NOT DETECTED
FLUBV RNA RESP QL NAA+PROBE: NOT DETECTED
GLOBULIN UR ELPH-MCNC: 2.7 GM/DL
GLUCOSE BLDC GLUCOMTR-MCNC: 150 MG/DL (ref 70–130)
GLUCOSE BLDC GLUCOMTR-MCNC: 159 MG/DL (ref 70–130)
GLUCOSE BLDC GLUCOMTR-MCNC: 202 MG/DL (ref 70–130)
GLUCOSE BLDC GLUCOMTR-MCNC: 214 MG/DL (ref 70–130)
GLUCOSE BLDC GLUCOMTR-MCNC: 229 MG/DL (ref 70–130)
GLUCOSE BLDC GLUCOMTR-MCNC: 237 MG/DL (ref 70–130)
GLUCOSE BLDC GLUCOMTR-MCNC: 255 MG/DL (ref 70–130)
GLUCOSE SERPL-MCNC: 152 MG/DL (ref 65–99)
GLUCOSE SERPL-MCNC: 255 MG/DL (ref 65–99)
GLUCOSE UR STRIP-MCNC: NEGATIVE MG/DL
HAV IGM SERPL QL IA: NORMAL
HBV CORE IGM SERPL QL IA: NORMAL
HBV SURFACE AG SERPL QL IA: NORMAL
HCO3 BLDV-SCNC: 20.6 MMOL/L (ref 22–28)
HCT VFR BLD AUTO: 26 % (ref 34–46.6)
HCT VFR BLD AUTO: 29.3 % (ref 34–46.6)
HCV AB SER DONR QL: NORMAL
HGB BLD-MCNC: 8.5 G/DL (ref 12–15.9)
HGB BLD-MCNC: 9.5 G/DL (ref 12–15.9)
HGB BLDA-MCNC: 9.2 G/DL (ref 14–18)
HGB UR QL STRIP.AUTO: ABNORMAL
HIV 1+2 AB+HIV1 P24 AG SERPL QL IA: NORMAL
HYALINE CASTS UR QL AUTO: ABNORMAL /LPF
IMM GRANULOCYTES # BLD AUTO: 0.05 10*3/MM3 (ref 0–0.05)
IMM GRANULOCYTES # BLD AUTO: 0.06 10*3/MM3 (ref 0–0.05)
IMM GRANULOCYTES NFR BLD AUTO: 1.2 % (ref 0–0.5)
IMM GRANULOCYTES NFR BLD AUTO: 1.4 % (ref 0–0.5)
INHALED O2 CONCENTRATION: 21 %
INR PPP: 2.36 (ref 0.89–1.12)
IPAP: 0
KETONES UR QL STRIP: NEGATIVE
LEUKOCYTE ESTERASE UR QL STRIP.AUTO: NEGATIVE
LYMPHOCYTES # BLD AUTO: 0.77 10*3/MM3 (ref 0.7–3.1)
LYMPHOCYTES # BLD AUTO: 0.92 10*3/MM3 (ref 0.7–3.1)
LYMPHOCYTES NFR BLD AUTO: 17.4 % (ref 19.6–45.3)
LYMPHOCYTES NFR BLD AUTO: 21.6 % (ref 19.6–45.3)
MAGNESIUM SERPL-MCNC: 2.3 MG/DL (ref 1.6–2.4)
MCH RBC QN AUTO: 27.3 PG (ref 26.6–33)
MCH RBC QN AUTO: 28.1 PG (ref 26.6–33)
MCHC RBC AUTO-ENTMCNC: 32.4 G/DL (ref 31.5–35.7)
MCHC RBC AUTO-ENTMCNC: 32.7 G/DL (ref 31.5–35.7)
MCV RBC AUTO: 84.2 FL (ref 79–97)
MCV RBC AUTO: 85.8 FL (ref 79–97)
METHGB BLD QL: 0.2 %
MODALITY: ABNORMAL
MONOCYTES # BLD AUTO: 0.17 10*3/MM3 (ref 0.1–0.9)
MONOCYTES # BLD AUTO: 0.21 10*3/MM3 (ref 0.1–0.9)
MONOCYTES NFR BLD AUTO: 3.8 % (ref 5–12)
MONOCYTES NFR BLD AUTO: 4.9 % (ref 5–12)
NEUTROPHILS NFR BLD AUTO: 3.02 10*3/MM3 (ref 1.7–7)
NEUTROPHILS NFR BLD AUTO: 3.39 10*3/MM3 (ref 1.7–7)
NEUTROPHILS NFR BLD AUTO: 70.9 % (ref 42.7–76)
NEUTROPHILS NFR BLD AUTO: 76.7 % (ref 42.7–76)
NITRITE UR QL STRIP: NEGATIVE
NRBC BLD AUTO-RTO: 0 /100 WBC (ref 0–0.2)
NRBC BLD AUTO-RTO: 0 /100 WBC (ref 0–0.2)
OXYHGB MFR BLDV: 74.7 %
PAW @ PEAK INSP FLOW SETTING VENT: 0 CMH2O
PCO2 BLDV: 35.9 MM HG (ref 41–51)
PH BLDV: 7.37 PH UNITS (ref 7.31–7.41)
PH UR STRIP.AUTO: <=5 [PH] (ref 5–8)
PHOSPHATE SERPL-MCNC: 7.3 MG/DL (ref 2.5–4.5)
PLATELET # BLD AUTO: 228 10*3/MM3 (ref 140–450)
PLATELET # BLD AUTO: 250 10*3/MM3 (ref 140–450)
PMV BLD AUTO: 10.9 FL (ref 6–12)
PMV BLD AUTO: 11 FL (ref 6–12)
PO2 BLDV: 43.5 MM HG (ref 27–53)
POTASSIUM SERPL-SCNC: 4.8 MMOL/L (ref 3.5–5.2)
POTASSIUM SERPL-SCNC: 5.3 MMOL/L (ref 3.5–5.2)
PROT SERPL-MCNC: 6.1 G/DL (ref 6–8.5)
PROT UR QL STRIP: ABNORMAL
PROTHROMBIN TIME: 26 SECONDS (ref 12.2–14.5)
QT INTERVAL: 502 MS
QTC INTERVAL: 549 MS
RBC # BLD AUTO: 3.03 10*6/MM3 (ref 3.77–5.28)
RBC # BLD AUTO: 3.48 10*6/MM3 (ref 3.77–5.28)
RBC # UR STRIP: ABNORMAL /HPF
REF LAB TEST METHOD: ABNORMAL
RH BLD: POSITIVE
RSV RNA NPH QL NAA+NON-PROBE: NOT DETECTED
SARS-COV-2 RNA RESP QL NAA+PROBE: NOT DETECTED
SODIUM SERPL-SCNC: 127 MMOL/L (ref 136–145)
SODIUM SERPL-SCNC: 127 MMOL/L (ref 136–145)
SODIUM SERPL-SCNC: 128 MMOL/L (ref 136–145)
SODIUM SERPL-SCNC: 128 MMOL/L (ref 136–145)
SODIUM SERPL-SCNC: 129 MMOL/L (ref 136–145)
SODIUM UR-SCNC: 20 MMOL/L
SP GR UR STRIP: 1.02 (ref 1–1.03)
SQUAMOUS #/AREA URNS HPF: ABNORMAL /HPF
T&S EXPIRATION DATE: NORMAL
TOTAL RATE: 0 BREATHS/MINUTE
TRANS CELLS #/AREA URNS HPF: ABNORMAL /HPF
TSH SERPL DL<=0.05 MIU/L-ACNC: 1.59 UIU/ML (ref 0.27–4.2)
UFH PPP CHRO-ACNC: 0.1 IU/ML (ref 0.3–0.7)
UFH PPP CHRO-ACNC: 0.14 IU/ML (ref 0.3–0.7)
UFH PPP CHRO-ACNC: 0.15 IU/ML (ref 0.3–0.7)
UFH PPP CHRO-ACNC: 0.34 IU/ML (ref 0.3–0.7)
UROBILINOGEN UR QL STRIP: ABNORMAL
WBC # UR STRIP: ABNORMAL /HPF
WBC NRBC COR # BLD AUTO: 4.26 10*3/MM3 (ref 3.4–10.8)
WBC NRBC COR # BLD AUTO: 4.42 10*3/MM3 (ref 3.4–10.8)

## 2024-01-02 PROCEDURE — 82805 BLOOD GASES W/O2 SATURATION: CPT

## 2024-01-02 PROCEDURE — 97162 PT EVAL MOD COMPLEX 30 MIN: CPT

## 2024-01-02 PROCEDURE — 99223 1ST HOSP IP/OBS HIGH 75: CPT | Performed by: INTERNAL MEDICINE

## 2024-01-02 PROCEDURE — 85730 THROMBOPLASTIN TIME PARTIAL: CPT | Performed by: INTERNAL MEDICINE

## 2024-01-02 PROCEDURE — 82043 UR ALBUMIN QUANTITATIVE: CPT | Performed by: INTERNAL MEDICINE

## 2024-01-02 PROCEDURE — 85520 HEPARIN ASSAY: CPT | Performed by: INTERNAL MEDICINE

## 2024-01-02 PROCEDURE — 84300 ASSAY OF URINE SODIUM: CPT | Performed by: INTERNAL MEDICINE

## 2024-01-02 PROCEDURE — 93005 ELECTROCARDIOGRAM TRACING: CPT | Performed by: PHYSICIAN ASSISTANT

## 2024-01-02 PROCEDURE — 85520 HEPARIN ASSAY: CPT

## 2024-01-02 PROCEDURE — G0432 EIA HIV-1/HIV-2 SCREEN: HCPCS | Performed by: INTERNAL MEDICINE

## 2024-01-02 PROCEDURE — 82948 REAGENT STRIP/BLOOD GLUCOSE: CPT

## 2024-01-02 PROCEDURE — 25010000002 CALCIUM GLUCONATE-NACL 1-0.675 GM/50ML-% SOLUTION: Performed by: PHYSICIAN ASSISTANT

## 2024-01-02 PROCEDURE — 80143 DRUG ASSAY ACETAMINOPHEN: CPT | Performed by: STUDENT IN AN ORGANIZED HEALTH CARE EDUCATION/TRAINING PROGRAM

## 2024-01-02 PROCEDURE — 25810000003 SODIUM CHLORIDE 0.9 % SOLUTION: Performed by: PHYSICIAN ASSISTANT

## 2024-01-02 PROCEDURE — 82436 ASSAY OF URINE CHLORIDE: CPT | Performed by: INTERNAL MEDICINE

## 2024-01-02 PROCEDURE — 25010000002 HEPARIN (PORCINE) 25000-0.45 UT/250ML-% SOLUTION

## 2024-01-02 PROCEDURE — 36415 COLL VENOUS BLD VENIPUNCTURE: CPT

## 2024-01-02 PROCEDURE — 83735 ASSAY OF MAGNESIUM: CPT | Performed by: INTERNAL MEDICINE

## 2024-01-02 PROCEDURE — 80074 ACUTE HEPATITIS PANEL: CPT | Performed by: INTERNAL MEDICINE

## 2024-01-02 PROCEDURE — 70490 CT SOFT TISSUE NECK W/O DYE: CPT

## 2024-01-02 PROCEDURE — 84443 ASSAY THYROID STIM HORMONE: CPT | Performed by: INTERNAL MEDICINE

## 2024-01-02 PROCEDURE — 80053 COMPREHEN METABOLIC PANEL: CPT | Performed by: PHYSICIAN ASSISTANT

## 2024-01-02 PROCEDURE — 84100 ASSAY OF PHOSPHORUS: CPT | Performed by: INTERNAL MEDICINE

## 2024-01-02 PROCEDURE — 74176 CT ABD & PELVIS W/O CONTRAST: CPT

## 2024-01-02 PROCEDURE — 63710000001 INSULIN LISPRO (HUMAN) PER 5 UNITS: Performed by: INTERNAL MEDICINE

## 2024-01-02 PROCEDURE — 99222 1ST HOSP IP/OBS MODERATE 55: CPT | Performed by: PHYSICIAN ASSISTANT

## 2024-01-02 PROCEDURE — 81001 URINALYSIS AUTO W/SCOPE: CPT | Performed by: PHYSICIAN ASSISTANT

## 2024-01-02 PROCEDURE — 63710000001 INSULIN REGULAR HUMAN PER 5 UNITS: Performed by: PHYSICIAN ASSISTANT

## 2024-01-02 PROCEDURE — 82570 ASSAY OF URINE CREATININE: CPT | Performed by: INTERNAL MEDICINE

## 2024-01-02 PROCEDURE — 85025 COMPLETE CBC W/AUTO DIFF WBC: CPT | Performed by: INTERNAL MEDICINE

## 2024-01-02 PROCEDURE — 25010000002 HEPARIN (PORCINE) PER 1000 UNITS

## 2024-01-02 PROCEDURE — 84295 ASSAY OF SERUM SODIUM: CPT | Performed by: STUDENT IN AN ORGANIZED HEALTH CARE EDUCATION/TRAINING PROGRAM

## 2024-01-02 PROCEDURE — 25010000002 HEPARIN (PORCINE) 25000-0.45 UT/250ML-% SOLUTION: Performed by: INTERNAL MEDICINE

## 2024-01-02 PROCEDURE — 97165 OT EVAL LOW COMPLEX 30 MIN: CPT | Performed by: OCCUPATIONAL THERAPIST

## 2024-01-02 PROCEDURE — 97535 SELF CARE MNGMENT TRAINING: CPT | Performed by: OCCUPATIONAL THERAPIST

## 2024-01-02 PROCEDURE — 25810000003 SODIUM CHLORIDE 0.9 % SOLUTION: Performed by: INTERNAL MEDICINE

## 2024-01-02 PROCEDURE — 82533 TOTAL CORTISOL: CPT | Performed by: INTERNAL MEDICINE

## 2024-01-02 RX ORDER — NITROGLYCERIN 0.4 MG/1
0.4 TABLET SUBLINGUAL
Status: DISCONTINUED | OUTPATIENT
Start: 2024-01-02 | End: 2024-01-15 | Stop reason: HOSPADM

## 2024-01-02 RX ORDER — IBUPROFEN 600 MG/1
1 TABLET ORAL
Status: DISCONTINUED | OUTPATIENT
Start: 2024-01-02 | End: 2024-01-15 | Stop reason: HOSPADM

## 2024-01-02 RX ORDER — ESCITALOPRAM OXALATE 20 MG/1
20 TABLET ORAL DAILY
Status: DISCONTINUED | OUTPATIENT
Start: 2024-01-02 | End: 2024-01-15 | Stop reason: HOSPADM

## 2024-01-02 RX ORDER — SODIUM CHLORIDE 0.9 % (FLUSH) 0.9 %
10 SYRINGE (ML) INJECTION EVERY 12 HOURS SCHEDULED
Status: DISCONTINUED | OUTPATIENT
Start: 2024-01-02 | End: 2024-01-15 | Stop reason: HOSPADM

## 2024-01-02 RX ORDER — HEPARIN SODIUM 1000 [USP'U]/ML
2000 INJECTION, SOLUTION INTRAVENOUS; SUBCUTANEOUS AS NEEDED
Status: DISCONTINUED | OUTPATIENT
Start: 2024-01-02 | End: 2024-01-02

## 2024-01-02 RX ORDER — HEPARIN SODIUM 10000 [USP'U]/100ML
18 INJECTION, SOLUTION INTRAVENOUS
Status: DISCONTINUED | OUTPATIENT
Start: 2024-01-02 | End: 2024-01-09

## 2024-01-02 RX ORDER — INSULIN LISPRO 100 [IU]/ML
2-7 INJECTION, SOLUTION INTRAVENOUS; SUBCUTANEOUS
Status: DISCONTINUED | OUTPATIENT
Start: 2024-01-02 | End: 2024-01-15 | Stop reason: HOSPADM

## 2024-01-02 RX ORDER — ALBUTEROL SULFATE 2.5 MG/3ML
2.5 SOLUTION RESPIRATORY (INHALATION) EVERY 4 HOURS PRN
Status: DISCONTINUED | OUTPATIENT
Start: 2024-01-02 | End: 2024-01-15 | Stop reason: HOSPADM

## 2024-01-02 RX ORDER — NALOXONE HCL 0.4 MG/ML
0.4 VIAL (ML) INJECTION
Status: DISCONTINUED | OUTPATIENT
Start: 2024-01-02 | End: 2024-01-15 | Stop reason: HOSPADM

## 2024-01-02 RX ORDER — FERROUS SULFATE 325(65) MG
325 TABLET ORAL
Status: DISCONTINUED | OUTPATIENT
Start: 2024-01-02 | End: 2024-01-15 | Stop reason: HOSPADM

## 2024-01-02 RX ORDER — ACETAMINOPHEN 325 MG/1
650 TABLET ORAL EVERY 8 HOURS PRN
Status: DISCONTINUED | OUTPATIENT
Start: 2024-01-02 | End: 2024-01-15 | Stop reason: HOSPADM

## 2024-01-02 RX ORDER — BISACODYL 5 MG/1
5 TABLET, DELAYED RELEASE ORAL DAILY PRN
Status: DISCONTINUED | OUTPATIENT
Start: 2024-01-02 | End: 2024-01-03

## 2024-01-02 RX ORDER — HYDROCODONE BITARTRATE AND ACETAMINOPHEN 7.5; 325 MG/1; MG/1
1 TABLET ORAL EVERY 8 HOURS PRN
COMMUNITY

## 2024-01-02 RX ORDER — SPIRONOLACTONE 25 MG/1
25 TABLET ORAL DAILY
COMMUNITY

## 2024-01-02 RX ORDER — LANOLIN ALCOHOL/MO/W.PET/CERES
800 CREAM (GRAM) TOPICAL DAILY
Status: DISCONTINUED | OUTPATIENT
Start: 2024-01-02 | End: 2024-01-15 | Stop reason: HOSPADM

## 2024-01-02 RX ORDER — HEPARIN SODIUM 1000 [USP'U]/ML
2000 INJECTION, SOLUTION INTRAVENOUS; SUBCUTANEOUS ONCE
Status: COMPLETED | OUTPATIENT
Start: 2024-01-02 | End: 2024-01-02

## 2024-01-02 RX ORDER — DEXTROSE MONOHYDRATE 25 G/50ML
25 INJECTION, SOLUTION INTRAVENOUS ONCE
Status: COMPLETED | OUTPATIENT
Start: 2024-01-02 | End: 2024-01-02

## 2024-01-02 RX ORDER — PANTOPRAZOLE SODIUM 40 MG/1
40 TABLET, DELAYED RELEASE ORAL
Status: DISCONTINUED | OUTPATIENT
Start: 2024-01-02 | End: 2024-01-15 | Stop reason: HOSPADM

## 2024-01-02 RX ORDER — SODIUM CHLORIDE 0.9 % (FLUSH) 0.9 %
10 SYRINGE (ML) INJECTION AS NEEDED
Status: DISCONTINUED | OUTPATIENT
Start: 2024-01-02 | End: 2024-01-15 | Stop reason: HOSPADM

## 2024-01-02 RX ORDER — CALCIUM GLUCONATE 20 MG/ML
1000 INJECTION, SOLUTION INTRAVENOUS ONCE
Status: COMPLETED | OUTPATIENT
Start: 2024-01-02 | End: 2024-01-02

## 2024-01-02 RX ORDER — AMOXICILLIN 250 MG
2 CAPSULE ORAL 2 TIMES DAILY
Status: DISCONTINUED | OUTPATIENT
Start: 2024-01-02 | End: 2024-01-03

## 2024-01-02 RX ORDER — SODIUM CHLORIDE 9 MG/ML
50 INJECTION, SOLUTION INTRAVENOUS CONTINUOUS
Status: DISCONTINUED | OUTPATIENT
Start: 2024-01-02 | End: 2024-01-05

## 2024-01-02 RX ORDER — OXYCODONE HYDROCHLORIDE 5 MG/1
5 TABLET ORAL EVERY 8 HOURS PRN
Status: DISCONTINUED | OUTPATIENT
Start: 2024-01-02 | End: 2024-01-04

## 2024-01-02 RX ORDER — OXYBUTYNIN CHLORIDE 5 MG/1
5 TABLET, EXTENDED RELEASE ORAL DAILY
Status: DISCONTINUED | OUTPATIENT
Start: 2024-01-02 | End: 2024-01-15 | Stop reason: HOSPADM

## 2024-01-02 RX ORDER — NICOTINE POLACRILEX 4 MG
15 LOZENGE BUCCAL
Status: DISCONTINUED | OUTPATIENT
Start: 2024-01-02 | End: 2024-01-15 | Stop reason: HOSPADM

## 2024-01-02 RX ORDER — POLYETHYLENE GLYCOL 3350 17 G/17G
17 POWDER, FOR SOLUTION ORAL DAILY PRN
Status: DISCONTINUED | OUTPATIENT
Start: 2024-01-02 | End: 2024-01-03

## 2024-01-02 RX ORDER — HEPARIN SODIUM 10000 [USP'U]/100ML
11.5 INJECTION, SOLUTION INTRAVENOUS
Status: DISCONTINUED | OUTPATIENT
Start: 2024-01-02 | End: 2024-01-02

## 2024-01-02 RX ORDER — HEPARIN SODIUM 1000 [USP'U]/ML
4000 INJECTION, SOLUTION INTRAVENOUS; SUBCUTANEOUS AS NEEDED
Status: DISCONTINUED | OUTPATIENT
Start: 2024-01-02 | End: 2024-01-02

## 2024-01-02 RX ORDER — SODIUM CHLORIDE 9 MG/ML
40 INJECTION, SOLUTION INTRAVENOUS AS NEEDED
Status: DISCONTINUED | OUTPATIENT
Start: 2024-01-02 | End: 2024-01-15 | Stop reason: HOSPADM

## 2024-01-02 RX ORDER — DEXTROSE MONOHYDRATE 25 G/50ML
25 INJECTION, SOLUTION INTRAVENOUS
Status: DISCONTINUED | OUTPATIENT
Start: 2024-01-02 | End: 2024-01-15 | Stop reason: HOSPADM

## 2024-01-02 RX ORDER — BISACODYL 10 MG
10 SUPPOSITORY, RECTAL RECTAL DAILY PRN
Status: DISCONTINUED | OUTPATIENT
Start: 2024-01-02 | End: 2024-01-03

## 2024-01-02 RX ADMIN — INSULIN LISPRO 2 UNITS: 100 INJECTION, SOLUTION INTRAVENOUS; SUBCUTANEOUS at 12:23

## 2024-01-02 RX ADMIN — SODIUM CHLORIDE 500 ML: 9 INJECTION, SOLUTION INTRAVENOUS at 03:31

## 2024-01-02 RX ADMIN — SODIUM CHLORIDE 50 ML/HR: 9 INJECTION, SOLUTION INTRAVENOUS at 04:27

## 2024-01-02 RX ADMIN — HEPARIN SODIUM 13 UNITS/KG/HR: 10000 INJECTION, SOLUTION INTRAVENOUS at 23:17

## 2024-01-02 RX ADMIN — INSULIN HUMAN 5 UNITS: 100 INJECTION, SOLUTION PARENTERAL at 02:05

## 2024-01-02 RX ADMIN — SODIUM ZIRCONIUM CYCLOSILICATE 10 G: 10 POWDER, FOR SUSPENSION ORAL at 02:05

## 2024-01-02 RX ADMIN — ESCITALOPRAM OXALATE 20 MG: 20 TABLET, FILM COATED ORAL at 09:45

## 2024-01-02 RX ADMIN — HEPARIN SODIUM 2000 UNITS: 1000 INJECTION INTRAVENOUS; SUBCUTANEOUS at 13:20

## 2024-01-02 RX ADMIN — INSULIN LISPRO 2 UNITS: 100 INJECTION, SOLUTION INTRAVENOUS; SUBCUTANEOUS at 17:44

## 2024-01-02 RX ADMIN — OXYCODONE HYDROCHLORIDE 5 MG: 5 TABLET ORAL at 12:27

## 2024-01-02 RX ADMIN — PANTOPRAZOLE SODIUM 40 MG: 40 TABLET, DELAYED RELEASE ORAL at 04:41

## 2024-01-02 RX ADMIN — FERROUS SULFATE TAB 325 MG (65 MG ELEMENTAL FE) 325 MG: 325 (65 FE) TAB at 09:45

## 2024-01-02 RX ADMIN — HEPARIN SODIUM 11.5 UNITS/KG/HR: 10000 INJECTION, SOLUTION INTRAVENOUS at 04:41

## 2024-01-02 RX ADMIN — INSULIN LISPRO 2 UNITS: 100 INJECTION, SOLUTION INTRAVENOUS; SUBCUTANEOUS at 09:44

## 2024-01-02 RX ADMIN — OXYCODONE HYDROCHLORIDE 5 MG: 5 TABLET ORAL at 21:43

## 2024-01-02 RX ADMIN — DEXTROSE MONOHYDRATE 25 G: 25 INJECTION, SOLUTION INTRAVENOUS at 02:05

## 2024-01-02 RX ADMIN — FOLIC ACID TAB 400 MCG 800 MCG: 400 TAB at 09:45

## 2024-01-02 RX ADMIN — OXYBUTYNIN CHLORIDE 5 MG: 5 TABLET, FILM COATED, EXTENDED RELEASE ORAL at 09:45

## 2024-01-02 RX ADMIN — CALCIUM GLUCONATE 1000 MG: 20 INJECTION, SOLUTION INTRAVENOUS at 02:05

## 2024-01-02 RX ADMIN — SODIUM CHLORIDE 1000 ML: 9 INJECTION, SOLUTION INTRAVENOUS at 00:35

## 2024-01-02 RX ADMIN — INSULIN LISPRO 3 UNITS: 100 INJECTION, SOLUTION INTRAVENOUS; SUBCUTANEOUS at 21:43

## 2024-01-02 NOTE — THERAPY TREATMENT NOTE
Patient Name: Di Lopez  : 1960    MRN: [id number]                              Today's Date: 2024       Admit Date: 2024    Visit Dx:     ICD-10-CM ICD-9-CM   1. Acute renal failure, unspecified acute renal failure type  N17.9 584.9   2. Hyperkalemia  E87.5 276.7   3. Gastrointestinal hemorrhage, unspecified gastrointestinal hemorrhage type  K92.2 578.9     Patient Active Problem List   Diagnosis    Arteriovenous malformation of gastrointestinal tract    Depression    Type 2 diabetes mellitus without complication, with long-term current use of insulin    Dyslipidemia    Emphysema/COPD    Hypertension    Insomnia    Morbid obesity    Obstructive sleep apnea syndrome    Chronic anticoagulation    Normocytic anemia    Sliding hiatal hernia    Vitamin B12 deficiency    Iron deficiency    Iron malabsorption    Bronchiectasis without complication    Electronic cigarette use    Chronic diastolic heart failure    Left bundle branch hemiblock    Chronic respiratory failure with hypoxia    Body aches    Headaches due to old head injury    History of tobacco use, presenting hazards to health    Multiple nodules of lung    Nocturnal hypoxemia    Screening for colon cancer    Abnormal CT scan    Wellness examination    ARF (acute renal failure)    H/O heart valve replacement with mechanical valve    GI bleed    Severe malnutrition    Hyponatremia    Hyperkalemia    Type 2 diabetes mellitus with hyperglycemia    Elevated LFTs    Anemia, chronic disease    Cervical lymphadenopathy    Renal failure     Past Medical History:   Diagnosis Date    Allergic     Anemia     Anticoagulated     WARFARIN    Arteriovenous malformation of gastrointestinal tract 2016    Arthritis     Asthma     Black hairy tongue     CHF (congestive heart failure)     Chronic back pain     Coronary artery disease     Depression 2016    Diabetes mellitus     Dyslipidemia 2016    Elevated cholesterol     Emphysema/COPD  05/12/2016    Emphysema/COPD 05/12/2016    Fatigue     GERD (gastroesophageal reflux disease)     Headache     Herniated cervical disc     History of echocardiogram 07/16/2013    Left ventricular systolic function at the lower limits of normal; EF 50-55%; mild MR; mechanical prosthetic mitral valve present; mod TR    History of transfusion     NO REACTION; [facility]    Hyperlipidemia     Hypertension     Insomnia 05/12/2016    Iron deficiency     A.  The patient was treated with IV iron replacement. B.  Secondary to GI bleed, status post EGD with cauterization of blood vessel to the duodenum, 7/16/2014.    Left bundle branch hemiblock 10/12/2020    Left shoulder strain     Liver disease     Liver mass     Long term current use of anticoagulant 05/12/2016    Low back pain     Lung nodules     PER DR. LEVI'S NOTE 10/22    Morbid obesity 05/12/2016    Neuromuscular disorder 7/28/2016    Obstructive sleep apnea syndrome 05/12/2016    Description: A.  Moderate; CPAP therapy..    Sliding hiatal hernia 07/28/2016    Description: A.  Reported on EGD, 12/30/2011.    Type 2 diabetes mellitus 05/12/2016    Urinary tract infection     Uterine cancer     HYSTERECTOMY    Vitamin B12 deficiency     A.  The patient was treated with vitamin B12 replacement.    Wears eyeglasses      Past Surgical History:   Procedure Laterality Date    BILATERAL OOPHORECTOMY  2009    Status post bilateral oophorectomy secondary to ovarian cysts     BREAST BIOPSY Right     STEREOTACTIC    BREAST EXCISIONAL BIOPSY Bilateral     CARDIAC ELECTROPHYSIOLOGY PROCEDURE N/A 10/15/2020    Procedure: Biventricular Device Insertion;  Surgeon: Nadir Everett MD;  Location: [facility];  Service: Cardiology;  Laterality: N/A;    CARDIAC SURGERY  2003    CARPAL TUNNEL RELEASE Bilateral     COLONOSCOPY  12/28/2012    COLONOSCOPY N/A 5/23/2023    Procedure: COLONOSCOPY;  Surgeon: Gt Fernandes MD;  Location: [facility];  Service:  Gastroenterology;  Laterality: N/A;    COLONOSCOPY N/A 11/16/2023    Procedure: COLONOSCOPY;  Surgeon: Brunner, Mark I, MD;  Location:  [facility];  Service: Gastroenterology;  Laterality: N/A;    CYSTECTOMY      removal of ganglion cyst from left wrist    ENDOSCOPY  12/30/2011    DIAGNOSTIC ESOPHAGOGASTRODUODENOSCOPY    ENDOSCOPY N/A 11/16/2023    Procedure: ESOPHAGOGASTRODUODENOSCOPY;  Surgeon: Brunner, Mark I, MD;  Location:  [facility];  Service: Gastroenterology;  Laterality: N/A;    HYSTERECTOMY  1995    A.  Status post hysterectomy for early stage endometrial cancer done in 1995.    MITRAL VALVE REPLACEMENT  03/03/2008    Dr. Hima aBrreto; MECHANICAL    OTHER SURGICAL HISTORY      LIVER MASS BLOOD SUPPLY CLIPPED ON RIGHT AND LEFT SIDE PER PT      General Information       Row Name 01/02/24 1310          OT Time and Intention    Document Type evaluation  -AR     Mode of Treatment occupational therapy  -AR       Row Name 01/02/24 1310          General Information    Patient Profile Reviewed yes  -AR     Prior Level of Function independent:;gait;transfer;ADL's;bed mobility;community mobility;w/c or scooter  independent wc mobility for community mobility  -AR     Existing Precautions/Restrictions fall;other (see comments)  chronic back pain radiating RLE  -AR     Barriers to Rehab medically complex;previous functional deficit  -AR       Row Name 01/02/24 1310          Living Environment    People in Home child(juan), dependent  14 yo daughter and 14 grandson  -AR       Row Name 01/02/24 1310          Home Main Entrance    Number of Stairs, Main Entrance none  -AR       Row Name 01/02/24 1310          Stairs Within Home, Primary    Number of Stairs, Within Home, Primary none  -AR     Stairs Comment, Within Home, Primary Pt has tub/shower, issued handouts on bathroom safety and bath bench  -AR       Row Name 01/02/24 1310          Cognition    Orientation Status (Cognition) oriented x 4  -AR       Row  Name 01/02/24 1310          Safety Issues, Functional Mobility    Safety Issues Affecting Function (Mobility) positioning of assistive device  -AR     Impairments Affecting Function (Mobility) pain;postural/trunk control;sensation/sensory awareness;strength;endurance/activity tolerance  -AR               User Key  (r) = Recorded By, (t) = Taken By, (c) = Cosigned By      Initials Name Provider Type    Marina Reed OT Occupational Therapist                     Mobility/ADL's       Row Name 01/02/24 1315          Bed Mobility    Bed Mobility scooting/bridging;sit-supine  -AR     Scooting/Bridging Holly (Bed Mobility) supervision;verbal cues  -AR     Sit-Supine Holly (Bed Mobility) minimum assist (75% patient effort);verbal cues  -AR     Assistive Device (Bed Mobility) bed rails;head of bed elevated  -AR     Comment, (Bed Mobility) Issued leg  to assist with bed mobility and car transfers to assist RLE, educated on use  -AR       Row Name 01/02/24 1315          Transfers    Transfers sit-stand transfer;stand-sit transfer;toilet transfer  -AR       Row Name 01/02/24 1315          Sit-Stand Transfer    Sit-Stand Holly (Transfers) verbal cues;contact guard  -AR     Assistive Device (Sit-Stand Transfers) walker, front-wheeled  -AR       Row Name 01/02/24 1315          Stand-Sit Transfer    Stand-Sit Holly (Transfers) verbal cues;contact guard  -AR     Assistive Device (Stand-Sit Transfers) walker, front-wheeled  -AR       Row Name 01/02/24 1315          Toilet Transfer    Type (Toilet Transfer) sit-stand;stand-sit  -AR     Holly Level (Toilet Transfer) verbal cues;contact guard  -AR     Assistive Device (Toilet Transfer) raised toilet seat;grab bars/safety frame;walker, front-wheeled  -AR       Row Name 01/02/24 1315          Functional Mobility    Functional Mobility- Ind. Level verbal cues required;contact guard assist  -AR     Functional Mobility- Device walker,  front-wheeled  -AR       Row Name 01/02/24 1315          Activities of Daily Living    BADL Assessment/Intervention bathing;lower body dressing;grooming;toileting  -AR       Row Name 01/02/24 1315          Bathing Assessment/Intervention    Comment, (Bathing) Issued LH sponge and educated on use  -AR       Row Name 01/02/24 1315          Lower Body Dressing Assessment/Training    [place] Level (Lower Body Dressing) don;socks;minimum assist (75% patient effort)  -AR     Assistive Devices (Lower Body Dressing) sock-aid  -AR     Position (Lower Body Dressing) edge of bed sitting  -AR     Comment, (Lower Body Dressing) Pt reports intermittent difficutly with LB dressing depending on back and RLE pain. OT issued self-care kit and educated on use.  -AR       Row Name 01/02/24 1315          Grooming Assessment/Training    [place] Level (Grooming) wash face, hands;verbal cues;contact guard assist  -AR     Position (Grooming) supported standing;sink side  -AR       Row Name 01/02/24 1315          Toileting Assessment/Training    [place] Level (Toileting) toileting skills;contact guard assist  -AR     Position (Toileting) supported standing;unsupported sitting  -AR               User Key  (r) = Recorded By, (t) = Taken By, (c) = Cosigned By      Initials Name Provider Type    Marina Reed, OT Occupational Therapist                   Obj/Interventions       Row Name 01/02/24 1322          Sensory Assessment (Somatosensory)    Sensory Assessment (Somatosensory) UE sensation intact  -AR     Sensory Assessment intermittent numbness LUE  -AR       Row Name 01/02/24 1322          Vision Assessment/Intervention    Visual Impairment/Limitations WNL;corrective lenses full-time  -AR       Row Name 01/02/24 1322          Range of Motion Comprehensive    General Range of Motion no range of motion deficits identified  -AR       Row Name 01/02/24 1322          Strength Comprehensive (MMT)    General Manual Muscle  Testing (MMT) Assessment upper extremity strength deficits identified  -AR     Comment, General Manual Muscle Testing (MMT) Assessment BUE 4/5  -AR       Row Name 01/02/24 1322          Motor Skills    Motor Skills coordination  -AR     Coordination WFL;bilateral;upper extremity;other (see comments)  opposition  -AR       Row Name 01/02/24 1322          Balance    Balance Assessment sitting static balance;sitting dynamic balance;standing static balance;standing dynamic balance  -AR     Static Sitting Balance independent  -AR     Dynamic Sitting Balance independent  -AR     Position, Sitting Balance unsupported;sitting edge of bed  -AR     Static Standing Balance contact guard  -AR     Dynamic Standing Balance contact guard  -AR     Position/Device Used, Standing Balance supported;walker, rolling  -AR               User Key  (r) = Recorded By, (t) = Taken By, (c) = Cosigned By      Initials Name Provider Type    AR Marina Aguayo, OT Occupational Therapist                   Goals/Plan       Row Name 01/02/24 1327          Transfer Goal 1 (OT)    Activity/Assistive Device (Transfer Goal 1, OT) sit-to-stand/stand-to-sit;toilet;walker, rolling;commode, 3-in-1  -AR     Excello Level/Cues Needed (Transfer Goal 1, OT) supervision required;verbal cues required  -AR     Time Frame (Transfer Goal 1, OT) long term goal (LTG);by discharge  -AR     Progress/Outcome (Transfer Goal 1, OT) goal ongoing  -AR       Row Name 01/02/24 1327          Dressing Goal 1 (OT)    Activity/Device (Dressing Goal 1, OT) lower body dressing;reacher;sock-aid  -AR     Excello/Cues Needed (Dressing Goal 1, OT) verbal cues required;supervision required  -AR     Time Frame (Dressing Goal 1, OT) long term goal (LTG);by discharge  -AR     Progress/Outcome (Dressing Goal 1, OT) goal ongoing  -AR       Row Name 01/02/24 1327          Toileting Goal 1 (OT)    Activity/Device (Toileting Goal 1, OT) toileting skills, all;raised toilet seat;grab  bar/safety frame  -AR     [place] Level/Cues Needed (Toileting Goal 1, OT) modified independence  -AR     Time Frame (Toileting Goal 1, OT) long term goal (LTG);by discharge  -AR     Progress/Outcome (Toileting Goal 1, OT) goal ongoing  -AR       Row Name 01/02/24 1327          Therapy Assessment/Plan (OT)    Planned Therapy Interventions (OT) activity tolerance training;adaptive equipment training;BADL retraining;IADL retraining;occupation/activity based interventions;patient/caregiver education/training;strengthening exercise;transfer/mobility retraining;ROM/therapeutic exercise  -AR               User Key  (r) = Recorded By, (t) = Taken By, (c) = Cosigned By      Initials Name Provider Type    Marnia Reed, CELESTINE Occupational Therapist                   Clinical Impression       Row Name 01/02/24 1323          Pain Assessment    Pretreatment Pain Rating 5/10  -AR     Posttreatment Pain Rating 7/10  -AR     Pain Location - Side/Orientation Right  -AR     Pain Location - groin  -AR     Pain Intervention(s) Repositioned;Ambulation/increased activity;Nursing Notified  -AR       Row Name 01/02/24 1323          Plan of Care Review    Plan of Care Reviewed With patient  -AR     Outcome Evaluation Pt completed bed mobility with min assist, LB dressing with min assist and toileting with CGA using RW. Pt limited wiht generalized weakness, decreased strength, decreased occupational endurance and chronic LBP radiating into RLE. Issued AE and educated on use. Recommend DC home with HHOT and RW.  -AR       Row Name 01/02/24 1323          Therapy Assessment/Plan (OT)    Rehab Potential (OT) good, to achieve stated therapy goals  -AR     Criteria for Skilled Therapeutic Interventions Met (OT) yes  -AR     Therapy Frequency (OT) daily  -AR       Row Name 01/02/24 1323          Therapy Plan Review/Discharge Plan (OT)    Equipment Needs Upon Discharge (OT) walker, rolling  -AR     Anticipated Discharge Disposition (OT)  home with home health  -AR       Row Name 01/02/24 1323          Vital Signs    Pre Systolic BP Rehab 124  -AR     Pre Treatment Diastolic BP 51  -AR     Post Systolic BP Rehab 115  -AR     Post Treatment Diastolic BP 48  -AR     Pre SpO2 (%) 95  -AR     O2 Delivery Pre Treatment room air  -AR     Intra SpO2 (%) 95  -AR     O2 Delivery Intra Treatment room air  -AR     Post SpO2 (%) 95  -AR     O2 Delivery Post Treatment room air  -AR     Pre Patient Position Supine  -AR     Intra Patient Position Standing  -AR     Post Patient Position Supine  -AR       Row Name 01/02/24 1323          Positioning and Restraints    Pre-Treatment Position in bed  -AR     Post Treatment Position bed  -AR     In Bed notified nsg;supine;call light within reach;encouraged to call for assist;exit alarm on  -AR               User Key  (r) = Recorded By, (t) = Taken By, (c) = Cosigned By      Initials Name Provider Type    Marina Reed, OT Occupational Therapist                   Outcome Measures       Row Name 01/02/24 1328          How much help from another is currently needed...    Putting on and taking off regular lower body clothing? 3  -AR     Bathing (including washing, rinsing, and drying) 3  -AR     Toileting (which includes using toilet bed pan or urinal) 3  -AR     Putting on and taking off regular upper body clothing 3  -AR     Taking care of personal grooming (such as brushing teeth) 3  -AR     Eating meals 3  -AR     AM-PAC 6 Clicks Score (OT) 18  -AR       Row Name 01/02/24 1202 01/02/24 0816       How much help from another person do you currently need...    Turning from your back to your side while in flat bed without using bedrails? 3  -SC 3  -MK    Moving from lying on back to sitting on the side of a flat bed without bedrails? 3  -SC 3  -MK    Moving to and from a bed to a chair (including a wheelchair)? 3  -SC 3  -MK    Standing up from a chair using your arms (e.g., wheelchair, bedside chair)? 3  -SC 3  -MK     Climbing 3-5 steps with a railing? 2  -SC 3  -MK    To walk in hospital room? 3  -SC 3  -MK    AM-PAC 6 Clicks Score (PT) 17  -SC 18  -MK    Highest Level of Mobility Goal 5 --> Static standing  -SC 6 --> Walk 10 steps or more  -      Row Name 01/02/24 0409          How much help from another person do you currently need...    Turning from your back to your side while in flat bed without using bedrails? 4  -SK     Moving from lying on back to sitting on the side of a flat bed without bedrails? 4  -SK     Moving to and from a bed to a chair (including a wheelchair)? 4  -SK     Standing up from a chair using your arms (e.g., wheelchair, bedside chair)? 4  -SK     Climbing 3-5 steps with a railing? 3  -SK     To walk in hospital room? 3  -SK     AM-PAC 6 Clicks Score (PT) 22  -SK     Highest Level of Mobility Goal 7 --> Walk 25 feet or more  -SK       Row Name 01/02/24 1328 01/02/24 1202       Functional Assessment    Outcome Measure Options AM-PAC 6 Clicks Daily Activity (OT)  -AR AM-PAC 6 Clicks Basic Mobility (PT)  -SC              User Key  (r) = Recorded By, (t) = Taken By, (c) = Cosigned By      Initials Name Provider Type    SC Isaías Kumar, PT Physical Therapist    Marina Reed, OT Occupational Therapist    Marjan Camilo, RN Registered Nurse    Onelia Seymour RN Registered Nurse                    Occupational Therapy Education       Title: PT OT SLP Therapies (Done)       Topic: Occupational Therapy (Done)       Point: ADL training (Done)       Description:   Instruct learner(s) on proper safety adaptation and remediation techniques during self care or transfers.   Instruct in proper use of assistive devices.                  Learning Progress Summary             Patient Eager, E,TB,D,H, VU by AR at 1/2/2024 1329                         Point: Home exercise program (Done)       Description:   Instruct learner(s) on appropriate technique for monitoring, assisting and/or  progressing therapeutic exercises/activities.                  Learning Progress Summary             Patient OSVALDO Lane,TB,D,H, VU by AR at 1/2/2024 1329                         Point: Precautions (Done)       Description:   Instruct learner(s) on prescribed precautions during self-care and functional transfers.                  Learning Progress Summary             Patient OSVALDO Lane,TB,D,H, VU by AR at 1/2/2024 1329                         Point: Body mechanics (Done)       Description:   Instruct learner(s) on proper positioning and spine alignment during self-care, functional mobility activities and/or exercises.                  Learning Progress Summary             Patient OSVALDO Lane,TB,D,H, VU by AR at 1/2/2024 1329                                         User Key       Initials Effective Dates Name Provider Type Discipline    AR 07/11/23 -  Marina Aguayo, CELESTINE Occupational Therapist OT                  OT Recommendation and Plan  Planned Therapy Interventions (OT): activity tolerance training, adaptive equipment training, BADL retraining, IADL retraining, occupation/activity based interventions, patient/caregiver education/training, strengthening exercise, transfer/mobility retraining, ROM/therapeutic exercise  Therapy Frequency (OT): daily  Plan of Care Review  Plan of Care Reviewed With: patient  Outcome Evaluation: Pt completed bed mobility with min assist, LB dressing with min assist and toileting with CGA using RW. Pt limited wiht generalized weakness, decreased strength, decreased occupational endurance and chronic LBP radiating into RLE. Issued AE and educated on use. Recommend DC home with HHOT and RW.     Time Calculation:   Evaluation Complexity (OT)  Review Occupational Profile/Medical/Therapy History Complexity: brief/low complexity  Assessment, Occupational Performance/Identification of Deficit Complexity: 1-3 performance deficits  Clinical Decision Making Complexity (OT): problem focused  assessment/low complexity  Overall Complexity of Evaluation (OT): low complexity     Time Calculation- OT       Row Name 01/02/24 1330             Time Calculation- OT    OT Start Time 1112  -AR      OT Received On 01/02/24  -AR      OT Goal Re-Cert Due Date 01/12/24  -AR         Timed Charges    57299 - OT Self Care/Mgmt Minutes 11  -AR         Untimed Charges    OT Eval/Re-eval Minutes 59  -AR         Total Minutes    Timed Charges Total Minutes 11  -AR      Untimed Charges Total Minutes 59  -AR       Total Minutes 70  -AR                User Key  (r) = Recorded By, (t) = Taken By, (c) = Cosigned By      Initials Name Provider Type    AR Marina Aguayo OT Occupational Therapist                  Therapy Charges for Today       Code Description Service Date Service Provider Modifiers Qty    79360757113 HC OT SELF CARE/MGMT/TRAIN EA 15 MIN 1/2/2024 Marina Aguayo OT GO 1    75012324161 HC OT EVAL LOW COMPLEXITY 4 1/2/2024 Marina Aguayo OT GO 1                 Marina Aguayo OT  1/2/2024

## 2024-01-02 NOTE — PROGRESS NOTES
[facility] Medicine Services  PROGRESS NOTE    Patient Name: Di Lopez  : 1960  MRN: [id number]    Date of Admission: 2024  Primary Care Physician: Davina Santiago DO    Subjective   Subjective     CC:  Fatigue, malaise    HPI:  Evaluated patient this morning. Patient reports that she has had no energy since her hospital admission in November. Has not been eating recently, has been nauseous at home. Ports that about 3 days ago she developed diarrhea. Also had a headache with muscle aches for several days. Around 5 AM yesterday, developed hematochezia. Recently was started on an antibiotic and muscle relaxer by her PCP. Currently feels well.      Objective   Objective     Vital Signs:   Temp:  [97.6 °F (36.4 °C)-98.5 °F (36.9 °C)] 98 °F (36.7 °C)  Heart Rate:  [70-82] 74  Resp:  [16-18] 18  BP: (101-124)/(41-54) 109/51  Flow (L/min):  [2] 2     Physical Exam:  Constitutional: Wake, alert, resting comfortably  HENT: NCAT, mucous membranes moist  Respiratory: Clear to auscultation bilaterally, respiratory effort normal   Cardiovascular: RRR, mechanical heart valve sound present  Gastrointestinal: Positive bowel sounds, soft, nontender, nondistended  Musculoskeletal: No bilateral ankle edema  Neurologic: Oriented x 3, no focal deficits, speech clear  Skin: No rashes      Results Reviewed:  LAB RESULTS:      Lab 24  1208 24  0808 24  0531 24  2330 23  1727   WBC  --   --  4.26 4.42  --    HEMOGLOBIN  --   --  8.5* 9.5*  --    HEMOGLOBIN, POC  --   --   --   --  11.9*   HEMATOCRIT  --   --  26.0* 29.3*  --    PLATELETS  --   --  228 250  --    NEUTROS ABS  --   --  3.02 3.39  --    IMMATURE GRANS (ABS)  --   --  0.05 0.06*  --    LYMPHS ABS  --   --  0.92 0.77  --    MONOS ABS  --   --  0.21 0.17  --    EOS ABS  --   --  0.05 0.02  --    MCV  --   --  85.8 84.2  --    LACTATE  --   --   --  1.5  --    PROTIME  --   --   --  26.0*  --    APTT  --    --  66.2  --   --    HEPARIN ANTI-XA 0.15* 0.14* 0.10*  --   --          Lab 01/02/24  1642 01/02/24  1144 01/02/24  0808 01/02/24  0331 01/01/24  2330   SODIUM 128* 127* 129* 127* 126*   POTASSIUM  --   --  5.3* 4.8 5.6*   CHLORIDE  --   --  95* 91* 87*   CO2  --   --  19.0* 18.0* 20.0*   ANION GAP  --   --  15.0 18.0* 19.0*   BUN  --   --  61* 63* 64*   CREATININE  --   --  7.49* 8.19* 8.70*   EGFR  --   --  5.7* 5.1* 4.8*   GLUCOSE  --   --  152* 255* 224*   CALCIUM  --   --  7.6* 7.6* 8.4*   MAGNESIUM  --   --  2.3  --   --    PHOSPHORUS  --   --  7.3*  --   --    TSH  --   --  1.590  --   --          Lab 01/02/24  0808 01/01/24  2330   TOTAL PROTEIN 6.1 7.1   ALBUMIN 3.4* 4.1   GLOBULIN 2.7 3.0   ALT (SGPT) 41* 45*   AST (SGOT) 37* 34*   BILIRUBIN 0.3 0.3   ALK PHOS 222* 247*   LIPASE  --  82*         Lab 01/01/24  2330   PROTIME 26.0*   INR 2.36*             Lab 01/01/24  2358   ABO TYPING O   RH TYPING Positive   ANTIBODY SCREEN Negative         Lab 01/02/24  0047   FIO2 21   CARBOXYHEMOGLOBIN (VENOUS) 1.3     Brief Urine Lab Results  (Last result in the past 365 days)        Color   Clarity   Blood   Leuk Est   Nitrite   Protein   CREAT   Urine HCG        01/02/24 0011 Yellow   Cloudy   Small (1+)   Negative   Negative   100 mg/dL (2+)                   Microbiology Results Abnormal       Procedure Component Value - Date/Time    COVID PRE-OP / PRE-PROCEDURE SCREENING ORDER (NO ISOLATION) - Swab, Nasopharynx [[id number]]  (Normal) Collected: 01/01/24 2330    Lab Status: Final result Specimen: Swab from Nasopharynx Updated: 01/02/24 0016    Narrative:      The following orders were created for panel order COVID PRE-OP / PRE-PROCEDURE SCREENING ORDER (NO ISOLATION) - Swab, Nasopharynx.  Procedure                               Abnormality         Status                     ---------                               -----------         ------                     COVID-19, FLU A/B, RSV P...[[id number]]  Normal               Final result                 Please view results for these tests on the individual orders.    COVID-19, FLU A/B, RSV PCR 1 HR TAT - Swab, Nasopharynx [[id number]]  (Normal) Collected: 01/01/24 2330    Lab Status: Final result Specimen: Swab from Nasopharynx Updated: 01/02/24 0016     COVID19 Not Detected     Influenza A PCR Not Detected     Influenza B PCR Not Detected     RSV, PCR Not Detected    Narrative:      Fact sheet for providers: https://www.fda.gov/media/670471/download    Fact sheet for patients: https://www.fda.gov/media/788096/download    Test performed by PCR.            CT Soft Tissue Neck Without Contrast    Result Date: 1/2/2024  CT SOFT TISSUE NECK WO CONTRAST Date of Exam: 1/2/2024 2:16 AM EST Indication: Neck mass, nonpulsatile. Comparison: Cervical spine CT 6/21/2023. Technique: Axial CT images were obtained of the neck without contrast administration.  Reconstructed coronal and sagittal images were also obtained. Automated exposure control and iterative construction methods were used. Findings: Limited view of the intracranial contents is unremarkable. The orbits appear within normal limits. Paranasal sinuses and mastoids appear clear. There is streak artifact from dental metallic restoration hardware. The parotid and salivary glands appear symmetric. The thyroid is homogeneous. There is mild carotid bifurcation calcification. There are moderate aortic and aortic branch vessel atherosclerotic calcifications. There are calcified granulomas in the superior mediastinum. There is mild emphysema. There is a left-sided ICD in place. There is an enlarged and inflamed lymph node in the right supraclavicular fossa measuring up to 10 x 15 mm on axial image 43. There are adjacent prominent but nonenlarged lymph nodes. There are is an enlarged left-sided level II lymph node with rounded morphology measuring 13 mm on image 30. There is a prominent left level III lymph node on image 44 measuring 13 mm in  the short axis. The parapharyngeal spaces appear clear. There is no pharyngeal mucosal mass. The tongue, base of tongue, floor of mouth,  epiglottis, aryepiglottic folds, false vocal cords, vocal ligaments and subglottic airway appear within normal limits. There is a lipoma at the posterior upper back deep to the latissimus dorsi. This measures up to 64 x 15 mm on axial image 62. There are no acute osseous abnormalities or destructive bone lesions. There are minimal cervical degenerative changes.     Impression: Impression: 1.There is an enlarged and inflamed lymph node in the right supraclavicular fossa with adjacent prominent but nonenlarged lymph nodes. There are also enlarged left-sided level II and III lymph nodes. These appear inflammatory, but could be related to infection or malignancy and clinical follow-up is recommended. 2.Mild emphysema. 3.Atherosclerotic disease and mild cervical degenerative changes. Electronically Signed: Delfino Grimes MD  1/2/2024 2:35 AM EST  Workstation ID: [id number]    CT Abdomen Pelvis Without Contrast    Result Date: 1/2/2024  CT ABDOMEN PELVIS WO CONTRAST Date of Exam: 1/2/2024 12:17 AM EST Indication: Gastrointestinal bleeding,  lower abdominal pain, acute renal failure on labs. Comparison: 11/9/2023. Technique: Axial CT images were obtained of the abdomen and pelvis without the administration of contrast. Reconstructed coronal and sagittal images were also obtained. Automated exposure control and iterative construction methods were used. Findings: There is a calcified left basilar granuloma. There is a stable 4 mm right middle lobe nodule on image 2. This is unchanged since at least 2022. There is a stable 4 mm nodule in the right lower lobe on image 7. There is mild dependent bibasilar atelectasis. There is a rounded nodule in the far lateral left breast on image 1 measuring 12 mm. The patient appears up-to-date on mammogram screening and this is likely a partially  visualized lymph node. There are no acute findings in the superficial soft tissues. There is a small fat-containing periumbilical hernia. There are no acute osseous abnormalities or destructive bone lesions. There is mild thoracolumbar disc degeneration and moderate lower lumbar facet arthritis. There is a stable lobulated mass of the left hepatic lobe measuring up to 8.4 cm with evidence of previous embolization. Patient has a history of a liver adenoma. The inferior right liver appears lobulated raising concern for cirrhosis. The gallbladder is contracted. The bile ducts are nondistended. The pancreas, stomach and spleen appear stable. There is a small hypodensity in the spleen measuring 12 mm, possibly cyst, hemangioma or noncalcified granuloma. Previous contrast-enhanced study demonstrated  noncalcified granulomas in the spleen. There is thickening of the left adrenal gland, likely a result of hyperplasia or small adenomas. There is a small simple right kidney cyst. There is no urolithiasis or hydronephrosis. The urinary bladder is within normal limits. Patient is status post hysterectomy. The ovaries are not seen. The appendix is normal. There is distal colonic diverticulosis. No small bowel distention. There is mild aortoiliac atherosclerosis. No ascites, pneumoperitoneum or lymphadenopathy.     Impression: Impression: 1.No acute abdominal or pelvic abnormality. 2.Colonic diverticulosis without evidence of diverticulitis. 3.Stable lobulated mass in the left hepatic lobe with evidence of previous embolization. Patient has a history of a liver adenoma. 4.Stable 4 mm nodules in the right lung base. These are unchanged since at least 2022. 5.12 mm rounded nodule in the far lateral left breast. The patient appears up-to-date on mammogram screening and this is likely a partially visualized lymph node. Electronically Signed: Delfino Grimes MD  1/2/2024 12:32 AM EST  Workstation ID: [id number]     Results for orders  placed during the hospital encounter of 05/31/23    Adult Transthoracic Echo Complete W/ Cont if Necessary Per Protocol    Interpretation Summary    Mildly reduced right ventricular systolic function noted.    There is calcification of the aortic valve.    Estimated right ventricular systolic pressure from tricuspid regurgitation is normal (<35 mmHg).    Mild global LV systolic dysfunction    EF=45-50%    Normal prosthetic MV function    AV calcification      Current medications:  Scheduled Meds:escitalopram, 20 mg, Oral, Daily  ferrous sulfate, 325 mg, Oral, Daily With Breakfast  folic acid, 800 mcg, Oral, Daily  insulin lispro, 2-7 Units, Subcutaneous, 4x Daily AC & at Bedtime  oxybutynin XL, 5 mg, Oral, Daily  pantoprazole, 40 mg, Oral, Q AM  senna-docusate sodium, 2 tablet, Oral, BID  sodium chloride, 10 mL, Intravenous, Q12H      Continuous Infusions:heparin, 13 Units/kg/hr, Last Rate: 13 Units/kg/hr (01/02/24 1321)  Pharmacy to Dose Heparin,   sodium chloride, 100 mL/hr, Last Rate: 75 mL/hr (01/02/24 0946)      PRN Meds:.  acetaminophen    albuterol    senna-docusate sodium **AND** polyethylene glycol **AND** bisacodyl **AND** bisacodyl    dextrose    dextrose    glucagon (human recombinant)    naloxone    nitroglycerin    oxyCODONE    Pharmacy to Dose Heparin    sodium chloride    sodium chloride    Assessment & Plan   Assessment & Plan     Active Hospital Problems    Diagnosis  POA    **Renal failure [N19]  Yes    Hyponatremia [E87.1]  Yes    Hyperkalemia [E87.5]  Yes    Type 2 diabetes mellitus with hyperglycemia [E11.65]  Yes    Elevated LFTs [R79.89]  Yes    Anemia, chronic disease [D63.8]  Yes    Cervical lymphadenopathy [R59.0]  Yes    GI bleed [K92.2]  Yes    H/O heart valve replacement with mechanical valve [Z95.2]  Not Applicable    ARF (acute renal failure) [N17.9]  Yes    Left bundle branch hemiblock [I44.60]  Yes    Type 2 diabetes mellitus without complication, with long-term current use of  insulin [E11.9, Z79.4]  Not Applicable    Dyslipidemia [E78.5]  Yes    Emphysema/COPD [J43.9]  Yes    Hypertension [I10]  Yes    Arteriovenous malformation of gastrointestinal tract [K55.20]  Yes    Obstructive sleep apnea syndrome [G47.33]  Yes    Chronic anticoagulation [Z79.01]  Not Applicable      Resolved Hospital Problems   No resolved problems to display.        Brief Hospital Course to date:  Di Lopez is a 63 y.o. female with PMHx of mechanical mitral valve on coumadin, AVM of GI tract, chronic HFmrEF with EF 45-50% 5/2023, COPD, DM, HLD, CAD, HTN, LBBB, liver mass s/p embolization who presented with c/o bright red blood per rectum, 10 lb weight loss, headache, swollen lymph nodes left neck, chills, diarrhea preceding hematochezia. Found to have severe MEERA with hyponatremia.    This patient's problems and plans were partially entered by my partner and updated as appropriate by me 01/02/24.    Acute renal failure  Metabolic acidosis  -Cr of 8.7 on admission with last Cr 1.25 less than one month ago  -pt has had diarrhea and poor po intake, recent cefdinir use for headache, swollen lymph nodes, chills, diarrhea  -?if this is an infectious diarrhea with HUS type picture so sending stool studies  -UA with protein and small blood.  -Nephrology evaluated, recommended to continue IV fluids for now. Ordered urine studies. Holding Entresto. Patient remains high risk for needing dialysis.     Hyperkalemia  -secondary to MEERA  -EKG stable  -hyperkalemia order set initiated in ER  -Monitor daily CMP.     Hyponatremia  -likely related to above but unclear, Na 126 on admission   -TSH normal, 8AM cortisol 10.55  -Nephrology following. Monitor Na q4h while on fluids.      Cervical LAD  -soft tissue neck CT with bilateral cervical lymphadenopathy; checked HIV given hx of esophageal candidiasis and abnormal presentation  -HIV normal.  -Will need interval follow-up imaging.     Hematochezia  Normocytic anemia  -h/h  stable on admission   -pt with same complaint last admit w/ scope revealing candida esophagitis and diverticulosis  -Holding coumadin, continue heparin infusion.  -Gastroenterology consulted. Recent bidirectional endoscopy was unremarkable. Bleeding was suspected to be secondary to diverticular source at that time. Recommended to obtain GI panel PCR. And consider infectious disease consultation.     Elevated transaminases  -mildly elevated on admission of unclear etiology  -Hepatitis panel negative.  -Monitor daily CMP. Check Tylenol level.    Incidental CT findings  -Stable lobulated mass in the left hepatic lobe with evidence of previous embolization. Patient has a history of a liver adenoma.  -Stable 4 mm nodules in the right lung base. These are unchanged since at least 2022.  -12 mm rounded nodule in the far lateral left breast - likely a partially visualized lymph node. Mammogram appears to be up to date.      Mechanical MV on chronic coumadin  -holding coumadin, continue heparin infusion.      IDDM2  -Continue SSI.    Anxiety/depression  -Continue Lexapro.    Chronic back pain  -Continue Tylenol while inpatient, PRN oxycodone as needed for moderate/severe pain.    Expected Discharge Location and Transportation: TBD pending evaluation  Expected Discharge   Expected discharge date/ time has not been documented.     DVT prophylaxis:  Medical DVT prophylaxis orders are present.     AM-PAC 6 Clicks Score (PT): 17 (01/02/24 1202)    CODE STATUS:   Code Status and Medical Interventions:   Ordered at: 01/02/24 0319     Code Status (Patient has no pulse and is not breathing):    CPR (Attempt to Resuscitate)     Medical Interventions (Patient has pulse or is breathing):    Full Support       Dariana Bridges, DO  01/02/24

## 2024-01-02 NOTE — H&P
[facility]  HISTORY AND PHYSICAL    Patient Name: Di Lopez  : 1960  MRN: [id number]  Primary Care Physician: Davina Santiago DO  Date of admission: 2024      Subjective   Subjective     Chief Complaint:   malaise    HPI:  Di Lopez is a 63 y.o. female with hx of mechanical prosthetic mitral valve on coumadin, AVM of GI tract, CHF w/ EF 45-50% 2023, COPD, DM, HLD, CAD, HTN, LBBB, liver mass s/p embolization who presents now after c/o bright red blood per rectum, 10 lb weight loss, headache, swollen lymph nodes left neck, chills, diarrhea preceding hematochezia, no vomiting.  Notes sx began 2023 and have continued.  Pt was last hospitalized in 2023 with GI bleed felt to be diverticular requiring 1 unit prbc.  Upper and lower endoscopy revealed Candida esophagitis and diverticulosis.  Pt was evaluated by nephrology for cr 1.9 and recommended prn lasix for volume overload.  Pt also had UTI and was treated w/ rocephin.  Pt states after d/c home she really did not feel better for about 2 wks then had short period of improvement until recently.  Pt was seen by pcp for her lymphadenopathy and was placed on cefdinir and methocarbamol.        Personal History     Past Medical History:   Diagnosis Date    Allergic     Anemia     Anticoagulated     WARFARIN    Arteriovenous malformation of gastrointestinal tract 2016    Arthritis     Asthma     Black hairy tongue     CHF (congestive heart failure)     Chronic back pain     Coronary artery disease     Depression 2016    Diabetes mellitus     Dyslipidemia 2016    Elevated cholesterol     Emphysema/COPD 2016    Emphysema/COPD 2016    Fatigue     GERD (gastroesophageal reflux disease)     Headache     Herniated cervical disc     History of echocardiogram 2013    Left ventricular systolic function at the lower limits of normal; EF 50-55%; mild MR; mechanical  prosthetic mitral valve present; mod TR    History of transfusion     NO REACTION; [facility]    Hyperlipidemia     Hypertension     Insomnia 05/12/2016    Iron deficiency     A.  The patient was treated with IV iron replacement. B.  Secondary to GI bleed, status post EGD with cauterization of blood vessel to the duodenum, 7/16/2014.    Left bundle branch hemiblock 10/12/2020    Left shoulder strain     Liver disease     Liver mass     Long term current use of anticoagulant 05/12/2016    Low back pain     Lung nodules     PER DR. LEVI'S NOTE 10/22    Morbid obesity 05/12/2016    Neuromuscular disorder 7/28/2016    Obstructive sleep apnea syndrome 05/12/2016    Description: A.  Moderate; CPAP therapy..    Sliding hiatal hernia 07/28/2016    Description: A.  Reported on EGD, 12/30/2011.    Type 2 diabetes mellitus 05/12/2016    Urinary tract infection     Uterine cancer     HYSTERECTOMY    Vitamin B12 deficiency     A.  The patient was treated with vitamin B12 replacement.    Wears eyeglasses              Past Surgical History:   Procedure Laterality Date    BILATERAL OOPHORECTOMY  2009    Status post bilateral oophorectomy secondary to ovarian cysts     BREAST BIOPSY Right     STEREOTACTIC    BREAST EXCISIONAL BIOPSY Bilateral     CARDIAC ELECTROPHYSIOLOGY PROCEDURE N/A 10/15/2020    Procedure: Biventricular Device Insertion;  Surgeon: Nadir Everett MD;  Location:  [facility];  Service: Cardiology;  Laterality: N/A;    CARDIAC SURGERY  2003    CARPAL TUNNEL RELEASE Bilateral     COLONOSCOPY  12/28/2012    COLONOSCOPY N/A 5/23/2023    Procedure: COLONOSCOPY;  Surgeon: Gt Fernandes MD;  Location:  [facility];  Service: Gastroenterology;  Laterality: N/A;    COLONOSCOPY N/A 11/16/2023    Procedure: COLONOSCOPY;  Surgeon: Brunner, Mark I, MD;  Location:  [facility];  Service: Gastroenterology;  Laterality: N/A;    CYSTECTOMY      removal of ganglion cyst from left wrist    ENDOSCOPY  " 12/30/2011    DIAGNOSTIC ESOPHAGOGASTRODUODENOSCOPY    ENDOSCOPY N/A 11/16/2023    Procedure: ESOPHAGOGASTRODUODENOSCOPY;  Surgeon: Brunner, Mark I, MD;  Location: [facility];  Service: Gastroenterology;  Laterality: N/A;    HYSTERECTOMY  1995 A.  Status post hysterectomy for early stage endometrial cancer done in 1995.    MITRAL VALVE REPLACEMENT  03/03/2008    Dr. Hima Barreto; MECHANICAL    OTHER SURGICAL HISTORY      LIVER MASS BLOOD SUPPLY CLIPPED ON RIGHT AND LEFT SIDE PER PT       Family History: family history includes Cancer in her father and paternal grandmother; Diabetes in her brother; Hypertension in her brother, father, and mother; Multiple myeloma in her father; Ovarian cancer in her paternal grandmother; Sudden death in her mother.     Social History:  reports that she quit smoking about 13 years ago. Her smoking use included cigarettes and electronic cigarette. She started smoking about 47 years ago. She has a 30.00 pack-year smoking history. She has never used smokeless tobacco. She reports that she does not drink alcohol and does not use drugs.  Social History     Social History Narrative    Not on file       Medications:  Available home medication information reviewed.  (Not in a hospital admission)      Allergies   Allergen Reactions    Adhesive Tape Other (See Comments)     \"pulls skin off\"    Sildenafil Shortness Of Breath and Other (See Comments)     pt c/o tightness in chest and trouble breathing    Codeine Itching and Nausea And Vomiting    Morphine Other (See Comments)     uncontrolable crying     Penicillins Hives    Statins Other (See Comments)     HX of Liver Issues    Sulfa Antibiotics Nausea And Vomiting       Objective   Objective     Vital Signs:   Temp:  [97.7 °F (36.5 °C)] 97.7 °F (36.5 °C)  Heart Rate:  [70-77] 77  Resp:  [16] 16  BP: (101-115)/(41-53) 101/44  Flow (L/min):  [2] 2       Physical Exam    Gen; alert, oriented, nad  Heent; perrla, eomi, prominent left " submandibular tender lymphadenopathy  Cv; rrr 5/6 samir w/ click  L; cta w/ dec bs's bll  Abd; soft, mild epigastric ttp, no r/g  Ext; no cce, palpable pulses  Skin; cdi, warm  Neuro; grossly intact  Psych; mood and affect appropriate    Result Review:  I have personally reviewed the results from the time of this admission to 1/2/2024 02:25 EST and agree with these findings:  [x]  Laboratory list / accordion  [x]  Microbiology  [x]  Radiology  [x]  EKG/Telemetry   []  Cardiology/Vascular   []  Pathology  []  Old records  []  Other:  Most notable findings include:          LAB RESULTS:      Lab 01/01/24  2330 12/27/23  1727   WBC 4.42  --    HEMOGLOBIN 9.5*  --    HEMOGLOBIN, POC  --  11.9*   HEMATOCRIT 29.3*  --    PLATELETS 250  --    NEUTROS ABS 3.39  --    IMMATURE GRANS (ABS) 0.06*  --    LYMPHS ABS 0.77  --    MONOS ABS 0.17  --    EOS ABS 0.02  --    MCV 84.2  --    LACTATE 1.5  --    PROTIME 26.0*  --    INR 2.36*  --          Lab 01/01/24  2330   SODIUM 126*   POTASSIUM 5.6*   CHLORIDE 87*   CO2 20.0*   ANION GAP 19.0*   BUN 64*   CREATININE 8.70*   EGFR 4.8*   GLUCOSE 224*   CALCIUM 8.4*         Lab 01/01/24  2330   TOTAL PROTEIN 7.1   ALBUMIN 4.1   GLOBULIN 3.0   ALT (SGPT) 45*   AST (SGOT) 34*   BILIRUBIN 0.3   ALK PHOS 247*   LIPASE 82*                 Lab 01/01/24  2358   ABO TYPING O   RH TYPING Positive   ANTIBODY SCREEN Negative         Lab 01/02/24  0047   FIO2 21   CARBOXYHEMOGLOBIN (VENOUS) 1.3     UA          8/24/2023    15:12 11/9/2023    23:20 1/2/2024    00:11   Urinalysis   Squamous Epithelial Cells, UA  0-2  13-20    Specific Gravity, UA  1.019  1.016    Ketones, UA Negative  Negative  Negative    Blood, UA  Negative  Small (1+)    Leukocytes, UA Negative  Small (1+)  Negative    Nitrite, UA  Positive  Negative    RBC, UA  0-2  0-2    WBC, UA  21-50  3-5    Bacteria, UA  4+  Trace        Microbiology Results (last 10 days)       Procedure Component Value - Date/Time    COVID PRE-OP /  PRE-PROCEDURE SCREENING ORDER (NO ISOLATION) - Swab, Nasopharynx [[id number]]  (Normal) Collected: 01/01/24 2330    Lab Status: Final result Specimen: Swab from Nasopharynx Updated: 01/02/24 0016    Narrative:      The following orders were created for panel order COVID PRE-OP / PRE-PROCEDURE SCREENING ORDER (NO ISOLATION) - Swab, Nasopharynx.  Procedure                               Abnormality         Status                     ---------                               -----------         ------                     COVID-19, FLU A/B, RSV P...[[id number]]  Normal              Final result                 Please view results for these tests on the individual orders.    COVID-19, FLU A/B, RSV PCR 1 HR TAT - Swab, Nasopharynx [[id number]]  (Normal) Collected: 01/01/24 2330    Lab Status: Final result Specimen: Swab from Nasopharynx Updated: 01/02/24 0016     COVID19 Not Detected     Influenza A PCR Not Detected     Influenza B PCR Not Detected     RSV, PCR Not Detected    Narrative:      Fact sheet for providers: https://www.fda.gov/media/635598/download    Fact sheet for patients: https://www.fda.gov/media/529458/download    Test performed by PCR.            CT Abdomen Pelvis Without Contrast    Result Date: 1/2/2024  CT ABDOMEN PELVIS WO CONTRAST Date of Exam: 1/2/2024 12:17 AM EST Indication: Gastrointestinal bleeding,  lower abdominal pain, acute renal failure on labs. Comparison: 11/9/2023. Technique: Axial CT images were obtained of the abdomen and pelvis without the administration of contrast. Reconstructed coronal and sagittal images were also obtained. Automated exposure control and iterative construction methods were used. Findings: There is a calcified left basilar granuloma. There is a stable 4 mm right middle lobe nodule on image 2. This is unchanged since at least 2022. There is a stable 4 mm nodule in the right lower lobe on image 7. There is mild dependent bibasilar atelectasis. There is a rounded nodule in  the far lateral left breast on image 1 measuring 12 mm. The patient appears up-to-date on mammogram screening and this is likely a partially visualized lymph node. There are no acute findings in the superficial soft tissues. There is a small fat-containing periumbilical hernia. There are no acute osseous abnormalities or destructive bone lesions. There is mild thoracolumbar disc degeneration and moderate lower lumbar facet arthritis. There is a stable lobulated mass of the left hepatic lobe measuring up to 8.4 cm with evidence of previous embolization. Patient has a history of a liver adenoma. The inferior right liver appears lobulated raising concern for cirrhosis. The gallbladder is contracted. The bile ducts are nondistended. The pancreas, stomach and spleen appear stable. There is a small hypodensity in the spleen measuring 12 mm, possibly cyst, hemangioma or noncalcified granuloma. Previous contrast-enhanced study demonstrated  noncalcified granulomas in the spleen. There is thickening of the left adrenal gland, likely a result of hyperplasia or small adenomas. There is a small simple right kidney cyst. There is no urolithiasis or hydronephrosis. The urinary bladder is within normal limits. Patient is status post hysterectomy. The ovaries are not seen. The appendix is normal. There is distal colonic diverticulosis. No small bowel distention. There is mild aortoiliac atherosclerosis. No ascites, pneumoperitoneum or lymphadenopathy.     Impression: Impression: 1.No acute abdominal or pelvic abnormality. 2.Colonic diverticulosis without evidence of diverticulitis. 3.Stable lobulated mass in the left hepatic lobe with evidence of previous embolization. Patient has a history of a liver adenoma. 4.Stable 4 mm nodules in the right lung base. These are unchanged since at least 2022. 5.12 mm rounded nodule in the far lateral left breast. The patient appears up-to-date on mammogram screening and this is likely a  partially visualized lymph node. Electronically Signed: Delfino Grimes MD  1/2/2024 12:32 AM EST  Workstation ID: [id number]     Results for orders placed during the hospital encounter of 05/31/23    Adult Transthoracic Echo Complete W/ Cont if Necessary Per Protocol    Interpretation Summary    Mildly reduced right ventricular systolic function noted.    There is calcification of the aortic valve.    Estimated right ventricular systolic pressure from tricuspid regurgitation is normal (<35 mmHg).    Mild global LV systolic dysfunction    EF=45-50%    Normal prosthetic MV function    AV calcification      Assessment & Plan   Assessment & Plan     Active Hospital Problems    Diagnosis  POA    Hyponatremia [E87.1]  Yes    Hyperkalemia [E87.5]  Yes    Type 2 diabetes mellitus with hyperglycemia [E11.65]  Yes    Elevated LFTs [R79.89]  Yes    Anemia, chronic disease [D63.8]  Yes    Cervical lymphadenopathy [R59.0]  Yes    GI bleed [K92.2]  Yes    H/O heart valve replacement with mechanical valve [Z95.2]  Not Applicable    ARF (acute renal failure) [N17.9]  Yes    Left bundle branch hemiblock [I44.60]  Yes     Added automatically from request for surgery [id number]      Type 2 diabetes mellitus without complication, with long-term current use of insulin [E11.9, Z79.4]  Not Applicable    Dyslipidemia [E78.5]  Yes    Emphysema/COPD [J43.9]  Yes     No obstruction on her PFTs      Hypertension [I10]  Yes    Arteriovenous malformation of gastrointestinal tract [K55.20]  Yes    Obstructive sleep apnea syndrome [G47.33]  Yes     BiPAP with 2 L at night      Chronic anticoagulation [Z79.01]  Not Applicable     Valvular heart replacement          62 y/o female with hx of mechanical prosthetic mitral valve on coumadin, AVM of GI tract, CHF w/ EF 45-50% 5/2023, COPD, DM, HLD, CAD, HTN, LBBB, liver mass s/p embolization here now w/  ARF  -pt w/ Cr of 8.7 w/ last Cr being 1.25 less than one month ago  -pt has had diarrhea and poor po  intake  -also has had cervical lymphadenopathy treated w/ cefdinir w/ no improvement in sx  -has not been taking prn diuretic   -?if this is an infectious diarrhea w/ HUS type picture so sending stool studies  -nephrology consult  -hold offending meds    2. Hyperkalemia  -EKG stable  -hyperkalemia order set initiated in ER    3. Hyponatremia  -presume related to above but will check tsh and cortisol to r/o adrenal insufficiency    4. Cervical LAD  -soft tissue neck CT with bilateral cervical lymphadenopathy; will check HIV given hx of esophageal candidiasis and abnormal presentation    5. GI bleed  -h/h stable  -pt w/ same on last admit w/ scope revealing candida esophagitis and diverticulosis  -hold coumadin and place on hep gtt  -GI consult    6. Elevated lft's  -mildly elevated but will check hep panel     7. Mechanical MV on chronic coumadin  -hold coumadin and place on hep gtt    8. IDDM  -SSI       DVT prophylaxis:   hep gtt      CODE STATUS:   full   There are no questions and answers to display.       Expected Discharge  tbd  Expected discharge date/ time has not been documented.     Ariella Phan MD  01/02/24  Electronically signed by Ariella Phan MD, 01/02/24, 3:00 AM EST.

## 2024-01-02 NOTE — PROGRESS NOTES
HEPARIN INFUSION  Di Lopez is a  63 y.o. female receiving heparin infusion.     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   Patient Weight:  89.9 kg  Initial Bolus (Y/N):  No  Any Bolus (Y/N):  Yes       Signs or Symptoms of Bleeding:     Cardiac or Other (Not VTE)   Initial Bolus: 60 units/kg (Max 4,000 units)  Initial rate: 12 units/kg/hr (Max 1,000 units/hr)   Anti Xa Rebolus Infusion Hold time Change infusion Dose (Units/kg/hr) Next Anti Xa or aPTT Level Due   < 0.11 50 Units/kg  (4000 Units Max) None Increase by  3 Units/kg/hr 6 hours   0.11- 0.19 25 Units/kg  (2000 Units Max) None Increase by  2 Units/kg/hr 6 hours   0.2 - 0.29 0 None Increase by  1 Units/kg/hr 6 hours   0.3 - 0.5 0 None No Change 6 hours (after 2 consecutive levels in range check qAM)   0.51 - 0.6 0 None Decrease by  1 Units/kg/hr 6 hours   0.61 - 0.8 0 30 Minutes Decrease by  2 Units/kg/hr 6 hours   0.81 - 1 0 60 Minutes Decrease by  3 Units/kg/hr 6 hours   >1 0 Hold  After Anti Xa less than 0.5 decrease previous rate by  4 Units/kg/hr  Every 2 hours until Anti Xa  less than 0.5 then when infusion restarts in 6 hours       Results from last 7 days   Lab Units 01/02/24  0531 01/01/24  2330 12/27/23  1727   INR   --  2.36*  --    HEMOGLOBIN g/dL 8.5* 9.5*  --    HEMOGLOBIN, POC g/dL  --   --  11.9*   HEMATOCRIT % 26.0* 29.3*  --    PLATELETS 10*3/mm3 228 250  --           Date   Time   Anti-Xa Current Rate (Unit/kg/hr) Bolus   (Units) Rate Change   (Unit/kg/hr) New Rate (Unit/kg/hr) Next   Anti-Xa Comments  Pump Check Daily   1/2 0531 0.1 New Start No Bolus -- 11 1100 Discussed w/ nurse  Warfarin patient    1/2 1140 Pump check 11 -- -- 11 now Pump weight and rate verified    1/2 1208 0.15 11 2000 +2 13 2000 D/w Marjan                                                                                                                                                                                                            Annabelle Fernandez, PharmD  1/2/2024  12:59 EST

## 2024-01-02 NOTE — PAYOR COMM NOTE
"Janine Malone RN  Utilization Management  P:[phone]  F:[phone]    Ref #[id number]     Neha Monterroso \"Shiloh\" (63 y.o. Female)       Date of Birth   1960    Social Security Number       Address   [address] [place] [address]    Home Phone   [phone]    MRN   [id number]       Restorationism   Presybeterian    Marital Status   Single                            Admission Date   24    Admission Type   Emergency    Admitting Provider   Dariana Bridges DO    Attending Provider   Dariana Bridges DO    Department, Room/Bed   [facility] 3H, S382/1       Discharge Date       Discharge Disposition       Discharge Destination                                 Attending Provider: Dariana Bridges DO    Allergies: Adhesive Tape, Sildenafil, Codeine, Morphine, Penicillins, Statins, Sulfa Antibiotics    Isolation: None   Infection: None   Code Status: CPR    Ht: 152.4 cm (60\")   Wt: 89.9 kg (198 lb 3.2 oz)    Admission Cmt: None   Principal Problem: Renal failure [N19]                   Active Insurance as of 2024       Primary Coverage       Payor Plan Insurance Group Employer/Plan Group    ANTHEM MEDICARE REPLACEMENT ANTHEM MEDICARE ADVANTAGE [id number]       Payor Plan Address Payor Plan Phone Number Payor Plan Fax Number Effective Dates    [address] [phone]  2018 - None Entered    [facility] [id number]         Subscriber Name Subscriber Birth Date Member ID       NEHA MOTNERROSO 1960 [id number]                     Emergency Contacts        (Rel.) Home Phone Work Phone Mobile Phone    TARAOLIVIA LEI (Daughter) [phone] -- [phone]    LARS ROTHMAN (Significant Other) [phone] -- [phone]                 History & Physical        Day, Ariella GUADARRAMA MD at 24 0207              [facility]  HISTORY AND PHYSICAL    Patient Name: Neha Monterroso  : 1960  MRN: " [id number]  Primary Care Physician: Davina Santiago DO  Date of admission: 1/1/2024      Subjective   Subjective     Chief Complaint:   malaise    HPI:  Di Lopez is a 63 y.o. female with hx of mechanical prosthetic mitral valve on coumadin, AVM of GI tract, CHF w/ EF 45-50% 5/2023, COPD, DM, HLD, CAD, HTN, LBBB, liver mass s/p embolization who presents now after c/o bright red blood per rectum, 10 lb weight loss, headache, swollen lymph nodes left neck, chills, diarrhea preceding hematochezia, no vomiting.  Notes sx began 12/22/2023 and have continued.  Pt was last hospitalized in 11/2023 with GI bleed felt to be diverticular requiring 1 unit prbc.  Upper and lower endoscopy revealed Candida esophagitis and diverticulosis.  Pt was evaluated by nephrology for cr 1.9 and recommended prn lasix for volume overload.  Pt also had UTI and was treated w/ rocephin.  Pt states after d/c home she really did not feel better for about 2 wks then had short period of improvement until recently.  Pt was seen by pcp for her lymphadenopathy and was placed on cefdinir and methocarbamol.        Personal History     Past Medical History:   Diagnosis Date    Allergic     Anemia     Anticoagulated     WARFARIN    Arteriovenous malformation of gastrointestinal tract 05/12/2016    Arthritis     Asthma     Black hairy tongue     CHF (congestive heart failure)     Chronic back pain     Coronary artery disease     Depression 05/12/2016    Diabetes mellitus     Dyslipidemia 05/12/2016    Elevated cholesterol     Emphysema/COPD 05/12/2016    Emphysema/COPD 05/12/2016    Fatigue     GERD (gastroesophageal reflux disease)     Headache     Herniated cervical disc     History of echocardiogram 07/16/2013    Left ventricular systolic function at the lower limits of normal; EF 50-55%; mild MR; mechanical prosthetic mitral valve present; mod TR    History of transfusion     NO REACTION; [facility]    Hyperlipidemia     Hypertension     Insomnia  05/12/2016    Iron deficiency     A.  The patient was treated with IV iron replacement. B.  Secondary to GI bleed, status post EGD with cauterization of blood vessel to the duodenum, 7/16/2014.    Left bundle branch hemiblock 10/12/2020    Left shoulder strain     Liver disease     Liver mass     Long term current use of anticoagulant 05/12/2016    Low back pain     Lung nodules     PER DR. LEVI'S NOTE 10/22    Morbid obesity 05/12/2016    Neuromuscular disorder 7/28/2016    Obstructive sleep apnea syndrome 05/12/2016    Description: A.  Moderate; CPAP therapy..    Sliding hiatal hernia 07/28/2016    Description: A.  Reported on EGD, 12/30/2011.    Type 2 diabetes mellitus 05/12/2016    Urinary tract infection     Uterine cancer     HYSTERECTOMY    Vitamin B12 deficiency     A.  The patient was treated with vitamin B12 replacement.    Wears eyeglasses              Past Surgical History:   Procedure Laterality Date    BILATERAL OOPHORECTOMY  2009    Status post bilateral oophorectomy secondary to ovarian cysts     BREAST BIOPSY Right     STEREOTACTIC    BREAST EXCISIONAL BIOPSY Bilateral     CARDIAC ELECTROPHYSIOLOGY PROCEDURE N/A 10/15/2020    Procedure: Biventricular Device Insertion;  Surgeon: Nadir Everett MD;  Location:  [facility] INVASIVE LOCATION;  Service: Cardiology;  Laterality: N/A;    CARDIAC SURGERY  2003    CARPAL TUNNEL RELEASE Bilateral     COLONOSCOPY  12/28/2012    COLONOSCOPY N/A 5/23/2023    Procedure: COLONOSCOPY;  Surgeon: Gt Fernandes MD;  Location:  [facility]OSCOPY;  Service: Gastroenterology;  Laterality: N/A;    COLONOSCOPY N/A 11/16/2023    Procedure: COLONOSCOPY;  Surgeon: Brunner, Mark I, MD;  Location:  [facility];  Service: Gastroenterology;  Laterality: N/A;    CYSTECTOMY      removal of ganglion cyst from left wrist    ENDOSCOPY  12/30/2011    DIAGNOSTIC ESOPHAGOGASTRODUODENOSCOPY    ENDOSCOPY N/A 11/16/2023    Procedure: ESOPHAGOGASTRODUODENOSCOPY;  Surgeon:  "Brunner, Mark I, MD;  Location: [facility] ENDOSCOPY;  Service: Gastroenterology;  Laterality: N/A;    HYSTERECTOMY  1995 A.  Status post hysterectomy for early stage endometrial cancer done in 1995.    MITRAL VALVE REPLACEMENT  03/03/2008    Dr. Hima Barreto; MECHANICAL    OTHER SURGICAL HISTORY      LIVER MASS BLOOD SUPPLY CLIPPED ON RIGHT AND LEFT SIDE PER PT       Family History: family history includes Cancer in her father and paternal grandmother; Diabetes in her brother; Hypertension in her brother, father, and mother; Multiple myeloma in her father; Ovarian cancer in her paternal grandmother; Sudden death in her mother.     Social History:  reports that she quit smoking about 13 years ago. Her smoking use included cigarettes and electronic cigarette. She started smoking about 47 years ago. She has a 30.00 pack-year smoking history. She has never used smokeless tobacco. She reports that she does not drink alcohol and does not use drugs.  Social History     Social History Narrative    Not on file       Medications:  Available home medication information reviewed.  (Not in a hospital admission)      Allergies   Allergen Reactions    Adhesive Tape Other (See Comments)     \"pulls skin off\"    Sildenafil Shortness Of Breath and Other (See Comments)     pt c/o tightness in chest and trouble breathing    Codeine Itching and Nausea And Vomiting    Morphine Other (See Comments)     uncontrolable crying     Penicillins Hives    Statins Other (See Comments)     HX of Liver Issues    Sulfa Antibiotics Nausea And Vomiting       Objective   Objective     Vital Signs:   Temp:  [97.7 °F (36.5 °C)] 97.7 °F (36.5 °C)  Heart Rate:  [70-77] 77  Resp:  [16] 16  BP: (101-115)/(41-53) 101/44  Flow (L/min):  [2] 2       Physical Exam    Gen; alert, oriented, nad  Heent; perrla, eomi, prominent left submandibular tender lymphadenopathy  Cv; rrr 5/6 samir w/ click  L; cta w/ dec bs's bll  Abd; soft, mild epigastric ttp, no r/g  Ext; no " cce, palpable pulses  Skin; cdi, warm  Neuro; grossly intact  Psych; mood and affect appropriate    Result Review:  I have personally reviewed the results from the time of this admission to 1/2/2024 02:25 EST and agree with these findings:  [x]  Laboratory list / accordion  [x]  Microbiology  [x]  Radiology  [x]  EKG/Telemetry   []  Cardiology/Vascular   []  Pathology  []  Old records  []  Other:  Most notable findings include:          LAB RESULTS:      Lab 01/01/24  2330 12/27/23  1727   WBC 4.42  --    HEMOGLOBIN 9.5*  --    HEMOGLOBIN, POC  --  11.9*   HEMATOCRIT 29.3*  --    PLATELETS 250  --    NEUTROS ABS 3.39  --    IMMATURE GRANS (ABS) 0.06*  --    LYMPHS ABS 0.77  --    MONOS ABS 0.17  --    EOS ABS 0.02  --    MCV 84.2  --    LACTATE 1.5  --    PROTIME 26.0*  --    INR 2.36*  --          Lab 01/01/24  2330   SODIUM 126*   POTASSIUM 5.6*   CHLORIDE 87*   CO2 20.0*   ANION GAP 19.0*   BUN 64*   CREATININE 8.70*   EGFR 4.8*   GLUCOSE 224*   CALCIUM 8.4*         Lab 01/01/24  2330   TOTAL PROTEIN 7.1   ALBUMIN 4.1   GLOBULIN 3.0   ALT (SGPT) 45*   AST (SGOT) 34*   BILIRUBIN 0.3   ALK PHOS 247*   LIPASE 82*                 Lab 01/01/24  2358   ABO TYPING O   RH TYPING Positive   ANTIBODY SCREEN Negative         Lab 01/02/24  0047   FIO2 21   CARBOXYHEMOGLOBIN (VENOUS) 1.3     UA          8/24/2023    15:12 11/9/2023    23:20 1/2/2024    00:11   Urinalysis   Squamous Epithelial Cells, UA  0-2  13-20    Specific Gravity, UA  1.019  1.016    Ketones, UA Negative  Negative  Negative    Blood, UA  Negative  Small (1+)    Leukocytes, UA Negative  Small (1+)  Negative    Nitrite, UA  Positive  Negative    RBC, UA  0-2  0-2    WBC, UA  21-50  3-5    Bacteria, UA  4+  Trace        Microbiology Results (last 10 days)       Procedure Component Value - Date/Time    COVID PRE-OP / PRE-PROCEDURE SCREENING ORDER (NO ISOLATION) - Swab, Nasopharynx [[id number]]  (Normal) Collected: 01/01/24 3033    Lab Status: Final result  Specimen: Swab from Nasopharynx Updated: 01/02/24 0016    Narrative:      The following orders were created for panel order COVID PRE-OP / PRE-PROCEDURE SCREENING ORDER (NO ISOLATION) - Swab, Nasopharynx.  Procedure                               Abnormality         Status                     ---------                               -----------         ------                     COVID-19, FLU A/B, RSV P...[[id number]]  Normal              Final result                 Please view results for these tests on the individual orders.    COVID-19, FLU A/B, RSV PCR 1 HR TAT - Swab, Nasopharynx [[id number]]  (Normal) Collected: 01/01/24 2330    Lab Status: Final result Specimen: Swab from Nasopharynx Updated: 01/02/24 0016     COVID19 Not Detected     Influenza A PCR Not Detected     Influenza B PCR Not Detected     RSV, PCR Not Detected    Narrative:      Fact sheet for providers: https://www.fda.gov/media/114495/download    Fact sheet for patients: https://www.fda.gov/media/637866/download    Test performed by PCR.            CT Abdomen Pelvis Without Contrast    Result Date: 1/2/2024  CT ABDOMEN PELVIS WO CONTRAST Date of Exam: 1/2/2024 12:17 AM EST Indication: Gastrointestinal bleeding,  lower abdominal pain, acute renal failure on labs. Comparison: 11/9/2023. Technique: Axial CT images were obtained of the abdomen and pelvis without the administration of contrast. Reconstructed coronal and sagittal images were also obtained. Automated exposure control and iterative construction methods were used. Findings: There is a calcified left basilar granuloma. There is a stable 4 mm right middle lobe nodule on image 2. This is unchanged since at least 2022. There is a stable 4 mm nodule in the right lower lobe on image 7. There is mild dependent bibasilar atelectasis. There is a rounded nodule in the far lateral left breast on image 1 measuring 12 mm. The patient appears up-to-date on mammogram screening and this is likely a  partially visualized lymph node. There are no acute findings in the superficial soft tissues. There is a small fat-containing periumbilical hernia. There are no acute osseous abnormalities or destructive bone lesions. There is mild thoracolumbar disc degeneration and moderate lower lumbar facet arthritis. There is a stable lobulated mass of the left hepatic lobe measuring up to 8.4 cm with evidence of previous embolization. Patient has a history of a liver adenoma. The inferior right liver appears lobulated raising concern for cirrhosis. The gallbladder is contracted. The bile ducts are nondistended. The pancreas, stomach and spleen appear stable. There is a small hypodensity in the spleen measuring 12 mm, possibly cyst, hemangioma or noncalcified granuloma. Previous contrast-enhanced study demonstrated  noncalcified granulomas in the spleen. There is thickening of the left adrenal gland, likely a result of hyperplasia or small adenomas. There is a small simple right kidney cyst. There is no urolithiasis or hydronephrosis. The urinary bladder is within normal limits. Patient is status post hysterectomy. The ovaries are not seen. The appendix is normal. There is distal colonic diverticulosis. No small bowel distention. There is mild aortoiliac atherosclerosis. No ascites, pneumoperitoneum or lymphadenopathy.     Impression: Impression: 1.No acute abdominal or pelvic abnormality. 2.Colonic diverticulosis without evidence of diverticulitis. 3.Stable lobulated mass in the left hepatic lobe with evidence of previous embolization. Patient has a history of a liver adenoma. 4.Stable 4 mm nodules in the right lung base. These are unchanged since at least 2022. 5.12 mm rounded nodule in the far lateral left breast. The patient appears up-to-date on mammogram screening and this is likely a partially visualized lymph node. Electronically Signed: Delfino Grimes MD  1/2/2024 12:32 AM EST  Workstation ID: [id number]     Results for  orders placed during the hospital encounter of 05/31/23    Adult Transthoracic Echo Complete W/ Cont if Necessary Per Protocol    Interpretation Summary    Mildly reduced right ventricular systolic function noted.    There is calcification of the aortic valve.    Estimated right ventricular systolic pressure from tricuspid regurgitation is normal (<35 mmHg).    Mild global LV systolic dysfunction    EF=45-50%    Normal prosthetic MV function    AV calcification      Assessment & Plan   Assessment & Plan     Active Hospital Problems    Diagnosis  POA    Hyponatremia [E87.1]  Yes    Hyperkalemia [E87.5]  Yes    Type 2 diabetes mellitus with hyperglycemia [E11.65]  Yes    Elevated LFTs [R79.89]  Yes    Anemia, chronic disease [D63.8]  Yes    Cervical lymphadenopathy [R59.0]  Yes    GI bleed [K92.2]  Yes    H/O heart valve replacement with mechanical valve [Z95.2]  Not Applicable    ARF (acute renal failure) [N17.9]  Yes    Left bundle branch hemiblock [I44.60]  Yes     Added automatically from request for surgery [id number]      Type 2 diabetes mellitus without complication, with long-term current use of insulin [E11.9, Z79.4]  Not Applicable    Dyslipidemia [E78.5]  Yes    Emphysema/COPD [J43.9]  Yes     No obstruction on her PFTs      Hypertension [I10]  Yes    Arteriovenous malformation of gastrointestinal tract [K55.20]  Yes    Obstructive sleep apnea syndrome [G47.33]  Yes     BiPAP with 2 L at night      Chronic anticoagulation [Z79.01]  Not Applicable     Valvular heart replacement          64 y/o female with hx of mechanical prosthetic mitral valve on coumadin, AVM of GI tract, CHF w/ EF 45-50% 5/2023, COPD, DM, HLD, CAD, HTN, LBBB, liver mass s/p embolization here now w/  ARF  -pt w/ Cr of 8.7 w/ last Cr being 1.25 less than one month ago  -pt has had diarrhea and poor po intake  -also has had cervical lymphadenopathy treated w/ cefdinir w/ no improvement in sx  -has not been taking prn diuretic   -?if this is an  infectious diarrhea w/ HUS type picture so sending stool studies  -nephrology consult  -hold offending meds    2. Hyperkalemia  -EKG stable  -hyperkalemia order set initiated in ER    3. Hyponatremia  -presume related to above but will check tsh and cortisol to r/o adrenal insufficiency    4. Cervical LAD  -soft tissue neck CT with bilateral cervical lymphadenopathy; will check HIV given hx of esophageal candidiasis and abnormal presentation    5. GI bleed  -h/h stable  -pt w/ same on last admit w/ scope revealing candida esophagitis and diverticulosis  -hold coumadin and place on hep gtt  -GI consult    6. Elevated lft's  -mildly elevated but will check hep panel     7. Mechanical MV on chronic coumadin  -hold coumadin and place on hep gtt    8. IDDM  -SSI       DVT prophylaxis:   hep gtt      CODE STATUS:   full   There are no questions and answers to display.       Expected Discharge  tbd  Expected discharge date/ time has not been documented.     Ariella Phan MD  01/02/24  Electronically signed by Ariella Phan MD, 01/02/24, 3:00 AM EST.      Electronically signed by Ariella Phan MD at 01/02/24 0310          Emergency Department Notes        Safia Poe, RN at 01/02/24 0321           Di Lopez    Nursing Report ED to Floor:  Mental status: AOx4  Ambulatory status: assist x1  Oxygen Therapy:  2L, normally does not wear O2. MD Emilie is aware.  Cardiac Rhythm: Normal sinus  Admitted from: ER  Safety Concerns:  NA  Social Issues: NA  ED Room #:  15    ED Nurse Phone Extension - [phone] or may call [phone].      HPI:   Chief Complaint   Patient presents with    Rectal Bleeding       Past Medical History:  Past Medical History:   Diagnosis Date    Allergic     Anemia     Anticoagulated     WARFARIN    Arteriovenous malformation of gastrointestinal tract 05/12/2016    Arthritis     Asthma     Black hairy tongue     CHF (congestive heart failure)     Chronic back pain     Coronary artery disease     Depression  05/12/2016    Diabetes mellitus     Dyslipidemia 05/12/2016    Elevated cholesterol     Emphysema/COPD 05/12/2016    Emphysema/COPD 05/12/2016    Fatigue     GERD (gastroesophageal reflux disease)     Headache     Herniated cervical disc     History of echocardiogram 07/16/2013    Left ventricular systolic function at the lower limits of normal; EF 50-55%; mild MR; mechanical prosthetic mitral valve present; mod TR    History of transfusion     NO REACTION; [facility]    Hyperlipidemia     Hypertension     Insomnia 05/12/2016    Iron deficiency     A.  The patient was treated with IV iron replacement. B.  Secondary to GI bleed, status post EGD with cauterization of blood vessel to the duodenum, 7/16/2014.    Left bundle branch hemiblock 10/12/2020    Left shoulder strain     Liver disease     Liver mass     Long term current use of anticoagulant 05/12/2016    Low back pain     Lung nodules     PER DR. LEVI'S NOTE 10/22    Morbid obesity 05/12/2016    Neuromuscular disorder 7/28/2016    Obstructive sleep apnea syndrome 05/12/2016    Description: A.  Moderate; CPAP therapy..    Sliding hiatal hernia 07/28/2016    Description: A.  Reported on EGD, 12/30/2011.    Type 2 diabetes mellitus 05/12/2016    Urinary tract infection     Uterine cancer     HYSTERECTOMY    Vitamin B12 deficiency     A.  The patient was treated with vitamin B12 replacement.    Wears eyeglasses         Past Surgical History:  Past Surgical History:   Procedure Laterality Date    BILATERAL OOPHORECTOMY  2009    Status post bilateral oophorectomy secondary to ovarian cysts     BREAST BIOPSY Right     STEREOTACTIC    BREAST EXCISIONAL BIOPSY Bilateral     CARDIAC ELECTROPHYSIOLOGY PROCEDURE N/A 10/15/2020    Procedure: Biventricular Device Insertion;  Surgeon: Nadir Everett MD;  Location: [facility] INVASIVE LOCATION;  Service: Cardiology;  Laterality: N/A;    CARDIAC SURGERY  2003    CARPAL TUNNEL RELEASE Bilateral     COLONOSCOPY  12/28/2012     COLONOSCOPY N/A 5/23/2023    Procedure: COLONOSCOPY;  Surgeon: Gt Fernandes MD;  Location:  [facility];  Service: Gastroenterology;  Laterality: N/A;    COLONOSCOPY N/A 11/16/2023    Procedure: COLONOSCOPY;  Surgeon: Brunner, Mark I, MD;  Location:  [facility];  Service: Gastroenterology;  Laterality: N/A;    CYSTECTOMY      removal of ganglion cyst from left wrist    ENDOSCOPY  12/30/2011    DIAGNOSTIC ESOPHAGOGASTRODUODENOSCOPY    ENDOSCOPY N/A 11/16/2023    Procedure: ESOPHAGOGASTRODUODENOSCOPY;  Surgeon: Brunner, Mark I, MD;  Location:  [facility];  Service: Gastroenterology;  Laterality: N/A;    HYSTERECTOMY  1995    A.  Status post hysterectomy for early stage endometrial cancer done in 1995.    MITRAL VALVE REPLACEMENT  03/03/2008    Dr. Hima Barreto; MECHANICAL    OTHER SURGICAL HISTORY      LIVER MASS BLOOD SUPPLY CLIPPED ON RIGHT AND LEFT SIDE PER PT        Admitting Doctor:   Ariella Phan MD    Consulting Provider(s):  Consults       No orders found for last 30 day(s).             Admitting Diagnosis:   The primary encounter diagnosis was Acute renal failure, unspecified acute renal failure type. Diagnoses of Hyperkalemia and Gastrointestinal hemorrhage, unspecified gastrointestinal hemorrhage type were also pertinent to this visit.    Most Recent Vitals:   Vitals:    01/02/24 0101 01/02/24 0116 01/02/24 0121 01/02/24 0128   BP: 111/53 114/47 101/44    Pulse: 76 75 73 77   Resp:       Temp:       TempSrc:       SpO2: 93% 92% 98% 98%   Weight:       Height:           Active LDAs/IV Access:   Lines, Drains & Airways       Active LDAs       Name Placement date Placement time Site Days    Peripheral IV 01/01/24 2334 Left Antecubital 01/01/24  2334  Antecubital  less than 1                    Labs (abnormal labs have a star):   Labs Reviewed   COMPREHENSIVE METABOLIC PANEL - Abnormal; Notable for the following components:       Result Value    Glucose 224 (*)     BUN 64 (*)      Creatinine 8.70 (*)     Sodium 126 (*)     Potassium 5.6 (*)     Chloride 87 (*)     CO2 20.0 (*)     Calcium 8.4 (*)     ALT (SGPT) 45 (*)     AST (SGOT) 34 (*)     Alkaline Phosphatase 247 (*)     Anion Gap 19.0 (*)     eGFR 4.8 (*)     All other components within normal limits    Narrative:     GFR Normal >60  Chronic Kidney Disease <60  Kidney Failure <15     LIPASE - Abnormal; Notable for the following components:    Lipase 82 (*)     All other components within normal limits   PROTIME-INR - Abnormal; Notable for the following components:    Protime 26.0 (*)     INR 2.36 (*)     All other components within normal limits   CBC WITH AUTO DIFFERENTIAL - Abnormal; Notable for the following components:    RBC 3.48 (*)     Hemoglobin 9.5 (*)     Hematocrit 29.3 (*)     Neutrophil % 76.7 (*)     Lymphocyte % 17.4 (*)     Monocyte % 3.8 (*)     Immature Grans % 1.4 (*)     Immature Grans, Absolute 0.06 (*)     All other components within normal limits   URINALYSIS W/ MICROSCOPIC IF INDICATED (NO CULTURE) - Abnormal; Notable for the following components:    Appearance, UA Cloudy (*)     Blood, UA Small (1+) (*)     Protein,  mg/dL (2+) (*)     All other components within normal limits   URINALYSIS, MICROSCOPIC ONLY - Abnormal; Notable for the following components:    WBC, UA 3-5 (*)     Squamous Epithelial Cells, UA 13-20 (*)     All other components within normal limits   BLOOD GAS, VENOUS W/CO-OXIMETRY - Abnormal; Notable for the following components:    pCO2, Venous 35.9 (*)     HCO3, Venous 20.6 (*)     Base Excess, Venous -4.3 (*)     Hemoglobin, Blood Gas 9.2 (*)     CO2 Content 21.7 (*)     All other components within normal limits   POCT OCCULT BLOOD STOOL - Abnormal; Notable for the following components:    Fecal Occult Blood Positive (*)     All other components within normal limits   POCT GLUCOSE FINGERSTICK - Abnormal; Notable for the following components:    Glucose 255 (*)     All other components  within normal limits   COVID-19/FLUA&B/RSV, NP SWAB IN TRANSPORT MEDIA 1 HR TAT - Normal    Narrative:     Fact sheet for providers: https://www.fda.gov/media/417022/download    Fact sheet for patients: https://www.fda.gov/media/512848/download    Test performed by PCR.   LACTIC ACID, PLASMA - Normal   BETA HYDROXYBUTYRATE QUANTITATIVE - Normal    Narrative:     In the assessment of possible diabetic ketoacidosis, the test should be interpreted along with other clinical and laboratory findings.  A level greater than 1 mmol/L should require further evaluation and levels of more than 3 mmol/L require immediate medical review.   COVID PRE-OP / PRE-PROCEDURE SCREENING ORDER (NO ISOLATION)    Narrative:     The following orders were created for panel order COVID PRE-OP / PRE-PROCEDURE SCREENING ORDER (NO ISOLATION) - Swab, Nasopharynx.  Procedure                               Abnormality         Status                     ---------                               -----------         ------                     COVID-19, FLU A/B, RSV P...[[id number]]  Normal              Final result                 Please view results for these tests on the individual orders.   GASTROINTESTINAL PANEL, PCR ([facility])   BLOOD GAS, VENOUS   BASIC METABOLIC PANEL   HIV-1/O/2 ANTIGEN/ANTIBODY   HEPATITIS PANEL, ACUTE   POCT GLUCOSE FINGERSTICK   POCT GLUCOSE FINGERSTICK   POCT GLUCOSE FINGERSTICK   POCT GLUCOSE FINGERSTICK   TYPE AND SCREEN   CBC AND DIFFERENTIAL    Narrative:     The following orders were created for panel order CBC & Differential.  Procedure                               Abnormality         Status                     ---------                               -----------         ------                     CBC Auto Differential[[id number]]        Abnormal            Final result                 Please view results for these tests on the individual orders.   KETONE BODIES SERUM    Narrative:     The following orders were created for  "panel order Ketone Bodies, Serum (Not performed at [facility]).  Procedure                               Abnormality         Status                     ---------                               -----------         ------                     Beta Hydroxybutyrate [place]...[[id number]]  Normal              Final result                 Please view results for these tests on the individual orders.       Meds Given in ED:   Medications   sodium chloride 0.9 % bolus 500 mL (has no administration in time range)   sodium chloride 0.9 % bolus 1,000 mL (0 mL Intravenous Stopped 24 0132)   calcium gluconate 1000 Mg/50ml 0.675% NaCl IV SOLN (0 mg Intravenous Stopped 24 0220)   insulin regular (humuLIN R,novoLIN R) injection 5 Units (5 Units Intravenous Given 24 020)   dextrose (D50W) (25 g/50 mL) IV injection 25 g (25 g Intravenous Given 24 020)   sodium zirconium cyclosilicate (LOKELMA) pack 10 g (10 g Oral Given 24)              Electronically signed by Safia Poe RN at 24 0322       Jayro Laughlin PA at 24 2312       Attestation signed by Alison Chris MD at 24 0730        NON FACE TO FACE: This visit was performed by BOTH a physician and an APC. I performed all aspects of the MDM as documented. Case discussed with the PA, I agree with the PA's assessment and plan.  Alison Chris MD 2024 07:30 EST                          EMERGENCY DEPARTMENT ENCOUNTER    Pt Name: Di Lopez  MRN: [id number]  Pt :   1960  Room Number:  15/15  Date of encounter:  2024  PCP: Davina Santiago DO  ED Provider: TAVO Johnson    Historian: Patient    HPI:  Chief Complaint: GI bleeding    Context: Di Lopez is a 63 y.o. female who presents to the ED c/o gastrointestinal bleeding.  Patient shares that on 2023 she started feeling \"crummy\".  Patient states that she made it through [facility] and has basically been in bed since then.  She reports feeling chills " and just overwhelmingly tired.  She was seen and evaluated by her primary care physician and was found to have swollen lymph nodes and also complained of generalized aches.  Patient reports that she has had recent bouts of diarrhea and that today at approximately 5 AM this morning she noticed she was passing blood in her stools.  Patient has history of GI bleed.  She is anticoagulated on warfarin with history of mechanical valve.  HPI     REVIEW OF SYSTEMS  A chief complaint appropriate review of systems was completed and is negative except as noted in the HPI.     PAST MEDICAL HISTORY  Past Medical History:   Diagnosis Date    Allergic     Anemia     Anticoagulated     WARFARIN    Arteriovenous malformation of gastrointestinal tract 05/12/2016    Arthritis     Asthma     Black hairy tongue     CHF (congestive heart failure)     Chronic back pain     Coronary artery disease     Depression 05/12/2016    Diabetes mellitus     Dyslipidemia 05/12/2016    Elevated cholesterol     Emphysema/COPD 05/12/2016    Emphysema/COPD 05/12/2016    Fatigue     GERD (gastroesophageal reflux disease)     Headache     Herniated cervical disc     History of echocardiogram 07/16/2013    Left ventricular systolic function at the lower limits of normal; EF 50-55%; mild MR; mechanical prosthetic mitral valve present; mod TR    History of transfusion     NO REACTION; [facility]    Hyperlipidemia     Hypertension     Insomnia 05/12/2016    Iron deficiency     A.  The patient was treated with IV iron replacement. B.  Secondary to GI bleed, status post EGD with cauterization of blood vessel to the duodenum, 7/16/2014.    Left bundle branch hemiblock 10/12/2020    Left shoulder strain     Liver disease     Liver mass     Long term current use of anticoagulant 05/12/2016    Low back pain     Lung nodules     PER DR. LEVI'S NOTE 10/22    Morbid obesity 05/12/2016    Neuromuscular disorder 7/28/2016    Obstructive sleep apnea syndrome 05/12/2016     Description: A.  Moderate; CPAP therapy..    Sliding hiatal hernia 07/28/2016    Description: A.  Reported on EGD, 12/30/2011.    Type 2 diabetes mellitus 05/12/2016    Urinary tract infection     Uterine cancer     HYSTERECTOMY    Vitamin B12 deficiency     A.  The patient was treated with vitamin B12 replacement.    Wears eyeglasses        PAST SURGICAL HISTORY  Past Surgical History:   Procedure Laterality Date    BILATERAL OOPHORECTOMY  2009    Status post bilateral oophorectomy secondary to ovarian cysts     BREAST BIOPSY Right     STEREOTACTIC    BREAST EXCISIONAL BIOPSY Bilateral     CARDIAC ELECTROPHYSIOLOGY PROCEDURE N/A 10/15/2020    Procedure: Biventricular Device Insertion;  Surgeon: Nadir Everett MD;  Location:  [facility];  Service: Cardiology;  Laterality: N/A;    CARDIAC SURGERY  2003    CARPAL TUNNEL RELEASE Bilateral     COLONOSCOPY  12/28/2012    COLONOSCOPY N/A 5/23/2023    Procedure: COLONOSCOPY;  Surgeon: Gt Fernandes MD;  Location:  [facility];  Service: Gastroenterology;  Laterality: N/A;    COLONOSCOPY N/A 11/16/2023    Procedure: COLONOSCOPY;  Surgeon: Brunner, Mark I, MD;  Location:  [facility];  Service: Gastroenterology;  Laterality: N/A;    CYSTECTOMY      removal of ganglion cyst from left wrist    ENDOSCOPY  12/30/2011    DIAGNOSTIC ESOPHAGOGASTRODUODENOSCOPY    ENDOSCOPY N/A 11/16/2023    Procedure: ESOPHAGOGASTRODUODENOSCOPY;  Surgeon: Brunner, Mark I, MD;  Location:  [facility];  Service: Gastroenterology;  Laterality: N/A;    HYSTERECTOMY  1995    A.  Status post hysterectomy for early stage endometrial cancer done in 1995.    MITRAL VALVE REPLACEMENT  03/03/2008    Dr. Hima Barreto; MECHANICAL    OTHER SURGICAL HISTORY      LIVER MASS BLOOD SUPPLY CLIPPED ON RIGHT AND LEFT SIDE PER PT       FAMILY HISTORY  Family History   Problem Relation Age of Onset    Sudden death Mother         POSSIBLE SERIOUS MASSIVE STROKE    Hypertension  Mother             Multiple myeloma Father     Cancer Father             Hypertension Father             Ovarian cancer Paternal Grandmother     Cancer Paternal Grandmother             Diabetes Brother             Hypertension Brother             Breast cancer Neg Hx        SOCIAL HISTORY  Social History     Socioeconomic History    Marital status: Single   Tobacco Use    Smoking status: Former     Packs/day: 1.00     Years: 30.00     Additional pack years: 0.00     Total pack years: 30.00     Types: Cigarettes, Electronic Cigarette     Start date: 1976     Quit date: 2/10/2010     Years since quittin.9    Smokeless tobacco: Never    Tobacco comments:     Quit in    Vaping Use    Vaping Use: Every day    Substances: Nicotine    Devices: Disposable, one every 2 days   Substance and Sexual Activity    Alcohol use: No    Drug use: Never    Sexual activity: Defer     Partners: Male     Birth control/protection: Abstinence, None, Hysterectomy     Comment: 1 partner       ALLERGIES  Adhesive tape, Sildenafil, Codeine, Morphine, Penicillins, Statins, and Sulfa antibiotics    PHYSICAL EXAM  Physical Exam  Vitals and nursing note reviewed.   Constitutional:       General: She is not in acute distress.     Appearance: Normal appearance. She is obese. She is not ill-appearing or toxic-appearing.   HENT:      Head: Normocephalic and atraumatic.      Nose: Nose normal.      Mouth/Throat:      Mouth: Mucous membranes are dry.   Eyes:      Extraocular Movements: Extraocular movements intact.   Cardiovascular:      Rate and Rhythm: Normal rate and regular rhythm.   Pulmonary:      Effort: Pulmonary effort is normal.   Abdominal:      General: There is no distension.      Tenderness: There is no abdominal tenderness.   Genitourinary:     Rectum: Normal. Guaiac result positive.   Musculoskeletal:         General: Normal range of motion.      Cervical back: Normal range of  motion and neck supple.   Skin:     General: Skin is warm and dry.   Neurological:      General: No focal deficit present.      Mental Status: She is alert.   Psychiatric:         Mood and Affect: Mood normal.         Behavior: Behavior normal.         LAB RESULTS  Results for orders placed or performed during the hospital encounter of 01/01/24   COVID-19, FLU A/B, RSV PCR 1 HR TAT - Swab, Nasopharynx    Specimen: Nasopharynx; Swab   Result Value Ref Range    COVID19 Not Detected Not Detected - Ref. Range    Influenza A PCR Not Detected Not Detected    Influenza B PCR Not Detected Not Detected    RSV, PCR Not Detected Not Detected   Comprehensive Metabolic Panel    Specimen: Blood   Result Value Ref Range    Glucose 224 (H) 65 - 99 mg/dL    BUN 64 (H) 8 - 23 mg/dL    Creatinine 8.70 (H) 0.57 - 1.00 mg/dL    Sodium 126 (L) 136 - 145 mmol/L    Potassium 5.6 (H) 3.5 - 5.2 mmol/L    Chloride 87 (L) 98 - 107 mmol/L    CO2 20.0 (L) 22.0 - 29.0 mmol/L    Calcium 8.4 (L) 8.6 - 10.5 mg/dL    Total Protein 7.1 6.0 - 8.5 g/dL    Albumin 4.1 3.5 - 5.2 g/dL    ALT (SGPT) 45 (H) 1 - 33 U/L    AST (SGOT) 34 (H) 1 - 32 U/L    Alkaline Phosphatase 247 (H) 39 - 117 U/L    Total Bilirubin 0.3 0.0 - 1.2 mg/dL    Globulin 3.0 gm/dL    A/G Ratio 1.4 g/dL    BUN/Creatinine Ratio 7.4 7.0 - 25.0    Anion Gap 19.0 (H) 5.0 - 15.0 mmol/L    eGFR 4.8 (L) >60.0 mL/min/1.73   Lipase    Specimen: Blood   Result Value Ref Range    Lipase 82 (H) 13 - 60 U/L   Lactic Acid, Plasma    Specimen: Blood   Result Value Ref Range    Lactate 1.5 0.5 - 2.0 mmol/L   Protime-INR    Specimen: Blood   Result Value Ref Range    Protime 26.0 (H) 12.2 - 14.5 Seconds    INR 2.36 (H) 0.89 - 1.12   CBC Auto Differential    Specimen: Blood   Result Value Ref Range    WBC 4.42 3.40 - 10.80 10*3/mm3    RBC 3.48 (L) 3.77 - 5.28 10*6/mm3    Hemoglobin 9.5 (L) 12.0 - 15.9 g/dL    Hematocrit 29.3 (L) 34.0 - 46.6 %    MCV 84.2 79.0 - 97.0 fL    MCH 27.3 26.6 - 33.0 pg    MCHC  32.4 31.5 - 35.7 g/dL    RDW 14.6 12.3 - 15.4 %    RDW-SD 44.6 37.0 - 54.0 fl    MPV 10.9 6.0 - 12.0 fL    Platelets 250 140 - 450 10*3/mm3    Neutrophil % 76.7 (H) 42.7 - 76.0 %    Lymphocyte % 17.4 (L) 19.6 - 45.3 %    Monocyte % 3.8 (L) 5.0 - 12.0 %    Eosinophil % 0.5 0.3 - 6.2 %    Basophil % 0.2 0.0 - 1.5 %    Immature Grans % 1.4 (H) 0.0 - 0.5 %    Neutrophils, Absolute 3.39 1.70 - 7.00 10*3/mm3    Lymphocytes, Absolute 0.77 0.70 - 3.10 10*3/mm3    Monocytes, Absolute 0.17 0.10 - 0.90 10*3/mm3    Eosinophils, Absolute 0.02 0.00 - 0.40 10*3/mm3    Basophils, Absolute 0.01 0.00 - 0.20 10*3/mm3    Immature Grans, Absolute 0.06 (H) 0.00 - 0.05 10*3/mm3    nRBC 0.0 0.0 - 0.2 /100 WBC   Urinalysis With Microscopic If Indicated (No Culture) - Urine, Clean Catch    Specimen: Urine, Clean Catch   Result Value Ref Range    Color, UA Yellow Yellow, Straw    Appearance, UA Cloudy (A) Clear    pH, UA <=5.0 5.0 - 8.0    Specific Gravity, UA 1.016 1.001 - 1.030    Glucose, UA Negative Negative    Ketones, UA Negative Negative    Bilirubin, UA Negative Negative    Blood, UA Small (1+) (A) Negative    Protein,  mg/dL (2+) (A) Negative    Leuk Esterase, UA Negative Negative    Nitrite, UA Negative Negative    Urobilinogen, UA 0.2 E.U./dL 0.2 - 1.0 E.U./dL   Urinalysis, Microscopic Only - Urine, Clean Catch    Specimen: Urine, Clean Catch   Result Value Ref Range    RBC, UA 0-2 None Seen, 0-2 /HPF    WBC, UA 3-5 (A) None Seen, 0-2 /HPF    Bacteria, UA Trace None Seen, Trace /HPF    Squamous Epithelial Cells, UA 13-20 (A) None Seen, 0-2 /HPF    Transitional Epithelial Cells, UA 0-2 0 - 2 /HPF    Hyaline Casts, UA 0-6 0 - 6 /LPF    Coarse Granular Casts, UA 0-2 None Seen /LPF    Fine Granular Casts, UA 0-2 None Seen /LPF    Amorphous Crystals, UA Moderate/2+ None Seen /HPF    Methodology Manual Light Microscopy    Blood Gas, Venous With Co-Ox    Specimen: Venous Blood   Result Value Ref Range    Site Nurse/Dr Draw     pH,  Venous 7.367 7.310 - 7.410 pH Units    pCO2, Venous 35.9 (L) 41.0 - 51.0 mm Hg    pO2, Venous 43.5 27.0 - 53.0 mm Hg    HCO3, Venous 20.6 (L) 22.0 - 28.0 mmol/L    Base Excess, Venous -4.3 (L) -2.0 - 2.0 mmol/L    Hemoglobin, Blood Gas 9.2 (L) 14 - 18 g/dL    Oxyhemoglobin Venous 74.7 %    Methemoglobin Venous 0.2 %    Carboxyhemoglobin Venous 1.3 %    CO2 Content 21.7 (L) 22 - 33 mmol/L    Temperature 37.0     Barometric Pressure for Blood Gas      Modality Room Air     FIO2 21 %    Rate 0 Breaths/minute    PIP 0 cmH2O    IPAP 0     EPAP 0    Beta Hydroxybutyrate Quantitative    Specimen: Blood   Result Value Ref Range    Beta-Hydroxybutyrate Quant 0.129 0.020 - 0.270 mmol/L   POC Occult Blood Stool    Specimen: Per Rectum; Stool   Result Value Ref Range    Fecal Occult Blood Positive (A)     Lot Number [id number]     Expiration Date 10/26     DEVELOPER LOT NUMBER [id number]     DEVELOPER EXPIRATION DATE 6/2,025     Positive Control Positive     Negative Control Negative    POC Glucose Once    Specimen: Blood   Result Value Ref Range    Glucose 255 (H) 70 - 130 mg/dL   ECG 12 Lead Electrolyte Imbalance   Result Value Ref Range    QT Interval 502 ms    QTC Interval 549 ms   Type & Screen    Specimen: Blood   Result Value Ref Range    ABO Type O     RH type Positive     Antibody Screen Negative     T&S Expiration Date 1/4/2024 11:59:59 PM      *Note: Due to a large number of results and/or encounters for the requested time period, some results have not been displayed. A complete set of results can be found in Results Review.       If labs were ordered, I independently reviewed the results and considered them in treating the patient.    RADIOLOGY  CT Soft Tissue Neck Without Contrast   Final Result   Impression:   1.There is an enlarged and inflamed lymph node in the right supraclavicular fossa with adjacent prominent but nonenlarged lymph nodes. There are also enlarged left-sided level II and III lymph nodes. These appear  inflammatory, but could be related to    infection or malignancy and clinical follow-up is recommended.   2.Mild emphysema.   3.Atherosclerotic disease and mild cervical degenerative changes.            Electronically Signed: Delfino Grimes MD     1/2/2024 2:35 AM EST     Workstation ID: [id number]      CT Abdomen Pelvis Without Contrast   Final Result   Impression:   1.No acute abdominal or pelvic abnormality.   2.Colonic diverticulosis without evidence of diverticulitis.   3.Stable lobulated mass in the left hepatic lobe with evidence of previous embolization. Patient has a history of a liver adenoma.   4.Stable 4 mm nodules in the right lung base. These are unchanged since at least 2022.   5.12 mm rounded nodule in the far lateral left breast. The patient appears up-to-date on mammogram screening and this is likely a partially visualized lymph node.            Electronically Signed: Delfino Grimes MD     1/2/2024 12:32 AM EST     Workstation ID: [id number]        [x] Radiologist's Report Reviewed:  I ordered and independently interpreted the above noted radiographic studies.  See radiologist's dictation for official interpretation.      PROCEDURES    Procedures    ECG 12 Lead Electrolyte Imbalance   Preliminary Result   Test Reason : Electrolyte Imbalance   Blood Pressure :   */*   mmHG   Vent. Rate :  72 BPM     Atrial Rate :  72 BPM      P-R Int : 140 ms          QRS Dur : 146 ms       QT Int : 502 ms       P-R-T Axes :  44 196  66 degrees      QTc Int : 549 ms      ** Poor data quality, interpretation may be adversely affected   ** Suspect unspecified pacemaker failure   Atrial-sensed ventricular-paced rhythm   Abnormal ECG   When compared with ECG of 09-NOV-2023 23:36,   No significant change was found      Referred By: EDMD           Confirmed By:           MEDICATIONS GIVEN IN ER    Medications   sodium chloride 0.9 % bolus 500 mL (has no administration in time range)   sodium chloride 0.9 % bolus 1,000 mL (0  mL Intravenous Stopped 1/2/24 0132)   calcium gluconate 1000 Mg/50ml 0.675% NaCl IV SOLN (0 mg Intravenous Stopped 1/2/24 0220)   insulin regular (humuLIN R,novoLIN R) injection 5 Units (5 Units Intravenous Given 1/2/24 0205)   dextrose (D50W) (25 g/50 mL) IV injection 25 g (25 g Intravenous Given 1/2/24 0205)   sodium zirconium cyclosilicate (LOKELMA) pack 10 g (10 g Oral Given 1/2/24 0205)       MEDICAL DECISION MAKING, PROGRESS, and CONSULTS   Medical Decision Making  Problems Addressed:  Acute renal failure, unspecified acute renal failure type: complicated acute illness or injury  Gastrointestinal hemorrhage, unspecified gastrointestinal hemorrhage type: complicated acute illness or injury  Hyperkalemia: complicated acute illness or injury    Amount and/or Complexity of Data Reviewed  Labs: ordered.  Radiology: ordered.  ECG/medicine tests: ordered.    Risk  OTC drugs.  Prescription drug management.  Decision regarding hospitalization.        All labs have been independently reviewed by me.  All radiology studies have been interpreted by me and the radiologist dictating the report.  All EKG's have been independently interpreted by me as well as and overseeing attending physician.    [] Discussed with radiology regarding test interpretation:    Discussion below represents my analysis of pertinent findings related to patient's condition, differential diagnosis, treatment plan and final disposition.    Differential diagnosis:  The differential diagnosis associated with the patient's presentation includes: Peptic ulcer disease, gastritis, esophagitis, Judit-Verduzco tear, gastric/esophageal varices, malignancy, hemorrhagic gastritis, celiac disease, diverticulosis, diverticulitis, inflammatory bowel disease, infectious colitis, mesenteric ischemia, ischemic colitis, colorectal cancer, hemorrhoids, anal fissure and others    Additional sources  Discussed/ obtained information from independent historians:   []  Spouse  [] Parent  [] Family member  [] Friend  [] EMS   [] Other:  External (non-ED) record review:   [] Inpatient record:   [] Office record:   [] Outpatient record:   [] Prior Outpatient labs:   [] Prior Outpatient radiology:   [x] Primary Care record: Patient seen and evaluated outpatient on December 8, 2023 with diagnosis of type 2 diabetes, hypertension; subsequent visit on December 27, 2023 where patient was seen and evaluated and diagnosed with cough and anemia   [] Outside ED record:   [] Other:   Patient's care impacted by:   [x] Diabetes  [x] Hypertension  [x] Hyperlipidemia  [] Hypothyroidism   [] Coronary Artery Disease   [x] COPD   [] Cancer   [x] Obesity  [] GERD   [] Tobacco Abuse   [] Substance Abuse    [] Anxiety   [x] Depression   [x] Other: Anemia of chronic disease  Care significantly affected by Social Determinants of Health (housing and economic circumstances, unemployment)    [] Yes     [x] No   If yes, Patient's care significantly limited by  Social Determinants of Health including:   [] Inadequate housing   [] Low income   [] Alcoholism and drug addiction in family   [] Problems related to primary support group   [] Unemployment   [] Problems related to employment   [] Other Social Determinants of Health:     Shared decision making:  I reviewed workup performed in ED including labs and imaging. Based on findings, recommendation made for admission. Patient is agreeable to plan of care and hospital admission.      Orders placed during this visit:  Orders Placed This Encounter   Procedures    COVID PRE-OP / PRE-PROCEDURE SCREENING ORDER (NO ISOLATION) - Swab, Nasopharynx    COVID-19, FLU A/B, RSV PCR 1 HR TAT - Swab, Nasopharynx    Gastrointestinal Panel, PCR ([facility]) - Stool, Per Rectum    CT Abdomen Pelvis Without Contrast    CT Soft Tissue Neck Without Contrast    Comprehensive Metabolic Panel    Lipase    Lactic Acid, Plasma    Protime-INR    CBC Auto Differential    Urinalysis With  Microscopic If Indicated (No Culture) - Urine, Clean Catch    Urinalysis, Microscopic Only - Urine, Clean Catch    Blood Gas, Venous -With Co-Ox Panel: Yes    Blood Gas, Venous With Co-Ox    Beta Hydroxybutyrate Quantitative    Basic Metabolic Panel    HIV-1 / O / 2 Ag / Antibody    Hepatitis Panel, Acute    Code Status and Medical Interventions:    POC Occult Blood Stool    POC Glucose Q1H    POC Glucose Once    ECG 12 Lead Electrolyte Imbalance    Type & Screen    Inpatient Admission    CBC & Differential    Ketone Bodies, Serum (Not performed at [facility])       ED Course:    ED Course as of 01/02/24 0331 Tue Jan 02, 2024   0326 In summary this is a 63-year-old nontoxic-appearing female who presents the ER for evaluation of bright red blood in her stool.  Hemodynamically stable with hemoglobin of 9.5 and hematocrit of 29.3.  Patient found to be in acute renal failure with serum creatinine of 8.7 and BUN of 64.  Also hyperkalemic with potassium of 5.6.  Hyperkalemia protocol initiated.  Type and screen obtained.  Remainder of labs without acute abrasion realities.  CT scan of abdomen pelvis demonstrates. No acute abdominal or pelvic abnormality.  Hospitalist messaged for admission and was agreeable to accept patient.  Patient agreeable to plan of care and hospital admission for further evaluation and treatment. [JG]      ED Course User Index  [JG] Jayro Laughlin, PA            DIAGNOSIS  Final diagnoses:   Acute renal failure, unspecified acute renal failure type   Hyperkalemia   Gastrointestinal hemorrhage, unspecified gastrointestinal hemorrhage type       DISPOSITION    ED Disposition       ED Disposition   Decision to Admit    Condition   --    Comment   Level of Care: Telemetry [5]   Diagnosis: Renal failure [[id number]]   Admitting Physician: ALLAN GUERRERO [[id number]]   Attending Physician: ALLAN GUERRERO [[id number]]   Certification: I Certify That Inpatient Hospital Services Are Medically Necessary For Greater Than 2  Midnights                 Please note that portions of this document were completed with voice recognition software.        Jayro Laughlin PA  01/02/24 0331      Electronically signed by Alison Chris MD at 01/02/24 0730       Current Facility-Administered Medications   Medication Dose Route Frequency Provider Last Rate Last Admin    acetaminophen (TYLENOL) tablet 650 mg  650 mg Oral Q8H PRN Dariana Bridges DO        albuterol (PROVENTIL) nebulizer solution 0.083% 2.5 mg/3mL  2.5 mg Nebulization Q4H PRN Day, Ariella GUADARRAMA MD        sennosides-docusate (PERICOLACE) 8.6-50 MG per tablet 2 tablet  2 tablet Oral BID Day, Ariella GUADARRAMA MD        And    polyethylene glycol (MIRALAX) packet 17 g  17 g Oral Daily PRN Day, Ariella GUADARRAMA MD        And    bisacodyl (DULCOLAX) EC tablet 5 mg  5 mg Oral Daily PRN Day, Ariella GUADARRAMA MD        And    bisacodyl (DULCOLAX) suppository 10 mg  10 mg Rectal Daily PRN Day, Ariella GUADARRAMA MD        dextrose (D50W) (25 g/50 mL) IV injection 25 g  25 g Intravenous Q15 Min PRN Day, Ariella GUADARRAMA MD        dextrose (GLUTOSE) oral gel 15 g  15 g Oral Q15 Min PRN Day, Ariella GUADARRAMA MD        escitalopram (LEXAPRO) tablet 20 mg  20 mg Oral Daily Day, Ariella GUADARRAMA MD   20 mg at 01/02/24 0945    ferrous sulfate tablet 325 mg  325 mg Oral Daily With Breakfast Day, Ariella GUADARRAMA MD   325 mg at 01/02/24 0945    folic acid (FOLVITE) tablet 800 mcg  800 mcg Oral Daily Day, Ariella GUADARRAMA MD   800 mcg at 01/02/24 0945    glucagon (GLUCAGEN) injection 1 mg  1 mg Intramuscular Q15 Min PRN Day, Ariella GUADARRAMA MD        heparin 62564 units/250 mL (100 units/mL) in 0.45 % NaCl infusion  13 Units/kg/hr Intravenous Titrated Annabelle Fernandez, PharmD 11.68 mL/hr at 01/02/24 1321 13 Units/kg/hr at 01/02/24 1321    Insulin Lispro (humaLOG) injection 2-7 Units  2-7 Units Subcutaneous 4x Daily AC & at Bedtime Day, Ariella GUADARRAMA MD   2 Units at 01/02/24 1223    naloxone (NARCAN) injection 0.4 mg  0.4 mg Intravenous Q5 Min PRN Dariana Bridges DO         nitroglycerin (NITROSTAT) SL tablet 0.4 mg  0.4 mg Sublingual Q5 Min PRN Day, Ariella GUADARRAMA MD        oxybutynin XL (DITROPAN-XL) 24 hr tablet 5 mg  5 mg Oral Daily Day, Ariella GUADARRAMA MD   5 mg at 01/02/24 0945    oxyCODONE (ROXICODONE) immediate release tablet 5 mg  5 mg Oral Q8H PRN Dariana Bridges DO   5 mg at 01/02/24 1227    pantoprazole (PROTONIX) EC tablet 40 mg  40 mg Oral Q AM Day, Ariella GUADARRAMA MD   40 mg at 01/02/24 0441    Pharmacy to Dose Heparin   Does not apply Continuous PRN Day, Ariella GUADARRAMA MD        sodium chloride 0.9 % flush 10 mL  10 mL Intravenous Q12H Day, Ariella GUADARRAMA MD        sodium chloride 0.9 % flush 10 mL  10 mL Intravenous PRN Day, Ariella GUADARRAMA MD        sodium chloride 0.9 % infusion 40 mL  40 mL Intravenous PRN Day, Ariella GUADARRAMA MD        sodium chloride 0.9 % infusion  75 mL/hr Intravenous Continuous Dariana Bridges DO 75 mL/hr at 01/02/24 0946 75 mL/hr at 01/02/24 0946     Lab Results (all)       Procedure Component Value Units Date/Time    Heparin Anti-Xa [[id number]]  (Abnormal) Collected: 01/02/24 1208    Specimen: Blood Updated: 01/02/24 1240     Heparin Anti-Xa (UFH) 0.15 IU/ml     Sodium [[id number]]  (Abnormal) Collected: 01/02/24 1144    Specimen: Blood Updated: 01/02/24 1207     Sodium 127 mmol/L     POC Glucose Once [[id number]]  (Abnormal) Collected: 01/02/24 1159    Specimen: Blood Updated: 01/02/24 1201     Glucose 229 mg/dL     Comprehensive Metabolic Panel [[id number]]  (Abnormal) Collected: 01/02/24 0808    Specimen: Blood Updated: 01/02/24 0932     Glucose 152 mg/dL      BUN 61 mg/dL      Creatinine 7.49 mg/dL      Sodium 129 mmol/L      Potassium 5.3 mmol/L      Comment: Slight hemolysis detected by analyzer. Result may be falsely elevated.        Chloride 95 mmol/L      CO2 19.0 mmol/L      Calcium 7.6 mg/dL      Total Protein 6.1 g/dL      Albumin 3.4 g/dL      ALT (SGPT) 41 U/L      AST (SGOT) 37 U/L      Alkaline Phosphatase 222 U/L      Total Bilirubin 0.3 mg/dL       Globulin 2.7 gm/dL      Comment: Calculated Result        A/G Ratio 1.3 g/dL      BUN/Creatinine Ratio 8.1     Anion Gap 15.0 mmol/L      eGFR 5.7 mL/min/1.73      Comment: <15 Indicative of kidney failure       Narrative:      GFR Normal >60  Chronic Kidney Disease <60  Kidney Failure <15      Magnesium [[id number]]  (Normal) Collected: 01/02/24 0808    Specimen: Blood Updated: 01/02/24 0932     Magnesium 2.3 mg/dL     Phosphorus [[id number]]  (Abnormal) Collected: 01/02/24 0808    Specimen: Blood Updated: 01/02/24 0932     Phosphorus 7.3 mg/dL     TSH [[id number]]  (Normal) Collected: 01/02/24 0808    Specimen: Blood Updated: 01/02/24 0917     TSH 1.590 uIU/mL     Cortisol [[id number]] Collected: 01/02/24 0808    Specimen: Blood Updated: 01/02/24 0917     Cortisol 10.55 mcg/dL     Narrative:      Cortisol Reference Ranges:    Cortisol 6AM - 10AM Range: 6.02-18.40 mcg/dl  Cortisol 4PM - 8PM Range: 2.68-10.50 mcg/dl      Results may be falsely increased if patient taking Biotin.      Heparin Anti-Xa [[id number]]  (Abnormal) Collected: 01/02/24 0808    Specimen: Blood Updated: 01/02/24 0854     Heparin Anti-Xa (UFH) 0.14 IU/ml     POC Glucose Once [[id number]]  (Abnormal) Collected: 01/02/24 0813    Specimen: Blood Updated: 01/02/24 0821     Glucose 159 mg/dL     Heparin Anti-Xa [[id number]]  (Abnormal) Collected: 01/02/24 0531    Specimen: Blood Updated: 01/02/24 0641     Heparin Anti-Xa (UFH) 0.10 IU/ml     aPTT [[id number]]  (Normal) Collected: 01/02/24 0531    Specimen: Blood Updated: 01/02/24 0641     PTT 66.2 seconds     Narrative:      PTT = The equivalent PTT values for the therapeutic range of heparin levels at 0.3 to 0.5 U/ml are 60 to 70 seconds.    CBC Auto Differential [[id number]]  (Abnormal) Collected: 01/02/24 0531    Specimen: Blood Updated: 01/02/24 0628     WBC 4.26 10*3/mm3      RBC 3.03 10*6/mm3      Hemoglobin 8.5 g/dL      Hematocrit 26.0 %      MCV 85.8 fL      MCH 28.1 pg      MCHC 32.7 g/dL       RDW 14.6 %      RDW-SD 46.0 fl      MPV 11.0 fL      Platelets 228 10*3/mm3      Neutrophil % 70.9 %      Lymphocyte % 21.6 %      Monocyte % 4.9 %      Eosinophil % 1.2 %      Basophil % 0.2 %      Immature Grans % 1.2 %      Neutrophils, Absolute 3.02 10*3/mm3      Lymphocytes, Absolute 0.92 10*3/mm3      Monocytes, Absolute 0.21 10*3/mm3      Eosinophils, Absolute 0.05 10*3/mm3      Basophils, Absolute 0.01 10*3/mm3      Immature Grans, Absolute 0.05 10*3/mm3      nRBC 0.0 /100 WBC     Hepatitis Panel, Acute [[id number]]  (Normal) Collected: 01/02/24 0331    Specimen: Blood Updated: 01/02/24 0513     Hepatitis B Surface Ag Non-Reactive     Hep A IgM Non-Reactive     Hep B C IgM Non-Reactive     Hepatitis C Ab Non-Reactive    Narrative:      Results may be falsely decreased if patient taking Biotin.     POC Glucose Once [[id number]]  (Abnormal) Collected: 01/02/24 0454    Specimen: Blood Updated: 01/02/24 0454     Glucose 214 mg/dL     POC Glucose Once [[id number]]  (Abnormal) Collected: 01/02/24 0425    Specimen: Blood Updated: 01/02/24 0427     Glucose 237 mg/dL     HIV-1 / O / 2 Ag / Antibody [[id number]]  (Normal) Collected: 01/02/24 0331    Specimen: Blood Updated: 01/02/24 0417     HIV-1/ HIV-2 Non-Reactive    Narrative:      The HIV antibody/antigen combo assay is a qualitative assay for HIV that includes the p24 antigen as well as antibodies to HIV types 1 and 2. This test is intended to be used as a screening assay in the diagnosis of HIV infection in patients over the age of 2.  Results may be falsely decreased if patient taking Biotin.      Basic Metabolic Panel [[id number]]  (Abnormal) Collected: 01/02/24 0331    Specimen: Blood Updated: 01/02/24 0359     Glucose 255 mg/dL      BUN 63 mg/dL      Creatinine 8.19 mg/dL      Sodium 127 mmol/L      Potassium 4.8 mmol/L      Comment: Slight hemolysis detected by analyzer. Result may be falsely elevated.        Chloride 91 mmol/L      CO2 18.0 mmol/L      Calcium  7.6 mg/dL      BUN/Creatinine Ratio 7.7     Anion Gap 18.0 mmol/L      eGFR 5.1 mL/min/1.73      Comment: <15 Indicative of kidney failure       Narrative:      GFR Normal >60  Chronic Kidney Disease <60  Kidney Failure <15      POC Glucose Once [[id number]]  (Abnormal) Collected: 01/02/24 0204    Specimen: Blood Updated: 01/02/24 0206     Glucose 255 mg/dL     Ketone Bodies, Serum (Not performed at [place]) [[id number]]  (Normal) Collected: 01/01/24 2330    Specimen: Blood Updated: 01/02/24 0108    Narrative:      The following orders were created for panel order Ketone Bodies, Serum (Not performed at [place]).  Procedure                               Abnormality         Status                     ---------                               -----------         ------                     Beta Hydroxybutyrate [place]...[[id number]]  Normal              Final result                 Please view results for these tests on the individual orders.    Beta Hydroxybutyrate Quantitative [[id number]]  (Normal) Collected: 01/01/24 2330    Specimen: Blood Updated: 01/02/24 0108     Beta-Hydroxybutyrate Quant 0.129 mmol/L     Narrative:      In the assessment of possible diabetic ketoacidosis, the test should be interpreted along with other clinical and laboratory findings.  A level greater than 1 mmol/L should require further evaluation and levels of more than 3 mmol/L require immediate medical review.    Urinalysis With Microscopic If Indicated (No Culture) - Urine, Clean Catch [[id number]]  (Abnormal) Collected: 01/02/24 0011    Specimen: Urine, Clean Catch Updated: 01/02/24 0054     Color, UA Yellow     Appearance, UA Cloudy     pH, UA <=5.0     Specific Gravity, UA 1.016     Glucose, UA Negative     Ketones, UA Negative     Bilirubin, UA Negative     Blood, UA Small (1+)     Protein,  mg/dL (2+)     Leuk Esterase, UA Negative     Nitrite, UA Negative     Urobilinogen, UA 0.2 E.U./dL    Urinalysis, Microscopic Only - Urine, Clean  Catch [[id number]]  (Abnormal) Collected: 01/02/24 0011    Specimen: Urine, Clean Catch Updated: 01/02/24 0054     RBC, UA 0-2 /HPF      WBC, UA 3-5 /HPF      Bacteria, UA Trace /HPF      Squamous Epithelial Cells, UA 13-20 /HPF      Transitional Epithelial Cells, UA 0-2 /HPF      Hyaline Casts, UA 0-6 /LPF      Coarse Granular Casts, UA 0-2 /LPF      Fine Granular Casts, UA 0-2 /LPF      Amorphous Crystals, UA Moderate/2+ /HPF      Methodology Manual Light Microscopy    Blood Gas, Venous With Co-Ox [[id number]]  (Abnormal) Collected: 01/02/24 0047    Specimen: Venous Blood Updated: 01/02/24 0047     Site Nurse/Dr Draw     pH, Venous 7.367 pH Units      pCO2, Venous 35.9 mm Hg      Comment: 84 Value below reference range        pO2, Venous 43.5 mm Hg      HCO3, Venous 20.6 mmol/L      Base Excess, Venous -4.3 mmol/L      Hemoglobin, Blood Gas 9.2 g/dL      Oxyhemoglobin Venous 74.7 %      Methemoglobin Venous 0.2 %      Carboxyhemoglobin Venous 1.3 %      CO2 Content 21.7 mmol/L      Temperature 37.0     Barometric Pressure for Blood Gas --     Comment: N/A        Modality Room Air     FIO2 21 %      Rate 0 Breaths/minute      PIP 0 cmH2O      Comment: Meter: [id number]     :  [id number]        IPAP 0     EPAP 0    Protime-INR [[id number]]  (Abnormal) Collected: 01/01/24 2330    Specimen: Blood Updated: 01/02/24 0024     Protime 26.0 Seconds      INR 2.36    COVID PRE-OP / PRE-PROCEDURE SCREENING ORDER (NO ISOLATION) - Swab, Nasopharynx [[id number]]  (Normal) Collected: 01/01/24 2330    Specimen: Swab from Nasopharynx Updated: 01/02/24 0016    Narrative:      The following orders were created for panel order COVID PRE-OP / PRE-PROCEDURE SCREENING ORDER (NO ISOLATION) - Swab, Nasopharynx.  Procedure                               Abnormality         Status                     ---------                               -----------         ------                     COVID-19, FLU A/B, RSV P...[[id number]]  Normal               Final result                 Please view results for these tests on the individual orders.    COVID-19, FLU A/B, RSV PCR 1 HR TAT - Swab, Nasopharynx [[id number]]  (Normal) Collected: 01/01/24 2330    Specimen: Swab from Nasopharynx Updated: 01/02/24 0016     COVID19 Not Detected     Influenza A PCR Not Detected     Influenza B PCR Not Detected     RSV, PCR Not Detected    Narrative:      Fact sheet for providers: https://www.fda.gov/media/123850/download    Fact sheet for patients: https://www.fda.gov/media/397859/download    Test performed by PCR.    CBC & Differential [[id number]]  (Abnormal) Collected: 01/01/24 2330    Specimen: Blood Updated: 01/02/24 0005    Narrative:      The following orders were created for panel order CBC & Differential.  Procedure                               Abnormality         Status                     ---------                               -----------         ------                     CBC Auto Differential[[id number]]        Abnormal            Final result                 Please view results for these tests on the individual orders.    CBC Auto Differential [[id number]]  (Abnormal) Collected: 01/01/24 2330    Specimen: Blood Updated: 01/02/24 0005     WBC 4.42 10*3/mm3      RBC 3.48 10*6/mm3      Hemoglobin 9.5 g/dL      Hematocrit 29.3 %      MCV 84.2 fL      MCH 27.3 pg      MCHC 32.4 g/dL      RDW 14.6 %      RDW-SD 44.6 fl      MPV 10.9 fL      Platelets 250 10*3/mm3      Neutrophil % 76.7 %      Lymphocyte % 17.4 %      Monocyte % 3.8 %      Eosinophil % 0.5 %      Basophil % 0.2 %      Immature Grans % 1.4 %      Neutrophils, Absolute 3.39 10*3/mm3      Lymphocytes, Absolute 0.77 10*3/mm3      Monocytes, Absolute 0.17 10*3/mm3      Eosinophils, Absolute 0.02 10*3/mm3      Basophils, Absolute 0.01 10*3/mm3      Immature Grans, Absolute 0.06 10*3/mm3      nRBC 0.0 /100 WBC     Comprehensive Metabolic Panel [[id number]]  (Abnormal) Collected: 01/01/24 2330    Specimen:  Blood Updated: 01/01/24 2358     Glucose 224 mg/dL      BUN 64 mg/dL      Creatinine 8.70 mg/dL      Sodium 126 mmol/L      Potassium 5.6 mmol/L      Comment: Slight hemolysis detected by analyzer. Result may be falsely elevated.        Chloride 87 mmol/L      CO2 20.0 mmol/L      Calcium 8.4 mg/dL      Total Protein 7.1 g/dL      Albumin 4.1 g/dL      ALT (SGPT) 45 U/L      AST (SGOT) 34 U/L      Alkaline Phosphatase 247 U/L      Total Bilirubin 0.3 mg/dL      Globulin 3.0 gm/dL      Comment: Calculated Result        A/G Ratio 1.4 g/dL      BUN/Creatinine Ratio 7.4     Anion Gap 19.0 mmol/L      eGFR 4.8 mL/min/1.73      Comment: <15 Indicative of kidney failure       Narrative:      GFR Normal >60  Chronic Kidney Disease <60  Kidney Failure <15      Lipase [[id number]]  (Abnormal) Collected: 01/01/24 2330    Specimen: Blood Updated: 01/01/24 2358     Lipase 82 U/L     Lactic Acid, Plasma [[id number]]  (Normal) Collected: 01/01/24 2330    Specimen: Blood Updated: 01/01/24 2353     Lactate 1.5 mmol/L      Comment: Falsely depressed results may occur on samples drawn from patients receiving N-Acetylcysteine (NAC) or Metamizole.       POC Occult Blood Stool [[id number]]  (Abnormal) Resulted: 01/01/24 2311    Specimen: Stool from Per Rectum Updated: 01/01/24 2312     Fecal Occult Blood Positive     Lot Number [id number]     Expiration Date 10/26     DEVELOPER LOT NUMBER [id number]     DEVELOPER EXPIRATION DATE 6/2,025     Positive Control Positive     Negative Control Negative          Imaging Results (All)       Procedure Component Value Units Date/Time    CT Soft Tissue Neck Without Contrast [[id number]] Collected: 01/02/24 0231     Updated: 01/02/24 0238    Narrative:      CT SOFT TISSUE NECK WO CONTRAST    Date of Exam: 1/2/2024 2:16 AM EST    Indication: Neck mass, nonpulsatile.    Comparison: Cervical spine CT 6/21/2023.    Technique: Axial CT images were obtained of the neck without contrast administration.  Reconstructed  coronal and sagittal images were also obtained. Automated exposure control and iterative construction methods were used.      Findings:  Limited view of the intracranial contents is unremarkable. The orbits appear within normal limits. Paranasal sinuses and mastoids appear clear. There is streak artifact from dental metallic restoration hardware. The parotid and salivary glands appear   symmetric. The thyroid is homogeneous. There is mild carotid bifurcation calcification. There are moderate aortic and aortic branch vessel atherosclerotic calcifications. There are calcified granulomas in the superior mediastinum. There is mild   emphysema. There is a left-sided ICD in place.    There is an enlarged and inflamed lymph node in the right supraclavicular fossa measuring up to 10 x 15 mm on axial image 43. There are adjacent prominent but nonenlarged lymph nodes. There are is an enlarged left-sided level II lymph node with rounded   morphology measuring 13 mm on image 30. There is a prominent left level III lymph node on image 44 measuring 13 mm in the short axis. The parapharyngeal spaces appear clear. There is no pharyngeal mucosal mass. The tongue, base of tongue, floor of mouth,   epiglottis, aryepiglottic folds, false vocal cords, vocal ligaments and subglottic airway appear within normal limits. There is a lipoma at the posterior upper back deep to the latissimus dorsi. This measures up to 64 x 15 mm on axial image 62. There   are no acute osseous abnormalities or destructive bone lesions. There are minimal cervical degenerative changes.      Impression:      Impression:  1.There is an enlarged and inflamed lymph node in the right supraclavicular fossa with adjacent prominent but nonenlarged lymph nodes. There are also enlarged left-sided level II and III lymph nodes. These appear inflammatory, but could be related to   infection or malignancy and clinical follow-up is recommended.  2.Mild  emphysema.  3.Atherosclerotic disease and mild cervical degenerative changes.        Electronically Signed: Delfino Grimes MD    1/2/2024 2:35 AM EST    Workstation ID: [id number]    CT Abdomen Pelvis Without Contrast [[id number]] Collected: 01/02/24 0022     Updated: 01/02/24 0035    Narrative:      CT ABDOMEN PELVIS WO CONTRAST    Date of Exam: 1/2/2024 12:17 AM EST    Indication: Gastrointestinal bleeding,  lower abdominal pain, acute renal failure on labs.    Comparison: 11/9/2023.    Technique: Axial CT images were obtained of the abdomen and pelvis without the administration of contrast. Reconstructed coronal and sagittal images were also obtained. Automated exposure control and iterative construction methods were used.      Findings:  There is a calcified left basilar granuloma. There is a stable 4 mm right middle lobe nodule on image 2. This is unchanged since at least 2022. There is a stable 4 mm nodule in the right lower lobe on image 7. There is mild dependent bibasilar   atelectasis. There is a rounded nodule in the far lateral left breast on image 1 measuring 12 mm. The patient appears up-to-date on mammogram screening and this is likely a partially visualized lymph node. There are no acute findings in the superficial   soft tissues. There is a small fat-containing periumbilical hernia. There are no acute osseous abnormalities or destructive bone lesions. There is mild thoracolumbar disc degeneration and moderate lower lumbar facet arthritis.    There is a stable lobulated mass of the left hepatic lobe measuring up to 8.4 cm with evidence of previous embolization. Patient has a history of a liver adenoma. The inferior right liver appears lobulated raising concern for cirrhosis. The gallbladder   is contracted. The bile ducts are nondistended. The pancreas, stomach and spleen appear stable. There is a small hypodensity in the spleen measuring 12 mm, possibly cyst, hemangioma or noncalcified granuloma.  Previous contrast-enhanced study demonstrated   noncalcified granulomas in the spleen. There is thickening of the left adrenal gland, likely a result of hyperplasia or small adenomas. There is a small simple right kidney cyst. There is no urolithiasis or hydronephrosis. The urinary bladder is within   normal limits. Patient is status post hysterectomy. The ovaries are not seen. The appendix is normal. There is distal colonic diverticulosis. No small bowel distention. There is mild aortoiliac atherosclerosis. No ascites, pneumoperitoneum or   lymphadenopathy.      Impression:      Impression:  1.No acute abdominal or pelvic abnormality.  2.Colonic diverticulosis without evidence of diverticulitis.  3.Stable lobulated mass in the left hepatic lobe with evidence of previous embolization. Patient has a history of a liver adenoma.  4.Stable 4 mm nodules in the right lung base. These are unchanged since at least 2022.  5.12 mm rounded nodule in the far lateral left breast. The patient appears up-to-date on mammogram screening and this is likely a partially visualized lymph node.        Electronically Signed: Delfino Grimes MD    1/2/2024 12:32 AM EST    Workstation ID: [id number]          ECG/EMG Results (all)       Procedure Component Value Units Date/Time    ECG 12 Lead Electrolyte Imbalance [[id number]] Collected: 01/02/24 0034     Updated: 01/02/24 0752     QT Interval 502 ms      QTC Interval 549 ms     Narrative:      Test Reason : Electrolyte Imbalance  Blood Pressure :   */*   mmHG  Vent. Rate :  72 BPM     Atrial Rate :  72 BPM     P-R Int : 140 ms          QRS Dur : 146 ms      QT Int : 502 ms       P-R-T Axes :  44 196  66 degrees     QTc Int : 549 ms    ** Poor data quality, interpretation may be adversely affected  ** Suspect unspecified pacemaker failure  Atrial-sensed ventricular-paced rhythm  Abnormal ECG  When compared with ECG of 09-NOV-2023 23:36,  No significant change was found    Referred By: EDMD            Confirmed By:             Consult Notes (all)        Lorene Chance PA at 01/02/24 1052        Consult Orders    1. Inpatient Gastroenterology Consult [[id number]] ordered by Ariella Phan MD at 01/02/24 6393                 [facility] Gastroenterology Consult    Referring Provider: Dariana Gr DO     PCP: Davina Santiago DO    Reason for Consultation: GI bleed     Chief complaint: Chills, body aches, blood in stool     History of present illness:    Di Lopez is a 63 y.o. female who is admitted with acute renal failure (Cr of 8.70).  She has been feeling poorly with a multitude of symptoms for several weeks.   She describes sharp, severe frontal headaches with neck pain and bilateral neck swelling.   She has chills, fatigue, lower extremity aches.   She denies any known sick contacts.     She began to have bright red blood per rectum two days ago.   She describes these as small clotted blood mixed in loose stool.   She has mild abdominal cramping.       She was admitted in November 2023 for rectal bleeding.  GI panel PCR at that time was positive for Adenovirus.   She underwent EGD and Colonoscopy on 11/16/23 which showed left sided diverticulosis, presumed source of recent GI blood loss.  Normal terminal ileum.  EGD showed candida esophagitis for which she received a course of Nystatin swish and swallow for 1 week.       Allergies:  Adhesive tape, Sildenafil, Codeine, Morphine, Penicillins, Statins, and Sulfa antibiotics    Scheduled Meds:  escitalopram, 20 mg, Oral, Daily  ferrous sulfate, 325 mg, Oral, Daily With Breakfast  folic acid, 800 mcg, Oral, Daily  insulin lispro, 2-7 Units, Subcutaneous, 4x Daily AC & at Bedtime  oxybutynin XL, 5 mg, Oral, Daily  pantoprazole, 40 mg, Oral, Q AM  senna-docusate sodium, 2 tablet, Oral, BID  sodium chloride, 10 mL, Intravenous, Q12H         Infusions:  heparin, 11 Units/kg/hr, Last Rate: 11 Units/kg/hr (01/02/24 1941)  Pharmacy to Dose Heparin,   sodium  chloride, 75 mL/hr, Last Rate: 75 mL/hr (01/02/24 0942)        PRN Meds:    albuterol    senna-docusate sodium **AND** polyethylene glycol **AND** bisacodyl **AND** bisacodyl    dextrose    dextrose    glucagon (human recombinant)    nitroglycerin    Pharmacy to Dose Heparin    sodium chloride    sodium chloride    Home Meds:  Medications Prior to Admission   Medication Sig Dispense Refill Last Dose    albuterol sulfate  (90 Base) MCG/ACT inhaler Inhale 2 puffs Every 4 (Four) Hours As Needed for Wheezing. 1 g 0     azelastine (ASTELIN) 0.1 % nasal spray 2 sprays into the nostril(s) as directed by provider 2 (Two) Times a Day. Use in each nostril as directed (Patient taking differently: 2 sprays into the nostril(s) as directed by provider As Needed for Rhinitis or Allergies. Use in each nostril as directed prn) 1 each 1     carvedilol (COREG) 12.5 MG tablet Take 1 tablet by mouth 2 (Two) Times a Day With Meals. 180 tablet 2     cefdinir (OMNICEF) 300 MG capsule Take 1 capsule by mouth 2 (Two) Times a Day. 20 capsule 0     Continuous Blood Gluc  (FreeStyle Dalia 2 [place]) device 1 each Daily. Use as directed daily to check blood sugars 1 each 0     Continuous Blood Gluc Sensor (FreeStyle Dalia 2 Sensor) misc USE AS DIRECTED FOR 14 DAYS 2 each 11     Dulaglutide (Trulicity) 4.5 MG/0.5ML solution pen-injector INJECT 1  SYRINGE SUBCUTANEOUSLY ONCE A WEEK 6 mL 0     escitalopram (LEXAPRO) 20 MG tablet Take 1 tablet by mouth Daily. 90 tablet 3     ferrous sulfate 325 (65 FE) MG tablet Take 1 tablet by mouth Daily With Breakfast.       folic acid (FOLVITE) 800 MCG tablet Take 1 tablet by mouth Daily.       furosemide (LASIX) 40 MG tablet Take 1 tablet by mouth 3 (Three) Times a Week. Monday, Wednesday, Friday. Resume when done w daily lasix       gabapentin (NEURONTIN) 300 MG capsule Take 1 capsule by mouth At Night As Needed (leg pain). 30 capsule 3     glimepiride (AMARYL) 4 MG tablet Take 1 tablet by mouth  2 (Two) Times a Day. 180 tablet 1     glucose blood test strip Use as instructed 3 times a day 100 each 11     HYDROcodone-acetaminophen (NORCO) 7.5-325 MG per tablet Take 1 tablet by mouth Every 8 (Eight) Hours As Needed for Moderate Pain.       Insulin Glargine (Lantus SoloStar) 100 UNIT/ML injection pen Inject 44 Units under the skin into the appropriate area as directed Daily. 45 mL 1     Insulin Lispro, 1 Unit Dial, (HumaLOG KwikPen) 100 UNIT/ML solution pen-injector Per correctional scale, MDD 50 units 15 mL 3     Insulin Pen Needle (B-D UF III MINI PEN NEEDLES) 31G X 5 MM misc USE 1  FIVE TIMES DAILY 150 each 5     Insulin Pen Needle (RELION PEN NEEDLES) 32G X 4 MM misc Use BID for E11.9 100 each 1     Lancets misc For use with glucose monitoring 3 times daily 100 each 11     lidocaine (LIDODERM) 5 % USE 1 PATCH EXTERNALLY ONCE DAILY. REMOVE AND DISCARD PATCH WITHIN 12 HOURS OR AS DIRECTED BY MD (Patient taking differently: Place 1 patch on the skin as directed by provider Daily.) 30 patch 0     methocarbamol (ROBAXIN) 500 MG tablet Take 1 tablet by mouth 4 (Four) Times a Day As Needed for Muscle Spasms. 40 tablet 0     nystatin (MYCOSTATIN) 100,000 unit/mL suspension Swish and swallow 5 mL 4 (Four) Times a Day. 200 mL 0     Omega-3 Fatty Acids (FISH OIL) 1000 MG capsule capsule Take 1 capsule by mouth 4 (Four) Times a Day. (Patient taking differently: Take 1 capsule by mouth 2 (Two) Times a Day With Meals.) 120 capsule 2     omeprazole (priLOSEC) 20 MG capsule Take 1 capsule by mouth twice daily 60 capsule 0     sacubitril-valsartan (ENTRESTO) 24-26 MG tablet Take 1 tablet by mouth 2 (Two) Times a Day. Resume when ok w PCP       simvastatin (ZOCOR) 5 MG tablet TAKE 1 TABLET BY MOUTH ONCE DAILY AT NIGHT 90 tablet 0     solifenacin (VESICARE) 5 MG tablet Take 1 tablet by mouth Daily. 90 tablet 0     spironolactone (ALDACTONE) 25 MG tablet Take 1 tablet by mouth Daily.       Vitamin D, Cholecalciferol,  (CHOLECALCIFEROL) 10 MCG (400 UNIT) tablet Take 2 tablets by mouth Daily.       warfarin (COUMADIN) 2 MG tablet Take on 11/24/2023  Indications: Presence of Mechanical Artificial Heart Valve (Patient taking differently: Take 2.5 tablets by mouth. Take on 11/24/2023  Indications: Presence of Mechanical Artificial Heart Valve) 5 tablet 0     warfarin (COUMADIN) 4 MG tablet Once per day on Sun Mon Tue Wed Thu Sat  Indications: Presence of Mechanical Artificial Heart Valve 14 tablet 0        ROS: Review of Systems   Constitutional:  Positive for chills and fatigue.   Eyes: Negative.    Respiratory: Negative.     Cardiovascular: Negative.    Gastrointestinal:  Positive for abdominal pain, blood in stool and diarrhea.   Endocrine: Negative.    Genitourinary: Negative.    Musculoskeletal:  Positive for arthralgias, myalgias and neck pain.   Skin: Negative.    Neurological:  Positive for headaches.   Hematological: Negative.    Psychiatric/Behavioral: Negative.         PAST MED HX:  Past Medical History:   Diagnosis Date    Allergic     Anemia     Anticoagulated     WARFARIN    Arteriovenous malformation of gastrointestinal tract 05/12/2016    Arthritis     Asthma     Black hairy tongue     CHF (congestive heart failure)     Chronic back pain     Coronary artery disease     Depression 05/12/2016    Diabetes mellitus     Dyslipidemia 05/12/2016    Elevated cholesterol     Emphysema/COPD 05/12/2016    Emphysema/COPD 05/12/2016    Fatigue     GERD (gastroesophageal reflux disease)     Headache     Herniated cervical disc     History of echocardiogram 07/16/2013    Left ventricular systolic function at the lower limits of normal; EF 50-55%; mild MR; mechanical prosthetic mitral valve present; mod TR    History of transfusion     NO REACTION; [facility]    Hyperlipidemia     Hypertension     Insomnia 05/12/2016    Iron deficiency     A.  The patient was treated with IV iron replacement. B.  Secondary to GI bleed, status post EGD with  cauterization of blood vessel to the duodenum, 7/16/2014.    Left bundle branch hemiblock 10/12/2020    Left shoulder strain     Liver disease     Liver mass     Long term current use of anticoagulant 05/12/2016    Low back pain     Lung nodules     PER DR. LEVI'S NOTE 10/22    Morbid obesity 05/12/2016    Neuromuscular disorder 7/28/2016    Obstructive sleep apnea syndrome 05/12/2016    Description: A.  Moderate; CPAP therapy..    Sliding hiatal hernia 07/28/2016    Description: A.  Reported on EGD, 12/30/2011.    Type 2 diabetes mellitus 05/12/2016    Urinary tract infection     Uterine cancer     HYSTERECTOMY    Vitamin B12 deficiency     A.  The patient was treated with vitamin B12 replacement.    Wears eyeglasses        PAST SURG HX:  Past Surgical History:   Procedure Laterality Date    BILATERAL OOPHORECTOMY  2009    Status post bilateral oophorectomy secondary to ovarian cysts     BREAST BIOPSY Right     STEREOTACTIC    BREAST EXCISIONAL BIOPSY Bilateral     CARDIAC ELECTROPHYSIOLOGY PROCEDURE N/A 10/15/2020    Procedure: Biventricular Device Insertion;  Surgeon: Nadir Everett MD;  Location:  [facility];  Service: Cardiology;  Laterality: N/A;    CARDIAC SURGERY  2003    CARPAL TUNNEL RELEASE Bilateral     COLONOSCOPY  12/28/2012    COLONOSCOPY N/A 5/23/2023    Procedure: COLONOSCOPY;  Surgeon: Gt Fernandes MD;  Location:  [facility];  Service: Gastroenterology;  Laterality: N/A;    COLONOSCOPY N/A 11/16/2023    Procedure: COLONOSCOPY;  Surgeon: Brunner, Mark I, MD;  Location:  [facility];  Service: Gastroenterology;  Laterality: N/A;    CYSTECTOMY      removal of ganglion cyst from left wrist    ENDOSCOPY  12/30/2011    DIAGNOSTIC ESOPHAGOGASTRODUODENOSCOPY    ENDOSCOPY N/A 11/16/2023    Procedure: ESOPHAGOGASTRODUODENOSCOPY;  Surgeon: Brunner, Mark I, MD;  Location:  [facility];  Service: Gastroenterology;  Laterality: N/A;    HYSTERECTOMY  1995    A.   "Status post hysterectomy for early stage endometrial cancer done in .    MITRAL VALVE REPLACEMENT  2008    Dr. Hima Barreto; MECHANICAL    OTHER SURGICAL HISTORY      LIVER MASS BLOOD SUPPLY CLIPPED ON RIGHT AND LEFT SIDE PER PT       FAM HX:  Family History   Problem Relation Age of Onset    Sudden death Mother         POSSIBLE SERIOUS MASSIVE STROKE    Hypertension Mother             Multiple myeloma Father     Cancer Father             Hypertension Father             Ovarian cancer Paternal Grandmother     Cancer Paternal Grandmother             Diabetes Brother             Hypertension Brother             Breast cancer Neg Hx        SOC HX:  Social History     Socioeconomic History    Marital status: Single   Tobacco Use    Smoking status: Former     Packs/day: 1.00     Years: 30.00     Additional pack years: 0.00     Total pack years: 30.00     Types: Cigarettes, Electronic Cigarette     Start date: 1976     Quit date: 2/10/2010     Years since quittin.9    Smokeless tobacco: Never    Tobacco comments:     Quit in    Vaping Use    Vaping Use: Every day    Substances: Nicotine    Devices: Disposable, one every 2 days   Substance and Sexual Activity    Alcohol use: No    Drug use: Never    Sexual activity: Defer     Partners: Male     Birth control/protection: Abstinence, None, Hysterectomy     Comment: 1 partner       PHYSICAL EXAM  /54 (BP Location: Right arm, Patient Position: Lying)   Pulse 75   Temp 98.5 °F (36.9 °C) (Oral)   Resp 16   Ht 152.4 cm (60\")   Wt 89.9 kg (198 lb 3.2 oz)   LMP 1998   SpO2 99%   BMI 38.71 kg/m²   Wt Readings from Last 3 Encounters:   24 89.9 kg (198 lb 3.2 oz)   23 84.8 kg (187 lb)   23 87.1 kg (192 lb)   ,body mass index is 38.71 kg/m².  Physical Exam  Constitutional:       General: She is not in acute distress.  HENT:      Head: Normocephalic and atraumatic.      " Mouth/Throat:      Mouth: Mucous membranes are moist.   Eyes:      General: No scleral icterus.  Cardiovascular:      Rate and Rhythm: Normal rate and regular rhythm.   Pulmonary:      Effort: Pulmonary effort is normal. No respiratory distress.   Abdominal:      General: Bowel sounds are normal. There is no distension.      Palpations: Abdomen is soft.      Tenderness: There is no abdominal tenderness. There is no guarding.   Skin:     General: Skin is warm and dry.   Neurological:      Mental Status: She is alert and oriented to person, place, and time.   Psychiatric:         Behavior: Behavior normal.         Thought Content: Thought content normal.       Results Review:   I reviewed the patient's new clinical results.    Lab Results   Component Value Date    WBC 4.26 01/02/2024    HGB 8.5 (L) 01/02/2024    HGB 9.5 (L) 01/01/2024    HGB 11.9 (A) 12/27/2023    HCT 26.0 (L) 01/02/2024    MCV 85.8 01/02/2024     01/02/2024     Lab Results   Component Value Date    INR 2.36 (H) 01/01/2024    INR 1.44 (H) 11/22/2023    INR 1.35 (H) 11/21/2023       Lab Results   Component Value Date    GLUCOSE 152 (H) 01/02/2024    BUN 61 (H) 01/02/2024    CREATININE 7.49 (H) 01/02/2024    EGFRIFAFRI 73 10/14/2022    BCR 8.1 01/02/2024     (L) 01/02/2024    K 5.3 (H) 01/02/2024    CO2 19.0 (L) 01/02/2024    CALCIUM 7.6 (L) 01/02/2024    ALBUMIN 3.4 (L) 01/02/2024    ALKPHOS 222 (H) 01/02/2024    BILITOT 0.3 01/02/2024    BILIDIR <0.2 03/31/2023    ALT 41 (H) 01/02/2024    AST 37 (H) 01/02/2024     CT Soft Tissue neck without contrast:  Findings:  Limited view of the intracranial contents is unremarkable. The orbits appear within normal limits. Paranasal sinuses and mastoids appear clear. There is streak artifact from dental metallic restoration hardware. The parotid and salivary glands appear symmetric. The thyroid is homogeneous. There is mild carotid bifurcation calcification. There are moderate aortic and aortic branch  vessel atherosclerotic calcifications. There are calcified granulomas in the superior mediastinum. There is mild emphysema. There is a left-sided ICD in place.   There is an enlarged and inflamed lymph node in the right supraclavicular fossa measuring up to 10 x 15 mm on axial image 43. There are adjacent prominent but nonenlarged lymph nodes. There are is an enlarged left-sided level II lymph node with rounded morphology measuring 13 mm on image 30. There is a prominent left level III lymph node on image 44 measuring 13 mm in the short axis. The parapharyngeal spaces appear clear. There is no pharyngeal mucosal mass. The tongue, base of tongue, floor of mouth,   epiglottis, aryepiglottic folds, false vocal cords, vocal ligaments and subglottic airway appear within normal limits. There is a lipoma at the posterior upper back deep to the latissimus dorsi. This measures up to 64 x 15 mm on axial image 62. There are no acute osseous abnormalities or destructive bone lesions. There are minimal cervical degenerative changes.   IMPRESSION:  Impression:  1.There is an enlarged and inflamed lymph node in the right supraclavicular fossa with adjacent prominent but nonenlarged lymph nodes. There are also enlarged left-sided level II and III lymph nodes. These appear inflammatory, but could be related to   infection or malignancy and clinical follow-up is recommended.  2.Mild emphysema.  3.Atherosclerotic disease and mild cervical degenerative changes.      CT Abdomen/Pelvis without contrast:  Findings:  There is a calcified left basilar granuloma. There is a stable 4 mm right middle lobe nodule on image 2. This is unchanged since at least 2022. There is a stable 4 mm nodule in the right lower lobe on image 7. There is mild dependent bibasilar atelectasis. There is a rounded nodule in the far lateral left breast on image 1 measuring 12 mm. The patient appears up-to-date on mammogram screening and this is likely a partially  visualized lymph node. There are no acute findings in the superficial soft tissues. There is a small fat-containing periumbilical hernia. There are no acute osseous abnormalities or destructive bone lesions. There is mild thoracolumbar disc degeneration and moderate lower lumbar facet arthritis.   There is a stable lobulated mass of the left hepatic lobe measuring up to 8.4 cm with evidence of previous embolization. Patient has a history of a liver adenoma. The inferior right liver appears lobulated raising concern for cirrhosis. The gallbladder is contracted. The bile ducts are nondistended. The pancreas, stomach and spleen appear stable. There is a small hypodensity in the spleen measuring 12 mm, possibly cyst, hemangioma or noncalcified granuloma. Previous contrast-enhanced study demonstrated noncalcified granulomas in the spleen. There is thickening of the left adrenal gland, likely a result of hyperplasia or small adenomas. There is a small simple right kidney cyst. There is no urolithiasis or hydronephrosis. The urinary bladder is within   normal limits. Patient is status post hysterectomy. The ovaries are not seen. The appendix is normal. There is distal colonic diverticulosis. No small bowel distention. There is mild aortoiliac atherosclerosis. No ascites, pneumoperitoneum or lymphadenopathy.   IMPRESSION:  Impression:  1.No acute abdominal or pelvic abnormality.  2.Colonic diverticulosis without evidence of diverticulitis.  3.Stable lobulated mass in the left hepatic lobe with evidence of previous embolization. Patient has a history of a liver adenoma.  4.Stable 4 mm nodules in the right lung base. These are unchanged since at least 2022.  5.12 mm rounded nodule in the far lateral left breast. The patient appears up-to-date on mammogram screening and this is likely a partially visualized lymph node.    ASSESSMENTS/PLANS    Rectal bleeding  Acute recurrent diarrhea  Acute renal failure   Headache and neck  pain  Enlarged and inflamed lymph node in the right supraclavicular fossa  Hepatic adenoma, stable, history of TACE procedure   Mechanical mitral valve, on chronic anticoagulation     Suspect infectious source.   Recent bidirectional endoscopy was unremarkable.    Bleeding was suspected to be secondary to diverticular source at that time.       >> Obtain GI panel PCR  >> Continue IV Heparin drip for her mechanical mitral valve   >> Consider infectious disease consultation     I discussed the patient's findings and my recommendations with patient    TAVO Alvarenga  01/02/24  10:52 EST      Electronically signed by Lorene Chance PA at 01/02/24 0410

## 2024-01-02 NOTE — THERAPY EVALUATION
Patient Name: Di Lopez  : 1960    MRN: [id number]                              Today's Date: 2024       Admit Date: 2024    Visit Dx:     ICD-10-CM ICD-9-CM   1. Acute renal failure, unspecified acute renal failure type  N17.9 584.9   2. Hyperkalemia  E87.5 276.7   3. Gastrointestinal hemorrhage, unspecified gastrointestinal hemorrhage type  K92.2 578.9     Patient Active Problem List   Diagnosis    Arteriovenous malformation of gastrointestinal tract    Depression    Type 2 diabetes mellitus without complication, with long-term current use of insulin    Dyslipidemia    Emphysema/COPD    Hypertension    Insomnia    Morbid obesity    Obstructive sleep apnea syndrome    Chronic anticoagulation    Normocytic anemia    Sliding hiatal hernia    Vitamin B12 deficiency    Iron deficiency    Iron malabsorption    Bronchiectasis without complication    Electronic cigarette use    Chronic diastolic heart failure    Left bundle branch hemiblock    Chronic respiratory failure with hypoxia    Body aches    Headaches due to old head injury    History of tobacco use, presenting hazards to health    Multiple nodules of lung    Nocturnal hypoxemia    Screening for colon cancer    Abnormal CT scan    Wellness examination    ARF (acute renal failure)    H/O heart valve replacement with mechanical valve    GI bleed    Severe malnutrition    Hyponatremia    Hyperkalemia    Type 2 diabetes mellitus with hyperglycemia    Elevated LFTs    Anemia, chronic disease    Cervical lymphadenopathy    Renal failure     Past Medical History:   Diagnosis Date    Allergic     Anemia     Anticoagulated     WARFARIN    Arteriovenous malformation of gastrointestinal tract 2016    Arthritis     Asthma     Black hairy tongue     CHF (congestive heart failure)     Chronic back pain     Coronary artery disease     Depression 2016    Diabetes mellitus     Dyslipidemia 2016    Elevated cholesterol     Emphysema/COPD  05/12/2016    Emphysema/COPD 05/12/2016    Fatigue     GERD (gastroesophageal reflux disease)     Headache     Herniated cervical disc     History of echocardiogram 07/16/2013    Left ventricular systolic function at the lower limits of normal; EF 50-55%; mild MR; mechanical prosthetic mitral valve present; mod TR    History of transfusion     NO REACTION; [facility]    Hyperlipidemia     Hypertension     Insomnia 05/12/2016    Iron deficiency     A.  The patient was treated with IV iron replacement. B.  Secondary to GI bleed, status post EGD with cauterization of blood vessel to the duodenum, 7/16/2014.    Left bundle branch hemiblock 10/12/2020    Left shoulder strain     Liver disease     Liver mass     Long term current use of anticoagulant 05/12/2016    Low back pain     Lung nodules     PER DR. LEVI'S NOTE 10/22    Morbid obesity 05/12/2016    Neuromuscular disorder 7/28/2016    Obstructive sleep apnea syndrome 05/12/2016    Description: A.  Moderate; CPAP therapy..    Sliding hiatal hernia 07/28/2016    Description: A.  Reported on EGD, 12/30/2011.    Type 2 diabetes mellitus 05/12/2016    Urinary tract infection     Uterine cancer     HYSTERECTOMY    Vitamin B12 deficiency     A.  The patient was treated with vitamin B12 replacement.    Wears eyeglasses      Past Surgical History:   Procedure Laterality Date    BILATERAL OOPHORECTOMY  2009    Status post bilateral oophorectomy secondary to ovarian cysts     BREAST BIOPSY Right     STEREOTACTIC    BREAST EXCISIONAL BIOPSY Bilateral     CARDIAC ELECTROPHYSIOLOGY PROCEDURE N/A 10/15/2020    Procedure: Biventricular Device Insertion;  Surgeon: Nadir Everett MD;  Location: [facility];  Service: Cardiology;  Laterality: N/A;    CARDIAC SURGERY  2003    CARPAL TUNNEL RELEASE Bilateral     COLONOSCOPY  12/28/2012    COLONOSCOPY N/A 5/23/2023    Procedure: COLONOSCOPY;  Surgeon: Gt Fernandes MD;  Location: [facility];  Service:  Gastroenterology;  Laterality: N/A;    COLONOSCOPY N/A 11/16/2023    Procedure: COLONOSCOPY;  Surgeon: Brunner, Mark I, MD;  Location:  [facility];  Service: Gastroenterology;  Laterality: N/A;    CYSTECTOMY      removal of ganglion cyst from left wrist    ENDOSCOPY  12/30/2011    DIAGNOSTIC ESOPHAGOGASTRODUODENOSCOPY    ENDOSCOPY N/A 11/16/2023    Procedure: ESOPHAGOGASTRODUODENOSCOPY;  Surgeon: Brunner, Mark I, MD;  Location:  [facility];  Service: Gastroenterology;  Laterality: N/A;    HYSTERECTOMY  1995    A.  Status post hysterectomy for early stage endometrial cancer done in 1995.    MITRAL VALVE REPLACEMENT  03/03/2008    Dr. Hima Barreto; MECHANICAL    OTHER SURGICAL HISTORY      LIVER MASS BLOOD SUPPLY CLIPPED ON RIGHT AND LEFT SIDE PER PT      General Information       Row Name 01/02/24 1148          Physical Therapy Time and Intention    Document Type evaluation  -SC     Mode of Treatment physical therapy  -SC       Row Name 01/02/24 1148          General Information    Patient Profile Reviewed yes  -SC     Prior Level of Function independent:;gait;transfer;bed mobility  walks in home , has walker  -SC     Existing Precautions/Restrictions fall  -SC     Barriers to Rehab medically complex;previous functional deficit  -SC       Row Name 01/02/24 1148          Living Environment    People in Home child(juan), dependent  -SC       Row Name 01/02/24 1148          Home Main Entrance    Number of Stairs, Main Entrance none  -SC       Row Name 01/02/24 1148          Stairs Within Home, Primary    Number of Stairs, Within Home, Primary none  -SC       Row Name 01/02/24 1148          Cognition    Orientation Status (Cognition) oriented x 3  -SC       Row Name 01/02/24 1148          Safety Issues, Functional Mobility    Impairments Affecting Function (Mobility) pain;postural/trunk control;sensation/sensory awareness;strength  -SC     Comment, Safety Issues/Impairments (Mobility) alert, following commands   -SC               User Key  (r) = Recorded By, (t) = Taken By, (c) = Cosigned By      Initials Name Provider Type    SC Isaías Kumar PT Physical Therapist                   Mobility       Row Name 01/02/24 1149          Bed Mobility    Comment, (Bed Mobility) recieved sitting on edge of bed  -SC       Row Name 01/02/24 1149          Transfers    Comment, (Transfers) Cues for safety and hand placement. Demonstrated safe transfers  -[facility] Name 01/02/24 1149          Sit-Stand Transfer    Sit-Stand [place] (Transfers) standby assist  -SC     Assistive Device (Sit-Stand Transfers) walker, front-wheeled  -SC       Row Name 01/02/24 1149          Gait/Stairs (Locomotion)    [place] Level (Gait) 1 person assist;1 person to manage equipment;contact guard  -SC     Assistive Device (Gait) walker, front-wheeled  -SC     Distance in Feet (Gait) 60  -SC     Deviations/Abnormal Patterns (Gait) gait speed decreased  -SC     Bilateral Gait Deviations forward flexed posture  -SC     Comment, (Gait/Stairs) Demonstrated slow ambulaiton with step through gait pattern. No buckling or Loss of balance. c/o fatigue at end of walk  -SC               User Key  (r) = Recorded By, (t) = Taken By, (c) = Cosigned By      Initials Name Provider Type    SC Isaías Kumar PT Physical Therapist                   Obj/Interventions       [facility] Name 01/02/24 1151          Range of Motion Comprehensive    General Range of Motion bilateral lower extremity ROM WFL  -[facility] Name 01/02/24 1151          Strength Comprehensive (MMT)    General Manual Muscle Testing (MMT) Assessment lower extremity strength deficits identified  -SC     Comment, General Manual Muscle Testing (MMT) Assessment tib ant 4/5, quads 4/5  -SC       Row Name 01/02/24 1151          Balance    Balance Assessment standing dynamic balance  -SC     Dynamic Standing Balance 1-person assist;contact guard  -SC     Position/Device Used, Standing Balance  supported;walker, rolling  -SC     Comment, Balance c/o back and leg pain  -SC       Row Name 01/02/24 1151          Sensory Assessment (Somatosensory)    Sensory Assessment (Somatosensory) other (see comments)  + numbness R lateral thigh  -SC               User Key  (r) = Recorded By, (t) = Taken By, (c) = Cosigned By      Initials Name Provider Type    SC Isaías Kumar, PT Physical Therapist                   Goals/Plan       Row Name 01/02/24 1201          Bed Mobility Goal 1 (PT)    Activity/Assistive Device (Bed Mobility Goal 1, PT) bed mobility activities, all  -SC     [place] Level/Cues Needed (Bed Mobility Goal 1, PT) independent  -SC     Time Frame (Bed Mobility Goal 1, PT) long term goal (LTG);10 days  -SC       Row Name 01/02/24 1201          Transfer Goal 1 (PT)    Activity/Assistive Device (Transfer Goal 1, PT) bed-to-chair/chair-to-bed;walker, rolling  -SC     [place] Level/Cues Needed (Transfer Goal 1, PT) modified independence  -SC     Time Frame (Transfer Goal 1, PT) long term goal (LTG);10 days  -[facility] Name 01/02/24 1201          Gait Training Goal 1 (PT)    Activity/Assistive Device (Gait Training Goal 1, PT) gait (walking locomotion)  -SC     [place] Level (Gait Training Goal 1, PT) modified independence  -SC     Distance (Gait Training Goal 1, PT) 150  -SC     Time Frame (Gait Training Goal 1, PT) long term goal (LTG);10 days  -SC       Row Name 01/02/24 1201          Therapy Assessment/Plan (PT)    Planned Therapy Interventions (PT) bed mobility training;gait training;home exercise program;strengthening;ROM (range of motion);patient/family education;transfer training  -SC               User Key  (r) = Recorded By, (t) = Taken By, (c) = Cosigned By      Initials Name Provider Type    SC Isaías Kumar, PT Physical Therapist                   Clinical Impression       Row Name 01/02/24 1153          Pain    Pretreatment Pain Rating 5/10  -SC     Posttreatment Pain Rating  5/10  -SC     Pain Location - back  -SC     Pre/Posttreatment Pain Comment R hip  -SC     Pain Intervention(s) Repositioned  -SC       Row Name 01/02/24 1153          Plan of Care Review    Plan of Care Reviewed With patient  -SC     Outcome Evaluation Patient presents with weakness in her legs. This is a chronic condition which includes back and leg pain that fluctuates. Her ambulation is newly limited by fatigue. Recommend continued skilled PT and home with [facility] at discharge.  -SC       Row Name 01/02/24 1153          Therapy Assessment/Plan (PT)    Patient/Family Therapy Goals Statement (PT) go home  -SC     Rehab Potential (PT) good, to achieve stated therapy goals  -SC     Criteria for Skilled Interventions Met (PT) yes  -SC     Therapy Frequency (PT) daily  -SC       Row Name 01/02/24 1153          Vital Signs    Pre Systolic BP Rehab 124  -SC     Pre Treatment Diastolic BP 51  -SC     Post Systolic BP Rehab 115  -SC     Post Treatment Diastolic BP 48  -SC     Posttreatment Heart Rate (beats/min) 73  -SC     Post SpO2 (%) 98  -SC     O2 Delivery Post Treatment supplemental O2  -SC       Row Name 01/02/24 1153          Positioning and Restraints    Pre-Treatment Position in bed  -SC     Post Treatment Position bed  -SC     In Bed with OT;sitting;sitting EOB  -SC               User Key  (r) = Recorded By, (t) = Taken By, (c) = Cosigned By      Initials Name Provider Type    SC Isaías Kumar, PT Physical Therapist                   Outcome Measures       Row Name 01/02/24 1202 01/02/24 0816       How much help from another person do you currently need...    Turning from your back to your side while in flat bed without using bedrails? 3  -SC 3  -MK    Moving from lying on back to sitting on the side of a flat bed without bedrails? 3  -SC 3  -MK    Moving to and from a bed to a chair (including a wheelchair)? 3  -SC 3  -MK    Standing up from a chair using your arms (e.g., wheelchair, bedside chair)? 3   -SC 3  -MK    Climbing 3-5 steps with a railing? 2  -SC 3  -MK    To walk in hospital room? 3  -SC 3  -MK    AM-PAC 6 Clicks Score (PT) 17  -SC 18  -MK    Highest Level of Mobility Goal 5 --> Static standing  -SC 6 --> Walk 10 steps or more  -      Row Name 01/02/24 0409          How much help from another person do you currently need...    Turning from your back to your side while in flat bed without using bedrails? 4  -SK     Moving from lying on back to sitting on the side of a flat bed without bedrails? 4  -SK     Moving to and from a bed to a chair (including a wheelchair)? 4  -SK     Standing up from a chair using your arms (e.g., wheelchair, bedside chair)? 4  -SK     Climbing 3-5 steps with a railing? 3  -SK     To walk in hospital room? 3  -SK     AM-PAC 6 Clicks Score (PT) 22  -SK     Highest Level of Mobility Goal 7 --> Walk 25 feet or more  -SK       Row Name 01/02/24 1202          Functional Assessment    Outcome Measure Options AM-[facility] 6 Clicks Basic Mobility (PT)  -SC               User Key  (r) = Recorded By, (t) = Taken By, (c) = Cosigned By      Initials Name Provider Type    SC Isaías Kumar, PT Physical Therapist    Marjan Camilo, RN Registered Nurse    Onelia Seymour RN Registered Nurse                                 Physical Therapy Education       Title: PT OT SLP Therapies (In Progress)       Topic: Physical Therapy (Done)       Point: Mobility training (Done)       Learning Progress Summary             Patient OSVALDO Lane VU by SC at 1/2/2024 1203    Comment: reviewed benefits of activity                         Point: Home exercise program (Done)       Learning Progress Summary             Patient Eager, E, VU by SC at 1/2/2024 1203    Comment: reviewed benefits of activity                         Point: Body mechanics (Done)       Learning Progress Summary             Patient Eager, E, VU by SC at 1/2/2024 1203    Comment: reviewed benefits of activity                          Point: Precautions (Done)       Learning Progress Summary             Patient OSVALDO Lane, VU by SC at 1/2/2024 1203    Comment: reviewed benefits of activity                                         User Key       Initials Effective Dates Name Provider Type Discipline    SC 02/03/23 -  Isaías Kumar PT Physical Therapist PT                  PT Recommendation and Plan  Planned Therapy Interventions (PT): bed mobility training, gait training, home exercise program, strengthening, ROM (range of motion), patient/family education, transfer training  Plan of Care Reviewed With: patient  Outcome Evaluation: Patient presents with weakness in her legs. This is a chronic condition which includes back and leg pain that fluctuates. Her ambulation is newly limited by fatigue. Recommend continued skilled PT and home with [facility] at discharge.     Time Calculation:   PT Evaluation Complexity  History, PT Evaluation Complexity: 3 or more personal factors and/or comorbidities  Examination of Body Systems (PT Eval Complexity): total of 4 or more elements  Clinical Presentation (PT Evaluation Complexity): evolving  Clinical Decision Making (PT Evaluation Complexity): moderate complexity  Overall Complexity (PT Evaluation Complexity): moderate complexity     PT Charges       Row Name 01/02/24 1116             Time Calculation    Start Time 1116  -SC      PT Received On 01/02/24  -SC      PT Goal Re-Cert Due Date 01/12/24  -SC         Untimed Charges    PT Eval/Re-eval Minutes 43  -SC         Total Minutes    Untimed Charges Total Minutes 43  -SC       Total Minutes 43  -SC                User Key  (r) = Recorded By, (t) = Taken By, (c) = Cosigned By      Initials Name Provider Type    SC Isaías Kumar, PT Physical Therapist                  Therapy Charges for Today       Code Description Service Date Service Provider Modifiers Qty    [id number] HC PT EVAL MOD COMPLEXITY 3 1/2/2024 Isaías Kumar PT GP 1            PT  G-Codes  Outcome Measure Options: AM-PAC 6 Clicks Basic Mobility (PT)  AM-PAC 6 Clicks Score (PT): 17  PT Discharge Summary  Anticipated Discharge Disposition (PT): home, home with home health    Isaías Kumar, PT  1/2/2024

## 2024-01-02 NOTE — ED NOTES
Di Lopez    Nursing Report ED to Floor:  Mental status: AOx4  Ambulatory status: assist x1  Oxygen Therapy:  2L, normally does not wear O2. Day,MD is aware.  Cardiac Rhythm: Normal sinus  Admitted from: ER  Safety Concerns:  NA  Social Issues: NA  ED Room #:  15    ED Nurse Phone Extension - [phone] or may call [phone].      HPI:   Chief Complaint   Patient presents with    Rectal Bleeding       Past Medical History:  Past Medical History:   Diagnosis Date    Allergic     Anemia     Anticoagulated     WARFARIN    Arteriovenous malformation of gastrointestinal tract 05/12/2016    Arthritis     Asthma     Black hairy tongue     CHF (congestive heart failure)     Chronic back pain     Coronary artery disease     Depression 05/12/2016    Diabetes mellitus     Dyslipidemia 05/12/2016    Elevated cholesterol     Emphysema/COPD 05/12/2016    Emphysema/COPD 05/12/2016    Fatigue     GERD (gastroesophageal reflux disease)     Headache     Herniated cervical disc     History of echocardiogram 07/16/2013    Left ventricular systolic function at the lower limits of normal; EF 50-55%; mild MR; mechanical prosthetic mitral valve present; mod TR    History of transfusion     NO REACTION; [facility]    Hyperlipidemia     Hypertension     Insomnia 05/12/2016    Iron deficiency     A.  The patient was treated with IV iron replacement. B.  Secondary to GI bleed, status post EGD with cauterization of blood vessel to the duodenum, 7/16/2014.    Left bundle branch hemiblock 10/12/2020    Left shoulder strain     Liver disease     Liver mass     Long term current use of anticoagulant 05/12/2016    Low back pain     Lung nodules     PER DR. LEVI'S NOTE 10/22    Morbid obesity 05/12/2016    Neuromuscular disorder 7/28/2016    Obstructive sleep apnea syndrome 05/12/2016    Description: A.  Moderate; CPAP therapy..    Sliding hiatal hernia 07/28/2016    Description: A.  Reported on EGD, 12/30/2011.    Type 2 diabetes mellitus 05/12/2016     Urinary tract infection     Uterine cancer     HYSTERECTOMY    Vitamin B12 deficiency     A.  The patient was treated with vitamin B12 replacement.    Wears eyeglasses         Past Surgical History:  Past Surgical History:   Procedure Laterality Date    BILATERAL OOPHORECTOMY  2009    Status post bilateral oophorectomy secondary to ovarian cysts     BREAST BIOPSY Right     STEREOTACTIC    BREAST EXCISIONAL BIOPSY Bilateral     CARDIAC ELECTROPHYSIOLOGY PROCEDURE N/A 10/15/2020    Procedure: Biventricular Device Insertion;  Surgeon: Nadir Everett MD;  Location:  [facility];  Service: Cardiology;  Laterality: N/A;    CARDIAC SURGERY  2003    CARPAL TUNNEL RELEASE Bilateral     COLONOSCOPY  12/28/2012    COLONOSCOPY N/A 5/23/2023    Procedure: COLONOSCOPY;  Surgeon: Gt Fernandes MD;  Location:  [facility];  Service: Gastroenterology;  Laterality: N/A;    COLONOSCOPY N/A 11/16/2023    Procedure: COLONOSCOPY;  Surgeon: Brunner, Mark I, MD;  Location:  [facility];  Service: Gastroenterology;  Laterality: N/A;    CYSTECTOMY      removal of ganglion cyst from left wrist    ENDOSCOPY  12/30/2011    DIAGNOSTIC ESOPHAGOGASTRODUODENOSCOPY    ENDOSCOPY N/A 11/16/2023    Procedure: ESOPHAGOGASTRODUODENOSCOPY;  Surgeon: Brunner, Mark I, MD;  Location:  [facility];  Service: Gastroenterology;  Laterality: N/A;    HYSTERECTOMY  1995    A.  Status post hysterectomy for early stage endometrial cancer done in 1995.    MITRAL VALVE REPLACEMENT  03/03/2008    Dr. Hima Barreto; MECHANICAL    OTHER SURGICAL HISTORY      LIVER MASS BLOOD SUPPLY CLIPPED ON RIGHT AND LEFT SIDE PER PT        Admitting Doctor:   Ariella Phan MD    Consulting Provider(s):  Consults       No orders found for last 30 day(s).             Admitting Diagnosis:   The primary encounter diagnosis was Acute renal failure, unspecified acute renal failure type. Diagnoses of Hyperkalemia and Gastrointestinal hemorrhage,  unspecified gastrointestinal hemorrhage type were also pertinent to this visit.    Most Recent Vitals:   Vitals:    01/02/24 0101 01/02/24 0116 01/02/24 0121 01/02/24 0128   BP: 111/53 114/47 101/44    Pulse: 76 75 73 77   Resp:       Temp:       TempSrc:       SpO2: 93% 92% 98% 98%   Weight:       Height:           Active LDAs/IV Access:   Lines, Drains & Airways       Active LDAs       Name Placement date Placement time Site Days    Peripheral IV 01/01/24 2334 Left Antecubital 01/01/24 2334  Antecubital  less than 1                    Labs (abnormal labs have a star):   Labs Reviewed   COMPREHENSIVE METABOLIC PANEL - Abnormal; Notable for the following components:       Result Value    Glucose 224 (*)     BUN 64 (*)     Creatinine 8.70 (*)     Sodium 126 (*)     Potassium 5.6 (*)     Chloride 87 (*)     CO2 20.0 (*)     Calcium 8.4 (*)     ALT (SGPT) 45 (*)     AST (SGOT) 34 (*)     Alkaline Phosphatase 247 (*)     Anion Gap 19.0 (*)     eGFR 4.8 (*)     All other components within normal limits    Narrative:     GFR Normal >60  Chronic Kidney Disease <60  Kidney Failure <15     LIPASE - Abnormal; Notable for the following components:    Lipase 82 (*)     All other components within normal limits   PROTIME-INR - Abnormal; Notable for the following components:    Protime 26.0 (*)     INR 2.36 (*)     All other components within normal limits   CBC WITH AUTO DIFFERENTIAL - Abnormal; Notable for the following components:    RBC 3.48 (*)     Hemoglobin 9.5 (*)     Hematocrit 29.3 (*)     Neutrophil % 76.7 (*)     Lymphocyte % 17.4 (*)     Monocyte % 3.8 (*)     Immature Grans % 1.4 (*)     Immature Grans, Absolute 0.06 (*)     All other components within normal limits   URINALYSIS W/ MICROSCOPIC IF INDICATED (NO CULTURE) - Abnormal; Notable for the following components:    Appearance, UA Cloudy (*)     Blood, UA Small (1+) (*)     Protein,  mg/dL (2+) (*)     All other components within normal limits    URINALYSIS, MICROSCOPIC ONLY - Abnormal; Notable for the following components:    WBC, UA 3-5 (*)     Squamous Epithelial Cells, UA 13-20 (*)     All other components within normal limits   BLOOD GAS, VENOUS W/CO-OXIMETRY - Abnormal; Notable for the following components:    pCO2, Venous 35.9 (*)     HCO3, Venous 20.6 (*)     Base Excess, Venous -4.3 (*)     Hemoglobin, Blood Gas 9.2 (*)     CO2 Content 21.7 (*)     All other components within normal limits   POCT OCCULT BLOOD STOOL - Abnormal; Notable for the following components:    Fecal Occult Blood Positive (*)     All other components within normal limits   POCT GLUCOSE FINGERSTICK - Abnormal; Notable for the following components:    Glucose 255 (*)     All other components within normal limits   COVID-19/FLUA&B/RSV, NP SWAB IN TRANSPORT MEDIA 1 HR TAT - Normal    Narrative:     Fact sheet for providers: https://www.fda.gov/media/568099/download    Fact sheet for patients: https://www.fda.gov/media/455996/download    Test performed by PCR.   LACTIC ACID, PLASMA - Normal   BETA HYDROXYBUTYRATE QUANTITATIVE - Normal    Narrative:     In the assessment of possible diabetic ketoacidosis, the test should be interpreted along with other clinical and laboratory findings.  A level greater than 1 mmol/L should require further evaluation and levels of more than 3 mmol/L require immediate medical review.   COVID PRE-OP / PRE-PROCEDURE SCREENING ORDER (NO ISOLATION)    Narrative:     The following orders were created for panel order COVID PRE-OP / PRE-PROCEDURE SCREENING ORDER (NO ISOLATION) - Swab, Nasopharynx.  Procedure                               Abnormality         Status                     ---------                               -----------         ------                     COVID-19, FLU A/B, RSV P...[[id number]]  Normal              Final result                 Please view results for these tests on the individual orders.   GASTROINTESTINAL PANEL, PCR  ([facility])   BLOOD GAS, VENOUS   BASIC METABOLIC PANEL   HIV-1/O/2 ANTIGEN/ANTIBODY   HEPATITIS PANEL, ACUTE   POCT GLUCOSE FINGERSTICK   POCT GLUCOSE FINGERSTICK   POCT GLUCOSE FINGERSTICK   POCT GLUCOSE FINGERSTICK   TYPE AND SCREEN   CBC AND DIFFERENTIAL    Narrative:     The following orders were created for panel order CBC & Differential.  Procedure                               Abnormality         Status                     ---------                               -----------         ------                     CBC Auto Differential[[id number]]        Abnormal            Final result                 Please view results for these tests on the individual orders.   KETONE BODIES SERUM    Narrative:     The following orders were created for panel order Ketone Bodies, Serum (Not performed at [facility]).  Procedure                               Abnormality         Status                     ---------                               -----------         ------                     Beta Hydroxybutyrate [place]...[[id number]]  Normal              Final result                 Please view results for these tests on the individual orders.       Meds Given in ED:   Medications   sodium chloride 0.9 % bolus 500 mL (has no administration in time range)   sodium chloride 0.9 % bolus 1,000 mL (0 mL Intravenous Stopped 1/2/24 0132)   calcium gluconate 1000 Mg/50ml 0.675% NaCl IV SOLN (0 mg Intravenous Stopped 1/2/24 0220)   insulin regular (humuLIN R,novoLIN R) injection 5 Units (5 Units Intravenous Given 1/2/24 0205)   dextrose (D50W) (25 g/50 mL) IV injection 25 g (25 g Intravenous Given 1/2/24 0205)   sodium zirconium cyclosilicate (LOKELMA) pack 10 g (10 g Oral Given 1/2/24 0205)

## 2024-01-02 NOTE — CASE MANAGEMENT/SOCIAL WORK
Continued Stay Note  [facility]     Patient Name: Di Lopez  MRN: [id number]  Today's Date: 1/2/2024    Admit Date: 1/1/2024    Plan: home with family   Discharge Plan       Row Name 01/02/24 0914       Plan    Plan home with family    Patient/Family in Agreement with Plan yes    Plan Comments Pt reports she lives in [place] with her family. She has a cpap and [facility] O2 [facility] and reports she has a motorized scooter she uses for distances. She is independent with ADLs. Pt is followed by her PCP and has drug coverage. At this time her plan for discharge is to return home. Cm to follow for any discharge needs    Final Discharge Disposition Code 01 - home or self-care                   Discharge Codes    No documentation.                       Marcela Gibbs RN

## 2024-01-02 NOTE — PLAN OF CARE
Goal Outcome Evaluation:  Plan of Care Reviewed With: patient           Outcome Evaluation: Pt completed bed mobility with min assist, LB dressing with min assist and toileting with CGA using RW. Pt limited wiht generalized weakness, decreased strength, decreased occupational endurance and chronic LBP radiating into RLE. Issued AE and educated on use. Recommend DC home with HHOT and RW.      Anticipated Discharge Disposition (OT): home with home health

## 2024-01-02 NOTE — ED PROVIDER NOTES
" EMERGENCY DEPARTMENT ENCOUNTER    Pt Name: Di Lopez  MRN: [id number]  Pt :   1960  Room Number:  1515  Date of encounter:  2024  PCP: Davina Santiago DO  ED Provider: TAVO Johnson    Historian: Patient    HPI:  Chief Complaint: GI bleeding    Context: Di Lopez is a 63 y.o. female who presents to the ED c/o gastrointestinal bleeding.  Patient shares that on 2023 she started feeling \"crummy\".  Patient states that she made it through [place] and has basically been in bed since then.  She reports feeling chills and just overwhelmingly tired.  She was seen and evaluated by her primary care physician and was found to have swollen lymph nodes and also complained of generalized aches.  Patient reports that she has had recent bouts of diarrhea and that today at approximately 5 AM this morning she noticed she was passing blood in her stools.  Patient has history of GI bleed.  She is anticoagulated on warfarin with history of mechanical valve.  HPI     REVIEW OF SYSTEMS  A chief complaint appropriate review of systems was completed and is negative except as noted in the HPI.     PAST MEDICAL HISTORY  Past Medical History:   Diagnosis Date    Allergic     Anemia     Anticoagulated     WARFARIN    Arteriovenous malformation of gastrointestinal tract 2016    Arthritis     Asthma     Black hairy tongue     CHF (congestive heart failure)     Chronic back pain     Coronary artery disease     Depression 2016    Diabetes mellitus     Dyslipidemia 2016    Elevated cholesterol     Emphysema/COPD 2016    Emphysema/COPD 2016    Fatigue     GERD (gastroesophageal reflux disease)     Headache     Herniated cervical disc     History of echocardiogram 2013    Left ventricular systolic function at the lower limits of normal; EF 50-55%; mild MR; mechanical prosthetic mitral valve present; mod TR    History of transfusion     NO REACTION; [facility]    " Hyperlipidemia     Hypertension     Insomnia 05/12/2016    Iron deficiency     A.  The patient was treated with IV iron replacement. B.  Secondary to GI bleed, status post EGD with cauterization of blood vessel to the duodenum, 7/16/2014.    Left bundle branch hemiblock 10/12/2020    Left shoulder strain     Liver disease     Liver mass     Long term current use of anticoagulant 05/12/2016    Low back pain     Lung nodules     PER DR. LEVI'S NOTE 10/22    Morbid obesity 05/12/2016    Neuromuscular disorder 7/28/2016    Obstructive sleep apnea syndrome 05/12/2016    Description: A.  Moderate; CPAP therapy..    Sliding hiatal hernia 07/28/2016    Description: A.  Reported on EGD, 12/30/2011.    Type 2 diabetes mellitus 05/12/2016    Urinary tract infection     Uterine cancer     HYSTERECTOMY    Vitamin B12 deficiency     A.  The patient was treated with vitamin B12 replacement.    Wears eyeglasses        PAST SURGICAL HISTORY  Past Surgical History:   Procedure Laterality Date    BILATERAL OOPHORECTOMY  2009    Status post bilateral oophorectomy secondary to ovarian cysts     BREAST BIOPSY Right     STEREOTACTIC    BREAST EXCISIONAL BIOPSY Bilateral     CARDIAC ELECTROPHYSIOLOGY PROCEDURE N/A 10/15/2020    Procedure: Biventricular Device Insertion;  Surgeon: Nadir Everett MD;  Location: [facility] CATH INVASIVE LOCATION;  Service: Cardiology;  Laterality: N/A;    CARDIAC SURGERY  2003    CARPAL TUNNEL RELEASE Bilateral     COLONOSCOPY  12/28/2012    COLONOSCOPY N/A 5/23/2023    Procedure: COLONOSCOPY;  Surgeon: Gt Fernandes MD;  Location: [facility] ENDOSCOPY;  Service: Gastroenterology;  Laterality: N/A;    COLONOSCOPY N/A 11/16/2023    Procedure: COLONOSCOPY;  Surgeon: Brunner, Mark I, MD;  Location: [facility] ENDOSCOPY;  Service: Gastroenterology;  Laterality: N/A;    CYSTECTOMY      removal of ganglion cyst from left wrist    ENDOSCOPY  12/30/2011    DIAGNOSTIC ESOPHAGOGASTRODUODENOSCOPY    ENDOSCOPY N/A  2023    Procedure: ESOPHAGOGASTRODUODENOSCOPY;  Surgeon: Brunner, Mark I, MD;  [facility];  Service: Gastroenterology;  Laterality: N/A;    HYSTERECTOMY  1995.  Status post hysterectomy for early stage endometrial cancer done in .    MITRAL VALVE REPLACEMENT  2008    Dr. Hima Barreto; MECHANICAL    OTHER SURGICAL HISTORY      LIVER MASS BLOOD SUPPLY CLIPPED ON RIGHT AND LEFT SIDE PER PT       FAMILY HISTORY  Family History   Problem Relation Age of Onset    Sudden death Mother         POSSIBLE SERIOUS MASSIVE STROKE    Hypertension Mother             Multiple myeloma Father     Cancer Father             Hypertension Father             Ovarian cancer Paternal Grandmother     Cancer Paternal Grandmother             Diabetes Brother             Hypertension Brother             Breast cancer Neg Hx        SOCIAL HISTORY  Social History     Socioeconomic History    Marital status: Single   Tobacco Use    Smoking status: Former     Packs/day: 1.00     Years: 30.00     Additional pack years: 0.00     Total pack years: 30.00     Types: Cigarettes, Electronic Cigarette     Start date: 1976     Quit date: 2/10/2010     Years since quittin.9    Smokeless tobacco: Never    Tobacco comments:     Quit in    Vaping Use    Vaping Use: Every day    Substances: Nicotine    Devices: Disposable, one every 2 days   Substance and Sexual Activity    Alcohol use: No    Drug use: Never    Sexual activity: Defer     Partners: Male     Birth control/protection: Abstinence, None, Hysterectomy     Comment: 1 partner       ALLERGIES  Adhesive tape, Sildenafil, Codeine, Morphine, Penicillins, Statins, and Sulfa antibiotics    PHYSICAL EXAM  Physical Exam  Vitals and nursing note reviewed.   Constitutional:       General: She is not in acute distress.     Appearance: Normal appearance. She is obese. She is not ill-appearing or toxic-appearing.    HENT:      Head: Normocephalic and atraumatic.      Nose: Nose normal.      Mouth/Throat:      Mouth: Mucous membranes are dry.   Eyes:      Extraocular Movements: Extraocular movements intact.   Cardiovascular:      Rate and Rhythm: Normal rate and regular rhythm.   Pulmonary:      Effort: Pulmonary effort is normal.   Abdominal:      General: There is no distension.      Tenderness: There is no abdominal tenderness.   Genitourinary:     Rectum: Normal. Guaiac result positive.   Musculoskeletal:         General: Normal range of motion.      Cervical back: Normal range of motion and neck supple.   Skin:     General: Skin is warm and dry.   Neurological:      General: No focal deficit present.      Mental Status: She is alert.   Psychiatric:         Mood and Affect: Mood normal.         Behavior: Behavior normal.         LAB RESULTS  Results for orders placed or performed during the hospital encounter of 01/01/24   COVID-19, FLU A/B, RSV PCR 1 HR TAT - Swab, Nasopharynx    Specimen: Nasopharynx; Swab   Result Value Ref Range    COVID19 Not Detected Not Detected - Ref. Range    Influenza A PCR Not Detected Not Detected    Influenza B PCR Not Detected Not Detected    RSV, PCR Not Detected Not Detected   Comprehensive Metabolic Panel    Specimen: Blood   Result Value Ref Range    Glucose 224 (H) 65 - 99 mg/dL    BUN 64 (H) 8 - 23 mg/dL    Creatinine 8.70 (H) 0.57 - 1.00 mg/dL    Sodium 126 (L) 136 - 145 mmol/L    Potassium 5.6 (H) 3.5 - 5.2 mmol/L    Chloride 87 (L) 98 - 107 mmol/L    CO2 20.0 (L) 22.0 - 29.0 mmol/L    Calcium 8.4 (L) 8.6 - 10.5 mg/dL    Total Protein 7.1 6.0 - 8.5 g/dL    Albumin 4.1 3.5 - 5.2 g/dL    ALT (SGPT) 45 (H) 1 - 33 U/L    AST (SGOT) 34 (H) 1 - 32 U/L    Alkaline Phosphatase 247 (H) 39 - 117 U/L    Total Bilirubin 0.3 0.0 - 1.2 mg/dL    Globulin 3.0 gm/dL    A/G Ratio 1.4 g/dL    BUN/Creatinine Ratio 7.4 7.0 - 25.0    Anion Gap 19.0 (H) 5.0 - 15.0 mmol/L    eGFR 4.8 (L) >60.0 mL/min/1.73    Lipase    Specimen: Blood   Result Value Ref Range    Lipase 82 (H) 13 - 60 U/L   Lactic Acid, Plasma    Specimen: Blood   Result Value Ref Range    Lactate 1.5 0.5 - 2.0 mmol/L   Protime-INR    Specimen: Blood   Result Value Ref Range    Protime 26.0 (H) 12.2 - 14.5 Seconds    INR 2.36 (H) 0.89 - 1.12   CBC Auto Differential    Specimen: Blood   Result Value Ref Range    WBC 4.42 3.40 - 10.80 10*3/mm3    RBC 3.48 (L) 3.77 - 5.28 10*6/mm3    Hemoglobin 9.5 (L) 12.0 - 15.9 g/dL    Hematocrit 29.3 (L) 34.0 - 46.6 %    MCV 84.2 79.0 - 97.0 fL    MCH 27.3 26.6 - 33.0 pg    MCHC 32.4 31.5 - 35.7 g/dL    RDW 14.6 12.3 - 15.4 %    RDW-SD 44.6 37.0 - 54.0 fl    MPV 10.9 6.0 - 12.0 fL    Platelets 250 140 - 450 10*3/mm3    Neutrophil % 76.7 (H) 42.7 - 76.0 %    Lymphocyte % 17.4 (L) 19.6 - 45.3 %    Monocyte % 3.8 (L) 5.0 - 12.0 %    Eosinophil % 0.5 0.3 - 6.2 %    Basophil % 0.2 0.0 - 1.5 %    Immature Grans % 1.4 (H) 0.0 - 0.5 %    Neutrophils, Absolute 3.39 1.70 - 7.00 10*3/mm3    Lymphocytes, Absolute 0.77 0.70 - 3.10 10*3/mm3    Monocytes, Absolute 0.17 0.10 - 0.90 10*3/mm3    Eosinophils, Absolute 0.02 0.00 - 0.40 10*3/mm3    Basophils, Absolute 0.01 0.00 - 0.20 10*3/mm3    Immature Grans, Absolute 0.06 (H) 0.00 - 0.05 10*3/mm3    nRBC 0.0 0.0 - 0.2 /100 WBC   Urinalysis With Microscopic If Indicated (No Culture) - Urine, Clean Catch    Specimen: Urine, Clean Catch   Result Value Ref Range    Color, UA Yellow Yellow, Straw    Appearance, UA Cloudy (A) Clear    pH, UA <=5.0 5.0 - 8.0    Specific Gravity, UA 1.016 1.001 - 1.030    Glucose, UA Negative Negative    Ketones, UA Negative Negative    Bilirubin, UA Negative Negative    Blood, UA Small (1+) (A) Negative    Protein,  mg/dL (2+) (A) Negative    Leuk Esterase, UA Negative Negative    Nitrite, UA Negative Negative    Urobilinogen, UA 0.2 E.U./dL 0.2 - 1.0 E.U./dL   Urinalysis, Microscopic Only - Urine, Clean Catch    Specimen: Urine, Clean Catch   Result  Value Ref Range    RBC, UA 0-2 None Seen, 0-2 /HPF    WBC, UA 3-5 (A) None Seen, 0-2 /HPF    Bacteria, UA Trace None Seen, Trace /HPF    Squamous Epithelial Cells, UA 13-20 (A) None Seen, 0-2 /HPF    Transitional Epithelial Cells, UA 0-2 0 - 2 /HPF    Hyaline Casts, UA 0-6 0 - 6 /LPF    Coarse Granular Casts, UA 0-2 None Seen /LPF    Fine Granular Casts, UA 0-2 None Seen /LPF    Amorphous Crystals, UA Moderate/2+ None Seen /HPF    Methodology Manual Light Microscopy    Blood Gas, Venous With Co-Ox    Specimen: Venous Blood   Result Value Ref Range    Site Nurse/Dr Draw     pH, Venous 7.367 7.310 - 7.410 pH Units    pCO2, Venous 35.9 (L) 41.0 - 51.0 mm Hg    pO2, Venous 43.5 27.0 - 53.0 mm Hg    HCO3, Venous 20.6 (L) 22.0 - 28.0 mmol/L    Base Excess, Venous -4.3 (L) -2.0 - 2.0 mmol/L    Hemoglobin, Blood Gas 9.2 (L) 14 - 18 g/dL    Oxyhemoglobin Venous 74.7 %    Methemoglobin Venous 0.2 %    Carboxyhemoglobin Venous 1.3 %    CO2 Content 21.7 (L) 22 - 33 mmol/L    Temperature 37.0     Barometric Pressure for Blood Gas      Modality Room Air     FIO2 21 %    Rate 0 Breaths/minute    PIP 0 cmH2O    IPAP 0     EPAP 0    Beta Hydroxybutyrate Quantitative    Specimen: Blood   Result Value Ref Range    Beta-Hydroxybutyrate Quant 0.129 0.020 - 0.270 mmol/L   POC Occult Blood Stool    Specimen: Per Rectum; Stool   Result Value Ref Range    Fecal Occult Blood Positive (A)     Lot Number [id number]     Expiration Date 10/26     DEVELOPER LOT NUMBER [id number]     DEVELOPER EXPIRATION DATE 6/2,025     Positive Control Positive     Negative Control Negative    POC Glucose Once    Specimen: Blood   Result Value Ref Range    Glucose 255 (H) 70 - 130 mg/dL   ECG 12 Lead Electrolyte Imbalance   Result Value Ref Range    QT Interval 502 ms    QTC Interval 549 ms   Type & Screen    Specimen: Blood   Result Value Ref Range    ABO Type O     RH type Positive     Antibody Screen Negative     T&S Expiration Date 1/4/2024 11:59:59 PM      *Note:  Due to a large number of results and/or encounters for the requested time period, some results have not been displayed. A complete set of results can be found in Results Review.       If labs were ordered, I independently reviewed the results and considered them in treating the patient.    RADIOLOGY  CT Soft Tissue Neck Without Contrast   Final Result   Impression:   1.There is an enlarged and inflamed lymph node in the right supraclavicular fossa with adjacent prominent but nonenlarged lymph nodes. There are also enlarged left-sided level II and III lymph nodes. These appear inflammatory, but could be related to    infection or malignancy and clinical follow-up is recommended.   2.Mild emphysema.   3.Atherosclerotic disease and mild cervical degenerative changes.            Electronically Signed: Delfino Grimes MD     1/2/2024 2:35 AM EST     Workstation ID: [id number]      CT Abdomen Pelvis Without Contrast   Final Result   Impression:   1.No acute abdominal or pelvic abnormality.   2.Colonic diverticulosis without evidence of diverticulitis.   3.Stable lobulated mass in the left hepatic lobe with evidence of previous embolization. Patient has a history of a liver adenoma.   4.Stable 4 mm nodules in the right lung base. These are unchanged since at least 2022.   5.12 mm rounded nodule in the far lateral left breast. The patient appears up-to-date on mammogram screening and this is likely a partially visualized lymph node.            Electronically Signed: Delfino Grimes MD     1/2/2024 12:32 AM EST     Workstation ID: [id number]        [x] Radiologist's Report Reviewed:  I ordered and independently interpreted the above noted radiographic studies.  See radiologist's dictation for official interpretation.      PROCEDURES    Procedures    ECG 12 Lead Electrolyte Imbalance   Preliminary Result   Test Reason : Electrolyte Imbalance   Blood Pressure :   */*   mmHG   Vent. Rate :  72 BPM     Atrial Rate :  72 BPM      P-R  Int : 140 ms          QRS Dur : 146 ms       QT Int : 502 ms       P-R-T Axes :  44 196  66 degrees      QTc Int : 549 ms      ** Poor data quality, interpretation may be adversely affected   ** Suspect unspecified pacemaker failure   Atrial-sensed ventricular-paced rhythm   Abnormal ECG   When compared with ECG of 09-NOV-2023 23:36,   No significant change was found      Referred By: EDMD           Confirmed By:           MEDICATIONS GIVEN IN ER    Medications   sodium chloride 0.9 % bolus 500 mL (has no administration in time range)   sodium chloride 0.9 % bolus 1,000 mL (0 mL Intravenous Stopped 1/2/24 0132)   calcium gluconate 1000 Mg/50ml 0.675% NaCl IV SOLN (0 mg Intravenous Stopped 1/2/24 0220)   insulin regular (humuLIN R,novoLIN R) injection 5 Units (5 Units Intravenous Given 1/2/24 0205)   dextrose (D50W) (25 g/50 mL) IV injection 25 g (25 g Intravenous Given 1/2/24 0205)   sodium zirconium cyclosilicate (LOKELMA) pack 10 g (10 g Oral Given 1/2/24 0205)       MEDICAL DECISION MAKING, PROGRESS, and CONSULTS   Medical Decision Making  Problems Addressed:  Acute renal failure, unspecified acute renal failure type: complicated acute illness or injury  Gastrointestinal hemorrhage, unspecified gastrointestinal hemorrhage type: complicated acute illness or injury  Hyperkalemia: complicated acute illness or injury    Amount and/or Complexity of Data Reviewed  Labs: ordered.  Radiology: ordered.  ECG/medicine tests: ordered.    Risk  OTC drugs.  Prescription drug management.  Decision regarding hospitalization.        All labs have been independently reviewed by me.  All radiology studies have been interpreted by me and the radiologist dictating the report.  All EKG's have been independently interpreted by me as well as and overseeing attending physician.    [] Discussed with radiology regarding test interpretation:    Discussion below represents my analysis of pertinent findings related to patient's condition,  differential diagnosis, treatment plan and final disposition.    Differential diagnosis:  The differential diagnosis associated with the patient's presentation includes: Peptic ulcer disease, gastritis, esophagitis, Judit-Verduzco tear, gastric/esophageal varices, malignancy, hemorrhagic gastritis, celiac disease, diverticulosis, diverticulitis, inflammatory bowel disease, infectious colitis, mesenteric ischemia, ischemic colitis, colorectal cancer, hemorrhoids, anal fissure and others    Additional sources  Discussed/ obtained information from independent historians:   [] Spouse  [] Parent  [] Family member  [] Friend  [] EMS   [] Other:  External (non-ED) record review:   [] Inpatient record:   [] Office record:   [] Outpatient record:   [] Prior Outpatient labs:   [] Prior Outpatient radiology:   [x] Primary Care record: Patient seen and evaluated outpatient on December 8, 2023 with diagnosis of type 2 diabetes, hypertension; subsequent visit on December 27, 2023 where patient was seen and evaluated and diagnosed with cough and anemia   [] Outside ED record:   [] Other:   Patient's care impacted by:   [x] Diabetes  [x] Hypertension  [x] Hyperlipidemia  [] Hypothyroidism   [] Coronary Artery Disease   [x] COPD   [] Cancer   [x] Obesity  [] GERD   [] Tobacco Abuse   [] Substance Abuse    [] Anxiety   [x] Depression   [x] Other: Anemia of chronic disease  Care significantly affected by Social Determinants of Health (housing and economic circumstances, unemployment)    [] Yes     [x] No   If yes, Patient's care significantly limited by  Social Determinants of Health including:   [] Inadequate housing   [] Low income   [] Alcoholism and drug addiction in family   [] Problems related to primary support group   [] Unemployment   [] Problems related to employment   [] Other Social Determinants of Health:     Shared decision making:  I reviewed workup performed in ED including labs and imaging. Based on findings,  recommendation made for admission. Patient is agreeable to plan of care and hospital admission.      Orders placed during this visit:  Orders Placed This Encounter   Procedures    COVID PRE-OP / PRE-PROCEDURE SCREENING ORDER (NO ISOLATION) - Swab, Nasopharynx    COVID-19, FLU A/B, RSV PCR 1 HR TAT - Swab, Nasopharynx    Gastrointestinal Panel, PCR ([facility]) - Stool, Per Rectum    CT Abdomen Pelvis Without Contrast    CT Soft Tissue Neck Without Contrast    Comprehensive Metabolic Panel    Lipase    Lactic Acid, Plasma    Protime-INR    CBC Auto Differential    Urinalysis With Microscopic If Indicated (No Culture) - Urine, Clean Catch    Urinalysis, Microscopic Only - Urine, Clean Catch    Blood Gas, Venous -With Co-Ox Panel: Yes    Blood Gas, Venous With Co-Ox    Beta Hydroxybutyrate Quantitative    Basic Metabolic Panel    HIV-1 / O / 2 Ag / Antibody    Hepatitis Panel, Acute    Code Status and Medical Interventions:    POC Occult Blood Stool    POC Glucose Q1H    POC Glucose Once    ECG 12 Lead Electrolyte Imbalance    Type & Screen    Inpatient Admission    CBC & Differential    Ketone Bodies, Serum (Not performed at [place])       ED Course:    ED Course as of 01/02/24 0331 Tue Jan 02, 2024   0326 In summary this is a 63-year-old nontoxic-appearing female who presents the ER for evaluation of bright red blood in her stool.  Hemodynamically stable with hemoglobin of 9.5 and hematocrit of 29.3.  Patient found to be in acute renal failure with serum creatinine of 8.7 and BUN of 64.  Also hyperkalemic with potassium of 5.6.  Hyperkalemia protocol initiated.  Type and screen obtained.  Remainder of labs without acute abrasion realities.  CT scan of abdomen pelvis demonstrates. No acute abdominal or pelvic abnormality.  Hospitalist messaged for admission and was agreeable to accept patient.  Patient agreeable to plan of care and hospital admission for further evaluation and treatment. [JG]      ED Course User  Index  [JG] Jayro Laughlin PA            DIAGNOSIS  Final diagnoses:   Acute renal failure, unspecified acute renal failure type   Hyperkalemia   Gastrointestinal hemorrhage, unspecified gastrointestinal hemorrhage type       DISPOSITION    ED Disposition       ED Disposition   Decision to Admit    Condition   --    Comment   Level of Care: Telemetry [5]   Diagnosis: Renal failure [[id number]]   Admitting Physician: ALLAN GUERRERO [[id number]]   Attending Physician: ALLAN GUERRERO [[id number]]   Certification: I Certify That Inpatient Hospital Services Are Medically Necessary For Greater Than 2 Midnights                 Please note that portions of this document were completed with voice recognition software.        Jayro Laughlin PA  01/02/24 0331

## 2024-01-02 NOTE — PROGRESS NOTES
Clinical Nutrition     Reason for Visit: Identified at risk by screening criteria      Patient Name: Di Lopez  YOB: 1960  MRN: [id number]  Date of Encounter: 01/02/24 18:31 EST  Admission date: 1/1/2024      Nutrition Assessment   Admission Diagnosis:  Renal failure [N19]      Problem List:    Renal failure    Arteriovenous malformation of gastrointestinal tract    Type 2 diabetes mellitus without complication, with long-term current use of insulin    Dyslipidemia    Emphysema/COPD    Hypertension    Obstructive sleep apnea syndrome    Chronic anticoagulation    Left bundle branch hemiblock    ARF (acute renal failure)    H/O heart valve replacement with mechanical valve    GI bleed    Hyponatremia    Hyperkalemia    Type 2 diabetes mellitus with hyperglycemia    Elevated LFTs    Anemia, chronic disease    Cervical lymphadenopathy        PMH:   She  has a past medical history of Allergic, Anemia, Anticoagulated, Arteriovenous malformation of gastrointestinal tract (05/12/2016), Arthritis, Asthma, Black hairy tongue, CHF (congestive heart failure), Chronic back pain, Coronary artery disease, Depression (05/12/2016), Diabetes mellitus, Dyslipidemia (05/12/2016), Elevated cholesterol, Emphysema/COPD (05/12/2016), Emphysema/COPD (05/12/2016), Fatigue, GERD (gastroesophageal reflux disease), Headache, Herniated cervical disc, History of echocardiogram (07/16/2013), History of transfusion, Hyperlipidemia, Hypertension, Insomnia (05/12/2016), Iron deficiency, Left bundle branch hemiblock (10/12/2020), Left shoulder strain, Liver disease, Liver mass, Long term current use of anticoagulant (05/12/2016), Low back pain, Lung nodules, Morbid obesity (05/12/2016), Neuromuscular disorder (7/28/2016), Obstructive sleep apnea syndrome (05/12/2016), Sliding hiatal hernia (07/28/2016), Type 2 diabetes mellitus (05/12/2016), Urinary tract infection, Uterine cancer, Vitamin B12 deficiency, and  "Wears eyeglasses.    PSH:  She  has a past surgical history that includes Colonoscopy (12/28/2012); Esophagogastroduodenoscopy (12/30/2011); Bilateral oophorectomy (2009); Cystectomy; Mitral valve replacement (03/03/2008); Breast excisional biopsy (Bilateral); Breast biopsy (Right); Hysterectomy (1995); Cardiac electrophysiology procedure (N/A, 10/15/2020); Carpal tunnel release (Bilateral); Other surgical history; Cardiac surgery (2003); Colonoscopy (N/A, 5/23/2023); Colonoscopy (N/A, 11/16/2023); and Esophagogastroduodenoscopy (N/A, 11/16/2023).    Substance history: tobacco remote    Applicable Nutrition Concerns:   Skin: wnl  Oral:dry mouth  GI:GIB, last bm 1-1      Reported/Observed/Food/Nutrition Related History:     Pt resting in bed, on nasal cannula, reports fair appetite, is c/ o dry moth, got choked on some solid foods, noticed a bloody bm this past Sunday, UBW ~ 198lb's, had some increased swelling last month and weighed ~ 207lb's, when she went to MD office recently her wt had gone down to 182lb's, now in hospital her weight is back to 198lb's, unclear if she has lost weight or not        Anthropometrics     Flowsheet Rows      Flowsheet Row First Filed Value   Admission Height 152.4 cm (60\") Documented at 01/01/2024 2251   Admission Weight 84.8 kg (187 lb) Documented at 01/01/2024 2251          Height: Height: 152.4 cm (60\")  Last Filed Weight: Weight: 89.9 kg (198 lb 3.2 oz) (01/02/24 0408)  Method: Weight Method: Bed scale  BMI: BMI (Calculated): 38.7  BMI classification: Obese Class II: 35-39.9kg/m2      UBW:  198lb  Weight change:  ? loss     Weight     Weight (lbs) Weight Method   3/1/2023 192 lb 12.8 oz     3/20/2023 196 lb 12.8 oz     3/31/2023 193 lb 12.8 oz     4/25/2023 192 lb 12.8 oz     4/28/2023 192 lb 3.2 oz     5/16/2023 192 lb  Stated    5/31/2023 192 lb 0.3 oz     6/5/2023 192 lb     8/24/2023 193 lb 9.6 oz     10/6/2023 194 lb      194 lb 12.8 oz     10/18/2023 199 lb     11/2/2023 " 195 lb     11/9/2023 192 lb  Stated    11/10/2023 206 lb 8 oz  Bed scale     200 lb 8 oz  Bed scale    11/11/2023 202 lb 4.8 oz     11/14/2023 206 lb 12.8 oz  Standing scale      Standing scale    11/15/2023 208 lb 11.2 oz  Bed scale    11/16/2023 207 lb  Standing scale    11/17/2023 95 lb 3.4 oz  Bed scale     214 lb 8.1 oz     11/20/2023 207 lb 6 oz  Standing scale    11/21/2023 202 lb 9.6 oz  Standing scale    11/22/2023 199 lb 9.6 oz     12/8/2023 192 lb     12/27/2023 187 lb     1/1/2024 187 lb  Stated    1/2/2024 198 lb 3.2 oz  Bed scale          Nutrition Focused Physical Exam     Date:         Unable to perform exam due to: Defer pending indication    Current Nutrition Prescription   PO: Diet: Gastrointestinal Diets; Fiber-Restricted; Texture: Regular Texture (IDDSI 7); Fluid Consistency: Thin (IDDSI 0)  Oral Nutrition Supplement:   Intake: Insufficient data 75% of 1 meal      Nutrition Diagnosis   Date:  1-2-24            Updated:    Problem Swallowing difficulty   Etiology Per Clinical Status   Signs/Symptoms Pt c/o dry mouth, reports difficulty swallowing solids       Goal:   General: Nutrition support treatment  PO: Establish PO  EN/PN: N/A    Nutrition Intervention      Follow treatment progress, Care plan reviewed, Advised available snacks, Interview for preferences    Will add extra gravy with meals    Pt does not want chopped meats at present    MEERA, add Renal Restriction to diet as K+, Phos elevated    Suggest SLP eval if pt has any further S/S of dysphagia    Monitoring/Evaluation:   Per protocol      Sammie South RD  Time Spent: 30min

## 2024-01-02 NOTE — CONSULTS
Patient Care Team:  Davina Santiago DO as PCP - General (Family Medicine)  Ameena Iglesias MD as Consulting Physician (Endocrinology)  Nakita Freire APRN as Nurse Practitioner (Nurse Practitioner)  Raimundo Thomas MD as Consulting Physician (Pulmonary Disease)  Nadir Everett MD as Consulting Physician (Cardiology)  Luiz Longoria MD as Consulting Physician (Pain Medicine)    Chief complaint: MEERA   Poor oral intake.     HPI: Di Lopez is a 63 y.o. female who is admitted with acute renal failure (Cr of 8.70).  She has been feeling poorly with a multitude of symptoms for several weeks.   She describes sharp, severe frontal headaches with neck pain and bilateral neck swelling. Patient also noticed rectal bleeding and decided to come to ER. She was recently discharged from hospital following admission for MEERA. GI bleed in the setting of supra therapeutic INR. According to the patient since she went home she hasn't been feeling well. He appetite was on low side. Didn't notice any fluid build up. She admits taking intermittent Aleeve few times. She was also restarted on enteresto on last visit. Patient didn't get a change to followup with Nephrology as outpatient. On admission she is found to have severe acute kidney injury w cr~ 8.70 BUN 61 K 5.3 Na ~ 127 bicarb 19. HH 8.5/26        Review of Systems   Constitutional:  Positive for appetite change and fatigue.   HENT: Negative.     Cardiovascular: Negative.    Gastrointestinal:  Positive for blood in stool, diarrhea and nausea.   Genitourinary: Negative.    Musculoskeletal: Negative.    Skin: Negative.    Neurological: Negative.    Hematological: Negative.    Psychiatric/Behavioral: Negative.          Past Medical History:   Diagnosis Date    Allergic     Anemia     Anticoagulated     WARFARIN    Arteriovenous malformation of gastrointestinal tract 05/12/2016    Arthritis     Asthma     Black hairy tongue     CHF (congestive heart failure)      Chronic back pain     Coronary artery disease     Depression 05/12/2016    Diabetes mellitus     Dyslipidemia 05/12/2016    Elevated cholesterol     Emphysema/COPD 05/12/2016    Emphysema/COPD 05/12/2016    Fatigue     GERD (gastroesophageal reflux disease)     Headache     Herniated cervical disc     History of echocardiogram 07/16/2013    Left ventricular systolic function at the lower limits of normal; EF 50-55%; mild MR; mechanical prosthetic mitral valve present; mod TR    History of transfusion     NO REACTION; [facility]    Hyperlipidemia     Hypertension     Insomnia 05/12/2016    Iron deficiency     A.  The patient was treated with IV iron replacement. B.  Secondary to GI bleed, status post EGD with cauterization of blood vessel to the duodenum, 7/16/2014.    Left bundle branch hemiblock 10/12/2020    Left shoulder strain     Liver disease     Liver mass     Long term current use of anticoagulant 05/12/2016    Low back pain     Lung nodules     PER DR. LEVI'S NOTE 10/22    Morbid obesity 05/12/2016    Neuromuscular disorder 7/28/2016    Obstructive sleep apnea syndrome 05/12/2016    Description: A.  Moderate; CPAP therapy..    Sliding hiatal hernia 07/28/2016    Description: A.  Reported on EGD, 12/30/2011.    Type 2 diabetes mellitus 05/12/2016    Urinary tract infection     Uterine cancer     HYSTERECTOMY    Vitamin B12 deficiency     A.  The patient was treated with vitamin B12 replacement.    Wears eyeglasses    ,   Past Surgical History:   Procedure Laterality Date    BILATERAL OOPHORECTOMY  2009    Status post bilateral oophorectomy secondary to ovarian cysts     BREAST BIOPSY Right     STEREOTACTIC    BREAST EXCISIONAL BIOPSY Bilateral     CARDIAC ELECTROPHYSIOLOGY PROCEDURE N/A 10/15/2020    Procedure: Biventricular Device Insertion;  Surgeon: Nadir Everett MD;  Location: [facility] INVASIVE LOCATION;  Service: Cardiology;  Laterality: N/A;    CARDIAC SURGERY  2003    CARPAL TUNNEL RELEASE  Bilateral     COLONOSCOPY  2012    COLONOSCOPY N/A 2023    Procedure: COLONOSCOPY;  Surgeon: Gt Fernandes MD;  Location:  [facility];  Service: Gastroenterology;  Laterality: N/A;    COLONOSCOPY N/A 2023    Procedure: COLONOSCOPY;  Surgeon: Brunner, Mark I, MD;  Location:  [facility];  Service: Gastroenterology;  Laterality: N/A;    CYSTECTOMY      removal of ganglion cyst from left wrist    ENDOSCOPY  2011    DIAGNOSTIC ESOPHAGOGASTRODUODENOSCOPY    ENDOSCOPY N/A 2023    Procedure: ESOPHAGOGASTRODUODENOSCOPY;  Surgeon: Brunner, Mark I, MD;  Location:  [facility];  Service: Gastroenterology;  Laterality: N/A;    HYSTERECTOMY      A.  Status post hysterectomy for early stage endometrial cancer done in .    MITRAL VALVE REPLACEMENT  2008    Dr. Hima Barreto; [facility]    OTHER SURGICAL HISTORY      LIVER MASS BLOOD SUPPLY CLIPPED ON RIGHT AND LEFT SIDE PER PT   ,   Family History   Problem Relation Age of Onset    Sudden death Mother         POSSIBLE SERIOUS MASSIVE STROKE    Hypertension Mother             Multiple myeloma Father     Cancer Father             Hypertension Father             Ovarian cancer Paternal Grandmother     Cancer Paternal Grandmother             Diabetes Brother             Hypertension Brother             Breast cancer Neg Hx    ,   Social History     Socioeconomic History    Marital status: Single   Tobacco Use    Smoking status: Former     Packs/day: 1.00     Years: 30.00     Additional pack years: 0.00     Total pack years: 30.00     Types: Cigarettes, Electronic Cigarette     Start date: 1976     Quit date: 2/10/2010     Years since quittin.9    Smokeless tobacco: Never    Tobacco comments:     Quit in    Vaping Use    Vaping Use: Every day    Substances: Nicotine    Devices: Disposable, one every 2 days   Substance and Sexual Activity    Alcohol use: No     Drug use: Never    Sexual activity: Defer     Partners: Male     Birth control/protection: Abstinence, None, Hysterectomy     Comment: 1 partner     E-cigarette/Vaping    E-cigarette/Vaping Use Current Every Day User     Passive Exposure No     Counseling Given No      E-cigarette/Vaping Substances    Nicotine Yes     THC No     CBD No     Flavoring No      E-cigarette/Vaping Devices    Disposable Yes     Pre-filled or Refillable Cartridge No     Refillable Tank No     Pre-filled Pod No     Other one every 2 days          ,   Medications Prior to Admission   Medication Sig Dispense Refill Last Dose    albuterol sulfate  (90 Base) MCG/ACT inhaler Inhale 2 puffs Every 4 (Four) Hours As Needed for Wheezing. 1 g 0     azelastine (ASTELIN) 0.1 % nasal spray 2 sprays into the nostril(s) as directed by provider 2 (Two) Times a Day. Use in each nostril as directed (Patient taking differently: 2 sprays into the nostril(s) as directed by provider As Needed for Rhinitis or Allergies. Use in each nostril as directed prn) 1 each 1     carvedilol (COREG) 12.5 MG tablet Take 1 tablet by mouth 2 (Two) Times a Day With Meals. 180 tablet 2     cefdinir (OMNICEF) 300 MG capsule Take 1 capsule by mouth 2 (Two) Times a Day. 20 capsule 0     Continuous Blood Gluc  (FreeStyle Dalia 2 [place]) device 1 each Daily. Use as directed daily to check blood sugars 1 each 0     Continuous Blood Gluc Sensor (FreeStyle Dalia 2 Sensor) misc USE AS DIRECTED FOR 14 DAYS 2 each 11     Dulaglutide (Trulicity) 4.5 MG/0.5ML solution pen-injector INJECT 1  SYRINGE SUBCUTANEOUSLY ONCE A WEEK 6 mL 0     escitalopram (LEXAPRO) 20 MG tablet Take 1 tablet by mouth Daily. 90 tablet 3     ferrous sulfate 325 (65 FE) MG tablet Take 1 tablet by mouth Daily With Breakfast.       folic acid (FOLVITE) 800 MCG tablet Take 1 tablet by mouth Daily.       furosemide (LASIX) 40 MG tablet Take 1 tablet by mouth 3 (Three) Times a Week. Monday, Wednesday, Friday.  Resume when done w daily lasix       gabapentin (NEURONTIN) 300 MG capsule Take 1 capsule by mouth At Night As Needed (leg pain). 30 capsule 3     glimepiride (AMARYL) 4 MG tablet Take 1 tablet by mouth 2 (Two) Times a Day. 180 tablet 1     glucose blood test strip Use as instructed 3 times a day 100 each 11     HYDROcodone-acetaminophen (NORCO) 7.5-325 MG per tablet Take 1 tablet by mouth Every 8 (Eight) Hours As Needed for Moderate Pain.       Insulin Glargine (Lantus SoloStar) 100 UNIT/ML injection pen Inject 44 Units under the skin into the appropriate area as directed Daily. 45 mL 1     Insulin Lispro, 1 Unit Dial, (HumaLOG KwikPen) 100 UNIT/ML solution pen-injector Per correctional scale, MDD 50 units 15 mL 3     Insulin Pen Needle (B-D UF III MINI PEN NEEDLES) 31G X 5 MM misc USE 1  FIVE TIMES DAILY 150 each 5     Insulin Pen Needle (RELION PEN NEEDLES) 32G X 4 MM misc Use BID for E11.9 100 each 1     Lancets misc For use with glucose monitoring 3 times daily 100 each 11     lidocaine (LIDODERM) 5 % USE 1 PATCH EXTERNALLY ONCE DAILY. REMOVE AND DISCARD PATCH WITHIN 12 HOURS OR AS DIRECTED BY MD (Patient taking differently: Place 1 patch on the skin as directed by provider Daily.) 30 patch 0     methocarbamol (ROBAXIN) 500 MG tablet Take 1 tablet by mouth 4 (Four) Times a Day As Needed for Muscle Spasms. 40 tablet 0     nystatin (MYCOSTATIN) 100,000 unit/mL suspension Swish and swallow 5 mL 4 (Four) Times a Day. 200 mL 0     Omega-3 Fatty Acids (FISH OIL) 1000 MG capsule capsule Take 1 capsule by mouth 4 (Four) Times a Day. (Patient taking differently: Take 1 capsule by mouth 2 (Two) Times a Day With Meals.) 120 capsule 2     omeprazole (priLOSEC) 20 MG capsule Take 1 capsule by mouth twice daily 60 capsule 0     sacubitril-valsartan (ENTRESTO) 24-26 MG tablet Take 1 tablet by mouth 2 (Two) Times a Day. Resume when ok w PCP       simvastatin (ZOCOR) 5 MG tablet TAKE 1 TABLET BY MOUTH ONCE DAILY AT NIGHT 90  tablet 0     solifenacin (VESICARE) 5 MG tablet Take 1 tablet by mouth Daily. 90 tablet 0     spironolactone (ALDACTONE) 25 MG tablet Take 1 tablet by mouth Daily.       Vitamin D, Cholecalciferol, (CHOLECALCIFEROL) 10 MCG (400 UNIT) tablet Take 2 tablets by mouth Daily.       warfarin (COUMADIN) 2 MG tablet Take on 11/24/2023  Indications: Presence of Mechanical Artificial Heart Valve (Patient taking differently: Take 2.5 tablets by mouth. Take on 11/24/2023  Indications: Presence of Mechanical Artificial Heart Valve) 5 tablet 0     warfarin (COUMADIN) 4 MG tablet Once per day on Sun Mon Tue Wed Thu Sat  Indications: Presence of Mechanical Artificial Heart Valve 14 tablet 0    , and Scheduled Meds:  escitalopram, 20 mg, Oral, Daily  ferrous sulfate, 325 mg, Oral, Daily With Breakfast  folic acid, 800 mcg, Oral, Daily  insulin lispro, 2-7 Units, Subcutaneous, 4x Daily AC & at Bedtime  oxybutynin XL, 5 mg, Oral, Daily  pantoprazole, 40 mg, Oral, Q AM  senna-docusate sodium, 2 tablet, Oral, BID  sodium chloride, 10 mL, Intravenous, Q12H       Objective     Vital Signs  Temp:  [97.6 °F (36.4 °C)-98.5 °F (36.9 °C)] 98 °F (36.7 °C)  Heart Rate:  [70-82] 74  Resp:  [16-18] 18  BP: (101-124)/(41-54) 109/51    I/O this shift:  In: 360 [P.O.:360]  Out: -   I/O last 3 completed shifts:  In: 1050 [IV Piggyback:1050]  Out: -     Physical Exam  Constitutional:       General: She is not in acute distress.     Appearance: Normal appearance. She is not ill-appearing.   HENT:      Head: Normocephalic.      Nose: Nose normal.      Mouth/Throat:      Mouth: Mucous membranes are moist.   Eyes:      Pupils: Pupils are equal, round, and reactive to light.   Neck:      Vascular: No carotid bruit.   Cardiovascular:      Rate and Rhythm: Normal rate and regular rhythm.      Pulses: Normal pulses.      Heart sounds: Normal heart sounds. No murmur heard.     No friction rub.   Pulmonary:      Effort: Pulmonary effort is normal. No respiratory  "distress.      Breath sounds: Normal breath sounds. No stridor.   Abdominal:      General: Abdomen is flat.   Musculoskeletal:      Cervical back: Normal range of motion. No rigidity.      Right lower leg: No edema.      Left lower leg: No edema.   Lymphadenopathy:      Cervical: No cervical adenopathy.   Skin:     General: Skin is warm.   Neurological:      General: No focal deficit present.      Mental Status: She is alert and oriented to person, place, and time. Mental status is at baseline.         Results Review:    I reviewed the patient's new clinical results.    WBC WBC   Date Value Ref Range Status   01/02/2024 4.26 3.40 - 10.80 10*3/mm3 Final   01/01/2024 4.42 3.40 - 10.80 10*3/mm3 Final      HGB Hemoglobin   Date Value Ref Range Status   01/02/2024 8.5 (L) 12.0 - 15.9 g/dL Final   01/01/2024 9.5 (L) 12.0 - 15.9 g/dL Final      HCT Hematocrit   Date Value Ref Range Status   01/02/2024 26.0 (L) 34.0 - 46.6 % Final   01/01/2024 29.3 (L) 34.0 - 46.6 % Final      Platlets No results found for: \"LABPLAT\"   MCV MCV   Date Value Ref Range Status   01/02/2024 85.8 79.0 - 97.0 fL Final   01/01/2024 84.2 79.0 - 97.0 fL Final          Sodium Sodium   Date Value Ref Range Status   01/02/2024 127 (L) 136 - 145 mmol/L Final   01/02/2024 129 (L) 136 - 145 mmol/L Final   01/02/2024 127 (L) 136 - 145 mmol/L Final   01/01/2024 126 (L) 136 - 145 mmol/L Final      Potassium Potassium   Date Value Ref Range Status   01/02/2024 5.3 (H) 3.5 - 5.2 mmol/L Final     Comment:     Slight hemolysis detected by analyzer. Result may be falsely elevated.   01/02/2024 4.8 3.5 - 5.2 mmol/L Final     Comment:     Slight hemolysis detected by analyzer. Result may be falsely elevated.   01/01/2024 5.6 (H) 3.5 - 5.2 mmol/L Final     Comment:     Slight hemolysis detected by analyzer. Result may be falsely elevated.      Chloride Chloride   Date Value Ref Range Status   01/02/2024 95 (L) 98 - 107 mmol/L Final   01/02/2024 91 (L) 98 - 107 mmol/L " "Final   01/01/2024 87 (L) 98 - 107 mmol/L Final      CO2 CO2   Date Value Ref Range Status   01/02/2024 19.0 (L) 22.0 - 29.0 mmol/L Final   01/02/2024 18.0 (L) 22.0 - 29.0 mmol/L Final   01/01/2024 20.0 (L) 22.0 - 29.0 mmol/L Final      BUN BUN   Date Value Ref Range Status   01/02/2024 61 (H) 8 - 23 mg/dL Final   01/02/2024 63 (H) 8 - 23 mg/dL Final   01/01/2024 64 (H) 8 - 23 mg/dL Final      Creatinine Creatinine   Date Value Ref Range Status   01/02/2024 7.49 (H) 0.57 - 1.00 mg/dL Final   01/02/2024 8.19 (H) 0.57 - 1.00 mg/dL Final   01/01/2024 8.70 (H) 0.57 - 1.00 mg/dL Final      Calcium Calcium   Date Value Ref Range Status   01/02/2024 7.6 (L) 8.6 - 10.5 mg/dL Final   01/02/2024 7.6 (L) 8.6 - 10.5 mg/dL Final   01/01/2024 8.4 (L) 8.6 - 10.5 mg/dL Final      PO4 No results found for: \"CAPO4\"   Albumin Albumin   Date Value Ref Range Status   01/02/2024 3.4 (L) 3.5 - 5.2 g/dL Final   01/01/2024 4.1 3.5 - 5.2 g/dL Final      Magnesium Magnesium   Date Value Ref Range Status   01/02/2024 2.3 1.6 - 2.4 mg/dL Final      Uric Acid No results found for: \"URICACID\"         Assessment & Plan       Renal failure    Arteriovenous malformation of gastrointestinal tract    Type 2 diabetes mellitus without complication, with long-term current use of insulin    Dyslipidemia    Emphysema/COPD    Hypertension    Obstructive sleep apnea syndrome    Chronic anticoagulation    Left bundle branch hemiblock    ARF (acute renal failure)    H/O heart valve replacement with mechanical valve    GI bleed    Hyponatremia    Hyperkalemia    Type 2 diabetes mellitus with hyperglycemia    Elevated LFTs    Anemia, chronic disease    Cervical lymphadenopathy      Assessment & Plan    Acute kidney injury: Last known stable cr ~ 1.1 mg/dl on last discharge few weeks prior to this admission. Patient is presenting with cr ~ 8. UA small blood trace protein 13-20 sq epi cell. Suspect severe hemodynamic injury in the setting of poor po intake, nsaid " use, diarrhea, and enetersto use. CT didn't show any hydro.     Hyponatremia: Due to hypovolemia and MEERA    Hyperkalemia: Due to MEERA.     CHF: EF 40-45%. Hx of MV and depressed RV function. On beta blocker and lasix. Not on ACEi/ARB/MRA/ or SGLT-2inh for now.      Volume status: No significant LE edema.      Anemia. Hx of supra therapeutic IRN w rectal bleeding in the past. Admitted again with concern for rectal bleed     Hx of mechanical valve. Currently on heparin     Recs  MEERA likely hemodynamic injury. Continue with IV fluids NS@ 100cc/hr. Check urine Na, cl and creatinine. Check urine eos. Quantify proteinuria. Continue to hold Enteresto for now. Daily labs. Strict I/O. No additional med change is req. High risk for needing dialysis. No indication at present.        I discussed the patients findings and my recommendations with patient    Pro Quesada MD  01/02/24  16:37 EST

## 2024-01-02 NOTE — PLAN OF CARE
Goal Outcome Evaluation:  Plan of Care Reviewed With: patient           Outcome Evaluation: Patient presents with weakness in her legs. This is a chronic condition which includes back and leg pain that fluctuates. Her ambulation is newly limited by fatigue. Recommend continued skilled PT and home with [facility] at discharge.      Anticipated Discharge Disposition (PT): home, home with home health

## 2024-01-02 NOTE — CONSULTS
[facility]ology Consult    Referring Provider: Dariana Gr DO     PCP: Davina Santiago DO    Reason for Consultation: GI bleed     Chief complaint: Chills, body aches, blood in stool     History of present illness:    Di Lopez is a 63 y.o. female who is admitted with acute renal failure (Cr of 8.70).  She has been feeling poorly with a multitude of symptoms for several weeks.   She describes sharp, severe frontal headaches with neck pain and bilateral neck swelling.   She has chills, fatigue, lower extremity aches.   She denies any known sick contacts.     She began to have bright red blood per rectum two days ago.   She describes these as small clotted blood mixed in loose stool.   She has mild abdominal cramping.       She was admitted in November 2023 for rectal bleeding.  GI panel PCR at that time was positive for Adenovirus.   She underwent EGD and Colonoscopy on 11/16/23 which showed left sided diverticulosis, presumed source of recent GI blood loss.  Normal terminal ileum.  EGD showed candida esophagitis for which she received a course of Nystatin swish and swallow for 1 week.       Allergies:  Adhesive tape, Sildenafil, Codeine, Morphine, Penicillins, Statins, and Sulfa antibiotics    Scheduled Meds:  escitalopram, 20 mg, Oral, Daily  ferrous sulfate, 325 mg, Oral, Daily With Breakfast  folic acid, 800 mcg, Oral, Daily  insulin lispro, 2-7 Units, Subcutaneous, 4x Daily AC & at Bedtime  oxybutynin XL, 5 mg, Oral, Daily  pantoprazole, 40 mg, Oral, Q AM  senna-docusate sodium, 2 tablet, Oral, BID  sodium chloride, 10 mL, Intravenous, Q12H         Infusions:  heparin, 11 Units/kg/hr, Last Rate: 11 Units/kg/hr (01/02/24 1974)  Pharmacy to Dose Heparin,   sodium chloride, 75 mL/hr, Last Rate: 75 mL/hr (01/02/24 9734)        PRN Meds:    albuterol    senna-docusate sodium **AND** polyethylene glycol **AND** bisacodyl **AND** bisacodyl    dextrose    dextrose    glucagon (human recombinant)     nitroglycerin    Pharmacy to Dose Heparin    sodium chloride    sodium chloride    Home Meds:  Medications Prior to Admission   Medication Sig Dispense Refill Last Dose    albuterol sulfate  (90 Base) MCG/ACT inhaler Inhale 2 puffs Every 4 (Four) Hours As Needed for Wheezing. 1 g 0     azelastine (ASTELIN) 0.1 % nasal spray 2 sprays into the nostril(s) as directed by provider 2 (Two) Times a Day. Use in each nostril as directed (Patient taking differently: 2 sprays into the nostril(s) as directed by provider As Needed for Rhinitis or Allergies. Use in each nostril as directed prn) 1 each 1     carvedilol (COREG) 12.5 MG tablet Take 1 tablet by mouth 2 (Two) Times a Day With Meals. 180 tablet 2     cefdinir (OMNICEF) 300 MG capsule Take 1 capsule by mouth 2 (Two) Times a Day. 20 capsule 0     Continuous Blood Gluc  (FreeStyle Dalia 2 [employer]) device 1 each Daily. Use as directed daily to check blood sugars 1 each 0     Continuous Blood Gluc Sensor (FreeStyle Dalia 2 Sensor) misc USE AS DIRECTED FOR 14 DAYS 2 each 11     Dulaglutide (Trulicity) 4.5 MG/0.5ML solution pen-injector INJECT 1  SYRINGE SUBCUTANEOUSLY ONCE A WEEK 6 mL 0     escitalopram (LEXAPRO) 20 MG tablet Take 1 tablet by mouth Daily. 90 tablet 3     ferrous sulfate 325 (65 FE) MG tablet Take 1 tablet by mouth Daily With Breakfast.       folic acid (FOLVITE) 800 MCG tablet Take 1 tablet by mouth Daily.       furosemide (LASIX) 40 MG tablet Take 1 tablet by mouth 3 (Three) Times a Week. Monday, Wednesday, Friday. Resume when done w daily lasix       gabapentin (NEURONTIN) 300 MG capsule Take 1 capsule by mouth At Night As Needed (leg pain). 30 capsule 3     glimepiride (AMARYL) 4 MG tablet Take 1 tablet by mouth 2 (Two) Times a Day. 180 tablet 1     glucose blood test strip Use as instructed 3 times a day 100 each 11     HYDROcodone-acetaminophen (NORCO) 7.5-325 MG per tablet Take 1 tablet by mouth Every 8 (Eight) Hours As Needed for  Moderate Pain.       Insulin Glargine (Lantus SoloStar) 100 UNIT/ML injection pen Inject 44 Units under the skin into the appropriate area as directed Daily. 45 mL 1     Insulin Lispro, 1 Unit Dial, (HumaLOG KwikPen) 100 UNIT/ML solution pen-injector Per correctional scale, MDD 50 units 15 mL 3     Insulin Pen Needle (B-D UF III MINI PEN NEEDLES) 31G X 5 MM misc USE 1  FIVE TIMES DAILY 150 each 5     Insulin Pen Needle (RELION PEN NEEDLES) 32G X 4 MM misc Use BID for E11.9 100 each 1     Lancets misc For use with glucose monitoring 3 times daily 100 each 11     lidocaine (LIDODERM) 5 % USE 1 PATCH EXTERNALLY ONCE DAILY. REMOVE AND DISCARD PATCH WITHIN 12 HOURS OR AS DIRECTED BY MD (Patient taking differently: Place 1 patch on the skin as directed by provider Daily.) 30 patch 0     methocarbamol (ROBAXIN) 500 MG tablet Take 1 tablet by mouth 4 (Four) Times a Day As Needed for Muscle Spasms. 40 tablet 0     nystatin (MYCOSTATIN) 100,000 unit/mL suspension Swish and swallow 5 mL 4 (Four) Times a Day. 200 mL 0     Omega-3 Fatty Acids (FISH OIL) 1000 MG capsule capsule Take 1 capsule by mouth 4 (Four) Times a Day. (Patient taking differently: Take 1 capsule by mouth 2 (Two) Times a Day With Meals.) 120 capsule 2     omeprazole (priLOSEC) 20 MG capsule Take 1 capsule by mouth twice daily 60 capsule 0     sacubitril-valsartan (ENTRESTO) 24-26 MG tablet Take 1 tablet by mouth 2 (Two) Times a Day. Resume when ok w PCP       simvastatin (ZOCOR) 5 MG tablet TAKE 1 TABLET BY MOUTH ONCE DAILY AT NIGHT 90 tablet 0     solifenacin (VESICARE) 5 MG tablet Take 1 tablet by mouth Daily. 90 tablet 0     spironolactone (ALDACTONE) 25 MG tablet Take 1 tablet by mouth Daily.       Vitamin D, Cholecalciferol, (CHOLECALCIFEROL) 10 MCG (400 UNIT) tablet Take 2 tablets by mouth Daily.       warfarin (COUMADIN) 2 MG tablet Take on 11/24/2023  Indications: Presence of Mechanical Artificial Heart Valve (Patient taking differently: Take 2.5  tablets by mouth. Take on 11/24/2023  Indications: Presence of Mechanical Artificial Heart Valve) 5 tablet 0     warfarin (COUMADIN) 4 MG tablet Once per day on Sun Mon Tue Wed Thu Sat  Indications: Presence of Mechanical Artificial Heart Valve 14 tablet 0        ROS: Review of Systems   Constitutional:  Positive for chills and fatigue.   Eyes: Negative.    Respiratory: Negative.     Cardiovascular: Negative.    Gastrointestinal:  Positive for abdominal pain, blood in stool and diarrhea.   Endocrine: Negative.    Genitourinary: Negative.    Musculoskeletal:  Positive for arthralgias, myalgias and neck pain.   Skin: Negative.    Neurological:  Positive for headaches.   Hematological: Negative.    Psychiatric/Behavioral: Negative.         PAST MED HX:  Past Medical History:   Diagnosis Date    Allergic     Anemia     Anticoagulated     WARFARIN    Arteriovenous malformation of gastrointestinal tract 05/12/2016    Arthritis     Asthma     Black hairy tongue     CHF (congestive heart failure)     Chronic back pain     Coronary artery disease     Depression 05/12/2016    Diabetes mellitus     Dyslipidemia 05/12/2016    Elevated cholesterol     Emphysema/COPD 05/12/2016    Emphysema/COPD 05/12/2016    Fatigue     GERD (gastroesophageal reflux disease)     Headache     Herniated cervical disc     History of echocardiogram 07/16/2013    Left ventricular systolic function at the lower limits of normal; EF 50-55%; mild MR; mechanical prosthetic mitral valve present; mod TR    History of transfusion     NO REACTION; [facility]    Hyperlipidemia     Hypertension     Insomnia 05/12/2016    Iron deficiency     A.  The patient was treated with IV iron replacement. B.  Secondary to GI bleed, status post EGD with cauterization of blood vessel to the duodenum, 7/16/2014.    Left bundle branch hemiblock 10/12/2020    Left shoulder strain     Liver disease     Liver mass     Long term current use of anticoagulant 05/12/2016    Low back pain      Lung nodules     PER DR. LEVI'S NOTE 10/22    Morbid obesity 05/12/2016    Neuromuscular disorder 7/28/2016    Obstructive sleep apnea syndrome 05/12/2016    Description: A.  Moderate; CPAP therapy..    Sliding hiatal hernia 07/28/2016    Description: A.  Reported on EGD, 12/30/2011.    Type 2 diabetes mellitus 05/12/2016    Urinary tract infection     Uterine cancer     HYSTERECTOMY    Vitamin B12 deficiency     A.  The patient was treated with vitamin B12 replacement.    Wears eyeglasses        PAST SURG HX:  Past Surgical History:   Procedure Laterality Date    BILATERAL OOPHORECTOMY  2009    Status post bilateral oophorectomy secondary to ovarian cysts     BREAST BIOPSY Right     STEREOTACTIC    BREAST EXCISIONAL BIOPSY Bilateral     CARDIAC ELECTROPHYSIOLOGY PROCEDURE N/A 10/15/2020    Procedure: Biventricular Device Insertion;  Surgeon: Nadir Everett MD;  Location: [facility];  Service: Cardiology;  Laterality: N/A;    CARDIAC SURGERY  2003    CARPAL TUNNEL RELEASE Bilateral     COLONOSCOPY  12/28/2012    COLONOSCOPY N/A 5/23/2023    Procedure: COLONOSCOPY;  Surgeon: Gt Fernandes MD;  Location: [facility];  Service: Gastroenterology;  Laterality: N/A;    COLONOSCOPY N/A 11/16/2023    Procedure: COLONOSCOPY;  Surgeon: Brunner, Mark I, MD;  Location: [facility];  Service: Gastroenterology;  Laterality: N/A;    CYSTECTOMY      removal of ganglion cyst from left wrist    ENDOSCOPY  12/30/2011    DIAGNOSTIC ESOPHAGOGASTRODUODENOSCOPY    ENDOSCOPY N/A 11/16/2023    Procedure: ESOPHAGOGASTRODUODENOSCOPY;  Surgeon: Brunner, Mark I, MD;  Location: [facility];  Service: Gastroenterology;  Laterality: N/A;    HYSTERECTOMY  1995    A.  Status post hysterectomy for early stage endometrial cancer done in 1995.    MITRAL VALVE REPLACEMENT  03/03/2008    Dr. Hima Barreto; MECHANICAL    OTHER SURGICAL HISTORY      LIVER MASS BLOOD SUPPLY CLIPPED ON RIGHT AND LEFT SIDE  "PER PT       FAM HX:  Family History   Problem Relation Age of Onset    Sudden death Mother         POSSIBLE SERIOUS MASSIVE STROKE    Hypertension Mother             Multiple myeloma Father     Cancer Father             Hypertension Father             Ovarian cancer Paternal Grandmother     Cancer Paternal Grandmother             Diabetes Brother             Hypertension Brother             Breast cancer Neg Hx        SOC HX:  Social History     Socioeconomic History    Marital status: Single   Tobacco Use    Smoking status: Former     Packs/day: 1.00     Years: 30.00     Additional pack years: 0.00     Total pack years: 30.00     Types: Cigarettes, Electronic Cigarette     Start date: 1976     Quit date: 2/10/2010     Years since quittin.9    Smokeless tobacco: Never    Tobacco comments:     Quit in    Vaping Use    Vaping Use: Every day    Substances: Nicotine    Devices: Disposable, one every 2 days   Substance and Sexual Activity    Alcohol use: No    Drug use: Never    Sexual activity: Defer     Partners: Male     Birth control/protection: Abstinence, None, Hysterectomy     Comment: 1 partner       PHYSICAL EXAM  /54 (BP Location: Right arm, Patient Position: Lying)   Pulse 75   Temp 98.5 °F (36.9 °C) (Oral)   Resp 16   Ht 152.4 cm (60\")   Wt 89.9 kg (198 lb 3.2 oz)   LMP 1998   SpO2 99%   BMI 38.71 kg/m²   Wt Readings from Last 3 Encounters:   24 89.9 kg (198 lb 3.2 oz)   23 84.8 kg (187 lb)   23 87.1 kg (192 lb)   ,body mass index is 38.71 kg/m².  Physical Exam  Constitutional:       General: She is not in acute distress.  HENT:      Head: Normocephalic and atraumatic.      Mouth/Throat:      Mouth: Mucous membranes are moist.   Eyes:      General: No scleral icterus.  Cardiovascular:      Rate and Rhythm: Normal rate and regular rhythm.   Pulmonary:      Effort: Pulmonary effort is normal. No respiratory " distress.   Abdominal:      General: Bowel sounds are normal. There is no distension.      Palpations: Abdomen is soft.      Tenderness: There is no abdominal tenderness. There is no guarding.   Skin:     General: Skin is warm and dry.   Neurological:      Mental Status: She is alert and oriented to person, place, and time.   Psychiatric:         Behavior: Behavior normal.         Thought Content: Thought content normal.       Results Review:   I reviewed the patient's new clinical results.    Lab Results   Component Value Date    WBC 4.26 01/02/2024    HGB 8.5 (L) 01/02/2024    HGB 9.5 (L) 01/01/2024    HGB 11.9 (A) 12/27/2023    HCT 26.0 (L) 01/02/2024    MCV 85.8 01/02/2024     01/02/2024     Lab Results   Component Value Date    INR 2.36 (H) 01/01/2024    INR 1.44 (H) 11/22/2023    INR 1.35 (H) 11/21/2023       Lab Results   Component Value Date    GLUCOSE 152 (H) 01/02/2024    BUN 61 (H) 01/02/2024    CREATININE 7.49 (H) 01/02/2024    EGFRIFAFRI 73 10/14/2022    BCR 8.1 01/02/2024     (L) 01/02/2024    K 5.3 (H) 01/02/2024    CO2 19.0 (L) 01/02/2024    CALCIUM 7.6 (L) 01/02/2024    ALBUMIN 3.4 (L) 01/02/2024    ALKPHOS 222 (H) 01/02/2024    BILITOT 0.3 01/02/2024    BILIDIR <0.2 03/31/2023    ALT 41 (H) 01/02/2024    AST 37 (H) 01/02/2024     CT Soft Tissue neck without contrast:  Findings:  Limited view of the intracranial contents is unremarkable. The orbits appear within normal limits. Paranasal sinuses and mastoids appear clear. There is streak artifact from dental metallic restoration hardware. The parotid and salivary glands appear symmetric. The thyroid is homogeneous. There is mild carotid bifurcation calcification. There are moderate aortic and aortic branch vessel atherosclerotic calcifications. There are calcified granulomas in the superior mediastinum. There is mild emphysema. There is a left-sided ICD in place.   There is an enlarged and inflamed lymph node in the right supraclavicular  fossa measuring up to 10 x 15 mm on axial image 43. There are adjacent prominent but nonenlarged lymph nodes. There are is an enlarged left-sided level II lymph node with rounded morphology measuring 13 mm on image 30. There is a prominent left level III lymph node on image 44 measuring 13 mm in the short axis. The parapharyngeal spaces appear clear. There is no pharyngeal mucosal mass. The tongue, base of tongue, floor of mouth,   epiglottis, aryepiglottic folds, false vocal cords, vocal ligaments and subglottic airway appear within normal limits. There is a lipoma at the posterior upper back deep to the latissimus dorsi. This measures up to 64 x 15 mm on axial image 62. There are no acute osseous abnormalities or destructive bone lesions. There are minimal cervical degenerative changes.   IMPRESSION:  Impression:  1.There is an enlarged and inflamed lymph node in the right supraclavicular fossa with adjacent prominent but nonenlarged lymph nodes. There are also enlarged left-sided level II and III lymph nodes. These appear inflammatory, but could be related to   infection or malignancy and clinical follow-up is recommended.  2.Mild emphysema.  3.Atherosclerotic disease and mild cervical degenerative changes.      CT Abdomen/Pelvis without contrast:  Findings:  There is a calcified left basilar granuloma. There is a stable 4 mm right middle lobe nodule on image 2. This is unchanged since at least 2022. There is a stable 4 mm nodule in the right lower lobe on image 7. There is mild dependent bibasilar atelectasis. There is a rounded nodule in the far lateral left breast on image 1 measuring 12 mm. The patient appears up-to-date on mammogram screening and this is likely a partially visualized lymph node. There are no acute findings in the superficial soft tissues. There is a small fat-containing periumbilical hernia. There are no acute osseous abnormalities or destructive bone lesions. There is mild thoracolumbar  disc degeneration and moderate lower lumbar facet arthritis.   There is a stable lobulated mass of the left hepatic lobe measuring up to 8.4 cm with evidence of previous embolization. Patient has a history of a liver adenoma. The inferior right liver appears lobulated raising concern for cirrhosis. The gallbladder is contracted. The bile ducts are nondistended. The pancreas, stomach and spleen appear stable. There is a small hypodensity in the spleen measuring 12 mm, possibly cyst, hemangioma or noncalcified granuloma. Previous contrast-enhanced study demonstrated noncalcified granulomas in the spleen. There is thickening of the left adrenal gland, likely a result of hyperplasia or small adenomas. There is a small simple right kidney cyst. There is no urolithiasis or hydronephrosis. The urinary bladder is within   normal limits. Patient is status post hysterectomy. The ovaries are not seen. The appendix is normal. There is distal colonic diverticulosis. No small bowel distention. There is mild aortoiliac atherosclerosis. No ascites, pneumoperitoneum or lymphadenopathy.   IMPRESSION:  Impression:  1.No acute abdominal or pelvic abnormality.  2.Colonic diverticulosis without evidence of diverticulitis.  3.Stable lobulated mass in the left hepatic lobe with evidence of previous embolization. Patient has a history of a liver adenoma.  4.Stable 4 mm nodules in the right lung base. These are unchanged since at least 2022.  5.12 mm rounded nodule in the far lateral left breast. The patient appears up-to-date on mammogram screening and this is likely a partially visualized lymph node.    ASSESSMENTS/PLANS    Rectal bleeding  Acute recurrent diarrhea  Acute renal failure   Headache and neck pain  Enlarged and inflamed lymph node in the right supraclavicular fossa  Hepatic adenoma, stable, history of TACE procedure   Mechanical mitral valve, on chronic anticoagulation     Suspect infectious source.   Recent bidirectional  endoscopy was unremarkable.    Bleeding was suspected to be secondary to diverticular source at that time.       >> Obtain GI panel PCR  >> Continue IV Heparin drip for her mechanical mitral valve   >> Consider infectious disease consultation     I discussed the patient's findings and my recommendations with patient    TAVO Alvarenga  01/02/24  10:52 EST

## 2024-01-02 NOTE — PLAN OF CARE
Goal Outcome Evaluation:  Plan of Care Reviewed With: patient        Progress: no change  Outcome Evaluation: Paced on the monitor. Heparin drip continues to infuse. Currently drip is at 13u/kg/hr. IVF's infusing at 75 er hour as ordered. Pain managed with oral medicaiton. Waiting a discharge plan when medically ready.

## 2024-01-03 ENCOUNTER — APPOINTMENT (OUTPATIENT)
Dept: GENERAL RADIOLOGY | Facility: HOSPITAL | Age: 64
End: 2024-01-03
Payer: MEDICARE

## 2024-01-03 LAB
ALBUMIN SERPL-MCNC: 3.2 G/DL (ref 3.5–5.2)
ALBUMIN UR-MCNC: 10.8 MG/DL
ALBUMIN/GLOB SERPL: 1.3 G/DL
ALP SERPL-CCNC: 217 U/L (ref 39–117)
ALT SERPL W P-5'-P-CCNC: 37 U/L (ref 1–33)
ANION GAP SERPL CALCULATED.3IONS-SCNC: 13 MMOL/L (ref 5–15)
AST SERPL-CCNC: 32 U/L (ref 1–32)
BASOPHILS # BLD AUTO: 0.01 10*3/MM3 (ref 0–0.2)
BASOPHILS NFR BLD AUTO: 0.2 % (ref 0–1.5)
BILIRUB SERPL-MCNC: 0.2 MG/DL (ref 0–1.2)
BUN SERPL-MCNC: 57 MG/DL (ref 8–23)
BUN/CREAT SERPL: 8.5 (ref 7–25)
CALCIUM SPEC-SCNC: 7.1 MG/DL (ref 8.6–10.5)
CHLORIDE SERPL-SCNC: 99 MMOL/L (ref 98–107)
CO2 SERPL-SCNC: 19 MMOL/L (ref 22–29)
CREAT SERPL-MCNC: 6.72 MG/DL (ref 0.57–1)
CREAT UR-MCNC: 142.2 MG/DL
DEPRECATED RDW RBC AUTO: 46.1 FL (ref 37–54)
EGFRCR SERPLBLD CKD-EPI 2021: 6.5 ML/MIN/1.73
EOSINOPHIL # BLD AUTO: 0.12 10*3/MM3 (ref 0–0.4)
EOSINOPHIL NFR BLD AUTO: 2.9 % (ref 0.3–6.2)
ERYTHROCYTE [DISTWIDTH] IN BLOOD BY AUTOMATED COUNT: 14.7 % (ref 12.3–15.4)
GLOBULIN UR ELPH-MCNC: 2.5 GM/DL
GLUCOSE BLDC GLUCOMTR-MCNC: 163 MG/DL (ref 70–130)
GLUCOSE BLDC GLUCOMTR-MCNC: 167 MG/DL (ref 70–130)
GLUCOSE BLDC GLUCOMTR-MCNC: 246 MG/DL (ref 70–130)
GLUCOSE BLDC GLUCOMTR-MCNC: 276 MG/DL (ref 70–130)
GLUCOSE SERPL-MCNC: 150 MG/DL (ref 65–99)
HCT VFR BLD AUTO: 25.2 % (ref 34–46.6)
HGB BLD-MCNC: 8.4 G/DL (ref 12–15.9)
IMM GRANULOCYTES # BLD AUTO: 0.07 10*3/MM3 (ref 0–0.05)
IMM GRANULOCYTES NFR BLD AUTO: 1.7 % (ref 0–0.5)
LYMPHOCYTES # BLD AUTO: 1.12 10*3/MM3 (ref 0.7–3.1)
LYMPHOCYTES NFR BLD AUTO: 26.8 % (ref 19.6–45.3)
MCH RBC QN AUTO: 28.6 PG (ref 26.6–33)
MCHC RBC AUTO-ENTMCNC: 33.3 G/DL (ref 31.5–35.7)
MCV RBC AUTO: 85.7 FL (ref 79–97)
MICROALBUMIN/CREAT UR: 75.9 MG/G (ref 0–29)
MONOCYTES # BLD AUTO: 0.25 10*3/MM3 (ref 0.1–0.9)
MONOCYTES NFR BLD AUTO: 6 % (ref 5–12)
NEUTROPHILS NFR BLD AUTO: 2.61 10*3/MM3 (ref 1.7–7)
NEUTROPHILS NFR BLD AUTO: 62.4 % (ref 42.7–76)
NRBC BLD AUTO-RTO: 0 /100 WBC (ref 0–0.2)
PLATELET # BLD AUTO: 224 10*3/MM3 (ref 140–450)
PMV BLD AUTO: 9.7 FL (ref 6–12)
POTASSIUM SERPL-SCNC: 5 MMOL/L (ref 3.5–5.2)
PROT SERPL-MCNC: 5.7 G/DL (ref 6–8.5)
RBC # BLD AUTO: 2.94 10*6/MM3 (ref 3.77–5.28)
SODIUM SERPL-SCNC: 131 MMOL/L (ref 136–145)
SODIUM SERPL-SCNC: 131 MMOL/L (ref 136–145)
SODIUM SERPL-SCNC: 132 MMOL/L (ref 136–145)
SODIUM SERPL-SCNC: 132 MMOL/L (ref 136–145)
SODIUM SERPL-SCNC: 133 MMOL/L (ref 136–145)
SODIUM SERPL-SCNC: 133 MMOL/L (ref 136–145)
UFH PPP CHRO-ACNC: 0.37 IU/ML (ref 0.3–0.7)
WBC NRBC COR # BLD AUTO: 4.18 10*3/MM3 (ref 3.4–10.8)

## 2024-01-03 PROCEDURE — 84295 ASSAY OF SERUM SODIUM: CPT | Performed by: STUDENT IN AN ORGANIZED HEALTH CARE EDUCATION/TRAINING PROGRAM

## 2024-01-03 PROCEDURE — 82948 REAGENT STRIP/BLOOD GLUCOSE: CPT

## 2024-01-03 PROCEDURE — 99232 SBSQ HOSP IP/OBS MODERATE 35: CPT | Performed by: PHYSICIAN ASSISTANT

## 2024-01-03 PROCEDURE — 99232 SBSQ HOSP IP/OBS MODERATE 35: CPT | Performed by: STUDENT IN AN ORGANIZED HEALTH CARE EDUCATION/TRAINING PROGRAM

## 2024-01-03 PROCEDURE — 85025 COMPLETE CBC W/AUTO DIFF WBC: CPT | Performed by: INTERNAL MEDICINE

## 2024-01-03 PROCEDURE — 71046 X-RAY EXAM CHEST 2 VIEWS: CPT

## 2024-01-03 PROCEDURE — 25010000002 HEPARIN (PORCINE) 25000-0.45 UT/250ML-% SOLUTION

## 2024-01-03 PROCEDURE — 63710000001 INSULIN DETEMIR PER 5 UNITS: Performed by: STUDENT IN AN ORGANIZED HEALTH CARE EDUCATION/TRAINING PROGRAM

## 2024-01-03 PROCEDURE — 63710000001 INSULIN LISPRO (HUMAN) PER 5 UNITS: Performed by: INTERNAL MEDICINE

## 2024-01-03 PROCEDURE — 80053 COMPREHEN METABOLIC PANEL: CPT | Performed by: STUDENT IN AN ORGANIZED HEALTH CARE EDUCATION/TRAINING PROGRAM

## 2024-01-03 PROCEDURE — 86668 FRANCISELLA TULARENSIS: CPT | Performed by: INTERNAL MEDICINE

## 2024-01-03 PROCEDURE — 85520 HEPARIN ASSAY: CPT

## 2024-01-03 PROCEDURE — 25810000003 SODIUM CHLORIDE 0.9 % SOLUTION: Performed by: STUDENT IN AN ORGANIZED HEALTH CARE EDUCATION/TRAINING PROGRAM

## 2024-01-03 RX ORDER — CYCLOBENZAPRINE HCL 10 MG
5 TABLET ORAL 3 TIMES DAILY PRN
Status: DISCONTINUED | OUTPATIENT
Start: 2024-01-03 | End: 2024-01-04

## 2024-01-03 RX ORDER — CHOLECALCIFEROL (VITAMIN D3) 125 MCG
5 CAPSULE ORAL NIGHTLY PRN
Status: DISCONTINUED | OUTPATIENT
Start: 2024-01-03 | End: 2024-01-15 | Stop reason: HOSPADM

## 2024-01-03 RX ORDER — INSULIN LISPRO 100 [IU]/ML
3 INJECTION, SOLUTION INTRAVENOUS; SUBCUTANEOUS
Status: DISCONTINUED | OUTPATIENT
Start: 2024-01-04 | End: 2024-01-15 | Stop reason: HOSPADM

## 2024-01-03 RX ADMIN — INSULIN LISPRO 4 UNITS: 100 INJECTION, SOLUTION INTRAVENOUS; SUBCUTANEOUS at 17:39

## 2024-01-03 RX ADMIN — OXYBUTYNIN CHLORIDE 5 MG: 5 TABLET, FILM COATED, EXTENDED RELEASE ORAL at 11:36

## 2024-01-03 RX ADMIN — HEPARIN SODIUM 13 UNITS/KG/HR: 10000 INJECTION, SOLUTION INTRAVENOUS at 15:46

## 2024-01-03 RX ADMIN — CYCLOBENZAPRINE 5 MG: 10 TABLET, FILM COATED ORAL at 11:35

## 2024-01-03 RX ADMIN — ESCITALOPRAM OXALATE 20 MG: 20 TABLET, FILM COATED ORAL at 11:36

## 2024-01-03 RX ADMIN — FERROUS SULFATE TAB 325 MG (65 MG ELEMENTAL FE) 325 MG: 325 (65 FE) TAB at 11:36

## 2024-01-03 RX ADMIN — ACETAMINOPHEN 650 MG: 325 TABLET ORAL at 00:16

## 2024-01-03 RX ADMIN — Medication 5 MG: at 00:16

## 2024-01-03 RX ADMIN — SODIUM CHLORIDE 100 ML/HR: 9 INJECTION, SOLUTION INTRAVENOUS at 05:51

## 2024-01-03 RX ADMIN — SODIUM CHLORIDE 100 ML/HR: 9 INJECTION, SOLUTION INTRAVENOUS at 15:46

## 2024-01-03 RX ADMIN — INSULIN LISPRO 3 UNITS: 100 INJECTION, SOLUTION INTRAVENOUS; SUBCUTANEOUS at 11:44

## 2024-01-03 RX ADMIN — INSULIN DETEMIR 5 UNITS: 100 INJECTION, SOLUTION SUBCUTANEOUS at 20:41

## 2024-01-03 RX ADMIN — FOLIC ACID TAB 400 MCG 800 MCG: 400 TAB at 11:35

## 2024-01-03 RX ADMIN — PANTOPRAZOLE SODIUM 40 MG: 40 TABLET, DELAYED RELEASE ORAL at 05:51

## 2024-01-03 NOTE — PROGRESS NOTES
" LOS: 1 day   Patient Care Team:  Davina Santiago DO as PCP - General (Family Medicine)  Ameena Iglesias MD as Consulting Physician (Endocrinology)  Nakita Freire APRN as Nurse Practitioner (Nurse Practitioner)  Raimundo Thomas MD as Consulting Physician (Pulmonary Disease)  Nadir Everett MD as Consulting Physician (Cardiology)  Luiz Longoria MD as Consulting Physician (Pain Medicine)    Chief Complaint: MEERA    Subjective         Subjective:  Symptoms:  Stable.  No shortness of breath, chest pain or chest pressure.    Diet:  Adequate intake.        History taken from: patient    Objective     Vital Sign Min/Max for last 24 hours  Temp  Min: 97.4 °F (36.3 °C)  Max: 100.5 °F (38.1 °C)   BP  Min: 98/40  Max: 135/54   Pulse  Min: 65  Max: 81   Resp  Min: 16  Max: 18   SpO2  Min: 93 %  Max: 100 %   Flow (L/min)  Min: 2  Max: 2   No data recorded     Flowsheet Rows      Flowsheet Row First Filed Value   Admission Height 152.4 cm (60\") Documented at 01/01/2024 2251   Admission Weight 84.8 kg (187 lb) Documented at 01/01/2024 2251            I/O this shift:  In: 240 [P.O.:240]  Out: 600 [Urine:600]  I/O last 3 completed shifts:  In: 1650 [P.O.:600; IV Piggyback:1050]  Out: 1000 [Urine:1000]    Objective:  General Appearance:  Comfortable.    Vital signs: (most recent): Blood pressure 116/57, pulse 81, temperature 100.5 °F (38.1 °C), temperature source Oral, resp. rate 18, height 152.4 cm (60\"), weight 89.9 kg (198 lb 3.2 oz), last menstrual period 06/17/1998, SpO2 93%, not currently breastfeeding.  Vital signs are normal.    Output: Producing urine.    HEENT: Normal HEENT exam.    Lungs:  Normal effort and normal respiratory rate.  Breath sounds clear to auscultation.  She is not in respiratory distress.  No stridor.    Heart: Normal rate.  Regular rhythm.  S1 normal and S2 normal.  No murmur or gallop.   Abdomen: Abdomen is soft.  Bowel sounds are normal.   There is no abdominal tenderness.   " "  Extremities: Normal range of motion.  There is no dependent edema or local swelling.    Pulses: Distal pulses are intact.    Neurological: Patient is alert and oriented to person, place and time.  Normal strength.    Pupils:  Pupils are equal, round, and reactive to light.                Results Review:     I reviewed the patient's new clinical results.    WBC WBC   Date Value Ref Range Status   01/03/2024 4.18 3.40 - 10.80 10*3/mm3 Final   01/02/2024 4.26 3.40 - 10.80 10*3/mm3 Final   01/01/2024 4.42 3.40 - 10.80 10*3/mm3 Final      HGB Hemoglobin   Date Value Ref Range Status   01/03/2024 8.4 (L) 12.0 - 15.9 g/dL Final   01/02/2024 8.5 (L) 12.0 - 15.9 g/dL Final   01/01/2024 9.5 (L) 12.0 - 15.9 g/dL Final      HCT Hematocrit   Date Value Ref Range Status   01/03/2024 25.2 (L) 34.0 - 46.6 % Final   01/02/2024 26.0 (L) 34.0 - 46.6 % Final   01/01/2024 29.3 (L) 34.0 - 46.6 % Final      Platlets No results found for: \"LABPLAT\"   MCV MCV   Date Value Ref Range Status   01/03/2024 85.7 79.0 - 97.0 fL Final   01/02/2024 85.8 79.0 - 97.0 fL Final   01/01/2024 84.2 79.0 - 97.0 fL Final          Sodium Sodium   Date Value Ref Range Status   01/03/2024 133 (L) 136 - 145 mmol/L Final   01/03/2024 133 (L) 136 - 145 mmol/L Final   01/03/2024 131 (L) 136 - 145 mmol/L Final   01/03/2024 131 (L) 136 - 145 mmol/L Final   01/02/2024 132 (L) 136 - 145 mmol/L Final   01/02/2024 128 (L) 136 - 145 mmol/L Final   01/02/2024 128 (L) 136 - 145 mmol/L Final   01/02/2024 127 (L) 136 - 145 mmol/L Final   01/02/2024 129 (L) 136 - 145 mmol/L Final   01/02/2024 127 (L) 136 - 145 mmol/L Final   01/01/2024 126 (L) 136 - 145 mmol/L Final      Potassium Potassium   Date Value Ref Range Status   01/03/2024 5.0 3.5 - 5.2 mmol/L Final   01/02/2024 5.3 (H) 3.5 - 5.2 mmol/L Final     Comment:     Slight hemolysis detected by analyzer. Result may be falsely elevated.   01/02/2024 4.8 3.5 - 5.2 mmol/L Final     Comment:     Slight hemolysis detected by " "analyzer. Result may be falsely elevated.   01/01/2024 5.6 (H) 3.5 - 5.2 mmol/L Final     Comment:     Slight hemolysis detected by analyzer. Result may be falsely elevated.      Chloride Chloride   Date Value Ref Range Status   01/03/2024 99 98 - 107 mmol/L Final   01/02/2024 95 (L) 98 - 107 mmol/L Final   01/02/2024 91 (L) 98 - 107 mmol/L Final   01/01/2024 87 (L) 98 - 107 mmol/L Final      CO2 CO2   Date Value Ref Range Status   01/03/2024 19.0 (L) 22.0 - 29.0 mmol/L Final   01/02/2024 19.0 (L) 22.0 - 29.0 mmol/L Final   01/02/2024 18.0 (L) 22.0 - 29.0 mmol/L Final   01/01/2024 20.0 (L) 22.0 - 29.0 mmol/L Final      BUN BUN   Date Value Ref Range Status   01/03/2024 57 (H) 8 - 23 mg/dL Final   01/02/2024 61 (H) 8 - 23 mg/dL Final   01/02/2024 63 (H) 8 - 23 mg/dL Final   01/01/2024 64 (H) 8 - 23 mg/dL Final      Creatinine Creatinine   Date Value Ref Range Status   01/03/2024 6.72 (H) 0.57 - 1.00 mg/dL Final   01/02/2024 7.49 (H) 0.57 - 1.00 mg/dL Final   01/02/2024 8.19 (H) 0.57 - 1.00 mg/dL Final   01/01/2024 8.70 (H) 0.57 - 1.00 mg/dL Final      Calcium Calcium   Date Value Ref Range Status   01/03/2024 7.1 (L) 8.6 - 10.5 mg/dL Final   01/02/2024 7.6 (L) 8.6 - 10.5 mg/dL Final   01/02/2024 7.6 (L) 8.6 - 10.5 mg/dL Final   01/01/2024 8.4 (L) 8.6 - 10.5 mg/dL Final      PO4 No results found for: \"CAPO4\"   Albumin Albumin   Date Value Ref Range Status   01/03/2024 3.2 (L) 3.5 - 5.2 g/dL Final   01/02/2024 3.4 (L) 3.5 - 5.2 g/dL Final   01/01/2024 4.1 3.5 - 5.2 g/dL Final      Magnesium Magnesium   Date Value Ref Range Status   01/02/2024 2.3 1.6 - 2.4 mg/dL Final      Uric Acid No results found for: \"URICACID\"     Medication Review: Yes    Assessment & Plan       Renal failure    Arteriovenous malformation of gastrointestinal tract    Type 2 diabetes mellitus without complication, with long-term current use of insulin    Dyslipidemia    Emphysema/COPD    Hypertension    Obstructive sleep apnea syndrome    Chronic " anticoagulation    Left bundle branch hemiblock    ARF (acute renal failure)    H/O heart valve replacement with mechanical valve    GI bleed    Hyponatremia    Hyperkalemia    Type 2 diabetes mellitus with hyperglycemia    Elevated LFTs    Anemia, chronic disease    Cervical lymphadenopathy      Assessment & Plan    Acute kidney injury: Last known stable cr ~ 1.1 mg/dl on last discharge few weeks prior to this admission. Patient is presenting with cr ~ 8. UA small blood trace protein 13-20 sq epi cell. Suspect severe hemodynamic injury in the setting of poor po intake, nsaid use, diarrhea, and enetersto use. CT didn't show any hydro. Urine Cl <20 Urine sodium 20. No significant proteinuria.      Hyponatremia: Due to hypovolemia and MEERA     Hyperkalemia: Due to MEERA.     CHF: EF 40-45%. Hx of MV and depressed RV function. On beta blocker and lasix. Not on ACEi/ARB/MRA/ or SGLT-2inh for now.      Volume status: No significant LE edema.      Anemia. Hx of supra therapeutic IRN w rectal bleeding in the past. Admitted again with concern for rectal bleed     Hx of mechanical valve. Currently on heparin     Recs  MEERA likely hemodynamic injury. Continue with IV fluids NS@ 100cc/hr.Continue to hold Enteresto for now. Daily labs. Strict I/O. No additional med change is req. High risk for needing dialysis. No indication at present.         I discussed the patients findings and my recommendations with patient    Pro Quesada MD  01/03/24  16:21 EST

## 2024-01-03 NOTE — PROGRESS NOTES
"GI Daily Progress Note  Subjective:    Chief Complaint:  Follow up rectal bleeding     Denies any recurrence in rectal bleeding.    Has not had a bowel movement since admission.        Complains again of lower extremity aching, throbbing frontal headache, ongoing chills.      Forgetfulness as she does not remember meeting me yesterday despite our long conversation.        Objective:    BP 98/40 (BP Location: Right arm, Patient Position: Lying)   Pulse 65   Temp 97.9 °F (36.6 °C) (Oral)   Resp 18   Ht 152.4 cm (60\")   Wt 89.9 kg (198 lb 3.2 oz)   LMP 06/17/1998   SpO2 99%   BMI 38.71 kg/m²     Physical Exam  Constitutional:       General: She is not in acute distress.  Cardiovascular:      Rate and Rhythm: Normal rate and regular rhythm.   Pulmonary:      Effort: Pulmonary effort is normal. No respiratory distress.   Abdominal:      General: Bowel sounds are normal. There is no distension.      Palpations: Abdomen is soft.      Tenderness: There is no abdominal tenderness.   Neurological:      Mental Status: She is alert and oriented to person, place, and time.   Psychiatric:         Cognition and Memory: She exhibits impaired recent memory.       Lab  Lab Results   Component Value Date    WBC 4.18 01/03/2024    HGB 8.4 (L) 01/03/2024    HGB 8.5 (L) 01/02/2024    HGB 9.5 (L) 01/01/2024    MCV 85.7 01/03/2024     01/03/2024    INR 2.36 (H) 01/01/2024    INR 1.44 (H) 11/22/2023    INR 1.35 (H) 11/21/2023    INR 1.10 11/20/2023    INR 1.23 (H) 11/19/2023     Lab Results   Component Value Date    GLUCOSE 150 (H) 01/03/2024    BUN 57 (H) 01/03/2024    CREATININE 6.72 (H) 01/03/2024    EGFRIFAFRI 73 10/14/2022    BCR 8.5 01/03/2024     (L) 01/03/2024    K 5.0 01/03/2024    CO2 19.0 (L) 01/03/2024    CALCIUM 7.1 (L) 01/03/2024    ALBUMIN 3.2 (L) 01/03/2024    ALKPHOS 217 (H) 01/03/2024    BILITOT 0.2 01/03/2024    BILIDIR <0.2 03/31/2023    ALT 37 (H) 01/03/2024    AST 32 01/03/2024 "       Assessment:    Rectal bleeding, resolved   Acute recurrent diarrhea, resolved.  No bowel movement since admission   Acute renal failure   Headache and neck pain  Enlarged and inflamed lymph node in the right supraclavicular fossa  Hepatic adenoma, stable, history of TACE procedure   Mechanical mitral valve, on chronic anticoagulation     Plan:    No recurrence in rectal bleeding.   Patient has actually not had any bowel movement since arrival.   She has ongoing MEERA that is slowly improving and multitude of symptoms above.        She had a recent EGD and colonoscopy on 11/16/23 that showed left sided diverticulosis and candida esophagitis (subsequently treated with Nystatin swish and swallow).        >> [place] ordered   >> GI panel PCR if diarrhea occurs   >> Further work up per primary team/nephrology.  Consider ID.       TAVO Alvarenga  01/03/24  11:23 EST

## 2024-01-03 NOTE — CASE MANAGEMENT/SOCIAL WORK
Continued Stay Note  [facility]     Patient Name: Di Lopez  MRN: [id number]  Today's Date: 1/3/2024    Admit Date: 1/1/2024    Plan: home with family   Discharge Plan       Row Name 01/03/24 1042       Plan    Plan Comments Pt remains on Heparin drip at 13u/kg/hr. At this time PT/OT are recommending home with HH if pt agreeable. Cm will continue to follow for medcial readiness for discharge                   Discharge Codes    No documentation.                       Marcela Gibbs RN

## 2024-01-03 NOTE — PROGRESS NOTES
[facility] Medicine Services  PROGRESS NOTE    Patient Name: Di Lopez  : 1960  MRN: [id number]    Date of Admission: 2024  Primary Care Physician: Davina Santiago DO    Subjective   Subjective     CC:  Fatigue, malaise    HPI:  Evaluated patient this morning. Reports that she has not had a bowel movement since admission. Reports right hip numbness with some radiation of pain into anterior aspect of right thigh down to knee. Also reports muscle cramping in bilateral lower extremities.      Objective   Objective     Vital Signs:   Temp:  [97.4 °F (36.3 °C)-100.5 °F (38.1 °C)] 99.8 °F (37.7 °C)  Heart Rate:  [65-81] 81  Resp:  [16-18] 18  BP: ()/(40-57) 116/57  Flow (L/min):  [2] 2     Physical Exam:  Constitutional: Awake, alert, resting comfortably  HENT: NCAT, mucous membranes moist  Respiratory: Clear to auscultation bilaterally, respiratory effort normal   Cardiovascular: RRR, mechanical heart valve sound present  Gastrointestinal: Positive bowel sounds, soft, nontender, nondistended  Musculoskeletal: No bilateral ankle edema, anterior aspect of thighs tender to palpation bilaterally  Neurologic: Alert and oriented x 3, no focal deficits, speech clear  Skin: No rashes      Results Reviewed:  LAB RESULTS:      Lab 24  0251 24  2035 24  1208 24  0808 24  0531 24  2330   WBC 4.18  --   --   --  4.26 4.42   HEMOGLOBIN 8.4*  --   --   --  8.5* 9.5*   HEMATOCRIT 25.2*  --   --   --  26.0* 29.3*   PLATELETS 224  --   --   --  228 250   NEUTROS ABS 2.61  --   --   --  3.02 3.39   IMMATURE GRANS (ABS) 0.07*  --   --   --  0.05 0.06*   LYMPHS ABS 1.12  --   --   --  0.92 0.77   MONOS ABS 0.25  --   --   --  0.21 0.17   EOS ABS 0.12  --   --   --  0.05 0.02   MCV 85.7  --   --   --  85.8 84.2   LACTATE  --   --   --   --   --  1.5   PROTIME  --   --   --   --   --  26.0*   APTT  --   --   --   --  66.2  --    HEPARIN ANTI-XA 0.37 0.34  0.15* 0.14* 0.10*  --          Lab 01/03/24  1654 01/03/24  1153 01/03/24  0737 01/03/24  0251 01/02/24  2336 01/02/24  1144 01/02/24  0808 01/02/24  0331 01/01/24 2330   SODIUM 132* 133* 133* 131*  131* 132*   < > 129* 127* 126*   POTASSIUM  --   --   --  5.0  --   --  5.3* 4.8 5.6*   CHLORIDE  --   --   --  99  --   --  95* 91* 87*   CO2  --   --   --  19.0*  --   --  19.0* 18.0* 20.0*   ANION GAP  --   --   --  13.0  --   --  15.0 18.0* 19.0*   BUN  --   --   --  57*  --   --  61* 63* 64*   CREATININE  --   --   --  6.72*  --   --  7.49* 8.19* 8.70*   EGFR  --   --   --  6.5*  --   --  5.7* 5.1* 4.8*   GLUCOSE  --   --   --  150*  --   --  152* 255* 224*   CALCIUM  --   --   --  7.1*  --   --  7.6* 7.6* 8.4*   MAGNESIUM  --   --   --   --   --   --  2.3  --   --    PHOSPHORUS  --   --   --   --   --   --  7.3*  --   --    TSH  --   --   --   --   --   --  1.590  --   --     < > = values in this interval not displayed.         Lab 01/03/24  0251 01/02/24  0808 01/01/24 2330   TOTAL PROTEIN 5.7* 6.1 7.1   ALBUMIN 3.2* 3.4* 4.1   GLOBULIN 2.5 2.7 3.0   ALT (SGPT) 37* 41* 45*   AST (SGOT) 32 37* 34*   BILIRUBIN 0.2 0.3 0.3   ALK PHOS 217* 222* 247*   LIPASE  --   --  82*         Lab 01/01/24 2330   PROTIME 26.0*   INR 2.36*             Lab 01/01/24  2358   ABO TYPING O   RH TYPING Positive   ANTIBODY SCREEN Negative         Lab 01/02/24  0047   FIO2 21   CARBOXYHEMOGLOBIN (VENOUS) 1.3     Brief Urine Lab Results  (Last result in the past 365 days)        Color   Clarity   Blood   Leuk Est   Nitrite   Protein   CREAT   Urine HCG        01/02/24 0011             142.2         01/02/24 0011 Yellow   Cloudy   Small (1+)   Negative   Negative   100 mg/dL (2+)                   Microbiology Results Abnormal       Procedure Component Value - Date/Time    COVID PRE-OP / PRE-PROCEDURE SCREENING ORDER (NO ISOLATION) - Swab, Nasopharynx [[id number]]  (Normal) Collected: 01/01/24 2330    Lab Status: Final result Specimen:  Swab from Nasopharynx Updated: 01/02/24 0016    Narrative:      The following orders were created for panel order COVID PRE-OP / PRE-PROCEDURE SCREENING ORDER (NO ISOLATION) - Swab, Nasopharynx.  Procedure                               Abnormality         Status                     ---------                               -----------         ------                     COVID-19, FLU A/B, RSV P...[[id number]]  Normal              Final result                 Please view results for these tests on the individual orders.    COVID-19, FLU A/B, RSV PCR 1 HR TAT - Swab, Nasopharynx [[id number]]  (Normal) Collected: 01/01/24 2330    Lab Status: Final result Specimen: Swab from Nasopharynx Updated: 01/02/24 0016     COVID19 Not Detected     Influenza A PCR Not Detected     Influenza B PCR Not Detected     RSV, PCR Not Detected    Narrative:      Fact sheet for providers: https://www.fda.gov/media/300233/download    Fact sheet for patients: https://www.fda.gov/media/392548/download    Test performed by PCR.            CT Soft Tissue Neck Without Contrast    Result Date: 1/2/2024  CT SOFT TISSUE NECK WO CONTRAST Date of Exam: 1/2/2024 2:16 AM EST Indication: Neck mass, nonpulsatile. Comparison: Cervical spine CT 6/21/2023. Technique: Axial CT images were obtained of the neck without contrast administration.  Reconstructed coronal and sagittal images were also obtained. Automated exposure control and iterative construction methods were used. Findings: Limited view of the intracranial contents is unremarkable. The orbits appear within normal limits. Paranasal sinuses and mastoids appear clear. There is streak artifact from dental metallic restoration hardware. The parotid and salivary glands appear symmetric. The thyroid is homogeneous. There is mild carotid bifurcation calcification. There are moderate aortic and aortic branch vessel atherosclerotic calcifications. There are calcified granulomas in the superior mediastinum. There  is mild emphysema. There is a left-sided ICD in place. There is an enlarged and inflamed lymph node in the right supraclavicular fossa measuring up to 10 x 15 mm on axial image 43. There are adjacent prominent but nonenlarged lymph nodes. There are is an enlarged left-sided level II lymph node with rounded morphology measuring 13 mm on image 30. There is a prominent left level III lymph node on image 44 measuring 13 mm in the short axis. The parapharyngeal spaces appear clear. There is no pharyngeal mucosal mass. The tongue, base of tongue, floor of mouth,  epiglottis, aryepiglottic folds, false vocal cords, vocal ligaments and subglottic airway appear within normal limits. There is a lipoma at the posterior upper back deep to the latissimus dorsi. This measures up to 64 x 15 mm on axial image 62. There are no acute osseous abnormalities or destructive bone lesions. There are minimal cervical degenerative changes.     Impression: Impression: 1.There is an enlarged and inflamed lymph node in the right supraclavicular fossa with adjacent prominent but nonenlarged lymph nodes. There are also enlarged left-sided level II and III lymph nodes. These appear inflammatory, but could be related to infection or malignancy and clinical follow-up is recommended. 2.Mild emphysema. 3.Atherosclerotic disease and mild cervical degenerative changes. Electronically Signed: Delfino Grimes MD  1/2/2024 2:35 AM EST  Workstation ID: [id number]    CT Abdomen Pelvis Without Contrast    Result Date: 1/2/2024  CT ABDOMEN PELVIS WO CONTRAST Date of Exam: 1/2/2024 12:17 AM EST Indication: Gastrointestinal bleeding,  lower abdominal pain, acute renal failure on labs. Comparison: 11/9/2023. Technique: Axial CT images were obtained of the abdomen and pelvis without the administration of contrast. Reconstructed coronal and sagittal images were also obtained. Automated exposure control and iterative construction methods were used. Findings: There is a  calcified left basilar granuloma. There is a stable 4 mm right middle lobe nodule on image 2. This is unchanged since at least 2022. There is a stable 4 mm nodule in the right lower lobe on image 7. There is mild dependent bibasilar atelectasis. There is a rounded nodule in the far lateral left breast on image 1 measuring 12 mm. The patient appears up-to-date on mammogram screening and this is likely a partially visualized lymph node. There are no acute findings in the superficial soft tissues. There is a small fat-containing periumbilical hernia. There are no acute osseous abnormalities or destructive bone lesions. There is mild thoracolumbar disc degeneration and moderate lower lumbar facet arthritis. There is a stable lobulated mass of the left hepatic lobe measuring up to 8.4 cm with evidence of previous embolization. Patient has a history of a liver adenoma. The inferior right liver appears lobulated raising concern for cirrhosis. The gallbladder is contracted. The bile ducts are nondistended. The pancreas, stomach and spleen appear stable. There is a small hypodensity in the spleen measuring 12 mm, possibly cyst, hemangioma or noncalcified granuloma. Previous contrast-enhanced study demonstrated  noncalcified granulomas in the spleen. There is thickening of the left adrenal gland, likely a result of hyperplasia or small adenomas. There is a small simple right kidney cyst. There is no urolithiasis or hydronephrosis. The urinary bladder is within normal limits. Patient is status post hysterectomy. The ovaries are not seen. The appendix is normal. There is distal colonic diverticulosis. No small bowel distention. There is mild aortoiliac atherosclerosis. No ascites, pneumoperitoneum or lymphadenopathy.     Impression: Impression: 1.No acute abdominal or pelvic abnormality. 2.Colonic diverticulosis without evidence of diverticulitis. 3.Stable lobulated mass in the left hepatic lobe with evidence of previous  embolization. Patient has a history of a liver adenoma. 4.Stable 4 mm nodules in the right lung base. These are unchanged since at least 2022. 5.12 mm rounded nodule in the far lateral left breast. The patient appears up-to-date on mammogram screening and this is likely a partially visualized lymph node. Electronically Signed: Delfino Grimes MD  1/2/2024 12:32 AM EST  Workstation ID: [id number]     Results for orders placed during the hospital encounter of 05/31/23    Adult Transthoracic Echo Complete W/ Cont if Necessary Per Protocol    Interpretation Summary    Mildly reduced right ventricular systolic function noted.    There is calcification of the aortic valve.    Estimated right ventricular systolic pressure from tricuspid regurgitation is normal (<35 mmHg).    Mild global LV systolic dysfunction    EF=45-50%    Normal prosthetic MV function    AV calcification      Current medications:  Scheduled Meds:escitalopram, 20 mg, Oral, Daily  ferrous sulfate, 325 mg, Oral, Daily With Breakfast  folic acid, 800 mcg, Oral, Daily  insulin detemir, 5 Units, Subcutaneous, Nightly  insulin lispro, 2-7 Units, Subcutaneous, 4x Daily AC & at Bedtime  [START ON 1/4/2024] Insulin Lispro, 3 Units, Subcutaneous, TID With Meals  oxybutynin XL, 5 mg, Oral, Daily  pantoprazole, 40 mg, Oral, Q AM  sodium chloride, 10 mL, Intravenous, Q12H      Continuous Infusions:heparin, 13 Units/kg/hr, Last Rate: 13 Units/kg/hr (01/03/24 1546)  Pharmacy to Dose Heparin,   sodium chloride, 100 mL/hr, Last Rate: 100 mL/hr (01/03/24 1546)      PRN Meds:.  acetaminophen    albuterol    cyclobenzaprine    dextrose    dextrose    glucagon (human recombinant)    melatonin    naloxone    nitroglycerin    oxyCODONE    Pharmacy to Dose Heparin    sodium chloride    sodium chloride    Assessment & Plan   Assessment & Plan     Active Hospital Problems    Diagnosis  POA    **Renal failure [N19]  Yes    Hyponatremia [E87.1]  Yes    Hyperkalemia [E87.5]  Yes     Type 2 diabetes mellitus with hyperglycemia [E11.65]  Yes    Elevated LFTs [R79.89]  Yes    Anemia, chronic disease [D63.8]  Yes    Cervical lymphadenopathy [R59.0]  Yes    GI bleed [K92.2]  Yes    H/O heart valve replacement with mechanical valve [Z95.2]  Not Applicable    ARF (acute renal failure) [N17.9]  Yes    Left bundle branch hemiblock [I44.60]  Yes    Type 2 diabetes mellitus without complication, with long-term current use of insulin [E11.9, Z79.4]  Not Applicable    Dyslipidemia [E78.5]  Yes    Emphysema/COPD [J43.9]  Yes    Hypertension [I10]  Yes    Arteriovenous malformation of gastrointestinal tract [K55.20]  Yes    Obstructive sleep apnea syndrome [G47.33]  Yes    Chronic anticoagulation [Z79.01]  Not Applicable      Resolved Hospital Problems   No resolved problems to display.        Brief Hospital Course to date:  Di Lopez is a 63 y.o. female with PMHx of mechanical mitral valve on coumadin, AVM of GI tract, chronic HFmrEF with EF 45-50% 5/2023, COPD, DM, HLD, CAD, HTN, LBBB, liver mass s/p embolization who presented with c/o bright red blood per rectum, 10 lb weight loss, headache, swollen lymph nodes left neck, chills, diarrhea preceding hematochezia. Found to have severe MEERA with hyponatremia.    This patient's problems and plans were partially entered by my partner and updated as appropriate by me 01/03/24.    Acute renal failure  Metabolic acidosis  -pt has had diarrhea and poor po intake, recent cefdinir use for headache, swollen lymph nodes, chills, diarrhea, ?if an infectious diarrhea leading to dehydration  -Cr of 8.7 on admission with last Cr 1.25 less than one month ago  -UA with protein and small blood.  -Nephrology following, recommended to continue IV fluids for now. Ordered urine studies. Holding Entresto. Patient remains high risk for needing dialysis.     Hyponatremia  -likely related to above but unclear, Na 126 on admission   -TSH normal, 8AM cortisol 10.55  -Nephrology  following. Monitor daily BMP.     Hematochezia  Normocytic anemia  -h/h stable on admission   -pt with same complaint last admit with scopes revealing candida esophagitis and diverticulosis  -Gastroenterology evaluated. Recent bidirectional endoscopy was unremarkable. Bleeding was suspected to be secondary to diverticular source at that time. Recommended to obtain GI panel PCR. Ordered tularemia antibody.    Cervical LAD  -soft tissue neck CT with bilateral cervical lymphadenopathy; checked HIV given hx of esophageal candidiasis and abnormal presentation  -HIV normal.  -Will need interval follow-up imaging.     Elevated transaminases, improving  -mildly elevated on admission of unclear etiology  -Hepatitis panel negative.  -Monitor daily CMP. Check Tylenol level.    Hyperkalemia  -secondary to MEERA  -EKG stable  -hyperkalemia order set initiated in ER  -Monitor daily CMP.    Incidental CT findings  -Stable lobulated mass in the left hepatic lobe with evidence of previous embolization. Patient has a history of a liver adenoma.  -Stable 4 mm nodules in the right lung base. These are unchanged since at least 2022.  -12 mm rounded nodule in the far lateral left breast - likely a partially visualized lymph node. Mammogram appears to be up to date.      Mechanical MV on chronic coumadin  -Holding coumadin, continue heparin infusion.      IDDM2 with hyperglycemia  -A1c 7.2% most recently  -Continue SSI while inpatient. Adding on Levemir 5 units nightly and scheduled lispro 3 units 3 times daily with meals.    Anxiety/depression  -Continue Lexapro.    Chronic back pain  Bilateral lower extremity muscle aches  -Continue Tylenol while inpatient, PRN oxycodone as needed for moderate/severe pain. Added on as needed Flexeril to try.    Expected Discharge Location and Transportation: [facility] pending evaluation  Expected Discharge   Expected Discharge Date: 1/6/2024; Expected Discharge Time:      DVT prophylaxis:  Medical DVT  prophylaxis orders are present.     AM-PAC 6 Clicks Score (PT): 17 (01/02/24 1796)    CODE STATUS:   Code Status and Medical Interventions:   Ordered at: 01/02/24 0319     Code Status (Patient has no pulse and is not breathing):    CPR (Attempt to Resuscitate)     Medical Interventions (Patient has pulse or is breathing):    Full Support       Dariana Bridges, DO  01/03/24

## 2024-01-04 LAB
ALBUMIN SERPL-MCNC: 3.2 G/DL (ref 3.5–5.2)
ALBUMIN/GLOB SERPL: 1.6 G/DL
ALP SERPL-CCNC: 212 U/L (ref 39–117)
ALT SERPL W P-5'-P-CCNC: 32 U/L (ref 1–33)
ANION GAP SERPL CALCULATED.3IONS-SCNC: 14 MMOL/L (ref 5–15)
AST SERPL-CCNC: 29 U/L (ref 1–32)
BILIRUB SERPL-MCNC: 0.2 MG/DL (ref 0–1.2)
BUN SERPL-MCNC: 50 MG/DL (ref 8–23)
BUN/CREAT SERPL: 9.3 (ref 7–25)
CALCIUM SPEC-SCNC: 7.1 MG/DL (ref 8.6–10.5)
CHLORIDE SERPL-SCNC: 105 MMOL/L (ref 98–107)
CK SERPL-CCNC: 160 U/L (ref 20–180)
CO2 SERPL-SCNC: 17 MMOL/L (ref 22–29)
CREAT SERPL-MCNC: 5.39 MG/DL (ref 0.57–1)
DEPRECATED RDW RBC AUTO: 47.2 FL (ref 37–54)
EGFRCR SERPLBLD CKD-EPI 2021: 8.5 ML/MIN/1.73
ERYTHROCYTE [DISTWIDTH] IN BLOOD BY AUTOMATED COUNT: 14.9 % (ref 12.3–15.4)
GLOBULIN UR ELPH-MCNC: 2 GM/DL
GLUCOSE BLDC GLUCOMTR-MCNC: 101 MG/DL (ref 70–130)
GLUCOSE BLDC GLUCOMTR-MCNC: 155 MG/DL (ref 70–130)
GLUCOSE BLDC GLUCOMTR-MCNC: 234 MG/DL (ref 70–130)
GLUCOSE BLDC GLUCOMTR-MCNC: 254 MG/DL (ref 70–130)
GLUCOSE SERPL-MCNC: 89 MG/DL (ref 65–99)
HCT VFR BLD AUTO: 24.5 % (ref 34–46.6)
HGB BLD-MCNC: 7.8 G/DL (ref 12–15.9)
MCH RBC QN AUTO: 27.4 PG (ref 26.6–33)
MCHC RBC AUTO-ENTMCNC: 31.8 G/DL (ref 31.5–35.7)
MCV RBC AUTO: 86 FL (ref 79–97)
PLATELET # BLD AUTO: 265 10*3/MM3 (ref 140–450)
PMV BLD AUTO: 10 FL (ref 6–12)
POTASSIUM SERPL-SCNC: 5.1 MMOL/L (ref 3.5–5.2)
PROT SERPL-MCNC: 5.2 G/DL (ref 6–8.5)
RBC # BLD AUTO: 2.85 10*6/MM3 (ref 3.77–5.28)
SODIUM SERPL-SCNC: 136 MMOL/L (ref 136–145)
UFH PPP CHRO-ACNC: 0.34 IU/ML (ref 0.3–0.7)
WBC NRBC COR # BLD AUTO: 5.78 10*3/MM3 (ref 3.4–10.8)

## 2024-01-04 PROCEDURE — 82746 ASSAY OF FOLIC ACID SERUM: CPT | Performed by: STUDENT IN AN ORGANIZED HEALTH CARE EDUCATION/TRAINING PROGRAM

## 2024-01-04 PROCEDURE — 82607 VITAMIN B-12: CPT | Performed by: STUDENT IN AN ORGANIZED HEALTH CARE EDUCATION/TRAINING PROGRAM

## 2024-01-04 PROCEDURE — 82550 ASSAY OF CK (CPK): CPT | Performed by: STUDENT IN AN ORGANIZED HEALTH CARE EDUCATION/TRAINING PROGRAM

## 2024-01-04 PROCEDURE — 25810000003 SODIUM CHLORIDE 0.9 % SOLUTION: Performed by: STUDENT IN AN ORGANIZED HEALTH CARE EDUCATION/TRAINING PROGRAM

## 2024-01-04 PROCEDURE — 63710000001 INSULIN LISPRO (HUMAN) PER 5 UNITS: Performed by: STUDENT IN AN ORGANIZED HEALTH CARE EDUCATION/TRAINING PROGRAM

## 2024-01-04 PROCEDURE — 25010000002 HEPARIN (PORCINE) 25000-0.45 UT/250ML-% SOLUTION

## 2024-01-04 PROCEDURE — 82948 REAGENT STRIP/BLOOD GLUCOSE: CPT

## 2024-01-04 PROCEDURE — 63710000001 INSULIN LISPRO (HUMAN) PER 5 UNITS: Performed by: INTERNAL MEDICINE

## 2024-01-04 PROCEDURE — 25810000003 SODIUM CHLORIDE 0.9 % SOLUTION: Performed by: INTERNAL MEDICINE

## 2024-01-04 PROCEDURE — 97530 THERAPEUTIC ACTIVITIES: CPT

## 2024-01-04 PROCEDURE — 63710000001 INSULIN DETEMIR PER 5 UNITS: Performed by: STUDENT IN AN ORGANIZED HEALTH CARE EDUCATION/TRAINING PROGRAM

## 2024-01-04 PROCEDURE — 86480 TB TEST CELL IMMUN MEASURE: CPT | Performed by: INTERNAL MEDICINE

## 2024-01-04 PROCEDURE — 80053 COMPREHEN METABOLIC PANEL: CPT | Performed by: STUDENT IN AN ORGANIZED HEALTH CARE EDUCATION/TRAINING PROGRAM

## 2024-01-04 PROCEDURE — 85520 HEPARIN ASSAY: CPT

## 2024-01-04 PROCEDURE — 85027 COMPLETE CBC AUTOMATED: CPT | Performed by: STUDENT IN AN ORGANIZED HEALTH CARE EDUCATION/TRAINING PROGRAM

## 2024-01-04 PROCEDURE — 99232 SBSQ HOSP IP/OBS MODERATE 35: CPT | Performed by: STUDENT IN AN ORGANIZED HEALTH CARE EDUCATION/TRAINING PROGRAM

## 2024-01-04 RX ORDER — OXYCODONE AND ACETAMINOPHEN 7.5; 325 MG/1; MG/1
1 TABLET ORAL EVERY 6 HOURS PRN
Status: DISPENSED | OUTPATIENT
Start: 2024-01-04 | End: 2024-01-11

## 2024-01-04 RX ADMIN — INSULIN LISPRO 2 UNITS: 100 INJECTION, SOLUTION INTRAVENOUS; SUBCUTANEOUS at 12:47

## 2024-01-04 RX ADMIN — ESCITALOPRAM OXALATE 20 MG: 20 TABLET, FILM COATED ORAL at 09:03

## 2024-01-04 RX ADMIN — OXYCODONE HYDROCHLORIDE AND ACETAMINOPHEN 1 TABLET: 7.5; 325 TABLET ORAL at 17:02

## 2024-01-04 RX ADMIN — HEPARIN SODIUM 13 UNITS/KG/HR: 10000 INJECTION, SOLUTION INTRAVENOUS at 09:28

## 2024-01-04 RX ADMIN — PANTOPRAZOLE SODIUM 40 MG: 40 TABLET, DELAYED RELEASE ORAL at 06:01

## 2024-01-04 RX ADMIN — Medication 10 ML: at 21:31

## 2024-01-04 RX ADMIN — SODIUM CHLORIDE 100 ML/HR: 9 INJECTION, SOLUTION INTRAVENOUS at 11:17

## 2024-01-04 RX ADMIN — FOLIC ACID TAB 400 MCG 800 MCG: 400 TAB at 09:02

## 2024-01-04 RX ADMIN — OXYCODONE HYDROCHLORIDE 5 MG: 5 TABLET ORAL at 09:08

## 2024-01-04 RX ADMIN — SODIUM CHLORIDE 50 ML/HR: 9 INJECTION, SOLUTION INTRAVENOUS at 23:42

## 2024-01-04 RX ADMIN — INSULIN LISPRO 3 UNITS: 100 INJECTION, SOLUTION INTRAVENOUS; SUBCUTANEOUS at 12:47

## 2024-01-04 RX ADMIN — OXYBUTYNIN CHLORIDE 5 MG: 5 TABLET, FILM COATED, EXTENDED RELEASE ORAL at 09:03

## 2024-01-04 RX ADMIN — INSULIN DETEMIR 5 UNITS: 100 INJECTION, SOLUTION SUBCUTANEOUS at 21:31

## 2024-01-04 RX ADMIN — INSULIN LISPRO 3 UNITS: 100 INJECTION, SOLUTION INTRAVENOUS; SUBCUTANEOUS at 17:36

## 2024-01-04 RX ADMIN — INSULIN LISPRO 3 UNITS: 100 INJECTION, SOLUTION INTRAVENOUS; SUBCUTANEOUS at 17:37

## 2024-01-04 RX ADMIN — INSULIN LISPRO 4 UNITS: 100 INJECTION, SOLUTION INTRAVENOUS; SUBCUTANEOUS at 21:31

## 2024-01-04 RX ADMIN — ACETAMINOPHEN 650 MG: 325 TABLET ORAL at 23:48

## 2024-01-04 RX ADMIN — SODIUM CHLORIDE 100 ML/HR: 9 INJECTION, SOLUTION INTRAVENOUS at 01:07

## 2024-01-04 RX ADMIN — FERROUS SULFATE TAB 325 MG (65 MG ELEMENTAL FE) 325 MG: 325 (65 FE) TAB at 09:03

## 2024-01-04 NOTE — THERAPY TREATMENT NOTE
Patient Name: Di Lopez  : 1960    MRN: [id number]                              Today's Date: 2024       Admit Date: 2024    Visit Dx:     ICD-10-CM ICD-9-CM   1. Acute renal failure, unspecified acute renal failure type  N17.9 584.9   2. Hyperkalemia  E87.5 276.7   3. Gastrointestinal hemorrhage, unspecified gastrointestinal hemorrhage type  K92.2 578.9     Patient Active Problem List   Diagnosis    Arteriovenous malformation of gastrointestinal tract    Depression    Type 2 diabetes mellitus without complication, with long-term current use of insulin    Dyslipidemia    Emphysema/COPD    Hypertension    Insomnia    Morbid obesity    Obstructive sleep apnea syndrome    Chronic anticoagulation    Normocytic anemia    Sliding hiatal hernia    Vitamin B12 deficiency    Iron deficiency    Iron malabsorption    Bronchiectasis without complication    Electronic cigarette use    Chronic diastolic heart failure    Left bundle branch hemiblock    Chronic respiratory failure with hypoxia    Body aches    Headaches due to old head injury    History of tobacco use, presenting hazards to health    Multiple nodules of lung    Nocturnal hypoxemia    Screening for colon cancer    Abnormal CT scan    Wellness examination    ARF (acute renal failure)    H/O heart valve replacement with mechanical valve    GI bleed    Severe malnutrition    Hyponatremia    Hyperkalemia    Type 2 diabetes mellitus with hyperglycemia    Elevated LFTs    Anemia, chronic disease    Cervical lymphadenopathy    Renal failure     Past Medical History:   Diagnosis Date    Allergic     Anemia     Anticoagulated     WARFARIN    Arteriovenous malformation of gastrointestinal tract 2016    Arthritis     Asthma     Black hairy tongue     CHF (congestive heart failure)     Chronic back pain     Coronary artery disease     Depression 2016    Diabetes mellitus     Dyslipidemia 2016    Elevated cholesterol     Emphysema/COPD  05/12/2016    Emphysema/COPD 05/12/2016    Fatigue     GERD (gastroesophageal reflux disease)     Headache     Herniated cervical disc     History of echocardiogram 07/16/2013    Left ventricular systolic function at the lower limits of normal; EF 50-55%; mild MR; mechanical prosthetic mitral valve present; mod TR    History of transfusion     NO REACTION; [facility]    Hyperlipidemia     Hypertension     Insomnia 05/12/2016    Iron deficiency     A.  The patient was treated with IV iron replacement. B.  Secondary to GI bleed, status post EGD with cauterization of blood vessel to the duodenum, 7/16/2014.    Left bundle branch hemiblock 10/12/2020    Left shoulder strain     Liver disease     Liver mass     Long term current use of anticoagulant 05/12/2016    Low back pain     Lung nodules     PER DR. LEVI'S NOTE 10/22    Morbid obesity 05/12/2016    Neuromuscular disorder 7/28/2016    Obstructive sleep apnea syndrome 05/12/2016    Description: A.  Moderate; CPAP therapy..    Sliding hiatal hernia 07/28/2016    Description: A.  Reported on EGD, 12/30/2011.    Type 2 diabetes mellitus 05/12/2016    Urinary tract infection     Uterine cancer     HYSTERECTOMY    Vitamin B12 deficiency     A.  The patient was treated with vitamin B12 replacement.    Wears eyeglasses      Past Surgical History:   Procedure Laterality Date    BILATERAL OOPHORECTOMY  2009    Status post bilateral oophorectomy secondary to ovarian cysts     BREAST BIOPSY Right     STEREOTACTIC    BREAST EXCISIONAL BIOPSY Bilateral     CARDIAC ELECTROPHYSIOLOGY PROCEDURE N/A 10/15/2020    Procedure: Biventricular Device Insertion;  Surgeon: Nadir Everett MD;  Location: [facility] CATH INVASIVE LOCATION;  Service: Cardiology;  Laterality: N/A;    CARDIAC SURGERY  2003    CARPAL TUNNEL RELEASE Bilateral     COLONOSCOPY  12/28/2012    COLONOSCOPY N/A 5/23/2023    Procedure: COLONOSCOPY;  Surgeon: Gt Fernandes MD;  Location: [facility] ENDOSCOPY;  Service:  Gastroenterology;  Laterality: N/A;    COLONOSCOPY N/A 11/16/2023    Procedure: COLONOSCOPY;  Surgeon: Brunner, Mark I, MD;  Location:  [facility];  Service: Gastroenterology;  Laterality: N/A;    CYSTECTOMY      removal of ganglion cyst from left wrist    ENDOSCOPY  12/30/2011    DIAGNOSTIC ESOPHAGOGASTRODUODENOSCOPY    ENDOSCOPY N/A 11/16/2023    Procedure: ESOPHAGOGASTRODUODENOSCOPY;  Surgeon: Brunner, Mark I, MD;  Location:  [facility];  Service: Gastroenterology;  Laterality: N/A;    HYSTERECTOMY  1995    A.  Status post hysterectomy for early stage endometrial cancer done in 1995.    MITRAL VALVE REPLACEMENT  03/03/2008    Dr. Hima Barreto; MECHANICAL    OTHER SURGICAL HISTORY      LIVER MASS BLOOD SUPPLY CLIPPED ON RIGHT AND LEFT SIDE PER PT      General Information       [facility] Name 01/04/24 1117          Physical Therapy Time and Intention    Document Type therapy note (daily note)  -SC     Mode of Treatment physical therapy  -SC       Row Name 01/04/24 1117          General Information    Patient Profile Reviewed yes  -SC     Existing Precautions/Restrictions fall;other (see comments)  chronic back pain  -SC       Row Name 01/04/24 1117          Cognition    Orientation Status (Cognition) oriented x 4  -[facility] Name 01/04/24 1117          Safety Issues, Functional Mobility    Impairments Affecting Function (Mobility) pain;postural/trunk control;sensation/sensory awareness;strength;endurance/activity tolerance  -SC     Comment, Safety Issues/Impairments (Mobility) alert, following commands  -SC               User Key  (r) = Recorded By, (t) = Taken By, (c) = Cosigned By      Initials Name Provider Type    SC Isaías Kumar PT Physical Therapist                   Mobility       Row Name 01/04/24 1124          Bed Mobility    Bed Mobility supine-sit;sit-supine;scooting/bridging  -SC     Scooting/Bridging [place] (Bed Mobility) independent  -SC     Sit-Supine [place] (Bed Mobility)  modified independence  -SC     Assistive Device (Bed Mobility) head of bed elevated  -SC     Comment, (Bed Mobility) able to get in/oob  -[facility] Name 01/04/24 1124          Transfers    Comment, (Transfers) able to transfer safely  -[facility] Name 01/04/24 1124          Sit-Stand Transfer    Sit-Stand [place] (Transfers) standby assist  -SC     Assistive Device (Sit-Stand Transfers) walker, front-wheeled  -[facility] Name 01/04/24 1124          Gait/Stairs (Locomotion)    [place] Level (Gait) contact guard  -SC     Assistive Device (Gait) walker, front-wheeled  -SC     Distance in Feet (Gait) 250  -SC     Deviations/Abnormal Patterns (Gait) gait speed decreased;base of support, wide  -SC     Comment, (Gait/Stairs) Demonstrated good contol of walker. Cues for focusing on breathing. No LOB noted.  -SC               User Key  (r) = Recorded By, (t) = Taken By, (c) = Cosigned By      Initials Name Provider Type    SC Isaías Kumar, PT Physical Therapist                   Obj/Interventions    No documentation.                  Goals/Plan    No documentation.                  Clinical Impression       [facility] Name 01/04/24 1129          Pain    Additional Documentation Pain Scale: FACES Pre/Post-Treatment (Group)  -[facility] Name 01/04/24 1129          Pain Scale: FACES Pre/Post-Treatment    Pain: FACES Scale, Pretreatment 2-->hurts little bit  -SC     Posttreatment Pain Rating 2-->hurts little bit  -SC     Pain Location - Side/Orientation Left  -SC     Pain Location lower  -SC     Pain Location - extremity  -[facility] Name 01/04/24 1129          Plan of Care Review    Plan of Care Reviewed With patient  -SC     Progress improving  -SC     Outcome Evaluation Patient able to ambulate increasing distance today with good control.  -SC       Row Name 01/04/24 1129          Therapy Assessment/Plan (PT)    Rehab Potential (PT) good, to achieve stated therapy goals  -SC     Criteria for Skilled  Interventions Met (PT) yes  -SC     Therapy Frequency (PT) daily  -SC       Row Name 01/04/24 1129          Vital Signs    Posttreatment Heart Rate (beats/min) 75  -SC     Post SpO2 (%) 100  -SC     O2 Delivery Post Treatment supplemental O2  -SC       Row Name 01/04/24 1129          Positioning and Restraints    Pre-Treatment Position in bed  -SC     Post Treatment Position bed  -SC     In Bed supine;encouraged to call for assist;exit alarm on  -SC               User Key  (r) = Recorded By, (t) = Taken By, (c) = Cosigned By      Initials Name Provider Type    SC Isaías Kumar PT Physical Therapist                   Outcome Measures       Row Name 01/04/24 1130          How much help from another person do you currently need...    Turning from your back to your side while in flat bed without using bedrails? 4  -SC     Moving from lying on back to sitting on the side of a flat bed without bedrails? 4  -SC     Moving to and from a bed to a chair (including a wheelchair)? 4  -SC     Standing up from a chair using your arms (e.g., wheelchair, bedside chair)? 4  -SC     Climbing 3-5 steps with a railing? 3  -SC     To walk in hospital room? 3  -SC     AM-PAC 6 Clicks Score (PT) 22  -SC     Highest Level of Mobility Goal 7 --> Walk 25 feet or more  -SC       Row Name 01/04/24 1130          Functional Assessment    Outcome Measure Options AM-PAC 6 Clicks Basic Mobility (PT)  -SC               User Key  (r) = Recorded By, (t) = Taken By, (c) = Cosigned By      Initials Name Provider Type    SC Isaías Kumar PT Physical Therapist                                 Physical Therapy Education       Title: PT OT SLP Therapies (Done)       Topic: Physical Therapy (Done)       Point: Mobility training (Done)       Learning Progress Summary             Patient [address] VU by SC at 1/4/2024 1131    Comment: reviewed benefits of walking    [address] VU by SC at 1/2/2024 1203    Comment: reviewed benefits of activity                          Point: Home exercise program (Done)       Learning Progress Summary             Patient [address], VU by SC at 1/4/2024 1131    Comment: reviewed benefits of walking    [address] VU by SC at 1/2/2024 1203    Comment: reviewed benefits of activity                         Point: Body mechanics (Done)       Learning Progress Summary             Patient [address], VU by SC at 1/4/2024 1131    Comment: reviewed benefits of walking    [address], VU by SC at 1/2/2024 1203    Comment: reviewed benefits of activity                         Point: Precautions (Done)       Learning Progress Summary             Patient [address]VAL[address] Lane, VU by SC at 1/4/2024 1131    Comment: reviewed benefits of walking    [address], VU by SC at 1/2/2024 1203    Comment: reviewed benefits of activity                                         User Key       Initials Effective Dates Name Provider Type Discipline    SC 02/03/23 -  Isaías Kumar, PT Physical Therapist PT                  PT Recommendation and Plan  Planned Therapy Interventions (PT): bed mobility training, gait training, home exercise program, strengthening, ROM (range of motion), patient/family education, transfer training  Plan of Care Reviewed With: patient  Progress: improving  Outcome Evaluation: Patient able to ambulate increasing distance today with good control.     Time Calculation:   PT Evaluation Complexity  History, PT Evaluation Complexity: 3 or more personal factors and/or comorbidities  Examination of Body Systems (PT Eval Complexity): total of 4 or more elements  Clinical Presentation (PT Evaluation Complexity): evolving  Clinical Decision Making (PT Evaluation Complexity): moderate complexity  Overall Complexity (PT Evaluation Complexity): moderate complexity     PT Charges       Row Name 01/04/24 1028             Time Calculation    Start Time 1028  -SC      PT Received On 01/04/24  -SC      PT Goal Re-Cert Due Date 01/12/24  -SC         Timed Charges    18315 - PT  Therapeutic Activity Minutes 23  -SC         Total Minutes    Timed Charges Total Minutes 23  -SC       Total Minutes 23  -SC                User Key  (r) = Recorded By, (t) = Taken By, (c) = Cosigned By      Initials Name Provider Type    Isaías [place] PT Physical Therapist                  Therapy Charges for Today       Code Description Service Date Service Provider Modifiers Qty    [id number]  PT THERAPEUTIC ACT EA 15 MIN 1/4/2024 Isaías Kumar PT GP 2            PT G-Codes  Outcome Measure Options: AM-PAC 6 Clicks Basic Mobility (PT)  AM-PAC 6 Clicks Score (PT): 22  AM-PAC 6 Clicks Score (OT): 18  PT Discharge Summary  Anticipated Discharge Disposition (PT): home, home with home health    Isaías Kumar PT  1/4/2024

## 2024-01-04 NOTE — PROGRESS NOTES
HEPARIN INFUSION  Di Lopez is a  63 y.o. female receiving heparin infusion.     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   Patient Weight:  89.9 kg  Initial Bolus (Y/N):  No  Any Bolus (Y/N):  Yes       Signs or Symptoms of Bleeding:     Cardiac or Other (Not VTE)   Initial Bolus: 60 units/kg (Max 4,000 units)  Initial rate: 12 units/kg/hr (Max 1,000 units/hr)   Anti Xa Rebolus Infusion Hold time Change infusion Dose (Units/kg/hr) Next Anti Xa or aPTT Level Due   < 0.11 50 Units/kg  (4000 Units Max) None Increase by  3 Units/kg/hr 6 hours   0.11- 0.19 25 Units/kg  (2000 Units Max) None Increase by  2 Units/kg/hr 6 hours   0.2 - 0.29 0 None Increase by  1 Units/kg/hr 6 hours   0.3 - 0.5 0 None No Change 6 hours (after 2 consecutive levels in range check qAM)   0.51 - 0.6 0 None Decrease by  1 Units/kg/hr 6 hours   0.61 - 0.8 0 30 Minutes Decrease by  2 Units/kg/hr 6 hours   0.81 - 1 0 60 Minutes Decrease by  3 Units/kg/hr 6 hours   >1 0 Hold  After Anti Xa less than 0.5 decrease previous rate by  4 Units/kg/hr  Every 2 hours until Anti Xa  less than 0.5 then when infusion restarts in 6 hours       Results from last 7 days   Lab Units 01/04/24  0541 01/03/24  0251 01/02/24  0531 01/01/24  2330   INR   --   --   --  2.36*   HEMOGLOBIN g/dL 7.8* 8.4* 8.5* 9.5*   HEMATOCRIT % 24.5* 25.2* 26.0* 29.3*   PLATELETS 10*3/mm3 265 224 228 250          Date   Time   Anti-Xa Current Rate (Unit/kg/hr) Bolus   (Units) Rate Change   (Unit/kg/hr) New Rate (Unit/kg/hr) Next   Anti-Xa Comments  Pump Check Daily   1/2 0531 0.1 New Start No Bolus -- 11 1100 Discussed w/ nurse  Warfarin patient    1/2 1140 Pump check 11 -- -- 11 now Pump weight and rate verified    1/2 1208 0.15 11 2000 +2 13 2000 D/w Marjan   1/2 2035 0.34 13 -- -- 13 0300 DW JARAD Rousseau   1/3 0251 0.37 13 -- -- 13 0600 1/4/24 Discussed w/ nurse   1/3 1100 PUMP CHECK 13 - -- 13 0600 1/4/23 Pump rate and weight verified   1/4 0578  0.34 13 -- -- 13 0600 DW RN. Pump verified.

## 2024-01-04 NOTE — PLAN OF CARE
Patient alert and oriented x 4. Has complaints of bilateral thigh pain and right hip numbness, pain meds changed to percocet q 6 hrs. MRI ordered for lumbar spine, will need cardioloy to clear patient for MRI since she has a pacemaker, she is a patient of Dr. Everett, entered order for consult. Patient remains on heparin gtt at 13 units/kg/hr. No bm today, no signs of bleeding. She has been on room air most of the afternoon but after walking from bathroom to bed this evening she felt short of breath, sats were 92% but she requested her 02 NC.

## 2024-01-04 NOTE — PLAN OF CARE
Goal Outcome Evaluation:  Plan of Care Reviewed With: patient        Progress: improving  Outcome Evaluation: Patient able to ambulate increasing distance today with good control.      Anticipated Discharge Disposition (PT): home, home with home health

## 2024-01-04 NOTE — PLAN OF CARE
Heparin drip continues, new blood tinged nasal mucous. IVF NS at 100ml/hr continues. No BM this shift, active bowel sounds LBM 1-1-24, need stool sample. Pt up with 1 to bathroom. VSS, 2L of O2 utilized, AV paced. Chest xray completed, results to be reviewed and discussed with patient. GI, Nephro, and hospitalist following.       Goal Outcome Evaluation:                         negative...

## 2024-01-05 ENCOUNTER — DOCUMENTATION (OUTPATIENT)
Dept: HOME HEALTH SERVICES | Facility: HOME HEALTHCARE | Age: 64
End: 2024-01-05
Payer: MEDICARE

## 2024-01-05 ENCOUNTER — TELEPHONE (OUTPATIENT)
Dept: FAMILY MEDICINE CLINIC | Facility: CLINIC | Age: 64
End: 2024-01-05
Payer: MEDICARE

## 2024-01-05 ENCOUNTER — APPOINTMENT (OUTPATIENT)
Dept: MRI IMAGING | Facility: HOSPITAL | Age: 64
End: 2024-01-05
Payer: MEDICARE

## 2024-01-05 LAB
ALBUMIN SERPL-MCNC: 3.5 G/DL (ref 3.5–5.2)
ALBUMIN/GLOB SERPL: 1.5 G/DL
ALP SERPL-CCNC: 204 U/L (ref 39–117)
ALT SERPL W P-5'-P-CCNC: 29 U/L (ref 1–33)
ANION GAP SERPL CALCULATED.3IONS-SCNC: 12 MMOL/L (ref 5–15)
AST SERPL-CCNC: 28 U/L (ref 1–32)
B PARAPERT DNA SPEC QL NAA+PROBE: NOT DETECTED
B PERT DNA SPEC QL NAA+PROBE: NOT DETECTED
BILIRUB SERPL-MCNC: 0.2 MG/DL (ref 0–1.2)
BUN SERPL-MCNC: 43 MG/DL (ref 8–23)
BUN/CREAT SERPL: 10.2 (ref 7–25)
C PNEUM DNA NPH QL NAA+NON-PROBE: NOT DETECTED
CALCIUM SPEC-SCNC: 7.9 MG/DL (ref 8.6–10.5)
CHLORIDE SERPL-SCNC: 104 MMOL/L (ref 98–107)
CO2 SERPL-SCNC: 17 MMOL/L (ref 22–29)
CREAT SERPL-MCNC: 4.2 MG/DL (ref 0.57–1)
CRP SERPL-MCNC: 5.92 MG/DL (ref 0–0.5)
D-LACTATE SERPL-SCNC: 1.4 MMOL/L (ref 0.5–2)
DEPRECATED RDW RBC AUTO: 49 FL (ref 37–54)
EGFRCR SERPLBLD CKD-EPI 2021: 11.4 ML/MIN/1.73
ERYTHROCYTE [DISTWIDTH] IN BLOOD BY AUTOMATED COUNT: 15.1 % (ref 12.3–15.4)
ERYTHROCYTE [SEDIMENTATION RATE] IN BLOOD: 58 MM/HR (ref 0–30)
FLUAV SUBTYP SPEC NAA+PROBE: NOT DETECTED
FLUBV RNA ISLT QL NAA+PROBE: NOT DETECTED
FOLATE SERPL-MCNC: >20 NG/ML (ref 4.78–24.2)
GLOBULIN UR ELPH-MCNC: 2.4 GM/DL
GLUCOSE BLDC GLUCOMTR-MCNC: 125 MG/DL (ref 70–130)
GLUCOSE BLDC GLUCOMTR-MCNC: 147 MG/DL (ref 70–130)
GLUCOSE BLDC GLUCOMTR-MCNC: 191 MG/DL (ref 70–130)
GLUCOSE BLDC GLUCOMTR-MCNC: 233 MG/DL (ref 70–130)
GLUCOSE SERPL-MCNC: 103 MG/DL (ref 65–99)
HADV DNA SPEC NAA+PROBE: NOT DETECTED
HCOV 229E RNA SPEC QL NAA+PROBE: NOT DETECTED
HCOV HKU1 RNA SPEC QL NAA+PROBE: NOT DETECTED
HCOV NL63 RNA SPEC QL NAA+PROBE: NOT DETECTED
HCOV OC43 RNA SPEC QL NAA+PROBE: NOT DETECTED
HCT VFR BLD AUTO: 24.3 % (ref 34–46.6)
HETEROPH AB SER QL LA: NEGATIVE
HGB BLD-MCNC: 7.5 G/DL (ref 12–15.9)
HMPV RNA NPH QL NAA+NON-PROBE: NOT DETECTED
HPIV1 RNA ISLT QL NAA+PROBE: NOT DETECTED
HPIV2 RNA SPEC QL NAA+PROBE: NOT DETECTED
HPIV3 RNA NPH QL NAA+PROBE: NOT DETECTED
HPIV4 P GENE NPH QL NAA+PROBE: NOT DETECTED
INR PPP: 1.56 (ref 0.89–1.12)
M PNEUMO IGG SER IA-ACNC: NOT DETECTED
MCH RBC QN AUTO: 26.8 PG (ref 26.6–33)
MCHC RBC AUTO-ENTMCNC: 30.9 G/DL (ref 31.5–35.7)
MCV RBC AUTO: 86.8 FL (ref 79–97)
PLATELET # BLD AUTO: 282 10*3/MM3 (ref 140–450)
PMV BLD AUTO: 10.5 FL (ref 6–12)
POTASSIUM SERPL-SCNC: 5.2 MMOL/L (ref 3.5–5.2)
PROT SERPL-MCNC: 5.9 G/DL (ref 6–8.5)
PROTHROMBIN TIME: 18.8 SECONDS (ref 12.2–14.5)
RBC # BLD AUTO: 2.8 10*6/MM3 (ref 3.77–5.28)
RHINOVIRUS RNA SPEC NAA+PROBE: NOT DETECTED
RSV RNA NPH QL NAA+NON-PROBE: NOT DETECTED
SARS-COV-2 RNA NPH QL NAA+NON-PROBE: NOT DETECTED
SODIUM SERPL-SCNC: 133 MMOL/L (ref 136–145)
UFH PPP CHRO-ACNC: 0.21 IU/ML (ref 0.3–0.7)
UFH PPP CHRO-ACNC: 0.25 IU/ML (ref 0.3–0.7)
VIT B12 BLD-MCNC: 353 PG/ML (ref 211–946)
WBC NRBC COR # BLD AUTO: 6.74 10*3/MM3 (ref 3.4–10.8)

## 2024-01-05 PROCEDURE — 82948 REAGENT STRIP/BLOOD GLUCOSE: CPT

## 2024-01-05 PROCEDURE — 80053 COMPREHEN METABOLIC PANEL: CPT | Performed by: STUDENT IN AN ORGANIZED HEALTH CARE EDUCATION/TRAINING PROGRAM

## 2024-01-05 PROCEDURE — 25010000002 HEPARIN (PORCINE) 25000-0.45 UT/250ML-% SOLUTION

## 2024-01-05 PROCEDURE — 0202U NFCT DS 22 TRGT SARS-COV-2: CPT | Performed by: INTERNAL MEDICINE

## 2024-01-05 PROCEDURE — 86140 C-REACTIVE PROTEIN: CPT | Performed by: NURSE PRACTITIONER

## 2024-01-05 PROCEDURE — 86308 HETEROPHILE ANTIBODY SCREEN: CPT | Performed by: INTERNAL MEDICINE

## 2024-01-05 PROCEDURE — 85027 COMPLETE CBC AUTOMATED: CPT | Performed by: STUDENT IN AN ORGANIZED HEALTH CARE EDUCATION/TRAINING PROGRAM

## 2024-01-05 PROCEDURE — 97535 SELF CARE MNGMENT TRAINING: CPT

## 2024-01-05 PROCEDURE — 85610 PROTHROMBIN TIME: CPT

## 2024-01-05 PROCEDURE — 63710000001 INSULIN LISPRO (HUMAN) PER 5 UNITS: Performed by: STUDENT IN AN ORGANIZED HEALTH CARE EDUCATION/TRAINING PROGRAM

## 2024-01-05 PROCEDURE — 85652 RBC SED RATE AUTOMATED: CPT | Performed by: NURSE PRACTITIONER

## 2024-01-05 PROCEDURE — 83605 ASSAY OF LACTIC ACID: CPT | Performed by: NURSE PRACTITIONER

## 2024-01-05 PROCEDURE — 99232 SBSQ HOSP IP/OBS MODERATE 35: CPT | Performed by: NURSE PRACTITIONER

## 2024-01-05 PROCEDURE — 92610 EVALUATE SWALLOWING FUNCTION: CPT

## 2024-01-05 PROCEDURE — 85520 HEPARIN ASSAY: CPT

## 2024-01-05 PROCEDURE — 72148 MRI LUMBAR SPINE W/O DYE: CPT

## 2024-01-05 PROCEDURE — 63710000001 INSULIN DETEMIR PER 5 UNITS: Performed by: STUDENT IN AN ORGANIZED HEALTH CARE EDUCATION/TRAINING PROGRAM

## 2024-01-05 PROCEDURE — 63710000001 INSULIN LISPRO (HUMAN) PER 5 UNITS: Performed by: INTERNAL MEDICINE

## 2024-01-05 PROCEDURE — 87040 BLOOD CULTURE FOR BACTERIA: CPT | Performed by: NURSE PRACTITIONER

## 2024-01-05 RX ORDER — SODIUM BICARBONATE 650 MG/1
1300 TABLET ORAL 3 TIMES DAILY
Status: DISCONTINUED | OUTPATIENT
Start: 2024-01-05 | End: 2024-01-13

## 2024-01-05 RX ADMIN — Medication 5 MG: at 21:45

## 2024-01-05 RX ADMIN — OXYBUTYNIN CHLORIDE 5 MG: 5 TABLET, FILM COATED, EXTENDED RELEASE ORAL at 09:07

## 2024-01-05 RX ADMIN — Medication 10 ML: at 21:46

## 2024-01-05 RX ADMIN — INSULIN DETEMIR 5 UNITS: 100 INJECTION, SOLUTION SUBCUTANEOUS at 21:45

## 2024-01-05 RX ADMIN — INSULIN LISPRO 3 UNITS: 100 INJECTION, SOLUTION INTRAVENOUS; SUBCUTANEOUS at 17:59

## 2024-01-05 RX ADMIN — FOLIC ACID TAB 400 MCG 800 MCG: 400 TAB at 09:06

## 2024-01-05 RX ADMIN — SODIUM BICARBONATE 650 MG TABLET 1300 MG: at 21:45

## 2024-01-05 RX ADMIN — INSULIN LISPRO 3 UNITS: 100 INJECTION, SOLUTION INTRAVENOUS; SUBCUTANEOUS at 09:16

## 2024-01-05 RX ADMIN — OXYCODONE HYDROCHLORIDE AND ACETAMINOPHEN 1 TABLET: 7.5; 325 TABLET ORAL at 18:41

## 2024-01-05 RX ADMIN — ESCITALOPRAM OXALATE 20 MG: 20 TABLET, FILM COATED ORAL at 09:06

## 2024-01-05 RX ADMIN — FERROUS SULFATE TAB 325 MG (65 MG ELEMENTAL FE) 325 MG: 325 (65 FE) TAB at 09:06

## 2024-01-05 RX ADMIN — Medication 10 ML: at 09:17

## 2024-01-05 RX ADMIN — INSULIN LISPRO 2 UNITS: 100 INJECTION, SOLUTION INTRAVENOUS; SUBCUTANEOUS at 17:58

## 2024-01-05 RX ADMIN — HEPARIN SODIUM 14 UNITS/KG/HR: 10000 INJECTION, SOLUTION INTRAVENOUS at 10:10

## 2024-01-05 RX ADMIN — Medication 5 MG: at 01:46

## 2024-01-05 RX ADMIN — SODIUM BICARBONATE 650 MG TABLET 1300 MG: at 16:40

## 2024-01-05 RX ADMIN — PANTOPRAZOLE SODIUM 40 MG: 40 TABLET, DELAYED RELEASE ORAL at 06:07

## 2024-01-05 RX ADMIN — INSULIN LISPRO 3 UNITS: 100 INJECTION, SOLUTION INTRAVENOUS; SUBCUTANEOUS at 21:45

## 2024-01-05 RX ADMIN — INSULIN LISPRO 3 UNITS: 100 INJECTION, SOLUTION INTRAVENOUS; SUBCUTANEOUS at 12:36

## 2024-01-05 NOTE — PROGRESS NOTES
[facility] Medicine Services  PROGRESS NOTE    Patient Name: Di Lopez  : 1960  MRN: [id number]    Date of Admission: 2024  Primary Care Physician: Davina Santiago DO    Subjective   Subjective     CC:  Fatigue, malaise    HPI:  Evaluated patient this morning. Patient was reporting right hip numbness with muscle aches and bilateral anterior thighs. Also reporting mild left-sided headache above the left eye.      Objective   Objective     Vital Signs:   Temp:  [97.7 °F (36.5 °C)-99.9 °F (37.7 °C)] 97.7 °F (36.5 °C)  Heart Rate:  [] 80  Resp:  [18-21] 18  BP: (115-122)/(41-57) 120/55  Flow (L/min):  [2] 2     Physical Exam:  Constitutional: Awake, alert, resting comfortably  HENT: NCAT, mucous membranes moist  Respiratory: Clear to auscultation bilaterally, respiratory effort normal   Cardiovascular: RRR, mechanical heart valve sound present  Gastrointestinal: Positive bowel sounds, soft, nontender, nondistended  Musculoskeletal: No bilateral ankle edema, anterior aspect of thighs tender to palpation bilaterally  Neurologic: Alert and oriented x 3, no focal deficits, speech clear  Skin: No rashes    Exam unchanged from 1/3.    Results Reviewed:  LAB RESULTS:      Lab 24  0541 24  0251 245 24  1208 24  0808 24  0531 24  0531 24  2330   WBC 5.78 4.18  --   --   --   --  4.26 4.42   HEMOGLOBIN 7.8* 8.4*  --   --   --   --  8.5* 9.5*   HEMATOCRIT 24.5* 25.2*  --   --   --   --  26.0* 29.3*   PLATELETS 265 224  --   --   --   --  228 250   NEUTROS ABS  --  2.61  --   --   --   --  3.02 3.39   IMMATURE GRANS (ABS)  --  0.07*  --   --   --   --  0.05 0.06*   LYMPHS ABS  --  1.12  --   --   --   --  0.92 0.77   MONOS ABS  --  0.25  --   --   --   --  0.21 0.17   EOS ABS  --  0.12  --   --   --   --  0.05 0.02   MCV 86.0 85.7  --   --   --   --  85.8 84.2   LACTATE  --   --   --   --   --   --   --  1.5   PROTIME  --   --    --   --   --   --   --  26.0*   APTT  --   --   --   --   --   --  66.2  --    HEPARIN ANTI-XA 0.34 0.37 0.34 0.15* 0.14*   < > 0.10*  --     < > = values in this interval not displayed.         Lab 01/04/24  0541 01/03/24  1654 01/03/24  1153 01/03/24  0737 01/03/24  0251 01/02/24  1144 01/02/24  0808 01/02/24  0331 01/01/24  2330   SODIUM 136 132* 133* 133* 131*  131*   < > 129* 127* 126*   POTASSIUM 5.1  --   --   --  5.0  --  5.3* 4.8 5.6*   CHLORIDE 105  --   --   --  99  --  95* 91* 87*   CO2 17.0*  --   --   --  19.0*  --  19.0* 18.0* 20.0*   ANION GAP 14.0  --   --   --  13.0  --  15.0 18.0* 19.0*   BUN 50*  --   --   --  57*  --  61* 63* 64*   CREATININE 5.39*  --   --   --  6.72*  --  7.49* 8.19* 8.70*   EGFR 8.5*  --   --   --  6.5*  --  5.7* 5.1* 4.8*   GLUCOSE 89  --   --   --  150*  --  152* 255* 224*   CALCIUM 7.1*  --   --   --  7.1*  --  7.6* 7.6* 8.4*   MAGNESIUM  --   --   --   --   --   --  2.3  --   --    PHOSPHORUS  --   --   --   --   --   --  7.3*  --   --    TSH  --   --   --   --   --   --  1.590  --   --     < > = values in this interval not displayed.         Lab 01/04/24  0541 01/03/24  0251 01/02/24  0808 01/01/24  2330   TOTAL PROTEIN 5.2* 5.7* 6.1 7.1   ALBUMIN 3.2* 3.2* 3.4* 4.1   GLOBULIN 2.0 2.5 2.7 3.0   ALT (SGPT) 32 37* 41* 45*   AST (SGOT) 29 32 37* 34*   BILIRUBIN 0.2 0.2 0.3 0.3   ALK PHOS 212* 217* 222* 247*   LIPASE  --   --   --  82*         Lab 01/01/24  2330   PROTIME 26.0*   INR 2.36*             Lab 01/01/24  2358   ABO TYPING O   RH TYPING Positive   ANTIBODY SCREEN Negative         Lab 01/02/24  0047   FIO2 21   CARBOXYHEMOGLOBIN (VENOUS) 1.3     Brief Urine Lab Results  (Last result in the past 365 days)        Color   Clarity   Blood   Leuk Est   Nitrite   Protein   CREAT   Urine HCG        01/02/24 0011             142.2         01/02/24 0011 Yellow   Cloudy   Small (1+)   Negative   Negative   100 mg/dL (2+)                   Microbiology Results Abnormal        Procedure Component Value - Date/Time    COVID PRE-OP / PRE-PROCEDURE SCREENING ORDER (NO ISOLATION) - Swab, Nasopharynx [[id number]]  (Normal) Collected: 01/01/24 2330    Lab Status: Final result Specimen: Swab from Nasopharynx Updated: 01/02/24 0016    Narrative:      The following orders were created for panel order COVID PRE-OP / PRE-PROCEDURE SCREENING ORDER (NO ISOLATION) - Swab, Nasopharynx.  Procedure                               Abnormality         Status                     ---------                               -----------         ------                     COVID-19, FLU A/B, RSV P...[[id number]]  Normal              Final result                 Please view results for these tests on the individual orders.    COVID-19, FLU A/B, RSV PCR 1 HR TAT - Swab, Nasopharynx [[id number]]  (Normal) Collected: 01/01/24 2330    Lab Status: Final result Specimen: Swab from Nasopharynx Updated: 01/02/24 0016     COVID19 Not Detected     Influenza A PCR Not Detected     Influenza B PCR Not Detected     RSV, PCR Not Detected    Narrative:      Fact sheet for providers: https://www.fda.gov/media/735463/download    Fact sheet for patients: https://www.fda.gov/media/485700/download    Test performed by PCR.            XR Chest PA & Lateral    Result Date: 1/3/2024  XR CHEST PA AND LATERAL Date of Exam: 1/3/2024 6:58 PM EST Indication: fever, cervical adenopathy Comparison: 11/13/2023 Findings: Status post sternotomy and valve replacement. Left-sided AICD noted. Central pulmonary vascular congestion. No overt pulmonary edema. Negative for pneumothorax or pleural effusion. Osseous structures are grossly intact.     Impression: Impression: Stable mild cardiomegaly and central pulmonary vascular congestion. Electronically Signed: Sacha Chou MD  1/3/2024 7:16 PM EST  Workstation ID: [id number]     Results for orders placed during the hospital encounter of 05/31/23    Adult Transthoracic Echo Complete W/ Cont if Necessary Per  Protocol    Interpretation Summary    Mildly reduced right ventricular systolic function noted.    There is calcification of the aortic valve.    Estimated right ventricular systolic pressure from tricuspid regurgitation is normal (<35 mmHg).    Mild global LV systolic dysfunction    EF=45-50%    Normal prosthetic MV function    AV calcification      Current medications:  Scheduled Meds:escitalopram, 20 mg, Oral, Daily  ferrous sulfate, 325 mg, Oral, Daily With Breakfast  folic acid, 800 mcg, Oral, Daily  insulin detemir, 5 Units, Subcutaneous, Nightly  insulin lispro, 2-7 Units, Subcutaneous, 4x Daily AC & at Bedtime  Insulin Lispro, 3 Units, Subcutaneous, TID With Meals  oxybutynin XL, 5 mg, Oral, Daily  pantoprazole, 40 mg, Oral, Q AM  sodium chloride, 10 mL, Intravenous, Q12H      Continuous Infusions:heparin, 13 Units/kg/hr, Last Rate: 13 Units/kg/hr (01/04/24 0928)  Pharmacy to Dose Heparin,   sodium chloride, 100 mL/hr, Last Rate: 100 mL/hr (01/04/24 1117)      PRN Meds:.  acetaminophen    albuterol    dextrose    dextrose    glucagon (human recombinant)    melatonin    naloxone    nitroglycerin    oxyCODONE-acetaminophen    Pharmacy to Dose Heparin    sodium chloride    sodium chloride    Assessment & Plan   Assessment & Plan     Active Hospital Problems    Diagnosis  POA    **Renal failure [N19]  Yes    Hyponatremia [E87.1]  Yes    Hyperkalemia [E87.5]  Yes    Type 2 diabetes mellitus with hyperglycemia [E11.65]  Yes    Elevated LFTs [R79.89]  Yes    Anemia, chronic disease [D63.8]  Yes    Cervical lymphadenopathy [R59.0]  Yes    GI bleed [K92.2]  Yes    H/O heart valve replacement with mechanical valve [Z95.2]  Not Applicable    ARF (acute renal failure) [N17.9]  Yes    Left bundle branch hemiblock [I44.60]  Yes    Type 2 diabetes mellitus without complication, with long-term current use of insulin [E11.9, Z79.4]  Not Applicable    Dyslipidemia [E78.5]  Yes    Emphysema/COPD [J43.9]  Yes    Hypertension  [I10]  Yes    Arteriovenous malformation of gastrointestinal tract [K55.20]  Yes    Obstructive sleep apnea syndrome [G47.33]  Yes    Chronic anticoagulation [Z79.01]  Not Applicable      Resolved Hospital Problems   No resolved problems to display.        Brief Hospital Course to date:  Di Lopez is a 63 y.o. female with PMHx of mechanical mitral valve on coumadin, AVM of GI tract, chronic HFmrEF with EF 45-50% 5/2023, COPD, DM, HLD, CAD, HTN, LBBB, liver mass s/p embolization who presented with c/o bright red blood per rectum, 10 lb weight loss, headache, swollen lymph nodes left neck, chills, diarrhea preceding hematochezia. Found to have severe MEERA with hyponatremia.    This patient's problems and plans were partially entered by my partner and updated as appropriate by me 01/04/24.    Acute renal failure  Metabolic acidosis  -pt has had diarrhea and poor po intake, recent cefdinir use for headache, swollen lymph nodes, chills, diarrhea, ?if infectious diarrhea leading to dehydration  -Cr of 8.7 on admission with last Cr 1.25 less than one month ago  -UA with protein and small blood.  -Nephrology following, continue IV fluids for now. Holding Entresto. Patient remains high risk for needing dialysis.     Hyponatremia, resolved  -likely related to above but unclear, Na 126 on admission   -TSH normal, 8AM cortisol 10.55  -Nephrology following. Monitor daily BMP.     Hematochezia  Normocytic anemia  -h/h stable on admission   -pt with same complaint last admit with scopes revealing candida esophagitis and diverticulosis  -Gastroenterology evaluated. Recent bidirectional endoscopy was unremarkable. Bleeding was suspected to be secondary to diverticular source at that time. Recommended to obtain GI panel PCR. Ordered tularemia antibody.    Chronic back pain  Bilateral lower extremity muscle aches  -Checked CK which was normal.  -Ordered MRI lumbar spine to evaluate for radiculopathy.  -No improvement with trial  of Flexeril. Discontinued.  -Continue Tylenol while inpatient, PRN Percocet for moderate/severe pain.     Cervical LAD  -soft tissue neck CT with bilateral cervical lymphadenopathy; checked HIV given hx of esophageal candidiasis and abnormal presentation  -HIV normal.  -Will need interval follow-up imaging.     Elevated transaminases, improving  -mildly elevated on admission of unclear etiology  -Hepatitis panel negative.  -Monitor daily CMP. Check Tylenol level.    Hyperkalemia, resolved  -secondary to MEERA  -EKG stable  -hyperkalemia order set initiated in ER  -Monitor daily CMP.    Incidental CT findings  -Stable lobulated mass in the left hepatic lobe with evidence of previous embolization. Patient has a history of a liver adenoma.  -Stable 4 mm nodules in the right lung base. These are unchanged since at least 2022.  -12 mm rounded nodule in the far lateral left breast - likely a partially visualized lymph node. Mammogram appears to be up to date.      Mechanical MV on chronic coumadin  -Holding coumadin, continue heparin infusion.      IDDM2 with hyperglycemia  -A1c 7.2% most recently  -Continue SSI while inpatient. Adding on Levemir 5 units nightly and scheduled lispro 3 units 3 times daily with meals.    Anxiety/depression  -Continue Lexapro.      Expected Discharge Location and Transportation: TBD pending evaluation  Expected Discharge   Expected Discharge Date: 1/6/2024; Expected Discharge Time:      DVT prophylaxis:  Medical DVT prophylaxis orders are present.     AM-PAC 6 Clicks Score (PT): 22 (01/04/24 1130)    CODE STATUS:   Code Status and Medical Interventions:   Ordered at: 01/02/24 0313     Code Status (Patient has no pulse and is not breathing):    CPR (Attempt to Resuscitate)     Medical Interventions (Patient has pulse or is breathing):    Full Support       Dariana Bridges, DO  01/04/24

## 2024-01-05 NOTE — CASE MANAGEMENT/SOCIAL WORK
Continued Stay Note  IFTIKHAR Staples     Patient Name: Di Lopez  MRN: [id number]  Today's Date: 1/5/2024    Admit Date: 1/1/2024    Plan: Home with [facility] for SN/PT/OT   Discharge Plan       Row Name 01/05/24 1112       Plan    Plan Home with [facility] for SN/PT/OT    Patient/Family in Agreement with Plan yes    Plan Comments I spoke with Ms. Lopez, at the bedside, today. PT/OT are recommending Home Health. Pt is agreeable. She has no preference on agency. Pt confirmed that she wears home Oxygen, 2L NC, 24/7. She said it is supplied through [employer]. She states she needs an adapter for her oxygen to hook up to her cpap machine. I spoke with [employer] ([place]), they will deliver adapter and portable O2 tank to room today. I spoke with [facility], they can accept pt for SN/PT/OT. [facility] states they will follow pt's chart to watch for possible discharge over the weekend. Pt states that her boyfriend will transport her home. No other needs voiced or identified. will continue to follow.    Final Discharge Disposition Code 06 - home with home health care                   Discharge Codes    No documentation.                 Expected Discharge Date and Time       Expected Discharge Date Expected Discharge Time    Jan 6, 2024               Monika Alvarado RN

## 2024-01-05 NOTE — PROGRESS NOTES
[facility] Medicine Services  PROGRESS NOTE    Patient Name: Di Lopez  : 1960  MRN: [id number]    Date of Admission: 2024  Primary Care Physician: Davina Santiago DO    Subjective   Subjective     CC:  Fatigue, malaise    HPI:  Patient seen resting up in bed in no acute distress.  Awake and alert.  She feels very sleepy today.  Rates low back pain 5/10 scale.  No neck pain.  Positive headache.  Shortness of air a little better today.  No nausea or vomiting.  Also reports feeling like she is getting choked on anything p.o. intake, solids which is new for her.  Noted fever last night, afebrile this morning.      Objective   Objective     Vital Signs:   Temp:  [97.7 °F (36.5 °C)-101.3 °F (38.5 °C)] 100.6 °F (38.1 °C)  Heart Rate:  [] 83  Resp:  [18-20] 20  BP: (109-152)/(41-90) 133/55  Flow (L/min):  [2] 2     Physical Exam:  Constitutional: Awake, alert, resting up on side of bed.  No acute distress.  HENT: NCAT, mucous membranes moist.  Mild cervical lymphadenopathy.  Respiratory: Clear to auscultation bilaterally, respiratory effort normal on 2 LNC.  Sats WNL.  Cardiovascular: RRR, mechanical heart valve sound present  Gastrointestinal: Positive bowel sounds, soft, nontender, nondistended.  Obese.  Musculoskeletal:  trace bilateral ankle edema, anterior aspect of thighs tender to palpation bilaterally but stable.  SALEEM spontaneously.  Neurologic: Alert and oriented x 3, no focal deficits, speech clear and appropriate.  Follows commands.  Skin: No rashes    Results Reviewed:  LAB RESULTS:      Lab 24  0501 24  0541 24  0251 24  2035 24  1208 24  0808 24  0531 24  2330   WBC 6.74 5.78 4.18  --   --   --  4.26 4.42   HEMOGLOBIN 7.5* 7.8* 8.4*  --   --   --  8.5* 9.5*   HEMATOCRIT 24.3* 24.5* 25.2*  --   --   --  26.0* 29.3*   PLATELETS 282 265 224  --   --   --  228 250   NEUTROS ABS  --   --  2.61  --   --   --  3.02  3.39   IMMATURE GRANS (ABS)  --   --  0.07*  --   --   --  0.05 0.06*   LYMPHS ABS  --   --  1.12  --   --   --  0.92 0.77   MONOS ABS  --   --  0.25  --   --   --  0.21 0.17   EOS ABS  --   --  0.12  --   --   --  0.05 0.02   MCV 86.8 86.0 85.7  --   --   --  85.8 84.2   LACTATE  --   --   --   --   --   --   --  1.5   PROTIME 18.8*  --   --   --   --   --   --  26.0*   APTT  --   --   --   --   --   --  66.2  --    HEPARIN ANTI-XA 0.21* 0.34 0.37 0.34 0.15*   < > 0.10*  --     < > = values in this interval not displayed.         Lab 01/05/24  0501 01/04/24  0541 01/03/24  1654 01/03/24  1153 01/03/24  0737 01/03/24  0251 01/02/24  1144 01/02/24  0808 01/02/24  0331   SODIUM 133* 136 132* 133* 133* 131*  131*   < > 129* 127*   POTASSIUM 5.2 5.1  --   --   --  5.0  --  5.3* 4.8   CHLORIDE 104 105  --   --   --  99  --  95* 91*   CO2 17.0* 17.0*  --   --   --  19.0*  --  19.0* 18.0*   ANION GAP 12.0 14.0  --   --   --  13.0  --  15.0 18.0*   BUN 43* 50*  --   --   --  57*  --  61* 63*   CREATININE 4.20* 5.39*  --   --   --  6.72*  --  7.49* 8.19*   EGFR 11.4* 8.5*  --   --   --  6.5*  --  5.7* 5.1*   GLUCOSE 103* 89  --   --   --  150*  --  152* 255*   CALCIUM 7.9* 7.1*  --   --   --  7.1*  --  7.6* 7.6*   MAGNESIUM  --   --   --   --   --   --   --  2.3  --    PHOSPHORUS  --   --   --   --   --   --   --  7.3*  --    TSH  --   --   --   --   --   --   --  1.590  --     < > = values in this interval not displayed.         Lab 01/05/24  0501 01/04/24  0541 01/03/24  0251 01/02/24  0808 01/01/24  2330   TOTAL PROTEIN 5.9* 5.2* 5.7* 6.1 7.1   ALBUMIN 3.5 3.2* 3.2* 3.4* 4.1   GLOBULIN 2.4 2.0 2.5 2.7 3.0   ALT (SGPT) 29 32 37* 41* 45*   AST (SGOT) 28 29 32 37* 34*   BILIRUBIN 0.2 0.2 0.2 0.3 0.3   ALK PHOS 204* 212* 217* 222* 247*   LIPASE  --   --   --   --  82*         Lab 01/05/24  0501 01/01/24  2330   PROTIME 18.8* 26.0*   INR 1.56* 2.36*             Lab 01/04/24  0541 01/01/24  2358   FOLATE >20.00  --    VITAMIN  B 12 353  --    ABO TYPING  --  O   RH TYPING  --  Positive   ANTIBODY SCREEN  --  Negative         Lab 01/02/24  0047   FIO2 21   CARBOXYHEMOGLOBIN (VENOUS) 1.3     Brief Urine Lab Results  (Last result in the past 365 days)        Color   Clarity   Blood   Leuk Est   Nitrite   Protein   CREAT   Urine HCG        01/02/24 0011             142.2         01/02/24 0011 Yellow   Cloudy   Small (1+)   Negative   Negative   100 mg/dL (2+)                   Microbiology Results Abnormal       Procedure Component Value - Date/Time    COVID PRE-OP / PRE-PROCEDURE SCREENING ORDER (NO ISOLATION) - Swab, Nasopharynx [[id number]]  (Normal) Collected: 01/01/24 2330    Lab Status: Final result Specimen: Swab from Nasopharynx Updated: 01/02/24 0016    Narrative:      The following orders were created for panel order COVID PRE-OP / PRE-PROCEDURE SCREENING ORDER (NO ISOLATION) - Swab, Nasopharynx.  Procedure                               Abnormality         Status                     ---------                               -----------         ------                     COVID-19, FLU A/B, RSV P...[[id number]]  Normal              Final result                 Please view results for these tests on the individual orders.    COVID-19, FLU A/B, RSV PCR 1 HR TAT - Swab, Nasopharynx [[id number]]  (Normal) Collected: 01/01/24 2330    Lab Status: Final result Specimen: Swab from Nasopharynx Updated: 01/02/24 0016     COVID19 Not Detected     Influenza A PCR Not Detected     Influenza B PCR Not Detected     RSV, PCR Not Detected    Narrative:      Fact sheet for providers: https://www.fda.gov/media/436122/download    Fact sheet for patients: https://www.fda.gov/media/983507/download    Test performed by PCR.            XR Chest PA & Lateral    Result Date: 1/3/2024  XR CHEST PA AND LATERAL Date of Exam: 1/3/2024 6:58 PM EST Indication: fever, cervical adenopathy Comparison: 11/13/2023 Findings: Status post sternotomy and valve replacement.  Left-sided AICD noted. Central pulmonary vascular congestion. No overt pulmonary edema. Negative for pneumothorax or pleural effusion. Osseous structures are grossly intact.     Impression: Impression: Stable mild cardiomegaly and central pulmonary vascular congestion. Electronically Signed: Sacha Chou MD  1/3/2024 7:16 PM EST  Workstation ID: [id number]     Results for orders placed during the hospital encounter of 05/31/23    Adult Transthoracic Echo Complete W/ Cont if Necessary Per Protocol    Interpretation Summary    Mildly reduced right ventricular systolic function noted.    There is calcification of the aortic valve.    Estimated right ventricular systolic pressure from tricuspid regurgitation is normal (<35 mmHg).    Mild global LV systolic dysfunction    EF=45-50%    Normal prosthetic MV function    AV calcification      Current medications:  Scheduled Meds:escitalopram, 20 mg, Oral, Daily  ferrous sulfate, 325 mg, Oral, Daily With Breakfast  folic acid, 800 mcg, Oral, Daily  insulin detemir, 5 Units, Subcutaneous, Nightly  insulin lispro, 2-7 Units, Subcutaneous, 4x Daily AC & at Bedtime  Insulin Lispro, 3 Units, Subcutaneous, TID With Meals  oxybutynin XL, 5 mg, Oral, Daily  pantoprazole, 40 mg, Oral, Q AM  sodium chloride, 10 mL, Intravenous, Q12H      Continuous Infusions:heparin, 14 Units/kg/hr, Last Rate: 14 Units/kg/hr (01/05/24 1010)  Pharmacy to Dose Heparin,   sodium chloride, 50 mL/hr, Last Rate: 50 mL/hr (01/04/24 2342)      PRN Meds:.  acetaminophen    albuterol    dextrose    dextrose    glucagon (human recombinant)    melatonin    naloxone    nitroglycerin    oxyCODONE-acetaminophen    Pharmacy to Dose Heparin    sodium chloride    sodium chloride    Assessment & Plan   Assessment & Plan     Active Hospital Problems    Diagnosis  POA    **Renal failure [N19]  Yes    Hyponatremia [E87.1]  Yes    Hyperkalemia [E87.5]  Yes    Type 2 diabetes mellitus with hyperglycemia [E11.65]  Yes     Elevated LFTs [R79.89]  Yes    Anemia, chronic disease [D63.8]  Yes    Cervical lymphadenopathy [R59.0]  Yes    GI bleed [K92.2]  Yes    H/O heart valve replacement with mechanical valve [Z95.2]  Not Applicable    ARF (acute renal failure) [N17.9]  Yes    Left bundle branch hemiblock [I44.60]  Yes    Type 2 diabetes mellitus without complication, with long-term current use of insulin [E11.9, Z79.4]  Not Applicable    Dyslipidemia [E78.5]  Yes    Emphysema/COPD [J43.9]  Yes    Hypertension [I10]  Yes    Arteriovenous malformation of gastrointestinal tract [K55.20]  Yes    Obstructive sleep apnea syndrome [G47.33]  Yes    Chronic anticoagulation [Z79.01]  Not Applicable      Resolved Hospital Problems   No resolved problems to display.        Brief Hospital Course to date:  Di Lopez is a 63 y.o. female with PMHx of mechanical mitral valve on coumadin, AVM of GI tract, chronic HFmrEF with EF 45-50% 5/2023, COPD, DM, HLD, CAD, HTN, LBBB, liver mass s/p embolization who presented with c/o bright red blood per rectum, 10 lb weight loss, headache, swollen lymph nodes left neck, chills, diarrhea preceding hematochezia. Found to have severe MEERA with hyponatremia.    This patient's problems and plans were partially entered by my partner and updated as appropriate by me 01/05/24.    Assessment/plan:  Patient is new to me today    Acute renal failure  Metabolic acidosis  -pt has had diarrhea and poor po intake, recent cefdinir use for headache, swollen lymph nodes, chills, diarrhea, ?if infectious diarrhea leading to dehydration  -Cr of 8.7 on admission with last Cr 1.25 less than one month ago  -UA with protein and small blood.  -Nephrology following, continue IV fluids for now. Holding Entresto. Patient remains high risk for needing dialysis.  --Renal function better today with creatinine 4.2.     Hyponatremia, resolved  -likely related to above but unclear, Na 126 on admission   -TSH normal, 8AM cortisol  10.55  -Nephrology following. Monitor daily BMP.  Sodium today 133.     Hematochezia  Normocytic anemia  -h/h stable on admission   -pt with same complaint last admit with scopes revealing candida esophagitis and diverticulosis  -Gastroenterology evaluated. Recent bidirectional endoscopy was unremarkable. Bleeding was suspected to be secondary to diverticular source at that time.   -- FOBT + 1/1/2024.    -- Recommended to obtain GI panel PCR was ordered but discontinued due to greater than 3 days since admit.  System will not allow order at this point.. Ordered tularemia antibody.  However no further bloody bowel movements reported.  -- Hemoglobin is trending down.  9.5, 8.5, 8.4, 7.8, and today 7.5.  No obvious bleeding.  Only complaining of an occasional spotting dry nose bleed.  Dabs and stops bleeding.  -- AM labs.    Chronic back pain  Bilateral lower extremity muscle aches  -Checked CK which was normal.  -Ordered MRI lumbar spine to evaluate for radiculopathy.  -No improvement with trial of Flexeril. Discontinued.  -Continue Tylenol while inpatient, PRN Percocet for moderate/severe pain.     Cervical LAD  Headache  -soft tissue neck CT with bilateral cervical lymphadenopathy; checked HIV given hx of esophageal candidiasis and abnormal presentation  -HIV normal.  -Will need interval follow-up imaging.    --Will consult ID today to eval due to LAD and fevers and headache now.      Elevated transaminases, improving  -mildly elevated on admission of unclear etiology  -Hepatitis panel negative.  -Monitor daily CMP. Checked Tylenol level.    Hyperkalemia, resolved  -secondary to MEERA  -EKG stable  -hyperkalemia order set initiated in ER  -Monitor daily CMP.    Incidental CT findings  -Stable lobulated mass in the left hepatic lobe with evidence of previous embolization. Patient has a history of a liver adenoma.  -Stable 4 mm nodules in the right lung base. These are unchanged since at least 2022.  -12 mm rounded  nodule in the far lateral left breast - likely a partially visualized lymph node. Mammogram appears to be up to date.      Mechanical MV on chronic coumadin  -Holding coumadin, continue heparin infusion.      IDDM2 with hyperglycemia  -A1c 7.2% most recently  -Continue SSI while inpatient. Prior added on Levemir 5 units nightly and scheduled lispro 3 units 3 times daily with meals.  -- Glucose generally stable.  1) elevated.  Monitor.    Anxiety/depression  -Continue Lexapro.      Expected Discharge Location and Transportation: TBD pending evaluation  Expected Discharge   Expected Discharge Date: 1/6/2024; Expected Discharge Time:      DVT prophylaxis:  Medical DVT prophylaxis orders are present.     AM-PAC 6 Clicks Score (PT): 22 (01/04/24 2030)    CODE STATUS:   Code Status and Medical Interventions:   Ordered at: 01/02/24 0319     Code Status (Patient has no pulse and is not breathing):    CPR (Attempt to Resuscitate)     Medical Interventions (Patient has pulse or is breathing):    Full Support       Valorie Damon, APRN  01/05/24

## 2024-01-05 NOTE — PROGRESS NOTES
Di WADDELL Hockaday 1960  S382/1    Asked to see patient for MRI Clearance with her [employer] Pacemaker.  Patient's entire system is MRI compatible.  MRI form is filled out/signed on chart.  Okay for MRI lumbar spine. RN notified in person.     Vijaya Zavala PA-C  Electronically signed by TAVO Quintero, 01/05/24, 8:32 AM EST.

## 2024-01-05 NOTE — TELEPHONE ENCOUNTER
[facility] IS ASKING IF DR. HANNAH WILL FOLLOW PATIENT WHEN SHE DISCHARGES FROM THE HOSPITAL THIS WEEKEND.    CALL ABRAM [phone]

## 2024-01-05 NOTE — THERAPY EVALUATION
Acute Care - Speech Language Pathology   Swallow Initial Evaluation [facility]  Clinical Swallow Evaluation       Patient Name: Di Lopez  : 1960  MRN: [id number]  Today's Date: 2024               Admit Date: 2024    Visit Dx:     ICD-10-CM ICD-9-CM   1. Acute renal failure, unspecified acute renal failure type  N17.9 584.9   2. Hyperkalemia  E87.5 276.7   3. Gastrointestinal hemorrhage, unspecified gastrointestinal hemorrhage type  K92.2 578.9   4. Acute renal failure superimposed on chronic kidney disease, unspecified acute renal failure type, unspecified CKD stage  N17.9 584.9    N18.9 585.9   5. Chronic respiratory failure with hypoxia  J96.11 518.83     799.02   6. Weakness  R53.1 780.79   7. Dysphagia, unspecified type  R13.10 787.20     Patient Active Problem List   Diagnosis    Arteriovenous malformation of gastrointestinal tract    Depression    Type 2 diabetes mellitus without complication, with long-term current use of insulin    Dyslipidemia    Emphysema/COPD    Hypertension    Insomnia    Morbid obesity    Obstructive sleep apnea syndrome    Chronic anticoagulation    Normocytic anemia    Sliding hiatal hernia    Vitamin B12 deficiency    Iron deficiency    Iron malabsorption    Bronchiectasis without complication    Electronic cigarette use    Chronic diastolic heart failure    Left bundle branch hemiblock    Chronic respiratory failure with hypoxia    Body aches    Headaches due to old head injury    History of tobacco use, presenting hazards to health    Multiple nodules of lung    Nocturnal hypoxemia    Screening for colon cancer    Abnormal CT scan    Wellness examination    ARF (acute renal failure)    H/O heart valve replacement with mechanical valve    GI bleed    Severe malnutrition    Hyponatremia    Hyperkalemia    Type 2 diabetes mellitus with hyperglycemia    Elevated LFTs    Anemia, chronic disease    Cervical lymphadenopathy    Renal failure     Past Medical  History:   Diagnosis Date    Allergic     Anemia     Anticoagulated     WARFARIN    Arteriovenous malformation of gastrointestinal tract 05/12/2016    Arthritis     Asthma     Black hairy tongue     CHF (congestive heart failure)     Chronic back pain     Coronary artery disease     Depression 05/12/2016    Diabetes mellitus     Dyslipidemia 05/12/2016    Elevated cholesterol     Emphysema/COPD 05/12/2016    Emphysema/COPD 05/12/2016    Fatigue     GERD (gastroesophageal reflux disease)     Headache     Herniated cervical disc     History of echocardiogram 07/16/2013    Left ventricular systolic function at the lower limits of normal; EF 50-55%; mild MR; mechanical prosthetic mitral valve present; mod TR    History of transfusion     NO REACTION; [facility]    Hyperlipidemia     Hypertension     Insomnia 05/12/2016    Iron deficiency     A.  The patient was treated with IV iron replacement. B.  Secondary to GI bleed, status post EGD with cauterization of blood vessel to the duodenum, 7/16/2014.    Left bundle branch hemiblock 10/12/2020    Left shoulder strain     Liver disease     Liver mass     Long term current use of anticoagulant 05/12/2016    Low back pain     Lung nodules     PER DR. LEVI'S NOTE 10/22    Morbid obesity 05/12/2016    Neuromuscular disorder 7/28/2016    Obstructive sleep apnea syndrome 05/12/2016    Description: A.  Moderate; CPAP therapy..    Sliding hiatal hernia 07/28/2016    Description: A.  Reported on EGD, 12/30/2011.    Type 2 diabetes mellitus 05/12/2016    Urinary tract infection     Uterine cancer     HYSTERECTOMY    Vitamin B12 deficiency     A.  The patient was treated with vitamin B12 replacement.    Wears eyeglasses      Past Surgical History:   Procedure Laterality Date    BILATERAL OOPHORECTOMY  2009    Status post bilateral oophorectomy secondary to ovarian cysts     BREAST BIOPSY Right     STEREOTACTIC    BREAST EXCISIONAL BIOPSY Bilateral     CARDIAC ELECTROPHYSIOLOGY PROCEDURE  N/A 10/15/2020    Procedure: Biventricular Device Insertion;  Surgeon: Nadir Everett MD;  Location:  [facility];  Service: Cardiology;  Laterality: N/A;    CARDIAC SURGERY  2003    CARPAL TUNNEL RELEASE Bilateral     COLONOSCOPY  12/28/2012    COLONOSCOPY N/A 5/23/2023    Procedure: COLONOSCOPY;  Surgeon: Gt Fernandes MD;  Location:  [facility];  Service: Gastroenterology;  Laterality: N/A;    COLONOSCOPY N/A 11/16/2023    Procedure: COLONOSCOPY;  Surgeon: Brunner, Mark I, MD;  Location:  [facility];  Service: Gastroenterology;  Laterality: N/A;    CYSTECTOMY      removal of ganglion cyst from left wrist    ENDOSCOPY  12/30/2011    DIAGNOSTIC ESOPHAGOGASTRODUODENOSCOPY    ENDOSCOPY N/A 11/16/2023    Procedure: ESOPHAGOGASTRODUODENOSCOPY;  Surgeon: Brunner, Mark I, MD;  Location:  [facility];  Service: Gastroenterology;  Laterality: N/A;    HYSTERECTOMY  1995    A.  Status post hysterectomy for early stage endometrial cancer done in 1995.    MITRAL VALVE REPLACEMENT  03/03/2008    Dr. Hima Barerto; MECHANICAL    OTHER SURGICAL HISTORY      LIVER MASS BLOOD SUPPLY CLIPPED ON RIGHT AND LEFT SIDE PER PT       SLP Recommendation and Plan  SLP Swallowing Diagnosis: swallow WFL/no suspected pharyngeal impairment, suspected esophageal dysphagia (01/05/24 1325)  SLP Diet Recommendation: soft to chew textures, ground, thin liquids (Extra sauce/gravy) (01/05/24 1325)  Recommended Precautions and Strategies: general aspiration precautions, reflux precautions, upright posture during/after eating (01/05/24 1325)  SLP Rec. for Method of Medication Administration: meds whole, with thin liquids, meds crushed, with puree, as tolerated (01/05/24 1325)     Monitor for Signs of Aspiration: yes, notify SLP if any concerns (01/05/24 1325)  Recommended Diagnostics: other (see comments) (diet tolerance) (01/05/24 1325)  Swallow Criteria for Skilled Therapeutic Interventions Met: demonstrates  skilled criteria (01/05/24 1325)  Anticipated Discharge Disposition (SLP): unknown, home with home health (01/05/24 1325)  Rehab Potential/Prognosis, Swallowing: good, to achieve stated therapy goals (01/05/24 1325)  Therapy Frequency (Swallow): PRN (01/05/24 1325)  Predicted Duration Therapy Intervention (Days): until discharge (01/05/24 1325)  Oral Care Recommendations: Oral Care BID/PRN, Toothbrush (01/05/24 1325)                                      Oral Care Recommendations: Oral Care BID/PRN, Toothbrush (01/05/24 1325)           SWALLOW EVALUATION (last 72 hours)       SLP Adult Swallow Evaluation       Row Name 01/05/24 1325                   Rehab Evaluation    Document Type evaluation  -        Subjective Information no complaints  -        Patient Observations alert;cooperative  -        Patient/Family/Caregiver Comments/Observations No family present  -        Patient Effort good  -        Symptoms Noted During/After Treatment none  -           General Information    Patient Profile Reviewed yes  -        Pertinent History Of Current Problem Adm w/ severe MEERA & hyponatremia. Hx: AVM of GI tract, COPD, DM, HTN, CAD, HTN, LBBB, liver mass s/p embolization. CXR: stable mild cardiomegaly & central pulmonary vascular congestion  -        Current Method of Nutrition regular textures;thin liquids  -        Precautions/Limitations, Vision WFL;for purposes of eval  -        Precautions/Limitations, Hearing WFL;for purposes of eval  -        Prior Level of Function-Communication unknown  -        Prior Level of Function-Swallowing esophageal concerns;other (see comments)  Per pt report  -        Plans/Goals Discussed with patient;agreed upon  -        Barriers to Rehab none identified  -        Patient's Goals for Discharge patient did not state  -           Pain    Additional Documentation Pain Scale: FACES Pre/Post-Treatment (Group)  -           Pain Scale: FACES  Pre/Post-Treatment    Pain: FACES Scale, Pretreatment 0-->no hurt  -        Posttreatment Pain Rating 0-->no hurt  -           Oral Motor Structure and Function    Dentition Assessment natural, present and adequate  -        Secretion Management WNL/WFL  -        Mucosal Quality moist, healthy  -           Oral Musculature and Cranial Nerve Assessment    Oral Motor General Assessment [facility]  -           General Eating/Swallowing Observations    Respiratory Support Currently in Use nasal cannula  -        Eating/Swallowing Skills self-fed;fed by SLP  -        Positioning During Eating upright in bed  -        Utensils Used spoon;cup;straw  -        Consistencies Trialed ice chips;thin liquids;pureed;regular textures  -           Clinical Swallow Eval    Oral Prep Phase [facility]  -        Oral Transit [facility]  -        Oral Residue [facility]  -        Pharyngeal Phase no overt signs/symptoms of pharyngeal impairment  -        Esophageal Phase suspected esophageal impairment  -        Clinical Swallow Evaluation Summary No overt s/s of aspiration accross trials of thin liquid, pureed, or regular solid consistencies; even when pushed for consecutive sips of thin. Adequate oral manipulation/mastication of regular solid trial w/o significant residue. Pt reports globus/discomfort w/ dry/non-cohesive solids. Recommend soft/ground solid diet w/ thin liquids, extra sauce/gravy w/ meats, general aspiration, reflux precautions. SLP will f/u x1 for diet tolerance  -           Esophageal Phase Concerns    Esophageal Phase Concerns globus  -        Globus regular consistencies  -           SLP Evaluation Clinical Impression    SLP Swallowing Diagnosis swallow WFL/no suspected pharyngeal impairment;suspected esophageal dysphagia  -        Functional Impact risk of aspiration/pneumonia  -        Rehab Potential/Prognosis, Swallowing good, to achieve stated therapy goals  -        Swallow Criteria for  Skilled Therapeutic Interventions Met [facility] skilled criteria  -           Recommendations    Therapy Frequency (Swallow) PRN  -        Predicted Duration Therapy Intervention (Days) until discharge  -        SLP Diet Recommendation soft to chew textures;ground;thin liquids  Extra sauce/gravy  -        Recommended Diagnostics other (see comments)  diet tolerance  -        Recommended Precautions and Strategies general aspiration precautions;reflux precautions;upright posture during/after eating  -        Oral Care Recommendations Oral Care BID/PRN;Toothbrush  -        SLP Rec. for Method of Medication Administration meds whole;with thin liquids;meds crushed;with puree;as tolerated  -        Monitor for Signs of Aspiration yes;notify SLP if any concerns  -        Anticipated Discharge Disposition (SLP) unknown;home with home health  -                  User Key  (r) = Recorded By, (t) = Taken By, (c) = Cosigned By      Initials Name Effective Dates     Arina Beauchamp, MS CCC-SLP 05/12/23 -                     EDUCATION  The patient has been educated in the following areas:   Dysphagia (Swallowing Impairment).        [facility] GOALS       Row Name 01/05/24 1325             (LTG) Patient will demonstrate functional swallow for    Diet Texture (Demonstrate functional swallow) regular textures  -      Liquid viscosity (Demonstrate functional swallow) thin liquids  -      [place] (Demonstrate functional swallow) independently (over 90% accuracy)  -      Time Frame (Demonstrate functional swallow) by discharge  -      Progress/Outcomes (Demonstrate functional swallow) new goal  -         (STG) Patient will tolerate trials of    Consistencies Trialed (Tolerate trials) soft to chew (ground) textures;thin liquids  -      Desired Outcome (Tolerate trials) without signs/symptoms of aspiration  -      [place] (Tolerate trials) independently (over 90% accuracy)  -      Time Frame  (Tolerate trials) 1 week  -      Progress/Outcomes (Tolerate trials) new goal  -                User Key  (r) = Recorded By, (t) = Taken By, (c) = Cosigned By      Initials Name Provider Type    Arina Marlow MS CCC-SLP Speech and Language Pathologist                       Time Calculation:    Time Calculation- SLP       Row Name 01/05/24 1401             Time Calculation- SLP    SLP Start Time 1325  -      SLP Received On 01/05/24  -         Untimed Charges    [id number] Eval Oral Pharyng Swallow Minutes 45  -         Total Minutes    Untimed Charges Total Minutes 45  -       Total Minutes 45  -                User Key  (r) = Recorded By, (t) = Taken By, (c) = Cosigned By      Initials Name Provider Type    Arina Marlow MS CCC-SLP Speech and Language Pathologist                    Therapy Charges for Today       Code Description Service Date Service Provider Modifiers Qty    [id number]  ST EVAL ORAL PHARYNG SWALLOW 3 1/5/2024 Arina Beauchamp MS CCC-SLP GN 1                 MS CHINO Benito  1/5/2024

## 2024-01-05 NOTE — CONSULTS
INFECTIOUS DISEASES  INPATIENT CONSULT NOTE / INITIAL HOSPITAL VISIT      PATIENT NAME:  Di Lopez  YOB: 1960  MEDICAL RECORD NUMBER:  [id number]    Date of Admission:  1/1/2024  Date of Consult: 1/5/2024    Requesting Provider: ELAINE Bridges DO  Evaluating Physician: Maciej Willett MD    Chief Complaint   Patient presents with    Rectal Bleeding       Reason for Consultation:   fever, cervical adenopathy    History of Present Illness:  Patient is a 63 y.o. female with a past medical history significant for mitral valve replacement on chronic warfarin and implanted defibrillator who was admitted to [facility] on 1/1/2024 after presenting to ED with complaints of rectal bleeding.   Patient was found to have acute kidney injury with creatinine 2/8.  COVID-19/influenza/RSV PCR negative.  She reported having some cervical lymph node swelling.  CT confirmed that.  Of note, she had Candida esophagitis in November.  HIV testing is negative.  Renal function is improving.  She complains of some wheezing.  She says her cervical adenopathy has much improved.  She has not been on antibiotics.  She has developed fever, up to Tmax 1 to 1.3 °F with low-grade fevers today.    I have been consulted to assist in workup and antimicrobial management of fever and cervical adenopathy.    Past Medical History:   Diagnosis Date    Allergic     Anemia     Anticoagulated     WARFARIN    Arteriovenous malformation of gastrointestinal tract 05/12/2016    Arthritis     Asthma     Black hairy tongue     CHF (congestive heart failure)     Chronic back pain     Coronary artery disease     Depression 05/12/2016    Diabetes mellitus     Dyslipidemia 05/12/2016    Elevated cholesterol     Emphysema/COPD 05/12/2016    Emphysema/COPD 05/12/2016    Fatigue     GERD (gastroesophageal reflux disease)     Headache     Herniated cervical disc     History of echocardiogram 07/16/2013    Left ventricular systolic  function at the lower limits of normal; EF 50-55%; mild MR; mechanical prosthetic mitral valve present; mod TR    History of transfusion     NO REACTION; [facility]    Hyperlipidemia     Hypertension     Insomnia 05/12/2016    Iron deficiency     A.  The patient was treated with IV iron replacement. B.  Secondary to GI bleed, status post EGD with cauterization of blood vessel to the duodenum, 7/16/2014.    Left bundle branch hemiblock 10/12/2020    Left shoulder strain     Liver disease     Liver mass     Long term current use of anticoagulant 05/12/2016    Low back pain     Lung nodules     PER DR. LEVI'S NOTE 10/22    Morbid obesity 05/12/2016    Neuromuscular disorder 7/28/2016    Obstructive sleep apnea syndrome 05/12/2016    Description: A.  Moderate; CPAP therapy..    Sliding hiatal hernia 07/28/2016    Description: A.  Reported on EGD, 12/30/2011.    Type 2 diabetes mellitus 05/12/2016    Urinary tract infection     Uterine cancer     HYSTERECTOMY    Vitamin B12 deficiency     A.  The patient was treated with vitamin B12 replacement.    Wears eyeglasses        Past Surgical History:   Procedure Laterality Date    BILATERAL OOPHORECTOMY  2009    Status post bilateral oophorectomy secondary to ovarian cysts     BREAST BIOPSY Right     STEREOTACTIC    BREAST EXCISIONAL BIOPSY Bilateral     CARDIAC ELECTROPHYSIOLOGY PROCEDURE N/A 10/15/2020    Procedure: Biventricular Device Insertion;  Surgeon: Nadir Everett MD;  Location:  [facility]VASIVE [facility]ATION;  Service: Cardiology;  Laterality: N/A;    CARDIAC SURGERY  2003    CARPAL TUNNEL RELEASE Bilateral     COLONOSCOPY  12/28/2012    COLONOSCOPY N/A 5/23/2023    Procedure: COLONOSCOPY;  Surgeon: Gt Fernandes MD;  Location:  [facility] ENDOSCOPY;  Service: Gastroenterology;  Laterality: N/A;    COLONOSCOPY N/A 11/16/2023    Procedure: COLONOSCOPY;  Surgeon: Brunner, Mark I, MD;  Location:  [facility];  Service: Gastroenterology;  Laterality: N/A;     "CYSTECTOMY      removal of ganglion cyst from left wrist    ENDOSCOPY  2011    DIAGNOSTIC ESOPHAGOGASTRODUODENOSCOPY    ENDOSCOPY N/A 2023    Procedure: ESOPHAGOGASTRODUODENOSCOPY;  Surgeon: Brunner, Mark I, MD;  Location: [facility] ENDOSCOPY;  Service: Gastroenterology;  Laterality: N/A;    HYSTERECTOMY  1995.  Status post hysterectomy for early stage endometrial cancer done in .    MITRAL VALVE REPLACEMENT  2008    Dr. Hima Barreto; MECHANICAL    OTHER SURGICAL HISTORY      LIVER MASS BLOOD SUPPLY CLIPPED ON RIGHT AND LEFT SIDE PER PT       Family History   Problem Relation Age of Onset    Sudden death Mother         POSSIBLE SERIOUS MASSIVE STROKE    Hypertension Mother             Multiple myeloma Father     Cancer Father             Hypertension Father             Ovarian cancer Paternal Grandmother     Cancer Paternal Grandmother             Diabetes Brother             Hypertension Brother             Breast cancer Neg Hx        Social History     Socioeconomic History    Marital status: Single   Tobacco Use    Smoking status: Former     Packs/day: 1.00     Years: 30.00     Additional pack years: 0.00     Total pack years: 30.00     Types: Cigarettes, Electronic Cigarette     Start date: 1976     Quit date: 2/10/2010     Years since quittin.9    Smokeless tobacco: Never    Tobacco comments:     Quit in    Vaping Use    Vaping Use: Every day    Substances: Nicotine    Devices: Disposable, one every 2 days   Substance and Sexual Activity    Alcohol use: No    Drug use: Never    Sexual activity: Defer     Partners: Male     Birth control/protection: Abstinence, None, Hysterectomy     Comment: 1 partner         Allergies:  Allergies   Allergen Reactions    Adhesive Tape Other (See Comments)     \"pulls skin off\"    Sildenafil Shortness Of Breath and Other (See Comments)     pt c/o tightness in chest and trouble breathing    " Codeine Itching and Nausea And Vomiting    Morphine Other (See Comments)     uncontrolable crying     Penicillins Hives    Statins Other (See Comments)     HX of Liver Issues    Sulfa Antibiotics Nausea And Vomiting       Home Medications:  No current facility-administered medications on file prior to encounter.     Current Outpatient Medications on File Prior to Encounter   Medication Sig Dispense Refill    albuterol sulfate  (90 Base) MCG/ACT inhaler Inhale 2 puffs Every 4 (Four) Hours As Needed for Wheezing. 1 g 0    azelastine (ASTELIN) 0.1 % nasal spray 2 sprays into the nostril(s) as directed by provider 2 (Two) Times a Day. Use in each nostril as directed (Patient taking differently: 2 sprays into the nostril(s) as directed by provider As Needed for Rhinitis or Allergies. Use in each nostril as directed prn) 1 each 1    carvedilol (COREG) 12.5 MG tablet Take 1 tablet by mouth 2 (Two) Times a Day With Meals. 180 tablet 2    cefdinir (OMNICEF) 300 MG capsule Take 1 capsule by mouth 2 (Two) Times a Day. 20 capsule 0    Continuous Blood Gluc  (FreeStyle Dalia 2 Mazon) device 1 each Daily. Use as directed daily to check blood sugars 1 each 0    Continuous Blood Gluc Sensor (FreeStyle Dalia 2 Sensor) misc USE AS DIRECTED FOR 14 DAYS 2 each 11    Dulaglutide (Trulicity) 4.5 MG/0.5ML solution pen-injector INJECT 1  SYRINGE SUBCUTANEOUSLY ONCE A WEEK 6 mL 0    escitalopram (LEXAPRO) 20 MG tablet Take 1 tablet by mouth Daily. 90 tablet 3    ferrous sulfate 325 (65 FE) MG tablet Take 1 tablet by mouth Daily With Breakfast.      folic acid (FOLVITE) 800 MCG tablet Take 1 tablet by mouth Daily.      furosemide (LASIX) 40 MG tablet Take 1 tablet by mouth 3 (Three) Times a Week. Monday, Wednesday, Friday. Resume when done w daily lasix      gabapentin (NEURONTIN) 300 MG capsule Take 1 capsule by mouth At Night As Needed (leg pain). 30 capsule 3    glimepiride (AMARYL) 4 MG tablet Take 1 tablet by mouth 2  (Two) Times a Day. 180 tablet 1    glucose blood test strip Use as instructed 3 times a day 100 each 11    HYDROcodone-acetaminophen (NORCO) 7.5-325 MG per tablet Take 1 tablet by mouth Every 8 (Eight) Hours As Needed for Moderate Pain.      Insulin Glargine (Lantus SoloStar) 100 UNIT/ML injection pen Inject 44 Units under the skin into the appropriate area as directed Daily. 45 mL 1    Insulin Lispro, 1 Unit Dial, (HumaLOG KwikPen) 100 UNIT/ML solution pen-injector Per correctional scale, MDD 50 units 15 mL 3    Insulin Pen Needle (B-D UF III MINI PEN NEEDLES) 31G X 5 MM misc USE 1  FIVE TIMES DAILY 150 each 5    Insulin Pen Needle (RELION PEN NEEDLES) 32G X 4 MM misc Use BID for E11.9 100 each 1    Lancets misc For use with glucose monitoring 3 times daily 100 each 11    lidocaine (LIDODERM) 5 % USE 1 PATCH EXTERNALLY ONCE DAILY. REMOVE AND DISCARD PATCH WITHIN 12 HOURS OR AS DIRECTED BY MD (Patient taking differently: Place 1 patch on the skin as directed by provider Daily.) 30 patch 0    methocarbamol (ROBAXIN) 500 MG tablet Take 1 tablet by mouth 4 (Four) Times a Day As Needed for Muscle Spasms. 40 tablet 0    nystatin (MYCOSTATIN) 100,000 unit/mL suspension Swish and swallow 5 mL 4 (Four) Times a Day. 200 mL 0    Omega-3 Fatty Acids (FISH OIL) 1000 MG capsule capsule Take 1 capsule by mouth 4 (Four) Times a Day. (Patient taking differently: Take 1 capsule by mouth 2 (Two) Times a Day With Meals.) 120 capsule 2    omeprazole (priLOSEC) 20 MG capsule Take 1 capsule by mouth twice daily 60 capsule 0    sacubitril-valsartan (ENTRESTO) 24-26 MG tablet Take 1 tablet by mouth 2 (Two) Times a Day. Resume when ok w PCP      simvastatin (ZOCOR) 5 MG tablet TAKE 1 TABLET BY MOUTH ONCE DAILY AT NIGHT 90 tablet 0    solifenacin (VESICARE) 5 MG tablet Take 1 tablet by mouth Daily. 90 tablet 0    spironolactone (ALDACTONE) 25 MG tablet Take 1 tablet by mouth Daily.      Vitamin D, Cholecalciferol, (CHOLECALCIFEROL) 10 MCG  (400 UNIT) tablet Take 2 tablets by mouth Daily.      warfarin (COUMADIN) 2 MG tablet Take on 11/24/2023  Indications: Presence of Mechanical Artificial Heart Valve (Patient taking differently: Take 2.5 tablets by mouth. Take on 11/24/2023  Indications: Presence of Mechanical Artificial Heart Valve) 5 tablet 0    warfarin (COUMADIN) 4 MG tablet Once per day on Sun Mon Tue Wed Thu Sat  Indications: Presence of Mechanical Artificial Heart Valve 14 tablet 0       Current Hospital Medications:  Current Facility-Administered Medications   Medication Dose Route Frequency Provider Last Rate Last Admin    acetaminophen (TYLENOL) tablet 650 mg  650 mg Oral Q8H PRN Dariana Bridges DO   650 mg at 01/04/24 2348    albuterol (PROVENTIL) nebulizer solution 0.083% 2.5 mg/3mL  2.5 mg Nebulization Q4H PRN Day, Ariella GUADARRAMA MD        dextrose (D50W) (25 g/50 mL) IV injection 25 g  25 g Intravenous Q15 Min PRN Day, Ariella GUADARRAMA MD        dextrose (GLUTOSE) oral gel 15 g  15 g Oral Q15 Min PRN Day, Ariella GUADARRAMA MD        escitalopram (LEXAPRO) tablet 20 mg  20 mg Oral Daily Day, Ariella GUADARRAMA MD   20 mg at 01/05/24 0906    ferrous sulfate tablet 325 mg  325 mg Oral Daily With Breakfast Day, Ariella GUADARRAMA MD   325 mg at 01/05/24 0906    folic acid (FOLVITE) tablet 800 mcg  800 mcg Oral Daily Day, Ariella GUADARRAMA MD   800 mcg at 01/05/24 0906    glucagon (GLUCAGEN) injection 1 mg  1 mg Intramuscular Q15 Min PRN Day, Ariella GUADARRAMA MD        heparin 77283 units/250 mL (100 units/mL) in 0.45 % NaCl infusion  14 Units/kg/hr Intravenous Titrated Annabelle Fernandez, PharmD 12.58 mL/hr at 01/05/24 1010 14 Units/kg/hr at 01/05/24 1010    insulin detemir (LEVEMIR) injection 5 Units  5 Units Subcutaneous Nightly Dariana Bridges DO   5 Units at 01/04/24 2131    Insulin Lispro (humaLOG) injection 2-7 Units  2-7 Units Subcutaneous 4x Daily AC & at Bedtime Day, Ariella GUADARRAMA MD   4 Units at 01/04/24 2131    Insulin Lispro (humaLOG) injection 3 Units  3 Units  Subcutaneous TID With Meals Dariana Bridges, DO   3 Units at 01/05/24 1236    melatonin tablet 5 mg  5 mg Oral Nightly PRN Niya Subramanian, APRN   5 mg at 01/05/24 0146    naloxone (NARCAN) injection 0.4 mg  0.4 mg Intravenous Q5 Min PRN Dariana Bridges DO        nitroglycerin (NITROSTAT) SL tablet 0.4 mg  0.4 mg Sublingual Q5 Min PRN Day, Ariella GUADARRAMA MD        oxybutynin XL (DITROPAN-XL) 24 hr tablet 5 mg  5 mg Oral Daily Day, Ariella GUADARRAMA MD   5 mg at 01/05/24 0907    oxyCODONE-acetaminophen (PERCOCET) 7.5-325 MG per tablet 1 tablet  1 tablet Oral Q6H PRN Dariana Bridges DO   1 tablet at 01/04/24 1702    pantoprazole (PROTONIX) EC tablet 40 mg  40 mg Oral Q AM Day, Ariella GUADARRAMA MD   40 mg at 01/05/24 0607    Pharmacy to Dose Heparin   Does not apply Continuous PRN Day, Ariella GUADARRAMA MD        sodium bicarbonate tablet 1,300 mg  1,300 mg Oral TID Justin Rodriguez MD        sodium chloride 0.9 % flush 10 mL  10 mL Intravenous Q12H Day, Ariella GUADARRAMA MD   10 mL at 01/05/24 0917    sodium chloride 0.9 % flush 10 mL  10 mL Intravenous PRN Day, Ariella GUADARRAMA MD        sodium chloride 0.9 % infusion 40 mL  40 mL Intravenous PRN Day, Ariella GUADARRAMA MD        sodium chloride 0.9 % infusion  50 mL/hr Intravenous Continuous Pro Quesada MD 50 mL/hr at 01/04/24 2342 50 mL/hr at 01/04/24 2342       Antimicrobials:  Anti-Infectives (From admission, onward)      None            Review of Systems:   Constitutional:  +fevers and fatigue.  HEENT:  No new vision, hearing or throat complaints.  No oral sores.  No headache, photophobia or neck stiffness.  +cervical swelling and pain - improving.  CV:  No chest pain, palpitations, or syncope.  Respiratory:  +wheezing.  GI:  +rectal bleeding.  :  No dysuria, hematuria, urgency, or flank pain.  Lymph:  + swollen lymph nodes in neck  Hematologic:  chronic warfarin.  Endo:  + diabetes.    Musculoskeletal:  No new swelling or pain of joints.  No new back pain.  Neurologic:  No  "acute focal weakness or numbness.  No seizures.  Skin:  No rashes, ulcers, or lesions.       Vital Signs:  Temp (24hrs), Av.2 °F (37.3 °C), Min:97.7 °F (36.5 °C), Max:101.3 °F (38.5 °C)    /53   Pulse 77   Temp 99 °F (37.2 °C) (Oral)   Resp 20   Ht 152.4 cm (60\")   Wt 89.9 kg (198 lb 3.2 oz)   LMP 1998   SpO2 97%   BMI 38.71 kg/m²     Physical Examination:  GENERAL: Chronically ill-appearing female.  Awake and alert, in no acute distress.   HEENT: Normocephalic, atraumatic.  EOMI. No conjunctival injection or subconjunctival hemorrhage. No icterus. Oropharynx clear without evidence of thrush or exudate.  Mucous membranes moist.   No tenderness palpation over the frontal or maxillary sinuses.  No mastoid tenderness.  NECK: Supple without nuchal rigidity.  No thyromegaly.  LYMPH: Shotty tender cervical adenopathy, left greater than right.  CV: RRR. No murmur, rubs, gallops.  Normal S1S2.  LUNGS: Bilateral wheezing or crackles.  ABDOMEN: Positive bowel sounds.  Soft, nontender, nondistended.  No rebound or guarding.   EXT:  No edema.  MSK: No joint effusions or inflammation noted.  SKIN: Warm and dry without rash or ulcer.   NEURO: A&Ox4. No focal deficits.  Face symmetric.  Speech fluent.  Moves all extremities well.   PSYCHIATRIC: Normal insight and judgement.  Cooperative.  Normal affect.      Laboratory Data:    Results from last 7 days   Lab Units 24  0501 24  0541 24  0251   WBC 10*3/mm3 6.74 5.78 4.18   HEMOGLOBIN g/dL 7.5* 7.8* 8.4*   HEMATOCRIT % 24.3* 24.5* 25.2*   PLATELETS 10*3/mm3 282 265 224     Results from last 7 days   Lab Units 24  0501   SODIUM mmol/L 133*   POTASSIUM mmol/L 5.2   CHLORIDE mmol/L 104   CO2 mmol/L 17.0*   BUN mg/dL 43*   CREATININE mg/dL 4.20*   GLUCOSE mg/dL 103*   CALCIUM mg/dL 7.9*     Results from last 7 days   Lab Units 24  0501   ALK PHOS U/L 204*   BILIRUBIN mg/dL 0.2   ALT (SGPT) U/L 29   AST (SGOT) U/L 28           "   Estimated Creatinine Clearance: 13.9 mL/min (A) (by C-G formula based on SCr of 4.2 mg/dL (H)).  Results from last 7 days   Lab Units 01/01/24  2330   LACTATE mmol/L 1.5     Results from last 7 days   Lab Units 01/04/24  0541   CK TOTAL U/L 160               Microbiology:  Microbiology Results (last 10 days)       Procedure Component Value - Date/Time    COVID PRE-OP / PRE-PROCEDURE SCREENING ORDER (NO ISOLATION) - Swab, Nasopharynx [[id number]]  (Normal) Collected: 01/01/24 2330    Lab Status: Final result Specimen: Swab from Nasopharynx Updated: 01/02/24 0016    Narrative:      The following orders were created for panel order COVID PRE-OP / PRE-PROCEDURE SCREENING ORDER (NO ISOLATION) - Swab, Nasopharynx.  Procedure                               Abnormality         Status                     ---------                               -----------         ------                     COVID-19, FLU A/B, RSV P...[[id number]]  Normal              Final result                 Please view results for these tests on the individual orders.    COVID-19, FLU A/B, RSV PCR 1 HR TAT - Swab, Nasopharynx [[id number]]  (Normal) Collected: 01/01/24 2330    Lab Status: Final result Specimen: Swab from Nasopharynx Updated: 01/02/24 0016     COVID19 Not Detected     Influenza A PCR Not Detected     Influenza B PCR Not Detected     RSV, PCR Not Detected    Narrative:      Fact sheet for providers: https://www.fda.gov/media/790338/download    Fact sheet for patients: https://www.fda.gov/media/263109/download    Test performed by PCR.                  Radiology:  Imaging Results (Last 72 Hours)       Procedure Component Value Units Date/Time    XR Chest PA & Lateral [[id number]] Collected: 01/03/24 1915     Updated: 01/03/24 1919    Narrative:      XR CHEST PA AND LATERAL    Date of Exam: 1/3/2024 6:58 PM EST    Indication: fever, cervical adenopathy    Comparison: 11/13/2023    Findings:  Status post sternotomy and valve replacement.  Left-sided AICD noted. Central pulmonary vascular congestion. No overt pulmonary edema. Negative for pneumothorax or pleural effusion. Osseous structures are grossly intact.      Impression:      Impression:  Stable mild cardiomegaly and central pulmonary vascular congestion.      Electronically Signed: Sacha Chou MD    1/3/2024 7:16 PM EST    Workstation ID: [id number]              IMPRESSION:  63 y.o. female with history of modified replacement and implanted defibrillator admitted with acute kidney injury and GI bleed.   Reported cervical adenopathy confirmed on CT imaging and has developed fever while hospitalized.    PROBLEM LIST:   -- Undifferentiated fever.  COVID-19/flu/RSV PCR negative.  -- Cervical adenopathy - improving.  No pharyngitis or sinusitis.  Consider viral infection, such as mononucleosis.  -- Recent Candida esophagitis.  HIV negative.  -- Implanted defibrillator.  -- History of mitral valve replacement.  On chronic anticoagulation.  -- MEERA, improving.  -- Rectal bleeding.  CT without acute intra-abdominal abnormality.    PLAN:  -- Blood cultures x 2; at risk for bacteremia/endocarditis with defibrillator and valve replacement.  If positive, will need endocarditis workup.  -- Check Monospot  -- Check full respiratory PCR panel  -- Defer addition of antimicrobials for now  -- Follow vitals, exam, labs, and cultures.    Thank you for the consultation.  I will continue to follow along with you.  Plan discussed with patient and nursing.    Maciej Willett MD  1/5/2024  15:47 EST

## 2024-01-05 NOTE — PROGRESS NOTES
"   LOS: 3 days    Patient Care Team:  Davina Santiago DO as PCP - General (Family Medicine)  Ameena Iglesias MD as Consulting Physician (Endocrinology)  Nakita Freire APRN as Nurse Practitioner (Nurse Practitioner)  Raimundo Thomas MD as Consulting Physician (Pulmonary Disease)  Nadir Everett MD as Consulting Physician (Cardiology)  Luiz Longoria MD as Consulting Physician (Pain Medicine)    Subjective     Stable overnight.  Feeling a little better.    Objective     Vital Signs:  Blood pressure 133/55, pulse 83, temperature 98.2 °F (36.8 °C), temperature source Axillary, resp. rate 20, height 152.4 cm (60\"), weight 89.9 kg (198 lb 3.2 oz), last menstrual period 06/17/1998, SpO2 96%, not currently breastfeeding.      Intake/Output Summary (Last 24 hours) at 1/5/2024 1527  Last data filed at 1/5/2024 1357  Gross per 24 hour   Intake 470 ml   Output 2400 ml   Net -1930 ml        01/04 0701 - 01/05 0700  In: 705 [P.O.:705]  Out: 1450 [Urine:1450]    Physical Exam:        GENERAL: WD AAF NAD  NEURO: Awake and alert, oriented. No focal deficit  PSYCHIATRIC: NMA. Cooperative with PE  EYE: PE, no icterus, no conjunctivitis  ENT: ommm, dentition intact,  Hearing intact  NECK: Supple , No JVD discernable,  Trachea midline  CV: Trace edema, RRR  LUNGS:  Quiet,  Nonlabored resp.  Symmetrical expansion  ABDOMEN: Nondistended, soft nontender.  : No Perdomo, no palp bladder  SKIN: Warm and dry without rash      Labs:  Results from last 7 days   Lab Units 01/05/24  0501 01/04/24  0541 01/03/24  0251   WBC 10*3/mm3 6.74 5.78 4.18   HEMOGLOBIN g/dL 7.5* 7.8* 8.4*   PLATELETS 10*3/mm3 282 265 224     Results from last 7 days   Lab Units 01/05/24  0501 01/04/24  0541 01/03/24  1654 01/03/24  1153 01/03/24  0737 01/03/24  0251 01/02/24  1144 01/02/24  0808   SODIUM mmol/L 133* 136 132* 133*   < > 131*  131*   < > 129*   POTASSIUM mmol/L 5.2 5.1  --   --   --  5.0  --  5.3*   CHLORIDE mmol/L 104 105  --   --   --  " 99  --  95*   CO2 mmol/L 17.0* 17.0*  --   --   --  19.0*  --  19.0*   BUN mg/dL 43* 50*  --   --   --  57*  --  61*   CREATININE mg/dL 4.20* 5.39*  --   --   --  6.72*  --  7.49*   CALCIUM mg/dL 7.9* 7.1*  --   --   --  7.1*  --  7.6*   PHOSPHORUS mg/dL  --   --   --   --   --   --   --  7.3*   MAGNESIUM mg/dL  --   --   --   --   --   --   --  2.3   ALBUMIN g/dL 3.5 3.2*  --   --   --  3.2*  --  3.4*    < > = values in this interval not displayed.     Results from last 7 days   Lab Units 01/05/24  0501   ALK PHOS U/L 204*   BILIRUBIN mg/dL 0.2   ALT (SGPT) U/L 29   AST (SGOT) U/L 28                   Estimated Creatinine Clearance: 13.9 mL/min (A) (by C-G formula based on SCr of 4.2 mg/dL (H)).         A/P:    ARF: Creatinine continues to improve.  Urine output nonoliguric with negative volume.  Creatinine up to 8 on presentation with likely dehydration due to diarrhea with poor p.o. intake in the setting of NSAIDs and Entresto.  Urine sodium 20 consistent with prerenal azotemia.  Renal function improving with hydration.  For now continue supportive care.  Strict I's and O's.  Monitor for further renal recovery.    HTN: Blood pressure stable.    Hyponatremia: Sodium borderline.  Initially down to 129 on presentation due to hypovolemia and MEERA.  All fluids isotonic     Hyperkalemia: Stable.  Due to MEERA.     Met Aciodsis: Due to renal failure and underlying GI losses with diarrhea.  Will start po bicarb     Anemia. Hx of supra therapeutic IRN w rectal bleeding in the past. Admitted again with concern for rectal bleed.  Transfuse as needed for Hgb <7.    Volume status: Negative overnight.  Likely dehydrated on admission.  Improving with hydration.  Avoid volume overload with underlying CHF.  Hold IV fluids tonight.  Redose as indicated.  Continue strict I's and O's.     Hx of mechanical valve.  On anticoagulation     CHF: EF 40-45%. Hx of MV and depressed RV function. On beta blocker and lasix. Not on ACEi/ARB/MRA/  or SGLT-2inh for now.    High risk and complexity patient.    Justin Rodriguez MD  01/05/24  15:27 EST

## 2024-01-05 NOTE — PROGRESS NOTES
" LOS: 2 days   Patient Care Team:  Davina Santiago DO as PCP - General (Family Medicine)  Ameena Iglesias MD as Consulting Physician (Endocrinology)  Nakita Freire APRN as Nurse Practitioner (Nurse Practitioner)  Raimundo Thomas MD as Consulting Physician (Pulmonary Disease)  Nadir Everett MD as Consulting Physician (Cardiology)  Luiz Longoria MD as Consulting Physician (Pain Medicine)    Chief Complaint: MEERA    Subjective     Renal function improving. C/o cramping in both legs Started on muscle relaxant.      Subjective:  Symptoms:  Stable.  No shortness of breath, chest pain or chest pressure.    Diet:  Adequate intake.    Pain:  She complains of pain that is moderate.        History taken from: patient    Objective     Vital Sign Min/Max for last 24 hours  Temp  Min: 97.7 °F (36.5 °C)  Max: 99.7 °F (37.6 °C)   BP  Min: 115/50  Max: 122/57   Pulse  Min: 62  Max: 158   Resp  Min: 18  Max: 18   SpO2  Min: 90 %  Max: 99 %   Flow (L/min)  Min: 2  Max: 2   No data recorded     Flowsheet Rows      Flowsheet Row First Filed Value   Admission Height 152.4 cm (60\") Documented at 01/01/2024 2251   Admission Weight 84.8 kg (187 lb) Documented at 01/01/2024 2251            No intake/output data recorded.  I/O last 3 completed shifts:  In: 2187 [P.O.:1287; I.V.:900]  Out: 1600 [Urine:1600]    Objective:  General Appearance:  Comfortable.    Vital signs: (most recent): Blood pressure 120/55, pulse 80, temperature 97.7 °F (36.5 °C), temperature source Oral, resp. rate 18, height 152.4 cm (60\"), weight 89.9 kg (198 lb 3.2 oz), last menstrual period 06/17/1998, SpO2 94%, not currently breastfeeding.  Vital signs are normal.    Output: Producing urine.    HEENT: Normal HEENT exam.    Lungs:  Normal effort and normal respiratory rate.  Breath sounds clear to auscultation.  She is not in respiratory distress.  No stridor.    Heart: Normal rate.  Regular rhythm.  S1 normal and S2 normal.  No murmur or gallop. " "  Abdomen: Abdomen is soft.  Bowel sounds are normal.   There is no abdominal tenderness.     Extremities: Normal range of motion.  There is no dependent edema or local swelling.    Pulses: Distal pulses are intact.    Neurological: Patient is alert and oriented to person, place and time.  Normal strength.    Pupils:  Pupils are equal, round, and reactive to light.                Results Review:     I reviewed the patient's new clinical results.    WBC WBC   Date Value Ref Range Status   01/04/2024 5.78 3.40 - 10.80 10*3/mm3 Final   01/03/2024 4.18 3.40 - 10.80 10*3/mm3 Final   01/02/2024 4.26 3.40 - 10.80 10*3/mm3 Final   01/01/2024 4.42 3.40 - 10.80 10*3/mm3 Final      HGB Hemoglobin   Date Value Ref Range Status   01/04/2024 7.8 (L) 12.0 - 15.9 g/dL Final   01/03/2024 8.4 (L) 12.0 - 15.9 g/dL Final   01/02/2024 8.5 (L) 12.0 - 15.9 g/dL Final   01/01/2024 9.5 (L) 12.0 - 15.9 g/dL Final      HCT Hematocrit   Date Value Ref Range Status   01/04/2024 24.5 (L) 34.0 - 46.6 % Final   01/03/2024 25.2 (L) 34.0 - 46.6 % Final   01/02/2024 26.0 (L) 34.0 - 46.6 % Final   01/01/2024 29.3 (L) 34.0 - 46.6 % Final      Platlets No results found for: \"LABPLAT\"   MCV MCV   Date Value Ref Range Status   01/04/2024 86.0 79.0 - 97.0 fL Final   01/03/2024 85.7 79.0 - 97.0 fL Final   01/02/2024 85.8 79.0 - 97.0 fL Final   01/01/2024 84.2 79.0 - 97.0 fL Final          Sodium Sodium   Date Value Ref Range Status   01/04/2024 136 136 - 145 mmol/L Final   01/03/2024 132 (L) 136 - 145 mmol/L Final   01/03/2024 133 (L) 136 - 145 mmol/L Final   01/03/2024 133 (L) 136 - 145 mmol/L Final   01/03/2024 131 (L) 136 - 145 mmol/L Final   01/03/2024 131 (L) 136 - 145 mmol/L Final   01/02/2024 132 (L) 136 - 145 mmol/L Final   01/02/2024 128 (L) 136 - 145 mmol/L Final   01/02/2024 128 (L) 136 - 145 mmol/L Final   01/02/2024 127 (L) 136 - 145 mmol/L Final   01/02/2024 129 (L) 136 - 145 mmol/L Final   01/02/2024 127 (L) 136 - 145 mmol/L Final " "  01/01/2024 126 (L) 136 - 145 mmol/L Final      Potassium Potassium   Date Value Ref Range Status   01/04/2024 5.1 3.5 - 5.2 mmol/L Final   01/03/2024 5.0 3.5 - 5.2 mmol/L Final   01/02/2024 5.3 (H) 3.5 - 5.2 mmol/L Final     Comment:     Slight hemolysis detected by analyzer. Result may be falsely elevated.   01/02/2024 4.8 3.5 - 5.2 mmol/L Final     Comment:     Slight hemolysis detected by analyzer. Result may be falsely elevated.   01/01/2024 5.6 (H) 3.5 - 5.2 mmol/L Final     Comment:     Slight hemolysis detected by analyzer. Result may be falsely elevated.      Chloride Chloride   Date Value Ref Range Status   01/04/2024 105 98 - 107 mmol/L Final   01/03/2024 99 98 - 107 mmol/L Final   01/02/2024 95 (L) 98 - 107 mmol/L Final   01/02/2024 91 (L) 98 - 107 mmol/L Final   01/01/2024 87 (L) 98 - 107 mmol/L Final      CO2 CO2   Date Value Ref Range Status   01/04/2024 17.0 (L) 22.0 - 29.0 mmol/L Final   01/03/2024 19.0 (L) 22.0 - 29.0 mmol/L Final   01/02/2024 19.0 (L) 22.0 - 29.0 mmol/L Final   01/02/2024 18.0 (L) 22.0 - 29.0 mmol/L Final   01/01/2024 20.0 (L) 22.0 - 29.0 mmol/L Final      BUN BUN   Date Value Ref Range Status   01/04/2024 50 (H) 8 - 23 mg/dL Final   01/03/2024 57 (H) 8 - 23 mg/dL Final   01/02/2024 61 (H) 8 - 23 mg/dL Final   01/02/2024 63 (H) 8 - 23 mg/dL Final   01/01/2024 64 (H) 8 - 23 mg/dL Final      Creatinine Creatinine   Date Value Ref Range Status   01/04/2024 5.39 (H) 0.57 - 1.00 mg/dL Final   01/03/2024 6.72 (H) 0.57 - 1.00 mg/dL Final   01/02/2024 7.49 (H) 0.57 - 1.00 mg/dL Final   01/02/2024 8.19 (H) 0.57 - 1.00 mg/dL Final   01/01/2024 8.70 (H) 0.57 - 1.00 mg/dL Final      Calcium Calcium   Date Value Ref Range Status   01/04/2024 7.1 (L) 8.6 - 10.5 mg/dL Final   01/03/2024 7.1 (L) 8.6 - 10.5 mg/dL Final   01/02/2024 7.6 (L) 8.6 - 10.5 mg/dL Final   01/02/2024 7.6 (L) 8.6 - 10.5 mg/dL Final   01/01/2024 8.4 (L) 8.6 - 10.5 mg/dL Final      PO4 No results found for: \"CAPO4\"   Albumin " "Albumin   Date Value Ref Range Status   01/04/2024 3.2 (L) 3.5 - 5.2 g/dL Final   01/03/2024 3.2 (L) 3.5 - 5.2 g/dL Final   01/02/2024 3.4 (L) 3.5 - 5.2 g/dL Final   01/01/2024 4.1 3.5 - 5.2 g/dL Final      Magnesium Magnesium   Date Value Ref Range Status   01/02/2024 2.3 1.6 - 2.4 mg/dL Final      Uric Acid No results found for: \"URICACID\"     Medication Review: Yes    Assessment & Plan       Renal failure    Arteriovenous malformation of gastrointestinal tract    Type 2 diabetes mellitus without complication, with long-term current use of insulin    Dyslipidemia    Emphysema/COPD    Hypertension    Obstructive sleep apnea syndrome    Chronic anticoagulation    Left bundle branch hemiblock    ARF (acute renal failure)    H/O heart valve replacement with mechanical valve    GI bleed    Hyponatremia    Hyperkalemia    Type 2 diabetes mellitus with hyperglycemia    Elevated LFTs    Anemia, chronic disease    Cervical lymphadenopathy      Assessment & Plan    Acute kidney injury: Last known stable cr ~ 1.1 mg/dl on last discharge few weeks prior to this admission. Patient is presenting with cr ~ 8. UA small blood trace protein 13-20 sq epi cell. Suspect severe hemodynamic injury in the setting of poor po intake, nsaid use, diarrhea, and enetersto use. CT didn't show any hydro. Urine Cl <20 Urine sodium 20. No significant proteinuria.      Hyponatremia: Due to hypovolemia and MEERA. Improving with volume expansion     Hyperkalemia: Due to MEERA.     CHF: EF 40-45%. Hx of MV and depressed RV function. On beta blocker and lasix. Not on ACEi/ARB/MRA/ or SGLT-2inh for now.      Volume status: No significant LE edema.      Anemia. Hx of supra therapeutic IRN w rectal bleeding in the past. Admitted again with concern for rectal bleed     Hx of mechanical valve. Currently on heparin    Met acidosis: Due to MEERA and component of dilution acidosis with NS.      Recs  MEERA likely hemodynamic injury. Continue with IV fluids NS@ 50 " cc/hr.Continue to hold Enteresto for now. Daily labs. Strict I/O. No additional med change is req. No indication at present.         I discussed the patients findings and my recommendations with patient    Pro Quesada MD  01/04/24  20:00 EST

## 2024-01-05 NOTE — PROGRESS NOTES
Met with patient to discuss home care and she is agreeable to [facility].P/C to PCP Dr Davina Santiago's office and message left regarding following for home care. Al[facility]o [facility] Liaison-return p/c received MD office and Dr Davina Santiago will follow for home care. [facility] Liaison

## 2024-01-05 NOTE — PROGRESS NOTES
HEPARIN INFUSION  Di Lopez is a  63 y.o. female receiving heparin infusion.     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   Patient Weight:  89.9 kg  Initial Bolus (Y/N):  No  Any Bolus (Y/N):  Yes       Signs or Symptoms of Bleeding:     Cardiac or Other (Not VTE)   Initial Bolus: 60 units/kg (Max 4,000 units)  Initial rate: 12 units/kg/hr (Max 1,000 units/hr)   Anti Xa Rebolus Infusion Hold time Change infusion Dose (Units/kg/hr) Next Anti Xa or aPTT Level Due   < 0.11 50 Units/kg  (4000 Units Max) None Increase by  3 Units/kg/hr 6 hours   0.11- 0.19 25 Units/kg  (2000 Units Max) None Increase by  2 Units/kg/hr 6 hours   0.2 - 0.29 0 None Increase by  1 Units/kg/hr 6 hours   0.3 - 0.5 0 None No Change 6 hours (after 2 consecutive levels in range check qAM)   0.51 - 0.6 0 None Decrease by  1 Units/kg/hr 6 hours   0.61 - 0.8 0 30 Minutes Decrease by  2 Units/kg/hr 6 hours   0.81 - 1 0 60 Minutes Decrease by  3 Units/kg/hr 6 hours   >1 0 Hold  After Anti Xa less than 0.5 decrease previous rate by  4 Units/kg/hr  Every 2 hours until Anti Xa  less than 0.5 then when infusion restarts in 6 hours       Results from last 7 days   Lab Units 01/05/24  0501 01/04/24  0541 01/03/24  0251 01/02/24  0531 01/01/24  2330   INR  1.56*  --   --   --  2.36*   HEMOGLOBIN g/dL 7.5* 7.8* 8.4*   < > 9.5*   HEMATOCRIT % 24.3* 24.5* 25.2*   < > 29.3*   PLATELETS 10*3/mm3 282 265 224   < > 250    < > = values in this interval not displayed.          Date   Time   Anti-Xa Current Rate (Unit/kg/hr) Bolus   (Units) Rate Change   (Unit/kg/hr) New Rate (Unit/kg/hr) Next   Anti-Xa Comments  Pump Check Daily   1/2 0531 0.1 New Start No Bolus -- 11 1100 Discussed w/ nurse  Warfarin patient    1/2 1140 Pump check 11 -- -- 11 now Pump weight and rate verified    1/2 1208 0.15 11 2000 +2 13 2000 D/w Marjan   1/2 2035 0.34 13 -- -- 13 0300 MARCELLUS Rousseau   1/3 0251 0.37 13 -- -- 13 0600  1/4/24 Discussed w/ nurse    1/3 1100 PUMP CHECK 13 - -- 13 0600  1/4/23 Pump rate and weight verified   1/4 0541 0.34 13 -- -- 13 0600 DW RN   1/5 0501 0.21 13 -- +1 14 1400 D/w RN   1/5 1040 PUMP CHECK 14 -- -- 14 1400 Pump weight and rate verified

## 2024-01-05 NOTE — PLAN OF CARE
Goal Outcome Evaluation:   SLP evaluation completed. Will continue to address dysphagia. Please see note for further details and recommendations.                    Anticipated Discharge Disposition (SLP): unknown, home with home health

## 2024-01-05 NOTE — PLAN OF CARE
Problem: Adult Inpatient Plan of Care  Goal: Plan of Care Review  Outcome: Ongoing, Progressing  Goal: Patient-Specific Goal (Individualized)  Outcome: Ongoing, Progressing  Goal: Absence of Hospital-Acquired Illness or Injury  Outcome: Ongoing, Progressing  Intervention: Identify and Manage Fall Risk  Recent Flowsheet Documentation  Taken 1/5/2024 0400 by Ann De La O RN  Safety Promotion/Fall Prevention:   activity supervised   assistive device/personal items within reach   clutter free environment maintained   fall prevention program maintained   nonskid shoes/slippers when out of bed   safety round/check completed   room organization consistent   lighting adjusted  Taken 1/5/2024 0000 by Ann De La O RN  Safety Promotion/Fall Prevention:   activity supervised   assistive device/personal items within reach   clutter free environment maintained   fall prevention program maintained   nonskid shoes/slippers when out of bed   room organization consistent   safety round/check completed  Taken 1/4/2024 2030 by Ann De La O RN  Safety Promotion/Fall Prevention:   activity supervised   assistive device/personal items within reach   clutter free environment maintained   fall prevention program maintained   nonskid shoes/slippers when out of bed   safety round/check completed   room organization consistent   lighting adjusted  Intervention: Prevent Skin Injury  Recent Flowsheet Documentation  Taken 1/5/2024 0400 by Ann De La O RN  Body Position:   position changed independently   neutral head position   neutral body alignment   legs elevated  Skin Protection:   adhesive use limited   incontinence pads utilized   transparent dressing maintained   tubing/devices free from skin contact  Taken 1/5/2024 0000 by Ann De La O RN  Body Position:   position changed independently   neutral body alignment   neutral head position   sitting up in bed  Skin Protection:   adhesive use limited    incontinence pads utilized   transparent dressing maintained   tubing/devices free from skin contact  Taken 1/4/2024 2030 by Ann De La O RN  Body Position:   position changed independently   neutral body alignment   neutral head position  Skin Protection:   adhesive use limited   incontinence pads utilized   tubing/devices free from skin contact   transparent dressing maintained  Intervention: Prevent and Manage VTE (Venous Thromboembolism) Risk  Recent Flowsheet Documentation  Taken 1/5/2024 0400 by Ann De La O RN  VTE Prevention/Management:   bilateral   sequential compression devices on  Taken 1/5/2024 0000 by Ann De La O RN  VTE Prevention/Management:   bilateral   sequential compression devices off   patient refused intervention  Intervention: Prevent Infection  Recent Flowsheet Documentation  Taken 1/5/2024 0400 by Ann De La O RN  Infection Prevention:   hand hygiene promoted   rest/sleep promoted   single patient room provided  Taken 1/5/2024 0000 by Ann De La O RN  Infection Prevention:   hand hygiene promoted   rest/sleep promoted   single patient room provided  Taken 1/4/2024 2030 by Ann De La O RN  Infection Prevention:   hand hygiene promoted   single patient room provided   rest/sleep promoted  Goal: Optimal Comfort and Wellbeing  Outcome: Ongoing, Progressing  Intervention: Monitor Pain and Promote Comfort  Recent Flowsheet Documentation  Taken 1/4/2024 2030 by Ann De La O RN  Pain Management Interventions:   pillow support provided   position adjusted  Intervention: Provide Person-Centered Care  Recent Flowsheet Documentation  Taken 1/4/2024 2030 by Ann De La O RN  Trust Relationship/Rapport:   care explained   choices provided   empathic listening provided   questions encouraged   reassurance provided   thoughts/feelings acknowledged  Goal: Readiness for Transition of Care  Outcome: Ongoing, Progressing   Goal Outcome Evaluation:

## 2024-01-05 NOTE — THERAPY TREATMENT NOTE
Patient Name: Di Lopez  : 1960    MRN: [id number]                              Today's Date: 2024       Admit Date: 2024    Visit Dx:     ICD-10-CM ICD-9-CM   1. Acute renal failure, unspecified acute renal failure type  N17.9 584.9   2. Hyperkalemia  E87.5 276.7   3. Gastrointestinal hemorrhage, unspecified gastrointestinal hemorrhage type  K92.2 578.9   4. Acute renal failure superimposed on chronic kidney disease, unspecified acute renal failure type, unspecified CKD stage  N17.9 584.9    N18.9 585.9   5. Chronic respiratory failure with hypoxia  J96.11 518.83     799.02   6. Weakness  R53.1 780.79   7. Dysphagia, unspecified type  R13.10 787.20     Patient Active Problem List   Diagnosis    Arteriovenous malformation of gastrointestinal tract    Depression    Type 2 diabetes mellitus without complication, with long-term current use of insulin    Dyslipidemia    Emphysema/COPD    Hypertension    Insomnia    Morbid obesity    Obstructive sleep apnea syndrome    Chronic anticoagulation    Normocytic anemia    Sliding hiatal hernia    Vitamin B12 deficiency    Iron deficiency    Iron malabsorption    Bronchiectasis without complication    Electronic cigarette use    Chronic diastolic heart failure    Left bundle branch hemiblock    Chronic respiratory failure with hypoxia    Body aches    Headaches due to old head injury    History of tobacco use, presenting hazards to health    Multiple nodules of lung    Nocturnal hypoxemia    Screening for colon cancer    Abnormal CT scan    Wellness examination    ARF (acute renal failure)    H/O heart valve replacement with mechanical valve    GI bleed    Severe malnutrition    Hyponatremia    Hyperkalemia    Type 2 diabetes mellitus with hyperglycemia    Elevated LFTs    Anemia, chronic disease    Cervical lymphadenopathy    Renal failure     Past Medical History:   Diagnosis Date    Allergic     Anemia     Anticoagulated     WARFARIN    Arteriovenous  malformation of gastrointestinal tract 05/12/2016    Arthritis     Asthma     Black hairy tongue     CHF (congestive heart failure)     Chronic back pain     Coronary artery disease     Depression 05/12/2016    Diabetes mellitus     Dyslipidemia 05/12/2016    Elevated cholesterol     Emphysema/COPD 05/12/2016    Emphysema/COPD 05/12/2016    Fatigue     GERD (gastroesophageal reflux disease)     Headache     Herniated cervical disc     History of echocardiogram 07/16/2013    Left ventricular systolic function at the lower limits of normal; EF 50-55%; mild MR; mechanical prosthetic mitral valve present; mod TR    History of transfusion     NO REACTION; [facility]    Hyperlipidemia     Hypertension     Insomnia 05/12/2016    Iron deficiency     A.  The patient was treated with IV iron replacement. B.  Secondary to GI bleed, status post EGD with cauterization of blood vessel to the duodenum, 7/16/2014.    Left bundle branch hemiblock 10/12/2020    Left shoulder strain     Liver disease     Liver mass     Long term current use of anticoagulant 05/12/2016    Low back pain     Lung nodules     PER DR. LEVI'S NOTE 10/22    Morbid obesity 05/12/2016    Neuromuscular disorder 7/28/2016    Obstructive sleep apnea syndrome 05/12/2016    Description: A.  Moderate; CPAP therapy..    Sliding hiatal hernia 07/28/2016    Description: A.  Reported on EGD, 12/30/2011.    Type 2 diabetes mellitus 05/12/2016    Urinary tract infection     Uterine cancer     HYSTERECTOMY    Vitamin B12 deficiency     A.  The patient was treated with vitamin B12 replacement.    Wears eyeglasses      Past Surgical History:   Procedure Laterality Date    BILATERAL OOPHORECTOMY  2009    Status post bilateral oophorectomy secondary to ovarian cysts     BREAST BIOPSY Right     STEREOTACTIC    BREAST EXCISIONAL BIOPSY Bilateral     CARDIAC ELECTROPHYSIOLOGY PROCEDURE N/A 10/15/2020    Procedure: Biventricular Device Insertion;  Surgeon: Nadir Everett MD;   Location:  [facility];  Service: Cardiology;  Laterality: N/A;    CARDIAC SURGERY  2003    CARPAL TUNNEL RELEASE Bilateral     COLONOSCOPY  12/28/2012    COLONOSCOPY N/A 5/23/2023    Procedure: COLONOSCOPY;  Surgeon: Gt Fernandes MD;  Location:  [facility];  Service: Gastroenterology;  Laterality: N/A;    COLONOSCOPY N/A 11/16/2023    Procedure: COLONOSCOPY;  Surgeon: Brunner, Mark I, MD;  Location:  [facility];  Service: Gastroenterology;  Laterality: N/A;    CYSTECTOMY      removal of ganglion cyst from left wrist    ENDOSCOPY  12/30/2011    DIAGNOSTIC ESOPHAGOGASTRODUODENOSCOPY    ENDOSCOPY N/A 11/16/2023    Procedure: ESOPHAGOGASTRODUODENOSCOPY;  Surgeon: Brunner, Mark I, MD;  Location:  [facility];  Service: Gastroenterology;  Laterality: N/A;    HYSTERECTOMY  1995    A.  Status post hysterectomy for early stage endometrial cancer done in 1995.    MITRAL VALVE REPLACEMENT  03/03/2008    Dr. Hima Barreto; MECHANICAL    OTHER SURGICAL HISTORY      LIVER MASS BLOOD SUPPLY CLIPPED ON RIGHT AND LEFT SIDE PER PT      General Information       Row Name 01/05/24 1505          OT Time and Intention    Document Type therapy note (daily note)  -MR     Mode of Treatment occupational therapy  -MR       Row Name 01/05/24 1505          General Information    Patient Profile Reviewed yes  -MR     Existing Precautions/Restrictions fall;other (see comments)  -MR     Barriers to Rehab medically complex;previous functional deficit  -MR       Row Name 01/05/24 1505          Cognition    Orientation Status (Cognition) oriented x 4  -MR       Row Name 01/05/24 1505          Safety Issues, Functional Mobility    Safety Issues Affecting Function (Mobility) positioning of assistive device  -MR     Impairments Affecting Function (Mobility) pain;postural/trunk control;sensation/sensory awareness;strength;endurance/activity tolerance  -MR               User Key  (r) = Recorded By, (t) = Taken By,  (c) = Cosigned By      Initials Name Provider Type    Sammie Cedeño, OT Occupational Therapist                     Mobility/ADL's       Row Name 01/05/24 1505          Transfers    Transfers sit-stand transfer  -MR       Row Name 01/05/24 1505          Sit-Stand Transfer    Sit-Stand [place] (Transfers) standby assist  -MR     Assistive Device (Sit-Stand Transfers) walker, front-wheeled  -MR       Row Name 01/05/24 1505          Activities of Daily Living    BADL Assessment/Intervention lower body dressing;feeding  -MR       Row Name 01/05/24 1505          Lower Body Dressing Assessment/Training    [place] Level (Lower Body Dressing) don;doff;pants/bottoms;shoes/slippers  -MR     Position (Lower Body Dressing) edge of bed sitting  -MR     Comment, (Lower Body Dressing) OT re-educated pt on use of AE for increased I w/ ADLs.  -MR       Row Name 01/05/24 1505          Self-Feeding Assessment/Training    [place] Level (Feeding) feeding skills;independent  -MR     Position (Self-Feeding) edge of bed sitting  -MR               User Key  (r) = Recorded By, (t) = Taken By, (c) = Cosigned By      Initials Name Provider Type    Sammie Cedeño, OT Occupational Therapist                   Obj/Interventions       Row Name 01/05/24 1506          Balance    Balance Assessment sitting static balance;sitting dynamic balance;standing static balance;standing dynamic balance  -MR     Static Sitting Balance independent  -MR     Dynamic Sitting Balance independent  -MR     Position, Sitting Balance unsupported;sitting edge of bed  -MR     Static Standing Balance contact guard  -MR     Dynamic Standing Balance contact guard  -MR     Position/Device Used, Standing Balance supported;walker, rolling  -MR     Balance Interventions sitting;standing;sit to stand;supported;static;occupation based/functional task  -MR               User Key  (r) = Recorded By, (t) = Taken By, (c) = Cosigned By      Initials Name Provider  Type    Sammie Cedeño, OT Occupational Therapist                   Goals/Plan    No documentation.                  Clinical Impression       Row Name 01/05/24 1507          Pain Assessment    Pain Intervention(s) Ambulation/increased activity;Repositioned  -MR       Row Name 01/05/24 1507          Plan of Care Review    Plan of Care Reviewed With patient  -MR     Progress improving  -MR     Outcome Evaluation Pt demonstrating improvements w/ activity tolerance and endurance. Pt demonstrating good carryover and understanding w/ AE for increased I w/ ADLs. Continue to progress as able per current POC.  -MR       Row Name 01/05/24 1507          Therapy Plan Review/Discharge Plan (OT)    Anticipated Discharge Disposition (OT) home with home health  -MR       Row Name 01/05/24 1507          Vital Signs    Pre Systolic BP Rehab 133  -MR     Pre Treatment Diastolic BP 51  -MR     O2 Delivery Pre Treatment room air  -MR     O2 Delivery Intra Treatment room air  -MR     O2 Delivery Post Treatment room air  -MR     Pre Patient Position Sitting  -MR     Intra Patient Position Standing  -MR     Post Patient Position Sitting  -MR       Row Name 01/05/24 1507          Positioning and Restraints    Pre-Treatment Position in bed  -MR     Post Treatment Position bed  -MR     In Bed sitting EOB;notified nsg  -MR               User Key  (r) = Recorded By, (t) = Taken By, (c) = Cosigned By      Initials Name Provider Type     Sammie Matt, CELESTINE Occupational Therapist                   Outcome Measures       Row Name 01/05/24 1513          How much help from another is currently needed...    Putting on and taking off regular lower body clothing? 3  -MR     Bathing (including washing, rinsing, and drying) 3  -MR     Toileting (which includes using toilet bed pan or urinal) 3  -MR     Putting on and taking off regular upper body clothing 4  -MR     Taking care of personal grooming (such as brushing teeth) 4  -MR     Eating meals 4   -MR     AM-[facility] 6 Clicks Score (OT) 21  -MR       Row Name 01/05/24 1513          Functional Assessment    Outcome Measure Options AM-PAC 6 Clicks Daily Activity (OT)  -MR               User Key  (r) = Recorded By, (t) = Taken By, (c) = Cosigned By      Initials Name Provider Type    Sammie Cedeño, OT Occupational Therapist                    Occupational Therapy Education       Title: PT OT SLP Therapies (Done)       Topic: Occupational Therapy (Done)       Point: ADL training (Done)       Description:   Instruct learner(s) on proper safety adaptation and remediation techniques during self care or transfers.   Instruct in proper use of assistive devices.                  Learning Progress Summary             Patient Acceptance, E, VU by MR at 1/5/2024 1514    Eager, E,TB,D,H, VU by AR at 1/2/2024 1329                         Point: Home exercise program (Done)       Description:   Instruct learner(s) on appropriate technique for monitoring, assisting and/or progressing therapeutic exercises/activities.                  Learning Progress Summary             Patient Acceptance, E, VU by MR at 1/5/2024 1514    Eager, E,TB,D,H, VU by AR at 1/2/2024 1329                         Point: Precautions (Done)       Description:   Instruct learner(s) on prescribed precautions during self-care and functional transfers.                  Learning Progress Summary             Patient Acceptance, E, VU by MR at 1/5/2024 1514    Eager, E,TB,D,H, VU by AR at 1/2/2024 1329                         Point: Body mechanics (Done)       Description:   Instruct learner(s) on proper positioning and spine alignment during self-care, functional mobility activities and/or exercises.                  Learning Progress Summary             Patient Acceptance, E, VU by MR at 1/5/2024 1514    Eager, E,TB,D,H, VU by AR at 1/2/2024 1329                                         User Key       Initials Effective Dates Name Provider Type Discipline     AR 07/11/23 -  Marina Aguayo OT Occupational Therapist OT    MR 09/22/22 -  Sammie Matt OT Occupational Therapist OT                  OT Recommendation and Plan     Plan of Care Review  Plan of Care Reviewed With: patient  Progress: improving  Outcome Evaluation: Pt demonstrating improvements w/ activity tolerance and endurance. Pt demonstrating good carryover and understanding w/ AE for increased I w/ ADLs. Continue to progress as able per current POC.     Time Calculation:         Time Calculation- OT       Row Name 01/05/24 1514             Time Calculation- OT    OT Start Time 1402  -MR      OT Received On 01/05/24  -MR         Timed Charges    98326 - OT Therapeutic Activity Minutes 2  -MR      74918 - OT Self Care/Mgmt Minutes 10  -MR         Total Minutes    Timed Charges Total Minutes 12  -MR       Total Minutes 12  -MR                User Key  (r) = Recorded By, (t) = Taken By, (c) = Cosigned By      Initials Name Provider Type     Sammie Matt OT Occupational Therapist                  Therapy Charges for Today       Code Description Service Date Service Provider Modifiers Qty    [id number] HC OT SELF CARE/MGMT/TRAIN EA 15 MIN 1/5/2024 Sammie Matt OT GO 1                 Sammie Matt OT  1/5/2024

## 2024-01-05 NOTE — PLAN OF CARE
Goal Outcome Evaluation:  Plan of Care Reviewed With: patient        Progress: improving  Outcome Evaluation: Pt demonstrating improvements w/ activity tolerance. Pt demonstrating good carryover and understanding w/ AE for increased I w/ ADLs. Continue to progress as able per current POC.      Anticipated Discharge Disposition (OT): home with home health

## 2024-01-06 ENCOUNTER — APPOINTMENT (OUTPATIENT)
Dept: CARDIOLOGY | Facility: HOSPITAL | Age: 64
End: 2024-01-06
Payer: MEDICARE

## 2024-01-06 LAB
ALBUMIN SERPL-MCNC: 3.5 G/DL (ref 3.5–5.2)
ALBUMIN/GLOB SERPL: 1.3 G/DL
ALP SERPL-CCNC: 212 U/L (ref 39–117)
ALT SERPL W P-5'-P-CCNC: 31 U/L (ref 1–33)
ANION GAP SERPL CALCULATED.3IONS-SCNC: 14 MMOL/L (ref 5–15)
AST SERPL-CCNC: 36 U/L (ref 1–32)
BASOPHILS # BLD AUTO: 0.03 10*3/MM3 (ref 0–0.2)
BASOPHILS NFR BLD AUTO: 0.5 % (ref 0–1.5)
BH CV ECHO MEAS - AO MAX PG: 14.8 MMHG
BH CV ECHO MEAS - AO MEAN PG: 7.8 MMHG
BH CV ECHO MEAS - AO ROOT DIAM: 2.3 CM
BH CV ECHO MEAS - AO V2 MAX: 192.3 CM/SEC
BH CV ECHO MEAS - AO V2 VTI: 36.5 CM
BH CV ECHO MEAS - AVA(I,D): 1.85 CM2
BH CV ECHO MEAS - EDV(CUBED): 85.2 ML
BH CV ECHO MEAS - EDV(MOD-SP2): 105 ML
BH CV ECHO MEAS - EDV(MOD-SP4): 94.9 ML
BH CV ECHO MEAS - EF(MOD-BP): 48.2 %
BH CV ECHO MEAS - EF(MOD-SP2): 47.4 %
BH CV ECHO MEAS - EF(MOD-SP4): 47.9 %
BH CV ECHO MEAS - ESV(CUBED): 29.8 ML
BH CV ECHO MEAS - ESV(MOD-SP2): 55.2 ML
BH CV ECHO MEAS - ESV(MOD-SP4): 49.4 ML
BH CV ECHO MEAS - FS: 29.5 %
BH CV ECHO MEAS - IVS/LVPW: 0.89 CM
BH CV ECHO MEAS - IVSD: 0.8 CM
BH CV ECHO MEAS - LA DIMENSION: 4.1 CM
BH CV ECHO MEAS - LAT PEAK E' VEL: 8.8 CM/SEC
BH CV ECHO MEAS - LV MASS(C)D: 118.6 GRAMS
BH CV ECHO MEAS - LV MAX PG: 5.2 MMHG
BH CV ECHO MEAS - LV MEAN PG: 3 MMHG
BH CV ECHO MEAS - LV V1 MAX: 114 CM/SEC
BH CV ECHO MEAS - LV V1 VTI: 21.5 CM
BH CV ECHO MEAS - LVIDD: 4.4 CM
BH CV ECHO MEAS - LVIDS: 3.1 CM
BH CV ECHO MEAS - LVOT AREA: 3.1 CM2
BH CV ECHO MEAS - LVOT DIAM: 2 CM
BH CV ECHO MEAS - LVPWD: 0.9 CM
BH CV ECHO MEAS - MED PEAK E' VEL: 7.7 CM/SEC
BH CV ECHO MEAS - MV A MAX VEL: 107 CM/SEC
BH CV ECHO MEAS - MV DEC SLOPE: 997 CM/SEC2
BH CV ECHO MEAS - MV DEC TIME: 0.21 SEC
BH CV ECHO MEAS - MV E MAX VEL: 179 CM/SEC
BH CV ECHO MEAS - MV E/A: 1.67
BH CV ECHO MEAS - MV MAX PG: 19.5 MMHG
BH CV ECHO MEAS - MV MEAN PG: 7 MMHG
BH CV ECHO MEAS - MV P1/2T: 62.9 MSEC
BH CV ECHO MEAS - MV V2 VTI: 51.3 CM
BH CV ECHO MEAS - MVA(P1/2T): 3.5 CM2
BH CV ECHO MEAS - MVA(VTI): 1.32 CM2
BH CV ECHO MEAS - PA ACC TIME: 0.14 SEC
BH CV ECHO MEAS - PA V2 MAX: 130 CM/SEC
BH CV ECHO MEAS - PI END-D VEL: 125 CM/SEC
BH CV ECHO MEAS - RAP SYSTOLE: 15 MMHG
BH CV ECHO MEAS - RVSP: 88 MMHG
BH CV ECHO MEAS - SV(LVOT): 67.5 ML
BH CV ECHO MEAS - SV(MOD-SP2): 49.8 ML
BH CV ECHO MEAS - SV(MOD-SP4): 45.5 ML
BH CV ECHO MEAS - TAPSE (>1.6): 1.47 CM
BH CV ECHO MEAS - TR MAX PG: 72.6 MMHG
BH CV ECHO MEAS - TR MAX VEL: 426 CM/SEC
BH CV ECHO MEASUREMENTS AVERAGE E/E' RATIO: 21.7
BH CV XLRA - RV BASE: 4.7 CM
BH CV XLRA - RV LENGTH: 7.5 CM
BH CV XLRA - RV MID: 3.4 CM
BH CV XLRA - TDI S': 10.4 CM/SEC
BILIRUB SERPL-MCNC: 0.3 MG/DL (ref 0–1.2)
BUN SERPL-MCNC: 37 MG/DL (ref 8–23)
BUN/CREAT SERPL: 9.7 (ref 7–25)
CALCIUM SPEC-SCNC: 8.3 MG/DL (ref 8.6–10.5)
CHLORIDE SERPL-SCNC: 105 MMOL/L (ref 98–107)
CO2 SERPL-SCNC: 18 MMOL/L (ref 22–29)
CREAT SERPL-MCNC: 3.8 MG/DL (ref 0.57–1)
DEPRECATED RDW RBC AUTO: 48.6 FL (ref 37–54)
EGFRCR SERPLBLD CKD-EPI 2021: 12.9 ML/MIN/1.73
EOSINOPHIL # BLD AUTO: 0.06 10*3/MM3 (ref 0–0.4)
EOSINOPHIL NFR BLD AUTO: 1 % (ref 0.3–6.2)
ERYTHROCYTE [DISTWIDTH] IN BLOOD BY AUTOMATED COUNT: 15.6 % (ref 12.3–15.4)
FERRITIN SERPL-MCNC: 1188 NG/ML (ref 13–150)
GAMMA INTERFERON BACKGROUND BLD IA-ACNC: 2.4 IU/ML
GLOBULIN UR ELPH-MCNC: 2.7 GM/DL
GLUCOSE BLDC GLUCOMTR-MCNC: 139 MG/DL (ref 70–130)
GLUCOSE BLDC GLUCOMTR-MCNC: 146 MG/DL (ref 70–130)
GLUCOSE BLDC GLUCOMTR-MCNC: 180 MG/DL (ref 70–130)
GLUCOSE BLDC GLUCOMTR-MCNC: 207 MG/DL (ref 70–130)
GLUCOSE SERPL-MCNC: 145 MG/DL (ref 65–99)
HCT VFR BLD AUTO: 23 % (ref 34–46.6)
HGB BLD-MCNC: 7.3 G/DL (ref 12–15.9)
IMM GRANULOCYTES # BLD AUTO: 0.17 10*3/MM3 (ref 0–0.05)
IMM GRANULOCYTES NFR BLD AUTO: 2.9 % (ref 0–0.5)
INR PPP: 1.48 (ref 0.89–1.12)
IRON 24H UR-MRATE: 15 MCG/DL (ref 37–145)
IRON SATN MFR SERPL: 5 % (ref 20–50)
LEFT ATRIUM VOLUME INDEX: 38 ML/M2
LYMPHOCYTES # BLD AUTO: 1.18 10*3/MM3 (ref 0.7–3.1)
LYMPHOCYTES NFR BLD AUTO: 20 % (ref 19.6–45.3)
M TB IFN-G BLD-IMP: NEGATIVE
M TB IFN-G CD4+ T-CELLS BLD-ACNC: 2.3 IU/ML
M TBIFN-G CD4+ CD8+T-CELLS BLD-ACNC: 2.38 IU/ML
MCH RBC QN AUTO: 27.1 PG (ref 26.6–33)
MCHC RBC AUTO-ENTMCNC: 31.7 G/DL (ref 31.5–35.7)
MCV RBC AUTO: 85.5 FL (ref 79–97)
MITOGEN IGNF BLD-ACNC: >10 IU/ML
MONOCYTES # BLD AUTO: 0.26 10*3/MM3 (ref 0.1–0.9)
MONOCYTES NFR BLD AUTO: 4.4 % (ref 5–12)
NEUTROPHILS NFR BLD AUTO: 4.19 10*3/MM3 (ref 1.7–7)
NEUTROPHILS NFR BLD AUTO: 71.2 % (ref 42.7–76)
NRBC BLD AUTO-RTO: 0 /100 WBC (ref 0–0.2)
PLATELET # BLD AUTO: 263 10*3/MM3 (ref 140–450)
PMV BLD AUTO: 9.7 FL (ref 6–12)
POTASSIUM SERPL-SCNC: 5.2 MMOL/L (ref 3.5–5.2)
PROT SERPL-MCNC: 6.2 G/DL (ref 6–8.5)
PROTHROMBIN TIME: 18 SECONDS (ref 12.2–14.5)
QUANTIFERON INCUBATION: NORMAL
RBC # BLD AUTO: 2.69 10*6/MM3 (ref 3.77–5.28)
RETICS # AUTO: 0.03 10*6/MM3 (ref 0.02–0.13)
RETICS/RBC NFR AUTO: 1.05 % (ref 0.7–1.9)
SERVICE CMNT-IMP: NORMAL
SODIUM SERPL-SCNC: 137 MMOL/L (ref 136–145)
TIBC SERPL-MCNC: 279 MCG/DL (ref 298–536)
TRANSFERRIN SERPL-MCNC: 187 MG/DL (ref 200–360)
UFH PPP CHRO-ACNC: 0.35 IU/ML (ref 0.3–0.7)
UFH PPP CHRO-ACNC: 0.36 IU/ML (ref 0.3–0.7)
WBC NRBC COR # BLD AUTO: 5.89 10*3/MM3 (ref 3.4–10.8)

## 2024-01-06 PROCEDURE — 85025 COMPLETE CBC W/AUTO DIFF WBC: CPT | Performed by: INTERNAL MEDICINE

## 2024-01-06 PROCEDURE — 99232 SBSQ HOSP IP/OBS MODERATE 35: CPT | Performed by: INTERNAL MEDICINE

## 2024-01-06 PROCEDURE — 80053 COMPREHEN METABOLIC PANEL: CPT | Performed by: STUDENT IN AN ORGANIZED HEALTH CARE EDUCATION/TRAINING PROGRAM

## 2024-01-06 PROCEDURE — 63710000001 INSULIN DETEMIR PER 5 UNITS: Performed by: STUDENT IN AN ORGANIZED HEALTH CARE EDUCATION/TRAINING PROGRAM

## 2024-01-06 PROCEDURE — 84466 ASSAY OF TRANSFERRIN: CPT | Performed by: INTERNAL MEDICINE

## 2024-01-06 PROCEDURE — 25010000002 CEFTRIAXONE PER 250 MG: Performed by: INTERNAL MEDICINE

## 2024-01-06 PROCEDURE — 94640 AIRWAY INHALATION TREATMENT: CPT

## 2024-01-06 PROCEDURE — 82728 ASSAY OF FERRITIN: CPT | Performed by: INTERNAL MEDICINE

## 2024-01-06 PROCEDURE — 85610 PROTHROMBIN TIME: CPT

## 2024-01-06 PROCEDURE — 63710000001 INSULIN LISPRO (HUMAN) PER 5 UNITS: Performed by: STUDENT IN AN ORGANIZED HEALTH CARE EDUCATION/TRAINING PROGRAM

## 2024-01-06 PROCEDURE — 94799 UNLISTED PULMONARY SVC/PX: CPT

## 2024-01-06 PROCEDURE — 83540 ASSAY OF IRON: CPT | Performed by: INTERNAL MEDICINE

## 2024-01-06 PROCEDURE — 25010000002 HEPARIN (PORCINE) 25000-0.45 UT/250ML-% SOLUTION

## 2024-01-06 PROCEDURE — 87040 BLOOD CULTURE FOR BACTERIA: CPT | Performed by: NURSE PRACTITIONER

## 2024-01-06 PROCEDURE — 85520 HEPARIN ASSAY: CPT

## 2024-01-06 PROCEDURE — 63710000001 INSULIN LISPRO (HUMAN) PER 5 UNITS: Performed by: INTERNAL MEDICINE

## 2024-01-06 PROCEDURE — 94664 DEMO&/EVAL PT USE INHALER: CPT

## 2024-01-06 PROCEDURE — 85045 AUTOMATED RETICULOCYTE COUNT: CPT | Performed by: INTERNAL MEDICINE

## 2024-01-06 PROCEDURE — 93306 TTE W/DOPPLER COMPLETE: CPT

## 2024-01-06 PROCEDURE — 82948 REAGENT STRIP/BLOOD GLUCOSE: CPT

## 2024-01-06 RX ORDER — AMOXICILLIN 250 MG
1 CAPSULE ORAL 2 TIMES DAILY
Status: DISCONTINUED | OUTPATIENT
Start: 2024-01-06 | End: 2024-01-15 | Stop reason: HOSPADM

## 2024-01-06 RX ORDER — POLYETHYLENE GLYCOL 3350 17 G/17G
17 POWDER, FOR SOLUTION ORAL DAILY
Status: DISCONTINUED | OUTPATIENT
Start: 2024-01-06 | End: 2024-01-15 | Stop reason: HOSPADM

## 2024-01-06 RX ADMIN — INSULIN LISPRO 3 UNITS: 100 INJECTION, SOLUTION INTRAVENOUS; SUBCUTANEOUS at 08:43

## 2024-01-06 RX ADMIN — INSULIN DETEMIR 5 UNITS: 100 INJECTION, SOLUTION SUBCUTANEOUS at 20:10

## 2024-01-06 RX ADMIN — FERROUS SULFATE TAB 325 MG (65 MG ELEMENTAL FE) 325 MG: 325 (65 FE) TAB at 08:43

## 2024-01-06 RX ADMIN — INSULIN LISPRO 3 UNITS: 100 INJECTION, SOLUTION INTRAVENOUS; SUBCUTANEOUS at 17:48

## 2024-01-06 RX ADMIN — SODIUM BICARBONATE 650 MG TABLET 1300 MG: at 08:43

## 2024-01-06 RX ADMIN — SODIUM BICARBONATE 650 MG TABLET 1300 MG: at 16:08

## 2024-01-06 RX ADMIN — ALBUTEROL SULFATE 2.5 MG: 2.5 SOLUTION RESPIRATORY (INHALATION) at 16:37

## 2024-01-06 RX ADMIN — ACETAMINOPHEN 650 MG: 325 TABLET ORAL at 03:13

## 2024-01-06 RX ADMIN — OXYCODONE HYDROCHLORIDE AND ACETAMINOPHEN 1 TABLET: 7.5; 325 TABLET ORAL at 08:51

## 2024-01-06 RX ADMIN — ESCITALOPRAM OXALATE 20 MG: 20 TABLET, FILM COATED ORAL at 08:43

## 2024-01-06 RX ADMIN — INSULIN LISPRO 2 UNITS: 100 INJECTION, SOLUTION INTRAVENOUS; SUBCUTANEOUS at 12:45

## 2024-01-06 RX ADMIN — INSULIN LISPRO 3 UNITS: 100 INJECTION, SOLUTION INTRAVENOUS; SUBCUTANEOUS at 12:45

## 2024-01-06 RX ADMIN — OXYCODONE HYDROCHLORIDE AND ACETAMINOPHEN 1 TABLET: 7.5; 325 TABLET ORAL at 20:10

## 2024-01-06 RX ADMIN — OXYBUTYNIN CHLORIDE 5 MG: 5 TABLET, FILM COATED, EXTENDED RELEASE ORAL at 08:43

## 2024-01-06 RX ADMIN — Medication 5 MG: at 20:10

## 2024-01-06 RX ADMIN — HEPARIN SODIUM 15 UNITS/KG/HR: 10000 INJECTION, SOLUTION INTRAVENOUS at 05:43

## 2024-01-06 RX ADMIN — CEFTRIAXONE 2000 MG: 2 INJECTION, POWDER, FOR SOLUTION INTRAMUSCULAR; INTRAVENOUS at 12:46

## 2024-01-06 RX ADMIN — PANTOPRAZOLE SODIUM 40 MG: 40 TABLET, DELAYED RELEASE ORAL at 05:42

## 2024-01-06 RX ADMIN — SODIUM BICARBONATE 650 MG TABLET 1300 MG: at 20:10

## 2024-01-06 RX ADMIN — SENNOSIDES AND DOCUSATE SODIUM 1 TABLET: 8.6; 5 TABLET ORAL at 20:10

## 2024-01-06 RX ADMIN — FOLIC ACID TAB 400 MCG 800 MCG: 400 TAB at 08:43

## 2024-01-06 RX ADMIN — POLYETHYLENE GLYCOL 3350 17 G: 17 POWDER, FOR SOLUTION ORAL at 16:08

## 2024-01-06 NOTE — PROGRESS NOTES
HEPARIN INFUSION  Di Lopez is a  63 y.o. female receiving heparin infusion.     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   Patient Weight:  89.9 kg  Initial Bolus (Y/N):  No  Any Bolus (Y/N):  Yes       Signs or Symptoms of Bleeding: No    Cardiac or Other (Not VTE)   Initial Bolus: 60 units/kg (Max 4,000 units)  Initial rate: 12 units/kg/hr (Max 1,000 units/hr)   Anti Xa Rebolus Infusion Hold time Change infusion Dose (Units/kg/hr) Next Anti Xa or aPTT Level Due   < 0.11 50 Units/kg  (4000 Units Max) None Increase by  3 Units/kg/hr 6 hours   0.11- 0.19 25 Units/kg  (2000 Units Max) None Increase by  2 Units/kg/hr 6 hours   0.2 - 0.29 0 None Increase by  1 Units/kg/hr 6 hours   0.3 - 0.5 0 None No Change 6 hours (after 2 consecutive levels in range check qAM)   0.51 - 0.6 0 None Decrease by  1 Units/kg/hr 6 hours   0.61 - 0.8 0 30 Minutes Decrease by  2 Units/kg/hr 6 hours   0.81 - 1 0 60 Minutes Decrease by  3 Units/kg/hr 6 hours   >1 0 Hold  After Anti Xa less than 0.5 decrease previous rate by  4 Units/kg/hr  Every 2 hours until Anti Xa  less than 0.5 then when infusion restarts in 6 hours       Results from last 7 days   Lab Units 01/05/24  0501 01/04/24  0541 01/03/24  0251 01/02/24  0531 01/01/24  2330   INR  1.56*  --   --   --  2.36*   HEMOGLOBIN g/dL 7.5* 7.8* 8.4*   < > 9.5*   HEMATOCRIT % 24.3* 24.5* 25.2*   < > 29.3*   PLATELETS 10*3/mm3 282 265 224   < > 250    < > = values in this interval not displayed.          Date   Time   Anti-Xa Current Rate (Unit/kg/hr) Bolus   (Units) Rate Change   (Unit/kg/hr) New Rate (Unit/kg/hr) Next   Anti-Xa Comments  Pump Check Daily   1/2 0531 0.1 New Start No Bolus -- 11 1100 Discussed w/ nurse  Warfarin patient    1/2 1140 Pump check 11 -- -- 11 now Pump weight and rate verified    1/2 1208 0.15 11 2000 +2 13 2000 D/w Marjan   1/2 2035 0.34 13 -- -- 13 0300 DW JARAD Rousseau   1/3 0251 0.37 13 -- -- 13 0600  1/4/24 Discussed w/  nurse   1/3 1100 PUMP CHECK 13 - -- 13 0600  1/4/23 Pump rate and weight verified   1/4 0541 0.34 13 -- -- 13 0600 DW RN   1/5 0501 0.21 13 -- +1 14 1400 D/w RN   1/5 1040 PUMP CHECK 14 -- -- 14 1400 Pump weight and rate verified    1/5 1640 0.25 14 -- +1 15 0000 DW RN   1/6 0056 0.36 15 -- -- 15 0800 D/w RN   1/6 0737 0.35 15 -- -- 15 0600 D/w JARAD Phillip pump verified                                                 Javier Samuels, PharmD, BCPS  1/6/2024  08:43 EST

## 2024-01-06 NOTE — PROGRESS NOTES
[facility] Medicine Services  PROGRESS NOTE    Patient Name: Di Lopez  : 1960  MRN: [id number]    Date of Admission: 2024  Primary Care Physician: Davina Santiago DO    Subjective   Subjective     CC:  BRBPR, febrile illness f/u    HPI:  Patient reports persistent pain in front of bilateral thighs without progressive worsening x 1 week  No further bowel movements for last 5 days  Continues to feel ill  Some shortness of breath subjectively but lying flat on home 2 liters n/c    Objective   Objective     Vital Signs:   Temp:  [98.2 °F (36.8 °C)-103 °F (39.4 °C)] 98.7 °F (37.1 °C)  Heart Rate:  [] 109  Resp:  [20-22] 22  BP: (119-144)/(49-69) 140/55  Flow (L/min):  [2] 2     Physical Exam:  Constitutional: Awake, alert female lying flat in bed. Uncomfortable appearing  HENT: NCAT, mucous membranes moist  Respiratory: Clear to auscultation bilaterally, respiratory effort normal on 2 liters n/c  Cardiovascular: RRR, soft systolic murmur appreciated  Gastrointestinal: Soft, some nonspecific tenderness right side of abdomen without rebound, nondistended  Musculoskeletal: Muscle tone within normal limits, no joint effusions appreciated  Psychiatric: Appropriate affect, cooperative  Neurologic: Alert and oriented, facial movements symmetric and spontaneous movement of all 4 extremities grossly equal bilaterally, speech clear  Skin: No rashes    Results Reviewed:  LAB RESULTS:      Lab 24  0737 24  0056 24  1640 24  0501 24  0541 24  0251 24  0808 24  0531 24  2330   WBC 5.89  --   --  6.74 5.78 4.18  --  4.26 4.42   HEMOGLOBIN 7.3*  --   --  7.5* 7.8* 8.4*  --  8.5* 9.5*   HEMATOCRIT 23.0*  --   --  24.3* 24.5* 25.2*  --  26.0* 29.3*   PLATELETS 263  --   --  282 265 224  --  228 250   NEUTROS ABS 4.19  --   --   --   --  2.61  --  3.02 3.39   IMMATURE GRANS (ABS) 0.17*  --   --   --   --  0.07*  --  0.05 0.06*    LYMPHS ABS 1.18  --   --   --   --  1.12  --  0.92 0.77   MONOS ABS 0.26  --   --   --   --  0.25  --  0.21 0.17   EOS ABS 0.06  --   --   --   --  0.12  --  0.05 0.02   MCV 85.5  --   --  86.8 86.0 85.7  --  85.8 84.2   SED RATE  --   --  58*  --   --   --   --   --   --    CRP  --   --  5.92*  --   --   --   --   --   --    LACTATE  --   --  1.4  --   --   --   --   --  1.5   PROTIME 18.0*  --   --  18.8*  --   --   --   --  26.0*   APTT  --   --   --   --   --   --   --  66.2  --    HEPARIN ANTI-XA 0.35 0.36 0.25* 0.21* 0.34 0.37   < > 0.10*  --     < > = values in this interval not displayed.         Lab 01/06/24  0737 01/05/24  0501 01/04/24  0541 01/03/24  1654 01/03/24  1153 01/03/24  0737 01/03/24  0251 01/02/24  1144 01/02/24  0808   SODIUM 137 133* 136 132* 133*   < > 131*  131*   < > 129*   POTASSIUM 5.2 5.2 5.1  --   --   --  5.0  --  5.3*   CHLORIDE 105 104 105  --   --   --  99  --  95*   CO2 18.0* 17.0* 17.0*  --   --   --  19.0*  --  19.0*   ANION GAP 14.0 12.0 14.0  --   --   --  13.0  --  15.0   BUN 37* 43* 50*  --   --   --  57*  --  61*   CREATININE 3.80* 4.20* 5.39*  --   --   --  6.72*  --  7.49*   EGFR 12.9* 11.4* 8.5*  --   --   --  6.5*  --  5.7*   GLUCOSE 145* 103* 89  --   --   --  150*  --  152*   CALCIUM 8.3* 7.9* 7.1*  --   --   --  7.1*  --  7.6*   MAGNESIUM  --   --   --   --   --   --   --   --  2.3   PHOSPHORUS  --   --   --   --   --   --   --   --  7.3*   TSH  --   --   --   --   --   --   --   --  1.590    < > = values in this interval not displayed.         Lab 01/06/24  0737 01/05/24  0501 01/04/24  0541 01/03/24  0251 01/02/24  0808 01/01/24  2330   TOTAL PROTEIN 6.2 5.9* 5.2* 5.7* 6.1 7.1   ALBUMIN 3.5 3.5 3.2* 3.2* 3.4* 4.1   GLOBULIN 2.7 2.4 2.0 2.5 2.7 3.0   ALT (SGPT) 31 29 32 37* 41* 45*   AST (SGOT) 36* 28 29 32 37* 34*   BILIRUBIN 0.3 0.2 0.2 0.2 0.3 0.3   ALK PHOS 212* 204* 212* 217* 222* 247*   LIPASE  --   --   --   --   --  82*         Lab 01/06/24  0737  01/05/24  0501 01/01/24  2330   PROTIME 18.0* 18.8* 26.0*   INR 1.48* 1.56* 2.36*             Lab 01/06/24  0737 01/04/24  0541 01/01/24  2358   IRON 15*  --   --    IRON SATURATION (TSAT) 5*  --   --    TIBC 279*  --   --    TRANSFERRIN 187*  --   --    FERRITIN 1,188.00*  --   --    FOLATE  --  >20.00  --    VITAMIN B 12  --  353  --    ABO TYPING  --   --  O   RH TYPING  --   --  Positive   ANTIBODY SCREEN  --   --  Negative         Lab 01/02/24  0047   FIO2 21   CARBOXYHEMOGLOBIN (VENOUS) 1.3     Brief Urine Lab Results  (Last result in the past 365 days)        Color   Clarity   Blood   Leuk Est   Nitrite   Protein   CREAT   Urine HCG        01/02/24 0011             142.2         01/02/24 0011 Yellow   Cloudy   Small (1+)   Negative   Negative   100 mg/dL (2+)                   Microbiology Results Abnormal       Procedure Component Value - Date/Time    Respiratory Panel PCR w/COVID-19(SARS-CoV-2) LUIS/ESTHER/KARSON/PAD/COR/LEONARDO In-House, NP Swab in UTM/VTM, 2 HR TAT - Swab, Nasopharynx [[id number]]  (Normal) Collected: 01/05/24 1634    Lab Status: Final result Specimen: Swab from Nasopharynx Updated: 01/05/24 1804     ADENOVIRUS, PCR Not Detected     Coronavirus 229E Not Detected     Coronavirus HKU1 Not Detected     Coronavirus NL63 Not Detected     Coronavirus OC43 Not Detected     COVID19 Not Detected     Human Metapneumovirus Not Detected     Human Rhinovirus/Enterovirus Not Detected     Influenza A PCR Not Detected     Influenza B PCR Not Detected     Parainfluenza Virus 1 Not Detected     Parainfluenza Virus 2 Not Detected     Parainfluenza Virus 3 Not Detected     Parainfluenza Virus 4 Not Detected     RSV, PCR Not Detected     Bordetella pertussis pcr Not Detected     Bordetella parapertussis PCR Not Detected     Chlamydophila pneumoniae PCR Not Detected     Mycoplasma pneumo by PCR Not Detected    Narrative:      In the setting of a positive respiratory panel with a viral infection PLUS a negative  procalcitonin without other underlying concern for bacterial infection, consider observing off antibiotics or discontinuation of antibiotics and continue supportive care. If the respiratory panel is positive for atypical bacterial infection (Bordetella pertussis, Chlamydophila pneumoniae, or Mycoplasma pneumoniae), consider antibiotic de-escalation to target atypical bacterial infection.    COVID PRE-OP / PRE-PROCEDURE SCREENING ORDER (NO ISOLATION) - Swab, Nasopharynx [[id number]]  (Normal) Collected: 01/01/24 2330    Lab Status: Final result Specimen: Swab from Nasopharynx Updated: 01/02/24 0016    Narrative:      The following orders were created for panel order COVID PRE-OP / PRE-PROCEDURE SCREENING ORDER (NO ISOLATION) - Swab, Nasopharynx.  Procedure                               Abnormality         Status                     ---------                               -----------         ------                     COVID-19, FLU A/B, RSV P...[[id number]]  Normal              Final result                 Please view results for these tests on the individual orders.    COVID-19, FLU A/B, RSV PCR 1 HR TAT - Swab, Nasopharynx [[id number]]  (Normal) Collected: 01/01/24 2330    Lab Status: Final result Specimen: Swab from Nasopharynx Updated: 01/02/24 0016     COVID19 Not Detected     Influenza A PCR Not Detected     Influenza B PCR Not Detected     RSV, PCR Not Detected    Narrative:      Fact sheet for providers: https://www.fda.gov/media/569943/download    Fact sheet for patients: https://www.fda.gov/media/904120/download    Test performed by PCR.            MRI Lumbar Spine Without Contrast    Result Date: 1/5/2024  MRI LUMBAR SPINE WO CONTRAST Date of Exam: 1/5/2024 7:30 PM EST Indication: Low back pain, symptoms persist with > 6 wks treatment patient with possible metallic implant?.  Comparison: CT 7/16/2013. Radiograph 4/20/2022. Technique:  Routine multiplanar/multisequence sequence images of the lumbar spine were  obtained without contrast administration.  Findings: The vertebral body heights are maintained. Grade 1 retrolisthesis of L5 on S1. Otherwise overall alignment is maintained. Mild T2/T1 hyperintensity within the endplates at L5-S1, most likely representing Modic type III changes. No suspicious marrow edema. Mild diffuse hypodensity within the visualized marrow, nonspecific but may represent underlying hematopoiesis. The visualized spinal cord is unremarkable. The conus terminates at L1. Although evaluation is limited, no definite fluid collection is noted within the spinal canal. The paravertebral soft tissues are unremarkable. The visualized intra-abdominal and intrapelvic soft tissues are unremarkable. Mild loss of intervertebral disc base height throughout the visualized spine and moderate to severe loss of intervertebral disc base height at L5-S1. T12-L1: No significant spinal canal stenosis or neuroforaminal narrowing. L1-L2: No significant spinal canal stenosis or neuroforaminal narrowing. L2-L3: No significant spinal canal stenosis or neuroforaminal narrowing. L3-4: No significant spinal canal stenosis or neuroforaminal narrowing. L4-L5: Mild posterior broad-based disc bulge, ligamentum flavum thickening and bilateral facet arthrosis causes mild to moderate bilateral neuroforaminal narrowing without significant spinal canal stenosis. L5-S1: Grade 1 retrolisthesis, broad-based disc bulge and possible superimposed small disc herniation. There is also bilateral facet arthrosis and ligamentum flavum thickening. Overall this causes mild spinal canal stenosis and moderate to severe bilateral neuroforaminal narrowing, worse on the right.     Impression: Impression: Multilevel degenerative changes as characterized above, most significantly at L5-S1. No definite acute abnormality. Electronically Signed: Devin Johnson DO  1/5/2024 8:11 PM EST  Workstation ID: [id number]     Results for orders placed during the  hospital encounter of 05/31/23    Adult Transthoracic Echo Complete W/ Cont if Necessary Per Protocol    Interpretation Summary    Mildly reduced right ventricular systolic function noted.    There is calcification of the aortic valve.    Estimated right ventricular systolic pressure from tricuspid regurgitation is normal (<35 mmHg).    Mild global LV systolic dysfunction    EF=45-50%    Normal prosthetic MV function    AV calcification      Current medications:  Scheduled Meds:cefTRIAXone, 2,000 mg, Intravenous, Q24H  escitalopram, 20 mg, Oral, Daily  ferrous sulfate, 325 mg, Oral, Daily With Breakfast  folic acid, 800 mcg, Oral, Daily  insulin detemir, 5 Units, Subcutaneous, Nightly  insulin lispro, 2-7 Units, Subcutaneous, 4x Daily AC & at Bedtime  Insulin Lispro, 3 Units, Subcutaneous, TID With Meals  oxybutynin XL, 5 mg, Oral, Daily  pantoprazole, 40 mg, Oral, Q AM  polyethylene glycol, 17 g, Oral, Daily  senna-docusate sodium, 1 tablet, Oral, BID  sodium bicarbonate, 1,300 mg, Oral, TID  sodium chloride, 10 mL, Intravenous, Q12H      Continuous Infusions:heparin, 15 Units/kg/hr, Last Rate: 15 Units/kg/hr (01/06/24 0543)  Pharmacy to Dose Heparin,       PRN Meds:.  acetaminophen    albuterol    dextrose    dextrose    glucagon (human recombinant)    melatonin    naloxone    nitroglycerin    oxyCODONE-acetaminophen    Pharmacy to Dose Heparin    sodium chloride    sodium chloride    Assessment & Plan   Assessment & Plan     Active Hospital Problems    Diagnosis  POA    **Renal failure [N19]  Yes    Hyponatremia [E87.1]  Yes    Hyperkalemia [E87.5]  Yes    Type 2 diabetes mellitus with hyperglycemia [E11.65]  Yes    Elevated LFTs [R79.89]  Yes    Anemia, chronic disease [D63.8]  Yes    Cervical lymphadenopathy [R59.0]  Yes    GI bleed [K92.2]  Yes    H/O heart valve replacement with mechanical valve [Z95.2]  Not Applicable    ARF (acute renal failure) [N17.9]  Yes    Left bundle branch hemiblock [I44.60]  Yes     Type 2 diabetes mellitus without complication, with long-term current use of insulin [E11.9, Z79.4]  Not Applicable    Dyslipidemia [E78.5]  Yes    Emphysema/COPD [J43.9]  Yes    Hypertension [I10]  Yes    Arteriovenous malformation of gastrointestinal tract [K55.20]  Yes    Obstructive sleep apnea syndrome [G47.33]  Yes    Chronic anticoagulation [Z79.01]  Not Applicable      Resolved Hospital Problems   No resolved problems to display.        Brief Hospital Course to date:  Di Lopez is a 63 y.o. 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 Found to have severe MEERA, resolution of BRBPR, persistent fever, chills and back pain    Acute Renal Failure 2/2 hypovolemia + med effect - improved  -2/2 acute diarrheal illness + entresto, NSAIDs  -improving w IVF, do suspect some ATN component and expect slow recovery  -nephrology following    Hypovolemic hyponatremia - improved  -IVF per nephrology as above    Acute anemia 2/2 bloody diarrheal illness - blood diarrhea resolved  -no further bowel movement since admission, suspect further decline is 2/2 prior hemoconcentration and now IVF resuscitation  -elev ferritin, retic pending  -bowel regimen    Febrile illness  -negative full viral panel, negative monospot  -continue to suspect viral illness (throat soreness, fevers, back pain/aches, etc) but bacterial endocarditis is concern in patient w [facility] heart valve. Could also consider diskitis given back pain sx but MRI withOUT contrast somewhat reassuring  -f/u blood cultures, MEAGAN. ID following and on ceftriaxone for now    Acute on chronic back pain - MRI withOUT contrast w only chronic degen changes    Mechanical mitral valve on coumadin - heparin gtt in setting of severe MEERA, pharmacy monitoring    CHFpEF (EF 45-50%), s/p ICD - appears euvolemic currently  Chronic hypoxic resp failure 2/2 COPD - on home 2  liters n/c  DMII - basal/bolus regimen  Anxiety/depression  BMI 38  Liver mass s/p embolization    Expected Discharge Location and Transportation: home  Expected Discharge 1/11 (Discharge date is tentative pending patient's medical condition and is subject to change)  Expected Discharge Date: 1/11/2024; Expected Discharge Time:      DVT prophylaxis:  Medical DVT prophylaxis orders are present.     AM-PAC 6 Clicks Score (PT): 22 (01/06/24 1921)    CODE STATUS:   Code Status and Medical Interventions:   Ordered at: 01/02/24 9091     Code Status (Patient has no pulse and is not breathing):    CPR (Attempt to Resuscitate)     Medical Interventions (Patient has pulse or is breathing):    Full Support       Rachel Gloria MD  01/06/24

## 2024-01-06 NOTE — PLAN OF CARE
AOx4, 2LNC, still complains of numbness and intermittent mild pain in right hip/thigh area, VSS- other than fever spiked again during this shift- tylenol given-see mar. Heparin drip still infusing-see mar. MRI completed at start of this shift. Will ctm.       Problem: Adult Inpatient Plan of Care  Goal: Plan of Care Review  Outcome: Ongoing, Progressing  Goal: Patient-Specific Goal (Individualized)  Outcome: Ongoing, Progressing  Goal: Absence of Hospital-Acquired Illness or Injury  Outcome: Ongoing, Progressing  Intervention: Identify and Manage Fall Risk  Recent Flowsheet Documentation  Taken 1/6/2024 0400 by Ann De La O RN  Safety Promotion/Fall Prevention:   activity supervised   assistive device/personal items within reach   clutter free environment maintained   fall prevention program maintained   nonskid shoes/slippers when out of bed   safety round/check completed   room organization consistent   gait belt   lighting adjusted  Taken 1/6/2024 0000 by Ann De La O RN  Safety Promotion/Fall Prevention:   activity supervised   assistive device/personal items within reach   clutter free environment maintained   fall prevention program maintained   nonskid shoes/slippers when out of bed   safety round/check completed   room organization consistent  Taken 1/5/2024 2015 by Ann De La O RN  Safety Promotion/Fall Prevention:   activity supervised   assistive device/personal items within reach   clutter free environment maintained   fall prevention program maintained   nonskid shoes/slippers when out of bed   safety round/check completed   room organization consistent   lighting adjusted  Intervention: Prevent Skin Injury  Recent Flowsheet Documentation  Taken 1/6/2024 0400 by Ann De La O, RN  Body Position:   position changed independently   neutral body alignment   neutral head position  Skin Protection:   adhesive use limited   tubing/devices free from skin contact   transparent dressing  maintained   pulse oximeter probe site changed  Taken 1/6/2024 0000 by Ann De La O RN  Body Position:   position changed independently   neutral body alignment   neutral head position  Skin Protection:   adhesive use limited   incontinence pads utilized   tubing/devices free from skin contact   transparent dressing maintained   pulse oximeter probe site changed  Taken 1/5/2024 2015 by Ann De La O RN  Body Position:   position changed independently   sitting up in bed  Skin Protection:   adhesive use limited   tubing/devices free from skin contact   transparent dressing maintained  Intervention: Prevent and Manage VTE (Venous Thromboembolism) Risk  Recent Flowsheet Documentation  Taken 1/6/2024 0400 by Ann De La O RN  VTE Prevention/Management:   sequential compression devices off   patient refused intervention  Taken 1/6/2024 0300 by Ann De La O RN  Activity Management:   ambulated to bathroom   back to bed  Taken 1/6/2024 0000 by Ann De La O RN  VTE Prevention/Management:   sequential compression devices off   patient refused intervention  Taken 1/5/2024 2300 by Ann De La O RN  Activity Management:   ambulated to bathroom   back to bed  Taken 1/5/2024 2015 by Ann De La O RN  Activity Management:   ambulated to bathroom   back to bed  VTE Prevention/Management:   sequential compression devices off   patient refused intervention  Intervention: Prevent Infection  Recent Flowsheet Documentation  Taken 1/6/2024 0400 by Ann De La O RN  Infection Prevention:   hand hygiene promoted   rest/sleep promoted   single patient room provided   equipment surfaces disinfected  Taken 1/6/2024 0000 by Ann De La O RN  Infection Prevention:   hand hygiene promoted   rest/sleep promoted   single patient room provided  Taken 1/5/2024 2015 by Ann De La O RN  Infection Prevention:   hand hygiene promoted   rest/sleep promoted   single patient room  provided  Goal: Optimal Comfort and Wellbeing  Outcome: Ongoing, Progressing  Intervention: Monitor Pain and Promote Comfort  Recent Flowsheet Documentation  Taken 1/5/2024 2015 by Ann De La O, RN  Pain Management Interventions:   pillow support provided   position adjusted  Intervention: Provide Person-Centered Care  Recent Flowsheet Documentation  Taken 1/5/2024 2015 by Ann De La O, RN  Trust Relationship/Rapport:   care explained   choices provided   empathic listening provided   questions encouraged   reassurance provided   thoughts/feelings acknowledged  Goal: Readiness for Transition of Care  Outcome: Ongoing, Progressing   Goal Outcome Evaluation:

## 2024-01-06 NOTE — PLAN OF CARE
Goal Outcome Evaluation:  Plan of Care Reviewed With: patient        Progress: no change  Outcome Evaluation: Remains paced on the monitor. Heparin drip continues to infuse at 15 units per kg per hour. Using O2. Expiratory wheezes noted this afternoon. Respiratory called to give prn treatment. Echo completed today. Ambulates to the bathroom , gets short of air when up without O2. Due labs for the morning. Will monitor.

## 2024-01-06 NOTE — PROGRESS NOTES
No recurrence of rectal bleeding.  Hemoglobin stable at approximately 7.5 g/dL.  Continues to have fevers.  ID is following.    Will sign off.  Please call with questions or concerns.

## 2024-01-06 NOTE — PROGRESS NOTES
"   LOS: 4 days    Patient Care Team:  Davina Santiago DO as PCP - General (Family Medicine)  Ameena Iglesias MD as Consulting Physician (Endocrinology)  Nakita Freire APRN as Nurse Practitioner (Nurse Practitioner)  Raimundo Thomas MD as Consulting Physician (Pulmonary Disease)  Nadir Everett MD as Consulting Physician (Cardiology)  Luiz Longoria MD as Consulting Physician (Pain Medicine)    Subjective     Stable overnight.      Objective     Vital Signs:  Blood pressure 140/55, pulse 109, temperature 98.7 °F (37.1 °C), temperature source Oral, resp. rate 22, height 152.4 cm (60\"), weight 89.9 kg (198 lb 3.2 oz), last menstrual period 06/17/1998, SpO2 93%, not currently breastfeeding.      Intake/Output Summary (Last 24 hours) at 1/6/2024 1323  Last data filed at 1/6/2024 1056  Gross per 24 hour   Intake 810 ml   Output 1900 ml   Net -1090 ml        01/05 0701 - 01/06 0700  In: 1050 [P.O.:1050]  Out: 2000 [Urine:2000]    Physical Exam:        GENERAL: WD AAF NAD  NEURO: Awake and alert, oriented. No focal deficit  PSYCHIATRIC: NMA. Cooperative with PE  EYE: PE, no icterus, no conjunctivitis  ENT: ommm, dentition intact,  Hearing intact  NECK: Supple , No JVD discernable,  Trachea midline  CV: Trace edema, RRR  LUNGS:  Quiet,  Nonlabored resp.  Symmetrical expansion  ABDOMEN: Nondistended, soft nontender.  : No Perdomo, no palp bladder  SKIN: Warm and dry without rash      Labs:  Results from last 7 days   Lab Units 01/06/24  0737 01/05/24  0501 01/04/24  0541   WBC 10*3/mm3 5.89 6.74 5.78   HEMOGLOBIN g/dL 7.3* 7.5* 7.8*   PLATELETS 10*3/mm3 263 282 265     Results from last 7 days   Lab Units 01/06/24  0737 01/05/24  0501 01/04/24  0541 01/03/24  1654 01/03/24  0737 01/03/24  0251 01/02/24  1144 01/02/24  0808   SODIUM mmol/L 137 133* 136 132*   < > 131*  131*   < > 129*   POTASSIUM mmol/L 5.2 5.2 5.1  --   --  5.0  --  5.3*   CHLORIDE mmol/L 105 104 105  --   --  99  --  95*   CO2 mmol/L " 18.0* 17.0* 17.0*  --   --  19.0*  --  19.0*   BUN mg/dL 37* 43* 50*  --   --  57*  --  61*   CREATININE mg/dL 3.80* 4.20* 5.39*  --   --  6.72*  --  7.49*   CALCIUM mg/dL 8.3* 7.9* 7.1*  --   --  7.1*  --  7.6*   PHOSPHORUS mg/dL  --   --   --   --   --   --   --  7.3*   MAGNESIUM mg/dL  --   --   --   --   --   --   --  2.3   ALBUMIN g/dL 3.5 3.5 3.2*  --   --  3.2*  --  3.4*    < > = values in this interval not displayed.     Results from last 7 days   Lab Units 01/06/24  0737   ALK PHOS U/L 212*   BILIRUBIN mg/dL 0.3   ALT (SGPT) U/L 31   AST (SGOT) U/L 36*                   Estimated Creatinine Clearance: 15.3 mL/min (A) (by C-G formula based on SCr of 3.8 mg/dL (H)).         A/P:    ARF: Creatinine continues to improve.  Urine output nonoliguric with negative volume.  Creatinine up to 8 on presentation with likely dehydration due to diarrhea with poor p.o. intake in the setting of NSAIDs and Entresto.  Urine sodium 20 consistent with prerenal azotemia.  Renal function improving with hydration.  For now continue supportive care.  Strict I's and O's.  Monitor for further renal recovery.    HTN: Blood pressure stable.    Hyponatremia: Sod improving.  ium borderline.  Initially down to 129 on presentation due to hypovolemia and MEERA.  All fluids isotonic     Hyperkalemia: Stable.  Due to MEERA.     Met Aciodsis: Improved on p.o. bicarb.  Due to renal failure and underlying GI losses with diarrhea.       Anemia.  Hemoglobin below goal.  Hx of supra therapeutic IRN w rectal bleeding in the past. Admitted again with concern for rectal bleed.  Check iron studies.  Transfuse as needed for Hgb <7.    Volume status: Negative overnight.  Likely dehydrated on admission.  Improving with hydration.  Avoid volume overload with underlying CHF.  IV fluids held overnight.  Continue strict I's and O's.     Hx of mechanical valve.  On anticoagulation     CHF: EF 40-45%. Hx of MV and depressed RV function. On beta blocker and lasix.  Not on ACEi/ARB/MRA/ or SGLT-2inh for now.    High risk and complexity patient.    Justin Rodriguez MD  01/06/24  13:23 EST

## 2024-01-06 NOTE — PROGRESS NOTES
"  INFECTIOUS DISEASES  INPATIENT PROGRESS NOTE  2024      PATIENT NAME: Di Lopez  :  1960  MRN:  [id number]  Date of Admission:  2024      Antimicrobials:  none      MAR reviewed.  Pertinent other medications include:  heparin gtt    Chief Complaint   Patient presents with    Rectal Bleeding       Reason for consultation:  fevers    Interval history:  Patient had sweats overnight and fever spiked to 103F.  Cervical adenopathy/pain improved.  States she had a broken right mandibular tooth a couple of months ago and painful left maxillary tooth at prior root canal.  Resp PCR panel negative and monospot negative.  MRI L-spine with degenerative changes; no acute changes.    ROS:  No new rashes.  No SOB or chest pain.  No nausea/vomiting/diarrhea.     Objective:  Temp (24hrs), Av.8 °F (37.7 °C), Min:98.2 °F (36.8 °C), Max:103 °F (39.4 °C)    /55 (BP Location: Right arm, Patient Position: Sitting)   Pulse 109   Temp 98.7 °F (37.1 °C) (Oral)   Resp 22   Ht 152.4 cm (60\")   Wt 89.9 kg (198 lb 3.2 oz)   LMP 1998   SpO2 93%   BMI 38.71 kg/m²     Physical Examination:  GENERAL: Chronically ill-appearing female.  Awake and alert, in no acute distress.   HEENT: broken right mandibular tooth with necrosis.  NECK: Supple without nuchal rigidity.  No thyromegaly.  LYMPH: Shotty tender cervical adenopathy, resolving.  CV: RRR. +faint murmur and mechanical click.  ICD pocket without TTP.  LUNGS: Prior wheezing/crackles resolved.  Good air movement.  ABDOMEN: Positive bowel sounds.  Soft, nontender, nondistended.  EXT:  No edema.  MSK: No joint effusions or inflammation noted.  SKIN: Warm and dry without rash or ulcer.   NEURO: A&Ox4. No focal deficits.  Face symmetric.  Speech fluent.  Moves all extremities well.   PSYCHIATRIC: Normal insight and judgement.  Cooperative.  Normal affect.    Laboratory Data:    Results from last 7 days   Lab Units 24  0737 24  0501 " 01/04/24  0541   WBC 10*3/mm3 5.89 6.74 5.78   HEMOGLOBIN g/dL 7.3* 7.5* 7.8*   HEMATOCRIT % 23.0* 24.3* 24.5*   PLATELETS 10*3/mm3 263 282 265     Results from last 7 days   Lab Units 01/06/24  0737   SODIUM mmol/L 137   POTASSIUM mmol/L 5.2   CHLORIDE mmol/L 105   CO2 mmol/L 18.0*   BUN mg/dL 37*   CREATININE mg/dL 3.80*   GLUCOSE mg/dL 145*   CALCIUM mg/dL 8.3*     Results from last 7 days   Lab Units 01/06/24  0737   ALK PHOS U/L 212*   BILIRUBIN mg/dL 0.3   ALT (SGPT) U/L 31   AST (SGOT) U/L 36*     Results from last 7 days   Lab Units 01/05/24  1640   SED RATE mm/hr 58*     Results from last 7 days   Lab Units 01/05/24  1640   CRP mg/dL 5.92*     Estimated Creatinine Clearance: 15.3 mL/min (A) (by C-G formula based on SCr of 3.8 mg/dL (H)).  Results from last 7 days   Lab Units 01/05/24  1640   LACTATE mmol/L 1.4     Results from last 7 days   Lab Units 01/04/24  0541   CK TOTAL U/L 160               Microbiology:    Microbiology Results (last 10 days)       Procedure Component Value - Date/Time    Respiratory Panel PCR w/COVID-19(SARS-CoV-2) [facility]/ESTHER/KARSON/PAD/COR/LEONARDO In-House, NP Swab in UTM/VTM, 2 HR TAT - Swab, Nasopharynx [[id number]]  (Normal) Collected: 01/05/24 1634    Lab Status: Final result Specimen: Swab from Nasopharynx Updated: 01/05/24 1801     ADENOVIRUS, PCR Not Detected     Coronavirus 229E Not Detected     Coronavirus HKU1 Not Detected     Coronavirus NL63 Not Detected     Coronavirus OC43 Not Detected     COVID19 Not Detected     Human Metapneumovirus Not Detected     Human Rhinovirus/Enterovirus Not Detected     Influenza A PCR Not Detected     Influenza B PCR Not Detected     Parainfluenza Virus 1 Not Detected     Parainfluenza Virus 2 Not Detected     Parainfluenza Virus 3 Not Detected     Parainfluenza Virus 4 Not Detected     RSV, PCR Not Detected     Bordetella pertussis pcr Not Detected     Bordetella parapertussis PCR Not Detected     Chlamydophila pneumoniae PCR Not Detected      Mycoplasma pneumo by PCR Not Detected    Narrative:      In the setting of a positive respiratory panel with a viral infection PLUS a negative procalcitonin without other underlying concern for bacterial infection, consider observing off antibiotics or discontinuation of antibiotics and continue supportive care. If the respiratory panel is positive for atypical bacterial infection (Bordetella pertussis, Chlamydophila pneumoniae, or Mycoplasma pneumoniae), consider antibiotic de-escalation to target atypical bacterial infection.    COVID PRE-OP / PRE-PROCEDURE SCREENING ORDER (NO ISOLATION) - Swab, Nasopharynx [[id number]]  (Normal) Collected: 01/01/24 2330    Lab Status: Final result Specimen: Swab from Nasopharynx Updated: 01/02/24 0016    Narrative:      The following orders were created for panel order COVID PRE-OP / PRE-PROCEDURE SCREENING ORDER (NO ISOLATION) - Swab, Nasopharynx.  Procedure                               Abnormality         Status                     ---------                               -----------         ------                     COVID-19, FLU A/B, RSV P...[[id number]]  Normal              Final result                 Please view results for these tests on the individual orders.    COVID-19, FLU A/B, RSV PCR 1 HR TAT - Swab, Nasopharynx [[id number]]  (Normal) Collected: 01/01/24 2330    Lab Status: Final result Specimen: Swab from Nasopharynx Updated: 01/02/24 0016     COVID19 Not Detected     Influenza A PCR Not Detected     Influenza B PCR Not Detected     RSV, PCR Not Detected    Narrative:      Fact sheet for providers: https://www.fda.gov/media/953884/download    Fact sheet for patients: https://www.fda.gov/media/933756/download    Test performed by PCR.          Monospot negative      Radiology:  MRI Lumbar Spine Without Contrast    Result Date: 1/5/2024  Impression: Multilevel degenerative changes as characterized above, most significantly at L5-S1. No definite acute abnormality.  Electronically Signed: Devin DO Alex  1/5/2024 8:11 PM EST  Workstation ID: [id number]           DISCUSSION:  63 y.o. female with history of modified replacement and implanted defibrillator admitted with acute kidney injury and GI bleed.   Patient reported cervical adenopathy confirmed on CT imaging and developed fever while hospitalized.  Resp PCR and monospot negative.    PROBLEM LIST:   -- Undifferentiated fever with sweats - ongoing.  COVID-19/flu/RSV PCR negative.  Full resp PCR negative.  Blood cultures obtained.  At risk for endocarditis with implanted ICD and valve replacement.  Recent dental issues with broken/necrotic right mandibular tooth and painful left maxillary at prior root canal; at risk for odontogenic source bacteremia/cardiac device infection.  -- Cervical adenopathy - resolving.  No pharyngitis or sinusitis.  Monospot negative.  -- Recent Candida esophagitis.  HIV negative.  -- Implanted defibrillator.  -- History of mitral valve replacement.  On chronic anticoagulation.  -- MEERA, improving.  -- Rectal bleeding.  CT without acute intra-abdominal abnormality.  -- Back pain/sciatica symptoms; MRI L-spine with degenerative changes; no acute abnormality.    PLAN:  -- Follow blood cultures  -- Check TTE to assess for vegetation on valves and leads; may need MEAGAN pending results  -- Start empiric ceftriaxone 2 g iv daily for potential bacteremia/endocarditis from odontogenic source given her dental issues  -- Continue to follow fever curve    Complex case.  Plan discussed with patient.    Maciej Willett MD  1/6/2024  12:00 EST

## 2024-01-07 LAB
ALBUMIN SERPL-MCNC: 3.7 G/DL (ref 3.5–5.2)
ALBUMIN/GLOB SERPL: 1.4 G/DL
ALP SERPL-CCNC: 225 U/L (ref 39–117)
ALT SERPL W P-5'-P-CCNC: 43 U/L (ref 1–33)
ANION GAP SERPL CALCULATED.3IONS-SCNC: 15 MMOL/L (ref 5–15)
AST SERPL-CCNC: 49 U/L (ref 1–32)
BILIRUB SERPL-MCNC: 0.3 MG/DL (ref 0–1.2)
BUN SERPL-MCNC: 33 MG/DL (ref 8–23)
BUN/CREAT SERPL: 8.5 (ref 7–25)
CALCIUM SPEC-SCNC: 8.5 MG/DL (ref 8.6–10.5)
CHLORIDE SERPL-SCNC: 103 MMOL/L (ref 98–107)
CK SERPL-CCNC: 191 U/L (ref 20–180)
CO2 SERPL-SCNC: 18 MMOL/L (ref 22–29)
CREAT SERPL-MCNC: 3.87 MG/DL (ref 0.57–1)
DEPRECATED RDW RBC AUTO: 50.1 FL (ref 37–54)
EGFRCR SERPLBLD CKD-EPI 2021: 12.6 ML/MIN/1.73
ERYTHROCYTE [DISTWIDTH] IN BLOOD BY AUTOMATED COUNT: 15.6 % (ref 12.3–15.4)
GLOBULIN UR ELPH-MCNC: 2.6 GM/DL
GLUCOSE BLDC GLUCOMTR-MCNC: 158 MG/DL (ref 70–130)
GLUCOSE BLDC GLUCOMTR-MCNC: 163 MG/DL (ref 70–130)
GLUCOSE BLDC GLUCOMTR-MCNC: 163 MG/DL (ref 70–130)
GLUCOSE BLDC GLUCOMTR-MCNC: 212 MG/DL (ref 70–130)
GLUCOSE BLDC GLUCOMTR-MCNC: 229 MG/DL (ref 70–130)
GLUCOSE SERPL-MCNC: 167 MG/DL (ref 65–99)
HCT VFR BLD AUTO: 23 % (ref 34–46.6)
HGB BLD-MCNC: 7.3 G/DL (ref 12–15.9)
INR PPP: 1.39 (ref 0.89–1.12)
MCH RBC QN AUTO: 27.8 PG (ref 26.6–33)
MCHC RBC AUTO-ENTMCNC: 31.7 G/DL (ref 31.5–35.7)
MCV RBC AUTO: 87.5 FL (ref 79–97)
PLATELET # BLD AUTO: 255 10*3/MM3 (ref 140–450)
PMV BLD AUTO: 10.4 FL (ref 6–12)
POTASSIUM SERPL-SCNC: 4.9 MMOL/L (ref 3.5–5.2)
PROCALCITONIN SERPL-MCNC: 0.4 NG/ML (ref 0–0.25)
PROT SERPL-MCNC: 6.3 G/DL (ref 6–8.5)
PROTHROMBIN TIME: 17.2 SECONDS (ref 12.2–14.5)
RBC # BLD AUTO: 2.63 10*6/MM3 (ref 3.77–5.28)
SODIUM SERPL-SCNC: 136 MMOL/L (ref 136–145)
UFH PPP CHRO-ACNC: 0.25 IU/ML (ref 0.3–0.7)
UFH PPP CHRO-ACNC: 0.38 IU/ML (ref 0.3–0.7)
UFH PPP CHRO-ACNC: 0.41 IU/ML (ref 0.3–0.7)
WBC NRBC COR # BLD AUTO: 4.97 10*3/MM3 (ref 3.4–10.8)

## 2024-01-07 PROCEDURE — 80053 COMPREHEN METABOLIC PANEL: CPT | Performed by: STUDENT IN AN ORGANIZED HEALTH CARE EDUCATION/TRAINING PROGRAM

## 2024-01-07 PROCEDURE — 82550 ASSAY OF CK (CPK): CPT | Performed by: INTERNAL MEDICINE

## 2024-01-07 PROCEDURE — 25010000002 HEPARIN (PORCINE) 25000-0.45 UT/250ML-% SOLUTION

## 2024-01-07 PROCEDURE — 63710000001 INSULIN LISPRO (HUMAN) PER 5 UNITS: Performed by: INTERNAL MEDICINE

## 2024-01-07 PROCEDURE — 85520 HEPARIN ASSAY: CPT

## 2024-01-07 PROCEDURE — 82948 REAGENT STRIP/BLOOD GLUCOSE: CPT

## 2024-01-07 PROCEDURE — 63710000001 INSULIN LISPRO (HUMAN) PER 5 UNITS: Performed by: STUDENT IN AN ORGANIZED HEALTH CARE EDUCATION/TRAINING PROGRAM

## 2024-01-07 PROCEDURE — 85610 PROTHROMBIN TIME: CPT

## 2024-01-07 PROCEDURE — 97116 GAIT TRAINING THERAPY: CPT

## 2024-01-07 PROCEDURE — 63710000001 INSULIN DETEMIR PER 5 UNITS: Performed by: STUDENT IN AN ORGANIZED HEALTH CARE EDUCATION/TRAINING PROGRAM

## 2024-01-07 PROCEDURE — 84145 PROCALCITONIN (PCT): CPT | Performed by: INTERNAL MEDICINE

## 2024-01-07 PROCEDURE — 25010000002 CEFTRIAXONE PER 250 MG: Performed by: INTERNAL MEDICINE

## 2024-01-07 PROCEDURE — 85027 COMPLETE CBC AUTOMATED: CPT | Performed by: STUDENT IN AN ORGANIZED HEALTH CARE EDUCATION/TRAINING PROGRAM

## 2024-01-07 PROCEDURE — 99232 SBSQ HOSP IP/OBS MODERATE 35: CPT | Performed by: INTERNAL MEDICINE

## 2024-01-07 RX ORDER — OXYMETAZOLINE HYDROCHLORIDE 0.05 G/100ML
2 SPRAY NASAL 2 TIMES DAILY
Status: COMPLETED | OUTPATIENT
Start: 2024-01-07 | End: 2024-01-09

## 2024-01-07 RX ADMIN — SENNOSIDES AND DOCUSATE SODIUM 1 TABLET: 8.6; 5 TABLET ORAL at 20:24

## 2024-01-07 RX ADMIN — POLYETHYLENE GLYCOL 3350 17 G: 17 POWDER, FOR SOLUTION ORAL at 08:54

## 2024-01-07 RX ADMIN — INSULIN LISPRO 3 UNITS: 100 INJECTION, SOLUTION INTRAVENOUS; SUBCUTANEOUS at 12:18

## 2024-01-07 RX ADMIN — ESCITALOPRAM OXALATE 20 MG: 20 TABLET, FILM COATED ORAL at 08:53

## 2024-01-07 RX ADMIN — INSULIN LISPRO 3 UNITS: 100 INJECTION, SOLUTION INTRAVENOUS; SUBCUTANEOUS at 17:34

## 2024-01-07 RX ADMIN — INSULIN LISPRO 3 UNITS: 100 INJECTION, SOLUTION INTRAVENOUS; SUBCUTANEOUS at 20:25

## 2024-01-07 RX ADMIN — HEPARIN SODIUM 15 UNITS/KG/HR: 10000 INJECTION, SOLUTION INTRAVENOUS at 00:08

## 2024-01-07 RX ADMIN — Medication 10 ML: at 20:35

## 2024-01-07 RX ADMIN — FERROUS SULFATE TAB 325 MG (65 MG ELEMENTAL FE) 325 MG: 325 (65 FE) TAB at 08:54

## 2024-01-07 RX ADMIN — OXYCODONE HYDROCHLORIDE AND ACETAMINOPHEN 1 TABLET: 7.5; 325 TABLET ORAL at 20:34

## 2024-01-07 RX ADMIN — INSULIN LISPRO 2 UNITS: 100 INJECTION, SOLUTION INTRAVENOUS; SUBCUTANEOUS at 08:54

## 2024-01-07 RX ADMIN — HEPARIN SODIUM 16 UNITS/KG/HR: 10000 INJECTION, SOLUTION INTRAVENOUS at 17:25

## 2024-01-07 RX ADMIN — SODIUM BICARBONATE 650 MG TABLET 1300 MG: at 15:59

## 2024-01-07 RX ADMIN — Medication 2 SPRAY: at 12:22

## 2024-01-07 RX ADMIN — SODIUM BICARBONATE 650 MG TABLET 1300 MG: at 20:24

## 2024-01-07 RX ADMIN — Medication 2 SPRAY: at 20:24

## 2024-01-07 RX ADMIN — OXYBUTYNIN CHLORIDE 5 MG: 5 TABLET, FILM COATED, EXTENDED RELEASE ORAL at 08:54

## 2024-01-07 RX ADMIN — Medication 5 MG: at 22:16

## 2024-01-07 RX ADMIN — INSULIN LISPRO 3 UNITS: 100 INJECTION, SOLUTION INTRAVENOUS; SUBCUTANEOUS at 08:54

## 2024-01-07 RX ADMIN — PANTOPRAZOLE SODIUM 40 MG: 40 TABLET, DELAYED RELEASE ORAL at 05:17

## 2024-01-07 RX ADMIN — FOLIC ACID TAB 400 MCG 800 MCG: 400 TAB at 08:54

## 2024-01-07 RX ADMIN — INSULIN LISPRO 2 UNITS: 100 INJECTION, SOLUTION INTRAVENOUS; SUBCUTANEOUS at 12:18

## 2024-01-07 RX ADMIN — SODIUM BICARBONATE 650 MG TABLET 1300 MG: at 08:53

## 2024-01-07 RX ADMIN — OXYCODONE HYDROCHLORIDE AND ACETAMINOPHEN 1 TABLET: 7.5; 325 TABLET ORAL at 13:51

## 2024-01-07 RX ADMIN — INSULIN DETEMIR 5 UNITS: 100 INJECTION, SOLUTION SUBCUTANEOUS at 20:24

## 2024-01-07 RX ADMIN — CEFTRIAXONE 2000 MG: 2 INJECTION, POWDER, FOR SOLUTION INTRAMUSCULAR; INTRAVENOUS at 08:53

## 2024-01-07 RX ADMIN — SENNOSIDES AND DOCUSATE SODIUM 1 TABLET: 8.6; 5 TABLET ORAL at 09:09

## 2024-01-07 NOTE — PLAN OF CARE
Goal Outcome Evaluation:  Plan of Care Reviewed With: patient        Progress: improving  Outcome Evaluation: O2 at 2 L walked 350 w RW.  IND transfers.  Back to bed following walk      Anticipated Discharge Disposition (PT): home, home with home health

## 2024-01-07 NOTE — PROGRESS NOTES
HEPARIN INFUSION  Di Lopez is a  63 y.o. female receiving heparin infusion.     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   Patient Weight:  89.9 kg  Initial Bolus (Y/N):  No  Any Bolus (Y/N):  Yes       Signs or Symptoms of Bleeding: No    Cardiac or Other (Not VTE)   Initial Bolus: 60 units/kg (Max 4,000 units)  Initial rate: 12 units/kg/hr (Max 1,000 units/hr)   Anti Xa Rebolus Infusion Hold time Change infusion Dose (Units/kg/hr) Next Anti Xa or aPTT Level Due   < 0.11 50 Units/kg  (4000 Units Max) None Increase by  3 Units/kg/hr 6 hours   0.11- 0.19 25 Units/kg  (2000 Units Max) None Increase by  2 Units/kg/hr 6 hours   0.2 - 0.29 0 None Increase by  1 Units/kg/hr 6 hours   0.3 - 0.5 0 None No Change 6 hours (after 2 consecutive levels in range check qAM)   0.51 - 0.6 0 None Decrease by  1 Units/kg/hr 6 hours   0.61 - 0.8 0 30 Minutes Decrease by  2 Units/kg/hr 6 hours   0.81 - 1 0 60 Minutes Decrease by  3 Units/kg/hr 6 hours   >1 0 Hold  After Anti Xa less than 0.5 decrease previous rate by  4 Units/kg/hr  Every 2 hours until Anti Xa  less than 0.5 then when infusion restarts in 6 hours       Results from last 7 days   Lab Units 01/07/24  0552 01/07/24  0518 01/06/24  0737 01/05/24  0501   INR   --  1.39* 1.48* 1.56*   HEMOGLOBIN g/dL 7.3*  --  7.3* 7.5*   HEMATOCRIT % 23.0*  --  23.0* 24.3*   PLATELETS 10*3/mm3 255  --  263 282          Date   Time   Anti-Xa Current Rate (Unit/kg/hr) Bolus   (Units) Rate Change   (Unit/kg/hr) New Rate (Unit/kg/hr) Next   Anti-Xa Comments  Pump Check Daily   1/2 0531 0.1 New Start No Bolus -- 11 1100 Discussed w/ nurse  Warfarin patient    1/2 1140 Pump check 11 -- -- 11 now Pump weight and rate verified    1/2 1208 0.15 11 2000 +2 13 2000 D/w Marjan   1/2 2035 0.34 13 -- -- 13 0300 DW JARAD Rousseau   1/3 0251 0.37 13 -- -- 13 0600  1/4/24 Discussed w/ nurse   1/3 1100 PUMP CHECK 13 - -- 13 0600  1/4/23 Pump rate and weight verified   1/4  0541 0.34 13 -- -- 13 0600 DW RN   1/5 0501 0.21 13 -- +1 14 1400 D/w RN   1/5 1040 PUMP CHECK 14 -- -- 14 1400 Pump weight and rate verified    1/5 1640 0.25 14 -- +1 15 0000 DW RN   1/6 0056 0.36 15 -- -- 15 0800 D/w RN   1/6 0737 0.35 15 -- -- 15 0600 D/w JARAD Phillip, pump verified   1/7 0518 0.25 15 -- +1 16 1300 D/w RN   1/7 1402 0.38 16 -- -- 16 2100 D/w JARAD Phillip, pump verified                           Javier Samuels, PharmD, BCPS  1/7/2024  14:54 EST

## 2024-01-07 NOTE — PROGRESS NOTES
[facility] Medicine Services  PROGRESS NOTE    Patient Name: Di Lopez  : 1960  MRN: [id number]    Date of Admission: 2024  Primary Care Physician: Davina Santiago DO    Subjective   Subjective     CC:  BRBPR, febrile illness f/u    HPI:  Patient reports persistent pain in front of bilateral thighs, continues  Feels she now has leg swelling  Dry mouth, drinking regularly  Significant h/o nose bleeds, some nose bleed intermittently appreciated now and coughed up some bright red blood yesterday x 1    Objective   Objective     Vital Signs:   Temp:  [98.1 °F (36.7 °C)-99.9 °F (37.7 °C)] 99.5 °F (37.5 °C)  Heart Rate:  [76-87] 85  Resp:  [18] 18  BP: (119-137)/(52-75) 134/58  Flow (L/min):  [2] 2     Physical Exam:  Constitutional: Awake, alert female sitting up in recliner. Well appearing  HENT: NCAT, mucous membranes moist  Respiratory: Clear to auscultation bilaterally, respiratory effort normal on 2 liters n/c  Cardiovascular: RRR, soft systolic murmur appreciated, some nonpitting leg swelling  Gastrointestinal: Soft, some nonspecific tenderness right side of abdomen without rebound, nondistended  Musculoskeletal: Muscle tone within normal limits, no joint effusions appreciated  Psychiatric: Appropriate affect, cooperative  Neurologic: Alert and oriented, facial movements symmetric and spontaneous movement of all 4 extremities grossly equal bilaterally, speech clear  Skin: No rashes    Results Reviewed:  LAB RESULTS:      Lab 24  0552 24  0518 24  0737 24  0056 24  1640 24  0501 24  0541 24  0251 24  0808 24  0531 24  2330   WBC 4.97  --  5.89  --   --  6.74 5.78 4.18  --  4.26 4.42   HEMOGLOBIN 7.3*  --  7.3*  --   --  7.5* 7.8* 8.4*  --  8.5* 9.5*   HEMATOCRIT 23.0*  --  23.0*  --   --  24.3* 24.5* 25.2*  --  26.0* 29.3*   PLATELETS 255  --  263  --   --  282 265 224  --  228 250   NEUTROS ABS  --   --   4.19  --   --   --   --  2.61  --  3.02 3.39   IMMATURE GRANS (ABS)  --   --  0.17*  --   --   --   --  0.07*  --  0.05 0.06*   LYMPHS ABS  --   --  1.18  --   --   --   --  1.12  --  0.92 0.77   MONOS ABS  --   --  0.26  --   --   --   --  0.25  --  0.21 0.17   EOS ABS  --   --  0.06  --   --   --   --  0.12  --  0.05 0.02   MCV 87.5  --  85.5  --   --  86.8 86.0 85.7  --  85.8 84.2   SED RATE  --   --   --   --  58*  --   --   --   --   --   --    CRP  --   --   --   --  5.92*  --   --   --   --   --   --    PROCALCITONIN  --  0.40*  --   --   --   --   --   --   --   --   --    LACTATE  --   --   --   --  1.4  --   --   --   --   --  1.5   PROTIME  --  17.2* 18.0*  --   --  18.8*  --   --   --   --  26.0*   APTT  --   --   --   --   --   --   --   --   --  66.2  --    HEPARIN ANTI-XA  --  0.25* 0.35 0.36 0.25* 0.21* 0.34 0.37   < > 0.10*  --     < > = values in this interval not displayed.         Lab 01/07/24  0518 01/06/24  0737 01/05/24  0501 01/04/24  0541 01/03/24  1654 01/03/24  0737 01/03/24  0251 01/02/24  1144 01/02/24  0808   SODIUM 136 137 133* 136 132*   < > 131*  131*   < > 129*   POTASSIUM 4.9 5.2 5.2 5.1  --   --  5.0  --  5.3*   CHLORIDE 103 105 104 105  --   --  99  --  95*   CO2 18.0* 18.0* 17.0* 17.0*  --   --  19.0*  --  19.0*   ANION GAP 15.0 14.0 12.0 14.0  --   --  13.0  --  15.0   BUN 33* 37* 43* 50*  --   --  57*  --  61*   CREATININE 3.87* 3.80* 4.20* 5.39*  --   --  6.72*  --  7.49*   EGFR 12.6* 12.9* 11.4* 8.5*  --   --  6.5*  --  5.7*   GLUCOSE 167* 145* 103* 89  --   --  150*  --  152*   CALCIUM 8.5* 8.3* 7.9* 7.1*  --   --  7.1*  --  7.6*   MAGNESIUM  --   --   --   --   --   --   --   --  2.3   PHOSPHORUS  --   --   --   --   --   --   --   --  7.3*   TSH  --   --   --   --   --   --   --   --  1.590    < > = values in this interval not displayed.         Lab 01/07/24  0518 01/06/24  0737 01/05/24  0501 01/04/24  0541 01/03/24  0251 01/02/24  0808 01/01/24  2330   TOTAL PROTEIN  6.3 6.2 5.9* 5.2* 5.7*   < > 7.1   ALBUMIN 3.7 3.5 3.5 3.2* 3.2*   < > 4.1   GLOBULIN 2.6 2.7 2.4 2.0 2.5   < > 3.0   ALT (SGPT) 43* 31 29 32 37*   < > 45*   AST (SGOT) 49* 36* 28 29 32   < > 34*   BILIRUBIN 0.3 0.3 0.2 0.2 0.2   < > 0.3   ALK PHOS 225* 212* 204* 212* 217*   < > 247*   LIPASE  --   --   --   --   --   --  82*    < > = values in this interval not displayed.         Lab 01/07/24  0518 01/06/24  0737 01/05/24  0501 01/01/24  2330   PROTIME 17.2* 18.0* 18.8* 26.0*   INR 1.39* 1.48* 1.56* 2.36*             Lab 01/06/24  0737 01/04/24  0541 01/01/24  2358   IRON 15*  --   --    IRON SATURATION (TSAT) 5*  --   --    TIBC 279*  --   --    TRANSFERRIN 187*  --   --    FERRITIN 1,188.00*  --   --    FOLATE  --  >20.00  --    VITAMIN B 12  --  353  --    ABO TYPING  --   --  O   RH TYPING  --   --  Positive   ANTIBODY SCREEN  --   --  Negative         Lab 01/02/24  0047   FIO2 21   CARBOXYHEMOGLOBIN (VENOUS) 1.3     Brief Urine Lab Results  (Last result in the past 365 days)        Color   Clarity   Blood   Leuk Est   Nitrite   Protein   CREAT   Urine HCG        01/02/24 0011             142.2         01/02/24 0011 Yellow   Cloudy   Small (1+)   Negative   Negative   100 mg/dL (2+)                   Microbiology Results Abnormal       Procedure Component Value - Date/Time    Blood Culture - Blood, Arm, Left [[id number]]  (Normal) Collected: 01/06/24 0056    Lab Status: Preliminary result Specimen: Blood from Arm, Left Updated: 01/07/24 0815     Blood Culture No growth at 24 hours    Blood Culture - Blood, Arm, Left [[id number]]  (Normal) Collected: 01/05/24 1641    Lab Status: Preliminary result Specimen: Blood from Arm, Left Updated: 01/06/24 1812     Blood Culture No growth at 24 hours    Narrative:      Less than seven (7) mL's of blood was collected.  Insufficient quantity may yield false negative results.    Respiratory Panel PCR w/COVID-19(SARS-CoV-2) LUIS/ESTHER/KARSON/PAD/COR/LEONARDO In-House, NP Swab in [facility], 2  HR TAT - Swab, Nasopharynx [[id number]]  (Normal) Collected: 01/05/24 1634    Lab Status: Final result Specimen: Swab from Nasopharynx Updated: 01/05/24 1804     ADENOVIRUS, PCR Not Detected     Coronavirus 229E Not Detected     Coronavirus HKU1 Not Detected     Coronavirus NL63 Not Detected     Coronavirus OC43 Not Detected     COVID19 Not Detected     Human Metapneumovirus Not Detected     Human Rhinovirus/Enterovirus Not Detected     Influenza A PCR Not Detected     Influenza B PCR Not Detected     Parainfluenza Virus 1 Not Detected     Parainfluenza Virus 2 Not Detected     Parainfluenza Virus 3 Not Detected     Parainfluenza Virus 4 Not Detected     RSV, PCR Not Detected     Bordetella pertussis pcr Not Detected     Bordetella parapertussis PCR Not Detected     Chlamydophila pneumoniae PCR Not Detected     Mycoplasma pneumo by PCR Not Detected    Narrative:      In the setting of a positive respiratory panel with a viral infection PLUS a negative procalcitonin without other underlying concern for bacterial infection, consider observing off antibiotics or discontinuation of antibiotics and continue supportive care. If the respiratory panel is positive for atypical bacterial infection (Bordetella pertussis, Chlamydophila pneumoniae, or Mycoplasma pneumoniae), consider antibiotic de-escalation to target atypical bacterial infection.    COVID PRE-OP / PRE-PROCEDURE SCREENING ORDER (NO ISOLATION) - Swab, Nasopharynx [[id number]]  (Normal) Collected: 01/01/24 2330    Lab Status: Final result Specimen: Swab from Nasopharynx Updated: 01/02/24 0016    Narrative:      The following orders were created for panel order COVID PRE-OP / PRE-PROCEDURE SCREENING ORDER (NO ISOLATION) - Swab, Nasopharynx.  Procedure                               Abnormality         Status                     ---------                               -----------         ------                     COVID-19, FLU A/B, RSV P...[[id number]]  Normal               Final result                 Please view results for these tests on the individual orders.    COVID-19, FLU A/B, RSV PCR 1 HR TAT - Swab, Nasopharynx [[id number]]  (Normal) Collected: 01/01/24 2330    Lab Status: Final result Specimen: Swab from Nasopharynx Updated: 01/02/24 0016     COVID19 Not Detected     Influenza A PCR Not Detected     Influenza B PCR Not Detected     RSV, PCR Not Detected    Narrative:      Fact sheet for providers: https://www.fda.gov/media/392246/download    Fact sheet for patients: https://www.fda.gov/media/556311/download    Test performed by PCR.            Adult Transthoracic Echo Complete W/ Cont if Necessary Per Protocol    Result Date: 1/6/2024    The right ventricular cavity is dilated.   The left atrial cavity is mildly dilated.   Left atrial volume is mildly increased.   The right atrial cavity is dilated.   The mitral valve mean gradient is 7 mmHg.   There is a mechanical mitral valve prosthesis present.   Moderate to severe tricuspid valve regurgitation is present.   Estimated right ventricular systolic pressure from tricuspid regurgitation is markedly elevated (>55 mmHg). Calculated right ventricular systolic pressure from tricuspid regurgitation is 88 mmHg.   LVEF estimated at 59%     MRI Lumbar Spine Without Contrast    Result Date: 1/5/2024  MRI LUMBAR SPINE WO CONTRAST Date of Exam: 1/5/2024 7:30 PM EST Indication: Low back pain, symptoms persist with > 6 wks treatment patient with possible metallic implant?.  Comparison: CT 7/16/2013. Radiograph 4/20/2022. Technique:  Routine multiplanar/multisequence sequence images of the lumbar spine were obtained without contrast administration.  Findings: The vertebral body heights are maintained. Grade 1 retrolisthesis of L5 on S1. Otherwise overall alignment is maintained. Mild T2/T1 hyperintensity within the endplates at L5-S1, most likely representing Modic type III changes. No suspicious marrow edema. Mild diffuse hypodensity  within the visualized marrow, nonspecific but may represent underlying hematopoiesis. The visualized spinal cord is unremarkable. The conus terminates at L1. Although evaluation is limited, no definite fluid collection is noted within the spinal canal. The paravertebral soft tissues are unremarkable. The visualized intra-abdominal and intrapelvic soft tissues are unremarkable. Mild loss of intervertebral disc base height throughout the visualized spine and moderate to severe loss of intervertebral disc base height at L5-S1. T12-L1: No significant spinal canal stenosis or neuroforaminal narrowing. L1-L2: No significant spinal canal stenosis or neuroforaminal narrowing. L2-L3: No significant spinal canal stenosis or neuroforaminal narrowing. L3-4: No significant spinal canal stenosis or neuroforaminal narrowing. L4-L5: Mild posterior broad-based disc bulge, ligamentum flavum thickening and bilateral facet arthrosis causes mild to moderate bilateral neuroforaminal narrowing without significant spinal canal stenosis. L5-S1: Grade 1 retrolisthesis, broad-based disc bulge and possible superimposed small disc herniation. There is also bilateral facet arthrosis and ligamentum flavum thickening. Overall this causes mild spinal canal stenosis and moderate to severe bilateral neuroforaminal narrowing, worse on the right.     Impression: Impression: Multilevel degenerative changes as characterized above, most significantly at L5-S1. No definite acute abnormality. Electronically Signed: Devin Johnson DO  1/5/2024 8:11 PM EST  Workstation ID: [id number]     Results for orders placed during the hospital encounter of 01/01/24    Adult Transthoracic Echo Complete W/ Cont if Necessary Per Protocol    Interpretation Summary    The right ventricular cavity is dilated.    The left atrial cavity is mildly dilated.    Left atrial volume is mildly increased.    The right atrial cavity is dilated.    The mitral valve mean gradient is 7  mmHg.    There is a mechanical mitral valve prosthesis present.    Moderate to severe tricuspid valve regurgitation is present.    Estimated right ventricular systolic pressure from tricuspid regurgitation is markedly elevated (>55 mmHg). Calculated right ventricular systolic pressure from tricuspid regurgitation is 88 mmHg.    LVEF estimated at 59%      Current medications:  Scheduled Meds:cefTRIAXone, 2,000 mg, Intravenous, Q24H  escitalopram, 20 mg, Oral, Daily  ferrous sulfate, 325 mg, Oral, Daily With Breakfast  folic acid, 800 mcg, Oral, Daily  insulin detemir, 5 Units, Subcutaneous, Nightly  insulin lispro, 2-7 Units, Subcutaneous, 4x Daily AC & at Bedtime  Insulin Lispro, 3 Units, Subcutaneous, TID With Meals  oxybutynin XL, 5 mg, Oral, Daily  oxymetazoline, 2 spray, Each Nare, BID  pantoprazole, 40 mg, Oral, Q AM  polyethylene glycol, 17 g, Oral, Daily  senna-docusate sodium, 1 tablet, Oral, BID  sodium bicarbonate, 1,300 mg, Oral, TID  sodium chloride, 10 mL, Intravenous, Q12H      Continuous Infusions:heparin, 16 Units/kg/hr, Last Rate: 16 Units/kg/hr (01/07/24 0638)  Pharmacy to Dose Heparin,       PRN Meds:.  acetaminophen    albuterol    dextrose    dextrose    glucagon (human recombinant)    melatonin    naloxone    nitroglycerin    oxyCODONE-acetaminophen    Pharmacy to Dose Heparin    sodium chloride    sodium chloride    Assessment & Plan   Assessment & Plan     Active Hospital Problems    Diagnosis  POA    **Renal failure [N19]  Yes    Hyponatremia [E87.1]  Yes    Hyperkalemia [E87.5]  Yes    Type 2 diabetes mellitus with hyperglycemia [E11.65]  Yes    Elevated LFTs [R79.89]  Yes    Anemia, chronic disease [D63.8]  Yes    Cervical lymphadenopathy [R59.0]  Yes    GI bleed [K92.2]  Yes    H/O heart valve replacement with mechanical valve [Z95.2]  Not Applicable    ARF (acute renal failure) [N17.9]  Yes    Left bundle branch hemiblock [I44.60]  Yes    Type 2 diabetes mellitus without complication,  with long-term current use of insulin [E11.9, Z79.4]  Not Applicable    Dyslipidemia [E78.5]  Yes    Emphysema/COPD [J43.9]  Yes    Hypertension [I10]  Yes    Arteriovenous malformation of gastrointestinal tract [K55.20]  Yes    Obstructive sleep apnea syndrome [G47.33]  Yes    Chronic anticoagulation [Z79.01]  Not Applicable      Resolved Hospital Problems   No resolved problems to display.        Brief Hospital Course to date:  Di Lopez is a 63 y.o. 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 Found to have severe MEERA, resolution of BRBPR, persistent fever/chills and thigh pain    Epistaxis  -start afrin x 3 days, repeat hemogram in AM    Febrile illness, unclear etiology  -negative full viral panel, negative monospot  -continue to suspect viral illness (throat soreness, fevers, back pain/aches, etc), given new thigh pain consideration of discitis but MRI withOUT contrast negative  -blood cultures ngtd, TTE without vegetation. Unclear etiology but stable patient  -ID following, on rocephin    Acute Renal Failure 2/2 hypovolemia + med effect - improved  -2/2 acute diarrheal illness + entresto, NSAIDs  -IVF discontinued, appears mildly hypervolemic w good PO intake now  -do suspect some ATN component and expect slow recovery  -nephrology following    Hypovolemic hyponatremia - improved  -IVF per nephrology as above    Acute anemia 2/2 bloody diarrheal illness - blood diarrhea resolved  -no further bowel movement since admission, suspect further decline is 2/2 prior hemoconcentration and now IVF resuscitation. hypoproliferative    Mechanical mitral valve on coumadin - heparin gtt in setting of severe MEERA, pharmacy monitoring    CHFpEF (EF 45-50%), s/p ICD - appears euvolemic currently  Chronic hypoxic resp failure 2/2 COPD - on home 2 liters n/c  DMII - basal/bolus regimen  Anxiety/depression  BMI  38  Liver mass s/p embolization    Expected Discharge Location and Transportation: home  Expected Discharge 1/11 (Discharge date is tentative pending patient's medical condition and is subject to change)  Expected Discharge Date: 1/11/2024; Expected Discharge Time:      DVT prophylaxis:  Medical DVT prophylaxis orders are present.     AM-PAC 6 Clicks Score (PT): 22 (01/07/24 0852)    CODE STATUS:   Code Status and Medical Interventions:   Ordered at: 01/02/24 0319     Code Status (Patient has no pulse and is not breathing):    CPR (Attempt to Resuscitate)     Medical Interventions (Patient has pulse or is breathing):    Full Support       Rachel Gloria MD  01/07/24

## 2024-01-07 NOTE — PLAN OF CARE
Goal Outcome Evaluation:         VSS throughout the shift. Heparin gtt infusing. 1x complaint of pain PRN medication administered and effective. 2LNC. Pt appeared to have rested well throughout the night. States this AM she did not rest well at all.

## 2024-01-07 NOTE — PLAN OF CARE
Goal Outcome Evaluation:  Plan of Care Reviewed With: patient        Progress: no change  Outcome Evaluation: Heparin drip continues to infuse without difficulty at 16 units per KG per hour. Next Xa due around 9 PM this evening. Remains paced on the monitor. Miralax and sennasides given to help promote Bowel movement. Will monitor. Waiting a discharge plan when ready.

## 2024-01-07 NOTE — THERAPY TREATMENT NOTE
Patient Name: Di Lopez  : 1960    MRN: [id number]                              Today's Date: 2024       Admit Date: 2024    Visit Dx:     ICD-10-CM ICD-9-CM   1. Acute renal failure, unspecified acute renal failure type  N17.9 584.9   2. Hyperkalemia  E87.5 276.7   3. Gastrointestinal hemorrhage, unspecified gastrointestinal hemorrhage type  K92.2 578.9   4. Acute renal failure superimposed on chronic kidney disease, unspecified acute renal failure type, unspecified CKD stage  N17.9 584.9    N18.9 585.9   5. Chronic respiratory failure with hypoxia  J96.11 518.83     799.02   6. Weakness  R53.1 780.79   7. Dysphagia, unspecified type  R13.10 787.20     Patient Active Problem List   Diagnosis    Arteriovenous malformation of gastrointestinal tract    Depression    Type 2 diabetes mellitus without complication, with long-term current use of insulin    Dyslipidemia    Emphysema/COPD    Hypertension    Insomnia    Morbid obesity    Obstructive sleep apnea syndrome    Chronic anticoagulation    Normocytic anemia    Sliding hiatal hernia    Vitamin B12 deficiency    Iron deficiency    Iron malabsorption    Bronchiectasis without complication    Electronic cigarette use    Chronic diastolic heart failure    Left bundle branch hemiblock    Chronic respiratory failure with hypoxia    Body aches    Headaches due to old head injury    History of tobacco use, presenting hazards to health    Multiple nodules of lung    Nocturnal hypoxemia    Screening for colon cancer    Abnormal CT scan    Wellness examination    ARF (acute renal failure)    H/O heart valve replacement with mechanical valve    GI bleed    Severe malnutrition    Hyponatremia    Hyperkalemia    Type 2 diabetes mellitus with hyperglycemia    Elevated LFTs    Anemia, chronic disease    Cervical lymphadenopathy    Renal failure     Past Medical History:   Diagnosis Date    Allergic     Anemia     Anticoagulated     WARFARIN    Arteriovenous  malformation of gastrointestinal tract 05/12/2016    Arthritis     Asthma     Black hairy tongue     CHF (congestive heart failure)     Chronic back pain     Coronary artery disease     Depression 05/12/2016    Diabetes mellitus     Dyslipidemia 05/12/2016    Elevated cholesterol     Emphysema/COPD 05/12/2016    Emphysema/COPD 05/12/2016    Fatigue     GERD (gastroesophageal reflux disease)     Headache     Herniated cervical disc     History of echocardiogram 07/16/2013    Left ventricular systolic function at the lower limits of normal; EF 50-55%; mild MR; mechanical prosthetic mitral valve present; mod TR    History of transfusion     NO REACTION; [facility]    Hyperlipidemia     Hypertension     Insomnia 05/12/2016    Iron deficiency     A.  The patient was treated with IV iron replacement. B.  Secondary to GI bleed, status post EGD with cauterization of blood vessel to the duodenum, 7/16/2014.    Left bundle branch hemiblock 10/12/2020    Left shoulder strain     Liver disease     Liver mass     Long term current use of anticoagulant 05/12/2016    Low back pain     Lung nodules     PER DR. LEVI'S NOTE 10/22    Morbid obesity 05/12/2016    Neuromuscular disorder 7/28/2016    Obstructive sleep apnea syndrome 05/12/2016    Description: A.  Moderate; CPAP therapy..    Sliding hiatal hernia 07/28/2016    Description: A.  Reported on EGD, 12/30/2011.    Type 2 diabetes mellitus 05/12/2016    Urinary tract infection     Uterine cancer     HYSTERECTOMY    Vitamin B12 deficiency     A.  The patient was treated with vitamin B12 replacement.    Wears eyeglasses      Past Surgical History:   Procedure Laterality Date    BILATERAL OOPHORECTOMY  2009    Status post bilateral oophorectomy secondary to ovarian cysts     BREAST BIOPSY Right     STEREOTACTIC    BREAST EXCISIONAL BIOPSY Bilateral     CARDIAC ELECTROPHYSIOLOGY PROCEDURE N/A 10/15/2020    Procedure: Biventricular Device Insertion;  Surgeon: Nadir Everett MD;   Location:  [facility];  Service: Cardiology;  Laterality: N/A;    CARDIAC SURGERY  2003    CARPAL TUNNEL RELEASE Bilateral     COLONOSCOPY  12/28/2012    COLONOSCOPY N/A 5/23/2023    Procedure: COLONOSCOPY;  Surgeon: Gt Fernandes MD;  Location:  [facility];  Service: Gastroenterology;  Laterality: N/A;    COLONOSCOPY N/A 11/16/2023    Procedure: COLONOSCOPY;  Surgeon: Brunner, Mark I, MD;  Location:  [facility];  Service: Gastroenterology;  Laterality: N/A;    CYSTECTOMY      removal of ganglion cyst from left wrist    ENDOSCOPY  12/30/2011    DIAGNOSTIC ESOPHAGOGASTRODUODENOSCOPY    ENDOSCOPY N/A 11/16/2023    Procedure: ESOPHAGOGASTRODUODENOSCOPY;  Surgeon: Brunner, Mark I, MD;  Location:  [facility];  Service: Gastroenterology;  Laterality: N/A;    HYSTERECTOMY  1995    A.  Status post hysterectomy for early stage endometrial cancer done in 1995.    MITRAL VALVE REPLACEMENT  03/03/2008    Dr. Hima Barreto; MECHANICAL    OTHER SURGICAL HISTORY      LIVER MASS BLOOD SUPPLY CLIPPED ON RIGHT AND LEFT SIDE PER PT      General Information       Row Name 01/07/24 1545          Physical Therapy Time and Intention    Document Type therapy note (daily note)  -CT     Mode of Treatment physical therapy  -CT       Row Name 01/07/24 1545          General Information    Patient Profile Reviewed yes  -CT     Prior Level of Function independent:;transfer;bed mobility;ADL's;gait  -CT     Existing Precautions/Restrictions fall;other (see comments)  -CT     Barriers to Rehab medically complex;previous functional deficit  -CT       Row Name 01/07/24 1545          Living Environment    People in Home child(juan), adult;child(juan), dependent  -CT       Row Name 01/07/24 1545          Home Main Entrance    Number of Stairs, Main Entrance none  -CT       Row Name 01/07/24 1545          Stairs Within Home, Primary    Number of Stairs, Within Home, Primary none  -CT       Row Name 01/07/24 1549           Cognition    Orientation Status (Cognition) oriented x 4  -CT       Row Name 01/07/24 1545          Safety Issues, Functional Mobility    Impairments Affecting Function (Mobility) endurance/activity tolerance  -CT               User Key  (r) = Recorded By, (t) = Taken By, (c) = Cosigned By      Initials Name Provider Type    CT Hansel Aguayo, PAIGE Physical Therapist                   Mobility       Row Name 01/07/24 1546          Bed Mobility    Bed Mobility bed mobility (all) activities  -CT     Scooting/Bridging [place] (Bed Mobility) independent  -CT     Sit-Supine [place] (Bed Mobility) independent  -CT     Assistive Device (Bed Mobility) head of bed elevated  -CT       Row Name 01/07/24 1546          Bed-Chair Transfer    Bed-Chair [place] (Transfers) independent  -CT       Row Name 01/07/24 1546          Sit-Stand Transfer    Sit-Stand [place] (Transfers) independent  -CT     Assistive Device (Sit-Stand Transfers) walker, front-wheeled  -CT       Row Name 01/07/24 1546          Gait/Stairs (Locomotion)    [place] Level (Gait) supervision  -CT     Assistive Device (Gait) walker, front-wheeled  -CT     Distance in Feet (Gait) 350  -CT               User Key  (r) = Recorded By, (t) = Taken By, (c) = Cosigned By      Initials Name Provider Type    CT Hansel Aguayo PT Physical Therapist                   Obj/Interventions       Row Name 01/07/24 1547          Range of Motion Comprehensive    General Range of Motion no range of motion deficits identified  -CT       Row Name 01/07/24 1547          Strength Comprehensive (MMT)    General Manual Muscle Testing (MMT) Assessment upper extremity strength deficits identified  -CT       Row Name 01/07/24 1547          Motor Skills    Motor Skills functional endurance  -CT       Row Name 01/07/24 1547          Balance    Balance Assessment sitting static balance;sitting dynamic balance;standing static balance;standing dynamic  balance  -CT     Static Sitting Balance independent  -CT     Dynamic Sitting Balance independent  -CT     Static Standing Balance independent  -CT     Dynamic Standing Balance independent  -CT     Position/Device Used, Standing Balance supported;walker, front-wheeled  -CT     Balance Interventions sitting;standing;sit to stand;supported;static  -CT               User Key  (r) = Recorded By, (t) = Taken By, (c) = Cosigned By      Initials Name Provider Type    CT Hansel Aguayo, PT Physical Therapist                   Goals/Plan    No documentation.                  Clinical Impression       Row Name 01/07/24 1547          Pain    Pretreatment Pain Rating 5/10  -CT     Posttreatment Pain Rating 3/10  -CT     Pain Location - Side/Orientation Right  -CT     Pain Location - groin  -CT     Pain Intervention(s) Ambulation/increased activity;Repositioned  -CT       Row Name 01/07/24 1547          Plan of Care Review    Plan of Care Reviewed With patient  -CT     Progress improving  -CT     Outcome Evaluation O2 at 2 L walked 350 w RW.  IND transfers.  Back to bed following walk  -CT       Row Name 01/07/24 1547          Therapy Assessment/Plan (PT)    Rehab Potential (PT) good, to achieve stated therapy goals  -CT     Therapy Frequency (PT) daily  -CT       Row Name 01/07/24 1547          Vital Signs    Pre SpO2 (%) 96  -CT     O2 Delivery Pre Treatment supplemental O2  -CT     Intra SpO2 (%) 95  -CT     O2 Delivery Intra Treatment supplemental O2  -CT     Post SpO2 (%) 93  -CT     O2 Delivery Post Treatment supplemental O2  -CT       Row Name 01/07/24 1547          Positioning and Restraints    Pre-Treatment Position in bed  -CT     Post Treatment Position bed  -CT     In Bed notified nsg;supine;call light within reach;encouraged to call for assist;exit alarm on  -CT               User Key  (r) = Recorded By, (t) = Taken By, (c) = Cosigned By      Initials Name Provider Type    CT Hansel Aguayo, PT  Physical Therapist                   Outcome Measures       Row Name 01/07/24 1549 01/07/24 0852       How much help from another person do you currently need...    Turning from your back to your side while in flat bed without using bedrails? 4  -CT 4  -MK    Moving from lying on back to sitting on the side of a flat bed without bedrails? 4  -CT 4  -MK    Moving to and from a bed to a chair (including a wheelchair)? 4  -CT 4  -MK    Standing up from a chair using your arms (e.g., wheelchair, bedside chair)? 4  -CT 4  -MK    Climbing 3-5 steps with a railing? 3  -CT 3  -MK    To walk in hospital room? 4  -CT 3  -MK    AM-PAC 6 Clicks Score (PT) 23  -CT 22  -MK    Highest Level of Mobility Goal 7 --> Walk 25 feet or more  -CT 7 --> Walk 25 feet or more  -MK      Row Name 01/07/24 1549          Functional Assessment    Outcome Measure Options AM-PAC 6 Clicks Basic Mobility (PT)  -CT               User Key  (r) = Recorded By, (t) = Taken By, (c) = Cosigned By      Initials Name Provider Type    CT Hansel Aguayo, PT Physical Therapist    Marjan Camilo, RN Registered Nurse                                 Physical Therapy Education       Title: PT OT SLP Therapies (In Progress)       Topic: Physical Therapy (In Progress)       Point: Mobility training (In Progress)       Learning Progress Summary             Patient Acceptance, E,D, NR by CT at 1/7/2024 1550    [address] VU by SC at 1/4/2024 1131    Comment: reviewed benefits of walking    [address] VU by SC at 1/2/2024 1203    Comment: reviewed benefits of activity                         Point: Home exercise program (In Progress)       Learning Progress Summary             Patient Acceptance, E,D, NR by CT at 1/7/2024 1550    [address], VU by SC at 1/4/2024 1131    Comment: reviewed benefits of walking    [address] VU by SC at 1/2/2024 1203    Comment: reviewed benefits of activity                         Point: Body mechanics (In Progress)        Learning Progress Summary             Patient Acceptance, E,D, NR by CT at 1/7/2024 1550    Eager, E, VU by SC at 1/4/2024 1131    Comment: reviewed benefits of walking    Eager, E, VU by SC at 1/2/2024 1203    Comment: reviewed benefits of activity                         Point: Precautions (In Progress)       Learning Progress Summary             Patient Acceptance, E,D, NR by CT at 1/7/2024 1550    Eager, E, VU by SC at 1/4/2024 1131    Comment: reviewed benefits of walking    Eager, E, VU by SC at 1/2/2024 1203    Comment: reviewed benefits of activity                                         User Key       Initials Effective Dates Name Provider Type Discipline    SC 02/03/23 -  Isaías Kumar, PT Physical Therapist PT    CT 07/07/23 -  Hansel Aguayo, PT Physical Therapist PT                  PT Recommendation and Plan     Plan of Care Reviewed With: patient  Progress: improving  Outcome Evaluation: O2 at 2 L walked 350 w RW.  IND transfers.  Back to bed following walk     Time Calculation:         PT Charges       Row Name 01/07/24 1550             Time Calculation    Start Time 1520  -CT      PT Received On 01/07/24  -CT         Time Calculation- PT    Total Timed Code Minutes- PT 35 minute(s)  -CT                User Key  (r) = Recorded By, (t) = Taken By, (c) = Cosigned By      Initials Name Provider Type    CT Hansel Aguayo, PT Physical Therapist                  Therapy Charges for Today       Code Description Service Date Service Provider Modifiers Qty    02723396543 HC GAIT TRAINING EA 15 MIN 1/7/2024 Hansel Aguayo, PT GP 2            PT G-Codes  Outcome Measure Options: AM-PAC 6 Clicks Basic Mobility (PT)  AM-PAC 6 Clicks Score (PT): 23  AM-PAC 6 Clicks Score (OT): 21  PT Discharge Summary  Anticipated Discharge Disposition (PT): home, home with home health    Hansel Aguayo, PT  1/7/2024

## 2024-01-07 NOTE — PROGRESS NOTES
"   LOS: 5 days    Patient Care Team:  Davina Santiago DO as PCP - General (Family Medicine)  Ameena Iglesias MD as Consulting Physician (Endocrinology)  Nakita Freire APRN as Nurse Practitioner (Nurse Practitioner)  Raimundo Thomas MD as Consulting Physician (Pulmonary Disease)  Nadir Everett MD as Consulting Physician (Cardiology)  Luiz Longoria MD as Consulting Physician (Pain Medicine)    Subjective     Stable overnight.      Objective     Vital Signs:  Blood pressure 134/58, pulse 85, temperature 99.5 °F (37.5 °C), temperature source Oral, resp. rate 18, height 152.4 cm (60\"), weight 89.9 kg (198 lb 3.1 oz), last menstrual period 06/17/1998, SpO2 91%, not currently breastfeeding.      Intake/Output Summary (Last 24 hours) at 1/7/2024 1022  Last data filed at 1/7/2024 0900  Gross per 24 hour   Intake 200 ml   Output 1100 ml   Net -900 ml        01/06 0701 - 01/07 0700  In: -   Out: 1350 [Urine:1350]    Physical Exam:        GENERAL: WD AAF NAD  NEURO: Awake and alert, oriented. No focal deficit  PSYCHIATRIC: NMA. Cooperative with PE  EYE: PE, no icterus, no conjunctivitis  ENT: ommm, dentition intact,  Hearing intact  NECK: Supple , No JVD discernable,  Trachea midline  CV: Trace edema, RRR  LUNGS:  Quiet,  Nonlabored resp.  Symmetrical expansion  ABDOMEN: Nondistended, soft nontender.  : No Perdomo, no palp bladder  SKIN: Warm and dry without rash      Labs:  Results from last 7 days   Lab Units 01/07/24  0552 01/06/24  0737 01/05/24  0501   WBC 10*3/mm3 4.97 5.89 6.74   HEMOGLOBIN g/dL 7.3* 7.3* 7.5*   PLATELETS 10*3/mm3 255 263 282     Results from last 7 days   Lab Units 01/07/24  0518 01/06/24  0737 01/05/24  0501 01/04/24  0541 01/02/24  1144 01/02/24  0808   SODIUM mmol/L 136 137 133* 136   < > 129*   POTASSIUM mmol/L 4.9 5.2 5.2 5.1   < > 5.3*   CHLORIDE mmol/L 103 105 104 105   < > 95*   CO2 mmol/L 18.0* 18.0* 17.0* 17.0*   < > 19.0*   BUN mg/dL 33* 37* 43* 50*   < > 61* "   CREATININE mg/dL 3.87* 3.80* 4.20* 5.39*   < > 7.49*   CALCIUM mg/dL 8.5* 8.3* 7.9* 7.1*   < > 7.6*   PHOSPHORUS mg/dL  --   --   --   --   --  7.3*   MAGNESIUM mg/dL  --   --   --   --   --  2.3   ALBUMIN g/dL 3.7 3.5 3.5 3.2*   < > 3.4*    < > = values in this interval not displayed.     Results from last 7 days   Lab Units 01/07/24  0518   ALK PHOS U/L 225*   BILIRUBIN mg/dL 0.3   ALT (SGPT) U/L 43*   AST (SGOT) U/L 49*                   Estimated Creatinine Clearance: 15.1 mL/min (A) (by C-G formula based on SCr of 3.87 mg/dL (H)).         A/P:    ARF: Creatinine back up slightly to 3.9 overnight.  Urine output nonoliguric with negative volume.  Creatinine up to 8 on presentation with likely dehydration due to diarrhea with poor p.o. intake in the setting of NSAIDs and Entresto.  Urine sodium 20 consistent with prerenal azotemia.  Renal function had been improving with hydration.  IV fluids held to avoid overload.  Creatinine back up slightly overnight.  For now continue supportive care.  Strict I's and O's.  Monitor for further renal recovery.    HTN: Blood pressure stable.    Hyponatremia: Resolved.  Initially down to 129 on presentation due to hypovolemia and MEERA.  All fluids isotonic     Hyperkalemia: Stable.  Due to MEERA.     Met Aciodsis: Improved on p.o. bicarb.  Due to renal failure and underlying GI losses with diarrhea.       Anemia.  Hemoglobin below goal.  Hx of supra therapeutic IRN w rectal bleeding in the past. Admitted again with concern for rectal bleed.  Check iron studies.  Transfuse as needed for Hgb <7.    Volume status: Continues with negative volume overnight.  Likely dehydrated on admission.  Improving with hydration.  Avoid volume overload with underlying CHF.  IV fluids held yesterday with creatinine increasing overnight.  Continue strict I's and O's.     Hx of mechanical valve.  On anticoagulation     CHF: EF 40-45%. Hx of MV and depressed RV function. On beta blocker and lasix. Not  on ACEi/ARB/MRA/ or SGLT-2inh for now.    High risk and complexity patient.    Justin Rodriguez MD  01/07/24  10:22 EST

## 2024-01-08 ENCOUNTER — APPOINTMENT (OUTPATIENT)
Dept: CARDIOLOGY | Facility: HOSPITAL | Age: 64
End: 2024-01-08
Payer: MEDICARE

## 2024-01-08 ENCOUNTER — HOME HEALTH ADMISSION (OUTPATIENT)
Dept: HOME HEALTH SERVICES | Facility: HOME HEALTHCARE | Age: 64
End: 2024-01-08
Payer: COMMERCIAL

## 2024-01-08 LAB
ABO GROUP BLD: NORMAL
ALBUMIN SERPL-MCNC: 3.2 G/DL (ref 3.5–5.2)
ALBUMIN/GLOB SERPL: 1.2 G/DL
ALP SERPL-CCNC: 213 U/L (ref 39–117)
ALT SERPL W P-5'-P-CCNC: 42 U/L (ref 1–33)
ANION GAP SERPL CALCULATED.3IONS-SCNC: 11 MMOL/L (ref 5–15)
AST SERPL-CCNC: 33 U/L (ref 1–32)
BH CV ECHO MEAS - TR MAX PG: 65 MMHG
BH CV ECHO MEAS - TR MAX VEL: 402 CM/SEC
BH CV VAS BP LEFT ARM: NORMAL MMHG
BILIRUB SERPL-MCNC: 0.2 MG/DL (ref 0–1.2)
BLD GP AB SCN SERPL QL: NEGATIVE
BUN SERPL-MCNC: 31 MG/DL (ref 8–23)
BUN/CREAT SERPL: 9.6 (ref 7–25)
CALCIUM SPEC-SCNC: 8.3 MG/DL (ref 8.6–10.5)
CHLORIDE SERPL-SCNC: 104 MMOL/L (ref 98–107)
CO2 SERPL-SCNC: 19 MMOL/L (ref 22–29)
CREAT SERPL-MCNC: 3.24 MG/DL (ref 0.57–1)
DEPRECATED RDW RBC AUTO: 49.9 FL (ref 37–54)
EGFRCR SERPLBLD CKD-EPI 2021: 15.6 ML/MIN/1.73
ERYTHROCYTE [DISTWIDTH] IN BLOOD BY AUTOMATED COUNT: 15.8 % (ref 12.3–15.4)
F TULAR IGG TITR SER: NEGATIVE {TITER}
F TULAR IGM TITR SER: NEGATIVE {TITER}
GLOBULIN UR ELPH-MCNC: 2.7 GM/DL
GLUCOSE BLDC GLUCOMTR-MCNC: 162 MG/DL (ref 70–130)
GLUCOSE BLDC GLUCOMTR-MCNC: 168 MG/DL (ref 70–130)
GLUCOSE BLDC GLUCOMTR-MCNC: 184 MG/DL (ref 70–130)
GLUCOSE BLDC GLUCOMTR-MCNC: 203 MG/DL (ref 70–130)
GLUCOSE SERPL-MCNC: 226 MG/DL (ref 65–99)
HCT VFR BLD AUTO: 22.1 % (ref 34–46.6)
HGB BLD-MCNC: 7.1 G/DL (ref 12–15.9)
INR PPP: 1.29 (ref 0.89–1.12)
MCH RBC QN AUTO: 27.7 PG (ref 26.6–33)
MCHC RBC AUTO-ENTMCNC: 32.1 G/DL (ref 31.5–35.7)
MCV RBC AUTO: 86.3 FL (ref 79–97)
PLATELET # BLD AUTO: 231 10*3/MM3 (ref 140–450)
PMV BLD AUTO: 10.3 FL (ref 6–12)
POTASSIUM SERPL-SCNC: 4.8 MMOL/L (ref 3.5–5.2)
PROT SERPL-MCNC: 5.9 G/DL (ref 6–8.5)
PROTHROMBIN TIME: 16.2 SECONDS (ref 12.2–14.5)
RBC # BLD AUTO: 2.56 10*6/MM3 (ref 3.77–5.28)
RH BLD: POSITIVE
SODIUM SERPL-SCNC: 134 MMOL/L (ref 136–145)
T&S EXPIRATION DATE: NORMAL
UFH PPP CHRO-ACNC: 0.31 IU/ML (ref 0.3–0.7)
WBC NRBC COR # BLD AUTO: 4.73 10*3/MM3 (ref 3.4–10.8)

## 2024-01-08 PROCEDURE — 86900 BLOOD TYPING SEROLOGIC ABO: CPT

## 2024-01-08 PROCEDURE — 99232 SBSQ HOSP IP/OBS MODERATE 35: CPT | Performed by: INTERNAL MEDICINE

## 2024-01-08 PROCEDURE — 93325 DOPPLER ECHO COLOR FLOW MAPG: CPT | Performed by: INTERNAL MEDICINE

## 2024-01-08 PROCEDURE — 80053 COMPREHEN METABOLIC PANEL: CPT | Performed by: STUDENT IN AN ORGANIZED HEALTH CARE EDUCATION/TRAINING PROGRAM

## 2024-01-08 PROCEDURE — 93312 ECHO TRANSESOPHAGEAL: CPT

## 2024-01-08 PROCEDURE — 85027 COMPLETE CBC AUTOMATED: CPT | Performed by: STUDENT IN AN ORGANIZED HEALTH CARE EDUCATION/TRAINING PROGRAM

## 2024-01-08 PROCEDURE — 93320 DOPPLER ECHO COMPLETE: CPT

## 2024-01-08 PROCEDURE — 93325 DOPPLER ECHO COLOR FLOW MAPG: CPT

## 2024-01-08 PROCEDURE — 25010000002 HEPARIN (PORCINE) 25000-0.45 UT/250ML-% SOLUTION

## 2024-01-08 PROCEDURE — B24BZZ4 ULTRASONOGRAPHY OF HEART WITH AORTA, TRANSESOPHAGEAL: ICD-10-PCS | Performed by: INTERNAL MEDICINE

## 2024-01-08 PROCEDURE — 99152 MOD SED SAME PHYS/QHP 5/>YRS: CPT

## 2024-01-08 PROCEDURE — P9016 RBC LEUKOCYTES REDUCED: HCPCS

## 2024-01-08 PROCEDURE — 63710000001 INSULIN LISPRO (HUMAN) PER 5 UNITS: Performed by: STUDENT IN AN ORGANIZED HEALTH CARE EDUCATION/TRAINING PROGRAM

## 2024-01-08 PROCEDURE — 63710000001 INSULIN DETEMIR PER 5 UNITS: Performed by: STUDENT IN AN ORGANIZED HEALTH CARE EDUCATION/TRAINING PROGRAM

## 2024-01-08 PROCEDURE — 93312 ECHO TRANSESOPHAGEAL: CPT | Performed by: INTERNAL MEDICINE

## 2024-01-08 PROCEDURE — 82948 REAGENT STRIP/BLOOD GLUCOSE: CPT

## 2024-01-08 PROCEDURE — 86901 BLOOD TYPING SEROLOGIC RH(D): CPT | Performed by: INTERNAL MEDICINE

## 2024-01-08 PROCEDURE — 63710000001 INSULIN LISPRO (HUMAN) PER 5 UNITS: Performed by: INTERNAL MEDICINE

## 2024-01-08 PROCEDURE — 76376 3D RENDER W/INTRP POSTPROCES: CPT

## 2024-01-08 PROCEDURE — 85610 PROTHROMBIN TIME: CPT

## 2024-01-08 PROCEDURE — 86850 RBC ANTIBODY SCREEN: CPT | Performed by: INTERNAL MEDICINE

## 2024-01-08 PROCEDURE — 86923 COMPATIBILITY TEST ELECTRIC: CPT

## 2024-01-08 PROCEDURE — 76376 3D RENDER W/INTRP POSTPROCES: CPT | Performed by: INTERNAL MEDICINE

## 2024-01-08 PROCEDURE — 86900 BLOOD TYPING SEROLOGIC ABO: CPT | Performed by: INTERNAL MEDICINE

## 2024-01-08 PROCEDURE — 87040 BLOOD CULTURE FOR BACTERIA: CPT | Performed by: INTERNAL MEDICINE

## 2024-01-08 PROCEDURE — 25010000002 FENTANYL CITRATE (PF) 50 MCG/ML SOLUTION: Performed by: INTERNAL MEDICINE

## 2024-01-08 PROCEDURE — 25010000002 MIDAZOLAM PER 1 MG: Performed by: INTERNAL MEDICINE

## 2024-01-08 PROCEDURE — 93320 DOPPLER ECHO COMPLETE: CPT | Performed by: INTERNAL MEDICINE

## 2024-01-08 PROCEDURE — 85520 HEPARIN ASSAY: CPT

## 2024-01-08 PROCEDURE — 25010000002 CEFTRIAXONE PER 250 MG: Performed by: INTERNAL MEDICINE

## 2024-01-08 PROCEDURE — 36430 TRANSFUSION BLD/BLD COMPNT: CPT

## 2024-01-08 RX ORDER — FENTANYL CITRATE 50 UG/ML
INJECTION, SOLUTION INTRAMUSCULAR; INTRAVENOUS
Status: COMPLETED | OUTPATIENT
Start: 2024-01-08 | End: 2024-01-08

## 2024-01-08 RX ORDER — MIDAZOLAM HYDROCHLORIDE 1 MG/ML
INJECTION INTRAMUSCULAR; INTRAVENOUS
Status: COMPLETED | OUTPATIENT
Start: 2024-01-08 | End: 2024-01-08

## 2024-01-08 RX ADMIN — OXYBUTYNIN CHLORIDE 5 MG: 5 TABLET, FILM COATED, EXTENDED RELEASE ORAL at 08:26

## 2024-01-08 RX ADMIN — SODIUM BICARBONATE 650 MG TABLET 1300 MG: at 20:19

## 2024-01-08 RX ADMIN — FENTANYL CITRATE 25 MCG: 50 INJECTION, SOLUTION INTRAMUSCULAR; INTRAVENOUS at 15:08

## 2024-01-08 RX ADMIN — INSULIN DETEMIR 5 UNITS: 100 INJECTION, SOLUTION SUBCUTANEOUS at 20:19

## 2024-01-08 RX ADMIN — FERROUS SULFATE TAB 325 MG (65 MG ELEMENTAL FE) 325 MG: 325 (65 FE) TAB at 08:26

## 2024-01-08 RX ADMIN — SODIUM BICARBONATE 650 MG TABLET 1300 MG: at 08:27

## 2024-01-08 RX ADMIN — SENNOSIDES AND DOCUSATE SODIUM 1 TABLET: 8.6; 5 TABLET ORAL at 08:26

## 2024-01-08 RX ADMIN — FOLIC ACID TAB 400 MCG 800 MCG: 400 TAB at 08:26

## 2024-01-08 RX ADMIN — INSULIN LISPRO 3 UNITS: 100 INJECTION, SOLUTION INTRAVENOUS; SUBCUTANEOUS at 17:12

## 2024-01-08 RX ADMIN — OXYCODONE HYDROCHLORIDE AND ACETAMINOPHEN 1 TABLET: 7.5; 325 TABLET ORAL at 17:11

## 2024-01-08 RX ADMIN — POLYETHYLENE GLYCOL 3350 17 G: 17 POWDER, FOR SOLUTION ORAL at 08:27

## 2024-01-08 RX ADMIN — Medication 10 ML: at 20:18

## 2024-01-08 RX ADMIN — Medication 5 MG: at 20:19

## 2024-01-08 RX ADMIN — SENNOSIDES AND DOCUSATE SODIUM 1 TABLET: 8.6; 5 TABLET ORAL at 20:19

## 2024-01-08 RX ADMIN — SODIUM BICARBONATE 650 MG TABLET 1300 MG: at 16:26

## 2024-01-08 RX ADMIN — ESCITALOPRAM OXALATE 20 MG: 20 TABLET, FILM COATED ORAL at 08:27

## 2024-01-08 RX ADMIN — INSULIN LISPRO 2 UNITS: 100 INJECTION, SOLUTION INTRAVENOUS; SUBCUTANEOUS at 17:11

## 2024-01-08 RX ADMIN — INSULIN LISPRO 2 UNITS: 100 INJECTION, SOLUTION INTRAVENOUS; SUBCUTANEOUS at 20:19

## 2024-01-08 RX ADMIN — MIDAZOLAM HYDROCHLORIDE 1 MG: 1 INJECTION, SOLUTION INTRAMUSCULAR; INTRAVENOUS at 15:08

## 2024-01-08 RX ADMIN — Medication 2 SPRAY: at 20:19

## 2024-01-08 RX ADMIN — Medication 2 SPRAY: at 08:37

## 2024-01-08 RX ADMIN — MIDAZOLAM HYDROCHLORIDE 2 MG: 1 INJECTION, SOLUTION INTRAMUSCULAR; INTRAVENOUS at 15:12

## 2024-01-08 RX ADMIN — MIDAZOLAM HYDROCHLORIDE 1 MG: 1 INJECTION, SOLUTION INTRAMUSCULAR; INTRAVENOUS at 15:06

## 2024-01-08 RX ADMIN — PANTOPRAZOLE SODIUM 40 MG: 40 TABLET, DELAYED RELEASE ORAL at 04:55

## 2024-01-08 RX ADMIN — FENTANYL CITRATE 50 MCG: 50 INJECTION, SOLUTION INTRAMUSCULAR; INTRAVENOUS at 15:12

## 2024-01-08 RX ADMIN — FENTANYL CITRATE 25 MCG: 50 INJECTION, SOLUTION INTRAMUSCULAR; INTRAVENOUS at 15:06

## 2024-01-08 RX ADMIN — ACETAMINOPHEN 650 MG: 325 TABLET ORAL at 08:37

## 2024-01-08 RX ADMIN — CEFTRIAXONE 2000 MG: 2 INJECTION, POWDER, FOR SOLUTION INTRAMUSCULAR; INTRAVENOUS at 08:26

## 2024-01-08 RX ADMIN — METHOHEXITAL SODIUM 20 MG: 500 INJECTION, POWDER, LYOPHILIZED, FOR SOLUTION INTRAMUSCULAR; INTRAVENOUS; RECTAL at 15:15

## 2024-01-08 RX ADMIN — HEPARIN SODIUM 17 UNITS/KG/HR: 10000 INJECTION, SOLUTION INTRAVENOUS at 10:42

## 2024-01-08 NOTE — PROGRESS NOTES
[facility] Medicine Services  PROGRESS NOTE    Patient Name: Di Lopez  : 1960  MRN: [id number]    Date of Admission: 2024  Primary Care Physician: Davina Santiago DO    Subjective   Subjective     CC:  Anemia, fever    HPI:  Doesn't feel well today. Fever this morning. Thigh pain. Denies SOA.       Objective   Objective     Vital Signs:   Temp:  [97.8 °F (36.6 °C)-101.6 °F (38.7 °C)] 98.5 °F (36.9 °C)  Heart Rate:  [68-78] 72  Resp:  [18] 18  BP: (120-148)/(49-60) 125/60  Flow (L/min):  [2] 2     Physical Exam:  Constitutional:  awake, alert; appears to feel unwell  HENT: NCAT, mucous membranes moist  Respiratory: clear, diminished with poor effort  Cardiovascular: RRR, no murmurs, rubs, or gallops  Gastrointestinal: Positive bowel sounds, soft, nontender, nondistended  Musculoskeletal: trace bilateral ankle edema; tender anterior thigh  Psychiatric: Appropriate affect, cooperative  Neurologic: Oriented x 3, strength symmetric in all extremities, Cranial Nerves grossly intact to confrontation, speech clear  Skin: No rashes      Results Reviewed:  LAB RESULTS:      Lab 24  0638 24  0457 24  2105 24  1402 24  0552 24  0518 24  0737 24  0056 24  1640 24  0501 24  0541 24  0251 24  0808 24  0531 24  2330   WBC  --  4.73  --   --  4.97  --  5.89  --   --  6.74 5.78 4.18  --  4.26 4.42   HEMOGLOBIN  --  7.1*  --   --  7.3*  --  7.3*  --   --  7.5* 7.8* 8.4*  --  8.5* 9.5*   HEMATOCRIT  --  22.1*  --   --  23.0*  --  23.0*  --   --  24.3* 24.5* 25.2*  --  26.0* 29.3*   PLATELETS  --  231  --   --  255  --  263  --   --  282 265 224  --  228 250   NEUTROS ABS  --   --   --   --   --   --  4.19  --   --   --   --  2.61  --  3.02 3.39   IMMATURE GRANS (ABS)  --   --   --   --   --   --  0.17*  --   --   --   --  0.07*  --  0.05 0.06*   LYMPHS ABS  --   --   --   --   --   --  1.18  --   --    --   --  1.12  --  0.92 0.77   MONOS ABS  --   --   --   --   --   --  0.26  --   --   --   --  0.25  --  0.21 0.17   EOS ABS  --   --   --   --   --   --  0.06  --   --   --   --  0.12  --  0.05 0.02   MCV  --  86.3  --   --  87.5  --  85.5  --   --  86.8 86.0 85.7  --  85.8 84.2   SED RATE  --   --   --   --   --   --   --   --  58*  --   --   --   --   --   --    CRP  --   --   --   --   --   --   --   --  5.92*  --   --   --   --   --   --    PROCALCITONIN  --   --   --   --   --  0.40*  --   --   --   --   --   --   --   --   --    LACTATE  --   --   --   --   --   --   --   --  1.4  --   --   --   --   --  1.5   PROTIME  --  16.2*  --   --   --  17.2* 18.0*  --   --  18.8*  --   --   --   --  26.0*   APTT  --   --   --   --   --   --   --   --   --   --   --   --   --  66.2  --    HEPARIN ANTI-XA 0.31  --  0.41 0.38  --  0.25* 0.35   < > 0.25* 0.21* 0.34 0.37   < > 0.10*  --     < > = values in this interval not displayed.         Lab 01/08/24  0457 01/07/24  0518 01/06/24  0737 01/05/24  0501 01/04/24  0541 01/02/24  1144 01/02/24  0808   SODIUM 134* 136 137 133* 136   < > 129*   POTASSIUM 4.8 4.9 5.2 5.2 5.1   < > 5.3*   CHLORIDE 104 103 105 104 105   < > 95*   CO2 19.0* 18.0* 18.0* 17.0* 17.0*   < > 19.0*   ANION GAP 11.0 15.0 14.0 12.0 14.0   < > 15.0   BUN 31* 33* 37* 43* 50*   < > 61*   CREATININE 3.24* 3.87* 3.80* 4.20* 5.39*   < > 7.49*   EGFR 15.6* 12.6* 12.9* 11.4* 8.5*   < > 5.7*   GLUCOSE 226* 167* 145* 103* 89   < > 152*   CALCIUM 8.3* 8.5* 8.3* 7.9* 7.1*   < > 7.6*   MAGNESIUM  --   --   --   --   --   --  2.3   PHOSPHORUS  --   --   --   --   --   --  7.3*   TSH  --   --   --   --   --   --  1.590    < > = values in this interval not displayed.         Lab 01/08/24  0457 01/07/24  0518 01/06/24  0737 01/05/24  0501 01/04/24  0541 01/02/24  0808 01/01/24  2330   TOTAL PROTEIN 5.9* 6.3 6.2 5.9* 5.2*   < > 7.1   ALBUMIN 3.2* 3.7 3.5 3.5 3.2*   < > 4.1   GLOBULIN 2.7 2.6 2.7 2.4 2.0   < > 3.0   ALT  (SGPT) 42* 43* 31 29 32   < > 45*   AST (SGOT) 33* 49* 36* 28 29   < > 34*   BILIRUBIN 0.2 0.3 0.3 0.2 0.2   < > 0.3   ALK PHOS 213* 225* 212* 204* 212*   < > 247*   LIPASE  --   --   --   --   --   --  82*    < > = values in this interval not displayed.         Lab 01/08/24  0457 01/07/24  0518 01/06/24  0737 01/05/24  0501 01/01/24  2330   PROTIME 16.2* 17.2* 18.0* 18.8* 26.0*   INR 1.29* 1.39* 1.48* 1.56* 2.36*             Lab 01/08/24  1113 01/06/24  0737 01/04/24  0541 01/01/24  2358   IRON  --  15*  --   --    IRON SATURATION (TSAT)  --  5*  --   --    TIBC  --  279*  --   --    TRANSFERRIN  --  187*  --   --    FERRITIN  --  1,188.00*  --   --    FOLATE  --   --  >20.00  --    VITAMIN B 12  --   --  353  --    ABO TYPING O  --   --  O   RH TYPING Positive  --   --  Positive   ANTIBODY SCREEN Negative  --   --  Negative         Lab 01/02/24  0047   FIO2 21   CARBOXYHEMOGLOBIN (VENOUS) 1.3     Brief Urine Lab Results  (Last result in the past 365 days)        Color   Clarity   Blood   Leuk Est   Nitrite   Protein   CREAT   Urine HCG        01/02/24 0011             142.2         01/02/24 0011 Yellow   Cloudy   Small (1+)   Negative   Negative   100 mg/dL (2+)                   Microbiology Results Abnormal       Procedure Component Value - Date/Time    Blood Culture - Blood, Arm, Left [[id number]]  (Normal) Collected: 01/06/24 0056    Lab Status: Preliminary result Specimen: Blood from Arm, Left Updated: 01/08/24 0815     Blood Culture No growth at 2 days    Blood Culture - Blood, Arm, Left [[id number]]  (Normal) Collected: 01/05/24 1641    Lab Status: Preliminary result Specimen: Blood from Arm, Left Updated: 01/07/24 1815     Blood Culture No growth at 2 days    Narrative:      Less than seven (7) mL's of blood was collected.  Insufficient quantity may yield false negative results.    Respiratory Panel PCR w/COVID-19(SARS-CoV-2) LUIS/ESTHER/KARSON/PAD/COR/LEONARDO In-House, NP Swab in UTM/VTM, 2 HR TAT - Swab, Nasopharynx  [[id number]]  (Normal) Collected: 01/05/24 1634    Lab Status: Final result Specimen: Swab from Nasopharynx Updated: 01/05/24 1804     ADENOVIRUS, PCR Not Detected     Coronavirus 229E Not Detected     Coronavirus HKU1 Not Detected     Coronavirus NL63 Not Detected     Coronavirus OC43 Not Detected     COVID19 Not Detected     Human Metapneumovirus Not Detected     Human Rhinovirus/Enterovirus Not Detected     Influenza A PCR Not Detected     Influenza B PCR Not Detected     Parainfluenza Virus 1 Not Detected     Parainfluenza Virus 2 Not Detected     Parainfluenza Virus 3 Not Detected     Parainfluenza Virus 4 Not Detected     RSV, PCR Not Detected     Bordetella pertussis pcr Not Detected     Bordetella parapertussis PCR Not Detected     Chlamydophila pneumoniae PCR Not Detected     Mycoplasma pneumo by PCR Not Detected    Narrative:      In the setting of a positive respiratory panel with a viral infection PLUS a negative procalcitonin without other underlying concern for bacterial infection, consider observing off antibiotics or discontinuation of antibiotics and continue supportive care. If the respiratory panel is positive for atypical bacterial infection (Bordetella pertussis, Chlamydophila pneumoniae, or Mycoplasma pneumoniae), consider antibiotic de-escalation to target atypical bacterial infection.    COVID PRE-OP / PRE-PROCEDURE SCREENING ORDER (NO ISOLATION) - Swab, Nasopharynx [[id number]]  (Normal) Collected: 01/01/24 2330    Lab Status: Final result Specimen: Swab from Nasopharynx Updated: 01/02/24 0016    Narrative:      The following orders were created for panel order COVID PRE-OP / PRE-PROCEDURE SCREENING ORDER (NO ISOLATION) - Swab, Nasopharynx.  Procedure                               Abnormality         Status                     ---------                               -----------         ------                     COVID-19, FLU A/B, RSV P...[[id number]]  Normal              Final result                  Please view results for these tests on the individual orders.    COVID-19, FLU A/B, RSV PCR 1 HR TAT - Swab, Nasopharynx [[id number]]  (Normal) Collected: 01/01/24 2330    Lab Status: Final result Specimen: Swab from Nasopharynx Updated: 01/02/24 0016     COVID19 Not Detected     Influenza A PCR Not Detected     Influenza B PCR Not Detected     RSV, PCR Not Detected    Narrative:      Fact sheet for providers: https://www.fda.gov/media/947072/download    Fact sheet for patients: https://www.fda.gov/media/770902/download    Test performed by PCR.            Adult Transthoracic Echo Complete W/ Cont if Necessary Per Protocol    Result Date: 1/6/2024    The right ventricular cavity is dilated.   The left atrial cavity is mildly dilated.   Left atrial volume is mildly increased.   The right atrial cavity is dilated.   The mitral valve mean gradient is 7 mmHg.   There is a mechanical mitral valve prosthesis present.   Moderate to severe tricuspid valve regurgitation is present.   Estimated right ventricular systolic pressure from tricuspid regurgitation is markedly elevated (>55 mmHg). Calculated right ventricular systolic pressure from tricuspid regurgitation is 88 mmHg.   LVEF estimated at 59%      Results for orders placed during the hospital encounter of 01/01/24    Adult Transthoracic Echo Complete W/ Cont if Necessary Per Protocol    Interpretation Summary    The right ventricular cavity is dilated.    The left atrial cavity is mildly dilated.    Left atrial volume is mildly increased.    The right atrial cavity is dilated.    The mitral valve mean gradient is 7 mmHg.    There is a mechanical mitral valve prosthesis present.    Moderate to severe tricuspid valve regurgitation is present.    Estimated right ventricular systolic pressure from tricuspid regurgitation is markedly elevated (>55 mmHg). Calculated right ventricular systolic pressure from tricuspid regurgitation is 88 mmHg.    LVEF estimated at  59%      Current medications:  Scheduled Meds:cefTRIAXone, 2,000 mg, Intravenous, Q24H  escitalopram, 20 mg, Oral, Daily  ferrous sulfate, 325 mg, Oral, Daily With Breakfast  folic acid, 800 mcg, Oral, Daily  insulin detemir, 5 Units, Subcutaneous, Nightly  insulin lispro, 2-7 Units, Subcutaneous, 4x Daily AC & at Bedtime  Insulin Lispro, 3 Units, Subcutaneous, TID With Meals  oxybutynin XL, 5 mg, Oral, Daily  oxymetazoline, 2 spray, Each Nare, BID  pantoprazole, 40 mg, Oral, Q AM  polyethylene glycol, 17 g, Oral, Daily  senna-docusate sodium, 1 tablet, Oral, BID  sodium bicarbonate, 1,300 mg, Oral, TID  sodium chloride, 10 mL, Intravenous, Q12H      Continuous Infusions:heparin, 17 Units/kg/hr, Last Rate: 17 Units/kg/hr (01/08/24 1042)  Pharmacy to Dose Heparin,       PRN Meds:.  acetaminophen    albuterol    dextrose    dextrose    glucagon (human recombinant)    melatonin    naloxone    nitroglycerin    oxyCODONE-acetaminophen    Pharmacy to Dose Heparin    sodium chloride    sodium chloride    Assessment & Plan   Assessment & Plan     Active Hospital Problems    Diagnosis  POA    **Renal failure [N19]  Yes    Hyponatremia [E87.1]  Yes    Hyperkalemia [E87.5]  Yes    Type 2 diabetes mellitus with hyperglycemia [E11.65]  Yes    Elevated LFTs [R79.89]  Yes    Anemia, chronic disease [D63.8]  Yes    Cervical lymphadenopathy [R59.0]  Yes    GI bleed [K92.2]  Yes    H/O heart valve replacement with mechanical valve [Z95.2]  Not Applicable    ARF (acute renal failure) [N17.9]  Yes    Left bundle branch hemiblock [I44.60]  Yes    Type 2 diabetes mellitus without complication, with long-term current use of insulin [E11.9, Z79.4]  Not Applicable    Dyslipidemia [E78.5]  Yes    Emphysema/COPD [J43.9]  Yes    Hypertension [I10]  Yes    Arteriovenous malformation of gastrointestinal tract [K55.20]  Yes    Obstructive sleep apnea syndrome [G47.33]  Yes    Chronic anticoagulation [Z79.01]  Not Applicable      [facility]  Problems   No resolved problems to display.        Brief Hospital Course to date:  Di Lopez is a 63 y.o. 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 Found to have severe MEERA, resolution of BRBPR, persistent fever/chills and thigh pain     Epistaxis  -start afrin x 3 days  - h/h trending down but epistaxis appears improved      Febrile illness, unclear etiology  -negative full viral panel, negative monospot  -continue to suspect viral illness (throat soreness, fevers, back pain/aches, etc), given new thigh pain consideration of discitis but MRI withOUT contrast negative  -blood cultures ngtd, TTE without vegetation.   - Repeat fever 1/8 - cultures repeated  - MEAGAN pending   -ID following, on rocephin     Acute Renal Failure 2/2 hypovolemia + med effect - improved  -2/2 acute diarrheal illness + entresto, NSAIDs  -IVF discontinued, appears mildly hypervolemic w good PO intake now  -do suspect some ATN component and expect slow recovery  -nephrology consulted  - Currently holding fluids and monitoring      Hypovolemic hyponatremia - improved  - remains stable off IVF     Acute anemia 2/2 bloody diarrheal illness - blood diarrhea resolved  -no further bowel movement since admission, suspect further decline is 2/2 prior hemoconcentration and now IVF resuscitation. Hypoproliferative  - 1 units PRBC 1/8     Mechanical mitral valve on coumadin - heparin gtt in setting of severe MEERA, pharmacy monitoring     CHFpEF (EF 45-50%), s/p ICD - appears euvolemic currently. Off fluids   Chronic hypoxic resp failure 2/2 COPD - on home 2 liters n/c  DMII - basal/bolus regimen  Anxiety/depression  BMI 38  Liver mass s/p embolization    Expected Discharge Location and Transportation: home health  Expected Discharge   Expected Discharge Date: 1/11/2024; Expected Discharge Time:      DVT prophylaxis:  Medical DVT  prophylaxis orders are present.     AM-PAC 6 Clicks Score (PT): 23 (01/07/24 2693)    CODE STATUS:   Code Status and Medical Interventions:   Ordered at: 01/02/24 5687     Code Status (Patient has no pulse and is not breathing):    CPR (Attempt to Resuscitate)     Medical Interventions (Patient has pulse or is breathing):    Full Support       Sarah Sherman, DO  01/08/24

## 2024-01-08 NOTE — CONSULTS
Cardiology Consult - [facility]    Di WADDELL Hockaday     [id number] 1960  [address] [place] [id number]         Admission Date:  1/1/2024    Consultation Date:  01/08/24        PCP:  Davina Santiago DO  Referring MD: Dr. Willett  Consulting MD: Dr. Everett          CC: ARF, persistent fevers, cervical adenopathy    Reason for Consult: Rule out endocarditis, severe TR by TTE          Allergies:  is allergic to adhesive tape, sildenafil, codeine, morphine, penicillins, statins, and sulfa antibiotics.    Medications Prior to Admission   Medication Sig Dispense Refill Last Dose    albuterol sulfate  (90 Base) MCG/ACT inhaler Inhale 2 puffs Every 4 (Four) Hours As Needed for Wheezing. 1 g 0     azelastine (ASTELIN) 0.1 % nasal spray 2 sprays into the nostril(s) as directed by provider 2 (Two) Times a Day. Use in each nostril as directed (Patient taking differently: 2 sprays into the nostril(s) as directed by provider As Needed for Rhinitis or Allergies. Use in each nostril as directed prn) 1 each 1     carvedilol (COREG) 12.5 MG tablet Take 1 tablet by mouth 2 (Two) Times a Day With Meals. 180 tablet 2     cefdinir (OMNICEF) 300 MG capsule Take 1 capsule by mouth 2 (Two) Times a Day. 20 capsule 0     Continuous Blood Gluc  (FreeStyle Dalia 2 [place]) device 1 each Daily. Use as directed daily to check blood sugars 1 each 0     Continuous Blood Gluc Sensor (FreeStyle Dalia 2 Sensor) misc USE AS DIRECTED FOR 14 DAYS 2 each 11     Dulaglutide (Trulicity) 4.5 MG/0.5ML solution pen-injector INJECT 1  SYRINGE SUBCUTANEOUSLY ONCE A WEEK 6 mL 0     escitalopram (LEXAPRO) 20 MG tablet Take 1 tablet by mouth Daily. 90 tablet 3     ferrous sulfate 325 (65 FE) MG tablet Take 1 tablet by mouth Daily With Breakfast.       folic acid (FOLVITE) 800 MCG tablet Take 1 tablet by mouth Daily.       furosemide (LASIX) 40 MG tablet Take 1 tablet by mouth 3 (Three) Times a Week. Monday,  Wednesday, Friday. Resume when done w daily lasix       gabapentin (NEURONTIN) 300 MG capsule Take 1 capsule by mouth At Night As Needed (leg pain). 30 capsule 3     glimepiride (AMARYL) 4 MG tablet Take 1 tablet by mouth 2 (Two) Times a Day. 180 tablet 1     glucose blood test strip Use as instructed 3 times a day 100 each 11     HYDROcodone-acetaminophen (NORCO) 7.5-325 MG per tablet Take 1 tablet by mouth Every 8 (Eight) Hours As Needed for Moderate Pain.       Insulin Glargine (Lantus SoloStar) 100 UNIT/ML injection pen Inject 44 Units under the skin into the appropriate area as directed Daily. 45 mL 1     Insulin Lispro, 1 Unit Dial, (HumaLOG KwikPen) 100 UNIT/ML solution pen-injector Per correctional scale, MDD 50 units 15 mL 3     Insulin Pen Needle (B-D UF III MINI PEN NEEDLES) 31G X 5 MM misc USE 1  FIVE TIMES DAILY 150 each 5     Insulin Pen Needle (RELION PEN NEEDLES) 32G X 4 MM misc Use BID for E11.9 100 each 1     Lancets misc For use with glucose monitoring 3 times daily 100 each 11     lidocaine (LIDODERM) 5 % USE 1 PATCH EXTERNALLY ONCE DAILY. REMOVE AND DISCARD PATCH WITHIN 12 HOURS OR AS DIRECTED BY MD (Patient taking differently: Place 1 patch on the skin as directed by provider Daily.) 30 patch 0     methocarbamol (ROBAXIN) 500 MG tablet Take 1 tablet by mouth 4 (Four) Times a Day As Needed for Muscle Spasms. 40 tablet 0     nystatin (MYCOSTATIN) 100,000 unit/mL suspension Swish and swallow 5 mL 4 (Four) Times a Day. 200 mL 0     Omega-3 Fatty Acids (FISH OIL) 1000 MG capsule capsule Take 1 capsule by mouth 4 (Four) Times a Day. (Patient taking differently: Take 1 capsule by mouth 2 (Two) Times a Day With Meals.) 120 capsule 2     omeprazole (priLOSEC) 20 MG capsule Take 1 capsule by mouth twice daily 60 capsule 0     sacubitril-valsartan (ENTRESTO) 24-26 MG tablet Take 1 tablet by mouth 2 (Two) Times a Day. Resume when ok w PCP       simvastatin (ZOCOR) 5 MG tablet TAKE 1 TABLET BY MOUTH ONCE  DAILY AT NIGHT 90 tablet 0     solifenacin (VESICARE) 5 MG tablet Take 1 tablet by mouth Daily. 90 tablet 0     spironolactone (ALDACTONE) 25 MG tablet Take 1 tablet by mouth Daily.       Vitamin D, Cholecalciferol, (CHOLECALCIFEROL) 10 MCG (400 UNIT) tablet Take 2 tablets by mouth Daily.       warfarin (COUMADIN) 2 MG tablet Take on 11/24/2023  Indications: Presence of Mechanical Artificial Heart Valve (Patient taking differently: Take 2.5 tablets by mouth. Take on 11/24/2023  Indications: Presence of Mechanical Artificial Heart Valve) 5 tablet 0     warfarin (COUMADIN) 4 MG tablet Once per day on Sun Mon Tue Wed Thu Sat  Indications: Presence of Mechanical Artificial Heart Valve 14 tablet 0        heparin, 17 Units/kg/hr, Last Rate: 17 Units/kg/hr (01/08/24 4906)  Pharmacy to Dose Heparin,           HPI:  Di Lopez is a 62 yo AA female with PMHx Chronic HFpEF with BiV PPM, LBBB, mechanical MVR on chronic warfarin, HTN, HLP, DMII, liver mass s/p embolization, AVM GI tract, COPD with former tobacco abuse/ongoing vaping use, obesity, recent GIB, Candida esophagitis, diverticulosis.  She presents to [facility] with complaints of 10 pound weight loss, chills, diarrhea, swollen lymph nodes, BRBPR.  She was last hospitalized November 2023 with a GI bleed requiring 1 unit PRBC.  At that time, upper and lower endoscopy revealed Candida esophagitis and diverticulosis.  She was also treated for UTI during that admission (Rocephin).  She reports not feeling well since discharge and was recently treated for continued lymphadenopathy by her PCP with cefdinir and methocarbamol.  She has been admitted to hospitalist services, ID and nephrology following in consultation for ARF and persistent fevers.      Transthoracic echo performed demonstrates EF 59%, moderate to severe TR with RVSP 88 mmHg.  She also has a BiV pacemaker.  With this cardiac history, there is concern for possible endocarditis and cardiology has been asked to  consult for MEAGAN.  Her device was interrogated last week for MRI clearance.  Interrogation was WNL with normal function.  At time of consultation, patient is awake in bed, no family present.  She continues to have pain in BLE particularly behind her knees.  She continues to report pedal edema, generalized weakness and overall pain throughout her body.  She denies chest pain or angina, denies dyspnea or orthopnea.  She reports no palpitations or irregular heartbeats.  Denies dizziness or presyncope.          Social History     Socioeconomic History    Marital status: Single   Tobacco Use    Smoking status: Former     Packs/day: 1.00     Years: 30.00     Additional pack years: 0.00     Total pack years: 30.00     Types: Cigarettes, Electronic Cigarette     Start date: 1976     Quit date: 2/10/2010     Years since quittin.9    Smokeless tobacco: Never    Tobacco comments:     Quit in    Vaping Use    Vaping Use: Every day    Substances: Nicotine    Devices: Disposable, one every 2 days   Substance and Sexual Activity    Alcohol use: No    Drug use: Never    Sexual activity: Defer     Partners: Male     Birth control/protection: Abstinence, None, Hysterectomy     Comment: 1 partner     Family History   Problem Relation Age of Onset    Sudden death Mother         POSSIBLE SERIOUS MASSIVE STROKE    Hypertension Mother             Multiple myeloma Father     Cancer Father             Hypertension Father             Ovarian cancer Paternal Grandmother     Cancer Paternal Grandmother             Diabetes Brother             Hypertension Brother             Breast cancer Neg Hx      Past Surgical History:   Procedure Laterality Date    BILATERAL OOPHORECTOMY      Status post bilateral oophorectomy secondary to ovarian cysts     BREAST BIOPSY Right     STEREOTACTIC    BREAST EXCISIONAL BIOPSY Bilateral     CARDIAC ELECTROPHYSIOLOGY PROCEDURE N/A 10/15/2020     "Procedure: Biventricular Device Insertion;  Surgeon: Nadir Everett MD;  Location:  [facility];  Service: Cardiology;  Laterality: N/A;    CARDIAC SURGERY  2003    CARPAL TUNNEL RELEASE Bilateral     COLONOSCOPY  12/28/2012    COLONOSCOPY N/A 5/23/2023    Procedure: COLONOSCOPY;  Surgeon: Gt Fernandes MD;  Location:  [facility];  Service: Gastroenterology;  Laterality: N/A;    COLONOSCOPY N/A 11/16/2023    Procedure: COLONOSCOPY;  Surgeon: Brunner, Mark I, MD;  Location:  [facility];  Service: Gastroenterology;  Laterality: N/A;    CYSTECTOMY      removal of ganglion cyst from left wrist    ENDOSCOPY  12/30/2011    DIAGNOSTIC ESOPHAGOGASTRODUODENOSCOPY    ENDOSCOPY N/A 11/16/2023    Procedure: ESOPHAGOGASTRODUODENOSCOPY;  Surgeon: Brunner, Mark I, MD;  Location:  [facility];  Service: Gastroenterology;  Laterality: N/A;    HYSTERECTOMY  1995    A.  Status post hysterectomy for early stage endometrial cancer done in 1995.    MITRAL VALVE REPLACEMENT  03/03/2008    Dr. Hima Barreto; MECHANICAL    OTHER SURGICAL HISTORY      LIVER MASS BLOOD SUPPLY CLIPPED ON RIGHT AND LEFT SIDE PER PT     ROS: Pertinent items are noted in HPI, all other systems reviewed and negative     Objective     height is 152.4 cm (60\") and weight is 89.9 kg (198 lb 3.1 oz). Her oral temperature is 101.4 °F (38.6 °C) (abnormal). Her blood pressure is 123/60 and her pulse is 78. Her respiration is 18 and oxygen saturation is 96%.    Intake/Output Summary (Last 24 hours) at 1/8/2024 0916  Last data filed at 1/8/2024 0455  Gross per 24 hour   Intake 160 ml   Output 1300 ml   Net -1140 ml     Intake/Output                         01/06/24 0701 - 01/07/24 0700 01/07/24 0701 - 01/08/24 0700     [id number] [id number] Total 8938-1783 7867-8924 Total                 Intake    P.O.  --  -- --  360  -- 360    Total Intake -- -- -- 360 -- 360       Output    Urine  800  550 1350  900  400 1300    Total Output "                 01/01/24  2251 01/02/24  0408 01/06/24  1612   Weight: 84.8 kg (187 lb) 89.9 kg (198 lb 3.2 oz) 89.9 kg (198 lb 3.1 oz)          Physical Exam:  General Appearance:    Alert, cooperative, in no acute distress   Head:    Normocephalic, without obvious abnormality, atraumatic   Eyes:            Lids and lashes normal, conjunctivae and sclerae normal, no   icterus, no pallor, corneas clear, PERRLA   Ears:    Ears appear intact with no abnormalities noted   Throat:   No oral lesions, no thrush, oral mucosa moist   Neck:   No adenopathy, supple, trachea midline, no thyromegaly, no carotid bruit, no JVD   Back:     No kyphosis present, no scoliosis present, no skin lesions,    erythema or scars, no tenderness to percussion or                   palpation, range of motion normal   Lungs:     Clear to auscultation,respirations regular, even and               unlabored    Heart:    Regular rhythm and normal rate, normal S1 and S2, no         murmur, no gallop, no rub.  + click   Abdomen:     Normal bowel sounds, no masses, no organomegaly, soft     nontender, nondistended, no guarding, no rebound   tenderness   Extremities:   Moves all extremities well,  no cyanosis, no redness, + edema   Pulses:   Pulses palpable and equal bilaterally   Skin:   No bleeding, bruising or rash   Lymph nodes:   + cervical adenopathy   Neurologic:   Cranial nerves 2 - 12 grossly intact, sensation intact, DTR     present and equal bilaterally          Results Review:  I personally reviewed the patient's clinical results.  Results from last 7 days   Lab Units 01/08/24  0457   WBC 10*3/mm3 4.73   HEMOGLOBIN g/dL 7.1*   HEMATOCRIT % 22.1*   PLATELETS 10*3/mm3 231     Results from last 7 days   Lab Units 01/08/24  0457   SODIUM mmol/L 134*   POTASSIUM mmol/L 4.8   CHLORIDE mmol/L 104   CO2 mmol/L 19.0*   BUN mg/dL 31*   CREATININE mg/dL 3.24*   CALCIUM mg/dL 8.3*   BILIRUBIN mg/dL 0.2   ALK PHOS U/L 213*   ALT  (SGPT) U/L 42*   AST (SGOT) U/L 33*   GLUCOSE mg/dL 226*       Results from last 7 days   Lab Units 01/08/24  0457 01/07/24  0518 01/06/24  0737   INR  1.29* 1.39* 1.48*     Results from last 7 days   Lab Units 01/02/24  0808   TSH uIU/mL 1.590                     Radiology:  Imaging Results (Last 72 Hours)       Procedure Component Value Units Date/Time    MRI Lumbar Spine Without Contrast [[id number]] Collected: 01/05/24 2006     Updated: 01/05/24 2014    Narrative:        MRI LUMBAR SPINE WO CONTRAST    Date of Exam: 1/5/2024 7:30 PM EST    Indication: Low back pain, symptoms persist with > 6 wks treatment  patient with possible metallic implant?.     Comparison: CT 7/16/2013. Radiograph 4/20/2022.    Technique:  Routine multiplanar/multisequence sequence images of the lumbar spine were obtained without contrast administration.        Findings:  The vertebral body heights are maintained.  Grade 1 retrolisthesis of L5 on S1.  Otherwise overall alignment is maintained.    Mild T2/T1 hyperintensity within the endplates at L5-S1, most likely representing Modic type III changes. No suspicious marrow edema.  Mild diffuse hypodensity within the visualized marrow, nonspecific but may represent underlying hematopoiesis.    The visualized spinal cord is unremarkable.  The conus terminates at L1. Although evaluation is limited, no definite fluid collection is noted within the spinal canal.    The paravertebral soft tissues are unremarkable.  The visualized intra-abdominal and intrapelvic soft tissues are unremarkable.    Mild loss of intervertebral disc base height throughout the visualized spine and moderate to severe loss of intervertebral disc base height at L5-S1.    T12-L1: No significant spinal canal stenosis or neuroforaminal narrowing.  L1-L2: No significant spinal canal stenosis or neuroforaminal narrowing.  L2-L3: No significant spinal canal stenosis or neuroforaminal narrowing.  L3-4: No significant spinal canal  stenosis or neuroforaminal narrowing.  L4-L5: Mild posterior broad-based disc bulge, ligamentum flavum thickening and bilateral facet arthrosis causes mild to moderate bilateral neuroforaminal narrowing without significant spinal canal stenosis.  L5-S1: Grade 1 retrolisthesis, broad-based disc bulge and possible superimposed small disc herniation. There is also bilateral facet arthrosis and ligamentum flavum thickening. Overall this causes mild spinal canal stenosis and moderate to severe   bilateral neuroforaminal narrowing, worse on the right.      Impression:      Impression:  Multilevel degenerative changes as characterized above, most significantly at L5-S1. No definite acute abnormality.        Electronically Signed: Devin Johnson DO    1/5/2024 8:11 PM EST    Workstation ID: [id number]              Tele: NSR, DEMETRIUS paced    Assessment:  -  62 yo AA female admitted with persistent fevers, lymphadenopathy, weight loss, diarrhea, generalized malaise and pain, ARF. Admitted with febrile illness of unclear etiology with ongoing work up.  ID following  -  VHDz with prior mechanical MVR on chronic warfarin.  -  TTE demonstrates severe TR with RVSP 88mHg  -  Chronic HFpEF with [facility] BiV PPM  -  HTN  -  HLP  -  COPD with former tobacco abuse.  Current vaping use.  -  Recent GIB requiring 1 unit PRBC.  AVM noted.  + Diverticulosis  -  Candida esophagitis  -  ARF, improving slowly  -  DMII        Plan:  -  Admitted to hospitalist services.  -  ID following  -  Nephrology following.  Cr 3.24 today (down from 3.87)  -  TTE reviewed.  Will keep patient NPO for MEAGAN today, will be performed at 3 pm.  Okay for morning meds with sip of water prior to 0900.    -  warfarin on hold, Heparin gtt ongoing.  Trend daily labs.  -  Cardiology will see again in a.m.        I discussed the patient's findings and my recommendations with the patient, any present family members, and the nursing staff.  Nadir Everett MD saw and  examined patient, verified hx and PE, read all radiographic studies, reviewed labs and micro data, and formulated dx, plan for treatment and all medical decision making.      Vijaya Zavala PA-C, working with Nadir Everett MD  01/08/24 09:16 EST

## 2024-01-08 NOTE — PROGRESS NOTES
"   LOS: 6 days    Patient Care Team:  Davina Santiago DO as PCP - General (Family Medicine)  Ameena Iglesias MD as Consulting Physician (Endocrinology)  Nakita Freire APRN as Nurse Practitioner (Nurse Practitioner)  Raimundo Thomas MD as Consulting Physician (Pulmonary Disease)  Nadir Everett MD as Consulting Physician (Cardiology)  Luiz Longoria MD as Consulting Physician (Pain Medicine)    Subjective     Stable overnight.      Objective     Vital Signs:  Blood pressure 125/60, pulse 74, temperature 98.5 °F (36.9 °C), temperature source Oral, resp. rate 18, height 152.4 cm (60\"), weight 89.9 kg (198 lb 3.1 oz), last menstrual period 06/17/1998, SpO2 97%, not currently breastfeeding.      Intake/Output Summary (Last 24 hours) at 1/8/2024 1130  Last data filed at 1/8/2024 1030  Gross per 24 hour   Intake 160 ml   Output 1400 ml   Net -1240 ml        01/07 0701 - 01/08 0700  In: 360 [P.O.:360]  Out: 1300 [Urine:1300]    Physical Exam:        GENERAL: WD AAF NAD  NEURO: Awake and alert, oriented. No focal deficit  PSYCHIATRIC: NMA. Cooperative with PE  EYE: PE, no icterus, no conjunctivitis  ENT: ommm, dentition intact,  Hearing intact  NECK: Supple , No JVD discernable,  Trachea midline  CV: Trace edema, RRR  LUNGS:  Quiet,  Nonlabored resp.  Symmetrical expansion  ABDOMEN: Nondistended, soft nontender.  : No Perdomo, no palp bladder  SKIN: Warm and dry without rash      Labs:  Results from last 7 days   Lab Units 01/08/24  0457 01/07/24  0552 01/06/24  0737   WBC 10*3/mm3 4.73 4.97 5.89   HEMOGLOBIN g/dL 7.1* 7.3* 7.3*   PLATELETS 10*3/mm3 231 255 263     Results from last 7 days   Lab Units 01/08/24  0457 01/07/24  0518 01/06/24  0737 01/05/24  0501 01/02/24  1144 01/02/24  0808   SODIUM mmol/L 134* 136 137 133*   < > 129*   POTASSIUM mmol/L 4.8 4.9 5.2 5.2   < > 5.3*   CHLORIDE mmol/L 104 103 105 104   < > 95*   CO2 mmol/L 19.0* 18.0* 18.0* 17.0*   < > 19.0*   BUN mg/dL 31* 33* 37* 43*   < > " 61*   CREATININE mg/dL 3.24* 3.87* 3.80* 4.20*   < > 7.49*   CALCIUM mg/dL 8.3* 8.5* 8.3* 7.9*   < > 7.6*   PHOSPHORUS mg/dL  --   --   --   --   --  7.3*   MAGNESIUM mg/dL  --   --   --   --   --  2.3   ALBUMIN g/dL 3.2* 3.7 3.5 3.5   < > 3.4*    < > = values in this interval not displayed.     Results from last 7 days   Lab Units 01/08/24  0457   ALK PHOS U/L 213*   BILIRUBIN mg/dL 0.2   ALT (SGPT) U/L 42*   AST (SGOT) U/L 33*                   Estimated Creatinine Clearance: 18 mL/min (A) (by C-G formula based on SCr of 3.24 mg/dL (H)).         A/P:    ARF: Creatinine back down further to 3.2 after increasing to 3.9 yesterday.  Urine output continues nonoliguric with negative volume.  Creatinine up to 8 on presentation with likely dehydration due to diarrhea with poor p.o. intake in the setting of NSAIDs and Entresto.  Urine sodium 20 consistent with prerenal azotemia.  Renal function had been improving with hydration.  IV fluids held to avoid overload.   For now continue supportive care.  Strict I's and O's.  Monitor for further renal recovery.    HTN: Blood pressure stable.    Hyponatremia: Borderline.  Initially down to 129 on presentation due to hypovolemia and MEERA.  All fluids isotonic.     Hyperkalemia: Stable.  Due to MEERA.     Met Aciodsis: Improved on p.o. bicarb.  Due to renal failure and underlying GI losses with diarrhea.       Anemia.  Hemoglobin below goal.  Hx of supra therapeutic IRN w rectal bleeding in the past. Admitted again with concern for rectal bleed.  Check iron studies.  Transfuse as needed for Hgb <7.    Volume status: Continues with negative volume overnight.  Likely dehydrated on admission.  Creatinine improved with hydration.  IV fluids on hold for now as patient with history of CHF and had developed some mild edema.  Continue to hold diuretics..  Continue strict I's and O's.     Hx of mechanical valve.  On anticoagulation     CHF: EF 40-45%. Hx of MV and depressed RV function. On  beta blocker and lasix. Not on ACEi/ARB/MRA/ or SGLT-2inh for now.    High risk and complexity patient.    Justin Rodriguez MD  01/08/24  11:30 EST

## 2024-01-08 NOTE — PAYOR COMM NOTE
"Janine Malone RN  Utilization Management  P:[phone]  F:[phone]    Ref # [id number]     Auth remains pending    Neha Monterroso \"Shiloh\" (63 y.o. Female)       Date of Birth   1960    Social Security Number       Address   [address] [place] [address]    Home Phone   [phone]    MRN   [id number]       Hinduism   Hoahaoism    Marital Status   Single                            Admission Date   1/1/24    Admission Type   Emergency    Admitting Provider   Sarah Sherman DO    Attending Provider   Sarah Sherman DO    Department, Room/Bed   [facility] 3H, S382/1       Discharge Date       Discharge Disposition       Discharge Destination                                 Attending Provider: Sarah Sherman DO    Allergies: Adhesive Tape, Sildenafil, Codeine, Morphine, Penicillins, Statins, Sulfa Antibiotics    Isolation: None   Infection: None   Code Status: CPR    Ht: 152.4 cm (60\")   Wt: 89.8 kg (198 lb)    Admission Cmt: None   Principal Problem: Renal failure [N19]                   Active Insurance as of 1/1/2024       Primary Coverage       Payor Plan Insurance Group Employer/Plan Group    ANTHEM MEDICARE REPLACEMENT ANTHEM MEDICARE ADVANTAGE [id number]       Payor Plan Address Payor Plan Phone Number Payor Plan Fax Number Effective Dates    [address] [phone]  1/1/2018 - None Entered    [facility] [id number]         Subscriber Name Subscriber Birth Date Member ID       NEHA MONTERROSO 1960 [id number]                Current Facility-Administered Medications   Medication Dose Route Frequency Provider Last Rate Last Admin    acetaminophen (TYLENOL) tablet 650 mg  650 mg Oral Q8H PRN Dariana Bridges DO   650 mg at 01/08/24 0837    albuterol (PROVENTIL) nebulizer solution 0.083% 2.5 mg/3mL  2.5 mg Nebulization Q4H PRN Ariella Phan MD   2.5 mg at 01/06/24 1637    cefTRIAXone (ROCEPHIN) 2,000 mg in sodium chloride 0.9 % 100 mL IVPB  2,000 " mg Intravenous Q24H Maciej Willett  mL/hr at 01/08/24 0826 2,000 mg at 01/08/24 0826    dextrose (D50W) (25 g/50 mL) IV injection 25 g  25 g Intravenous Q15 Min PRN Day, Ariella GUADARRAMA MD        dextrose (GLUTOSE) oral gel 15 g  15 g Oral Q15 Min PRN Day, Ariella GUADARRAMA MD        escitalopram (LEXAPRO) tablet 20 mg  20 mg Oral Daily Day, Ariella GUADARRAMA MD   20 mg at 01/08/24 0827    ferrous sulfate tablet 325 mg  325 mg Oral Daily With Breakfast Day, Ariella GUADARRAMA MD   325 mg at 01/08/24 0826    folic acid (FOLVITE) tablet 800 mcg  800 mcg Oral Daily Day, Ariella GUADARRAMA MD   800 mcg at 01/08/24 0826    glucagon (GLUCAGEN) injection 1 mg  1 mg Intramuscular Q15 Min PRN Day, Ariella GUADARRAMA MD        heparin 59584 units/250 mL (100 units/mL) in 0.45 % NaCl infusion  17 Units/kg/hr Intravenous Titrated Annabelle Fernandez, PharmD 15.28 mL/hr at 01/08/24 1042 17 Units/kg/hr at 01/08/24 1042    insulin detemir (LEVEMIR) injection 5 Units  5 Units Subcutaneous Nightly Dariana Bridges DO   5 Units at 01/07/24 2024    Insulin Lispro (humaLOG) injection 2-7 Units  2-7 Units Subcutaneous 4x Daily AC & at Bedtime Day, Ariella GUADARRAMA MD   3 Units at 01/07/24 2025    Insulin Lispro (humaLOG) injection 3 Units  3 Units Subcutaneous TID With Meals Dariana Bridges DO   3 Units at 01/07/24 1734    melatonin tablet 5 mg  5 mg Oral Nightly PRN Niya Subramanian APRN   5 mg at 01/07/24 2216    naloxone (NARCAN) injection 0.4 mg  0.4 mg Intravenous Q5 Min PRN Dariana Bridges DO        nitroglycerin (NITROSTAT) SL tablet 0.4 mg  0.4 mg Sublingual Q5 Min PRN Day, Ariella GUADARRAMA MD        oxybutynin XL (DITROPAN-XL) 24 hr tablet 5 mg  5 mg Oral Daily Day, Ariella GUADARRAMA MD   5 mg at 01/08/24 0826    oxyCODONE-acetaminophen (PERCOCET) 7.5-325 MG per tablet 1 tablet  1 tablet Oral Q6H PRN Dariana Bridges DO   1 tablet at 01/07/24 2034    oxymetazoline (AFRIN) nasal spray 2 spray  2 spray Each Nare BID Rachel Gloria MD   2 spray at 01/08/24 0837     pantoprazole (PROTONIX) EC tablet 40 mg  40 mg Oral Q AM Day, Ariella GUADARRAMA MD   40 mg at 01/08/24 0455    Pharmacy to Dose Heparin   Does not apply Continuous PRN Day, Ariella GUADARRAMA MD        polyethylene glycol (MIRALAX) packet 17 g  17 g Oral Daily Rachel Gloria MD   17 g at 01/08/24 0827    sennosides-docusate (PERICOLACE) 8.6-50 MG per tablet 1 tablet  1 tablet Oral BID Rachel Gloria MD   1 tablet at 01/08/24 0826    sodium bicarbonate tablet 1,300 mg  1,300 mg Oral TID Justin Rodriguez MD   1,300 mg at 01/08/24 1626    sodium chloride 0.9 % flush 10 mL  10 mL Intravenous Q12H Day, Ariella GUADARRAMA MD   10 mL at 01/07/24 2035    sodium chloride 0.9 % flush 10 mL  10 mL Intravenous PRN Day, Ariella GUADARRAMA MD        sodium chloride 0.9 % infusion 40 mL  40 mL Intravenous PRN Day, Ariella GUADARRAMA MD         Lab Results (last 48 hours)       Procedure Component Value Units Date/Time    Tularemia Antibody [[id number]] Collected: 01/03/24 0251    Specimen: Blood Updated: 01/08/24 1509     Francisella tularensis IgG Negative     Francisella tularensis IgM Negative     Comment: The performance characteristics of the listed assay was  validated by [facility]. The [place] FDA has not approved or cleared this  test. The results of  this assay can be used for clinical diagnosis without FDA  approval. [facility] is a CLIA certified, CAP accredited  laboratory for performing high  complexity assays such as this one.  Testing Performed at: [facility] [address] [place] [id number]       Narrative:      Performed at:  01 - [facility]  [address] [place] [address]  [id number]  : Ronda Santiago PhD, Phone:  [phone]    POC Glucose Once [[id number]]  (Abnormal) Collected: 01/08/24 1147    Specimen: Blood Updated: 01/08/24 1149     Glucose 162 mg/dL     Blood Culture - Blood, Arm, Left [[id number]] Collected: 01/08/24 1018    Specimen: Blood from Arm, Left  Updated: 01/08/24 1018    Blood Culture - Blood, Hand, Left [[id number]] Collected: 01/08/24 1017    Specimen: Blood from Hand, Left Updated: 01/08/24 1018    Blood Culture - Blood, Arm, Left [[id number]]  (Normal) Collected: 01/06/24 0056    Specimen: Blood from Arm, Left Updated: 01/08/24 0815     Blood Culture No growth at 2 days    POC Glucose Once [[id number]]  (Abnormal) Collected: 01/08/24 0756    Specimen: Blood Updated: 01/08/24 0757     Glucose 203 mg/dL     Heparin Anti-Xa [[id number]]  (Normal) Collected: 01/08/24 0638    Specimen: Blood Updated: 01/08/24 0726     Heparin Anti-Xa (UFH) 0.31 IU/ml     Comprehensive Metabolic Panel [[id number]]  (Abnormal) Collected: 01/08/24 0457    Specimen: Blood Updated: 01/08/24 0557     Glucose 226 mg/dL      BUN 31 mg/dL      Creatinine 3.24 mg/dL      Sodium 134 mmol/L      Potassium 4.8 mmol/L      Chloride 104 mmol/L      CO2 19.0 mmol/L      Calcium 8.3 mg/dL      Total Protein 5.9 g/dL      Albumin 3.2 g/dL      ALT (SGPT) 42 U/L      AST (SGOT) 33 U/L      Alkaline Phosphatase 213 U/L      Total Bilirubin 0.2 mg/dL      Globulin 2.7 gm/dL      Comment: Calculated Result        A/G Ratio 1.2 g/dL      BUN/Creatinine Ratio 9.6     Anion Gap 11.0 mmol/L      eGFR 15.6 mL/min/1.73     Narrative:      GFR Normal >60  Chronic Kidney Disease <60  Kidney Failure <15      Protime-INR [[id number]]  (Abnormal) Collected: 01/08/24 0457    Specimen: Blood Updated: 01/08/24 0539     Protime 16.2 Seconds      INR 1.29    CBC (No Diff) [[id number]]  (Abnormal) Collected: 01/08/24 0457    Specimen: Blood Updated: 01/08/24 0520     WBC 4.73 10*3/mm3      RBC 2.56 10*6/mm3      Hemoglobin 7.1 g/dL      Hematocrit 22.1 %      MCV 86.3 fL      MCH 27.7 pg      MCHC 32.1 g/dL      RDW 15.8 %      RDW-SD 49.9 fl      MPV 10.3 fL      Platelets 231 10*3/mm3     POC Glucose Once [[id number]]  (Abnormal) Collected: 01/07/24 2259    Specimen: Blood Updated: 01/07/24 2301     Glucose 158 mg/dL      Heparin Anti-Xa [[id number]]  (Normal) Collected: 01/07/24 2105    Specimen: Blood Updated: 01/07/24 2144     Heparin Anti-Xa (UFH) 0.41 IU/ml     POC Glucose Once [[id number]]  (Abnormal) Collected: 01/07/24 2012    Specimen: Blood Updated: 01/07/24 2014     Glucose 229 mg/dL     Blood Culture - Blood, Arm, Left [[id number]]  (Normal) Collected: 01/05/24 1641    Specimen: Blood from Arm, Left Updated: 01/07/24 1815     Blood Culture No growth at 2 days    Narrative:      Less than seven (7) mL's of blood was collected.  Insufficient quantity may yield false negative results.    POC Glucose Once [[id number]]  (Abnormal) Collected: 01/07/24 1644    Specimen: Blood Updated: 01/07/24 1646     Glucose 212 mg/dL     Heparin Anti-Xa [[id number]]  (Normal) Collected: 01/07/24 1402    Specimen: Blood Updated: 01/07/24 1437     Heparin Anti-Xa (UFH) 0.38 IU/ml     POC Glucose Once [[id number]]  (Abnormal) Collected: 01/07/24 1204    Specimen: Blood Updated: 01/07/24 1206     Glucose 163 mg/dL     CK [[id number]]  (Abnormal) Collected: 01/07/24 0518    Specimen: Blood Updated: 01/07/24 1009     Creatine Kinase 191 U/L     POC Glucose Once [[id number]]  (Abnormal) Collected: 01/07/24 0808    Specimen: Blood Updated: 01/07/24 0809     Glucose 163 mg/dL     Comprehensive Metabolic Panel [[id number]]  (Abnormal) Collected: 01/07/24 0518    Specimen: Blood Updated: 01/07/24 0653     Glucose 167 mg/dL      BUN 33 mg/dL      Creatinine 3.87 mg/dL      Sodium 136 mmol/L      Potassium 4.9 mmol/L      Chloride 103 mmol/L      CO2 18.0 mmol/L      Calcium 8.5 mg/dL      Total Protein 6.3 g/dL      Albumin 3.7 g/dL      ALT (SGPT) 43 U/L      AST (SGOT) 49 U/L      Alkaline Phosphatase 225 U/L      Total Bilirubin 0.3 mg/dL      Globulin 2.6 gm/dL      Comment: Calculated Result        A/G Ratio 1.4 g/dL      BUN/Creatinine Ratio 8.5     Anion Gap 15.0 mmol/L      eGFR 12.6 mL/min/1.73      Comment: <15 Indicative of kidney failure        "Narrative:      GFR Normal >60  Chronic Kidney Disease <60  Kidney Failure <15      Procalcitonin [[id number]]  (Abnormal) Collected: 01/07/24 0518    Specimen: Blood Updated: 01/07/24 0635     Procalcitonin 0.40 ng/mL     Narrative:      As a Marker for Sepsis (Non-Neonates):    1. <0.5 ng/mL represents a low risk of severe sepsis and/or septic shock.  2. >2 ng/mL represents a high risk of severe sepsis and/or septic shock.    As a Marker for Lower Respiratory Tract Infections that require antibiotic therapy:    PCT on Admission    Antibiotic Therapy       6-12 Hrs later    >0.5                Strongly Recommended  >0.25 - <0.5        Recommended   0.1 - 0.25          Discouraged              Remeasure/reassess PCT  <0.1                Strongly Discouraged     Remeasure/reassess PCT    As 28 day mortality risk marker: \"Change in Procalcitonin Result\" (>80% or <=80%) if Day 0 (or Day 1) and Day 4 values are available. Refer to http://www.[facility]pct[facility]cal[facility]    Change in PCT <=80%  A decrease of PCT levels below or equal to 80% defines a positive change in PCT test result representing a higher risk for 28-day all-cause mortality of patients diagnosed with severe sepsis for septic shock.    Change in PCT >80%  A decrease of PCT levels of more than 80% defines a negative change in PCT result representing a lower risk for 28-day all-cause mortality of patients diagnosed with severe sepsis or septic shock.       Heparin Anti-Xa [[id number]]  (Abnormal) Collected: 01/07/24 0518    Specimen: Blood Updated: 01/07/24 0614     Heparin Anti-Xa (UFH) 0.25 IU/ml     Protime-INR [[id number]]  (Abnormal) Collected: 01/07/24 0518    Specimen: Blood Updated: 01/07/24 0613     Protime 17.2 Seconds      INR 1.39    CBC (No Diff) [[id number]]  (Abnormal) Collected: 01/07/24 0552    Specimen: Blood Updated: 01/07/24 0556     WBC 4.97 10*3/mm3      RBC 2.63 10*6/mm3      Hemoglobin 7.3 g/dL      Hematocrit 23.0 %      MCV 87.5 fL      " "MCH 27.8 pg      MCHC 31.7 g/dL      RDW 15.6 %      RDW-SD 50.1 fl      MPV 10.4 fL      Platelets 255 10*3/mm3     POC Glucose Once [[id number]]  (Abnormal) Collected: 24    Specimen: Blood Updated: 24     Glucose 146 mg/dL     POC Glucose Once [[id number]]  (Abnormal) Collected: 24 163    Specimen: Blood Updated: 24 163     Glucose 207 mg/dL           Physician Progress Notes (last 48 hours)        Maciej Willett MD at 24 1506            INFECTIOUS DISEASES  INPATIENT PROGRESS NOTE  2024      PATIENT NAME: Di Lopez  :  1960  MRN:  [id number]  Date of Admission:  2024      Antimicrobials:  Day 2:  ceftriaxone      MAR reviewed.  Pertinent other medications include:  heparin gtt    Chief Complaint   Patient presents with    Rectal Bleeding       Reason for consultation:  fevers    Interval history: Patient had recurrent fever this morning.  Repeat blood cultures obtained.  Continues on ceftriaxone.  Initial blood cultures remain no growth.  Cardiology evaluated and plans for MEAGAN this afternoon.    ROS:  No new rashes.  No SOB or chest pain.  No nausea/vomiting/diarrhea.     Objective:  Temp (24hrs), Av.3 °F (37.4 °C), Min:97.8 °F (36.6 °C), Max:101.6 °F (38.7 °C)    /62   Pulse 58   Temp 98 °F (36.7 °C) (Oral)   Resp 16   Ht 152.4 cm (60\")   Wt 89.9 kg (198 lb 3.1 oz)   LMP 1998   SpO2 98%   BMI 38.71 kg/m²     Physical Examination:  GENERAL: Chronically ill-appearing female.  Awake and alert, in no acute distress.   HEENT: broken right mandibular tooth with necrosis.  NECK: Supple without nuchal rigidity. .  CV: RRR. +faint murmur and mechanical click.  ICD pocket without TTP.  LUNGS: CTAB.  Good air movement.  ABDOMEN: Positive bowel sounds.  Soft, nontender, nondistended.  EXT:  No edema.  MSK: No joint effusions or inflammation noted.  SKIN: Warm and dry without rash or ulcer.   NEURO: A&Ox4. No focal deficits.  Face " symmetric.  Speech fluent.  Moves all extremities well.   PSYCHIATRIC: Normal insight and judgement.  Cooperative.  Normal affect.    Laboratory Data:    Results from last 7 days   Lab Units 01/08/24  0457 01/07/24  0552 01/06/24  0737   WBC 10*3/mm3 4.73 4.97 5.89   HEMOGLOBIN g/dL 7.1* 7.3* 7.3*   HEMATOCRIT % 22.1* 23.0* 23.0*   PLATELETS 10*3/mm3 231 255 263     Results from last 7 days   Lab Units 01/08/24  0457   SODIUM mmol/L 134*   POTASSIUM mmol/L 4.8   CHLORIDE mmol/L 104   CO2 mmol/L 19.0*   BUN mg/dL 31*   CREATININE mg/dL 3.24*   GLUCOSE mg/dL 226*   CALCIUM mg/dL 8.3*     Results from last 7 days   Lab Units 01/08/24  0457   ALK PHOS U/L 213*   BILIRUBIN mg/dL 0.2   ALT (SGPT) U/L 42*   AST (SGOT) U/L 33*     Results from last 7 days   Lab Units 01/05/24  1640   SED RATE mm/hr 58*     Results from last 7 days   Lab Units 01/05/24  1640   CRP mg/dL 5.92*     Estimated Creatinine Clearance: 18 mL/min (A) (by C-G formula based on SCr of 3.24 mg/dL (H)).  Results from last 7 days   Lab Units 01/05/24  1640   LACTATE mmol/L 1.4     Results from last 7 days   Lab Units 01/07/24  0518 01/04/24  0541   CK TOTAL U/L 191* 160               Microbiology:    Microbiology Results (last 10 days)       Procedure Component Value - Date/Time    Blood Culture - Blood, Arm, Left [[id number]]  (Normal) Collected: 01/06/24 0056    Lab Status: Preliminary result Specimen: Blood from Arm, Left Updated: 01/08/24 0815     Blood Culture No growth at 2 days    Blood Culture - Blood, Arm, Left [[id number]]  (Normal) Collected: 01/05/24 1641    Lab Status: Preliminary result Specimen: Blood from Arm, Left Updated: 01/07/24 1815     Blood Culture No growth at 2 days    Narrative:      Less than seven (7) mL's of blood was collected.  Insufficient quantity may yield false negative results.    Respiratory Panel PCR w/COVID-19(SARS-CoV-2) LUIS/ESTHER/KARSON/PAD/COR/LEONARDO In-House, NP Swab in UTM/VTM, 2 HR TAT - Swab, Nasopharynx [[id number]]   (Normal) Collected: 01/05/24 1634    Lab Status: Final result Specimen: Swab from Nasopharynx Updated: 01/05/24 1804     ADENOVIRUS, PCR Not Detected     Coronavirus 229E Not Detected     Coronavirus HKU1 Not Detected     Coronavirus NL63 Not Detected     Coronavirus OC43 Not Detected     COVID19 Not Detected     Human Metapneumovirus Not Detected     Human Rhinovirus/Enterovirus Not Detected     Influenza A PCR Not Detected     Influenza B PCR Not Detected     Parainfluenza Virus 1 Not Detected     Parainfluenza Virus 2 Not Detected     Parainfluenza Virus 3 Not Detected     Parainfluenza Virus 4 Not Detected     RSV, PCR Not Detected     Bordetella pertussis pcr Not Detected     Bordetella parapertussis PCR Not Detected     Chlamydophila pneumoniae PCR Not Detected     Mycoplasma pneumo by PCR Not Detected    Narrative:      In the setting of a positive respiratory panel with a viral infection PLUS a negative procalcitonin without other underlying concern for bacterial infection, consider observing off antibiotics or discontinuation of antibiotics and continue supportive care. If the respiratory panel is positive for atypical bacterial infection (Bordetella pertussis, Chlamydophila pneumoniae, or Mycoplasma pneumoniae), consider antibiotic de-escalation to target atypical bacterial infection.    COVID PRE-OP / PRE-PROCEDURE SCREENING ORDER (NO ISOLATION) - Swab, Nasopharynx [[id number]]  (Normal) Collected: 01/01/24 2330    Lab Status: Final result Specimen: Swab from Nasopharynx Updated: 01/02/24 0016    Narrative:      The following orders were created for panel order COVID PRE-OP / PRE-PROCEDURE SCREENING ORDER (NO ISOLATION) - Swab, Nasopharynx.  Procedure                               Abnormality         Status                     ---------                               -----------         ------                     COVID-19, FLU A/B, RSV P...[[id number]]  Normal              Final result                 Please  view results for these tests on the individual orders.    COVID-19, FLU A/B, RSV PCR 1 HR TAT - Swab, Nasopharynx [[id number]]  (Normal) Collected: 01/01/24 2330    Lab Status: Final result Specimen: Swab from Nasopharynx Updated: 01/02/24 0016     COVID19 Not Detected     Influenza A PCR Not Detected     Influenza B PCR Not Detected     RSV, PCR Not Detected    Narrative:      Fact sheet for providers: https://www.fda.gov/media/024009/download    Fact sheet for patients: https://www.fda.gov/media/489774/download    Test performed by PCR.          Monospot negative      Radiology:  No radiology results for the last day    TTE:    The right ventricular cavity is dilated.    The left atrial cavity is mildly dilated.    Left atrial volume is mildly increased.    The right atrial cavity is dilated.    The mitral valve mean gradient is 7 mmHg.    There is a mechanical mitral valve prosthesis present.    Moderate to severe tricuspid valve regurgitation is present.    Estimated right ventricular systolic pressure from tricuspid regurgitation is markedly elevated (>55 mmHg). Calculated right ventricular systolic pressure from tricuspid regurgitation is 88 mmHg.    LVEF estimated at 59%      DISCUSSION:  63 y.o. female with history of modified replacement and implanted defibrillator admitted with acute kidney injury and GI bleed.   Patient reported cervical adenopathy confirmed on CT imaging and developed fever while hospitalized.  Resp PCR and monospot negative.    PROBLEM LIST:   -- Undifferentiated fever with sweats - ongoing.  COVID-19/flu/RSV PCR negative.  Full resp PCR negative.  Blood cultures obtained.  At risk for endocarditis with implanted ICD and valve replacement.  Recent dental issues with broken/necrotic right mandibular tooth and painful left maxillary at prior root canal; at risk for odontogenic source bacteremia/cardiac device infection.  -- Cervical adenopathy - resolving.  No pharyngitis or sinusitis.   Monospot negative.  -- Recent Candida esophagitis.  HIV negative.  -- Implanted defibrillator.  -- History of mitral valve replacement.  On chronic anticoagulation.  -- MEERA, improving.  -- Rectal bleeding and anemia.  CT without acute intra-abdominal abnormality.  -- Back pain/sciatica symptoms; MRI L-spine with degenerative changes; no acute abnormality.  -- Moderate to severe tricuspid valve regurgitation on echocardiogram; no vegetation noted.    PLAN:  -- Following blood cultures - repeated today due to recurrent fever  -- Continue empiric ceftriaxone 2 g iv daily for potential bacteremia/endocarditis from odontogenic source given her dental issues  -- MEAGAN pending; high suspicion for endocarditis.  -- Further plans pending results of the above    Complex case.  Plan discussed with patient.    Maciej Willett MD  2024  15:06 EST      Electronically signed by Maciej Willett MD at 24 1512       Sarah Sherman DO at 24 1305              [facility]  PROGRESS NOTE    Patient Name: Di Lopez  : 1960  MRN: [id number]    Date of Admission: 2024  Primary Care Physician: Davina Santiago DO    Subjective   Subjective     CC:  Anemia, fever    HPI:  Doesn't feel well today. Fever this morning. Thigh pain. Denies SOA.       Objective   Objective     Vital Signs:   Temp:  [97.8 °F (36.6 °C)-101.6 °F (38.7 °C)] 98.5 °F (36.9 °C)  Heart Rate:  [68-78] 72  Resp:  [18] 18  BP: (120-148)/(49-60) 125/60  Flow (L/min):  [2] 2     Physical Exam:  Constitutional:  awake, alert; appears to feel unwell  HENT: NCAT, mucous membranes moist  Respiratory: clear, diminished with poor effort  Cardiovascular: RRR, no murmurs, rubs, or gallops  Gastrointestinal: Positive bowel sounds, soft, nontender, nondistended  Musculoskeletal: trace bilateral ankle edema; tender anterior thigh  Psychiatric: Appropriate affect, cooperative  Neurologic: Oriented x 3, strength  symmetric in all extremities, Cranial Nerves grossly intact to confrontation, speech clear  Skin: No rashes      Results Reviewed:  LAB RESULTS:      Lab 01/08/24  0638 01/08/24  0457 01/07/24  2105 01/07/24  1402 01/07/24  0552 01/07/24  0518 01/06/24  0737 01/06/24  0056 01/05/24  1640 01/05/24  0501 01/04/24  0541 01/03/24  0251 01/02/24  0808 01/02/24  0531 01/01/24  2330   WBC  --  4.73  --   --  4.97  --  5.89  --   --  6.74 5.78 4.18  --  4.26 4.42   HEMOGLOBIN  --  7.1*  --   --  7.3*  --  7.3*  --   --  7.5* 7.8* 8.4*  --  8.5* 9.5*   HEMATOCRIT  --  22.1*  --   --  23.0*  --  23.0*  --   --  24.3* 24.5* 25.2*  --  26.0* 29.3*   PLATELETS  --  231  --   --  255  --  263  --   --  282 265 224  --  228 250   NEUTROS ABS  --   --   --   --   --   --  4.19  --   --   --   --  2.61  --  3.02 3.39   IMMATURE GRANS (ABS)  --   --   --   --   --   --  0.17*  --   --   --   --  0.07*  --  0.05 0.06*   LYMPHS ABS  --   --   --   --   --   --  1.18  --   --   --   --  1.12  --  0.92 0.77   MONOS ABS  --   --   --   --   --   --  0.26  --   --   --   --  0.25  --  0.21 0.17   EOS ABS  --   --   --   --   --   --  0.06  --   --   --   --  0.12  --  0.05 0.02   MCV  --  86.3  --   --  87.5  --  85.5  --   --  86.8 86.0 85.7  --  85.8 84.2   SED RATE  --   --   --   --   --   --   --   --  58*  --   --   --   --   --   --    CRP  --   --   --   --   --   --   --   --  5.92*  --   --   --   --   --   --    PROCALCITONIN  --   --   --   --   --  0.40*  --   --   --   --   --   --   --   --   --    LACTATE  --   --   --   --   --   --   --   --  1.4  --   --   --   --   --  1.5   PROTIME  --  16.2*  --   --   --  17.2* 18.0*  --   --  18.8*  --   --   --   --  26.0*   APTT  --   --   --   --   --   --   --   --   --   --   --   --   --  66.2  --    HEPARIN ANTI-XA 0.31  --  0.41 0.38  --  0.25* 0.35   < > 0.25* 0.21* 0.34 0.37   < > 0.10*  --     < > = values in this interval not displayed.         Lab 01/08/24  0457  01/07/24  0518 01/06/24  0737 01/05/24  0501 01/04/24  0541 01/02/24  1144 01/02/24  0808   SODIUM 134* 136 137 133* 136   < > 129*   POTASSIUM 4.8 4.9 5.2 5.2 5.1   < > 5.3*   CHLORIDE 104 103 105 104 105   < > 95*   CO2 19.0* 18.0* 18.0* 17.0* 17.0*   < > 19.0*   ANION GAP 11.0 15.0 14.0 12.0 14.0   < > 15.0   BUN 31* 33* 37* 43* 50*   < > 61*   CREATININE 3.24* 3.87* 3.80* 4.20* 5.39*   < > 7.49*   EGFR 15.6* 12.6* 12.9* 11.4* 8.5*   < > 5.7*   GLUCOSE 226* 167* 145* 103* 89   < > 152*   CALCIUM 8.3* 8.5* 8.3* 7.9* 7.1*   < > 7.6*   MAGNESIUM  --   --   --   --   --   --  2.3   PHOSPHORUS  --   --   --   --   --   --  7.3*   TSH  --   --   --   --   --   --  1.590    < > = values in this interval not displayed.         Lab 01/08/24  0457 01/07/24 0518 01/06/24  0737 01/05/24  0501 01/04/24  0541 01/02/24  0808 01/01/24  2330   TOTAL PROTEIN 5.9* 6.3 6.2 5.9* 5.2*   < > 7.1   ALBUMIN 3.2* 3.7 3.5 3.5 3.2*   < > 4.1   GLOBULIN 2.7 2.6 2.7 2.4 2.0   < > 3.0   ALT (SGPT) 42* 43* 31 29 32   < > 45*   AST (SGOT) 33* 49* 36* 28 29   < > 34*   BILIRUBIN 0.2 0.3 0.3 0.2 0.2   < > 0.3   ALK PHOS 213* 225* 212* 204* 212*   < > 247*   LIPASE  --   --   --   --   --   --  82*    < > = values in this interval not displayed.         Lab 01/08/24  0457 01/07/24  0518 01/06/24  0737 01/05/24  0501 01/01/24  2330   PROTIME 16.2* 17.2* 18.0* 18.8* 26.0*   INR 1.29* 1.39* 1.48* 1.56* 2.36*             Lab 01/08/24  1113 01/06/24  0737 01/04/24  0541 01/01/24  2358   IRON  --  15*  --   --    IRON SATURATION (TSAT)  --  5*  --   --    TIBC  --  279*  --   --    TRANSFERRIN  --  187*  --   --    FERRITIN  --  1,188.00*  --   --    FOLATE  --   --  >20.00  --    VITAMIN B 12  --   --  353  --    ABO TYPING O  --   --  O   RH TYPING Positive  --   --  Positive   ANTIBODY SCREEN Negative  --   --  Negative         Lab 01/02/24  0047   FIO2 21   CARBOXYHEMOGLOBIN (VENOUS) 1.3     Brief Urine Lab Results  (Last result in the past 365  days)        Color   Clarity   Blood   Leuk Est   Nitrite   Protein   CREAT   Urine HCG        01/02/24 0011             142.2         01/02/24 0011 Yellow   Cloudy   Small (1+)   Negative   Negative   100 mg/dL (2+)                   Microbiology Results Abnormal       Procedure Component Value - Date/Time    Blood Culture - Blood, Arm, Left [[id number]]  (Normal) Collected: 01/06/24 0056    Lab Status: Preliminary result Specimen: Blood from Arm, Left Updated: 01/08/24 0815     Blood Culture No growth at 2 days    Blood Culture - Blood, Arm, Left [[id number]]  (Normal) Collected: 01/05/24 1641    Lab Status: Preliminary result Specimen: Blood from Arm, Left Updated: 01/07/24 1815     Blood Culture No growth at 2 days    Narrative:      Less than seven (7) mL's of blood was collected.  Insufficient quantity may yield false negative results.    Respiratory Panel PCR w/COVID-19(SARS-CoV-2) [facility]/ESTHER/KARSON/PAD/COR/LEONARDO In-House, NP Swab in UTM/VTM, 2 HR TAT - Swab, Nasopharynx [[id number]]  (Normal) Collected: 01/05/24 1634    Lab Status: Final result Specimen: Swab from Nasopharynx Updated: 01/05/24 1804     ADENOVIRUS, PCR Not Detected     Coronavirus 229E Not Detected     Coronavirus HKU1 Not Detected     Coronavirus NL63 Not Detected     Coronavirus OC43 Not Detected     COVID19 Not Detected     Human Metapneumovirus Not Detected     Human Rhinovirus/Enterovirus Not Detected     Influenza A PCR Not Detected     Influenza B PCR Not Detected     Parainfluenza Virus 1 Not Detected     Parainfluenza Virus 2 Not Detected     Parainfluenza Virus 3 Not Detected     Parainfluenza Virus 4 Not Detected     RSV, PCR Not Detected     Bordetella pertussis pcr Not Detected     Bordetella parapertussis PCR Not Detected     Chlamydophila pneumoniae PCR Not Detected     Mycoplasma pneumo by PCR Not Detected    Narrative:      In the setting of a positive respiratory panel with a viral infection PLUS a negative procalcitonin without other  underlying concern for bacterial infection, consider observing off antibiotics or discontinuation of antibiotics and continue supportive care. If the respiratory panel is positive for atypical bacterial infection (Bordetella pertussis, Chlamydophila pneumoniae, or Mycoplasma pneumoniae), consider antibiotic de-escalation to target atypical bacterial infection.    COVID PRE-OP / PRE-PROCEDURE SCREENING ORDER (NO ISOLATION) - Swab, Nasopharynx [[id number]]  (Normal) Collected: 01/01/24 2330    Lab Status: Final result Specimen: Swab from Nasopharynx Updated: 01/02/24 0016    Narrative:      The following orders were created for panel order COVID PRE-OP / PRE-PROCEDURE SCREENING ORDER (NO ISOLATION) - Swab, Nasopharynx.  Procedure                               Abnormality         Status                     ---------                               -----------         ------                     COVID-19, FLU A/B, RSV P...[[id number]]  Normal              Final result                 Please view results for these tests on the individual orders.    COVID-19, FLU A/B, RSV PCR 1 HR TAT - Swab, Nasopharynx [[id number]]  (Normal) Collected: 01/01/24 2330    Lab Status: Final result Specimen: Swab from Nasopharynx Updated: 01/02/24 0016     COVID19 Not Detected     Influenza A PCR Not Detected     Influenza B PCR Not Detected     RSV, PCR Not Detected    Narrative:      Fact sheet for providers: https://www.fda.gov/media/104450/download    Fact sheet for patients: https://www.fda.gov/media/942217/download    Test performed by PCR.            Adult Transthoracic Echo Complete W/ Cont if Necessary Per Protocol    Result Date: 1/6/2024    The right ventricular cavity is dilated.   The left atrial cavity is mildly dilated.   Left atrial volume is mildly increased.   The right atrial cavity is dilated.   The mitral valve mean gradient is 7 mmHg.   There is a mechanical mitral valve prosthesis present.   Moderate to severe tricuspid  valve regurgitation is present.   Estimated right ventricular systolic pressure from tricuspid regurgitation is markedly elevated (>55 mmHg). Calculated right ventricular systolic pressure from tricuspid regurgitation is 88 mmHg.   LVEF estimated at 59%      Results for orders placed during the hospital encounter of 01/01/24    Adult Transthoracic Echo Complete W/ Cont if Necessary Per Protocol    Interpretation Summary    The right ventricular cavity is dilated.    The left atrial cavity is mildly dilated.    Left atrial volume is mildly increased.    The right atrial cavity is dilated.    The mitral valve mean gradient is 7 mmHg.    There is a mechanical mitral valve prosthesis present.    Moderate to severe tricuspid valve regurgitation is present.    Estimated right ventricular systolic pressure from tricuspid regurgitation is markedly elevated (>55 mmHg). Calculated right ventricular systolic pressure from tricuspid regurgitation is 88 mmHg.    LVEF estimated at 59%      Current medications:  Scheduled Meds:cefTRIAXone, 2,000 mg, Intravenous, Q24H  escitalopram, 20 mg, Oral, Daily  ferrous sulfate, 325 mg, Oral, Daily With Breakfast  folic acid, 800 mcg, Oral, Daily  insulin detemir, 5 Units, Subcutaneous, Nightly  insulin lispro, 2-7 Units, Subcutaneous, 4x Daily AC & at Bedtime  Insulin Lispro, 3 Units, Subcutaneous, TID With Meals  oxybutynin XL, 5 mg, Oral, Daily  oxymetazoline, 2 spray, Each Nare, BID  pantoprazole, 40 mg, Oral, Q AM  polyethylene glycol, 17 g, Oral, Daily  senna-docusate sodium, 1 tablet, Oral, BID  sodium bicarbonate, 1,300 mg, Oral, TID  sodium chloride, 10 mL, Intravenous, Q12H      Continuous Infusions:heparin, 17 Units/kg/hr, Last Rate: 17 Units/kg/hr (01/08/24 1042)  Pharmacy to Dose Heparin,       PRN Meds:.  acetaminophen    albuterol    dextrose    dextrose    glucagon (human recombinant)    melatonin    naloxone    nitroglycerin    oxyCODONE-acetaminophen    Pharmacy to Dose  Heparin    sodium chloride    sodium chloride    Assessment & Plan   Assessment & Plan     Active Hospital Problems    Diagnosis  POA    **Renal failure [N19]  Yes    Hyponatremia [E87.1]  Yes    Hyperkalemia [E87.5]  Yes    Type 2 diabetes mellitus with hyperglycemia [E11.65]  Yes    Elevated LFTs [R79.89]  Yes    Anemia, chronic disease [D63.8]  Yes    Cervical lymphadenopathy [R59.0]  Yes    GI bleed [K92.2]  Yes    H/O heart valve replacement with mechanical valve [Z95.2]  Not Applicable    ARF (acute renal failure) [N17.9]  Yes    Left bundle branch hemiblock [I44.60]  Yes    Type 2 diabetes mellitus without complication, with long-term current use of insulin [E11.9, Z79.4]  Not Applicable    Dyslipidemia [E78.5]  Yes    Emphysema/COPD [J43.9]  Yes    Hypertension [I10]  Yes    Arteriovenous malformation of gastrointestinal tract [K55.20]  Yes    Obstructive sleep apnea syndrome [G47.33]  Yes    Chronic anticoagulation [Z79.01]  Not Applicable      Resolved Hospital Problems   No resolved problems to display.        Brief Hospital Course to date:  Di Lopez is a 63 y.o. 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 Found to have severe MEERA, resolution of BRBPR, persistent fever/chills and thigh pain     Epistaxis  -start afrin x 3 days  - h/h trending down but epistaxis appears improved      Febrile illness, unclear etiology  -negative full viral panel, negative monospot  -continue to suspect viral illness (throat soreness, fevers, back pain/aches, etc), given new thigh pain consideration of discitis but MRI withOUT contrast negative  -blood cultures ngtd, TTE without vegetation.   - Repeat fever 1/8 - cultures repeated  - MEAGAN pending   -ID following, on rocephin     Acute Renal Failure 2/2 hypovolemia + med effect - improved  -2/2 acute diarrheal illness + entresto, NSAIDs  -IVF discontinued,  appears mildly hypervolemic w good PO intake now  -do suspect some ATN component and expect slow recovery  -nephrology consulted  - Currently holding fluids and monitoring      Hypovolemic hyponatremia - improved  - remains stable off IVF     Acute anemia 2/2 bloody diarrheal illness - blood diarrhea resolved  -no further bowel movement since admission, suspect further decline is 2/2 prior hemoconcentration and now IVF resuscitation. Hypoproliferative  - 1 units PRBC 1/8     Mechanical mitral valve on coumadin - heparin gtt in setting of severe MEERA, pharmacy monitoring     CHFpEF (EF 45-50%), s/p ICD - appears euvolemic currently. Off fluids   Chronic hypoxic resp failure 2/2 COPD - on home 2 liters n/c  DMII - basal/bolus regimen  Anxiety/depression  BMI 38  Liver mass s/p embolization    Expected Discharge Location and Transportation: home health  Expected Discharge   Expected Discharge Date: 1/11/2024; Expected Discharge Time:      DVT prophylaxis:  Medical DVT prophylaxis orders are present.     AM-PAC 6 Clicks Score (PT): 23 (01/07/24 5469)    CODE STATUS:   Code Status and Medical Interventions:   Ordered at: 01/02/24 0319     Code Status (Patient has no pulse and is not breathing):    CPR (Attempt to Resuscitate)     Medical Interventions (Patient has pulse or is breathing):    Full Support       Sarah Sherman DO  01/08/24        Electronically signed by Sarah Sherman DO at 01/08/24 1309       Justin Rodriguez MD at 01/08/24 1130             LOS: 6 days    Patient Care Team:  Davina Santiago DO as PCP - General (Family Medicine)  Ameena Iglesias MD as Consulting Physician (Endocrinology)  Nakita Freire APRN as Nurse Practitioner (Nurse Practitioner)  Raimundo Thomas MD as Consulting Physician (Pulmonary Disease)  Nadir Everett MD as Consulting Physician (Cardiology)  Luiz Longoria MD as Consulting Physician (Pain Medicine)    Subjective     Stable overnight.      Objective  "    Vital Signs:  Blood pressure 125/60, pulse 74, temperature 98.5 °F (36.9 °C), temperature source Oral, resp. rate 18, height 152.4 cm (60\"), weight 89.9 kg (198 lb 3.1 oz), last menstrual period 06/17/1998, SpO2 97%, not currently breastfeeding.      Intake/Output Summary (Last 24 hours) at 1/8/2024 1130  Last data filed at 1/8/2024 1030  Gross per 24 hour   Intake 160 ml   Output 1400 ml   Net -1240 ml        01/07 0701 - 01/08 0700  In: 360 [P.O.:360]  Out: 1300 [Urine:1300]    Physical Exam:        GENERAL: WD AAF NAD  NEURO: Awake and alert, oriented. No focal deficit  PSYCHIATRIC: NMA. Cooperative with PE  EYE: PE, no icterus, no conjunctivitis  ENT: ommm, dentition intact,  Hearing intact  NECK: Supple , No JVD discernable,  Trachea midline  CV: Trace edema, RRR  LUNGS:  Quiet,  Nonlabored resp.  Symmetrical expansion  ABDOMEN: Nondistended, soft nontender.  : No Perdomo, no palp bladder  SKIN: Warm and dry without rash      Labs:  Results from last 7 days   Lab Units 01/08/24  0457 01/07/24  0552 01/06/24  0737   WBC 10*3/mm3 4.73 4.97 5.89   HEMOGLOBIN g/dL 7.1* 7.3* 7.3*   PLATELETS 10*3/mm3 231 255 263     Results from last 7 days   Lab Units 01/08/24  0457 01/07/24  0518 01/06/24  0737 01/05/24  0501 01/02/24  1144 01/02/24  0808   SODIUM mmol/L 134* 136 137 133*   < > 129*   POTASSIUM mmol/L 4.8 4.9 5.2 5.2   < > 5.3*   CHLORIDE mmol/L 104 103 105 104   < > 95*   CO2 mmol/L 19.0* 18.0* 18.0* 17.0*   < > 19.0*   BUN mg/dL 31* 33* 37* 43*   < > 61*   CREATININE mg/dL 3.24* 3.87* 3.80* 4.20*   < > 7.49*   CALCIUM mg/dL 8.3* 8.5* 8.3* 7.9*   < > 7.6*   PHOSPHORUS mg/dL  --   --   --   --   --  7.3*   MAGNESIUM mg/dL  --   --   --   --   --  2.3   ALBUMIN g/dL 3.2* 3.7 3.5 3.5   < > 3.4*    < > = values in this interval not displayed.     Results from last 7 days   Lab Units 01/08/24  0457   ALK PHOS U/L 213*   BILIRUBIN mg/dL 0.2   ALT (SGPT) U/L 42*   AST (SGOT) U/L 33*                   Estimated " Creatinine Clearance: 18 mL/min (A) (by C-G formula based on SCr of 3.24 mg/dL (H)).        A/P:    ARF: Creatinine back down further to 3.2 after increasing to 3.9 yesterday.  Urine output continues nonoliguric with negative volume.  Creatinine up to 8 on presentation with likely dehydration due to diarrhea with poor p.o. intake in the setting of NSAIDs and Entresto.  Urine sodium 20 consistent with prerenal azotemia.  Renal function had been improving with hydration.  IV fluids held to avoid overload.   For now continue supportive care.  Strict I's and O's.  Monitor for further renal recovery.    HTN: Blood pressure stable.    Hyponatremia: Borderline.  Initially down to 129 on presentation due to hypovolemia and MEERA.  All fluids isotonic.     Hyperkalemia: Stable.  Due to MEERA.     Met Aciodsis: Improved on p.o. bicarb.  Due to renal failure and underlying GI losses with diarrhea.       Anemia.  Hemoglobin below goal.  Hx of supra therapeutic IRN w rectal bleeding in the past. Admitted again with concern for rectal bleed.  Check iron studies.  Transfuse as needed for Hgb <7.    Volume status: Continues with negative volume overnight.  Likely dehydrated on admission.  Creatinine improved with hydration.  IV fluids on hold for now as patient with history of CHF and had developed some mild edema.  Continue to hold diuretics..  Continue strict I's and O's.     Hx of mechanical valve.  On anticoagulation     CHF: EF 40-45%. Hx of MV and depressed RV function. On beta blocker and lasix. Not on ACEi/ARB/MRA/ or SGLT-2inh for now.    High risk and complexity patient.    Justin Rodriguez MD  24  11:30 EST          Electronically signed by Justin Rodriguez MD at 24 1139       Maciej Willett MD at 24            INFECTIOUS DISEASES  INPATIENT PROGRESS NOTE  2024      PATIENT NAME: Di Lopez  :  1960  MRN:  [id number]  Date of Admission:   "2024      Antimicrobials:  Day 1:  ceftriaxone      MAR reviewed.  Pertinent other medications include:  heparin gtt    Chief Complaint   Patient presents with    Rectal Bleeding       Reason for consultation:  fevers    Interval history: Patient was started on ceftriaxone yesterday due to ongoing fevers.  No fever overnight or during the day today.  Overall, she is feeling better.  Blood cultures remain no growth to date.  Echocardiogram did not show any vegetation but did have moderate to severe tricuspid regurgitation.    ROS:  No new rashes.  No SOB or chest pain.  No nausea/vomiting/diarrhea.     Objective:  Temp (24hrs), Av °F (37.2 °C), Min:97.9 °F (36.6 °C), Max:99.9 °F (37.7 °C)    /53 (BP Location: Right arm, Patient Position: Sitting)   Pulse 78   Temp 98 °F (36.7 °C) (Oral)   Resp 18   Ht 152.4 cm (60\")   Wt 89.9 kg (198 lb 3.1 oz)   LMP 1998   SpO2 91%   BMI 38.71 kg/m²     Physical Examination:  GENERAL: Chronically ill-appearing female.  Awake and alert, in no acute distress.   HEENT: broken right mandibular tooth with necrosis.  NECK: Supple without nuchal rigidity.  No thyromegaly.  LYMPH: Shotty tender cervical adenopathy, resolving.  CV: RRR. +faint murmur and mechanical click.  ICD pocket without TTP.  LUNGS: Prior wheezing/crackles resolved.  Good air movement.  ABDOMEN: Positive bowel sounds.  Soft, nontender, nondistended.  EXT:  No edema.  MSK: No joint effusions or inflammation noted.  SKIN: Warm and dry without rash or ulcer.   NEURO: A&Ox4. No focal deficits.  Face symmetric.  Speech fluent.  Moves all extremities well.   PSYCHIATRIC: Normal insight and judgement.  Cooperative.  Normal affect.    Laboratory Data:    Results from last 7 days   Lab Units 24  0552 24  0737 24  0501   WBC 10*3/mm3 4.97 5.89 6.74   HEMOGLOBIN g/dL 7.3* 7.3* 7.5*   HEMATOCRIT % 23.0* 23.0* 24.3*   PLATELETS 10*3/mm3 255 263 282     Results from last 7 days   Lab " Units 01/07/24  0518   SODIUM mmol/L 136   POTASSIUM mmol/L 4.9   CHLORIDE mmol/L 103   CO2 mmol/L 18.0*   BUN mg/dL 33*   CREATININE mg/dL 3.87*   GLUCOSE mg/dL 167*   CALCIUM mg/dL 8.5*     Results from last 7 days   Lab Units 01/07/24  0518   ALK PHOS U/L 225*   BILIRUBIN mg/dL 0.3   ALT (SGPT) U/L 43*   AST (SGOT) U/L 49*     Results from last 7 days   Lab Units 01/05/24  1640   SED RATE mm/hr 58*     Results from last 7 days   Lab Units 01/05/24  1640   CRP mg/dL 5.92*     Estimated Creatinine Clearance: 15.1 mL/min (A) (by C-G formula based on SCr of 3.87 mg/dL (H)).  Results from last 7 days   Lab Units 01/05/24  1640   LACTATE mmol/L 1.4     Results from last 7 days   Lab Units 01/07/24  0518 01/04/24  0541   CK TOTAL U/L 191* 160               Microbiology:    Microbiology Results (last 10 days)       Procedure Component Value - Date/Time    Blood Culture - Blood, Arm, Left [[id number]]  (Normal) Collected: 01/06/24 0056    Lab Status: Preliminary result Specimen: Blood from Arm, Left Updated: 01/07/24 0815     Blood Culture No growth at 24 hours    Blood Culture - Blood, Arm, Left [[id number]]  (Normal) Collected: 01/05/24 1641    Lab Status: Preliminary result Specimen: Blood from Arm, Left Updated: 01/07/24 1815     Blood Culture No growth at 2 days    Narrative:      Less than seven (7) mL's of blood was collected.  Insufficient quantity may yield false negative results.    Respiratory Panel PCR w/COVID-19(SARS-CoV-2) [facility]/ESTHER/KARSON/PAD/COR/LEONARDO In-House, NP Swab in UTM/VTM, 2 HR TAT - Swab, Nasopharynx [[id number]]  (Normal) Collected: 01/05/24 1634    Lab Status: Final result Specimen: Swab from Nasopharynx Updated: 01/05/24 1804     ADENOVIRUS, PCR Not Detected     Coronavirus 229E Not Detected     Coronavirus HKU1 Not Detected     Coronavirus NL63 Not Detected     Coronavirus OC43 Not Detected     COVID19 Not Detected     Human Metapneumovirus Not Detected     Human Rhinovirus/Enterovirus Not Detected      Influenza A PCR Not Detected     Influenza B PCR Not Detected     Parainfluenza Virus 1 Not Detected     Parainfluenza Virus 2 Not Detected     Parainfluenza Virus 3 Not Detected     Parainfluenza Virus 4 Not Detected     RSV, PCR Not Detected     Bordetella pertussis pcr Not Detected     Bordetella parapertussis PCR Not Detected     Chlamydophila pneumoniae PCR Not Detected     Mycoplasma pneumo by PCR Not Detected    Narrative:      In the setting of a positive respiratory panel with a viral infection PLUS a negative procalcitonin without other underlying concern for bacterial infection, consider observing off antibiotics or discontinuation of antibiotics and continue supportive care. If the respiratory panel is positive for atypical bacterial infection (Bordetella pertussis, Chlamydophila pneumoniae, or Mycoplasma pneumoniae), consider antibiotic de-escalation to target atypical bacterial infection.    COVID PRE-OP / PRE-PROCEDURE SCREENING ORDER (NO ISOLATION) - Swab, Nasopharynx [[id number]]  (Normal) Collected: 01/01/24 2330    Lab Status: Final result Specimen: Swab from Nasopharynx Updated: 01/02/24 0016    Narrative:      The following orders were created for panel order COVID PRE-OP / PRE-PROCEDURE SCREENING ORDER (NO ISOLATION) - Swab, Nasopharynx.  Procedure                               Abnormality         Status                     ---------                               -----------         ------                     COVID-19, FLU A/B, RSV P...[[id number]]  Normal              Final result                 Please view results for these tests on the individual orders.    COVID-19, FLU A/B, RSV PCR 1 HR TAT - Swab, Nasopharynx [[id number]]  (Normal) Collected: 01/01/24 2330    Lab Status: Final result Specimen: Swab from Nasopharynx Updated: 01/02/24 0016     COVID19 Not Detected     Influenza A PCR Not Detected     Influenza B PCR Not Detected     RSV, PCR Not Detected    Narrative:      Fact sheet for  providers: https://www.fda.gov/media/010182/download    Fact sheet for patients: https://www.fda.gov/media/668027/download    Test performed by PCR.          Monospot negative      Radiology:  No radiology results for the last day    TTE:    The right ventricular cavity is dilated.    The left atrial cavity is mildly dilated.    Left atrial volume is mildly increased.    The right atrial cavity is dilated.    The mitral valve mean gradient is 7 mmHg.    There is a mechanical mitral valve prosthesis present.    Moderate to severe tricuspid valve regurgitation is present.    Estimated right ventricular systolic pressure from tricuspid regurgitation is markedly elevated (>55 mmHg). Calculated right ventricular systolic pressure from tricuspid regurgitation is 88 mmHg.    LVEF estimated at 59%      DISCUSSION:  63 y.o. female with history of modified replacement and implanted defibrillator admitted with acute kidney injury and GI bleed.   Patient reported cervical adenopathy confirmed on CT imaging and developed fever while hospitalized.  Resp PCR and monospot negative.    PROBLEM LIST:   -- Undifferentiated fever with sweats - ongoing.  COVID-19/flu/RSV PCR negative.  Full resp PCR negative.  Blood cultures obtained.  At risk for endocarditis with implanted ICD and valve replacement.  Recent dental issues with broken/necrotic right mandibular tooth and painful left maxillary at prior root canal; at risk for odontogenic source bacteremia/cardiac device infection.  -- Cervical adenopathy - resolving.  No pharyngitis or sinusitis.  Monospot negative.  -- Recent Candida esophagitis.  HIV negative.  -- Implanted defibrillator.  -- History of mitral valve replacement.  On chronic anticoagulation.  -- MEERA, improving.  -- Rectal bleeding.  CT without acute intra-abdominal abnormality.  -- Back pain/sciatica symptoms; MRI L-spine with degenerative changes; no acute abnormality.  -- Moderate to severe tricuspid valve  regurgitation on echocardiogram; no vegetation noted.    PLAN:  -- Following blood cultures - no growth so far  -- Continue empiric ceftriaxone 2 g iv daily for potential bacteremia/endocarditis from odontogenic source given her dental issues  -- Continue to follow fever curve - improved with ceftriaxone  -- Consult cardiology regarding the moderate to severe TR on echo.  Consider MEAGAN.   -- I remain highly suspicious for endocarditis.  Her fever has improved with ceftriaxone and she has potential odontogenic source for bacteremia in the setting of mitral valve replacement and implanted defibrillator device.  She also tells me that she has been fatigued with worsening night sweats for several weeks.    Complex case.  Plan discussed with patient.    Maciej Willett MD  2024  20:57 EST      Electronically signed by Maciej Willett MD at 24       Rachel Gloria MD at 24 1158              [facility] Medicine Services  PROGRESS NOTE    Patient Name: Di WADDELL obi  : 1960  MRN: [id number]    Date of Admission: 2024  Primary Care Physician: Davina Santiago DO    Subjective   Subjective     CC:  BRBPR, febrile illness f/u    HPI:  Patient reports persistent pain in front of bilateral thighs, continues  Feels she now has leg swelling  Dry mouth, drinking regularly  Significant h/o nose bleeds, some nose bleed intermittently appreciated now and coughed up some bright red blood yesterday x 1    Objective   Objective     Vital Signs:   Temp:  [98.1 °F (36.7 °C)-99.9 °F (37.7 °C)] 99.5 °F (37.5 °C)  Heart Rate:  [76-87] 85  Resp:  [18] 18  BP: (119-137)/(52-75) 134/58  Flow (L/min):  [2] 2     Physical Exam:  Constitutional: Awake, alert female sitting up in recliner. Well appearing  HENT: NCAT, mucous membranes moist  Respiratory: Clear to auscultation bilaterally, respiratory effort normal on 2 liters n/c  Cardiovascular: RRR, soft systolic murmur appreciated, some  nonpitting leg swelling  Gastrointestinal: Soft, some nonspecific tenderness right side of abdomen without rebound, nondistended  Musculoskeletal: Muscle tone within normal limits, no joint effusions appreciated  Psychiatric: Appropriate affect, cooperative  Neurologic: Alert and oriented, facial movements symmetric and spontaneous movement of all 4 extremities grossly equal bilaterally, speech clear  Skin: No rashes    Results Reviewed:  LAB RESULTS:      Lab 01/07/24  0552 01/07/24  0518 01/06/24  0737 01/06/24  0056 01/05/24  1640 01/05/24  0501 01/04/24  0541 01/03/24  0251 01/02/24  0808 01/02/24  0531 01/01/24  2330   WBC 4.97  --  5.89  --   --  6.74 5.78 4.18  --  4.26 4.42   HEMOGLOBIN 7.3*  --  7.3*  --   --  7.5* 7.8* 8.4*  --  8.5* 9.5*   HEMATOCRIT 23.0*  --  23.0*  --   --  24.3* 24.5* 25.2*  --  26.0* 29.3*   PLATELETS 255  --  263  --   --  282 265 224  --  228 250   NEUTROS ABS  --   --  4.19  --   --   --   --  2.61  --  3.02 3.39   IMMATURE GRANS (ABS)  --   --  0.17*  --   --   --   --  0.07*  --  0.05 0.06*   LYMPHS ABS  --   --  1.18  --   --   --   --  1.12  --  0.92 0.77   MONOS ABS  --   --  0.26  --   --   --   --  0.25  --  0.21 0.17   EOS ABS  --   --  0.06  --   --   --   --  0.12  --  0.05 0.02   MCV 87.5  --  85.5  --   --  86.8 86.0 85.7  --  85.8 84.2   SED RATE  --   --   --   --  58*  --   --   --   --   --   --    CRP  --   --   --   --  5.92*  --   --   --   --   --   --    PROCALCITONIN  --  0.40*  --   --   --   --   --   --   --   --   --    LACTATE  --   --   --   --  1.4  --   --   --   --   --  1.5   PROTIME  --  17.2* 18.0*  --   --  18.8*  --   --   --   --  26.0*   APTT  --   --   --   --   --   --   --   --   --  66.2  --    HEPARIN ANTI-XA  --  0.25* 0.35 0.36 0.25* 0.21* 0.34 0.37   < > 0.10*  --     < > = values in this interval not displayed.         Lab 01/07/24  0518 01/06/24  0737 01/05/24  0501 01/04/24  0541 01/03/24  1654 01/03/24  0737 01/03/24  0251  01/02/24  1144 01/02/24  0808   SODIUM 136 137 133* 136 132*   < > 131*  131*   < > 129*   POTASSIUM 4.9 5.2 5.2 5.1  --   --  5.0  --  5.3*   CHLORIDE 103 105 104 105  --   --  99  --  95*   CO2 18.0* 18.0* 17.0* 17.0*  --   --  19.0*  --  19.0*   ANION GAP 15.0 14.0 12.0 14.0  --   --  13.0  --  15.0   BUN 33* 37* 43* 50*  --   --  57*  --  61*   CREATININE 3.87* 3.80* 4.20* 5.39*  --   --  6.72*  --  7.49*   EGFR 12.6* 12.9* 11.4* 8.5*  --   --  6.5*  --  5.7*   GLUCOSE 167* 145* 103* 89  --   --  150*  --  152*   CALCIUM 8.5* 8.3* 7.9* 7.1*  --   --  7.1*  --  7.6*   MAGNESIUM  --   --   --   --   --   --   --   --  2.3   PHOSPHORUS  --   --   --   --   --   --   --   --  7.3*   TSH  --   --   --   --   --   --   --   --  1.590    < > = values in this interval not displayed.         Lab 01/07/24  0518 01/06/24  0737 01/05/24  0501 01/04/24  0541 01/03/24  0251 01/02/24  0808 01/01/24  2330   TOTAL PROTEIN 6.3 6.2 5.9* 5.2* 5.7*   < > 7.1   ALBUMIN 3.7 3.5 3.5 3.2* 3.2*   < > 4.1   GLOBULIN 2.6 2.7 2.4 2.0 2.5   < > 3.0   ALT (SGPT) 43* 31 29 32 37*   < > 45*   AST (SGOT) 49* 36* 28 29 32   < > 34*   BILIRUBIN 0.3 0.3 0.2 0.2 0.2   < > 0.3   ALK PHOS 225* 212* 204* 212* 217*   < > 247*   LIPASE  --   --   --   --   --   --  82*    < > = values in this interval not displayed.         Lab 01/07/24  0518 01/06/24  0737 01/05/24  0501 01/01/24  2330   PROTIME 17.2* 18.0* 18.8* 26.0*   INR 1.39* 1.48* 1.56* 2.36*             Lab 01/06/24  0737 01/04/24  0541 01/01/24  2358   IRON 15*  --   --    IRON SATURATION (TSAT) 5*  --   --    TIBC 279*  --   --    TRANSFERRIN 187*  --   --    FERRITIN 1,188.00*  --   --    FOLATE  --  >20.00  --    VITAMIN B 12  --  353  --    ABO TYPING  --   --  O   RH TYPING  --   --  Positive   ANTIBODY SCREEN  --   --  Negative         Lab 01/02/24  0047   FIO2 21   CARBOXYHEMOGLOBIN (VENOUS) 1.3     Brief Urine Lab Results  (Last result in the past 365 days)        Color   Clarity    Blood   Leuk Est   Nitrite   Protein   CREAT   Urine HCG        01/02/24 0011             142.2         01/02/24 0011 Yellow   Cloudy   Small (1+)   Negative   Negative   100 mg/dL (2+)                   Microbiology Results Abnormal       Procedure Component Value - Date/Time    Blood Culture - Blood, Arm, Left [[id number]]  (Normal) Collected: 01/06/24 0056    Lab Status: Preliminary result Specimen: Blood from Arm, Left Updated: 01/07/24 0815     Blood Culture No growth at 24 hours    Blood Culture - Blood, Arm, Left [[id number]]  (Normal) Collected: 01/05/24 1641    Lab Status: Preliminary result Specimen: Blood from Arm, Left Updated: 01/06/24 1815     Blood Culture No growth at 24 hours    Narrative:      Less than seven (7) mL's of blood was collected.  Insufficient quantity may yield false negative results.    Respiratory Panel PCR w/COVID-19(SARS-CoV-2) LUIS/ESTHER/KARSON/PAD/COR/LEONARDO In-House, NP Swab in UTM/VTM, 2 HR TAT - Swab, Nasopharynx [[id number]]  (Normal) Collected: 01/05/24 1634    Lab Status: Final result Specimen: Swab from Nasopharynx Updated: 01/05/24 1804     ADENOVIRUS, PCR Not Detected     Coronavirus 229E Not Detected     Coronavirus HKU1 Not Detected     Coronavirus NL63 Not Detected     Coronavirus OC43 Not Detected     COVID19 Not Detected     Human Metapneumovirus Not Detected     Human Rhinovirus/Enterovirus Not Detected     Influenza A PCR Not Detected     Influenza B PCR Not Detected     Parainfluenza Virus 1 Not Detected     Parainfluenza Virus 2 Not Detected     Parainfluenza Virus 3 Not Detected     Parainfluenza Virus 4 Not Detected     RSV, PCR Not Detected     Bordetella pertussis pcr Not Detected     Bordetella parapertussis PCR Not Detected     Chlamydophila pneumoniae PCR Not Detected     Mycoplasma pneumo by PCR Not Detected    Narrative:      In the setting of a positive respiratory panel with a viral infection PLUS a negative procalcitonin without other underlying concern for  bacterial infection, consider observing off antibiotics or discontinuation of antibiotics and continue supportive care. If the respiratory panel is positive for atypical bacterial infection (Bordetella pertussis, Chlamydophila pneumoniae, or Mycoplasma pneumoniae), consider antibiotic de-escalation to target atypical bacterial infection.    COVID PRE-OP / PRE-PROCEDURE SCREENING ORDER (NO ISOLATION) - Swab, Nasopharynx [[id number]]  (Normal) Collected: 01/01/24 2330    Lab Status: Final result Specimen: Swab from Nasopharynx Updated: 01/02/24 0016    Narrative:      The following orders were created for panel order COVID PRE-OP / PRE-PROCEDURE SCREENING ORDER (NO ISOLATION) - Swab, Nasopharynx.  Procedure                               Abnormality         Status                     ---------                               -----------         ------                     COVID-19, FLU A/B, RSV P...[[id number]]  Normal              Final result                 Please view results for these tests on the individual orders.    COVID-19, FLU A/B, RSV PCR 1 HR TAT - Swab, Nasopharynx [[id number]]  (Normal) Collected: 01/01/24 2330    Lab Status: Final result Specimen: Swab from Nasopharynx Updated: 01/02/24 0016     COVID19 Not Detected     Influenza A PCR Not Detected     Influenza B PCR Not Detected     RSV, PCR Not Detected    Narrative:      Fact sheet for providers: https://www.fda.gov/media/137462/download    Fact sheet for patients: https://www.fda.gov/media/834625/download    Test performed by PCR.            Adult Transthoracic Echo Complete W/ Cont if Necessary Per Protocol    Result Date: 1/6/2024    The right ventricular cavity is dilated.   The left atrial cavity is mildly dilated.   Left atrial volume is mildly increased.   The right atrial cavity is dilated.   The mitral valve mean gradient is 7 mmHg.   There is a mechanical mitral valve prosthesis present.   Moderate to severe tricuspid valve regurgitation is  present.   Estimated right ventricular systolic pressure from tricuspid regurgitation is markedly elevated (>55 mmHg). Calculated right ventricular systolic pressure from tricuspid regurgitation is 88 mmHg.   LVEF estimated at 59%     MRI Lumbar Spine Without Contrast    Result Date: 1/5/2024  MRI LUMBAR SPINE WO CONTRAST Date of Exam: 1/5/2024 7:30 PM EST Indication: Low back pain, symptoms persist with > 6 wks treatment patient with possible metallic implant?.  Comparison: CT 7/16/2013. Radiograph 4/20/2022. Technique:  Routine multiplanar/multisequence sequence images of the lumbar spine were obtained without contrast administration.  Findings: The vertebral body heights are maintained. Grade 1 retrolisthesis of L5 on S1. Otherwise overall alignment is maintained. Mild T2/T1 hyperintensity within the endplates at L5-S1, most likely representing Modic type III changes. No suspicious marrow edema. Mild diffuse hypodensity within the visualized marrow, nonspecific but may represent underlying hematopoiesis. The visualized spinal cord is unremarkable. The conus terminates at L1. Although evaluation is limited, no definite fluid collection is noted within the spinal canal. The paravertebral soft tissues are unremarkable. The visualized intra-abdominal and intrapelvic soft tissues are unremarkable. Mild loss of intervertebral disc base height throughout the visualized spine and moderate to severe loss of intervertebral disc base height at L5-S1. T12-L1: No significant spinal canal stenosis or neuroforaminal narrowing. L1-L2: No significant spinal canal stenosis or neuroforaminal narrowing. L2-L3: No significant spinal canal stenosis or neuroforaminal narrowing. L3-4: No significant spinal canal stenosis or neuroforaminal narrowing. L4-L5: Mild posterior broad-based disc bulge, ligamentum flavum thickening and bilateral facet arthrosis causes mild to moderate bilateral neuroforaminal narrowing without significant spinal  canal stenosis. L5-S1: Grade 1 retrolisthesis, broad-based disc bulge and possible superimposed small disc herniation. There is also bilateral facet arthrosis and ligamentum flavum thickening. Overall this causes mild spinal canal stenosis and moderate to severe bilateral neuroforaminal narrowing, worse on the right.     Impression: Impression: Multilevel degenerative changes as characterized above, most significantly at L5-S1. No definite acute abnormality. Electronically Signed: Devin Johnson DO  1/5/2024 8:11 PM EST  Workstation ID: [id number]     Results for orders placed during the hospital encounter of 01/01/24    Adult Transthoracic Echo Complete W/ Cont if Necessary Per Protocol    Interpretation Summary    The right ventricular cavity is dilated.    The left atrial cavity is mildly dilated.    Left atrial volume is mildly increased.    The right atrial cavity is dilated.    The mitral valve mean gradient is 7 mmHg.    There is a mechanical mitral valve prosthesis present.    Moderate to severe tricuspid valve regurgitation is present.    Estimated right ventricular systolic pressure from tricuspid regurgitation is markedly elevated (>55 mmHg). Calculated right ventricular systolic pressure from tricuspid regurgitation is 88 mmHg.    LVEF estimated at 59%      Current medications:  Scheduled Meds:cefTRIAXone, 2,000 mg, Intravenous, Q24H  escitalopram, 20 mg, Oral, Daily  ferrous sulfate, 325 mg, Oral, Daily With Breakfast  folic acid, 800 mcg, Oral, Daily  insulin detemir, 5 Units, Subcutaneous, Nightly  insulin lispro, 2-7 Units, Subcutaneous, 4x Daily AC & at Bedtime  Insulin Lispro, 3 Units, Subcutaneous, TID With Meals  oxybutynin XL, 5 mg, Oral, Daily  oxymetazoline, 2 spray, Each Nare, BID  pantoprazole, 40 mg, Oral, Q AM  polyethylene glycol, 17 g, Oral, Daily  senna-docusate sodium, 1 tablet, Oral, BID  sodium bicarbonate, 1,300 mg, Oral, TID  sodium chloride, 10 mL, Intravenous,  Q12H      Continuous Infusions:heparin, 16 Units/kg/hr, Last Rate: 16 Units/kg/hr (01/07/24 0638)  Pharmacy to Dose Heparin,       PRN Meds:.  acetaminophen    albuterol    dextrose    dextrose    glucagon (human recombinant)    melatonin    naloxone    nitroglycerin    oxyCODONE-acetaminophen    Pharmacy to Dose Heparin    sodium chloride    sodium chloride    Assessment & Plan   Assessment & Plan     Active Hospital Problems    Diagnosis  POA    **Renal failure [N19]  Yes    Hyponatremia [E87.1]  Yes    Hyperkalemia [E87.5]  Yes    Type 2 diabetes mellitus with hyperglycemia [E11.65]  Yes    Elevated LFTs [R79.89]  Yes    Anemia, chronic disease [D63.8]  Yes    Cervical lymphadenopathy [R59.0]  Yes    GI bleed [K92.2]  Yes    H/O heart valve replacement with mechanical valve [Z95.2]  Not Applicable    ARF (acute renal failure) [N17.9]  Yes    Left bundle branch hemiblock [I44.60]  Yes    Type 2 diabetes mellitus without complication, with long-term current use of insulin [E11.9, Z79.4]  Not Applicable    Dyslipidemia [E78.5]  Yes    Emphysema/COPD [J43.9]  Yes    Hypertension [I10]  Yes    Arteriovenous malformation of gastrointestinal tract [K55.20]  Yes    Obstructive sleep apnea syndrome [G47.33]  Yes    Chronic anticoagulation [Z79.01]  Not Applicable      Resolved Hospital Problems   No resolved problems to display.        Brief Hospital Course to date:  Di Lopez is a 63 y.o. 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 Found to have severe MEERA, resolution of BRBPR, persistent fever/chills and thigh pain    Epistaxis  -start afrin x 3 days, repeat hemogram in AM    Febrile illness, unclear etiology  -negative full viral panel, negative monospot  -continue to suspect viral illness (throat soreness, fevers, back pain/aches, etc), given new thigh pain consideration of discitis but MRI withOUT  contrast negative  -blood cultures ngtd, TTE without vegetation. Unclear etiology but stable patient  -ID following, on rocephin    Acute Renal Failure 2/2 hypovolemia + med effect - improved  -2/2 acute diarrheal illness + entresto, NSAIDs  -IVF discontinued, appears mildly hypervolemic w good PO intake now  -do suspect some ATN component and expect slow recovery  -nephrology following    Hypovolemic hyponatremia - improved  -IVF per nephrology as above    Acute anemia 2/2 bloody diarrheal illness - blood diarrhea resolved  -no further bowel movement since admission, suspect further decline is 2/2 prior hemoconcentration and now IVF resuscitation. hypoproliferative    Mechanical mitral valve on coumadin - heparin gtt in setting of severe MEERA, pharmacy monitoring    CHFpEF (EF 45-50%), s/p ICD - appears euvolemic currently  Chronic hypoxic resp failure 2/2 COPD - on home 2 liters n/c  DMII - basal/bolus regimen  Anxiety/depression  BMI 38  Liver mass s/p embolization    Expected Discharge Location and Transportation: home  Expected Discharge 1/11 (Discharge date is tentative pending patient's medical condition and is subject to change)  Expected Discharge Date: 1/11/2024; Expected Discharge Time:      DVT prophylaxis:  Medical DVT prophylaxis orders are present.     AM-PAC 6 Clicks Score (PT): 22 (01/07/24 0818)    CODE STATUS:   Code Status and Medical Interventions:   Ordered at: 01/02/24 0319     Code Status (Patient has no pulse and is not breathing):    CPR (Attempt to Resuscitate)     Medical Interventions (Patient has pulse or is breathing):    Full Support       Rachel Gloria MD  01/07/24        Electronically signed by Rachel Gloria MD at 01/07/24 1203       Justin Rodriguez MD at 01/07/24 1022             LOS: 5 days    Patient Care Team:  Davina Santiago DO as PCP - General (Family Medicine)  Ameena Iglesias MD as Consulting Physician (Endocrinology)  Nakita Freire APRN as Nurse  "Practitioner (Nurse Practitioner)  Raimundo Thomas MD as Consulting Physician (Pulmonary Disease)  Nadir Everett MD as Consulting Physician (Cardiology)  Luiz Longoria MD as Consulting Physician (Pain Medicine)    Subjective     Stable overnight.      Objective     Vital Signs:  Blood pressure 134/58, pulse 85, temperature 99.5 °F (37.5 °C), temperature source Oral, resp. rate 18, height 152.4 cm (60\"), weight 89.9 kg (198 lb 3.1 oz), last menstrual period 06/17/1998, SpO2 91%, not currently breastfeeding.      Intake/Output Summary (Last 24 hours) at 1/7/2024 1022  Last data filed at 1/7/2024 0900  Gross per 24 hour   Intake 200 ml   Output 1100 ml   Net -900 ml        01/06 0701 - 01/07 0700  In: -   Out: 1350 [Urine:1350]    Physical Exam:        GENERAL: WD AAF NAD  NEURO: Awake and alert, oriented. No focal deficit  PSYCHIATRIC: NMA. Cooperative with PE  EYE: PE, no icterus, no conjunctivitis  ENT: ommm, dentition intact,  Hearing intact  NECK: Supple , No JVD discernable,  Trachea midline  CV: Trace edema, RRR  LUNGS:  Quiet,  Nonlabored resp.  Symmetrical expansion  ABDOMEN: Nondistended, soft nontender.  : No Perdomo, no palp bladder  SKIN: Warm and dry without rash      Labs:  Results from last 7 days   Lab Units 01/07/24  0552 01/06/24  0737 01/05/24  0501   WBC 10*3/mm3 4.97 5.89 6.74   HEMOGLOBIN g/dL 7.3* 7.3* 7.5*   PLATELETS 10*3/mm3 255 263 282     Results from last 7 days   Lab Units 01/07/24  0518 01/06/24  0737 01/05/24  0501 01/04/24  0541 01/02/24  1144 01/02/24  0808   SODIUM mmol/L 136 137 133* 136   < > 129*   POTASSIUM mmol/L 4.9 5.2 5.2 5.1   < > 5.3*   CHLORIDE mmol/L 103 105 104 105   < > 95*   CO2 mmol/L 18.0* 18.0* 17.0* 17.0*   < > 19.0*   BUN mg/dL 33* 37* 43* 50*   < > 61*   CREATININE mg/dL 3.87* 3.80* 4.20* 5.39*   < > 7.49*   CALCIUM mg/dL 8.5* 8.3* 7.9* 7.1*   < > 7.6*   PHOSPHORUS mg/dL  --   --   --   --   --  7.3*   MAGNESIUM mg/dL  --   --   --   --   --  2.3 "   ALBUMIN g/dL 3.7 3.5 3.5 3.2*   < > 3.4*    < > = values in this interval not displayed.     Results from last 7 days   Lab Units 01/07/24  0518   ALK PHOS U/L 225*   BILIRUBIN mg/dL 0.3   ALT (SGPT) U/L 43*   AST (SGOT) U/L 49*                   Estimated Creatinine Clearance: 15.1 mL/min (A) (by C-G formula based on SCr of 3.87 mg/dL (H)).        A/P:    ARF: Creatinine back up slightly to 3.9 overnight.  Urine output nonoliguric with negative volume.  Creatinine up to 8 on presentation with likely dehydration due to diarrhea with poor p.o. intake in the setting of NSAIDs and Entresto.  Urine sodium 20 consistent with prerenal azotemia.  Renal function had been improving with hydration.  IV fluids held to avoid overload.  Creatinine back up slightly overnight.  For now continue supportive care.  Strict I's and O's.  Monitor for further renal recovery.    HTN: Blood pressure stable.    Hyponatremia: Resolved.  Initially down to 129 on presentation due to hypovolemia and MEERA.  All fluids isotonic     Hyperkalemia: Stable.  Due to MEERA.     Met Aciodsis: Improved on p.o. bicarb.  Due to renal failure and underlying GI losses with diarrhea.       Anemia.  Hemoglobin below goal.  Hx of supra therapeutic IRN w rectal bleeding in the past. Admitted again with concern for rectal bleed.  Check iron studies.  Transfuse as needed for Hgb <7.    Volume status: Continues with negative volume overnight.  Likely dehydrated on admission.  Improving with hydration.  Avoid volume overload with underlying CHF.  IV fluids held yesterday with creatinine increasing overnight.  Continue strict I's and O's.     Hx of mechanical valve.  On anticoagulation     CHF: EF 40-45%. Hx of MV and depressed RV function. On beta blocker and lasix. Not on ACEi/ARB/MRA/ or SGLT-2inh for now.    High risk and complexity patient.    Justin Rodriguez MD  01/07/24  10:22 EST          Electronically signed by Justin Rodriguez MD at 01/07/24  1446          Consult Notes (last 72 hours)        Nadir Everett MD at 01/08/24 0916        Consult Orders    1. Inpatient Cardiology Consult [[id number]] ordered by Maciej Willett MD at 01/07/24 1956                           Cardiology Consult - [facility]    Di WADDELL Hocsully     [id number]  1960  [address] [place] [id number]         Admission Date:  1/1/2024    Consultation Date:  01/08/24        PCP:  Davina Santiago DO  Referring MD: Dr. Willett  Consulting MD: Dr. Everett          CC: ARF, persistent fevers, cervical adenopathy    Reason for Consult: Rule out endocarditis, severe TR by TTE          Allergies:  is allergic to adhesive tape, sildenafil, codeine, morphine, penicillins, statins, and sulfa antibiotics.    Medications Prior to Admission   Medication Sig Dispense Refill Last Dose    albuterol sulfate  (90 Base) MCG/ACT inhaler Inhale 2 puffs Every 4 (Four) Hours As Needed for Wheezing. 1 g 0     azelastine (ASTELIN) 0.1 % nasal spray 2 sprays into the nostril(s) as directed by provider 2 (Two) Times a Day. Use in each nostril as directed (Patient taking differently: 2 sprays into the nostril(s) as directed by provider As Needed for Rhinitis or Allergies. Use in each nostril as directed prn) 1 each 1     carvedilol (COREG) 12.5 MG tablet Take 1 tablet by mouth 2 (Two) Times a Day With Meals. 180 tablet 2     cefdinir (OMNICEF) 300 MG capsule Take 1 capsule by mouth 2 (Two) Times a Day. 20 capsule 0     Continuous Blood Gluc  (FreeStyle Dalia 2 Adel) device 1 each Daily. Use as directed daily to check blood sugars 1 each 0     Continuous Blood Gluc Sensor (FreeStyle Dalia 2 Sensor) misc USE AS DIRECTED FOR 14 DAYS 2 each 11     Dulaglutide (Trulicity) 4.5 MG/0.5ML solution pen-injector INJECT 1  SYRINGE SUBCUTANEOUSLY ONCE A WEEK 6 mL 0     escitalopram (LEXAPRO) 20 MG tablet Take 1 tablet by mouth Daily. 90 tablet 3     ferrous sulfate 325 (65  FE) MG tablet Take 1 tablet by mouth Daily With Breakfast.       folic acid (FOLVITE) 800 MCG tablet Take 1 tablet by mouth Daily.       furosemide (LASIX) 40 MG tablet Take 1 tablet by mouth 3 (Three) Times a Week. Monday, Wednesday, Friday. Resume when done w daily lasix       gabapentin (NEURONTIN) 300 MG capsule Take 1 capsule by mouth At Night As Needed (leg pain). 30 capsule 3     glimepiride (AMARYL) 4 MG tablet Take 1 tablet by mouth 2 (Two) Times a Day. 180 tablet 1     glucose blood test strip Use as instructed 3 times a day 100 each 11     HYDROcodone-acetaminophen (NORCO) 7.5-325 MG per tablet Take 1 tablet by mouth Every 8 (Eight) Hours As Needed for Moderate Pain.       Insulin Glargine (Lantus SoloStar) 100 UNIT/ML injection pen Inject 44 Units under the skin into the appropriate area as directed Daily. 45 mL 1     Insulin Lispro, 1 Unit Dial, (HumaLOG KwikPen) 100 UNIT/ML solution pen-injector Per correctional scale, MDD 50 units 15 mL 3     Insulin Pen Needle (B-D UF III MINI PEN NEEDLES) 31G X 5 MM misc USE 1  FIVE TIMES DAILY 150 each 5     Insulin Pen Needle (RELION PEN NEEDLES) 32G X 4 MM misc Use BID for E11.9 100 each 1     Lancets misc For use with glucose monitoring 3 times daily 100 each 11     lidocaine (LIDODERM) 5 % USE 1 PATCH EXTERNALLY ONCE DAILY. REMOVE AND DISCARD PATCH WITHIN 12 HOURS OR AS DIRECTED BY MD (Patient taking differently: Place 1 patch on the skin as directed by provider Daily.) 30 patch 0     methocarbamol (ROBAXIN) 500 MG tablet Take 1 tablet by mouth 4 (Four) Times a Day As Needed for Muscle Spasms. 40 tablet 0     nystatin (MYCOSTATIN) 100,000 unit/mL suspension Swish and swallow 5 mL 4 (Four) Times a Day. 200 mL 0     Omega-3 Fatty Acids (FISH OIL) 1000 MG capsule capsule Take 1 capsule by mouth 4 (Four) Times a Day. (Patient taking differently: Take 1 capsule by mouth 2 (Two) Times a Day With Meals.) 120 capsule 2     omeprazole (priLOSEC) 20 MG capsule Take 1  capsule by mouth twice daily 60 capsule 0     sacubitril-valsartan (ENTRESTO) 24-26 MG tablet Take 1 tablet by mouth 2 (Two) Times a Day. Resume when ok w PCP       simvastatin (ZOCOR) 5 MG tablet TAKE 1 TABLET BY MOUTH ONCE DAILY AT NIGHT 90 tablet 0     solifenacin (VESICARE) 5 MG tablet Take 1 tablet by mouth Daily. 90 tablet 0     spironolactone (ALDACTONE) 25 MG tablet Take 1 tablet by mouth Daily.       Vitamin D, Cholecalciferol, (CHOLECALCIFEROL) 10 MCG (400 UNIT) tablet Take 2 tablets by mouth Daily.       warfarin (COUMADIN) 2 MG tablet Take on 11/24/2023  Indications: Presence of Mechanical Artificial Heart Valve (Patient taking differently: Take 2.5 tablets by mouth. 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  She presents to [facility] with complaints of 10 pound weight loss, chills, diarrhea, swollen lymph nodes, BRBPR.  She was last hospitalized November 2023 with a GI bleed requiring 1 unit PRBC.  At that time, upper and lower endoscopy revealed Candida esophagitis and diverticulosis.  She was also treated for UTI during that admission (Rocephin).  She reports not feeling well since discharge and was recently treated for continued lymphadenopathy by her PCP with cefdinir and methocarbamol.  She has been admitted to hospitalist services, ID and nephrology following in consultation for ARF and persistent fevers.       Transthoracic echo performed demonstrates EF 59%, moderate to severe TR with RVSP 88 mmHg.  She also has a BiV pacemaker.  With this cardiac history, there is concern for possible endocarditis and cardiology has been asked to consult for MEAGAN.  Her device was interrogated last week for MRI clearance.  Interrogation was WNL with normal function.  At time of consultation, patient is awake in bed, no family present.  She continues to have pain in BLE particularly behind her knees.  She continues to report pedal edema, generalized weakness and overall pain throughout her body.  She denies chest pain or angina, denies dyspnea or orthopnea.  She reports no palpitations or irregular heartbeats.  Denies dizziness or presyncope.          Social History     Socioeconomic History    Marital status: Single   Tobacco Use    Smoking status: Former     Packs/day: 1.00     Years: 30.00     Additional pack years: 0.00     Total pack years: 30.00     Types: Cigarettes, Electronic Cigarette     Start date: 1976     Quit date: 2/10/2010     Years since quittin.9    Smokeless tobacco: Never    Tobacco comments:     Quit in    Vaping Use    Vaping Use: Every day    Substances: Nicotine    Devices: Disposable, one every 2 days   Substance and Sexual Activity    Alcohol use: No    Drug use: Never    Sexual activity: Defer     Partners: Male     Birth control/protection: Abstinence, None, Hysterectomy     Comment: 1 partner     Family History   Problem Relation Age of Onset    Sudden death Mother         POSSIBLE SERIOUS MASSIVE STROKE    Hypertension Mother             Multiple myeloma Father     Cancer Father             Hypertension Father             Ovarian cancer Paternal Grandmother     Cancer Paternal Grandmother             Diabetes Brother             Hypertension Brother             Breast cancer Neg Hx      Past Surgical History:   Procedure Laterality Date     "BILATERAL OOPHORECTOMY  2009    Status post bilateral oophorectomy secondary to ovarian cysts     BREAST BIOPSY Right     STEREOTACTIC    BREAST EXCISIONAL BIOPSY Bilateral     CARDIAC ELECTROPHYSIOLOGY PROCEDURE N/A 10/15/2020    Procedure: Biventricular Device Insertion;  Surgeon: Nadir Everett MD;  Location:  [facility];  Service: Cardiology;  Laterality: N/A;    CARDIAC SURGERY  2003    CARPAL TUNNEL RELEASE Bilateral     COLONOSCOPY  12/28/2012    COLONOSCOPY N/A 5/23/2023    Procedure: COLONOSCOPY;  Surgeon: Gt Fernandes MD;  Location:  [facility];  Service: Gastroenterology;  Laterality: N/A;    COLONOSCOPY N/A 11/16/2023    Procedure: COLONOSCOPY;  Surgeon: Brunner, Mark I, MD;  Location:  [facility];  Service: Gastroenterology;  Laterality: N/A;    CYSTECTOMY      removal of ganglion cyst from left wrist    ENDOSCOPY  12/30/2011    DIAGNOSTIC ESOPHAGOGASTRODUODENOSCOPY    ENDOSCOPY N/A 11/16/2023    Procedure: ESOPHAGOGASTRODUODENOSCOPY;  Surgeon: Brunner, Mark I, MD;  Location:  [facility];  Service: Gastroenterology;  Laterality: N/A;    HYSTERECTOMY  1995    A.  Status post hysterectomy for early stage endometrial cancer done in 1995.    MITRAL VALVE REPLACEMENT  03/03/2008    Dr. Hima Barreto; MECHANICAL    OTHER SURGICAL HISTORY      LIVER MASS BLOOD SUPPLY CLIPPED ON RIGHT AND LEFT SIDE PER PT     ROS: Pertinent items are noted in HPI, all other systems reviewed and negative     Objective     height is 152.4 cm (60\") and weight is 89.9 kg (198 lb 3.1 oz). Her oral temperature is 101.4 °F (38.6 °C) (abnormal). Her blood pressure is 123/60 and her pulse is 78. Her respiration is 18 and oxygen saturation is 96%.    Intake/Output Summary (Last 24 hours) at 1/8/2024 0916  Last data filed at 1/8/2024 0455  Gross per 24 hour   Intake 160 ml   Output 1300 ml   Net -1140 ml     Intake/Output                         01/06/24 0701 - 01/07/24 0700 01/07/24 0701 - " 01/08/24 0700     [id number] 6936-1850 Total 8226-4917 4426-8550 Total                 Intake    P.O.  --  -- --  360  -- 360    Total Intake -- -- -- 360 -- 360       Output    Urine  800  550 1350  900  400 1300    Total Output                 01/01/24  2251 01/02/24  0408 01/06/24  1612   Weight: 84.8 kg (187 lb) 89.9 kg (198 lb 3.2 oz) 89.9 kg (198 lb 3.1 oz)          Physical Exam:  General Appearance:    Alert, cooperative, in no acute distress   Head:    Normocephalic, without obvious abnormality, atraumatic   Eyes:            Lids and lashes normal, conjunctivae and sclerae normal, no   icterus, no pallor, corneas clear, PERRLA   Ears:    Ears appear intact with no abnormalities noted   Throat:   No oral lesions, no thrush, oral mucosa moist   Neck:   No adenopathy, supple, trachea midline, no thyromegaly, no carotid bruit, no JVD   Back:     No kyphosis present, no scoliosis present, no skin lesions,    erythema or scars, no tenderness to percussion or                   palpation, range of motion normal   Lungs:     Clear to auscultation,respirations regular, even and               unlabored    Heart:    Regular rhythm and normal rate, normal S1 and S2, no         murmur, no gallop, no rub.  + click   Abdomen:     Normal bowel sounds, no masses, no organomegaly, soft     nontender, nondistended, no guarding, no rebound   tenderness   Extremities:   Moves all extremities well,  no cyanosis, no redness, + edema   Pulses:   Pulses palpable and equal bilaterally   Skin:   No bleeding, bruising or rash   Lymph nodes:   + cervical adenopathy   Neurologic:   Cranial nerves 2 - 12 grossly intact, sensation intact, DTR     present and equal bilaterally          Results Review:  I personally reviewed the patient's clinical results.  Results from last 7 days   Lab Units 01/08/24  0457   WBC 10*3/mm3 4.73   HEMOGLOBIN g/dL 7.1*   HEMATOCRIT % 22.1*   PLATELETS 10*3/mm3 231     Results from last 7  days   Lab Units 01/08/24  0457   SODIUM mmol/L 134*   POTASSIUM mmol/L 4.8   CHLORIDE mmol/L 104   CO2 mmol/L 19.0*   BUN mg/dL 31*   CREATININE mg/dL 3.24*   CALCIUM mg/dL 8.3*   BILIRUBIN mg/dL 0.2   ALK PHOS U/L 213*   ALT (SGPT) U/L 42*   AST (SGOT) U/L 33*   GLUCOSE mg/dL 226*       Results from last 7 days   Lab Units 01/08/24  0457 01/07/24  0518 01/06/24  0737   INR  1.29* 1.39* 1.48*     Results from last 7 days   Lab Units 01/02/24  0808   TSH uIU/mL 1.590                     Radiology:  Imaging Results (Last 72 Hours)       Procedure Component Value Units Date/Time    MRI Lumbar Spine Without Contrast [[id number]] Collected: 01/05/24 2006     Updated: 01/05/24 2014    Narrative:        MRI LUMBAR SPINE WO CONTRAST    Date of Exam: 1/5/2024 7:30 PM EST    Indication: Low back pain, symptoms persist with > 6 wks treatment  patient with possible metallic implant?.     Comparison: CT 7/16/2013. Radiograph 4/20/2022.    Technique:  Routine multiplanar/multisequence sequence images of the lumbar spine were obtained without contrast administration.        Findings:  The vertebral body heights are maintained.  Grade 1 retrolisthesis of L5 on S1.  Otherwise overall alignment is maintained.    Mild T2/T1 hyperintensity within the endplates at L5-S1, most likely representing Modic type III changes. No suspicious marrow edema.  Mild diffuse hypodensity within the visualized marrow, nonspecific but may represent underlying hematopoiesis.    The visualized spinal cord is unremarkable.  The conus terminates at L1. Although evaluation is limited, no definite fluid collection is noted within the spinal canal.    The paravertebral soft tissues are unremarkable.  The visualized intra-abdominal and intrapelvic soft tissues are unremarkable.    Mild loss of intervertebral disc base height throughout the visualized spine and moderate to severe loss of intervertebral disc base height at L5-S1.    T12-L1: No significant spinal  canal stenosis or neuroforaminal narrowing.  L1-L2: No significant spinal canal stenosis or neuroforaminal narrowing.  L2-L3: No significant spinal canal stenosis or neuroforaminal narrowing.  L3-4: No significant spinal canal stenosis or neuroforaminal narrowing.  L4-L5: Mild posterior broad-based disc bulge, ligamentum flavum thickening and bilateral facet arthrosis causes mild to moderate bilateral neuroforaminal narrowing without significant spinal canal stenosis.  L5-S1: Grade 1 retrolisthesis, broad-based disc bulge and possible superimposed small disc herniation. There is also bilateral facet arthrosis and ligamentum flavum thickening. Overall this causes mild spinal canal stenosis and moderate to severe   bilateral neuroforaminal narrowing, worse on the right.      Impression:      Impression:  Multilevel degenerative changes as characterized above, most significantly at L5-S1. No definite acute abnormality.        Electronically Signed: Devin Johnson DO    1/5/2024 8:11 PM EST    Workstation ID: [id number]              Tele: NSR, V paced    Assessment:  -  64 yo AA female admitted with persistent fevers, lymphadenopathy, weight loss, diarrhea, generalized malaise and pain, ARF. Admitted with febrile illness of unclear etiology with ongoing work up.  ID following  -  VHDz with prior mechanical MVR on chronic warfarin.  -  TTE demonstrates severe TR with RVSP 88mHg  -  Chronic HFpEF with [facility] [facility] BiV PPM  -  HTN  -  HLP  -  COPD with former tobacco abuse.  Current vaping use.  -  Recent GIB requiring 1 unit PRBC.  AVM noted.  + Diverticulosis  -  Candida esophagitis  -  ARF, improving slowly  -  DMII        Plan:  -  Admitted to hospitalist services.  -  ID following  -  Nephrology following.  Cr 3.24 today (down from 3.87)  -  TTE reviewed.  Will keep patient NPO for MEAGAN today, will be performed at 3 pm.  Okay for morning meds with sip of water prior to 0900.    -  warfarin on hold, Heparin gtt ongoing.   Trend daily labs.  -  Cardiology will see again in a.m.        I discussed the patient's findings and my recommendations with the patient, any present family members, and the nursing staff.  Nadir Everett MD saw and examined patient, verified hx and PE, read all radiographic studies, reviewed labs and micro data, and formulated dx, plan for treatment and all medical decision making.      Vijaya Zavala PA-C, working with Nadir Everett MD  01/08/24 09:16 EST        Electronically signed by Nadir Everett MD at 01/08/24 1526

## 2024-01-08 NOTE — PLAN OF CARE
"Goal Outcome Evaluation:  Plan of Care Reviewed With: patient        Progress: no change  Outcome Evaluation: NPO for MEAGAN this afternoon. One unit of PRBC's infusing after return to the unit from MEAGAN. Remains paced on the monitor. Temp max 101.6 this AM. Blood cultures obtained and tylenol given. Temp currently 98.1. Strict I and O requested by Nephrology today. Will monitor this. Heparin drip infusing at 17 u per KG per hour. Next Xa level is scheduled for 2300. Glucoses 206, 167, and 168. Pt states she has not \"felt good\" today. Bowel meds given. Waiting test results . Will monitor.         "

## 2024-01-08 NOTE — SIGNIFICANT NOTE
01/08/24 1402   SLP Deferred Reason   SLP Deferred Reason Routine  (Spoke to RN, pt NPO for procedure this PM. SLP will defer & f/u as appropriate)

## 2024-01-08 NOTE — CASE MANAGEMENT/SOCIAL WORK
Continued Stay Note  [facility]     Patient Name: Di Lopez  MRN: [id number]  Today's Date: 1/8/2024    Admit Date: 1/1/2024    Plan: Home with [facility] for SN/PT/OT   Discharge Plan       Row Name 01/08/24 1609       Plan    Plan Home with [facility] for SN/PT/OT    Plan Comments CM reviewed chart notes. Patient not medically ready for discharge. Patient to possibly have a MEAGAN during this admission. Nephrology following patient. CM to follow and assist as needed.    Final Discharge Disposition Code 06 - home with home health care                   Discharge Codes    No documentation.                 Expected Discharge Date and Time       Expected Discharge Date Expected Discharge Time    Jan 11, 2024               Diandra Rebollar RN

## 2024-01-08 NOTE — PROGRESS NOTES
"  INFECTIOUS DISEASES  INPATIENT PROGRESS NOTE  2024      PATIENT NAME: Di WADDELL sully  :  1960  MRN:  [id number]  Date of Admission:  2024      Antimicrobials:  Day 2:  ceftriaxone      MAR reviewed.  Pertinent other medications include:  heparin gtt    Chief Complaint   Patient presents with    Rectal Bleeding       Reason for consultation:  fevers    Interval history: Patient had recurrent fever this morning.  Repeat blood cultures obtained.  Continues on ceftriaxone.  Initial blood cultures remain no growth.  Cardiology evaluated and plans for MEAGAN this afternoon.    ROS:  No new rashes.  No SOB or chest pain.  No nausea/vomiting/diarrhea.     Objective:  Temp (24hrs), Av.3 °F (37.4 °C), Min:97.8 °F (36.6 °C), Max:101.6 °F (38.7 °C)    /62   Pulse 58   Temp 98 °F (36.7 °C) (Oral)   Resp 16   Ht 152.4 cm (60\")   Wt 89.9 kg (198 lb 3.1 oz)   LMP 1998   SpO2 98%   BMI 38.71 kg/m²     Physical Examination:  GENERAL: Chronically ill-appearing female.  Awake and alert, in no acute distress.   HEENT: broken right mandibular tooth with necrosis.  NECK: Supple without nuchal rigidity. .  CV: RRR. +faint murmur and mechanical click.  ICD pocket without TTP.  LUNGS: CTAB.  Good air movement.  ABDOMEN: Positive bowel sounds.  Soft, nontender, nondistended.  EXT:  No edema.  MSK: No joint effusions or inflammation noted.  SKIN: Warm and dry without rash or ulcer.   NEURO: A&Ox4. No focal deficits.  Face symmetric.  Speech fluent.  Moves all extremities well.   PSYCHIATRIC: Normal insight and judgement.  Cooperative.  Normal affect.    Laboratory Data:    Results from last 7 days   Lab Units 24  0457 24  0552 24  0737   WBC 10*3/mm3 4.73 4.97 5.89   HEMOGLOBIN g/dL 7.1* 7.3* 7.3*   HEMATOCRIT % 22.1* 23.0* 23.0*   PLATELETS 10*3/mm3 231 255 263     Results from last 7 days   Lab Units 24  0457   SODIUM mmol/L 134*   POTASSIUM mmol/L 4.8   CHLORIDE mmol/L 104 "   CO2 mmol/L 19.0*   BUN mg/dL 31*   CREATININE mg/dL 3.24*   GLUCOSE mg/dL 226*   CALCIUM mg/dL 8.3*     Results from last 7 days   Lab Units 01/08/24  0457   ALK PHOS U/L 213*   BILIRUBIN mg/dL 0.2   ALT (SGPT) U/L 42*   AST (SGOT) U/L 33*     Results from last 7 days   Lab Units 01/05/24  1640   SED RATE mm/hr 58*     Results from last 7 days   Lab Units 01/05/24  1640   CRP mg/dL 5.92*     Estimated Creatinine Clearance: 18 mL/min (A) (by C-G formula based on SCr of 3.24 mg/dL (H)).  Results from last 7 days   Lab Units 01/05/24  1640   LACTATE mmol/L 1.4     Results from last 7 days   Lab Units 01/07/24  0518 01/04/24  0541   CK TOTAL U/L 191* 160               Microbiology:    Microbiology Results (last 10 days)       Procedure Component Value - Date/Time    Blood Culture - Blood, Arm, Left [[id number]]  (Normal) Collected: 01/06/24 0056    Lab Status: Preliminary result Specimen: Blood from Arm, Left Updated: 01/08/24 0815     Blood Culture No growth at 2 days    Blood Culture - Blood, Arm, Left [[id number]]  (Normal) Collected: 01/05/24 1641    Lab Status: Preliminary result Specimen: Blood from Arm, Left Updated: 01/07/24 1815     Blood Culture No growth at 2 days    Narrative:      Less than seven (7) mL's of blood was collected.  Insufficient quantity may yield false negative results.    Respiratory Panel PCR w/COVID-19(SARS-CoV-2) [facility] In-House, NP Swab in UTM/VTM, 2 HR TAT - Swab, Nasopharynx [[id number]]  (Normal) Collected: 01/05/24 1634    Lab Status: Final result Specimen: Swab from Nasopharynx Updated: 01/05/24 1804     ADENOVIRUS, PCR Not Detected     Coronavirus 229E Not Detected     Coronavirus HKU1 Not Detected     Coronavirus NL63 Not Detected     Coronavirus OC43 Not Detected     COVID19 Not Detected     Human Metapneumovirus Not Detected     Human Rhinovirus/Enterovirus Not Detected     Influenza A PCR Not Detected     Influenza B PCR Not Detected     Parainfluenza Virus 1  Not Detected     Parainfluenza Virus 2 Not Detected     Parainfluenza Virus 3 Not Detected     Parainfluenza Virus 4 Not Detected     RSV, PCR Not Detected     Bordetella pertussis pcr Not Detected     Bordetella parapertussis PCR Not Detected     Chlamydophila pneumoniae PCR Not Detected     Mycoplasma pneumo by PCR Not Detected    Narrative:      In the setting of a positive respiratory panel with a viral infection PLUS a negative procalcitonin without other underlying concern for bacterial infection, consider observing off antibiotics or discontinuation of antibiotics and continue supportive care. If the respiratory panel is positive for atypical bacterial infection (Bordetella pertussis, Chlamydophila pneumoniae, or Mycoplasma pneumoniae), consider antibiotic de-escalation to target atypical bacterial infection.    COVID PRE-OP / PRE-PROCEDURE SCREENING ORDER (NO ISOLATION) - Swab, Nasopharynx [[id number]]  (Normal) Collected: 01/01/24 2330    Lab Status: Final result Specimen: Swab from Nasopharynx Updated: 01/02/24 0016    Narrative:      The following orders were created for panel order COVID PRE-OP / PRE-PROCEDURE SCREENING ORDER (NO ISOLATION) - Swab, Nasopharynx.  Procedure                               Abnormality         Status                     ---------                               -----------         ------                     COVID-19, FLU A/B, RSV P...[[id number]]  Normal              Final result                 Please view results for these tests on the individual orders.    COVID-19, FLU A/B, RSV PCR 1 HR TAT - Swab, Nasopharynx [[id number]]  (Normal) Collected: 01/01/24 2330    Lab Status: Final result Specimen: Swab from Nasopharynx Updated: 01/02/24 0016     COVID19 Not Detected     Influenza A PCR Not Detected     Influenza B PCR Not Detected     RSV, PCR Not Detected    Narrative:      Fact sheet for providers: https://www.fda.gov/media/728460/download    Fact sheet for patients:  https://www.fda.gov/media/504657/download    Test performed by PCR.          Monospot negative      Radiology:  No radiology results for the last day    TTE:    The right ventricular cavity is dilated.    The left atrial cavity is mildly dilated.    Left atrial volume is mildly increased.    The right atrial cavity is dilated.    The mitral valve mean gradient is 7 mmHg.    There is a mechanical mitral valve prosthesis present.    Moderate to severe tricuspid valve regurgitation is present.    Estimated right ventricular systolic pressure from tricuspid regurgitation is markedly elevated (>55 mmHg). Calculated right ventricular systolic pressure from tricuspid regurgitation is 88 mmHg.    LVEF estimated at 59%      DISCUSSION:  63 y.o. female with history of modified replacement and implanted defibrillator admitted with acute kidney injury and GI bleed.   Patient reported cervical adenopathy confirmed on CT imaging and developed fever while hospitalized.  Resp PCR and monospot negative.    PROBLEM LIST:   -- Undifferentiated fever with sweats - ongoing.  COVID-19/flu/RSV PCR negative.  Full resp PCR negative.  Blood cultures obtained.  At risk for endocarditis with implanted ICD and valve replacement.  Recent dental issues with broken/necrotic right mandibular tooth and painful left maxillary at prior root canal; at risk for odontogenic source bacteremia/cardiac device infection.  -- Cervical adenopathy - resolving.  No pharyngitis or sinusitis.  Monospot negative.  -- Recent Candida esophagitis.  HIV negative.  -- Implanted defibrillator.  -- History of mitral valve replacement.  On chronic anticoagulation.  -- MEERA, improving.  -- Rectal bleeding and anemia.  CT without acute intra-abdominal abnormality.  -- Back pain/sciatica symptoms; MRI L-spine with degenerative changes; no acute abnormality.  -- Moderate to severe tricuspid valve regurgitation on echocardiogram; no vegetation noted.    PLAN:  -- Following  blood cultures - repeated today due to recurrent fever  -- Continue empiric ceftriaxone 2 g iv daily for potential bacteremia/endocarditis from odontogenic source given her dental issues  -- MEAGAN pending; high suspicion for endocarditis.  -- Further plans pending results of the above    Complex case.  Plan discussed with patient.    Maciej Willett MD  1/8/2024  15:06 EST

## 2024-01-08 NOTE — PLAN OF CARE
Goal Outcome Evaluation:  Plan of Care Reviewed With: patient        Progress: no change     Pt is alert and oriented x4. VSS on 2L NC. Paced on tele. Pt has slept off and on throughout the shift. PRN percocet given as ordered for pain. Heparin gtt infusing at 16 units/kg/hr per pharmacy. Up with an assist x1 and gait belt to the bathroom; voiding spontaneously. Will monitor.

## 2024-01-08 NOTE — PROGRESS NOTES
"  INFECTIOUS DISEASES  INPATIENT PROGRESS NOTE  2024      PATIENT NAME: Di Lopez  :  1960  MRN:  [id number]  Date of Admission:  2024      Antimicrobials:  Day 1:  ceftriaxone      MAR reviewed.  Pertinent other medications include:  heparin gtt    Chief Complaint   Patient presents with    Rectal Bleeding       Reason for consultation:  fevers    Interval history: Patient was started on ceftriaxone yesterday due to ongoing fevers.  No fever overnight or during the day today.  Overall, she is feeling better.  Blood cultures remain no growth to date.  Echocardiogram did not show any vegetation but did have moderate to severe tricuspid regurgitation.    ROS:  No new rashes.  No SOB or chest pain.  No nausea/vomiting/diarrhea.     Objective:  Temp (24hrs), Av °F (37.2 °C), Min:97.9 °F (36.6 °C), Max:99.9 °F (37.7 °C)    /53 (BP Location: Right arm, Patient Position: Sitting)   Pulse 78   Temp 98 °F (36.7 °C) (Oral)   Resp 18   Ht 152.4 cm (60\")   Wt 89.9 kg (198 lb 3.1 oz)   LMP 1998   SpO2 91%   BMI 38.71 kg/m²     Physical Examination:  GENERAL: Chronically ill-appearing female.  Awake and alert, in no acute distress.   HEENT: broken right mandibular tooth with necrosis.  NECK: Supple without nuchal rigidity.  No thyromegaly.  LYMPH: Shotty tender cervical adenopathy, resolving.  CV: RRR. +faint murmur and mechanical click.  ICD pocket without TTP.  LUNGS: Prior wheezing/crackles resolved.  Good air movement.  ABDOMEN: Positive bowel sounds.  Soft, nontender, nondistended.  EXT:  No edema.  MSK: No joint effusions or inflammation noted.  SKIN: Warm and dry without rash or ulcer.   NEURO: A&Ox4. No focal deficits.  Face symmetric.  Speech fluent.  Moves all extremities well.   PSYCHIATRIC: Normal insight and judgement.  Cooperative.  Normal affect.    Laboratory Data:    Results from last 7 days   Lab Units 24  0552 24  0737 24  0501   WBC 10*3/mm3 " 4.97 5.89 6.74   HEMOGLOBIN g/dL 7.3* 7.3* 7.5*   HEMATOCRIT % 23.0* 23.0* 24.3*   PLATELETS 10*3/mm3 255 263 282     Results from last 7 days   Lab Units 01/07/24  0518   SODIUM mmol/L 136   POTASSIUM mmol/L 4.9   CHLORIDE mmol/L 103   CO2 mmol/L 18.0*   BUN mg/dL 33*   CREATININE mg/dL 3.87*   GLUCOSE mg/dL 167*   CALCIUM mg/dL 8.5*     Results from last 7 days   Lab Units 01/07/24  0518   ALK PHOS U/L 225*   BILIRUBIN mg/dL 0.3   ALT (SGPT) U/L 43*   AST (SGOT) U/L 49*     Results from last 7 days   Lab Units 01/05/24  1640   SED RATE mm/hr 58*     Results from last 7 days   Lab Units 01/05/24  1640   CRP mg/dL 5.92*     Estimated Creatinine Clearance: 15.1 mL/min (A) (by C-G formula based on SCr of 3.87 mg/dL (H)).  Results from last 7 days   Lab Units 01/05/24  1640   LACTATE mmol/L 1.4     Results from last 7 days   Lab Units 01/07/24  0518 01/04/24  0541   CK TOTAL U/L 191* 160               Microbiology:    Microbiology Results (last 10 days)       Procedure Component Value - Date/Time    Blood Culture - Blood, Arm, Left [[id number]]  (Normal) Collected: 01/06/24 0056    Lab Status: Preliminary result Specimen: Blood from Arm, Left Updated: 01/07/24 0815     Blood Culture No growth at 24 hours    Blood Culture - Blood, Arm, Left [[id number]]  (Normal) Collected: 01/05/24 1641    Lab Status: Preliminary result Specimen: Blood from Arm, Left Updated: 01/07/24 1815     Blood Culture No growth at 2 days    Narrative:      Less than seven (7) mL's of blood was collected.  Insufficient quantity may yield false negative results.    Respiratory Panel PCR w/COVID-19(SARS-CoV-2) [facility]/ESTHER/KARSON/[facility]/COR/LEONARDO In-House, NP Swab in UTM/VTM, 2 HR TAT - Swab, Nasopharynx [[id number]]  (Normal) Collected: 01/05/24 1634    Lab Status: Final result Specimen: Swab from Nasopharynx Updated: 01/05/24 1807     ADENOVIRUS, PCR Not Detected     Coronavirus 229E Not Detected     Coronavirus HKU1 Not Detected     Coronavirus NL63 Not Detected      Coronavirus OC43 Not Detected     COVID19 Not Detected     Human Metapneumovirus Not Detected     Human Rhinovirus/Enterovirus Not Detected     Influenza A PCR Not Detected     Influenza B PCR Not Detected     Parainfluenza Virus 1 Not Detected     Parainfluenza Virus 2 Not Detected     Parainfluenza Virus 3 Not Detected     Parainfluenza Virus 4 Not Detected     RSV, PCR Not Detected     Bordetella pertussis pcr Not Detected     Bordetella parapertussis PCR Not Detected     Chlamydophila pneumoniae PCR Not Detected     Mycoplasma pneumo by PCR Not Detected    Narrative:      In the setting of a positive respiratory panel with a viral infection PLUS a negative procalcitonin without other underlying concern for bacterial infection, consider observing off antibiotics or discontinuation of antibiotics and continue supportive care. If the respiratory panel is positive for atypical bacterial infection (Bordetella pertussis, Chlamydophila pneumoniae, or Mycoplasma pneumoniae), consider antibiotic de-escalation to target atypical bacterial infection.    COVID PRE-OP / PRE-PROCEDURE SCREENING ORDER (NO ISOLATION) - Swab, Nasopharynx [[id number]]  (Normal) Collected: 01/01/24 2330    Lab Status: Final result Specimen: Swab from Nasopharynx Updated: 01/02/24 0016    Narrative:      The following orders were created for panel order COVID PRE-OP / PRE-PROCEDURE SCREENING ORDER (NO ISOLATION) - Swab, Nasopharynx.  Procedure                               Abnormality         Status                     ---------                               -----------         ------                     COVID-19, FLU A/B, RSV P...[[id number]]  Normal              Final result                 Please view results for these tests on the individual orders.    COVID-19, FLU A/B, RSV PCR 1 HR TAT - Swab, Nasopharynx [[id number]]  (Normal) Collected: 01/01/24 2330    Lab Status: Final result Specimen: Swab from Nasopharynx Updated: 01/02/24 0016      COVID19 Not Detected     Influenza A PCR Not Detected     Influenza B PCR Not Detected     RSV, PCR Not Detected    Narrative:      Fact sheet for providers: https://www.fda.gov/media/892755/download    Fact sheet for patients: https://www.fda.gov/media/534318/download    Test performed by PCR.          Monospot negative      Radiology:  No radiology results for the last day    TTE:    The right ventricular cavity is dilated.    The left atrial cavity is mildly dilated.    Left atrial volume is mildly increased.    The right atrial cavity is dilated.    The mitral valve mean gradient is 7 mmHg.    There is a mechanical mitral valve prosthesis present.    Moderate to severe tricuspid valve regurgitation is present.    Estimated right ventricular systolic pressure from tricuspid regurgitation is markedly elevated (>55 mmHg). Calculated right ventricular systolic pressure from tricuspid regurgitation is 88 mmHg.    LVEF estimated at 59%      DISCUSSION:  63 y.o. female with history of modified replacement and implanted defibrillator admitted with acute kidney injury and GI bleed.   Patient reported cervical adenopathy confirmed on CT imaging and developed fever while hospitalized.  Resp PCR and monospot negative.    PROBLEM LIST:   -- Undifferentiated fever with sweats - ongoing.  COVID-19/flu/RSV PCR negative.  Full resp PCR negative.  Blood cultures obtained.  At risk for endocarditis with implanted ICD and valve replacement.  Recent dental issues with broken/necrotic right mandibular tooth and painful left maxillary at prior root canal; at risk for odontogenic source bacteremia/cardiac device infection.  -- Cervical adenopathy - resolving.  No pharyngitis or sinusitis.  Monospot negative.  -- Recent Candida esophagitis.  HIV negative.  -- Implanted defibrillator.  -- History of mitral valve replacement.  On chronic anticoagulation.  -- MEERA, improving.  -- Rectal bleeding.  CT without acute intra-abdominal  abnormality.  -- Back pain/sciatica symptoms; MRI L-spine with degenerative changes; no acute abnormality.  -- Moderate to severe tricuspid valve regurgitation on echocardiogram; no vegetation noted.    PLAN:  -- Following blood cultures - no growth so far  -- Continue empiric ceftriaxone 2 g iv daily for potential bacteremia/endocarditis from odontogenic source given her dental issues  -- Continue to follow fever curve - improved with ceftriaxone  -- Consult cardiology regarding the moderate to severe TR on echo.  Consider MEAGAN.   -- I remain highly suspicious for endocarditis.  Her fever has improved with ceftriaxone and she has potential odontogenic source for bacteremia in the setting of mitral valve replacement and implanted defibrillator device.  She also tells me that she has been fatigued with worsening night sweats for several weeks.    Complex case.  Plan discussed with patient.    Maciej Willett MD  1/7/2024  20:57 EST

## 2024-01-09 ENCOUNTER — APPOINTMENT (OUTPATIENT)
Dept: CT IMAGING | Facility: HOSPITAL | Age: 64
End: 2024-01-09
Payer: MEDICARE

## 2024-01-09 LAB
ALBUMIN SERPL-MCNC: 3.7 G/DL (ref 3.5–5.2)
ALBUMIN/GLOB SERPL: 1.4 G/DL
ALP SERPL-CCNC: 234 U/L (ref 39–117)
ALT SERPL W P-5'-P-CCNC: 41 U/L (ref 1–33)
ANION GAP SERPL CALCULATED.3IONS-SCNC: 16 MMOL/L (ref 5–15)
AST SERPL-CCNC: 32 U/L (ref 1–32)
BASOPHILS # BLD AUTO: 0.01 10*3/MM3 (ref 0–0.2)
BASOPHILS NFR BLD AUTO: 0.2 % (ref 0–1.5)
BH BB BLOOD EXPIRATION DATE: NORMAL
BH BB BLOOD TYPE BARCODE: 5100
BH BB DISPENSE STATUS: NORMAL
BH BB PRODUCT CODE: NORMAL
BH BB UNIT NUMBER: NORMAL
BILIRUB SERPL-MCNC: 0.3 MG/DL (ref 0–1.2)
BUN SERPL-MCNC: 28 MG/DL (ref 8–23)
BUN/CREAT SERPL: 9.4 (ref 7–25)
CALCIUM SPEC-SCNC: 8.6 MG/DL (ref 8.6–10.5)
CHLORIDE SERPL-SCNC: 103 MMOL/L (ref 98–107)
CO2 SERPL-SCNC: 18 MMOL/L (ref 22–29)
CREAT SERPL-MCNC: 2.99 MG/DL (ref 0.57–1)
CROSSMATCH INTERPRETATION: NORMAL
DEPRECATED RDW RBC AUTO: 50.4 FL (ref 37–54)
EGFRCR SERPLBLD CKD-EPI 2021: 17.1 ML/MIN/1.73
EOSINOPHIL # BLD AUTO: 0.09 10*3/MM3 (ref 0–0.4)
EOSINOPHIL NFR BLD AUTO: 1.7 % (ref 0.3–6.2)
ERYTHROCYTE [DISTWIDTH] IN BLOOD BY AUTOMATED COUNT: 16 % (ref 12.3–15.4)
GLOBULIN UR ELPH-MCNC: 2.6 GM/DL
GLUCOSE BLDC GLUCOMTR-MCNC: 155 MG/DL (ref 70–130)
GLUCOSE BLDC GLUCOMTR-MCNC: 187 MG/DL (ref 70–130)
GLUCOSE BLDC GLUCOMTR-MCNC: 196 MG/DL (ref 70–130)
GLUCOSE BLDC GLUCOMTR-MCNC: 269 MG/DL (ref 70–130)
GLUCOSE SERPL-MCNC: 213 MG/DL (ref 65–99)
HCT VFR BLD AUTO: 25.8 % (ref 34–46.6)
HGB BLD-MCNC: 8.3 G/DL (ref 12–15.9)
IMM GRANULOCYTES # BLD AUTO: 0.06 10*3/MM3 (ref 0–0.05)
IMM GRANULOCYTES NFR BLD AUTO: 1.1 % (ref 0–0.5)
INR PPP: 1.25 (ref 0.89–1.12)
LYMPHOCYTES # BLD AUTO: 0.86 10*3/MM3 (ref 0.7–3.1)
LYMPHOCYTES NFR BLD AUTO: 16.2 % (ref 19.6–45.3)
MCH RBC QN AUTO: 28 PG (ref 26.6–33)
MCHC RBC AUTO-ENTMCNC: 32.2 G/DL (ref 31.5–35.7)
MCV RBC AUTO: 87.2 FL (ref 79–97)
MONOCYTES # BLD AUTO: 0.23 10*3/MM3 (ref 0.1–0.9)
MONOCYTES NFR BLD AUTO: 4.3 % (ref 5–12)
NEUTROPHILS NFR BLD AUTO: 4.05 10*3/MM3 (ref 1.7–7)
NEUTROPHILS NFR BLD AUTO: 76.5 % (ref 42.7–76)
NRBC BLD AUTO-RTO: 0 /100 WBC (ref 0–0.2)
PLATELET # BLD AUTO: 225 10*3/MM3 (ref 140–450)
PMV BLD AUTO: 10 FL (ref 6–12)
POTASSIUM SERPL-SCNC: 4.7 MMOL/L (ref 3.5–5.2)
PROT SERPL-MCNC: 6.3 G/DL (ref 6–8.5)
PROTHROMBIN TIME: 15.9 SECONDS (ref 12.2–14.5)
RBC # BLD AUTO: 2.96 10*6/MM3 (ref 3.77–5.28)
SODIUM SERPL-SCNC: 137 MMOL/L (ref 136–145)
UFH PPP CHRO-ACNC: 0.23 IU/ML (ref 0.3–0.7)
UFH PPP CHRO-ACNC: 0.31 IU/ML (ref 0.3–0.7)
UNIT  ABO: NORMAL
UNIT  RH: NORMAL
WBC NRBC COR # BLD AUTO: 5.3 10*3/MM3 (ref 3.4–10.8)

## 2024-01-09 PROCEDURE — 25010000002 ENOXAPARIN PER 10 MG: Performed by: INTERNAL MEDICINE

## 2024-01-09 PROCEDURE — 97535 SELF CARE MNGMENT TRAINING: CPT

## 2024-01-09 PROCEDURE — 85520 HEPARIN ASSAY: CPT

## 2024-01-09 PROCEDURE — 25010000002 HEPARIN (PORCINE) 25000-0.45 UT/250ML-% SOLUTION

## 2024-01-09 PROCEDURE — 92526 ORAL FUNCTION THERAPY: CPT | Performed by: SPEECH-LANGUAGE PATHOLOGIST

## 2024-01-09 PROCEDURE — 63710000001 INSULIN LISPRO (HUMAN) PER 5 UNITS: Performed by: STUDENT IN AN ORGANIZED HEALTH CARE EDUCATION/TRAINING PROGRAM

## 2024-01-09 PROCEDURE — 99232 SBSQ HOSP IP/OBS MODERATE 35: CPT | Performed by: INTERNAL MEDICINE

## 2024-01-09 PROCEDURE — 85025 COMPLETE CBC W/AUTO DIFF WBC: CPT | Performed by: INTERNAL MEDICINE

## 2024-01-09 PROCEDURE — 82948 REAGENT STRIP/BLOOD GLUCOSE: CPT

## 2024-01-09 PROCEDURE — 97530 THERAPEUTIC ACTIVITIES: CPT

## 2024-01-09 PROCEDURE — 70486 CT MAXILLOFACIAL W/O DYE: CPT

## 2024-01-09 PROCEDURE — 80053 COMPREHEN METABOLIC PANEL: CPT | Performed by: STUDENT IN AN ORGANIZED HEALTH CARE EDUCATION/TRAINING PROGRAM

## 2024-01-09 PROCEDURE — 63710000001 INSULIN LISPRO (HUMAN) PER 5 UNITS: Performed by: INTERNAL MEDICINE

## 2024-01-09 PROCEDURE — 63710000001 INSULIN DETEMIR PER 5 UNITS: Performed by: STUDENT IN AN ORGANIZED HEALTH CARE EDUCATION/TRAINING PROGRAM

## 2024-01-09 PROCEDURE — 25010000002 CEFTRIAXONE PER 250 MG: Performed by: INTERNAL MEDICINE

## 2024-01-09 PROCEDURE — 85610 PROTHROMBIN TIME: CPT

## 2024-01-09 RX ORDER — ENOXAPARIN SODIUM 100 MG/ML
90 INJECTION SUBCUTANEOUS EVERY 24 HOURS
Status: DISCONTINUED | OUTPATIENT
Start: 2024-01-09 | End: 2024-01-15 | Stop reason: HOSPADM

## 2024-01-09 RX ORDER — WARFARIN SODIUM 5 MG/1
5 TABLET ORAL
Status: DISCONTINUED | OUTPATIENT
Start: 2024-01-09 | End: 2024-01-10

## 2024-01-09 RX ADMIN — INSULIN LISPRO 2 UNITS: 100 INJECTION, SOLUTION INTRAVENOUS; SUBCUTANEOUS at 09:11

## 2024-01-09 RX ADMIN — Medication 10 ML: at 10:49

## 2024-01-09 RX ADMIN — OXYBUTYNIN CHLORIDE 5 MG: 5 TABLET, FILM COATED, EXTENDED RELEASE ORAL at 09:12

## 2024-01-09 RX ADMIN — INSULIN LISPRO 3 UNITS: 100 INJECTION, SOLUTION INTRAVENOUS; SUBCUTANEOUS at 13:40

## 2024-01-09 RX ADMIN — Medication 2 SPRAY: at 10:49

## 2024-01-09 RX ADMIN — ESCITALOPRAM OXALATE 20 MG: 20 TABLET, FILM COATED ORAL at 09:11

## 2024-01-09 RX ADMIN — FERROUS SULFATE TAB 325 MG (65 MG ELEMENTAL FE) 325 MG: 325 (65 FE) TAB at 09:12

## 2024-01-09 RX ADMIN — OXYCODONE HYDROCHLORIDE AND ACETAMINOPHEN 1 TABLET: 7.5; 325 TABLET ORAL at 21:14

## 2024-01-09 RX ADMIN — FOLIC ACID TAB 400 MCG 800 MCG: 400 TAB at 09:11

## 2024-01-09 RX ADMIN — Medication 5 MG: at 21:14

## 2024-01-09 RX ADMIN — INSULIN LISPRO 2 UNITS: 100 INJECTION, SOLUTION INTRAVENOUS; SUBCUTANEOUS at 13:39

## 2024-01-09 RX ADMIN — SODIUM BICARBONATE 650 MG TABLET 1300 MG: at 21:08

## 2024-01-09 RX ADMIN — WARFARIN SODIUM 5 MG: 5 TABLET ORAL at 19:01

## 2024-01-09 RX ADMIN — INSULIN LISPRO 3 UNITS: 100 INJECTION, SOLUTION INTRAVENOUS; SUBCUTANEOUS at 19:00

## 2024-01-09 RX ADMIN — INSULIN LISPRO 3 UNITS: 100 INJECTION, SOLUTION INTRAVENOUS; SUBCUTANEOUS at 09:23

## 2024-01-09 RX ADMIN — Medication 2 SPRAY: at 21:09

## 2024-01-09 RX ADMIN — INSULIN LISPRO 4 UNITS: 100 INJECTION, SOLUTION INTRAVENOUS; SUBCUTANEOUS at 21:09

## 2024-01-09 RX ADMIN — SODIUM BICARBONATE 650 MG TABLET 1300 MG: at 09:11

## 2024-01-09 RX ADMIN — SODIUM BICARBONATE 650 MG TABLET 1300 MG: at 16:43

## 2024-01-09 RX ADMIN — PANTOPRAZOLE SODIUM 40 MG: 40 TABLET, DELAYED RELEASE ORAL at 06:02

## 2024-01-09 RX ADMIN — INSULIN DETEMIR 5 UNITS: 100 INJECTION, SOLUTION SUBCUTANEOUS at 21:08

## 2024-01-09 RX ADMIN — Medication 10 ML: at 21:10

## 2024-01-09 RX ADMIN — INSULIN LISPRO 2 UNITS: 100 INJECTION, SOLUTION INTRAVENOUS; SUBCUTANEOUS at 19:00

## 2024-01-09 RX ADMIN — CEFTRIAXONE 2000 MG: 2 INJECTION, POWDER, FOR SOLUTION INTRAMUSCULAR; INTRAVENOUS at 09:12

## 2024-01-09 RX ADMIN — ENOXAPARIN SODIUM 90 MG: 100 INJECTION SUBCUTANEOUS at 16:43

## 2024-01-09 RX ADMIN — HEPARIN SODIUM 18 UNITS/KG/HR: 10000 INJECTION, SOLUTION INTRAVENOUS at 03:47

## 2024-01-09 NOTE — PROGRESS NOTES
"   LOS: 7 days    Patient Care Team:  Davina Santiago DO as PCP - General (Family Medicine)  Ameena Iglesias MD as Consulting Physician (Endocrinology)  Nakita Freire APRN as Nurse Practitioner (Nurse Practitioner)  Raimundo Thomas MD as Consulting Physician (Pulmonary Disease)  Nadir Everett MD as Consulting Physician (Cardiology)  Luiz Longoria MD as Consulting Physician (Pain Medicine)    Subjective     Stable overnight.      Objective     Vital Signs:  Blood pressure 130/59, pulse 72, temperature 99.5 °F (37.5 °C), temperature source Oral, resp. rate 18, height 152.4 cm (60\"), weight 89.8 kg (198 lb), last menstrual period 06/17/1998, SpO2 94%, not currently breastfeeding.      Intake/Output Summary (Last 24 hours) at 1/9/2024 1058  Last data filed at 1/9/2024 0851  Gross per 24 hour   Intake 1080.42 ml   Output 700 ml   Net 380.42 ml        01/08 0701 - 01/09 0700  In: 1000.4 [P.O.:645]  Out: 700 [Urine:700]    Physical Exam:        GENERAL: WD AAF NAD  NEURO: Awake and alert, oriented. No focal deficit  PSYCHIATRIC: NMA. Cooperative with PE  EYE: PE, no icterus, no conjunctivitis  ENT: ommm, dentition intact,  Hearing intact  NECK: Supple , No JVD discernable,  Trachea midline  CV: Trace edema, RRR  LUNGS:  Quiet,  Nonlabored resp.  Symmetrical expansion  ABDOMEN: Nondistended, soft nontender.  : No Perdomo, no palp bladder  SKIN: Warm and dry without rash      Labs:  Results from last 7 days   Lab Units 01/09/24  0920 01/08/24  0457 01/07/24  0552   WBC 10*3/mm3 5.30 4.73 4.97   HEMOGLOBIN g/dL 8.3* 7.1* 7.3*   PLATELETS 10*3/mm3 225 231 255     Results from last 7 days   Lab Units 01/09/24  0920 01/08/24  0457 01/07/24  0518 01/06/24  0737   SODIUM mmol/L 137 134* 136 137   POTASSIUM mmol/L 4.7 4.8 4.9 5.2   CHLORIDE mmol/L 103 104 103 105   CO2 mmol/L 18.0* 19.0* 18.0* 18.0*   BUN mg/dL 28* 31* 33* 37*   CREATININE mg/dL 2.99* 3.24* 3.87* 3.80*   CALCIUM mg/dL 8.6 8.3* 8.5* 8.3* "   ALBUMIN g/dL 3.7 3.2* 3.7 3.5     Results from last 7 days   Lab Units 01/09/24  0920   ALK PHOS U/L 234*   BILIRUBIN mg/dL 0.3   ALT (SGPT) U/L 41*   AST (SGOT) U/L 32                   Estimated Creatinine Clearance: 19.5 mL/min (A) (by C-G formula based on SCr of 2.99 mg/dL (H)).         A/P:    ARF: Creatinine continues to improve daily.  Urine output continues nonoliguric with negative volume.  Creatinine up to 8 on presentation with likely dehydration due to diarrhea with poor p.o. intake in the setting of NSAIDs and Entresto.  Urine sodium 20 consistent with prerenal azotemia.  Renal function had been improving with hydration.  IV fluids held to avoid overload.   For now continue supportive care.  Strict I's and O's.  Monitor for further renal recovery.    HTN: Blood pressure stable.    Hyponatremia: Borderline.  Initially down to 129 on presentation due to hypovolemia and MEERA.  All fluids isotonic.     Hyperkalemia: Stable.  Due to MEERA.     Met Aciodsis: Persistent on p.o. bicarb.  Due to renal failure and underlying GI losses with diarrhea.  Monitor for now.     Anemia.  Hemoglobin below goal.  Hx of supra therapeutic INR w rectal bleeding in the past. Admitted again with concern for rectal bleed.  Iron saturation quite low with elevated ferritin.  Transfuse as needed for Hgb <7.    Volume status: Stable overnight.  Likely dehydrated on admission.  Creatinine improved with hydration.  IV fluids on hold for now as patient with history of CHF and had developed some mild edema.    Continue strict I's and O's.  May need to restart diuretics if gaining additional fluid.  Will get daily standing weight.     Hx of mechanical valve.  On anticoagulation     CHF: EF 40-45%. Hx of MV and depressed RV function. On beta blocker and lasix at home. Not on ACEi/ARB/MRA/ or SGLT-2inh for now.    High risk and complexity patient.    Justin Rodriguez MD  01/09/24  10:58 EST

## 2024-01-09 NOTE — THERAPY TREATMENT NOTE
Patient Name: Di Lopez  : 1960    MRN: [id number]                              Today's Date: 2024       Admit Date: 2024    Visit Dx:     ICD-10-CM ICD-9-CM   1. Acute renal failure, unspecified acute renal failure type  N17.9 584.9   2. Hyperkalemia  E87.5 276.7   3. Gastrointestinal hemorrhage, unspecified gastrointestinal hemorrhage type  K92.2 578.9   4. Acute renal failure superimposed on chronic kidney disease, unspecified acute renal failure type, unspecified CKD stage  N17.9 584.9    N18.9 585.9   5. Chronic respiratory failure with hypoxia  J96.11 518.83     799.02   6. Weakness  R53.1 780.79   7. Dysphagia, unspecified type  R13.10 787.20     Patient Active Problem List   Diagnosis    Arteriovenous malformation of gastrointestinal tract    Depression    Type 2 diabetes mellitus without complication, with long-term current use of insulin    Dyslipidemia    Emphysema/COPD    Hypertension    Insomnia    Morbid obesity    Obstructive sleep apnea syndrome    Chronic anticoagulation    Normocytic anemia    Sliding hiatal hernia    Vitamin B12 deficiency    Iron deficiency    Iron malabsorption    Bronchiectasis without complication    Electronic cigarette use    Chronic diastolic heart failure    Left bundle branch hemiblock    Chronic respiratory failure with hypoxia    Body aches    Headaches due to old head injury    History of tobacco use, presenting hazards to health    Multiple nodules of lung    Nocturnal hypoxemia    Screening for colon cancer    Abnormal CT scan    Wellness examination    ARF (acute renal failure)    H/O heart valve replacement with mechanical valve    GI bleed    Severe malnutrition    Hyponatremia    Hyperkalemia    Type 2 diabetes mellitus with hyperglycemia    Elevated LFTs    Anemia, chronic disease    Cervical lymphadenopathy    Renal failure     Past Medical History:   Diagnosis Date    Allergic     Anemia     Anticoagulated     WARFARIN    Arteriovenous  malformation of gastrointestinal tract 05/12/2016    Arthritis     Asthma     Black hairy tongue     CHF (congestive heart failure)     Chronic back pain     Coronary artery disease     Depression 05/12/2016    Diabetes mellitus     Dyslipidemia 05/12/2016    Elevated cholesterol     Emphysema/COPD 05/12/2016    Emphysema/COPD 05/12/2016    Fatigue     GERD (gastroesophageal reflux disease)     Headache     Herniated cervical disc     History of echocardiogram 07/16/2013    Left ventricular systolic function at the lower limits of normal; EF 50-55%; mild MR; mechanical prosthetic mitral valve present; mod TR    History of transfusion     NO REACTION; [facility]    Hyperlipidemia     Hypertension     Insomnia 05/12/2016    Iron deficiency     A.  The patient was treated with IV iron replacement. B.  Secondary to GI bleed, status post EGD with cauterization of blood vessel to the duodenum, 7/16/2014.    Left bundle branch hemiblock 10/12/2020    Left shoulder strain     Liver disease     Liver mass     Long term current use of anticoagulant 05/12/2016    Low back pain     Lung nodules     PER DR. LEVI'S NOTE 10/22    Morbid obesity 05/12/2016    Neuromuscular disorder 7/28/2016    Obstructive sleep apnea syndrome 05/12/2016    Description: A.  Moderate; CPAP therapy..    Sliding hiatal hernia 07/28/2016    Description: A.  Reported on EGD, 12/30/2011.    Type 2 diabetes mellitus 05/12/2016    Urinary tract infection     Uterine cancer     HYSTERECTOMY    Vitamin B12 deficiency     A.  The patient was treated with vitamin B12 replacement.    Wears eyeglasses      Past Surgical History:   Procedure Laterality Date    BILATERAL OOPHORECTOMY  2009    Status post bilateral oophorectomy secondary to ovarian cysts     BREAST BIOPSY Right     STEREOTACTIC    BREAST EXCISIONAL BIOPSY Bilateral     CARDIAC ELECTROPHYSIOLOGY PROCEDURE N/A 10/15/2020    Procedure: Biventricular Device Insertion;  Surgeon: Nadir Everett MD;   Location:  [facility];  Service: Cardiology;  Laterality: N/A;    CARDIAC SURGERY  2003    CARPAL TUNNEL RELEASE Bilateral     COLONOSCOPY  12/28/2012    COLONOSCOPY N/A 5/23/2023    Procedure: COLONOSCOPY;  Surgeon: Gt Fernandes MD;  Location:  [facility];  Service: Gastroenterology;  Laterality: N/A;    COLONOSCOPY N/A 11/16/2023    Procedure: COLONOSCOPY;  Surgeon: Brunner, Mark I, MD;  Location:  [facility];  Service: Gastroenterology;  Laterality: N/A;    CYSTECTOMY      removal of ganglion cyst from left wrist    ENDOSCOPY  12/30/2011    DIAGNOSTIC ESOPHAGOGASTRODUODENOSCOPY    ENDOSCOPY N/A 11/16/2023    Procedure: ESOPHAGOGASTRODUODENOSCOPY;  Surgeon: Brunner, Mark I, MD;  Location:  [facility];  Service: Gastroenterology;  Laterality: N/A;    HYSTERECTOMY  1995    A.  Status post hysterectomy for early stage endometrial cancer done in 1995.    MITRAL VALVE REPLACEMENT  03/03/2008    Dr. Hima Barreto; MECHANICAL    OTHER SURGICAL HISTORY      LIVER MASS BLOOD SUPPLY CLIPPED ON RIGHT AND LEFT SIDE PER PT      General Information       Row Name 01/09/24 1510          OT Time and Intention    Document Type therapy note (daily note)  -TB     Mode of Treatment occupational therapy;individual therapy  -TB       Row Name 01/09/24 1510          General Information    Patient Profile Reviewed yes  -TB     Existing Precautions/Restrictions fall;oxygen therapy device and L/min  -TB     Barriers to Rehab medically complex;previous functional deficit  -TB       Row Name 01/09/24 1510          Occupational Profile    Reason for Services/Referral (Occupational Profile) Occupational decline  -TB       Row Name 01/09/24 1510          Cognition    Orientation Status (Cognition) oriented x 4  -TB       Row Name 01/09/24 1510          Safety Issues, Functional Mobility    Safety Issues Affecting Function (Mobility) insight into deficits/self-awareness;awareness of need for  "assistance;safety precautions follow-through/compliance;safety precaution awareness  -TB     Impairments Affecting Function (Mobility) endurance/activity tolerance;pain;strength;balance  -TB               User Key  (r) = Recorded By, (t) = Taken By, (c) = Cosigned By      Initials Name Provider Type    TB Nicki Chirinos, OT Occupational Therapist                     Mobility/ADL's       Row Name 01/09/24 1511          Bed Mobility    Bed Mobility sit-supine;rolling right;scooting/bridging  -TB     Rolling Right [place] (Bed Mobility) standby assist  -TB     Scooting/Bridging [place] (Bed Mobility) modified independence  -TB     Sit-Supine [place] (Bed Mobility) moderate assist (50% patient effort);verbal cues  -TB     Bed Mobility, Safety Issues decreased use of legs for bridging/pushing  -TB     Assistive Device (Bed Mobility) bed rails  -TB     Comment, (Bed Mobility) Assist to bring BLE into bed  -TB       Row Name 01/09/24 1511          Transfers    Transfers sit-stand transfer;stand-sit transfer  -TB       Row Name 01/09/24 1511          Sit-Stand Transfer    Sit-Stand [place] (Transfers) standby assist  -TB     Comment, (Sit-Stand Transfer) Holding to bed rail to move up toward HOB  -TB       Row Name 01/09/24 1511          Stand-Sit Transfer    Stand-Sit [place] (Transfers) standby assist  -TB       Row Name 01/09/24 1511          Functional Mobility    Functional Mobility- Comment Pt declined, \"I already did that today.\"  -TB     Patient was able to Ambulate no, other medical factors prevent ambulation  -TB     Reason Patient was unable to Ambulate Other (Comment)  Pt declined  -TB       Row Name 01/09/24 1511          Activities of Daily Living    BADL Assessment/Intervention lower body dressing;feeding  -TB       Row Name 01/09/24 1511          Lower Body Dressing Assessment/Training    [place] Level (Lower Body Dressing) lower body dressing " skills;doff;shoes/slippers;standby assist  -     Comment, (Lower Body Dressing) Education/demonstration reviewed for ADL retraining.  -       Row Name 01/09/24 1511          Self-Feeding Assessment/Training    [place] Level (Feeding) independent;liquids to mouth  -     Position (Self-Feeding) sitting up in bed  -               User Key  (r) = Recorded By, (t) = Taken By, (c) = Cosigned By      Initials Name Provider Type     Nicki Chirinos OT Occupational Therapist                   Obj/Interventions       Row Name 01/09/24 1513          Motor Skills    Therapeutic Exercise --  Education reviewed for ther ex to support mobility and self-care: 10 reps -  B ankle bumps (to address edema), seated alternating kicks and marching in place. Returned to supine and completed quad and glute sets.  -       Row Name 01/09/24 1513          Balance    Balance Assessment sitting dynamic balance;sit to stand dynamic balance;standing dynamic balance  -     Dynamic Sitting Balance independent  -     Position, Sitting Balance unsupported;sitting edge of bed  -     Sit to Stand Dynamic Balance standby assist;verbal cues  -TB     Dynamic Standing Balance standby assist;verbal cues  -TB     Position/Device Used, Standing Balance supported  -TB     Balance Interventions sitting;standing;sit to stand;supported;dynamic;dynamic reaching;occupation based/functional task;UE activity with balance activity  -               User Key  (r) = Recorded By, (t) = Taken By, (c) = Cosigned By      Initials Name Provider Type     Nicki Chirinos OT Occupational Therapist                   Goals/Plan    No documentation.                  Clinical Impression       Row Name 01/09/24 1516          Pain Assessment    Pain Intervention(s) Ambulation/increased activity;Repositioned;Elevated  -     Additional Documentation Pain Scale: FACES Pre/Post-Treatment (Group)  -       Row Name 01/09/24 1516          Pain  Scale: FACES Pre/Post-Treatment    Pain: FACES Scale, Pretreatment 2-->hurts little bit  -TB     Posttreatment Pain Rating 4-->hurts little more  -TB     Pain Location - Side/Orientation Bilateral  -TB     Pain Location lower  -TB     Pain Location - extremity  -TB     Pre/Posttreatment Pain Comment Pain limits participation  -TB       Row Name 01/09/24 1516          Plan of Care Review    Plan of Care Reviewed With patient  -TB     Progress no change  -TB     Outcome Evaluation Pt is A/Ox4 and participates in therapy with encouragement and time. Remains below her occupational baseline with generalized weakness, balance and activity tolerance deficits limiting mobility and self-care performance. SBA STS and to reposition for activity. Completes ther ex with good effort from EOB and Supine. Education/demonstration reviewed for ADL retraining. OT will continue to follow IP. Recommend d/c to home with assist when pt is medically ready.  -TB       Row Name 01/09/24 1516          Therapy Plan Review/Discharge Plan (OT)    Anticipated Discharge Disposition (OT) home with assist  -TB       Row Name 01/09/24 1516          Vital Signs    Pre Systolic BP Rehab --  RN cleared OT  -TB     Pre SpO2 (%) 97  -TB     O2 Delivery Pre Treatment supplemental O2  -TB     Pre Patient Position Sitting  -TB     Post Patient Position Supine  -TB       Row Name 01/09/24 1516          Positioning and Restraints    Pre-Treatment Position in bed  -TB     Post Treatment Position bed  -TB     In Bed notified nsg;fowlers;call light within reach;encouraged to call for assist;exit alarm on;legs elevated  -TB               User Key  (r) = Recorded By, (t) = Taken By, (c) = Cosigned By      Initials Name Provider Type    TB Nicki Chirinos OT Occupational Therapist                   Outcome Measures       Row Name 01/09/24 1521          How much help from another is currently needed...    Putting on and taking off regular lower body clothing?  3  -TB     Bathing (including washing, rinsing, and drying) 3  -TB     Toileting (which includes using toilet bed pan or urinal) 3  -TB     Putting on and taking off regular upper body clothing 4  -TB     Taking care of personal grooming (such as brushing teeth) 4  -TB     Eating meals 4  -TB     AM-PAC 6 Clicks Score (OT) 21  -       Row Name 01/09/24 1158          How much help from another person do you currently need...    Turning from your back to your side while in flat bed without using bedrails? 4  -LH     Moving from lying on back to sitting on the side of a flat bed without bedrails? 3  -LH     Moving to and from a bed to a chair (including a wheelchair)? 3  -LH     Standing up from a chair using your arms (e.g., wheelchair, bedside chair)? 3  -LH     Climbing 3-5 steps with a railing? 3  -LH     To walk in hospital room? 3  -     AM-PAC 6 Clicks Score (PT) 19  -     Highest Level of Mobility Goal 6 --> Walk 10 steps or more  -       Row Name 01/09/24 1521 01/09/24 1158       Functional Assessment    Outcome Measure Options AM-PAC 6 Clicks Daily Activity (OT)  - AM-PAC 6 Clicks Basic Mobility (PT)  -              User Key  (r) = Recorded By, (t) = Taken By, (c) = Cosigned By      Initials Name Provider Type    TB Nicki Chirinos OT Occupational Therapist     Suzette Faria, PT Physical Therapist                    Occupational Therapy Education       Title: PT OT SLP Therapies (In Progress)       Topic: Occupational Therapy (Done)       Point: ADL training (Done)       Description:   Instruct learner(s) on proper safety adaptation and remediation techniques during self care or transfers.   Instruct in proper use of assistive devices.                  Learning Progress Summary             Patient Acceptance, E,D, VU,DU,NR by TB at 1/9/2024 1522    Acceptance, E, VU by MR at 1/5/2024 1514    EagerOSVALDO,AMANDA,D,H, VU by AR at 1/2/2024 1329                         Point: Home exercise program  (Done)       Description:   Instruct learner(s) on appropriate technique for monitoring, assisting and/or progressing therapeutic exercises/activities.                  Learning Progress Summary             Patient Acceptance, E,D, VU,DU,NR by TB at 1/9/2024 1522    Acceptance, E, VU by MR at 1/5/2024 1514    Eager, E,TB,D,H, VU by AR at 1/2/2024 1329                         Point: Precautions (Done)       Description:   Instruct learner(s) on prescribed precautions during self-care and functional transfers.                  Learning Progress Summary             Patient Acceptance, E, VU by MR at 1/5/2024 1514    Eager, E,TB,D,H, VU by AR at 1/2/2024 1329                         Point: Body mechanics (Done)       Description:   Instruct learner(s) on proper positioning and spine alignment during self-care, functional mobility activities and/or exercises.                  Learning Progress Summary             Patient Acceptance, E, VU by MR at 1/5/2024 1514    Eager, E,TB,D,H, VU by AR at 1/2/2024 1329                                         User Key       Initials Effective Dates Name Provider Type Discipline     07/11/23 -  Nicki Chirinos, OT Occupational Therapist OT    AR 07/11/23 -  Marina Aguayo, OT Occupational Therapist OT    MR 09/22/22 -  Sammie Matt, OT Occupational Therapist OT                  OT Recommendation and Plan     Plan of Care Review  Plan of Care Reviewed With: patient  Progress: no change  Outcome Evaluation: Pt is A/Ox4 and participates in therapy with encouragement and time. Remains below her occupational baseline with generalized weakness, balance and activity tolerance deficits limiting mobility and self-care performance. SBA STS and to reposition for activity. Completes ther ex with good effort from EOB and Supine. Education/demonstration reviewed for ADL retraining. OT will continue to follow IP. Recommend d/c to home with assist when pt is medically ready.     Time  Calculation:         Time Calculation- OT       Row Name 01/09/24 1403             Time Calculation- OT    OT Start Time 1403  -TB      OT Received On 01/09/24  -TB      OT Goal Re-Cert Due Date 01/12/23  -TB         Timed Charges    59802 - OT Self Care/Mgmt Minutes 23  -TB         Total Minutes    Timed Charges Total Minutes 23  -TB       Total Minutes 23  -TB                User Key  (r) = Recorded By, (t) = Taken By, (c) = Cosigned By      Initials Name Provider Type    TB Nicki Chirinos OT Occupational Therapist                  Therapy Charges for Today       Code Description Service Date Service Provider Modifiers Qty    [id number] HC OT SELF CARE/MGMT/TRAIN EA 15 MIN 1/9/2024 Nicki Chirinos OT GO 2                 Nicki Chirinos OT  1/9/2024

## 2024-01-09 NOTE — PLAN OF CARE
Problem: Adult Inpatient Plan of Care  Goal: Plan of Care Review  Recent Flowsheet Documentation  Taken 1/9/2024 1516 by Nicki Chirinos OT  Progress: no change  Plan of Care Reviewed With: patient  Outcome Evaluation: Pt is A/Ox4 and participates in therapy with encouragement and time. Remains below her occupational baseline with generalized weakness, balance and activity tolerance deficits limiting mobility and self-care performance. SBA STS and to reposition for activity. Completes ther ex with good effort from EOB and Supine. Education/demonstration reviewed for ADL retraining. OT will continue to follow IP. Recommend d/c to home with assist when pt is medically ready.

## 2024-01-09 NOTE — PLAN OF CARE
Goal Outcome Evaluation:  Plan of Care Reviewed With: patient        Progress: no change         Anticipated Discharge Disposition (SLP): No further SLP services warranted             Treatment Assessment (SLP): continued, toleration of diet (01/09/24 0850)  Treatment Assessment Comments (SLP): Pt seen for diet tolerance this date. No overt s/s of aspiration w/ any po presentations. Pt requested upgrade to regular solids. No observed difficulty w/ any trials. Pt denies any difficulty. Will upgrade to regular solids (01/09/24 0850)  Plan for Continued Treatment (SLP): continue treatment per plan of care (01/09/24 0850)

## 2024-01-09 NOTE — CASE MANAGEMENT/SOCIAL WORK
Continued Stay Note  [facility]     Patient Name: Di Lopez  MRN: [id number]  Today's Date: 1/9/2024    Admit Date: 1/1/2024    Plan: Home with [facility] SN/PT/OT   Discharge Plan       Row Name 01/09/24 0832       Plan    Plan Home with [facility] [place] SN/PT/OT    Patient/Family in Agreement with Plan yes    Plan Comments Spoke to patient at bedside. Plan is home with [facility] [place] SN/PT/OT. [employer] deliver a protable oxygen tank. [facility] will transport. CM will continue to follow.    Final Discharge Disposition Code 06 - home with home health care                   Discharge Codes    No documentation.                 Expected Discharge Date and Time       Expected Discharge Date Expected Discharge Time    Jan 11, 2024               Linus Borden RN

## 2024-01-09 NOTE — THERAPY TREATMENT NOTE
Patient Name: Di Lopez  : 1960    MRN: [id number]                              Today's Date: 2024       Admit Date: 2024    Visit Dx:     ICD-10-CM ICD-9-CM   1. Acute renal failure, unspecified acute renal failure type  N17.9 584.9   2. Hyperkalemia  E87.5 276.7   3. Gastrointestinal hemorrhage, unspecified gastrointestinal hemorrhage type  K92.2 578.9   4. Acute renal failure superimposed on chronic kidney disease, unspecified acute renal failure type, unspecified CKD stage  N17.9 584.9    N18.9 585.9   5. Chronic respiratory failure with hypoxia  J96.11 518.83     799.02   6. Weakness  R53.1 780.79   7. Dysphagia, unspecified type  R13.10 787.20     Patient Active Problem List   Diagnosis    Arteriovenous malformation of gastrointestinal tract    Depression    Type 2 diabetes mellitus without complication, with long-term current use of insulin    Dyslipidemia    Emphysema/COPD    Hypertension    Insomnia    Morbid obesity    Obstructive sleep apnea syndrome    Chronic anticoagulation    Normocytic anemia    Sliding hiatal hernia    Vitamin B12 deficiency    Iron deficiency    Iron malabsorption    Bronchiectasis without complication    Electronic cigarette use    Chronic diastolic heart failure    Left bundle branch hemiblock    Chronic respiratory failure with hypoxia    Body aches    Headaches due to old head injury    History of tobacco use, presenting hazards to health    Multiple nodules of lung    Nocturnal hypoxemia    Screening for colon cancer    Abnormal CT scan    Wellness examination    ARF (acute renal failure)    H/O heart valve replacement with mechanical valve    GI bleed    Severe malnutrition    Hyponatremia    Hyperkalemia    Type 2 diabetes mellitus with hyperglycemia    Elevated LFTs    Anemia, chronic disease    Cervical lymphadenopathy    Renal failure     Past Medical History:   Diagnosis Date    Allergic     Anemia     Anticoagulated     WARFARIN    Arteriovenous  malformation of gastrointestinal tract 05/12/2016    Arthritis     Asthma     Black hairy tongue     CHF (congestive heart failure)     Chronic back pain     Coronary artery disease     Depression 05/12/2016    Diabetes mellitus     Dyslipidemia 05/12/2016    Elevated cholesterol     Emphysema/COPD 05/12/2016    Emphysema/COPD 05/12/2016    Fatigue     GERD (gastroesophageal reflux disease)     Headache     Herniated cervical disc     History of echocardiogram 07/16/2013    Left ventricular systolic function at the lower limits of normal; EF 50-55%; mild MR; mechanical prosthetic mitral valve present; mod TR    History of transfusion     NO REACTION; [facility]    Hyperlipidemia     Hypertension     Insomnia 05/12/2016    Iron deficiency     A.  The patient was treated with IV iron replacement. B.  Secondary to GI bleed, status post EGD with cauterization of blood vessel to the duodenum, 7/16/2014.    Left bundle branch hemiblock 10/12/2020    Left shoulder strain     Liver disease     Liver mass     Long term current use of anticoagulant 05/12/2016    Low back pain     Lung nodules     PER DR. LEVI'S NOTE 10/22    Morbid obesity 05/12/2016    Neuromuscular disorder 7/28/2016    Obstructive sleep apnea syndrome 05/12/2016    Description: A.  Moderate; CPAP therapy..    Sliding hiatal hernia 07/28/2016    Description: A.  Reported on EGD, 12/30/2011.    Type 2 diabetes mellitus 05/12/2016    Urinary tract infection     Uterine cancer     HYSTERECTOMY    Vitamin B12 deficiency     A.  The patient was treated with vitamin B12 replacement.    Wears eyeglasses      Past Surgical History:   Procedure Laterality Date    BILATERAL OOPHORECTOMY  2009    Status post bilateral oophorectomy secondary to ovarian cysts     BREAST BIOPSY Right     STEREOTACTIC    BREAST EXCISIONAL BIOPSY Bilateral     CARDIAC ELECTROPHYSIOLOGY PROCEDURE N/A 10/15/2020    Procedure: Biventricular Device Insertion;  Surgeon: Nadir Everett MD;   Location:  [facility];  Service: Cardiology;  Laterality: N/A;    CARDIAC SURGERY  2003    CARPAL TUNNEL RELEASE Bilateral     COLONOSCOPY  12/28/2012    COLONOSCOPY N/A 5/23/2023    Procedure: COLONOSCOPY;  Surgeon: Gt Fernandes MD;  Location:  [facility];  Service: Gastroenterology;  Laterality: N/A;    COLONOSCOPY N/A 11/16/2023    Procedure: COLONOSCOPY;  Surgeon: Brunner, Mark I, MD;  Location:  [facility];  Service: Gastroenterology;  Laterality: N/A;    CYSTECTOMY      removal of ganglion cyst from left wrist    ENDOSCOPY  12/30/2011    DIAGNOSTIC ESOPHAGOGASTRODUODENOSCOPY    ENDOSCOPY N/A 11/16/2023    Procedure: ESOPHAGOGASTRODUODENOSCOPY;  Surgeon: Brunner, Mark I, MD;  Location:  [facility];  Service: Gastroenterology;  Laterality: N/A;    HYSTERECTOMY  1995    A.  Status post hysterectomy for early stage endometrial cancer done in 1995.    MITRAL VALVE REPLACEMENT  03/03/2008    Dr. Hima Barreto; MECHANICAL    OTHER SURGICAL HISTORY      LIVER MASS BLOOD SUPPLY CLIPPED ON RIGHT AND LEFT SIDE PER PT      General Information       [facility] Name 01/09/24 1149          Physical Therapy Time and Intention    Document Type therapy note (daily note)  -     Mode of Treatment physical therapy  -       Row Name 01/09/24 1149          General Information    Patient Profile Reviewed yes  -     Existing Precautions/Restrictions fall;oxygen therapy device and L/min  -     Barriers to Rehab medically complex;previous functional deficit  -       Row Name 01/09/24 1149          Cognition    Orientation Status (Cognition) oriented x 4  -       Row Name 01/09/24 1149          Safety Issues, Functional Mobility    Safety Issues Affecting Function (Mobility) awareness of need for assistance;insight into deficits/self-awareness;safety precaution awareness;safety precautions follow-through/compliance  -     Impairments Affecting Function (Mobility) endurance/activity  tolerance;balance;pain;shortness of breath;strength  -               User Key  (r) = Recorded By, (t) = Taken By, (c) = Cosigned By      Initials Name Provider Type     Suzette Faria PT Physical Therapist                   Mobility       Row Name 01/09/24 1149          Bed Mobility    Bed Mobility supine-sit;sit-supine  -     Supine-Sit [place] (Bed Mobility) modified independence;verbal cues  -     Sit-Supine [place] (Bed Mobility) minimum assist (75% patient effort);1 person assist;verbal cues  -     Assistive Device (Bed Mobility) bed rails;head of bed elevated  -     Comment, (Bed Mobility) VCs for hand placement and sequecing. Required assist w/ R LE t/f sit>sup  -       Row Name 01/09/24 1149          Transfers    Comment, (Transfers) VCs fro hand placement and sequencing  -       Row Name 01/09/24 1149          Sit-Stand Transfer    Sit-Stand [place] (Transfers) standby assist;verbal cues  -     Assistive Device (Sit-Stand Transfers) walker, front-wheeled  -     Comment, (Sit-Stand Transfer) STS x1 from EOB. No unsteadiness noted. Denies dizziness  -       Row Name 01/09/24 1149          Gait/Stairs (Locomotion)    [place] Level (Gait) standby assist;verbal cues  -     Assistive Device (Gait) walker, front-wheeled  -     Distance in Feet (Gait) 250  -     Deviations/Abnormal Patterns (Gait) gait speed decreased;base of support, wide;stride length decreased;sabra decreased  -     Bilateral Gait Deviations forward flexed posture;heel strike decreased  -     Comment, (Gait/Stairs) Pt demo step through gait pattern w/ slow, steady pace. VCs for upright posture and PLB d/t SOA. Ambulate on 2L NC, spO2 >90% throughout. No unsteadiness or LOB noted. Distance limited by pain and fatigue  -               User Key  (r) = Recorded By, (t) = Taken By, (c) = Cosigned By      Initials Name Provider Type    Suzette Menezes PT Physical Therapist                    Obj/Interventions       [facility] Name 01/09/24 1153          Motor Skills    Motor Skills functional endurance  -     Functional Endurance Decreased functional endurance w/ mobility on 2L NC  -[facility] Name 01/09/24 1153          Balance    Balance Assessment sitting static balance;sitting dynamic balance;sit to stand dynamic balance;standing static balance;standing dynamic balance  -     Static Sitting Balance independent  -     Dynamic Sitting Balance independent  -     Position, Sitting Balance unsupported;sitting edge of bed  -     Sit to Stand Dynamic Balance standby assist;verbal cues  -     Static Standing Balance supervision  -     Dynamic Standing Balance standby assist  -     Position/Device Used, Standing Balance supported;walker, front-wheeled  -     Balance Interventions sitting;standing;sit to stand;supported;static;dynamic  -               User Key  (r) = Recorded By, (t) = Taken By, (c) = Cosigned By      Initials Name Provider Type     Suzette Faria, PT Physical Therapist                   Goals/Plan    No documentation.                  Clinical Impression       [facility] Name 01/09/24 1154          Pain    Pretreatment Pain Rating 8/10  -     Posttreatment Pain Rating 8/10  [facility]     Pain Location - Side/Orientation Right  -     Pain Location lower  -     Pain Location - extremity  -     Pre/Posttreatment Pain Comment tolerated  -     Pain Intervention(s) Repositioned;Ambulation/increased activity  -[facility] Name 01/09/24 1154          Plan of Care Review    Plan of Care Reviewed With patient  -     Progress no change  -     Outcome Evaluation Pt continues to present below baseline function d/t generalized weakness, decreased balance, and decreased functional endurance. Pt required SBA for STS and to ambulate 250 ft w/ FWW and 2L NC. Pt would continue to benefit from skilled IP PT. Recommend home w/ assist and  PT at d/c.  -[facility] Name 01/09/24 5945           Therapy Assessment/Plan (PT)    Rehab Potential (PT) good, to achieve stated therapy goals  -     Criteria for Skilled Interventions Met (PT) yes  -     Therapy Frequency (PT) daily  -       Row Name 01/09/24 1154          Vital Signs    Pre Systolic BP Rehab --  nsg cleared for tx  -     Intratreatment Heart Rate (beats/min) 106  -LH     Posttreatment Heart Rate (beats/min) 70  -LH     O2 Delivery Pre Treatment supplemental O2  -LH     Intra SpO2 (%) 93  -LH     O2 Delivery Intra Treatment supplemental O2  -LH     Post SpO2 (%) 95  -LH     O2 Delivery Post Treatment supplemental O2  -LH     Pre Patient Position Supine  -LH     Intra Patient Position Standing  -LH     Post Patient Position Supine  -       Row Name 01/09/24 1154          Positioning and Restraints    Pre-Treatment Position in bed  -LH     Post Treatment Position bed  -LH     In Bed supine;call light within reach;encouraged to call for assist;exit alarm on;notified [facility]  -               User Key  (r) = Recorded By, (t) = Taken By, (c) = Cosigned By      Initials Name Provider Type     Suzette Faria, PT Physical Therapist                   Outcome Measures       Row Name 01/09/24 1158          How much help from another person do you currently need...    Turning from your back to your side while in flat bed without using bedrails? 4  -LH     Moving from lying on back to sitting on the side of a flat bed without bedrails? 3  -LH     Moving to and from a bed to a chair (including a wheelchair)? 3  -LH     Standing up from a chair using your arms (e.g., wheelchair, bedside chair)? 3  -LH     Climbing 3-5 steps with a railing? 3  -LH     To walk in hospital room? 3  -     AM-PAC 6 Clicks Score (PT) 19  -     Highest Level of Mobility Goal 6 --> Walk 10 steps or more  -       Row Name 01/09/24 1158          Functional Assessment    Outcome Measure Options AM-PAC 6 Clicks Basic Mobility (PT)  -               User Key  (r) = Recorded  By, (t) = Taken By, (c) = Cosigned By      Initials Name Provider Type     Suzette Faria, PT Physical Therapist                                 Physical Therapy Education       Title: PT OT SLP Therapies (In Progress)       Topic: Physical Therapy (In Progress)       Point: Mobility training (Done)       Learning Progress Summary             Patient Acceptance, E, VU,NR by  at 1/9/2024 1158    Acceptance, E,D, NR by CT at 1/7/2024 1550    Eager, E, VU by SC at 1/4/2024 1131    Comment: reviewed benefits of walking    Eager, E, VU by SC at 1/2/2024 1203    Comment: reviewed benefits of activity                         Point: Home exercise program (In Progress)       Learning Progress Summary             Patient Acceptance, E,D, NR by CT at 1/7/2024 1550    Eager, E, VU by SC at 1/4/2024 1131    Comment: reviewed benefits of walking    Eager, E, VU by SC at 1/2/2024 1203    Comment: reviewed benefits of activity                         Point: Body mechanics (Done)       Learning Progress Summary             Patient Acceptance, E, VU,NR by  at 1/9/2024 1158    Acceptance, E,D, NR by CT at 1/7/2024 1550    Eager, E, VU by SC at 1/4/2024 1131    Comment: reviewed benefits of walking    Eager, E, VU by SC at 1/2/2024 1203    Comment: reviewed benefits of activity                         Point: Precautions (Done)       Learning Progress Summary             Patient Acceptance, E, VU,NR by  at 1/9/2024 1158    Acceptance, E,D, NR by CT at 1/7/2024 1550    Eager, E, VU by SC at 1/4/2024 1131    Comment: reviewed benefits of walking    Eager, E, VU by SC at 1/2/2024 1203    Comment: reviewed benefits of activity                                         User Key       Initials Effective Dates Name Provider Type Discipline    SC 02/03/23 -  Isaías Kumar, PT Physical Therapist PT    CT 07/07/23 -  Hansel Aguayo, PT Physical Therapist PT     09/21/23 -  Suzette Faria, PT Physical Therapist PT                   PT Recommendation and Plan     Plan of Care Reviewed With: patient  Progress: no change  Outcome Evaluation: Pt continues to present below baseline function d/t generalized weakness, decreased balance, and decreased functional endurance. Pt required SBA for STS and to ambulate 250 ft w/ FWW and 2L NC. Pt would continue to benefit from skilled IP PT. Recommend home w/ assist and HH PT at d/c.     Time Calculation:         PT Charges       Row Name 01/09/24 1158             Time Calculation    Start Time 1058  -      PT Received On 01/09/24  -      PT Goal Re-Cert Due Date 01/12/24  -         Timed Charges    89120 - PT Therapeutic Activity Minutes 24 -LH         Total Minutes    Timed Charges Total Minutes 24 -       Total Minutes 24 -                User Key  (r) = Recorded By, (t) = Taken By, (c) = Cosigned By      Initials Name Provider Type     Suzette Faria, PAIGE Physical Therapist                  Therapy Charges for Today       Code Description Service Date Service Provider Modifiers Qty    [id number] HC PT THERAPEUTIC ACT EA 15 MIN 1/9/2024 Suzette Faria, PT GP 2            PT G-Codes  Outcome Measure Options: AM-PAC 6 Clicks Basic Mobility (PT)  AM-PAC 6 Clicks Score (PT): 19  AM-PAC 6 Clicks Score (OT): 21  PT Discharge Summary  Anticipated Discharge Disposition (PT): home, home with home health    Suzette Faria PT  1/9/2024

## 2024-01-09 NOTE — THERAPY TREATMENT NOTE
Acute Care - Speech Language Pathology   Swallow Treatment Note [facility]     Patient Name: Di Lopez  : 1960  MRN: [id number]  Today's Date: 2024               Admit Date: 2024    Visit Dx:     ICD-10-CM ICD-9-CM   1. Acute renal failure, unspecified acute renal failure type  N17.9 584.9   2. Hyperkalemia  E87.5 276.7   3. Gastrointestinal hemorrhage, unspecified gastrointestinal hemorrhage type  K92.2 578.9   4. Acute renal failure superimposed on chronic kidney disease, unspecified acute renal failure type, unspecified CKD stage  N17.9 584.9    N18.9 585.9   5. Chronic respiratory failure with hypoxia  J96.11 518.83     799.02   6. Weakness  R53.1 780.79   7. Dysphagia, unspecified type  R13.10 787.20     Patient Active Problem List   Diagnosis    Arteriovenous malformation of gastrointestinal tract    Depression    Type 2 diabetes mellitus without complication, with long-term current use of insulin    Dyslipidemia    Emphysema/COPD    Hypertension    Insomnia    Morbid obesity    Obstructive sleep apnea syndrome    Chronic anticoagulation    Normocytic anemia    Sliding hiatal hernia    Vitamin B12 deficiency    Iron deficiency    Iron malabsorption    Bronchiectasis without complication    Electronic cigarette use    Chronic diastolic heart failure    Left bundle branch hemiblock    Chronic respiratory failure with hypoxia    Body aches    Headaches due to old head injury    History of tobacco use, presenting hazards to health    Multiple nodules of lung    Nocturnal hypoxemia    Screening for colon cancer    Abnormal CT scan    Wellness examination    ARF (acute renal failure)    H/O heart valve replacement with mechanical valve    GI bleed    Severe malnutrition    Hyponatremia    Hyperkalemia    Type 2 diabetes mellitus with hyperglycemia    Elevated LFTs    Anemia, chronic disease    Cervical lymphadenopathy    Renal failure     Past Medical History:   Diagnosis Date    Allergic      Anemia     Anticoagulated     WARFARIN    Arteriovenous malformation of gastrointestinal tract 05/12/2016    Arthritis     Asthma     Black hairy tongue     CHF (congestive heart failure)     Chronic back pain     Coronary artery disease     Depression 05/12/2016    Diabetes mellitus     Dyslipidemia 05/12/2016    Elevated cholesterol     Emphysema/COPD 05/12/2016    Emphysema/COPD 05/12/2016    Fatigue     GERD (gastroesophageal reflux disease)     Headache     Herniated cervical disc     History of echocardiogram 07/16/2013    Left ventricular systolic function at the lower limits of normal; EF 50-55%; mild MR; mechanical prosthetic mitral valve present; mod TR    History of transfusion     NO REACTION; [facility]    Hyperlipidemia     Hypertension     Insomnia 05/12/2016    Iron deficiency     A.  The patient was treated with IV iron replacement. B.  Secondary to GI bleed, status post EGD with cauterization of blood vessel to the duodenum, 7/16/2014.    Left bundle branch hemiblock 10/12/2020    Left shoulder strain     Liver disease     Liver mass     Long term current use of anticoagulant 05/12/2016    Low back pain     Lung nodules     PER DR. LEVI'S NOTE 10/22    Morbid obesity 05/12/2016    Neuromuscular disorder 7/28/2016    Obstructive sleep apnea syndrome 05/12/2016    Description: A.  Moderate; CPAP therapy..    Sliding hiatal hernia 07/28/2016    Description: A.  Reported on EGD, 12/30/2011.    Type 2 diabetes mellitus 05/12/2016    Urinary tract infection     Uterine cancer     HYSTERECTOMY    Vitamin B12 deficiency     A.  The patient was treated with vitamin B12 replacement.    Wears eyeglasses      Past Surgical History:   Procedure Laterality Date    BILATERAL OOPHORECTOMY  2009    Status post bilateral oophorectomy secondary to ovarian cysts     BREAST BIOPSY Right     STEREOTACTIC    BREAST EXCISIONAL BIOPSY Bilateral     CARDIAC ELECTROPHYSIOLOGY PROCEDURE N/A 10/15/2020    Procedure:  Biventricular Device Insertion;  Surgeon: Nadir Everett MD;  Location:  [facility];  Service: Cardiology;  Laterality: N/A;    CARDIAC SURGERY  2003    CARPAL TUNNEL RELEASE Bilateral     COLONOSCOPY  12/28/2012    COLONOSCOPY N/A 5/23/2023    Procedure: COLONOSCOPY;  Surgeon: Gt Fernandes MD;  Location:  [facility];  Service: Gastroenterology;  Laterality: N/A;    COLONOSCOPY N/A 11/16/2023    Procedure: COLONOSCOPY;  Surgeon: Brunner, Mark I, MD;  Location:  [facility];  Service: Gastroenterology;  Laterality: N/A;    CYSTECTOMY      removal of ganglion cyst from left wrist    ENDOSCOPY  12/30/2011    DIAGNOSTIC ESOPHAGOGASTRODUODENOSCOPY    ENDOSCOPY N/A 11/16/2023    Procedure: ESOPHAGOGASTRODUODENOSCOPY;  Surgeon: Brunner, Mark I, MD;  Location:  [facility];  Service: Gastroenterology;  Laterality: N/A;    HYSTERECTOMY  1995    A.  Status post hysterectomy for early stage endometrial cancer done in 1995.    MITRAL VALVE REPLACEMENT  03/03/2008    Dr. Hima Barreto; MECHANICAL    OTHER SURGICAL HISTORY      LIVER MASS BLOOD SUPPLY CLIPPED ON RIGHT AND LEFT SIDE PER PT       SLP Recommendation and Plan     SLP Diet Recommendation: regular textures, thin liquids (extra sauce/gravy) (01/09/24 0850)  Recommended Precautions and Strategies: general aspiration precautions, reflux precautions, upright posture during/after eating (01/09/24 0850)  SLP Rec. for Method of Medication Administration: meds whole, with thin liquids, meds crushed, with puree, as tolerated (01/09/24 0850)     Monitor for Signs of Aspiration: yes, notify SLP if any concerns (01/09/24 0850)  Recommended Diagnostics: No further SLP services recommended (01/09/24 0850)     Anticipated Discharge Disposition (SLP): No further SLP services warranted (01/09/24 0850)     Therapy Frequency (Swallow): PRN (01/09/24 0850)  Predicted Duration Therapy Intervention (Days): until discharge (01/09/24 0850)  Oral  Care Recommendations: Oral Care BID/PRN, Toothbrush (01/09/24 0850)        Daily Summary of Progress (SLP): progress toward functional goals is good (01/09/24 0850)               Treatment Assessment (SLP): continued, toleration of diet (01/09/24 0850)  Treatment Assessment Comments (SLP): Pt seen for diet tolerance this date. No overt s/s of aspiration w/ any po presentations. Pt requested upgrade to regular solids. No observed difficulty w/ any trials. Pt denies any difficulty. Will upgrade to regular solids (01/09/24 0850)  Plan for Continued Treatment (SLP): continue treatment per plan of care (01/09/24 0850)       Oral Care Recommendations: Oral Care BID/PRN, Toothbrush (01/09/24 0850)    Plan of Care Reviewed With: patient  Progress: no change      SWALLOW EVALUATION (last 72 hours)       SLP Adult Swallow Evaluation       Row Name 01/09/24 0850       Rehab Evaluation    Document Type therapy note (daily note)  -CJ    Subjective Information no complaints  -CJ    Patient Observations alert;cooperative;agree to therapy  -CJ    Patient/Family/Caregiver Comments/Observations no family present  -CJ    Patient Effort good  -CJ    Symptoms Noted During/After Treatment none  -CJ       Pain    Additional Documentation Pain Scale: FACES Pre/Post-Treatment (Group)  -       Pain Scale: FACES Pre/Post-Treatment    Pain: FACES Scale, Pretreatment 0-->no hurt  -CJ    Posttreatment Pain Rating 0-->no hurt  -CJ       SLP Treatment Clinical Impressions    Treatment Assessment (SLP) continued;toleration of diet  -CJ    Treatment Assessment Comments (SLP) Pt seen for diet tolerance this date. No overt s/s of aspiration w/ any po presentations. Pt requested upgrade to regular solids. No observed difficulty w/ any trials. Pt denies any difficulty. Will upgrade to regular solids  -CJ    Daily Summary of Progress (SLP) progress toward functional goals is good  -CJ    Plan for Continued Treatment (SLP) continue treatment per plan of  care  -CJ    Care Plan Review evaluation/treatment results reviewed;care plan/treatment goals reviewed  -CJ       Recommendations    Therapy Frequency (Swallow) PRN  -CJ    Predicted Duration Therapy Intervention (Days) until discharge  -CJ    SLP Diet Recommendation regular textures;thin liquids  extra sauce/gravy  -CJ    Recommended Diagnostics No further SLP services recommended  -    Recommended Precautions and Strategies general aspiration precautions;reflux precautions;upright posture during/after eating  -CJ    Oral Care Recommendations Oral Care BID/PRN;Toothbrush  -CJ    SLP Rec. for Method of Medication Administration meds whole;with thin liquids;meds crushed;with puree;as tolerated  -CJ    Monitor for Signs of Aspiration yes;notify SLP if any concerns  -CJ    Anticipated Discharge Disposition (SLP) No further SLP services warranted  -              User Key  (r) = Recorded By, (t) = Taken By, (c) = Cosigned By      Initials Name Effective Dates    Sulema Dey, MS CCC-SLP 07/11/23 -                     EDUCATION  The patient has been educated in the following areas:   Dysphagia (Swallowing Impairment) Oral Care/Hydration.        SLP GOALS       Row Name 01/09/24 0850             (LTG) Patient will demonstrate functional swallow for    Diet Texture (Demonstrate functional swallow) regular textures  -CJ      Liquid viscosity (Demonstrate functional swallow) thin liquids  -CJ      Ada (Demonstrate functional swallow) independently (over 90% accuracy)  -CJ      Time Frame (Demonstrate functional swallow) by discharge  -CJ      Progress/Outcomes (Demonstrate functional swallow) goal met  -         (STG) Patient will tolerate trials of    Consistencies Trialed (Tolerate trials) soft to chew (ground) textures;thin liquids  -CJ      Desired Outcome (Tolerate trials) without signs/symptoms of aspiration  -CJ      Ada (Tolerate trials) independently (over 90% accuracy)  -CJ      Time  Frame (Tolerate trials) 1 week  -      Progress/Outcomes (Tolerate trials) goal met;goal no longer appropriate  -CJ      Comment (Tolerate trials) no overt s/s of aspiration w/ trials of regular solids  -CJ                User Key  (r) = Recorded By, (t) = Taken By, (c) = Cosigned By      Initials Name Provider Type    Sulema Dey MS CCC-SLP Speech and Language Pathologist                       Time Calculation:    Time Calculation- SLP       Row Name 01/09/24 1115             Time Calculation- SLP    SLP Start Time 0850  -      SLP Received On 01/09/24  -         Untimed Charges    20728-UD Treatment Swallow Minutes 40  -CJ         Total Minutes    Untimed Charges Total Minutes 40  -CJ       Total Minutes 40  -CJ                User Key  (r) = Recorded By, (t) = Taken By, (c) = Cosigned By      Initials Name Provider Type    Sulema Dey MS CCC-SLP Speech and Language Pathologist                    Therapy Charges for Today       Code Description Service Date Service Provider Modifiers Qty    [id number]  ST TREATMENT SWALLOW 3 1/9/2024 Sulema Miller MS CCC-SLP GN 1                 Sulema Miller MS CCC-HUBERT  1/9/2024

## 2024-01-09 NOTE — PROGRESS NOTES
[facility] Medicine Services  PROGRESS NOTE    Patient Name: Di Lopez  : 1960  MRN: [id number]    Date of Admission: 2024  Primary Care Physician: Davina Santiago DO    Subjective   Subjective     CC:  Anemia, fever     HPI:  No acute events. Seems to feel better today. Thigh pain persists.       Objective   Objective     Vital Signs:   Temp:  [97.2 °F (36.2 °C)-99.7 °F (37.6 °C)] 98.6 °F (37 °C)  Heart Rate:  [58-86] 71  Resp:  [8-18] 18  BP: (111-167)/(54-91) 140/56  Flow (L/min):  [2] 2     Physical Exam:  Constitutional: No acute distress, awake, alert  HENT: NCAT, mucous membranes moist  Respiratory: diminished; clear.   Cardiovascular: RRR, II/VI murmur   Gastrointestinal: Positive bowel sounds, soft, nontender, nondistended  Musculoskeletal: trace bilateral ankle edema  Psychiatric: Appropriate affect, cooperative  Neurologic: Oriented x 3, strength symmetric in all extremities, Cranial Nerves grossly intact to confrontation, speech clear  Skin: No rashes      Results Reviewed:  LAB RESULTS:      Lab 24  0920 24  0010 24  0638 24  0457 24  2105 24  1402 24  0552 24  0518 24  0737 24  0056 24  1640 24  0501 24  0541 24  0251   WBC 5.30  --   --  4.73  --   --  4.97  --  5.89  --   --  6.74   < > 4.18   HEMOGLOBIN 8.3*  --   --  7.1*  --   --  7.3*  --  7.3*  --   --  7.5*   < > 8.4*   HEMATOCRIT 25.8*  --   --  22.1*  --   --  23.0*  --  23.0*  --   --  24.3*   < > 25.2*   PLATELETS 225  --   --  231  --   --  255  --  263  --   --  282   < > 224   NEUTROS ABS 4.05  --   --   --   --   --   --   --  4.19  --   --   --   --  2.61   IMMATURE GRANS (ABS) 0.06*  --   --   --   --   --   --   --  0.17*  --   --   --   --  0.07*   LYMPHS ABS 0.86  --   --   --   --   --   --   --  1.18  --   --   --   --  1.12   MONOS ABS 0.23  --   --   --   --   --   --   --  0.26  --   --   --   --   0.25   EOS ABS 0.09  --   --   --   --   --   --   --  0.06  --   --   --   --  0.12   MCV 87.2  --   --  86.3  --   --  87.5  --  85.5  --   --  86.8   < > 85.7   SED RATE  --   --   --   --   --   --   --   --   --   --  58*  --   --   --    CRP  --   --   --   --   --   --   --   --   --   --  5.92*  --   --   --    PROCALCITONIN  --   --   --   --   --   --   --  0.40*  --   --   --   --   --   --    LACTATE  --   --   --   --   --   --   --   --   --   --  1.4  --   --   --    PROTIME 15.9*  --   --  16.2*  --   --   --  17.2* 18.0*  --   --  18.8*  --   --    HEPARIN ANTI-XA 0.31 0.23* 0.31  --  0.41 0.38  --  0.25* 0.35   < > 0.25* 0.21*   < > 0.37    < > = values in this interval not displayed.         Lab 01/09/24 0920 01/08/24 0457 01/07/24 0518 01/06/24 0737 01/05/24  0501   SODIUM 137 134* 136 137 133*   POTASSIUM 4.7 4.8 4.9 5.2 5.2   CHLORIDE 103 104 103 105 104   CO2 18.0* 19.0* 18.0* 18.0* 17.0*   ANION GAP 16.0* 11.0 15.0 14.0 12.0   BUN 28* 31* 33* 37* 43*   CREATININE 2.99* 3.24* 3.87* 3.80* 4.20*   EGFR 17.1* 15.6* 12.6* 12.9* 11.4*   GLUCOSE 213* 226* 167* 145* 103*   CALCIUM 8.6 8.3* 8.5* 8.3* 7.9*         Lab 01/09/24 0920 01/08/24 0457 01/07/24 0518 01/06/24 0737 01/05/24  0501   TOTAL PROTEIN 6.3 5.9* 6.3 6.2 5.9*   ALBUMIN 3.7 3.2* 3.7 3.5 3.5   GLOBULIN 2.6 2.7 2.6 2.7 2.4   ALT (SGPT) 41* 42* 43* 31 29   AST (SGOT) 32 33* 49* 36* 28   BILIRUBIN 0.3 0.2 0.3 0.3 0.2   ALK PHOS 234* 213* 225* 212* 204*         Lab 01/09/24  0920 01/08/24  0457 01/07/24  0518 01/06/24  0737 01/05/24  0501   PROTIME 15.9* 16.2* 17.2* 18.0* 18.8*   INR 1.25* 1.29* 1.39* 1.48* 1.56*             Lab 01/08/24  1113 01/06/24  0737 01/04/24  0541   IRON  --  15*  --    IRON SATURATION (TSAT)  --  5*  --    TIBC  --  279*  --    TRANSFERRIN  --  187*  --    FERRITIN  --  1,188.00*  --    FOLATE  --   --  >20.00   VITAMIN B 12  --   --  353   ABO TYPING O  --   --    RH TYPING Positive  --   --    ANTIBODY  SCREEN Negative  --   --          Brief Urine Lab Results  (Last result in the past 365 days)        Color   Clarity   Blood   Leuk Est   Nitrite   Protein   CREAT   Urine HCG        01/02/24 0011             142.2         01/02/24 0011 Yellow   Cloudy   Small (1+)   Negative   Negative   100 mg/dL (2+)                   Microbiology Results Abnormal       Procedure Component Value - Date/Time    Blood Culture - Blood, Hand, Left [[id number]]  (Normal) Collected: 01/08/24 1017    Lab Status: Preliminary result Specimen: Blood from Hand, Left Updated: 01/09/24 1030     Blood Culture No growth at 24 hours    Blood Culture - Blood, Arm, Left [[id number]]  (Normal) Collected: 01/08/24 1018    Lab Status: Preliminary result Specimen: Blood from Arm, Left Updated: 01/09/24 1030     Blood Culture No growth at 24 hours    Blood Culture - Blood, Arm, Left [[id number]]  (Normal) Collected: 01/06/24 0056    Lab Status: Preliminary result Specimen: Blood from Arm, Left Updated: 01/09/24 0815     Blood Culture No growth at 3 days    Blood Culture - Blood, Arm, Left [[id number]]  (Normal) Collected: 01/05/24 1641    Lab Status: Preliminary result Specimen: Blood from Arm, Left Updated: 01/08/24 1815     Blood Culture No growth at 3 days    Narrative:      Less than seven (7) mL's of blood was collected.  Insufficient quantity may yield false negative results.    Respiratory Panel PCR w/COVID-19(SARS-CoV-2) LUIS/ESTHER/KARSON/PAD/COR/LEONARDO In-House, NP Swab in UTM/VTM, 2 HR TAT - Swab, Nasopharynx [[id number]]  (Normal) Collected: 01/05/24 1634    Lab Status: Final result Specimen: Swab from Nasopharynx Updated: 01/05/24 1804     ADENOVIRUS, PCR Not Detected     Coronavirus 229E Not Detected     Coronavirus HKU1 Not Detected     Coronavirus NL63 Not Detected     Coronavirus OC43 Not Detected     COVID19 Not Detected     Human Metapneumovirus Not Detected     Human Rhinovirus/Enterovirus Not Detected     Influenza A PCR Not Detected      Influenza B PCR Not Detected     Parainfluenza Virus 1 Not Detected     Parainfluenza Virus 2 Not Detected     Parainfluenza Virus 3 Not Detected     Parainfluenza Virus 4 Not Detected     RSV, PCR Not Detected     Bordetella pertussis pcr Not Detected     Bordetella parapertussis PCR Not Detected     Chlamydophila pneumoniae PCR Not Detected     Mycoplasma pneumo by PCR Not Detected    Narrative:      In the setting of a positive respiratory panel with a viral infection PLUS a negative procalcitonin without other underlying concern for bacterial infection, consider observing off antibiotics or discontinuation of antibiotics and continue supportive care. If the respiratory panel is positive for atypical bacterial infection (Bordetella pertussis, Chlamydophila pneumoniae, or Mycoplasma pneumoniae), consider antibiotic de-escalation to target atypical bacterial infection.    COVID PRE-OP / PRE-PROCEDURE SCREENING ORDER (NO ISOLATION) - Swab, Nasopharynx [[id number]]  (Normal) Collected: 01/01/24 2330    Lab Status: Final result Specimen: Swab from Nasopharynx Updated: 01/02/24 0016    Narrative:      The following orders were created for panel order COVID PRE-OP / PRE-PROCEDURE SCREENING ORDER (NO ISOLATION) - Swab, Nasopharynx.  Procedure                               Abnormality         Status                     ---------                               -----------         ------                     COVID-19, FLU A/B, RSV P...[[id number]]  Normal              Final result                 Please view results for these tests on the individual orders.    COVID-19, FLU A/B, RSV PCR 1 HR TAT - Swab, Nasopharynx [[id number]]  (Normal) Collected: 01/01/24 2330    Lab Status: Final result Specimen: Swab from Nasopharynx Updated: 01/02/24 0016     COVID19 Not Detected     Influenza A PCR Not Detected     Influenza B PCR Not Detected     RSV, PCR Not Detected    Narrative:      Fact sheet for providers:  https://www.fda.gov/media/174818/download    Fact sheet for patients: https://www.fda.gov/media/536895/download    Test performed by PCR.            Adult Transesophageal Echo 3D (MEAGAN) W/ Cont If Necessary Per Protocol    Result Date: 1/8/2024    Left ventricular ejection fraction appears to be 56 - 60%.   The right ventricular cavity is mildly dilated.  There is a electronically in the RV with no visible adherent mass or vegetation.   The right atrium is mildly dilated, there is electronic lead present with no visible vegetation.   There is a mechanical mitral valve with moderately increased gradients similar to most recent TTE.  The bileaflet valve appears to be functioning normally with no visible thrombus or vegetation.  There is mild MR around the periphery with some potential perivalvular leak.   Aortic valve is mildly calcified with trace AR, no vegetation seen.   The tricuspid valve appears structurally normal with no vegetation.  There is moderate to severe central tricuspid regurgitation around the site of the lead transversing the valve.      Results for orders placed during the hospital encounter of 01/01/24    Adult Transesophageal Echo 3D (MEAGAN) W/ Cont If Necessary Per Protocol    Interpretation Summary    Left ventricular ejection fraction appears to be 56 - 60%.    The right ventricular cavity is mildly dilated.  There is a electronically in the RV with no visible adherent mass or vegetation.    The right atrium is mildly dilated, there is electronic lead present with no visible vegetation.    There is a mechanical mitral valve with moderately increased gradients similar to most recent TTE.  The bileaflet valve appears to be functioning normally with no visible thrombus or vegetation.  There is mild MR around the periphery with some potential perivalvular leak.    Aortic valve is mildly calcified with trace AR, no vegetation seen.    The tricuspid valve appears structurally normal with no vegetation.   There is moderate to severe central tricuspid regurgitation around the site of the lead transversing the valve.      Current medications:  Scheduled Meds:cefTRIAXone, 2,000 mg, Intravenous, Q24H  escitalopram, 20 mg, Oral, Daily  ferrous sulfate, 325 mg, Oral, Daily With Breakfast  folic acid, 800 mcg, Oral, Daily  insulin detemir, 5 Units, Subcutaneous, Nightly  insulin lispro, 2-7 Units, Subcutaneous, 4x Daily AC & at Bedtime  Insulin Lispro, 3 Units, Subcutaneous, TID With Meals  oxybutynin XL, 5 mg, Oral, Daily  oxymetazoline, 2 spray, Each Nare, BID  pantoprazole, 40 mg, Oral, Q AM  polyethylene glycol, 17 g, Oral, Daily  senna-docusate sodium, 1 tablet, Oral, BID  sodium bicarbonate, 1,300 mg, Oral, TID  sodium chloride, 10 mL, Intravenous, Q12H  warfarin, 5 mg, Oral, Daily      Continuous Infusions:heparin, 18 Units/kg/hr, Last Rate: 18 Units/kg/hr (01/09/24 0347)  Pharmacy to Dose Heparin,   Pharmacy to dose warfarin,       PRN Meds:.  acetaminophen    albuterol    dextrose    dextrose    glucagon (human recombinant)    melatonin    naloxone    nitroglycerin    oxyCODONE-acetaminophen    Pharmacy to Dose Heparin    Pharmacy to dose warfarin    sodium chloride    sodium chloride    Assessment & Plan   Assessment & Plan     Active Hospital Problems    Diagnosis  POA    **Renal failure [N19]  Yes    Hyponatremia [E87.1]  Yes    Hyperkalemia [E87.5]  Yes    Type 2 diabetes mellitus with hyperglycemia [E11.65]  Yes    Elevated LFTs [R79.89]  Yes    Anemia, chronic disease [D63.8]  Yes    Cervical lymphadenopathy [R59.0]  Yes    GI bleed [K92.2]  Yes    H/O heart valve replacement with mechanical valve [Z95.2]  Not Applicable    ARF (acute renal failure) [N17.9]  Yes    Left bundle branch hemiblock [I44.60]  Yes    Type 2 diabetes mellitus without complication, with long-term current use of insulin [E11.9, Z79.4]  Not Applicable    Dyslipidemia [E78.5]  Yes    Emphysema/COPD [J43.9]  Yes    Hypertension [I10]   Yes    Arteriovenous malformation of gastrointestinal tract [K55.20]  Yes    Obstructive sleep apnea syndrome [G47.33]  Yes    Chronic anticoagulation [Z79.01]  Not Applicable      Resolved Hospital Problems   No resolved problems to display.        Brief Hospital Course to date:  Di Lopez is a 63 y.o. 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 Found to have severe MEERA, resolution of BRBPR, persistent fever/chills and thigh pain     Epistaxis  - afrin x 3 days with improvement     Febrile illness, unclear etiology  -negative full viral panel, negative monospot  -continue to suspect viral illness (throat soreness, fevers, back pain/aches, etc), given new thigh pain consideration of discitis but MRI withOUT contrast negative  - CT abd/pel with no acute findings  -blood cultures ngtd, TTE without vegetation.   - Repeat fever 1/8 - cultures repeated -- pending   - MEAGAN with no obvious evidence of endocarditis   -ID following, on rocephin     Acute Renal Failure 2/2 hypovolemia + med effect - improved  - 2/2 acute diarrheal illness + entresto, NSAIDs  - monitoring off IVF  - Do suspect some ATN component and expect slow recovery  - Nephrology consulted and appreciate recs   - Currently holding fluids and monitoring   - Cr improving. Monitoring I/Os     Hypovolemic hyponatremia - improved  - remains stable off IVF     Acute anemia 2/2 bloody diarrheal illness - blood diarrhea resolved; epistaxis  - 1 units PRBC 1/8 with appropriate response. Monitor AM labs.       Mechanical mitral valve on coumadin - Initially on heparin gtt. No continued intervention planned. Will change to lovenox and resume warfarin.     CHFpEF (EF 45-50%), s/p ICD - appears euvolemic currently. Off fluids   Chronic hypoxic resp failure 2/2 COPD - on home 2 liters n/c  DMII - basal/bolus regimen  Anxiety/depression  BMI 38 -  complicates all aspects of care   Liver mass s/p embolization     Expected Discharge Location and Transportation: home health  Expected Discharge   Expected Discharge Date: 1/11/2024; Expected Discharge Time:      DVT prophylaxis:  Medical DVT prophylaxis orders are present.     AM-PAC 6 Clicks Score (PT): 19 (01/09/24 9158)    CODE STATUS:   Code Status and Medical Interventions:   Ordered at: 01/02/24 0319     Code Status (Patient has no pulse and is not breathing):    CPR (Attempt to Resuscitate)     Medical Interventions (Patient has pulse or is breathing):    Full Support       Sarah Sherman DO  01/09/24

## 2024-01-09 NOTE — PROGRESS NOTES
HEPARIN INFUSION  Di Lopez is a  63 y.o. female receiving heparin infusion.     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   Patient Weight:  89.9 kg  Initial Bolus (Y/N):  No  Any Bolus (Y/N):  Yes       Signs or Symptoms of Bleeding: rectal bleeding on admission-GI has signed off    Cardiac or Other (Not VTE)   Initial Bolus: 60 units/kg (Max 4,000 units)  Initial rate: 12 units/kg/hr (Max 1,000 units/hr)   Anti Xa Rebolus Infusion Hold time Change infusion Dose (Units/kg/hr) Next Anti Xa or aPTT Level Due   < 0.11 50 Units/kg  (4000 Units Max) None Increase by  3 Units/kg/hr 6 hours   0.11- 0.19 25 Units/kg  (2000 Units Max) None Increase by  2 Units/kg/hr 6 hours   0.2 - 0.29 0 None Increase by  1 Units/kg/hr 6 hours   0.3 - 0.5 0 None No Change 6 hours (after 2 consecutive levels in range check qAM)   0.51 - 0.6 0 None Decrease by  1 Units/kg/hr 6 hours   0.61 - 0.8 0 30 Minutes Decrease by  2 Units/kg/hr 6 hours   0.81 - 1 0 60 Minutes Decrease by  3 Units/kg/hr 6 hours   >1 0 Hold  After Anti Xa less than 0.5 decrease previous rate by  4 Units/kg/hr  Every 2 hours until Anti Xa  less than 0.5 then when infusion restarts in 6 hours       Results from last 7 days   Lab Units 01/09/24  0920 01/08/24  0457 01/07/24  0552 01/07/24  0518   INR  1.25* 1.29*  --  1.39*   HEMOGLOBIN g/dL 8.3* 7.1* 7.3*  --    HEMATOCRIT % 25.8* 22.1* 23.0*  --    PLATELETS 10*3/mm3 225 231 255  --           Date   Time   Anti-Xa Current Rate (Unit/kg/hr) Bolus   (Units) Rate Change   (Unit/kg/hr) New Rate (Unit/kg/hr) Next   Anti-Xa Comments  Pump Check Daily   1/2 0531 0.1 New Start No Bolus -- 11 1100 Discussed w/ nurse  Warfarin patient    1/2 1140 Pump check 11 -- -- 11 now Pump weight and rate verified    1/2 1208 0.15 11 2000 +2 13 2000 D/w Marjan   1/2 2035 0.34 13 -- -- 13 0300 DW JARAD Rousseau   1/3 0251 0.37 13 -- -- 13 0600  1/4/24 Discussed w/ nurse   1/3 1100 PUMP CHECK 13 - -- 13  0600  1/4/23 Pump rate and weight verified   1/4 0541 0.34 13 -- -- 13 0600 DW RN   1/5 0501 0.21 13 -- +1 14 1400 D/w RN   1/5 1040 PUMP CHECK 14 -- -- 14 1400 Pump weight and rate verified    1/5 1640 0.25 14 -- +1 15 0000 DW RN   1/6 0056 0.36 15 -- -- 15 0800 D/w RN   1/6 0737 0.35 15 -- -- 15 0600 D/w JARAD Phillip pump verified   1/7 0518 0.25 15 -- +1 16 1300 D/w RN   1/7 1402 0.38 16 -- -- 16 2100 D/w JARAD Phillip pump verified   1/7 2105 0.41 16 -- -- 16 0600 D/w RN   1/8 0638 0.31 16 -- +1 17 1400 D/w RN   1/8 1100 Pump check 17 -- -- 17 1400 Pump weight and rate verified    1/8 1430 -- 17 -- -17 HELD   Heparin gtt turned off for MEAGAN   1/8 1630 -- HELD -- +17 17 2300 Heparin gtt resumed   1/9 0010 0.23 17 -- +1 18 0800 D/w RN   1/9 0920 0.31 18 -- -- 18 1500 DW JARAD Clark, Juan Ramon, BCPS  1/9/2024  11:52 EST

## 2024-01-09 NOTE — PROGRESS NOTES
"  INFECTIOUS DISEASES  INPATIENT PROGRESS NOTE  2024      PATIENT NAME: Di Lopez  :  1960  MRN:  [id number]  Date of Admission:  2024      Antimicrobials:  Day 3:  ceftriaxone      MAR reviewed.  Pertinent other medications include:  heparin gtt    Chief Complaint   Patient presents with    Rectal Bleeding       Reason for consultation:  fevers    Interval history: MEAGAN performed yesterday without obvious vegetation on the valves or leads.  No fever overnight.  She continues to feel quite fatigued today.    ROS:  No new rashes.  No SOB or chest pain.  No nausea/vomiting/diarrhea.     Objective:  Temp (24hrs), Av.4 °F (36.9 °C), Min:97.2 °F (36.2 °C), Max:99.7 °F (37.6 °C)    /56 (BP Location: Right arm, Patient Position: Lying)   Pulse 71   Temp 98.6 °F (37 °C) (Oral)   Resp 18   Ht 152.4 cm (60\")   Wt 89.8 kg (198 lb)   LMP 1998   SpO2 94%   BMI 38.67 kg/m²     Physical Examination:  GENERAL: Chronically ill-appearing female.  Awake and alert, in no acute distress.   HEENT: broken right mandibular tooth with necrosis.  CV: RRR. +faint murmur and mechanical click.  ICD pocket without TTP.  LUNGS: CTAB.  Good air movement.  ABDOMEN: Positive bowel sounds.  Soft, nontender, nondistended.  EXT:  No edema.  MSK: No joint effusions or inflammation noted.  SKIN: Warm and dry without rash or ulcer.   NEURO: A&Ox4. No focal deficits.  Face symmetric.  Speech fluent.  Moves all extremities well.   PSYCHIATRIC: Normal insight and judgement.  Cooperative.  Normal affect.    Laboratory Data:    Results from last 7 days   Lab Units 24  0920 24  0457 24  0552   WBC 10*3/mm3 5.30 4.73 4.97   HEMOGLOBIN g/dL 8.3* 7.1* 7.3*   HEMATOCRIT % 25.8* 22.1* 23.0*   PLATELETS 10*3/mm3 225 231 255     Results from last 7 days   Lab Units 24  0920   SODIUM mmol/L 137   POTASSIUM mmol/L 4.7   CHLORIDE mmol/L 103   CO2 mmol/L 18.0*   BUN mg/dL 28*   CREATININE mg/dL 2.99* "   GLUCOSE mg/dL 213*   CALCIUM mg/dL 8.6     Results from last 7 days   Lab Units 01/09/24  0920   ALK PHOS U/L 234*   BILIRUBIN mg/dL 0.3   ALT (SGPT) U/L 41*   AST (SGOT) U/L 32     Results from last 7 days   Lab Units 01/05/24  1640   SED RATE mm/hr 58*     Results from last 7 days   Lab Units 01/05/24  1640   CRP mg/dL 5.92*     Estimated Creatinine Clearance: 19.5 mL/min (A) (by C-G formula based on SCr of 2.99 mg/dL (H)).  Results from last 7 days   Lab Units 01/05/24  1640   LACTATE mmol/L 1.4     Results from last 7 days   Lab Units 01/07/24  0518 01/04/24  0541   CK TOTAL U/L 191* 160               Microbiology:    Microbiology Results (last 10 days)       Procedure Component Value - Date/Time    Blood Culture - Blood, Arm, Left [[id number]]  (Normal) Collected: 01/08/24 1018    Lab Status: Preliminary result Specimen: Blood from Arm, Left Updated: 01/09/24 1030     Blood Culture No growth at 24 hours    Blood Culture - Blood, Hand, Left [[id number]]  (Normal) Collected: 01/08/24 1017    Lab Status: Preliminary result Specimen: Blood from Hand, Left Updated: 01/09/24 1030     Blood Culture No growth at 24 hours    Blood Culture - Blood, Arm, Left [[id number]]  (Normal) Collected: 01/06/24 0056    Lab Status: Preliminary result Specimen: Blood from Arm, Left Updated: 01/09/24 0815     Blood Culture No growth at 3 days    Blood Culture - Blood, Arm, Left [[id number]]  (Normal) Collected: 01/05/24 1641    Lab Status: Preliminary result Specimen: Blood from Arm, Left Updated: 01/08/24 1815     Blood Culture No growth at 3 days    Narrative:      Less than seven (7) mL's of blood was collected.  Insufficient quantity may yield false negative results.    Respiratory Panel PCR w/COVID-19(SARS-CoV-2) LUIS/ESTHER/KARSON/PAD/COR/LEONARDO In-House, NP Swab in UTM/VTM, 2 HR TAT - Swab, Nasopharynx [[id number]]  (Normal) Collected: 01/05/24 1634    Lab Status: Final result Specimen: Swab from Nasopharynx Updated: 01/05/24 5811      ADENOVIRUS, PCR Not Detected     Coronavirus 229E Not Detected     Coronavirus HKU1 Not Detected     Coronavirus NL63 Not Detected     Coronavirus OC43 Not Detected     COVID19 Not Detected     Human Metapneumovirus Not Detected     Human Rhinovirus/Enterovirus Not Detected     Influenza A PCR Not Detected     Influenza B PCR Not Detected     Parainfluenza Virus 1 Not Detected     Parainfluenza Virus 2 Not Detected     Parainfluenza Virus 3 Not Detected     Parainfluenza Virus 4 Not Detected     RSV, PCR Not Detected     Bordetella pertussis pcr Not Detected     Bordetella parapertussis PCR Not Detected     Chlamydophila pneumoniae PCR Not Detected     Mycoplasma pneumo by PCR Not Detected    Narrative:      In the setting of a positive respiratory panel with a viral infection PLUS a negative procalcitonin without other underlying concern for bacterial infection, consider observing off antibiotics or discontinuation of antibiotics and continue supportive care. If the respiratory panel is positive for atypical bacterial infection (Bordetella pertussis, Chlamydophila pneumoniae, or Mycoplasma pneumoniae), consider antibiotic de-escalation to target atypical bacterial infection.    COVID PRE-OP / PRE-PROCEDURE SCREENING ORDER (NO ISOLATION) - Swab, Nasopharynx [[id number]]  (Normal) Collected: 01/01/24 2330    Lab Status: Final result Specimen: Swab from Nasopharynx Updated: 01/02/24 0016    Narrative:      The following orders were created for panel order COVID PRE-OP / PRE-PROCEDURE SCREENING ORDER (NO ISOLATION) - Swab, Nasopharynx.  Procedure                               Abnormality         Status                     ---------                               -----------         ------                     COVID-19, FLU A/B, RSV P...[[id number]]  Normal              Final result                 Please view results for these tests on the individual orders.    COVID-19, FLU A/B, RSV PCR 1 HR TAT - Swab, Nasopharynx  [[id number]]  (Normal) Collected: 01/01/24 2330    Lab Status: Final result Specimen: Swab from Nasopharynx Updated: 01/02/24 0016     COVID19 Not Detected     Influenza A PCR Not Detected     Influenza B PCR Not Detected     RSV, PCR Not Detected    Narrative:      Fact sheet for providers: https://www.fda.gov/media/375050/download    Fact sheet for patients: https://www.fda.gov/media/302747/download    Test performed by PCR.          Monospot negative      Radiology:  No radiology results for the last day    MEAGAN:    Left ventricular ejection fraction appears to be 56 - 60%.    The right ventricular cavity is mildly dilated.  There is a electronically in the RV with no visible adherent mass or vegetation.    The right atrium is mildly dilated, there is electronic lead present with no visible vegetation.    There is a mechanical mitral valve with moderately increased gradients similar to most recent TTE.  The bileaflet valve appears to be functioning normally with no visible thrombus or vegetation.  There is mild MR around the periphery with some potential perivalvular leak.    Aortic valve is mildly calcified with trace AR, no vegetation seen.    The tricuspid valve appears structurally normal with no vegetation.  There is moderate to severe central tricuspid regurgitation around the site of the lead transversing the valve.      DISCUSSION:  63 y.o. female with history of modified replacement and implanted defibrillator admitted with acute kidney injury and GI bleed.   Patient reported cervical adenopathy confirmed on CT imaging and developed fever while hospitalized.  Resp PCR and monospot negative.    PROBLEM LIST:   -- Undifferentiated fever with sweats - intermittent.  COVID-19/flu/RSV PCR negative.  Full resp PCR negative.  Blood cultures obtained.  At risk for endocarditis with implanted ICD and valve replacement.  Recent dental issues with broken/necrotic right mandibular tooth and painful left maxillary at  prior root canal; at risk for odontogenic source bacteremia/cardiac device infection.  MEAGAN without valve or lead vegetation.  -- Cervical adenopathy - resolving.  No pharyngitis or sinusitis.  Monospot negative.  -- Recent Candida esophagitis.  HIV negative.  -- Implanted defibrillator.  -- History of mitral valve replacement.  On chronic anticoagulation.  -- MEERA, improving.  -- Rectal bleeding and anemia.  CT without acute intra-abdominal abnormality.  -- Back pain/sciatica symptoms; MRI L-spine with degenerative changes; no acute abnormality.    PLAN:  -- Following blood cultures - remain no growth  -- Note MEAGAN negative for vegetation  -- Continue empiric ceftriaxone 2 g iv daily for potential odontogenic source given her dental issues  -- Proceed with maxillofacial CT  -- If blood cultures remain negative and fever resolves, likely recommend discharge with prolonged oral Augmentin and follow-up outpatient with oral surgery.    Complex case.  Plan discussed with patient.    Maciej Willett MD  1/9/2024  14:45 EST

## 2024-01-09 NOTE — PROGRESS NOTES
Clinical Nutrition     Reason for Visit: Follow-up protocol      Patient Name: Di Lopez  YOB: 1960  MRN: [id number]  Date of Encounter: 01/09/24 15:45 EST  Admission date: 1/1/2024      Nutrition Assessment   Admission Diagnosis:  Renal failure [N19]      Problem List:    Renal failure    Arteriovenous malformation of gastrointestinal tract    Type 2 diabetes mellitus without complication, with long-term current use of insulin    Dyslipidemia    Emphysema/COPD    Hypertension    Obstructive sleep apnea syndrome    Chronic anticoagulation    Left bundle branch hemiblock    ARF (acute renal failure)    H/O heart valve replacement with mechanical valve    GI bleed    Hyponatremia    Hyperkalemia    Type 2 diabetes mellitus with hyperglycemia    Elevated LFTs    Anemia, chronic disease    Cervical lymphadenopathy        PMH:   She  has a past medical history of Allergic, Anemia, Anticoagulated, Arteriovenous malformation of gastrointestinal tract (05/12/2016), Arthritis, Asthma, Black hairy tongue, CHF (congestive heart failure), Chronic back pain, Coronary artery disease, Depression (05/12/2016), Diabetes mellitus, Dyslipidemia (05/12/2016), Elevated cholesterol, Emphysema/COPD (05/12/2016), Emphysema/COPD (05/12/2016), Fatigue, GERD (gastroesophageal reflux disease), Headache, Herniated cervical disc, History of echocardiogram (07/16/2013), History of transfusion, Hyperlipidemia, Hypertension, Insomnia (05/12/2016), Iron deficiency, Left bundle branch hemiblock (10/12/2020), Left shoulder strain, Liver disease, Liver mass, Long term current use of anticoagulant (05/12/2016), Low back pain, Lung nodules, Morbid obesity (05/12/2016), Neuromuscular disorder (7/28/2016), Obstructive sleep apnea syndrome (05/12/2016), Sliding hiatal hernia (07/28/2016), Type 2 diabetes mellitus (05/12/2016), Urinary tract infection, Uterine cancer, Vitamin B12 deficiency, and Wears  "eyeglasses.    PSH:  She  has a past surgical history that includes Colonoscopy (12/28/2012); Esophagogastroduodenoscopy (12/30/2011); Bilateral oophorectomy (2009); Cystectomy; Mitral valve replacement (03/03/2008); Breast excisional biopsy (Bilateral); Breast biopsy (Right); Hysterectomy (1995); Cardiac electrophysiology procedure (N/A, 10/15/2020); Carpal tunnel release (Bilateral); Other surgical history; Cardiac surgery (2003); Colonoscopy (N/A, 5/23/2023); Colonoscopy (N/A, 11/16/2023); and Esophagogastroduodenoscopy (N/A, 11/16/2023).    Substance history: tobacco remote    Applicable Nutrition Concerns:   Skin: bruised  GI:GIB, last bm 1-9    SLP Recommendation and Plan  SLP Diet Recommendation: regular textures, thin liquids (extra sauce/gravy) (01/09/24 0850)  Recommended Precautions and Strategies: general aspiration precautions, reflux precautions, upright posture during/after eating (01/09/24 0850)  SLP Rec. for Method of Medication Administration: meds whole, with thin liquids, meds crushed, with puree, as tolerated (01/09/24 0850)  Monitor for Signs of Aspiration: yes, notify SLP if any concerns (01/09/24 0850)  Recommended Diagnostics: No further SLP services recommended (01/09/24 0850)  Reported/Observed/Food/Nutrition Related History:     1-9: pt resting in bed, on nasal cannula, reports poor appetite, is having some nausea, coughs a lot when she eats    1-2:Pt resting in bed, on nasal cannula, reports fair appetite, is c/ o dry moth, got choked on some solid foods, noticed a bloody bm this past Sunday, UBW ~ 198lb's, had some increased swelling last month and weighed ~ 207lb's, when she went to MD office recently her wt had gone down to 182lb's, now in hospital her weight is back to 198lb's, unclear if she has lost weight or not        Anthropometrics     Flowsheet Rows      Flowsheet Row First Filed Value   Admission Height 152.4 cm (60\") Documented at 01/01/2024 2251   Admission Weight 84.8 kg " "(187 lb) Documented at 01/01/2024 2251          Height: Height: 152.4 cm (60\")  Last Filed Weight: Weight: 89.8 kg (198 lb) (01/08/24 1616)  Method: Weight Method: Bed scale  BMI: BMI (Calculated): 38.7  BMI classification: Obese Class II: 35-39.9kg/m2      UBW:  198lb  Weight change:  ? loss     Weight     Weight (lbs) Weight Method   3/1/2023 192 lb 12.8 oz     3/20/2023 196 lb 12.8 oz     3/31/2023 193 lb 12.8 oz     4/25/2023 192 lb 12.8 oz     4/28/2023 192 lb 3.2 oz     5/16/2023 192 lb  Stated    5/31/2023 192 lb 0.3 oz     6/5/2023 192 lb     8/24/2023 193 lb 9.6 oz     10/6/2023 194 lb      194 lb 12.8 oz     10/18/2023 199 lb     11/2/2023 195 lb     11/9/2023 192 lb  Stated    11/10/2023 206 lb 8 oz  Bed scale     200 lb 8 oz  Bed scale    11/11/2023 202 lb 4.8 oz     11/14/2023 206 lb 12.8 oz  Standing scale      Standing scale    11/15/2023 208 lb 11.2 oz  Bed scale    11/16/2023 207 lb  Standing scale    11/17/2023 95 lb 3.4 oz  Bed scale     214 lb 8.1 oz     11/20/2023 207 lb 6 oz  Standing scale    11/21/2023 202 lb 9.6 oz  Standing scale    11/22/2023 199 lb 9.6 oz     12/8/2023 192 lb     12/27/2023 187 lb     1/1/2024 187 lb  Stated    1/2/2024 198 lb 3.2 oz  Bed scale          Nutrition Focused Physical Exam     Date:         Unable to perform exam due to: Defer pending indication    Current Nutrition Prescription   PO: Diet: Gastrointestinal Diets, Diabetic Diets; Consistent Carbohydrate; Fiber-Restricted; Texture: Regular Texture (IDDSI 7); Fluid Consistency: Thin (IDDSI 0)  Oral Nutrition Supplement:   Intake: Insufficient data 75% of 1 meal      Nutrition Diagnosis   Date:  1-2-24            Updated:  1-9  Problem Swallowing difficulty   Etiology Per Clinical Status   Signs/Symptoms Per SLP eval       Goal:   General: Nutrition support treatment  PO: Increase intake  EN/PN: N/A    Nutrition Intervention      Follow treatment progress, Care plan reviewed, Advised available snacks, Interview " for preferences, Menu adjusted, Supplement provided    Add Diabetic Restriction    Add Boost GC 2x/day    Encourage small bites and sips, sitting uptight when eating, adding extra gravy/ sauces with food    Monitoring/Evaluation:   Per protocol      Sammie South RD  Time Spent: 30min

## 2024-01-09 NOTE — PLAN OF CARE
Goal Outcome Evaluation:  Plan of Care Reviewed With: patient        Progress: no change  Outcome Evaluation: Pt continues to present below baseline function d/t generalized weakness, decreased balance, and decreased functional endurance. Pt required SBA for STS and to ambulate 250 ft w/ FWW and 2L NC. Pt would continue to benefit from skilled IP PT. Recommend home w/ assist and HH PT at d/c.      Anticipated Discharge Disposition (PT): home, home with home health

## 2024-01-09 NOTE — PROGRESS NOTES
"Pharmacy Consult  -  Warfarin    Di Lopez is a  63 y.o. female   Height - 152.4 cm (60\")  Weight - 89.8 kg (198 lb)    Consulting Provider: - Hospitalist  Indication: - Mechanical mitral valve  Goal INR: - 2.5-3.5  Home Regimen:   - Warfarin 5mg daily    Bridge Therapy: Yes   and unfractionated heparin    Drug-Drug Interactions with current regimen:   CTX-increased risk of bleeding    Warfarin Dosing During Admission:    Date  1/9           INR  1.25           Dose  (5mg)                Education Provided:  Will follow and  as needed.    Discharge Follow up:   Following Provider - Dr. Everett-Pt has home monitor   Follow up time range or appointment - 2-3 days post discharge      Labs:    Results from last 7 days   Lab Units 01/09/24  0920 01/08/24  0457 01/07/24  0552 01/07/24  0518 01/06/24  0737 01/05/24  0501 01/04/24  0541 01/03/24  0251   INR  1.25* 1.29*  --  1.39* 1.48* 1.56*  --   --    HEMOGLOBIN g/dL 8.3* 7.1* 7.3*  --  7.3* 7.5* 7.8* 8.4*   HEMATOCRIT % 25.8* 22.1* 23.0*  --  23.0* 24.3* 24.5* 25.2*     Results from last 7 days   Lab Units 01/09/24  0920 01/08/24 0457 01/07/24  0518   SODIUM mmol/L 137 134* 136   POTASSIUM mmol/L 4.7 4.8 4.9   CHLORIDE mmol/L 103 104 103   CO2 mmol/L 18.0* 19.0* 18.0*   BUN mg/dL 28* 31* 33*   CREATININE mg/dL 2.99* 3.24* 3.87*   CALCIUM mg/dL 8.6 8.3* 8.5*   BILIRUBIN mg/dL 0.3 0.2 0.3   ALK PHOS U/L 234* 213* 225*   ALT (SGPT) U/L 41* 42* 43*   AST (SGOT) U/L 32 33* 49*   GLUCOSE mg/dL 213* 226* 167*       Current dietary intake: Regular diet-75% intake last documented      Assessment/Plan:      PTD warfarin for mechanical mitral valve, goal INR 2.5-3.5. Pt presented with BRBPR on admission and GI has signed off. 1 unit PRBC administered yesterday, 1/8. INR today is SUBtherapeutic at 1.25 after being held since admission. Bridged with heparin drip currently. Resume home warfarin 5mg this evening. Pharmacy will follow.       Thank you  Zeferino Clark, " PharmD  1/9/2024  11:55 EST

## 2024-01-09 NOTE — CASE MANAGEMENT/SOCIAL WORK
Continued Stay Note  [facility]     Patient Name: Di Lopez  MRN: [id number]  Today's Date: 1/9/2024    Admit Date: 1/1/2024    Plan: Home with [facility] SN/PT/OT   Discharge Plan       Row Name 01/09/24 1448       Plan    Plan Home with [facility] SN/PT/OT    Patient/Family in Agreement with Plan yes    Plan Comments CM received a consult for locating an outpatient oral surgeon. Patient will need to contact [employer] and they can give her a list that are covered by her policy. CM has no way of knowing.    Final Discharge Disposition Code 06 - home with home health care                   Discharge Codes    No documentation.                 Expected Discharge Date and Time       Expected Discharge Date Expected Discharge Time    Jan 11, 2024               Linus Borden RN

## 2024-01-09 NOTE — PLAN OF CARE
Goal Outcome Evaluation:  Plan of Care Reviewed With: patient        Progress: no change     Pt is alert and oriented x4. VSS on 2L NC. Paced on tele. Pt has slept off and on throughout the shift. Heparin gtt infusing per pharmacy's orders. 1 unit of PRBCS finished this shift. Up with an assist x1 and gait belt to the bathroom; voiding spontaneously. Will monitor.

## 2024-01-10 LAB
ANION GAP SERPL CALCULATED.3IONS-SCNC: 12 MMOL/L (ref 5–15)
BACTERIA SPEC AEROBE CULT: NORMAL
BUN SERPL-MCNC: 28 MG/DL (ref 8–23)
BUN/CREAT SERPL: 10.6 (ref 7–25)
CALCIUM SPEC-SCNC: 8.8 MG/DL (ref 8.6–10.5)
CHLORIDE SERPL-SCNC: 103 MMOL/L (ref 98–107)
CO2 SERPL-SCNC: 22 MMOL/L (ref 22–29)
CREAT SERPL-MCNC: 2.65 MG/DL (ref 0.57–1)
DEPRECATED RDW RBC AUTO: 50.7 FL (ref 37–54)
EGFRCR SERPLBLD CKD-EPI 2021: 19.8 ML/MIN/1.73
ERYTHROCYTE [DISTWIDTH] IN BLOOD BY AUTOMATED COUNT: 16 % (ref 12.3–15.4)
GLUCOSE BLDC GLUCOMTR-MCNC: 134 MG/DL (ref 70–130)
GLUCOSE BLDC GLUCOMTR-MCNC: 205 MG/DL (ref 70–130)
GLUCOSE BLDC GLUCOMTR-MCNC: 209 MG/DL (ref 70–130)
GLUCOSE BLDC GLUCOMTR-MCNC: 291 MG/DL (ref 70–130)
GLUCOSE SERPL-MCNC: 126 MG/DL (ref 65–99)
HCT VFR BLD AUTO: 25 % (ref 34–46.6)
HGB BLD-MCNC: 7.9 G/DL (ref 12–15.9)
INR PPP: 1.21 (ref 0.89–1.12)
MCH RBC QN AUTO: 27.8 PG (ref 26.6–33)
MCHC RBC AUTO-ENTMCNC: 31.6 G/DL (ref 31.5–35.7)
MCV RBC AUTO: 88 FL (ref 79–97)
PLATELET # BLD AUTO: 212 10*3/MM3 (ref 140–450)
PMV BLD AUTO: 10.5 FL (ref 6–12)
POTASSIUM SERPL-SCNC: 4.4 MMOL/L (ref 3.5–5.2)
PROTHROMBIN TIME: 15.4 SECONDS (ref 12.2–14.5)
RBC # BLD AUTO: 2.84 10*6/MM3 (ref 3.77–5.28)
SODIUM SERPL-SCNC: 137 MMOL/L (ref 136–145)
URATE SERPL-MCNC: 7.9 MG/DL (ref 2.4–5.7)
WBC NRBC COR # BLD AUTO: 4.49 10*3/MM3 (ref 3.4–10.8)

## 2024-01-10 PROCEDURE — 63710000001 INSULIN LISPRO (HUMAN) PER 5 UNITS: Performed by: STUDENT IN AN ORGANIZED HEALTH CARE EDUCATION/TRAINING PROGRAM

## 2024-01-10 PROCEDURE — 25010000002 CEFTRIAXONE PER 250 MG: Performed by: INTERNAL MEDICINE

## 2024-01-10 PROCEDURE — 97530 THERAPEUTIC ACTIVITIES: CPT

## 2024-01-10 PROCEDURE — 63710000001 INSULIN LISPRO (HUMAN) PER 5 UNITS: Performed by: INTERNAL MEDICINE

## 2024-01-10 PROCEDURE — 82948 REAGENT STRIP/BLOOD GLUCOSE: CPT

## 2024-01-10 PROCEDURE — 85027 COMPLETE CBC AUTOMATED: CPT | Performed by: STUDENT IN AN ORGANIZED HEALTH CARE EDUCATION/TRAINING PROGRAM

## 2024-01-10 PROCEDURE — 80048 BASIC METABOLIC PNL TOTAL CA: CPT | Performed by: INTERNAL MEDICINE

## 2024-01-10 PROCEDURE — 85610 PROTHROMBIN TIME: CPT

## 2024-01-10 PROCEDURE — 99232 SBSQ HOSP IP/OBS MODERATE 35: CPT | Performed by: INTERNAL MEDICINE

## 2024-01-10 PROCEDURE — 63710000001 INSULIN DETEMIR PER 5 UNITS: Performed by: STUDENT IN AN ORGANIZED HEALTH CARE EDUCATION/TRAINING PROGRAM

## 2024-01-10 PROCEDURE — 84550 ASSAY OF BLOOD/URIC ACID: CPT | Performed by: INTERNAL MEDICINE

## 2024-01-10 PROCEDURE — 97110 THERAPEUTIC EXERCISES: CPT

## 2024-01-10 RX ORDER — BUMETANIDE 1 MG/1
2 TABLET ORAL ONCE
Status: COMPLETED | OUTPATIENT
Start: 2024-01-10 | End: 2024-01-10

## 2024-01-10 RX ORDER — WARFARIN SODIUM 7.5 MG/1
7.5 TABLET ORAL
Status: DISCONTINUED | OUTPATIENT
Start: 2024-01-10 | End: 2024-01-14

## 2024-01-10 RX ADMIN — FERROUS SULFATE TAB 325 MG (65 MG ELEMENTAL FE) 325 MG: 325 (65 FE) TAB at 09:25

## 2024-01-10 RX ADMIN — INSULIN LISPRO 3 UNITS: 100 INJECTION, SOLUTION INTRAVENOUS; SUBCUTANEOUS at 17:47

## 2024-01-10 RX ADMIN — WARFARIN SODIUM 7.5 MG: 7.5 TABLET ORAL at 17:48

## 2024-01-10 RX ADMIN — INSULIN DETEMIR 5 UNITS: 100 INJECTION, SOLUTION SUBCUTANEOUS at 22:33

## 2024-01-10 RX ADMIN — INSULIN LISPRO 3 UNITS: 100 INJECTION, SOLUTION INTRAVENOUS; SUBCUTANEOUS at 17:46

## 2024-01-10 RX ADMIN — ESCITALOPRAM OXALATE 20 MG: 20 TABLET, FILM COATED ORAL at 09:25

## 2024-01-10 RX ADMIN — SENNOSIDES AND DOCUSATE SODIUM 1 TABLET: 8.6; 5 TABLET ORAL at 09:25

## 2024-01-10 RX ADMIN — Medication 10 ML: at 09:28

## 2024-01-10 RX ADMIN — Medication 5 MG: at 22:33

## 2024-01-10 RX ADMIN — BUMETANIDE 2 MG: 1 TABLET ORAL at 17:47

## 2024-01-10 RX ADMIN — FOLIC ACID TAB 400 MCG 800 MCG: 400 TAB at 09:25

## 2024-01-10 RX ADMIN — SODIUM BICARBONATE 650 MG TABLET 1300 MG: at 17:47

## 2024-01-10 RX ADMIN — PANTOPRAZOLE SODIUM 40 MG: 40 TABLET, DELAYED RELEASE ORAL at 05:11

## 2024-01-10 RX ADMIN — CEFTRIAXONE 2000 MG: 2 INJECTION, POWDER, FOR SOLUTION INTRAMUSCULAR; INTRAVENOUS at 09:27

## 2024-01-10 RX ADMIN — Medication 10 ML: at 22:35

## 2024-01-10 RX ADMIN — SODIUM BICARBONATE 650 MG TABLET 1300 MG: at 22:33

## 2024-01-10 RX ADMIN — SODIUM BICARBONATE 650 MG TABLET 1300 MG: at 09:25

## 2024-01-10 RX ADMIN — OXYBUTYNIN CHLORIDE 5 MG: 5 TABLET, FILM COATED, EXTENDED RELEASE ORAL at 09:26

## 2024-01-10 RX ADMIN — OXYCODONE HYDROCHLORIDE AND ACETAMINOPHEN 1 TABLET: 7.5; 325 TABLET ORAL at 17:57

## 2024-01-10 RX ADMIN — POLYETHYLENE GLYCOL 3350 17 G: 17 POWDER, FOR SOLUTION ORAL at 09:27

## 2024-01-10 RX ADMIN — INSULIN LISPRO 3 UNITS: 100 INJECTION, SOLUTION INTRAVENOUS; SUBCUTANEOUS at 09:26

## 2024-01-10 RX ADMIN — INSULIN LISPRO 4 UNITS: 100 INJECTION, SOLUTION INTRAVENOUS; SUBCUTANEOUS at 22:33

## 2024-01-10 NOTE — PLAN OF CARE
Problem: Adult Inpatient Plan of Care  Goal: Plan of Care Review  Outcome: Ongoing, Progressing  Goal: Patient-Specific Goal (Individualized)  Outcome: Ongoing, Progressing  Goal: Absence of Hospital-Acquired Illness or Injury  Outcome: Ongoing, Progressing  Intervention: Identify and Manage Fall Risk  Recent Flowsheet Documentation  Taken 1/10/2024 0200 by Jolie Palacio RN  Safety Promotion/Fall Prevention:   assistive device/personal items within reach   clutter free environment maintained   fall prevention program maintained   nonskid shoes/slippers when out of bed   room organization consistent   safety round/check completed  Taken 1/10/2024 0000 by Jolie Palacio RN  Safety Promotion/Fall Prevention:   assistive device/personal items within reach   clutter free environment maintained   fall prevention program maintained   nonskid shoes/slippers when out of bed   room organization consistent   safety round/check completed  Taken 1/9/2024 2200 by Jolie Palacio RN  Safety Promotion/Fall Prevention:   assistive device/personal items within reach   clutter free environment maintained   fall prevention program maintained   nonskid shoes/slippers when out of bed   room organization consistent   safety round/check completed  Taken 1/9/2024 2000 by Jolie Palacio RN  Safety Promotion/Fall Prevention:   assistive device/personal items within reach   clutter free environment maintained   fall prevention program maintained   nonskid shoes/slippers when out of bed   room organization consistent   safety round/check completed  Intervention: Prevent Skin Injury  Recent Flowsheet Documentation  Taken 1/10/2024 0200 by Jolie Palacio RN  Body Position: position changed independently  Skin Protection:   adhesive use limited   tubing/devices free from skin contact  Taken 1/10/2024 0000 by Jolie Palacio RN  Body Position: position changed independently  Skin Protection:   adhesive use limited   tubing/devices free from skin  contact  Taken 1/9/2024 2200 by Jolie Palacio RN  Body Position: position changed independently  Skin Protection:   adhesive use limited   tubing/devices free from skin contact  Taken 1/9/2024 2000 by Jolie Palacio RN  Body Position: position changed independently  Skin Protection:   adhesive use limited   tubing/devices free from skin contact  Intervention: Prevent and Manage VTE (Venous Thromboembolism) Risk  Recent Flowsheet Documentation  Taken 1/9/2024 2000 by Jolie Palacio RN  VTE Prevention/Management: (lovenox, coumadin) other (see comments)  Goal: Optimal Comfort and Wellbeing  Outcome: Ongoing, Progressing  Goal: Readiness for Transition of Care  Outcome: Ongoing, Progressing     Problem: COPD (Chronic Obstructive Pulmonary Disease) Comorbidity  Goal: Maintenance of COPD Symptom Control  Outcome: Ongoing, Progressing     Problem: Diabetes Comorbidity  Goal: Blood Glucose Level Within Targeted Range  Outcome: Ongoing, Progressing  Intervention: Monitor and Manage Glycemia  Recent Flowsheet Documentation  Taken 1/9/2024 2000 by Jolie Palacio RN  Glycemic Management: blood glucose monitored     Problem: Heart Failure Comorbidity  Goal: Maintenance of Heart Failure Symptom Control  Outcome: Ongoing, Progressing     Problem: Hypertension Comorbidity  Goal: Blood Pressure in Desired Range  Outcome: Ongoing, Progressing     Problem: Obstructive Sleep Apnea Risk or Actual Comorbidity Management  Goal: Unobstructed Breathing During Sleep  Outcome: Ongoing, Progressing     Problem: Pain Chronic (Persistent) (Comorbidity Management)  Goal: Acceptable Pain Control and Functional Ability  Outcome: Ongoing, Progressing     Problem: Fall Injury Risk  Goal: Absence of Fall and Fall-Related Injury  Outcome: Ongoing, Progressing  Intervention: Promote Injury-Free Environment  Recent Flowsheet Documentation  Taken 1/10/2024 0200 by Jolie Palacio RN  Safety Promotion/Fall Prevention:   assistive device/personal items within  reach   clutter free environment maintained   fall prevention program maintained   nonskid shoes/slippers when out of bed   room organization consistent   safety round/check completed  Taken 1/10/2024 0000 by Jolie Palacio RN  Safety Promotion/Fall Prevention:   assistive device/personal items within reach   clutter free environment maintained   fall prevention program maintained   nonskid shoes/slippers when out of bed   room organization consistent   safety round/check completed  Taken 1/9/2024 2200 by Jolie Palacio RN  Safety Promotion/Fall Prevention:   assistive device/personal items within reach   clutter free environment maintained   fall prevention program maintained   nonskid shoes/slippers when out of bed   room organization consistent   safety round/check completed  Taken 1/9/2024 2000 by Jolie Palacio RN  Safety Promotion/Fall Prevention:   assistive device/personal items within reach   clutter free environment maintained   fall prevention program maintained   nonskid shoes/slippers when out of bed   room organization consistent   safety round/check completed   Goal Outcome Evaluation:

## 2024-01-10 NOTE — PLAN OF CARE
Goal Outcome Evaluation:  Plan of Care Reviewed With: patient        Progress: improving  Outcome Evaluation: Patient demonstrated slow carefull mobility with rolliing walker. No need to stop to rest. NO SOA noted. Walked on oxygen with good recovery      Anticipated Discharge Disposition (PT): home, home with home health

## 2024-01-10 NOTE — THERAPY TREATMENT NOTE
Patient Name: Di Lopez  : 1960    MRN: [id number]                              Today's Date: 1/10/2024       Admit Date: 2024    Visit Dx:     ICD-10-CM ICD-9-CM   1. Acute renal failure, unspecified acute renal failure type  N17.9 584.9   2. Hyperkalemia  E87.5 276.7   3. Gastrointestinal hemorrhage, unspecified gastrointestinal hemorrhage type  K92.2 578.9   4. Acute renal failure superimposed on chronic kidney disease, unspecified acute renal failure type, unspecified CKD stage  N17.9 584.9    N18.9 585.9   5. Chronic respiratory failure with hypoxia  J96.11 518.83     799.02   6. Weakness  R53.1 780.79   7. Dysphagia, unspecified type  R13.10 787.20     Patient Active Problem List   Diagnosis    Arteriovenous malformation of gastrointestinal tract    Depression    Type 2 diabetes mellitus without complication, with long-term current use of insulin    Dyslipidemia    Emphysema/COPD    Hypertension    Insomnia    Morbid obesity    Obstructive sleep apnea syndrome    Chronic anticoagulation    Normocytic anemia    Sliding hiatal hernia    Vitamin B12 deficiency    Iron deficiency    Iron malabsorption    Bronchiectasis without complication    Electronic cigarette use    Chronic diastolic heart failure    Left bundle branch hemiblock    Chronic respiratory failure with hypoxia    Body aches    Headaches due to old head injury    History of tobacco use, presenting hazards to health    Multiple nodules of lung    Nocturnal hypoxemia    Screening for colon cancer    Abnormal CT scan    Wellness examination    ARF (acute renal failure)    H/O heart valve replacement with mechanical valve    GI bleed    Severe malnutrition    Hyponatremia    Hyperkalemia    Type 2 diabetes mellitus with hyperglycemia    Elevated LFTs    Anemia, chronic disease    Cervical lymphadenopathy    Renal failure     Past Medical History:   Diagnosis Date    Allergic     Anemia     Anticoagulated     WARFARIN    Arteriovenous  malformation of gastrointestinal tract 05/12/2016    Arthritis     Asthma     Black hairy tongue     CHF (congestive heart failure)     Chronic back pain     Coronary artery disease     Depression 05/12/2016    Diabetes mellitus     Dyslipidemia 05/12/2016    Elevated cholesterol     Emphysema/COPD 05/12/2016    Emphysema/COPD 05/12/2016    Fatigue     GERD (gastroesophageal reflux disease)     Headache     Herniated cervical disc     History of echocardiogram 07/16/2013    Left ventricular systolic function at the lower limits of normal; EF 50-55%; mild MR; mechanical prosthetic mitral valve present; mod TR    History of transfusion     NO REACTION; [facility]    Hyperlipidemia     Hypertension     Insomnia 05/12/2016    Iron deficiency     A.  The patient was treated with IV iron replacement. B.  Secondary to GI bleed, status post EGD with cauterization of blood vessel to the duodenum, 7/16/2014.    Left bundle branch hemiblock 10/12/2020    Left shoulder strain     Liver disease     Liver mass     Long term current use of anticoagulant 05/12/2016    Low back pain     Lung nodules     PER DR. LEVI'S NOTE 10/22    Morbid obesity 05/12/2016    Neuromuscular disorder 7/28/2016    Obstructive sleep apnea syndrome 05/12/2016    Description: A.  Moderate; CPAP therapy..    Sliding hiatal hernia 07/28/2016    Description: A.  Reported on EGD, 12/30/2011.    Type 2 diabetes mellitus 05/12/2016    Urinary tract infection     Uterine cancer     HYSTERECTOMY    Vitamin B12 deficiency     A.  The patient was treated with vitamin B12 replacement.    Wears eyeglasses      Past Surgical History:   Procedure Laterality Date    BILATERAL OOPHORECTOMY  2009    Status post bilateral oophorectomy secondary to ovarian cysts     BREAST BIOPSY Right     STEREOTACTIC    BREAST EXCISIONAL BIOPSY Bilateral     CARDIAC ELECTROPHYSIOLOGY PROCEDURE N/A 10/15/2020    Procedure: Biventricular Device Insertion;  Surgeon: Nadir Everett MD;   Location:  [facility];  Service: Cardiology;  Laterality: N/A;    CARDIAC SURGERY  2003    CARPAL TUNNEL RELEASE Bilateral     COLONOSCOPY  12/28/2012    COLONOSCOPY N/A 5/23/2023    Procedure: COLONOSCOPY;  Surgeon: Gt Fernandes MD;  Location:  [facility];  Service: Gastroenterology;  Laterality: N/A;    COLONOSCOPY N/A 11/16/2023    Procedure: COLONOSCOPY;  Surgeon: Brunner, Mark I, MD;  Location:  [facility];  Service: Gastroenterology;  Laterality: N/A;    CYSTECTOMY      removal of ganglion cyst from left wrist    ENDOSCOPY  12/30/2011    DIAGNOSTIC ESOPHAGOGASTRODUODENOSCOPY    ENDOSCOPY N/A 11/16/2023    Procedure: ESOPHAGOGASTRODUODENOSCOPY;  Surgeon: Brunner, Mark I, MD;  Location:  [facility];  Service: Gastroenterology;  Laterality: N/A;    HYSTERECTOMY  1995    A.  Status post hysterectomy for early stage endometrial cancer done in 1995.    MITRAL VALVE REPLACEMENT  03/03/2008    Dr. Hima Barreto; MECHANICAL    OTHER SURGICAL HISTORY      LIVER MASS BLOOD SUPPLY CLIPPED ON RIGHT AND LEFT SIDE PER PT      General Information       Row Name 01/10/24 1528          Physical Therapy Time and Intention    Document Type therapy note (daily note)  -SC     Mode of Treatment physical therapy  -SC       Row Name 01/10/24 1528          General Information    Patient Profile Reviewed yes  -SC     Existing Precautions/Restrictions fall;oxygen therapy device and L/min  -SC       Row Name 01/10/24 1528          Cognition    Orientation Status (Cognition) oriented x 4  -[facility] Name 01/10/24 1528          Safety Issues, Functional Mobility    Impairments Affecting Function (Mobility) endurance/activity tolerance;pain;strength;balance  -SC     Comment, Safety Issues/Impairments (Mobility) alert, following commands  -SC               User Key  (r) = Recorded By, (t) = Taken By, (c) = Cosigned By      Initials Name Provider Type    SC Isaías Kumar, PT Physical Therapist                    Mobility       Row Name 01/10/24 1529          Bed Mobility    Bed Mobility sit-supine  -SC     Scooting/Bridging [place] (Bed Mobility) independent  -SC     Sit-Supine [place] (Bed Mobility) moderate assist (50% patient effort);verbal cues  -SC     Assistive Device (Bed Mobility) bed rails  -SC     Comment, (Bed Mobility) able to get back to bed with cues for log rolling  -[facility] Name 01/10/24 1529          Transfers    Comment, (Transfers) Demonstrated safe  mobility  -[facility] Name 01/10/24 1529          Sit-Stand Transfer    Sit-Stand [place] (Transfers) modified independence  -SC     Assistive Device (Sit-Stand Transfers) walker, front-wheeled  -[facility] Name 01/10/24 1529          Gait/Stairs (Locomotion)    [place] Level (Gait) modified independence  -SC     Assistive Device (Gait) walker, front-wheeled  -SC     Distance in Feet (Gait) 350  -SC     Deviations/Abnormal Patterns (Gait) right sided deviations;stride length decreased;antalgic  -SC     Bilateral Gait Deviations forward flexed posture  -SC     Comment, (Gait/Stairs) Demonstrated good control of walker in hallway. Cues to stay closer to walker. Walker on oxygen  -SC               User Key  (r) = Recorded By, (t) = Taken By, (c) = Cosigned By      Initials Name Provider Type    SC Isaías Kumar PT Physical Therapist                   Obj/Interventions       [facility] Name 01/10/24 1531          Motor Skills    Therapeutic Exercise hip;knee;ankle  -[facility] Name 01/10/24 1531          Hip (Therapeutic Exercise)    Hip (Therapeutic Exercise) AROM (active range of motion)  -SC     Hip AROM (Therapeutic Exercise) bilateral;flexion;extension;external rotation;internal rotation;10 repetitions  -[facility] Name 01/10/24 1531          Knee (Therapeutic Exercise)    Knee (Therapeutic Exercise) isometric exercises  -SC     Knee Isometrics (Therapeutic Exercise) bilateral;gluteal sets;quad sets;10  repetitions  -SC       Row Name 01/10/24 1531          Ankle (Therapeutic Exercise)    Ankle (Therapeutic Exercise) AROM (active range of motion)  -SC     Ankle AROM (Therapeutic Exercise) bilateral;dorsiflexion;plantarflexion;10 repetitions  -SC       Row Name 01/10/24 1531          Balance    Balance Assessment standing dynamic balance  -SC     Dynamic Standing Balance modified independence  -SC     Position/Device Used, Standing Balance supported;walker, rolling  -SC     Comment, Balance no loss of balance  -SC               User Key  (r) = Recorded By, (t) = Taken By, (c) = Cosigned By      Initials Name Provider Type    SC Isaías Kumar, PT Physical Therapist                   Goals/Plan    No documentation.                  Clinical Impression       Row Name 01/10/24 1532          Pain Scale: FACES Pre/Post-Treatment    Pain: FACES Scale, Pretreatment 4-->hurts little more  -SC     Posttreatment Pain Rating 4-->hurts little more  -SC     Pain Location - Side/Orientation Right  -SC     Pain Location lower  -SC     Pain Location - extremity  upper thigh  -SC       Row Name 01/10/24 1532          Plan of Care Review    Plan of Care Reviewed With patient  -SC     Progress improving  -SC     Outcome Evaluation Patient demonstrated slow carefull mobility with rolliing walker. No need to stop to rest. NO SOA noted. Walked on oxygen with good recovery  -SC       Row Name 01/10/24 1532          Therapy Assessment/Plan (PT)    Patient/Family Therapy Goals Statement (PT) go home  -SC     Rehab Potential (PT) good, to achieve stated therapy goals  -SC     Criteria for Skilled Interventions Met (PT) yes  -SC     Therapy Frequency (PT) daily  -SC       Row Name 01/10/24 1532          Positioning and Restraints    Pre-Treatment Position other (comment)  sitting Edge of bed  -SC     Post Treatment Position bed  -SC     In Bed supine;sitting  -SC               User Key  (r) = Recorded By, (t) = Taken By, (c) = Cosigned By       Initials Name Provider Type    SC Isaías Kumar PT Physical Therapist                   Outcome Measures       Row Name 01/10/24 1536 01/10/24 1000       How much help from another person do you currently need...    Turning from your back to your side while in flat bed without using bedrails? 4  -SC 4  -CJ    Moving from lying on back to sitting on the side of a flat bed without bedrails? 3  -SC 4  -CJ    Moving to and from a bed to a chair (including a wheelchair)? 4  -SC 3  -CJ    Standing up from a chair using your arms (e.g., wheelchair, bedside chair)? 4  -SC 3  -CJ    Climbing 3-5 steps with a railing? 3  -SC 3  -CJ    To walk in hospital room? 4  -SC 3  -CJ    AM-PAC 6 Clicks Score (PT) 22  -SC 20  -    Highest Level of Mobility Goal 7 --> Walk 25 feet or more  -SC 6 --> Walk 10 steps or more  -      Row Name 01/10/24 1536          Functional Assessment    Outcome Measure Options AM-PAC 6 Clicks Basic Mobility (PT)  -SC               User Key  (r) = Recorded By, (t) = Taken By, (c) = Cosigned By      Initials Name Provider Type    SC Isaías Kumar PT Physical Therapist    Aidan Dey, RN Registered Nurse                                 Physical Therapy Education       Title: PT OT SLP Therapies (Done)       Topic: Physical Therapy (Done)       Point: Mobility training (Done)       Learning Progress Summary             Patient Eager, E, VU by SC at 1/10/2024 1536    Comment: reviewed HEP    Acceptance, E, VU,NR by  at 1/9/2024 1158    Acceptance, E,D, NR by CT at 1/7/2024 1550    Eager, E, VU by SC at 1/4/2024 1131    Comment: reviewed benefits of walking    Eager, E, VU by SC at 1/2/2024 1203    Comment: reviewed benefits of activity                         Point: Home exercise program (Done)       Learning Progress Summary             Patient Eager, E, VU by SC at 1/10/2024 1536    Comment: reviewed HEP    Acceptance, E,D, NR by CT at 1/7/2024 1550    Eager, E, VU by SC at 1/4/2024 1131     Comment: reviewed benefits of walking    Eager, E, VU by SC at 1/2/2024 1203    Comment: reviewed benefits of activity                         Point: Body mechanics (Done)       Learning Progress Summary             Patient Eager, E, VU by SC at 1/10/2024 1536    Comment: reviewed HEP    Acceptance, E, VU,NR by  at 1/9/2024 1158    Acceptance, E,D, NR by CT at 1/7/2024 1550    Eager, E, VU by SC at 1/4/2024 1131    Comment: reviewed benefits of walking    Eager, E, VU by SC at 1/2/2024 1203    Comment: reviewed benefits of activity                         Point: Precautions (Done)       Learning Progress Summary             Patient Eager, E, VU by SC at 1/10/2024 1536    Comment: reviewed HEP    Acceptance, E, VU,NR by  at 1/9/2024 1158    Acceptance, E,D, NR by CT at 1/7/2024 1550    Eager, E, VU by SC at 1/4/2024 1131    Comment: reviewed benefits of walking    Eager, E, VU by SC at 1/2/2024 1203    Comment: reviewed benefits of activity                                         User Key       Initials Effective Dates Name Provider Type Discipline    SC 02/03/23 -  Isaías Kumar, PT Physical Therapist PT    CT 07/07/23 -  Hansel Aguayo, PT Physical Therapist PT     09/21/23 -  Suzette Faria, PT Physical Therapist PT                  PT Recommendation and Plan  Planned Therapy Interventions (PT): bed mobility training, gait training, home exercise program, strengthening, ROM (range of motion), patient/family education, transfer training  Plan of Care Reviewed With: patient  Progress: improving  Outcome Evaluation: Patient demonstrated slow carefull mobility with rolliing walker. No need to stop to rest. NO SOA noted. Walked on oxygen with good recovery     Time Calculation:   PT Evaluation Complexity  History, PT Evaluation Complexity: 3 or more personal factors and/or comorbidities  Examination of Body Systems (PT Eval Complexity): total of 4 or more elements  Clinical Presentation (PT  Evaluation Complexity): evolving  Clinical Decision Making (PT Evaluation Complexity): moderate complexity  Overall Complexity (PT Evaluation Complexity): moderate complexity     PT Charges       Row Name 01/10/24 1455             Time Calculation    Start Time 1455  -SC      PT Received On 01/10/24  -SC      PT Goal Re-Cert Due Date 01/12/24  -SC         Timed Charges    35219 - PT Therapeutic Exercise Minutes 10  -SC      25362 - PT Therapeutic Activity Minutes 15  -SC         Total Minutes    Timed Charges Total Minutes 25  -SC       Total Minutes 25  -SC                User Key  (r) = Recorded By, (t) = Taken By, (c) = Cosigned By      Initials Name Provider Type    SC Isaías Kumar, PT Physical Therapist                  Therapy Charges for Today       Code Description Service Date Service Provider Modifiers Qty    57502996378 HC PT THER PROC EA 15 MIN 1/10/2024 Isaías Kumar, PT GP 1    94796968685 HC PT THERAPEUTIC ACT EA 15 MIN 1/10/2024 Isaías Kumar, PT GP 1            PT G-Codes  Outcome Measure Options: AM-PAC 6 Clicks Basic Mobility (PT)  AM-PAC 6 Clicks Score (PT): 22  AM-PAC 6 Clicks Score (OT): 21  PT Discharge Summary  Anticipated Discharge Disposition (PT): home, home with home health    Isaías Kumar PT  1/10/2024

## 2024-01-10 NOTE — PROGRESS NOTES
"   LOS: 8 days    Patient Care Team:  Davina Santiago DO as PCP - General (Family Medicine)  Ameena Iglesias MD as Consulting Physician (Endocrinology)  Nakita Freire APRN as Nurse Practitioner (Nurse Practitioner)  Raimundo Thomas MD as Consulting Physician (Pulmonary Disease)  Nadir Everett MD as Consulting Physician (Cardiology)  Luiz Longoria MD as Consulting Physician (Pain Medicine)    Subjective     Stable overnight.  Recurrent diarrhea overnight    Objective     Vital Signs:  Blood pressure 146/54, pulse 67, temperature 98.6 °F (37 °C), temperature source Oral, resp. rate 18, height 152.4 cm (60\"), weight 95.1 kg (209 lb 11.2 oz), last menstrual period 06/17/1998, SpO2 97%, not currently breastfeeding.      Intake/Output Summary (Last 24 hours) at 1/10/2024 1257  Last data filed at 1/10/2024 1251  Gross per 24 hour   Intake 800 ml   Output 900 ml   Net -100 ml        01/09 0701 - 01/10 0700  In: 600 [P.O.:600]  Out: 400 [Urine:400]    Physical Exam:        GENERAL: WD AAF NAD  NEURO: Awake and alert, oriented. No focal deficit  PSYCHIATRIC: NMA. Cooperative with PE  EYE: PE, no icterus, no conjunctivitis  ENT: ommm, dentition intact,  Hearing intact  NECK: Supple , No JVD discernable,  Trachea midline  CV: + Edema, RRR  LUNGS:  Quiet,  Nonlabored resp.  Symmetrical expansion  ABDOMEN: Nondistended, soft nontender.  : No Perdomo, no palp bladder  SKIN: Warm and dry without rash      Labs:  Results from last 7 days   Lab Units 01/10/24  0509 01/09/24  0920 01/08/24  0457   WBC 10*3/mm3 4.49 5.30 4.73   HEMOGLOBIN g/dL 7.9* 8.3* 7.1*   PLATELETS 10*3/mm3 212 225 231     Results from last 7 days   Lab Units 01/10/24  0509 01/09/24  0920 01/08/24  0457 01/07/24  0518 01/06/24  0737   SODIUM mmol/L 137 137 134* 136 137   POTASSIUM mmol/L 4.4 4.7 4.8 4.9 5.2   CHLORIDE mmol/L 103 103 104 103 105   CO2 mmol/L 22.0 18.0* 19.0* 18.0* 18.0*   BUN mg/dL 28* 28* 31* 33* 37*   CREATININE mg/dL 2.65* " 2.99* 3.24* 3.87* 3.80*   CALCIUM mg/dL 8.8 8.6 8.3* 8.5* 8.3*   ALBUMIN g/dL  --  3.7 3.2* 3.7 3.5     Results from last 7 days   Lab Units 01/09/24  0920   ALK PHOS U/L 234*   BILIRUBIN mg/dL 0.3   ALT (SGPT) U/L 41*   AST (SGOT) U/L 32                   Estimated Creatinine Clearance: 22.7 mL/min (A) (by C-G formula based on SCr of 2.65 mg/dL (H)).         A/P:    ARF: Creatinine continues to improve daily.  Urine output continues nonoliguric with unmeasured voids.  Creatinine up to 8 on presentation with likely dehydration due to diarrhea with poor p.o. intake in the setting of NSAIDs and Entresto.  Previous baseline 0.9.  Last visit 11/23 creatinine had increased initially but improved to 1.1 on discharge.  Urine sodium 20 consistent with prerenal azotemia.  Renal function had been improving with hydration.  IV fluids held to avoid overload.   For now continue supportive care.  Strict I's and O's.  Monitor for further renal recovery.    HTN: Blood pressure stable.    Hyponatremia: Borderline.  Initially down to 129 on presentation due to hypovolemia and MEERA.  All fluids isotonic.     Hyperkalemia: Stable.  Due to MEERA.     Met Aciodsis: Persistent on p.o. bicarb.  Due to renal failure and underlying GI losses with diarrhea.  Monitor for now.     Anemia.  Hemoglobin below goal.  Hx of supra therapeutic INR w rectal bleeding in the past. Admitted again with concern for rectal bleed.  Iron saturation quite low with elevated ferritin.  Transfuse as needed for Hgb <7.    Volume status: Negative overnight.  Likely dehydrated on admission.  Creatinine improved with hydration.  IV fluids on hold for now as patient with history of CHF and had developed some mild edema.  Edema worsening .  Will give dose of Bumex today.  Continue strict I's and O's.  Will get daily standing weight.  May be able to do sliding scale diuretics once EDW established.     Hx of mechanical valve.  On anticoagulation     CHF: EF 40-45%. Hx of MV  and depressed RV function. On beta blocker and lasix at home. Not on ACEi/ARB/MRA/ or SGLT-2inh for now.    High risk and complexity patient.    Justin Rodriguez MD  01/10/24  12:57 EST

## 2024-01-10 NOTE — CASE MANAGEMENT/SOCIAL WORK
Continued Stay Note  [facility]     Patient Name: Di Lopez  MRN: [id number]  Today's Date: 1/10/2024    Admit Date: 1/1/2024    Plan: Home with [facility]   Discharge Plan       Row Name 01/10/24 1630       Plan    Plan Home with [facility]    Patient/Family in Agreement with Plan yes    Plan Comments CM spoke with the patient by phone today. Patient still agreement to return home at discharge with [facility]. Patient denied discharge needs. CM to follow and assist.    Final Discharge Disposition Code 06 - home with home health care                   Discharge Codes    No documentation.                 Expected Discharge Date and Time       Expected Discharge Date Expected Discharge Time    Jan 11, 2024               Diandra Rebollar RN

## 2024-01-10 NOTE — PLAN OF CARE
Goal Outcome Evaluation:  Plan of Care Reviewed With: patient           Outcome Evaluation: Pt is AAOx4 and pleasant.  She has ambulated to the bathroom with SBA.  No c/o pain voiced.  Voiding well and had 2 loose BMs today.  Pt is on 2L NC.  Paced on tele.  No concerns at this time.

## 2024-01-10 NOTE — PROGRESS NOTES
"Pharmacy Consult  -  Warfarin    Di Lopez is a  63 y.o. female   Height - 152.4 cm (60\")  Weight - 95.1 kg (209 lb 11.2 oz)    Consulting Provider: - Hospitalist  Indication: - Mechanical mitral valve  Goal INR: - 2.5-3.5  Home Regimen:   - Warfarin 5mg daily    Bridge Therapy: Yes   Therapeutic enoxaparin    Drug-Drug Interactions with current regimen:   CTX, enoxaparin-increased risk of bleeding    Warfarin Dosing During Admission:    Date  1/9 1/10          INR  1.25 1.21          Dose  5mg 7.5mg               Education Provided:  Will follow and  as needed.    Discharge Follow up:   Following Provider - Dr. Everett-Pt has home monitor   Follow up time range or appointment - 2-3 days post discharge      Labs:    Results from last 7 days   Lab Units 01/10/24  0509 01/09/24  0920 01/08/24  0457 01/07/24  0552 01/07/24  0518 01/06/24  0737 01/05/24  0501 01/04/24  0541   INR  1.21* 1.25* 1.29*  --  1.39* 1.48* 1.56*  --    HEMOGLOBIN g/dL 7.9* 8.3* 7.1* 7.3*  --  7.3* 7.5* 7.8*   HEMATOCRIT % 25.0* 25.8* 22.1* 23.0*  --  23.0* 24.3* 24.5*     Results from last 7 days   Lab Units 01/10/24  0509 01/09/24  0920 01/08/24  0457 01/07/24  0518   SODIUM mmol/L 137 137 134* 136   POTASSIUM mmol/L 4.4 4.7 4.8 4.9   CHLORIDE mmol/L 103 103 104 103   CO2 mmol/L 22.0 18.0* 19.0* 18.0*   BUN mg/dL 28* 28* 31* 33*   CREATININE mg/dL 2.65* 2.99* 3.24* 3.87*   CALCIUM mg/dL 8.8 8.6 8.3* 8.5*   BILIRUBIN mg/dL  --  0.3 0.2 0.3   ALK PHOS U/L  --  234* 213* 225*   ALT (SGPT) U/L  --  41* 42* 43*   AST (SGOT) U/L  --  32 33* 49*   GLUCOSE mg/dL 126* 213* 226* 167*       Current dietary intake: Regular diet- 50-75% of meals documented      Assessment/Plan:     Pharmacy consulted to dose warfarin for mechanical mitral valve, goal INR 2.5-3.5.  INR today is 1.21 after resumption of warfarin on 1/9.  Plan to give a one time boosted dose of warfarin 7.5mg tonight. Continue lovenox bridge until INR >2.5 for 2 days.   Daily " PT/INR ordered, monitor for s/s of bleeding/clotting (Pt presented to hospital with BRBPR on admission and GI has signed off. 1 unit PRBC administered 1/8.) monitor h/h closely, and for new medication interactions.  Will continue to follow and adjust dose as needed.    Thank you for this consult,    Sienna LaroseD, BCPS  1/10/2024  11:38 EST

## 2024-01-10 NOTE — PROGRESS NOTES
[facility] Medicine Services  PROGRESS NOTE    Patient Name: Di Lopez  : 1960  MRN: [id number]    Date of Admission: 2024  Primary Care Physician: Davina Santiago DO    Subjective   Subjective     CC:  Renal failure    HPI:  No acute events. Feeling improved today. No diarrhea. Thigh pain persists.       Objective   Objective     Vital Signs:   Temp:  [98 °F (36.7 °C)-99.2 °F (37.3 °C)] 98.6 °F (37 °C)  Heart Rate:  [63-72] 67  Resp:  [17-18] 18  BP: (128-160)/(53-68) 146/54  Flow (L/min):  [2] 2     Physical Exam:  Constitutional: No acute distress, awake, alert  HENT: NCAT, mucous membranes moist  Respiratory: Clear to auscultation bilaterally, respiratory effort normal   Cardiovascular: RRR, no murmurs, rubs, or gallops  Gastrointestinal: Positive bowel sounds, soft, nontender, nondistended  Musculoskeletal: trace bilateral ankle edema  Psychiatric: Appropriate affect, cooperative  Neurologic: Oriented x 3, strength symmetric in all extremities, Cranial Nerves grossly intact to confrontation, speech clear  Skin: No rashes      Results Reviewed:  LAB RESULTS:      Lab 01/10/24  0509 24  0920 24  0010 24  0638 24  0457 24  2105 24  1402 24  0552 24  0518 24  0737 24  0056 24  1640   WBC 4.49 5.30  --   --  4.73  --   --  4.97  --  5.89  --   --    HEMOGLOBIN 7.9* 8.3*  --   --  7.1*  --   --  7.3*  --  7.3*  --   --    HEMATOCRIT 25.0* 25.8*  --   --  22.1*  --   --  23.0*  --  23.0*  --   --    PLATELETS 212 225  --   --  231  --   --  255  --  263  --   --    NEUTROS ABS  --  4.05  --   --   --   --   --   --   --  4.19  --   --    IMMATURE GRANS (ABS)  --  0.06*  --   --   --   --   --   --   --  0.17*  --   --    LYMPHS ABS  --  0.86  --   --   --   --   --   --   --  1.18  --   --    MONOS ABS  --  0.23  --   --   --   --   --   --   --  0.26  --   --    EOS ABS  --  0.09  --   --   --   --   --    --   --  0.06  --   --    MCV 88.0 87.2  --   --  86.3  --   --  87.5  --  85.5  --   --    SED RATE  --   --   --   --   --   --   --   --   --   --   --  58*   CRP  --   --   --   --   --   --   --   --   --   --   --  5.92*   PROCALCITONIN  --   --   --   --   --   --   --   --  0.40*  --   --   --    LACTATE  --   --   --   --   --   --   --   --   --   --   --  1.4   PROTIME 15.4* 15.9*  --   --  16.2*  --   --   --  17.2* 18.0*  --   --    HEPARIN ANTI-XA  --  0.31 0.23* 0.31  --  0.41 0.38  --  0.25* 0.35   < > 0.25*    < > = values in this interval not displayed.         Lab 01/10/24  0509 01/09/24 0920 01/08/24 0457 01/07/24  0518 01/06/24  0737   SODIUM 137 137 134* 136 137   POTASSIUM 4.4 4.7 4.8 4.9 5.2   CHLORIDE 103 103 104 103 105   CO2 22.0 18.0* 19.0* 18.0* 18.0*   ANION GAP 12.0 16.0* 11.0 15.0 14.0   BUN 28* 28* 31* 33* 37*   CREATININE 2.65* 2.99* 3.24* 3.87* 3.80*   EGFR 19.8* 17.1* 15.6* 12.6* 12.9*   GLUCOSE 126* 213* 226* 167* 145*   CALCIUM 8.8 8.6 8.3* 8.5* 8.3*         Lab 01/09/24 0920 01/08/24 0457 01/07/24  0518 01/06/24  0737 01/05/24  0501   TOTAL PROTEIN 6.3 5.9* 6.3 6.2 5.9*   ALBUMIN 3.7 3.2* 3.7 3.5 3.5   GLOBULIN 2.6 2.7 2.6 2.7 2.4   ALT (SGPT) 41* 42* 43* 31 29   AST (SGOT) 32 33* 49* 36* 28   BILIRUBIN 0.3 0.2 0.3 0.3 0.2   ALK PHOS 234* 213* 225* 212* 204*         Lab 01/10/24  0509 01/09/24  0920 01/08/24  0457 01/07/24  0518 01/06/24  0737   PROTIME 15.4* 15.9* 16.2* 17.2* 18.0*   INR 1.21* 1.25* 1.29* 1.39* 1.48*             Lab 01/08/24  1113 01/06/24  0737 01/04/24  0541   IRON  --  15*  --    IRON SATURATION (TSAT)  --  5*  --    TIBC  --  279*  --    TRANSFERRIN  --  187*  --    FERRITIN  --  1,188.00*  --    FOLATE  --   --  >20.00   VITAMIN B 12  --   --  353   ABO TYPING O  --   --    RH TYPING Positive  --   --    ANTIBODY SCREEN Negative  --   --          Brief Urine Lab Results  (Last result in the past 365 days)        Color   Clarity   Blood   Leuk Est    Nitrite   Protein   CREAT   Urine HCG        01/02/24 0011             142.2         01/02/24 0011 Yellow   Cloudy   Small (1+)   Negative   Negative   100 mg/dL (2+)                   Microbiology Results Abnormal       Procedure Component Value - Date/Time    Blood Culture - Blood, Hand, Left [[id number]]  (Normal) Collected: 01/08/24 1017    Lab Status: Preliminary result Specimen: Blood from Hand, Left Updated: 01/10/24 1030     Blood Culture No growth at 2 days    Blood Culture - Blood, Arm, Left [[id number]]  (Normal) Collected: 01/08/24 1018    Lab Status: Preliminary result Specimen: Blood from Arm, Left Updated: 01/10/24 1030     Blood Culture No growth at 2 days    Blood Culture - Blood, Arm, Left [[id number]]  (Normal) Collected: 01/06/24 0056    Lab Status: Preliminary result Specimen: Blood from Arm, Left Updated: 01/10/24 0815     Blood Culture No growth at 4 days    Blood Culture - Blood, Arm, Left [[id number]]  (Normal) Collected: 01/05/24 1641    Lab Status: Preliminary result Specimen: Blood from Arm, Left Updated: 01/09/24 1815     Blood Culture No growth at 4 days    Narrative:      Less than seven (7) mL's of blood was collected.  Insufficient quantity may yield false negative results.    Respiratory Panel PCR w/COVID-19(SARS-CoV-2) LUIS/ESTHER/KARSON/PAD/COR/LEONARDO In-House, NP Swab in UTM/VTM, 2 HR TAT - Swab, Nasopharynx [[id number]]  (Normal) Collected: 01/05/24 1634    Lab Status: Final result Specimen: Swab from Nasopharynx Updated: 01/05/24 1804     ADENOVIRUS, PCR Not Detected     Coronavirus 229E Not Detected     Coronavirus HKU1 Not Detected     Coronavirus NL63 Not Detected     Coronavirus OC43 Not Detected     COVID19 Not Detected     Human Metapneumovirus Not Detected     Human Rhinovirus/Enterovirus Not Detected     Influenza A PCR Not Detected     Influenza B PCR Not Detected     Parainfluenza Virus 1 Not Detected     Parainfluenza Virus 2 Not Detected     Parainfluenza Virus 3 Not Detected      Parainfluenza Virus 4 Not Detected     RSV, PCR Not Detected     Bordetella pertussis pcr Not Detected     Bordetella parapertussis PCR Not Detected     Chlamydophila pneumoniae PCR Not Detected     Mycoplasma pneumo by PCR Not Detected    Narrative:      In the setting of a positive respiratory panel with a viral infection PLUS a negative procalcitonin without other underlying concern for bacterial infection, consider observing off antibiotics or discontinuation of antibiotics and continue supportive care. If the respiratory panel is positive for atypical bacterial infection (Bordetella pertussis, Chlamydophila pneumoniae, or Mycoplasma pneumoniae), consider antibiotic de-escalation to target atypical bacterial infection.    COVID PRE-OP / PRE-PROCEDURE SCREENING ORDER (NO ISOLATION) - Swab, Nasopharynx [[id number]]  (Normal) Collected: 01/01/24 2330    Lab Status: Final result Specimen: Swab from Nasopharynx Updated: 01/02/24 0016    Narrative:      The following orders were created for panel order COVID PRE-OP / PRE-PROCEDURE SCREENING ORDER (NO ISOLATION) - Swab, Nasopharynx.  Procedure                               Abnormality         Status                     ---------                               -----------         ------                     COVID-19, FLU A/B, RSV P...[[id number]]  Normal              Final result                 Please view results for these tests on the individual orders.    COVID-19, FLU A/B, RSV PCR 1 HR TAT - Swab, Nasopharynx [[id number]]  (Normal) Collected: 01/01/24 2330    Lab Status: Final result Specimen: Swab from Nasopharynx Updated: 01/02/24 0016     COVID19 Not Detected     Influenza A PCR Not Detected     Influenza B PCR Not Detected     RSV, PCR Not Detected    Narrative:      Fact sheet for providers: https://www.fda.gov/media/984822/download    Fact sheet for patients: https://www.fda.gov/media/654196/download    Test performed by PCR.            CT Maxillofacial Without  Contrast    Result Date: 1/9/2024  CT MAXILLOFACIAL WO CONT Date of Exam: 1/9/2024 5:18 PM EST Indication: Maxillofacial pain. Comparison: None. Technique: Axial CT images were obtained from the inferior aspect of the mandible through the frontal sinuses without contrast administration.  Reconstructed coronal and sagittal images were also obtained. Automated exposure control and iterative construction methods were used. Findings: The visualized intracranial contents are unremarkable. The globes and orbits appear intact. The nasopharynx is widely patent. No loculated fluid collection is identified to suggest an abscess. No acute fracture is identified. The paranasal sinuses are clear. The mastoid air cells are well aerated. No periapical lucency is identified along the teeth. The nasopharynx and oropharynx have a normal configuration. The larynx appears normal.     Impression: Impression: No acute process identified. Electronically Signed: Danis Jacobson MD  1/9/2024 8:05 PM EST  Workstation ID: [id number]    Adult Transesophageal Echo 3D (MEAGAN) W/ Cont If Necessary Per Protocol    Result Date: 1/8/2024    Left ventricular ejection fraction appears to be 56 - 60%.   The right ventricular cavity is mildly dilated.  There is a electronically in the RV with no visible adherent mass or vegetation.   The right atrium is mildly dilated, there is electronic lead present with no visible vegetation.   There is a mechanical mitral valve with moderately increased gradients similar to most recent TTE.  The bileaflet valve appears to be functioning normally with no visible thrombus or vegetation.  There is mild MR around the periphery with some potential perivalvular leak.   Aortic valve is mildly calcified with trace AR, no vegetation seen.   The tricuspid valve appears structurally normal with no vegetation.  There is moderate to severe central tricuspid regurgitation around the site of the lead transversing the valve.       Results for orders placed during the hospital encounter of 01/01/24    Adult Transesophageal Echo 3D (MEAGAN) W/ Cont If Necessary Per Protocol    Interpretation Summary    Left ventricular ejection fraction appears to be 56 - 60%.    The right ventricular cavity is mildly dilated.  There is a electronically in the RV with no visible adherent mass or vegetation.    The right atrium is mildly dilated, there is electronic lead present with no visible vegetation.    There is a mechanical mitral valve with moderately increased gradients similar to most recent TTE.  The bileaflet valve appears to be functioning normally with no visible thrombus or vegetation.  There is mild MR around the periphery with some potential perivalvular leak.    Aortic valve is mildly calcified with trace AR, no vegetation seen.    The tricuspid valve appears structurally normal with no vegetation.  There is moderate to severe central tricuspid regurgitation around the site of the lead transversing the valve.      Current medications:  Scheduled Meds:cefTRIAXone, 2,000 mg, Intravenous, Q24H  enoxaparin, 90 mg, Subcutaneous, Q24H  escitalopram, 20 mg, Oral, Daily  ferrous sulfate, 325 mg, Oral, Daily With Breakfast  folic acid, 800 mcg, Oral, Daily  insulin detemir, 5 Units, Subcutaneous, Nightly  insulin lispro, 2-7 Units, Subcutaneous, 4x Daily AC & at Bedtime  Insulin Lispro, 3 Units, Subcutaneous, TID With Meals  oxybutynin XL, 5 mg, Oral, Daily  pantoprazole, 40 mg, Oral, Q AM  polyethylene glycol, 17 g, Oral, Daily  senna-docusate sodium, 1 tablet, Oral, BID  sodium bicarbonate, 1,300 mg, Oral, TID  sodium chloride, 10 mL, Intravenous, Q12H  warfarin, 7.5 mg, Oral, Daily      Continuous Infusions:Pharmacy to dose warfarin,       PRN Meds:.  acetaminophen    albuterol    dextrose    dextrose    glucagon (human recombinant)    melatonin    naloxone    nitroglycerin    oxyCODONE-acetaminophen    Pharmacy to dose warfarin    sodium chloride     sodium chloride    Assessment & Plan   Assessment & Plan     Active Hospital Problems    Diagnosis  POA    **Renal failure [N19]  Yes    Hyponatremia [E87.1]  Yes    Hyperkalemia [E87.5]  Yes    Type 2 diabetes mellitus with hyperglycemia [E11.65]  Yes    Elevated LFTs [R79.89]  Yes    Anemia, chronic disease [D63.8]  Yes    Cervical lymphadenopathy [R59.0]  Yes    GI bleed [K92.2]  Yes    H/O heart valve replacement with mechanical valve [Z95.2]  Not Applicable    ARF (acute renal failure) [N17.9]  Yes    Left bundle branch hemiblock [I44.60]  Yes    Type 2 diabetes mellitus without complication, with long-term current use of insulin [E11.9, Z79.4]  Not Applicable    Dyslipidemia [E78.5]  Yes    Emphysema/COPD [J43.9]  Yes    Hypertension [I10]  Yes    Arteriovenous malformation of gastrointestinal tract [K55.20]  Yes    Obstructive sleep apnea syndrome [G47.33]  Yes    Chronic anticoagulation [Z79.01]  Not Applicable      Resolved Hospital Problems   No resolved problems to display.        Brief Hospital Course to date:  Di Lopez is a 63 y.o. 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 Found to have severe MEERA, resolution of BRBPR, persistent fever/chills and thigh pain     Epistaxis -resolved   - afrin x 3 days with improvement     Febrile illness, unclear etiology -- improved   -negative full viral panel, negative monospot  -continue to suspect viral illness (throat soreness, fevers, back pain/aches, etc), given new thigh pain consideration of discitis but MRI withOUT contrast negative  - CT abd/pel with no acute findings  - Bcx x 2 different sets NGTD  - MEAGAN without vegetation  -CT maxillofacial with no acute process  - ID consulted --appreciate recs  - Continue rocephin -- likely DC on Augmentin      Acute Renal Failure 2/2 hypovolemia + med effect - improved  - 2/2 acute diarrheal  illness + entresto, NSAIDs  - monitoring off IVF  - Nephrology consulted and appreciate recs   - Currently holding fluids and monitoring   - Cr continues to improve. Monitoring I/Os and daily weights      Hypovolemic hyponatremia - improved  - remains stable off IVF     Acute anemia 2/2 bloody diarrheal illness - blood diarrhea resolved; epistaxis  - 1 units PRBC 1/8 with appropriate response  - AM labs      Mechanical mitral valve on coumadin - Initially on heparin gtt. No continued intervention planned. Will change to lovenox and resume warfarin. Follows with Dr. Everett with home monitor      CHFpEF (EF 45-50%), s/p ICD - monitor volume status -- may need diuretics   Chronic hypoxic resp failure 2/2 COPD - on home 2 liters n/c  DMII - basal/bolus regimen  Anxiety/depression  BMI 38 - complicates all aspects of care   Liver mass s/p embolization    Expected Discharge Location and Transportation: home health  Expected Discharge   Expected Discharge Date: 1/11/2024; Expected Discharge Time:      DVT prophylaxis:  Medical DVT prophylaxis orders are present.     AM-PAC 6 Clicks Score (PT): 20 (01/09/24 2000)    CODE STATUS:   Code Status and Medical Interventions:   Ordered at: 01/02/24 0319     Code Status (Patient has no pulse and is not breathing):    CPR (Attempt to Resuscitate)     Medical Interventions (Patient has pulse or is breathing):    Full Support       Sarah Sherman DO  01/10/24

## 2024-01-10 NOTE — PROGRESS NOTES
"  INFECTIOUS DISEASES  INPATIENT PROGRESS NOTE  1/10/2024      PATIENT NAME: Di Lopez  :  1960  MRN:  [id number]  Date of Admission:  2024      Antimicrobials:  Day 4:  ceftriaxone      MAR reviewed.  Pertinent other medications include:  Lovenox/warfarin    Chief Complaint   Patient presents with    Rectal Bleeding       Reason for consultation:  fevers    Interval history: No further fever.  She is feeling better today.  Less fatigue.  Maxillofacial CT without abscess.  Transitioning back to warfarin off heparin gtt.    ROS:  No new rashes.  No SOB or chest pain.  No nausea/vomiting/diarrhea.     Objective:  Temp (24hrs), Av.5 °F (36.9 °C), Min:98 °F (36.7 °C), Max:99.2 °F (37.3 °C)    /59 (BP Location: Right arm, Patient Position: Sitting)   Pulse 64   Temp 98.4 °F (36.9 °C) (Oral)   Resp 18   Ht 152.4 cm (60\")   Wt 95.1 kg (209 lb 11.2 oz)   LMP 1998   SpO2 97%   BMI 40.95 kg/m²     Physical Examination:  GENERAL: Chronically ill-appearing female.  Awake and alert, in no acute distress.   HEENT: broken right mandibular tooth with necrosis.  CV: RRR. +faint murmur and mechanical click.  ICD pocket without TTP.  LUNGS: CTAB.  Good air movement.  ABDOMEN: Positive bowel sounds.  Soft, nontender, nondistended.  EXT:  No edema.  MSK: No joint effusions or inflammation noted.  SKIN: Warm and dry without rash or ulcer.   NEURO: A&Ox4. No focal deficits.  Face symmetric.  Speech fluent.  Moves all extremities well.   PSYCHIATRIC: Normal insight and judgement.  Cooperative.  Normal affect.    Laboratory Data:    Results from last 7 days   Lab Units 01/10/24  0509 24  0920 24  0457   WBC 10*3/mm3 4.49 5.30 4.73   HEMOGLOBIN g/dL 7.9* 8.3* 7.1*   HEMATOCRIT % 25.0* 25.8* 22.1*   PLATELETS 10*3/mm3 212 225 231     Results from last 7 days   Lab Units 01/10/24  0509   SODIUM mmol/L 137   POTASSIUM mmol/L 4.4   CHLORIDE mmol/L 103   CO2 mmol/L 22.0   BUN mg/dL 28* "   CREATININE mg/dL 2.65*   GLUCOSE mg/dL 126*   CALCIUM mg/dL 8.8     Results from last 7 days   Lab Units 01/09/24  0920   ALK PHOS U/L 234*   BILIRUBIN mg/dL 0.3   ALT (SGPT) U/L 41*   AST (SGOT) U/L 32     Results from last 7 days   Lab Units 01/05/24  1640   SED RATE mm/hr 58*     Results from last 7 days   Lab Units 01/05/24  1640   CRP mg/dL 5.92*     Estimated Creatinine Clearance: 22.7 mL/min (A) (by C-G formula based on SCr of 2.65 mg/dL (H)).  Results from last 7 days   Lab Units 01/05/24  1640   LACTATE mmol/L 1.4     Results from last 7 days   Lab Units 01/07/24  0518 01/04/24  0541   CK TOTAL U/L 191* 160               Microbiology:    Microbiology Results (last 10 days)       Procedure Component Value - Date/Time    Blood Culture - Blood, Arm, Left [[id number]]  (Normal) Collected: 01/08/24 1018    Lab Status: Preliminary result Specimen: Blood from Arm, Left Updated: 01/10/24 1030     Blood Culture No growth at 2 days    Blood Culture - Blood, Hand, Left [[id number]]  (Normal) Collected: 01/08/24 1017    Lab Status: Preliminary result Specimen: Blood from Hand, Left Updated: 01/10/24 1030     Blood Culture No growth at 2 days    Blood Culture - Blood, Arm, Left [[id number]]  (Normal) Collected: 01/06/24 0056    Lab Status: Preliminary result Specimen: Blood from Arm, Left Updated: 01/10/24 0815     Blood Culture No growth at 4 days    Blood Culture - Blood, Arm, Left [[id number]]  (Normal) Collected: 01/05/24 1641    Lab Status: Preliminary result Specimen: Blood from Arm, Left Updated: 01/09/24 1815     Blood Culture No growth at 4 days    Narrative:      Less than seven (7) mL's of blood was collected.  Insufficient quantity may yield false negative results.    Respiratory Panel PCR w/COVID-19(SARS-CoV-2) LUIS/ESTHER/KARSON/PAD/COR/LEONARDO In-House, NP Swab in UTM/VTM, 2 HR TAT - Swab, Nasopharynx [[id number]]  (Normal) Collected: 01/05/24 0152    Lab Status: Final result Specimen: Swab from Nasopharynx Updated:  01/05/24 1804     ADENOVIRUS, PCR Not Detected     Coronavirus 229E Not Detected     Coronavirus HKU1 Not Detected     Coronavirus NL63 Not Detected     Coronavirus OC43 Not Detected     COVID19 Not Detected     Human Metapneumovirus Not Detected     Human Rhinovirus/Enterovirus Not Detected     Influenza A PCR Not Detected     Influenza B PCR Not Detected     Parainfluenza Virus 1 Not Detected     Parainfluenza Virus 2 Not Detected     Parainfluenza Virus 3 Not Detected     Parainfluenza Virus 4 Not Detected     RSV, PCR Not Detected     Bordetella pertussis pcr Not Detected     Bordetella parapertussis PCR Not Detected     Chlamydophila pneumoniae PCR Not Detected     Mycoplasma pneumo by PCR Not Detected    Narrative:      In the setting of a positive respiratory panel with a viral infection PLUS a negative procalcitonin without other underlying concern for bacterial infection, consider observing off antibiotics or discontinuation of antibiotics and continue supportive care. If the respiratory panel is positive for atypical bacterial infection (Bordetella pertussis, Chlamydophila pneumoniae, or Mycoplasma pneumoniae), consider antibiotic de-escalation to target atypical bacterial infection.    COVID PRE-OP / PRE-PROCEDURE SCREENING ORDER (NO ISOLATION) - Swab, Nasopharynx [[id number]]  (Normal) Collected: 01/01/24 2330    Lab Status: Final result Specimen: Swab from Nasopharynx Updated: 01/02/24 0016    Narrative:      The following orders were created for panel order COVID PRE-OP / PRE-PROCEDURE SCREENING ORDER (NO ISOLATION) - Swab, Nasopharynx.  Procedure                               Abnormality         Status                     ---------                               -----------         ------                     COVID-19, FLU A/B, RSV P...[[id number]]  Normal              Final result                 Please view results for these tests on the individual orders.    COVID-19, FLU A/B, RSV PCR 1 HR TAT - Swab,  Nasopharynx [[id number]]  (Normal) Collected: 01/01/24 2330    Lab Status: Final result Specimen: Swab from Nasopharynx Updated: 01/02/24 0016     COVID19 Not Detected     Influenza A PCR Not Detected     Influenza B PCR Not Detected     RSV, PCR Not Detected    Narrative:      Fact sheet for providers: https://www.fda.gov/media/798389/download    Fact sheet for patients: https://www.fda.gov/media/824860/download    Test performed by PCR.          Monospot negative      Radiology:  CT Maxillofacial Without Contrast    Result Date: 1/9/2024  Impression: No acute process identified. Electronically Signed: Danis Jacobson MD  1/9/2024 8:05 PM EST  Workstation ID: [id number]     MEAGAN:    Left ventricular ejection fraction appears to be 56 - 60%.    The right ventricular cavity is mildly dilated.  There is a electronically in the RV with no visible adherent mass or vegetation.    The right atrium is mildly dilated, there is electronic lead present with no visible vegetation.    There is a mechanical mitral valve with moderately increased gradients similar to most recent TTE.  The bileaflet valve appears to be functioning normally with no visible thrombus or vegetation.  There is mild MR around the periphery with some potential perivalvular leak.    Aortic valve is mildly calcified with trace AR, no vegetation seen.    The tricuspid valve appears structurally normal with no vegetation.  There is moderate to severe central tricuspid regurgitation around the site of the lead transversing the valve.      DISCUSSION:  63 y.o. female with history of modified replacement and implanted defibrillator admitted with acute kidney injury and GI bleed.   Patient reported cervical adenopathy confirmed on CT imaging and developed fever while hospitalized.  Resp PCR and monospot negative.    PROBLEM LIST:   -- Undifferentiated fever with sweats - intermittent/improved  COVID-19/flu/RSV PCR negative.  Full resp PCR negative.  Blood cultures  obtained.  At risk for endocarditis with implanted ICD and valve replacement.  Recent dental issues with broken/necrotic right mandibular tooth and painful left maxillary at prior root canal; at risk for odontogenic source bacteremia/cardiac device infection.  MEAGAN without valve or lead vegetation.  Maxillofacial CT without acute process or abscess.  -- Cervical adenopathy - resolving.  No pharyngitis or sinusitis.  Monospot negative.  -- Recent Candida esophagitis.  HIV negative.  -- Implanted defibrillator.  -- History of mitral valve replacement.  On chronic anticoagulation.  -- MEERA, improving.  -- Rectal bleeding and anemia.  CT without acute intra-abdominal abnormality.  -- Back pain/sciatica symptoms; MRI L-spine with degenerative changes; no acute abnormality.    PLAN:  -- Following blood cultures - remain no growth  -- Continue empiric ceftriaxone 2 g iv daily for potential odontogenic source of fever given her dental issues  -- As blood cultures remain negative and fever resolving, will recommend discharge with prolonged oral Augmentin 500 mg po bid until she can have her dental issues addressed outpatient with oral surgery.  -- Follow-up with me in 2 weeks    Complex case.  Plan discussed with patient.  Likely discharge home soon, pending nephrology clearance.    Maciej Willett MD  1/10/2024  16:48 EST

## 2024-01-11 LAB
ANION GAP SERPL CALCULATED.3IONS-SCNC: 13 MMOL/L (ref 5–15)
BACTERIA SPEC AEROBE CULT: NORMAL
BUN SERPL-MCNC: 22 MG/DL (ref 8–23)
BUN/CREAT SERPL: 10.7 (ref 7–25)
CALCIUM SPEC-SCNC: 8.6 MG/DL (ref 8.6–10.5)
CHLORIDE SERPL-SCNC: 102 MMOL/L (ref 98–107)
CO2 SERPL-SCNC: 24 MMOL/L (ref 22–29)
CREAT SERPL-MCNC: 2.06 MG/DL (ref 0.57–1)
DEPRECATED RDW RBC AUTO: 50.2 FL (ref 37–54)
EGFRCR SERPLBLD CKD-EPI 2021: 26.8 ML/MIN/1.73
ERYTHROCYTE [DISTWIDTH] IN BLOOD BY AUTOMATED COUNT: 16.1 % (ref 12.3–15.4)
GLUCOSE BLDC GLUCOMTR-MCNC: 132 MG/DL (ref 70–130)
GLUCOSE BLDC GLUCOMTR-MCNC: 147 MG/DL (ref 70–130)
GLUCOSE BLDC GLUCOMTR-MCNC: 201 MG/DL (ref 70–130)
GLUCOSE BLDC GLUCOMTR-MCNC: 213 MG/DL (ref 70–130)
GLUCOSE SERPL-MCNC: 128 MG/DL (ref 65–99)
HCT VFR BLD AUTO: 24.2 % (ref 34–46.6)
HGB BLD-MCNC: 7.7 G/DL (ref 12–15.9)
INR PPP: 1.24 (ref 0.89–1.12)
MCH RBC QN AUTO: 27.5 PG (ref 26.6–33)
MCHC RBC AUTO-ENTMCNC: 31.8 G/DL (ref 31.5–35.7)
MCV RBC AUTO: 86.4 FL (ref 79–97)
PLATELET # BLD AUTO: 198 10*3/MM3 (ref 140–450)
PMV BLD AUTO: 9.9 FL (ref 6–12)
POTASSIUM SERPL-SCNC: 4.5 MMOL/L (ref 3.5–5.2)
PROTHROMBIN TIME: 15.7 SECONDS (ref 12.2–14.5)
RBC # BLD AUTO: 2.8 10*6/MM3 (ref 3.77–5.28)
SODIUM SERPL-SCNC: 139 MMOL/L (ref 136–145)
WBC NRBC COR # BLD AUTO: 4.53 10*3/MM3 (ref 3.4–10.8)

## 2024-01-11 PROCEDURE — 63710000001 INSULIN LISPRO (HUMAN) PER 5 UNITS: Performed by: STUDENT IN AN ORGANIZED HEALTH CARE EDUCATION/TRAINING PROGRAM

## 2024-01-11 PROCEDURE — 99232 SBSQ HOSP IP/OBS MODERATE 35: CPT | Performed by: INTERNAL MEDICINE

## 2024-01-11 PROCEDURE — 63710000001 INSULIN DETEMIR PER 5 UNITS: Performed by: STUDENT IN AN ORGANIZED HEALTH CARE EDUCATION/TRAINING PROGRAM

## 2024-01-11 PROCEDURE — 25010000002 CEFTRIAXONE PER 250 MG: Performed by: INTERNAL MEDICINE

## 2024-01-11 PROCEDURE — 85027 COMPLETE CBC AUTOMATED: CPT | Performed by: STUDENT IN AN ORGANIZED HEALTH CARE EDUCATION/TRAINING PROGRAM

## 2024-01-11 PROCEDURE — 82948 REAGENT STRIP/BLOOD GLUCOSE: CPT

## 2024-01-11 PROCEDURE — 85610 PROTHROMBIN TIME: CPT

## 2024-01-11 PROCEDURE — 97530 THERAPEUTIC ACTIVITIES: CPT

## 2024-01-11 PROCEDURE — 63710000001 INSULIN LISPRO (HUMAN) PER 5 UNITS: Performed by: INTERNAL MEDICINE

## 2024-01-11 PROCEDURE — 80048 BASIC METABOLIC PNL TOTAL CA: CPT | Performed by: INTERNAL MEDICINE

## 2024-01-11 RX ORDER — OXYCODONE AND ACETAMINOPHEN 7.5; 325 MG/1; MG/1
1 TABLET ORAL EVERY 6 HOURS PRN
Status: DISCONTINUED | OUTPATIENT
Start: 2024-01-11 | End: 2024-01-15 | Stop reason: HOSPADM

## 2024-01-11 RX ORDER — BUMETANIDE 1 MG/1
2 TABLET ORAL
Status: DISCONTINUED | OUTPATIENT
Start: 2024-01-11 | End: 2024-01-15

## 2024-01-11 RX ADMIN — INSULIN LISPRO 3 UNITS: 100 INJECTION, SOLUTION INTRAVENOUS; SUBCUTANEOUS at 11:43

## 2024-01-11 RX ADMIN — FOLIC ACID TAB 400 MCG 800 MCG: 400 TAB at 08:31

## 2024-01-11 RX ADMIN — Medication 10 ML: at 08:33

## 2024-01-11 RX ADMIN — PANTOPRAZOLE SODIUM 40 MG: 40 TABLET, DELAYED RELEASE ORAL at 05:49

## 2024-01-11 RX ADMIN — OXYCODONE HYDROCHLORIDE AND ACETAMINOPHEN 1 TABLET: 7.5; 325 TABLET ORAL at 13:18

## 2024-01-11 RX ADMIN — SODIUM BICARBONATE 650 MG TABLET 1300 MG: at 15:31

## 2024-01-11 RX ADMIN — INSULIN LISPRO 3 UNITS: 100 INJECTION, SOLUTION INTRAVENOUS; SUBCUTANEOUS at 17:19

## 2024-01-11 RX ADMIN — Medication 10 ML: at 21:08

## 2024-01-11 RX ADMIN — WARFARIN SODIUM 7.5 MG: 7.5 TABLET ORAL at 17:19

## 2024-01-11 RX ADMIN — INSULIN DETEMIR 5 UNITS: 100 INJECTION, SOLUTION SUBCUTANEOUS at 21:07

## 2024-01-11 RX ADMIN — SODIUM BICARBONATE 650 MG TABLET 1300 MG: at 21:08

## 2024-01-11 RX ADMIN — OXYBUTYNIN CHLORIDE 5 MG: 5 TABLET, FILM COATED, EXTENDED RELEASE ORAL at 08:31

## 2024-01-11 RX ADMIN — BUMETANIDE 2 MG: 1 TABLET ORAL at 18:15

## 2024-01-11 RX ADMIN — SODIUM BICARBONATE 650 MG TABLET 1300 MG: at 08:31

## 2024-01-11 RX ADMIN — INSULIN LISPRO 3 UNITS: 100 INJECTION, SOLUTION INTRAVENOUS; SUBCUTANEOUS at 08:31

## 2024-01-11 RX ADMIN — ESCITALOPRAM OXALATE 20 MG: 20 TABLET, FILM COATED ORAL at 08:31

## 2024-01-11 RX ADMIN — INSULIN LISPRO 3 UNITS: 100 INJECTION, SOLUTION INTRAVENOUS; SUBCUTANEOUS at 21:07

## 2024-01-11 RX ADMIN — NYSTATIN 500000 UNITS: 100000 SUSPENSION ORAL at 21:07

## 2024-01-11 RX ADMIN — FERROUS SULFATE TAB 325 MG (65 MG ELEMENTAL FE) 325 MG: 325 (65 FE) TAB at 08:31

## 2024-01-11 RX ADMIN — Medication 5 MG: at 21:07

## 2024-01-11 RX ADMIN — OXYCODONE HYDROCHLORIDE AND ACETAMINOPHEN 1 TABLET: 7.5; 325 TABLET ORAL at 21:08

## 2024-01-11 RX ADMIN — CEFTRIAXONE 2000 MG: 2 INJECTION, POWDER, FOR SOLUTION INTRAMUSCULAR; INTRAVENOUS at 08:32

## 2024-01-11 NOTE — PROGRESS NOTES
"Pharmacy Consult  -  Warfarin    Di Lopez is a  63 y.o. female   Height - 152.4 cm (60\")  Weight - 95.1 kg (209 lb 11.2 oz)    Consulting Provider: - Hospitalist  Indication: - Mechanical mitral valve  Goal INR: - 2.5-3.5  Home Regimen:   - Warfarin 5mg daily    Bridge Therapy: Yes   Therapeutic enoxaparin    Drug-Drug Interactions with current regimen:   CTX, enoxaparin-increased risk of bleeding    Warfarin Dosing During Admission:    Date  1/9 1/10 1/11         INR  1.25 1.21 1.24         Dose  5mg 7.5mg 7.5mg              Education Provided:  Will follow and  as needed.    Discharge Follow up:   Following Provider - Dr. Everett-Pt has home monitor   Follow up time range or appointment - 2-3 days post discharge      Labs:    Results from last 7 days   Lab Units 01/11/24  0606 01/10/24  0509 01/09/24  0920 01/08/24  0457 01/07/24  0552 01/07/24  0518 01/06/24  0737 01/05/24  0501   INR  1.24* 1.21* 1.25* 1.29*  --  1.39* 1.48* 1.56*   HEMOGLOBIN g/dL 7.7* 7.9* 8.3* 7.1* 7.3*  --  7.3* 7.5*   HEMATOCRIT % 24.2* 25.0* 25.8* 22.1* 23.0*  --  23.0* 24.3*     Results from last 7 days   Lab Units 01/11/24  0606 01/10/24  0509 01/09/24  0920 01/08/24  0457 01/07/24  0518   SODIUM mmol/L 139 137 137 134* 136   POTASSIUM mmol/L 4.5 4.4 4.7 4.8 4.9   CHLORIDE mmol/L 102 103 103 104 103   CO2 mmol/L 24.0 22.0 18.0* 19.0* 18.0*   BUN mg/dL 22 28* 28* 31* 33*   CREATININE mg/dL 2.06* 2.65* 2.99* 3.24* 3.87*   CALCIUM mg/dL 8.6 8.8 8.6 8.3* 8.5*   BILIRUBIN mg/dL  --   --  0.3 0.2 0.3   ALK PHOS U/L  --   --  234* 213* 225*   ALT (SGPT) U/L  --   --  41* 42* 43*   AST (SGOT) U/L  --   --  32 33* 49*   GLUCOSE mg/dL 128* 126* 213* 226* 167*       Current dietary intake: Regular diet- 50-75% of meals documented in the past 24 hours.      Assessment/Plan:     Pharmacy consulted to dose warfarin for mechanical mitral valve, goal INR 2.5-3.5.  INR today is 1.24 after resumption of warfarin on 1/9.  Continue warfarin " 7.5mg tonight.   If discharged, plan to give patient a boosted dose of Warfarin 7.5mg today and tomorrow and resume home dose of warfarin 5mg daily on 1/13. Repeat INR on Monday 1/15.   Continue lovenox bridge until INR is greater than 2.5 for 2 consecutive days.   Daily PT/INR ordered, monitor for s/s of bleeding/clotting (Pt presented to hospital with BRBPR on admission and GI has signed off. 1 unit PRBC administered 1/8.) monitor h/h closely, and for new medication interactions.  Will continue to follow and adjust dose as needed.    Thank you for this consult,    Annabelle Fernandez, PharmD, BCPS  1/11/2024  10:00 EST

## 2024-01-11 NOTE — PROGRESS NOTES
[facility] Medicine Services  PROGRESS NOTE    Patient Name: Di Lopez  : 1960  MRN: [id number]    Date of Admission: 2024  Primary Care Physician: Davina Santiago DO    Subjective   Subjective     CC:  Renal failure, fever     HPI:  No acute events. States that she feels ok but her legs are tight and swollen. Reviewed improved renal function.       Objective   Objective     Vital Signs:   Temp:  [97.9 °F (36.6 °C)-99.3 °F (37.4 °C)] 98.5 °F (36.9 °C)  Heart Rate:  [62-88] 63  Resp:  [18] 18  BP: (135-153)/(49-67) 135/49     Physical Exam:  Constitutional: No acute distress, awake, alert  HENT: NCAT, mucous membranes moist  Respiratory: Clear to auscultation bilaterally, respiratory effort normal   Cardiovascular: RRR, no murmurs, rubs, or gallops  Gastrointestinal: Positive bowel sounds, soft, nontender, nondistended  Musculoskeletal: +2 lower ext edema  Psychiatric: Appropriate affect, cooperative  Neurologic: Oriented x 3, strength symmetric in all extremities, Cranial Nerves grossly intact to confrontation, speech clear  Skin: No rashes      Results Reviewed:  LAB RESULTS:      Lab 24  0606 01/10/24  0509 24  0920 24  0010 24  0638 24  0457 24  2105 24  1402 24  0552 24  0518 24  0737 24  0056 24  1640   WBC 4.53 4.49 5.30  --   --  4.73  --   --  4.97  --  5.89  --   --    HEMOGLOBIN 7.7* 7.9* 8.3*  --   --  7.1*  --   --  7.3*  --  7.3*  --   --    HEMATOCRIT 24.2* 25.0* 25.8*  --   --  22.1*  --   --  23.0*  --  23.0*  --   --    PLATELETS 198 212 225  --   --  231  --   --  255  --  263  --   --    NEUTROS ABS  --   --  4.05  --   --   --   --   --   --   --  4.19  --   --    IMMATURE GRANS (ABS)  --   --  0.06*  --   --   --   --   --   --   --  0.17*  --   --    LYMPHS ABS  --   --  0.86  --   --   --   --   --   --   --  1.18  --   --    MONOS ABS  --   --  0.23  --   --   --   --   --    --   --  0.26  --   --    EOS ABS  --   --  0.09  --   --   --   --   --   --   --  0.06  --   --    MCV 86.4 88.0 87.2  --   --  86.3  --   --  87.5  --  85.5  --   --    SED RATE  --   --   --   --   --   --   --   --   --   --   --   --  58*   CRP  --   --   --   --   --   --   --   --   --   --   --   --  5.92*   PROCALCITONIN  --   --   --   --   --   --   --   --   --  0.40*  --   --   --    LACTATE  --   --   --   --   --   --   --   --   --   --   --   --  1.4   PROTIME 15.7* 15.4* 15.9*  --   --  16.2*  --   --   --  17.2* 18.0*  --   --    HEPARIN ANTI-XA  --   --  0.31 0.23* 0.31  --  0.41 0.38  --  0.25* 0.35   < > 0.25*    < > = values in this interval not displayed.         Lab 01/11/24  0606 01/10/24  0509 01/09/24 0920 01/08/24 0457 01/07/24  0518   SODIUM 139 137 137 134* 136   POTASSIUM 4.5 4.4 4.7 4.8 4.9   CHLORIDE 102 103 103 104 103   CO2 24.0 22.0 18.0* 19.0* 18.0*   ANION GAP 13.0 12.0 16.0* 11.0 15.0   BUN 22 28* 28* 31* 33*   CREATININE 2.06* 2.65* 2.99* 3.24* 3.87*   EGFR 26.8* 19.8* 17.1* 15.6* 12.6*   GLUCOSE 128* 126* 213* 226* 167*   CALCIUM 8.6 8.8 8.6 8.3* 8.5*         Lab 01/09/24 0920 01/08/24 0457 01/07/24  0518 01/06/24  0737 01/05/24  0501   TOTAL PROTEIN 6.3 5.9* 6.3 6.2 5.9*   ALBUMIN 3.7 3.2* 3.7 3.5 3.5   GLOBULIN 2.6 2.7 2.6 2.7 2.4   ALT (SGPT) 41* 42* 43* 31 29   AST (SGOT) 32 33* 49* 36* 28   BILIRUBIN 0.3 0.2 0.3 0.3 0.2   ALK PHOS 234* 213* 225* 212* 204*         Lab 01/11/24  0606 01/10/24  0509 01/09/24  0920 01/08/24  0457 01/07/24  0518   PROTIME 15.7* 15.4* 15.9* 16.2* 17.2*   INR 1.24* 1.21* 1.25* 1.29* 1.39*             Lab 01/08/24  1113 01/06/24  0737   IRON  --  15*   IRON SATURATION (TSAT)  --  5*   TIBC  --  279*   TRANSFERRIN  --  187*   FERRITIN  --  1,188.00*   ABO TYPING O  --    RH TYPING Positive  --    ANTIBODY SCREEN Negative  --          Brief Urine Lab Results  (Last result in the past 365 days)        Color   Clarity   Blood   Leuk Est    Nitrite   Protein   CREAT   Urine HCG        01/02/24 0011             142.2         01/02/24 0011 Yellow   Cloudy   Small (1+)   Negative   Negative   100 mg/dL (2+)                   Microbiology Results Abnormal       Procedure Component Value - Date/Time    Blood Culture - Blood, Hand, Left [[id number]]  (Normal) Collected: 01/08/24 1017    Lab Status: Preliminary result Specimen: Blood from Hand, Left Updated: 01/11/24 1030     Blood Culture No growth at 3 days    Blood Culture - Blood, Arm, Left [[id number]]  (Normal) Collected: 01/08/24 1018    Lab Status: Preliminary result Specimen: Blood from Arm, Left Updated: 01/11/24 1030     Blood Culture No growth at 3 days    Blood Culture - Blood, Arm, Left [[id number]]  (Normal) Collected: 01/06/24 0056    Lab Status: Final result Specimen: Blood from Arm, Left Updated: 01/11/24 0815     Blood Culture No growth at 5 days    Blood Culture - Blood, Arm, Left [[id number]]  (Normal) Collected: 01/05/24 1641    Lab Status: Final result Specimen: Blood from Arm, Left Updated: 01/10/24 1815     Blood Culture No growth at 5 days    Narrative:      Less than seven (7) mL's of blood was collected.  Insufficient quantity may yield false negative results.    Respiratory Panel PCR w/COVID-19(SARS-CoV-2) LUIS/ESTHER/KARSON/PAD/COR/LEONARDO In-House, NP Swab in UTM/VTM, 2 HR TAT - Swab, Nasopharynx [[id number]]  (Normal) Collected: 01/05/24 1634    Lab Status: Final result Specimen: Swab from Nasopharynx Updated: 01/05/24 1804     ADENOVIRUS, PCR Not Detected     Coronavirus 229E Not Detected     Coronavirus HKU1 Not Detected     Coronavirus NL63 Not Detected     Coronavirus OC43 Not Detected     COVID19 Not Detected     Human Metapneumovirus Not Detected     Human Rhinovirus/Enterovirus Not Detected     Influenza A PCR Not Detected     Influenza B PCR Not Detected     Parainfluenza Virus 1 Not Detected     Parainfluenza Virus 2 Not Detected     Parainfluenza Virus 3 Not Detected      Parainfluenza Virus 4 Not Detected     RSV, PCR Not Detected     Bordetella pertussis pcr Not Detected     Bordetella parapertussis PCR Not Detected     Chlamydophila pneumoniae PCR Not Detected     Mycoplasma pneumo by PCR Not Detected    Narrative:      In the setting of a positive respiratory panel with a viral infection PLUS a negative procalcitonin without other underlying concern for bacterial infection, consider observing off antibiotics or discontinuation of antibiotics and continue supportive care. If the respiratory panel is positive for atypical bacterial infection (Bordetella pertussis, Chlamydophila pneumoniae, or Mycoplasma pneumoniae), consider antibiotic de-escalation to target atypical bacterial infection.    COVID PRE-OP / PRE-PROCEDURE SCREENING ORDER (NO ISOLATION) - Swab, Nasopharynx [[id number]]  (Normal) Collected: 01/01/24 2330    Lab Status: Final result Specimen: Swab from Nasopharynx Updated: 01/02/24 0016    Narrative:      The following orders were created for panel order COVID PRE-OP / PRE-PROCEDURE SCREENING ORDER (NO ISOLATION) - Swab, Nasopharynx.  Procedure                               Abnormality         Status                     ---------                               -----------         ------                     COVID-19, FLU A/B, RSV P...[[id number]]  Normal              Final result                 Please view results for these tests on the individual orders.    COVID-19, FLU A/B, RSV PCR 1 HR TAT - Swab, Nasopharynx [[id number]]  (Normal) Collected: 01/01/24 2330    Lab Status: Final result Specimen: Swab from Nasopharynx Updated: 01/02/24 0016     COVID19 Not Detected     Influenza A PCR Not Detected     Influenza B PCR Not Detected     RSV, PCR Not Detected    Narrative:      Fact sheet for providers: https://www.fda.gov/media/598074/download    Fact sheet for patients: https://www.fda.gov/media/202961/download    Test performed by PCR.            CT Maxillofacial Without  Contrast    Result Date: 1/9/2024  CT MAXILLOFACIAL WO CONT Date of Exam: 1/9/2024 5:18 PM EST Indication: Maxillofacial pain. Comparison: None. Technique: Axial CT images were obtained from the inferior aspect of the mandible through the frontal sinuses without contrast administration.  Reconstructed coronal and sagittal images were also obtained. Automated exposure control and iterative construction methods were used. Findings: The visualized intracranial contents are unremarkable. The globes and orbits appear intact. The nasopharynx is widely patent. No loculated fluid collection is identified to suggest an abscess. No acute fracture is identified. The paranasal sinuses are clear. The mastoid air cells are well aerated. No periapical lucency is identified along the teeth. The nasopharynx and oropharynx have a normal configuration. The larynx appears normal.     Impression: Impression: No acute process identified. Electronically Signed: Danis Jacobson MD  1/9/2024 8:05 PM EST  Workstation ID: [id number]     Results for orders placed during the hospital encounter of 01/01/24    Adult Transesophageal Echo 3D (MEAGAN) W/ Cont If Necessary Per Protocol    Interpretation Summary    Left ventricular ejection fraction appears to be 56 - 60%.    The right ventricular cavity is mildly dilated.  There is a electronically in the RV with no visible adherent mass or vegetation.    The right atrium is mildly dilated, there is electronic lead present with no visible vegetation.    There is a mechanical mitral valve with moderately increased gradients similar to most recent TTE.  The bileaflet valve appears to be functioning normally with no visible thrombus or vegetation.  There is mild MR around the periphery with some potential perivalvular leak.    Aortic valve is mildly calcified with trace AR, no vegetation seen.    The tricuspid valve appears structurally normal with no vegetation.  There is moderate to severe central  tricuspid regurgitation around the site of the lead transversing the valve.      Current medications:  Scheduled Meds:cefTRIAXone, 2,000 mg, Intravenous, Q24H  enoxaparin, 90 mg, Subcutaneous, Q24H  escitalopram, 20 mg, Oral, Daily  ferrous sulfate, 325 mg, Oral, Daily With Breakfast  folic acid, 800 mcg, Oral, Daily  insulin detemir, 5 Units, Subcutaneous, Nightly  insulin lispro, 2-7 Units, Subcutaneous, 4x Daily AC & at Bedtime  Insulin Lispro, 3 Units, Subcutaneous, TID With Meals  oxybutynin XL, 5 mg, Oral, Daily  pantoprazole, 40 mg, Oral, Q AM  polyethylene glycol, 17 g, Oral, Daily  senna-docusate sodium, 1 tablet, Oral, BID  sodium bicarbonate, 1,300 mg, Oral, TID  sodium chloride, 10 mL, Intravenous, Q12H  warfarin, 7.5 mg, Oral, Daily      Continuous Infusions:Pharmacy to dose warfarin,       PRN Meds:.  acetaminophen    albuterol    dextrose    dextrose    glucagon (human recombinant)    melatonin    naloxone    nitroglycerin    oxyCODONE-acetaminophen    Pharmacy to dose warfarin    sodium chloride    sodium chloride    Assessment & Plan   Assessment & Plan     Active Hospital Problems    Diagnosis  POA    **Renal failure [N19]  Yes    Hyponatremia [E87.1]  Yes    Hyperkalemia [E87.5]  Yes    Type 2 diabetes mellitus with hyperglycemia [E11.65]  Yes    Elevated LFTs [R79.89]  Yes    Anemia, chronic disease [D63.8]  Yes    Cervical lymphadenopathy [R59.0]  Yes    GI bleed [K92.2]  Yes    H/O heart valve replacement with mechanical valve [Z95.2]  Not Applicable    ARF (acute renal failure) [N17.9]  Yes    Left bundle branch hemiblock [I44.60]  Yes    Type 2 diabetes mellitus without complication, with long-term current use of insulin [E11.9, Z79.4]  Not Applicable    Dyslipidemia [E78.5]  Yes    Emphysema/COPD [J43.9]  Yes    Hypertension [I10]  Yes    Arteriovenous malformation of gastrointestinal tract [K55.20]  Yes    Obstructive sleep apnea syndrome [G47.33]  Yes    Chronic anticoagulation [Z79.01]   Not Applicable      Resolved Hospital Problems   No resolved problems to display.        Brief Hospital Course to date:  Di Lopez is a 63 y.o. 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 Found to have severe MEERA, resolution of BRBPR, persistent fever/chills and thigh pain     Epistaxis -resolved   - afrin x 3 days with improvement     Febrile illness, unclear etiology -- improved   -negative full viral panel, negative monospot  -continue to suspect viral illness (throat soreness, fevers, back pain/aches, etc), given new thigh pain consideration of discitis but MRI withOUT contrast negative  - CT abd/pel with no acute findings  - Bcx x 2 different sets NGTD  - MEAGAN without vegetation  -CT maxillofacial with no acute process  - ID consulted --appreciate recs  - Continue rocephin --  DC on augmentin 500 mg BID with close outpatient ID follow up      Acute Renal Failure 2/2 hypovolemia + med effect - improved  - 2/2 acute diarrheal illness + entresto, NSAIDs  - Nephrology consulted and appreciate recs   - improved with fluids and holding nephrotoxins.   - monitor with intermittent diuresis      Hypovolemic hyponatremia - improved  - remains stable off IVF     Acute anemia 2/2 bloody diarrheal illness - blood diarrhea resolved; epistaxis  - 1 units PRBC 1/8 with appropriate response  - slight trend down. AM labs.      Mechanical mitral valve on coumadin - Initially on heparin gtt. No continued intervention planned. Changed to lovenox and resume warfarin. Follows with Dr. Everett with home monitor      AoCHFpEF (EF 45-50%), s/p ICD   - now issue with overload. Receiving intermittent diuresis. Monitor I/os     Chronic hypoxic resp failure 2/2 COPD - on home 2 liters n/c  DMII - basal/bolus regimen  Anxiety/depression  BMI 38 - complicates all aspects of care   Liver mass s/p embolization     Expected  Discharge Location and Transportation: home  Expected Discharge   Expected Discharge Date: 1/12/2024; Expected Discharge Time:      DVT prophylaxis:  Medical DVT prophylaxis orders are present.     AM-PAC 6 Clicks Score (PT): 22 (01/11/24 0832)    CODE STATUS:   Code Status and Medical Interventions:   Ordered at: 01/02/24 0319     Code Status (Patient has no pulse and is not breathing):    CPR (Attempt to Resuscitate)     Medical Interventions (Patient has pulse or is breathing):    Full Support       Sarah Sherman DO  01/11/24

## 2024-01-11 NOTE — PLAN OF CARE
Goal Outcome Evaluation:  Plan of Care Reviewed With: patient        Progress: improving  Outcome Evaluation: Patient with less c/o pain today. She demonstrated good control of walker but edema in feet noted.      Anticipated Discharge Disposition (PT): home, home with home health

## 2024-01-11 NOTE — PLAN OF CARE
Goal Outcome Evaluation:      Patient had increase in swelling to BLE, Bumex bid per nephrology. Vitals signs WNL. Will continue to monitor.

## 2024-01-11 NOTE — PROGRESS NOTES
"   LOS: 9 days    Patient Care Team:  Davina Santiago DO as PCP - General (Family Medicine)  Ameena Iglesias MD as Consulting Physician (Endocrinology)  Nakita Freire APRN as Nurse Practitioner (Nurse Practitioner)  Raimundo Thomas MD as Consulting Physician (Pulmonary Disease)  Nadir Everett MD as Consulting Physician (Cardiology)  Luiz Longoria MD as Consulting Physician (Pain Medicine)    Subjective     Stable overnight.  No further diarrhea.  Shortness of breath a little worse today.    Objective     Vital Signs:  Blood pressure 135/49, pulse 63, temperature 98.5 °F (36.9 °C), temperature source Oral, resp. rate 18, height 152.4 cm (60\"), weight 95.1 kg (209 lb 11.2 oz), last menstrual period 06/17/1998, SpO2 98%, not currently breastfeeding.      Intake/Output Summary (Last 24 hours) at 1/11/2024 1346  Last data filed at 1/11/2024 1330  Gross per 24 hour   Intake 1000 ml   Output --   Net 1000 ml        01/10 0701 - 01/11 0700  In: 700 [P.O.:700]  Out: 600 [Urine:600]    Physical Exam:        GENERAL: WD AAF NAD  NEURO: Awake and alert, oriented. No focal deficit  PSYCHIATRIC: NMA. Cooperative with PE  EYE: PE, no icterus, no conjunctivitis  ENT: ommm, dentition intact,  Hearing intact  NECK: Supple , No JVD discernable,  Trachea midline  CV: + Edema, RRR  LUNGS:  Quiet,  Nonlabored resp.  Symmetrical expansion  ABDOMEN: Nondistended, soft nontender.  : No Perdomo, no palp bladder  SKIN: Warm and dry without rash      Labs:  Results from last 7 days   Lab Units 01/11/24  0606 01/10/24  0509 01/09/24  0920   WBC 10*3/mm3 4.53 4.49 5.30   HEMOGLOBIN g/dL 7.7* 7.9* 8.3*   PLATELETS 10*3/mm3 198 212 225     Results from last 7 days   Lab Units 01/11/24  0606 01/10/24  0509 01/09/24  0920 01/08/24  0457 01/07/24  0518 01/06/24  0737   SODIUM mmol/L 139 137 137 134* 136 137   POTASSIUM mmol/L 4.5 4.4 4.7 4.8 4.9 5.2   CHLORIDE mmol/L 102 103 103 104 103 105   CO2 mmol/L 24.0 22.0 18.0* 19.0* " 18.0* 18.0*   BUN mg/dL 22 28* 28* 31* 33* 37*   CREATININE mg/dL 2.06* 2.65* 2.99* 3.24* 3.87* 3.80*   CALCIUM mg/dL 8.6 8.8 8.6 8.3* 8.5* 8.3*   ALBUMIN g/dL  --   --  3.7 3.2* 3.7 3.5     Results from last 7 days   Lab Units 01/09/24  0920   ALK PHOS U/L 234*   BILIRUBIN mg/dL 0.3   ALT (SGPT) U/L 41*   AST (SGOT) U/L 32                   Estimated Creatinine Clearance: 29.2 mL/min (A) (by C-G formula based on SCr of 2.06 mg/dL (H)).         A/P:    ARF: Creatinine continues to improve daily.  Urine output continues nonoliguric with unmeasured voids.  Creatinine up to 8 on presentation with likely dehydration due to diarrhea with poor p.o. intake in the setting of NSAIDs and Entresto.  Previous baseline 0.9.  Last visit 11/23 creatinine had increased initially but improved to 1.1 on discharge.  Urine sodium 20 consistent with prerenal azotemia.  Renal function had been improving with hydration.  IV fluids held to avoid overload.   For now continue supportive care.  Strict I's and O's.  Monitor for further renal recovery.    HTN: Blood pressure stable.    Hyponatremia: Borderline.  Initially down to 129 on presentation due to hypovolemia and MEERA.  All fluids isotonic.     Hyperkalemia: Stable.  Due to MEERA.     Met Aciodsis: Persistent on p.o. bicarb.  Due to renal failure and underlying GI losses with diarrhea.  Monitor for now.     Anemia.  Hemoglobin below goal.  Hx of supra therapeutic INR w rectal bleeding in the past. Admitted again with concern for rectal bleed.  Iron saturation quite low with elevated ferritin.  Transfuse as needed for Hgb <7.    Volume status: Negative overnight.  Likely dehydrated on admission.  Creatinine improved with hydration.  IV fluids on hold for now as patient with history of CHF and had developed some mild edema.  Edema worsening .  Will give additional Bumex today.  Continue strict I's and O's.  Awaiting standing weight.  May be able to do sliding scale diuretics once EDW  established.     Hx of mechanical valve.  On anticoagulation     CHF: EF 40-45%. Hx of MV and depressed RV function. On beta blocker and lasix at home. Not on ACEi/ARB/MRA/ or SGLT-2inh for now.    High risk and complexity patient.    Justin Rodriguez MD  01/11/24  13:46 EST

## 2024-01-11 NOTE — PROGRESS NOTES
"  INFECTIOUS DISEASES  INPATIENT PROGRESS NOTE  2024      PATIENT NAME: Di Lopez  :  1960  MRN:  [id number]  Date of Admission:  2024      Antimicrobials:  Day 5:  ceftriaxone      MAR reviewed.  Pertinent other medications include:  Lovenox/warfarin    Chief Complaint   Patient presents with    Rectal Bleeding       Reason for consultation:  fevers    Interval history: No fever for past 3 days.  Tolerating antibiotic.  Renal function improving.  She has noticed some increased edema in her legs today.  Had some tongue pain and thought she may have some thrush.    ROS:  No new rashes.  No SOB or chest pain.  No nausea/vomiting/diarrhea.     Objective:  Temp (24hrs), Av.5 °F (36.9 °C), Min:97.9 °F (36.6 °C), Max:99.3 °F (37.4 °C)    /62 (BP Location: Right arm, Patient Position: Lying)   Pulse 71   Temp 98.2 °F (36.8 °C) (Oral)   Resp 18   Ht 152.4 cm (60\")   Wt 94 kg (207 lb 3.2 oz)   LMP 1998   SpO2 98%   BMI 40.47 kg/m²     Physical Examination:  GENERAL: Chronically ill-appearing female.  Awake and alert, in no acute distress.   HEENT: broken right mandibular tooth with necrosis.  + White plaques on buccal mucosa.  CV: RRR. +faint murmur and mechanical click.  ICD pocket without TTP.  LUNGS: CTAB.  Good air movement.  ABDOMEN: Positive bowel sounds.  Soft, nontender, nondistended.  EXT:  2+ B LE edema.  MSK: No joint effusions or inflammation noted.  SKIN: Warm and dry without rash or ulcer.   NEURO: A&Ox4. No focal deficits.  Face symmetric.  Speech fluent.  Moves all extremities well.   PSYCHIATRIC: Normal insight and judgement.  Cooperative.  Normal affect.    Laboratory Data:    Results from last 7 days   Lab Units 24  0606 01/10/24  0509 24  0920   WBC 10*3/mm3 4.53 4.49 5.30   HEMOGLOBIN g/dL 7.7* 7.9* 8.3*   HEMATOCRIT % 24.2* 25.0* 25.8*   PLATELETS 10*3/mm3 198 212 225     Results from last 7 days   Lab Units 24  0606   SODIUM mmol/L 139 "   POTASSIUM mmol/L 4.5   CHLORIDE mmol/L 102   CO2 mmol/L 24.0   BUN mg/dL 22   CREATININE mg/dL 2.06*   GLUCOSE mg/dL 128*   CALCIUM mg/dL 8.6     Results from last 7 days   Lab Units 01/09/24  0920   ALK PHOS U/L 234*   BILIRUBIN mg/dL 0.3   ALT (SGPT) U/L 41*   AST (SGOT) U/L 32     Results from last 7 days   Lab Units 01/05/24  1640   SED RATE mm/hr 58*     Results from last 7 days   Lab Units 01/05/24  1640   CRP mg/dL 5.92*     Estimated Creatinine Clearance: 29 mL/min (A) (by C-G formula based on SCr of 2.06 mg/dL (H)).  Results from last 7 days   Lab Units 01/05/24  1640   LACTATE mmol/L 1.4     Results from last 7 days   Lab Units 01/07/24  0518   CK TOTAL U/L 191*               Microbiology:    Microbiology Results (last 10 days)       Procedure Component Value - Date/Time    Blood Culture - Blood, Arm, Left [[id number]]  (Normal) Collected: 01/08/24 1018    Lab Status: Preliminary result Specimen: Blood from Arm, Left Updated: 01/11/24 1030     Blood Culture No growth at 3 days    Blood Culture - Blood, Hand, Left [[id number]]  (Normal) Collected: 01/08/24 1017    Lab Status: Preliminary result Specimen: Blood from Hand, Left Updated: 01/11/24 1030     Blood Culture No growth at 3 days    Blood Culture - Blood, Arm, Left [[id number]]  (Normal) Collected: 01/06/24 0056    Lab Status: Final result Specimen: Blood from Arm, Left Updated: 01/11/24 0815     Blood Culture No growth at 5 days    Blood Culture - Blood, Arm, Left [[id number]]  (Normal) Collected: 01/05/24 1641    Lab Status: Final result Specimen: Blood from Arm, Left Updated: 01/10/24 1815     Blood Culture No growth at 5 days    Narrative:      Less than seven (7) mL's of blood was collected.  Insufficient quantity may yield false negative results.    Respiratory Panel PCR w/COVID-19(SARS-CoV-2) [facility]/ESTHER/KARSON/PAD/COR/LEONARDO In-House, NP Swab in UTM/VTM, 2 HR TAT - Swab, Nasopharynx [[id number]]  (Normal) Collected: 01/05/24 9504    Lab Status: Final  result Specimen: Swab from Nasopharynx Updated: 01/05/24 1804     ADENOVIRUS, PCR Not Detected     Coronavirus 229E Not Detected     Coronavirus HKU1 Not Detected     Coronavirus NL63 Not Detected     Coronavirus OC43 Not Detected     COVID19 Not Detected     Human Metapneumovirus Not Detected     Human Rhinovirus/Enterovirus Not Detected     Influenza A PCR Not Detected     Influenza B PCR Not Detected     Parainfluenza Virus 1 Not Detected     Parainfluenza Virus 2 Not Detected     Parainfluenza Virus 3 Not Detected     Parainfluenza Virus 4 Not Detected     RSV, PCR Not Detected     Bordetella pertussis pcr Not Detected     Bordetella parapertussis PCR Not Detected     Chlamydophila pneumoniae PCR Not Detected     Mycoplasma pneumo by PCR Not Detected    Narrative:      In the setting of a positive respiratory panel with a viral infection PLUS a negative procalcitonin without other underlying concern for bacterial infection, consider observing off antibiotics or discontinuation of antibiotics and continue supportive care. If the respiratory panel is positive for atypical bacterial infection (Bordetella pertussis, Chlamydophila pneumoniae, or Mycoplasma pneumoniae), consider antibiotic de-escalation to target atypical bacterial infection.    COVID PRE-OP / PRE-PROCEDURE SCREENING ORDER (NO ISOLATION) - Swab, Nasopharynx [[id number]]  (Normal) Collected: 01/01/24 2330    Lab Status: Final result Specimen: Swab from Nasopharynx Updated: 01/02/24 0016    Narrative:      The following orders were created for panel order COVID PRE-OP / PRE-PROCEDURE SCREENING ORDER (NO ISOLATION) - Swab, Nasopharynx.  Procedure                               Abnormality         Status                     ---------                               -----------         ------                     COVID-19, FLU A/B, RSV P...[[id number]]  Normal              Final result                 Please view results for these tests on the individual orders.     COVID-19, FLU A/B, RSV PCR 1 HR TAT - Swab, Nasopharynx [[id number]]  (Normal) Collected: 01/01/24 2330    Lab Status: Final result Specimen: Swab from Nasopharynx Updated: 01/02/24 0016     COVID19 Not Detected     Influenza A PCR Not Detected     Influenza B PCR Not Detected     RSV, PCR Not Detected    Narrative:      Fact sheet for providers: https://www.fda.gov/media/676654/download    Fact sheet for patients: https://www.fda.gov/media/414871/download    Test performed by PCR.          Monospot negative      Radiology:  No radiology results for the last day    MEAGAN:    Left ventricular ejection fraction appears to be 56 - 60%.    The right ventricular cavity is mildly dilated.  There is a electronically in the RV with no visible adherent mass or vegetation.    The right atrium is mildly dilated, there is electronic lead present with no visible vegetation.    There is a mechanical mitral valve with moderately increased gradients similar to most recent TTE.  The bileaflet valve appears to be functioning normally with no visible thrombus or vegetation.  There is mild MR around the periphery with some potential perivalvular leak.    Aortic valve is mildly calcified with trace AR, no vegetation seen.    The tricuspid valve appears structurally normal with no vegetation.  There is moderate to severe central tricuspid regurgitation around the site of the lead transversing the valve.      DISCUSSION:  63 y.o. female with history of modified replacement and implanted defibrillator admitted with acute kidney injury and GI bleed.   Patient reported cervical adenopathy confirmed on CT imaging and developed fever while hospitalized.  Resp PCR and monospot negative.    PROBLEM LIST:   -- Undifferentiated fever with sweats - intermittent/improved  COVID-19/flu/RSV PCR negative.  Full resp PCR negative.  Blood cultures obtained.  At risk for endocarditis with implanted ICD and valve replacement.  Recent dental issues with  broken/necrotic right mandibular tooth and painful left maxillary at prior root canal; at risk for odontogenic source bacteremia/cardiac device infection.  MEAGAN without valve or lead vegetation.  Maxillofacial CT without acute process or abscess.  -- Cervical adenopathy - resolving.  No pharyngitis or sinusitis.  Monospot negative.  -- Recent Candida esophagitis.  HIV negative.  -- DMII.  -- Implanted defibrillator.  -- History of mitral valve replacement.  On chronic anticoagulation.  -- MEERA, improving.  -- Rectal bleeding and anemia.  CT without acute intra-abdominal abnormality.  -- Back pain/sciatica symptoms; MRI L-spine with degenerative changes; no acute abnormality.  -- Oral thrush.    PLAN:  -- Following blood cultures - remain no growth  -- Continue empiric ceftriaxone 2 g iv daily for potential odontogenic source of fever given her dental issues  -- As blood cultures remain negative and fever resolving, will recommend discharge with prolonged oral Augmentin 500 mg po bid until she can have her dental issues addressed outpatient with oral surgery.  -- Follow-up with me in 2 weeks  -- Add nystatin swish and swallow for oral thrush    Complex case.  Plan discussed with patient.    Maciej Willett MD  1/11/2024  17:48 EST

## 2024-01-11 NOTE — PLAN OF CARE
Problem: Adult Inpatient Plan of Care  Goal: Plan of Care Review  Outcome: Ongoing, Progressing  Goal: Patient-Specific Goal (Individualized)  Outcome: Ongoing, Progressing  Goal: Absence of Hospital-Acquired Illness or Injury  Outcome: Ongoing, Progressing  Intervention: Identify and Manage Fall Risk  Recent Flowsheet Documentation  Taken 1/11/2024 0000 by Jolie Palacio RN  Safety Promotion/Fall Prevention:   assistive device/personal items within reach   clutter free environment maintained   fall prevention program maintained   nonskid shoes/slippers when out of bed   room organization consistent   safety round/check completed  Taken 1/10/2024 2200 by Jolie Palacio RN  Safety Promotion/Fall Prevention:   assistive device/personal items within reach   clutter free environment maintained   fall prevention program maintained   nonskid shoes/slippers when out of bed   room organization consistent   safety round/check completed  Taken 1/10/2024 2000 by Jolie Palacio RN  Safety Promotion/Fall Prevention:   assistive device/personal items within reach   clutter free environment maintained   fall prevention program maintained   nonskid shoes/slippers when out of bed   room organization consistent   safety round/check completed  Intervention: Prevent Skin Injury  Recent Flowsheet Documentation  Taken 1/11/2024 0000 by Jolie Palacio RN  Body Position: position changed independently  Skin Protection:   adhesive use limited   tubing/devices free from skin contact  Taken 1/10/2024 2200 by Jolie Palacio RN  Body Position: position changed independently  Skin Protection:   adhesive use limited   tubing/devices free from skin contact  Taken 1/10/2024 2000 by Jolie Palacio RN  Body Position: position changed independently  Skin Protection:   adhesive use limited   tubing/devices free from skin contact  Goal: Optimal Comfort and Wellbeing  Outcome: Ongoing, Progressing  Goal: Readiness for Transition of Care  Outcome: Ongoing,  Progressing     Problem: COPD (Chronic Obstructive Pulmonary Disease) Comorbidity  Goal: Maintenance of COPD Symptom Control  Outcome: Ongoing, Progressing     Problem: Diabetes Comorbidity  Goal: Blood Glucose Level Within Targeted Range  Outcome: Ongoing, Progressing     Problem: Heart Failure Comorbidity  Goal: Maintenance of Heart Failure Symptom Control  Outcome: Ongoing, Progressing     Problem: Hypertension Comorbidity  Goal: Blood Pressure in Desired Range  Outcome: Ongoing, Progressing     Problem: Obstructive Sleep Apnea Risk or Actual Comorbidity Management  Goal: Unobstructed Breathing During Sleep  Outcome: Ongoing, Progressing     Problem: Pain Chronic (Persistent) (Comorbidity Management)  Goal: Acceptable Pain Control and Functional Ability  Outcome: Ongoing, Progressing     Problem: Fall Injury Risk  Goal: Absence of Fall and Fall-Related Injury  Outcome: Ongoing, Progressing  Intervention: Promote Injury-Free Environment  Recent Flowsheet Documentation  Taken 1/11/2024 0000 by Jolie Palacio RN  Safety Promotion/Fall Prevention:   assistive device/personal items within reach   clutter free environment maintained   fall prevention program maintained   nonskid shoes/slippers when out of bed   room organization consistent   safety round/check completed  Taken 1/10/2024 2200 by Jolie Palacio RN  Safety Promotion/Fall Prevention:   assistive device/personal items within reach   clutter free environment maintained   fall prevention program maintained   nonskid shoes/slippers when out of bed   room organization consistent   safety round/check completed  Taken 1/10/2024 2000 by Jolie Palacio RN  Safety Promotion/Fall Prevention:   assistive device/personal items within reach   clutter free environment maintained   fall prevention program maintained   nonskid shoes/slippers when out of bed   room organization consistent   safety round/check completed   Goal Outcome Evaluation:

## 2024-01-11 NOTE — THERAPY TREATMENT NOTE
Patient Name: Di Lopez  : 1960    MRN: [id number]                              Today's Date: 2024       Admit Date: 2024    Visit Dx:     ICD-10-CM ICD-9-CM   1. Acute renal failure, unspecified acute renal failure type  N17.9 584.9   2. Hyperkalemia  E87.5 276.7   3. Gastrointestinal hemorrhage, unspecified gastrointestinal hemorrhage type  K92.2 578.9   4. Acute renal failure superimposed on chronic kidney disease, unspecified acute renal failure type, unspecified CKD stage  N17.9 584.9    N18.9 585.9   5. Chronic respiratory failure with hypoxia  J96.11 518.83     799.02   6. Weakness  R53.1 780.79   7. Dysphagia, unspecified type  R13.10 787.20     Patient Active Problem List   Diagnosis    Arteriovenous malformation of gastrointestinal tract    Depression    Type 2 diabetes mellitus without complication, with long-term current use of insulin    Dyslipidemia    Emphysema/COPD    Hypertension    Insomnia    Morbid obesity    Obstructive sleep apnea syndrome    Chronic anticoagulation    Normocytic anemia    Sliding hiatal hernia    Vitamin B12 deficiency    Iron deficiency    Iron malabsorption    Bronchiectasis without complication    Electronic cigarette use    Chronic diastolic heart failure    Left bundle branch hemiblock    Chronic respiratory failure with hypoxia    Body aches    Headaches due to old head injury    History of tobacco use, presenting hazards to health    Multiple nodules of lung    Nocturnal hypoxemia    Screening for colon cancer    Abnormal CT scan    Wellness examination    ARF (acute renal failure)    H/O heart valve replacement with mechanical valve    GI bleed    Severe malnutrition    Hyponatremia    Hyperkalemia    Type 2 diabetes mellitus with hyperglycemia    Elevated LFTs    Anemia, chronic disease    Cervical lymphadenopathy    Renal failure     Past Medical History:   Diagnosis Date    Allergic     Anemia     Anticoagulated     WARFARIN    Arteriovenous  malformation of gastrointestinal tract 05/12/2016    Arthritis     Asthma     Black hairy tongue     CHF (congestive heart failure)     Chronic back pain     Coronary artery disease     Depression 05/12/2016    Diabetes mellitus     Dyslipidemia 05/12/2016    Elevated cholesterol     Emphysema/COPD 05/12/2016    Emphysema/COPD 05/12/2016    Fatigue     GERD (gastroesophageal reflux disease)     Headache     Herniated cervical disc     History of echocardiogram 07/16/2013    Left ventricular systolic function at the lower limits of normal; EF 50-55%; mild MR; mechanical prosthetic mitral valve present; mod TR    History of transfusion     NO REACTION; [facility]    Hyperlipidemia     Hypertension     Insomnia 05/12/2016    Iron deficiency     A.  The patient was treated with IV iron replacement. B.  Secondary to GI bleed, status post EGD with cauterization of blood vessel to the duodenum, 7/16/2014.    Left bundle branch hemiblock 10/12/2020    Left shoulder strain     Liver disease     Liver mass     Long term current use of anticoagulant 05/12/2016    Low back pain     Lung nodules     PER DR. LEVI'S NOTE 10/22    Morbid obesity 05/12/2016    Neuromuscular disorder 7/28/2016    Obstructive sleep apnea syndrome 05/12/2016    Description: A.  Moderate; CPAP therapy..    Sliding hiatal hernia 07/28/2016    Description: A.  Reported on EGD, 12/30/2011.    Type 2 diabetes mellitus 05/12/2016    Urinary tract infection     Uterine cancer     HYSTERECTOMY    Vitamin B12 deficiency     A.  The patient was treated with vitamin B12 replacement.    Wears eyeglasses      Past Surgical History:   Procedure Laterality Date    BILATERAL OOPHORECTOMY  2009    Status post bilateral oophorectomy secondary to ovarian cysts     BREAST BIOPSY Right     STEREOTACTIC    BREAST EXCISIONAL BIOPSY Bilateral     CARDIAC ELECTROPHYSIOLOGY PROCEDURE N/A 10/15/2020    Procedure: Biventricular Device Insertion;  Surgeon: Nadir Everett MD;   Location:  [facility];  Service: Cardiology;  Laterality: N/A;    CARDIAC SURGERY  2003    CARPAL TUNNEL RELEASE Bilateral     COLONOSCOPY  12/28/2012    COLONOSCOPY N/A 5/23/2023    Procedure: COLONOSCOPY;  Surgeon: Gt Fernandes MD;  Location:  [facility];  Service: Gastroenterology;  Laterality: N/A;    COLONOSCOPY N/A 11/16/2023    Procedure: COLONOSCOPY;  Surgeon: Brunner, Mark I, MD;  Location:  [facility];  Service: Gastroenterology;  Laterality: N/A;    CYSTECTOMY      removal of ganglion cyst from left wrist    ENDOSCOPY  12/30/2011    DIAGNOSTIC ESOPHAGOGASTRODUODENOSCOPY    ENDOSCOPY N/A 11/16/2023    Procedure: ESOPHAGOGASTRODUODENOSCOPY;  Surgeon: Brunner, Mark I, MD;  Location:  [facility];  Service: Gastroenterology;  Laterality: N/A;    HYSTERECTOMY  1995    A.  Status post hysterectomy for early stage endometrial cancer done in 1995.    MITRAL VALVE REPLACEMENT  03/03/2008    Dr. Hima Barreto; MECHANICAL    OTHER SURGICAL HISTORY      LIVER MASS BLOOD SUPPLY CLIPPED ON RIGHT AND LEFT SIDE PER PT      General Information       Row Name 01/11/24 1616          Physical Therapy Time and Intention    Document Type therapy note (daily note)  -SC     Mode of Treatment physical therapy  -SC       Row Name 01/11/24 1616          General Information    Patient Profile Reviewed yes  -SC     Existing Precautions/Restrictions fall;oxygen therapy device and L/min  -SC       Row Name 01/11/24 1616          Cognition    Orientation Status (Cognition) oriented x 4  -SC       Row Name 01/11/24 1616          Safety Issues, Functional Mobility    Impairments Affecting Function (Mobility) endurance/activity tolerance;pain;strength;balance  -SC     Comment, Safety Issues/Impairments (Mobility) alert, following commands  -SC               User Key  (r) = Recorded By, (t) = Taken By, (c) = Cosigned By      Initials Name Provider Type    SC Isaías Kumar, PT Physical Therapist                    Mobility       Row Name 01/11/24 1617          Bed Mobility    Bed Mobility scooting/bridging;sit-supine  -SC     Scooting/Bridging [place] (Bed Mobility) independent  -SC     Sit-Supine [place] (Bed Mobility) modified independence  -SC     Assistive Device (Bed Mobility) head of bed elevated;bed rails  -SC     Comment, (Bed Mobility) able to get back to bed and scoot up in bed  -SC       Row Name 01/11/24 1617          Transfers    Comment, (Transfers) demonstrated safe mobility  -SC       Row Name 01/11/24 1617          Sit-Stand Transfer    Sit-Stand [place] (Transfers) modified independence  -SC     Assistive Device (Sit-Stand Transfers) walker, front-wheeled  -SC       Row Name 01/11/24 1617          Gait/Stairs (Locomotion)    [place] Level (Gait) modified independence  -SC     Assistive Device (Gait) walker, front-wheeled  -SC     Distance in Feet (Gait) 120  -SC     Deviations/Abnormal Patterns (Gait) sabra decreased  -SC     Bilateral Gait Deviations forward flexed posture  -SC     Comment, (Gait/Stairs) Demonstrated good control of walker. No dizziness or c/o pain today. B LE with + edema. No twilling to walk further  -SC               User Key  (r) = Recorded By, (t) = Taken By, (c) = Cosigned By      Initials Name Provider Type    Isaías Spring, PT Physical Therapist                   Obj/Interventions       Row Name 01/11/24 1618          Balance    Dynamic Standing Balance standby assist  -SC     Position/Device Used, Standing Balance supported;walker, rolling  -SC               User Key  (r) = Recorded By, (t) = Taken By, (c) = Cosigned By      Initials Name Provider Type    SC Isaías Kumar, PT Physical Therapist                   Goals/Plan    No documentation.                  Clinical Impression       Row Name 01/11/24 1619          Pain Scale: FACES Pre/Post-Treatment    Pain: FACES Scale, Pretreatment 0-->no hurt  -SC     Posttreatment Pain Rating  0-->no hurt  -SC       Row Name 01/11/24 1619          Plan of Care Review    Plan of Care Reviewed With patient  -SC     Outcome Evaluation Patient with less c/o pain today. She demonstrated good control of walker but edema in feet noted.  -[facility] Name 01/11/24 1619          Therapy Assessment/Plan (PT)    Patient/Family Therapy Goals Statement (PT) go home  -SC     Rehab Potential (PT) good, to achieve stated therapy goals  -SC     Criteria for Skilled Interventions Met (PT) yes  -SC     Therapy Frequency (PT) daily  -[facility] Name 01/11/24 1619          Vital Signs    Posttreatment Heart Rate (beats/min) 84  -SC     Post SpO2 (%) 95  -SC     O2 Delivery Post Treatment room air  -[facility] Name 01/11/24 1619          Positioning and Restraints    Pre-Treatment Position other (comment)  recieved standing in room  -SC     Post Treatment Position bed  -SC     In Bed notified nsg;supine;call light within reach  -SC               User Key  (r) = Recorded By, (t) = Taken By, (c) = Cosigned By      Initials Name Provider Type    SC Isaías Kumar, PT Physical Therapist                   Outcome Measures       Row Name 01/11/24 1621 01/11/24 0832       How much help from another person do you currently need...    Turning from your back to your side while in flat bed without using bedrails? 4  -SC 4  -RC    Moving from lying on back to sitting on the side of a flat bed without bedrails? 4  -SC 3  -RC    Moving to and from a bed to a chair (including a wheelchair)? 4  -SC 4  -RC    Standing up from a chair using your arms (e.g., wheelchair, bedside chair)? 4  -SC 4  -RC    Climbing 3-5 steps with a railing? 3  -SC 3  -RC    To walk in hospital room? 4  -SC 4  -RC    AM-PAC 6 Clicks Score (PT) 23  -SC 22  -    Highest Level of Mobility Goal 7 --> Walk 25 feet or more  -SC 7 --> Walk 25 feet or more  -[facility] Name 01/11/24 1621          Functional Assessment    Outcome Measure Options AM-PAC 6 Clicks Basic  Mobility (PT)  -SC               User Key  (r) = Recorded By, (t) = Taken By, (c) = Cosigned By      Initials Name Provider Type    Isaías Spring PT Physical Therapist    Jaz Ballesteros, RN Registered Nurse                                 Physical Therapy Education       Title: PT OT SLP Therapies (Done)       Topic: Physical Therapy (Done)       Point: Mobility training (Done)       Learning Progress Summary             Patient Eager, E, VU by SC at 1/11/2024 1622    Comment: reviewed benefits of walking    Eager, E, VU by SC at 1/11/2024 1621    Comment: reviewed benefits of walking    Eager, E, VU by SC at 1/10/2024 1536    Comment: reviewed HEP    Acceptance, E, VU,NR by  at 1/9/2024 1158    Acceptance, E,D, NR by CT at 1/7/2024 1550    Eager, E, VU by SC at 1/4/2024 1131    Comment: reviewed benefits of walking    Eager, E, VU by SC at 1/2/2024 1203    Comment: reviewed benefits of activity                         Point: Home exercise program (Done)       Learning Progress Summary             Patient Eager, E, VU by SC at 1/11/2024 1622    Comment: reviewed benefits of walking    Eager, E, VU by SC at 1/11/2024 1621    Comment: reviewed benefits of walking    Eager, E, VU by SC at 1/10/2024 1536    Comment: reviewed HEP    Acceptance, E,D, NR by CT at 1/7/2024 1550    Eager, E, VU by SC at 1/4/2024 1131    Comment: reviewed benefits of walking    Eager, E, VU by SC at 1/2/2024 1203    Comment: reviewed benefits of activity                         Point: Body mechanics (Done)       Learning Progress Summary             Patient Eager, E, VU by SC at 1/11/2024 1622    Comment: reviewed benefits of walking    Eager, E, VU by SC at 1/11/2024 1621    Comment: reviewed benefits of walking    Eager, E, VU by SC at 1/10/2024 1536    Comment: reviewed HEP    Acceptance, E, VU,NR by  at 1/9/2024 1158    Acceptance, E,D, NR by CT at 1/7/2024 1550    Eager, E, VU by SC at 1/4/2024 1131    Comment: reviewed  benefits of walking    Eager, E, VU by SC at 1/2/2024 1203    Comment: reviewed benefits of activity                         Point: Precautions (Done)       Learning Progress Summary             Patient Eager, E, VU by SC at 1/11/2024 1622    Comment: reviewed benefits of walking    Eager, E, VU by SC at 1/11/2024 1621    Comment: reviewed benefits of walking    Eager, E, VU by SC at 1/10/2024 1536    Comment: reviewed HEP    Acceptance, E, VU,NR by  at 1/9/2024 1158    Acceptance, E,D, NR by CT at 1/7/2024 1550    Eager, E, VU by SC at 1/4/2024 1131    Comment: reviewed benefits of walking    Eager, E, VU by SC at 1/2/2024 1203    Comment: reviewed benefits of activity                                         User Key       Initials Effective Dates Name Provider Type Discipline    SC 02/03/23 -  Isaías Kumar, PT Physical Therapist PT    CT 07/07/23 -  Hansel Aguayo, PT Physical Therapist PT     09/21/23 -  Suzette Faria, PT Physical Therapist PT                  PT Recommendation and Plan  Planned Therapy Interventions (PT): bed mobility training, gait training, home exercise program, strengthening, ROM (range of motion), patient/family education, transfer training  Plan of Care Reviewed With: patient  Progress: improving  Outcome Evaluation: Patient with less c/o pain today. She demonstrated good control of walker but edema in feet noted.     Time Calculation:   PT Evaluation Complexity  History, PT Evaluation Complexity: 3 or more personal factors and/or comorbidities  Examination of Body Systems (PT Eval Complexity): total of 4 or more elements  Clinical Presentation (PT Evaluation Complexity): evolving  Clinical Decision Making (PT Evaluation Complexity): moderate complexity  Overall Complexity (PT Evaluation Complexity): moderate complexity     PT Charges       Row Name 01/11/24 1556             Time Calculation    Start Time 1556  -SC      PT Received On 01/11/24  -SC      PT Goal Re-Cert  Due Date 01/12/24  -SC         Timed Charges    80696 - PT Therapeutic Activity Minutes 16  -SC         Total Minutes    Timed Charges Total Minutes 16  -SC       Total Minutes 16  -SC                User Key  (r) = Recorded By, (t) = Taken By, (c) = Cosigned By      Initials Name Provider Type    Isaías Spring PT Physical Therapist                  Therapy Charges for Today       Code Description Service Date Service Provider Modifiers Qty    17068440515 HC PT THER PROC EA 15 MIN 1/10/2024 Isaías Kumar, PT GP 1    03009201774 HC PT THERAPEUTIC ACT EA 15 MIN 1/10/2024 Isaías Kumar, PT GP 1    84053535627 HC PT THERAPEUTIC ACT EA 15 MIN 1/11/2024 Isaías Kumar PT GP 1            PT G-Codes  Outcome Measure Options: AM-PAC 6 Clicks Basic Mobility (PT)  AM-PAC 6 Clicks Score (PT): 23  AM-PAC 6 Clicks Score (OT): 21  PT Discharge Summary  Anticipated Discharge Disposition (PT): home, home with home health    Isaías Kumar PT  1/11/2024

## 2024-01-12 LAB
ANION GAP SERPL CALCULATED.3IONS-SCNC: 13 MMOL/L (ref 5–15)
BASOPHILS # BLD AUTO: 0.01 10*3/MM3 (ref 0–0.2)
BASOPHILS NFR BLD AUTO: 0.3 % (ref 0–1.5)
BUN SERPL-MCNC: 22 MG/DL (ref 8–23)
BUN/CREAT SERPL: 9.5 (ref 7–25)
CALCIUM SPEC-SCNC: 9 MG/DL (ref 8.6–10.5)
CHLORIDE SERPL-SCNC: 99 MMOL/L (ref 98–107)
CO2 SERPL-SCNC: 25 MMOL/L (ref 22–29)
CREAT SERPL-MCNC: 2.31 MG/DL (ref 0.57–1)
DEPRECATED RDW RBC AUTO: 50.7 FL (ref 37–54)
EGFRCR SERPLBLD CKD-EPI 2021: 23.4 ML/MIN/1.73
EOSINOPHIL # BLD AUTO: 0.08 10*3/MM3 (ref 0–0.4)
EOSINOPHIL NFR BLD AUTO: 2.1 % (ref 0.3–6.2)
ERYTHROCYTE [DISTWIDTH] IN BLOOD BY AUTOMATED COUNT: 16 % (ref 12.3–15.4)
GLUCOSE BLDC GLUCOMTR-MCNC: 131 MG/DL (ref 70–130)
GLUCOSE BLDC GLUCOMTR-MCNC: 158 MG/DL (ref 70–130)
GLUCOSE BLDC GLUCOMTR-MCNC: 180 MG/DL (ref 70–130)
GLUCOSE BLDC GLUCOMTR-MCNC: 223 MG/DL (ref 70–130)
GLUCOSE SERPL-MCNC: 131 MG/DL (ref 65–99)
HCT VFR BLD AUTO: 25.1 % (ref 34–46.6)
HGB BLD-MCNC: 7.8 G/DL (ref 12–15.9)
IMM GRANULOCYTES # BLD AUTO: 0.05 10*3/MM3 (ref 0–0.05)
IMM GRANULOCYTES NFR BLD AUTO: 1.3 % (ref 0–0.5)
INR PPP: 1.41 (ref 0.89–1.12)
LYMPHOCYTES # BLD AUTO: 0.71 10*3/MM3 (ref 0.7–3.1)
LYMPHOCYTES NFR BLD AUTO: 18.5 % (ref 19.6–45.3)
MCH RBC QN AUTO: 27.3 PG (ref 26.6–33)
MCHC RBC AUTO-ENTMCNC: 31.1 G/DL (ref 31.5–35.7)
MCV RBC AUTO: 87.8 FL (ref 79–97)
MONOCYTES # BLD AUTO: 0.28 10*3/MM3 (ref 0.1–0.9)
MONOCYTES NFR BLD AUTO: 7.3 % (ref 5–12)
NEUTROPHILS NFR BLD AUTO: 2.7 10*3/MM3 (ref 1.7–7)
NEUTROPHILS NFR BLD AUTO: 70.5 % (ref 42.7–76)
NRBC BLD AUTO-RTO: 0 /100 WBC (ref 0–0.2)
PLATELET # BLD AUTO: 190 10*3/MM3 (ref 140–450)
PMV BLD AUTO: 10 FL (ref 6–12)
POTASSIUM SERPL-SCNC: 4.2 MMOL/L (ref 3.5–5.2)
PROTHROMBIN TIME: 17.4 SECONDS (ref 12.2–14.5)
RBC # BLD AUTO: 2.86 10*6/MM3 (ref 3.77–5.28)
SODIUM SERPL-SCNC: 137 MMOL/L (ref 136–145)
WBC NRBC COR # BLD AUTO: 3.83 10*3/MM3 (ref 3.4–10.8)

## 2024-01-12 PROCEDURE — 80048 BASIC METABOLIC PNL TOTAL CA: CPT | Performed by: INTERNAL MEDICINE

## 2024-01-12 PROCEDURE — 85610 PROTHROMBIN TIME: CPT

## 2024-01-12 PROCEDURE — 99232 SBSQ HOSP IP/OBS MODERATE 35: CPT | Performed by: INTERNAL MEDICINE

## 2024-01-12 PROCEDURE — 63710000001 INSULIN LISPRO (HUMAN) PER 5 UNITS: Performed by: INTERNAL MEDICINE

## 2024-01-12 PROCEDURE — 25010000002 ENOXAPARIN PER 10 MG: Performed by: INTERNAL MEDICINE

## 2024-01-12 PROCEDURE — 82948 REAGENT STRIP/BLOOD GLUCOSE: CPT

## 2024-01-12 PROCEDURE — 63710000001 INSULIN DETEMIR PER 5 UNITS: Performed by: STUDENT IN AN ORGANIZED HEALTH CARE EDUCATION/TRAINING PROGRAM

## 2024-01-12 PROCEDURE — 25010000002 CEFTRIAXONE PER 250 MG: Performed by: INTERNAL MEDICINE

## 2024-01-12 PROCEDURE — 63710000001 INSULIN LISPRO (HUMAN) PER 5 UNITS: Performed by: STUDENT IN AN ORGANIZED HEALTH CARE EDUCATION/TRAINING PROGRAM

## 2024-01-12 PROCEDURE — 85025 COMPLETE CBC W/AUTO DIFF WBC: CPT | Performed by: INTERNAL MEDICINE

## 2024-01-12 RX ADMIN — CEFTRIAXONE 2000 MG: 2 INJECTION, POWDER, FOR SOLUTION INTRAMUSCULAR; INTRAVENOUS at 08:59

## 2024-01-12 RX ADMIN — OXYBUTYNIN CHLORIDE 5 MG: 5 TABLET, FILM COATED, EXTENDED RELEASE ORAL at 09:02

## 2024-01-12 RX ADMIN — INSULIN LISPRO 3 UNITS: 100 INJECTION, SOLUTION INTRAVENOUS; SUBCUTANEOUS at 13:36

## 2024-01-12 RX ADMIN — ENOXAPARIN SODIUM 90 MG: 100 INJECTION SUBCUTANEOUS at 16:49

## 2024-01-12 RX ADMIN — NYSTATIN 500000 UNITS: 100000 SUSPENSION ORAL at 20:18

## 2024-01-12 RX ADMIN — ESCITALOPRAM OXALATE 20 MG: 20 TABLET, FILM COATED ORAL at 09:01

## 2024-01-12 RX ADMIN — PANTOPRAZOLE SODIUM 40 MG: 40 TABLET, DELAYED RELEASE ORAL at 05:22

## 2024-01-12 RX ADMIN — INSULIN LISPRO 3 UNITS: 100 INJECTION, SOLUTION INTRAVENOUS; SUBCUTANEOUS at 18:26

## 2024-01-12 RX ADMIN — FERROUS SULFATE TAB 325 MG (65 MG ELEMENTAL FE) 325 MG: 325 (65 FE) TAB at 09:00

## 2024-01-12 RX ADMIN — OXYCODONE HYDROCHLORIDE AND ACETAMINOPHEN 1 TABLET: 7.5; 325 TABLET ORAL at 09:21

## 2024-01-12 RX ADMIN — NYSTATIN 500000 UNITS: 100000 SUSPENSION ORAL at 09:00

## 2024-01-12 RX ADMIN — Medication 10 ML: at 09:05

## 2024-01-12 RX ADMIN — FOLIC ACID TAB 400 MCG 800 MCG: 400 TAB at 09:01

## 2024-01-12 RX ADMIN — NYSTATIN 500000 UNITS: 100000 SUSPENSION ORAL at 13:36

## 2024-01-12 RX ADMIN — Medication 10 ML: at 20:12

## 2024-01-12 RX ADMIN — SODIUM BICARBONATE 650 MG TABLET 1300 MG: at 20:11

## 2024-01-12 RX ADMIN — INSULIN LISPRO 3 UNITS: 100 INJECTION, SOLUTION INTRAVENOUS; SUBCUTANEOUS at 09:02

## 2024-01-12 RX ADMIN — Medication 5 MG: at 21:09

## 2024-01-12 RX ADMIN — NYSTATIN 500000 UNITS: 100000 SUSPENSION ORAL at 18:26

## 2024-01-12 RX ADMIN — SODIUM BICARBONATE 650 MG TABLET 1300 MG: at 16:49

## 2024-01-12 RX ADMIN — INSULIN LISPRO 2 UNITS: 100 INJECTION, SOLUTION INTRAVENOUS; SUBCUTANEOUS at 20:12

## 2024-01-12 RX ADMIN — INSULIN LISPRO 3 UNITS: 100 INJECTION, SOLUTION INTRAVENOUS; SUBCUTANEOUS at 18:29

## 2024-01-12 RX ADMIN — INSULIN DETEMIR 5 UNITS: 100 INJECTION, SOLUTION SUBCUTANEOUS at 20:12

## 2024-01-12 RX ADMIN — BUMETANIDE 2 MG: 1 TABLET ORAL at 09:00

## 2024-01-12 RX ADMIN — BUMETANIDE 2 MG: 1 TABLET ORAL at 18:26

## 2024-01-12 RX ADMIN — INSULIN LISPRO 2 UNITS: 100 INJECTION, SOLUTION INTRAVENOUS; SUBCUTANEOUS at 13:35

## 2024-01-12 RX ADMIN — SODIUM BICARBONATE 650 MG TABLET 1300 MG: at 09:01

## 2024-01-12 RX ADMIN — WARFARIN SODIUM 7.5 MG: 7.5 TABLET ORAL at 18:26

## 2024-01-12 NOTE — PLAN OF CARE
Problem: Adult Inpatient Plan of Care  Goal: Plan of Care Review  Outcome: Ongoing, Progressing  Goal: Patient-Specific Goal (Individualized)  Outcome: Ongoing, Progressing  Goal: Absence of Hospital-Acquired Illness or Injury  Outcome: Ongoing, Progressing  Intervention: Identify and Manage Fall Risk  Recent Flowsheet Documentation  Taken 1/12/2024 0000 by Jolie Palacio RN  Safety Promotion/Fall Prevention:   assistive device/personal items within reach   clutter free environment maintained   fall prevention program maintained   nonskid shoes/slippers when out of bed   room organization consistent   safety round/check completed  Taken 1/11/2024 2200 by Jolie Palacio RN  Safety Promotion/Fall Prevention:   assistive device/personal items within reach   clutter free environment maintained   fall prevention program maintained   nonskid shoes/slippers when out of bed   room organization consistent   safety round/check completed  Taken 1/11/2024 2000 by Jolie Palacio RN  Safety Promotion/Fall Prevention:   assistive device/personal items within reach   clutter free environment maintained   fall prevention program maintained   nonskid shoes/slippers when out of bed   room organization consistent   safety round/check completed  Intervention: Prevent Skin Injury  Recent Flowsheet Documentation  Taken 1/12/2024 0000 by Jolie Palacio RN  Body Position: position changed independently  Skin Protection:   adhesive use limited   incontinence pads utilized   tubing/devices free from skin contact  Taken 1/11/2024 2200 by Jolie Palacio RN  Body Position: position changed independently  Skin Protection:   adhesive use limited   incontinence pads utilized   tubing/devices free from skin contact  Taken 1/11/2024 2000 by Jolie Palacio RN  Body Position: position changed independently  Skin Protection:   adhesive use limited   tubing/devices free from skin contact  Intervention: Prevent and Manage VTE (Venous Thromboembolism)  Risk  Recent Flowsheet Documentation  Taken 1/11/2024 2000 by Jolie Palacio RN  Activity Management: up ad vasu  Goal: Optimal Comfort and Wellbeing  Outcome: Ongoing, Progressing  Goal: Readiness for Transition of Care  Outcome: Ongoing, Progressing     Problem: COPD (Chronic Obstructive Pulmonary Disease) Comorbidity  Goal: Maintenance of COPD Symptom Control  Outcome: Ongoing, Progressing     Problem: Diabetes Comorbidity  Goal: Blood Glucose Level Within Targeted Range  Outcome: Ongoing, Progressing  Intervention: Monitor and Manage Glycemia  Recent Flowsheet Documentation  Taken 1/11/2024 2000 by Jolie Palacio RN  Glycemic Management: blood glucose monitored     Problem: Heart Failure Comorbidity  Goal: Maintenance of Heart Failure Symptom Control  Outcome: Ongoing, Progressing     Problem: Hypertension Comorbidity  Goal: Blood Pressure in Desired Range  Outcome: Ongoing, Progressing     Problem: Obstructive Sleep Apnea Risk or Actual Comorbidity Management  Goal: Unobstructed Breathing During Sleep  Outcome: Ongoing, Progressing     Problem: Pain Chronic (Persistent) (Comorbidity Management)  Goal: Acceptable Pain Control and Functional Ability  Outcome: Ongoing, Progressing     Problem: Fall Injury Risk  Goal: Absence of Fall and Fall-Related Injury  Outcome: Ongoing, Progressing  Intervention: Promote Injury-Free Environment  Recent Flowsheet Documentation  Taken 1/12/2024 0000 by Jolie Palacio RN  Safety Promotion/Fall Prevention:   assistive device/personal items within reach   clutter free environment maintained   fall prevention program maintained   nonskid shoes/slippers when out of bed   room organization consistent   safety round/check completed  Taken 1/11/2024 2200 by Jolie Palacio RN  Safety Promotion/Fall Prevention:   assistive device/personal items within reach   clutter free environment maintained   fall prevention program maintained   nonskid shoes/slippers when out of bed   room organization  consistent   safety round/check completed  Taken 1/11/2024 2000 by Jolie Palacio RN  Safety Promotion/Fall Prevention:   assistive device/personal items within reach   clutter free environment maintained   fall prevention program maintained   nonskid shoes/slippers when out of bed   room organization consistent   safety round/check completed     Problem: Skin Injury Risk Increased  Goal: Skin Health and Integrity  Outcome: Ongoing, Progressing  Intervention: Optimize Skin Protection  Recent Flowsheet Documentation  Taken 1/12/2024 0000 by Jolie Palacio RN  Pressure Reduction Techniques: frequent weight shift encouraged  Head of Bed (HOB) Positioning: [facility] elevated  Pressure Reduction Devices:   positioning supports utilized   pressure-redistributing mattress utilized  Skin Protection:   adhesive use limited   incontinence pads utilized   tubing/devices free from skin contact  Taken 1/11/2024 2200 by Jolie Palacio RN  Pressure Reduction Techniques: frequent weight shift encouraged  Head of Bed (HOB) Positioning: [facility] elevated  Pressure Reduction Devices:   positioning supports utilized   pressure-redistributing mattress utilized  Skin Protection:   adhesive use limited   incontinence pads utilized   tubing/devices free from skin contact  Taken 1/11/2024 2000 by Jolie Palacio RN  Pressure Reduction Techniques: frequent weight shift encouraged  Head of Bed (HOB) Positioning: [facility] elevated  Pressure Reduction Devices:   positioning supports utilized   pressure-redistributing mattress utilized  Skin Protection:   adhesive use limited   tubing/devices free from skin contact   Goal Outcome Evaluation:

## 2024-01-12 NOTE — PROGRESS NOTES
"Pharmacy Consult  -  Warfarin    Di Lopez is a  63 y.o. female   Height - 152.4 cm (60\")  Weight - 92.5 kg (204 lb)    Consulting Provider: - Hospitalist  Indication: - Mechanical mitral valve  Goal INR: - 2.5-3.5  Home Regimen:   - Warfarin 5mg daily    Bridge Therapy: Yes   Therapeutic enoxaparin    Drug-Drug Interactions with current regimen:   CTX, enoxaparin-increased risk of bleeding    Warfarin Dosing During Admission:    Date  1/9 1/10 1/11 1/12        INR  1.25 1.21 1.24 1.41        Dose  5mg 7.5mg 7.5mg 7.5mg             Education Provided:  Will follow and  as needed.    Discharge Follow up:   Following Provider - Dr. Everett-Pt has home monitor   Follow up time range or appointment - 2-3 days post discharge      Labs:    Results from last 7 days   Lab Units 01/12/24  0438 01/11/24  0606 01/10/24  0509 01/09/24  0920 01/08/24  0457 01/07/24  0552 01/07/24  0518 01/06/24  0737   INR  1.41* 1.24* 1.21* 1.25* 1.29*  --  1.39* 1.48*   HEMOGLOBIN g/dL 7.8* 7.7* 7.9* 8.3* 7.1* 7.3*  --  7.3*   HEMATOCRIT % 25.1* 24.2* 25.0* 25.8* 22.1* 23.0*  --  23.0*     Results from last 7 days   Lab Units 01/12/24  0849 01/11/24  0606 01/10/24  0509 01/09/24  0920 01/08/24  0457 01/07/24  0518   SODIUM mmol/L 137 139 137 137 134* 136   POTASSIUM mmol/L 4.2 4.5 4.4 4.7 4.8 4.9   CHLORIDE mmol/L 99 102 103 103 104 103   CO2 mmol/L 25.0 24.0 22.0 18.0* 19.0* 18.0*   BUN mg/dL 22 22 28* 28* 31* 33*   CREATININE mg/dL 2.31* 2.06* 2.65* 2.99* 3.24* 3.87*   CALCIUM mg/dL 9.0 8.6 8.8 8.6 8.3* 8.5*   BILIRUBIN mg/dL  --   --   --  0.3 0.2 0.3   ALK PHOS U/L  --   --   --  234* 213* 225*   ALT (SGPT) U/L  --   --   --  41* 42* 43*   AST (SGOT) U/L  --   --   --  32 33* 49*   GLUCOSE mg/dL 131* 128* 126* 213* 226* 167*       Current dietary intake: Regular diet- 75% of meals documented in the past 24 hours.      Assessment/Plan:     Pharmacy consulted to dose warfarin for mechanical mitral valve, goal INR 2.5-3.5.  INR " today is 1.41 after resumption of warfarin on 1/9.  Continue warfarin 7.5mg tonight.   If discharged, plan to give patient a boosted dose of Warfarin 7.5mg today resume home dose of warfarin 5mg daily on 1/13. Repeat INR on Monday 1/15.   Continue lovenox bridge until INR is greater than 2.5 for 2 consecutive days.   Daily PT/INR ordered, monitor for s/s of bleeding/clotting (Pt presented to hospital with BRBPR on admission and GI has signed off. 1 unit PRBC administered 1/8.) monitor h/h closely, and for new medication interactions.  Will continue to follow and adjust dose as needed.    Thank you for this consult,    Annabelle Fernandez, SiennaD, BCPS  1/12/2024  11:24 EST

## 2024-01-12 NOTE — PROGRESS NOTES
"  INFECTIOUS DISEASES  INPATIENT PROGRESS NOTE  2024      PATIENT NAME: Di Lopez  :  1960  MRN:  [id number]  Date of Admission:  2024      Antimicrobials:  Day 6:  ceftriaxone      MAR reviewed.  Pertinent other medications include:  Lovenox/warfarin    Chief Complaint   Patient presents with    Rectal Bleeding       Reason for consultation:  fevers    Interval history: No fevers.  Tolerating abx.  Ongoing leg edema.    ROS:  No new rashes.  No SOB or chest pain.  No nausea/vomiting/diarrhea.     Objective:  Temp (24hrs), Av.1 °F (36.7 °C), Min:97.4 °F (36.3 °C), Max:99.2 °F (37.3 °C)    /77 (BP Location: Right arm, Patient Position: Standing)   Pulse 68   Temp 97.9 °F (36.6 °C) (Oral)   Resp 18   Ht 152.4 cm (60\")   Wt 92.5 kg (204 lb)   LMP 1998   SpO2 98%   BMI 39.84 kg/m²     Physical Examination:  GENERAL: Chronically ill-appearing female.  Awake and alert, in no acute distress.   HEENT: broken right mandibular tooth with necrosis.  Prior thrush improved.  CV: RRR. +faint murmur and mechanical click.  ICD pocket without TTP.  LUNGS: CTAB.  Good air movement.  ABDOMEN: Positive bowel sounds.  Soft, nontender, nondistended.  EXT:  2+ B LE edema.  MSK: No joint effusions or inflammation noted.  SKIN: Warm and dry without rash or ulcer.   NEURO: A&Ox4. No focal deficits.  Face symmetric.  Speech fluent.  Moves all extremities well.   PSYCHIATRIC: Normal insight and judgement.  Cooperative.  Normal affect.    Laboratory Data:    Results from last 7 days   Lab Units 24  0438 24  0606 01/10/24  0509   WBC 10*3/mm3 3.83 4.53 4.49   HEMOGLOBIN g/dL 7.8* 7.7* 7.9*   HEMATOCRIT % 25.1* 24.2* 25.0*   PLATELETS 10*3/mm3 190 198 212     Results from last 7 days   Lab Units 24  0849   SODIUM mmol/L 137   POTASSIUM mmol/L 4.2   CHLORIDE mmol/L 99   CO2 mmol/L 25.0   BUN mg/dL 22   CREATININE mg/dL 2.31*   GLUCOSE mg/dL 131*   CALCIUM mg/dL 9.0     Results from " last 7 days   Lab Units 01/09/24  0920   ALK PHOS U/L 234*   BILIRUBIN mg/dL 0.3   ALT (SGPT) U/L 41*   AST (SGOT) U/L 32     Results from last 7 days   Lab Units 01/05/24  1640   SED RATE mm/hr 58*     Results from last 7 days   Lab Units 01/05/24  1640   CRP mg/dL 5.92*     Estimated Creatinine Clearance: 25.6 mL/min (A) (by C-G formula based on SCr of 2.31 mg/dL (H)).  Results from last 7 days   Lab Units 01/05/24  1640   LACTATE mmol/L 1.4     Results from last 7 days   Lab Units 01/07/24  0518   CK TOTAL U/L 191*               Microbiology:    Microbiology Results (last 10 days)       Procedure Component Value - Date/Time    Blood Culture - Blood, Arm, Left [[id number]]  (Normal) Collected: 01/08/24 1018    Lab Status: Preliminary result Specimen: Blood from Arm, Left Updated: 01/12/24 1030     Blood Culture No growth at 4 days    Blood Culture - Blood, Hand, Left [[id number]]  (Normal) Collected: 01/08/24 1017    Lab Status: Preliminary result Specimen: Blood from Hand, Left Updated: 01/12/24 1030     Blood Culture No growth at 4 days    Blood Culture - Blood, Arm, Left [[id number]]  (Normal) Collected: 01/06/24 0056    Lab Status: Final result Specimen: Blood from Arm, Left Updated: 01/11/24 0815     Blood Culture No growth at 5 days    Blood Culture - Blood, Arm, Left [[id number]]  (Normal) Collected: 01/05/24 1641    Lab Status: Final result Specimen: Blood from Arm, Left Updated: 01/10/24 1815     Blood Culture No growth at 5 days    Narrative:      Less than seven (7) mL's of blood was collected.  Insufficient quantity may yield false negative results.    Respiratory Panel PCR w/COVID-19(SARS-CoV-2) [facility]/ESTHER/KARSON/PAD/COR/LEONARDO In-House, NP Swab in [facility], 2 HR TAT - Swab, Nasopharynx [[id number]]  (Normal) Collected: 01/05/24 1634    Lab Status: Final result Specimen: Swab from Nasopharynx Updated: 01/05/24 1804     ADENOVIRUS, PCR Not Detected     Coronavirus 229E Not Detected     Coronavirus HKU1 Not Detected      Coronavirus NL63 Not Detected     Coronavirus OC43 Not Detected     COVID19 Not Detected     Human Metapneumovirus Not Detected     Human Rhinovirus/Enterovirus Not Detected     Influenza A PCR Not Detected     Influenza B PCR Not Detected     Parainfluenza Virus 1 Not Detected     Parainfluenza Virus 2 Not Detected     Parainfluenza Virus 3 Not Detected     Parainfluenza Virus 4 Not Detected     RSV, PCR Not Detected     Bordetella pertussis pcr Not Detected     Bordetella parapertussis PCR Not Detected     Chlamydophila pneumoniae PCR Not Detected     Mycoplasma pneumo by PCR Not Detected    Narrative:      In the setting of a positive respiratory panel with a viral infection PLUS a negative procalcitonin without other underlying concern for bacterial infection, consider observing off antibiotics or discontinuation of antibiotics and continue supportive care. If the respiratory panel is positive for atypical bacterial infection (Bordetella pertussis, Chlamydophila pneumoniae, or Mycoplasma pneumoniae), consider antibiotic de-escalation to target atypical bacterial infection.          Monospot negative      Radiology:  No radiology results for the last day    MEAGAN:    Left ventricular ejection fraction appears to be 56 - 60%.    The right ventricular cavity is mildly dilated.  There is a electronically in the RV with no visible adherent mass or vegetation.    The right atrium is mildly dilated, there is electronic lead present with no visible vegetation.    There is a mechanical mitral valve with moderately increased gradients similar to most recent TTE.  The bileaflet valve appears to be functioning normally with no visible thrombus or vegetation.  There is mild MR around the periphery with some potential perivalvular leak.    Aortic valve is mildly calcified with trace AR, no vegetation seen.    The tricuspid valve appears structurally normal with no vegetation.  There is moderate to severe central tricuspid  regurgitation around the site of the lead transversing the valve.      DISCUSSION:  63 y.o. female with history of modified replacement and implanted defibrillator admitted with acute kidney injury and GI bleed.   Patient reported cervical adenopathy confirmed on CT imaging and developed fever while hospitalized.  Resp PCR and monospot negative.    PROBLEM LIST:   -- Undifferentiated fever with sweats - intermittent/improved  COVID-19/flu/RSV PCR negative.  Full resp PCR negative.  Multiple sets blood cultures no growth.  At risk for endocarditis with implanted ICD and valve replacement.  Recent dental issues with broken/necrotic right mandibular tooth and painful left maxillary at prior root canal; at risk for odontogenic source infection.  MEAGAN without valve or lead vegetation.  Maxillofacial CT without acute process or abscess.  -- Cervical adenopathy - resolving.  No pharyngitis or sinusitis.  Monospot negative.  -- Recent Candida esophagitis.  HIV negative.  -- DMII.  -- Implanted defibrillator.  -- History of mitral valve replacement.  On chronic anticoagulation.  -- MEERA, ongoing.  -- Rectal bleeding and anemia.  CT without acute intra-abdominal abnormality.  -- Back pain/sciatica symptoms; MRI L-spine with degenerative changes; no acute abnormality.  -- Oral thrush, mild.    PLAN:  -- Continue empiric ceftriaxone 2 g iv daily for potential odontogenic source of fever  -- As blood cultures remain negative and fever resolving, will recommend discharge with prolonged oral Augmentin 500 mg po bid until she can have her dental issues addressed outpatient - she has appt at  dental clinic later this month.  -- Follow-up with me in 2 weeks  -- Continue nystatin swish and swallow for oral thrush    Plan discussed with patient.  I will see the patient again on Monday.  Please call my colleague on-call if issues in the interim.      Maciej Willett MD  1/12/2024  14:35 EST

## 2024-01-12 NOTE — PROGRESS NOTES
[facility] Medicine Services  PROGRESS NOTE    Patient Name: Di Lopez  : 1960  MRN: [id number]    Date of Admission: 2024  Primary Care Physician: Davina Santiago DO    Subjective   Subjective     CC:  Renal failure    HPI:  No acute events. Still with right leg pain. Reviewed renal function. Reviewed home health.       Objective   Objective     Vital Signs:   Temp:  [97.4 °F (36.3 °C)-99.2 °F (37.3 °C)] 97.9 °F (36.6 °C)  Heart Rate:  [62-77] 68  Resp:  [16-18] 18  BP: (131-151)/(57-77) 139/77  Flow (L/min):  [2] 2     Physical Exam:  Constitutional: No acute distress, awake, alert  HENT: NCAT, mucous membranes moist  Respiratory: Clear to auscultation bilaterally, respiratory effort normal   Cardiovascular: RRR, no murmurs, rubs, or gallops  Gastrointestinal: Positive bowel sounds, soft, nontender, nondistended  Musculoskeletal: +2 bilateral ankle edema  Psychiatric: Appropriate affect, cooperative  Neurologic: Oriented x 3, strength symmetric in all extremities, Cranial Nerves grossly intact to confrontation, speech clear  Skin: No rashes    Results Reviewed:  LAB RESULTS:      Lab 24  0438 24  0606 01/10/24  0509 24  0920 24  0010 24  0638 24  0457 24  2105 24  1402 24  0552 24  0518 24  0737 24  0056 24  1640   WBC 3.83 4.53 4.49 5.30  --   --  4.73  --   --    < >  --  5.89  --   --    HEMOGLOBIN 7.8* 7.7* 7.9* 8.3*  --   --  7.1*  --   --    < >  --  7.3*  --   --    HEMATOCRIT 25.1* 24.2* 25.0* 25.8*  --   --  22.1*  --   --    < >  --  23.0*  --   --    PLATELETS 190 198 212 225  --   --  231  --   --    < >  --  263  --   --    NEUTROS ABS 2.70  --   --  4.05  --   --   --   --   --   --   --  4.19  --   --    IMMATURE GRANS (ABS) 0.05  --   --  0.06*  --   --   --   --   --   --   --  0.17*  --   --    LYMPHS ABS 0.71  --   --  0.86  --   --   --   --   --   --   --  1.18  --   --     MONOS ABS 0.28  --   --  0.23  --   --   --   --   --   --   --  0.26  --   --    EOS ABS 0.08  --   --  0.09  --   --   --   --   --   --   --  0.06  --   --    MCV 87.8 86.4 88.0 87.2  --   --  86.3  --   --    < >  --  85.5  --   --    SED RATE  --   --   --   --   --   --   --   --   --   --   --   --   --  58*   CRP  --   --   --   --   --   --   --   --   --   --   --   --   --  5.92*   PROCALCITONIN  --   --   --   --   --   --   --   --   --   --  0.40*  --   --   --    LACTATE  --   --   --   --   --   --   --   --   --   --   --   --   --  1.4   PROTIME 17.4* 15.7* 15.4* 15.9*  --   --  16.2*  --   --   --  17.2* 18.0*   < >  --    HEPARIN ANTI-XA  --   --   --  0.31 0.23* 0.31  --  0.41 0.38  --  0.25* 0.35   < > 0.25*    < > = values in this interval not displayed.         Lab 01/12/24  0849 01/11/24  0606 01/10/24  0509 01/09/24  0920 01/08/24  0457   SODIUM 137 139 137 137 134*   POTASSIUM 4.2 4.5 4.4 4.7 4.8   CHLORIDE 99 102 103 103 104   CO2 25.0 24.0 22.0 18.0* 19.0*   ANION GAP 13.0 13.0 12.0 16.0* 11.0   BUN 22 22 28* 28* 31*   CREATININE 2.31* 2.06* 2.65* 2.99* 3.24*   EGFR 23.4* 26.8* 19.8* 17.1* 15.6*   GLUCOSE 131* 128* 126* 213* 226*   CALCIUM 9.0 8.6 8.8 8.6 8.3*         Lab 01/09/24  0920 01/08/24  0457 01/07/24  0518 01/06/24  0737   TOTAL PROTEIN 6.3 5.9* 6.3 6.2   ALBUMIN 3.7 3.2* 3.7 3.5   GLOBULIN 2.6 2.7 2.6 2.7   ALT (SGPT) 41* 42* 43* 31   AST (SGOT) 32 33* 49* 36*   BILIRUBIN 0.3 0.2 0.3 0.3   ALK PHOS 234* 213* 225* 212*         Lab 01/12/24  0438 01/11/24  0606 01/10/24  0509 01/09/24  0920 01/08/24  0457   PROTIME 17.4* 15.7* 15.4* 15.9* 16.2*   INR 1.41* 1.24* 1.21* 1.25* 1.29*             Lab 01/08/24  1113 01/06/24  0737   IRON  --  15*   IRON SATURATION (TSAT)  --  5*   TIBC  --  279*   TRANSFERRIN  --  187*   FERRITIN  --  1,188.00*   ABO TYPING O  --    RH TYPING Positive  --    ANTIBODY SCREEN Negative  --          Brief Urine Lab Results  (Last result in the past 365  days)        Color   Clarity   Blood   Leuk Est   Nitrite   Protein   CREAT   Urine HCG        01/02/24 0011             142.2         01/02/24 0011 Yellow   Cloudy   Small (1+)   Negative   Negative   100 mg/dL (2+)                   Microbiology Results Abnormal       Procedure Component Value - Date/Time    Blood Culture - Blood, Hand, Left [[id number]]  (Normal) Collected: 01/08/24 1017    Lab Status: Preliminary result Specimen: Blood from Hand, Left Updated: 01/12/24 1030     Blood Culture No growth at 4 days    Blood Culture - Blood, Arm, Left [[id number]]  (Normal) Collected: 01/08/24 1018    Lab Status: Preliminary result Specimen: Blood from Arm, Left Updated: 01/12/24 1030     Blood Culture No growth at 4 days    Blood Culture - Blood, Arm, Left [[id number]]  (Normal) Collected: 01/06/24 0056    Lab Status: Final result Specimen: Blood from Arm, Left Updated: 01/11/24 0815     Blood Culture No growth at 5 days    Blood Culture - Blood, Arm, Left [[id number]]  (Normal) Collected: 01/05/24 1641    Lab Status: Final result Specimen: Blood from Arm, Left Updated: 01/10/24 1815     Blood Culture No growth at 5 days    Narrative:      Less than seven (7) mL's of blood was collected.  Insufficient quantity may yield false negative results.    Respiratory Panel PCR w/COVID-19(SARS-CoV-2) [facility]/ESTHER/KARSON/PAD/COR/LEONARDO In-House, NP Swab in UTM/VTM, 2 HR TAT - Swab, Nasopharynx [[id number]]  (Normal) Collected: 01/05/24 1634    Lab Status: Final result Specimen: Swab from Nasopharynx Updated: 01/05/24 1804     ADENOVIRUS, PCR Not Detected     Coronavirus 229E Not Detected     Coronavirus HKU1 Not Detected     Coronavirus NL63 Not Detected     Coronavirus OC43 Not Detected     COVID19 Not Detected     Human Metapneumovirus Not Detected     Human Rhinovirus/Enterovirus Not Detected     Influenza A PCR Not Detected     Influenza B PCR Not Detected     Parainfluenza Virus 1 Not Detected     Parainfluenza Virus 2 Not Detected      Parainfluenza Virus 3 Not Detected     Parainfluenza Virus 4 Not Detected     RSV, PCR Not Detected     Bordetella pertussis pcr Not Detected     Bordetella parapertussis PCR Not Detected     Chlamydophila pneumoniae PCR Not Detected     Mycoplasma pneumo by PCR Not Detected    Narrative:      In the setting of a positive respiratory panel with a viral infection PLUS a negative procalcitonin without other underlying concern for bacterial infection, consider observing off antibiotics or discontinuation of antibiotics and continue supportive care. If the respiratory panel is positive for atypical bacterial infection (Bordetella pertussis, Chlamydophila pneumoniae, or Mycoplasma pneumoniae), consider antibiotic de-escalation to target atypical bacterial infection.    COVID PRE-OP / PRE-PROCEDURE SCREENING ORDER (NO ISOLATION) - Swab, Nasopharynx [[id number]]  (Normal) Collected: 01/01/24 2330    Lab Status: Final result Specimen: Swab from Nasopharynx Updated: 01/02/24 0016    Narrative:      The following orders were created for panel order COVID PRE-OP / PRE-PROCEDURE SCREENING ORDER (NO ISOLATION) - Swab, Nasopharynx.  Procedure                               Abnormality         Status                     ---------                               -----------         ------                     COVID-19, FLU A/B, RSV P...[[id number]]  Normal              Final result                 Please view results for these tests on the individual orders.    COVID-19, FLU A/B, RSV PCR 1 HR TAT - Swab, Nasopharynx [[id number]]  (Normal) Collected: 01/01/24 2330    Lab Status: Final result Specimen: Swab from Nasopharynx Updated: 01/02/24 0016     COVID19 Not Detected     Influenza A PCR Not Detected     Influenza B PCR Not Detected     RSV, PCR Not Detected    Narrative:      Fact sheet for providers: https://www.fda.gov/media/169024/download    Fact sheet for patients: https://www.fda.gov/media/543859/download    Test performed by PCR.             No radiology results from the last 24 hrs    Results for orders placed during the hospital encounter of 01/01/24    Adult Transesophageal Echo 3D (MEAGAN) W/ Cont If Necessary Per Protocol    Interpretation Summary    Left ventricular ejection fraction appears to be 56 - 60%.    The right ventricular cavity is mildly dilated.  There is a electronically in the RV with no visible adherent mass or vegetation.    The right atrium is mildly dilated, there is electronic lead present with no visible vegetation.    There is a mechanical mitral valve with moderately increased gradients similar to most recent TTE.  The bileaflet valve appears to be functioning normally with no visible thrombus or vegetation.  There is mild MR around the periphery with some potential perivalvular leak.    Aortic valve is mildly calcified with trace AR, no vegetation seen.    The tricuspid valve appears structurally normal with no vegetation.  There is moderate to severe central tricuspid regurgitation around the site of the lead transversing the valve.      Current medications:  Scheduled Meds:bumetanide, 2 mg, Oral, BID  cefTRIAXone, 2,000 mg, Intravenous, Q24H  enoxaparin, 90 mg, Subcutaneous, Q24H  escitalopram, 20 mg, Oral, Daily  ferrous sulfate, 325 mg, Oral, Daily With Breakfast  folic acid, 800 mcg, Oral, Daily  insulin detemir, 5 Units, Subcutaneous, Nightly  insulin lispro, 2-7 Units, Subcutaneous, 4x Daily AC & at Bedtime  Insulin Lispro, 3 Units, Subcutaneous, TID With Meals  nystatin, 5 mL, Swish & Swallow, 4x Daily  oxybutynin XL, 5 mg, Oral, Daily  pantoprazole, 40 mg, Oral, Q AM  polyethylene glycol, 17 g, Oral, Daily  senna-docusate sodium, 1 tablet, Oral, BID  sodium bicarbonate, 1,300 mg, Oral, TID  sodium chloride, 10 mL, Intravenous, Q12H  warfarin, 7.5 mg, Oral, Daily      Continuous Infusions:Pharmacy to dose warfarin,       PRN Meds:.  acetaminophen    albuterol    dextrose    dextrose    glucagon (human  recombinant)    melatonin    naloxone    nitroglycerin    oxyCODONE-acetaminophen    Pharmacy to dose warfarin    sodium chloride    sodium chloride    Assessment & Plan   Assessment & Plan     Active Hospital Problems    Diagnosis  POA    **Renal failure [N19]  Yes    Hyponatremia [E87.1]  Yes    Hyperkalemia [E87.5]  Yes    Type 2 diabetes mellitus with hyperglycemia [E11.65]  Yes    Elevated LFTs [R79.89]  Yes    Anemia, chronic disease [D63.8]  Yes    Cervical lymphadenopathy [R59.0]  Yes    GI bleed [K92.2]  Yes    H/O heart valve replacement with mechanical valve [Z95.2]  Not Applicable    ARF (acute renal failure) [N17.9]  Yes    Left bundle branch hemiblock [I44.60]  Yes    Type 2 diabetes mellitus without complication, with long-term current use of insulin [E11.9, Z79.4]  Not Applicable    Dyslipidemia [E78.5]  Yes    Emphysema/COPD [J43.9]  Yes    Hypertension [I10]  Yes    Arteriovenous malformation of gastrointestinal tract [K55.20]  Yes    Obstructive sleep apnea syndrome [G47.33]  Yes    Chronic anticoagulation [Z79.01]  Not Applicable      Resolved Hospital Problems   No resolved problems to display.        Brief Hospital Course to date:  Di Lopez is a 63 y.o. 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 Found to have severe MEERA, resolution of BRBPR, persistent fever/chills and thigh pain     Epistaxis -resolved   - afrin x 3 days with improvement     Febrile illness, unclear etiology -- resolved   -negative full viral panel, negative monospot  -continue to suspect viral illness (throat soreness, fevers, back pain/aches, etc), given new thigh pain consideration of discitis but MRI withOUT contrast negative  - CT abd/pel with no acute findings  - Bcx x 2 different sets NGTD  - MEAGAN without vegetation  -CT maxillofacial with no acute process  - ID consulted --appreciate recs  -  Continue rocephin --  DC on augmentin 500 mg BID until dental issues addressed with close outpatient ID follow up   - Reviewed need for outpatient dental appt     Right leg pain  - started prior to admission. Goes down front of thigh/lower leg  - MRI reviewed with degenerative changes  - Fluid likely complicating      Acute Renal Failure 2/2 hypovolemia + med effect - improved  - 2/2 acute diarrheal illness + entresto, NSAIDs  - Nephrology consulted and appreciate recs   - improved with fluids and holding nephrotoxins.   - Sodium bicarb  - renal function overall stable with diuresis; follow up nephrology recs      Hypovolemic hyponatremia - improved  - remains stable     Acute anemia 2/2 bloody diarrheal illness - blood diarrhea resolved; epistaxis  - 1 units PRBC 1/8 with appropriate response  - slight trend down but has remained stable      Mechanical mitral valve on coumadin - Initially on heparin gtt. No continued intervention planned. Changed to lovenox and resume warfarin. Follows with Dr. Everett with home monitor   - Patient feels comfortable with lovenox -- will need additional Rx on DC      AoCHFpEF (EF 45-50%), s/p ICD   - now issue with overload. Currently on bumex 2 mg BID; net negative     Chronic hypoxic resp failure 2/2 COPD - on home 2 liters n/c  DMII - basal/bolus regimen  Anxiety/depression  BMI 38 - complicates all aspects of care   Liver mass s/p embolization    Expected Discharge Location and Transportation: home health  Expected Discharge   Expected Discharge Date: 1/13/2024; Expected Discharge Time:      DVT prophylaxis:  Medical DVT prophylaxis orders are present.     AM-PAC 6 Clicks Score (PT): 23 (01/11/24 2000)    CODE STATUS:   Code Status and Medical Interventions:   Ordered at: 01/02/24 0319     Code Status (Patient has no pulse and is not breathing):    CPR (Attempt to Resuscitate)     Medical Interventions (Patient has pulse or is breathing):    Full Support       Sarah Sherman  DO  01/12/24

## 2024-01-12 NOTE — CASE MANAGEMENT/SOCIAL WORK
Continued Stay Note  [facility]     Patient Name: Di Lopez  MRN: [id number]  Today's Date: 1/12/2024    Admit Date: 1/1/2024    Plan: Home with [facility]   Discharge Plan       Row Name 01/12/24 1346       Plan    Plan Home with [facility]    Patient/Family in Agreement with Plan yes    Plan Comments Per MDR, Ms. Lopez likely to be discharged in 1-2 days.  [facility] has been arranged and CM has confirmed these orders are in Epic.  Please call weekend  for any additional discharge needs.                   Discharge Codes    No documentation.                 Expected Discharge Date and Time       Expected Discharge Date Expected Discharge Time    Jan 12, 2024               Michaela Smith RN

## 2024-01-13 LAB
ANION GAP SERPL CALCULATED.3IONS-SCNC: 12 MMOL/L (ref 5–15)
BACTERIA SPEC AEROBE CULT: NORMAL
BACTERIA SPEC AEROBE CULT: NORMAL
BUN SERPL-MCNC: 24 MG/DL (ref 8–23)
BUN/CREAT SERPL: 11.5 (ref 7–25)
CALCIUM SPEC-SCNC: 8.8 MG/DL (ref 8.6–10.5)
CHLORIDE SERPL-SCNC: 96 MMOL/L (ref 98–107)
CO2 SERPL-SCNC: 29 MMOL/L (ref 22–29)
CREAT SERPL-MCNC: 2.09 MG/DL (ref 0.57–1)
DEPRECATED RDW RBC AUTO: 49.9 FL (ref 37–54)
EGFRCR SERPLBLD CKD-EPI 2021: 26.4 ML/MIN/1.73
ERYTHROCYTE [DISTWIDTH] IN BLOOD BY AUTOMATED COUNT: 15.9 % (ref 12.3–15.4)
GLUCOSE BLDC GLUCOMTR-MCNC: 138 MG/DL (ref 70–130)
GLUCOSE BLDC GLUCOMTR-MCNC: 182 MG/DL (ref 70–130)
GLUCOSE BLDC GLUCOMTR-MCNC: 199 MG/DL (ref 70–130)
GLUCOSE BLDC GLUCOMTR-MCNC: 287 MG/DL (ref 70–130)
GLUCOSE SERPL-MCNC: 166 MG/DL (ref 65–99)
HCT VFR BLD AUTO: 24.8 % (ref 34–46.6)
HGB BLD-MCNC: 7.8 G/DL (ref 12–15.9)
INR PPP: 1.69 (ref 0.89–1.12)
MCH RBC QN AUTO: 27.3 PG (ref 26.6–33)
MCHC RBC AUTO-ENTMCNC: 31.5 G/DL (ref 31.5–35.7)
MCV RBC AUTO: 86.7 FL (ref 79–97)
PLATELET # BLD AUTO: 202 10*3/MM3 (ref 140–450)
PMV BLD AUTO: 10.4 FL (ref 6–12)
POTASSIUM SERPL-SCNC: 3.5 MMOL/L (ref 3.5–5.2)
PROTHROMBIN TIME: 20 SECONDS (ref 12.2–14.5)
RBC # BLD AUTO: 2.86 10*6/MM3 (ref 3.77–5.28)
SODIUM SERPL-SCNC: 137 MMOL/L (ref 136–145)
WBC NRBC COR # BLD AUTO: 3.69 10*3/MM3 (ref 3.4–10.8)

## 2024-01-13 PROCEDURE — 97116 GAIT TRAINING THERAPY: CPT

## 2024-01-13 PROCEDURE — 63710000001 INSULIN LISPRO (HUMAN) PER 5 UNITS: Performed by: STUDENT IN AN ORGANIZED HEALTH CARE EDUCATION/TRAINING PROGRAM

## 2024-01-13 PROCEDURE — 63710000001 INSULIN DETEMIR PER 5 UNITS: Performed by: STUDENT IN AN ORGANIZED HEALTH CARE EDUCATION/TRAINING PROGRAM

## 2024-01-13 PROCEDURE — 82948 REAGENT STRIP/BLOOD GLUCOSE: CPT

## 2024-01-13 PROCEDURE — 80048 BASIC METABOLIC PNL TOTAL CA: CPT | Performed by: INTERNAL MEDICINE

## 2024-01-13 PROCEDURE — 85610 PROTHROMBIN TIME: CPT

## 2024-01-13 PROCEDURE — 25010000002 ENOXAPARIN PER 10 MG: Performed by: INTERNAL MEDICINE

## 2024-01-13 PROCEDURE — 25010000002 CEFTRIAXONE PER 250 MG: Performed by: INTERNAL MEDICINE

## 2024-01-13 PROCEDURE — 97530 THERAPEUTIC ACTIVITIES: CPT

## 2024-01-13 PROCEDURE — 99232 SBSQ HOSP IP/OBS MODERATE 35: CPT | Performed by: INTERNAL MEDICINE

## 2024-01-13 PROCEDURE — 85027 COMPLETE CBC AUTOMATED: CPT | Performed by: STUDENT IN AN ORGANIZED HEALTH CARE EDUCATION/TRAINING PROGRAM

## 2024-01-13 PROCEDURE — 63710000001 INSULIN LISPRO (HUMAN) PER 5 UNITS: Performed by: INTERNAL MEDICINE

## 2024-01-13 RX ORDER — SODIUM BICARBONATE 650 MG/1
650 TABLET ORAL DAILY
Status: DISCONTINUED | OUTPATIENT
Start: 2024-01-14 | End: 2024-01-14

## 2024-01-13 RX ADMIN — CEFTRIAXONE 2000 MG: 2 INJECTION, POWDER, FOR SOLUTION INTRAMUSCULAR; INTRAVENOUS at 09:21

## 2024-01-13 RX ADMIN — NYSTATIN 500000 UNITS: 100000 SUSPENSION ORAL at 18:56

## 2024-01-13 RX ADMIN — INSULIN LISPRO 3 UNITS: 100 INJECTION, SOLUTION INTRAVENOUS; SUBCUTANEOUS at 12:51

## 2024-01-13 RX ADMIN — INSULIN LISPRO 3 UNITS: 100 INJECTION, SOLUTION INTRAVENOUS; SUBCUTANEOUS at 18:56

## 2024-01-13 RX ADMIN — ESCITALOPRAM OXALATE 20 MG: 20 TABLET, FILM COATED ORAL at 09:21

## 2024-01-13 RX ADMIN — NYSTATIN 500000 UNITS: 100000 SUSPENSION ORAL at 12:50

## 2024-01-13 RX ADMIN — INSULIN LISPRO 4 UNITS: 100 INJECTION, SOLUTION INTRAVENOUS; SUBCUTANEOUS at 20:30

## 2024-01-13 RX ADMIN — BUMETANIDE 2 MG: 1 TABLET ORAL at 12:50

## 2024-01-13 RX ADMIN — SENNOSIDES AND DOCUSATE SODIUM 1 TABLET: 8.6; 5 TABLET ORAL at 09:21

## 2024-01-13 RX ADMIN — NYSTATIN 500000 UNITS: 100000 SUSPENSION ORAL at 09:21

## 2024-01-13 RX ADMIN — Medication 10 ML: at 09:22

## 2024-01-13 RX ADMIN — NYSTATIN 500000 UNITS: 100000 SUSPENSION ORAL at 20:30

## 2024-01-13 RX ADMIN — OXYCODONE HYDROCHLORIDE AND ACETAMINOPHEN 1 TABLET: 7.5; 325 TABLET ORAL at 19:23

## 2024-01-13 RX ADMIN — WARFARIN SODIUM 7.5 MG: 7.5 TABLET ORAL at 18:56

## 2024-01-13 RX ADMIN — INSULIN DETEMIR 5 UNITS: 100 INJECTION, SOLUTION SUBCUTANEOUS at 20:30

## 2024-01-13 RX ADMIN — FERROUS SULFATE TAB 325 MG (65 MG ELEMENTAL FE) 325 MG: 325 (65 FE) TAB at 09:21

## 2024-01-13 RX ADMIN — INSULIN LISPRO 3 UNITS: 100 INJECTION, SOLUTION INTRAVENOUS; SUBCUTANEOUS at 09:21

## 2024-01-13 RX ADMIN — Medication 5 MG: at 19:23

## 2024-01-13 RX ADMIN — INSULIN LISPRO 2 UNITS: 100 INJECTION, SOLUTION INTRAVENOUS; SUBCUTANEOUS at 12:50

## 2024-01-13 RX ADMIN — FOLIC ACID TAB 400 MCG 800 MCG: 400 TAB at 09:21

## 2024-01-13 RX ADMIN — Medication 10 ML: at 19:23

## 2024-01-13 RX ADMIN — INSULIN LISPRO 2 UNITS: 100 INJECTION, SOLUTION INTRAVENOUS; SUBCUTANEOUS at 18:56

## 2024-01-13 RX ADMIN — PANTOPRAZOLE SODIUM 40 MG: 40 TABLET, DELAYED RELEASE ORAL at 04:46

## 2024-01-13 RX ADMIN — BUMETANIDE 2 MG: 1 TABLET ORAL at 18:56

## 2024-01-13 RX ADMIN — OXYCODONE HYDROCHLORIDE AND ACETAMINOPHEN 1 TABLET: 7.5; 325 TABLET ORAL at 13:15

## 2024-01-13 RX ADMIN — OXYBUTYNIN CHLORIDE 5 MG: 5 TABLET, FILM COATED, EXTENDED RELEASE ORAL at 09:21

## 2024-01-13 RX ADMIN — SODIUM BICARBONATE 650 MG TABLET 1300 MG: at 09:21

## 2024-01-13 RX ADMIN — ENOXAPARIN SODIUM 90 MG: 100 INJECTION SUBCUTANEOUS at 16:24

## 2024-01-13 NOTE — PLAN OF CARE
Goal Outcome Evaluation:  Plan of Care Reviewed With: patient        Progress: improving  Outcome Evaluation: PT re-cert completed with goals adjusted appropriately. Pt continues to demonstrate good effort with therapy but is limited by generalized weakness and c/o BLE pain/edema. Pt will continue to benefit from skilled therapy services to address general deconditioning and promote return to PLOF. PT rec home with assist and HHPT at d/c.      Anticipated Discharge Disposition (PT): home, home with home health

## 2024-01-13 NOTE — PROGRESS NOTES
"Pharmacy Consult  -  Warfarin    Di Lopez is a  63 y.o. female   Height - 152.4 cm (60\")  Weight - 92.5 kg (204 lb)    Consulting Provider: - Hospitalist  Indication: - Mechanical mitral valve  Goal INR: - 2.5-3.5  Home Regimen:   - Warfarin 5mg daily    Bridge Therapy: Yes   Therapeutic enoxaparin    Drug-Drug Interactions with current regimen:   CTX, enoxaparin-increased risk of bleeding    Warfarin Dosing During Admission:    Date  1/9 1/10 1/11 1/12 1/13       INR  1.25 1.21 1.24 1.41 1.69       Dose  5mg 7.5mg 7.5mg 7.5mg 7.5mg           Education Provided:  Will follow and  as needed.    Discharge Follow up:   Following Provider - Dr. Everett-Pt has home monitor   Follow up time range or appointment - 2-3 days post discharge      Labs:    Results from last 7 days   Lab Units 01/13/24  0433 01/12/24  0438 01/11/24  0606 01/10/24  0509 01/09/24  0920 01/08/24  0457 01/07/24  0552 01/07/24  0518   INR  1.69* 1.41* 1.24* 1.21* 1.25* 1.29*  --  1.39*   HEMOGLOBIN g/dL 7.8* 7.8* 7.7* 7.9* 8.3* 7.1* 7.3*  --    HEMATOCRIT % 24.8* 25.1* 24.2* 25.0* 25.8* 22.1* 23.0*  --      Results from last 7 days   Lab Units 01/13/24  0433 01/12/24  0849 01/11/24  0606 01/10/24  0509 01/09/24  0920 01/08/24  0457 01/07/24  0518   SODIUM mmol/L 137 137 139   < > 137 134* 136   POTASSIUM mmol/L 3.5 4.2 4.5   < > 4.7 4.8 4.9   CHLORIDE mmol/L 96* 99 102   < > 103 104 103   CO2 mmol/L 29.0 25.0 24.0   < > 18.0* 19.0* 18.0*   BUN mg/dL 24* 22 22   < > 28* 31* 33*   CREATININE mg/dL 2.09* 2.31* 2.06*   < > 2.99* 3.24* 3.87*   CALCIUM mg/dL 8.8 9.0 8.6   < > 8.6 8.3* 8.5*   BILIRUBIN mg/dL  --   --   --   --  0.3 0.2 0.3   ALK PHOS U/L  --   --   --   --  234* 213* 225*   ALT (SGPT) U/L  --   --   --   --  41* 42* 43*   AST (SGOT) U/L  --   --   --   --  32 33* 49*   GLUCOSE mg/dL 166* 131* 128*   < > 213* 226* 167*    < > = values in this interval not displayed.       Current dietary intake: Regular diet- 75% of meals " documented in the past 24 hours.      Assessment/Plan:     Pharmacy consulted to dose warfarin for mechanical mitral valve, goal INR 2.5-3.5.  INR today is 1.69 after resumption of warfarin on 1/9.  Continue warfarin 7.5mg tonight.   If discharged, plan to give patient a boosted dose of Warfarin 7.5mg today resume home dose of warfarin 5mg daily on 1/13. Repeat INR on Monday 1/15.   Continue lovenox bridge until INR is greater than 2.5 for 2 consecutive days.   Daily PT/INR ordered, monitor for s/s of bleeding/clotting (Pt presented to hospital with BRBPR on admission and GI has signed off. 1 unit PRBC administered 1/8.) monitor h/h closely, and for new medication interactions.  Will continue to follow and adjust dose as needed.    Thank you for this consult,    Keegan Chapman, PharmD  Pharmacy Resident  1/13/2024  07:26 EST

## 2024-01-13 NOTE — PROGRESS NOTES
"   LOS: 11 days    Patient Care Team:  Davina Santiago DO as PCP - General (Family Medicine)  Ameena Iglesias MD as Consulting Physician (Endocrinology)  Nakita Freire APRN as Nurse Practitioner (Nurse Practitioner)  Raimundo Thomas MD as Consulting Physician (Pulmonary Disease)  Nadir Everett MD as Consulting Physician (Cardiology)  Luiz Longoria MD as Consulting Physician (Pain Medicine)    Subjective     Stable renal function. Dependent edema noted.     Objective     Vital Signs:  Blood pressure 138/57, pulse 88, temperature 98.2 °F (36.8 °C), temperature source Oral, resp. rate 16, height 152.4 cm (60\"), weight 92.5 kg (204 lb), last menstrual period 06/17/1998, SpO2 95%, not currently breastfeeding.        01/12 0701 - 01/13 0700  In: 500 [P.O.:500]  Out: 2200 [Urine:2200]    Physical Exam:        GENERAL: WD AAF NAD  NEURO: Awake and alert, oriented. No focal deficit  PSYCHIATRIC: NMA. Cooperative with PE  EYE: PE, no icterus, no conjunctivitis  ENT: ommm, dentition intact,  Hearing intact  NECK: Supple , No JVD discernable,  Trachea midline  CV: + Edema, RRR  LUNGS:  Quiet,  Nonlabored resp.  Symmetrical expansion  ABDOMEN: Nondistended, soft nontender.  : No Perdomo, no palp bladder  SKIN: Warm and dry without rash      Labs:                  Estimated Creatinine Clearance: 28.3 mL/min (A) (by C-G formula based on SCr of 2.09 mg/dL (H)).         A/P:    ARF: Creatinine continues to improve daily.  Urine output continues nonoliguric with unmeasured voids.  Creatinine up to 8 on presentation with likely dehydration due to diarrhea with poor p.o. intake in the setting of NSAIDs and Entresto.      HTN: Blood pressure stable.    Hyponatremia: Borderline.  Initially down to 129 on presentation due to hypovolemia and MEERA.  All fluids isotonic.     Hyperkalemia: Stable.  Due to MEERA.     Met Aciodsis: Persistent on p.o. bicarb.  Due to renal failure and underlying GI losses with diarrhea.  " Monitor for now.     Anemia.  Hemoglobin below goal.  Hx of supra therapeutic INR w rectal bleeding in the past. Admitted again with concern for rectal bleed.  Iron saturation quite low with elevated ferritin.  Transfuse as needed for Hgb <7.    Volume status: Dependent edema noted.      Hx of mechanical valve.  On anticoagulation     CHF: EF 40-45%. Hx of MV and depressed RV function. On beta blocker and lasix at home. Not on ACEi/ARB/MRA/ or SGLT-2inh for now.    Recs  Continue bumex 2mg  BID  Conservative care otherwise from renal standpoint.    High risk and complexity patient.    Pro Quesada MD

## 2024-01-13 NOTE — PLAN OF CARE
Up ad vasu, patient ambulates independently. On Diuretics, voids frequently. VSS on 2L. AV paced on cardiac mx. Denies pain/discomfort at this time. Call light in reach.

## 2024-01-13 NOTE — THERAPY PROGRESS REPORT/RE-CERT
Patient Name: Di Lopez  : 1960    MRN: [id number]                              Today's Date: 2024       Admit Date: 2024    Visit Dx:     ICD-10-CM ICD-9-CM   1. Acute renal failure, unspecified acute renal failure type  N17.9 584.9   2. Hyperkalemia  E87.5 276.7   3. Gastrointestinal hemorrhage, unspecified gastrointestinal hemorrhage type  K92.2 578.9   4. Acute renal failure superimposed on chronic kidney disease, unspecified acute renal failure type, unspecified CKD stage  N17.9 584.9    N18.9 585.9   5. Chronic respiratory failure with hypoxia  J96.11 518.83     799.02   6. Weakness  R53.1 780.79   7. Dysphagia, unspecified type  R13.10 787.20     Patient Active Problem List   Diagnosis    Arteriovenous malformation of gastrointestinal tract    Depression    Type 2 diabetes mellitus without complication, with long-term current use of insulin    Dyslipidemia    Emphysema/COPD    Hypertension    Insomnia    Morbid obesity    Obstructive sleep apnea syndrome    Chronic anticoagulation    Normocytic anemia    Sliding hiatal hernia    Vitamin B12 deficiency    Iron deficiency    Iron malabsorption    Bronchiectasis without complication    Electronic cigarette use    Chronic diastolic heart failure    Left bundle branch hemiblock    Chronic respiratory failure with hypoxia    Body aches    Headaches due to old head injury    History of tobacco use, presenting hazards to health    Multiple nodules of lung    Nocturnal hypoxemia    Screening for colon cancer    Abnormal CT scan    Wellness examination    ARF (acute renal failure)    H/O heart valve replacement with mechanical valve    GI bleed    Severe malnutrition    Hyponatremia    Hyperkalemia    Type 2 diabetes mellitus with hyperglycemia    Elevated LFTs    Anemia, chronic disease    Cervical lymphadenopathy    Renal failure     Past Medical History:   Diagnosis Date    Allergic     Anemia     Anticoagulated     WARFARIN    Arteriovenous  malformation of gastrointestinal tract 05/12/2016    Arthritis     Asthma     Black hairy tongue     CHF (congestive heart failure)     Chronic back pain     Coronary artery disease     Depression 05/12/2016    Diabetes mellitus     Dyslipidemia 05/12/2016    Elevated cholesterol     Emphysema/COPD 05/12/2016    Emphysema/COPD 05/12/2016    Fatigue     GERD (gastroesophageal reflux disease)     Headache     Herniated cervical disc     History of echocardiogram 07/16/2013    Left ventricular systolic function at the lower limits of normal; EF 50-55%; mild MR; mechanical prosthetic mitral valve present; mod TR    History of transfusion     NO REACTION; [facility]    Hyperlipidemia     Hypertension     Insomnia 05/12/2016    Iron deficiency     A.  The patient was treated with IV iron replacement. B.  Secondary to GI bleed, status post EGD with cauterization of blood vessel to the duodenum, 7/16/2014.    Left bundle branch hemiblock 10/12/2020    Left shoulder strain     Liver disease     Liver mass     Long term current use of anticoagulant 05/12/2016    Low back pain     Lung nodules     PER DR. LEVI'S NOTE 10/22    Morbid obesity 05/12/2016    Neuromuscular disorder 7/28/2016    Obstructive sleep apnea syndrome 05/12/2016    Description: A.  Moderate; CPAP therapy..    Sliding hiatal hernia 07/28/2016    Description: A.  Reported on EGD, 12/30/2011.    Type 2 diabetes mellitus 05/12/2016    Urinary tract infection     Uterine cancer     HYSTERECTOMY    Vitamin B12 deficiency     A.  The patient was treated with vitamin B12 replacement.    Wears eyeglasses      Past Surgical History:   Procedure Laterality Date    BILATERAL OOPHORECTOMY  2009    Status post bilateral oophorectomy secondary to ovarian cysts     BREAST BIOPSY Right     STEREOTACTIC    BREAST EXCISIONAL BIOPSY Bilateral     CARDIAC ELECTROPHYSIOLOGY PROCEDURE N/A 10/15/2020    Procedure: Biventricular Device Insertion;  Surgeon: Nadir Everett MD;   Location:  [facility];  Service: Cardiology;  Laterality: N/A;    CARDIAC SURGERY  2003    CARPAL TUNNEL RELEASE Bilateral     COLONOSCOPY  12/28/2012    COLONOSCOPY N/A 5/23/2023    Procedure: COLONOSCOPY;  Surgeon: Gt Fernandes MD;  Location:  [facility];  Service: Gastroenterology;  Laterality: N/A;    COLONOSCOPY N/A 11/16/2023    Procedure: COLONOSCOPY;  Surgeon: Brunner, Mark I, MD;  Location:  [facility];  Service: Gastroenterology;  Laterality: N/A;    CYSTECTOMY      removal of ganglion cyst from left wrist    ENDOSCOPY  12/30/2011    DIAGNOSTIC ESOPHAGOGASTRODUODENOSCOPY    ENDOSCOPY N/A 11/16/2023    Procedure: ESOPHAGOGASTRODUODENOSCOPY;  Surgeon: Brunner, Mark I, MD;  Location:  [facility];  Service: Gastroenterology;  Laterality: N/A;    HYSTERECTOMY  1995    A.  Status post hysterectomy for early stage endometrial cancer done in 1995.    MITRAL VALVE REPLACEMENT  03/03/2008    Dr. Hima Barreto; MECHANICAL    OTHER SURGICAL HISTORY      LIVER MASS BLOOD SUPPLY CLIPPED ON RIGHT AND LEFT SIDE PER PT      General Information       Row Name 01/13/24 0952          Physical Therapy Time and Intention    Document Type progress note/recertification  -ES     Mode of Treatment physical therapy  -ES       Row Name 01/13/24 0952          General Information    Patient Profile Reviewed yes  -ES     Existing Precautions/Restrictions fall;oxygen therapy device and L/min  -ES     Barriers to Rehab medically complex;previous functional deficit  -ES       Row Name 01/13/24 0952          Cognition    Orientation Status (Cognition) oriented x 4  -ES       Row Name 01/13/24 0952          Safety Issues, Functional Mobility    Safety Issues Affecting Function (Mobility) awareness of need for assistance;insight into deficits/self-awareness;safety precaution awareness;safety precautions follow-through/compliance;sequencing abilities  -ES     Impairments Affecting Function (Mobility)  endurance/activity tolerance;pain;strength;balance  -ES               User Key  (r) = Recorded By, (t) = Taken By, (c) = Cosigned By      Initials Name Provider Type    Viviane Franklin PT Physical Therapist                   Mobility       Row Name 01/13/24 0954          Bed Mobility    Bed Mobility sit-supine;scooting/bridging  -ES     Scooting/Bridging [place] (Bed Mobility) independent  -ES     Sit-Supine [place] (Bed Mobility) minimum assist (75% patient effort)  -ES     Assistive Device (Bed Mobility) head of bed elevated;bed rails  -ES     Comment, (Bed Mobility) Required [place] for BLE management  -ES       Row Name 01/13/24 0954          Sit-Stand Transfer    Sit-Stand [place] (Transfers) supervision  -ES     Assistive Device (Sit-Stand Transfers) walker, front-wheeled  -ES     Comment, (Sit-Stand Transfer) v/c for hand placement  -ES       Row Name 01/13/24 0954          Gait/Stairs (Locomotion)    [place] Level (Gait) supervision  -ES     Assistive Device (Gait) walker, front-wheeled  -ES     Distance in Feet (Gait) 350  -ES     Deviations/Abnormal Patterns (Gait) bilateral deviations;gait speed decreased;stride length decreased  -ES     Bilateral Gait Deviations forward flexed posture  -ES     Comment, (Gait/Stairs) Pt ambulated with supervision and FWW. Demo'd forward flexed posture with decreased stride length and decreased sabra. Limited by c/o R knee pain/BLE swelling with mobility. No LOB, further mobility limited by fatigue.  -ES               User Key  (r) = Recorded By, (t) = Taken By, (c) = Cosigned By      Initials Name Provider Type    Viviane Franklin PT Physical Therapist                   Obj/Interventions       Row Name 01/13/24 0956          Balance    Balance Assessment sitting static balance;sitting dynamic balance;sit to stand dynamic balance;standing static balance;standing dynamic balance  -ES     Static Sitting Balance independent  -ES     Dynamic Sitting  Balance independent  -ES     Position, Sitting Balance unsupported;sitting edge of bed  -ES     Sit to Stand Dynamic Balance supervision  -ES     Static Standing Balance supervision  -ES     Dynamic Standing Balance supervision  -ES     Position/Device Used, Standing Balance supported;walker, front-wheeled  -ES     Balance Interventions sitting;standing;sit to stand;supported;static;dynamic;occupation based/functional task  -ES     Comment, Balance no LOB  -ES               User Key  (r) = Recorded By, (t) = Taken By, (c) = Cosigned By      Initials Name Provider Type    ES Viviane Roberts, PT Physical Therapist                   Goals/Plan       Row Name 01/13/24 1002          Bed Mobility Goal 1 (PT)    Activity/Assistive Device (Bed Mobility Goal 1, PT) sit to supine/supine to sit  -ES     [place] Level/Cues Needed (Bed Mobility Goal 1, PT) independent  -ES     Time Frame (Bed Mobility Goal 1, PT) long term goal (LTG);10 days  -ES     Progress/Outcomes (Bed Mobility Goal 1, PT) goal ongoing;goal revised this date  -ES       Row Name 01/13/24 1002          Transfer Goal 1 (PT)    Activity/Assistive Device (Transfer Goal 1, PT) bed-to-chair/chair-to-bed;walker, rolling  -ES     [place] Level/Cues Needed (Transfer Goal 1, PT) modified independence  -ES     Time Frame (Transfer Goal 1, PT) long term goal (LTG);10 days  -ES     Progress/Outcome (Transfer Goal 1, PT) goal ongoing  -ES       Row Name 01/13/24 1002          Gait Training Goal 1 (PT)    Activity/Assistive Device (Gait Training Goal 1, PT) gait (walking locomotion)  -ES     [place] Level (Gait Training Goal 1, PT) modified independence  -ES     Distance (Gait Training Goal 1, PT) 150  -ES     Time Frame (Gait Training Goal 1, PT) long term goal (LTG);10 days  -ES     Progress/Outcome (Gait Training Goal 1, PT) goal ongoing  -ES               User Key  (r) = Recorded By, (t) = Taken By, (c) = Cosigned By      Initials Name Provider Type     ES Viviane Roberts, PT Physical Therapist                   Clinical Impression       Row Name 01/13/24 0957          Pain    Pain Intervention(s) Repositioned;Ambulation/increased activity  -ES     Additional Documentation Pain Scale: FACES Pre/Post-Treatment (Group)  -ES       Row Name 01/13/24 0957          Pain Scale: FACES Pre/Post-Treatment    Pain: FACES Scale, Pretreatment 2-->hurts little bit  -ES     Posttreatment Pain Rating 2-->hurts little bit  -ES     Pain Location - Side/Orientation Right  -ES     Pain Location lower  -ES     Pain Location - extremity  -ES     Pre/Posttreatment Pain Comment c/o R knee pain  -ES       Row Name 01/13/24 0957          Plan of Care Review    Plan of Care Reviewed With patient  -ES     Progress improving  -ES     Outcome Evaluation PT re-cert completed with goals adjusted appropriately. Pt continues to demonstrate good effort with therapy but is limited by generalized weakness and c/o BLE pain/edema. Pt will continue to benefit from skilled therapy services to address general deconditioning and promote return to PLOF. PT rec home with assist and HHPT at d/c.  -ES       Row Name 01/13/24 0957          Therapy Assessment/Plan (PT)    Rehab Potential (PT) good, to achieve stated therapy goals  -ES     Criteria for Skilled Interventions Met (PT) yes  -ES     Therapy Frequency (PT) daily  -ES       Row Name 01/13/24 0957          Vital Signs    Pre Systolic BP Rehab --  RN cleared for treatment  -ES     O2 Delivery Pre Treatment nasal cannula  -ES     O2 Delivery Intra Treatment nasal cannula  -ES     O2 Delivery Post Treatment nasal cannula  -ES     Pre Patient Position Sitting  -ES     Intra Patient Position Standing  -ES     Post Patient Position Supine  -ES       Row Name 01/13/24 0957          Positioning and Restraints    Pre-Treatment Position in bed  -ES     Post Treatment Position bed  -ES     In Bed notified sheila;indra;call light within reach;encouraged to call for  assist;side rails up x3;heels elevated;legs elevated  -ES               User Key  (r) = Recorded By, (t) = Taken By, (c) = Cosigned By      Initials Name Provider Type    Viviane Franklin PT Physical Therapist                   Outcome Measures       Row Name 01/13/24 1002          How much help from another person do you currently need...    Turning from your back to your side while in flat bed without using bedrails? 4  -ES     Moving from lying on back to sitting on the side of a flat bed without bedrails? 4  -ES     Moving to and from a bed to a chair (including a wheelchair)? 3  -ES     Standing up from a chair using your arms (e.g., wheelchair, bedside chair)? 3  -ES     Climbing 3-5 steps with a railing? 3  -ES     To walk in hospital room? 3  -ES     AM-PAC 6 Clicks Score (PT) 20  -ES     Highest Level of Mobility Goal 6 --> Walk 10 steps or more  -ES       Row Name 01/13/24 1002          Functional Assessment    Outcome Measure Options AM-PAC 6 Clicks Basic Mobility (PT)  -ES               User Key  (r) = Recorded By, (t) = Taken By, (c) = Cosigned By      Initials Name Provider Type    Viviane Franklin PT Physical Therapist                                 Physical Therapy Education       Title: PT OT SLP Therapies (Done)       Topic: Physical Therapy (Done)       Point: Mobility training (Done)       Learning Progress Summary             Patient Domingo, OSVALDO, VU by SC at 1/11/2024 1622    Comment: reviewed benefits of walking    Domingo, E, VU by SC at 1/11/2024 1621    Comment: reviewed benefits of walking    Domingo E, VU by SC at 1/10/2024 1536    Comment: reviewed HEP    Acceptance, E, VU,NR by  at 1/9/2024 1158    Acceptance, E,D, NR by CT at 1/7/2024 1550    Domingo, E, VU by SC at 1/4/2024 1131    Comment: reviewed benefits of walking    Domingo, E, VU by SC at 1/2/2024 1203    Comment: reviewed benefits of activity                         Point: Home exercise program (Done)       Learning Progress  Summary             Patient Eager, E, VU by SC at 1/11/2024 1622    Comment: reviewed benefits of walking    Eager, E, VU by SC at 1/11/2024 1621    Comment: reviewed benefits of walking    Eager, E, VU by SC at 1/10/2024 1536    Comment: reviewed HEP    Acceptance, E,D, NR by CT at 1/7/2024 1550    Eager, E, VU by SC at 1/4/2024 1131    Comment: reviewed benefits of walking    Eager, E, VU by SC at 1/2/2024 1203    Comment: reviewed benefits of activity                         Point: Body mechanics (Done)       Learning Progress Summary             Patient Eager, E, VU by SC at 1/11/2024 1622    Comment: reviewed benefits of walking    Eager, E, VU by SC at 1/11/2024 1621    Comment: reviewed benefits of walking    Eager, E, VU by SC at 1/10/2024 1536    Comment: reviewed HEP    Acceptance, E, VU,NR by  at 1/9/2024 1158    Acceptance, E,D, NR by CT at 1/7/2024 1550    Eager, E, VU by SC at 1/4/2024 1131    Comment: reviewed benefits of walking    Eager, E, VU by SC at 1/2/2024 1203    Comment: reviewed benefits of activity                         Point: Precautions (Done)       Learning Progress Summary             Patient Eager, E, VU by SC at 1/11/2024 1622    Comment: reviewed benefits of walking    Eager, E, VU by SC at 1/11/2024 1621    Comment: reviewed benefits of walking    Eager, E, VU by SC at 1/10/2024 1536    Comment: reviewed HEP    Acceptance, E, VU,NR by  at 1/9/2024 1158    Acceptance, E,D, NR by CT at 1/7/2024 1550    Eager, E, VU by SC at 1/4/2024 1131    Comment: reviewed benefits of walking    Eager, E, VU by SC at 1/2/2024 1203    Comment: reviewed benefits of activity                                         User Key       Initials Effective Dates Name Provider Type Discipline    SC 02/03/23 -  Isaías Kumar PT Physical Therapist PT    CT 07/07/23 -  Hansel Aguayo, PT Physical Therapist PT     09/21/23 -  Suzette Faria, PT Physical Therapist PT                  PT  Recommendation and Plan     Plan of Care Reviewed With: patient  Progress: improving  Outcome Evaluation: PT re-cert completed with goals adjusted appropriately. Pt continues to demonstrate good effort with therapy but is limited by generalized weakness and c/o BLE pain/edema. Pt will continue to benefit from skilled therapy services to address general deconditioning and promote return to PLOF. PT rec home with assist and HHPT at d/c.     Time Calculation:         PT Charges       Row Name 01/13/24 1003             Time Calculation    Start Time 0845  -ES      PT Received On 01/13/24  -ES      PT Goal Re-Cert Due Date 01/23/24  -ES         Time Calculation- PT    Total Timed Code Minutes- PT 31 minute(s)  -ES         Timed Charges    81791 - Gait Training Minutes  16  -ES      27595 - PT Therapeutic Activity Minutes 15  -ES         Total Minutes    Timed Charges Total Minutes 31  -ES       Total Minutes 31  -ES                User Key  (r) = Recorded By, (t) = Taken By, (c) = Cosigned By      Initials Name Provider Type    ES Viviane Roberts PT Physical Therapist                  Therapy Charges for Today       Code Description Service Date Service Provider Modifiers Qty    86757741219 HC GAIT TRAINING EA 15 MIN 1/13/2024 Viviane Roberts, PT GP 1    16906581301 HC PT THERAPEUTIC ACT EA 15 MIN 1/13/2024 Viviane Roberts, PT GP 1            PT G-Codes  Outcome Measure Options: AM-PAC 6 Clicks Basic Mobility (PT)  AM-PAC 6 Clicks Score (PT): 20  AM-PAC 6 Clicks Score (OT): 21  PT Discharge Summary  Anticipated Discharge Disposition (PT): home, home with home health    Viviane Roberts PT  1/13/2024

## 2024-01-13 NOTE — PROGRESS NOTES
[facility] Medicine Services  PROGRESS NOTE    Patient Name: Di Lopez  : 1960  MRN: [id number]    Date of Admission: 2024  Primary Care Physician: Davina Santiago DO    Subjective   Subjective     CC:  Renal failure    HPI:  slept poorly.  Has pain in her legs that she relates to swelling.  At home she has mild swelling only.   Net output 1600ml.      She prefers to stay in hospital while leg edema improves as she lives alone and walking is painful.       Objective   Objective     Vital Signs:   Temp:  [98 °F (36.7 °C)-98.7 °F (37.1 °C)] 98.2 °F (36.8 °C)  Heart Rate:  [64-88] 88  Resp:  [16-18] 16  BP: (124-148)/(53-64) 138/57  Flow (L/min):  [2] 2     Physical Exam:  Constitutional: No acute distress, awake, alert in bed   HENT: NCAT, mucous membranes moist  Respiratory: Clear to auscultation bilaterally, respiratory effort normal   Cardiovascular: RRR,  Gastrointestinal: Positive bowel sounds, soft, nontender, nondistended  Musculoskeletal: edema, taut.  To thighs bilat   Psychiatric: Appropriate affect, cooperative  Neurologic: Oriented x 3, strength symmetric in all extremities, Cranial Nerves grossly intact to confrontation, speech clear  Skin: No rashes    Results Reviewed:  LAB RESULTS:      Lab 24  0433 24  0438 24  0606 01/10/24  0509 24  0920 24  0010 24  0638 24  0457 24  2105 24  1402 24  0552 24  0518   WBC 3.69 3.83 4.53 4.49 5.30  --   --    < >  --   --    < >  --    HEMOGLOBIN 7.8* 7.8* 7.7* 7.9* 8.3*  --   --    < >  --   --    < >  --    HEMATOCRIT 24.8* 25.1* 24.2* 25.0* 25.8*  --   --    < >  --   --    < >  --    PLATELETS 202 190 198 212 225  --   --    < >  --   --    < >  --    NEUTROS ABS  --  2.70  --   --  4.05  --   --   --   --   --   --   --    IMMATURE GRANS (ABS)  --  0.05  --   --  0.06*  --   --   --   --   --   --   --    LYMPHS ABS  --  0.71  --   --  0.86  --   --    --   --   --   --   --    MONOS ABS  --  0.28  --   --  0.23  --   --   --   --   --   --   --    EOS ABS  --  0.08  --   --  0.09  --   --   --   --   --   --   --    MCV 86.7 87.8 86.4 88.0 87.2  --   --    < >  --   --    < >  --    PROCALCITONIN  --   --   --   --   --   --   --   --   --   --   --  0.40*   PROTIME 20.0* 17.4* 15.7* 15.4* 15.9*  --   --    < >  --   --   --  17.2*   HEPARIN ANTI-XA  --   --   --   --  0.31 0.23* 0.31  --  0.41 0.38  --  0.25*    < > = values in this interval not displayed.         Lab 01/13/24 0433 01/12/24  0849 01/11/24  0606 01/10/24  0509 01/09/24  0920   SODIUM 137 137 139 137 137   POTASSIUM 3.5 4.2 4.5 4.4 4.7   CHLORIDE 96* 99 102 103 103   CO2 29.0 25.0 24.0 22.0 18.0*   ANION GAP 12.0 13.0 13.0 12.0 16.0*   BUN 24* 22 22 28* 28*   CREATININE 2.09* 2.31* 2.06* 2.65* 2.99*   EGFR 26.4* 23.4* 26.8* 19.8* 17.1*   GLUCOSE 166* 131* 128* 126* 213*   CALCIUM 8.8 9.0 8.6 8.8 8.6         Lab 01/09/24  0920 01/08/24  0457 01/07/24  0518   TOTAL PROTEIN 6.3 5.9* 6.3   ALBUMIN 3.7 3.2* 3.7   GLOBULIN 2.6 2.7 2.6   ALT (SGPT) 41* 42* 43*   AST (SGOT) 32 33* 49*   BILIRUBIN 0.3 0.2 0.3   ALK PHOS 234* 213* 225*         Lab 01/13/24  0433 01/12/24  0438 01/11/24  0606 01/10/24  0509 01/09/24  0920   PROTIME 20.0* 17.4* 15.7* 15.4* 15.9*   INR 1.69* 1.41* 1.24* 1.21* 1.25*             Lab 01/08/24  1113   ABO TYPING O   RH TYPING Positive   ANTIBODY SCREEN Negative         Brief Urine Lab Results  (Last result in the past 365 days)        Color   Clarity   Blood   Leuk Est   Nitrite   Protein   CREAT   Urine HCG        01/02/24 0011             142.2         01/02/24 0011 Yellow   Cloudy   Small (1+)   Negative   Negative   100 mg/dL (2+)                   Microbiology Results Abnormal       Procedure Component Value - Date/Time    Blood Culture - Blood, Hand, Left [[id number]]  (Normal) Collected: 01/08/24 1017    Lab Status: Final result Specimen: Blood from Hand, Left Updated:  01/13/24 1030     Blood Culture No growth at 5 days    Blood Culture - Blood, Arm, Left [[id number]]  (Normal) Collected: 01/08/24 1018    Lab Status: Final result Specimen: Blood from Arm, Left Updated: 01/13/24 1030     Blood Culture No growth at 5 days    Blood Culture - Blood, Arm, Left [[id number]]  (Normal) Collected: 01/06/24 0056    Lab Status: Final result Specimen: Blood from Arm, Left Updated: 01/11/24 0815     Blood Culture No growth at 5 days    Blood Culture - Blood, Arm, Left [[id number]]  (Normal) Collected: 01/05/24 1641    Lab Status: Final result Specimen: Blood from Arm, Left Updated: 01/10/24 1815     Blood Culture No growth at 5 days    Narrative:      Less than seven (7) mL's of blood was collected.  Insufficient quantity may yield false negative results.    Respiratory Panel PCR w/COVID-19(SARS-CoV-2) LUIS/ESTHER/KARSON/PAD/COR/LEONARDO In-House, NP Swab in UTM/VTM, 2 HR TAT - Swab, Nasopharynx [[id number]]  (Normal) Collected: 01/05/24 1634    Lab Status: Final result Specimen: Swab from Nasopharynx Updated: 01/05/24 1804     ADENOVIRUS, PCR Not Detected     Coronavirus 229E Not Detected     Coronavirus HKU1 Not Detected     Coronavirus NL63 Not Detected     Coronavirus OC43 Not Detected     COVID19 Not Detected     Human Metapneumovirus Not Detected     Human Rhinovirus/Enterovirus Not Detected     Influenza A PCR Not Detected     Influenza B PCR Not Detected     Parainfluenza Virus 1 Not Detected     Parainfluenza Virus 2 Not Detected     Parainfluenza Virus 3 Not Detected     Parainfluenza Virus 4 Not Detected     RSV, PCR Not Detected     Bordetella pertussis pcr Not Detected     Bordetella parapertussis PCR Not Detected     Chlamydophila pneumoniae PCR Not Detected     Mycoplasma pneumo by PCR Not Detected    Narrative:      In the setting of a positive respiratory panel with a viral infection PLUS a negative procalcitonin without other underlying concern for bacterial infection, consider observing off  antibiotics or discontinuation of antibiotics and continue supportive care. If the respiratory panel is positive for atypical bacterial infection (Bordetella pertussis, Chlamydophila pneumoniae, or Mycoplasma pneumoniae), consider antibiotic de-escalation to target atypical bacterial infection.    COVID PRE-OP / PRE-PROCEDURE SCREENING ORDER (NO ISOLATION) - Swab, Nasopharynx [[id number]]  (Normal) Collected: 01/01/24 2330    Lab Status: Final result Specimen: Swab from Nasopharynx Updated: 01/02/24 0016    Narrative:      The following orders were created for panel order COVID PRE-OP / PRE-PROCEDURE SCREENING ORDER (NO ISOLATION) - Swab, Nasopharynx.  Procedure                               Abnormality         Status                     ---------                               -----------         ------                     COVID-19, FLU A/B, RSV P...[[id number]]  Normal              Final result                 Please view results for these tests on the individual orders.    COVID-19, FLU A/B, RSV PCR 1 HR TAT - Swab, Nasopharynx [[id number]]  (Normal) Collected: 01/01/24 2330    Lab Status: Final result Specimen: Swab from Nasopharynx Updated: 01/02/24 0016     COVID19 Not Detected     Influenza A PCR Not Detected     Influenza B PCR Not Detected     RSV, PCR Not Detected    Narrative:      Fact sheet for providers: https://www.fda.gov/media/951716/download    Fact sheet for patients: https://www.fda.gov/media/319272/download    Test performed by PCR.            No radiology results from the last 24 hrs    Results for orders placed during the hospital encounter of 01/01/24    Adult Transesophageal Echo 3D (MEAGAN) W/ Cont If Necessary Per Protocol    Interpretation Summary    Left ventricular ejection fraction appears to be 56 - 60%.    The right ventricular cavity is mildly dilated.  There is a electronically in the RV with no visible adherent mass or vegetation.    The right atrium is mildly dilated, there is  electronic lead present with no visible vegetation.    There is a mechanical mitral valve with moderately increased gradients similar to most recent TTE.  The bileaflet valve appears to be functioning normally with no visible thrombus or vegetation.  There is mild MR around the periphery with some potential perivalvular leak.    Aortic valve is mildly calcified with trace AR, no vegetation seen.    The tricuspid valve appears structurally normal with no vegetation.  There is moderate to severe central tricuspid regurgitation around the site of the lead transversing the valve.      Current medications:  Scheduled Meds:bumetanide, 2 mg, Oral, BID  cefTRIAXone, 2,000 mg, Intravenous, Q24H  enoxaparin, 90 mg, Subcutaneous, Q24H  escitalopram, 20 mg, Oral, Daily  ferrous sulfate, 325 mg, Oral, Daily With Breakfast  folic acid, 800 mcg, Oral, Daily  insulin detemir, 5 Units, Subcutaneous, Nightly  insulin lispro, 2-7 Units, Subcutaneous, 4x Daily AC & at Bedtime  Insulin Lispro, 3 Units, Subcutaneous, TID With Meals  nystatin, 5 mL, Swish & Swallow, 4x Daily  oxybutynin XL, 5 mg, Oral, Daily  pantoprazole, 40 mg, Oral, Q AM  polyethylene glycol, 17 g, Oral, Daily  senna-docusate sodium, 1 tablet, Oral, BID  [START ON 1/14/2024] sodium bicarbonate, 650 mg, Oral, Daily  sodium chloride, 10 mL, Intravenous, Q12H  warfarin, 7.5 mg, Oral, Daily      Continuous Infusions:Pharmacy to dose warfarin,       PRN Meds:.  acetaminophen    albuterol    dextrose    dextrose    glucagon (human recombinant)    melatonin    naloxone    nitroglycerin    oxyCODONE-acetaminophen    Pharmacy to dose warfarin    sodium chloride    sodium chloride    Assessment & Plan   Assessment & Plan     Active Hospital Problems    Diagnosis  POA    **Renal failure [N19]  Yes    Hyponatremia [E87.1]  Yes    Hyperkalemia [E87.5]  Yes    Type 2 diabetes mellitus with hyperglycemia [E11.65]  Yes    Elevated LFTs [R79.89]  Yes    Anemia, chronic disease [D63.8]   Yes    Cervical lymphadenopathy [R59.0]  Yes    GI bleed [K92.2]  Yes    H/O heart valve replacement with mechanical valve [Z95.2]  Not Applicable    ARF (acute renal failure) [N17.9]  Yes    Left bundle branch hemiblock [I44.60]  Yes    Type 2 diabetes mellitus without complication, with long-term current use of insulin [E11.9, Z79.4]  Not Applicable    Dyslipidemia [E78.5]  Yes    Emphysema/COPD [J43.9]  Yes    Hypertension [I10]  Yes    Arteriovenous malformation of gastrointestinal tract [K55.20]  Yes    Obstructive sleep apnea syndrome [G47.33]  Yes    Chronic anticoagulation [Z79.01]  Not Applicable      Resolved Hospital Problems   No resolved problems to display.        Brief Hospital Course to date:  Di Lopez is a 63 y.o. 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 Found to have severe MEERA, resolution of BRBPR, persistent fever/chills and thigh pain     Epistaxis -resolved   - afrin x 3 days with improvement     Febrile illness, unclear etiology -- resolved   - ID consulted; pt got CTX, plan DC on augmentin.    - extensive negative workup inclu full viral panel, negative monospot, spine MRI withOUT contrast negative  - CT abd/pel with no acute findings  - Bcx x 2 different sets NGTD  - MEAGAN without vegetation  -CT maxillofacial with no acute process  - Reviewed need for outpatient dental appt given poor entition and MECH mv     Bilat leg pain R>L  Edema bilat   - started prior to admission. Goes down front of thigh/lower leg  - MRI reviewed with degenerative changes  - voiding a lot w bumex but legs still edematous  - compression hose, perhaps wraps  - dopplers not done - pt already on coumadin      Acute Renal Failure 2/2 hypovolemia + med effect - improved  - 2/2 acute diarrheal illness + entresto, NSAIDs  - Nephrology consulted and appreciate recs   - improved with fluids and holding  nephrotoxins.   - Sodium bicarb - stop   - renal function overall stable with diuresis; follow up nephrology recs       Acute anemia 2/2 bloody diarrheal illness - blood diarrhea resolved; epistaxis  - 1 units PRBC 1/8 with appropriate response  -stable hgb approx 8      Mechanical mitral valve on coumadin - Initially on heparin gtt.   -  Changed to lovenox and resume warfarin. Follows with Dr. Everett with home monitor   - INR 1.7.  Patient feels comfortable with lovenox -- will need additional Rx on DC      AoCHFpEF (EF 45-50%), s/p ICD   - now issue with overload. Currently on bumex 2 mg BID; net negative     Chronic hypoxic resp failure 2/2 COPD - on home 2 liters n/c  DMII - basal/bolus regimen  Anxiety/depression  BMI 38 - complicates all aspects of care   Liver mass s/p embolization    Expected Discharge Location and Transportation: home health  Expected Discharge   Expected Discharge Date: 1/15/2024; Expected Discharge Time:      DVT prophylaxis:  Medical DVT prophylaxis orders are present.     AM-PAC 6 Clicks Score (PT): 20 (01/13/24 1002)    CODE STATUS:   Code Status and Medical Interventions:   Ordered at: 01/02/24 0319     Code Status (Patient has no pulse and is not breathing):    CPR (Attempt to Resuscitate)     Medical Interventions (Patient has pulse or is breathing):    Full Support       Rhoda Holman MD  01/13/24

## 2024-01-13 NOTE — PROGRESS NOTES
"   LOS: 11 days    Patient Care Team:  Davina Santiago DO as PCP - General (Family Medicine)  Ameena Iglesias MD as Consulting Physician (Endocrinology)  Nakita Freire APRN as Nurse Practitioner (Nurse Practitioner)  Raimundo Thomas MD as Consulting Physician (Pulmonary Disease)  Nadir Everett MD as Consulting Physician (Cardiology)  Luiz Longoria MD as Consulting Physician (Pain Medicine)    Subjective     Stable renal function. Dependent edema noted.     Objective     Vital Signs:  Blood pressure 138/57, pulse 88, temperature 98.2 °F (36.8 °C), temperature source Oral, resp. rate 16, height 152.4 cm (60\"), weight 92.5 kg (204 lb), last menstrual period 06/17/1998, SpO2 95%, not currently breastfeeding.      Intake/Output Summary (Last 24 hours) at 1/13/2024 1338  Last data filed at 1/13/2024 0930  Gross per 24 hour   Intake 540 ml   Output 1600 ml   Net -1060 ml        01/12 0701 - 01/13 0700  In: 500 [P.O.:500]  Out: 2200 [Urine:2200]    Physical Exam:        GENERAL: WD AAF NAD  NEURO: Awake and alert, oriented. No focal deficit  PSYCHIATRIC: NMA. Cooperative with PE  EYE: PE, no icterus, no conjunctivitis  ENT: ommm, dentition intact,  Hearing intact  NECK: Supple , No JVD discernable,  Trachea midline  CV: + Edema, RRR  LUNGS:  Quiet,  Nonlabored resp.  Symmetrical expansion  ABDOMEN: Nondistended, soft nontender.  : No Perdomo, no palp bladder  SKIN: Warm and dry without rash      Labs:  Results from last 7 days   Lab Units 01/13/24  0433 01/12/24  0438 01/11/24  0606   WBC 10*3/mm3 3.69 3.83 4.53   HEMOGLOBIN g/dL 7.8* 7.8* 7.7*   PLATELETS 10*3/mm3 202 190 198     Results from last 7 days   Lab Units 01/13/24  0433 01/12/24  0849 01/11/24  0606 01/10/24  0509 01/09/24  0920 01/08/24  0457 01/07/24  0518   SODIUM mmol/L 137 137 139 137 137 134* 136   POTASSIUM mmol/L 3.5 4.2 4.5 4.4 4.7 4.8 4.9   CHLORIDE mmol/L 96* 99 102 103 103 104 103   CO2 mmol/L 29.0 25.0 24.0 22.0 18.0* 19.0* " 18.0*   BUN mg/dL 24* 22 22 28* 28* 31* 33*   CREATININE mg/dL 2.09* 2.31* 2.06* 2.65* 2.99* 3.24* 3.87*   CALCIUM mg/dL 8.8 9.0 8.6 8.8 8.6 8.3* 8.5*   ALBUMIN g/dL  --   --   --   --  3.7 3.2* 3.7     Results from last 7 days   Lab Units 01/09/24  0920   ALK PHOS U/L 234*   BILIRUBIN mg/dL 0.3   ALT (SGPT) U/L 41*   AST (SGOT) U/L 32                   Estimated Creatinine Clearance: 28.3 mL/min (A) (by C-G formula based on SCr of 2.09 mg/dL (H)).         A/P:    ARF: Creatinine continues to improve daily.  Urine output continues nonoliguric with unmeasured voids.  Creatinine up to 8 on presentation with likely dehydration due to diarrhea with poor p.o. intake in the setting of NSAIDs and Entresto.      HTN: Blood pressure stable.    Hyponatremia: Borderline.  Initially down to 129 on presentation due to hypovolemia and MEERA.  All fluids isotonic.     Hyperkalemia: Stable.  Due to MEERA.     Met Aciodsis: Persistent on p.o. bicarb.  Due to renal failure and underlying GI losses with diarrhea.  Monitor for now.     Anemia.  Hemoglobin below goal.  Hx of supra therapeutic INR w rectal bleeding in the past. Admitted again with concern for rectal bleed.  Iron saturation quite low with elevated ferritin.  Transfuse as needed for Hgb <7.    Volume status: Dependent edema noted.      Hx of mechanical valve.  On anticoagulation     CHF: EF 40-45%. Hx of MV and depressed RV function. On beta blocker and lasix at home. Not on ACEi/ARB/MRA/ or SGLT-2inh for now.    Recs  Continue bumex 2mg  BID  Conservative care otherwise from renal standpoint.    High risk and complexity patient.    Pro Quesada MD  01/13/24  13:38 EST

## 2024-01-13 NOTE — PLAN OF CARE
Goal Outcome Evaluation:  Plan of Care Reviewed With: patient           Outcome Evaluation: Patient A&Ox4 and VSS. Paced on tele. PO PRN pain medication administered this a.m. Voiding spontaneously and frequently with standby assist during ambulation to bathroom.  Call light in reach

## 2024-01-14 LAB
ANION GAP SERPL CALCULATED.3IONS-SCNC: 13 MMOL/L (ref 5–15)
BUN SERPL-MCNC: 24 MG/DL (ref 8–23)
BUN/CREAT SERPL: 10.4 (ref 7–25)
CALCIUM SPEC-SCNC: 9 MG/DL (ref 8.6–10.5)
CHLORIDE SERPL-SCNC: 96 MMOL/L (ref 98–107)
CO2 SERPL-SCNC: 32 MMOL/L (ref 22–29)
CREAT SERPL-MCNC: 2.31 MG/DL (ref 0.57–1)
DEPRECATED RDW RBC AUTO: 50.4 FL (ref 37–54)
EGFRCR SERPLBLD CKD-EPI 2021: 23.4 ML/MIN/1.73
ERYTHROCYTE [DISTWIDTH] IN BLOOD BY AUTOMATED COUNT: 15.9 % (ref 12.3–15.4)
GLUCOSE BLDC GLUCOMTR-MCNC: 151 MG/DL (ref 70–130)
GLUCOSE BLDC GLUCOMTR-MCNC: 168 MG/DL (ref 70–130)
GLUCOSE BLDC GLUCOMTR-MCNC: 185 MG/DL (ref 70–130)
GLUCOSE BLDC GLUCOMTR-MCNC: 220 MG/DL (ref 70–130)
GLUCOSE SERPL-MCNC: 163 MG/DL (ref 65–99)
HCT VFR BLD AUTO: 25.5 % (ref 34–46.6)
HGB BLD-MCNC: 8 G/DL (ref 12–15.9)
INR PPP: 1.96 (ref 0.89–1.12)
MCH RBC QN AUTO: 27.4 PG (ref 26.6–33)
MCHC RBC AUTO-ENTMCNC: 31.4 G/DL (ref 31.5–35.7)
MCV RBC AUTO: 87.3 FL (ref 79–97)
PLATELET # BLD AUTO: 186 10*3/MM3 (ref 140–450)
PMV BLD AUTO: 10.1 FL (ref 6–12)
POTASSIUM SERPL-SCNC: 3.6 MMOL/L (ref 3.5–5.2)
PROTHROMBIN TIME: 22.5 SECONDS (ref 12.2–14.5)
RBC # BLD AUTO: 2.92 10*6/MM3 (ref 3.77–5.28)
SODIUM SERPL-SCNC: 141 MMOL/L (ref 136–145)
WBC NRBC COR # BLD AUTO: 3.82 10*3/MM3 (ref 3.4–10.8)

## 2024-01-14 PROCEDURE — 63710000001 INSULIN LISPRO (HUMAN) PER 5 UNITS: Performed by: STUDENT IN AN ORGANIZED HEALTH CARE EDUCATION/TRAINING PROGRAM

## 2024-01-14 PROCEDURE — 63710000001 INSULIN DETEMIR PER 5 UNITS: Performed by: STUDENT IN AN ORGANIZED HEALTH CARE EDUCATION/TRAINING PROGRAM

## 2024-01-14 PROCEDURE — 85027 COMPLETE CBC AUTOMATED: CPT | Performed by: STUDENT IN AN ORGANIZED HEALTH CARE EDUCATION/TRAINING PROGRAM

## 2024-01-14 PROCEDURE — 99232 SBSQ HOSP IP/OBS MODERATE 35: CPT | Performed by: INTERNAL MEDICINE

## 2024-01-14 PROCEDURE — 25010000002 CEFTRIAXONE PER 250 MG: Performed by: INTERNAL MEDICINE

## 2024-01-14 PROCEDURE — 82948 REAGENT STRIP/BLOOD GLUCOSE: CPT

## 2024-01-14 PROCEDURE — 25010000002 ENOXAPARIN PER 10 MG: Performed by: INTERNAL MEDICINE

## 2024-01-14 PROCEDURE — 63710000001 INSULIN LISPRO (HUMAN) PER 5 UNITS: Performed by: INTERNAL MEDICINE

## 2024-01-14 PROCEDURE — 85610 PROTHROMBIN TIME: CPT

## 2024-01-14 PROCEDURE — 80048 BASIC METABOLIC PNL TOTAL CA: CPT | Performed by: INTERNAL MEDICINE

## 2024-01-14 RX ORDER — WARFARIN SODIUM 5 MG/1
5 TABLET ORAL
Status: DISCONTINUED | OUTPATIENT
Start: 2024-01-14 | End: 2024-01-15

## 2024-01-14 RX ADMIN — BUMETANIDE 2 MG: 1 TABLET ORAL at 09:47

## 2024-01-14 RX ADMIN — SENNOSIDES AND DOCUSATE SODIUM 1 TABLET: 8.6; 5 TABLET ORAL at 21:39

## 2024-01-14 RX ADMIN — ESCITALOPRAM OXALATE 20 MG: 20 TABLET, FILM COATED ORAL at 09:48

## 2024-01-14 RX ADMIN — OXYCODONE HYDROCHLORIDE AND ACETAMINOPHEN 1 TABLET: 7.5; 325 TABLET ORAL at 06:05

## 2024-01-14 RX ADMIN — NYSTATIN 500000 UNITS: 100000 SUSPENSION ORAL at 21:39

## 2024-01-14 RX ADMIN — WARFARIN SODIUM 5 MG: 5 TABLET ORAL at 17:46

## 2024-01-14 RX ADMIN — PANTOPRAZOLE SODIUM 40 MG: 40 TABLET, DELAYED RELEASE ORAL at 06:05

## 2024-01-14 RX ADMIN — ENOXAPARIN SODIUM 90 MG: 100 INJECTION SUBCUTANEOUS at 16:14

## 2024-01-14 RX ADMIN — INSULIN LISPRO 2 UNITS: 100 INJECTION, SOLUTION INTRAVENOUS; SUBCUTANEOUS at 17:48

## 2024-01-14 RX ADMIN — INSULIN LISPRO 3 UNITS: 100 INJECTION, SOLUTION INTRAVENOUS; SUBCUTANEOUS at 17:47

## 2024-01-14 RX ADMIN — INSULIN LISPRO 3 UNITS: 100 INJECTION, SOLUTION INTRAVENOUS; SUBCUTANEOUS at 13:07

## 2024-01-14 RX ADMIN — Medication 10 ML: at 21:40

## 2024-01-14 RX ADMIN — OXYBUTYNIN CHLORIDE 5 MG: 5 TABLET, FILM COATED, EXTENDED RELEASE ORAL at 09:48

## 2024-01-14 RX ADMIN — FERROUS SULFATE TAB 325 MG (65 MG ELEMENTAL FE) 325 MG: 325 (65 FE) TAB at 09:48

## 2024-01-14 RX ADMIN — INSULIN LISPRO 2 UNITS: 100 INJECTION, SOLUTION INTRAVENOUS; SUBCUTANEOUS at 13:07

## 2024-01-14 RX ADMIN — OXYCODONE HYDROCHLORIDE AND ACETAMINOPHEN 1 TABLET: 7.5; 325 TABLET ORAL at 13:13

## 2024-01-14 RX ADMIN — NYSTATIN 500000 UNITS: 100000 SUSPENSION ORAL at 09:48

## 2024-01-14 RX ADMIN — NYSTATIN 500000 UNITS: 100000 SUSPENSION ORAL at 13:13

## 2024-01-14 RX ADMIN — INSULIN DETEMIR 5 UNITS: 100 INJECTION, SOLUTION SUBCUTANEOUS at 21:39

## 2024-01-14 RX ADMIN — FOLIC ACID TAB 400 MCG 800 MCG: 400 TAB at 09:48

## 2024-01-14 RX ADMIN — NYSTATIN 500000 UNITS: 100000 SUSPENSION ORAL at 17:47

## 2024-01-14 RX ADMIN — INSULIN LISPRO 3 UNITS: 100 INJECTION, SOLUTION INTRAVENOUS; SUBCUTANEOUS at 21:39

## 2024-01-14 RX ADMIN — INSULIN LISPRO 3 UNITS: 100 INJECTION, SOLUTION INTRAVENOUS; SUBCUTANEOUS at 09:50

## 2024-01-14 RX ADMIN — SODIUM BICARBONATE 650 MG TABLET 650 MG: at 09:47

## 2024-01-14 RX ADMIN — Medication 10 ML: at 09:55

## 2024-01-14 RX ADMIN — INSULIN LISPRO 2 UNITS: 100 INJECTION, SOLUTION INTRAVENOUS; SUBCUTANEOUS at 09:48

## 2024-01-14 RX ADMIN — CEFTRIAXONE 2000 MG: 2 INJECTION, POWDER, FOR SOLUTION INTRAMUSCULAR; INTRAVENOUS at 09:49

## 2024-01-14 NOTE — PLAN OF CARE
Patient c/o intermittent bilateral knee ( previously operated) pain described as throbbing. Adequately controlled with PO medications. On diuretic, cont to void frequently. VSS on 2L. Paced rhythm on cardiac mx. Call light in reach.

## 2024-01-14 NOTE — PROGRESS NOTES
"Pharmacy Consult  -  Warfarin    Di Lopez is a  63 y.o. female   Height - 152.4 cm (60\")  Weight - 92.5 kg (204 lb)    Consulting Provider: - Hospitalist  Indication: - Mechanical mitral valve  Goal INR: - 2.5-3.5  Home Regimen:   - Warfarin 5mg daily    Bridge Therapy: Yes   Therapeutic enoxaparin    Drug-Drug Interactions with current regimen:   CTX, enoxaparin-increased risk of bleeding    Warfarin Dosing During Admission:    Date  1/9 1/10 1/11 1/12 1/13 1/14      INR  1.25 1.21 1.24 1.41 1.69 1.96      Dose  5mg 7.5mg 7.5mg 7.5mg 7.5mg (5mg)          Education Provided:  Will follow and  as needed.    Discharge Follow up:   Following Provider - Dr. Everett-Pt has home monitor   Follow up time range or appointment - 2-3 days post discharge      Labs:    Results from last 7 days   Lab Units 01/13/24  0433 01/12/24  0438 01/11/24  0606 01/10/24  0509 01/09/24  0920 01/08/24  0457 01/07/24  0552 01/07/24  0518   INR  1.69* 1.41* 1.24* 1.21* 1.25* 1.29*  --  1.39*   HEMOGLOBIN g/dL 7.8* 7.8* 7.7* 7.9* 8.3* 7.1* 7.3*  --    HEMATOCRIT % 24.8* 25.1* 24.2* 25.0* 25.8* 22.1* 23.0*  --      Results from last 7 days   Lab Units 01/13/24  0433 01/12/24  0849 01/11/24  0606 01/10/24  0509 01/09/24  0920 01/08/24  0457 01/07/24  0518   SODIUM mmol/L 137 137 139   < > 137 134* 136   POTASSIUM mmol/L 3.5 4.2 4.5   < > 4.7 4.8 4.9   CHLORIDE mmol/L 96* 99 102   < > 103 104 103   CO2 mmol/L 29.0 25.0 24.0   < > 18.0* 19.0* 18.0*   BUN mg/dL 24* 22 22   < > 28* 31* 33*   CREATININE mg/dL 2.09* 2.31* 2.06*   < > 2.99* 3.24* 3.87*   CALCIUM mg/dL 8.8 9.0 8.6   < > 8.6 8.3* 8.5*   BILIRUBIN mg/dL  --   --   --   --  0.3 0.2 0.3   ALK PHOS U/L  --   --   --   --  234* 213* 225*   ALT (SGPT) U/L  --   --   --   --  41* 42* 43*   AST (SGOT) U/L  --   --   --   --  32 33* 49*   GLUCOSE mg/dL 166* 131* 128*   < > 213* 226* 167*    < > = values in this interval not displayed.       Current dietary intake: Regular diet- 75% " of meals documented in the past 24 hours.      Assessment/Plan:     Pharmacy consulted to dose warfarin for mechanical mitral valve, goal INR 2.5-3.5.  INR today is 1.96 after resumption of warfarin on 1/9.  Give warfarin 5mg tonight.   Continue lovenox bridge until INR is greater than 2.5 for 2 consecutive days.   Daily PT/INR ordered, monitor for s/s of bleeding/clotting (Pt presented to hospital with BRBPR on admission and GI has signed off. 1 unit PRBC administered 1/8.) monitor h/h closely, and for new medication interactions.  Will continue to follow and adjust dose as needed.    Thank you for this consult,    Keegan Chapman, PharmD  Pharmacy Resident  1/13/2024  07:26 EST

## 2024-01-14 NOTE — PLAN OF CARE
Goal Outcome Evaluation:  Plan of Care Reviewed With: patient           Outcome Evaluation: Pt AAOx4 and pleasant, cooperative.  C/o pain in her L knee that was relieved with PRN pain medications.  Voiding spontaneously and frequently.  Ambulates well with no assist.  Refused evening dose of Bumex stating she was told medication would be changed to daily in the AM to prevent being up all night urinating.  Wearing 2L O2 via NC.  No other concerns at this time.

## 2024-01-14 NOTE — PROGRESS NOTES
[facility] Medicine Services  PROGRESS NOTE    Patient Name: Di Lopez  : 1960  MRN: [id number]    Date of Admission: 2024  Primary Care Physician: Davina Santiago DO    Subjective   Subjective     CC:  Renal failure    HPI:  continues to void a lot.  Legs still very swollen, though.  Some pain       Objective   Objective     Vital Signs:   Temp:  [97.5 °F (36.4 °C)-99 °F (37.2 °C)] 99 °F (37.2 °C)  Heart Rate:  [67-73] 73  Resp:  [16-17] 17  BP: (123-144)/(58-72) 144/64  Flow (L/min):  [2] 2     Physical Exam:  Constitutional: No acute distress, awake, alert sitting up and eating lunch   HENT: NCAT, mucous membranes moist  Respiratory: Clear to auscultation bilaterally, respiratory effort normal   Cardiovascular: RRR,  Gastrointestinal: Positive bowel sounds,  Musculoskeletal: edema3+   Psychiatric: Appropriate affect, cooperative  Neurologic: Oriented x 3, strength symmetric in all extremities, Cranial Nerves grossly intact to confrontation, speech clear  Skin: No rashes    Results Reviewed:  LAB RESULTS:      Lab 24  0644 24  0433 24  0438 24  0606 01/10/24  0509 24  0920 24  0010 24  0638 24  0457 24  2105 24  1402   WBC 3.82 3.69 3.83 4.53 4.49 5.30  --   --    < >  --   --    HEMOGLOBIN 8.0* 7.8* 7.8* 7.7* 7.9* 8.3*  --   --    < >  --   --    HEMATOCRIT 25.5* 24.8* 25.1* 24.2* 25.0* 25.8*  --   --    < >  --   --    PLATELETS 186 202 190 198 212 225  --   --    < >  --   --    NEUTROS ABS  --   --  2.70  --   --  4.05  --   --   --   --   --    IMMATURE GRANS (ABS)  --   --  0.05  --   --  0.06*  --   --   --   --   --    LYMPHS ABS  --   --  0.71  --   --  0.86  --   --   --   --   --    MONOS ABS  --   --  0.28  --   --  0.23  --   --   --   --   --    EOS ABS  --   --  0.08  --   --  0.09  --   --   --   --   --    MCV 87.3 86.7 87.8 86.4 88.0 87.2  --   --    < >  --   --    PROTIME 22.5* 20.0*  17.4* 15.7* 15.4* 15.9*  --   --    < >  --   --    HEPARIN ANTI-XA  --   --   --   --   --  0.31 0.23* 0.31  --  0.41 0.38    < > = values in this interval not displayed.         Lab 01/13/24  0433 01/12/24  0849 01/11/24  0606 01/10/24  0509 01/09/24  0920   SODIUM 137 137 139 137 137   POTASSIUM 3.5 4.2 4.5 4.4 4.7   CHLORIDE 96* 99 102 103 103   CO2 29.0 25.0 24.0 22.0 18.0*   ANION GAP 12.0 13.0 13.0 12.0 16.0*   BUN 24* 22 22 28* 28*   CREATININE 2.09* 2.31* 2.06* 2.65* 2.99*   EGFR 26.4* 23.4* 26.8* 19.8* 17.1*   GLUCOSE 166* 131* 128* 126* 213*   CALCIUM 8.8 9.0 8.6 8.8 8.6         Lab 01/09/24  0920 01/08/24  0457   TOTAL PROTEIN 6.3 5.9*   ALBUMIN 3.7 3.2*   GLOBULIN 2.6 2.7   ALT (SGPT) 41* 42*   AST (SGOT) 32 33*   BILIRUBIN 0.3 0.2   ALK PHOS 234* 213*         Lab 01/14/24  0644 01/13/24  0433 01/12/24  0438 01/11/24  0606 01/10/24  0509   PROTIME 22.5* 20.0* 17.4* 15.7* 15.4*   INR 1.96* 1.69* 1.41* 1.24* 1.21*             Lab 01/08/24  1113   ABO TYPING O   RH TYPING Positive   ANTIBODY SCREEN Negative         Brief Urine Lab Results  (Last result in the past 365 days)        Color   Clarity   Blood   Leuk Est   Nitrite   Protein   CREAT   Urine HCG        01/02/24 0011             142.2         01/02/24 0011 Yellow   Cloudy   Small (1+)   Negative   Negative   100 mg/dL (2+)                   Microbiology Results Abnormal       Procedure Component Value - Date/Time    Blood Culture - Blood, Hand, Left [[id number]]  (Normal) Collected: 01/08/24 1017    Lab Status: Final result Specimen: Blood from Hand, Left Updated: 01/13/24 1030     Blood Culture No growth at 5 days    Blood Culture - Blood, Arm, Left [[id number]]  (Normal) Collected: 01/08/24 1018    Lab Status: Final result Specimen: Blood from Arm, Left Updated: 01/13/24 1030     Blood Culture No growth at 5 days    Blood Culture - Blood, Arm, Left [[id number]]  (Normal) Collected: 01/06/24 0056    Lab Status: Final result Specimen: Blood from Arm,  Left Updated: 01/11/24 0815     Blood Culture No growth at 5 days    Blood Culture - Blood, Arm, Left [[id number]]  (Normal) Collected: 01/05/24 1641    Lab Status: Final result Specimen: Blood from Arm, Left Updated: 01/10/24 1815     Blood Culture No growth at 5 days    Narrative:      Less than seven (7) mL's of blood was collected.  Insufficient quantity may yield false negative results.    Respiratory Panel PCR w/COVID-19(SARS-CoV-2) LUIS/ESTHER/KARSON/PAD/COR/LEONARDO In-House, NP Swab in UTM/VTM, 2 HR TAT - Swab, Nasopharynx [[id number]]  (Normal) Collected: 01/05/24 1634    Lab Status: Final result Specimen: Swab from Nasopharynx Updated: 01/05/24 1804     ADENOVIRUS, PCR Not Detected     Coronavirus 229E Not Detected     Coronavirus HKU1 Not Detected     Coronavirus NL63 Not Detected     Coronavirus OC43 Not Detected     COVID19 Not Detected     Human Metapneumovirus Not Detected     Human Rhinovirus/Enterovirus Not Detected     Influenza A PCR Not Detected     Influenza B PCR Not Detected     Parainfluenza Virus 1 Not Detected     Parainfluenza Virus 2 Not Detected     Parainfluenza Virus 3 Not Detected     Parainfluenza Virus 4 Not Detected     RSV, PCR Not Detected     Bordetella pertussis pcr Not Detected     Bordetella parapertussis PCR Not Detected     Chlamydophila pneumoniae PCR Not Detected     Mycoplasma pneumo by PCR Not Detected    Narrative:      In the setting of a positive respiratory panel with a viral infection PLUS a negative procalcitonin without other underlying concern for bacterial infection, consider observing off antibiotics or discontinuation of antibiotics and continue supportive care. If the respiratory panel is positive for atypical bacterial infection (Bordetella pertussis, Chlamydophila pneumoniae, or Mycoplasma pneumoniae), consider antibiotic de-escalation to target atypical bacterial infection.    COVID PRE-OP / PRE-PROCEDURE SCREENING ORDER (NO ISOLATION) - Swab, Nasopharynx [[id number]]   (Normal) Collected: 01/01/24 2330    Lab Status: Final result Specimen: Swab from Nasopharynx Updated: 01/02/24 0016    Narrative:      The following orders were created for panel order COVID PRE-OP / PRE-PROCEDURE SCREENING ORDER (NO ISOLATION) - Swab, Nasopharynx.  Procedure                               Abnormality         Status                     ---------                               -----------         ------                     COVID-19, FLU A/B, RSV P...[[id number]]  Normal              Final result                 Please view results for these tests on the individual orders.    COVID-19, FLU A/B, RSV PCR 1 HR TAT - Swab, Nasopharynx [[id number]]  (Normal) Collected: 01/01/24 2330    Lab Status: Final result Specimen: Swab from Nasopharynx Updated: 01/02/24 0016     COVID19 Not Detected     Influenza A PCR Not Detected     Influenza B PCR Not Detected     RSV, PCR Not Detected    Narrative:      Fact sheet for providers: https://www.fda.gov/media/268412/download    Fact sheet for patients: https://www.fda.gov/media/847996/download    Test performed by PCR.            No radiology results from the last 24 hrs    Results for orders placed during the hospital encounter of 01/01/24    Adult Transesophageal Echo 3D (MEAGAN) W/ Cont If Necessary Per Protocol    Interpretation Summary    Left ventricular ejection fraction appears to be 56 - 60%.    The right ventricular cavity is mildly dilated.  There is a electronically in the RV with no visible adherent mass or vegetation.    The right atrium is mildly dilated, there is electronic lead present with no visible vegetation.    There is a mechanical mitral valve with moderately increased gradients similar to most recent TTE.  The bileaflet valve appears to be functioning normally with no visible thrombus or vegetation.  There is mild MR around the periphery with some potential perivalvular leak.    Aortic valve is mildly calcified with trace AR, no vegetation seen.     The tricuspid valve appears structurally normal with no vegetation.  There is moderate to severe central tricuspid regurgitation around the site of the lead transversing the valve.      Current medications:  Scheduled Meds:bumetanide, 2 mg, Oral, BID  cefTRIAXone, 2,000 mg, Intravenous, Q24H  enoxaparin, 90 mg, Subcutaneous, Q24H  escitalopram, 20 mg, Oral, Daily  ferrous sulfate, 325 mg, Oral, Daily With Breakfast  folic acid, 800 mcg, Oral, Daily  insulin detemir, 5 Units, Subcutaneous, Nightly  insulin lispro, 2-7 Units, Subcutaneous, 4x Daily AC & at Bedtime  Insulin Lispro, 3 Units, Subcutaneous, TID With Meals  nystatin, 5 mL, Swish & Swallow, 4x Daily  oxybutynin XL, 5 mg, Oral, Daily  pantoprazole, 40 mg, Oral, Q AM  polyethylene glycol, 17 g, Oral, Daily  senna-docusate sodium, 1 tablet, Oral, BID  sodium bicarbonate, 650 mg, Oral, Daily  sodium chloride, 10 mL, Intravenous, Q12H  warfarin, 5 mg, Oral, Daily      Continuous Infusions:Pharmacy to dose warfarin,       PRN Meds:.  acetaminophen    albuterol    dextrose    dextrose    glucagon (human recombinant)    melatonin    naloxone    nitroglycerin    oxyCODONE-acetaminophen    Pharmacy to dose warfarin    sodium chloride    sodium chloride    Assessment & Plan   Assessment & Plan     Active Hospital Problems    Diagnosis  POA    **Renal failure [N19]  Yes    Hyponatremia [E87.1]  Yes    Hyperkalemia [E87.5]  Yes    Type 2 diabetes mellitus with hyperglycemia [E11.65]  Yes    Elevated LFTs [R79.89]  Yes    Anemia, chronic disease [D63.8]  Yes    Cervical lymphadenopathy [R59.0]  Yes    GI bleed [K92.2]  Yes    H/O heart valve replacement with mechanical valve [Z95.2]  Not Applicable    ARF (acute renal failure) [N17.9]  Yes    Left bundle branch hemiblock [I44.60]  Yes    Type 2 diabetes mellitus without complication, with long-term current use of insulin [E11.9, Z79.4]  Not Applicable    Dyslipidemia [E78.5]  Yes    Emphysema/COPD [J43.9]  Yes     Hypertension [I10]  Yes    Arteriovenous malformation of gastrointestinal tract [K55.20]  Yes    Obstructive sleep apnea syndrome [G47.33]  Yes    Chronic anticoagulation [Z79.01]  Not Applicable      Resolved Hospital Problems   No resolved problems to display.        Brief Hospital Course to date:  Di Lopez is a 63 y.o. 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 Found to have severe MEERA, resolution of BRBPR, persistent fever/chills and thigh pain     Epistaxis -resolved   - afrin x 3 days with improvement     Febrile illness, unclear etiology -- resolved   - ID consulted; pt got CTX, plan DC on augmentin.    - extensive negative workup inclu full viral panel, negative monospot, spine MRI withOUT contrast negative  - CT abd/pel with no acute findings  - Bcx x 2 different sets NGTD  - MEAGAN without vegetation  -CT maxillofacial with no acute process  - Reviewed need for outpatient dental appt given poor entition and MECH mv     Bilat leg pain R>L  Edema bilat   - started prior to admission. Goes down front of thigh/lower leg  - MRI reviewed with degenerative changes  - voiding a lot w bumex but legs still edematous  - compression hose, perhaps wraps  - dopplers not done - pt already on coumadin      Acute Renal Failure 2/2 hypovolemia + med effect   - 2/2 acute diarrheal illness + entresto, NSAIDs  - Nephrology consulted and appreciate recs   - improved with fluids and holding nephrotoxins.   - Sodium bicarb - decreased   - renal function overall stable with diuresis; follow up nephrology recs ; Cr 2-2.3 recently       Acute anemia 2/2 bloody diarrheal illness - bloody diarrhea resolved; epistaxis  - 1 units PRBC 1/8 with appropriate response  -stable hgb approx 8      Mechanical mitral valve on coumadin - Initially on heparin gtt.   -  Changed to lovenox and resume warfarin. Follows with   [facility] with home monitor   - INR 1.7.  Patient feels comfortable with lovenox -- will need additional Rx on DC      AoCHFpEF (EF 45-50%), s/p ICD   - now issue with overload. Currently on bumex 2 mg BID; net negative     Chronic hypoxic resp failure 2/2 COPD - on home 2 liters n/c  DMII - basal/bolus regimen  Anxiety/depression  BMI 38 - complicates all aspects of care   Liver mass s/p embolization    Expected Discharge Location and Transportation: home health  Expected Discharge   Expected Discharge Date: 1/15/2024; Expected Discharge Time:      DVT prophylaxis:  Medical DVT prophylaxis orders are present.     AM-PAC 6 Clicks Score (PT): 24 (01/13/24 1923)    CODE STATUS:   Code Status and Medical Interventions:   Ordered at: 01/02/24 0319     Code Status (Patient has no pulse and is not breathing):    CPR (Attempt to Resuscitate)     Medical Interventions (Patient has pulse or is breathing):    Full Support       Rhoda Holman MD  01/14/24

## 2024-01-14 NOTE — PROGRESS NOTES
"   LOS: 12 days    Patient Care Team:  Davina Santiago DO as PCP - General (Family Medicine)  Ameena Iglesias MD as Consulting Physician (Endocrinology)  Nakita Freire APRN as Nurse Practitioner (Nurse Practitioner)  Raimundo Thomas MD as Consulting Physician (Pulmonary Disease)  Nadir Everett MD as Consulting Physician (Cardiology)  Luiz Longoria MD as Consulting Physician (Pain Medicine)    Subjective     Stable renal function. Dependent edema noted. Responding well to diuretics.     Objective     Vital Signs:  Blood pressure 139/58, pulse 85, temperature 97 °F (36.1 °C), temperature source Axillary, resp. rate 17, height 152.4 cm (60\"), weight 92.5 kg (204 lb), last menstrual period 06/17/1998, SpO2 96%, not currently breastfeeding.      Intake/Output Summary (Last 24 hours) at 1/14/2024 1507  Last data filed at 1/13/2024 2152  Gross per 24 hour   Intake 240 ml   Output 1000 ml   Net -760 ml        01/13 0701 - 01/14 0700  In: 720 [P.O.:720]  Out: 1000 [Urine:1000]    Physical Exam:        GENERAL: WD AAF NAD  NEURO: Awake and alert, oriented. No focal deficit  PSYCHIATRIC: NMA. Cooperative with PE  EYE: PE, no icterus, no conjunctivitis  ENT: ommm, dentition intact,  Hearing intact  NECK: Supple , No JVD discernable,  Trachea midline  CV: + Edema, RRR  LUNGS:  Quiet,  Nonlabored resp.  Symmetrical expansion  ABDOMEN: Nondistended, soft nontender.  : No Perdomo, no palp bladder  SKIN: Warm and dry without rash      Labs:  Results from last 7 days   Lab Units 01/14/24  0644 01/13/24  0433 01/12/24  0438   WBC 10*3/mm3 3.82 3.69 3.83   HEMOGLOBIN g/dL 8.0* 7.8* 7.8*   PLATELETS 10*3/mm3 186 202 190     Results from last 7 days   Lab Units 01/14/24  1103 01/13/24  0433 01/12/24  0849 01/11/24  0606 01/10/24  0509 01/09/24  0920 01/08/24  0457   SODIUM mmol/L 141 137 137 139   < > 137 134*   POTASSIUM mmol/L 3.6 3.5 4.2 4.5   < > 4.7 4.8   CHLORIDE mmol/L 96* 96* 99 102   < > 103 104   CO2 " mmol/L 32.0* 29.0 25.0 24.0   < > 18.0* 19.0*   BUN mg/dL 24* 24* 22 22   < > 28* 31*   CREATININE mg/dL 2.31* 2.09* 2.31* 2.06*   < > 2.99* 3.24*   CALCIUM mg/dL 9.0 8.8 9.0 8.6   < > 8.6 8.3*   ALBUMIN g/dL  --   --   --   --   --  3.7 3.2*    < > = values in this interval not displayed.     Results from last 7 days   Lab Units 01/09/24  0920   ALK PHOS U/L 234*   BILIRUBIN mg/dL 0.3   ALT (SGPT) U/L 41*   AST (SGOT) U/L 32                   Estimated Creatinine Clearance: 25.6 mL/min (A) (by C-G formula based on SCr of 2.31 mg/dL (H)).         A/P:    ARF: Creatinine continues to improve daily.  Urine output continues nonoliguric with unmeasured voids.  Creatinine up to 8 on presentation with likely dehydration due to diarrhea with poor p.o. intake in the setting of NSAIDs and Entresto.      HTN: Blood pressure stable.    Hyponatremia: Borderline.  Initially down to 129 on presentation due to hypovolemia and MEERA.  All fluids isotonic.     Hyperkalemia: Stable.  Due to MEERA.     Met Aciodsis: Persistent on p.o. bicarb.  Due to renal failure and underlying GI losses with diarrhea.  Monitor for now.     Anemia.  Hemoglobin below goal.  Hx of supra therapeutic INR w rectal bleeding in the past. Admitted again with concern for rectal bleed.  S/p IV iron on this admission.    Volume status: Dependent edema noted.      Hx of mechanical valve.  On anticoagulation     CHF: EF 40-45%. Hx of MV and depressed RV function. On beta blocker and lasix at home. Not on ACEi/ARB/MRA/ or SGLT-2inh for now.    Recs  Continue bumex 2mg  BID  Conservative care otherwise from renal standpoint.    High risk and complexity patient.    Pro Quesada MD  01/14/24  15:07 EST

## 2024-01-15 ENCOUNTER — READMISSION MANAGEMENT (OUTPATIENT)
Dept: CALL CENTER | Facility: HOSPITAL | Age: 64
End: 2024-01-15
Payer: MEDICARE

## 2024-01-15 VITALS
SYSTOLIC BLOOD PRESSURE: 133 MMHG | WEIGHT: 196.6 LBS | TEMPERATURE: 97.9 F | HEIGHT: 60 IN | BODY MASS INDEX: 38.6 KG/M2 | DIASTOLIC BLOOD PRESSURE: 80 MMHG | HEART RATE: 82 BPM | OXYGEN SATURATION: 97 % | RESPIRATION RATE: 18 BRPM

## 2024-01-15 LAB
ANION GAP SERPL CALCULATED.3IONS-SCNC: 11 MMOL/L (ref 5–15)
BASOPHILS # BLD MANUAL: 0 10*3/MM3 (ref 0–0.2)
BASOPHILS NFR BLD MANUAL: 0 % (ref 0–1.5)
BUN SERPL-MCNC: 26 MG/DL (ref 8–23)
BUN/CREAT SERPL: 11.7 (ref 7–25)
CALCIUM SPEC-SCNC: 8.5 MG/DL (ref 8.6–10.5)
CHLORIDE SERPL-SCNC: 94 MMOL/L (ref 98–107)
CO2 SERPL-SCNC: 32 MMOL/L (ref 22–29)
CREAT SERPL-MCNC: 2.22 MG/DL (ref 0.57–1)
DEPRECATED RDW RBC AUTO: 48.9 FL (ref 37–54)
EGFRCR SERPLBLD CKD-EPI 2021: 24.5 ML/MIN/1.73
EOSINOPHIL # BLD MANUAL: 0.11 10*3/MM3 (ref 0–0.4)
EOSINOPHIL NFR BLD MANUAL: 3 % (ref 0.3–6.2)
ERYTHROCYTE [DISTWIDTH] IN BLOOD BY AUTOMATED COUNT: 15.7 % (ref 12.3–15.4)
GLUCOSE BLDC GLUCOMTR-MCNC: 161 MG/DL (ref 70–130)
GLUCOSE BLDC GLUCOMTR-MCNC: 240 MG/DL (ref 70–130)
GLUCOSE SERPL-MCNC: 150 MG/DL (ref 65–99)
HCT VFR BLD AUTO: 24.3 % (ref 34–46.6)
HGB BLD-MCNC: 7.7 G/DL (ref 12–15.9)
INR PPP: 2.15 (ref 0.89–1.12)
LYMPHOCYTES # BLD MANUAL: 0.75 10*3/MM3 (ref 0.7–3.1)
LYMPHOCYTES NFR BLD MANUAL: 10 % (ref 5–12)
MCH RBC QN AUTO: 27 PG (ref 26.6–33)
MCHC RBC AUTO-ENTMCNC: 31.7 G/DL (ref 31.5–35.7)
MCV RBC AUTO: 85.3 FL (ref 79–97)
MONOCYTES # BLD: 0.36 10*3/MM3 (ref 0.1–0.9)
NEUTROPHILS # BLD AUTO: 2.34 10*3/MM3 (ref 1.7–7)
NEUTROPHILS NFR BLD MANUAL: 49 % (ref 42.7–76)
NEUTS BAND NFR BLD MANUAL: 17 % (ref 0–5)
PLAT MORPH BLD: NORMAL
PLATELET # BLD AUTO: 177 10*3/MM3 (ref 140–450)
PMV BLD AUTO: 10 FL (ref 6–12)
POTASSIUM SERPL-SCNC: 3.6 MMOL/L (ref 3.5–5.2)
PROTHROMBIN TIME: 24.2 SECONDS (ref 12.2–14.5)
RBC # BLD AUTO: 2.85 10*6/MM3 (ref 3.77–5.28)
RBC MORPH BLD: NORMAL
SODIUM SERPL-SCNC: 137 MMOL/L (ref 136–145)
VARIANT LYMPHS NFR BLD MANUAL: 19 % (ref 19.6–45.3)
VARIANT LYMPHS NFR BLD MANUAL: 2 % (ref 0–5)
WBC MORPH BLD: NORMAL
WBC NRBC COR # BLD AUTO: 3.55 10*3/MM3 (ref 3.4–10.8)

## 2024-01-15 PROCEDURE — 85025 COMPLETE CBC W/AUTO DIFF WBC: CPT | Performed by: INTERNAL MEDICINE

## 2024-01-15 PROCEDURE — 85007 BL SMEAR W/DIFF WBC COUNT: CPT | Performed by: INTERNAL MEDICINE

## 2024-01-15 PROCEDURE — 25010000002 CEFTRIAXONE PER 250 MG: Performed by: INTERNAL MEDICINE

## 2024-01-15 PROCEDURE — 80048 BASIC METABOLIC PNL TOTAL CA: CPT | Performed by: INTERNAL MEDICINE

## 2024-01-15 PROCEDURE — 82948 REAGENT STRIP/BLOOD GLUCOSE: CPT

## 2024-01-15 PROCEDURE — 85610 PROTHROMBIN TIME: CPT

## 2024-01-15 PROCEDURE — 63710000001 INSULIN LISPRO (HUMAN) PER 5 UNITS: Performed by: INTERNAL MEDICINE

## 2024-01-15 PROCEDURE — 97164 PT RE-EVAL EST PLAN CARE: CPT

## 2024-01-15 PROCEDURE — 97530 THERAPEUTIC ACTIVITIES: CPT

## 2024-01-15 PROCEDURE — 29581 APPL MULTLAYER CMPRN SYS LEG: CPT

## 2024-01-15 PROCEDURE — 63710000001 INSULIN LISPRO (HUMAN) PER 5 UNITS: Performed by: STUDENT IN AN ORGANIZED HEALTH CARE EDUCATION/TRAINING PROGRAM

## 2024-01-15 PROCEDURE — 99239 HOSP IP/OBS DSCHRG MGMT >30: CPT | Performed by: INTERNAL MEDICINE

## 2024-01-15 PROCEDURE — 97535 SELF CARE MNGMENT TRAINING: CPT

## 2024-01-15 RX ORDER — WARFARIN SODIUM 7.5 MG/1
7.5 TABLET ORAL
Status: DISCONTINUED | OUTPATIENT
Start: 2024-01-15 | End: 2024-01-15

## 2024-01-15 RX ORDER — WARFARIN SODIUM 7.5 MG/1
TABLET ORAL
Status: CANCELLED
Start: 2024-01-15

## 2024-01-15 RX ORDER — BUMETANIDE 1 MG/1
2 TABLET ORAL DAILY
Status: DISCONTINUED | OUTPATIENT
Start: 2024-01-16 | End: 2024-01-15 | Stop reason: HOSPADM

## 2024-01-15 RX ORDER — AMOXICILLIN AND CLAVULANATE POTASSIUM 500; 125 MG/1; MG/1
1 TABLET, FILM COATED ORAL 2 TIMES DAILY
Qty: 30 TABLET | Refills: 0 | Status: SHIPPED | OUTPATIENT
Start: 2024-01-15

## 2024-01-15 RX ORDER — CHLORAL HYDRATE 500 MG
1000 CAPSULE ORAL 2 TIMES DAILY WITH MEALS
Start: 2024-01-15

## 2024-01-15 RX ORDER — ENOXAPARIN SODIUM 100 MG/ML
90 INJECTION SUBCUTANEOUS EVERY 24 HOURS
Qty: 4.5 ML | Refills: 0 | Status: SHIPPED | OUTPATIENT
Start: 2024-01-15 | End: 2024-01-20

## 2024-01-15 RX ORDER — INSULIN GLARGINE 100 [IU]/ML
5 INJECTION, SOLUTION SUBCUTANEOUS DAILY
Qty: 45 ML | Refills: 1 | Status: SHIPPED | OUTPATIENT
Start: 2024-01-15

## 2024-01-15 RX ORDER — BUMETANIDE 2 MG/1
2 TABLET ORAL DAILY
Qty: 30 TABLET | Refills: 5 | Status: SHIPPED | OUTPATIENT
Start: 2024-01-16

## 2024-01-15 RX ADMIN — PANTOPRAZOLE SODIUM 40 MG: 40 TABLET, DELAYED RELEASE ORAL at 05:16

## 2024-01-15 RX ADMIN — ESCITALOPRAM OXALATE 20 MG: 20 TABLET, FILM COATED ORAL at 09:27

## 2024-01-15 RX ADMIN — INSULIN LISPRO 3 UNITS: 100 INJECTION, SOLUTION INTRAVENOUS; SUBCUTANEOUS at 13:12

## 2024-01-15 RX ADMIN — NYSTATIN 500000 UNITS: 100000 SUSPENSION ORAL at 09:27

## 2024-01-15 RX ADMIN — FERROUS SULFATE TAB 325 MG (65 MG ELEMENTAL FE) 325 MG: 325 (65 FE) TAB at 09:27

## 2024-01-15 RX ADMIN — CEFTRIAXONE 2000 MG: 2 INJECTION, POWDER, FOR SOLUTION INTRAMUSCULAR; INTRAVENOUS at 09:29

## 2024-01-15 RX ADMIN — ACETAMINOPHEN 650 MG: 325 TABLET ORAL at 09:48

## 2024-01-15 RX ADMIN — NYSTATIN 500000 UNITS: 100000 SUSPENSION ORAL at 13:11

## 2024-01-15 RX ADMIN — OXYBUTYNIN CHLORIDE 5 MG: 5 TABLET, FILM COATED, EXTENDED RELEASE ORAL at 09:28

## 2024-01-15 RX ADMIN — FOLIC ACID TAB 400 MCG 800 MCG: 400 TAB at 09:27

## 2024-01-15 RX ADMIN — Medication 10 ML: at 09:00

## 2024-01-15 RX ADMIN — INSULIN LISPRO 3 UNITS: 100 INJECTION, SOLUTION INTRAVENOUS; SUBCUTANEOUS at 09:27

## 2024-01-15 RX ADMIN — BUMETANIDE 2 MG: 1 TABLET ORAL at 09:27

## 2024-01-15 RX ADMIN — INSULIN LISPRO 2 UNITS: 100 INJECTION, SOLUTION INTRAVENOUS; SUBCUTANEOUS at 09:27

## 2024-01-15 NOTE — PLAN OF CARE
Goal Outcome Evaluation:  Plan of Care Reviewed With: patient        Progress: improving  Outcome Evaluation: OT re cert completed. Pt presents with some remaining underlying impairments impacting function. Pt still below occuaptional performance baseline limited by decreased activity tolerance, impaired balance, muscle weakness at BUEs and decreased safety awareness. Pt requires gross spv for fxl STS and min in room ADL related mobility and gross CGA for UBD, LBD. Pt demonstrates improved activity tolerance and improved reach distally with understanding of use of LH AE for LB ADLs. Recommend IPOT POC while pt hospitalized, home w/ A at d/c when medically ready.      Anticipated Discharge Disposition (OT): home with assist

## 2024-01-15 NOTE — PLAN OF CARE
Goal Outcome Evaluation:  Plan of Care Reviewed With: patient        Progress: no change  Outcome Evaluation: PT wound care initial evaluation complete. Pt presenting with moderate BLE edema which pt reports has considerably improved since hospital admission with use of diuretics. Pt also with reports of BLE pain related to edema. PT applied BLE compressogrips for light compression to help promote venous return, improve skin integrity/pain, and promote effectiveness of diuretics. PT plans to f/u with MLW changes in 2-3 days.

## 2024-01-15 NOTE — THERAPY RE-EVALUATION
Acute Care - Wound/Debridement Initial Evaluation  [facility]     Patient Name: Di Lopez  : 1960  MRN: [id number]  Today's Date: 1/15/2024                Admit Date: 2024    Visit Dx:    ICD-10-CM ICD-9-CM   1. Acute renal failure, unspecified acute renal failure type  N17.9 584.9   2. Hyperkalemia  E87.5 276.7   3. Gastrointestinal hemorrhage, unspecified gastrointestinal hemorrhage type  K92.2 578.9   4. Acute renal failure superimposed on chronic kidney disease, unspecified acute renal failure type, unspecified CKD stage  N17.9 584.9    N18.9 585.9   5. Chronic respiratory failure with hypoxia  J96.11 518.83     799.02   6. Weakness  R53.1 780.79   7. Dysphagia, unspecified type  R13.10 787.20       Patient Active Problem List   Diagnosis    Arteriovenous malformation of gastrointestinal tract    Depression    Type 2 diabetes mellitus without complication, with long-term current use of insulin    Dyslipidemia    Emphysema/COPD    Hypertension    Insomnia    Morbid obesity    Obstructive sleep apnea syndrome    Chronic anticoagulation    Normocytic anemia    Sliding hiatal hernia    Vitamin B12 deficiency    Iron deficiency    Iron malabsorption    Bronchiectasis without complication    Electronic cigarette use    Chronic diastolic heart failure    Left bundle branch hemiblock    Chronic respiratory failure with hypoxia    Body aches    Headaches due to old head injury    History of tobacco use, presenting hazards to health    Multiple nodules of lung    Nocturnal hypoxemia    Screening for colon cancer    Abnormal CT scan    Wellness examination    ARF (acute renal failure)    H/O heart valve replacement with mechanical valve    GI bleed    Severe malnutrition    Hyponatremia    Hyperkalemia    Type 2 diabetes mellitus with hyperglycemia    Elevated LFTs    Anemia, chronic disease    Cervical lymphadenopathy    Renal failure        Past Medical History:   Diagnosis Date    Allergic      Anemia     Anticoagulated     WARFARIN    Arteriovenous malformation of gastrointestinal tract 05/12/2016    Arthritis     Asthma     Black hairy tongue     CHF (congestive heart failure)     Chronic back pain     Coronary artery disease     Depression 05/12/2016    Diabetes mellitus     Dyslipidemia 05/12/2016    Elevated cholesterol     Emphysema/COPD 05/12/2016    Emphysema/COPD 05/12/2016    Fatigue     GERD (gastroesophageal reflux disease)     Headache     Herniated cervical disc     History of echocardiogram 07/16/2013    Left ventricular systolic function at the lower limits of normal; EF 50-55%; mild MR; mechanical prosthetic mitral valve present; mod TR    History of transfusion     NO REACTION; [facility]    Hyperlipidemia     Hypertension     Insomnia 05/12/2016    Iron deficiency     A.  The patient was treated with IV iron replacement. B.  Secondary to GI bleed, status post EGD with cauterization of blood vessel to the duodenum, 7/16/2014.    Left bundle branch hemiblock 10/12/2020    Left shoulder strain     Liver disease     Liver mass     Long term current use of anticoagulant 05/12/2016    Low back pain     Lung nodules     PER DR. LEVI'S NOTE 10/22    Morbid obesity 05/12/2016    Neuromuscular disorder 7/28/2016    Obstructive sleep apnea syndrome 05/12/2016    Description: A.  Moderate; CPAP therapy..    Sliding hiatal hernia 07/28/2016    Description: A.  Reported on EGD, 12/30/2011.    Type 2 diabetes mellitus 05/12/2016    Urinary tract infection     Uterine cancer     HYSTERECTOMY    Vitamin B12 deficiency     A.  The patient was treated with vitamin B12 replacement.    Wears eyeglasses         Past Surgical History:   Procedure Laterality Date    BILATERAL OOPHORECTOMY  2009    Status post bilateral oophorectomy secondary to ovarian cysts     BREAST BIOPSY Right     STEREOTACTIC    BREAST EXCISIONAL BIOPSY Bilateral     CARDIAC ELECTROPHYSIOLOGY PROCEDURE N/A 10/15/2020    Procedure:  Biventricular Device Insertion;  Surgeon: Nadir Everett MD;  Location:  [facility];  Service: Cardiology;  Laterality: N/A;    CARDIAC SURGERY  2003    CARPAL TUNNEL RELEASE Bilateral     COLONOSCOPY  12/28/2012    COLONOSCOPY N/A 5/23/2023    Procedure: COLONOSCOPY;  Surgeon: Gt Fernandes MD;  Location:  [facility];  Service: Gastroenterology;  Laterality: N/A;    COLONOSCOPY N/A 11/16/2023    Procedure: COLONOSCOPY;  Surgeon: Brunner, Mark I, MD;  Location:  [facility];  Service: Gastroenterology;  Laterality: N/A;    CYSTECTOMY      removal of ganglion cyst from left wrist    ENDOSCOPY  12/30/2011    DIAGNOSTIC ESOPHAGOGASTRODUODENOSCOPY    ENDOSCOPY N/A 11/16/2023    Procedure: ESOPHAGOGASTRODUODENOSCOPY;  Surgeon: Brunner, Mark I, MD;  Location:  [facility];  Service: Gastroenterology;  Laterality: N/A;    HYSTERECTOMY  1995    A.  Status post hysterectomy for early stage endometrial cancer done in 1995.    MITRAL VALVE REPLACEMENT  03/03/2008    Dr. Hima Barreto; MECHANICAL    OTHER SURGICAL HISTORY      LIVER MASS BLOOD SUPPLY CLIPPED ON RIGHT AND LEFT SIDE PER PT               Lymphedema       Row Name 01/15/24 1100             Lymphedema Edema Assessment    Ptting Edema Category By severity  -LH      Pitting Edema Moderate  -LH         Skin Changes/Observations    Location/Assessment Lower Extremity  -LH      Lower Extremity Conditions bilateral:;intact;clean;dry;normal  -LH      Lower Extremity Color/Pigment bilateral:;normal  -LH         Lymphedema Pulses/Capillary Refill    Lymphedema Pulses/Capillary Refill lower extremity pulses;capillary refill  -LH      Dorsalis Pedis Pulse right:;left:;+2 normal  -LH      Posterior Tibialis Pulse right:;left:;+2 normal  -LH      Capillary Refill lower extremity capillary refill  -LH      Lower Extremity Capillary Refill right:;left:;less than 3 seconds  -LH         Lymphedema Measurements    Measurement Type(s) Quick  Girth  -      Quick Girth Areas Lower extremities  -         LLE Quick Girth (cm)    Mid foot 23.8 cm  -      Smallest ankle 25.5 cm  -      Largest calf 36.8 cm  -         RLE Quick Girth (cm)    Mid foot 23.9 cm  -LH      Smallest ankle 24.9 cm  -      Largest calf 36.6 cm  -      RLE Quick Girth Total 85.4  -         Compression/Skin Care    Compression/Skin Care skin care;wrapping location  -      Skin Care washed/dried;lotion applied  -      Wrapping Location lower extremity  -      Wrapping Location LE bilateral:;foot to knee  -      Wrapping Comments BLE size 4/5 compressogrips applied doubled and overlapping for gradient compression.  -                User Key  (r) = Recorded By, (t) = Taken By, (c) = Cosigned By      Initials Name Provider Type     Luiz Melvin, PT Physical Therapist                    WOUND DEBRIDEMENT                     PT Assessment (last 12 hours)       PT Evaluation and Treatment       Row Name 01/15/24 0946          Physical Therapy Time and Intention    Subjective Information complains of;weakness;fatigue;pain;swelling  -     Document Type re-evaluation;wound care  -     Mode of Treatment physical therapy;individual therapy  -       Row Name 01/15/24 0946          General Information    Patient Profile Reviewed yes  -     Patient Observations alert;cooperative;agree to therapy  -     Pertinent History of Current Functional Problem Pt reports chronic BLE edema with occasional episodes of more severe BLE edema. Occasionally wears BLE compression stockins at home.  -     Risks Reviewed patient:;increased discomfort  -     Benefits Reviewed patient:;increase knowledge;improve skin integrity;decrease risk of DVT;decrease pain;improve function  -     Barriers to Rehab medically complex  -       Row Name 01/15/24 0946          Pain    Pain Intervention(s) Medication (See MAR);Repositioned;Elevated  -       Row Name 01/15/24 0946           Pain Scale: FACES Pre/Post-Treatment    Pain: FACES Scale, Pretreatment 4-->hurts little more  -LH     Posttreatment Pain Rating 4-->hurts little more  -     Pain Location - Side/Orientation Bilateral  -     Pain Location lower  -     Pain Location - extremity  -       Row Name 01/15/24 0946          Cognition    Affect/Mental Status (Cognition) WNL  -     Orientation Status (Cognition) oriented x 4  -[facility] Name 01/15/24 0946          Coping    Observed Emotional State calm;cooperative  -     Verbalized Emotional State acceptance  -     Trust Relationship/Rapport care explained;questions answered  -       Row Name 01/15/24 0946          Plan of Care Review    Plan of Care Reviewed With patient  -     Progress no change  -     Outcome Evaluation PT wound care initial evaluation complete. Pt presenting with moderate BLE edema which pt reports has considerably improved since hospital admission with use of diuretics. Pt also with reports of BLE pain related to edema. PT applied BLE compressogrips for light compression to help promote venous return, improve skin integrity/pain, and promote effectiveness of diuretics. PT plans to f/u with MLW changes in 2-3 days.  -       Row Name 01/15/24 0946          Positioning and Restraints    Pre-Treatment Position in bed  -     Post Treatment Position bed  -     In Bed supine;call light within reach;encouraged to call for assist  -       Row Name 01/15/24 0946          Therapy Assessment/Plan (PT)    Patient/Family Therapy Goals Statement (PT) Reduce BLE swelling and pain  -     PT Diagnosis (PT) Moderate BLE edema  -     Rehab Potential (PT) good, to achieve stated therapy goals  -     Criteria for Skilled Interventions Met (PT) yes;meets criteria;skilled treatment is necessary  -       Row Name 01/15/24 0946          Therapy Plan Review/Discharge Plan (PT)    Therapy Plan Review (PT) evaluation/treatment results reviewed;care  plan/treatment goals reviewed;risks/benefits reviewed;current/potential barriers reviewed;participants voiced agreement with care plan;participants included;patient  -       Row Name 01/15/24 0946          Physical Therapy Goals    Wound Care Goal Selection (PT) wound care, PT goal 1;wound care, PT goal 2  -       Row Name 01/15/24 0946          Wound Care Goal 1 (PT)    Wound Care Goal 1 (PT) Demonstrate minimal remaining BLE edema to improve skin integrity and reports of pain.  -     Time Frame (Wound Care Goal 1, PT) long term goal (LTG);10 days  -       Row Name 01/15/24 0946          Wound Care Goal 2 (PT)    Wound Care Goal 2 (PT) Demonstrate scant/no remaining BLE edema to allow for transition to personal compression garment use.  -     Time Frame (Wound Care Goal 2, PT) long term goal (LTG);10 days  -               User Key  (r) = Recorded By, (t) = Taken By, (c) = Cosigned By      Initials Name Provider Type     Luiz Melvin, PT Physical Therapist                  Physical Therapy Education       Title: PT OT SLP Therapies (Done)       Topic: Physical Therapy (Done)       Point: Mobility training (Done)       Learning Progress Summary             Patient Eager, E, VU by SC at 1/11/2024 1622    Comment: reviewed benefits of walking    Eager, E, VU by SC at 1/11/2024 1621    Comment: reviewed benefits of walking    Eager, E, VU by SC at 1/10/2024 1536    Comment: reviewed HEP    Acceptance, E, VU,NR by  at 1/9/2024 1158    Acceptance, E,D, NR by CT at 1/7/2024 1550    Eager, E, VU by SC at 1/4/2024 1131    Comment: reviewed benefits of walking    Eager, E, VU by SC at 1/2/2024 1203    Comment: reviewed benefits of activity                         Point: Home exercise program (Done)       Learning Progress Summary             Patient Eager, E, VU by SC at 1/11/2024 1622    Comment: reviewed benefits of walking    Eager, E, VU by SC at 1/11/2024 1621    Comment: reviewed benefits of walking     Eager, E, VU by SC at 1/10/2024 1536    Comment: reviewed HEP    Acceptance, E,D, NR by CT at 1/7/2024 1550    Eager, E, VU by SC at 1/4/2024 1131    Comment: reviewed benefits of walking    Eager, E, VU by SC at 1/2/2024 1203    Comment: reviewed benefits of activity                         Point: Body mechanics (Done)       Learning Progress Summary             Patient Eager, E, VU by SC at 1/11/2024 1622    Comment: reviewed benefits of walking    Eager, E, VU by SC at 1/11/2024 1621    Comment: reviewed benefits of walking    Eager, E, VU by SC at 1/10/2024 1536    Comment: reviewed HEP    Acceptance, E, VU,NR by  at 1/9/2024 1158    Acceptance, E,D, NR by CT at 1/7/2024 1550    Eager, E, VU by SC at 1/4/2024 1131    Comment: reviewed benefits of walking    Eager, E, VU by SC at 1/2/2024 1203    Comment: reviewed benefits of activity                         Point: Precautions (Done)       Learning Progress Summary             Patient Eager, E, VU by SC at 1/11/2024 1622    Comment: reviewed benefits of walking    Eager, E, VU by SC at 1/11/2024 1621    Comment: reviewed benefits of walking    Eager, E, VU by SC at 1/10/2024 1536    Comment: reviewed HEP    Acceptance, E, VU,NR by  at 1/9/2024 1158    Acceptance, E,D, NR by CT at 1/7/2024 1550    Eager, E, VU by SC at 1/4/2024 1131    Comment: reviewed benefits of walking    Eager, E, VU by SC at 1/2/2024 1203    Comment: reviewed benefits of activity                                         User Key       Initials Effective Dates Name Provider Type Discipline    SC 02/03/23 -  Isaías Kumar, PT Physical Therapist PT    CT 07/07/23 -  Hansel Aguayo, PT Physical Therapist PT     09/21/23 -  Suzette Faria, PT Physical Therapist PT                    Recommendation and Plan  Anticipated Discharge Disposition (PT): home, home with home health  Planned Therapy Interventions (PT): wound care, patient/family education  Therapy Frequency (PT):  daily  Plan of Care Reviewed With: patient   Progress: no change       Progress: no change  Outcome Evaluation: PT wound care initial evaluation complete. Pt presenting with moderate BLE edema which pt reports has considerably improved since hospital admission with use of diuretics. Pt also with reports of BLE pain related to edema. PT applied BLE compressogrips for light compression to help promote venous return, improve skin integrity/pain, and promote effectiveness of diuretics. PT plans to f/u with MLW changes in 2-3 days.  Plan of Care Reviewed With: patient            Time Calculation   PT Charges       Row Name 01/15/24 1136             Time Calculation    Start Time 0946  -      PT Goal Re-Cert Due Date 01/23/24  -         Untimed Charges    PT Eval/Re-eval Minutes 35  -LH      Wound Care 10260 Multilayer comp below knee  -LH      [id number] comp below knee 25  -LH         Total Minutes    Untimed Charges Total Minutes 60  -LH       Total Minutes 60  -LH                User Key  (r) = Recorded By, (t) = Taken By, (c) = Cosigned By      Initials Name Provider Type     Luiz Melvin, PT Physical Therapist                      Therapy Charges for Today       Code Description Service Date Service Provider Modifiers Qty    98957759991 HC PT RE-EVAL ESTABLISHED PLAN 2 1/15/2024 Luiz Melvin, PT GP 1    [id number]  PT MULTI LAYER COMP SYS BELOW KNEE 1/15/2024 Luiz Melvin, PT GP 1              PT G-Codes  Outcome Measure Options: AM-PAC 6 Clicks Basic Mobility (PT)  AM-PAC 6 Clicks Score (PT): 24  AM-PAC 6 Clicks Score (OT): 21       Luiz Melvin PT  1/15/2024

## 2024-01-15 NOTE — PROGRESS NOTES
"Pharmacy Consult  -  Warfarin    Di Lopez is a  63 y.o. female   Height - 152.4 cm (60\")  Weight - 92.5 kg (204 lb)    Consulting Provider: - Hospitalist  Indication: - Mechanical mitral valve  Goal INR: - 2.5-3.5  Home Regimen:   - Warfarin 5mg daily    Bridge Therapy: Yes   Therapeutic enoxaparin    Drug-Drug Interactions with current regimen:   CTX, enoxaparin-increased risk of bleeding    Warfarin Dosing During Admission:    Date  1/9 1/10 1/11 1/12 1/13 1/14 1/15     INR  1.25 1.21 1.24 1.41 1.69 1.96 2.15     Dose  5mg 7.5mg 7.5mg 7.5mg 7.5mg 5mg 7.5mg         Education Provided:  Will follow and  as needed.    Discharge Follow up:   Following Provider - Dr. Everett-Pt has home monitor   Follow up time range or appointment - 2-3 days post discharge      Labs:    Results from last 7 days   Lab Units 01/13/24  0433 01/12/24  0438 01/11/24  0606 01/10/24  0509 01/09/24  0920 01/08/24  0457 01/07/24  0552 01/07/24  0518   INR  1.69* 1.41* 1.24* 1.21* 1.25* 1.29*  --  1.39*   HEMOGLOBIN g/dL 7.8* 7.8* 7.7* 7.9* 8.3* 7.1* 7.3*  --    HEMATOCRIT % 24.8* 25.1* 24.2* 25.0* 25.8* 22.1* 23.0*  --      Results from last 7 days   Lab Units 01/13/24  0433 01/12/24  0849 01/11/24  0606 01/10/24  0509 01/09/24  0920 01/08/24  0457 01/07/24  0518   SODIUM mmol/L 137 137 139   < > 137 134* 136   POTASSIUM mmol/L 3.5 4.2 4.5   < > 4.7 4.8 4.9   CHLORIDE mmol/L 96* 99 102   < > 103 104 103   CO2 mmol/L 29.0 25.0 24.0   < > 18.0* 19.0* 18.0*   BUN mg/dL 24* 22 22   < > 28* 31* 33*   CREATININE mg/dL 2.09* 2.31* 2.06*   < > 2.99* 3.24* 3.87*   CALCIUM mg/dL 8.8 9.0 8.6   < > 8.6 8.3* 8.5*   BILIRUBIN mg/dL  --   --   --   --  0.3 0.2 0.3   ALK PHOS U/L  --   --   --   --  234* 213* 225*   ALT (SGPT) U/L  --   --   --   --  41* 42* 43*   AST (SGOT) U/L  --   --   --   --  32 33* 49*   GLUCOSE mg/dL 166* 131* 128*   < > 213* 226* 167*    < > = values in this interval not displayed.       Current dietary intake: " Regular diet- % of meals documented in the past 24 hours.      Assessment/Plan:     Pharmacy consulted to dose warfarin for mechanical mitral valve, goal INR 2.5-3.5.  INR today is 2.15 after resumption of warfarin on 1/9.  Give warfarin 7mg tonight, patient to check INR in the morning on 1/16 then contact Dr. Mathias's office for further instruction.   Continue lovenox bridge until INR is greater than 2.5 for 2 consecutive days.   Daily PT/INR ordered, monitor for s/s of bleeding/clotting (Pt presented to hospital with BRBPR on admission and GI has signed off. 1 unit PRBC administered 1/8.) monitor h/h closely, and for new medication interactions.  Will continue to follow and adjust dose as needed.    Thank you for this consult,    Annabelle Fernandez, PharmD, BCPS  1/15/2024  12:36 EST

## 2024-01-15 NOTE — PROGRESS NOTES
"   LOS: 13 days    Patient Care Team:  Davina Santiago DO as PCP - General (Family Medicine)  Ameena Iglesias MD as Consulting Physician (Endocrinology)  Nakita Freire APRN as Nurse Practitioner (Nurse Practitioner)  Raimundo Thomas MD as Consulting Physician (Pulmonary Disease)  Nadir Everett MD as Consulting Physician (Cardiology)  Luiz Longoria MD as Consulting Physician (Pain Medicine)    Subjective     Stable overnight.  Still with significant edema.  Lower extremities painful.  Good urine output.  Has not been restricting fluids.    Objective     Vital Signs:  Blood pressure 133/80, pulse 82, temperature 97.9 °F (36.6 °C), temperature source Oral, resp. rate 18, height 152.4 cm (60\"), weight 92.5 kg (204 lb), last menstrual period 06/17/1998, SpO2 97%, not currently breastfeeding.      Intake/Output Summary (Last 24 hours) at 1/15/2024 1354  Last data filed at 1/15/2024 1259  Gross per 24 hour   Intake 200 ml   Output 1100 ml   Net -900 ml        01/14 0701 - 01/15 0700  In: -   Out: 1150 [Urine:1150]    Physical Exam:        GENERAL: WD AAF NAD  NEURO: Awake and alert, oriented. No focal deficit  PSYCHIATRIC: NMA. Cooperative with PE  EYE: PE, no icterus, no conjunctivitis  ENT: ommm, dentition intact,  Hearing intact  NECK: Supple , No JVD discernable,  Trachea midline  CV: + Edema, RRR  LUNGS:  Quiet,  Nonlabored resp.  Symmetrical expansion  ABDOMEN: Nondistended, soft nontender.  : No Perdomo, no palp bladder  SKIN: Warm and dry without rash      Labs:  Results from last 7 days   Lab Units 01/15/24  0443 01/14/24  0644 01/13/24  0433   WBC 10*3/mm3 3.55 3.82 3.69   HEMOGLOBIN g/dL 7.7* 8.0* 7.8*   PLATELETS 10*3/mm3 177 186 202     Results from last 7 days   Lab Units 01/15/24  0443 01/14/24  1103 01/13/24  0433 01/12/24  0849 01/10/24  0509 01/09/24  0920   SODIUM mmol/L 137 141 137 137   < > 137   POTASSIUM mmol/L 3.6 3.6 3.5 4.2   < > 4.7   CHLORIDE mmol/L 94* 96* 96* 99   < > 103 "   CO2 mmol/L 32.0* 32.0* 29.0 25.0   < > 18.0*   BUN mg/dL 26* 24* 24* 22   < > 28*   CREATININE mg/dL 2.22* 2.31* 2.09* 2.31*   < > 2.99*   CALCIUM mg/dL 8.5* 9.0 8.8 9.0   < > 8.6   ALBUMIN g/dL  --   --   --   --   --  3.7    < > = values in this interval not displayed.     Results from last 7 days   Lab Units 01/09/24  0920   ALK PHOS U/L 234*   BILIRUBIN mg/dL 0.3   ALT (SGPT) U/L 41*   AST (SGOT) U/L 32                   Estimated Creatinine Clearance: 26.7 mL/min (A) (by C-G formula based on SCr of 2.22 mg/dL (H)).         A/P:    ARF: Creatinine back down to 2.2 overnight.  Initially improved to 2.1 with slight bump yesterday to 2.3 with continued diuresis.  Urine output remains nonoliguric.  Creatinine initially up to 8 on presentation with likely dehydration due to diarrhea with poor p.o. intake in the setting of NSAIDs and Entresto.  Previous baseline 0.9.  Last visit 11/23 creatinine had increased initially but improved to 1.1 on discharge.  Urine sodium initially low consistent with prerenal azotemia.  Renal function initially improved with IV fluids.  Patient developed increasing edema.  Has been getting diuretics with continued creatinine stabilization.  Unsure if patient has a new baseline.      HTN: Blood pressure stable.  Entresto on hold due to ARF.  Monitor at home.    Hyponatremia: Resolved.     Hyperkalemia: Resolved.  Initially potassium elevated due to MEERA.     Acid-base: Patient initially with metabolic acidosis in setting of ARF.  Improved with p.o. bicarb and renal recovery.  Patient now with a contraction alkalosis with continued diuresis.  Patient has been off p.o. bicarb.  Monitor for now.     Anemia.  Hemoglobin remains below goal.  Hx of supra therapeutic INR w rectal bleeding in the past. Admitted again with concern for rectal bleed.  Iron saturation quite low with elevated ferritin.  Will need to monitor closely as outpatient.  Transfuse as needed for Hgb <7.    Volume status:  Reportedly negative overnight with unreliable I's and O's..  Likely dehydrated on admission.  Patient initially getting IV fluids for dehydration.  Developed increased edema and has now been on diuretics.  Have been trying to get daily standing weight for volume management but have been unsuccessful.  Weight recently 207 down to 204 on 1/12/24.  Will get standing weight today.  Restrict fluids to 40 ounces a day.  Continue low-sodium diet.  Continue Bumex for now.  May be able to do sliding scale diuretics once EDW established.     Hx of mechanical valve.  On anticoagulation     CHF: EF 40-45%. Hx of MV and depressed RV function.  Continue Bumex for now.  As above we will try to establish EDW for sliding scale diuretics.    High risk and complexity patient.    Disposition: Patient stable for discharge.  Will need to get labs next week with PCP.  My office will send a lab slip in the mail.  Patient [facility] 1[facility]/2024 at 8:15.  Avoid all NSAIDs.  Continue to hold Entresto for now.  Low-sodium diet.  Limit fluids to 40 ounces a day.  Check daily standing weight.  Take Bumex 2 mg p.o. daily.  Can take second dose daily as needed if weight >190.    Justin Rodriguez MD  01/15/24  13:54 EST

## 2024-01-15 NOTE — DISCHARGE SUMMARY
[facility] Medicine Services  DISCHARGE SUMMARY    Patient Name: Di Lopez  : 1960  MRN: [id number]    Date of Admission: 2024 10:53 PM  Date of Discharge:  1/15/2024  Primary Care Physician: Davina Santiago DO    Consults       Date and Time Order Name Status Description    2024  7:56 PM Inpatient Cardiology Consult Completed     2024  2:20 PM Inpatient Infectious Diseases Consult Completed     2024  4:03 AM Inpatient Gastroenterology Consult Completed             Hospital Course     Presenting Problem: malaise     Active Hospital Problems    Diagnosis  POA    **Renal failure [N19]  Yes    Hyponatremia [E87.1]  Yes    Hyperkalemia [E87.5]  Yes    Type 2 diabetes mellitus with hyperglycemia [E11.65]  Yes    Elevated LFTs [R79.89]  Yes    Anemia, chronic disease [D63.8]  Yes    Cervical lymphadenopathy [R59.0]  Yes    GI bleed [K92.2]  Yes    H/O heart valve replacement with mechanical valve [Z95.2]  Not Applicable    ARF (acute renal failure) [N17.9]  Yes    Left bundle branch hemiblock [I44.60]  Yes    Type 2 diabetes mellitus without complication, with long-term current use of insulin [E11.9, Z79.4]  Not Applicable    Dyslipidemia [E78.5]  Yes    Emphysema/COPD [J43.9]  Yes    Hypertension [I10]  Yes    Arteriovenous malformation of gastrointestinal tract [K55.20]  Yes    Obstructive sleep apnea syndrome [G47.33]  Yes    Chronic anticoagulation [Z79.01]  Not Applicable      Resolved Hospital Problems   No resolved problems to display.          Hospital Course:  Di Lopez is a 63 y.o. female with hisotry of mechanical prosthetic mitral valve on coumadin, also CHF COPD COPD DM CAD HTN LBBB, AVM of GI tract, and liver mass s/p embolization.  She was hospitalized in 2023 and treated for Candida esophagitis and diverticular bleeding.  Her creatinine at that time was 1.9.  Her legs were swollen.  She was given Lasix.  However, she continued to feel bad  after discharge and basically was in bed with body aches.  She then again passed blood in her stool and came to the ED.        Epistaxis on coumadin - resolved   Acute anemia  - afrin x 3 days with improvement  - transfused prbc one unit.       Febrile illness, suspect dental abscess  - extensive negative workup inclu full viral panel, negative monospot, spine MRI withOUT contrast negative  - CT abd/pel with no acute findings  - Bcx x 2 different sets NGTD  - MEAGAN without vegetation  -CT maxillofacial with no acute process  - she was followed by [facility] consultant; was suspected to have dental infection, has been on ceftriaxone during her stay and is discharging on Augmentin (reports she has tolerated amoxicillin before).  She states she has an appt with [facility] dental clinic this month.  - Reviewed iportance of outpatient dental appt given poor dentition and MECH mv   - - she should stay on Augmentin until her dental appt or as ordered by [facility]      Bilat leg pain R>L  Edema bilat   - this has been ongoing at home.    - MRI reviewed with degenerative changes  - She was started on Bumex and has been voiding a lot; leg are still quite swollen; compression wraps placed on 1/15.  Home health will continue these.      Acute Renal Failure 2/2 hypovolemia + med effect   - 2/2 acute diarrheal illness + entresto, NSAIDs  - Nephrology consulted and appreciate recs   - renal function overall stable with diuresis; however her previous baseline was lower  - follow up in [facility] office        Mechanical mitral valve on coumadin - Initially on heparin gtt.   -  Changed to lovenox and resume warfarin. Follows with Dr. Everett with home monitor   - INR 1.7.  Patient feels comfortable with lovenox -- will need additional Rx on DC      AoCHFpEF (EF 45-50%), s/p ICD   - diuresing      Chronic hypoxic resp failure 2/2 COPD - on home 2 liters n/c  DMII - basal/bolus regimen  Anxiety/depression  BMI 38 - complicates all aspects of care   Liver mass  "s/p embolization         Discharge Follow Up Recommendations for outpatient labs/diagnostics:   - FU with [facility] in 2 weeks, Dr Willett.     - FU with  dental clinic as already scheduled    - FU with [facility] in 3 weeks    - Check INR at home per usual - may stop Lovenox bridge when INR is over 2.5.      - Cardiol FU per routine     Day of Discharge     HPI:   She now thinks she will be okay at home.  We discussed rehab facility but she prefers home and thinks she will be fine - \"my only problem is getting food, but they are arranging that.'    Review of Systems  Legs still sore but she is tolerating compression wrap and has kourtney up walking in room     Vital Signs:          Physical Exam:  Gen:  WD/WN woman in NAD on edge of bed   Neuro: alert and oriented, clear speech, follows commands, grossly nonfocal  HEENT:  NC/AT   Neck:  Supple, no LAD  Heart RRR   Lungs not labored   Abd:  Soft, nontender, no rebound or guarding, pos BS  Extrem:  No c/c.  Bilat leg edema, legs now in compression wraps       Pertinent  and/or Most Recent Results     LAB RESULTS:      Lab 01/15/24  0443 01/14/24  0644 01/13/24  0433 01/12/24  0438 01/11/24  0606   WBC 3.55 3.82 3.69 3.83 4.53   HEMOGLOBIN 7.7* 8.0* 7.8* 7.8* 7.7*   HEMATOCRIT 24.3* 25.5* 24.8* 25.1* 24.2*   PLATELETS 177 186 202 190 198   NEUTROS ABS 2.34  --   --  2.70  --    IMMATURE GRANS (ABS)  --   --   --  0.05  --    LYMPHS ABS  --   --   --  0.71  --    MONOS ABS  --   --   --  0.28  --    EOS ABS 0.11  --   --  0.08  --    MCV 85.3 87.3 86.7 87.8 86.4   PROTIME 24.2* 22.5* 20.0* 17.4* 15.7*         Lab 01/15/24  0443 01/14/24  1103 01/13/24  0433 01/12/24  0849 01/11/24  0606   SODIUM 137 141 137 137 139   POTASSIUM 3.6 3.6 3.5 4.2 4.5   CHLORIDE 94* 96* 96* 99 102   CO2 32.0* 32.0* 29.0 25.0 24.0   ANION GAP 11.0 13.0 12.0 13.0 13.0   BUN 26* 24* 24* 22 22   CREATININE 2.22* 2.31* 2.09* 2.31* 2.06*   EGFR 24.5* 23.4* 26.4* 23.4* 26.8*   GLUCOSE 150* 163* 166* 131* 128* "   CALCIUM 8.5* 9.0 8.8 9.0 8.6               Lab 01/15/24  0443 01/14/24  0644 01/13/24  0433 01/12/24  0438 01/11/24  0606   PROTIME 24.2* 22.5* 20.0* 17.4* 15.7*   INR 2.15* 1.96* 1.69* 1.41* 1.24*                 Brief Urine Lab Results  (Last result in the past 365 days)        Color   Clarity   Blood   Leuk Est   Nitrite   Protein   CREAT   Urine HCG        01/02/24 0011             142.2         01/02/24 0011 Yellow   Cloudy   Small (1+)   Negative   Negative   100 mg/dL (2+)                 Microbiology Results (last 10 days)       Procedure Component Value - Date/Time    Blood Culture - Blood, Arm, Left [[id number]]  (Normal) Collected: 01/08/24 1018    Lab Status: Final result Specimen: Blood from Arm, Left Updated: 01/13/24 1030     Blood Culture No growth at 5 days    Blood Culture - Blood, Hand, Left [[id number]]  (Normal) Collected: 01/08/24 1017    Lab Status: Final result Specimen: Blood from Hand, Left Updated: 01/13/24 1030     Blood Culture No growth at 5 days            CT Maxillofacial Without Contrast    Result Date: 1/9/2024  CT MAXILLOFACIAL WO CONT Date of Exam: 1/9/2024 5:18 PM EST Indication: Maxillofacial pain. Comparison: None. Technique: Axial CT images were obtained from the inferior aspect of the mandible through the frontal sinuses without contrast administration.  Reconstructed coronal and sagittal images were also obtained. Automated exposure control and iterative construction methods were used. Findings: The visualized intracranial contents are unremarkable. The globes and orbits appear intact. The nasopharynx is widely patent. No loculated fluid collection is identified to suggest an abscess. No acute fracture is identified. The paranasal sinuses are clear. The mastoid air cells are well aerated. No periapical lucency is identified along the teeth. The nasopharynx and oropharynx have a normal configuration. The larynx appears normal.     Impression: No acute process identified.  Electronically Signed: Danis Jacobson MD  1/9/2024 8:05 PM EST  Workstation ID: [id number]    Adult Transesophageal Echo 3D (MEAGAN) W/ Cont If Necessary Per Protocol    Result Date: 1/8/2024    Left ventricular ejection fraction appears to be 56 - 60%.   The right ventricular cavity is mildly dilated.  There is a electronically in the RV with no visible adherent mass or vegetation.   The right atrium is mildly dilated, there is electronic lead present with no visible vegetation.   There is a mechanical mitral valve with moderately increased gradients similar to most recent TTE.  The bileaflet valve appears to be functioning normally with no visible thrombus or vegetation.  There is mild MR around the periphery with some potential perivalvular leak.   Aortic valve is mildly calcified with trace AR, no vegetation seen.   The tricuspid valve appears structurally normal with no vegetation.  There is moderate to severe central tricuspid regurgitation around the site of the lead transversing the valve.     Adult Transthoracic Echo Complete W/ Cont if Necessary Per Protocol    Result Date: 1/6/2024    The right ventricular cavity is dilated.   The left atrial cavity is mildly dilated.   Left atrial volume is mildly increased.   The right atrial cavity is dilated.   The mitral valve mean gradient is 7 mmHg.   There is a mechanical mitral valve prosthesis present.   Moderate to severe tricuspid valve regurgitation is present.   Estimated right ventricular systolic pressure from tricuspid regurgitation is markedly elevated (>55 mmHg). Calculated right ventricular systolic pressure from tricuspid regurgitation is 88 mmHg.   LVEF estimated at 59%      Results for orders placed during the hospital encounter of 05/31/23    Duplex Carotid Ultrasound CAR    Interpretation Summary    Right internal carotid artery demonstrates a less than 50% stenosis.    Left internal carotid artery demonstrates normal flow without evidence of  hemodynamically significant stenosis.    Vertebral artery flow is antegrade bilaterally      Results for orders placed during the hospital encounter of 05/31/23    Duplex Carotid Ultrasound CAR    Interpretation Summary    Right internal carotid artery demonstrates a less than 50% stenosis.    Left internal carotid artery demonstrates normal flow without evidence of hemodynamically significant stenosis.    Vertebral artery flow is antegrade bilaterally      Results for orders placed during the hospital encounter of 01/01/24    Adult Transesophageal Echo 3D (MEAGAN) W/ Cont If Necessary Per Protocol    Interpretation Summary    Left ventricular ejection fraction appears to be 56 - 60%.    The right ventricular cavity is mildly dilated.  There is a electronically in the RV with no visible adherent mass or vegetation.    The right atrium is mildly dilated, there is electronic lead present with no visible vegetation.    There is a mechanical mitral valve with moderately increased gradients similar to most recent TTE.  The bileaflet valve appears to be functioning normally with no visible thrombus or vegetation.  There is mild MR around the periphery with some potential perivalvular leak.    Aortic valve is mildly calcified with trace AR, no vegetation seen.    The tricuspid valve appears structurally normal with no vegetation.  There is moderate to severe central tricuspid regurgitation around the site of the lead transversing the valve.      Discharge Details        Discharge Medications        New Medications        Instructions Start Date   amoxicillin-clavulanate 500-125 MG per tablet  Commonly known as: Augmentin   500 mg, Oral, 2 Times Daily      bumetanide 2 MG tablet  Commonly known as: BUMEX   2 mg, Oral, Daily      Enoxaparin Sodium 100 MG/ML solution prefilled syringe syringe  Commonly known as: LOVENOX   90 mg, Subcutaneous, Every 24 Hours, May stop whenever INR is 2.5 or above             Changes to  Medications        Instructions Start Date   Lantus SoloStar 100 UNIT/ML injection pen  Generic drug: Insulin Glargine  What changed: how much to take   5 Units, Subcutaneous, Daily      lidocaine 5 %  Commonly known as: LIDODERM  What changed: See the new instructions.   USE 1 PATCH EXTERNALLY ONCE DAILY. REMOVE AND DISCARD PATCH WITHIN 12 HOURS OR AS DIRECTED BY MD      warfarin 4 MG tablet  Commonly known as: COUMADIN  What changed: Another medication with the same name was changed. Make sure you understand how and when to take each.   Once per day on Sun Mon Tue Wed Thu Sat      warfarin 2 MG tablet  Commonly known as: COUMADIN  What changed:   how much to take  how to take this   Take on 11/24/2023             Continue These Medications        Instructions Start Date   albuterol sulfate  (90 Base) MCG/ACT inhaler  Commonly known as: PROVENTIL HFA;VENTOLIN HFA;PROAIR HFA   2 puffs, Inhalation, Every 4 Hours PRN      escitalopram 20 MG tablet  Commonly known as: LEXAPRO   20 mg, Oral, Daily      ferrous sulfate 325 (65 FE) MG tablet   325 mg, Oral, Daily With Breakfast      fish oil 1000 MG capsule capsule   1,000 mg, Oral, 2 Times Daily With Meals      folic acid 800 MCG tablet  Commonly known as: FOLVITE   800 mcg, Oral, Daily      FreeStyle Dalia 2 Woden device   1 each, Does not apply, Daily, Use as directed daily to check blood sugars      FreeStyle Dalia 2 Sensor misc   USE AS DIRECTED FOR 14 DAYS      gabapentin 300 MG capsule  Commonly known as: NEURONTIN   300 mg, Oral, Nightly PRN      glucose blood test strip   Use as instructed 3 times a day      HYDROcodone-acetaminophen 7.5-325 MG per tablet  Commonly known as: NORCO   1 tablet, Oral, Every 8 Hours PRN      Insulin Lispro (1 Unit Dial) 100 UNIT/ML solution pen-injector  Commonly known as: HumaLOG KwikPen   Per correctional scale, MDD 50 units      Insulin Pen Needle 32G X 4 MM misc  Commonly known as: ReliOn Pen Needles   Use BID for E11.9    "   B-D UF III MINI PEN NEEDLES 31G X 5 MM misc  Generic drug: Insulin Pen Needle   USE 1  FIVE TIMES DAILY      Lancets misc   For use with glucose monitoring 3 times daily      methocarbamol 500 MG tablet  Commonly known as: ROBAXIN   500 mg, Oral, 4 Times Daily PRN      nystatin 100,000 unit/mL suspension  Commonly known as: MYCOSTATIN   500,000 Units, Swish & Swallow, 4 Times Daily      omeprazole 20 MG capsule  Commonly known as: priLOSEC   20 mg, Oral, 2 Times Daily      simvastatin 5 MG tablet  Commonly known as: ZOCOR   TAKE 1 TABLET BY MOUTH ONCE DAILY AT NIGHT      solifenacin 5 MG tablet  Commonly known as: VESICARE   5 mg, Oral, Daily      spironolactone 25 MG tablet  Commonly known as: ALDACTONE   25 mg, Oral, Daily      Trulicity 4.5 MG/0.5ML solution pen-injector  Generic drug: Dulaglutide   Subcutaneous, Weekly      Vitamin D (Cholecalciferol) 10 MCG (400 UNIT) tablet  Commonly known as: CHOLECALCIFEROL   800 Units, Oral, Daily             Stop These Medications      azelastine 0.1 % nasal spray  Commonly known as: ASTELIN     carvedilol 12.5 MG tablet  Commonly known as: COREG     cefdinir 300 MG capsule  Commonly known as: OMNICEF     furosemide 40 MG tablet  Commonly known as: LASIX     glimepiride 4 MG tablet  Commonly known as: AMARYL     sacubitril-valsartan 24-26 MG tablet  Commonly known as: ENTRESTO              Allergies   Allergen Reactions    Adhesive Tape Other (See Comments)     \"pulls skin off\"    Sildenafil Shortness Of Breath and Other (See Comments)     pt c/o tightness in chest and trouble breathing    Codeine Itching and Nausea And Vomiting    Morphine Other (See Comments)     uncontrolable crying     Penicillins Hives    Statins Other (See Comments)     HX of Liver Issues    Sulfa Antibiotics Nausea And Vomiting         Discharge Disposition:  Home or Self Care    Diet:  Hospital:  No active diet order      Diet Instructions    Resume regular diet            Activity:  Activity " Instructions    Take warfarin 7 mg tonight.  Check INR 1/16/24 then contact Dr. TRINH's office.              CODE STATUS:    Code Status and Medical Interventions:   Ordered at: 01/02/24 0319     Code Status (Patient has no pulse and is not breathing):    CPR (Attempt to Resuscitate)     Medical Interventions (Patient has pulse or is breathing):    Full Support       Future Appointments   Date Time Provider Department Center   1/22/2024 To Be Determined Venita Pelayo OT [facility] None   1/22/2024 To Be Determined Evan Hart, PT [facility] None   1/25/2024  2:30 PM Jere Santiago DO [facility]   2/20/2024  2:30 PM Niya Gonzalez APRN [facility]   2/22/2024 11:30 AM Ameena Iglesias MD [facility]   3/8/2024  2:15 PM Jere Santiago DO [facility]   5/8/2024 11:00 AM Jere Santiago DO [facility]       Additional Instructions for the Follow-ups that You Need to Schedule       Ambulatory Referral to Home Health   As directed      Face to Face Visit Date: 1/5/2024   Follow-up provider for Plan of Care?: I treated the patient in an acute care facility and will not continue treatment after discharge.   Follow-up provider: JERE SANTIAGO [[id number]]   Reason/Clinical Findings: Impaired Functional Mobility, Gait, Balance, and Endurance   Describe mobility limitations that make leaving home difficult: Impaired Functional Mobility, Gait, Balance, and Endurance   Nursing/Therapeutic Services Requested: Physical Therapy Occupational Therapy Skilled Nursing   Skilled nursing orders: CHF management (Kidney disease) Other   PT orders: Therapeutic exercise Gait Training Transfer training Strengthening Home safety assessment   Weight Bearing Status: As Tolerated   Occupational orders: Activities of daily living Home safety assessment Energy conservation Strengthening   Frequency: 1 Week 1        Discharge Follow-up with Specialty: [facility]; 3 Weeks   As directed      [facility]   Follow Up: 3 Weeks        Discharge Follow-up with Specified Provider: Dr Maciej Willett; 2 Weeks   As directed      To: Dr Maciej Willett   Follow Up: 2 Weeks                      Rhoda Holman MD  01/17/24      Time Spent on Discharge:  I spent  45  minutes on this discharge activity which included: face-to-face encounter with the patient, reviewing the data in the system, coordination of the care with the nursing staff as well as consultants, documentation, and entering orders.

## 2024-01-15 NOTE — PAYOR COMM NOTE
"Janine Malone RN  Utilization Management  P:[phone]  F:[phone]    Auth# [id number]     Neha Monterroso \"[place]\" (63 y.o. Female)       Date of Birth   1960    Social Security Number       Address   [address] [place] [address]    Home Phone   [phone]    MRN   [id number]       Amish   Baptism    Marital Status   Single                            Admission Date   1/1/24    Admission Type   Emergency    Admitting Provider   Rhoda Holman MD    Attending Provider   Rhoda Holman MD    Department, Room/Bed   [address], S382/1       Discharge Date       Discharge Disposition       Discharge Destination                                 Attending Provider: Rhoda Holman MD    Allergies: Adhesive Tape, Sildenafil, Codeine, Morphine, Penicillins, Statins, Sulfa Antibiotics    Isolation: None   Infection: None   Code Status: CPR    Ht: 152.4 cm (60\")   Wt: 92.5 kg (204 lb)    Admission Cmt: None   Principal Problem: Renal failure [N19]                   Active Insurance as of 1/1/2024       Primary Coverage       Payor Plan Insurance Group Employer/Plan Group    ANTHEM MEDICARE REPLACEMENT ANTHEM MEDICARE ADVANTAGE [id number]       Payor Plan Address Payor Plan Phone Number Payor Plan Fax Number Effective Dates    [address] [phone]  1/1/2018 - None Entered    [facility] [phone]         Subscriber Name Subscriber Birth Date Member ID       NEHA MONTERROSO 1960 [id number]                Current Facility-Administered Medications   Medication Dose Route Frequency Provider Last Rate Last Admin    acetaminophen (TYLENOL) tablet 650 mg  650 mg Oral Q8H PRN Dariana Bridges DO   650 mg at 01/15/24 0948    albuterol (PROVENTIL) nebulizer solution 0.083% 2.5 mg/3mL  2.5 mg Nebulization Q4H PRN Ariella Phan MD   2.5 mg at 01/06/24 1637    bumetanide (BUMEX) tablet 2 mg  2 mg Oral BID Justin Rodriguez MD   2 mg at 01/15/24 0927    " [START ON 1/16/2024] cefTRIAXone (ROCEPHIN) 2,000 mg in sodium chloride 0.9 % 100 mL IVPB  2,000 mg Intravenous Q24H Maciej Willett MD        dextrose (D50W) (25 g/50 mL) IV injection 25 g  25 g Intravenous Q15 Min PRN Day, Ariella GUADARRAMA MD        dextrose (GLUTOSE) oral gel 15 g  15 g Oral Q15 Min PRN Day, Ariella GUADARRAMA MD        Enoxaparin Sodium (LOVENOX) syringe 90 mg  90 mg Subcutaneous Q24H Sarah Sherman DO   90 mg at 01/14/24 1614    escitalopram (LEXAPRO) tablet 20 mg  20 mg Oral Daily Day, Ariella GUADARRAMA MD   20 mg at 01/15/24 0927    ferrous sulfate tablet 325 mg  325 mg Oral Daily With Breakfast Day, Ariella GUADARRAMA MD   325 mg at 01/15/24 0927    folic acid (FOLVITE) tablet 800 mcg  800 mcg Oral Daily Day, Ariella GUADARRAMA MD   800 mcg at 01/15/24 0927    glucagon (GLUCAGEN) injection 1 mg  1 mg Intramuscular Q15 Min PRN Day, Ariella GUADARRAMA MD        insulin detemir (LEVEMIR) injection 5 Units  5 Units Subcutaneous Nightly Dariana Bridges DO   5 Units at 01/14/24 2139    Insulin Lispro (humaLOG) injection 2-7 Units  2-7 Units Subcutaneous 4x Daily AC & at Bedtime Day, Ariella GUADARRAMA MD   2 Units at 01/15/24 0927    Insulin Lispro (humaLOG) injection 3 Units  3 Units Subcutaneous TID With Meals Dariana Bridges DO   3 Units at 01/15/24 0927    melatonin tablet 5 mg  5 mg Oral Nightly PRN Niya Subramanian, APRN   5 mg at 01/13/24 1923    naloxone (NARCAN) injection 0.4 mg  0.4 mg Intravenous Q5 Min PRN Dariana Bridges DO        nitroglycerin (NITROSTAT) SL tablet 0.4 mg  0.4 mg Sublingual Q5 Min PRN Day, Ariella GUADARRAMA MD        nystatin (MYCOSTATIN) 100,000 unit/mL suspension 500,000 Units  5 mL Swish & Swallow 4x Daily Maciej Willett MD   500,000 Units at 01/15/24 0927    oxybutynin XL (DITROPAN-XL) 24 hr tablet 5 mg  5 mg Oral Daily Day, Ariella GUADARRAMA MD   5 mg at 01/15/24 0928    oxyCODONE-acetaminophen (PERCOCET) 7.5-325 MG per tablet 1 tablet  1 tablet Oral Q6H PRN Katelynn Flora, APRN   1 tablet at 01/14/24 1315     pantoprazole (PROTONIX) EC tablet 40 mg  40 mg Oral Q AM Day, Ariella GUADARRAMA MD   40 mg at 01/15/24 0516    Pharmacy to dose warfarin   Does not apply Continuous PRN Zeferino Clark, PharmD        polyethylene glycol (MIRALAX) packet 17 g  17 g Oral Daily Rachel Gloria MD   17 g at 01/10/24 0927    sennosides-docusate (PERICOLACE) 8.6-50 MG per tablet 1 tablet  1 tablet Oral BID Rachel Gloria MD   1 tablet at 01/14/24 2139    sodium chloride 0.9 % flush 10 mL  10 mL Intravenous Q12H Day, Ariella GUADARRAMA MD   10 mL at 01/15/24 0900    sodium chloride 0.9 % flush 10 mL  10 mL Intravenous PRN Day, Ariella GUADARRAMA MD        sodium chloride 0.9 % infusion 40 mL  40 mL Intravenous PRN Day, Ariella GUADARRAMA MD        warfarin (COUMADIN) tablet 5 mg  5 mg Oral Daily Keegan Chapman [facility]   5 mg at 01/14/24 1746     Lab Results (last 72 hours)       Procedure Component Value Units Date/Time    POC Glucose Once [[id number]]  (Abnormal) Collected: 01/15/24 1137    Specimen: Blood Updated: 01/15/24 1140     Glucose 240 mg/dL     POC Glucose Once [[id number]]  (Abnormal) Collected: 01/15/24 0757    Specimen: Blood Updated: 01/15/24 0758     Glucose 161 mg/dL     Basic Metabolic Panel [[id number]]  (Abnormal) Collected: 01/15/24 0443    Specimen: Blood Updated: 01/15/24 0607     Glucose 150 mg/dL      BUN 26 mg/dL      Creatinine 2.22 mg/dL      Sodium 137 mmol/L      Potassium 3.6 mmol/L      Chloride 94 mmol/L      CO2 32.0 mmol/L      Calcium 8.5 mg/dL      BUN/Creatinine Ratio 11.7     Anion Gap 11.0 mmol/L      eGFR 24.5 mL/min/1.73     Narrative:      GFR Normal >60  Chronic Kidney Disease <60  Kidney Failure <15      CBC & Differential [[id number]]  (Abnormal) Collected: 01/15/24 0443    Specimen: Blood Updated: 01/15/24 0603    Narrative:      The following orders were created for panel order CBC & Differential.  Procedure                               Abnormality         Status                     ---------                                -----------         ------                     CBC Auto Differential[[id number]]        Abnormal            Final result               Scan Slide[[id number]]                                                                    Please view results for these tests on the individual orders.    CBC Auto Differential [[id number]]  (Abnormal) Collected: 01/15/24 0443    Specimen: Blood Updated: 01/15/24 0603     WBC 3.55 10*3/mm3      RBC 2.85 10*6/mm3      Hemoglobin 7.7 g/dL      Hematocrit 24.3 %      MCV 85.3 fL      MCH 27.0 pg      MCHC 31.7 g/dL      RDW 15.7 %      RDW-SD 48.9 fl      MPV 10.0 fL      Platelets 177 10*3/mm3     Narrative:      The previously reported component NRBC is no longer being reported. Previous result was 0.0 /100 WBC (Reference Range: 0.0-0.2 /100 WBC) on 1/15/2024 at 0535 EST.    Manual Differential [[id number]]  (Abnormal) Collected: 01/15/24 0443    Specimen: Blood Updated: 01/15/24 0603     Neutrophil % 49.0 %      Lymphocyte % 19.0 %      Monocyte % 10.0 %      Eosinophil % 3.0 %      Basophil % 0.0 %      Bands %  17.0 %      Atypical Lymphocyte % 2.0 %      Neutrophils Absolute 2.34 10*3/mm3      Lymphocytes Absolute 0.75 10*3/mm3      Monocytes Absolute 0.36 10*3/mm3      Eosinophils Absolute 0.11 10*3/mm3      Basophils Absolute 0.00 10*3/mm3      RBC Morphology Normal     WBC Morphology Normal     Platelet Morphology Normal    Protime-INR [[id number]]  (Abnormal) Collected: 01/15/24 0443    Specimen: Blood Updated: 01/15/24 0538     Protime 24.2 Seconds      INR 2.15    POC Glucose Once [[id number]]  (Abnormal) Collected: 01/14/24 2108    Specimen: Blood Updated: 01/14/24 2110     Glucose 220 mg/dL     POC Glucose Once [[id number]]  (Abnormal) Collected: 01/14/24 1612    Specimen: Blood Updated: 01/14/24 1613     Glucose 168 mg/dL     POC Glucose Once [[id number]]  (Abnormal) Collected: 01/14/24 1235    Specimen: Blood Updated: 01/14/24 1237     Glucose 185 mg/dL     Basic Metabolic Panel  [[id number]]  (Abnormal) Collected: 01/14/24 1103    Specimen: Blood Updated: 01/14/24 1139     Glucose 163 mg/dL      BUN 24 mg/dL      Creatinine 2.31 mg/dL      Sodium 141 mmol/L      Potassium 3.6 mmol/L      Chloride 96 mmol/L      CO2 32.0 mmol/L      Calcium 9.0 mg/dL      BUN/Creatinine Ratio 10.4     Anion Gap 13.0 mmol/L      eGFR 23.4 mL/min/1.73     Narrative:      GFR Normal >60  Chronic Kidney Disease <60  Kidney Failure <15      POC Glucose Once [[id number]]  (Abnormal) Collected: 01/14/24 0835    Specimen: Blood Updated: 01/14/24 0836     Glucose 151 mg/dL     Protime-INR [[id number]]  (Abnormal) Collected: 01/14/24 0644    Specimen: Blood Updated: 01/14/24 0730     Protime 22.5 Seconds      INR 1.96    CBC (No Diff) [[id number]]  (Abnormal) Collected: 01/14/24 0644    Specimen: Blood Updated: 01/14/24 0715     WBC 3.82 10*3/mm3      RBC 2.92 10*6/mm3      Hemoglobin 8.0 g/dL      Hematocrit 25.5 %      MCV 87.3 fL      MCH 27.4 pg      MCHC 31.4 g/dL      RDW 15.9 %      RDW-SD 50.4 fl      MPV 10.1 fL      Platelets 186 10*3/mm3     POC Glucose Once [[id number]]  (Abnormal) Collected: 01/13/24 2026    Specimen: Blood Updated: 01/13/24 2035     Glucose 287 mg/dL     POC Glucose Once [[id number]]  (Abnormal) Collected: 01/13/24 1704    Specimen: Blood Updated: 01/13/24 1706     Glucose 182 mg/dL     POC Glucose Once [[id number]]  (Abnormal) Collected: 01/13/24 1159    Specimen: Blood Updated: 01/13/24 1201     Glucose 199 mg/dL     Blood Culture - Blood, Hand, Left [[id number]]  (Normal) Collected: 01/08/24 1017    Specimen: Blood from Hand, Left Updated: 01/13/24 1030     Blood Culture No growth at 5 days    Blood Culture - Blood, Arm, Left [[id number]]  (Normal) Collected: 01/08/24 1018    Specimen: Blood from Arm, Left Updated: 01/13/24 1030     Blood Culture No growth at 5 days    POC Glucose Once [[id number]]  (Abnormal) Collected: 01/13/24 0703    Specimen: Blood Updated: 01/13/24 0707     Glucose 138  mg/dL     Basic Metabolic Panel [[id number]]  (Abnormal) Collected: 01/13/24 0433    Specimen: Blood Updated: 01/13/24 0601     Glucose 166 mg/dL      BUN 24 mg/dL      Creatinine 2.09 mg/dL      Sodium 137 mmol/L      Potassium 3.5 mmol/L      Chloride 96 mmol/L      CO2 29.0 mmol/L      Calcium 8.8 mg/dL      BUN/Creatinine Ratio 11.5     Anion Gap 12.0 mmol/L      eGFR 26.4 mL/min/1.73     Narrative:      GFR Normal >60  Chronic Kidney Disease <60  Kidney Failure <15      Protime-INR [[id number]]  (Abnormal) Collected: 01/13/24 0433    Specimen: Blood Updated: 01/13/24 0538     Protime 20.0 Seconds      INR 1.69    CBC (No Diff) [[id number]]  (Abnormal) Collected: 01/13/24 0433    Specimen: Blood Updated: 01/13/24 0522     WBC 3.69 10*3/mm3      RBC 2.86 10*6/mm3      Hemoglobin 7.8 g/dL      Hematocrit 24.8 %      MCV 86.7 fL      MCH 27.3 pg      MCHC 31.5 g/dL      RDW 15.9 %      RDW-SD 49.9 fl      MPV 10.4 fL      Platelets 202 10*3/mm3     POC Glucose Once [[id number]]  (Abnormal) Collected: 01/12/24 2006    Specimen: Blood Updated: 01/12/24 2007     Glucose 180 mg/dL     POC Glucose Once [[id number]]  (Abnormal) Collected: 01/12/24 1631    Specimen: Blood Updated: 01/12/24 1632     Glucose 223 mg/dL               Physician Progress Notes (last 72 hours)        Pro Quesada MD at 01/14/24 1507             LOS: 12 days    Patient Care Team:  Davina Santiago DO as PCP - General (Family Medicine)  Ameena Iglesias MD as Consulting Physician (Endocrinology)  Nakita Freire APRN as Nurse Practitioner (Nurse Practitioner)  Raimundo Thomas MD as Consulting Physician (Pulmonary Disease)  Nadir Everett MD as Consulting Physician (Cardiology)  Luiz Longoria MD as Consulting Physician (Pain Medicine)    Subjective     Stable renal function. Dependent edema noted. Responding well to diuretics.     Objective     Vital Signs:  Blood pressure 139/58, pulse 85, temperature 97 °F (36.1 °C), temperature  "source Axillary, resp. rate 17, height 152.4 cm (60\"), weight 92.5 kg (204 lb), last menstrual period 06/17/1998, SpO2 96%, not currently breastfeeding.      Intake/Output Summary (Last 24 hours) at 1/14/2024 1507  Last data filed at 1/13/2024 2152  Gross per 24 hour   Intake 240 ml   Output 1000 ml   Net -760 ml        01/13 0701 - 01/14 0700  In: 720 [P.O.:720]  Out: 1000 [Urine:1000]    Physical Exam:        GENERAL: WD AAF NAD  NEURO: Awake and alert, oriented. No focal deficit  PSYCHIATRIC: NMA. Cooperative with PE  EYE: PE, no icterus, no conjunctivitis  ENT: ommm, dentition intact,  Hearing intact  NECK: Supple , No JVD discernable,  Trachea midline  CV: + Edema, RRR  LUNGS:  Quiet,  Nonlabored resp.  Symmetrical expansion  ABDOMEN: Nondistended, soft nontender.  : No Perdomo, no palp bladder  SKIN: Warm and dry without rash      Labs:  Results from last 7 days   Lab Units 01/14/24  0644 01/13/24  0433 01/12/24  0438   WBC 10*3/mm3 3.82 3.69 3.83   HEMOGLOBIN g/dL 8.0* 7.8* 7.8*   PLATELETS 10*3/mm3 186 202 190     Results from last 7 days   Lab Units 01/14/24  1103 01/13/24  0433 01/12/24  0849 01/11/24  0606 01/10/24  0509 01/09/24  0920 01/08/24  0457   SODIUM mmol/L 141 137 137 139   < > 137 134*   POTASSIUM mmol/L 3.6 3.5 4.2 4.5   < > 4.7 4.8   CHLORIDE mmol/L 96* 96* 99 102   < > 103 104   CO2 mmol/L 32.0* 29.0 25.0 24.0   < > 18.0* 19.0*   BUN mg/dL 24* 24* 22 22   < > 28* 31*   CREATININE mg/dL 2.31* 2.09* 2.31* 2.06*   < > 2.99* 3.24*   CALCIUM mg/dL 9.0 8.8 9.0 8.6   < > 8.6 8.3*   ALBUMIN g/dL  --   --   --   --   --  3.7 3.2*    < > = values in this interval not displayed.     Results from last 7 days   Lab Units 01/09/24  0920   ALK PHOS U/L 234*   BILIRUBIN mg/dL 0.3   ALT (SGPT) U/L 41*   AST (SGOT) U/L 32                   Estimated Creatinine Clearance: 25.6 mL/min (A) (by C-G formula based on SCr of 2.31 mg/dL (H)).        A/P:    ARF: Creatinine continues to improve daily.  Urine output " continues nonoliguric with unmeasured voids.  Creatinine up to 8 on presentation with likely dehydration due to diarrhea with poor p.o. intake in the setting of NSAIDs and Entresto.      HTN: Blood pressure stable.    Hyponatremia: Borderline.  Initially down to 129 on presentation due to hypovolemia and MEERA.  All fluids isotonic.     Hyperkalemia: Stable.  Due to MEERA.     Met Aciodsis: Persistent on p.o. bicarb.  Due to renal failure and underlying GI losses with diarrhea.  Monitor for now.     Anemia.  Hemoglobin below goal.  Hx of supra therapeutic INR w rectal bleeding in the past. Admitted again with concern for rectal bleed.  S/p IV iron on this admission.    Volume status: Dependent edema noted.      Hx of mechanical valve.  On anticoagulation     CHF: EF 40-45%. Hx of MV and depressed RV function. On beta blocker and lasix at home. Not on ACEi/ARB/MRA/ or SGLT-2inh for now.    Recs  Continue bumex 2mg  BID  Conservative care otherwise from renal standpoint.    High risk and complexity patient.    Pro Quesada MD  24  15:07 EST          Electronically signed by Pro Quesada MD at 24 1508       Rhoda Holman MD at 24 0912               [facility] Medicine Services  PROGRESS NOTE    Patient Name: Di Lopez  : 1960  MRN: [id number]    Date of Admission: 2024  Primary Care Physician: Davina Santiago DO    Subjective   Subjective     CC:  Renal failure    HPI:  continues to void a lot.  Legs still very swollen, though.  Some pain       Objective   Objective     Vital Signs:   Temp:  [97.5 °F (36.4 °C)-99 °F (37.2 °C)] 99 °F (37.2 °C)  Heart Rate:  [67-73] 73  Resp:  [16-17] 17  BP: (123-144)/(58-72) 144/64  Flow (L/min):  [2] 2     Physical Exam:  Constitutional: No acute distress, awake, alert sitting up and eating lunch   HENT: NCAT, mucous membranes moist  Respiratory: Clear to auscultation bilaterally, respiratory effort normal   Cardiovascular:  RRR,  Gastrointestinal: Positive bowel sounds,  Musculoskeletal: edema3+   Psychiatric: Appropriate affect, cooperative  Neurologic: Oriented x 3, strength symmetric in all extremities, Cranial Nerves grossly intact to confrontation, speech clear  Skin: No rashes    Results Reviewed:  LAB RESULTS:      Lab 01/14/24  0644 01/13/24  0433 01/12/24  0438 01/11/24  0606 01/10/24  0509 01/09/24  0920 01/09/24  0010 01/08/24  0638 01/08/24  0457 01/07/24  2105 01/07/24  1402   WBC 3.82 3.69 3.83 4.53 4.49 5.30  --   --    < >  --   --    HEMOGLOBIN 8.0* 7.8* 7.8* 7.7* 7.9* 8.3*  --   --    < >  --   --    HEMATOCRIT 25.5* 24.8* 25.1* 24.2* 25.0* 25.8*  --   --    < >  --   --    PLATELETS 186 202 190 198 212 225  --   --    < >  --   --    NEUTROS ABS  --   --  2.70  --   --  4.05  --   --   --   --   --    IMMATURE GRANS (ABS)  --   --  0.05  --   --  0.06*  --   --   --   --   --    LYMPHS ABS  --   --  0.71  --   --  0.86  --   --   --   --   --    MONOS ABS  --   --  0.28  --   --  0.23  --   --   --   --   --    EOS ABS  --   --  0.08  --   --  0.09  --   --   --   --   --    MCV 87.3 86.7 87.8 86.4 88.0 87.2  --   --    < >  --   --    PROTIME 22.5* 20.0* 17.4* 15.7* 15.4* 15.9*  --   --    < >  --   --    HEPARIN ANTI-XA  --   --   --   --   --  0.31 0.23* 0.31  --  0.41 0.38    < > = values in this interval not displayed.         Lab 01/13/24 0433 01/12/24  0849 01/11/24  0606 01/10/24  0509 01/09/24  0920   SODIUM 137 137 139 137 137   POTASSIUM 3.5 4.2 4.5 4.4 4.7   CHLORIDE 96* 99 102 103 103   CO2 29.0 25.0 24.0 22.0 18.0*   ANION GAP 12.0 13.0 13.0 12.0 16.0*   BUN 24* 22 22 28* 28*   CREATININE 2.09* 2.31* 2.06* 2.65* 2.99*   EGFR 26.4* 23.4* 26.8* 19.8* 17.1*   GLUCOSE 166* 131* 128* 126* 213*   CALCIUM 8.8 9.0 8.6 8.8 8.6         Lab 01/09/24  0920 01/08/24  0457   TOTAL PROTEIN 6.3 5.9*   ALBUMIN 3.7 3.2*   GLOBULIN 2.6 2.7   ALT (SGPT) 41* 42*   AST (SGOT) 32 33*   BILIRUBIN 0.3 0.2   ALK PHOS 234* 213*          Lab 01/14/24  0644 01/13/24  0433 01/12/24  0438 01/11/24  0606 01/10/24  0509   PROTIME 22.5* 20.0* 17.4* 15.7* 15.4*   INR 1.96* 1.69* 1.41* 1.24* 1.21*             Lab 01/08/24  1113   ABO TYPING O   RH TYPING Positive   ANTIBODY SCREEN Negative         Brief Urine Lab Results  (Last result in the past 365 days)        Color   Clarity   Blood   Leuk Est   Nitrite   Protein   CREAT   Urine HCG        01/02/24 0011             142.2         01/02/24 0011 Yellow   Cloudy   Small (1+)   Negative   Negative   100 mg/dL (2+)                   Microbiology Results Abnormal       Procedure Component Value - Date/Time    Blood Culture - Blood, Hand, Left [[id number]]  (Normal) Collected: 01/08/24 1017    Lab Status: Final result Specimen: Blood from Hand, Left Updated: 01/13/24 1030     Blood Culture No growth at 5 days    Blood Culture - Blood, Arm, Left [[id number]]  (Normal) Collected: 01/08/24 1018    Lab Status: Final result Specimen: Blood from Arm, Left Updated: 01/13/24 1030     Blood Culture No growth at 5 days    Blood Culture - Blood, Arm, Left [[id number]]  (Normal) Collected: 01/06/24 0056    Lab Status: Final result Specimen: Blood from Arm, Left Updated: 01/11/24 0815     Blood Culture No growth at 5 days    Blood Culture - Blood, Arm, Left [[id number]]  (Normal) Collected: 01/05/24 1641    Lab Status: Final result Specimen: Blood from Arm, Left Updated: 01/10/24 1815     Blood Culture No growth at 5 days    Narrative:      Less than seven (7) mL's of blood was collected.  Insufficient quantity may yield false negative results.    Respiratory Panel PCR w/COVID-19(SARS-CoV-2) LUIS/ESTHER/KARSON/PAD/COR/LEONARDO In-House, NP Swab in UTM/VTM, 2 HR TAT - Swab, Nasopharynx [[id number]]  (Normal) Collected: 01/05/24 1634    Lab Status: Final result Specimen: Swab from Nasopharynx Updated: 01/05/24 1804     ADENOVIRUS, PCR Not Detected     Coronavirus 229E Not Detected     Coronavirus HKU1 Not Detected     Coronavirus NL63  Not Detected     Coronavirus OC43 Not Detected     COVID19 Not Detected     Human Metapneumovirus Not Detected     Human Rhinovirus/Enterovirus Not Detected     Influenza A PCR Not Detected     Influenza B PCR Not Detected     Parainfluenza Virus 1 Not Detected     Parainfluenza Virus 2 Not Detected     Parainfluenza Virus 3 Not Detected     Parainfluenza Virus 4 Not Detected     RSV, PCR Not Detected     Bordetella pertussis pcr Not Detected     Bordetella parapertussis PCR Not Detected     Chlamydophila pneumoniae PCR Not Detected     Mycoplasma pneumo by PCR Not Detected    Narrative:      In the setting of a positive respiratory panel with a viral infection PLUS a negative procalcitonin without other underlying concern for bacterial infection, consider observing off antibiotics or discontinuation of antibiotics and continue supportive care. If the respiratory panel is positive for atypical bacterial infection (Bordetella pertussis, Chlamydophila pneumoniae, or Mycoplasma pneumoniae), consider antibiotic de-escalation to target atypical bacterial infection.    COVID PRE-OP / PRE-PROCEDURE SCREENING ORDER (NO ISOLATION) - Swab, Nasopharynx [[id number]]  (Normal) Collected: 01/01/24 2330    Lab Status: Final result Specimen: Swab from Nasopharynx Updated: 01/02/24 0016    Narrative:      The following orders were created for panel order COVID PRE-OP / PRE-PROCEDURE SCREENING ORDER (NO ISOLATION) - Swab, Nasopharynx.  Procedure                               Abnormality         Status                     ---------                               -----------         ------                     COVID-19, FLU A/B, RSV P...[[id number]]  Normal              Final result                 Please view results for these tests on the individual orders.    COVID-19, FLU A/B, RSV PCR 1 HR TAT - Swab, Nasopharynx [[id number]]  (Normal) Collected: 01/01/24 2330    Lab Status: Final result Specimen: Swab from Nasopharynx Updated: 01/02/24  0016     COVID19 Not Detected     Influenza A PCR Not Detected     Influenza B PCR Not Detected     RSV, PCR Not Detected    Narrative:      Fact sheet for providers: https://www.fda.gov/media/028537/download    Fact sheet for patients: https://www.fda.gov/media/786276/download    Test performed by PCR.            No radiology results from the last 24 hrs    Results for orders placed during the hospital encounter of 01/01/24    Adult Transesophageal Echo 3D (MEAGAN) W/ Cont If Necessary Per Protocol    Interpretation Summary    Left ventricular ejection fraction appears to be 56 - 60%.    The right ventricular cavity is mildly dilated.  There is a electronically in the RV with no visible adherent mass or vegetation.    The right atrium is mildly dilated, there is electronic lead present with no visible vegetation.    There is a mechanical mitral valve with moderately increased gradients similar to most recent TTE.  The bileaflet valve appears to be functioning normally with no visible thrombus or vegetation.  There is mild MR around the periphery with some potential perivalvular leak.    Aortic valve is mildly calcified with trace AR, no vegetation seen.    The tricuspid valve appears structurally normal with no vegetation.  There is moderate to severe central tricuspid regurgitation around the site of the lead transversing the valve.      Current medications:  Scheduled Meds:bumetanide, 2 mg, Oral, BID  cefTRIAXone, 2,000 mg, Intravenous, Q24H  enoxaparin, 90 mg, Subcutaneous, Q24H  escitalopram, 20 mg, Oral, Daily  ferrous sulfate, 325 mg, Oral, Daily With Breakfast  folic acid, 800 mcg, Oral, Daily  insulin detemir, 5 Units, Subcutaneous, Nightly  insulin lispro, 2-7 Units, Subcutaneous, 4x Daily AC & at Bedtime  Insulin Lispro, 3 Units, Subcutaneous, TID With Meals  nystatin, 5 mL, Swish & Swallow, 4x Daily  oxybutynin XL, 5 mg, Oral, Daily  pantoprazole, 40 mg, Oral, Q AM  polyethylene glycol, 17 g, Oral,  Daily  senna-docusate sodium, 1 tablet, Oral, BID  sodium bicarbonate, 650 mg, Oral, Daily  sodium chloride, 10 mL, Intravenous, Q12H  warfarin, 5 mg, Oral, Daily      Continuous Infusions:Pharmacy to dose warfarin,       PRN Meds:.  acetaminophen    albuterol    dextrose    dextrose    glucagon (human recombinant)    melatonin    naloxone    nitroglycerin    oxyCODONE-acetaminophen    Pharmacy to dose warfarin    sodium chloride    sodium chloride    Assessment & Plan   Assessment & Plan     Active Hospital Problems    Diagnosis  POA    **Renal failure [N19]  Yes    Hyponatremia [E87.1]  Yes    Hyperkalemia [E87.5]  Yes    Type 2 diabetes mellitus with hyperglycemia [E11.65]  Yes    Elevated LFTs [R79.89]  Yes    Anemia, chronic disease [D63.8]  Yes    Cervical lymphadenopathy [R59.0]  Yes    GI bleed [K92.2]  Yes    H/O heart valve replacement with mechanical valve [Z95.2]  Not Applicable    ARF (acute renal failure) [N17.9]  Yes    Left bundle branch hemiblock [I44.60]  Yes    Type 2 diabetes mellitus without complication, with long-term current use of insulin [E11.9, Z79.4]  Not Applicable    Dyslipidemia [E78.5]  Yes    Emphysema/COPD [J43.9]  Yes    Hypertension [I10]  Yes    Arteriovenous malformation of gastrointestinal tract [K55.20]  Yes    Obstructive sleep apnea syndrome [G47.33]  Yes    Chronic anticoagulation [Z79.01]  Not Applicable      Resolved Hospital Problems   No resolved problems to display.        Brief Hospital Course to date:  Di Lopez is a 63 y.o. 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 Found to have severe MEERA, resolution of BRBPR, persistent fever/chills and thigh pain     Epistaxis -resolved   - afrin x 3 days with improvement     Febrile illness, unclear etiology -- resolved   - ID consulted; pt got CTX, plan DC on augmentin.    - extensive negative workup inclu  full viral panel, negative monospot, spine MRI withOUT contrast negative  - CT abd/pel with no acute findings  - Bcx x 2 different sets NGTD  - MEAGAN without vegetation  -CT maxillofacial with no acute process  - Reviewed need for outpatient dental appt given poor entition and MECH mv     Bilat leg pain R>L  Edema bilat   - started prior to admission. Goes down front of thigh/lower leg  - MRI reviewed with degenerative changes  - voiding a lot w bumex but legs still edematous  - compression hose, perhaps wraps  - dopplers not done - pt already on coumadin      Acute Renal Failure 2/2 hypovolemia + med effect   - 2/2 acute diarrheal illness + entresto, NSAIDs  - Nephrology consulted and appreciate recs   - improved with fluids and holding nephrotoxins.   - Sodium bicarb - decreased   - renal function overall stable with diuresis; follow up nephrology recs ; Cr 2-2.3 recently       Acute anemia 2/2 bloody diarrheal illness - bloody diarrhea resolved; epistaxis  - 1 units PRBC 1/8 with appropriate response  -stable hgb approx 8      Mechanical mitral valve on coumadin - Initially on heparin gtt.   -  Changed to lovenox and resume warfarin. Follows with Dr. Everett with home monitor   - INR 1.7.  Patient feels comfortable with lovenox -- will need additional Rx on DC      AoCHFpEF (EF 45-50%), s/p ICD   - now issue with overload. Currently on bumex 2 mg BID; net negative     Chronic hypoxic resp failure 2/2 COPD - on home 2 liters n/c  DMII - basal/bolus regimen  Anxiety/depression  BMI 38 - complicates all aspects of care   Liver mass s/p embolization    Expected Discharge Location and Transportation: home health  Expected Discharge   Expected Discharge Date: 1/15/2024; Expected Discharge Time:      DVT prophylaxis:  Medical DVT prophylaxis orders are present.     AM-PAC 6 Clicks Score (PT): 24 (01/13/24 1923)    CODE STATUS:   Code Status and Medical Interventions:   Ordered at: 01/02/24 4440     Code Status (Patient  "has no pulse and is not breathing):    CPR (Attempt to Resuscitate)     Medical Interventions (Patient has pulse or is breathing):    Full Support       Rhoda Holman MD  01/14/24        Electronically signed by Rhoda Holman MD at 01/14/24 1330       Pro Quesada MD at 01/13/24 1338             LOS: 11 days    Patient Care Team:  Davina Santiago DO as PCP - General (Family Medicine)  Ameena Iglesias MD as Consulting Physician (Endocrinology)  Nakita Freire APRN as Nurse Practitioner (Nurse Practitioner)  Raimundo Thomas MD as Consulting Physician (Pulmonary Disease)  Nadir Everett MD as Consulting Physician (Cardiology)  Luiz Longoria MD as Consulting Physician (Pain Medicine)    Subjective     Stable renal function. Dependent edema noted.     Objective     Vital Signs:  Blood pressure 138/57, pulse 88, temperature 98.2 °F (36.8 °C), temperature source Oral, resp. rate 16, height 152.4 cm (60\"), weight 92.5 kg (204 lb), last menstrual period 06/17/1998, SpO2 95%, not currently breastfeeding.      Intake/Output Summary (Last 24 hours) at 1/13/2024 1338  Last data filed at 1/13/2024 0930  Gross per 24 hour   Intake 540 ml   Output 1600 ml   Net -1060 ml        01/12 0701 - 01/13 0700  In: 500 [P.O.:500]  Out: 2200 [Urine:2200]    Physical Exam:        GENERAL: WD AAF NAD  NEURO: Awake and alert, oriented. No focal deficit  PSYCHIATRIC: NMA. Cooperative with PE  EYE: PE, no icterus, no conjunctivitis  ENT: ommm, dentition intact,  Hearing intact  NECK: Supple , No JVD discernable,  Trachea midline  CV: + Edema, RRR  LUNGS:  Quiet,  Nonlabored resp.  Symmetrical expansion  ABDOMEN: Nondistended, soft nontender.  : No Perdomo, no palp bladder  SKIN: Warm and dry without rash      Labs:  Results from last 7 days   Lab Units 01/13/24  0433 01/12/24  0438 01/11/24  0606   WBC 10*3/mm3 3.69 3.83 4.53   HEMOGLOBIN g/dL 7.8* 7.8* 7.7*   PLATELETS 10*3/mm3 202 190 198     Results from last 7 days   Lab " Units 01/13/24  0433 01/12/24  0849 01/11/24  0606 01/10/24  0509 01/09/24  0920 01/08/24  0457 01/07/24  0518   SODIUM mmol/L 137 137 139 137 137 134* 136   POTASSIUM mmol/L 3.5 4.2 4.5 4.4 4.7 4.8 4.9   CHLORIDE mmol/L 96* 99 102 103 103 104 103   CO2 mmol/L 29.0 25.0 24.0 22.0 18.0* 19.0* 18.0*   BUN mg/dL 24* 22 22 28* 28* 31* 33*   CREATININE mg/dL 2.09* 2.31* 2.06* 2.65* 2.99* 3.24* 3.87*   CALCIUM mg/dL 8.8 9.0 8.6 8.8 8.6 8.3* 8.5*   ALBUMIN g/dL  --   --   --   --  3.7 3.2* 3.7     Results from last 7 days   Lab Units 01/09/24  0920   ALK PHOS U/L 234*   BILIRUBIN mg/dL 0.3   ALT (SGPT) U/L 41*   AST (SGOT) U/L 32                   Estimated Creatinine Clearance: 28.3 mL/min (A) (by C-G formula based on SCr of 2.09 mg/dL (H)).        A/P:    ARF: Creatinine continues to improve daily.  Urine output continues nonoliguric with unmeasured voids.  Creatinine up to 8 on presentation with likely dehydration due to diarrhea with poor p.o. intake in the setting of NSAIDs and Entresto.      HTN: Blood pressure stable.    Hyponatremia: Borderline.  Initially down to 129 on presentation due to hypovolemia and MEERA.  All fluids isotonic.     Hyperkalemia: Stable.  Due to MEERA.     Met Aciodsis: Persistent on p.o. bicarb.  Due to renal failure and underlying GI losses with diarrhea.  Monitor for now.     Anemia.  Hemoglobin below goal.  Hx of supra therapeutic INR w rectal bleeding in the past. Admitted again with concern for rectal bleed.  Iron saturation quite low with elevated ferritin.  Transfuse as needed for Hgb <7.    Volume status: Dependent edema noted.      Hx of mechanical valve.  On anticoagulation     CHF: EF 40-45%. Hx of MV and depressed RV function. On beta blocker and lasix at home. Not on ACEi/ARB/MRA/ or SGLT-2inh for now.    Recs  Continue bumex 2mg  BID  Conservative care otherwise from renal standpoint.    High risk and complexity patient.    Pro Quesada MD  01/13/24  13:38 EST           Electronically signed by Pro Quesada MD at 24 3539       Rhoda Holman MD at 24 0949               [facility] Medicine Services  PROGRESS NOTE    Patient Name: Di Lopez  : 1960  MRN: [id number]    Date of Admission: 2024  Primary Care Physician: Davina Santiago DO    Subjective   Subjective     CC:  Renal failure    HPI:  slept poorly.  Has pain in her legs that she relates to swelling.  At home she has mild swelling only.   Net output 1600ml.      She prefers to stay in hospital while leg edema improves as she lives alone and walking is painful.       Objective   Objective     Vital Signs:   Temp:  [98 °F (36.7 °C)-98.7 °F (37.1 °C)] 98.2 °F (36.8 °C)  Heart Rate:  [64-88] 88  Resp:  [16-18] 16  BP: (124-148)/(53-64) 138/57  Flow (L/min):  [2] 2     Physical Exam:  Constitutional: No acute distress, awake, alert in bed   HENT: NCAT, mucous membranes moist  Respiratory: Clear to auscultation bilaterally, respiratory effort normal   Cardiovascular: RRR,  Gastrointestinal: Positive bowel sounds, soft, nontender, nondistended  Musculoskeletal: edema, taut.  To thighs bilat   Psychiatric: Appropriate affect, cooperative  Neurologic: Oriented x 3, strength symmetric in all extremities, Cranial Nerves grossly intact to confrontation, speech clear  Skin: No rashes    Results Reviewed:  LAB RESULTS:      Lab 24  0433 24  0438 24  0606 01/10/24  0509 24  0920 24  0010 24  0638 24  0457 24  2105 24  1402 24  0552 24  0518   WBC 3.69 3.83 4.53 4.49 5.30  --   --    < >  --   --    < >  --    HEMOGLOBIN 7.8* 7.8* 7.7* 7.9* 8.3*  --   --    < >  --   --    < >  --    HEMATOCRIT 24.8* 25.1* 24.2* 25.0* 25.8*  --   --    < >  --   --    < >  --    PLATELETS 202 190 198 212 225  --   --    < >  --   --    < >  --    NEUTROS ABS  --  2.70  --   --  4.05  --   --   --   --   --   --   --    IMMATURE  GRANS (ABS)  --  0.05  --   --  0.06*  --   --   --   --   --   --   --    LYMPHS ABS  --  0.71  --   --  0.86  --   --   --   --   --   --   --    MONOS ABS  --  0.28  --   --  0.23  --   --   --   --   --   --   --    EOS ABS  --  0.08  --   --  0.09  --   --   --   --   --   --   --    MCV 86.7 87.8 86.4 88.0 87.2  --   --    < >  --   --    < >  --    PROCALCITONIN  --   --   --   --   --   --   --   --   --   --   --  0.40*   PROTIME 20.0* 17.4* 15.7* 15.4* 15.9*  --   --    < >  --   --   --  17.2*   HEPARIN ANTI-XA  --   --   --   --  0.31 0.23* 0.31  --  0.41 0.38  --  0.25*    < > = values in this interval not displayed.         Lab 01/13/24  0433 01/12/24  0849 01/11/24  0606 01/10/24  0509 01/09/24  0920   SODIUM 137 137 139 137 137   POTASSIUM 3.5 4.2 4.5 4.4 4.7   CHLORIDE 96* 99 102 103 103   CO2 29.0 25.0 24.0 22.0 18.0*   ANION GAP 12.0 13.0 13.0 12.0 16.0*   BUN 24* 22 22 28* 28*   CREATININE 2.09* 2.31* 2.06* 2.65* 2.99*   EGFR 26.4* 23.4* 26.8* 19.8* 17.1*   GLUCOSE 166* 131* 128* 126* 213*   CALCIUM 8.8 9.0 8.6 8.8 8.6         Lab 01/09/24  0920 01/08/24  0457 01/07/24  0518   TOTAL PROTEIN 6.3 5.9* 6.3   ALBUMIN 3.7 3.2* 3.7   GLOBULIN 2.6 2.7 2.6   ALT (SGPT) 41* 42* 43*   AST (SGOT) 32 33* 49*   BILIRUBIN 0.3 0.2 0.3   ALK PHOS 234* 213* 225*         Lab 01/13/24  0433 01/12/24  0438 01/11/24  0606 01/10/24  0509 01/09/24  0920   PROTIME 20.0* 17.4* 15.7* 15.4* 15.9*   INR 1.69* 1.41* 1.24* 1.21* 1.25*             Lab 01/08/24  1113   ABO TYPING O   RH TYPING Positive   ANTIBODY SCREEN Negative         Brief Urine Lab Results  (Last result in the past 365 days)        Color   Clarity   Blood   Leuk Est   Nitrite   Protein   CREAT   Urine HCG        01/02/24 0011             142.2         01/02/24 0011 Yellow   Cloudy   Small (1+)   Negative   Negative   100 mg/dL (2+)                   Microbiology Results Abnormal       Procedure Component Value - Date/Time    Blood Culture - Blood, Hand,  Left [[id number]]  (Normal) Collected: 01/08/24 1017    Lab Status: Final result Specimen: Blood from Hand, Left Updated: 01/13/24 1030     Blood Culture No growth at 5 days    Blood Culture - Blood, Arm, Left [[id number]]  (Normal) Collected: 01/08/24 1018    Lab Status: Final result Specimen: Blood from Arm, Left Updated: 01/13/24 1030     Blood Culture No growth at 5 days    Blood Culture - Blood, Arm, Left [[id number]]  (Normal) Collected: 01/06/24 0056    Lab Status: Final result Specimen: Blood from Arm, Left Updated: 01/11/24 0815     Blood Culture No growth at 5 days    Blood Culture - Blood, Arm, Left [[id number]]  (Normal) Collected: 01/05/24 1641    Lab Status: Final result Specimen: Blood from Arm, Left Updated: 01/10/24 1815     Blood Culture No growth at 5 days    Narrative:      Less than seven (7) mL's of blood was collected.  Insufficient quantity may yield false negative results.    Respiratory Panel PCR w/COVID-19(SARS-CoV-2) LUIS/ESTHER/KARSON/PAD/COR/LEONARDO In-House, NP Swab in UTM/VTM, 2 HR TAT - Swab, Nasopharynx [[id number]]  (Normal) Collected: 01/05/24 1634    Lab Status: Final result Specimen: Swab from Nasopharynx Updated: 01/05/24 1804     ADENOVIRUS, PCR Not Detected     Coronavirus 229E Not Detected     Coronavirus HKU1 Not Detected     Coronavirus NL63 Not Detected     Coronavirus OC43 Not Detected     COVID19 Not Detected     Human Metapneumovirus Not Detected     Human Rhinovirus/Enterovirus Not Detected     Influenza A PCR Not Detected     Influenza B PCR Not Detected     Parainfluenza Virus 1 Not Detected     Parainfluenza Virus 2 Not Detected     Parainfluenza Virus 3 Not Detected     Parainfluenza Virus 4 Not Detected     RSV, PCR Not Detected     Bordetella pertussis pcr Not Detected     Bordetella parapertussis PCR Not Detected     Chlamydophila pneumoniae PCR Not Detected     Mycoplasma pneumo by PCR Not Detected    Narrative:      In the setting of a positive respiratory panel with a viral  infection PLUS a negative procalcitonin without other underlying concern for bacterial infection, consider observing off antibiotics or discontinuation of antibiotics and continue supportive care. If the respiratory panel is positive for atypical bacterial infection (Bordetella pertussis, Chlamydophila pneumoniae, or Mycoplasma pneumoniae), consider antibiotic de-escalation to target atypical bacterial infection.    COVID PRE-OP / PRE-PROCEDURE SCREENING ORDER (NO ISOLATION) - Swab, Nasopharynx [[id number]]  (Normal) Collected: 01/01/24 2330    Lab Status: Final result Specimen: Swab from Nasopharynx Updated: 01/02/24 0016    Narrative:      The following orders were created for panel order COVID PRE-OP / PRE-PROCEDURE SCREENING ORDER (NO ISOLATION) - Swab, Nasopharynx.  Procedure                               Abnormality         Status                     ---------                               -----------         ------                     COVID-19, FLU A/B, RSV P...[[id number]]  Normal              Final result                 Please view results for these tests on the individual orders.    COVID-19, FLU A/B, RSV PCR 1 HR TAT - Swab, Nasopharynx [[id number]]  (Normal) Collected: 01/01/24 2330    Lab Status: Final result Specimen: Swab from Nasopharynx Updated: 01/02/24 0016     COVID19 Not Detected     Influenza A PCR Not Detected     Influenza B PCR Not Detected     RSV, PCR Not Detected    Narrative:      Fact sheet for providers: https://www.fda.gov/media/438169/download    Fact sheet for patients: https://www.fda.gov/media/756659/download    Test performed by PCR.            No radiology results from the last 24 hrs    Results for orders placed during the hospital encounter of 01/01/24    Adult Transesophageal Echo 3D (MEAGAN) W/ Cont If Necessary Per Protocol    Interpretation Summary    Left ventricular ejection fraction appears to be 56 - 60%.    The right ventricular cavity is mildly dilated.  There is a  electronically in the RV with no visible adherent mass or vegetation.    The right atrium is mildly dilated, there is electronic lead present with no visible vegetation.    There is a mechanical mitral valve with moderately increased gradients similar to most recent TTE.  The bileaflet valve appears to be functioning normally with no visible thrombus or vegetation.  There is mild MR around the periphery with some potential perivalvular leak.    Aortic valve is mildly calcified with trace AR, no vegetation seen.    The tricuspid valve appears structurally normal with no vegetation.  There is moderate to severe central tricuspid regurgitation around the site of the lead transversing the valve.      Current medications:  Scheduled Meds:bumetanide, 2 mg, Oral, BID  cefTRIAXone, 2,000 mg, Intravenous, Q24H  enoxaparin, 90 mg, Subcutaneous, Q24H  escitalopram, 20 mg, Oral, Daily  ferrous sulfate, 325 mg, Oral, Daily With Breakfast  folic acid, 800 mcg, Oral, Daily  insulin detemir, 5 Units, Subcutaneous, Nightly  insulin lispro, 2-7 Units, Subcutaneous, 4x Daily AC & at Bedtime  Insulin Lispro, 3 Units, Subcutaneous, TID With Meals  nystatin, 5 mL, Swish & Swallow, 4x Daily  oxybutynin XL, 5 mg, Oral, Daily  pantoprazole, 40 mg, Oral, Q AM  polyethylene glycol, 17 g, Oral, Daily  senna-docusate sodium, 1 tablet, Oral, BID  [START ON 1/14/2024] sodium bicarbonate, 650 mg, Oral, Daily  sodium chloride, 10 mL, Intravenous, Q12H  warfarin, 7.5 mg, Oral, Daily      Continuous Infusions:Pharmacy to dose warfarin,       PRN Meds:.  acetaminophen    albuterol    dextrose    dextrose    glucagon (human recombinant)    melatonin    naloxone    nitroglycerin    oxyCODONE-acetaminophen    Pharmacy to dose warfarin    sodium chloride    sodium chloride    Assessment & Plan   Assessment & Plan     Active Hospital Problems    Diagnosis  POA    **Renal failure [N19]  Yes    Hyponatremia [E87.1]  Yes    Hyperkalemia [E87.5]  Yes    Type 2  diabetes mellitus with hyperglycemia [E11.65]  Yes    Elevated LFTs [R79.89]  Yes    Anemia, chronic disease [D63.8]  Yes    Cervical lymphadenopathy [R59.0]  Yes    GI bleed [K92.2]  Yes    H/O heart valve replacement with mechanical valve [Z95.2]  Not Applicable    ARF (acute renal failure) [N17.9]  Yes    Left bundle branch hemiblock [I44.60]  Yes    Type 2 diabetes mellitus without complication, with long-term current use of insulin [E11.9, Z79.4]  Not Applicable    Dyslipidemia [E78.5]  Yes    Emphysema/COPD [J43.9]  Yes    Hypertension [I10]  Yes    Arteriovenous malformation of gastrointestinal tract [K55.20]  Yes    Obstructive sleep apnea syndrome [G47.33]  Yes    Chronic anticoagulation [Z79.01]  Not Applicable      Resolved Hospital Problems   No resolved problems to display.        Brief Hospital Course to date:  Di Lopez is a 63 y.o. 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 Found to have severe MEERA, resolution of BRBPR, persistent fever/chills and thigh pain     Epistaxis -resolved   - afrin x 3 days with improvement     Febrile illness, unclear etiology -- resolved   - ID consulted; pt got CTX, plan DC on augmentin.    - extensive negative workup inclu full viral panel, negative monospot, spine MRI withOUT contrast negative  - CT abd/pel with no acute findings  - Bcx x 2 different sets NGTD  - MEAGAN without vegetation  -CT maxillofacial with no acute process  - Reviewed need for outpatient dental appt given poor entition and MECH mv     Bilat leg pain R>L  Edema bilat   - started prior to admission. Goes down front of thigh/lower leg  - MRI reviewed with degenerative changes  - voiding a lot w bumex but legs still edematous  - compression hose, perhaps wraps  - dopplers not done - pt already on coumadin      Acute Renal Failure 2/2 hypovolemia + med effect - improved  - 2/2 acute  diarrheal illness + entresto, NSAIDs  - Nephrology consulted and appreciate recs   - improved with fluids and holding nephrotoxins.   - Sodium bicarb - stop   - renal function overall stable with diuresis; follow up nephrology recs       Acute anemia 2/2 bloody diarrheal illness - blood diarrhea resolved; epistaxis  - 1 units PRBC  with appropriate response  -stable hgb approx 8      Mechanical mitral valve on coumadin - Initially on heparin gtt.   -  Changed to lovenox and resume warfarin. Follows with Dr. Everett with home monitor   - INR 1.7.  Patient feels comfortable with lovenox -- will need additional Rx on DC      AoCHFpEF (EF 45-50%), s/p ICD   - now issue with overload. Currently on bumex 2 mg BID; net negative     Chronic hypoxic resp failure 2/2 COPD - on home 2 liters n/c  DMII - basal/bolus regimen  Anxiety/depression  BMI 38 - complicates all aspects of care   Liver mass s/p embolization    Expected Discharge Location and Transportation: home health  Expected Discharge   Expected Discharge Date: 1/15/2024; Expected Discharge Time:      DVT prophylaxis:  Medical DVT prophylaxis orders are present.     AM-PAC 6 Clicks Score (PT): 20 (24 1002)    CODE STATUS:   Code Status and Medical Interventions:   Ordered at: 24 0318     Code Status (Patient has no pulse and is not breathing):    CPR (Attempt to Resuscitate)     Medical Interventions (Patient has pulse or is breathing):    Full Support       Rhoda Holman MD  24        Electronically signed by Rhoda Holman MD at 24 1051       Maciej Willett MD at 24 1435            INFECTIOUS DISEASES  INPATIENT PROGRESS NOTE  2024      PATIENT NAME: Di Lopez  :  1960  MRN:  [id number]  Date of Admission:  2024      Antimicrobials:  Day 6:  ceftriaxone      MAR reviewed.  Pertinent other medications include:  Lovenox/warfarin    Chief Complaint   Patient presents with    Rectal Bleeding       Reason for  "consultation:  fevers    Interval history: No fevers.  Tolerating abx.  Ongoing leg edema.    ROS:  No new rashes.  No SOB or chest pain.  No nausea/vomiting/diarrhea.     Objective:  Temp (24hrs), Av.1 °F (36.7 °C), Min:97.4 °F (36.3 °C), Max:99.2 °F (37.3 °C)    /77 (BP Location: Right arm, Patient Position: Standing)   Pulse 68   Temp 97.9 °F (36.6 °C) (Oral)   Resp 18   Ht 152.4 cm (60\")   Wt 92.5 kg (204 lb)   LMP 1998   SpO2 98%   BMI 39.84 kg/m²     Physical Examination:  GENERAL: Chronically ill-appearing female.  Awake and alert, in no acute distress.   HEENT: broken right mandibular tooth with necrosis.  Prior thrush improved.  CV: RRR. +faint murmur and mechanical click.  ICD pocket without TTP.  LUNGS: CTAB.  Good air movement.  ABDOMEN: Positive bowel sounds.  Soft, nontender, nondistended.  EXT:  2+ B LE edema.  MSK: No joint effusions or inflammation noted.  SKIN: Warm and dry without rash or ulcer.   NEURO: A&Ox4. No focal deficits.  Face symmetric.  Speech fluent.  Moves all extremities well.   PSYCHIATRIC: Normal insight and judgement.  Cooperative.  Normal affect.    Laboratory Data:    Results from last 7 days   Lab Units 24  0438 24  0606 01/10/24  0509   WBC 10*3/mm3 3.83 4.53 4.49   HEMOGLOBIN g/dL 7.8* 7.7* 7.9*   HEMATOCRIT % 25.1* 24.2* 25.0*   PLATELETS 10*3/mm3 190 198 212     Results from last 7 days   Lab Units 24  0849   SODIUM mmol/L 137   POTASSIUM mmol/L 4.2   CHLORIDE mmol/L 99   CO2 mmol/L 25.0   BUN mg/dL 22   CREATININE mg/dL 2.31*   GLUCOSE mg/dL 131*   CALCIUM mg/dL 9.0     Results from last 7 days   Lab Units 24  0920   ALK PHOS U/L 234*   BILIRUBIN mg/dL 0.3   ALT (SGPT) U/L 41*   AST (SGOT) U/L 32     Results from last 7 days   Lab Units 24  1640   SED RATE mm/hr 58*     Results from last 7 days   Lab Units 24  1640   CRP mg/dL 5.92*     Estimated Creatinine Clearance: 25.6 mL/min (A) (by C-G formula based on SCr " of 2.31 mg/dL (H)).  Results from last 7 days   Lab Units 01/05/24  1640   LACTATE mmol/L 1.4     Results from last 7 days   Lab Units 01/07/24  0518   CK TOTAL U/L 191*               Microbiology:    Microbiology Results (last 10 days)       Procedure Component Value - Date/Time    Blood Culture - Blood, Arm, Left [[id number]]  (Normal) Collected: 01/08/24 1018    Lab Status: Preliminary result Specimen: Blood from Arm, Left Updated: 01/12/24 1030     Blood Culture No growth at 4 days    Blood Culture - Blood, Hand, Left [[id number]]  (Normal) Collected: 01/08/24 1017    Lab Status: Preliminary result Specimen: Blood from Hand, Left Updated: 01/12/24 1030     Blood Culture No growth at 4 days    Blood Culture - Blood, Arm, Left [[id number]]  (Normal) Collected: 01/06/24 0056    Lab Status: Final result Specimen: Blood from Arm, Left Updated: 01/11/24 0815     Blood Culture No growth at 5 days    Blood Culture - Blood, Arm, Left [[id number]]  (Normal) Collected: 01/05/24 1641    Lab Status: Final result Specimen: Blood from Arm, Left Updated: 01/10/24 1815     Blood Culture No growth at 5 days    Narrative:      Less than seven (7) mL's of blood was collected.  Insufficient quantity may yield false negative results.    Respiratory Panel PCR w/COVID-19(SARS-CoV-2) [facility]/ESTHER/KARSON/PAD/COR/LEONARDO In-House, NP Swab in UTM/VTM, 2 HR TAT - Swab, Nasopharynx [[id number]]  (Normal) Collected: 01/05/24 1634    Lab Status: Final result Specimen: Swab from Nasopharynx Updated: 01/05/24 1804     ADENOVIRUS, PCR Not Detected     Coronavirus 229E Not Detected     Coronavirus HKU1 Not Detected     Coronavirus NL63 Not Detected     Coronavirus OC43 Not Detected     COVID19 Not Detected     Human Metapneumovirus Not Detected     Human Rhinovirus/Enterovirus Not Detected     Influenza A PCR Not Detected     Influenza B PCR Not Detected     Parainfluenza Virus 1 Not Detected     Parainfluenza Virus 2 Not Detected     Parainfluenza Virus 3 Not  Detected     Parainfluenza Virus 4 Not Detected     RSV, PCR Not Detected     Bordetella pertussis pcr Not Detected     Bordetella parapertussis PCR Not Detected     Chlamydophila pneumoniae PCR Not Detected     Mycoplasma pneumo by PCR Not Detected    Narrative:      In the setting of a positive respiratory panel with a viral infection PLUS a negative procalcitonin without other underlying concern for bacterial infection, consider observing off antibiotics or discontinuation of antibiotics and continue supportive care. If the respiratory panel is positive for atypical bacterial infection (Bordetella pertussis, Chlamydophila pneumoniae, or Mycoplasma pneumoniae), consider antibiotic de-escalation to target atypical bacterial infection.          Monospot negative      Radiology:  No radiology results for the last day    MEAGAN:    Left ventricular ejection fraction appears to be 56 - 60%.    The right ventricular cavity is mildly dilated.  There is a electronically in the RV with no visible adherent mass or vegetation.    The right atrium is mildly dilated, there is electronic lead present with no visible vegetation.    There is a mechanical mitral valve with moderately increased gradients similar to most recent TTE.  The bileaflet valve appears to be functioning normally with no visible thrombus or vegetation.  There is mild MR around the periphery with some potential perivalvular leak.    Aortic valve is mildly calcified with trace AR, no vegetation seen.    The tricuspid valve appears structurally normal with no vegetation.  There is moderate to severe central tricuspid regurgitation around the site of the lead transversing the valve.      DISCUSSION:  63 y.o. female with history of modified replacement and implanted defibrillator admitted with acute kidney injury and GI bleed.   Patient reported cervical adenopathy confirmed on CT imaging and developed fever while hospitalized.  Resp PCR and monospot  negative.    PROBLEM LIST:   -- Undifferentiated fever with sweats - intermittent/improved  COVID-19/flu/RSV PCR negative.  Full resp PCR negative.  Multiple sets blood cultures no growth.  At risk for endocarditis with implanted ICD and valve replacement.  Recent dental issues with broken/necrotic right mandibular tooth and painful left maxillary at prior root canal; at risk for odontogenic source infection.  MEAGAN without valve or lead vegetation.  Maxillofacial CT without acute process or abscess.  -- Cervical adenopathy - resolving.  No pharyngitis or sinusitis.  Monospot negative.  -- Recent Candida esophagitis.  HIV negative.  -- DMII.  -- Implanted defibrillator.  -- History of mitral valve replacement.  On chronic anticoagulation.  -- MEERA, ongoing.  -- Rectal bleeding and anemia.  CT without acute intra-abdominal abnormality.  -- Back pain/sciatica symptoms; MRI L-spine with degenerative changes; no acute abnormality.  -- Oral thrush, mild.    PLAN:  -- Continue empiric ceftriaxone 2 g iv daily for potential odontogenic source of fever  -- As blood cultures remain negative and fever resolving, will recommend discharge with prolonged oral Augmentin 500 mg po bid until she can have her dental issues addressed outpatient - she has appt at  dental clinic later this month.  -- Follow-up with me in 2 weeks  -- Continue nystatin swish and swallow for oral thrush    Plan discussed with patient.  I will see the patient again on Monday.  Please call my colleague on-call if issues in the interim.      Maciej Willett MD  2024  14:35 EST      Electronically signed by Maciej Willett MD at 24 1440       Sarah Sherman DO at 24 1416              [facility] Medicine Services  PROGRESS NOTE    Patient Name: Di WADDELL   : 1960  MRN: [id number]    Date of Admission: 2024  Primary Care Physician: Davina Santiago DO    Subjective   Subjective     CC:  Renal  failure    HPI:  No acute events. Still with right leg pain. Reviewed renal function. Reviewed home health.       Objective   Objective     Vital Signs:   Temp:  [97.4 °F (36.3 °C)-99.2 °F (37.3 °C)] 97.9 °F (36.6 °C)  Heart Rate:  [62-77] 68  Resp:  [16-18] 18  BP: (131-151)/(57-77) 139/77  Flow (L/min):  [2] 2     Physical Exam:  Constitutional: No acute distress, awake, alert  HENT: NCAT, mucous membranes moist  Respiratory: Clear to auscultation bilaterally, respiratory effort normal   Cardiovascular: RRR, no murmurs, rubs, or gallops  Gastrointestinal: Positive bowel sounds, soft, nontender, nondistended  Musculoskeletal: +2 bilateral ankle edema  Psychiatric: Appropriate affect, cooperative  Neurologic: Oriented x 3, strength symmetric in all extremities, Cranial Nerves grossly intact to confrontation, speech clear  Skin: No rashes    Results Reviewed:  LAB RESULTS:      Lab 01/12/24  0438 01/11/24  0606 01/10/24  0509 01/09/24  0920 01/09/24  0010 01/08/24  0638 01/08/24  0457 01/07/24  2105 01/07/24  1402 01/07/24  0552 01/07/24  0518 01/06/24  0737 01/06/24  0056 01/05/24  1640   WBC 3.83 4.53 4.49 5.30  --   --  4.73  --   --    < >  --  5.89  --   --    HEMOGLOBIN 7.8* 7.7* 7.9* 8.3*  --   --  7.1*  --   --    < >  --  7.3*  --   --    HEMATOCRIT 25.1* 24.2* 25.0* 25.8*  --   --  22.1*  --   --    < >  --  23.0*  --   --    PLATELETS 190 198 212 225  --   --  231  --   --    < >  --  263  --   --    NEUTROS ABS 2.70  --   --  4.05  --   --   --   --   --   --   --  4.19  --   --    IMMATURE GRANS (ABS) 0.05  --   --  0.06*  --   --   --   --   --   --   --  0.17*  --   --    LYMPHS ABS 0.71  --   --  0.86  --   --   --   --   --   --   --  1.18  --   --    MONOS ABS 0.28  --   --  0.23  --   --   --   --   --   --   --  0.26  --   --    EOS ABS 0.08  --   --  0.09  --   --   --   --   --   --   --  0.06  --   --    MCV 87.8 86.4 88.0 87.2  --   --  86.3  --   --    < >  --  85.5  --   --    SED RATE  --    --   --   --   --   --   --   --   --   --   --   --   --  58*   CRP  --   --   --   --   --   --   --   --   --   --   --   --   --  5.92*   PROCALCITONIN  --   --   --   --   --   --   --   --   --   --  0.40*  --   --   --    LACTATE  --   --   --   --   --   --   --   --   --   --   --   --   --  1.4   PROTIME 17.4* 15.7* 15.4* 15.9*  --   --  16.2*  --   --   --  17.2* 18.0*   < >  --    HEPARIN ANTI-XA  --   --   --  0.31 0.23* 0.31  --  0.41 0.38  --  0.25* 0.35   < > 0.25*    < > = values in this interval not displayed.         Lab 01/12/24  0849 01/11/24  0606 01/10/24  0509 01/09/24  0920 01/08/24  0457   SODIUM 137 139 137 137 134*   POTASSIUM 4.2 4.5 4.4 4.7 4.8   CHLORIDE 99 102 103 103 104   CO2 25.0 24.0 22.0 18.0* 19.0*   ANION GAP 13.0 13.0 12.0 16.0* 11.0   BUN 22 22 28* 28* 31*   CREATININE 2.31* 2.06* 2.65* 2.99* 3.24*   EGFR 23.4* 26.8* 19.8* 17.1* 15.6*   GLUCOSE 131* 128* 126* 213* 226*   CALCIUM 9.0 8.6 8.8 8.6 8.3*         Lab 01/09/24  0920 01/08/24  0457 01/07/24  0518 01/06/24  0737   TOTAL PROTEIN 6.3 5.9* 6.3 6.2   ALBUMIN 3.7 3.2* 3.7 3.5   GLOBULIN 2.6 2.7 2.6 2.7   ALT (SGPT) 41* 42* 43* 31   AST (SGOT) 32 33* 49* 36*   BILIRUBIN 0.3 0.2 0.3 0.3   ALK PHOS 234* 213* 225* 212*         Lab 01/12/24  0438 01/11/24  0606 01/10/24  0509 01/09/24  0920 01/08/24  0457   PROTIME 17.4* 15.7* 15.4* 15.9* 16.2*   INR 1.41* 1.24* 1.21* 1.25* 1.29*             Lab 01/08/24  1113 01/06/24  0737   IRON  --  15*   IRON SATURATION (TSAT)  --  5*   TIBC  --  279*   TRANSFERRIN  --  187*   FERRITIN  --  1,188.00*   ABO TYPING O  --    RH TYPING Positive  --    ANTIBODY SCREEN Negative  --          Brief Urine Lab Results  (Last result in the past 365 days)        Color   Clarity   Blood   Leuk Est   Nitrite   Protein   CREAT   Urine HCG        01/02/24 0011             142.2         01/02/24 0011 Yellow   Cloudy   Small (1+)   Negative   Negative   100 mg/dL (2+)                   Microbiology Results  Abnormal       Procedure Component Value - Date/Time    Blood Culture - Blood, Hand, Left [[id number]]  (Normal) Collected: 01/08/24 1017    Lab Status: Preliminary result Specimen: Blood from Hand, Left Updated: 01/12/24 1030     Blood Culture No growth at 4 days    Blood Culture - Blood, Arm, Left [[id number]]  (Normal) Collected: 01/08/24 1018    Lab Status: Preliminary result Specimen: Blood from Arm, Left Updated: 01/12/24 1030     Blood Culture No growth at 4 days    Blood Culture - Blood, Arm, Left [[id number]]  (Normal) Collected: 01/06/24 0056    Lab Status: Final result Specimen: Blood from Arm, Left Updated: 01/11/24 0815     Blood Culture No growth at 5 days    Blood Culture - Blood, Arm, Left [[id number]]  (Normal) Collected: 01/05/24 1641    Lab Status: Final result Specimen: Blood from Arm, Left Updated: 01/10/24 1815     Blood Culture No growth at 5 days    Narrative:      Less than seven (7) mL's of blood was collected.  Insufficient quantity may yield false negative results.    Respiratory Panel PCR w/COVID-19(SARS-CoV-2) LUIS/ESTHER/KARSON/PAD/COR/LEONARDO In-House, NP Swab in UTM/VTM, 2 HR TAT - Swab, Nasopharynx [[id number]]  (Normal) Collected: 01/05/24 1634    Lab Status: Final result Specimen: Swab from Nasopharynx Updated: 01/05/24 1804     ADENOVIRUS, PCR Not Detected     Coronavirus 229E Not Detected     Coronavirus HKU1 Not Detected     Coronavirus NL63 Not Detected     Coronavirus OC43 Not Detected     COVID19 Not Detected     Human Metapneumovirus Not Detected     Human Rhinovirus/Enterovirus Not Detected     Influenza A PCR Not Detected     Influenza B PCR Not Detected     Parainfluenza Virus 1 Not Detected     Parainfluenza Virus 2 Not Detected     Parainfluenza Virus 3 Not Detected     Parainfluenza Virus 4 Not Detected     RSV, PCR Not Detected     Bordetella pertussis pcr Not Detected     Bordetella parapertussis PCR Not Detected     Chlamydophila pneumoniae PCR Not Detected     Mycoplasma pneumo  by PCR Not Detected    Narrative:      In the setting of a positive respiratory panel with a viral infection PLUS a negative procalcitonin without other underlying concern for bacterial infection, consider observing off antibiotics or discontinuation of antibiotics and continue supportive care. If the respiratory panel is positive for atypical bacterial infection (Bordetella pertussis, Chlamydophila pneumoniae, or Mycoplasma pneumoniae), consider antibiotic de-escalation to target atypical bacterial infection.    COVID PRE-OP / PRE-PROCEDURE SCREENING ORDER (NO ISOLATION) - Swab, Nasopharynx [[id number]]  (Normal) Collected: 01/01/24 2330    Lab Status: Final result Specimen: Swab from Nasopharynx Updated: 01/02/24 0016    Narrative:      The following orders were created for panel order COVID PRE-OP / PRE-PROCEDURE SCREENING ORDER (NO ISOLATION) - Swab, Nasopharynx.  Procedure                               Abnormality         Status                     ---------                               -----------         ------                     COVID-19, FLU A/B, RSV P...[[id number]]  Normal              Final result                 Please view results for these tests on the individual orders.    COVID-19, FLU A/B, RSV PCR 1 HR TAT - Swab, Nasopharynx [[id number]]  (Normal) Collected: 01/01/24 2330    Lab Status: Final result Specimen: Swab from Nasopharynx Updated: 01/02/24 0016     COVID19 Not Detected     Influenza A PCR Not Detected     Influenza B PCR Not Detected     RSV, PCR Not Detected    Narrative:      Fact sheet for providers: https://www.fda.gov/media/709260/download    Fact sheet for patients: https://www.fda.gov/media/110623/download    Test performed by PCR.            No radiology results from the last 24 hrs    Results for orders placed during the hospital encounter of 01/01/24    Adult Transesophageal Echo 3D (MEAGAN) W/ Cont If Necessary Per Protocol    Interpretation Summary    Left ventricular ejection  fraction appears to be 56 - 60%.    The right ventricular cavity is mildly dilated.  There is a electronically in the RV with no visible adherent mass or vegetation.    The right atrium is mildly dilated, there is electronic lead present with no visible vegetation.    There is a mechanical mitral valve with moderately increased gradients similar to most recent TTE.  The bileaflet valve appears to be functioning normally with no visible thrombus or vegetation.  There is mild MR around the periphery with some potential perivalvular leak.    Aortic valve is mildly calcified with trace AR, no vegetation seen.    The tricuspid valve appears structurally normal with no vegetation.  There is moderate to severe central tricuspid regurgitation around the site of the lead transversing the valve.      Current medications:  Scheduled Meds:bumetanide, 2 mg, Oral, BID  cefTRIAXone, 2,000 mg, Intravenous, Q24H  enoxaparin, 90 mg, Subcutaneous, Q24H  escitalopram, 20 mg, Oral, Daily  ferrous sulfate, 325 mg, Oral, Daily With Breakfast  folic acid, 800 mcg, Oral, Daily  insulin detemir, 5 Units, Subcutaneous, Nightly  insulin lispro, 2-7 Units, Subcutaneous, 4x Daily AC & at Bedtime  Insulin Lispro, 3 Units, Subcutaneous, TID With Meals  nystatin, 5 mL, Swish & Swallow, 4x Daily  oxybutynin XL, 5 mg, Oral, Daily  pantoprazole, 40 mg, Oral, Q AM  polyethylene glycol, 17 g, Oral, Daily  senna-docusate sodium, 1 tablet, Oral, BID  sodium bicarbonate, 1,300 mg, Oral, TID  sodium chloride, 10 mL, Intravenous, Q12H  warfarin, 7.5 mg, Oral, Daily      Continuous Infusions:Pharmacy to dose warfarin,       PRN Meds:.  acetaminophen    albuterol    dextrose    dextrose    glucagon (human recombinant)    melatonin    naloxone    nitroglycerin    oxyCODONE-acetaminophen    Pharmacy to dose warfarin    sodium chloride    sodium chloride    Assessment & Plan   Assessment & Plan     Active Hospital Problems    Diagnosis  POA    **Renal failure  [N19]  Yes    Hyponatremia [E87.1]  Yes    Hyperkalemia [E87.5]  Yes    Type 2 diabetes mellitus with hyperglycemia [E11.65]  Yes    Elevated LFTs [R79.89]  Yes    Anemia, chronic disease [D63.8]  Yes    Cervical lymphadenopathy [R59.0]  Yes    GI bleed [K92.2]  Yes    H/O heart valve replacement with mechanical valve [Z95.2]  Not Applicable    ARF (acute renal failure) [N17.9]  Yes    Left bundle branch hemiblock [I44.60]  Yes    Type 2 diabetes mellitus without complication, with long-term current use of insulin [E11.9, Z79.4]  Not Applicable    Dyslipidemia [E78.5]  Yes    Emphysema/COPD [J43.9]  Yes    Hypertension [I10]  Yes    Arteriovenous malformation of gastrointestinal tract [K55.20]  Yes    Obstructive sleep apnea syndrome [G47.33]  Yes    Chronic anticoagulation [Z79.01]  Not Applicable      Resolved Hospital Problems   No resolved problems to display.        Brief Hospital Course to date:  Di Lopez is a 63 y.o. 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 Found to have severe MEERA, resolution of BRBPR, persistent fever/chills and thigh pain     Epistaxis -resolved   - afrin x 3 days with improvement     Febrile illness, unclear etiology -- resolved   -negative full viral panel, negative monospot  -continue to suspect viral illness (throat soreness, fevers, back pain/aches, etc), given new thigh pain consideration of discitis but MRI withOUT contrast negative  - CT abd/pel with no acute findings  - Bcx x 2 different sets NGTD  - MEAGAN without vegetation  -CT maxillofacial with no acute process  - ID consulted --appreciate recs  - Continue rocephin --  DC on augmentin 500 mg BID until dental issues addressed with close outpatient ID follow up   - Reviewed need for outpatient dental appt     Right leg pain  - started prior to admission. Goes down front of thigh/lower leg  - MRI reviewed  with degenerative changes  - Fluid likely complicating      Acute Renal Failure 2/2 hypovolemia + med effect - improved  - 2/2 acute diarrheal illness + entresto, NSAIDs  - Nephrology consulted and appreciate recs   - improved with fluids and holding nephrotoxins.   - Sodium bicarb  - renal function overall stable with diuresis; follow up nephrology recs      Hypovolemic hyponatremia - improved  - remains stable     Acute anemia 2/2 bloody diarrheal illness - blood diarrhea resolved; epistaxis  - 1 units PRBC 1/8 with appropriate response  - slight trend down but has remained stable      Mechanical mitral valve on coumadin - Initially on heparin gtt. No continued intervention planned. Changed to lovenox and resume warfarin. Follows with Dr. Everett with home monitor   - Patient feels comfortable with lovenox -- will need additional Rx on DC      AoCHFpEF (EF 45-50%), s/p ICD   - now issue with overload. Currently on bumex 2 mg BID; net negative     Chronic hypoxic resp failure 2/2 COPD - on home 2 liters n/c  DMII - basal/bolus regimen  Anxiety/depression  BMI 38 - complicates all aspects of care   Liver mass s/p embolization    Expected Discharge Location and Transportation: home health  Expected Discharge   Expected Discharge Date: 1/13/2024; Expected Discharge Time:      DVT prophylaxis:  Medical DVT prophylaxis orders are present.     AM-PAC 6 Clicks Score (PT): 23 (01/11/24 2000)    CODE STATUS:   Code Status and Medical Interventions:   Ordered at: 01/02/24 0319     Code Status (Patient has no pulse and is not breathing):    CPR (Attempt to Resuscitate)     Medical Interventions (Patient has pulse or is breathing):    Full Support       Sarah Sherman DO  01/12/24        Electronically signed by Sarah Sherman DO at 01/12/24 1432       Pro Quesada MD at 01/12/24 1339             LOS: 11 days    Patient Care Team:  Davina Santiago DO as PCP - General (Family Medicine)  Ameena Iglesias MD as Consulting  "Physician (Endocrinology)  Nakita Freire APRN as Nurse Practitioner (Nurse Practitioner)  Raimundo Thomas MD as Consulting Physician (Pulmonary Disease)  Nadir Everett MD as Consulting Physician (Cardiology)  Luiz Longoria MD as Consulting Physician (Pain Medicine)    Subjective     Stable renal function. Dependent edema noted.     Objective     Vital Signs:  Blood pressure 138/57, pulse 88, temperature 98.2 °F (36.8 °C), temperature source Oral, resp. rate 16, height 152.4 cm (60\"), weight 92.5 kg (204 lb), last menstrual period 06/17/1998, SpO2 95%, not currently breastfeeding.        01/12 0701 - 01/13 0700  In: 500 [P.O.:500]  Out: 2200 [Urine:2200]    Physical Exam:        GENERAL: WD AAF NAD  NEURO: Awake and alert, oriented. No focal deficit  PSYCHIATRIC: NMA. Cooperative with PE  EYE: PE, no icterus, no conjunctivitis  ENT: ommm, dentition intact,  Hearing intact  NECK: Supple , No JVD discernable,  Trachea midline  CV: + Edema, RRR  LUNGS:  Quiet,  Nonlabored resp.  Symmetrical expansion  ABDOMEN: Nondistended, soft nontender.  : No Perdomo, no palp bladder  SKIN: Warm and dry without rash      Labs:                  Estimated Creatinine Clearance: 28.3 mL/min (A) (by C-G formula based on SCr of 2.09 mg/dL (H)).        A/P:    ARF: Creatinine continues to improve daily.  Urine output continues nonoliguric with unmeasured voids.  Creatinine up to 8 on presentation with likely dehydration due to diarrhea with poor p.o. intake in the setting of NSAIDs and Entresto.      HTN: Blood pressure stable.    Hyponatremia: Borderline.  Initially down to 129 on presentation due to hypovolemia and MEERA.  All fluids isotonic.     Hyperkalemia: Stable.  Due to MEERA.     Met Aciodsis: Persistent on p.o. bicarb.  Due to renal failure and underlying GI losses with diarrhea.  Monitor for now.     Anemia.  Hemoglobin below goal.  Hx of supra therapeutic INR w rectal bleeding in the past. Admitted again with " concern for rectal bleed.  Iron saturation quite low with elevated ferritin.  Transfuse as needed for Hgb <7.    Volume status: Dependent edema noted.      Hx of mechanical valve.  On anticoagulation     CHF: EF 40-45%. Hx of MV and depressed RV function. On beta blocker and lasix at home. Not on ACEi/ARB/MRA/ or SGLT-2inh for now.    Recs  Continue bumex 2mg  BID  Conservative care otherwise from renal standpoint.    High risk and complexity patient.    Pro Quesada MD            Electronically signed by Pro Quesada MD at 01/13/24 1340          Consult Notes (last 7 days)        Nadir Everett MD at 01/08/24 0916        Consult Orders    1. Inpatient Cardiology Consult [[id number]] ordered by Maciej Willett MD at 01/07/24 1956                           Cardiology Consult - [facility]    Di WADDELL Hockaday     [id number]  1960  [address] [place] [address]         Admission Date:  1/1/2024    Consultation Date:  01/08/24        PCP:  Davina Santiago DO  Referring MD: Dr. Willett  Consulting MD: Dr. Everett          CC: ARF, persistent fevers, cervical adenopathy    Reason for Consult: Rule out endocarditis, severe TR by TTE          Allergies:  is allergic to adhesive tape, sildenafil, codeine, morphine, penicillins, statins, and sulfa antibiotics.    Medications Prior to Admission   Medication Sig Dispense Refill Last Dose    albuterol sulfate  (90 Base) MCG/ACT inhaler Inhale 2 puffs Every 4 (Four) Hours As Needed for Wheezing. 1 g 0     azelastine (ASTELIN) 0.1 % nasal spray 2 sprays into the nostril(s) as directed by provider 2 (Two) Times a Day. Use in each nostril as directed (Patient taking differently: 2 sprays into the nostril(s) as directed by provider As Needed for Rhinitis or Allergies. Use in each nostril as directed prn) 1 each 1     carvedilol (COREG) 12.5 MG tablet Take 1 tablet by mouth 2 (Two) Times a Day With Meals. 180 tablet 2     cefdinir (OMNICEF)  300 MG capsule Take 1 capsule by mouth 2 (Two) Times a Day. 20 capsule 0     Continuous Blood Gluc  (FreeStyle Dalia 2 Jefferson) device 1 each Daily. Use as directed daily to check blood sugars 1 each 0     Continuous Blood Gluc Sensor (FreeStyle Dalia 2 Sensor) misc USE AS DIRECTED FOR 14 DAYS 2 each 11     Dulaglutide (Trulicity) 4.5 MG/0.5ML solution pen-injector INJECT 1  SYRINGE SUBCUTANEOUSLY ONCE A WEEK 6 mL 0     escitalopram (LEXAPRO) 20 MG tablet Take 1 tablet by mouth Daily. 90 tablet 3     ferrous sulfate 325 (65 FE) MG tablet Take 1 tablet by mouth Daily With Breakfast.       folic acid (FOLVITE) 800 MCG tablet Take 1 tablet by mouth Daily.       furosemide (LASIX) 40 MG tablet Take 1 tablet by mouth 3 (Three) Times a Week. Monday, Wednesday, Friday. Resume when done w daily lasix       gabapentin (NEURONTIN) 300 MG capsule Take 1 capsule by mouth At Night As Needed (leg pain). 30 capsule 3     glimepiride (AMARYL) 4 MG tablet Take 1 tablet by mouth 2 (Two) Times a Day. 180 tablet 1     glucose blood test strip Use as instructed 3 times a day 100 each 11     HYDROcodone-acetaminophen (NORCO) 7.5-325 MG per tablet Take 1 tablet by mouth Every 8 (Eight) Hours As Needed for Moderate Pain.       Insulin Glargine (Lantus SoloStar) 100 UNIT/ML injection pen Inject 44 Units under the skin into the appropriate area as directed Daily. 45 mL 1     Insulin Lispro, 1 Unit Dial, (HumaLOG KwikPen) 100 UNIT/ML solution pen-injector Per correctional scale, MDD 50 units 15 mL 3     Insulin Pen Needle (B-D UF III MINI PEN NEEDLES) 31G X 5 MM misc USE 1  FIVE TIMES DAILY 150 each 5     Insulin Pen Needle (RELION PEN NEEDLES) 32G X 4 MM misc Use BID for E11.9 100 each 1     Lancets misc For use with glucose monitoring 3 times daily 100 each 11     lidocaine (LIDODERM) 5 % USE 1 PATCH EXTERNALLY ONCE DAILY. REMOVE AND DISCARD PATCH WITHIN 12 HOURS OR AS DIRECTED BY MD (Patient taking differently: Place 1 patch on the  skin as directed by provider Daily.) 30 patch 0     methocarbamol (ROBAXIN) 500 MG tablet Take 1 tablet by mouth 4 (Four) Times a Day As Needed for Muscle Spasms. 40 tablet 0     nystatin (MYCOSTATIN) 100,000 unit/mL suspension Swish and swallow 5 mL 4 (Four) Times a Day. 200 mL 0     Omega-3 Fatty Acids (FISH OIL) 1000 MG capsule capsule Take 1 capsule by mouth 4 (Four) Times a Day. (Patient taking differently: Take 1 capsule by mouth 2 (Two) Times a Day With Meals.) 120 capsule 2     omeprazole (priLOSEC) 20 MG capsule Take 1 capsule by mouth twice daily 60 capsule 0     sacubitril-valsartan (ENTRESTO) 24-26 MG tablet Take 1 tablet by mouth 2 (Two) Times a Day. Resume when ok w PCP       simvastatin (ZOCOR) 5 MG tablet TAKE 1 TABLET BY MOUTH ONCE DAILY AT NIGHT 90 tablet 0     solifenacin (VESICARE) 5 MG tablet Take 1 tablet by mouth Daily. 90 tablet 0     spironolactone (ALDACTONE) 25 MG tablet Take 1 tablet by mouth Daily.       Vitamin D, Cholecalciferol, (CHOLECALCIFEROL) 10 MCG (400 UNIT) tablet Take 2 tablets by mouth Daily.       warfarin (COUMADIN) 2 MG tablet Take on 11/24/2023  Indications: Presence of Mechanical Artificial Heart Valve (Patient taking differently: Take 2.5 tablets by mouth. 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  She presents to [facility] with complaints of 10 pound weight loss, chills, diarrhea,  swollen lymph nodes, BRBPR.  She was last hospitalized 2023 with a GI bleed requiring 1 unit PRBC.  At that time, upper and lower endoscopy revealed Candida esophagitis and diverticulosis.  She was also treated for UTI during that admission (Rocephin).  She reports not feeling well since discharge and was recently treated for continued lymphadenopathy by her PCP with cefdinir and methocarbamol.  She has been admitted to hospitalist services, ID and nephrology following in consultation for ARF and persistent fevers.      Transthoracic echo performed demonstrates EF 59%, moderate to severe TR with RVSP 88 mmHg.  She also has a BiV pacemaker.  With this cardiac history, there is concern for possible endocarditis and cardiology has been asked to consult for MEAGAN.  Her device was interrogated last week for MRI clearance.  Interrogation was WNL with normal function.  At time of consultation, patient is awake in bed, no family present.  She continues to have pain in BLE particularly behind her knees.  She continues to report pedal edema, generalized weakness and overall pain throughout her body.  She denies chest pain or angina, denies dyspnea or orthopnea.  She reports no palpitations or irregular heartbeats.  Denies dizziness or presyncope.          Social History     Socioeconomic History    Marital status: Single   Tobacco Use    Smoking status: Former     Packs/day: 1.00     Years: 30.00     Additional pack years: 0.00     Total pack years: 30.00     Types: Cigarettes, Electronic Cigarette     Start date: 1976     Quit date: 2/10/2010     Years since quittin.9    Smokeless tobacco: Never    Tobacco comments:     Quit in    Vaping Use    Vaping Use: Every day    Substances: Nicotine    Devices: Disposable, one every 2 days   Substance and Sexual Activity    Alcohol use: No    Drug use: Never    Sexual activity: Defer     Partners: Male     Birth control/protection: Abstinence, None, Hysterectomy      Comment: 1 partner     Family History   Problem Relation Age of Onset    Sudden death Mother         POSSIBLE SERIOUS MASSIVE STROKE    Hypertension Mother             Multiple myeloma Father     Cancer Father             Hypertension Father             Ovarian cancer Paternal Grandmother     Cancer Paternal Grandmother             Diabetes Brother             Hypertension Brother             Breast cancer Neg Hx      Past Surgical History:   Procedure Laterality Date    BILATERAL OOPHORECTOMY  2009    Status post bilateral oophorectomy secondary to ovarian cysts     BREAST BIOPSY Right     STEREOTACTIC    BREAST EXCISIONAL BIOPSY Bilateral     CARDIAC ELECTROPHYSIOLOGY PROCEDURE N/A 10/15/2020    Procedure: Biventricular Device Insertion;  Surgeon: Nadir Everett MD;  Location: [facility];  Service: Cardiology;  Laterality: N/A;    CARDIAC SURGERY      CARPAL TUNNEL RELEASE Bilateral     COLONOSCOPY  2012    COLONOSCOPY N/A 2023    Procedure: COLONOSCOPY;  Surgeon: Gt Fernandes MD;  Location: [facility];  Service: Gastroenterology;  Laterality: N/A;    COLONOSCOPY N/A 2023    Procedure: COLONOSCOPY;  Surgeon: Brunner, Mark I, MD;  Location: [facility];  Service: Gastroenterology;  Laterality: N/A;    CYSTECTOMY      removal of ganglion cyst from left wrist    ENDOSCOPY  2011    DIAGNOSTIC ESOPHAGOGASTRODUODENOSCOPY    ENDOSCOPY N/A 2023    Procedure: ESOPHAGOGASTRODUODENOSCOPY;  Surgeon: Brunner, Mark I, MD;  Location:  [facility];  Service: Gastroenterology;  Laterality: N/A;    HYSTERECTOMY      A.  Status post hysterectomy for early stage endometrial cancer done in .    MITRAL VALVE REPLACEMENT  2008    Dr. Hima Barreto; MECHANICAL    OTHER SURGICAL HISTORY      LIVER MASS BLOOD SUPPLY CLIPPED ON RIGHT AND LEFT SIDE PER PT     ROS: Pertinent items are noted in HPI,  "all other systems reviewed and negative     Objective     height is 152.4 cm (60\") and weight is 89.9 kg (198 lb 3.1 oz). Her oral temperature is 101.4 °F (38.6 °C) (abnormal). Her blood pressure is 123/60 and her pulse is 78. Her respiration is 18 and oxygen saturation is 96%.    Intake/Output Summary (Last 24 hours) at 1/8/2024 0916  Last data filed at 1/8/2024 0455  Gross per 24 hour   Intake 160 ml   Output 1300 ml   Net -1140 ml     Intake/Output                         01/06/24 0701 - 01/07/24 0700 01/07/24 0701 - 01/08/24 0700     [id number] Total 6214-7329 9552-9067 Total                 Intake    P.O.  --  -- --  360  -- 360    Total Intake -- -- -- 360 -- 360       Output    Urine  800  550 1350  900  400 1300    Total Output                 01/01/24  2251 01/02/24  0408 01/06/24  1612   Weight: 84.8 kg (187 lb) 89.9 kg (198 lb 3.2 oz) 89.9 kg (198 lb 3.1 oz)          Physical Exam:  General Appearance:    Alert, cooperative, in no acute distress   Head:    Normocephalic, without obvious abnormality, atraumatic   Eyes:            Lids and lashes normal, conjunctivae and sclerae normal, no   icterus, no pallor, corneas clear, PERRLA   Ears:    Ears appear intact with no abnormalities noted   Throat:   No oral lesions, no thrush, oral mucosa moist   Neck:   No adenopathy, supple, trachea midline, no thyromegaly, no carotid bruit, no JVD   Back:     No kyphosis present, no scoliosis present, no skin lesions,    erythema or scars, no tenderness to percussion or                   palpation, range of motion normal   Lungs:     Clear to auscultation,respirations regular, even and               unlabored    Heart:    Regular rhythm and normal rate, normal S1 and S2, no         murmur, no gallop, no rub.  + click   Abdomen:     Normal bowel sounds, no masses, no organomegaly, soft     nontender, nondistended, no guarding, no rebound   tenderness   Extremities:   Moves all " extremities well,  no cyanosis, no redness, + edema   Pulses:   Pulses palpable and equal bilaterally   Skin:   No bleeding, bruising or rash   Lymph nodes:   + cervical adenopathy   Neurologic:   Cranial nerves 2 - 12 grossly intact, sensation intact, DTR     present and equal bilaterally          Results Review:  I personally reviewed the patient's clinical results.  Results from last 7 days   Lab Units 01/08/24  0457   WBC 10*3/mm3 4.73   HEMOGLOBIN g/dL 7.1*   HEMATOCRIT % 22.1*   PLATELETS 10*3/mm3 231     Results from last 7 days   Lab Units 01/08/24  0457   SODIUM mmol/L 134*   POTASSIUM mmol/L 4.8   CHLORIDE mmol/L 104   CO2 mmol/L 19.0*   BUN mg/dL 31*   CREATININE mg/dL 3.24*   CALCIUM mg/dL 8.3*   BILIRUBIN mg/dL 0.2   ALK PHOS U/L 213*   ALT (SGPT) U/L 42*   AST (SGOT) U/L 33*   GLUCOSE mg/dL 226*       Results from last 7 days   Lab Units 01/08/24  0457 01/07/24  0518 01/06/24  0737   INR  1.29* 1.39* 1.48*     Results from last 7 days   Lab Units 01/02/24  0808   TSH uIU/mL 1.590                     Radiology:  Imaging Results (Last 72 Hours)       Procedure Component Value Units Date/Time    MRI Lumbar Spine Without Contrast [[id number]] Collected: 01/05/24 2006     Updated: 01/05/24 2014    Narrative:        MRI LUMBAR SPINE WO CONTRAST    Date of Exam: 1/5/2024 7:30 PM EST    Indication: Low back pain, symptoms persist with > 6 wks treatment  patient with possible metallic implant?.     Comparison: CT 7/16/2013. Radiograph 4/20/2022.    Technique:  Routine multiplanar/multisequence sequence images of the lumbar spine were obtained without contrast administration.        Findings:  The vertebral body heights are maintained.  Grade 1 retrolisthesis of L5 on S1.  Otherwise overall alignment is maintained.    Mild T2/T1 hyperintensity within the endplates at L5-S1, most likely representing Modic type III changes. No suspicious marrow edema.  Mild diffuse hypodensity within the visualized marrow,  nonspecific but may represent underlying hematopoiesis.    The visualized spinal cord is unremarkable.  The conus terminates at L1. Although evaluation is limited, no definite fluid collection is noted within the spinal canal.    The paravertebral soft tissues are unremarkable.  The visualized intra-abdominal and intrapelvic soft tissues are unremarkable.    Mild loss of intervertebral disc base height throughout the visualized spine and moderate to severe loss of intervertebral disc base height at L5-S1.    T12-L1: No significant spinal canal stenosis or neuroforaminal narrowing.  L1-L2: No significant spinal canal stenosis or neuroforaminal narrowing.  L2-L3: No significant spinal canal stenosis or neuroforaminal narrowing.  L3-4: No significant spinal canal stenosis or neuroforaminal narrowing.  L4-L5: Mild posterior broad-based disc bulge, ligamentum flavum thickening and bilateral facet arthrosis causes mild to moderate bilateral neuroforaminal narrowing without significant spinal canal stenosis.  L5-S1: Grade 1 retrolisthesis, broad-based disc bulge and possible superimposed small disc herniation. There is also bilateral facet arthrosis and ligamentum flavum thickening. Overall this causes mild spinal canal stenosis and moderate to severe   bilateral neuroforaminal narrowing, worse on the right.      Impression:      Impression:  Multilevel degenerative changes as characterized above, most significantly at L5-S1. No definite acute abnormality.        Electronically Signed: Devin Johnson DO    1/5/2024 8:11 PM EST    Workstation ID: [id number]              Tele: DEMETRIUS MCKINNON paced    Assessment:  -  62 yo AA female admitted with persistent fevers, lymphadenopathy, weight loss, diarrhea, generalized malaise and pain, ARF. Admitted with febrile illness of unclear etiology with ongoing work up.  ID following  -  VHDz with prior mechanical MVR on chronic warfarin.  -  TTE demonstrates severe TR with RVSP 88mHg  -   Chronic HFpEF with [facility] [facility] BiV PPM  -  HTN  -  HLP  -  COPD with former tobacco abuse.  Current vaping use.  -  Recent GIB requiring 1 unit PRBC.  AVM noted.  + Diverticulosis  -  Candida esophagitis  -  ARF, improving slowly  -  DMII        Plan:  -  Admitted to hospitalist services.  -  ID following  -  Nephrology following.  Cr 3.24 today (down from 3.87)  -  TTE reviewed.  Will keep patient NPO for MEAGAN today, will be performed at 3 pm.  Okay for morning meds with sip of water prior to 0900.    -  warfarin on hold, Heparin gtt ongoing.  Trend daily labs.  -  Cardiology will see again in a.m.        I discussed the patient's findings and my recommendations with the patient, any present family members, and the nursing staff.  Nadir Everett MD saw and examined patient, verified hx and PE, read all radiographic studies, reviewed labs and micro data, and formulated dx, plan for treatment and all medical decision making.      Vijaya Zavala PA-C, working with Nadir Everett MD  01/08/24 09:16 EST        Electronically signed by Nadir Everett MD at 01/08/24 1619

## 2024-01-15 NOTE — PROGRESS NOTES
"  INFECTIOUS DISEASES  INPATIENT PROGRESS NOTE  1/15/2024      PATIENT NAME: Di Lopez  :  1960  MRN:  [id number]  Date of Admission:  2024      Antimicrobials:  Day 9:  ceftriaxone      MAR reviewed.  Pertinent other medications include:  warfarin, bumex    Chief Complaint   Patient presents with    Rectal Bleeding       Reason for consultation:  fevers    Interval history: No fever over weekend but Tmax today 99.9; patient says initial was >100 but recheck improved (not recorded).  Says she was not using electric blanket at the time.  Ongoing diuresis but legs still swollen.  Compression wraps now in place.   1.15 L negative yesterday.    ROS:  No new rashes.  No SOB or chest pain.  No nausea/vomiting/diarrhea.     Objective:  Temp (24hrs), Av.4 °F (36.9 °C), Min:97 °F (36.1 °C), Max:99.9 °F (37.7 °C)    /51   Pulse 73   Temp 98.1 °F (36.7 °C) (Oral)   Resp 18   Ht 152.4 cm (60\")   Wt 92.5 kg (204 lb)   LMP 1998   SpO2 97%   BMI 39.84 kg/m²       Intake/Output Summary (Last 24 hours) at 1/15/2024 1234  Last data filed at 1/15/2024 0900  Gross per 24 hour   Intake 200 ml   Output 900 ml   Net -700 ml         Physical Examination:  GENERAL: Chronically ill-appearing female.  Awake and alert, in no acute distress.   HEENT: broken right mandibular tooth with necrosis.   CV: RRR. +faint murmur and mechanical click.  ICD pocket without TTP.  LUNGS: CTAB.  Good air movement.  ABDOMEN: Positive bowel sounds.  Soft, nontender, nondistended.  EXT:  2+ B LE edema, compression wraps in place.  SKIN: Warm and dry without rash or ulcer.   NEURO: A&Ox4. No focal deficits.  Face symmetric.  Speech fluent.  Moves all extremities well.   PSYCHIATRIC: Normal insight and judgement.  Cooperative.  Normal affect.    Laboratory Data:    Results from last 7 days   Lab Units 01/15/24  0443 24  0644 24  0433   WBC 10*3/mm3 3.55 3.82 3.69   HEMOGLOBIN g/dL 7.7* 8.0* 7.8*   HEMATOCRIT % " 24.3* 25.5* 24.8*   PLATELETS 10*3/mm3 177 186 202     Results from last 7 days   Lab Units 01/15/24  0443   SODIUM mmol/L 137   POTASSIUM mmol/L 3.6   CHLORIDE mmol/L 94*   CO2 mmol/L 32.0*   BUN mg/dL 26*   CREATININE mg/dL 2.22*   GLUCOSE mg/dL 150*   CALCIUM mg/dL 8.5*     Results from last 7 days   Lab Units 01/09/24  0920   ALK PHOS U/L 234*   BILIRUBIN mg/dL 0.3   ALT (SGPT) U/L 41*   AST (SGOT) U/L 32                 Estimated Creatinine Clearance: 26.7 mL/min (A) (by C-G formula based on SCr of 2.22 mg/dL (H)).                        Microbiology:    Microbiology Results (last 10 days)       Procedure Component Value - Date/Time    Blood Culture - Blood, Arm, Left [[id number]]  (Normal) Collected: 01/08/24 1018    Lab Status: Final result Specimen: Blood from Arm, Left Updated: 01/13/24 1030     Blood Culture No growth at 5 days    Blood Culture - Blood, Hand, Left [[id number]]  (Normal) Collected: 01/08/24 1017    Lab Status: Final result Specimen: Blood from Hand, Left Updated: 01/13/24 1030     Blood Culture No growth at 5 days    Blood Culture - Blood, Arm, Left [[id number]]  (Normal) Collected: 01/06/24 0056    Lab Status: Final result Specimen: Blood from Arm, Left Updated: 01/11/24 0815     Blood Culture No growth at 5 days    Blood Culture - Blood, Arm, Left [[id number]]  (Normal) Collected: 01/05/24 1641    Lab Status: Final result Specimen: Blood from Arm, Left Updated: 01/10/24 1815     Blood Culture No growth at 5 days    Narrative:      Less than seven (7) mL's of blood was collected.  Insufficient quantity may yield false negative results.    Respiratory Panel PCR w/COVID-19(SARS-CoV-2) [facility] In-House, NP Swab in UTM/VTM, 2 HR TAT - Swab, Nasopharynx [[id number]]  (Normal) Collected: 01/05/24 1634    Lab Status: Final result Specimen: Swab from Nasopharynx Updated: 01/05/24 1804     ADENOVIRUS, PCR Not Detected     Coronavirus 229E Not Detected     Coronavirus HKU1 Not Detected      Coronavirus NL63 Not Detected     Coronavirus OC43 Not Detected     COVID19 Not Detected     Human Metapneumovirus Not Detected     Human Rhinovirus/Enterovirus Not Detected     Influenza A PCR Not Detected     Influenza B PCR Not Detected     Parainfluenza Virus 1 Not Detected     Parainfluenza Virus 2 Not Detected     Parainfluenza Virus 3 Not Detected     Parainfluenza Virus 4 Not Detected     RSV, PCR Not Detected     Bordetella pertussis pcr Not Detected     Bordetella parapertussis PCR Not Detected     Chlamydophila pneumoniae PCR Not Detected     Mycoplasma pneumo by PCR Not Detected    Narrative:      In the setting of a positive respiratory panel with a viral infection PLUS a negative procalcitonin without other underlying concern for bacterial infection, consider observing off antibiotics or discontinuation of antibiotics and continue supportive care. If the respiratory panel is positive for atypical bacterial infection (Bordetella pertussis, Chlamydophila pneumoniae, or Mycoplasma pneumoniae), consider antibiotic de-escalation to target atypical bacterial infection.          Monospot negative      Radiology:  No radiology results for the last day    MEAGAN:    Left ventricular ejection fraction appears to be 56 - 60%.    The right ventricular cavity is mildly dilated.  There is a electronically in the RV with no visible adherent mass or vegetation.    The right atrium is mildly dilated, there is electronic lead present with no visible vegetation.    There is a mechanical mitral valve with moderately increased gradients similar to most recent TTE.  The bileaflet valve appears to be functioning normally with no visible thrombus or vegetation.  There is mild MR around the periphery with some potential perivalvular leak.    Aortic valve is mildly calcified with trace AR, no vegetation seen.    The tricuspid valve appears structurally normal with no vegetation.  There is moderate to severe central tricuspid  regurgitation around the site of the lead transversing the valve.      DISCUSSION:  63 y.o. female with history of modified replacement and implanted defibrillator admitted with acute kidney injury and GI bleed.   Patient reported cervical adenopathy confirmed on CT imaging and developed fever while hospitalized.  Resp PCR and monospot negative.    PROBLEM LIST:   -- Undifferentiated fever with sweats - intermittent/improved  COVID-19/flu/RSV PCR negative.  Full resp PCR negative.  Multiple sets blood cultures no growth.  At risk for endocarditis with implanted ICD and valve replacement.  Recent dental issues with broken/necrotic right mandibular tooth and painful left maxillary at prior root canal; at risk for odontogenic source infection.  MEAGAN without valve or lead vegetation.  Maxillofacial CT without acute process or abscess.  -- Cervical adenopathy - resolving.  No pharyngitis or sinusitis.  Monospot negative.  -- Recent Candida esophagitis.  HIV negative.  -- DMII.  -- Implanted defibrillator.  -- History of mitral valve replacement.  On chronic anticoagulation.  -- MEERA, ongoing.  -- Rectal bleeding and anemia.  CT without acute intra-abdominal abnormality.  -- Back pain/sciatica symptoms; MRI L-spine with degenerative changes; no acute abnormality.  -- Oral thrush, mild.    PLAN:  -- Continue empiric ceftriaxone 2 g iv daily for potential odontogenic source of fever  -- Plan discharge with prolonged oral Augmentin 500 mg po bid until she can have her dental issues addressed outpatient - she has appt at  dental clinic later this month.  -- Follow-up with me in 2 weeks  -- Continue nystatin swish and swallow for oral thrush    Plan discussed with patient.      Maciej Willett MD  1/15/2024  12:31 EST

## 2024-01-15 NOTE — THERAPY PROGRESS REPORT/RE-CERT
Patient Name: Di Lopez  : 1960    MRN: [id number]                              Today's Date: 1/15/2024       Admit Date: 2024    Visit Dx:     ICD-10-CM ICD-9-CM   1. Acute renal failure, unspecified acute renal failure type  N17.9 584.9   2. Hyperkalemia  E87.5 276.7   3. Gastrointestinal hemorrhage, unspecified gastrointestinal hemorrhage type  K92.2 578.9   4. Acute renal failure superimposed on chronic kidney disease, unspecified acute renal failure type, unspecified CKD stage  N17.9 584.9    N18.9 585.9   5. Chronic respiratory failure with hypoxia  J96.11 518.83     799.02   6. Weakness  R53.1 780.79   7. Dysphagia, unspecified type  R13.10 787.20   8. Type 2 diabetes mellitus with hyperglycemia, with long-term current use of insulin  E11.65 250.00    Z79.4 790.29     V58.67     Patient Active Problem List   Diagnosis    Arteriovenous malformation of gastrointestinal tract    Depression    Type 2 diabetes mellitus without complication, with long-term current use of insulin    Dyslipidemia    Emphysema/COPD    Hypertension    Insomnia    Morbid obesity    Obstructive sleep apnea syndrome    Chronic anticoagulation    Normocytic anemia    Sliding hiatal hernia    Vitamin B12 deficiency    Iron deficiency    Iron malabsorption    Bronchiectasis without complication    Electronic cigarette use    Chronic diastolic heart failure    Left bundle branch hemiblock    Chronic respiratory failure with hypoxia    Body aches    Headaches due to old head injury    History of tobacco use, presenting hazards to health    Multiple nodules of lung    Nocturnal hypoxemia    Screening for colon cancer    Abnormal CT scan    Wellness examination    ARF (acute renal failure)    H/O heart valve replacement with mechanical valve    GI bleed    Severe malnutrition    Hyponatremia    Hyperkalemia    Type 2 diabetes mellitus with hyperglycemia    Elevated LFTs    Anemia, chronic disease    Cervical lymphadenopathy     Renal failure     Past Medical History:   Diagnosis Date    Allergic     Anemia     Anticoagulated     WARFARIN    Arteriovenous malformation of gastrointestinal tract 05/12/2016    Arthritis     Asthma     Black hairy tongue     CHF (congestive heart failure)     Chronic back pain     Coronary artery disease     Depression 05/12/2016    Diabetes mellitus     Dyslipidemia 05/12/2016    Elevated cholesterol     Emphysema/COPD 05/12/2016    Emphysema/COPD 05/12/2016    Fatigue     GERD (gastroesophageal reflux disease)     Headache     Herniated cervical disc     History of echocardiogram 07/16/2013    Left ventricular systolic function at the lower limits of normal; EF 50-55%; mild MR; mechanical prosthetic mitral valve present; mod TR    History of transfusion     NO REACTION; [facility]    Hyperlipidemia     Hypertension     Insomnia 05/12/2016    Iron deficiency     A.  The patient was treated with IV iron replacement. B.  Secondary to GI bleed, status post EGD with cauterization of blood vessel to the duodenum, 7/16/2014.    Left bundle branch hemiblock 10/12/2020    Left shoulder strain     Liver disease     Liver mass     Long term current use of anticoagulant 05/12/2016    Low back pain     Lung nodules     PER DR. LEVI'S NOTE 10/22    Morbid obesity 05/12/2016    Neuromuscular disorder 7/28/2016    Obstructive sleep apnea syndrome 05/12/2016    Description: A.  Moderate; CPAP therapy..    Sliding hiatal hernia 07/28/2016    Description: A.  Reported on EGD, 12/30/2011.    Type 2 diabetes mellitus 05/12/2016    Urinary tract infection     Uterine cancer     HYSTERECTOMY    Vitamin B12 deficiency     A.  The patient was treated with vitamin B12 replacement.    Wears eyeglasses      Past Surgical History:   Procedure Laterality Date    BILATERAL OOPHORECTOMY  2009    Status post bilateral oophorectomy secondary to ovarian cysts     BREAST BIOPSY Right     STEREOTACTIC    BREAST EXCISIONAL BIOPSY Bilateral      CARDIAC ELECTROPHYSIOLOGY PROCEDURE N/A 10/15/2020    Procedure: Biventricular Device Insertion;  Surgeon: Nadir Everett MD;  Location:  [facility];  Service: Cardiology;  Laterality: N/A;    CARDIAC SURGERY  2003    CARPAL TUNNEL RELEASE Bilateral     COLONOSCOPY  12/28/2012    COLONOSCOPY N/A 5/23/2023    Procedure: COLONOSCOPY;  Surgeon: Gt Fernandes MD;  Location:  [facility];  Service: Gastroenterology;  Laterality: N/A;    COLONOSCOPY N/A 11/16/2023    Procedure: COLONOSCOPY;  Surgeon: Brunner, Mark I, MD;  Location:  [facility];  Service: Gastroenterology;  Laterality: N/A;    CYSTECTOMY      removal of ganglion cyst from left wrist    ENDOSCOPY  12/30/2011    DIAGNOSTIC ESOPHAGOGASTRODUODENOSCOPY    ENDOSCOPY N/A 11/16/2023    Procedure: ESOPHAGOGASTRODUODENOSCOPY;  Surgeon: Brunner, Mark I, MD;  Location:  [facility];  Service: Gastroenterology;  Laterality: N/A;    HYSTERECTOMY  1995    A.  Status post hysterectomy for early stage endometrial cancer done in 1995.    MITRAL VALVE REPLACEMENT  03/03/2008    Dr. Hima Barreto; MECHANICAL    OTHER SURGICAL HISTORY      LIVER MASS BLOOD SUPPLY CLIPPED ON RIGHT AND LEFT SIDE PER PT      General Information       Row Name 01/15/24 1535          OT Time and Intention    Document Type progress note/recertification  -JY     Mode of Treatment occupational therapy;individual therapy  -JY       Row Name 01/15/24 1531          General Information    Patient Profile Reviewed yes  -JY     Existing Precautions/Restrictions fall;oxygen therapy device and L/min  -JY     Barriers to Rehab medically complex  -JY       Row Name 01/15/24 1535          Cognition    Orientation Status (Cognition) oriented x 4  -JY       Row Name 01/15/24 1534          Safety Issues, Functional Mobility    Safety Issues Affecting Function (Mobility) awareness of need for assistance;insight into deficits/self-awareness;safety precaution  awareness;safety precautions follow-through/compliance;sequencing abilities  -JY     Impairments Affecting Function (Mobility) endurance/activity tolerance;pain;strength;balance  -JY     Comment, Safety Issues/Impairments (Mobility) pt alert and able to follow commands, required safety cues intermittently  -JY               User Key  (r) = Recorded By, (t) = Taken By, (c) = Cosigned By      Initials Name Provider Type    Ann Ramey, OT Occupational Therapist                   Lymphedema       Row Name 01/15/24 1100             Lymphedema Edema Assessment    Ptting Edema Category By severity  -      Pitting Edema Moderate  -LH      Recorded by [LH] Luiz Melvin, PT              Skin Changes/Observations    Location/Assessment Lower Extremity  -      Lower Extremity Conditions bilateral:;intact;clean;dry;normal  -LH      Lower Extremity Color/Pigment bilateral:;normal  -LH      Recorded by [LH] Luiz Melvin, PT              Lymphedema Pulses/Capillary Refill    Lymphedema Pulses/Capillary Refill lower extremity pulses;capillary refill  -      Dorsalis Pedis Pulse right:;left:;+2 normal  -      Posterior Tibialis Pulse right:;left:;+2 normal  -      Capillary Refill lower extremity capillary refill  -      Lower Extremity Capillary Refill right:;left:;less than 3 seconds  -LH      Recorded by [LH] Luiz Melvin, PT              Lymphedema Measurements    Measurement Type(s) Quick Girth  -      Quick Girth Areas Lower extremities  -LH      Recorded by [LH] Luzi Melvin, PT              LLE Quick Girth (cm)    Mid foot 23.8 cm  -LH      Smallest ankle 25.5 cm  -LH      Largest calf 36.8 cm  -LH      Recorded by [LH] Luiz Melvin, PT              RLE Quick Girth (cm)    Mid foot 23.9 cm  -LH      Smallest ankle 24.9 cm  -LH      Largest calf 36.6 cm  -LH      RLE Quick Girth Total 85.4  -LH      Recorded by [LH] Luiz Melvin, PT              Compression/Skin Care     Compression/Skin Care skin care;wrapping location  -      Skin Care washed/dried;lotion applied  -      Wrapping Location lower extremity  -      Wrapping Location LE bilateral:;foot to knee  -      Wrapping Comments BLE size 4/5 compressogrips applied doubled and overlapping for gradient compression.  -LH      Recorded by [] Luiz Melvin, PT                User Key  (r) = Recorded By, (t) = Taken By, (c) = Cosigned By      Initials Name Effective Dates     Luiz Melvin, PT 09/21/21 -                    Mobility/ADL's       Row Name 01/15/24 1541          Bed Mobility    Bed Mobility other (see comments)  not assessed  -JY     Rolling Right [place] (Bed Mobility) not tested  -JY     Scooting/Bridging [place] (Bed Mobility) not tested  -JY     Supine-Sit [place] (Bed Mobility) not tested  -JY     Sit-Supine [place] (Bed Mobility) not tested  -JY     Comment, (Bed Mobility) pt up in bathroom upon OT arrival and preferred to sit in recliner at end of session thus bed mobility not assessed this date  -JY       Row Name 01/15/24 1541          Transfers    Transfers sit-stand transfer;stand-sit transfer  -Y       Row Name 01/15/24 1541          Sit-Stand Transfer    Sit-Stand [place] (Transfers) supervision  -JY     Assistive Device (Sit-Stand Transfers) other (see comments)  no AD used  -Y       Row Name 01/15/24 1541          Stand-Sit Transfer    Stand-Sit [place] (Transfers) supervision  -JY     Assistive Device (Stand-Sit Transfers) other (see comments)  no AD used  -Y       Row Name 01/15/24 1541          Toilet Transfer    [place] Level (Toilet Transfer) not tested  -JY       Row Name 01/15/24 1541          Functional Mobility    Functional Mobility- Comment deferred to PT as pt declined, focus of OT session on ADLs in prep for anticipated d/c  -JY       Row Name 01/15/24 1541          Activities of Daily Living    BADL Assessment/Intervention upper body  dressing;lower body dressing;grooming  -JY       [facility] Name 01/15/24 1541          Lower Body Dressing Assessment/Training    [place] Level (Lower Body Dressing) doff;don;pants/bottoms;contact guard assist;verbal cues  -JY     Position (Lower Body Dressing) unsupported sitting  -JY     Comment, (Lower Body Dressing) reviewed with pt use of LH AE to aid in distal reach while decreasing energy expenditure and improving reach given safety parameters in place for LE skin integrity  -[facility] Name 01/15/24 1541          Grooming Assessment/Training    [place] Level (Grooming) wash face, hands;set up  -JY     Position (Grooming) unsupported standing  -JY       [facility] Name 01/15/24 1541          Upper Body Dressing Assessment/Training    [place] Level (Upper Body Dressing) doff;don;pajama/robe;contact guard assist;verbal cues  -JY     Position (Upper Body Dressing) unsupported sitting;unsupported standing  -JY     Comment, (Upper Body Dressing) CGA for most proximal and posterior mgmt of gown  -JY               User Key  (r) = Recorded By, (t) = Taken By, (c) = Cosigned By      Initials Name Provider Type    Ann Ramey, OT Occupational Therapist                   Obj/Interventions       [facility] Name 01/15/24 1545          Sensory Assessment (Somatosensory)    Sensory Assessment (Somatosensory) bilateral UE;sensation intact  -     Bilateral UE Sensory Assessment general sensation;light touch awareness;intact  -     Sensory Assessment denies any numbness or tingling and able to recognize LT stimuli as intact and symmetrical at BUEs  -[facility] Name 01/15/24 1545          Vision Assessment/Intervention    Visual Impairment/Limitations corrective lenses full-time  -     Vision Assessment Comment no acute changes noted  -[facility] Name 01/15/24 1545          Range of Motion Comprehensive    General Range of Motion bilateral upper extremity ROM WFL  -[facility] Name 01/15/24 1545          Strength  Comprehensive (MMT)    General Manual Muscle Testing (MMT) Assessment upper extremity strength deficits identified  -JY     Comment, General Manual Muscle Testing (MMT) Assessment BUE MMS 4+/5 per MMT  -JY       [facility] Name 01/15/24 1545          Motor Skills    Motor Skills functional endurance  -JY     Functional Endurance decreased activity tolerance toward more dynamic demands, improving  -JY       Row Name 01/15/24 1545          Balance    Balance Assessment sitting static balance;sitting dynamic balance;standing static balance;standing dynamic balance  -JY     Static Sitting Balance independent  -JY     Dynamic Sitting Balance independent  -JY     Position, Sitting Balance unsupported;sitting in chair  -JY     Static Standing Balance supervision  -JY     Dynamic Standing Balance supervision  -JY     Position/Device Used, Standing Balance other (see comments)  no AD used  -JY     Balance Interventions sitting;standing;static;dynamic;sit to stand;supported;occupation based/functional task  -JY     Comment, Balance no overt LOB during seated or standing tasks  -JY               User Key  (r) = Recorded By, (t) = Taken By, (c) = Cosigned By      Initials Name Provider Type    Ann Ramey OT Occupational Therapist                   Goals/Plan       Row Name 01/15/24 1552          Transfer Goal 1 (OT)    Activity/Assistive Device (Transfer Goal 1, OT) sit-to-stand/stand-to-sit;toilet;walker, rolling;commode, 3-in-1  -JY     [place] Level/Cues Needed (Transfer Goal 1, OT) supervision required  -JY     Time Frame (Transfer Goal 1, OT) long term goal (LTG);by discharge  -JY     Progress/Outcome (Transfer Goal 1, OT) good progress toward goal;goal ongoing  -JY       Row Name 01/15/24 1552          Dressing Goal 1 (OT)    Activity/Device (Dressing Goal 1, OT) lower body dressing;reacher;sock-aid  -JY     [place]/Cues Needed (Dressing Goal 1, OT) supervision required;verbal cues required  -JY      Progress/Outcome (Dressing Goal 1, OT) goal ongoing  -JY       Row Name 01/15/24 1552          Toileting Goal 1 (OT)    Activity/Device (Toileting Goal 1, OT) toileting skills, all;raised toilet seat;grab bar/safety frame  -JY     [place] Level/Cues Needed (Toileting Goal 1, OT) modified independence  -JY     Time Frame (Toileting Goal 1, OT) long term goal (LTG);by discharge  -JY     Progress/Outcome (Toileting Goal 1, OT) goal ongoing  -JY       Row Name 01/15/24 1552          Therapy Assessment/Plan (OT)    Planned Therapy Interventions (OT) activity tolerance training;adaptive equipment training;functional balance retraining;neuromuscular control/coordination retraining;occupation/activity based interventions;patient/caregiver education/training;ROM/therapeutic exercise;strengthening exercise;transfer/mobility retraining  -JY               User Key  (r) = Recorded By, (t) = Taken By, (c) = Cosigned By      Initials Name Provider Type    Ann Ramey, CELESTINE Occupational Therapist                   Clinical Impression       Row Name 01/15/24 1547          Pain Assessment    Pretreatment Pain Rating 3/10  -JY     Posttreatment Pain Rating 3/10  -JY     Pain Location - Side/Orientation Bilateral  -JY     Pain Location lower  -JY     Pain Location - extremity  -JY     Pre/Posttreatment Pain Comment persistent pain at BLEs, not limiting to OT interventions this date  -JY     Pain Intervention(s) Repositioned;Ambulation/increased activity;Rest;Nursing Notified  -JY       Row Name 01/15/24 1697          Plan of Care Review    Plan of Care Reviewed With patient  -JY     Progress improving  -JY     Outcome Evaluation OT re cert completed. Pt presents with some remaining underlying impairments impacting function. Pt still below occuaptional performance baseline limited by decreased activity tolerance, impaired balance, muscle weakness at BUEs and decreased safety awareness. Pt requires gross spv for fxl STS and min  in room ADL related mobility and gross CGA for UBD, LBD. Pt demonstrates improved activity tolerance and improved reach distally with understanding of use of LH AE for LB ADLs. Recommend IPOT POC while pt hospitalized, home w/ A at d/c when medically ready.  -JY       Row Name 01/15/24 1547          Therapy Assessment/Plan (OT)    Rehab Potential (OT) good, to achieve stated therapy goals  -JY     Therapy Frequency (OT) daily  -JY       Row Name 01/15/24 1547          Therapy Plan Review/Discharge Plan (OT)    Anticipated Discharge Disposition (OT) home with assist  -JY       Row Name 01/15/24 1547          Vital Signs    Pre Systolic BP Rehab 133  -JY     Pre Treatment Diastolic BP 80  -JY     Pretreatment Heart Rate (beats/min) 85  -JY     O2 Delivery Pre Treatment room air  -JY     O2 Delivery Intra Treatment room air  -JY     O2 Delivery Post Treatment room air  -JY     Pre Patient Position Standing  -JY     Intra Patient Position Sitting  -JY     Post Patient Position Sitting  -JY       Row Name 01/15/24 1547          Positioning and Restraints    Pre-Treatment Position bathroom  -JY     Post Treatment Position chair  -JY     In Chair notified nsg;sitting;encouraged to call for assist;call light within reach  RN acknowledged no exit alarm engaged at OT arrival and approved no chair alarm engaged upon OT exit  -JY               User Key  (r) = Recorded By, (t) = Taken By, (c) = Cosigned By      Initials Name Provider Type    Ann Ramey, CELESTINE Occupational Therapist                   Outcome Measures       Row Name 01/15/24 4393          How much help from another is currently needed...    Putting on and taking off regular lower body clothing? 3  -JY     Bathing (including washing, rinsing, and drying) 3  -JY     Toileting (which includes using toilet bed pan or urinal) 3  -JY     Putting on and taking off regular upper body clothing 3  -JY     Taking care of personal grooming (such as brushing teeth) 4  -JY      Eating meals 4  -JY     AM-PAC 6 Clicks Score (OT) 20  -JY       Row Name 01/15/24 1553          Functional Assessment    Outcome Measure Options AM-PAC 6 Clicks Daily Activity (OT)  -JY               User Key  (r) = Recorded By, (t) = Taken By, (c) = Cosigned By      Initials Name Provider Type    Ann Ramey OT Occupational Therapist                    Occupational Therapy Education       Title: PT OT SLP Therapies (In Progress)       Topic: Occupational Therapy (In Progress)       Point: ADL training (In Progress)       Description:   Instruct learner(s) on proper safety adaptation and remediation techniques during self care or transfers.   Instruct in proper use of assistive devices.                  Learning Progress Summary             Patient Acceptance, E,D, NR by HARLEY at 1/15/2024 1520    Acceptance, E,D, VU,DU,NR by TB at 1/9/2024 1522    Acceptance, E, VU by MR at 1/5/2024 1514    Eager, E,TB,D,H, VU by AR at 1/2/2024 1329                         Point: Home exercise program (Done)       Description:   Instruct learner(s) on appropriate technique for monitoring, assisting and/or progressing therapeutic exercises/activities.                  Learning Progress Summary             Patient Acceptance, E,D, VU,DU,NR by TB at 1/9/2024 1522    Acceptance, E, VU by MR at 1/5/2024 1514    Eager, E,TB,D,H, VU by AR at 1/2/2024 1329                         Point: Precautions (In Progress)       Description:   Instruct learner(s) on prescribed precautions during self-care and functional transfers.                  Learning Progress Summary             Patient Acceptance, E,D, NR by HARLEY at 1/15/2024 1520    Acceptance, E, VU by MR at 1/5/2024 1514    Eager, E,TB,D,H, VU by AR at 1/2/2024 1329                         Point: Body mechanics (In Progress)       Description:   Instruct learner(s) on proper positioning and spine alignment during self-care, functional mobility activities and/or exercises.                   Learning Progress Summary             Patient Acceptance, E,D, NR by JY at 1/15/2024 1520    Acceptance, E, VU by MR at 1/5/2024 1514    Domingo, OSVALDO,TB,D,H, VU by AR at 1/2/2024 1329                                         User Key       Initials Effective Dates Name Provider Type Discipline    TB 07/11/23 -  Nicki Chirinos, OT Occupational Therapist OT    AR 07/11/23 -  Marina Aguayo, OT Occupational Therapist OT    JY 06/16/21 -  Ann Treadwell, OT Occupational Therapist OT    MR 09/22/22 -  Sammie Matt, OT Occupational Therapist OT                  OT Recommendation and Plan  Planned Therapy Interventions (OT): activity tolerance training, adaptive equipment training, functional balance retraining, neuromuscular control/coordination retraining, occupation/activity based interventions, patient/caregiver education/training, ROM/therapeutic exercise, strengthening exercise, transfer/mobility retraining  Therapy Frequency (OT): daily  Plan of Care Review  Plan of Care Reviewed With: patient  Progress: improving  Outcome Evaluation: OT re cert completed. Pt presents with some remaining underlying impairments impacting function. Pt still below occuaptional performance baseline limited by decreased activity tolerance, impaired balance, muscle weakness at BUEs and decreased safety awareness. Pt requires gross spv for fxl STS and min in room ADL related mobility and gross CGA for UBD, LBD. Pt demonstrates improved activity tolerance and improved reach distally with understanding of use of LH AE for LB ADLs. Recommend IPOT POC while pt hospitalized, home w/ A at d/c when medically ready.     Time Calculation:         Time Calculation- OT       Row Name 01/15/24 1554             Time Calculation- OT    OT Start Time 1520  -JY      OT Received On 01/15/24  -JY      OT Goal Re-Cert Due Date 01/25/24  -JY         Timed Charges    93188 - OT Therapeutic Activity Minutes 10  -JY      61594 - OT Self Care/Mgmt  Minutes 14  -JY         Total Minutes    Timed Charges Total Minutes 24  -JY       Total Minutes 24  -JY                User Key  (r) = Recorded By, (t) = Taken By, (c) = Cosigned By      Initials Name Provider Type    Ann Ramey OT Occupational Therapist                  Therapy Charges for Today       Code Description Service Date Service Provider Modifiers Qty    [id number]  OT THERAPEUTIC ACT EA 15 MIN 1/15/2024 Ann Treadwell OT GO 1    [id number]  OT SELF CARE/MGMT/TRAIN EA 15 MIN 1/15/2024 Ann Treadwell OT GO 1                 Ann Treadwell OT  1/15/2024

## 2024-01-15 NOTE — OUTREACH NOTE
Prep Survey      Flowsheet Row Responses   [facility] patient discharged from? [facility]   Is LACE score < 7 ? No   Eligibility [facility]   Date of Admission 01/01/24   Date of Discharge 01/15/24   Discharge Disposition Home-Health Care Sv   Discharge diagnosis Renal failure   Does the patient have one of the following disease processes/diagnoses(primary or secondary)? Other   Does the patient have Home health ordered? Yes   What is the Home health agency?  [facility]   Is there a DME ordered? Yes   What DME was ordered? [facility]- O2 adapter, portable tank   Prep survey completed? Yes            [facility] - Registered Nurse

## 2024-01-15 NOTE — CASE MANAGEMENT/SOCIAL WORK
Case Management Discharge Note      Final Note: Ms. Lopez will be discharged today.  She continues to plan to return home with Home Health services.  I have notified Alesia with [facility] of today's discharge as well as [facility] to be added to skilled nursing orders.  I have contacted [facility] and confirmed that they will deliver a replacement portable O2 tank for the empty one that is currently in the room.  I have asked ROBYN Guevara to see Ms. Lopez to discuss resources for meals.  Ms. Lopez states that she will have a friend provide transportation later today.  She denies any additional discharge needs at this time.         Selected Continued Care - Admitted Since 1/1/2024       Destination    No services have been selected for the patient.                Durable Medical Equipment Coordination complete.      Service Provider Selected Services Address Phone Fax Patient Preferred    [facility] [address] [address] [phone] --       Internal Comment last updated by Monika Alvarado RN 1/5/2024 1115    1/5/24-Spoke with [facility] to request an O2 adapter for her to hook it up to her cpap machine. They will deliver portable tank today. They will deliver it today.                          Dialysis/Infusion    No services have been selected for the patient.                Home Medical Care Coordination complete.      Service Provider Selected Services Address Phone Fax Patient Preferred     [facility] ,  Home Rehabilitation [address] [facility] [id number] [phone] --              Therapy    No services have been selected for the patient.                Community Resources    No services have been selected for the patient.                [facility] [place]    No services have been selected for the patient.                    Selected Continued Care - Episodes Includes  continued care and service providers with selected services from the active episodes listed below      [facility] Episode start date: 9/29/2023   There are no active outsourced providers for this episode.                 Selected Continued Care - Prior Encounters Includes continued care and service providers with selected services from prior encounters from 10/3/2023 to 1/15/2024      Discharged on 11/22/2023 Admission date: 11/9/2023 - Discharge disposition: Home or Self Care      Durable Medical Equipment       Service Provider Selected Services Address Phone Fax Patient Preferred    [facility] [place] [facility] ,  Durable Medical Equipment [address] [id number] [phone] --                               Final Discharge Disposition Code: 06 - home with home health care

## 2024-01-16 ENCOUNTER — TRANSITIONAL CARE MANAGEMENT TELEPHONE ENCOUNTER (OUTPATIENT)
Dept: CALL CENTER | Facility: HOSPITAL | Age: 64
End: 2024-01-16
Payer: MEDICARE

## 2024-01-16 NOTE — OUTREACH NOTE
Call Center TCM Note      Flowsheet Row Responses   [facility] patient discharged from? [place]   Does the patient have one of the following disease processes/diagnoses(primary or secondary)? Other   TCM attempt successful? Yes   Call start time 1323   Call end time 1334   Discharge diagnosis Renal failure   Meds reviewed with patient/caregiver? Yes   Is the patient having any side effects they believe may be caused by any medication additions or changes? Yes   Side effects comments  pt states there is a little bit of blood in her mucus, both nasal and oral-states it may be d/t the blood thinners she was given-nurse instructed pt to call PCP   Does the patient have all medications ordered at discharge? No   What is keeping the patient from filling the prescriptions? --  [Pharmacy is waiting for a return call from prescriber re: Pt was prescribed Amoxicillin but has Penicillins listed under her allergies]   Nursing Interventions Nurse called pharmacy   Notified Case Management Script issues   Comments Hosp dc fu apt on 1/25/24   Does the patient have an appointment with their PCP within 7-14 days of discharge? Yes   What is the Home health agency?  Counts include 234 beds at the [facility]   What DME was ordered? "[employer]" - [place]- O2 adapter, portable tank   Has all DME been delivered? Yes   DME comments pt plans to purchase a pulse ox   Did the patient receive a copy of their discharge instructions? Yes   Nursing interventions Reviewed instructions with patient   What is the patient's perception of their health status since discharge? Same  [pt states she is a little weaker and has noticed blood in her mucus (both oral and nasal)-pt states she has been on a blood thinner-pt was instructed to contact her PCP]   Is the patient/caregiver able to teach back signs and symptoms related to disease process for when to call PCP? Yes   Is the patient/caregiver able to teach back signs and symptoms related to disease process for when  to call 911? Yes   Is the patient/caregiver able to teach back the hierarchy of who to call/visit for symptoms/problems? PCP, Specialist, Home health nurse, Urgent Care, ED, 911 Yes   If the patient is a current smoker, are they able to teach back resources for cessation? Not a smoker   TCM call completed? Yes   Call end time [id number]            Bia Radford RN    1/16/2024, 13:44 EST

## 2024-01-17 ENCOUNTER — HOME CARE VISIT (OUTPATIENT)
Dept: HOME HEALTH SERVICES | Facility: HOME HEALTHCARE | Age: 64
End: 2024-01-17
Payer: MEDICARE

## 2024-01-18 NOTE — PAYOR COMM NOTE
"Janine Malone RN  Utilization Management  P:[phone]  F:[phone]    Auth# [id number]     Neha Monterroso \"Shiloh\" (63 y.o. Female)       Date of Birth   1960    Social Security Number       Address   [address] [place] [address]    Home Phone   [phone]    MRN   [id number]       Tenriism   Yarsani    Marital Status   Single                            Admission Date   24    Admission Type   Emergency    Admitting Provider   Rhoda Holman MD    Attending Provider       Department, Room/Bed   [address], S382/1       Discharge Date   1/15/2024    Discharge Disposition   Home or Self Care    Discharge Destination   Home                              Attending Provider: (none)   Allergies: Adhesive Tape, Sildenafil, Codeine, Morphine, Penicillins, Statins, Sulfa Antibiotics    Isolation: None   Infection: None   Code Status: Prior    Ht: 152.4 cm (60\")   Wt: 89.2 kg (196 lb 9.6 oz)    Admission Cmt: None   Principal Problem: Renal failure [N19]                   Active Insurance as of 2024       Primary Coverage       Payor Plan Insurance Group Employer/Plan Group    ANTHEM MEDICARE REPLACEMENT ANTHEM MEDICARE ADVANTAGE [id number]       Payor Plan Address Payor Plan Phone Number Payor Plan Fax Number Effective Dates    [address] [phone]  2018 - None Entered    [facility] [address]         Subscriber Name Subscriber Birth Date Member ID       NEHA MONTERROSO 1960 [id number]                     Emergency Contacts        (Rel.) Home Phone Work Phone Mobile Phone    TARAOLIVIA LEI (Daughter) [phone] -- [phone]    LARS ROTHMAN (Significant Other) [phone] -- [phone]                 Discharge Summary        Rhoda Holman MD at 01/15/24 [facility]              [facility] Medicine Services  DISCHARGE SUMMARY    Patient Name: Neha Monterroso  : 1960  MRN: [id number]    Date of " Admission: 1/1/2024 10:53 PM  Date of Discharge:  1/15/2024  Primary Care Physician: Davina Santiago DO    Consults       Date and Time Order Name Status Description    1/7/2024  7:56 PM Inpatient Cardiology Consult Completed     1/5/2024  2:20 PM Inpatient Infectious Diseases Consult Completed     1/2/2024  4:03 AM Inpatient Gastroenterology Consult Completed             Hospital Course     Presenting Problem: malaise     Active Hospital Problems    Diagnosis  POA    **Renal failure [N19]  Yes    Hyponatremia [E87.1]  Yes    Hyperkalemia [E87.5]  Yes    Type 2 diabetes mellitus with hyperglycemia [E11.65]  Yes    Elevated LFTs [R79.89]  Yes    Anemia, chronic disease [D63.8]  Yes    Cervical lymphadenopathy [R59.0]  Yes    GI bleed [K92.2]  Yes    H/O heart valve replacement with mechanical valve [Z95.2]  Not Applicable    ARF (acute renal failure) [N17.9]  Yes    Left bundle branch hemiblock [I44.60]  Yes    Type 2 diabetes mellitus without complication, with long-term current use of insulin [E11.9, Z79.4]  Not Applicable    Dyslipidemia [E78.5]  Yes    Emphysema/COPD [J43.9]  Yes    Hypertension [I10]  Yes    Arteriovenous malformation of gastrointestinal tract [K55.20]  Yes    Obstructive sleep apnea syndrome [G47.33]  Yes    Chronic anticoagulation [Z79.01]  Not Applicable      Resolved Hospital Problems   No resolved problems to display.          Hospital Course:  Di Lopez is a 63 y.o. female with hisotry of mechanical prosthetic mitral valve on coumadin, also CHF COPD COPD DM CAD HTN LBBB, AVM of GI tract, and liver mass s/p embolization.  She was hospitalized in 11/2023 and treated for Candida esophagitis and diverticular bleeding.  Her creatinine at that time was 1.9.  Her legs were swollen.  She was given Lasix.  However, she continued to feel bad after discharge and basically was in bed with body aches.  She then again passed blood in her stool and came to the ED.        Epistaxis on coumadin -  resolved   Acute anemia  - afrin x 3 days with improvement  - transfused prbc one unit.       Febrile illness, suspect dental abscess  - extensive negative workup inclu full viral panel, negative monospot, spine MRI withOUT contrast negative  - CT abd/pel with no acute findings  - Bcx x 2 different sets NGTD  - MEAGAN without vegetation  -CT maxillofacial with no acute process  - she was followed by [facility] consultant; was suspected to have dental infection, has been on ceftriaxone during her stay and is discharging on Augmentin (reports she has tolerated amoxicillin before).  She states she has an appt with [facility] dental clinic this month.  - Reviewed iportance of outpatient dental appt given poor dentition and MECH mv   - - she should stay on Augmentin until her dental appt or as ordered by [facility]      Bilat leg pain R>L  Edema bilat   - this has been ongoing at home.    - MRI reviewed with degenerative changes  - She was started on Bumex and has been voiding a lot; leg are still quite swollen; compression wraps placed on 1/15.  Home health will continue these.      Acute Renal Failure 2/2 hypovolemia + med effect   - 2/2 acute diarrheal illness + entresto, NSAIDs  - Nephrology consulted and appreciate recs   - renal function overall stable with diuresis; however her previous baseline was lower  - follow up in [facility] office        Mechanical mitral valve on coumadin - Initially on heparin gtt.   -  Changed to lovenox and resume warfarin. Follows with Dr. Everett with home monitor   - INR 1.7.  Patient feels comfortable with lovenox -- will need additional Rx on DC      AoCHFpEF (EF 45-50%), s/p ICD   - diuresing      Chronic hypoxic resp failure 2/2 COPD - on home 2 liters n/c  DMII - basal/bolus regimen  Anxiety/depression  BMI 38 - complicates all aspects of care   Liver mass s/p embolization         Discharge Follow Up Recommendations for outpatient labs/diagnostics:   - FU with [facility] in 2 weeks, Dr Willett.     - FU with   "dental clinic as already scheduled    - FU with [facility] in 3 weeks    - Check INR at home per usual - may stop Lovenox bridge when INR is over 2.5.      - Cardiol FU per routine     Day of Discharge     HPI:   She now thinks she will be okay at home.  We discussed rehab facility but she prefers home and thinks she will be fine - \"my only problem is getting food, but they are arranging that.'    Review of Systems  Legs still sore but she is tolerating compression wrap and has kourtney up walking in room     Vital Signs:          Physical Exam:  Gen:  WD/WN woman in NAD on edge of bed   Neuro: alert and oriented, clear speech, follows commands, grossly nonfocal  HEENT:  NC/AT   Neck:  Supple, no LAD  Heart RRR   Lungs not labored   Abd:  Soft, nontender, no rebound or guarding, pos BS  Extrem:  No c/c.  Bilat leg edema, legs now in compression wraps       Pertinent  and/or Most Recent Results     LAB RESULTS:      Lab 01/15/24  0443 01/14/24  0644 01/13/24  0433 01/12/24  0438 01/11/24  0606   WBC 3.55 3.82 3.69 3.83 4.53   HEMOGLOBIN 7.7* 8.0* 7.8* 7.8* 7.7*   HEMATOCRIT 24.3* 25.5* 24.8* 25.1* 24.2*   PLATELETS 177 186 202 190 198   NEUTROS ABS 2.34  --   --  2.70  --    IMMATURE GRANS (ABS)  --   --   --  0.05  --    LYMPHS ABS  --   --   --  0.71  --    MONOS ABS  --   --   --  0.28  --    EOS ABS 0.11  --   --  0.08  --    MCV 85.3 87.3 86.7 87.8 86.4   PROTIME 24.2* 22.5* 20.0* 17.4* 15.7*         Lab 01/15/24  0443 01/14/24  1103 01/13/24  0433 01/12/24  0849 01/11/24  0606   SODIUM 137 141 137 137 139   POTASSIUM 3.6 3.6 3.5 4.2 4.5   CHLORIDE 94* 96* 96* 99 102   CO2 32.0* 32.0* 29.0 25.0 24.0   ANION GAP 11.0 13.0 12.0 13.0 13.0   BUN 26* 24* 24* 22 22   CREATININE 2.22* 2.31* 2.09* 2.31* 2.06*   EGFR 24.5* 23.4* 26.4* 23.4* 26.8*   GLUCOSE 150* 163* 166* 131* 128*   CALCIUM 8.5* 9.0 8.8 9.0 8.6               Lab 01/15/24  0443 01/14/24  0644 01/13/24  0433 01/12/24  0438 01/11/24  0606   PROTIME 24.2* 22.5* 20.0* " 17.4* 15.7*   INR 2.15* 1.96* 1.69* 1.41* 1.24*                 Brief Urine Lab Results  (Last result in the past 365 days)        Color   Clarity   Blood   Leuk Est   Nitrite   Protein   CREAT   Urine HCG        01/02/24 0011             142.2         01/02/24 0011 Yellow   Cloudy   Small (1+)   Negative   Negative   100 mg/dL (2+)                 Microbiology Results (last 10 days)       Procedure Component Value - Date/Time    Blood Culture - Blood, Arm, Left [[id number]]  (Normal) Collected: 01/08/24 1018    Lab Status: Final result Specimen: Blood from Arm, Left Updated: 01/13/24 1030     Blood Culture No growth at 5 days    Blood Culture - Blood, Hand, Left [[id number]]  (Normal) Collected: 01/08/24 1017    Lab Status: Final result Specimen: Blood from Hand, Left Updated: 01/13/24 1030     Blood Culture No growth at 5 days            CT Maxillofacial Without Contrast    Result Date: 1/9/2024  CT MAXILLOFACIAL WO CONT Date of Exam: 1/9/2024 5:18 PM EST Indication: Maxillofacial pain. Comparison: None. Technique: Axial CT images were obtained from the inferior aspect of the mandible through the frontal sinuses without contrast administration.  Reconstructed coronal and sagittal images were also obtained. Automated exposure control and iterative construction methods were used. Findings: The visualized intracranial contents are unremarkable. The globes and orbits appear intact. The nasopharynx is widely patent. No loculated fluid collection is identified to suggest an abscess. No acute fracture is identified. The paranasal sinuses are clear. The mastoid air cells are well aerated. No periapical lucency is identified along the teeth. The nasopharynx and oropharynx have a normal configuration. The larynx appears normal.     Impression: No acute process identified. Electronically Signed: Danis Jacobson MD  1/9/2024 8:05 PM EST  Workstation ID: [id number]    Adult Transesophageal Echo 3D (MEAGAN) W/ Cont If Necessary Per  Protocol    Result Date: 1/8/2024    Left ventricular ejection fraction appears to be 56 - 60%.   The right ventricular cavity is mildly dilated.  There is a electronically in the RV with no visible adherent mass or vegetation.   The right atrium is mildly dilated, there is electronic lead present with no visible vegetation.   There is a mechanical mitral valve with moderately increased gradients similar to most recent TTE.  The bileaflet valve appears to be functioning normally with no visible thrombus or vegetation.  There is mild MR around the periphery with some potential perivalvular leak.   Aortic valve is mildly calcified with trace AR, no vegetation seen.   The tricuspid valve appears structurally normal with no vegetation.  There is moderate to severe central tricuspid regurgitation around the site of the lead transversing the valve.     Adult Transthoracic Echo Complete W/ Cont if Necessary Per Protocol    Result Date: 1/6/2024    The right ventricular cavity is dilated.   The left atrial cavity is mildly dilated.   Left atrial volume is mildly increased.   The right atrial cavity is dilated.   The mitral valve mean gradient is 7 mmHg.   There is a mechanical mitral valve prosthesis present.   Moderate to severe tricuspid valve regurgitation is present.   Estimated right ventricular systolic pressure from tricuspid regurgitation is markedly elevated (>55 mmHg). Calculated right ventricular systolic pressure from tricuspid regurgitation is 88 mmHg.   LVEF estimated at 59%      Results for orders placed during the hospital encounter of 05/31/23    Duplex Carotid Ultrasound CAR    Interpretation Summary    Right internal carotid artery demonstrates a less than 50% stenosis.    Left internal carotid artery demonstrates normal flow without evidence of hemodynamically significant stenosis.    Vertebral artery flow is antegrade bilaterally      Results for orders placed during the hospital encounter of  05/31/23    Duplex Carotid Ultrasound CAR    Interpretation Summary    Right internal carotid artery demonstrates a less than 50% stenosis.    Left internal carotid artery demonstrates normal flow without evidence of hemodynamically significant stenosis.    Vertebral artery flow is antegrade bilaterally      Results for orders placed during the hospital encounter of 01/01/24    Adult Transesophageal Echo 3D (MEAGAN) W/ Cont If Necessary Per Protocol    Interpretation Summary    Left ventricular ejection fraction appears to be 56 - 60%.    The right ventricular cavity is mildly dilated.  There is a electronically in the RV with no visible adherent mass or vegetation.    The right atrium is mildly dilated, there is electronic lead present with no visible vegetation.    There is a mechanical mitral valve with moderately increased gradients similar to most recent TTE.  The bileaflet valve appears to be functioning normally with no visible thrombus or vegetation.  There is mild MR around the periphery with some potential perivalvular leak.    Aortic valve is mildly calcified with trace AR, no vegetation seen.    The tricuspid valve appears structurally normal with no vegetation.  There is moderate to severe central tricuspid regurgitation around the site of the lead transversing the valve.      Discharge Details        Discharge Medications        New Medications        Instructions Start Date   amoxicillin-clavulanate 500-125 MG per tablet  Commonly known as: Augmentin   500 mg, Oral, 2 Times Daily      bumetanide 2 MG tablet  Commonly known as: BUMEX   2 mg, Oral, Daily      Enoxaparin Sodium 100 MG/ML solution prefilled syringe syringe  Commonly known as: LOVENOX   90 mg, Subcutaneous, Every 24 Hours, May stop whenever INR is 2.5 or above             Changes to Medications        Instructions Start Date   Lantus SoloStar 100 UNIT/ML injection pen  Generic drug: Insulin Glargine  What changed: how much to take   5 Units,  Subcutaneous, Daily      lidocaine 5 %  Commonly known as: LIDODERM  What changed: See the new instructions.   USE 1 PATCH EXTERNALLY ONCE DAILY. REMOVE AND DISCARD PATCH WITHIN 12 HOURS OR AS DIRECTED BY MD      warfarin 4 MG tablet  Commonly known as: COUMADIN  What changed: Another medication with the same name was changed. Make sure you understand how and when to take each.   Once per day on Sun Mon Tue Wed Thu Sat      warfarin 2 MG tablet  Commonly known as: COUMADIN  What changed:   how much to take  how to take this   Take on 11/24/2023             Continue These Medications        Instructions Start Date   albuterol sulfate  (90 Base) MCG/ACT inhaler  Commonly known as: PROVENTIL HFA;VENTOLIN HFA;PROAIR HFA   2 puffs, Inhalation, Every 4 Hours PRN      escitalopram 20 MG tablet  Commonly known as: LEXAPRO   20 mg, Oral, Daily      ferrous sulfate 325 (65 FE) MG tablet   325 mg, Oral, Daily With Breakfast      fish oil 1000 MG capsule capsule   1,000 mg, Oral, 2 Times Daily With Meals      folic acid 800 MCG tablet  Commonly known as: FOLVITE   800 mcg, Oral, Daily      FreeStyle Dalia 2 [place] device   1 each, Does not apply, Daily, Use as directed daily to check blood sugars      FreeStyle Dalia 2 Sensor misc   USE AS DIRECTED FOR 14 DAYS      gabapentin 300 MG capsule  Commonly known as: NEURONTIN   300 mg, Oral, Nightly PRN      glucose blood test strip   Use as instructed 3 times a day      HYDROcodone-acetaminophen 7.5-325 MG per tablet  Commonly known as: NORCO   1 tablet, Oral, Every 8 Hours PRN      Insulin Lispro (1 Unit Dial) 100 UNIT/ML solution pen-injector  Commonly known as: HumaLOG KwikPen   Per correctional scale, MDD 50 units      Insulin Pen Needle 32G X 4 MM misc  Commonly known as: ReliOn Pen Needles   Use BID for E11.9      B-D UF III MINI PEN NEEDLES 31G X 5 MM misc  Generic drug: Insulin Pen Needle   USE 1  FIVE TIMES DAILY      Lancets misc   For use with glucose monitoring 3  "times daily      methocarbamol 500 MG tablet  Commonly known as: ROBAXIN   500 mg, Oral, 4 Times Daily PRN      nystatin 100,000 unit/mL suspension  Commonly known as: MYCOSTATIN   500,000 Units, Swish & Swallow, 4 Times Daily      omeprazole 20 MG capsule  Commonly known as: priLOSEC   20 mg, Oral, 2 Times Daily      simvastatin 5 MG tablet  Commonly known as: ZOCOR   TAKE 1 TABLET BY MOUTH ONCE DAILY AT NIGHT      solifenacin 5 MG tablet  Commonly known as: VESICARE   5 mg, Oral, Daily      spironolactone 25 MG tablet  Commonly known as: ALDACTONE   25 mg, Oral, Daily      Trulicity 4.5 MG/0.5ML solution pen-injector  Generic drug: Dulaglutide   Subcutaneous, Weekly      Vitamin D (Cholecalciferol) 10 MCG (400 UNIT) tablet  Commonly known as: CHOLECALCIFEROL   800 Units, Oral, Daily             Stop These Medications      azelastine 0.1 % nasal spray  Commonly known as: ASTELIN     carvedilol 12.5 MG tablet  Commonly known as: COREG     cefdinir 300 MG capsule  Commonly known as: OMNICEF     furosemide 40 MG tablet  Commonly known as: LASIX     glimepiride 4 MG tablet  Commonly known as: AMARYL     sacubitril-valsartan 24-26 MG tablet  Commonly known as: ENTRESTO              Allergies   Allergen Reactions    Adhesive Tape Other (See Comments)     \"pulls skin off\"    Sildenafil Shortness Of Breath and Other (See Comments)     pt c/o tightness in chest and trouble breathing    Codeine Itching and Nausea And Vomiting    Morphine Other (See Comments)     uncontrolable crying     Penicillins Hives    Statins Other (See Comments)     HX of Liver Issues    Sulfa Antibiotics Nausea And Vomiting         Discharge Disposition:  Home or Self Care    Diet:  Hospital:  No active diet order      Diet Instructions    Resume regular diet            Activity:  Activity Instructions    Take warfarin 7 mg tonight.  Check INR 1/16/24 then contact Dr. TRINH's office.              CODE STATUS:    Code Status and Medical Interventions: "   Ordered at: 01/02/24 0319     Code Status (Patient has no pulse and is not breathing):    CPR (Attempt to Resuscitate)     Medical Interventions (Patient has pulse or is breathing):    Full Support       Future Appointments   Date Time Provider Department Center   1/22/2024 To Be Determined Venita Pealyo OT [facility] None   1/22/2024 To Be Determined Evan Hart, PT [facility] None   1/25/2024  2:30 PM Jere Santiago DO [facility]   2/20/2024  2:30 PM Niya Gonzalez, APRN [facility]   2/22/2024 11:30 AM Ameena Iglesias MD [facility]   3/8/2024  2:15 PM Jere Santiago DO [facility]   5/8/2024 11:00 AM Jere Santiago DO [facility]       Additional Instructions for the Follow-ups that You Need to Schedule       Ambulatory Referral to Home Health   As directed      Face to Face Visit Date: 1/5/2024   Follow-up provider for Plan of Care?: I treated the patient in an acute care facility and will not continue treatment after discharge.   Follow-up provider: JERE SANTIAGO [[id number]]   Reason/Clinical Findings: Impaired Functional Mobility, Gait, Balance, and Endurance   Describe mobility limitations that make leaving home difficult: Impaired Functional Mobility, Gait, Balance, and Endurance   Nursing/Therapeutic Services Requested: Physical Therapy Occupational Therapy Skilled Nursing   Skilled nursing orders: CHF management (Kidney disease) Other   PT orders: Therapeutic exercise Gait Training Transfer training Strengthening Home safety assessment   Weight Bearing Status: As Tolerated   Occupational orders: Activities of daily living Home safety assessment Energy conservation Strengthening   Frequency: 1 Week 1        Discharge Follow-up with Specialty: [facility]; 3 Weeks   As directed      Specialty: [facility]   Follow Up: 3 Weeks        Discharge Follow-up with Specified Provider: Dr Maciej Willett; 2 Weeks   As directed      To: Dr Maciej Willett   Follow Up: 2  Uri Holman MD  01/17/24      Time Spent on Discharge:  I spent  45  minutes on this discharge activity which included: face-to-face encounter with the patient, reviewing the data in the system, coordination of the care with the nursing staff as well as consultants, documentation, and entering orders.            Electronically signed by Rhoda Holman MD at 01/17/24 4323

## 2024-01-19 ENCOUNTER — HOME CARE VISIT (OUTPATIENT)
Dept: HOME HEALTH SERVICES | Facility: HOME HEALTHCARE | Age: 64
End: 2024-01-19

## 2024-01-19 NOTE — CASE COMMUNICATION
Multiple attempts to reach Patient by phone have been made without success.  RN had initially scheduled a visit with no answer at door and no returned/answered phone calls since.  Patient will be a non admit at this time.

## 2024-01-25 ENCOUNTER — READMISSION MANAGEMENT (OUTPATIENT)
Dept: CALL CENTER | Facility: HOSPITAL | Age: 64
End: 2024-01-25
Payer: MEDICARE

## 2024-01-25 NOTE — OUTREACH NOTE
Medical Week 2 Survey      Flowsheet Row Responses   [facility] patient discharged from? [place]   Does the patient have one of the following disease processes/diagnoses(primary or secondary)? Other   Week 2 attempt successful? No   Unsuccessful attempts Attempt 1            JED CLAROS - Registered Nurse

## 2024-01-29 ENCOUNTER — READMISSION MANAGEMENT (OUTPATIENT)
Dept: CALL CENTER | Facility: HOSPITAL | Age: 64
End: 2024-01-29
Payer: MEDICARE

## 2024-01-29 NOTE — OUTREACH NOTE
Medical Week 2 Survey      Flowsheet Row Responses   [facility] patient discharged from? [place]   Does the patient have one of the following disease processes/diagnoses(primary or secondary)? Other   Week 2 attempt successful? No   Unsuccessful attempts Attempt 2            Gagan KAUR - Registered Nurse

## 2024-02-07 ENCOUNTER — READMISSION MANAGEMENT (OUTPATIENT)
Dept: CALL CENTER | Facility: HOSPITAL | Age: 64
End: 2024-02-07
Payer: MEDICARE

## 2024-02-07 NOTE — OUTREACH NOTE
Medical Week 3 Survey      Flowsheet Row Responses   [facility] patient discharged from? [place]   Does the patient have one of the following disease processes/diagnoses(primary or secondary)? Other   Week 3 attempt successful? No   Unsuccessful attempts Attempt 1            Yamilex GUADARRAMA - Registered Nurse

## (undated) DEVICE — HYBRID CO2 TUBING/CAP SET FOR OLYMPUS® SCOPES & CO2 SOURCE: Brand: ERBE

## (undated) DEVICE — CATH EP 6FR

## (undated) DEVICE — SOL IRR H2O BTL 1000ML STRL

## (undated) DEVICE — SAFELINER SUCTION CANISTER 1000CC: Brand: DEROYAL

## (undated) DEVICE — FIRST STEP BEDSIDE ADD WATER KIT - RESEALABLE STAND-UP POUCH, ENDOSCOPIC CLEANING PAD - 1 POUCH: Brand: FIRST STEP BEDSIDE ADD WATER KIT - RESEALABLE STAND-UP POUCH, ENDOSCOPIC CLEANIN

## (undated) DEVICE — CONTN GRAD MEAS TRIANG 32OZ BLK

## (undated) DEVICE — KT ORCA ORCAPOD DISP STRL

## (undated) DEVICE — 3M™ IOBAN™ 2 ANTIMICROBIAL INCISE DRAPE 6650EZ: Brand: IOBAN™ 2

## (undated) DEVICE — Device

## (undated) DEVICE — THE BITE BLOCK MAXI, LATEX FREE STRAP IS USED TO PROTECT THE ENDOSCOPE INSERTION TUBE FROM BEING BITTEN BY THE PATIENT.

## (undated) DEVICE — SYR LUERLOK 50ML

## (undated) DEVICE — TUBING, SUCTION, 1/4" X 10', STRAIGHT: Brand: MEDLINE

## (undated) DEVICE — LIMB HOLDER, WRIST/ANKLE: Brand: DEROYAL

## (undated) DEVICE — CATH DIAG EXPO .045 MPA1 5F 100CM

## (undated) DEVICE — ST LINER SAFECAP GRN RED CP STRL

## (undated) DEVICE — ADAPT CLN LUM OLYMP AIR/H20

## (undated) DEVICE — SOLIDIFIER LIQ PREMISORB 1500CC

## (undated) DEVICE — CATH DIAG EXPO .045 AL2 5F 100CM

## (undated) DEVICE — LEX CATH LAB MINOR: Brand: MEDLINE INDUSTRIES, INC.

## (undated) DEVICE — INTRO TEAR AWAY/LVD W/SD PRT 6F 13CM

## (undated) DEVICE — 3M™ STERI-DRAPE™ INSTRUMENT POUCH 1018: Brand: STERI-DRAPE™

## (undated) DEVICE — INTRO ACCSR BLNT TP

## (undated) DEVICE — GRADUATE CONTN 1000ML

## (undated) DEVICE — 3M™ TEGADERM™ CHG DRESSING 25/CARTON 4 CARTONS/CASE 1659: Brand: TEGADERM™

## (undated) DEVICE — GUIDE CATHETER: Brand: ACUITY® PRO

## (undated) DEVICE — LUBE JELLY FOIL PACK 1.4 OZ: Brand: MEDLINE INDUSTRIES, INC.

## (undated) DEVICE — SKIN PREP TRAY W/CHG: Brand: MEDLINE INDUSTRIES, INC.

## (undated) DEVICE — RADIFOCUS GLIDEWIRE: Brand: GLIDEWIRE

## (undated) DEVICE — SINGLE-USE BIOPSY FORCEPS: Brand: RADIAL JAW 4

## (undated) DEVICE — ARM SLING II: Brand: DEROYAL

## (undated) DEVICE — SPNG ENDO BEDSIDE TUB ENZYM

## (undated) DEVICE — THE SINGLE USE ETRAP – POLYP TRAP IS USED FOR SUCTION RETRIEVAL OF ENDOSCOPICALLY REMOVED POLYPS.: Brand: ETRAP

## (undated) DEVICE — INTRO TEAR AWAY/LVD W/SD PRT 10F 13CM

## (undated) DEVICE — SINGLE-USE POLYPECTOMY SNARE: Brand: SENSATION SHORT THROW